# Patient Record
Sex: FEMALE | Race: WHITE | HISPANIC OR LATINO | Employment: OTHER | URBAN - METROPOLITAN AREA
[De-identification: names, ages, dates, MRNs, and addresses within clinical notes are randomized per-mention and may not be internally consistent; named-entity substitution may affect disease eponyms.]

---

## 2017-01-16 ENCOUNTER — OFFICE VISIT (OUTPATIENT)
Dept: RHEUMATOLOGY | Facility: CLINIC | Age: 72
End: 2017-01-16
Payer: MEDICARE

## 2017-01-16 ENCOUNTER — LAB VISIT (OUTPATIENT)
Dept: LAB | Facility: HOSPITAL | Age: 72
End: 2017-01-16
Attending: INTERNAL MEDICINE
Payer: MEDICARE

## 2017-01-16 VITALS
DIASTOLIC BLOOD PRESSURE: 84 MMHG | HEART RATE: 85 BPM | SYSTOLIC BLOOD PRESSURE: 151 MMHG | WEIGHT: 125.69 LBS | BODY MASS INDEX: 25.34 KG/M2 | HEIGHT: 59 IN

## 2017-01-16 DIAGNOSIS — M81.0 OSTEOPOROSIS: ICD-10-CM

## 2017-01-16 DIAGNOSIS — M19.049 CMC ARTHRITIS: Primary | ICD-10-CM

## 2017-01-16 DIAGNOSIS — M15.4 EROSIVE OSTEOARTHRITIS OF RIGHT HAND: ICD-10-CM

## 2017-01-16 LAB
ALBUMIN SERPL BCP-MCNC: 3.9 G/DL
ALP SERPL-CCNC: 71 U/L
ALT SERPL W/O P-5'-P-CCNC: 14 U/L
ANION GAP SERPL CALC-SCNC: 9 MMOL/L
AST SERPL-CCNC: 20 U/L
BILIRUB SERPL-MCNC: 0.1 MG/DL
BUN SERPL-MCNC: 14 MG/DL
CALCIUM SERPL-MCNC: 9.7 MG/DL
CHLORIDE SERPL-SCNC: 103 MMOL/L
CO2 SERPL-SCNC: 26 MMOL/L
CREAT SERPL-MCNC: 0.8 MG/DL
EST. GFR  (AFRICAN AMERICAN): >60 ML/MIN/1.73 M^2
EST. GFR  (NON AFRICAN AMERICAN): >60 ML/MIN/1.73 M^2
GLUCOSE SERPL-MCNC: 97 MG/DL
POTASSIUM SERPL-SCNC: 4.3 MMOL/L
PROT SERPL-MCNC: 7.3 G/DL
SODIUM SERPL-SCNC: 138 MMOL/L

## 2017-01-16 PROCEDURE — 3077F SYST BP >= 140 MM HG: CPT | Mod: S$GLB,,, | Performed by: INTERNAL MEDICINE

## 2017-01-16 PROCEDURE — 1157F ADVNC CARE PLAN IN RCRD: CPT | Mod: S$GLB,,, | Performed by: INTERNAL MEDICINE

## 2017-01-16 PROCEDURE — 36415 COLL VENOUS BLD VENIPUNCTURE: CPT

## 2017-01-16 PROCEDURE — 3079F DIAST BP 80-89 MM HG: CPT | Mod: S$GLB,,, | Performed by: INTERNAL MEDICINE

## 2017-01-16 PROCEDURE — 99999 PR PBB SHADOW E&M-EST. PATIENT-LVL III: CPT | Mod: PBBFAC,,, | Performed by: INTERNAL MEDICINE

## 2017-01-16 PROCEDURE — 1160F RVW MEDS BY RX/DR IN RCRD: CPT | Mod: S$GLB,,, | Performed by: INTERNAL MEDICINE

## 2017-01-16 PROCEDURE — 99214 OFFICE O/P EST MOD 30 MIN: CPT | Mod: S$GLB,,, | Performed by: INTERNAL MEDICINE

## 2017-01-16 PROCEDURE — 99499 UNLISTED E&M SERVICE: CPT | Mod: S$GLB,,, | Performed by: INTERNAL MEDICINE

## 2017-01-16 PROCEDURE — 80053 COMPREHEN METABOLIC PANEL: CPT

## 2017-01-16 PROCEDURE — 1159F MED LIST DOCD IN RCRD: CPT | Mod: S$GLB,,, | Performed by: INTERNAL MEDICINE

## 2017-01-16 RX ORDER — HYDROCODONE BITARTRATE AND ACETAMINOPHEN 5; 325 MG/1; MG/1
1 TABLET ORAL EVERY 8 HOURS
Refills: 0 | COMMUNITY
Start: 2016-12-22 | End: 2017-01-18 | Stop reason: SDUPTHER

## 2017-01-16 RX ORDER — DICLOFENAC SODIUM 10 MG/G
2 GEL TOPICAL 2 TIMES DAILY
Qty: 1 TUBE | Refills: 2 | Status: SHIPPED | OUTPATIENT
Start: 2017-01-16 | End: 2017-07-17 | Stop reason: SDUPTHER

## 2017-01-16 ASSESSMENT — ROUTINE ASSESSMENT OF PATIENT INDEX DATA (RAPID3)
PAIN SCORE: 8
MDHAQ FUNCTION SCORE: .3
PSYCHOLOGICAL DISTRESS SCORE: 4.4
TOTAL RAPID3 SCORE: 6.17
PATIENT GLOBAL ASSESSMENT SCORE: 9.5

## 2017-01-16 NOTE — PROGRESS NOTES
"Subjective:       Patient ID: Rola Richter is a 71 y.o. female.    Chief Complaint: CMC osteoarthritis Osteoporosis and Erosive OA  HPI 70y/o female is here for follow up for osteoporosis and Erosive OA. Today, she is complaining of pain in both first CMC joints, 8/10 worse with activity, better with rest.  No joint swelling     On hydroxychloroquine 200 mg once a day for erosive OA.  Cannot tolerate higher dose of Plaquenil.  Eye exam pending  For osteoporosis, we will scheduled zoledronic acid in March 2017. Denies any recent falls or fractures      Review of Systems   Constitutional: Negative for fever.   Eyes: Negative for pain and redness.   Respiratory: Negative for cough and shortness of breath.    Cardiovascular: Negative for chest pain and leg swelling.   Gastrointestinal: Negative for abdominal distention and abdominal pain.   Genitourinary: Negative for genital sores and hematuria.   Neurological: Negative for tremors and seizures.   Psychiatric/Behavioral: Negative for agitation and behavioral problems.         Objective:     Visit Vitals    BP (!) 151/84 (BP Location: Right arm, Patient Position: Sitting, BP Method: Automatic)    Pulse 85    Ht 4' 11" (1.499 m)    Wt 57 kg (125 lb 10.6 oz)    BMI 25.38 kg/m2        Physical Exam   Constitutional: She is oriented to person, place, and time and well-developed, well-nourished, and in no distress.   HENT:   Head: Normocephalic and atraumatic.   Eyes: Conjunctivae and EOM are normal. Pupils are equal, round, and reactive to light.   Neck: Neck supple. No thyromegaly present.   Cardiovascular: Normal rate and regular rhythm.    Pulmonary/Chest: Effort normal and breath sounds normal.   Abdominal: Soft. Bowel sounds are normal.   Neurological: She is alert and oriented to person, place, and time.   Skin: Skin is warm and dry.     Psychiatric: Affect normal.   Musculoskeletal: She exhibits no edema.   Bilateral first CMC tender to palpate           "           Lab Results   Component Value Date    WBC 7.60 10/08/2016    HGB 11.6 (L) 10/08/2016    HCT 34.4 (L) 10/08/2016    MCV 91 10/08/2016     10/08/2016     CMP  Sodium   Date Value Ref Range Status   10/08/2016 138 136 - 145 mmol/L Final     Potassium   Date Value Ref Range Status   10/08/2016 4.2 3.5 - 5.1 mmol/L Final     Chloride   Date Value Ref Range Status   10/08/2016 108 95 - 110 mmol/L Final     CO2   Date Value Ref Range Status   10/08/2016 22 (L) 23 - 29 mmol/L Final     Glucose   Date Value Ref Range Status   10/08/2016 95 70 - 110 mg/dL Final     BUN, Bld   Date Value Ref Range Status   10/08/2016 10 8 - 23 mg/dL Final     Creatinine   Date Value Ref Range Status   10/08/2016 0.7 0.5 - 1.4 mg/dL Final     Calcium   Date Value Ref Range Status   10/08/2016 8.4 (L) 8.7 - 10.5 mg/dL Final     Total Protein   Date Value Ref Range Status   10/08/2016 6.6 6.0 - 8.4 g/dL Final     Albumin   Date Value Ref Range Status   10/08/2016 3.5 3.5 - 5.2 g/dL Final     Total Bilirubin   Date Value Ref Range Status   10/08/2016 0.4 0.1 - 1.0 mg/dL Final     Comment:     For infants and newborns, interpretation of results should be based  on gestational age, weight and in agreement with clinical  observations.  Premature Infant recommended reference ranges:  Up to 24 hours.............<8.0 mg/dL  Up to 48 hours............<12.0 mg/dL  3-5 days..................<15.0 mg/dL  6-29 days.................<15.0 mg/dL       Alkaline Phosphatase   Date Value Ref Range Status   10/08/2016 60 55 - 135 U/L Final     AST   Date Value Ref Range Status   10/08/2016 19 10 - 40 U/L Final     ALT   Date Value Ref Range Status   10/08/2016 13 10 - 44 U/L Final     Anion Gap   Date Value Ref Range Status   10/08/2016 8 8 - 16 mmol/L Final     eGFR if    Date Value Ref Range Status   10/08/2016 >60 >60 mL/min/1.73 m^2 Final     eGFR if non    Date Value Ref Range Status   10/08/2016 >60 >60  mL/min/1.73 m^2 Final     Comment:     Calculation used to obtain the estimated glomerular filtration  rate (eGFR) is the CKD-EPI equation. Since race is unknown   in our information system, the eGFR values for   -American and Non--American patients are given   for each creatinine result.       Lab Results   Component Value Date    WBC 7.60 10/08/2016    HGB 11.6 (L) 10/08/2016    HCT 34.4 (L) 10/08/2016    MCV 91 10/08/2016     10/08/2016     CMP  Sodium   Date Value Ref Range Status   10/08/2016 138 136 - 145 mmol/L Final     Potassium   Date Value Ref Range Status   10/08/2016 4.2 3.5 - 5.1 mmol/L Final     Chloride   Date Value Ref Range Status   10/08/2016 108 95 - 110 mmol/L Final     CO2   Date Value Ref Range Status   10/08/2016 22 (L) 23 - 29 mmol/L Final     Glucose   Date Value Ref Range Status   10/08/2016 95 70 - 110 mg/dL Final     BUN, Bld   Date Value Ref Range Status   10/08/2016 10 8 - 23 mg/dL Final     Creatinine   Date Value Ref Range Status   10/08/2016 0.7 0.5 - 1.4 mg/dL Final     Calcium   Date Value Ref Range Status   10/08/2016 8.4 (L) 8.7 - 10.5 mg/dL Final     Total Protein   Date Value Ref Range Status   10/08/2016 6.6 6.0 - 8.4 g/dL Final     Albumin   Date Value Ref Range Status   10/08/2016 3.5 3.5 - 5.2 g/dL Final     Total Bilirubin   Date Value Ref Range Status   10/08/2016 0.4 0.1 - 1.0 mg/dL Final     Comment:     For infants and newborns, interpretation of results should be based  on gestational age, weight and in agreement with clinical  observations.  Premature Infant recommended reference ranges:  Up to 24 hours.............<8.0 mg/dL  Up to 48 hours............<12.0 mg/dL  3-5 days..................<15.0 mg/dL  6-29 days.................<15.0 mg/dL       Alkaline Phosphatase   Date Value Ref Range Status   10/08/2016 60 55 - 135 U/L Final     AST   Date Value Ref Range Status   10/08/2016 19 10 - 40 U/L Final     ALT   Date Value Ref Range Status    10/08/2016 13 10 - 44 U/L Final     Anion Gap   Date Value Ref Range Status   10/08/2016 8 8 - 16 mmol/L Final     eGFR if    Date Value Ref Range Status   10/08/2016 >60 >60 mL/min/1.73 m^2 Final     eGFR if non    Date Value Ref Range Status   10/08/2016 >60 >60 mL/min/1.73 m^2 Final     Comment:     Calculation used to obtain the estimated glomerular filtration  rate (eGFR) is the CKD-EPI equation. Since race is unknown   in our information system, the eGFR values for   -American and Non--American patients are given   for each creatinine result.       Lab Results   Component Value Date    SEDRATE 17 12/14/2015     Lab Results   Component Value Date    CRP 3.0 12/14/2015       The bone mineral densities (BMD) of the lumbar spine as well as the trabecular bone of the left femoral neck were calculated using the DXA method. Values were then compared to the diagnostic criteria established by the WHO (World Health Organization).     Hip:  The BMD of the trabecular bone of the femoral neck was measured at 0.639 grams per cm2, with a young adult T-score of -2.0 and an age-matched Z score of -0.2. Based on WHO criteria this is consistent with osteopenia at moderate increased risk for fracture. The FRAX 10 year probability of major osteoporotic fracture is 11 percent and the 10 year probability of hip fracture is 2.4 percent.    Lumbar:   The BMD of the lumbar region measures 0.710 grams per cm2, with a young adult T score of -3.1, and an age-matched Z score of -0.9. Based on WHO criteria this is consistent with osteoporosis at high risk for fracture.    Assessment:   Bilateral CMC OA  Erosive osteoarthritis- on hydroxychloroquine 200 mg once daily  Osteoporosis-on Reclast -monitor CMP  Long-term use of hydroxychloroquine  GERD-stable   Plan:   Start Voltaren gel for CMC OA  Continue  HCQ to 200mg once  a day (cannot tolerate twice a day)  Follow-up with ophthalmology for HC  Q eye exam  Continue Reclast 5 mg IV yearly  Check baseline CMP today  Continue vitamin D [id number] UNITS a day   Take calcium via diet   Advised to soak hands in warm water and wax  Counseled for fall prevention   RTC in 6 months

## 2017-01-16 NOTE — MR AVS SNAPSHOT
[place] [facility]  [address]  [place] [address]  Phone: [phone]  Fax: [phone]                  Rola Richter   2017 1:00 PM   Office Visit    Description:  Female : 1945   Provider:  Jess Cabral MD   Department:  [place] [facility]           Reason for Visit     Follow-up           Diagnoses this Visit        Comments    CMC arthritis    -  Primary     Osteoporosis                To Do List           Future Appointments        Provider Department Dept Phone    2017 1:40 PM [facility] [phone]    2017 11:00 AM Kathy Jim PA-C [place] [facility] [phone]    2017 1:00 PM HRASHAYAN 2 [place] [facility] [phone]    2017 1:00 PM Timi Marcial MD [facility] [facility] [phone]    3/24/2017 11:00 AM Liseth Carias MD [place] [facility] [phone]      Goals (5 Years of Data)     None       These Medications        Disp Refills Start End    diclofenac sodium (VOLTAREN) 1 % Gel 1 Tube 2 2017     Apply 2 g topically 2 (two) times daily. - Topical    Pharmacy: [facility] - [address] Ph #: [phone]         [facility] On Call     [facility] Nurse Care [facility] -  Assistance  Registered nurses in the [facility] provide clinical advisement, health education, appointment booking, and other advisory services.  Call for this free service at [phone].             Medications           Message regarding Medications     Verify the changes and/or additions to your medication regime listed below are the same as discussed with your clinician today.  If any of these changes or additions are incorrect, please notify your healthcare provider.        START taking these NEW medications        Refills    diclofenac sodium (VOLTAREN) 1 % Gel 2    Sig: Apply 2 g topically 2 (two) times  "daily.    Class: Normal    Route: Topical           Verify that the below list of medications is an accurate representation of the medications you are currently taking.  If none reported, the list may be blank. If incorrect, please contact your healthcare provider. Carry this list with you in case of emergency.           Current Medications     cyclobenzaprine (FLEXERIL) 10 MG tablet Take 1 tablet (10 mg total) by mouth 3 (three) times daily.    esomeprazole (NEXIUM) 40 MG capsule Take 1 capsule (40 mg total) by mouth before breakfast.    estradiol (ESTRACE) 1 MG tablet Take 1 tablet (1 mg total) by mouth once daily.    fish oil-omega-3 fatty acids 300-1,000 mg capsule Take 2 g by mouth once daily.    guaifenesin (HUMIBID E) 400 mg Tab Take 400 mg by mouth.    hydrocodone-acetaminophen 5-325mg (NORCO) 5-325 mg per tablet Take 1 tablet by mouth every 8 (eight) hours.    multivit with min-folic acid 200 mcg Chew     polyethylene glycol (GLYCOLAX) 17 gram/dose powder Take 17 g by mouth once daily.    sertraline (ZOLOFT) 100 MG tablet Take 1 tablet (100 mg total) by mouth once daily.    sucralfate (CARAFATE) 1 gram tablet Take 1 g by mouth 4 (four) times daily.    diclofenac sodium (VOLTAREN) 1 % Gel Apply 2 g topically 2 (two) times daily.    hydroxychloroquine (PLAQUENIL) 200 mg tablet Take 1 tablet (200 mg total) by mouth 2 (two) times daily.           Clinical Reference Information           Vital Signs - Last Recorded  Most recent update: 1/16/2017  1:07 PM by Cesar Colon LPN    BP Pulse Ht Wt BMI    (!) 151/84 (BP Location: Right arm, Patient Position: Sitting, BP Method: Automatic) 85 4' 11" (1.499 m) 57 kg (125 lb 10.6 oz) 25.38 kg/m2      Blood Pressure          Most Recent Value    BP  (!)  151/84      Allergies as of 1/16/2017     Aspirin    Penicillins    Sulfa (Sulfonamide Antibiotics)      Immunizations Administered on Date of Encounter - 1/16/2017     None      Orders Placed During Today's Visit     " Future Labs/Procedures Expected by Expires    Comprehensive metabolic panel  1/16/2017 3/17/2018

## 2017-01-18 ENCOUNTER — OFFICE VISIT (OUTPATIENT)
Dept: PAIN MEDICINE | Facility: CLINIC | Age: 72
End: 2017-01-18
Payer: MEDICARE

## 2017-01-18 VITALS
WEIGHT: 125.69 LBS | HEIGHT: 59 IN | HEART RATE: 83 BPM | SYSTOLIC BLOOD PRESSURE: 133 MMHG | BODY MASS INDEX: 25.34 KG/M2 | DIASTOLIC BLOOD PRESSURE: 78 MMHG

## 2017-01-18 DIAGNOSIS — M79.18 MYOFASCIAL PAIN: Primary | ICD-10-CM

## 2017-01-18 DIAGNOSIS — M54.81 OCCIPITAL NEURALGIA OF LEFT SIDE: ICD-10-CM

## 2017-01-18 DIAGNOSIS — M47.812 SPONDYLOSIS OF CERVICAL REGION WITHOUT MYELOPATHY OR RADICULOPATHY: ICD-10-CM

## 2017-01-18 DIAGNOSIS — M50.30 DDD (DEGENERATIVE DISC DISEASE), CERVICAL: ICD-10-CM

## 2017-01-18 PROCEDURE — 1157F ADVNC CARE PLAN IN RCRD: CPT | Mod: S$GLB,,, | Performed by: PHYSICIAN ASSISTANT

## 2017-01-18 PROCEDURE — 99214 OFFICE O/P EST MOD 30 MIN: CPT | Mod: S$GLB,,, | Performed by: PHYSICIAN ASSISTANT

## 2017-01-18 PROCEDURE — 1159F MED LIST DOCD IN RCRD: CPT | Mod: S$GLB,,, | Performed by: PHYSICIAN ASSISTANT

## 2017-01-18 PROCEDURE — 3075F SYST BP GE 130 - 139MM HG: CPT | Mod: S$GLB,,, | Performed by: PHYSICIAN ASSISTANT

## 2017-01-18 PROCEDURE — 3078F DIAST BP <80 MM HG: CPT | Mod: S$GLB,,, | Performed by: PHYSICIAN ASSISTANT

## 2017-01-18 PROCEDURE — 99999 PR PBB SHADOW E&M-EST. PATIENT-LVL III: CPT | Mod: PBBFAC,,, | Performed by: PHYSICIAN ASSISTANT

## 2017-01-18 PROCEDURE — 1125F AMNT PAIN NOTED PAIN PRSNT: CPT | Mod: S$GLB,,, | Performed by: PHYSICIAN ASSISTANT

## 2017-01-18 PROCEDURE — 1160F RVW MEDS BY RX/DR IN RCRD: CPT | Mod: S$GLB,,, | Performed by: PHYSICIAN ASSISTANT

## 2017-01-18 RX ORDER — HYDROCODONE BITARTRATE AND ACETAMINOPHEN 5; 325 MG/1; MG/1
1 TABLET ORAL EVERY 8 HOURS
Qty: 60 TABLET | Refills: 0 | Status: SHIPPED | OUTPATIENT
Start: 2017-02-18 | End: 2017-03-19

## 2017-01-18 RX ORDER — HYDROCODONE BITARTRATE AND ACETAMINOPHEN 5; 325 MG/1; MG/1
1 TABLET ORAL EVERY 8 HOURS
Qty: 60 TABLET | Refills: 0 | Status: SHIPPED | OUTPATIENT
Start: 2017-03-19 | End: 2017-03-08 | Stop reason: SDUPTHER

## 2017-01-18 RX ORDER — HYDROCODONE BITARTRATE AND ACETAMINOPHEN 5; 325 MG/1; MG/1
1 TABLET ORAL EVERY 8 HOURS
Qty: 60 TABLET | Refills: 0 | Status: SHIPPED | OUTPATIENT
Start: 2017-01-20 | End: 2017-02-06 | Stop reason: ALTCHOICE

## 2017-01-18 RX ORDER — CYCLOBENZAPRINE HCL 10 MG
10 TABLET ORAL 3 TIMES DAILY
Qty: 90 TABLET | Refills: 2 | Status: SHIPPED | OUTPATIENT
Start: 2017-01-18 | End: 2017-02-17

## 2017-01-18 NOTE — MR AVS SNAPSHOT
[place] [facility]  [address] [place] [address] [address]  [place] [address]  Phone: [phone]                  Rola Richter   2017 11:00 AM   Office Visit    Description:  Female : 1945   Provider:  Kathy Jim PA-C   Department:  [place] [facility]                To Do List           Future Appointments        Provider Department Dept Phone    2017 10:40 AM MD [place] [facility] [phone]    2017 2:00 PM [facility] [place] [facility] [phone]    2017 1:00 PM MD [place] [facility] [phone]    3/24/2017 11:00 AM MD [place] [facility] [phone]    2017 9:30 AM [facility] [phone]      Goals (5 Years of Data)     None       These Medications        Disp Refills Start End    hydrocodone-acetaminophen 5-325mg (NORCO) 5-325 mg per tablet 60 tablet 0 2017    Take 1 tablet by mouth every 8 (eight) hours. - Oral    Pharmacy: [facility] - [place] [id number] [facility] Ph #: [phone]       hydrocodone-acetaminophen 5-325mg (NORCO) 5-325 mg per tablet 60 tablet 0 2017 3/19/2017    Take 1 tablet by mouth every 8 (eight) hours. - Oral    Pharmacy: [facility] - [facility] [address] [facility] Ph #: [phone]       hydrocodone-acetaminophen 5-325mg (NORCO) 5-325 mg per tablet 60 tablet 0 3/19/2017 2017    Take 1 tablet by mouth every 8 (eight) hours. - Oral    Pharmacy: [facility] - [facility] [address] [facility] Ph #: [phone]       cyclobenzaprine (FLEXERIL) 10 MG tablet 90 tablet 2 2017    Take 1 tablet (10 mg total) by mouth 3 (three) times daily. - Oral    Pharmacy: [facility] - [place], [place] - [address] Ph #: [phone]         [facility]     [facility] Nurse Care Line - 24/7 Assistance  Registered nurses in the [facility] provide clinical advisement, health education, appointment booking, and other advisory services.  Call for this free service at [phone].             Medications           Message regarding Medications     Verify the changes and/or additions to your medication regime listed below are the same as discussed with your clinician today.  If any of these changes or additions are incorrect, please notify your healthcare provider.        START taking these NEW medications        Refills    hydrocodone-acetaminophen 5-325mg (NORCO) 5-325 mg per tablet 0    Starting on: 2/18/2017    Sig: Take 1 tablet by mouth every 8 (eight) hours.    Class: Print    Route: Oral    hydrocodone-acetaminophen 5-325mg (NORCO) 5-325 mg per tablet 0    Starting on: 3/19/2017    Sig: Take 1 tablet by mouth every 8 (eight) hours.    Class: Print    Route: Oral           Verify that the below list of medications is an accurate representation of the medications you are currently taking.  If none reported, the list may be blank. If incorrect, please contact your healthcare provider. Carry this list with you in case of emergency.           Current Medications     cyclobenzaprine (FLEXERIL) 10 MG tablet Take 1 tablet (10 mg total) by mouth 3 (three) times daily.    diclofenac sodium (VOLTAREN) 1 % Gel Apply 2 g topically 2 (two) times daily.    esomeprazole (NEXIUM) 40 MG capsule Take 1 capsule (40 mg total) by mouth before breakfast.    estradiol (ESTRACE) 1 MG tablet Take 1 tablet (1 mg total) by mouth once daily.    fish oil-omega-3 fatty acids 300-1,000 mg capsule Take 2 g by mouth once daily.    guaifenesin (HUMIBID E) 400 mg Tab Take 400 mg by mouth.    hydrocodone-acetaminophen 5-325mg (NORCO) 5-325 mg per tablet Starting on Jan 20, 2017. Take 1 tablet by mouth every 8 (eight) hours.    hydroxychloroquine (PLAQUENIL) 200 mg tablet Take 1 tablet (200 mg  "total) by mouth 2 (two) times daily.    multivit with min-folic acid 200 mcg Chew     polyethylene glycol (GLYCOLAX) 17 gram/dose powder Take 17 g by mouth once daily.    sertraline (ZOLOFT) 100 MG tablet Take 1 tablet (100 mg total) by mouth once daily.    sucralfate (CARAFATE) 1 gram tablet Take 1 g by mouth 4 (four) times daily.    hydrocodone-acetaminophen 5-325mg (NORCO) 5-325 mg per tablet Starting on Feb 18, 2017. Take 1 tablet by mouth every 8 (eight) hours.    hydrocodone-acetaminophen 5-325mg (NORCO) 5-325 mg per tablet Starting on Mar 19, 2017. Take 1 tablet by mouth every 8 (eight) hours.           Clinical Reference Information           Vital Signs - Last Recorded  Most recent update: 1/18/2017 11:20 AM by Jaleesa Ag LPN    BP Pulse Ht Wt BMI    133/78 83 4' 11" (1.499 m) 57 kg (125 lb 10.6 oz) 25.38 kg/m2      Blood Pressure          Most Recent Value    BP  133/78      Allergies as of 1/18/2017     Aspirin    Penicillins    Sulfa (Sulfonamide Antibiotics)      Immunizations Administered on Date of Encounter - 1/18/2017     None      "

## 2017-01-18 NOTE — PROGRESS NOTES
Referring Physician: No ref. provider found    PCP: Liseth Carias MD      CC: neck and mid back pain    Interval history: Ms. Richter is a 71 y.o. female with neck pain and occipital neuralgia who presents today for f/u and medication refills.  She received moderate benefit from occipital nerve blocks and trigger point injections in the past. Pain has returned to baseline. She would like to repeat these injections.  LCV a TENs unit was ordered but she didn't receive it.   She takes Flexeril with moderate benefit.  She also takes Norco 5 mg every 12 hours as needed with some moderate benefit as well.  She denies any weakness.  No bowel bladder changes.   Pain today is rated 8/10.    Prior HPI:   Patient is 70-year-old female with past history history of cervical DDD, cervical spondylosis and chronic headaches.  She recently moved here from [place], [place].  She is treated in the past by neurology.  She states having constant burning pain over the left side of her posterior scalp.  Pain radiates to her neck as well as a frontal.  She also has left-sided neck pain as well.  She denies any radicular arm pain.  No numbness or weakness.  She states having cervical epidural steroid injection at past with minimal benefit.  She also has had decided of cervical nerve blocks in the past with moderate benefit.  Most recent injection was performed 2 months ago.  She desires repeat injection.  She currently takes Norco 10 mg every 12 hours as needed with moderate benefit.  She also takes Zanaflex 4 mg every 8 hours with mild benefits.  She rates her pain 7/10 today.    Pain intervention history: s/p left occipital nerve blocks on 1/2016 with 50% relief of her headaches    ROS:  CONSTITUTIONAL: No fevers, chills, night sweats, wt. loss, appetite changes  SKIN: no rashes or itching  ENT: No headaches, head trauma, vision changes, or eye pain  LYMPH NODES: None noticed   CV: No chest pain, palpitations.   RESP: No shortness  of breath, dyspnea on exertion, cough, wheezing, or hemoptysis  GI: No nausea, emesis, diarrhea, constipation, melena, hematochezia, pain.    : No dysuria, hematuria, urgency, or frequency   HEME: No easy bruising, bleeding problems  PSYCHIATRIC: No depression, anxiety, psychosis, hallucinations.  NEURO: No seizures, memory loss, dizziness or difficulty sleeping  MSK: + History of present illness      Past Medical History   Diagnosis Date    Anxiety     Arthritis     Cataract     DDD (degenerative disc disease), lumbar     Depression     GERD (gastroesophageal reflux disease)     Hyperlipidemia     Hypertension      pt ststes she does not take meds anymore she just watches her weight//    Immunosuppressed status 2016    Osteoporosis      Past Surgical History   Procedure Laterality Date    Cholecystectomy       section       x 2    Hysterectomy      Vocal cord tumor removal      Laser periphery iridotomy Bilateral      OD 16 and OS touch up 2016    Appendectomy       Family History   Problem Relation Age of Onset    Osteoarthritis Mother     Alcohol abuse Mother     Osteoarthritis Sister     Diabetes Brother     No Known Problems Son     No Known Problems Sister     No Known Problems Sister     No Known Problems Sister     No Known Problems Brother     Arthritis Son     No Known Problems Son     Stroke Maternal Grandmother 99    Retinal detachment Neg Hx     Macular degeneration Neg Hx     Glaucoma Neg Hx     Amblyopia Neg Hx     Blindness Neg Hx     Cancer Neg Hx     Cataracts Neg Hx     Hypertension Neg Hx     Strabismus Neg Hx     Thyroid disease Neg Hx      Social History     Social History    Marital status:      Spouse name: N/A    Number of children: N/A    Years of education: N/A     Social History Main Topics    Smoking status: Never Smoker    Smokeless tobacco: Never Used    Alcohol use No    Drug use: No    Sexual activity: Yes  "    Partners: Male     Other Topics Concern    None     Social History Narrative         Medications/Allergies: See med card    Vitals:    01/18/17 1111   BP: 133/78   Pulse: 83   Weight: 57 kg (125 lb 10.6 oz)   Height: 4' 11" (1.499 m)   PainSc:   8   PainLoc: Neck         Physical exam:    GENERAL: A and O x3, the patient appears well groomed and is in no acute distress.  Skin: No rashes or obvious lesions  HEENT: normocephalic, atraumatic  CARDIOVASCULAR:  RRR  LUNGS: nonlabored breathing  ABDOMEN: soft, nontender   UPPER EXTREMITIES: Normal alignment, normal range of motion, no atrophy, no skin changes,  hair growth and nail growth normal and equal bilaterally. No swelling, no tenderness.    LOWER EXTREMITIES:  Normal alignment, normal range of motion, no atrophy, no skin changes,  hair growth and nail growth normal and equal bilaterally. No swelling, no tenderness.  CERVICAL SPINE:  Cervical spine: ROM is full in flexion, extension and lateral rotation with moderate increased pain.  Spurling's maneuver causes no neck pain to either side.  Myofascial exam:  Tenderness to palpation across cervical paraspinous region bilaterally, L>R.  Tenderness over the left occipital notch      MENTAL STATUS: normal orientation, speech, language, and fund of knowledge for social situation.  Emotional state appropriate.    CRANIAL NERVES:  II:  PERRL bilaterally,   III,IV,VI: EOMI.    V:  Facial sensation equal bilaterally  VII:  Facial motor function normal.  VIII:  Hearing equal to finger rub bilaterally  IX/X: Gag normal, palate symmetric  XI:  Shoulder shrug equal, head turn equal  XII:  Tongue midline without fasciculations      MOTOR: Tone and bulk: normal bilateral upper and lower Strength: normal   Delt Bi Tri WE WF     R 5 5 5 5 5 5   L 5 5 5 5 5 5     IP ADD ABD Quad TA Gas HAM  R 5 5 5 5 5 5 5  L 5 5 5 5 5 5 5    SENSATION: Light touch and pinprick intact bilaterally  REFLEXES: normal, symmetric, nonbrisk.  Toes " down, no clonus. No hoffmans.  GAIT: normal rise, base, steps, and arm swing.        Imaging:  Cervical MRI March 2015 ([place], [place])  - C2-3 disc space is hypoplastic and there is ankylosis of the facet joints bilaterally.  At C3-4, there is disc desiccation.  Disc bulging does not affect the spinal cord, but there is left uncovertebral joint hypertrophy.  At C4-5, there is disc desiccation and loss of height.  The facet joints are degenerative and hypertrophic.  There is slightly subluxation of C4-C5.  No central canal stenosis  C5-6, there is disc desiccation loss of height.  There is left uncovertebral joint hypertrophy.  C6-7, there is disc desiccation loss of height  C7-T1, there is disc desiccation.  Mild disc bulging.    Assessment:  Mrs. Richter is a 71 y.o. female with     1. Myofascial pain    2. Spondylosis of cervical region without myelopathy or radiculopathy    3. Occipital neuralgia of left side    4. DDD (degenerative disc disease), cervical        Plan:  1.  I have stressed the importance of physical activity and exercise to improve overall health  2. Schedule cervical paraspinal TPIs and occipital nerve blocks with Dr. Grimaldo  3. Norco 5mg q12hrs as needed for pain.  Script provided for three months.   reviewed  4. Ordered TENS unit for her residual myofascial pain  5. Follow up in three months  All medication management was performed by Dr. Timothy Grimaldo

## 2017-01-19 ENCOUNTER — TELEPHONE (OUTPATIENT)
Dept: PSYCHIATRY | Facility: CLINIC | Age: 72
End: 2017-01-19

## 2017-01-19 NOTE — TELEPHONE ENCOUNTER
For documentation purpose only. Reached out to patient x2 days, left multiple voicemail. Patient in need of follow-Up appointment with Dr. Benítez for continued care.  Kimberly

## 2017-01-25 ENCOUNTER — OFFICE VISIT (OUTPATIENT)
Dept: PAIN MEDICINE | Facility: CLINIC | Age: 72
End: 2017-01-25
Payer: MEDICARE

## 2017-01-25 VITALS
HEIGHT: 59 IN | DIASTOLIC BLOOD PRESSURE: 78 MMHG | BODY MASS INDEX: 25.2 KG/M2 | SYSTOLIC BLOOD PRESSURE: 150 MMHG | HEART RATE: 101 BPM | WEIGHT: 125 LBS

## 2017-01-25 DIAGNOSIS — M54.81 OCCIPITAL NEURALGIA OF LEFT SIDE: Primary | ICD-10-CM

## 2017-01-25 DIAGNOSIS — M50.30 DDD (DEGENERATIVE DISC DISEASE), CERVICAL: ICD-10-CM

## 2017-01-25 DIAGNOSIS — M79.18 MYOFASCIAL PAIN: ICD-10-CM

## 2017-01-25 PROCEDURE — 1160F RVW MEDS BY RX/DR IN RCRD: CPT | Mod: S$GLB,,, | Performed by: ANESTHESIOLOGY

## 2017-01-25 PROCEDURE — 3077F SYST BP >= 140 MM HG: CPT | Mod: S$GLB,,, | Performed by: ANESTHESIOLOGY

## 2017-01-25 PROCEDURE — 64405 NJX AA&/STRD GR OCPL NRV: CPT | Mod: 59,LT,S$GLB, | Performed by: ANESTHESIOLOGY

## 2017-01-25 PROCEDURE — 1159F MED LIST DOCD IN RCRD: CPT | Mod: S$GLB,,, | Performed by: ANESTHESIOLOGY

## 2017-01-25 PROCEDURE — 3078F DIAST BP <80 MM HG: CPT | Mod: S$GLB,,, | Performed by: ANESTHESIOLOGY

## 2017-01-25 PROCEDURE — 1125F AMNT PAIN NOTED PAIN PRSNT: CPT | Mod: S$GLB,,, | Performed by: ANESTHESIOLOGY

## 2017-01-25 PROCEDURE — 1157F ADVNC CARE PLAN IN RCRD: CPT | Mod: S$GLB,,, | Performed by: ANESTHESIOLOGY

## 2017-01-25 PROCEDURE — 99999 PR PBB SHADOW E&M-EST. PATIENT-LVL III: CPT | Mod: PBBFAC,,, | Performed by: ANESTHESIOLOGY

## 2017-01-25 PROCEDURE — 20553 NJX 1/MLT TRIGGER POINTS 3/>: CPT | Mod: 59,S$GLB,, | Performed by: ANESTHESIOLOGY

## 2017-01-25 PROCEDURE — 99213 OFFICE O/P EST LOW 20 MIN: CPT | Mod: 25,S$GLB,, | Performed by: ANESTHESIOLOGY

## 2017-01-25 RX ORDER — METHYLPREDNISOLONE ACETATE 40 MG/ML
40 INJECTION, SUSPENSION INTRA-ARTICULAR; INTRALESIONAL; INTRAMUSCULAR; SOFT TISSUE ONCE
Status: COMPLETED | OUTPATIENT
Start: 2017-01-25 | End: 2017-01-25

## 2017-01-25 RX ORDER — BUPIVACAINE HYDROCHLORIDE 2.5 MG/ML
10 INJECTION, SOLUTION EPIDURAL; INFILTRATION; INTRACAUDAL ONCE
Status: COMPLETED | OUTPATIENT
Start: 2017-01-25 | End: 2017-01-25

## 2017-01-25 RX ADMIN — BUPIVACAINE HYDROCHLORIDE 25 MG: 2.5 INJECTION, SOLUTION EPIDURAL; INFILTRATION; INTRACAUDAL at 11:01

## 2017-01-25 RX ADMIN — METHYLPREDNISOLONE ACETATE 40 MG: 40 INJECTION, SUSPENSION INTRA-ARTICULAR; INTRALESIONAL; INTRAMUSCULAR; SOFT TISSUE at 11:01

## 2017-01-25 NOTE — PROGRESS NOTES
Referring Physician: No ref. provider found    PCP: Liseth Carias MD      CC: neck and left occipital pain    Interval history: Ms. Richter is a 71 y.o. female with neck pain and occipital neuralgia who presents today for repeat cervical paraspinal TPIs and left occipital nerve block.   She received moderate benefit from occipital nerve blocks and trigger point injections in the past. Pain has returned to baseline.  She takes Flexeril with moderate benefit.  She also takes Norco 5 mg every 12 hours as needed with some moderate benefit as well.  She denies any weakness.  No bowel bladder changes.   Pain today is rated 8/10.  Prior HPI:   Patient is 70-year-old female with past history history of cervical DDD, cervical spondylosis and chronic headaches.  She recently moved here from [place], [place].  She is treated in the past by neurology.  She states having constant burning pain over the left side of her posterior scalp.  Pain radiates to her neck as well as a frontal.  She also has left-sided neck pain as well.  She denies any radicular arm pain.  No numbness or weakness.  She states having cervical epidural steroid injection at past with minimal benefit.  She also has had decided of cervical nerve blocks in the past with moderate benefit.  Most recent injection was performed 2 months ago.  She desires repeat injection.  She currently takes Norco 10 mg every 12 hours as needed with moderate benefit.  She also takes Zanaflex 4 mg every 8 hours with mild benefits.  She rates her pain 7/10 today.    Pain intervention history: s/p left occipital nerve blocks on 1/2016 with 50% relief of her headaches    ROS:  CONSTITUTIONAL: No fevers, chills, night sweats, wt. loss, appetite changes  SKIN: no rashes or itching  ENT: No headaches, head trauma, vision changes, or eye pain  LYMPH NODES: None noticed   CV: No chest pain, palpitations.   RESP: No shortness of breath, dyspnea on exertion, cough, wheezing, or  hemoptysis  GI: No nausea, emesis, diarrhea, constipation, melena, hematochezia, pain.    : No dysuria, hematuria, urgency, or frequency   HEME: No easy bruising, bleeding problems  PSYCHIATRIC: No depression, anxiety, psychosis, hallucinations.  NEURO: No seizures, memory loss, dizziness or difficulty sleeping  MSK: + History of present illness      Past Medical History   Diagnosis Date    Anxiety     Arthritis     Cataract     DDD (degenerative disc disease), lumbar     Depression     GERD (gastroesophageal reflux disease)     Hyperlipidemia     Hypertension      pt ststes she does not take meds anymore she just watches her weight//    Immunosuppressed status 2016    Osteoporosis      Past Surgical History   Procedure Laterality Date    Cholecystectomy       section       x 2    Hysterectomy      Vocal cord tumor removal      Laser periphery iridotomy Bilateral      OD 16 and OS touch up 2016    Appendectomy       Family History   Problem Relation Age of Onset    Osteoarthritis Mother     Alcohol abuse Mother     Osteoarthritis Sister     Diabetes Brother     No Known Problems Son     No Known Problems Sister     No Known Problems Sister     No Known Problems Sister     No Known Problems Brother     Arthritis Son     No Known Problems Son     Stroke Maternal Grandmother 99    Retinal detachment Neg Hx     Macular degeneration Neg Hx     Glaucoma Neg Hx     Amblyopia Neg Hx     Blindness Neg Hx     Cancer Neg Hx     Cataracts Neg Hx     Hypertension Neg Hx     Strabismus Neg Hx     Thyroid disease Neg Hx      Social History     Social History    Marital status:      Spouse name: N/A    Number of children: N/A    Years of education: N/A     Social History Main Topics    Smoking status: Never Smoker    Smokeless tobacco: Never Used    Alcohol use No    Drug use: No    Sexual activity: Yes     Partners: Male     Other Topics Concern     "None     Social History Narrative         Medications/Allergies: See med card    Vitals:    01/25/17 1035   BP: (!) 150/78   Pulse: 101   Weight: 56.7 kg (125 lb)   Height: 4' 11" (1.499 m)   PainSc:   6         Physical exam:    GENERAL: A and O x3, the patient appears well groomed and is in no acute distress.  Skin: No rashes or obvious lesions  HEENT: normocephalic, atraumatic  CARDIOVASCULAR:  RRR  LUNGS: nonlabored breathing  ABDOMEN: soft, nontender   UPPER EXTREMITIES: Normal alignment, normal range of motion, no atrophy, no skin changes,  hair growth and nail growth normal and equal bilaterally. No swelling, no tenderness.    LOWER EXTREMITIES:  Normal alignment, normal range of motion, no atrophy, no skin changes,  hair growth and nail growth normal and equal bilaterally. No swelling, no tenderness.  CERVICAL SPINE:  Cervical spine: ROM is full in flexion, extension and lateral rotation with moderate increased pain.  Spurling's maneuver causes no neck pain to either side.  Myofascial exam:  Tenderness to palpation across cervical paraspinous region bilaterally, L>R.  Tenderness over the left occipital notch      MENTAL STATUS: normal orientation, speech, language, and fund of knowledge for social situation.  Emotional state appropriate.    CRANIAL NERVES:  II:  PERRL bilaterally,   III,IV,VI: EOMI.    V:  Facial sensation equal bilaterally  VII:  Facial motor function normal.  VIII:  Hearing equal to finger rub bilaterally  IX/X: Gag normal, palate symmetric  XI:  Shoulder shrug equal, head turn equal  XII:  Tongue midline without fasciculations      MOTOR: Tone and bulk: normal bilateral upper and lower Strength: normal   Delt Bi Tri WE WF     R 5 5 5 5 5 5   L 5 5 5 5 5 5     IP ADD ABD Quad TA Gas HAM  R 5 5 5 5 5 5 5  L 5 5 5 5 5 5 5    SENSATION: Light touch and pinprick intact bilaterally  REFLEXES: normal, symmetric, nonbrisk.  Toes down, no clonus. No hoffmans.  GAIT: normal rise, base, steps, " and arm swing.        Imaging:  Cervical MRI March 2015 ([facility] [place])  - C2-3 disc space is hypoplastic and there is ankylosis of the facet joints bilaterally.  At C3-4, there is disc desiccation.  Disc bulging does not affect the spinal cord, but there is left uncovertebral joint hypertrophy.  At C4-5, there is disc desiccation and loss of height.  The facet joints are degenerative and hypertrophic.  There is slightly subluxation of C4-C5.  No central canal stenosis  C5-6, there is disc desiccation loss of height.  There is left uncovertebral joint hypertrophy.  C6-7, there is disc desiccation loss of height  C7-T1, there is disc desiccation.  Mild disc bulging.    Assessment:  Mrs. Richter is a 71 y.o. female with     1. Occipital neuralgia of left side    2. Myofascial pain    3. DDD (degenerative disc disease), cervical        Plan:  1.  I have stressed the importance of physical activity and exercise to improve overall health  2. Perform cervical paraspinal TPIs and left occipital nerve block today  3. Continue Norco 5mg q12hrs as needed for pain.  Script provided for three months last visit  4. Ordered TENS unit for her residual myofascial pain  5. Follow up in three months      Trigger point injection   Pre-procedure diagnosis: Myofascial trigger points in bilateral cervical and trapezius region  Post-procedure diagnosis: Same    Upon examination, 4 trigger points were noted in the above noted regions.   Timeout performed prior to procedure.  After the procedure was described and informed consent obtained, the skin over the trigger points was cleaned with isopropyl alcohol. Using a 27-gauge needle, injection of 1ml of 0.25% ropivacaine with 10 mg of methylprednisolone was injected into each trigger point. The patient noted concordant radiating pain upon injection of her trigger points. The skin was then cleaned and bandages were applied to sites as necessary. Blood loss was <2ml. We observed the patient for 10  minutes after the procedure for any complications, and as there were none noted, the patient was then discharged home with instructions. We advised the patient that during the duration of the local anesthetic effect, this would be the optimal time to perform stretching exercises for the muscles which contain the trigger points. We also advised the patient to continue these exercises daily as this would likely give the best chance of resolution of the trigger points.

## 2017-01-25 NOTE — PROCEDURES
"Nerve Block  Date/Time: 1/25/2017 11:25 AM  Performed by: MACIEJ BACON  Authorized by: MACIEJ BACON   Consent Done: Yes  Time out: Immediately prior to procedure a "time out" was called to verify the correct patient, procedure, equipment, support staff and site/side marked as required.  Indications: pain relief  Body area: head  Nerve: greater occipital  Laterality: left  Patient sedated: no  Medications administered: DepoMedrol 40 mg injectionPreparation: Patient was prepped and draped in the usual sterile fashion.  Patient position: sitting  Needle size: 27 G  Location technique: ultrasound guidance and anatomical landmarks  Local Anesthetic: bupivacaine 0.25% without epinephrine   Anesthetic total: 3 mL  Outcome: pain improved  Patient tolerance: Patient tolerated the procedure well with no immediate complications        "

## 2017-02-02 ENCOUNTER — DOCUMENTATION ONLY (OUTPATIENT)
Dept: FAMILY MEDICINE | Facility: CLINIC | Age: 72
End: 2017-02-02

## 2017-02-02 NOTE — PROGRESS NOTES
Pre-Visit Chart Review  For Appointment Scheduled on 02/06/2017    Health Maintenance Due   Topic Date Due    Fecal Occult Blood Test (FOBT)  1945    Colonoscopy  05/31/1995

## 2017-02-06 ENCOUNTER — OFFICE VISIT (OUTPATIENT)
Dept: FAMILY MEDICINE | Facility: CLINIC | Age: 72
End: 2017-02-06
Payer: MEDICARE

## 2017-02-06 VITALS
SYSTOLIC BLOOD PRESSURE: 139 MMHG | DIASTOLIC BLOOD PRESSURE: 78 MMHG | HEART RATE: 78 BPM | TEMPERATURE: 99 F | WEIGHT: 125 LBS | BODY MASS INDEX: 25.2 KG/M2 | HEIGHT: 59 IN

## 2017-02-06 DIAGNOSIS — Z00.00 ENCOUNTER FOR PREVENTIVE HEALTH EXAMINATION: Primary | ICD-10-CM

## 2017-02-06 DIAGNOSIS — D84.9 IMMUNOSUPPRESSED STATUS: ICD-10-CM

## 2017-02-06 DIAGNOSIS — F43.10 PTSD (POST-TRAUMATIC STRESS DISORDER): ICD-10-CM

## 2017-02-06 DIAGNOSIS — E78.5 HYPERLIPIDEMIA, UNSPECIFIED HYPERLIPIDEMIA TYPE: ICD-10-CM

## 2017-02-06 DIAGNOSIS — Z12.31 ENCOUNTER FOR SCREENING MAMMOGRAM FOR BREAST CANCER: ICD-10-CM

## 2017-02-06 DIAGNOSIS — I10 ESSENTIAL HYPERTENSION: ICD-10-CM

## 2017-02-06 DIAGNOSIS — K21.9 GASTROESOPHAGEAL REFLUX DISEASE, ESOPHAGITIS PRESENCE NOT SPECIFIED: ICD-10-CM

## 2017-02-06 DIAGNOSIS — F41.9 ANXIETY: ICD-10-CM

## 2017-02-06 DIAGNOSIS — H04.129 DRY EYE SYNDROME, UNSPECIFIED LATERALITY: ICD-10-CM

## 2017-02-06 DIAGNOSIS — Z13.31 POSITIVE DEPRESSION SCREENING: ICD-10-CM

## 2017-02-06 DIAGNOSIS — M81.0 OSTEOPOROSIS: ICD-10-CM

## 2017-02-06 PROCEDURE — 99999 PR PBB SHADOW E&M-EST. PATIENT-LVL V: CPT | Mod: PBBFAC,,, | Performed by: PHYSICIAN ASSISTANT

## 2017-02-06 PROCEDURE — 99499 UNLISTED E&M SERVICE: CPT | Mod: S$GLB,,, | Performed by: PHYSICIAN ASSISTANT

## 2017-02-06 PROCEDURE — 3075F SYST BP GE 130 - 139MM HG: CPT | Mod: S$GLB,,, | Performed by: PHYSICIAN ASSISTANT

## 2017-02-06 PROCEDURE — G0439 PPPS, SUBSEQ VISIT: HCPCS | Mod: S$GLB,,, | Performed by: PHYSICIAN ASSISTANT

## 2017-02-06 PROCEDURE — 3078F DIAST BP <80 MM HG: CPT | Mod: S$GLB,,, | Performed by: PHYSICIAN ASSISTANT

## 2017-02-06 RX ORDER — BUSPIRONE HYDROCHLORIDE 10 MG/1
TABLET ORAL
COMMUNITY
Start: 2017-01-30 | End: 2017-05-16 | Stop reason: SDUPTHER

## 2017-02-06 NOTE — Clinical Note
Primary Care Providers: Liseth Carias MD, MD (General)  Your patient was seen today for a HRA visit. Gap(s) in care (HEDIS gaps) have been identified during this visit that require additional testing and possible follow up.  Orders Placed This Encounter     Mammo Digital Screening Bilateral With CAD         Standing Status: Future         Standing Expiration Date: 4/6/2018         Order Specific Question: May the Radiologist modify the order per protocol to meet the clinical needs of the patient?         Answer: Yes     Lipid panel         Standing Status: Future         Standing Expiration Date: 4/7/2018     Ambulatory referral to Optometry   These orders were placed using [facility] approved protocol and any results will be forwarded to your office for appropriate follow up. I have included a copy of my visit note; please review the note and feel free to contact me with any questions.   Thank you for allowing me to participate in the care of your patients. Adriana Siddiqui PA-C

## 2017-02-06 NOTE — MR AVS SNAPSHOT
[facility]  [address]  [place] [address]  Phone: [phone]  Fax: [phone]                  Rola Richter   2017 2:00 PM   Office Visit    Description:  Female : 1945   Provider:  Adriana Siddiqui PA-C   Department:  [facility]           Reason for Visit     Health Risk Assessment           Diagnoses this Visit        Comments    Encounter for preventive health examination    -  Primary     Anxiety     Stable, continue to follow with Psychiatry    PTSD (post-traumatic stress disorder)         Essential hypertension         Gastroesophageal reflux disease, esophagitis presence not specified         Hyperlipidemia, unspecified hyperlipidemia type         Osteoporosis         Immunosuppressed status         Encounter for screening mammogram for breast cancer         Positive depression screening         Dry eye syndrome, unspecified laterality                To Do List           Future Appointments        Provider Department Dept Phone    2017 9:00 AM LAB, KYLEIGHBRUNA SAT [facility] - Lab [phone]    2017 9:30 AM [facility] MAMMO1 [facility]- Mammography [phone]    2017 9:45 AM Juan Manuel Eduardo OD [facility] - Optometry [phone]    3/1/2017 1:30 PM Timi Marcial MD [facility] - Gastroenterology [phone]    3/24/2017 11:00 AM Liseth Carias MD [facility] [phone]      Goals (5 Years of Data)     None      [facility]     [facility] -  Assistance  Registered nurses in the [facility] provide clinical advisement, health education, appointment booking, and other advisory services.  Call for this free service at [phone].             Medications           Message regarding Medications     Verify the changes and/or additions to your medication regime listed below are the same as discussed with your clinician today.  If any of these changes or additions are  "incorrect, please notify your healthcare provider.             Verify that the below list of medications is an accurate representation of the medications you are currently taking.  If none reported, the list may be blank. If incorrect, please contact your healthcare provider. Carry this list with you in case of emergency.           Current Medications     cyclobenzaprine (FLEXERIL) 10 MG tablet Take 1 tablet (10 mg total) by mouth 3 (three) times daily.    diclofenac sodium (VOLTAREN) 1 % Gel Apply 2 g topically 2 (two) times daily.    esomeprazole (NEXIUM) 40 MG capsule Take 1 capsule (40 mg total) by mouth before breakfast.    estradiol (ESTRACE) 1 MG tablet Take 1 tablet (1 mg total) by mouth once daily.    fish oil-omega-3 fatty acids 300-1,000 mg capsule Take 2 g by mouth once daily.    guaifenesin (HUMIBID E) 400 mg Tab Take 400 mg by mouth.    hydrocodone-acetaminophen 5-325mg (NORCO) 5-325 mg per tablet Starting on Feb 18, 2017. Take 1 tablet by mouth every 8 (eight) hours.    hydrocodone-acetaminophen 5-325mg (NORCO) 5-325 mg per tablet Starting on Mar 19, 2017. Take 1 tablet by mouth every 8 (eight) hours.    hydroxychloroquine (PLAQUENIL) 200 mg tablet Take 1 tablet (200 mg total) by mouth 2 (two) times daily.    multivit with min-folic acid 200 mcg Chew     polyethylene glycol (GLYCOLAX) 17 gram/dose powder Take 17 g by mouth once daily.    sertraline (ZOLOFT) 100 MG tablet Take 1 tablet (100 mg total) by mouth once daily.    sucralfate (CARAFATE) 1 gram tablet Take 1 g by mouth 4 (four) times daily.    busPIRone (BUSPAR) 10 MG tablet            Clinical Reference Information           Your Vitals Were     BP Pulse Temp Height Weight BMI    139/78 (BP Location: Right arm, Patient Position: Sitting, BP Method: Automatic) 78 98.6 °F (37 °C) (Oral) 4' 11" (1.499 m) 56.7 kg (125 lb) 25.25 kg/m2      Blood Pressure          Most Recent Value    BP  139/78      Allergies as of 2/6/2017     Aspirin    " Penicillins    Sulfa (Sulfonamide Antibiotics)      Immunizations Administered on Date of Encounter - 2/6/2017     None      Orders Placed During Today's Visit      Normal Orders This Visit    Ambulatory referral to Optometry     Future Labs/Procedures Expected by Expires    Lipid panel  2/6/2017 4/7/2018    Mammo Digital Screening Bilateral With CAD  2/6/2017 4/6/2018      Instructions      Controlling High Blood Pressure  High blood pressure (hypertension) is often called the silent killer. This is because many people who have it dont know it. High blood pressure is defined as 140/90 mm Hg or higher. Know your blood pressure and remember to check it regularly. Doing so can save your life. Here are some things you can do to help control your blood pressure.    Choose heart-healthy foods  · Select low-salt, low-fat foods. Limit sodium intake to 2,400 mg per day or the amount suggested by your healthcare provider.  · Limit canned, dried, cured, packaged, and fast foods. These can contain a lot of salt.  · Eat 8 to 10 servings of fruits and vegetables every day.  · Choose lean meats, fish, or chicken.  · Eat whole-grain pasta, brown rice, and beans.  · Eat 2 to 3 servings of low-fat or fat-free dairy products.  · Ask your doctor about the DASH eating plan. This plan helps reduce blood pressure.  · When you go to a restaurant, ask that your meal be prepared with no added salt.  Maintain a healthy weight  · Ask your healthcare provider how many calories to eat a day. Then stick to that number.  · Ask your healthcare provider what weight range is healthiest for you. If you are overweight, a weight loss of only 3% to 5% of your body weight can help lower blood pressure. Generally, a good weight loss goal is to lose 10% of your body weight in a year.  · Limit snacks and sweets.  · Get regular exercise.  Get up and get active  · Choose activities you enjoy. Find ones you can do with friends or family. This includes  bicycling, dancing, walking, and jogging.  · Park farther away from building entrances.  · Use stairs instead of the elevator.  · When you can, walk or bike instead of driving.  · [place] leaves, garden, or do household repairs.  · Be active at a moderate to vigorous level of physical activity for at least 40 minutes for a minimum of 3 to 4 days a week.   Manage stress  · Make time to relax and enjoy life. Find time to laugh.  · Communicate your concerns with your loved ones and your healthcare provider.  · Visit with family and friends, and keep up with hobbies.  Limit alcohol and quit smoking  · Men should have no more than 2 drinks per day.  · Women should have no more than 1 drink per day.  · Talk with your healthcare provider about quitting smoking. Smoking significantly increases your risk for heart disease and stroke. Ask your healthcare provider about community smoking cessation programs and other options.  Medicines  If lifestyle changes arent enough, your healthcare provider may prescribe high blood pressure medicine. Take all medicines as prescribed. If you have any questions about your medicines, ask your healthcare provider before stopping or changing them.   Date Last Reviewed: 4/27/2016  © 0980-5163 [employer]. [address] [place] [address], [place] [address]. All rights reserved. This information is not intended as a substitute for professional medical care. Always follow your healthcare professional's instructions.        Counseling and Referral of Other Preventative  (Italic type indicates deductible and co-insurance are waived)    Patient Name: Rola Richter  Today's Date: 2/6/2017      SERVICE LIMITATIONS RECOMMENDATION    Vaccines    · Pneumococcal (once after 65)    · Influenza (annually)    · Hepatitis B (if medium/high risk)    · Prevnar 13      Hepatitis B medium/high risk factors:       - End-stage renal disease       - Hemophiliacs who received Factor VII or         IX  concentrates       - Clients of institutions for the mentally             retarded       - Persons who live in the same house as          a HepB carrier       - Homosexual men       - Illicit injectable drug abusers     Pneumococcal: Done, no repeat necessary     Influenza: Done, repeat in one year     Hepatitis B: Not indicated     Prevnar 13: Done, no repeat necessary    Mammogram (biennial age 50-74)  Annually (age 40 or over)  Recommended annual, over due at this time.    Pap (up to age 70 and after 70 if unknown history or abnormal study last 10 years)   Not recommended     The USPSTF recommends against screening for cervical cancer in women who have had a hysterectomy with removal of the cervix and who do not have a history of a high-grade precancerous lesion (cervical intraepithelial neoplasia [RADHA] grade 2 or 3) or cervical cancer.     Colorectal cancer screening (to age 75)    · Fecal occult blood test (annual)  · Flexible sigmoidoscopy (5y)  · Screening colonoscopy (10y)  · Barium enema   Recommended today in clinic, pt to bring in a copy of records from out of state colon[place] performed 2-3 years ago    Diabetes self-management training (no USPSTF recommendations)  Requires referral by treating physician for patient with diabetes or renal disease. 10 hours of initial DSMT sessions of no less than 30 minutes each in a continuous 12-month period. 2 hours of follow-up DSMT in subsequent years. Not indicated    Bone mass measurements (age 65 & older, biennial)  Requires diagnosis related to osteoporosis or estrogen deficiency. Biennial benefit unless patient has history of long-term glucocorticoid  Last done 12/2015, recommend to repeat every 2-3  years    Glaucoma screening (no USPSTF recommendation)  Diabetes mellitus, family history   , age 50 or over    American, age 65 or over  Not indicated    Medical nutrition therapy for diabetes or renal disease (no recommended schedule)   Requires referral by treating physician for patient with diabetes or renal disease or kidney transplant within the past 3 years.  Can be provided in same year as diabetes self-management training (DSMT), and CMS recommends medical nutrition therapy take place after DSMT. Up to 3 hours for initial year and 2 hours in subsequent years.  Not indicated    Cardiovascular screening blood tests (every 5 years)  · Fasting lipid panel  Order as a panel if possible  Scheduled, see appointments    Diabetes screening tests (at least every 3 years, Medicare covers annually or at 6-month intervals for prediabetic patients)  · Fasting blood sugar (FBS) or glucose tolerance test (GTT)  Patient must be diagnosed with one of the following:       - Hypertension       - Dyslipidemia       - Obesity (BMI 30kg/m2)       - Previous elevated impaired FBS or GTT       ... or any two of the following:       - Overweight (BMI 25 but <30)       - Family history of diabetes       - Age 65 or older       - History of gestational diabetes or birth of baby weighing more than 9 pounds  Done this year, repeat every year    Abdominal aortic aneurysm screening (once)  · Sonogram   Limited to patients who meet one of the following criteria:       - Men who are 65-75 years old and have smoked more than 100 cigarette in their lifetime       - Anyone with a family history of abdominal aortic aneurysm       - Anyone recommended for screening by the USPSTF  Not indicated    HIV screening (annually for increased risk patients)  · HIV-1 and HIV-2 by EIA, or LAURY, rapid antibody test or oral mucosa transudate  Patients must be at increased risk for HIV infection per USPSTF guidelines or pregnant. Tests covered annually for patient at increased risk or as requested by the patient. Pregnant patients may receive up to 3 tests during pregnancy.  Risks discussed, screening is not recommended    Smoking cessation counseling (up to 8 sessions per year)  Patients  must be asymptomatic of tobacco-related conditions to receive as a preventative service. Not indicated    Subsequent annual wellness visit  At least 12 months since last AWV  Return in one year     The following information is provided to all patients.  This information is to help you find resources for any of the problems found today that may be affecting your health:                Living healthy guide: www.[facility].[place].[facility]      Understanding Diabetes: www.diabetes.org      Eating healthy: www.cdc.gov/healthyweight      CDC home safety checklist: www.cdc.gov/steadi/patient.html      Agency on Aging: www.goea.[place]      Alcoholics anonymous (AA): www.aa.org      Physical Activity: www.milagros.nih.gov/vq4inqk      Tobacco use: www.quitwithusla.org          Language Assistance Services     ATTENTION: Language assistance services are available, free of charge. Please call [phone].      ATENCIÓN: Si bennyla ba, tiene a garcia disposición servicios gratuitos de asistencia lingüística. Llame al [phone].     CHÚ Ý: N?u b?n nói Ti?ng Vi?t, có các d?ch v? h? tr? ngôn ng? mi?n phí dành cho b?n. G?i s? [phone].         [facility] complies with applicable Federal civil rights laws and does not discriminate on the basis of race, color, national origin, age, disability, or sex.

## 2017-02-06 NOTE — PATIENT INSTRUCTIONS
Controlling High Blood Pressure  High blood pressure (hypertension) is often called the silent killer. This is because many people who have it dont know it. High blood pressure is defined as 140/90 mm Hg or higher. Know your blood pressure and remember to check it regularly. Doing so can save your life. Here are some things you can do to help control your blood pressure.    Choose heart-healthy foods  · Select low-salt, low-fat foods. Limit sodium intake to 2,400 mg per day or the amount suggested by your healthcare provider.  · Limit canned, dried, cured, packaged, and fast foods. These can contain a lot of salt.  · Eat 8 to 10 servings of fruits and vegetables every day.  · Choose lean meats, fish, or chicken.  · Eat whole-grain pasta, brown rice, and beans.  · Eat 2 to 3 servings of low-fat or fat-free dairy products.  · Ask your doctor about the DASH eating plan. This plan helps reduce blood pressure.  · When you go to a restaurant, ask that your meal be prepared with no added salt.  Maintain a healthy weight  · Ask your healthcare provider how many calories to eat a day. Then stick to that number.  · Ask your healthcare provider what weight range is healthiest for you. If you are overweight, a weight loss of only 3% to 5% of your body weight can help lower blood pressure. Generally, a good weight loss goal is to lose 10% of your body weight in a year.  · Limit snacks and sweets.  · Get regular exercise.  Get up and get active  · Choose activities you enjoy. Find ones you can do with friends or family. This includes bicycling, dancing, walking, and jogging.  · Park farther away from building entrances.  · Use stairs instead of the elevator.  · When you can, walk or bike instead of driving.  · [place] leaves, garden, or do household repairs.  · Be active at a moderate to vigorous level of physical activity for at least 40 minutes for a minimum of 3 to 4 days a week.   Manage stress  · Make time to relax and enjoy  life. Find time to laugh.  · Communicate your concerns with your loved ones and your healthcare provider.  · Visit with family and friends, and keep up with hobbies.  Limit alcohol and quit smoking  · Men should have no more than 2 drinks per day.  · Women should have no more than 1 drink per day.  · Talk with your healthcare provider about quitting smoking. Smoking significantly increases your risk for heart disease and stroke. Ask your healthcare provider about community smoking cessation programs and other options.  Medicines  If lifestyle changes arent enough, your healthcare provider may prescribe high blood pressure medicine. Take all medicines as prescribed. If you have any questions about your medicines, ask your healthcare provider before stopping or changing them.   Date Last Reviewed: 4/27/2016  © 9347-1266 [employer]. [address] [place] [address], [place] [address]. All rights reserved. This information is not intended as a substitute for professional medical care. Always follow your healthcare professional's instructions.        Counseling and Referral of Other Preventative  (Italic type indicates deductible and co-insurance are waived)    Patient Name: Rola Richter  Today's Date: 2/6/2017      SERVICE LIMITATIONS RECOMMENDATION    Vaccines    · Pneumococcal (once after 65)    · Influenza (annually)    · Hepatitis B (if medium/high risk)    · Prevnar 13      Hepatitis B medium/high risk factors:       - End-stage renal disease       - Hemophiliacs who received Factor VII or         IX concentrates       - Clients of institutions for the mentally             retarded       - Persons who live in the same house as          a HepB carrier       - Homosexual men       - Illicit injectable drug abusers     Pneumococcal: Done, no repeat necessary     Influenza: Done, repeat in one year     Hepatitis B: Not indicated     Prevnar 13: Done, no repeat necessary    Mammogram (biennial age 50-74)   Annually (age 40 or over)  Recommended annual, over due at this time.    Pap (up to age 70 and after 70 if unknown history or abnormal study last 10 years)   Not recommended     The USPSTF recommends against screening for cervical cancer in women who have had a hysterectomy with removal of the cervix and who do not have a history of a high-grade precancerous lesion (cervical intraepithelial neoplasia [RADHA] grade 2 or 3) or cervical cancer.     Colorectal cancer screening (to age 75)    · Fecal occult blood test (annual)  · Flexible sigmoidoscopy (5y)  · Screening colonoscopy (10y)  · Barium enema   Recommended today in clinic, pt to bring in a copy of records from out of state colonoscopy performed 2-3 years ago    Diabetes self-management training (no USPSTF recommendations)  Requires referral by treating physician for patient with diabetes or renal disease. 10 hours of initial DSMT sessions of no less than 30 minutes each in a continuous 12-month period. 2 hours of follow-up DSMT in subsequent years. Not indicated    Bone mass measurements (age 65 & older, biennial)  Requires diagnosis related to osteoporosis or estrogen deficiency. Biennial benefit unless patient has history of long-term glucocorticoid  Last done 12/2015, recommend to repeat every 2-3  years    Glaucoma screening (no USPSTF recommendation)  Diabetes mellitus, family history   , age 50 or over    American, age 65 or over  Not indicated    Medical nutrition therapy for diabetes or renal disease (no recommended schedule)  Requires referral by treating physician for patient with diabetes or renal disease or kidney transplant within the past 3 years.  Can be provided in same year as diabetes self-management training (DSMT), and CMS recommends medical nutrition therapy take place after DSMT. Up to 3 hours for initial year and 2 hours in subsequent years.  Not indicated    Cardiovascular screening blood tests (every 5  years)  · Fasting lipid panel  Order as a panel if possible  Scheduled, see appointments    Diabetes screening tests (at least every 3 years, Medicare covers annually or at 6-month intervals for prediabetic patients)  · Fasting blood sugar (FBS) or glucose tolerance test (GTT)  Patient must be diagnosed with one of the following:       - Hypertension       - Dyslipidemia       - Obesity (BMI 30kg/m2)       - Previous elevated impaired FBS or GTT       ... or any two of the following:       - Overweight (BMI 25 but <30)       - Family history of diabetes       - Age 65 or older       - History of gestational diabetes or birth of baby weighing more than 9 pounds  Done this year, repeat every year    Abdominal aortic aneurysm screening (once)  · Sonogram   Limited to patients who meet one of the following criteria:       - Men who are 65-75 years old and have smoked more than 100 cigarette in their lifetime       - Anyone with a family history of abdominal aortic aneurysm       - Anyone recommended for screening by the USPSTF  Not indicated    HIV screening (annually for increased risk patients)  · HIV-1 and HIV-2 by EIA, or LAURY, rapid antibody test or oral mucosa transudate  Patients must be at increased risk for HIV infection per USPSTF guidelines or pregnant. Tests covered annually for patient at increased risk or as requested by the patient. Pregnant patients may receive up to 3 tests during pregnancy.  Risks discussed, screening is not recommended    Smoking cessation counseling (up to 8 sessions per year)  Patients must be asymptomatic of tobacco-related conditions to receive as a preventative service. Not indicated    Subsequent annual wellness visit  At least 12 months since last AWV  Return in one year     The following information is provided to all patients.  This information is to help you find resources for any of the problems found today that may be affecting your health:                Living healthy  guide: www.[facility].[place].[facility]      Understanding Diabetes: www.diabetes.org      Eating healthy: www.cdc.gov/healthyweight      CDC home safety checklist: www.cdc.gov/steadi/patient.html      Agency on Aging: www.goea.[place].[facility]      Alcoholics anonymous (AA): www.aa.org      Physical Activity: www.milagros.nih.gov/di7jrtd      Tobacco use: www.quitwithusla.org

## 2017-02-06 NOTE — PROGRESS NOTES
Subjective:       Patient ID: Rola Richter is here for   Chief Complaint   Patient presents with    Health Risk Assessment       Rola Richter was seen today in a face to face encounter for a comprehensive Health Risk Assessment. This visit included a review of her total medical record available at the time of this visit.        Past Medical, Family, and Surgical History:      This information was reviewed and reconciled today during this encounter. Medications were reviewed and reconciled today. The active problem list has been reviewed/updated and reconciled today. These active problems are listed in the diagnosis list below.    **See completed HRA forms/Questionnaires/Flowsheets for ROS information.    Physical Exam   Constitutional: She is oriented to person, place, and time. She appears well-developed and well-nourished.   HENT:   Head: Normocephalic and atraumatic.   Right Ear: Hearing and external ear normal.   Left Ear: Hearing and external ear normal.   Eyes: Conjunctivae and EOM are normal. Pupils are equal, round, and reactive to light.   Cardiovascular: Normal rate, regular rhythm, normal heart sounds and intact distal pulses.    No femoral/aortic bruits. No lower extremity ulcerations.   Pulmonary/Chest: Effort normal and breath sounds normal.   Neurological: She is alert and oriented to person, place, and time.   Skin: Skin is warm and dry.   Psychiatric: She has a normal mood and affect. Her behavior is normal.   Vitals reviewed.        Diagnoses (identified today) and assoicated plan for each diagnosis:         Encounter for preventive health examination    Anxiety  Comments:  Stable, continue to follow with Psychiatry    PTSD (post-traumatic stress disorder)  Comments:  Stable, overdue for follow up with Psychiatry    Essential hypertension  Comments:  Controlled, not currently on any medications, continue to follow with PCP    Gastroesophageal reflux disease, esophagitis presence not  specified  Comments:  Stable, on nexium 40 mg daily, continue to follow with GI    Hyperlipidemia, unspecified hyperlipidemia type  Comments:  Uncontrolled, overdue for lipid panel, continue to follow with PCP, consider starting statin if it remains uncontrolled  Orders:  -     Lipid panel; Future; Expected date: 2/6/17    Osteoporosis  Comments:  Stable, last DEXA 12/2015 on reclast infusions annually, continue to follow with Rheumatology    Immunosuppressed status  Comments:  Stable, on HCQ per Rheumatology for inflammatory osteoarthritis. Annual eye exam recommended.     Positive depression screening  Comments:  Encouraged pt to f/u with Psychiatry. If patient has any SI/HI thoughts then she is to call 911 or seek emergency medical attention.    Encounter for screening mammogram for breast cancer  -     Mammo Digital Screening Bilateral With CAD; Future; Expected date: 2/6/17    Dry eye syndrome, unspecified laterality  -     Ambulatory referral to Optometry    Advised patient to obtain zoster vaccine if approved by Rheumatology since she is on immunomodulating medications. Pt to bring her last colonoscopy report from out of state GI so health maintenance can be updated and colonoscopy can be ordered as needed for colon cancer screening.   Establish care with Dr. Carias as scheduled.

## 2017-02-08 ENCOUNTER — HOSPITAL ENCOUNTER (OUTPATIENT)
Dept: RADIOLOGY | Facility: CLINIC | Age: 72
Discharge: HOME | End: 2017-02-08
Attending: FAMILY MEDICINE
Payer: MEDICARE

## 2017-02-08 DIAGNOSIS — Z12.31 ENCOUNTER FOR SCREENING MAMMOGRAM FOR BREAST CANCER: ICD-10-CM

## 2017-02-08 PROCEDURE — 77067 SCR MAMMO BI INCL CAD: CPT | Mod: TC

## 2017-02-08 PROCEDURE — 77067 SCR MAMMO BI INCL CAD: CPT | Mod: 26,,, | Performed by: RADIOLOGY

## 2017-02-08 PROCEDURE — 77063 BREAST TOMOSYNTHESIS BI: CPT | Mod: 26,,, | Performed by: RADIOLOGY

## 2017-02-09 ENCOUNTER — OFFICE VISIT (OUTPATIENT)
Dept: OPTOMETRY | Facility: CLINIC | Age: 72
End: 2017-02-09
Payer: MEDICARE

## 2017-02-09 DIAGNOSIS — H25.13 NUCLEAR SCLEROSIS, BILATERAL: ICD-10-CM

## 2017-02-09 DIAGNOSIS — H43.393 VITREOUS FLOATER, BILATERAL: ICD-10-CM

## 2017-02-09 DIAGNOSIS — H35.89 RPE MOTTLING OF MACULA: ICD-10-CM

## 2017-02-09 DIAGNOSIS — H57.12 PAIN AROUND EYE, LEFT: Primary | ICD-10-CM

## 2017-02-09 PROCEDURE — 99999 PR PBB SHADOW E&M-EST. PATIENT-LVL II: CPT | Mod: PBBFAC,,, | Performed by: OPTOMETRIST

## 2017-02-09 PROCEDURE — 99499 UNLISTED E&M SERVICE: CPT | Mod: S$GLB,,, | Performed by: OPTOMETRIST

## 2017-02-09 PROCEDURE — 92014 COMPRE OPH EXAM EST PT 1/>: CPT | Mod: S$GLB,,, | Performed by: OPTOMETRIST

## 2017-02-09 NOTE — PROGRESS NOTES
HPI     CC: Pt here for irritated left eye over the last month. Pt states had   blood vessel in eye burst and eye was red for a few weeks and now is sore   1 month later.     Pt sees shadow in left eye over the last 1 month, small, floats by, not   always there. Pt denies flashes.    (+) art tears as needed both eyes    (-) pain / (+) discomfort  (-) headaches  (-) diplopia   (-) flashes / (+) hx of  floaters         Last edited by Juan Manuel Eduardo, OD on 2/9/2017 10:22 AM.     ROS     Positive for: Eyes    Negative for: Constitutional, Gastrointestinal, Neurological, Skin,   Genitourinary, Musculoskeletal, HENT, Endocrine, Cardiovascular,   Respiratory, Psychiatric, Allergic/Imm, Heme/Lymph    Last edited by Juan Manuel Eduardo, OD on 2/9/2017 10:09 AM. (History)        Assessment /Plan     For exam results, see Encounter Report.    Pain around eye, left    Vitreous floater, bilateral    Nuclear sclerosis, bilateral      Pain around OS, pt states having a lot of allergy and sinus issues lately. Discussed findings, recommend OTC antihistamine approved for HBP. F/u with PCP as directed.    Vitreous floaters OU. No holes, tears, breaks, RD OD, OS. Negative amador's sign. High refractive error with peripheral reticular degeneration OU. Discussed signs/symptoms of RD. Return immediately if worsening floaters, any flashes, decreased vision, veiling of vision, or trauma occurs.    Moderate NS ou. Discussed possible ocular affects of cataracts. Acceptable BCVA OU. Discussed treatment options. Surgery not recommended at this time. Monitor yearly.     Return prn for updated spec Rx.    Macular mottling OU. Recommend otc AREDs vitamins po. Amsler grid for home self monitoring. Monitor yearly.    RTC in 1 year for comprehensive eye exam, or sooner prn.

## 2017-02-09 NOTE — LETTER
February 9, 2017      Liseth Carias MD  [address]  [place] [address]           [facility] [place] [facility]  [address] [place] [address] Drive [address]  [place] [address]  Phone: [phone]          Patient: Rola Richter   MR Number: [id number]   YOB: 1945   Date of Visit: 2/9/2017       Dear Dr. Liseth Carias:    Thank you for referring Rola Richter to me for evaluation. Attached you will find relevant portions of my assessment and plan of care.    If you have questions, please do not hesitate to call me. I look forward to following Rola Richter along with you.    Sincerely,    Juan Manuel Eduardo, OD    Enclosure  CC:  No Recipients    If you would like to receive this communication electronically, please contact [email or web address] or [phone] to request more information on [employer] access.    For providers and/or their staff who would like to refer a patient to [facility], please contact us through our one-stop-shop provider referral line, [facility], at [phone].    If you feel you have received this communication in error or would no longer like to receive these types of communications, please e-mail [email or web address]

## 2017-02-14 RX ORDER — CYCLOBENZAPRINE HCL 10 MG
TABLET ORAL
Qty: 90 TABLET | Refills: 1 | Status: SHIPPED | OUTPATIENT
Start: 2017-02-14 | End: 2017-11-29 | Stop reason: SDUPTHER

## 2017-03-01 ENCOUNTER — OFFICE VISIT (OUTPATIENT)
Dept: GASTROENTEROLOGY | Facility: CLINIC | Age: 72
End: 2017-03-01
Payer: MEDICARE

## 2017-03-01 VITALS
DIASTOLIC BLOOD PRESSURE: 61 MMHG | RESPIRATION RATE: 20 BRPM | SYSTOLIC BLOOD PRESSURE: 120 MMHG | WEIGHT: 125 LBS | BODY MASS INDEX: 25.2 KG/M2 | HEART RATE: 80 BPM | HEIGHT: 59 IN

## 2017-03-01 DIAGNOSIS — K21.9 LPRD (LARYNGOPHARYNGEAL REFLUX DISEASE): ICD-10-CM

## 2017-03-01 DIAGNOSIS — K21.9 GASTROESOPHAGEAL REFLUX DISEASE WITHOUT ESOPHAGITIS: Primary | ICD-10-CM

## 2017-03-01 DIAGNOSIS — R13.10 DYSPHAGIA, UNSPECIFIED TYPE: Primary | ICD-10-CM

## 2017-03-01 DIAGNOSIS — R13.14 PHARYNGOESOPHAGEAL DYSPHAGIA: ICD-10-CM

## 2017-03-01 PROCEDURE — 99214 OFFICE O/P EST MOD 30 MIN: CPT | Mod: S$GLB,,, | Performed by: INTERNAL MEDICINE

## 2017-03-01 PROCEDURE — 1160F RVW MEDS BY RX/DR IN RCRD: CPT | Mod: S$GLB,,, | Performed by: INTERNAL MEDICINE

## 2017-03-01 PROCEDURE — 99499 UNLISTED E&M SERVICE: CPT | Mod: S$GLB,,, | Performed by: INTERNAL MEDICINE

## 2017-03-01 PROCEDURE — 1159F MED LIST DOCD IN RCRD: CPT | Mod: S$GLB,,, | Performed by: INTERNAL MEDICINE

## 2017-03-01 PROCEDURE — 1126F AMNT PAIN NOTED NONE PRSNT: CPT | Mod: S$GLB,,, | Performed by: INTERNAL MEDICINE

## 2017-03-01 PROCEDURE — 1157F ADVNC CARE PLAN IN RCRD: CPT | Mod: S$GLB,,, | Performed by: INTERNAL MEDICINE

## 2017-03-01 PROCEDURE — 3078F DIAST BP <80 MM HG: CPT | Mod: S$GLB,,, | Performed by: INTERNAL MEDICINE

## 2017-03-01 PROCEDURE — 99999 PR PBB SHADOW E&M-EST. PATIENT-LVL III: CPT | Mod: PBBFAC,,, | Performed by: INTERNAL MEDICINE

## 2017-03-01 PROCEDURE — 3074F SYST BP LT 130 MM HG: CPT | Mod: S$GLB,,, | Performed by: INTERNAL MEDICINE

## 2017-03-01 NOTE — PROGRESS NOTES
"[facility]    CC: GERD    HPI 71 y.o. female is here to follow up for her moderate GERD associated with laryngopharyngeal reflux, heartburn and dysphagia.  She states that she is compliant with nexium 40 mg BID although she does sometimes forget to take the AM dose until after she eats.  Despite BID dosing she does have some persistent heartburn and dysphagia to solids.  No nausea, emesis or GI bleeding.  No unintentional weight loss.  She does have hoarseness although this is likely due to prior surgery on the throat that was done many years ago.    Past Medical History:   Diagnosis Date    Anxiety     Arthritis     Cataract     DDD (degenerative disc disease), lumbar     Depression     GERD (gastroesophageal reflux disease)     Hyperlipidemia     Hypertension     pt ststes she does not take meds anymore she just watches her weight//    Immunosuppressed status 11/17/2016    Osteoporosis          Review of Systems  General ROS: negative for - chills, fever or weight loss  Cardiovascular ROS: no chest pain or dyspnea on exertion  Gastrointestinal ROS: +GERD, dysphagia, no diarrhea    Physical Examination  /61 (BP Location: Left arm, Patient Position: Sitting, BP Method: Automatic)  Pulse 80  Resp 20  Ht 4' 11" (1.499 m)  Wt 56.7 kg (125 lb)  BMI 25.25 kg/m2  General appearance: alert, cooperative, no distress  HENT: Normocephalic, atraumatic, neck symmetrical, no nasal discharge   Abdomen: soft, NT ND BS present    Labs:  H/H 11/34    Assessment:   1. GERD  2. Laryngopharyngeal reflux  3. Dysphagia    Plan:  - Continue nexium 40 mg BID  - Continue lifestyle modification for chronic GERD  - Schedule EGD with possible dilation for dysphagia  - If EGD is unrevealing and dilation does not improve symptoms - I will attempt to further manage her symptoms conservatively with reassurance and continued PPI BID dosing.    Timi Marcial MD  [facility]  [address], " [address]  [place] [address]  Office: [phone]  Fax: [phone]

## 2017-03-08 ENCOUNTER — OFFICE VISIT (OUTPATIENT)
Dept: PSYCHIATRY | Facility: CLINIC | Age: 72
End: 2017-03-08
Payer: MEDICARE

## 2017-03-08 VITALS
SYSTOLIC BLOOD PRESSURE: 129 MMHG | HEIGHT: 59 IN | HEART RATE: 81 BPM | WEIGHT: 124.75 LBS | BODY MASS INDEX: 25.15 KG/M2 | DIASTOLIC BLOOD PRESSURE: 64 MMHG

## 2017-03-08 DIAGNOSIS — F41.9 ANXIETY: ICD-10-CM

## 2017-03-08 DIAGNOSIS — F43.10 PTSD (POST-TRAUMATIC STRESS DISORDER): Primary | ICD-10-CM

## 2017-03-08 PROCEDURE — 3078F DIAST BP <80 MM HG: CPT | Mod: S$GLB,,, | Performed by: PSYCHIATRY & NEUROLOGY

## 2017-03-08 PROCEDURE — 1157F ADVNC CARE PLAN IN RCRD: CPT | Mod: S$GLB,,, | Performed by: PSYCHIATRY & NEUROLOGY

## 2017-03-08 PROCEDURE — 90833 PSYTX W PT W E/M 30 MIN: CPT | Mod: S$GLB,,, | Performed by: PSYCHIATRY & NEUROLOGY

## 2017-03-08 PROCEDURE — 3074F SYST BP LT 130 MM HG: CPT | Mod: S$GLB,,, | Performed by: PSYCHIATRY & NEUROLOGY

## 2017-03-08 PROCEDURE — 1159F MED LIST DOCD IN RCRD: CPT | Mod: S$GLB,,, | Performed by: PSYCHIATRY & NEUROLOGY

## 2017-03-08 PROCEDURE — 99999 PR PBB SHADOW E&M-EST. PATIENT-LVL III: CPT | Mod: PBBFAC,,, | Performed by: PSYCHIATRY & NEUROLOGY

## 2017-03-08 PROCEDURE — 1160F RVW MEDS BY RX/DR IN RCRD: CPT | Mod: S$GLB,,, | Performed by: PSYCHIATRY & NEUROLOGY

## 2017-03-08 PROCEDURE — 99214 OFFICE O/P EST MOD 30 MIN: CPT | Mod: S$GLB,,, | Performed by: PSYCHIATRY & NEUROLOGY

## 2017-03-08 PROCEDURE — 99499 UNLISTED E&M SERVICE: CPT | Mod: S$GLB,,, | Performed by: PSYCHIATRY & NEUROLOGY
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
## 2017-03-14 ENCOUNTER — SURGERY (OUTPATIENT)
Age: 72
End: 2017-03-14

## 2017-03-14 ENCOUNTER — ANESTHESIA (OUTPATIENT)
Dept: ENDOSCOPY | Facility: HOSPITAL | Age: 72
End: 2017-03-14
Payer: MEDICARE

## 2017-03-14 ENCOUNTER — ANESTHESIA EVENT (OUTPATIENT)
Dept: ENDOSCOPY | Facility: HOSPITAL | Age: 72
End: 2017-03-14
Payer: MEDICARE

## 2017-03-14 ENCOUNTER — HOSPITAL ENCOUNTER (OUTPATIENT)
Facility: HOSPITAL | Age: 72
Discharge: HOME | End: 2017-03-14
Attending: INTERNAL MEDICINE | Admitting: INTERNAL MEDICINE
Payer: MEDICARE

## 2017-03-14 VITALS — RESPIRATION RATE: 16 BRPM

## 2017-03-14 DIAGNOSIS — R13.10 DYSPHAGIA: ICD-10-CM

## 2017-03-14 PROCEDURE — 63600175 PHARM REV CODE 636 W HCPCS

## 2017-03-14 PROCEDURE — 43248 EGD GUIDE WIRE INSERTION: CPT | Performed by: INTERNAL MEDICINE

## 2017-03-14 PROCEDURE — 25000003 PHARM REV CODE 250: Performed by: INTERNAL MEDICINE

## 2017-03-14 PROCEDURE — 27201012 HC FORCEPS, HOT/COLD, DISP: Performed by: INTERNAL MEDICINE

## 2017-03-14 PROCEDURE — D9220A PRA ANESTHESIA: Mod: ANES,,, | Performed by: ANESTHESIOLOGY

## 2017-03-14 PROCEDURE — D9220A PRA ANESTHESIA: Mod: CRNA,,, | Performed by: NURSE ANESTHETIST, CERTIFIED REGISTERED

## 2017-03-14 PROCEDURE — 37000009 HC ANESTHESIA EA ADD 15 MINS: Performed by: INTERNAL MEDICINE

## 2017-03-14 PROCEDURE — 27201089 HC SNARE, DISP (ANY): Performed by: INTERNAL MEDICINE

## 2017-03-14 PROCEDURE — 37000008 HC ANESTHESIA 1ST 15 MINUTES: Performed by: INTERNAL MEDICINE

## 2017-03-14 PROCEDURE — 25000003 PHARM REV CODE 250: Performed by: NURSE ANESTHETIST, CERTIFIED REGISTERED

## 2017-03-14 PROCEDURE — 88305 TISSUE EXAM BY PATHOLOGIST: CPT | Performed by: PATHOLOGY

## 2017-03-14 PROCEDURE — 43251 EGD REMOVE LESION SNARE: CPT | Performed by: INTERNAL MEDICINE

## 2017-03-14 PROCEDURE — 43248 EGD GUIDE WIRE INSERTION: CPT | Mod: 59,,, | Performed by: INTERNAL MEDICINE

## 2017-03-14 PROCEDURE — 43251 EGD REMOVE LESION SNARE: CPT | Mod: ,,, | Performed by: INTERNAL MEDICINE

## 2017-03-14 PROCEDURE — 43239 EGD BIOPSY SINGLE/MULTIPLE: CPT | Mod: 59,,, | Performed by: INTERNAL MEDICINE

## 2017-03-14 PROCEDURE — 43239 EGD BIOPSY SINGLE/MULTIPLE: CPT | Performed by: INTERNAL MEDICINE

## 2017-03-14 RX ORDER — PROPOFOL 10 MG/ML
INJECTION, EMULSION INTRAVENOUS
Status: COMPLETED
Start: 2017-03-14 | End: 2017-03-14

## 2017-03-14 RX ORDER — SODIUM CHLORIDE 9 MG/ML
INJECTION, SOLUTION INTRAVENOUS CONTINUOUS
Status: DISCONTINUED | OUTPATIENT
Start: 2017-03-14 | End: 2017-03-14 | Stop reason: HOSPADM

## 2017-03-14 RX ORDER — LIDOCAINE HYDROCHLORIDE 20 MG/ML
INJECTION, SOLUTION EPIDURAL; INFILTRATION; INTRACAUDAL; PERINEURAL
Status: DISCONTINUED
Start: 2017-03-14 | End: 2017-03-14 | Stop reason: HOSPADM

## 2017-03-14 RX ORDER — OMEPRAZOLE 40 MG/1
40 CAPSULE, DELAYED RELEASE ORAL DAILY
Qty: 90 CAPSULE | Refills: 3 | Status: SHIPPED | OUTPATIENT
Start: 2017-03-14 | End: 2017-03-24

## 2017-03-14 RX ORDER — LIDOCAINE HCL/PF 100 MG/5ML
SYRINGE (ML) INTRAVENOUS
Status: DISCONTINUED | OUTPATIENT
Start: 2017-03-14 | End: 2017-03-14

## 2017-03-14 RX ORDER — LIDOCAINE HYDROCHLORIDE 20 MG/ML
15 SOLUTION OROPHARYNGEAL ONCE
Status: COMPLETED | OUTPATIENT
Start: 2017-03-14 | End: 2017-03-14

## 2017-03-14 RX ADMIN — SODIUM CHLORIDE: 0.9 INJECTION, SOLUTION INTRAVENOUS at 08:03

## 2017-03-14 RX ADMIN — PROPOFOL 50 MG: 10 INJECTION, EMULSION INTRAVENOUS at 09:03

## 2017-03-14 RX ADMIN — LIDOCAINE HYDROCHLORIDE 100 MG: 20 INJECTION, SOLUTION INTRAVENOUS at 09:03

## 2017-03-14 RX ADMIN — LIDOCAINE HYDROCHLORIDE 15 ML: 20 SOLUTION ORAL; TOPICAL at 10:03

## 2017-03-14 RX ADMIN — PROPOFOL 100 MG: 10 INJECTION, EMULSION INTRAVENOUS at 09:03

## 2017-03-14 NOTE — DISCHARGE INSTRUCTIONS
Esophageal Dilation     A balloon dilator may be used to widen a stricture in the esophagus.   An esophageal dilation is a procedure used to widen a narrowed section of your esophagus. This is the tube that leads from your throat to your stomach. Narrowing (stricture) of the esophagus can cause problems. These include trouble swallowing. This sheet explains what to expect with esophageal dilation.  Why esophageal dilation is needed  Several problems can be treated with esophageal dilation. They include:  · Peptic stricture. This is caused by reflux esophagitis. With this problem, the esophagus is irritated by acid reflux (heartburn). This occurs when acid from your stomach flows back up into the esophagus. Stomach acid damages the lining of the esophagus. This leads to a buildup of scar tissue. As a result, the esophagus is narrowed.  · Schatzkis ring. This is an abnormal ring of tissue. It forms where the esophagus meets the stomach. It can cause trouble swallowing. It can also cause food to get stuck in the esophagus. The cause of this condition is not known.  · Achalasia. This condition stops food and liquids from moving into your stomach from the esophagus. It affects the lower esophageal sphincter (LES). The LES is a muscular ring that opens (relaxes) when you swallow. With achalasia, the LES does not relax. This condition can also cause problems with peristalsis. This is the normal muscular action of the esophagus that moves food into the stomach.  · Eosinophilic esophagitis. This is a redness and swelling (inflammation) in the esophagus. It is caused by an environmental trigger such as a food allergy. It can lead to pain, trouble swallowing, and strictures.  · Less common causes of stricture. Other causes of stricture include radiation treatment and cancer.  Before you have esophageal dilation  · Tell your provider about any medicines you take. This includes prescription medicines, over-the-counter  medicines, herbs, vitamins, and other supplements. Be sure to mention aspirin or any blood thinners youre taking.  · Let your provider know if you need to take antibiotics before dental procedures. You may need to take them before esophageal dilation as well.  · Tell your provider about any health conditions you have, such as heart or lung disease. Also mention any allergies to medicines.  · Youll need to have an empty stomach for the procedure. Follow your providers instructions for not eating and drinking before the procedure.  · Arrange to have a family member or friend drive you home after the procedure.  During the procedure  · You may be given local anesthesia to numb your throat. Youll also likely be given medicine to relax you. The procedure takes about 15 minutes. It does not cause trouble breathing.  · A tube called an endoscope (scope) is used. This is a narrow tube with a tiny light and camera at the end. The scope is inserted through your mouth and into your esophagus. It lets your provider see inside your esophagus. To help guide your provider, an imaging method called fluoroscopy may also be used. This creates a moving X-ray image on a computer screen.   · Next, special tiny tools are carefully guided through your mouth and down into the esophagus. They widen the stricture and are then removed. Different types of instruments are used. The type used depends on the size and cause of the stricture. Types include:  ¨ Balloon dilator. A tiny empty balloon is put into the stricture using an endoscope. The balloon is slowly filled with air. The air is removed from the balloon when the stricture is widened to the right size. Balloon dilators are used to treat many types of strictures.  ¨ Guided wire dilator. A thin wire is eased down the esophagus. A small tube thats wider on one end is guided down the wire. It is put into the stricture to stretch it. These dilators are used to treat all kinds of  strictures.  ¨ Bougies. These are weighted, cone-shaped tubes. Starting with smaller cones, your provider uses increasingly larger cones until the stricture is stretched the right amount. Bougies are often used to treat strictures that are simple (short, straight, and not very narrow).  After the procedure  · Youll be watched closely until your provider says youre ready to go home. Youll need to have a friend or family member drive you home.  · You may have a sore throat for the rest of the day.  · You may have pain behind your breastbone for a short time afterwards.  · You can start drinking fluids again after the numbness in your throat goes away. You can resume eating the same day or the next day.  Risks and possible complications  Risks and possible complications for esophageal dilation include:  · Infection  · A tear or hole in the esophagus lining, causing bleeding and possibly needing surgery to fix  · Risks of anesthesia  Follow-up  You may need to have the procedure repeated one or more times. This depends on the cause and extent of the narrowing. Repeat procedures can allow the dilation to take place more slowly. This reduces the risks of the procedure.  If your stricture was caused by reflux esophagitis, youll likely need to take medicine to treat that condition. Your provider will tell you more.  When to call your provider  Call your healthcare provider right away if you have any of the following after the procedure:  · Fever of 100.4°F (38.0°C)  · Chest pain  · Trouble swallowing  · Vomiting blood or material that looks like coffee grounds  · Bleeding  · Black, tarry, or bloody stools   Date Last Reviewed: 7/1/2016 © 2000-2016 [employer]. [address] [place] [address], [place] [address]. All rights reserved. This information is not intended as a substitute for professional medical care. Always follow your healthcare professional's instructions.        Gastritis (Adult)    Gastritis  is inflammation and irritation of the stomach lining. It can be present for a short time (acute) or be long lasting (chronic). Gastritis is often caused by infection with bacteria called H pylori. More than a third of people in the [place] have this bacteria in their bodies. In many cases, H pylori causes no problems or symptoms. In some people, though, the infection irritates the stomach lining and causes gastritis. Other causes of stomach irritation include drinking alcohol or taking pain-relieving medicines called NSAIDs (such as aspirin or ibuprofen).   Symptoms of gastritis can include:  · Abdominal pain or bloating  · Loss of appetite  · Nausea or vomiting  · Vomiting blood or having black stools  · Feeling more tired than usual  An inflamed and irritated stomach lining is more likely to develop a sore called an ulcer. To help prevent this, gastritis should be treated.  Home care  If needed, medicines may be prescribed. If you have H pylori infection, treating it will likely relieve your symptoms. Other changes can help reduce stomach irritation and help it heal.  · If you have been prescribed medicines for H pylori infection, take them as directed. Take all of the medicine until it is finished or your healthcare provider tells you to stop, even if you feel better.  · Your healthcare provider may recommend avoiding NSAIDs. If you take daily aspirin for your heart or other medical reasons, do not stop without talking to your healthcare provider first.  · Avoid drinking alcohol.  · Stop smoking. Smoking can irritate the stomach and delay healing. As much as possible, stay away from second hand smoke.  Follow-up care  Follow up with your healthcare provider, or as advised by our staff. Testing may be needed to check for inflammation or an ulcer.  When to seek medical advice  Call your healthcare provider for any of the following:  · Stomach pain that gets worse or moves to the lower right abdomen (appendix  area)  · Chest pain that appears or gets worse, or spreads to the back, neck, shoulder, or arm  · Frequent vomiting (cant keep down liquids)  · Blood in the stool or vomit (red or black in color)  · Feeling weak or dizzy  · Fever of 100.4ºF (38ºC) or higher, or as directed by your healthcare provider  Date Last Reviewed: 6/22/2015  © 6484-3294 [employer]. [address] [place] [address], [place] [address]. All rights reserved. This information is not intended as a substitute for professional medical care. Always follow your healthcare professional's instructions.

## 2017-03-14 NOTE — ANESTHESIA PREPROCEDURE EVALUATION
03/14/2017  Rola Richter is a 71 y.o., female.    [facility] Anesthesia Evaluation    I have reviewed the Patient Summary Reports.    I have reviewed the Nursing Notes.      Review of Systems  Anesthesia Hx:  No problems with previous Anesthesia    Cardiovascular:   Hypertension, well controlled    Hepatic/GI:   GERD, well controlled        Physical Exam  General:  Well nourished                 Anesthesia Plan  Type of Anesthesia, risks & benefits discussed:  Anesthesia Type:  general  Patient's Preference:   Intra-op Monitoring Plan:   Intra-op Monitoring Plan Comments:   Post Op Pain Control Plan:   Post Op Pain Control Plan Comments:   Induction:   IV  Beta Blocker:  Patient is not currently on a Beta-Blocker (No further documentation required).       Informed Consent: Patient understands risks and agrees with Anesthesia plan.  Questions answered. Anesthesia consent signed with patient.  ASA Score: 2     Day of Surgery Review of History & Physical:    H&P update referred to the surgeon.         Ready For Surgery From Anesthesia Perspective.

## 2017-03-14 NOTE — H&P (VIEW-ONLY)
"[facility]    CC: GERD    HPI 71 y.o. female is here to follow up for her moderate GERD associated with laryngopharyngeal reflux, heartburn and dysphagia.  She states that she is compliant with nexium 40 mg BID although she does sometimes forget to take the AM dose until after she eats.  Despite BID dosing she does have some persistent heartburn and dysphagia to solids.  No nausea, emesis or GI bleeding.  No unintentional weight loss.  She does have hoarseness although this is likely due to prior surgery on the throat that was done many years ago.    Past Medical History:   Diagnosis Date    Anxiety     Arthritis     Cataract     DDD (degenerative disc disease), lumbar     Depression     GERD (gastroesophageal reflux disease)     Hyperlipidemia     Hypertension     pt ststes she does not take meds anymore she just watches her weight//    Immunosuppressed status 11/17/2016    Osteoporosis          Review of Systems  General ROS: negative for - chills, fever or weight loss  Cardiovascular ROS: no chest pain or dyspnea on exertion  Gastrointestinal ROS: +GERD, dysphagia, no diarrhea    Physical Examination  /61 (BP Location: Left arm, Patient Position: Sitting, BP Method: Automatic)  Pulse 80  Resp 20  Ht 4' 11" (1.499 m)  Wt 56.7 kg (125 lb)  BMI 25.25 kg/m2  General appearance: alert, cooperative, no distress  HENT: Normocephalic, atraumatic, neck symmetrical, no nasal discharge   Abdomen: soft, NT ND BS present    Labs:  H/H 11/34    Assessment:   1. GERD  2. Laryngopharyngeal reflux  3. Dysphagia    Plan:  - Continue nexium 40 mg BID  - Continue lifestyle modification for chronic GERD  - Schedule EGD with possible dilation for dysphagia  - If EGD is unrevealing and dilation does not improve symptoms - I will attempt to further manage her symptoms conservatively with reassurance and continued PPI BID dosing.    Timi Marcial MD  [facility]  [address], " [address]  [place] [address]  Office: [phone]  Fax: [phone]

## 2017-03-14 NOTE — ANESTHESIA POSTPROCEDURE EVALUATION
"Anesthesia Post Evaluation    Patient: Rola Richter    Procedure(s) Performed: Procedure(s) (LRB):  ESOPHAGOGASTRODUODENOSCOPY (EGD) (N/A)    Final Anesthesia Type: general  Patient location during evaluation: PACU  Patient participation: Yes- Able to Participate  Level of consciousness: awake and alert  Post-procedure vital signs: reviewed and stable  Pain management: adequate  Airway patency: patent  PONV status at discharge: No PONV  Anesthetic complications: no      Cardiovascular status: blood pressure returned to baseline and hemodynamically stable  Respiratory status: unassisted  Hydration status: euvolemic  Follow-up not needed.        Visit Vitals    BP (!) 150/71    Pulse 85    Temp 36.6 °C (97.8 °F) (Oral)    Resp (!) 23    Ht 4' 11" (1.499 m)    Wt 56.2 kg (124 lb)    SpO2 (!) 93%    Breastfeeding No    BMI 25.04 kg/m2       Pain/Amol Score: Pain Assessment Performed: Yes (3/14/2017  9:19 AM)  Presence of Pain: denies (3/14/2017  8:18 AM)      "

## 2017-03-14 NOTE — PLAN OF CARE
Patient awake, alert, and oriented.  Patient states that throat pain is better after lidocaine viscous administration.  Instructed patient not to eat or drink anything until after throat numbness is completely gone.    Abdomen soft and nontender;  Ambulates without difficulty;  All instructions given and reviewed with patient and family;  Stable for discharge to home accompanied by .  Dr. Marcial spoke with patient and  prior to discharge

## 2017-03-14 NOTE — OR NURSING
Patient with complaints of pain in throat post procedure.  Patient given some ice chips and sips of water, which she states only slightly helped.  Dr. Marcial to bedside for evaluation.  New orders received.

## 2017-03-14 NOTE — IP AVS SNAPSHOT
[address] [place] [address]  Phone: [phone]           Patient Discharge Instructions     Our goal is to set you up for success. This packet includes information on your condition, medications, and your home care. It will help you to care for yourself so you don't get sicker and need to go back to the hospital.     Please ask your nurse if you have any questions.        There are many details to remember when preparing to leave the hospital. Here is what you will need to do:    1. Take your medicine. If you are prescribed medications, review your Medication List in the following pages. You may have new medications to  at the pharmacy and others that you'll need to stop taking. Review the instructions for how and when to take your medications. Talk with your doctor or nurses if you are unsure of what to do.     2. Go to your follow-up appointments. Specific follow-up information is listed in the following pages. Your may be contacted by a transition nurse or clinical provider about future appointments. Be sure we have all of the phone numbers to reach you, if needed. Please contact your provider's office if you are unable to make an appointment.     3. Watch for warning signs. Your doctor or nurse will give you detailed warning signs to watch for and when to call for assistance. These instructions may also include educational information about your condition. If you experience any of warning signs to your health, call your doctor.               [facility]  Unless otherwise directed by your provider, please contact [facility], our nurse care line that is available for 24/7 assistance.     [phone] (toll-free)    Registered nurses in the [facility] provide clinical advisement, health education, appointment booking, and other advisory services.                    ** Verify the list of medication(s) below is accurate and up to date.  Carry this with you in case of emergency. If your medications have changed, please notify your healthcare provider.             Medication List      START taking these medications        Additional Info                      omeprazole 40 MG capsule   Commonly known as:  PRILOSEC   Quantity:  90 capsule   Refills:  3   Dose:  40 mg    Instructions:  Take 1 capsule (40 mg total) by mouth once daily.     Begin Date    AM    Noon    PM    Bedtime         CONTINUE taking these medications        Additional Info                      busPIRone 10 MG tablet   Commonly known as:  BUSPAR   Refills:  0      Begin Date    AM    Noon    PM    Bedtime       cyclobenzaprine 10 MG tablet   Commonly known as:  FLEXERIL   Quantity:  90 tablet   Refills:  1    Instructions:  take 1 tablet by mouth three times a day if needed for muscle spasm     Begin Date    AM    Noon    PM    Bedtime       diclofenac sodium 1 % Gel   Commonly known as:  VOLTAREN   Quantity:  1 Tube   Refills:  2   Dose:  2 g    Instructions:  Apply 2 g topically 2 (two) times daily.     Begin Date    AM    Noon    PM    Bedtime       estradiol 1 MG tablet   Commonly known as:  ESTRACE   Quantity:  30 tablet   Refills:  5   Dose:  1 mg   Comments:  Needs to establish care with a new PCP before further refills    Instructions:  Take 1 tablet (1 mg total) by mouth once daily.     Begin Date    AM    Noon    PM    Bedtime       fish oil-omega-3 fatty acids 300-1,000 mg capsule   Refills:  0   Dose:  2 g    Instructions:  Take 2 g by mouth once daily.     Begin Date    AM    Noon    PM    Bedtime       guaifenesin 400 mg Tab   Commonly known as:  HUMIBID E   Refills:  0   Dose:  400 mg    Instructions:  Take 400 mg by mouth.     Begin Date    AM    Noon    PM    Bedtime       hydrocodone-acetaminophen 5-325mg 5-325 mg per tablet   Commonly known as:  NORCO   Quantity:  60 tablet   Refills:  0   Dose:  1 tablet    Instructions:  Take 1 tablet by mouth every 8 (eight)  hours.     Begin Date    AM    Noon    PM    Bedtime       hydroxychloroquine 200 mg tablet   Commonly known as:  PLAQUENIL   Quantity:  60 tablet   Refills:  5   Dose:  200 mg    Instructions:  Take 1 tablet (200 mg total) by mouth 2 (two) times daily.     Begin Date    AM    Noon    PM    Bedtime       multivit with min-folic acid 200 mcg Chew   Refills:  0      Begin Date    AM    Noon    PM    Bedtime       polyethylene glycol 17 gram/dose powder   Commonly known as:  GLYCOLAX   Quantity:  510 g   Refills:  0   Dose:  17 g    Instructions:  Take 17 g by mouth once daily.     Begin Date    AM    Noon    PM    Bedtime       sertraline 100 MG tablet   Commonly known as:  ZOLOFT   Quantity:  90 tablet   Refills:  2   Dose:  100 mg    Instructions:  Take 1 tablet (100 mg total) by mouth once daily.     Begin Date    AM    Noon    PM    Bedtime       sucralfate 1 gram tablet   Commonly known as:  CARAFATE   Refills:  0   Dose:  1 g    Instructions:  Take 1 g by mouth 4 (four) times daily.     Begin Date    AM    Noon    PM    Bedtime         STOP taking these medications     esomeprazole 40 MG capsule   Commonly known as:  NEXIUM            Where to Get Your Medications      These medications were sent to [facility] - [facility] [address]  [address], [facility] [address]     Phone:  [phone]     omeprazole 40 MG capsule                  Please bring to all follow up appointments:    1. A copy of your discharge instructions.  2. All medicines you are currently taking in their original bottles.  3. Identification and insurance card.    Please arrive 15 minutes ahead of scheduled appointment time.    Please call 24 hours in advance if you must reschedule your appointment and/or time.        Your Scheduled Appointments     Mar 24, 2017 11:00 AM CDT   Established Patient Visit with MD [place] - Family Medicine ([place])    [address] [place]  [address]   [phone]            Apr 05, 2017 10:30 AM CDT   Established Patient Visit with WILLIS Yan - Pain Management ([facility])    [address] [place] [address] [address]  [place] [address]   [phone]            Jul 17, 2017 10:00 AM CDT   Established Patient Visit with MD [place] - Rheumatology ([facility])    [address] [facility]  [place] [address]   [phone]                Discharge Instructions     Future Orders    Diet general     Questions:    Total calories:      Fat restriction, if any:      Protein restriction, if any:      Na restriction, if any:      Fluid restriction:      Additional restrictions:          Discharge Instructions         Esophageal Dilation     A balloon dilator may be used to widen a stricture in the esophagus.   An esophageal dilation is a procedure used to widen a narrowed section of your esophagus. This is the tube that leads from your throat to your stomach. Narrowing (stricture) of the esophagus can cause problems. These include trouble swallowing. This sheet explains what to expect with esophageal dilation.  Why esophageal dilation is needed  Several problems can be treated with esophageal dilation. They include:  · Peptic stricture. This is caused by reflux esophagitis. With this problem, the esophagus is irritated by acid reflux (heartburn). This occurs when acid from your stomach flows back up into the esophagus. Stomach acid damages the lining of the esophagus. This leads to a buildup of scar tissue. As a result, the esophagus is narrowed.  · Schatzkis ring. This is an abnormal ring of tissue. It forms where the esophagus meets the stomach. It can cause trouble swallowing. It can also cause food to get stuck in the esophagus. The cause of this condition is not known.  · Achalasia. This condition stops food and liquids from moving into your stomach from the esophagus. It affects the lower  esophageal sphincter (LES). The LES is a muscular ring that opens (relaxes) when you swallow. With achalasia, the LES does not relax. This condition can also cause problems with peristalsis. This is the normal muscular action of the esophagus that moves food into the stomach.  · Eosinophilic esophagitis. This is a redness and swelling (inflammation) in the esophagus. It is caused by an environmental trigger such as a food allergy. It can lead to pain, trouble swallowing, and strictures.  · Less common causes of stricture. Other causes of stricture include radiation treatment and cancer.  Before you have esophageal dilation  · Tell your provider about any medicines you take. This includes prescription medicines, over-the-counter medicines, herbs, vitamins, and other supplements. Be sure to mention aspirin or any blood thinners youre taking.  · Let your provider know if you need to take antibiotics before dental procedures. You may need to take them before esophageal dilation as well.  · Tell your provider about any health conditions you have, such as heart or lung disease. Also mention any allergies to medicines.  · Youll need to have an empty stomach for the procedure. Follow your providers instructions for not eating and drinking before the procedure.  · Arrange to have a family member or friend drive you home after the procedure.  During the procedure  · You may be given local anesthesia to numb your throat. Youll also likely be given medicine to relax you. The procedure takes about 15 minutes. It does not cause trouble breathing.  · A tube called an endoscope (scope) is used. This is a narrow tube with a tiny light and camera at the end. The scope is inserted through your mouth and into your esophagus. It lets your provider see inside your esophagus. To help guide your provider, an imaging method called fluoroscopy may also be used. This creates a moving X-ray image on a computer screen.   · Next, special  tiny tools are carefully guided through your mouth and down into the esophagus. They widen the stricture and are then removed. Different types of instruments are used. The type used depends on the size and cause of the stricture. Types include:  ¨ Balloon dilator. A tiny empty balloon is put into the stricture using an endoscope. The balloon is slowly filled with air. The air is removed from the balloon when the stricture is widened to the right size. Balloon dilators are used to treat many types of strictures.  ¨ Guided wire dilator. A thin wire is eased down the esophagus. A small tube thats wider on one end is guided down the wire. It is put into the stricture to stretch it. These dilators are used to treat all kinds of strictures.  ¨ Bougies. These are weighted, cone-shaped tubes. Starting with smaller cones, your provider uses increasingly larger cones until the stricture is stretched the right amount. Bougies are often used to treat strictures that are simple (short, straight, and not very narrow).  After the procedure  · Youll be watched closely until your provider says youre ready to go home. Youll need to have a friend or family member drive you home.  · You may have a sore throat for the rest of the day.  · You may have pain behind your breastbone for a short time afterwards.  · You can start drinking fluids again after the numbness in your throat goes away. You can resume eating the same day or the next day.  Risks and possible complications  Risks and possible complications for esophageal dilation include:  · Infection  · A tear or hole in the esophagus lining, causing bleeding and possibly needing surgery to fix  · Risks of anesthesia  Follow-up  You may need to have the procedure repeated one or more times. This depends on the cause and extent of the narrowing. Repeat procedures can allow the dilation to take place more slowly. This reduces the risks of the procedure.  If your stricture was caused  by reflux esophagitis, youll likely need to take medicine to treat that condition. Your provider will tell you more.  When to call your provider  Call your healthcare provider right away if you have any of the following after the procedure:  · Fever of 100.4°F (38.0°C)  · Chest pain  · Trouble swallowing  · Vomiting blood or material that looks like coffee grounds  · Bleeding  · Black, tarry, or bloody stools   Date Last Reviewed: 7/1/2016 © 2000-2016 [employer]. [address] [place] [address]. All rights reserved. This information is not intended as a substitute for professional medical care. Always follow your healthcare professional's instructions.        Gastritis (Adult)    Gastritis is inflammation and irritation of the stomach lining. It can be present for a short time (acute) or be long lasting (chronic). Gastritis is often caused by infection with bacteria called H pylori. More than a third of people in the  have this bacteria in their bodies. In many cases, H pylori causes no problems or symptoms. In some people, though, the infection irritates the stomach lining and causes gastritis. Other causes of stomach irritation include drinking alcohol or taking pain-relieving medicines called NSAIDs (such as aspirin or ibuprofen).   Symptoms of gastritis can include:  · Abdominal pain or bloating  · Loss of appetite  · Nausea or vomiting  · Vomiting blood or having black stools  · Feeling more tired than usual  An inflamed and irritated stomach lining is more likely to develop a sore called an ulcer. To help prevent this, gastritis should be treated.  Home care  If needed, medicines may be prescribed. If you have H pylori infection, treating it will likely relieve your symptoms. Other changes can help reduce stomach irritation and help it heal.  · If you have been prescribed medicines for H pylori infection, take them as directed. Take all of the medicine until it is finished or your  "healthcare provider tells you to stop, even if you feel better.  · Your healthcare provider may recommend avoiding NSAIDs. If you take daily aspirin for your heart or other medical reasons, do not stop without talking to your healthcare provider first.  · Avoid drinking alcohol.  · Stop smoking. Smoking can irritate the stomach and delay healing. As much as possible, stay away from second hand smoke.  Follow-up care  Follow up with your healthcare provider, or as advised by our staff. Testing may be needed to check for inflammation or an ulcer.  When to seek medical advice  Call your healthcare provider for any of the following:  · Stomach pain that gets worse or moves to the lower right abdomen (appendix area)  · Chest pain that appears or gets worse, or spreads to the back, neck, shoulder, or arm  · Frequent vomiting (cant keep down liquids)  · Blood in the stool or vomit (red or black in color)  · Feeling weak or dizzy  · Fever of 100.4ºF (38ºC) or higher, or as directed by your healthcare provider  Date Last Reviewed: 6/22/2015  © 6491-8014 [employer]. [address] [place] [address]. All rights reserved. This information is not intended as a substitute for professional medical care. Always follow your healthcare professional's instructions.            Admission Information     Date & Time Provider Department CSN    3/14/2017  7:35 AM Timi Marcial MD [facility] [id number]      Care Providers     Provider Role Specialty Primary office phone    Timi Marcial MD Attending Provider Gastroenterology [phone]    Timi Marcial MD Surgeon  Gastroenterology [phone]      Your Vitals Were     BP Pulse Temp Resp Height Weight    147/71 78 97.8 °F (36.6 °C) (Oral) 16 4' 11" (1.499 m) 56.2 kg (124 lb)    SpO2 BMI             93% 25.04 kg/m2         Recent Lab Values     No lab values to display.      Allergies as of 3/14/2017        Reactions    Aspirin Nausea And " Vomiting    Penicillins Itching    Sulfa (Sulfonamide Antibiotics) Itching      Advance Directives     An advance directive is a document which, in the event you are no longer able to make decisions for yourself, tells your healthcare team what kind of treatment you do or do not want to receive, or who you would like to make those decisions for you.  If you do not currently have an advance directive, [facility] encourages you to create one.  For more information call:  [phone]), [phone] ([phone]),  or log on to [email or web address].        Language Assistance Services     ATTENTION: Language assistance services are available, free of charge. Please call [phone].      ATENCIÓN: Si habla español, tiene a garcia disposición servicios gratuitos de asistencia lingüística. Llame al [phone].     CHÚ Ý: N?u b?n nói Ti?ng Vi?t, có các d?ch v? h? tr? ngôn ng? mi?n phí dành cho b?n. G?i s? [phone].         [facility] complies with applicable Federal civil rights laws and does not discriminate on the basis of race, color, national origin, age, disability, or sex.

## 2017-03-14 NOTE — TRANSFER OF CARE
"Anesthesia Transfer of Care Note    Patient: Rola Richter    Procedure(s) Performed: Procedure(s) (LRB):  ESOPHAGOGASTRODUODENOSCOPY (EGD) (N/A)    Patient location: PACU    Anesthesia Type: general    Transport from OR: Transported from OR on room air with adequate spontaneous ventilation    Post pain: adequate analgesia    Post assessment: no apparent anesthetic complications and tolerated procedure well    Post vital signs: stable    Level of consciousness: awake    Nausea/Vomiting: no nausea/vomiting    Complications: none          Last vitals:   Visit Vitals    BP (!) 150/71    Pulse 85    Temp 36.6 °C (97.8 °F) (Oral)    Resp (!) 23    Ht 4' 11" (1.499 m)    Wt 56.2 kg (124 lb)    SpO2 (!) 93%    Breastfeeding No    BMI 25.04 kg/m2     "

## 2017-03-15 VITALS
RESPIRATION RATE: 18 BRPM | TEMPERATURE: 98 F | HEART RATE: 74 BPM | BODY MASS INDEX: 25 KG/M2 | OXYGEN SATURATION: 99 % | DIASTOLIC BLOOD PRESSURE: 79 MMHG | WEIGHT: 124 LBS | HEIGHT: 59 IN | SYSTOLIC BLOOD PRESSURE: 168 MMHG

## 2017-03-17 ENCOUNTER — TELEPHONE (OUTPATIENT)
Dept: GASTROENTEROLOGY | Facility: CLINIC | Age: 72
End: 2017-03-17

## 2017-03-17 NOTE — TELEPHONE ENCOUNTER
----- Message from Timi Marcial MD sent at 3/17/2017 10:21 AM CDT -----  Please let ms. Richter know that her gastric and esophageal biopsies were unrevealing.  Her gastric polyp was benign and needs no further follow up.  She needs to follow up with me if her dysphagia returns.

## 2017-03-20 RX ORDER — ATORVASTATIN CALCIUM 40 MG/1
40 TABLET, FILM COATED ORAL DAILY
Qty: 90 TABLET | Refills: 3 | Status: SHIPPED | OUTPATIENT
Start: 2017-03-20 | End: 2017-03-24

## 2017-03-23 ENCOUNTER — DOCUMENTATION ONLY (OUTPATIENT)
Dept: FAMILY MEDICINE | Facility: CLINIC | Age: 72
End: 2017-03-23

## 2017-03-24 ENCOUNTER — OFFICE VISIT (OUTPATIENT)
Dept: FAMILY MEDICINE | Facility: CLINIC | Age: 72
End: 2017-03-24
Payer: MEDICARE

## 2017-03-24 VITALS
HEART RATE: 71 BPM | BODY MASS INDEX: 24.85 KG/M2 | SYSTOLIC BLOOD PRESSURE: 125 MMHG | DIASTOLIC BLOOD PRESSURE: 66 MMHG | HEIGHT: 59 IN | TEMPERATURE: 98 F | WEIGHT: 123.25 LBS

## 2017-03-24 DIAGNOSIS — M81.0 OSTEOPOROSIS: ICD-10-CM

## 2017-03-24 DIAGNOSIS — F19.20 DEPENDENCY ON PAIN MEDICATION: ICD-10-CM

## 2017-03-24 DIAGNOSIS — E78.5 HYPERLIPIDEMIA, UNSPECIFIED HYPERLIPIDEMIA TYPE: Primary | ICD-10-CM

## 2017-03-24 DIAGNOSIS — M54.81 OCCIPITAL NEURALGIA, UNSPECIFIED LATERALITY: ICD-10-CM

## 2017-03-24 DIAGNOSIS — M79.18 MYOFASCIAL PAIN: ICD-10-CM

## 2017-03-24 DIAGNOSIS — M12.9 ARTHRITIS, MULTIPLE JOINT INVOLVEMENT: ICD-10-CM

## 2017-03-24 DIAGNOSIS — M15.4 EROSIVE OSTEOARTHRITIS OF RIGHT HAND: ICD-10-CM

## 2017-03-24 DIAGNOSIS — M50.30 DDD (DEGENERATIVE DISC DISEASE), CERVICAL: ICD-10-CM

## 2017-03-24 DIAGNOSIS — I10 ESSENTIAL HYPERTENSION: ICD-10-CM

## 2017-03-24 PROCEDURE — 96372 THER/PROPH/DIAG INJ SC/IM: CPT | Mod: S$GLB,,, | Performed by: FAMILY MEDICINE

## 2017-03-24 PROCEDURE — 1157F ADVNC CARE PLAN IN RCRD: CPT | Mod: S$GLB,,, | Performed by: FAMILY MEDICINE

## 2017-03-24 PROCEDURE — 99499 UNLISTED E&M SERVICE: CPT | Mod: S$GLB,,, | Performed by: FAMILY MEDICINE

## 2017-03-24 PROCEDURE — 3078F DIAST BP <80 MM HG: CPT | Mod: S$GLB,,, | Performed by: FAMILY MEDICINE

## 2017-03-24 PROCEDURE — 99999 PR PBB SHADOW E&M-EST. PATIENT-LVL III: CPT | Mod: PBBFAC,,, | Performed by: FAMILY MEDICINE

## 2017-03-24 PROCEDURE — 1159F MED LIST DOCD IN RCRD: CPT | Mod: S$GLB,,, | Performed by: FAMILY MEDICINE

## 2017-03-24 PROCEDURE — 3074F SYST BP LT 130 MM HG: CPT | Mod: S$GLB,,, | Performed by: FAMILY MEDICINE

## 2017-03-24 PROCEDURE — 99214 OFFICE O/P EST MOD 30 MIN: CPT | Mod: 25,S$GLB,, | Performed by: FAMILY MEDICINE

## 2017-03-24 PROCEDURE — 1125F AMNT PAIN NOTED PAIN PRSNT: CPT | Mod: S$GLB,,, | Performed by: FAMILY MEDICINE

## 2017-03-24 PROCEDURE — 1160F RVW MEDS BY RX/DR IN RCRD: CPT | Mod: S$GLB,,, | Performed by: FAMILY MEDICINE

## 2017-03-24 RX ORDER — METHYLPREDNISOLONE ACETATE 40 MG/ML
60 INJECTION, SUSPENSION INTRA-ARTICULAR; INTRALESIONAL; INTRAMUSCULAR; SOFT TISSUE
Status: COMPLETED | OUTPATIENT
Start: 2017-03-24 | End: 2017-03-24

## 2017-03-24 RX ORDER — HYDROCODONE BITARTRATE AND ACETAMINOPHEN 5; 325 MG/1; MG/1
1 TABLET ORAL EVERY 6 HOURS PRN
COMMUNITY
End: 2017-04-05 | Stop reason: SDUPTHER

## 2017-03-24 RX ORDER — ROSUVASTATIN CALCIUM 5 MG/1
5 TABLET, COATED ORAL DAILY
Qty: 90 TABLET | Refills: 3 | Status: SHIPPED | OUTPATIENT
Start: 2017-03-24 | End: 2017-05-07 | Stop reason: SDUPTHER

## 2017-03-24 RX ADMIN — METHYLPREDNISOLONE ACETATE 60 MG: 40 INJECTION, SUSPENSION INTRA-ARTICULAR; INTRALESIONAL; INTRAMUSCULAR; SOFT TISSUE at 11:03

## 2017-03-24 NOTE — PROGRESS NOTES
Subjective:       Patient ID: Rola Richter is a 71 y.o. female.    Chief Complaint: Establish Care    Patient Active Problem List   Diagnosis    Hypertension    GERD (gastroesophageal reflux disease)    Arthritis    Anxiety    Hyperlipidemia    Encounter for monitoring primary estrogen replacement therapy    Osteoporosis    Erosive osteoarthritis of right hand    Medication monitoring encounter    Dependency on pain medication    PTSD (post-traumatic stress disorder)    Post-nasal drip    Immunosuppressed status    CMC arthritis    Dry eye syndrome    Dysphagia    DDD (degenerative disc disease), cervical    Occipital neuralgia   C/o bin shoulder and neck pain which is chronic and stable under care of pain mgmt Dr. Grimaldo.  Also has chronic pain in hands from erosive OA under care of rheum  Dr. srivastava who is prescribing plaquenil.      Had colonoscopy 3 years ago in [place] which was wnl per pt-will check records    HPI  Review of Systems   Constitutional: Negative for fatigue and unexpected weight change.   Respiratory: Negative for chest tightness and shortness of breath.    Cardiovascular: Negative for chest pain, palpitations and leg swelling.   Gastrointestinal: Negative for abdominal pain.   Musculoskeletal: Positive for arthralgias, neck pain and neck stiffness.   Neurological: Negative for dizziness, syncope, light-headedness and headaches.       Objective:      Physical Exam   Constitutional: She is oriented to person, place, and time. She appears well-developed and well-nourished.   Cardiovascular: Normal rate, regular rhythm and normal heart sounds.    Pulmonary/Chest: Effort normal and breath sounds normal.   Musculoskeletal: She exhibits no edema.   Neurological: She is alert and oriented to person, place, and time.   Skin: Skin is warm and dry.   Psychiatric: She has a normal mood and affect.   Nursing note and vitals reviewed.      Assessment:       1. Hyperlipidemia, unspecified  hyperlipidemia type    2. Arthritis, multiple joint involvement    3. Dependency on pain medication    4. Essential hypertension    5. Erosive osteoarthritis of right hand    6. Osteoporosis    7. DDD (degenerative disc disease), cervical    8. Occipital neuralgia, unspecified laterality    9. Myofascial pain        Plan:       1. Hyperlipidemia, unspecified hyperlipidemia type  Reviewed recent labs with patient and recommended:  - rosuvastatin (CRESTOR) 5 MG tablet; Take 1 tablet (5 mg total) by mouth once daily.  Dispense: 90 tablet; Refill: 3    2. Arthritis, multiple joint involvement  Cont rheum and pain mgmt  - methylPREDNISolone acetate injection 60 mg; Inject 1.5 mLs (60 mg total) into the muscle one time.    3. Dependency on pain medication  Cont pain mgmt and monitoring under Dr Grimaldo    4. Essential hypertension  Controlled on current medications.  Continue current medications.      5. Erosive osteoarthritis of right hand  Cont plaquenil and care under rheum.  Cont conservative mgmt with heat, voltaren gel and tylenol as needed    6. Osteoporosis  Cont rheum care    7. DDD (degenerative disc disease), cervical  Cont pain mgmt    8. Occipital neuralgia, unspecified laterality  Cont pain mgmt    9. Myofascial pain  COnt pain mgmt    [facility] goal documentation:  Patient readiness: acceptance and barriers:readiness  During the course of the visit the patient was educated and counseled about the following: Hypertension:   Dietary sodium restriction.  Regular aerobic exercise.  Goals: Hypertension: Reduce Blood Pressure  Goal/Outcomes met:Hypertension  The following self management tools provided:none  Patient Instructions (the written plan) was given to the patient/family: Yes  Time spent with patient: 40 minutes    Patient with be reevaluated in 6 months or sooner prn    Greater than 50% of this visit was spent counseling as described in above documentation:Yes

## 2017-03-24 NOTE — MR AVS SNAPSHOT
[facility]  [address] [place] [place] [address]  Phone: [phone]  Fax: [phone]                  Rola Richter   3/24/2017 11:00 AM   Office Visit    Description:  Female : 1945   Provider:  Liseth Carias MD   Department:  [place] [facility]           Reason for Visit     Establish Care           Diagnoses this Visit        Comments    Hyperlipidemia, unspecified hyperlipidemia type    -  Primary     Arthritis, multiple joint involvement                To Do List           Future Appointments        Provider Department Dept Phone    2017 10:30 AM [facility] - Pain Management [phone]    2017 10:00 AM MD She[facility] - Rheumatology [phone]    2017 10:40 AM Liseth Carias MD [facility] [phone]      Goals (5 Years of Data)     None      Follow-Up and Disposition     Return in about 6 months (around 2017).       These Medications        Disp Refills Start End    rosuvastatin (CRESTOR) 5 MG tablet 90 tablet 3 3/24/2017 3/24/2018    Take 1 tablet (5 mg total) by mouth once daily. - Oral    Pharmacy: [facility] - [place], [place] - [address] Ph #: [phone]         [facility] On Call     [facility] On Call Nurse Care Line -  Assistance  Registered nurses in the [facility] On Call Center provide clinical advisement, health education, appointment booking, and other advisory services.  Call for this free service at [phone].             Medications           Message regarding Medications     Verify the changes and/or additions to your medication regime listed below are the same as discussed with your clinician today.  If any of these changes or additions are incorrect, please notify your healthcare provider.        START taking these NEW medications        Refills    rosuvastatin (CRESTOR) 5 MG tablet 3    Sig: Take 1 tablet (5 mg total) by mouth once daily.     Class: Normal    Route: Oral      These medications were administered today        Dose Freq    methylPREDNISolone acetate injection 60 mg 60 mg Clinic/HOD 1 time    Sig: Inject 1.5 mLs (60 mg total) into the muscle one time.    Class: Normal    Route: Intramuscular      STOP taking these medications     omeprazole (PRILOSEC) 40 MG capsule Take 1 capsule (40 mg total) by mouth once daily.    atorvastatin (LIPITOR) 40 MG tablet Take 1 tablet (40 mg total) by mouth once daily.           Verify that the below list of medications is an accurate representation of the medications you are currently taking.  If none reported, the list may be blank. If incorrect, please contact your healthcare provider. Carry this list with you in case of emergency.           Current Medications     busPIRone (BUSPAR) 10 MG tablet     cyclobenzaprine (FLEXERIL) 10 MG tablet take 1 tablet by mouth three times a day if needed for muscle spasm    diclofenac sodium (VOLTAREN) 1 % Gel Apply 2 g topically 2 (two) times daily.    estradiol (ESTRACE) 1 MG tablet Take 1 tablet (1 mg total) by mouth once daily.    fish oil-omega-3 fatty acids 300-1,000 mg capsule Take 2 g by mouth once daily.    guaifenesin (HUMIBID E) 400 mg Tab Take 400 mg by mouth.    hydrocodone-acetaminophen 5-325mg (NORCO) 5-325 mg per tablet Take 1 tablet by mouth every 6 (six) hours as needed for Pain.    hydroxychloroquine (PLAQUENIL) 200 mg tablet Take 1 tablet (200 mg total) by mouth 2 (two) times daily.    multivit with min-folic acid 200 mcg Chew     polyethylene glycol (GLYCOLAX) 17 gram/dose powder Take 17 g by mouth once daily.    sertraline (ZOLOFT) 100 MG tablet Take 1 tablet (100 mg total) by mouth once daily.    sucralfate (CARAFATE) 1 gram tablet Take 1 g by mouth 4 (four) times daily.    rosuvastatin (CRESTOR) 5 MG tablet Take 1 tablet (5 mg total) by mouth once daily.           Clinical Reference Information           Your Vitals Were     BP Pulse Temp Height  "Weight BMI    125/66 (BP Location: Right arm, Patient Position: Sitting, BP Method: Automatic) 71 98.2 °F (36.8 °C) (Oral) 4' 11" (1.499 m) 55.9 kg (123 lb 3.8 oz) 24.89 kg/m2      Blood Pressure          Most Recent Value    BP  125/66      Allergies as of 3/24/2017     Aspirin    Penicillins    Lipitor [Atorvastatin]    Sulfa (Sulfonamide Antibiotics)      Immunizations Administered on Date of Encounter - 3/24/2017     None      Administrations This Visit     methylPREDNISolone acetate injection 60 mg     Admin Date Action Dose Route Administered By             03/24/2017 Given 60 mg Intramuscular Sabrina Orozco LPN                      Language Assistance Services     ATTENTION: Language assistance services are available, free of charge. Please call [phone].      ATENCIÓN: Si becca cosme, tiene a garcia disposición servicios gratuitos de asistencia lingüística. Llame al [phone].     CHÚ Ý: N?u b?n nói Ti?ng Vi?t, có các d?ch v? h? tr? ngôn ng? mi?n phí dành cho b?n. G?i s? [phone].         [facility] complies with applicable Federal civil rights laws and does not discriminate on the basis of race, color, national origin, age, disability, or sex.        "

## 2017-04-05 ENCOUNTER — OFFICE VISIT (OUTPATIENT)
Dept: PAIN MEDICINE | Facility: CLINIC | Age: 72
End: 2017-04-05
Payer: MEDICARE

## 2017-04-05 VITALS
HEIGHT: 59 IN | DIASTOLIC BLOOD PRESSURE: 80 MMHG | HEART RATE: 83 BPM | BODY MASS INDEX: 23.6 KG/M2 | SYSTOLIC BLOOD PRESSURE: 143 MMHG | WEIGHT: 117.06 LBS

## 2017-04-05 DIAGNOSIS — M54.81 OCCIPITAL NEURALGIA OF LEFT SIDE: ICD-10-CM

## 2017-04-05 DIAGNOSIS — M47.812 SPONDYLOSIS OF CERVICAL REGION WITHOUT MYELOPATHY OR RADICULOPATHY: Primary | ICD-10-CM

## 2017-04-05 DIAGNOSIS — M79.18 MYOFASCIAL PAIN: ICD-10-CM

## 2017-04-05 PROCEDURE — 1157F ADVNC CARE PLAN IN RCRD: CPT | Mod: S$GLB,,, | Performed by: PHYSICIAN ASSISTANT

## 2017-04-05 PROCEDURE — 3077F SYST BP >= 140 MM HG: CPT | Mod: S$GLB,,, | Performed by: PHYSICIAN ASSISTANT

## 2017-04-05 PROCEDURE — 1159F MED LIST DOCD IN RCRD: CPT | Mod: S$GLB,,, | Performed by: PHYSICIAN ASSISTANT

## 2017-04-05 PROCEDURE — 1160F RVW MEDS BY RX/DR IN RCRD: CPT | Mod: S$GLB,,, | Performed by: PHYSICIAN ASSISTANT

## 2017-04-05 PROCEDURE — 1125F AMNT PAIN NOTED PAIN PRSNT: CPT | Mod: S$GLB,,, | Performed by: PHYSICIAN ASSISTANT

## 2017-04-05 PROCEDURE — 99999 PR PBB SHADOW E&M-EST. PATIENT-LVL III: CPT | Mod: PBBFAC,,, | Performed by: PHYSICIAN ASSISTANT

## 2017-04-05 PROCEDURE — 99214 OFFICE O/P EST MOD 30 MIN: CPT | Mod: S$GLB,,, | Performed by: PHYSICIAN ASSISTANT

## 2017-04-05 PROCEDURE — 3079F DIAST BP 80-89 MM HG: CPT | Mod: S$GLB,,, | Performed by: PHYSICIAN ASSISTANT

## 2017-04-05 RX ORDER — HYDROCODONE BITARTRATE AND ACETAMINOPHEN 5; 325 MG/1; MG/1
1 TABLET ORAL EVERY 12 HOURS PRN
Qty: 60 TABLET | Refills: 0 | Status: SHIPPED | OUTPATIENT
Start: 2017-05-18 | End: 2017-05-01

## 2017-04-05 RX ORDER — HYDROCODONE BITARTRATE AND ACETAMINOPHEN 5; 325 MG/1; MG/1
1 TABLET ORAL EVERY 12 HOURS PRN
Qty: 60 TABLET | Refills: 0 | Status: SHIPPED | OUTPATIENT
Start: 2017-06-17 | End: 2017-05-01

## 2017-04-05 RX ORDER — HYDROCODONE BITARTRATE AND ACETAMINOPHEN 5; 325 MG/1; MG/1
1 TABLET ORAL EVERY 12 HOURS PRN
Qty: 60 TABLET | Refills: 0 | Status: SHIPPED | OUTPATIENT
Start: 2017-04-18 | End: 2017-05-18

## 2017-04-05 NOTE — PROGRESS NOTES
Referring Physician: No ref. provider found    PCP: Liseth Carias MD      CC: neck and left occipital pain    Interval history: Ms. Richter is a 71 y.o. female with neck pain and occipital neuralgia who presents today for medication refill. She is s/p repeat cervical paraspinal TPIs and left occipital nerve block.   Reports near resolution of occipital neuralgia. New complaint of numbness and tingling in digits 2-4 on left hand. She takes Flexeril with moderate benefit.  She also takes Norco 5 mg every 12 hours as needed with some moderate benefit as well.  She denies any weakness.  No bowel bladder changes.   Pain today is rated 8/10.  Prior HPI:   Patient is 70-year-old female with past history history of cervical DDD, cervical spondylosis and chronic headaches.  She recently moved here from [place], [place].  She is treated in the past by neurology.  She states having constant burning pain over the left side of her posterior scalp.  Pain radiates to her neck as well as a frontal.  She also has left-sided neck pain as well.  She denies any radicular arm pain.  No numbness or weakness.  She states having cervical epidural steroid injection at past with minimal benefit.  She also has had decided of cervical nerve blocks in the past with moderate benefit.  Most recent injection was performed 2 months ago.  She desires repeat injection.  She currently takes Norco 10 mg every 12 hours as needed with moderate benefit.  She also takes Zanaflex 4 mg every 8 hours with mild benefits.  She rates her pain 7/10 today.    Pain intervention history: s/p left occipital nerve blocks on 1/2016 with 50% relief of her headaches    ROS:  CONSTITUTIONAL: No fevers, chills, night sweats, wt. loss, appetite changes  SKIN: no rashes or itching  ENT: No headaches, head trauma, vision changes, or eye pain  LYMPH NODES: None noticed   CV: No chest pain, palpitations.   RESP: No shortness of breath, dyspnea on exertion, cough,  wheezing, or hemoptysis  GI: No nausea, emesis, diarrhea, constipation, melena, hematochezia, pain.    : No dysuria, hematuria, urgency, or frequency   HEME: No easy bruising, bleeding problems  PSYCHIATRIC: No depression, anxiety, psychosis, hallucinations.  NEURO: No seizures, memory loss, dizziness or difficulty sleeping  MSK: + History of present illness      Past Medical History:   Diagnosis Date    Anxiety     Arthritis     Cataract     DDD (degenerative disc disease), lumbar     Depression     GERD (gastroesophageal reflux disease)     Hyperlipidemia     Hypertension     pt ststes she does not take meds anymore she just watches her weight//    Immunosuppressed status 2016    Occipital neuralgia 3/24/2017    Osteoporosis      Past Surgical History:   Procedure Laterality Date    APPENDECTOMY       SECTION      x 2    CHOLECYSTECTOMY      HYSTERECTOMY      Laser Periphery Iridotomy Bilateral     OD 16 and OS touch up 2016    vocal cord tumor removal       Family History   Problem Relation Age of Onset    Osteoarthritis Mother     Alcohol abuse Mother     Osteoarthritis Sister     Diabetes Brother     No Known Problems Son     No Known Problems Sister     No Known Problems Sister     No Known Problems Sister     No Known Problems Brother     Arthritis Son     No Known Problems Son     Stroke Maternal Grandmother 99    Retinal detachment Neg Hx     Macular degeneration Neg Hx     Glaucoma Neg Hx     Amblyopia Neg Hx     Blindness Neg Hx     Cancer Neg Hx     Cataracts Neg Hx     Hypertension Neg Hx     Strabismus Neg Hx     Thyroid disease Neg Hx      Social History     Social History    Marital status:      Spouse name: N/A    Number of children: N/A    Years of education: N/A     Social History Main Topics    Smoking status: Never Smoker    Smokeless tobacco: Never Used    Alcohol use No    Drug use: No    Sexual activity: Yes      "Partners: Male     Other Topics Concern    None     Social History Narrative         Medications/Allergies: See med card    Vitals:    04/05/17 1029   BP: (!) 143/80   Pulse: 83   Weight: 53.1 kg (117 lb 1 oz)   Height: 4' 11" (1.499 m)   PainSc:   8         Physical exam:    GENERAL: A and O x3, the patient appears well groomed and is in no acute distress.  Skin: No rashes or obvious lesions  HEENT: normocephalic, atraumatic  CARDIOVASCULAR:  RRR  LUNGS: nonlabored breathing  ABDOMEN: soft, nontender   UPPER EXTREMITIES: Normal alignment, normal range of motion, no atrophy, no skin changes,  hair growth and nail growth normal and equal bilaterally. No swelling, no tenderness.  +Phalens on left  LOWER EXTREMITIES:  Normal alignment, normal range of motion, no atrophy, no skin changes,  hair growth and nail growth normal and equal bilaterally. No swelling, no tenderness.  CERVICAL SPINE:  Cervical spine: ROM is full in flexion, extension and lateral rotation with moderate increased pain.  Spurling's maneuver causes no neck pain to either side.  Myofascial exam:  Tenderness to palpation across cervical paraspinous region bilaterally, L>R.        MENTAL STATUS: normal orientation, speech, language, and fund of knowledge for social situation.  Emotional state appropriate.    CRANIAL NERVES:  II:  PERRL bilaterally,   III,IV,VI: EOMI.    V:  Facial sensation equal bilaterally  VII:  Facial motor function normal.  VIII:  Hearing equal to finger rub bilaterally  IX/X: Gag normal, palate symmetric  XI:  Shoulder shrug equal, head turn equal  XII:  Tongue midline without fasciculations      MOTOR: Tone and bulk: normal bilateral upper and lower Strength: normal   Delt Bi Tri WE WF     R 5 5 5 5 5 5   L 5 5 5 5 5 5     IP ADD ABD Quad TA Gas HAM  R 5 5 5 5 5 5 5  L 5 5 5 5 5 5 5    SENSATION: Light touch and pinprick intact bilaterally  REFLEXES: normal, symmetric, nonbrisk.  Toes down, no clonus. No hoffmans.  GAIT: " normal rise, base, steps, and arm swing.        Imaging:   Cervical MRI March 2015 ([place], [place])  - C2-3 disc space is hypoplastic and there is ankylosis of the facet joints bilaterally.  At C3-4, there is disc desiccation.  Disc bulging does not affect the spinal cord, but there is left uncovertebral joint hypertrophy.  At C4-5, there is disc desiccation and loss of height.  The facet joints are degenerative and hypertrophic.  There is slightly subluxation of C4-C5.  No central canal stenosis  C5-6, there is disc desiccation loss of height.  There is left uncovertebral joint hypertrophy.  C6-7, there is disc desiccation loss of height  C7-T1, there is disc desiccation.  Mild disc bulging.    Assessment:  Mrs. Richter is a 71 y.o. female with     1. Spondylosis of cervical region without myelopathy or radiculopathy    2. Occipital neuralgia of left side    3. Myofascial pain        Plan:  1.  I have stressed the importance of physical activity and exercise to improve overall health  2. ,Monitor progress from cervical paraspinal TPIs and left occipital nerve block  3. Norco 5mg q12hrs as needed for pain.  Script provided for three months last visit  4. Wrist splint at night for carpal tunnel  5. Follow up in three months

## 2017-04-05 NOTE — MR AVS SNAPSHOT
[facility]  [address] [place] [address] [address]  [place] [address]  Phone: [phone]                  Rola Richter   2017 10:30 AM   Office Visit    Description:  Female : 1945   Provider:  Kathy Jim PA-C   Department:  [facility]           Reason for Visit     Neck Pain     Shoulder Pain           Diagnoses this Visit        Comments    Spondylosis of cervical region without myelopathy or radiculopathy    -  Primary     Occipital neuralgia of left side         Myofascial pain                To Do List           Future Appointments        Provider Department Dept Phone    2017 11:00 AM Kathy Jim PA-C [facility] [phone]    2017 10:00 AM MD [place] - Rhe[facility] [phone]    2017 10:40 AM Liseth Carias MD [facility] [phone]      Goals (5 Years of Data)     None       These Medications        Disp Refills Start End    hydrocodone-acetaminophen 5-325mg (NORCO) 5-325 mg per tablet 60 tablet 0 2017    Take 1 tablet by mouth every 12 (twelve) hours as needed for Pain. - Oral    [facility]acy: [facility] - [address] Ph #: [phone]       hydrocodone-acetaminophen 5-325mg (NORCO) 5-325 mg per tablet 60 tablet 0 2017    Take 1 tablet by mouth every 12 (twelve) hours as needed for Pain. - Oral    Pharmacy: [facility] - [facility] [address] [facility] Ph #: [phone]       hydrocodone-acetaminophen 5-325mg (NORCO) 5-325 mg per tablet 60 tablet 0 2017    Take 1 tablet by mouth every 12 (twelve) hours as needed for Pain. - Oral    Pharmacy: [facility] - [address] Ph #: [phone]         [facility]     [facility] Nurse Care Line -  Assistance  Unless otherwise directed by your provider, please contact  [facility], our nurse care line that is available for 24/7 assistance.     Registered nurses in the [facility] Center provide: appointment scheduling, clinical advisement, health education, and other advisory services.  Call: [phone] (toll free)               Medications           Message regarding Medications     Verify the changes and/or additions to your medication regime listed below are the same as discussed with your clinician today.  If any of these changes or additions are incorrect, please notify your healthcare provider.        START taking these NEW medications        Refills    hydrocodone-acetaminophen 5-325mg (NORCO) 5-325 mg per tablet 0    Starting on: 5/18/2017    Sig: Take 1 tablet by mouth every 12 (twelve) hours as needed for Pain.    Class: Print    Route: Oral    hydrocodone-acetaminophen 5-325mg (NORCO) 5-325 mg per tablet 0    Starting on: 6/17/2017    Sig: Take 1 tablet by mouth every 12 (twelve) hours as needed for Pain.    Class: Print    Route: Oral      CHANGE how you are taking these medications     Start Taking Instead of    hydrocodone-acetaminophen 5-325mg (NORCO) 5-325 mg per tablet hydrocodone-acetaminophen 5-325mg (NORCO) 5-325 mg per tablet    Dosage:  Take 1 tablet by mouth every 12 (twelve) hours as needed for Pain. Dosage:  Take 1 tablet by mouth every 6 (six) hours as needed for Pain.    Reason for Change:  Reorder     Starting on: 4/18/2017            Verify that the below list of medications is an accurate representation of the medications you are currently taking.  If none reported, the list may be blank. If incorrect, please contact your healthcare provider. Carry this list with you in case of emergency.           Current Medications     busPIRone (BUSPAR) 10 MG tablet     cyclobenzaprine (FLEXERIL) 10 MG tablet take 1 tablet by mouth three times a day if needed for muscle spasm    diclofenac sodium (VOLTAREN) 1 % Gel Apply 2 g topically 2 (two) times  "daily.    estradiol (ESTRACE) 1 MG tablet Take 1 tablet (1 mg total) by mouth once daily.    fish oil-omega-3 fatty acids 300-1,000 mg capsule Take 2 g by mouth once daily.    guaifenesin (HUMIBID E) 400 mg Tab Take 400 mg by mouth.    hydrocodone-acetaminophen 5-325mg (NORCO) 5-325 mg per tablet Starting on Apr 18, 2017. Take 1 tablet by mouth every 12 (twelve) hours as needed for Pain.    hydrocodone-acetaminophen 5-325mg (NORCO) 5-325 mg per tablet Starting on May 18, 2017. Take 1 tablet by mouth every 12 (twelve) hours as needed for Pain.    hydrocodone-acetaminophen 5-325mg (NORCO) 5-325 mg per tablet Starting on Jun 17, 2017. Take 1 tablet by mouth every 12 (twelve) hours as needed for Pain.    hydroxychloroquine (PLAQUENIL) 200 mg tablet Take 1 tablet (200 mg total) by mouth 2 (two) times daily.    multivit with min-folic acid 200 mcg Chew     polyethylene glycol (GLYCOLAX) 17 gram/dose powder Take 17 g by mouth once daily.    rosuvastatin (CRESTOR) 5 MG tablet Take 1 tablet (5 mg total) by mouth once daily.    sertraline (ZOLOFT) 100 MG tablet Take 1 tablet (100 mg total) by mouth once daily.    sucralfate (CARAFATE) 1 gram tablet Take 1 g by mouth 4 (four) times daily.           Clinical Reference Information           Your Vitals Were     BP Pulse Height Weight BMI    143/80 83 4' 11" (1.499 m) 53.1 kg (117 lb 1 oz) 23.64 kg/m2      Blood Pressure          Most Recent Value    BP  (!)  143/80      Allergies as of 4/5/2017     Aspirin    Penicillins    Lipitor [Atorvastatin]    Sulfa (Sulfonamide Antibiotics)      Immunizations Administered on Date of Encounter - 4/5/2017     None      Language Assistance Services     ATTENTION: Language assistance services are available, free of charge. Please call [phone].      ATENCIÓN: Si becca ba, tiene a garcia disposición servicios gratuitos de asistencia lingüística. Llame al [phone].     CHÚ Ý: N?u b?n nói Ti?ng Vi?t, có các d?ch v? h? tr? ngôn ng? " mi?n phí dành cho b?n. G?i s? [phone].         [facility] complies with applicable Federal civil rights laws and does not discriminate on the basis of race, color, national origin, age, disability, or sex.

## 2017-04-10 ENCOUNTER — OFFICE VISIT (OUTPATIENT)
Dept: PSYCHIATRY | Facility: CLINIC | Age: 72
End: 2017-04-10
Payer: MEDICARE

## 2017-04-10 VITALS
WEIGHT: 124.25 LBS | DIASTOLIC BLOOD PRESSURE: 70 MMHG | HEIGHT: 59 IN | SYSTOLIC BLOOD PRESSURE: 130 MMHG | BODY MASS INDEX: 25.05 KG/M2 | HEART RATE: 82 BPM

## 2017-04-10 DIAGNOSIS — F41.9 ANXIETY: ICD-10-CM

## 2017-04-10 DIAGNOSIS — F43.10 PTSD (POST-TRAUMATIC STRESS DISORDER): Primary | ICD-10-CM

## 2017-04-10 PROCEDURE — 99999 PR PBB SHADOW E&M-EST. PATIENT-LVL III: CPT | Mod: PBBFAC,,, | Performed by: PSYCHIATRY & NEUROLOGY

## 2017-04-10 PROCEDURE — 1159F MED LIST DOCD IN RCRD: CPT | Mod: S$GLB,,, | Performed by: PSYCHIATRY & NEUROLOGY

## 2017-04-10 PROCEDURE — 99499 UNLISTED E&M SERVICE: CPT | Mod: S$GLB,,, | Performed by: PSYCHIATRY & NEUROLOGY

## 2017-04-10 PROCEDURE — 99214 OFFICE O/P EST MOD 30 MIN: CPT | Mod: S$GLB,,, | Performed by: PSYCHIATRY & NEUROLOGY

## 2017-04-10 PROCEDURE — 3078F DIAST BP <80 MM HG: CPT | Mod: S$GLB,,, | Performed by: PSYCHIATRY & NEUROLOGY

## 2017-04-10 PROCEDURE — 90833 PSYTX W PT W E/M 30 MIN: CPT | Mod: S$GLB,,, | Performed by: PSYCHIATRY & NEUROLOGY

## 2017-04-10 PROCEDURE — 3075F SYST BP GE 130 - 139MM HG: CPT | Mod: S$GLB,,, | Performed by: PSYCHIATRY & NEUROLOGY

## 2017-04-10 PROCEDURE — 1160F RVW MEDS BY RX/DR IN RCRD: CPT | Mod: S$GLB,,, | Performed by: PSYCHIATRY & NEUROLOGY

## 2017-04-10 PROCEDURE — 1157F ADVNC CARE PLAN IN RCRD: CPT | Mod: S$GLB,,, | Performed by: PSYCHIATRY & NEUROLOGY
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
## 2017-04-20 ENCOUNTER — OFFICE VISIT (OUTPATIENT)
Dept: FAMILY MEDICINE | Facility: CLINIC | Age: 72
End: 2017-04-20
Payer: MEDICARE

## 2017-04-20 ENCOUNTER — TELEPHONE (OUTPATIENT)
Dept: FAMILY MEDICINE | Facility: CLINIC | Age: 72
End: 2017-04-20

## 2017-04-20 ENCOUNTER — DOCUMENTATION ONLY (OUTPATIENT)
Dept: FAMILY MEDICINE | Facility: CLINIC | Age: 72
End: 2017-04-20

## 2017-04-20 VITALS
HEART RATE: 74 BPM | DIASTOLIC BLOOD PRESSURE: 66 MMHG | BODY MASS INDEX: 24.62 KG/M2 | WEIGHT: 122.13 LBS | TEMPERATURE: 98 F | SYSTOLIC BLOOD PRESSURE: 119 MMHG | HEIGHT: 59 IN

## 2017-04-20 DIAGNOSIS — K13.79 MOUTH SORES: Primary | ICD-10-CM

## 2017-04-20 PROCEDURE — 99999 PR PBB SHADOW E&M-EST. PATIENT-LVL III: CPT | Mod: PBBFAC,,, | Performed by: PHYSICIAN ASSISTANT

## 2017-04-20 PROCEDURE — 1157F ADVNC CARE PLAN IN RCRD: CPT | Mod: S$GLB,,, | Performed by: PHYSICIAN ASSISTANT

## 2017-04-20 PROCEDURE — 3074F SYST BP LT 130 MM HG: CPT | Mod: S$GLB,,, | Performed by: PHYSICIAN ASSISTANT

## 2017-04-20 PROCEDURE — 99213 OFFICE O/P EST LOW 20 MIN: CPT | Mod: S$GLB,,, | Performed by: PHYSICIAN ASSISTANT

## 2017-04-20 PROCEDURE — 3078F DIAST BP <80 MM HG: CPT | Mod: S$GLB,,, | Performed by: PHYSICIAN ASSISTANT

## 2017-04-20 PROCEDURE — 1125F AMNT PAIN NOTED PAIN PRSNT: CPT | Mod: S$GLB,,, | Performed by: PHYSICIAN ASSISTANT

## 2017-04-20 PROCEDURE — 1159F MED LIST DOCD IN RCRD: CPT | Mod: S$GLB,,, | Performed by: PHYSICIAN ASSISTANT

## 2017-04-20 PROCEDURE — 1160F RVW MEDS BY RX/DR IN RCRD: CPT | Mod: S$GLB,,, | Performed by: PHYSICIAN ASSISTANT

## 2017-04-20 NOTE — MR AVS SNAPSHOT
[place] [facility]  [address]  [place] [address]  Phone: [phone]  Fax: [phone]                  Rola Richter   2017 2:40 PM   Office Visit    Description:  Female : 1945   Provider:  ANIL Alberto   Department:  [place] [facility]           Reason for Visit     Mouth Lesions           Diagnoses this Visit        Comments    Mouth sores    -  Primary            To Do List           Future Appointments        Provider Department Dept Phone    2017 9:15 AM Juan Manuel Agustin AleshaKAMARI [place] [facility] - Optometry [phone]    2017 11:00 AM Kathy Jim PA-C [place] [facility] Pain [facility] [phone]    2017 10:00 AM Jess Cabral MD [place] [facility] Rheumatology [phone]    2017 10:40 AM Liseth Carias MD [facility] [phone]      Goals (5 Years of Data)     None      [facility]     [facility] On Call Nurse Care Line -  Assistance  Unless otherwise directed by your provider, please contact [facility], our nurse care line that is available for  assistance.     Registered nurses in the [facility] provide: appointment scheduling, clinical advisement, health education, and other advisory services.  Call: [phone] (toll free)               Medications           Message regarding Medications     Verify the changes and/or additions to your medication regime listed below are the same as discussed with your clinician today.  If any of these changes or additions are incorrect, please notify your healthcare provider.             Verify that the below list of medications is an accurate representation of the medications you are currently taking.  If none reported, the list may be blank. If incorrect, please contact your healthcare provider. Carry this list with you in case of emergency.           Current Medications     busPIRone (BUSPAR) 10 MG tablet     cyclobenzaprine (FLEXERIL) 10 MG tablet take 1 tablet  "by mouth three times a day if needed for muscle spasm    diclofenac sodium (VOLTAREN) 1 % Gel Apply 2 g topically 2 (two) times daily.    estradiol (ESTRACE) 1 MG tablet Take 1 tablet (1 mg total) by mouth once daily.    fish oil-omega-3 fatty acids 300-1,000 mg capsule Take 2 g by mouth once daily.    hydrocodone-acetaminophen 5-325mg (NORCO) 5-325 mg per tablet Take 1 tablet by mouth every 12 (twelve) hours as needed for Pain.    hydrocodone-acetaminophen 5-325mg (NORCO) 5-325 mg per tablet Starting on May 18, 2017. Take 1 tablet by mouth every 12 (twelve) hours as needed for Pain.    hydrocodone-acetaminophen 5-325mg (NORCO) 5-325 mg per tablet Starting on Jun 17, 2017. Take 1 tablet by mouth every 12 (twelve) hours as needed for Pain.    hydroxychloroquine (PLAQUENIL) 200 mg tablet Take 1 tablet (200 mg total) by mouth 2 (two) times daily.    multivit with min-folic acid 200 mcg Chew     polyethylene glycol (GLYCOLAX) 17 gram/dose powder Take 17 g by mouth once daily.    rosuvastatin (CRESTOR) 5 MG tablet Take 1 tablet (5 mg total) by mouth once daily.    sertraline (ZOLOFT) 100 MG tablet Take 1 tablet (100 mg total) by mouth once daily.    sucralfate (CARAFATE) 1 gram tablet Take 1 g by mouth 4 (four) times daily.    guaifenesin (HUMIBID E) 400 mg Tab Take 400 mg by mouth.           Clinical Reference Information           Your Vitals Were     BP Pulse Temp Height Weight BMI    119/66 (BP Location: Left arm, Patient Position: Sitting, BP Method: Automatic) 74 98.1 °F (36.7 °C) (Oral) 4' 11" (1.499 m) 55.4 kg (122 lb 2.2 oz) 24.67 kg/m2      Blood Pressure          Most Recent Value    BP  119/66      Allergies as of 4/20/2017     Aspirin    Penicillins    Lipitor [Atorvastatin]    Sulfa (Sulfonamide Antibiotics)      Immunizations Administered on Date of Encounter - 4/20/2017     None      Orders Placed During Today's Visit      Normal Orders This Visit    Ambulatory referral to ENT       Language Assistance " Services     ATTENTION: Language assistance services are available, free of charge. Please call [phone].      ATENCIÓN: Si habla ba, tiene a garcia disposición servicios gratuitos de asistencia lingüística. Llame al [phone].     CHÚ Ý: N?u b?n nói Ti?ng Vi?t, có các d?ch v? h? tr? ngôn ng? mi?n phí dành cho b?n. G?i s? [phone].         [facility] complies with applicable Federal civil rights laws and does not discriminate on the basis of race, color, national origin, age, disability, or sex.

## 2017-04-20 NOTE — TELEPHONE ENCOUNTER
----- Message from Gladys Horvath sent at 4/18/2017  2:33 PM CDT -----  Contact: Patient  Patient stated for the last 3 or 4 days her tongue has been red and looks like a sponge. Almost everything hurts her tongue. Please call patient at [phone].

## 2017-04-20 NOTE — PROGRESS NOTES
Pre-Visit Chart Review  For Appointment Scheduled on 04/20/2017      Health Maintenance Due   Topic Date Due    Colonoscopy  05/31/1995

## 2017-04-20 NOTE — PROGRESS NOTES
Subjective:       Patient ID: Rola Richter is a 71 y.o. female.    Chief Complaint: Mouth Lesions (on tongue)    HPI Comments: Patient presents with mouth lesions.  She states that they have been occurring on and off for months.  She says spicy food causing a good deal of pain so she avoids them.  She also complains of chronic ulcers along the inner lip.  She has not tried any treatment for the symptoms.  Denies any history of smoking or chewing tobacco.  No abdominal pain, reflux or diarrhea    Review of Systems   Constitutional: Negative for activity change and appetite change.   HENT: Positive for mouth sores. Negative for congestion, dental problem, drooling, ear discharge, ear pain, facial swelling, hearing loss, nosebleeds, postnasal drip, rhinorrhea, sinus pressure, sneezing, sore throat, tinnitus, trouble swallowing and voice change.    Eyes: Negative for pain and visual disturbance.   Respiratory: Negative for chest tightness and shortness of breath.    Cardiovascular: Negative for chest pain, palpitations and leg swelling.   Genitourinary: Negative for difficulty urinating, dysuria and frequency.   Musculoskeletal: Negative for arthralgias, back pain, gait problem, joint swelling and neck pain.   Neurological: Positive for headaches. Negative for dizziness, tremors, weakness, light-headedness and numbness.       Objective:      Physical Exam   Constitutional: She appears well-developed and well-nourished. No distress.   HENT:   Head: Normocephalic and atraumatic.   Right Ear: External ear normal.   Left Ear: External ear normal.   Mouth/Throat: Uvula is midline, oropharynx is clear and moist and mucous membranes are normal. No oral lesions. No uvula swelling. No oropharyngeal exudate, posterior oropharyngeal edema or posterior oropharyngeal erythema.       Eyes: Conjunctivae and EOM are normal. Pupils are equal, round, and reactive to light.   Neck: Normal range of motion. Neck supple. No thyromegaly  present.   Cardiovascular: Normal rate, regular rhythm and normal heart sounds.  Exam reveals no gallop and no friction rub.    No murmur heard.  Pulmonary/Chest: Effort normal and breath sounds normal. No respiratory distress. She has no wheezes. She has no rales.   Abdominal: Soft. Bowel sounds are normal. There is no tenderness.   Skin: She is not diaphoretic.       Assessment:       1. Mouth sores        Plan:       Rola was seen today for mouth lesions.    Diagnoses and all orders for this visit:    Mouth sores  -     Ambulatory referral to ENT

## 2017-04-24 ENCOUNTER — OFFICE VISIT (OUTPATIENT)
Dept: OPTOMETRY | Facility: CLINIC | Age: 72
End: 2017-04-24
Payer: MEDICARE

## 2017-04-24 DIAGNOSIS — H04.123 DRY EYE SYNDROME, BILATERAL: ICD-10-CM

## 2017-04-24 DIAGNOSIS — H57.89 IRRITATION OF BOTH EYES: Primary | ICD-10-CM

## 2017-04-24 PROCEDURE — 92012 INTRM OPH EXAM EST PATIENT: CPT | Mod: S$GLB,,, | Performed by: OPTOMETRIST

## 2017-04-24 PROCEDURE — 99999 PR PBB SHADOW E&M-EST. PATIENT-LVL II: CPT | Mod: PBBFAC,,, | Performed by: OPTOMETRIST

## 2017-04-24 RX ORDER — FLUOROMETHOLONE 1 MG/ML
1 SUSPENSION/ DROPS OPHTHALMIC 4 TIMES DAILY
Qty: 5 ML | Refills: 0 | Status: SHIPPED | OUTPATIENT
Start: 2017-04-24 | End: 2017-09-27

## 2017-04-24 NOTE — PROGRESS NOTES
HPI     CC Pt here today because irritated both eyes. Left eye is worse than   right. Pt shas irritation over the last month. Pt states feels like   something is in eye and itches in the corner. Pt states left eye tears and   left eye gets crust occasionally.     (+) Pt has tried using OTC drops but states does not help. Pt using drops   2 to 3 x day.   (+) Pt states having hard john reading. Pt states when reading small print   letters get wavy and seem to move. Pt has new glasses from [facility] 3   months ago.    (-) pain / (+) discomfort  (-) headaches  (-) diplopia   (-) flashes / (-) floaters         Last edited by Juan Manuel Eduardo, OD on 4/24/2017  9:32 AM.     ROS     Positive for: Eyes    Negative for: Constitutional, Gastrointestinal, Neurological, Skin,   Genitourinary, Musculoskeletal, HENT, Endocrine, Cardiovascular,   Respiratory, Psychiatric, Allergic/Imm, Heme/Lymph    Last edited by Juan Manuel Eduardo, OD on 4/24/2017  9:32 AM. (History)        Assessment /Plan     For exam results, see Encounter Report.    Irritation of both eyes  -     fluorometholone 0.1% (FML) 0.1 % DrpS; Place 1 drop into both eyes 4 (four) times daily.  Dispense: 5 mL; Refill: 0    Dry eye syndrome, bilateral  -     fluorometholone 0.1% (FML) 0.1 % DrpS; Place 1 drop into both eyes 4 (four) times daily.  Dispense: 5 mL; Refill: 0      Discussed findings. Start FML 0.1%: 1 gt qid OU, shake bottle well. OTC Refresh gel drop at bedtime OU, caution transient blurring of vision with gel use. D/C visine drops. Return in 1 week for f/u or sooner if symptoms worsening.

## 2017-05-01 ENCOUNTER — OFFICE VISIT (OUTPATIENT)
Dept: FAMILY MEDICINE | Facility: CLINIC | Age: 72
End: 2017-05-01
Payer: MEDICARE

## 2017-05-01 ENCOUNTER — OFFICE VISIT (OUTPATIENT)
Dept: OPTOMETRY | Facility: CLINIC | Age: 72
End: 2017-05-01
Payer: MEDICARE

## 2017-05-01 ENCOUNTER — DOCUMENTATION ONLY (OUTPATIENT)
Dept: FAMILY MEDICINE | Facility: CLINIC | Age: 72
End: 2017-05-01

## 2017-05-01 ENCOUNTER — HOSPITAL ENCOUNTER (OUTPATIENT)
Dept: RADIOLOGY | Facility: HOSPITAL | Age: 72
Discharge: HOME | End: 2017-05-01
Attending: OTOLARYNGOLOGY
Payer: MEDICARE

## 2017-05-01 VITALS
WEIGHT: 123.25 LBS | TEMPERATURE: 98 F | SYSTOLIC BLOOD PRESSURE: 133 MMHG | HEART RATE: 75 BPM | DIASTOLIC BLOOD PRESSURE: 73 MMHG | HEIGHT: 59 IN | BODY MASS INDEX: 24.85 KG/M2

## 2017-05-01 DIAGNOSIS — M79.10 MYALGIA: ICD-10-CM

## 2017-05-01 DIAGNOSIS — E78.5 HYPERLIPIDEMIA, UNSPECIFIED HYPERLIPIDEMIA TYPE: ICD-10-CM

## 2017-05-01 DIAGNOSIS — B02.30 HERPES ZOSTER OPHTHALMICUS OF LEFT EYE: Primary | ICD-10-CM

## 2017-05-01 DIAGNOSIS — H57.89 IRRITATION OF BOTH EYES: Primary | ICD-10-CM

## 2017-05-01 DIAGNOSIS — J32.9 SINUSITIS: ICD-10-CM

## 2017-05-01 PROCEDURE — 1160F RVW MEDS BY RX/DR IN RCRD: CPT | Mod: S$GLB,,, | Performed by: FAMILY MEDICINE

## 2017-05-01 PROCEDURE — 70486 CT MAXILLOFACIAL W/O DYE: CPT | Mod: 26,,, | Performed by: RADIOLOGY

## 2017-05-01 PROCEDURE — 99213 OFFICE O/P EST LOW 20 MIN: CPT | Mod: S$GLB,,, | Performed by: FAMILY MEDICINE

## 2017-05-01 PROCEDURE — 1157F ADVNC CARE PLAN IN RCRD: CPT | Mod: S$GLB,,, | Performed by: FAMILY MEDICINE

## 2017-05-01 PROCEDURE — 92012 INTRM OPH EXAM EST PATIENT: CPT | Mod: S$GLB,,, | Performed by: OPTOMETRIST

## 2017-05-01 PROCEDURE — 3075F SYST BP GE 130 - 139MM HG: CPT | Mod: S$GLB,,, | Performed by: FAMILY MEDICINE

## 2017-05-01 PROCEDURE — 99499 UNLISTED E&M SERVICE: CPT | Mod: S$GLB,,, | Performed by: FAMILY MEDICINE

## 2017-05-01 PROCEDURE — 3074F SYST BP LT 130 MM HG: CPT | Mod: S$GLB,,, | Performed by: OPTOMETRIST

## 2017-05-01 PROCEDURE — 3078F DIAST BP <80 MM HG: CPT | Mod: S$GLB,,, | Performed by: OPTOMETRIST

## 2017-05-01 PROCEDURE — 99999 PR PBB SHADOW E&M-EST. PATIENT-LVL II: CPT | Mod: PBBFAC,,, | Performed by: OPTOMETRIST

## 2017-05-01 PROCEDURE — 99999 PR PBB SHADOW E&M-EST. PATIENT-LVL III: CPT | Mod: PBBFAC,,, | Performed by: FAMILY MEDICINE

## 2017-05-01 PROCEDURE — 3078F DIAST BP <80 MM HG: CPT | Mod: S$GLB,,, | Performed by: FAMILY MEDICINE

## 2017-05-01 PROCEDURE — 70486 CT MAXILLOFACIAL W/O DYE: CPT | Mod: TC

## 2017-05-01 PROCEDURE — 1159F MED LIST DOCD IN RCRD: CPT | Mod: S$GLB,,, | Performed by: FAMILY MEDICINE

## 2017-05-01 PROCEDURE — 1125F AMNT PAIN NOTED PAIN PRSNT: CPT | Mod: S$GLB,,, | Performed by: FAMILY MEDICINE

## 2017-05-01 RX ORDER — FERROUS SULFATE 325(65) MG
325 TABLET ORAL
COMMUNITY
End: 2017-09-21

## 2017-05-01 RX ORDER — VALACYCLOVIR HYDROCHLORIDE 1 G/1
1000 TABLET, FILM COATED ORAL 3 TIMES DAILY
Qty: 42 TABLET | Refills: 0 | Status: SHIPPED | OUTPATIENT
Start: 2017-05-01 | End: 2017-06-30 | Stop reason: SDUPTHER

## 2017-05-01 NOTE — MR AVS SNAPSHOT
[facility]  [address]  [place] [address]  Phone: [phone]  Fax: [phone]                  Rola Richter   2017 10:20 AM   Office Visit    Description:  Female : 1945   Provider:  Liseth Carias MD   Department:  [place] [facility]           Reason for Visit     Rash           Diagnoses this Visit        Comments    Herpes zoster ophthalmicus of left eye    -  Primary            To Do List           Future Appointments        Provider Department Dept Phone    2017 1:00 PM [facility] [phone]    2017  1:15 PM [facility] CT2 LIMIT [address] LBS [facility] [phone]    2017 9:45 AM KAMARI [facility] - Optometry [phone]    5/15/2017 11:00 AM ANIL [facility] - Family Medicine [phone]    2017 11:00 AM WILLIS [facility] - Pain Management [phone]      Goals (5 Years of Data)     None       These Medications        Disp Refills Start End    valacyclovir (VALTREX) 1000 MG tablet 42 tablet 0 2017 5/15/2017    Take 1 tablet (1,000 mg total) by mouth 3 (three) times daily. - Oral    Pharmacy: [facility] [facility] [address]  [place] Ph #: [phone]         [facility]     [facility] Nurse Care Line -  Assistance  Unless otherwise directed by your provider, please contact [facility], our nurse care line that is available for  assistance.     Registered nurses in the [facility] provide: appointment scheduling, clinical advisement, health education, and other advisory services.  Call: [phone] (toll free)               Medications           Message regarding Medications     Verify the changes and/or additions to your medication regime listed below are the same as discussed with your clinician today.  If any of these changes or additions are incorrect, please notify  your healthcare provider.        START taking these NEW medications        Refills    valacyclovir (VALTREX) 1000 MG tablet 0    Sig: Take 1 tablet (1,000 mg total) by mouth 3 (three) times daily.    Class: Normal    Route: Oral           Verify that the below list of medications is an accurate representation of the medications you are currently taking.  If none reported, the list may be blank. If incorrect, please contact your healthcare provider. Carry this list with you in case of emergency.           Current Medications     busPIRone (BUSPAR) 10 MG tablet     cyclobenzaprine (FLEXERIL) 10 MG tablet take 1 tablet by mouth three times a day if needed for muscle spasm    diclofenac sodium (VOLTAREN) 1 % Gel Apply 2 g topically 2 (two) times daily.    estradiol (ESTRACE) 1 MG tablet Take 1 tablet (1 mg total) by mouth once daily.    ferrous sulfate (IRON) 325 mg (65 mg iron) Tab tablet Take 325 mg by mouth daily with breakfast.    fish oil-omega-3 fatty acids 300-1,000 mg capsule Take 2 g by mouth once daily.    fluorometholone 0.1% (FML) 0.1 % DrpS Place 1 drop into both eyes 4 (four) times daily.    guaifenesin (HUMIBID E) 400 mg Tab Take 400 mg by mouth.    hydrocodone-acetaminophen 5-325mg (NORCO) 5-325 mg per tablet Take 1 tablet by mouth every 12 (twelve) hours as needed for Pain.    hydroxychloroquine (PLAQUENIL) 200 mg tablet Take 1 tablet (200 mg total) by mouth 2 (two) times daily.    multivit with min-folic acid 200 mcg Chew     polyethylene glycol (GLYCOLAX) 17 gram/dose powder Take 17 g by mouth once daily.    rosuvastatin (CRESTOR) 5 MG tablet Take 1 tablet (5 mg total) by mouth once daily.    sertraline (ZOLOFT) 100 MG tablet Take 1 tablet (100 mg total) by mouth once daily.    sucralfate (CARAFATE) 1 gram tablet Take 1 g by mouth 4 (four) times daily.    valacyclovir (VALTREX) 1000 MG tablet Take 1 tablet (1,000 mg total) by mouth 3 (three) times daily.           Clinical Reference Information     "       Your Vitals Were     BP Pulse Temp Height Weight BMI    133/73 (BP Location: Left arm, Patient Position: Sitting, BP Method: Automatic) 75 98.1 °F (36.7 °C) (Oral) 4' 11" (1.499 m) 55.9 kg (123 lb 3.8 oz) 24.89 kg/m2      Blood Pressure          Most Recent Value    BP  133/73      Allergies as of 5/1/2017     Aspirin    Penicillins    Lipitor [Atorvastatin]    Sulfa (Sulfonamide Antibiotics)      Immunizations Administered on Date of Encounter - 5/1/2017     None      Language Assistance Services     ATTENTION: Language assistance services are available, free of charge. Please call [phone].      ATENCIÓN: Si bennyla ba, tiene a garcia disposición servicios gratuitos de asistencia lingüística. Llame al [phone].     HYACINTH Ý: N?u b?n nói Ti?ng Vi?t, có các d?ch v? h? tr? ngôn ng? mi?n phí dành cho b?n. G?i s? [phone].         [place] [facility] complies with applicable Federal civil rights laws and does not discriminate on the basis of race, color, national origin, age, disability, or sex.        "

## 2017-05-01 NOTE — PROGRESS NOTES
Pre-Visit Chart Review  For Appointment Scheduled on 5-1-17    Health Maintenance Due   Topic Date Due    Colonoscopy  05/31/1995

## 2017-05-01 NOTE — PROGRESS NOTES
HPI     CC: Pt here today for 1 week follow up. Pt states left eye is getting   worse. Pt states left eye is burning and getting watery. Pt states Friday   (3 days ago) lid is soar to touch. Pt states feels like something is in   left eye. Pt states right eye feels better.     (+) FML 4 x day both eyes / Pt states compliant with drops   (+) Pt has red spot on upper left side of nose that is sore to the touch   and burns. Pt states spot started on Friday. Pt states temporal and side   of head is sore.     (-) pain / (-) discomfort  (-) headaches  (-) diplopia   (-) flashes / (-) floaters         Last edited by Juan Manuel Eduardo, OD on 5/1/2017 10:19 AM.     ROS     Positive for: Eyes    Negative for: Constitutional, Gastrointestinal, Neurological, Skin,   Genitourinary, Musculoskeletal, HENT, Endocrine, Cardiovascular,   Respiratory, Psychiatric, Allergic/Imm, Heme/Lymph    Last edited by Juan Manuel Eduardo, OD on 5/1/2017 10:19 AM. (History)        Assessment /Plan     For exam results, see Encounter Report.    Irritation of both eyes      Eye irritation OD resolved. Pt states left eye still irritated, pain around eye. Rash on left nose bridge, eyelid, and forehead started over the weekend. No corneal dendrites, no corneal stain, no ocular involvement. Continue FML 0.1%: 1 gt qid OS, shake bottle well. Start otc gel drop qid OS. Referred to Dr. Carias for rash eval for herpes zoster. Return with me in 1 week for ocular f/u, or sooner if symptoms worsen.

## 2017-05-01 NOTE — Clinical Note
Walter Eduardo.  I do think this is shingles and have started antiviral medication on this patient.  I see you have initiated treatment for her eye and will be seeing her again on the 8th. We will see her back in 2 weeks.  Please let me know if there is anything else you would recommend for her  Thank you Liseth Carias MD

## 2017-05-07 RX ORDER — ROSUVASTATIN CALCIUM 5 MG/1
5 TABLET, COATED ORAL EVERY OTHER DAY
Qty: 45 TABLET | Refills: 3 | COMMUNITY
Start: 2017-05-07 | End: 2018-07-16 | Stop reason: SDUPTHER

## 2017-05-07 NOTE — PROGRESS NOTES
Subjective:       Patient ID: Rola Richter is a 71 y.o. female.    Chief Complaint: Rash (burning)    Patient Active Problem List   Diagnosis    Hypertension    GERD (gastroesophageal reflux disease)    Arthritis    Anxiety    Hyperlipidemia    Encounter for monitoring primary estrogen replacement therapy    Osteoporosis    Erosive osteoarthritis of right hand    Medication monitoring encounter    Dependency on pain medication    PTSD (post-traumatic stress disorder)    Post-nasal drip    Immunosuppressed status    CMC arthritis    Dry eye syndrome    Dysphagia    DDD (degenerative disc disease), cervical    Occipital neuralgia   Has painful rash around left eye.  Seen by optometry today Dr. Eduardo.  Concerned about shingles.  Outbreak started 2-3 days ago    HPI  Review of Systems   Constitutional: Negative for fatigue and unexpected weight change.   Respiratory: Negative for chest tightness and shortness of breath.    Cardiovascular: Negative for chest pain, palpitations and leg swelling.   Gastrointestinal: Negative for abdominal pain.   Musculoskeletal: Positive for myalgias. Negative for arthralgias.   Neurological: Negative for dizziness, syncope, light-headedness and headaches.       Objective:      Physical Exam   Constitutional: She is oriented to person, place, and time. She appears well-developed and well-nourished.   Eyes: Conjunctivae and EOM are normal. Pupils are equal, round, and reactive to light.   Erythematous papulovesicular rash around left orbit and left temple, left scalp anteriorly.  Stops at midline   Cardiovascular: Normal rate, regular rhythm and normal heart sounds.    Pulmonary/Chest: Effort normal and breath sounds normal.   Musculoskeletal: She exhibits no edema.   Neurological: She is alert and oriented to person, place, and time.   Skin: Skin is warm and dry.   Psychiatric: She has a normal mood and affect.   Nursing note and vitals reviewed.      Assessment:        1. Herpes zoster ophthalmicus of left eye    2. Hyperlipidemia, unspecified hyperlipidemia type    3. Myalgia        Plan:         1. Herpes zoster ophthalmicus of left eye  Start:  - valacyclovir (VALTREX) 1000 MG tablet; Take 1 tablet (1,000 mg total) by mouth 3 (three) times daily.  Dispense: 42 tablet; Refill: 0  Cont care under eye specialist    2. Hyperlipidemia, unspecified hyperlipidemia type  Change to  - rosuvastatin (CRESTOR) 5 MG tablet; Take 1 tablet (5 mg total) by mouth every other day.  Dispense: 45 tablet; Refill: 3    3. Myalgia  Due to statin.      Reeval 2 weeks with [facility]

## 2017-05-08 ENCOUNTER — OFFICE VISIT (OUTPATIENT)
Dept: OPTOMETRY | Facility: CLINIC | Age: 72
End: 2017-05-08
Payer: MEDICARE

## 2017-05-08 DIAGNOSIS — H57.89 IRRITATION OF LEFT EYE: Primary | ICD-10-CM

## 2017-05-08 PROCEDURE — 92012 INTRM OPH EXAM EST PATIENT: CPT | Mod: S$GLB,,, | Performed by: OPTOMETRIST

## 2017-05-08 PROCEDURE — 99999 PR PBB SHADOW E&M-EST. PATIENT-LVL II: CPT | Mod: PBBFAC,,, | Performed by: OPTOMETRIST

## 2017-05-08 NOTE — PROGRESS NOTES
HPI     CC: Pt here today for irritation of both eyes 2 week follow up. Pt states   eye irritation is now gone. No Pain. Forehead still burns and tingles.     (+) Shingles  / oral valtrex one pill by mouth 3 x day  (+) FML 1 drop 4 x day lefty eye   (+) OTC gel drop 3 x day left eye     (-) pain / (-) discomfort  (-) headaches  (-) diplopia   (-) flashes / (-) floaters         Last edited by Juan Manuel Eduardo, OD on 5/8/2017 10:41 AM.     ROS     Positive for: Eyes    Negative for: Constitutional, Gastrointestinal, Neurological, Skin,   Genitourinary, Musculoskeletal, HENT, Endocrine, Cardiovascular,   Respiratory, Psychiatric, Allergic/Imm, Heme/Lymph    Last edited by Juan Manuel Eduardo, OD on 5/8/2017 10:41 AM. (History)        Assessment /Plan     For exam results, see Encounter Report.    Irritation of left eye      Resolved irritation OS, negative corneal stain, negative corneal dendrites. Pt currently on Valtrex, states feels better but forehead still burns. Directed to continue oral Valtrex and f/u with Dr. Carias as directed.    Start taper FML: 1 gt tid OS x 1 week, then 1 gt bid OS x 1 week, then 1 gt qd OS x 1 week, then D/C drops. Return if any eye irritation returns.

## 2017-05-12 ENCOUNTER — TELEPHONE (OUTPATIENT)
Dept: FAMILY MEDICINE | Facility: CLINIC | Age: 72
End: 2017-05-12

## 2017-05-12 NOTE — TELEPHONE ENCOUNTER
Informed patinet that provider will be out that day and appointment needed to be rescheduled. Per patient request, rescheduled appointment.

## 2017-05-16 RX ORDER — BUSPIRONE HYDROCHLORIDE 10 MG/1
TABLET ORAL
Qty: 90 TABLET | Refills: 1 | Status: SHIPPED | OUTPATIENT
Start: 2017-05-16 | End: 2017-09-12 | Stop reason: SDUPTHER

## 2017-05-17 ENCOUNTER — DOCUMENTATION ONLY (OUTPATIENT)
Dept: FAMILY MEDICINE | Facility: CLINIC | Age: 72
End: 2017-05-17

## 2017-05-17 NOTE — PROGRESS NOTES
Pre-Visit Chart Review  For Appointment Scheduled on 5/18/17    Health Maintenance Due   Topic Date Due    Colonoscopy  05/31/1995

## 2017-05-18 ENCOUNTER — OFFICE VISIT (OUTPATIENT)
Dept: FAMILY MEDICINE | Facility: CLINIC | Age: 72
End: 2017-05-18
Payer: MEDICARE

## 2017-05-18 VITALS
BODY MASS INDEX: 24.97 KG/M2 | HEIGHT: 59 IN | HEART RATE: 76 BPM | DIASTOLIC BLOOD PRESSURE: 74 MMHG | WEIGHT: 123.88 LBS | TEMPERATURE: 98 F | SYSTOLIC BLOOD PRESSURE: 128 MMHG

## 2017-05-18 DIAGNOSIS — B02.8 HERPES ZOSTER WITH COMPLICATION: Primary | ICD-10-CM

## 2017-05-18 PROCEDURE — 1159F MED LIST DOCD IN RCRD: CPT | Mod: S$GLB,,, | Performed by: PHYSICIAN ASSISTANT

## 2017-05-18 PROCEDURE — 99213 OFFICE O/P EST LOW 20 MIN: CPT | Mod: S$GLB,,, | Performed by: PHYSICIAN ASSISTANT

## 2017-05-18 PROCEDURE — 1160F RVW MEDS BY RX/DR IN RCRD: CPT | Mod: S$GLB,,, | Performed by: PHYSICIAN ASSISTANT

## 2017-05-18 PROCEDURE — 3074F SYST BP LT 130 MM HG: CPT | Mod: S$GLB,,, | Performed by: PHYSICIAN ASSISTANT

## 2017-05-18 PROCEDURE — 1125F AMNT PAIN NOTED PAIN PRSNT: CPT | Mod: S$GLB,,, | Performed by: PHYSICIAN ASSISTANT

## 2017-05-18 PROCEDURE — 3078F DIAST BP <80 MM HG: CPT | Mod: S$GLB,,, | Performed by: PHYSICIAN ASSISTANT

## 2017-05-18 PROCEDURE — 99999 PR PBB SHADOW E&M-EST. PATIENT-LVL IV: CPT | Mod: PBBFAC,,, | Performed by: PHYSICIAN ASSISTANT

## 2017-05-18 NOTE — MR AVS SNAPSHOT
[facility]  [address]  [place] [address]  Phone: [phone]  Fax: [phone]                  Rola Richter   2017 3:20 PM   Office Visit    Description:  Female : 1945   Provider:  ANIL Combs   Department:  [facility]           Reason for Visit     Follow-up           Diagnoses this Visit        Comments    Herpes zoster ophthalmicus of left eye    -  Primary            To Do List           Future Appointments        Provider Department Dept Phone    2017 3:20 PM ANIL Combs [facility] [phone]    2017 11:00 AM ANIL Yan-C [facility] [phone]    2017 10:00 AM MD [facility] - Rheumatology [phone]    2017 10:40 AM Liseth Carias MD [facility] [phone]      Goals (5 Years of Data)     None      [facility]     [facility] Care [facility] -  Assistance  Unless otherwise directed by your provider, please contact [facility], our nurse care line that is available for  assistance.     Registered nurses in the [facility] provide: appointment scheduling, clinical advisement, health education, and other advisory services.  Call: [phone] (toll free)               Medications           Message regarding Medications     Verify the changes and/or additions to your medication regime listed below are the same as discussed with your clinician today.  If any of these changes or additions are incorrect, please notify your healthcare provider.             Verify that the below list of medications is an accurate representation of the medications you are currently taking.  If none reported, the list may be blank. If incorrect, please contact your healthcare provider. Carry this list with you in case of emergency.           Current Medications     busPIRone (BUSPAR) 10 MG tablet TAKE 1 TABLET TWO TIMES  "DAILY AS NEEDED FOR ANXIETY    cyclobenzaprine (FLEXERIL) 10 MG tablet take 1 tablet by mouth three times a day if needed for muscle spasm    diclofenac sodium (VOLTAREN) 1 % Gel Apply 2 g topically 2 (two) times daily.    diphenhydrAMINE-aluminum-magnesium hydroxide-simethicone-lidocaine HCl 2% Swish and spit 15 mLs every 4 (four) hours as needed.    estradiol (ESTRACE) 1 MG tablet Take 1 tablet (1 mg total) by mouth once daily.    ferrous sulfate (IRON) 325 mg (65 mg iron) Tab tablet Take 325 mg by mouth daily with breakfast.    fish oil-omega-3 fatty acids 300-1,000 mg capsule Take 2 g by mouth once daily.    fluorometholone 0.1% (FML) 0.1 % DrpS Place 1 drop into both eyes 4 (four) times daily.    guaifenesin (HUMIBID E) 400 mg Tab Take 400 mg by mouth.    hydrocodone-acetaminophen 5-325mg (NORCO) 5-325 mg per tablet Take 1 tablet by mouth every 12 (twelve) hours as needed for Pain.    hydroxychloroquine (PLAQUENIL) 200 mg tablet Take 1 tablet (200 mg total) by mouth 2 (two) times daily.    multivit with min-folic acid 200 mcg Chew     polyethylene glycol (GLYCOLAX) 17 gram/dose powder Take 17 g by mouth once daily.    rosuvastatin (CRESTOR) 5 MG tablet Take 1 tablet (5 mg total) by mouth every other day.    sertraline (ZOLOFT) 100 MG tablet Take 1 tablet (100 mg total) by mouth once daily.    sucralfate (CARAFATE) 1 gram tablet Take 1 g by mouth 4 (four) times daily.    valacyclovir (VALTREX) 1000 MG tablet Take 1 tablet (1,000 mg total) by mouth 3 (three) times daily.           Clinical Reference Information           Your Vitals Were     BP Pulse Temp    128/74 (BP Location: Left arm, Patient Position: Sitting, BP Method: Automatic) 76 97.8 °F (36.6 °C) (Oral)    Height Weight BMI    4' 11" (1.499 m) 56.2 kg (123 lb 14.4 oz) 25.02 kg/m2      Blood Pressure          Most Recent Value    BP  128/74      Allergies as of 5/18/2017     Aspirin    Penicillins    Lipitor [Atorvastatin]    Sulfa (Sulfonamide " Antibiotics)      Immunizations Administered on Date of Encounter - 5/18/2017     None      Language Assistance Services     ATTENTION: Language assistance services are available, free of charge. Please call [phone].      ATENCIÓN: Si habheladio cosme, tiene a garcia disposición servicios gratuitos de asistencia lingüística. Llame al [phone].     CHÚ Ý: N?u b?n nói Ti?ng Vi?t, có các d?ch v? h? tr? ngôn ng? mi?n phí dành cho b?n. G?i s? [phone].         [facility] complies with applicable Federal civil rights laws and does not discriminate on the basis of race, color, national origin, age, disability, or sex.

## 2017-05-18 NOTE — PROGRESS NOTES
"Subjective:       Patient ID: Rola Richter is a 71 y.o. female.    Chief Complaint: Follow-up (Zosters,  left eye)    HPI Comments: Patient presents for follow up of Shingles.  She has completed a course of valtrex.  She has seen ophthalmologist.  She continues to have occasional "warm" sensation of the left forehead but otherwise she feels well.  She states that her rash is resolved.       Review of Systems   Constitutional: Negative for chills, diaphoresis, fatigue and fever.   HENT: Negative for ear pain.    Eyes: Negative for photophobia, pain, discharge, redness, itching and visual disturbance.   Skin: Negative for rash.   Neurological: Negative for dizziness, weakness, light-headedness and headaches.       Objective:      Physical Exam   Constitutional: She appears well-developed and well-nourished. She is cooperative. She does not appear ill. No distress.   HENT:   Head: Normocephalic and atraumatic.   Right Ear: Tympanic membrane, external ear and ear canal normal.   Left Ear: Tympanic membrane, external ear and ear canal normal.   Eyes: Conjunctivae, EOM and lids are normal. Pupils are equal, round, and reactive to light. Right eye exhibits no exudate. Left eye exhibits no exudate. No scleral icterus.   Neurological: She is alert.   Skin: Skin is warm and dry. No rash noted.   Vitals reviewed.      Assessment:       1. Herpes zoster with complication        Plan:       Rola was seen today for follow-up.    Diagnoses and all orders for this visit:    Herpes zoster with complication, resolved  No further treatment as this time   "

## 2017-06-12 ENCOUNTER — OFFICE VISIT (OUTPATIENT)
Dept: PSYCHIATRY | Facility: CLINIC | Age: 72
End: 2017-06-12
Payer: MEDICARE

## 2017-06-12 VITALS
SYSTOLIC BLOOD PRESSURE: 155 MMHG | BODY MASS INDEX: 24.82 KG/M2 | HEIGHT: 59 IN | WEIGHT: 123.13 LBS | DIASTOLIC BLOOD PRESSURE: 60 MMHG | HEART RATE: 73 BPM

## 2017-06-12 DIAGNOSIS — F43.10 PTSD (POST-TRAUMATIC STRESS DISORDER): Primary | ICD-10-CM

## 2017-06-12 DIAGNOSIS — F43.23 ADJUSTMENT DISORDER WITH MIXED ANXIETY AND DEPRESSED MOOD: ICD-10-CM

## 2017-06-12 PROCEDURE — 99999 PR PBB SHADOW E&M-EST. PATIENT-LVL III: CPT | Mod: PBBFAC,,, | Performed by: PSYCHIATRY & NEUROLOGY

## 2017-06-12 PROCEDURE — 99499 UNLISTED E&M SERVICE: CPT | Mod: S$GLB,,, | Performed by: PSYCHIATRY & NEUROLOGY

## 2017-06-12 PROCEDURE — 1159F MED LIST DOCD IN RCRD: CPT | Mod: S$GLB,,, | Performed by: PSYCHIATRY & NEUROLOGY

## 2017-06-12 PROCEDURE — 90833 PSYTX W PT W E/M 30 MIN: CPT | Mod: S$GLB,,, | Performed by: PSYCHIATRY & NEUROLOGY

## 2017-06-12 PROCEDURE — 99214 OFFICE O/P EST MOD 30 MIN: CPT | Mod: S$GLB,,, | Performed by: PSYCHIATRY & NEUROLOGY

## 2017-06-12 RX ORDER — BUTALBITAL, ASPIRIN, AND CAFFEINE 325; 50; 40 MG/1; MG/1; MG/1
1 CAPSULE ORAL EVERY 4 HOURS PRN
COMMUNITY
End: 2022-09-16

## 2017-06-12 RX ORDER — AMOXICILLIN AND CLAVULANATE POTASSIUM 875; 125 MG/1; MG/1
1 TABLET, FILM COATED ORAL 2 TIMES DAILY
COMMUNITY
End: 2017-06-28
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
## 2017-06-28 ENCOUNTER — HOSPITAL ENCOUNTER (OUTPATIENT)
Dept: RADIOLOGY | Facility: HOSPITAL | Age: 72
Discharge: HOME | End: 2017-06-28
Attending: INTERNAL MEDICINE
Payer: MEDICARE

## 2017-06-28 ENCOUNTER — OFFICE VISIT (OUTPATIENT)
Dept: RHEUMATOLOGY | Facility: CLINIC | Age: 72
End: 2017-06-28
Payer: MEDICARE

## 2017-06-28 ENCOUNTER — TELEPHONE (OUTPATIENT)
Dept: RHEUMATOLOGY | Facility: CLINIC | Age: 72
End: 2017-06-28

## 2017-06-28 VITALS
HEART RATE: 77 BPM | BODY MASS INDEX: 24.4 KG/M2 | DIASTOLIC BLOOD PRESSURE: 72 MMHG | WEIGHT: 121.06 LBS | SYSTOLIC BLOOD PRESSURE: 134 MMHG | HEIGHT: 59 IN

## 2017-06-28 DIAGNOSIS — Z79.899 LONG-TERM USE OF PLAQUENIL: ICD-10-CM

## 2017-06-28 DIAGNOSIS — M81.0 OSTEOPOROSIS, UNSPECIFIED OSTEOPOROSIS TYPE, UNSPECIFIED PATHOLOGICAL FRACTURE PRESENCE: ICD-10-CM

## 2017-06-28 DIAGNOSIS — M15.4 EROSIVE OSTEOARTHRITIS: ICD-10-CM

## 2017-06-28 DIAGNOSIS — M79.642 LEFT HAND PAIN: ICD-10-CM

## 2017-06-28 DIAGNOSIS — M65.9 TENOSYNOVITIS OF FINGER: Primary | ICD-10-CM

## 2017-06-28 PROCEDURE — 1159F MED LIST DOCD IN RCRD: CPT | Mod: S$GLB,,, | Performed by: INTERNAL MEDICINE

## 2017-06-28 PROCEDURE — 73130 X-RAY EXAM OF HAND: CPT | Mod: TC,LT

## 2017-06-28 PROCEDURE — 99499 UNLISTED E&M SERVICE: CPT | Mod: S$GLB,,, | Performed by: INTERNAL MEDICINE

## 2017-06-28 PROCEDURE — 73130 X-RAY EXAM OF HAND: CPT | Mod: 26,LT,, | Performed by: RADIOLOGY

## 2017-06-28 PROCEDURE — 20550 NJX 1 TENDON SHEATH/LIGAMENT: CPT | Mod: S$GLB,,, | Performed by: INTERNAL MEDICINE

## 2017-06-28 PROCEDURE — 1125F AMNT PAIN NOTED PAIN PRSNT: CPT | Mod: S$GLB,,, | Performed by: INTERNAL MEDICINE

## 2017-06-28 PROCEDURE — 99214 OFFICE O/P EST MOD 30 MIN: CPT | Mod: 25,S$GLB,, | Performed by: INTERNAL MEDICINE

## 2017-06-28 PROCEDURE — 99999 PR PBB SHADOW E&M-EST. PATIENT-LVL III: CPT | Mod: PBBFAC,,, | Performed by: INTERNAL MEDICINE

## 2017-06-28 RX ORDER — HYDROCODONE BITARTRATE AND ACETAMINOPHEN 5; 325 MG/1; MG/1
1 TABLET ORAL EVERY 6 HOURS PRN
COMMUNITY
End: 2017-06-30 | Stop reason: SDUPTHER

## 2017-06-28 RX ORDER — HYDROXYCHLOROQUINE SULFATE 200 MG/1
200 TABLET, FILM COATED ORAL DAILY
Qty: 30 TABLET | Refills: 11 | Status: SHIPPED | OUTPATIENT
Start: 2017-06-28 | End: 2018-01-10

## 2017-06-28 RX ORDER — TRIAMCINOLONE ACETONIDE 40 MG/ML
40 INJECTION, SUSPENSION INTRA-ARTICULAR; INTRAMUSCULAR
Status: DISCONTINUED | OUTPATIENT
Start: 2017-06-28 | End: 2017-06-28 | Stop reason: HOSPADM

## 2017-06-28 RX ADMIN — TRIAMCINOLONE ACETONIDE 40 MG: 40 INJECTION, SUSPENSION INTRA-ARTICULAR; INTRAMUSCULAR at 03:06

## 2017-06-28 NOTE — PROGRESS NOTES
"Subjective:       Patient ID: Rola Richter is a 72 y.o. female.    Chief Complaint: Left hand pain,  Osteoporosis and Erosive OA  HPI 70y/o female is here for follow up for osteoporosis and Erosive OA.  Today, she is here for evaluation of left hand pain.  Pain is located in the left second digit -in the tendon nodule accompanied by trigger finger.  Pain is sharp without any associated joint swelling   For erosive osteoarthritis, she is on On hydroxychloroquine 200 mg once a day Cannot tolerate higher dose of Plaquenil.    For osteoporosis, she is on zoledronic acid yearly. Denies any recent falls or fractures      Review of Systems   Eyes: Negative for pain and redness.   Respiratory: Negative for cough and shortness of breath.    Cardiovascular: Negative for chest pain and leg swelling.   Gastrointestinal: Negative for abdominal distention and abdominal pain.   Genitourinary: Negative for genital sores and hematuria.   Neurological: Negative for tremors and seizures.   Psychiatric/Behavioral: Negative for agitation and behavioral problems.         Objective:     /72 (BP Location: Left arm, Patient Position: Sitting, BP Method: Automatic)   Pulse 77   Ht 4' 11" (1.499 m)   Wt 54.9 kg (121 lb 0.5 oz)   BMI 24.45 kg/m²      Physical Exam   Constitutional: She is oriented to person, place, and time and well-developed, well-nourished, and in no distress.   HENT:   Head: Normocephalic and atraumatic.   Eyes: Conjunctivae and EOM are normal. Pupils are equal, round, and reactive to light.   Neck: Neck supple. No thyromegaly present.   Cardiovascular: Normal rate and regular rhythm.    Pulmonary/Chest: Effort normal and breath sounds normal.   Abdominal: Soft. Bowel sounds are normal.   Neurological: She is alert and oriented to person, place, and time.   Skin: Skin is warm and dry.     Psychiatric: Affect normal.   Musculoskeletal: She exhibits no edema.   Left second tenosynovitis                     Lab " Results   Component Value Date    WBC 7.60 10/08/2016    HGB 11.6 (L) 10/08/2016    HCT 34.4 (L) 10/08/2016    MCV 91 10/08/2016     10/08/2016     CMP  Sodium   Date Value Ref Range Status   05/01/2017 140 136 - 145 mmol/L Final     Potassium   Date Value Ref Range Status   05/01/2017 4.2 3.5 - 5.1 mmol/L Final     Chloride   Date Value Ref Range Status   05/01/2017 106 95 - 110 mmol/L Final     CO2   Date Value Ref Range Status   05/01/2017 25 23 - 29 mmol/L Final     Glucose   Date Value Ref Range Status   05/01/2017 106 70 - 110 mg/dL Final     BUN, Bld   Date Value Ref Range Status   05/01/2017 10 8 - 23 mg/dL Final     Creatinine   Date Value Ref Range Status   05/01/2017 0.8 0.5 - 1.4 mg/dL Final     Calcium   Date Value Ref Range Status   05/01/2017 9.3 8.7 - 10.5 mg/dL Final     Total Protein   Date Value Ref Range Status   01/16/2017 7.3 6.0 - 8.4 g/dL Final     Albumin   Date Value Ref Range Status   01/16/2017 3.9 3.5 - 5.2 g/dL Final     Total Bilirubin   Date Value Ref Range Status   01/16/2017 0.1 0.1 - 1.0 mg/dL Final     Comment:     For infants and newborns, interpretation of results should be based  on gestational age, weight and in agreement with clinical  observations.  Premature Infant recommended reference ranges:  Up to 24 hours.............<8.0 mg/dL  Up to 48 hours............<12.0 mg/dL  3-5 days..................<15.0 mg/dL  6-29 days.................<15.0 mg/dL       Alkaline Phosphatase   Date Value Ref Range Status   01/16/2017 71 55 - 135 U/L Final     AST   Date Value Ref Range Status   01/16/2017 20 10 - 40 U/L Final     ALT   Date Value Ref Range Status   01/16/2017 14 10 - 44 U/L Final     Anion Gap   Date Value Ref Range Status   05/01/2017 9 8 - 16 mmol/L Final     eGFR if    Date Value Ref Range Status   05/01/2017 >60 >60 mL/min/1.73 m^2 Final     eGFR if non    Date Value Ref Range Status   05/01/2017 >60 >60 mL/min/1.73 m^2 Final      Comment:     Calculation used to obtain the estimated glomerular filtration  rate (eGFR) is the CKD-EPI equation. Since race is unknown   in our information system, the eGFR values for   -American and Non--American patients are given   for each creatinine result.       Lab Results   Component Value Date    WBC 7.60 10/08/2016    HGB 11.6 (L) 10/08/2016    HCT 34.4 (L) 10/08/2016    MCV 91 10/08/2016     10/08/2016     CMP  Sodium   Date Value Ref Range Status   05/01/2017 140 136 - 145 mmol/L Final     Potassium   Date Value Ref Range Status   05/01/2017 4.2 3.5 - 5.1 mmol/L Final     Chloride   Date Value Ref Range Status   05/01/2017 106 95 - 110 mmol/L Final     CO2   Date Value Ref Range Status   05/01/2017 25 23 - 29 mmol/L Final     Glucose   Date Value Ref Range Status   05/01/2017 106 70 - 110 mg/dL Final     BUN, Bld   Date Value Ref Range Status   05/01/2017 10 8 - 23 mg/dL Final     Creatinine   Date Value Ref Range Status   05/01/2017 0.8 0.5 - 1.4 mg/dL Final     Calcium   Date Value Ref Range Status   05/01/2017 9.3 8.7 - 10.5 mg/dL Final     Total Protein   Date Value Ref Range Status   01/16/2017 7.3 6.0 - 8.4 g/dL Final     Albumin   Date Value Ref Range Status   01/16/2017 3.9 3.5 - 5.2 g/dL Final     Total Bilirubin   Date Value Ref Range Status   01/16/2017 0.1 0.1 - 1.0 mg/dL Final     Comment:     For infants and newborns, interpretation of results should be based  on gestational age, weight and in agreement with clinical  observations.  Premature Infant recommended reference ranges:  Up to 24 hours.............<8.0 mg/dL  Up to 48 hours............<12.0 mg/dL  3-5 days..................<15.0 mg/dL  6-29 days.................<15.0 mg/dL       Alkaline Phosphatase   Date Value Ref Range Status   01/16/2017 71 55 - 135 U/L Final     AST   Date Value Ref Range Status   01/16/2017 20 10 - 40 U/L Final     ALT   Date Value Ref Range Status   01/16/2017 14 10 - 44 U/L Final      Anion Gap   Date Value Ref Range Status   05/01/2017 9 8 - 16 mmol/L Final     eGFR if    Date Value Ref Range Status   05/01/2017 >60 >60 mL/min/1.73 m^2 Final     eGFR if non    Date Value Ref Range Status   05/01/2017 >60 >60 mL/min/1.73 m^2 Final     Comment:     Calculation used to obtain the estimated glomerular filtration  rate (eGFR) is the CKD-EPI equation. Since race is unknown   in our information system, the eGFR values for   -American and Non--American patients are given   for each creatinine result.       Lab Results   Component Value Date    SEDRATE 17 12/14/2015     Lab Results   Component Value Date    CRP 3.0 12/14/2015       The bone mineral densities (BMD) of the lumbar spine as well as the trabecular bone of the left femoral neck were calculated using the DXA method. Values were then compared to the diagnostic criteria established by the WHO (World Health Organization).     Hip:  The BMD of the trabecular bone of the femoral neck was measured at 0.639 grams per cm2, with a young adult T-score of -2.0 and an age-matched Z score of -0.2. Based on WHO criteria this is consistent with osteopenia at moderate increased risk for fracture. The FRAX 10 year probability of major osteoporotic fracture is 11 percent and the 10 year probability of hip fracture is 2.4 percent.    Lumbar:   The BMD of the lumbar region measures 0.710 grams per cm2, with a young adult T score of -3.1, and an age-matched Z score of -0.9. Based on WHO criteria this is consistent with osteoporosis at high risk for fracture.    Assessment:   Left second tenosynovitis/left hand pain   Erosive osteoarthritis- on hydroxychloroquine 200 mg once daily  Osteoporosis-on Reclast -monitor CMP  Long-term use of hydroxychloroquine  GERD-stable   Plan:   Left second tendon nodule was injected with  5 mg of triamcinolone   Check x-ray of left hand  Advised to tape left index finger  Continue   HCQ to 200mg once  a day (cannot tolerate twice a day)  Continue Voltaren gel for CMC OA   Follow-up with ophthalmology for HC Q eye exam   RTC in 6 months

## 2017-06-28 NOTE — TELEPHONE ENCOUNTER
----- Message from Donna Mclaughlin sent at 6/28/2017 12:53 PM CDT -----  Contact: self  Patient called regarding her left finger is swollen and can barely move it. Next appt is 7/17. Please contact [phone] (home)

## 2017-06-28 NOTE — PROCEDURES
Tendon Sheath  Date/Time: 6/28/2017 3:40 PM  Performed by: CANDIDA MAGAÑA  Authorized by: CANDIDA MAGAÑA     Consent Done?:  Yes (Verbal)  Timeout: prior to procedure the correct patient, procedure, and site was verified    Indications:  Pain  Site marked: the procedure site was marked    Timeout: prior to procedure the correct patient, procedure, and site was verified    Location:  Index finger  Site:  L index MCP  Prep: patient was prepped and draped in usual sterile fashion    Needle size:  27 G  Medications:  40 mg triamcinolone acetonide 40 mg/mL  Patient tolerance:  Patient tolerated the procedure well with no immediate complications   Left second tendon nodule was injected with  5 mg of triamcinolone

## 2017-06-29 ENCOUNTER — TELEPHONE (OUTPATIENT)
Dept: PAIN MEDICINE | Facility: CLINIC | Age: 72
End: 2017-06-29

## 2017-06-29 NOTE — TELEPHONE ENCOUNTER
----- Message from Donna Mclaughlin sent at 6/28/2017 12:53 PM CDT -----  Contact: self  Patient called regarding her appt on 6/30/17 and wanted to also get an injection. Please contact [phone] (home)

## 2017-06-30 ENCOUNTER — OFFICE VISIT (OUTPATIENT)
Dept: PAIN MEDICINE | Facility: CLINIC | Age: 72
End: 2017-06-30
Payer: MEDICARE

## 2017-06-30 VITALS
DIASTOLIC BLOOD PRESSURE: 81 MMHG | BODY MASS INDEX: 24.39 KG/M2 | HEIGHT: 59 IN | HEART RATE: 84 BPM | SYSTOLIC BLOOD PRESSURE: 147 MMHG | WEIGHT: 121 LBS

## 2017-06-30 DIAGNOSIS — M79.18 MYOFASCIAL PAIN: ICD-10-CM

## 2017-06-30 DIAGNOSIS — M47.812 SPONDYLOSIS OF CERVICAL REGION WITHOUT MYELOPATHY OR RADICULOPATHY: ICD-10-CM

## 2017-06-30 DIAGNOSIS — M54.12 CERVICAL RADICULOPATHY: ICD-10-CM

## 2017-06-30 DIAGNOSIS — M54.81 OCCIPITAL NEURALGIA OF LEFT SIDE: Primary | ICD-10-CM

## 2017-06-30 PROCEDURE — 99999 PR PBB SHADOW E&M-EST. PATIENT-LVL V: CPT | Mod: PBBFAC,,, | Performed by: PHYSICIAN ASSISTANT

## 2017-06-30 PROCEDURE — 1159F MED LIST DOCD IN RCRD: CPT | Mod: S$GLB,,, | Performed by: PHYSICIAN ASSISTANT

## 2017-06-30 PROCEDURE — 64405 NJX AA&/STRD GR OCPL NRV: CPT | Mod: LT,S$GLB,, | Performed by: ANESTHESIOLOGY

## 2017-06-30 PROCEDURE — 1125F AMNT PAIN NOTED PAIN PRSNT: CPT | Mod: S$GLB,,, | Performed by: PHYSICIAN ASSISTANT

## 2017-06-30 PROCEDURE — 99214 OFFICE O/P EST MOD 30 MIN: CPT | Mod: S$GLB,,, | Performed by: PHYSICIAN ASSISTANT

## 2017-06-30 RX ORDER — HYDROCODONE BITARTRATE AND ACETAMINOPHEN 5; 325 MG/1; MG/1
1 TABLET ORAL EVERY 12 HOURS PRN
Qty: 60 TABLET | Refills: 0 | Status: SHIPPED | OUTPATIENT
Start: 2017-08-17 | End: 2017-06-30 | Stop reason: SDUPTHER

## 2017-06-30 RX ORDER — HYDROCODONE BITARTRATE AND ACETAMINOPHEN 5; 325 MG/1; MG/1
1 TABLET ORAL EVERY 12 HOURS PRN
Qty: 60 TABLET | Refills: 0 | Status: SHIPPED | OUTPATIENT
Start: 2017-08-17 | End: 2017-09-12 | Stop reason: SDUPTHER

## 2017-06-30 RX ORDER — BUTALBITAL, ACETAMINOPHEN AND CAFFEINE 50; 325; 40 MG/1; MG/1; MG/1
TABLET ORAL
Refills: 0 | COMMUNITY
Start: 2017-06-02 | End: 2017-09-12 | Stop reason: SDUPTHER

## 2017-06-30 RX ORDER — HYDROCODONE BITARTRATE AND ACETAMINOPHEN 5; 325 MG/1; MG/1
1 TABLET ORAL EVERY 12 HOURS PRN
Qty: 60 TABLET | Refills: 0 | Status: SHIPPED | OUTPATIENT
Start: 2017-09-15 | End: 2017-09-28 | Stop reason: SDUPTHER

## 2017-06-30 RX ORDER — HYDROCODONE BITARTRATE AND ACETAMINOPHEN 5; 325 MG/1; MG/1
1 TABLET ORAL EVERY 12 HOURS PRN
Qty: 60 TABLET | Refills: 0 | Status: SHIPPED | OUTPATIENT
Start: 2017-07-19 | End: 2017-06-30 | Stop reason: SDUPTHER

## 2017-06-30 RX ORDER — HYDROCODONE BITARTRATE AND ACETAMINOPHEN 5; 325 MG/1; MG/1
1 TABLET ORAL EVERY 12 HOURS PRN
Qty: 60 TABLET | Refills: 0 | Status: SHIPPED | OUTPATIENT
Start: 2017-07-19 | End: 2017-08-17

## 2017-06-30 RX ORDER — HYDROCODONE BITARTRATE AND ACETAMINOPHEN 5; 325 MG/1; MG/1
1 TABLET ORAL EVERY 12 HOURS PRN
Qty: 60 TABLET | Refills: 0 | Status: SHIPPED | OUTPATIENT
Start: 2017-06-20 | End: 2017-06-30

## 2017-06-30 NOTE — PROGRESS NOTES
Referring Physician: No ref. provider found    PCP: Liseth Carias MD      CC: neck and left occipital pain    Interval history: Ms. Richter is a 72 y.o. female with neck pain and occipital neuralgia who presents today for medication refill and follow up. She requests a repeat cervical paraspinal TPIs and left occipital nerve block.   Reports near resolution of occipital neuralgia after previous injections. Continues to c/o of numbness and tingling in digits 2-4 on left hand. She takes Flexeril with moderate benefit.  She also takes Norco 5 mg every 12 hours as needed with some moderate benefit as well.  She denies any weakness.  No bowel bladder changes.   Pain today is rated 8/10.  Prior HPI:   Patient is 70-year-old female with past history history of cervical DDD, cervical spondylosis and chronic headaches.  She recently moved here from [place], [place].  She is treated in the past by neurology.  She states having constant burning pain over the left side of her posterior scalp.  Pain radiates to her neck as well as a frontal.  She also has left-sided neck pain as well.  She denies any radicular arm pain.  No numbness or weakness.  She states having cervical epidural steroid injection at past with minimal benefit.  She also has had decided of cervical nerve blocks in the past with moderate benefit.  Most recent injection was performed 2 months ago.  She desires repeat injection.  She currently takes Norco 10 mg every 12 hours as needed with moderate benefit.  She also takes Zanaflex 4 mg every 8 hours with mild benefits.  She rates her pain 7/10 today.    Pain intervention history: s/p left occipital nerve blocks on 1/2016 with 50% relief of her headaches    ROS:  CONSTITUTIONAL: No fevers, chills, night sweats, wt. loss, appetite changes  SKIN: no rashes or itching  ENT: No headaches, head trauma, vision changes, or eye pain  LYMPH NODES: None noticed   CV: No chest pain, palpitations.   RESP: No shortness  of breath, dyspnea on exertion, cough, wheezing, or hemoptysis  GI: No nausea, emesis, diarrhea, constipation, melena, hematochezia, pain.    : No dysuria, hematuria, urgency, or frequency   HEME: No easy bruising, bleeding problems  PSYCHIATRIC: No depression, anxiety, psychosis, hallucinations.  NEURO: No seizures, memory loss, dizziness or difficulty sleeping  MSK: + History of present illness      Past Medical History:   Diagnosis Date    Anxiety     Arthritis     Cataract     DDD (degenerative disc disease), lumbar     Depression     GERD (gastroesophageal reflux disease)     Hyperlipidemia     Hypertension     pt ststes she does not take meds anymore she just watches her weight//    Immunosuppressed status 2016    Occipital neuralgia 3/24/2017    Osteoporosis      Past Surgical History:   Procedure Laterality Date    APPENDECTOMY       SECTION      x 2    CHOLECYSTECTOMY      HYSTERECTOMY      Laser Periphery Iridotomy Bilateral     OD 16 and OS touch up 2016    vocal cord tumor removal       Family History   Problem Relation Age of Onset    Osteoarthritis Mother     Alcohol abuse Mother     Osteoarthritis Sister     Diabetes Brother     No Known Problems Son     No Known Problems Sister     No Known Problems Sister     No Known Problems Sister     No Known Problems Brother     Arthritis Son     No Known Problems Son     Stroke Maternal Grandmother 99    Retinal detachment Neg Hx     Macular degeneration Neg Hx     Glaucoma Neg Hx     Amblyopia Neg Hx     Blindness Neg Hx     Cancer Neg Hx     Cataracts Neg Hx     Hypertension Neg Hx     Strabismus Neg Hx     Thyroid disease Neg Hx      Social History     Social History    Marital status:      Spouse name: N/A    Number of children: N/A    Years of education: N/A     Social History Main Topics    Smoking status: Never Smoker    Smokeless tobacco: Never Used    Alcohol use No    Drug  "use: No    Sexual activity: Yes     Partners: Male     Other Topics Concern    None     Social History Narrative    None         Medications/Allergies: See med card    Vitals:    06/30/17 0908   BP: (!) 147/81   Pulse: 84   Weight: 54.9 kg (121 lb)   Height: 4' 11" (1.499 m)   PainSc:   8   PainLoc: Head         Physical exam:    GENERAL: A and O x3, the patient appears well groomed and is in no acute distress.  Skin: No rashes or obvious lesions  HEENT: normocephalic, atraumatic  CARDIOVASCULAR:  RRR  LUNGS: nonlabored breathing  ABDOMEN: soft, nontender   UPPER EXTREMITIES: Normal alignment, normal range of motion, no atrophy, no skin changes,  hair growth and nail growth normal and equal bilaterally. No swelling, no tenderness.  +Phalens on left  LOWER EXTREMITIES:  Normal alignment, normal range of motion, no atrophy, no skin changes,  hair growth and nail growth normal and equal bilaterally. No swelling, no tenderness.  CERVICAL SPINE:  Cervical spine: ROM is full in flexion, extension and lateral rotation with moderate increased pain.  Spurling's maneuver causes no neck pain to either side.  Myofascial exam:  Tenderness to palpation across cervical paraspinous region bilaterally, L>R.        MENTAL STATUS: normal orientation, speech, language, and fund of knowledge for social situation.  Emotional state appropriate.    CRANIAL NERVES:  II:  PERRL bilaterally,   III,IV,VI: EOMI.    V:  Facial sensation equal bilaterally  VII:  Facial motor function normal.  VIII:  Hearing equal to finger rub bilaterally  IX/X: Gag normal, palate symmetric  XI:  Shoulder shrug equal, head turn equal  XII:  Tongue midline without fasciculations      MOTOR: Tone and bulk: normal bilateral upper and lower Strength: normal   Delt Bi Tri WE WF     R 5 5 5 5 5 5   L 5 5 5 5 5 5     IP ADD ABD Quad TA Gas HAM  R 5 5 5 5 5 5 5  L 5 5 5 5 5 5 5    SENSATION: Light touch and pinprick intact bilaterally  REFLEXES: normal, symmetric, " nonbrisk.  Toes down, no clonus. No hoffmans.  GAIT: normal rise, base, steps, and arm swing.        Imaging:   Cervical MRI March 2015 ([facility] [place])  - C2-3 disc space is hypoplastic and there is ankylosis of the facet joints bilaterally.  At C3-4, there is disc desiccation.  Disc bulging does not affect the spinal cord, but there is left uncovertebral joint hypertrophy.  At C4-5, there is disc desiccation and loss of height.  The facet joints are degenerative and hypertrophic.  There is slightly subluxation of C4-C5.  No central canal stenosis  C5-6, there is disc desiccation loss of height.  There is left uncovertebral joint hypertrophy.  C6-7, there is disc desiccation loss of height  C7-T1, there is disc desiccation.  Mild disc bulging.    Assessment:  Mrs. Richter is a 72 y.o. female with     1. Occipital neuralgia of left side    2. Spondylosis of cervical region without myelopathy or radiculopathy    3. Myofascial pain    4. Cervical radiculopathy        Plan:  1.  I have stressed the importance of physical activity and exercise to improve overall health  2. Repeat cervical paraspinal TPIs and left occipital nerve block  3. Norco 5mg q12hrs as needed for pain.  Script provided for three months   4. May benefit from cervical MELANY  5. Follow up in three months  All medication management was performed by Dr. Timothy Grimaldo

## 2017-07-03 NOTE — PROCEDURES
"Nerve Block  Date/Time: 6/30/2017 11:03 AM  Performed by: MACIEJ BACON.  Authorized by: MACIEJ BACON   Consent Done: Yes  Time out: Immediately prior to procedure a "time out" was called to verify the correct patient, procedure, equipment, support staff and site/side marked as required.  Indications: pain relief  Body area: head  Nerve: greater occipital  Laterality: right  Patient sedated: no  Medications administered: DepoMedrol 40 mg injectionPreparation: Patient was prepped and draped in the usual sterile fashion.  Patient position: sitting  Needle size: 27 G  Location technique: anatomical landmarks  Local Anesthetic: bupivacaine 0.25% without epinephrine  Anesthetic total: 4 mL  Patient tolerance: Patient tolerated the procedure well with no immediate complications        "

## 2017-07-06 ENCOUNTER — TELEPHONE (OUTPATIENT)
Dept: RHEUMATOLOGY | Facility: CLINIC | Age: 72
End: 2017-07-06

## 2017-07-06 NOTE — TELEPHONE ENCOUNTER
----- Message from Sera Campuzano sent at 7/6/2017  9:54 AM CDT -----  Contact: marisabel with [facility] PH#[phone]  marisabel with [facility] PH#[phone] please call in regards to order on reclast   Last received jan 30, 2017

## 2017-07-06 NOTE — TELEPHONE ENCOUNTER
I spoke with Irving at [facility] infusion scheduling, and she wanted to see when the patient's next Reclast infusion should be since she had the last one 1/30/17. Irving informed that Dr. Cabral wants the next one in February 2018. Irving verbalized understanding.

## 2017-07-17 ENCOUNTER — OFFICE VISIT (OUTPATIENT)
Dept: RHEUMATOLOGY | Facility: CLINIC | Age: 72
End: 2017-07-17
Payer: MEDICARE

## 2017-07-17 VITALS
DIASTOLIC BLOOD PRESSURE: 71 MMHG | HEIGHT: 59 IN | WEIGHT: 122.38 LBS | SYSTOLIC BLOOD PRESSURE: 147 MMHG | BODY MASS INDEX: 24.67 KG/M2 | HEART RATE: 79 BPM

## 2017-07-17 DIAGNOSIS — M19.049 CMC ARTHRITIS: ICD-10-CM

## 2017-07-17 DIAGNOSIS — M15.4 EROSIVE OSTEOARTHRITIS OF RIGHT HAND: Primary | ICD-10-CM

## 2017-07-17 DIAGNOSIS — M81.0 AGE-RELATED OSTEOPOROSIS WITHOUT CURRENT PATHOLOGICAL FRACTURE: ICD-10-CM

## 2017-07-17 PROCEDURE — 99499 UNLISTED E&M SERVICE: CPT | Mod: S$GLB,,, | Performed by: INTERNAL MEDICINE

## 2017-07-17 PROCEDURE — 99999 PR PBB SHADOW E&M-EST. PATIENT-LVL III: CPT | Mod: PBBFAC,,, | Performed by: INTERNAL MEDICINE

## 2017-07-17 PROCEDURE — 1159F MED LIST DOCD IN RCRD: CPT | Mod: S$GLB,,, | Performed by: INTERNAL MEDICINE

## 2017-07-17 PROCEDURE — 99213 OFFICE O/P EST LOW 20 MIN: CPT | Mod: S$GLB,,, | Performed by: INTERNAL MEDICINE

## 2017-07-17 RX ORDER — DICLOFENAC SODIUM 10 MG/G
2 GEL TOPICAL 4 TIMES DAILY
Qty: 1 TUBE | Refills: 5 | Status: SHIPPED | OUTPATIENT
Start: 2017-07-17 | End: 2018-05-18

## 2017-07-17 ASSESSMENT — ROUTINE ASSESSMENT OF PATIENT INDEX DATA (RAPID3)
PATIENT GLOBAL ASSESSMENT SCORE: 0
TOTAL RAPID3 SCORE: .33
MDHAQ FUNCTION SCORE: .3
PSYCHOLOGICAL DISTRESS SCORE: 0
PAIN SCORE: 0

## 2017-07-17 NOTE — PROGRESS NOTES
"Subjective:       Patient ID: Rola Richter is a 72 y.o. female.    Chief Complaint:  Osteoporosis and Erosive OA  HPI 71y/o female is here for follow up for osteoporosis and Erosive OA.  Left hand tenosynovitis significantly improved since the last visit.  Reports injection was very helpful.  Denies any new joint pain or swelling today.  Grades her pain as 0 x 10 today.  Denies any joint swelling  For erosive osteoarthritis, she is on On hydroxychloroquine 200 mg once a day Cannot tolerate higher dose of Plaquenil.    For osteoporosis, she is on zoledronic acid yearly. Denies any recent falls or fractures      Review of Systems   Eyes: Negative for pain and redness.   Respiratory: Negative for cough and shortness of breath.    Cardiovascular: Negative for chest pain and leg swelling.   Gastrointestinal: Negative for abdominal distention and abdominal pain.   Genitourinary: Negative for genital sores and hematuria.   Neurological: Negative for tremors and seizures.   Psychiatric/Behavioral: Negative for agitation and behavioral problems.         Objective:     BP (!) 147/71 (BP Location: Right arm, Patient Position: Sitting, BP Method: Automatic)   Pulse 79   Ht 4' 11" (1.499 m)   Wt 55.5 kg (122 lb 5.7 oz)   BMI 24.71 kg/m²      Physical Exam   Constitutional: She is oriented to person, place, and time and well-developed, well-nourished, and in no distress.   HENT:   Head: Normocephalic and atraumatic.   Eyes: Conjunctivae and EOM are normal. Pupils are equal, round, and reactive to light.   Neck: Neck supple. No thyromegaly present.   Cardiovascular: Normal rate and regular rhythm.    Pulmonary/Chest: Effort normal and breath sounds normal.   Abdominal: Soft. Bowel sounds are normal.   Neurological: She is alert and oriented to person, place, and time.   Skin: Skin is warm and dry.     Psychiatric: Affect normal.   Musculoskeletal: She exhibits no edema.   No joint swelling                     Lab Results "   Component Value Date    WBC 7.60 10/08/2016    HGB 11.6 (L) 10/08/2016    HCT 34.4 (L) 10/08/2016    MCV 91 10/08/2016     10/08/2016     CMP  Sodium   Date Value Ref Range Status   05/01/2017 140 136 - 145 mmol/L Final     Potassium   Date Value Ref Range Status   05/01/2017 4.2 3.5 - 5.1 mmol/L Final     Chloride   Date Value Ref Range Status   05/01/2017 106 95 - 110 mmol/L Final     CO2   Date Value Ref Range Status   05/01/2017 25 23 - 29 mmol/L Final     Glucose   Date Value Ref Range Status   05/01/2017 106 70 - 110 mg/dL Final     BUN, Bld   Date Value Ref Range Status   05/01/2017 10 8 - 23 mg/dL Final     Creatinine   Date Value Ref Range Status   05/01/2017 0.8 0.5 - 1.4 mg/dL Final     Calcium   Date Value Ref Range Status   05/01/2017 9.3 8.7 - 10.5 mg/dL Final     Total Protein   Date Value Ref Range Status   01/16/2017 7.3 6.0 - 8.4 g/dL Final     Albumin   Date Value Ref Range Status   01/16/2017 3.9 3.5 - 5.2 g/dL Final     Total Bilirubin   Date Value Ref Range Status   01/16/2017 0.1 0.1 - 1.0 mg/dL Final     Comment:     For infants and newborns, interpretation of results should be based  on gestational age, weight and in agreement with clinical  observations.  Premature Infant recommended reference ranges:  Up to 24 hours.............<8.0 mg/dL  Up to 48 hours............<12.0 mg/dL  3-5 days..................<15.0 mg/dL  6-29 days.................<15.0 mg/dL       Alkaline Phosphatase   Date Value Ref Range Status   01/16/2017 71 55 - 135 U/L Final     AST   Date Value Ref Range Status   01/16/2017 20 10 - 40 U/L Final     ALT   Date Value Ref Range Status   01/16/2017 14 10 - 44 U/L Final     Anion Gap   Date Value Ref Range Status   05/01/2017 9 8 - 16 mmol/L Final     eGFR if    Date Value Ref Range Status   05/01/2017 >60 >60 mL/min/1.73 m^2 Final     eGFR if non    Date Value Ref Range Status   05/01/2017 >60 >60 mL/min/1.73 m^2 Final     Comment:      Calculation used to obtain the estimated glomerular filtration  rate (eGFR) is the CKD-EPI equation. Since race is unknown   in our information system, the eGFR values for   -American and Non--American patients are given   for each creatinine result.       Lab Results   Component Value Date    WBC 7.60 10/08/2016    HGB 11.6 (L) 10/08/2016    HCT 34.4 (L) 10/08/2016    MCV 91 10/08/2016     10/08/2016     CMP  Sodium   Date Value Ref Range Status   05/01/2017 140 136 - 145 mmol/L Final     Potassium   Date Value Ref Range Status   05/01/2017 4.2 3.5 - 5.1 mmol/L Final     Chloride   Date Value Ref Range Status   05/01/2017 106 95 - 110 mmol/L Final     CO2   Date Value Ref Range Status   05/01/2017 25 23 - 29 mmol/L Final     Glucose   Date Value Ref Range Status   05/01/2017 106 70 - 110 mg/dL Final     BUN, Bld   Date Value Ref Range Status   05/01/2017 10 8 - 23 mg/dL Final     Creatinine   Date Value Ref Range Status   05/01/2017 0.8 0.5 - 1.4 mg/dL Final     Calcium   Date Value Ref Range Status   05/01/2017 9.3 8.7 - 10.5 mg/dL Final     Total Protein   Date Value Ref Range Status   01/16/2017 7.3 6.0 - 8.4 g/dL Final     Albumin   Date Value Ref Range Status   01/16/2017 3.9 3.5 - 5.2 g/dL Final     Total Bilirubin   Date Value Ref Range Status   01/16/2017 0.1 0.1 - 1.0 mg/dL Final     Comment:     For infants and newborns, interpretation of results should be based  on gestational age, weight and in agreement with clinical  observations.  Premature Infant recommended reference ranges:  Up to 24 hours.............<8.0 mg/dL  Up to 48 hours............<12.0 mg/dL  3-5 days..................<15.0 mg/dL  6-29 days.................<15.0 mg/dL       Alkaline Phosphatase   Date Value Ref Range Status   01/16/2017 71 55 - 135 U/L Final     AST   Date Value Ref Range Status   01/16/2017 20 10 - 40 U/L Final     ALT   Date Value Ref Range Status   01/16/2017 14 10 - 44 U/L Final     Anion Gap    Date Value Ref Range Status   05/01/2017 9 8 - 16 mmol/L Final     eGFR if    Date Value Ref Range Status   05/01/2017 >60 >60 mL/min/1.73 m^2 Final     eGFR if non    Date Value Ref Range Status   05/01/2017 >60 >60 mL/min/1.73 m^2 Final     Comment:     Calculation used to obtain the estimated glomerular filtration  rate (eGFR) is the CKD-EPI equation. Since race is unknown   in our information system, the eGFR values for   -American and Non--American patients are given   for each creatinine result.       Lab Results   Component Value Date    SEDRATE 17 12/14/2015     Lab Results   Component Value Date    CRP 3.0 12/14/2015       The bone mineral densities (BMD) of the lumbar spine as well as the trabecular bone of the left femoral neck were calculated using the DXA method. Values were then compared to the diagnostic criteria established by the WHO (World Health Organization).     Hip:  The BMD of the trabecular bone of the femoral neck was measured at 0.639 grams per cm2, with a young adult T-score of -2.0 and an age-matched Z score of -0.2. Based on WHO criteria this is consistent with osteopenia at moderate increased risk for fracture. The FRAX 10 year probability of major osteoporotic fracture is 11 percent and the 10 year probability of hip fracture is 2.4 percent.    Lumbar:   The BMD of the lumbar region measures 0.710 grams per cm2, with a young adult T score of -3.1, and an age-matched Z score of -0.9. Based on WHO criteria this is consistent with osteoporosis at high risk for fracture.    Assessment:   Erosive osteoarthritis- on hydroxychloroquine 200 mg once daily  Osteoporosis-on Reclast   Long-term use of hydroxychloroquine  Left second tenosynovitis/left hand pain -improved  GERD-stable   Plan:   Continue  HCQ to 200mg once  a day (cannot tolerate twice a day)  Continue Voltaren gel for CMC OA   Follow-up with ophthalmology for HC Q eye exam   RTC in 6  months

## 2017-09-12 ENCOUNTER — OFFICE VISIT (OUTPATIENT)
Dept: PSYCHIATRY | Facility: CLINIC | Age: 72
End: 2017-09-12
Payer: MEDICARE

## 2017-09-12 VITALS
WEIGHT: 121.81 LBS | DIASTOLIC BLOOD PRESSURE: 70 MMHG | BODY MASS INDEX: 24.56 KG/M2 | SYSTOLIC BLOOD PRESSURE: 154 MMHG | HEIGHT: 59 IN | HEART RATE: 76 BPM

## 2017-09-12 DIAGNOSIS — F43.10 PTSD (POST-TRAUMATIC STRESS DISORDER): Primary | ICD-10-CM

## 2017-09-12 DIAGNOSIS — F41.9 ANXIETY: ICD-10-CM

## 2017-09-12 PROCEDURE — 3077F SYST BP >= 140 MM HG: CPT | Mod: S$GLB,,, | Performed by: PSYCHIATRY & NEUROLOGY

## 2017-09-12 PROCEDURE — 1159F MED LIST DOCD IN RCRD: CPT | Mod: S$GLB,,, | Performed by: PSYCHIATRY & NEUROLOGY

## 2017-09-12 PROCEDURE — 99499 UNLISTED E&M SERVICE: CPT | Mod: S$GLB,,, | Performed by: PSYCHIATRY & NEUROLOGY

## 2017-09-12 PROCEDURE — 1125F AMNT PAIN NOTED PAIN PRSNT: CPT | Mod: S$GLB,,, | Performed by: PSYCHIATRY & NEUROLOGY

## 2017-09-12 PROCEDURE — 99999 PR PBB SHADOW E&M-EST. PATIENT-LVL III: CPT | Mod: PBBFAC,,, | Performed by: PSYCHIATRY & NEUROLOGY

## 2017-09-12 PROCEDURE — 99214 OFFICE O/P EST MOD 30 MIN: CPT | Mod: S$GLB,,, | Performed by: PSYCHIATRY & NEUROLOGY

## 2017-09-12 PROCEDURE — 3008F BODY MASS INDEX DOCD: CPT | Mod: S$GLB,,, | Performed by: PSYCHIATRY & NEUROLOGY

## 2017-09-12 PROCEDURE — 3078F DIAST BP <80 MM HG: CPT | Mod: S$GLB,,, | Performed by: PSYCHIATRY & NEUROLOGY

## 2017-09-12 RX ORDER — BUSPIRONE HYDROCHLORIDE 10 MG/1
10 TABLET ORAL 2 TIMES DAILY
Qty: 180 TABLET | Refills: 0 | Status: SHIPPED | OUTPATIENT
Start: 2017-09-12 | End: 2018-02-21 | Stop reason: SDUPTHER

## 2017-09-12 RX ORDER — SERTRALINE HYDROCHLORIDE 100 MG/1
100 TABLET, FILM COATED ORAL DAILY
Qty: 90 TABLET | Refills: 0 | Status: SHIPPED | OUTPATIENT
Start: 2017-09-12 | End: 2017-12-29 | Stop reason: SDUPTHER
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
## 2017-09-15 DIAGNOSIS — Z12.11 SCREENING FOR COLON CANCER: Primary | ICD-10-CM

## 2017-09-20 ENCOUNTER — PATIENT MESSAGE (OUTPATIENT)
Dept: ADMINISTRATIVE | Facility: OTHER | Age: 72
End: 2017-09-20

## 2017-09-20 ENCOUNTER — DOCUMENTATION ONLY (OUTPATIENT)
Dept: FAMILY MEDICINE | Facility: CLINIC | Age: 72
End: 2017-09-20

## 2017-09-20 NOTE — PROGRESS NOTES
Pre-Visit Chart Review  For Appointment Scheduled on 9-21-17    Health Maintenance Due   Topic Date Due    Colonoscopy  05/31/1995

## 2017-09-21 ENCOUNTER — OFFICE VISIT (OUTPATIENT)
Dept: FAMILY MEDICINE | Facility: CLINIC | Age: 72
End: 2017-09-21
Payer: MEDICARE

## 2017-09-21 ENCOUNTER — HOSPITAL ENCOUNTER (OUTPATIENT)
Dept: RADIOLOGY | Facility: CLINIC | Age: 72
Discharge: HOME | End: 2017-09-21
Attending: FAMILY MEDICINE
Payer: MEDICARE

## 2017-09-21 VITALS
WEIGHT: 122.81 LBS | BODY MASS INDEX: 24.76 KG/M2 | HEART RATE: 76 BPM | SYSTOLIC BLOOD PRESSURE: 120 MMHG | DIASTOLIC BLOOD PRESSURE: 66 MMHG | HEIGHT: 59 IN | TEMPERATURE: 98 F

## 2017-09-21 DIAGNOSIS — E78.5 HYPERLIPIDEMIA, UNSPECIFIED HYPERLIPIDEMIA TYPE: ICD-10-CM

## 2017-09-21 DIAGNOSIS — M25.559 HIP PAIN, CHRONIC, UNSPECIFIED LATERALITY: Primary | ICD-10-CM

## 2017-09-21 DIAGNOSIS — G89.29 HIP PAIN, CHRONIC, UNSPECIFIED LATERALITY: ICD-10-CM

## 2017-09-21 DIAGNOSIS — M25.559 HIP PAIN, CHRONIC, UNSPECIFIED LATERALITY: ICD-10-CM

## 2017-09-21 DIAGNOSIS — G89.29 HIP PAIN, CHRONIC, UNSPECIFIED LATERALITY: Primary | ICD-10-CM

## 2017-09-21 DIAGNOSIS — I10 ESSENTIAL HYPERTENSION: ICD-10-CM

## 2017-09-21 DIAGNOSIS — J30.1 SEASONAL ALLERGIC RHINITIS DUE TO POLLEN, UNSPECIFIED CHRONICITY: ICD-10-CM

## 2017-09-21 PROCEDURE — 73521 X-RAY EXAM HIPS BI 2 VIEWS: CPT | Mod: TC,PO

## 2017-09-21 PROCEDURE — 1125F AMNT PAIN NOTED PAIN PRSNT: CPT | Mod: S$GLB,,, | Performed by: FAMILY MEDICINE

## 2017-09-21 PROCEDURE — 99499 UNLISTED E&M SERVICE: CPT | Mod: S$GLB,,, | Performed by: FAMILY MEDICINE

## 2017-09-21 PROCEDURE — 3074F SYST BP LT 130 MM HG: CPT | Mod: S$GLB,,, | Performed by: FAMILY MEDICINE

## 2017-09-21 PROCEDURE — 3078F DIAST BP <80 MM HG: CPT | Mod: S$GLB,,, | Performed by: FAMILY MEDICINE

## 2017-09-21 PROCEDURE — 1159F MED LIST DOCD IN RCRD: CPT | Mod: S$GLB,,, | Performed by: FAMILY MEDICINE

## 2017-09-21 PROCEDURE — 3008F BODY MASS INDEX DOCD: CPT | Mod: S$GLB,,, | Performed by: FAMILY MEDICINE

## 2017-09-21 PROCEDURE — 99214 OFFICE O/P EST MOD 30 MIN: CPT | Mod: S$GLB,,, | Performed by: FAMILY MEDICINE

## 2017-09-21 PROCEDURE — 73521 X-RAY EXAM HIPS BI 2 VIEWS: CPT | Mod: 26,,, | Performed by: RADIOLOGY

## 2017-09-21 PROCEDURE — 99999 PR PBB SHADOW E&M-EST. PATIENT-LVL III: CPT | Mod: PBBFAC,,, | Performed by: FAMILY MEDICINE

## 2017-09-21 RX ORDER — MELOXICAM 7.5 MG/1
7.5 TABLET ORAL DAILY
Qty: 90 TABLET | Refills: 3 | Status: SHIPPED | OUTPATIENT
Start: 2017-09-21 | End: 2017-11-29 | Stop reason: SDUPTHER

## 2017-09-21 RX ORDER — FLUTICASONE PROPIONATE 50 MCG
2 SPRAY, SUSPENSION (ML) NASAL DAILY
Qty: 1 BOTTLE | Status: SHIPPED | OUTPATIENT
Start: 2017-09-21 | End: 2020-10-22 | Stop reason: SDUPTHER

## 2017-09-21 RX ORDER — LORATADINE 10 MG/1
10 TABLET ORAL DAILY
Qty: 90 TABLET | Refills: 3 | Status: SHIPPED | OUTPATIENT
Start: 2017-09-21 | End: 2019-09-30

## 2017-09-21 NOTE — PATIENT INSTRUCTIONS
Controlling High Blood Pressure  High blood pressure (hypertension) is often called the silent killer. This is because many people who have it dont know it. High blood pressure is defined as 140/90 mm Hg or higher. Know your blood pressure and remember to check it regularly. Doing so can save your life. Here are some things you can do to help control your blood pressure.    Choose heart-healthy foods  · Select low-salt, low-fat foods. Limit sodium intake to 2,400 mg per day or the amount suggested by your healthcare provider.  · Limit canned, dried, cured, packaged, and fast foods. These can contain a lot of salt.  · Eat 8 to 10 servings of fruits and vegetables every day.  · Choose lean meats, fish, or chicken.  · Eat whole-grain pasta, brown rice, and beans.  · Eat 2 to 3 servings of low-fat or fat-free dairy products.  · Ask your doctor about the DASH eating plan. This plan helps reduce blood pressure.  · When you go to a restaurant, ask that your meal be prepared with no added salt.  Maintain a healthy weight  · Ask your healthcare provider how many calories to eat a day. Then stick to that number.  · Ask your healthcare provider what weight range is healthiest for you. If you are overweight, a weight loss of only 3% to 5% of your body weight can help lower blood pressure. Generally, a good weight loss goal is to lose 10% of your body weight in a year.  · Limit snacks and sweets.  · Get regular exercise.  Get up and get active  · Choose activities you enjoy. Find ones you can do with friends or family. This includes bicycling, dancing, walking, and jogging.  · Park farther away from building entrances.  · Use stairs instead of the elevator.  · When you can, walk or bike instead of driving.  · Colony leaves, garden, or do household repairs.  · Be active at a moderate to vigorous level of physical activity for at least 40 minutes for a minimum of 3 to 4 days a week.   Manage stress  · Make time to relax and enjoy  life. Find time to laugh.  · Communicate your concerns with your loved ones and your healthcare provider.  · Visit with family and friends, and keep up with hobbies.  Limit alcohol and quit smoking  · Men should have no more than 2 drinks per day.  · Women should have no more than 1 drink per day.  · Talk with your healthcare provider about quitting smoking. Smoking significantly increases your risk for heart disease and stroke. Ask your healthcare provider about community smoking cessation programs and other options.  Medicines  If lifestyle changes arent enough, your healthcare provider may prescribe high blood pressure medicine. Take all medicines as prescribed. If you have any questions about your medicines, ask your healthcare provider before stopping or changing them.   Date Last Reviewed: 4/27/2016  © 9087-2605 [employer]. [address] [place] [address], [place] [address]. All rights reserved. This information is not intended as a substitute for professional medical care. Always follow your healthcare professional's instructions.

## 2017-09-21 NOTE — PROGRESS NOTES
Subjective:       Patient ID: Rola Richter is a 72 y.o. female.    Chief Complaint: Follow-up    Patient Active Problem List   Diagnosis    Hypertension    GERD (gastroesophageal reflux disease)    Arthritis    Anxiety    Hyperlipidemia    Encounter for monitoring primary estrogen replacement therapy    Osteoporosis    Erosive osteoarthritis of right hand    Medication monitoring encounter    Dependency on pain medication    PTSD (post-traumatic stress disorder)    Post-nasal drip    Immunosuppressed status    CMC arthritis    Dry eye syndrome    Dysphagia    DDD (degenerative disc disease), cervical    Occipital neuralgia   C/o bin hip ache to thigh right > left.  Has h/o RA , lumbar DDD. Takes norco 5 bid Dr. Grimaldo.  Takes plaquenil prescribed by Dr. Cabral for erosive arthritis.  Pain worsens with sitting for prolonged periods.    Uses fioricet for sinus congestion and drippy nose.  No allergy meds      HPI  Review of Systems   Constitutional: Positive for activity change. Negative for unexpected weight change.   HENT: Positive for trouble swallowing. Negative for hearing loss and rhinorrhea.    Eyes: Negative for discharge and visual disturbance.   Respiratory: Negative for chest tightness and wheezing.    Cardiovascular: Negative for chest pain and palpitations.   Gastrointestinal: Positive for constipation. Negative for blood in stool, diarrhea and vomiting.   Endocrine: Negative for polydipsia and polyuria.   Genitourinary: Negative for difficulty urinating, dysuria, hematuria and menstrual problem.   Musculoskeletal: Positive for arthralgias and neck pain. Negative for joint swelling.   Neurological: Positive for weakness and headaches.   Psychiatric/Behavioral: Positive for dysphoric mood. Negative for confusion.       Objective:      Physical Exam   Constitutional: She is oriented to person, place, and time. She appears well-developed and well-nourished.   Cardiovascular: Normal rate,  regular rhythm and normal heart sounds.    Pulmonary/Chest: Effort normal and breath sounds normal.   Musculoskeletal: She exhibits no edema.        Right hip: She exhibits tenderness. She exhibits normal range of motion and normal strength.   Neurological: She is alert and oriented to person, place, and time.   Skin: Skin is warm and dry.   Psychiatric: She has a normal mood and affect.   Nursing note and vitals reviewed.      Assessment:       1. Hip pain, chronic, unspecified laterality    2. Seasonal allergic rhinitis due to pollen, unspecified chronicity    3. Essential hypertension    4. Hyperlipidemia, unspecified hyperlipidemia type        Plan:       1. Hip pain, chronic, unspecified laterality  Suspect DJD flare.  Work up:  - X-Ray Hips Bilateral 2 View Inc AP Pelvis; Future  Add:  - meloxicam (MOBIC) 7.5 MG tablet; Take 1 tablet (7.5 mg total) by mouth once daily.  Dispense: 90 tablet; Refill: 3    2. Seasonal allergic rhinitis due to pollen, unspecified chronicity  Recommend otc non-sedating antihistamine such as Loratadine and/or steroid nasal spray such as Flonase as directed and as needed.  Please return to clinic if symptoms persist after these interventions.  Avoid use of fioricet except for tension headache  - loratadine (CLARITIN) 10 mg tablet; Take 1 tablet (10 mg total) by mouth once daily.  Dispense: 90 tablet; Refill: 3  - fluticasone (FLONASE) 50 mcg/actuation nasal spray; 2 sprays by Each Nare route once daily.  Dispense: 1 Bottle; Refill: prn    3. Essential hypertension  Controlled on current medications.  Continue current medications.      4. Hyperlipidemia, unspecified hyperlipidemia type  Stable condition.  Continue current medications.  Will adjust based on lab findings or if condition changes.      [facility] goal documentation:  Patient readiness: acceptance and barriers:readiness  During the course of the visit the patient was educated and counseled about the following: Hypertension:    Dietary sodium restriction.  Regular aerobic exercise.  Goals: Hypertension: Reduce Blood Pressure  Goal/Outcomes met:Hypertension  The following self management tools provided:none  Patient Instructions (the written plan) was given to the patient/family: Yes  Time spent with patient: 20 minutes    Patient with be reevaluated in 3 months or sooner prn    Greater than 50% of this visit was spent counseling as described in above documentation:Yes

## 2017-09-27 ENCOUNTER — TELEPHONE (OUTPATIENT)
Dept: FAMILY MEDICINE | Facility: CLINIC | Age: 72
End: 2017-09-27

## 2017-09-27 ENCOUNTER — OFFICE VISIT (OUTPATIENT)
Dept: FAMILY MEDICINE | Facility: CLINIC | Age: 72
End: 2017-09-27
Payer: MEDICARE

## 2017-09-27 VITALS
RESPIRATION RATE: 16 BRPM | DIASTOLIC BLOOD PRESSURE: 66 MMHG | WEIGHT: 124.31 LBS | HEART RATE: 73 BPM | TEMPERATURE: 98 F | BODY MASS INDEX: 25.06 KG/M2 | SYSTOLIC BLOOD PRESSURE: 139 MMHG | HEIGHT: 59 IN

## 2017-09-27 DIAGNOSIS — N39.0 URINARY TRACT INFECTION WITH HEMATURIA, SITE UNSPECIFIED: Primary | ICD-10-CM

## 2017-09-27 DIAGNOSIS — R31.9 URINARY TRACT INFECTION WITH HEMATURIA, SITE UNSPECIFIED: Primary | ICD-10-CM

## 2017-09-27 LAB
BILIRUB SERPL-MCNC: NORMAL MG/DL
BLOOD, POC UA: NORMAL
GLUCOSE UR QL STRIP: NORMAL
KETONES UR QL STRIP: NEGATIVE
LEUKOCYTE ESTERASE URINE, POC: NORMAL
NITRITE, POC UA: NORMAL
PH, POC UA: 6
PROTEIN, POC: NEGATIVE
SPECIFIC GRAVITY, POC UA: 1.01
UROBILINOGEN, POC UA: NORMAL

## 2017-09-27 PROCEDURE — 3008F BODY MASS INDEX DOCD: CPT | Mod: S$GLB,,, | Performed by: FAMILY MEDICINE

## 2017-09-27 PROCEDURE — 3075F SYST BP GE 130 - 139MM HG: CPT | Mod: S$GLB,,, | Performed by: FAMILY MEDICINE

## 2017-09-27 PROCEDURE — 87088 URINE BACTERIA CULTURE: CPT

## 2017-09-27 PROCEDURE — 1125F AMNT PAIN NOTED PAIN PRSNT: CPT | Mod: S$GLB,,, | Performed by: FAMILY MEDICINE

## 2017-09-27 PROCEDURE — 87186 SC STD MICRODIL/AGAR DIL: CPT

## 2017-09-27 PROCEDURE — 87086 URINE CULTURE/COLONY COUNT: CPT

## 2017-09-27 PROCEDURE — 3078F DIAST BP <80 MM HG: CPT | Mod: S$GLB,,, | Performed by: FAMILY MEDICINE

## 2017-09-27 PROCEDURE — 1159F MED LIST DOCD IN RCRD: CPT | Mod: S$GLB,,, | Performed by: FAMILY MEDICINE

## 2017-09-27 PROCEDURE — 99214 OFFICE O/P EST MOD 30 MIN: CPT | Mod: 25,S$GLB,, | Performed by: FAMILY MEDICINE

## 2017-09-27 PROCEDURE — 87077 CULTURE AEROBIC IDENTIFY: CPT

## 2017-09-27 PROCEDURE — 99999 PR PBB SHADOW E&M-EST. PATIENT-LVL III: CPT | Mod: PBBFAC,,, | Performed by: FAMILY MEDICINE

## 2017-09-27 PROCEDURE — 81000 URINALYSIS NONAUTO W/SCOPE: CPT | Mod: S$GLB,,, | Performed by: FAMILY MEDICINE

## 2017-09-27 RX ORDER — CIPROFLOXACIN 500 MG/1
500 TABLET ORAL 2 TIMES DAILY
Qty: 14 TABLET | Refills: 0 | Status: SHIPPED | OUTPATIENT
Start: 2017-09-27 | End: 2017-10-02 | Stop reason: ALTCHOICE

## 2017-09-27 NOTE — PROGRESS NOTES
Subjective:       Patient ID: Rola Richter is a 72 y.o. female.    Chief Complaint: Urinary Tract Infection (started Sunday evening, c/o burning )    Urinary Tract Infection    This is a new problem. The current episode started in the past 7 days. The problem occurs every urination. The problem has been waxing and waning. The quality of the pain is described as aching and burning. The pain is moderate. There has been no fever. Associated symptoms include frequency and urgency. Pertinent negatives include no discharge, flank pain, nausea or rash. Treatments tried: AZO. The treatment provided mild relief.     Review of Systems   Constitutional: Negative for fever.   Respiratory: Negative for shortness of breath.    Cardiovascular: Negative for chest pain.   Gastrointestinal: Negative for abdominal pain and nausea.   Genitourinary: Positive for frequency and urgency. Negative for flank pain.   Skin: Negative for rash.   All other systems reviewed and are negative.      Objective:      Physical Exam   Constitutional: She appears well-developed. No distress.   Cardiovascular: Normal rate and regular rhythm.    No murmur heard.  Pulmonary/Chest: Effort normal and breath sounds normal.   Abdominal: Soft. There is tenderness in the suprapubic area. There is no CVA tenderness.       Assessment:       1. Urinary tract infection with hematuria, site unspecified        Plan:         Rola was seen today for urinary tract infection.    Diagnoses and all orders for this visit:    Urinary tract infection with hematuria, site unspecified  -     POCT Urinalysis  -     Urine culture  -     ciprofloxacin HCl (CIPRO) 500 MG tablet; Take 1 tablet (500 mg total) by mouth 2 (two) times daily.    Sulfa and PNCN allergy; pt has taken Cipro before with no side effects.

## 2017-09-27 NOTE — TELEPHONE ENCOUNTER
----- Message from Ro Morgan sent at 9/27/2017  8:53 AM CDT -----  call  //[phone]   Pt is calling   For  A med for  uti // please  Call   [facility] [place] [address] [address] [place] [address] [address] [facility] [address]  Phone: [phone] Fax: [phone]

## 2017-09-28 RX ORDER — HYDROCODONE BITARTRATE AND ACETAMINOPHEN 5; 325 MG/1; MG/1
1 TABLET ORAL EVERY 12 HOURS PRN
Qty: 60 TABLET | Refills: 0 | Status: SHIPPED | OUTPATIENT
Start: 2017-12-07 | End: 2018-01-05

## 2017-09-28 RX ORDER — HYDROCODONE BITARTRATE AND ACETAMINOPHEN 5; 325 MG/1; MG/1
1 TABLET ORAL EVERY 12 HOURS PRN
Qty: 60 TABLET | Refills: 0 | Status: SHIPPED | OUTPATIENT
Start: 2017-10-10 | End: 2017-11-08

## 2017-09-28 RX ORDER — HYDROCODONE BITARTRATE AND ACETAMINOPHEN 5; 325 MG/1; MG/1
1 TABLET ORAL EVERY 12 HOURS PRN
Qty: 60 TABLET | Refills: 0 | Status: SHIPPED | OUTPATIENT
Start: 2017-11-08 | End: 2017-12-07

## 2017-09-30 LAB — BACTERIA UR CULT: NORMAL

## 2017-10-02 ENCOUNTER — TELEPHONE (OUTPATIENT)
Dept: FAMILY MEDICINE | Facility: CLINIC | Age: 72
End: 2017-10-02

## 2017-10-02 DIAGNOSIS — N39.0 URINARY TRACT INFECTION WITHOUT HEMATURIA, SITE UNSPECIFIED: Primary | ICD-10-CM

## 2017-10-02 RX ORDER — NITROFURANTOIN 25; 75 MG/1; MG/1
100 CAPSULE ORAL 2 TIMES DAILY
Qty: 14 CAPSULE | Refills: 0 | Status: SHIPPED | OUTPATIENT
Start: 2017-10-02 | End: 2017-10-09

## 2017-10-02 NOTE — TELEPHONE ENCOUNTER
UTI is resistant to cipro. D/c antibiotic prescribed by Dr. Heredia. Start macrobid 100 mg BID for 7 days. Take antibiotics with food.  Increase fluid intake.  Call the clinic if symptoms worsen, new symptoms develop or if you are not any better after completion of your antibiotics.

## 2017-10-10 ENCOUNTER — TELEPHONE (OUTPATIENT)
Dept: PAIN MEDICINE | Facility: CLINIC | Age: 72
End: 2017-10-10

## 2017-10-10 ENCOUNTER — OFFICE VISIT (OUTPATIENT)
Dept: PAIN MEDICINE | Facility: CLINIC | Age: 72
End: 2017-10-10
Payer: MEDICARE

## 2017-10-10 ENCOUNTER — HOSPITAL ENCOUNTER (OUTPATIENT)
Dept: RADIOLOGY | Facility: HOSPITAL | Age: 72
Discharge: HOME | End: 2017-10-10
Attending: PHYSICIAN ASSISTANT
Payer: MEDICARE

## 2017-10-10 VITALS
HEART RATE: 83 BPM | DIASTOLIC BLOOD PRESSURE: 74 MMHG | HEIGHT: 59 IN | BODY MASS INDEX: 25 KG/M2 | WEIGHT: 124 LBS | SYSTOLIC BLOOD PRESSURE: 146 MMHG

## 2017-10-10 DIAGNOSIS — M47.812 SPONDYLOSIS OF CERVICAL REGION WITHOUT MYELOPATHY OR RADICULOPATHY: ICD-10-CM

## 2017-10-10 DIAGNOSIS — M79.18 MYOFASCIAL PAIN: ICD-10-CM

## 2017-10-10 DIAGNOSIS — M50.30 DDD (DEGENERATIVE DISC DISEASE), CERVICAL: ICD-10-CM

## 2017-10-10 DIAGNOSIS — M25.511 ACUTE PAIN OF RIGHT SHOULDER: ICD-10-CM

## 2017-10-10 DIAGNOSIS — M54.12 CERVICAL RADICULOPATHY: ICD-10-CM

## 2017-10-10 DIAGNOSIS — M25.511 ACUTE PAIN OF RIGHT SHOULDER: Primary | ICD-10-CM

## 2017-10-10 PROCEDURE — 99214 OFFICE O/P EST MOD 30 MIN: CPT | Mod: S$GLB,,, | Performed by: PHYSICIAN ASSISTANT

## 2017-10-10 PROCEDURE — 73030 X-RAY EXAM OF SHOULDER: CPT | Mod: TC,RT

## 2017-10-10 PROCEDURE — 99999 PR PBB SHADOW E&M-EST. PATIENT-LVL IV: CPT | Mod: PBBFAC,,, | Performed by: PHYSICIAN ASSISTANT

## 2017-10-10 PROCEDURE — 73030 X-RAY EXAM OF SHOULDER: CPT | Mod: 26,RT,, | Performed by: RADIOLOGY

## 2017-10-10 NOTE — PROGRESS NOTES
Referring Physician: No ref. provider found    PCP: Liseth Carias MD      CC: neck and left occipital pain    Interval history: Ms. Richter is a 72 y.o. female with neck pain and occipital neuralgia who presents today for medication refill and follow up. LCV she had repeat cervical paraspinal TPIs and left occipital nerve block. Reports good benefit.    Continues to c/o of numbness and tingling in digits 2-5 on right hand. Tingling occurs with flexion of her elbow. She also reports right shoulder pain. She takes Flexeril with moderate benefit.  She also takes Norco 5 mg every 12 hours as needed with some moderate benefit as well.  She denies any weakness.  No bowel bladder changes.   Pain today is rated 9/10.  Prior HPI:   Patient is 70-year-old female with past history history of cervical DDD, cervical spondylosis and chronic headaches.  She recently moved here from [place], [place].  She is treated in the past by neurology.  She states having constant burning pain over the left side of her posterior scalp.  Pain radiates to her neck as well as a frontal.  She also has left-sided neck pain as well.  She denies any radicular arm pain.  No numbness or weakness.  She states having cervical epidural steroid injection at past with minimal benefit.  She also has had decided of cervical nerve blocks in the past with moderate benefit.  Most recent injection was performed 2 months ago.  She desires repeat injection.  She currently takes Norco 10 mg every 12 hours as needed with moderate benefit.  She also takes Zanaflex 4 mg every 8 hours with mild benefits.  She rates her pain 7/10 today.    Pain intervention history: s/p left occipital nerve blocks on 1/2016 with 50% relief of her headaches    ROS:  CONSTITUTIONAL: No fevers, chills, night sweats, wt. loss, appetite changes  SKIN: no rashes or itching  ENT: No headaches, head trauma, vision changes, or eye pain  LYMPH NODES: None noticed   CV: No chest pain,  palpitations.   RESP: No shortness of breath, dyspnea on exertion, cough, wheezing, or hemoptysis  GI: No nausea, emesis, diarrhea, constipation, melena, hematochezia, pain.    : No dysuria, hematuria, urgency, or frequency   HEME: No easy bruising, bleeding problems  PSYCHIATRIC: No depression, anxiety, psychosis, hallucinations.  NEURO: No seizures, memory loss, dizziness or difficulty sleeping  MSK: + History of present illness      Past Medical History:   Diagnosis Date    Anxiety     Arthritis     Cataract     DDD (degenerative disc disease), lumbar     Depression     GERD (gastroesophageal reflux disease)     Hyperlipidemia     Hypertension     pt ststes she does not take meds anymore she just watches her weight//    Immunosuppressed status 2016    Occipital neuralgia 3/24/2017    Osteoporosis      Past Surgical History:   Procedure Laterality Date    APPENDECTOMY       SECTION      x 2    CHOLECYSTECTOMY      HYSTERECTOMY      Laser Periphery Iridotomy Bilateral     OD 16 and OS touch up 2016    vocal cord tumor removal       Family History   Problem Relation Age of Onset    Osteoarthritis Mother     Alcohol abuse Mother     Osteoarthritis Sister     Diabetes Brother     No Known Problems Son     No Known Problems Sister     No Known Problems Sister     No Known Problems Sister     No Known Problems Brother     Arthritis Son     No Known Problems Son     Stroke Maternal Grandmother 99    Retinal detachment Neg Hx     Macular degeneration Neg Hx     Glaucoma Neg Hx     Amblyopia Neg Hx     Blindness Neg Hx     Cancer Neg Hx     Cataracts Neg Hx     Hypertension Neg Hx     Strabismus Neg Hx     Thyroid disease Neg Hx      Social History     Social History    Marital status:      Spouse name: N/A    Number of children: N/A    Years of education: N/A     Social History Main Topics    Smoking status: Never Smoker    Smokeless tobacco:  "Never Used    Alcohol use No    Drug use: No    Sexual activity: Yes     Partners: Male     Other Topics Concern    None     Social History Narrative    None         Medications/Allergies: See med card    Vitals:    10/10/17 0930   BP: (!) 146/74   Pulse: 83   Weight: 56.2 kg (124 lb)   Height: 4' 11" (1.499 m)   PainSc:   9   PainLoc: Hip         Physical exam:    GENERAL: A and O x3, the patient appears well groomed and is in no acute distress.  Skin: No rashes or obvious lesions  HEENT: normocephalic, atraumatic  CARDIOVASCULAR:  RRR  LUNGS: nonlabored breathing  ABDOMEN: soft, nontender   UPPER EXTREMITIES: Normal alignment, normal range of motion, no atrophy, no skin changes,  hair growth and nail growth normal and equal bilaterally. No swelling, no tenderness.  +Phalens on left  LOWER EXTREMITIES:  Normal alignment, normal range of motion, no atrophy, no skin changes,  hair growth and nail growth normal and equal bilaterally. No swelling, no tenderness.  CERVICAL SPINE:  Cervical spine: ROM is full in flexion, extension and lateral rotation with moderate increased pain.  Spurling's maneuver causes no neck pain to either side.  Myofascial exam:  Tenderness to palpation across cervical paraspinous region bilaterally, L>R.        MENTAL STATUS: normal orientation, speech, language, and fund of knowledge for social situation.  Emotional state appropriate.    CRANIAL NERVES:  II:  PERRL bilaterally,   III,IV,VI: EOMI.    V:  Facial sensation equal bilaterally  VII:  Facial motor function normal.  VIII:  Hearing equal to finger rub bilaterally  IX/X: Gag normal, palate symmetric  XI:  Shoulder shrug equal, head turn equal  XII:  Tongue midline without fasciculations      MOTOR: Tone and bulk: normal bilateral upper and lower Strength: normal   Delt Bi Tri WE WF     R 5 5 5 5 5 5   L 5 5 5 5 5 5     IP ADD ABD Quad TA Gas HAM  R 5 5 5 5 5 5 5  L 5 5 5 5 5 5 5    SENSATION: Light touch and pinprick intact " bilaterally  REFLEXES: normal, symmetric, nonbrisk.  Toes down, no clonus. No hoffmans.  GAIT: normal rise, base, steps, and arm swing.        Imaging:   Cervical MRI March 2015 ([place], [place])  - C2-3 disc space is hypoplastic and there is ankylosis of the facet joints bilaterally.  At C3-4, there is disc desiccation.  Disc bulging does not affect the spinal cord, but there is left uncovertebral joint hypertrophy.  At C4-5, there is disc desiccation and loss of height.  The facet joints are degenerative and hypertrophic.  There is slightly subluxation of C4-C5.  No central canal stenosis  C5-6, there is disc desiccation loss of height.  There is left uncovertebral joint hypertrophy.  C6-7, there is disc desiccation loss of height  C7-T1, there is disc desiccation.  Mild disc bulging.    Assessment:  Mrs. Richter is a 72 y.o. female with     1. Acute pain of right shoulder    2. Spondylosis of cervical region without myelopathy or radiculopathy    3. Myofascial pain    4. Cervical radiculopathy    5. DDD (degenerative disc disease), cervical        Plan:  1.  I have stressed the importance of physical activity and exercise to improve overall health  2. Monitor progress cervical paraspinal TPIs and left occipital nerve block  3. Norco 5mg q12hrs as needed for pain.  Script provided for three months. Call for additional refills  4. Schedule cervical MELANY C7-T1. I have explained the risks, benefits, and alternatives of the procedure in detail. The patient voices understanding and all questions have been answered. The patient agrees to proceed as planned. Written Consent obtained.   5. If minimal improvement with above, Will consider Cervical MRI vs EMG  6. Right shoulder xray  7. F/u s/p MELANY and in February for med refill  All medication management was performed by Dr. Timothy Grimaldo

## 2017-10-16 DIAGNOSIS — M54.12 CERVICAL RADICULOPATHY: Primary | ICD-10-CM

## 2017-10-23 ENCOUNTER — HOSPITAL ENCOUNTER (OUTPATIENT)
Facility: AMBULARY SURGERY CENTER | Age: 72
Discharge: HOME | End: 2017-10-23
Attending: ANESTHESIOLOGY | Admitting: ANESTHESIOLOGY
Payer: MEDICARE

## 2017-10-23 ENCOUNTER — SURGERY (OUTPATIENT)
Age: 72
End: 2017-10-23

## 2017-10-23 DIAGNOSIS — M50.30 DDD (DEGENERATIVE DISC DISEASE), CERVICAL: Primary | ICD-10-CM

## 2017-10-23 PROCEDURE — 99152 MOD SED SAME PHYS/QHP 5/>YRS: CPT | Mod: ,,, | Performed by: ANESTHESIOLOGY

## 2017-10-23 PROCEDURE — 62321 NJX INTERLAMINAR CRV/THRC: CPT | Performed by: ANESTHESIOLOGY

## 2017-10-23 PROCEDURE — 62321 NJX INTERLAMINAR CRV/THRC: CPT | Mod: ,,, | Performed by: ANESTHESIOLOGY

## 2017-10-23 RX ORDER — ALPRAZOLAM 0.25 MG/1
1 TABLET ORAL ONCE AS NEEDED
Status: DISCONTINUED | OUTPATIENT
Start: 2017-10-23 | End: 2017-10-23 | Stop reason: HOSPADM

## 2017-10-23 RX ORDER — FENTANYL CITRATE 50 UG/ML
INJECTION, SOLUTION INTRAMUSCULAR; INTRAVENOUS
Status: DISCONTINUED
Start: 2017-10-23 | End: 2017-10-23 | Stop reason: HOSPADM

## 2017-10-23 RX ORDER — MIDAZOLAM HYDROCHLORIDE 1 MG/ML
INJECTION INTRAMUSCULAR; INTRAVENOUS
Status: DISCONTINUED
Start: 2017-10-23 | End: 2017-10-23 | Stop reason: HOSPADM

## 2017-10-23 RX ORDER — LIDOCAINE HYDROCHLORIDE 10 MG/ML
INJECTION, SOLUTION EPIDURAL; INFILTRATION; INTRACAUDAL; PERINEURAL
Status: DISCONTINUED
Start: 2017-10-23 | End: 2017-10-23 | Stop reason: HOSPADM

## 2017-10-23 RX ORDER — MIDAZOLAM HYDROCHLORIDE 2 MG/2ML
INJECTION, SOLUTION INTRAMUSCULAR; INTRAVENOUS
Status: DISCONTINUED | OUTPATIENT
Start: 2017-10-23 | End: 2017-10-23 | Stop reason: HOSPADM

## 2017-10-23 RX ORDER — SODIUM CHLORIDE, SODIUM LACTATE, POTASSIUM CHLORIDE, CALCIUM CHLORIDE 600; 310; 30; 20 MG/100ML; MG/100ML; MG/100ML; MG/100ML
INJECTION, SOLUTION INTRAVENOUS CONTINUOUS
Status: DISCONTINUED | OUTPATIENT
Start: 2017-10-23 | End: 2017-10-23 | Stop reason: HOSPADM

## 2017-10-23 RX ORDER — FENTANYL CITRATE 50 UG/ML
INJECTION, SOLUTION INTRAMUSCULAR; INTRAVENOUS
Status: DISCONTINUED | OUTPATIENT
Start: 2017-10-23 | End: 2017-10-23 | Stop reason: HOSPADM

## 2017-10-23 RX ORDER — DEXAMETHASONE SODIUM PHOSPHATE 10 MG/ML
INJECTION INTRAMUSCULAR; INTRAVENOUS
Status: DISCONTINUED | OUTPATIENT
Start: 2017-10-23 | End: 2017-10-23 | Stop reason: HOSPADM

## 2017-10-23 RX ORDER — SODIUM CHLORIDE 9 MG/ML
INJECTION, SOLUTION INTRAMUSCULAR; INTRAVENOUS; SUBCUTANEOUS
Status: DISCONTINUED | OUTPATIENT
Start: 2017-10-23 | End: 2017-10-23 | Stop reason: HOSPADM

## 2017-10-23 RX ORDER — DEXAMETHASONE SODIUM PHOSPHATE 10 MG/ML
INJECTION INTRAMUSCULAR; INTRAVENOUS
Status: DISCONTINUED
Start: 2017-10-23 | End: 2017-10-23 | Stop reason: HOSPADM

## 2017-10-23 RX ORDER — LIDOCAINE HYDROCHLORIDE 10 MG/ML
INJECTION, SOLUTION EPIDURAL; INFILTRATION; INTRACAUDAL; PERINEURAL
Status: DISCONTINUED | OUTPATIENT
Start: 2017-10-23 | End: 2017-10-23 | Stop reason: HOSPADM

## 2017-10-23 RX ADMIN — SODIUM CHLORIDE, SODIUM LACTATE, POTASSIUM CHLORIDE, CALCIUM CHLORIDE: 600; 310; 30; 20 INJECTION, SOLUTION INTRAVENOUS at 02:10

## 2017-10-23 RX ADMIN — FENTANYL CITRATE 50 MCG: 50 INJECTION, SOLUTION INTRAMUSCULAR; INTRAVENOUS at 02:10

## 2017-10-23 RX ADMIN — SODIUM CHLORIDE 4 ML: 9 INJECTION, SOLUTION INTRAMUSCULAR; INTRAVENOUS; SUBCUTANEOUS at 02:10

## 2017-10-23 RX ADMIN — MIDAZOLAM HYDROCHLORIDE 2 MG: 2 INJECTION, SOLUTION INTRAMUSCULAR; INTRAVENOUS at 02:10

## 2017-10-23 RX ADMIN — DEXAMETHASONE SODIUM PHOSPHATE 10 MG: 10 INJECTION INTRAMUSCULAR; INTRAVENOUS at 02:10

## 2017-10-23 RX ADMIN — LIDOCAINE HYDROCHLORIDE 5 ML: 10 INJECTION, SOLUTION EPIDURAL; INFILTRATION; INTRACAUDAL; PERINEURAL at 02:10

## 2017-10-23 NOTE — OP NOTE
PROCEDURE DATE: 10/23/2017    Procedure: C7-T1 cervical interlaminar epidural steroid injection under utilizing fluoroscopy.    Diagnosis: Cervical Degenerative Disc Diease; Cervical Radiculitis  POSTOP DIAGNOSIS: SAME    Physician: Timothy Grimaldo MD    Medications injected:  Dexamethasone 10mg followed by a slow injection of 4 mL sterile, preservative-free normal saline.    Local anesthetic used: Lidocaine 1%, 2 ml.    Sedation Medications: RN IV sedation    Complications:  None    Estimated blood loss: None    Technique:  A time-out was taken to identify patient and procedure prior to starting the procedure.  With the patient laying in a prone position with the neck in a mid-flexed forward position, the area was prepped and draped in the usual sterile fashion using ChloraPrep and a fenestrated drape.  The area was determined under AP fluoroscopic guidance.  Local anesthetic was given using a 25-gauge 1.5 inch needle by raising a wheal and then infiltrating ventrally.  A 3.5 inch 20-gauge Touhy needle was introduced under fluoroscopic guidance to meet the lamina of C7.  The needle was then hinged under the lamina then advanced using loss of resistance technique.  Once the tip of the needle was in the desired position, the 1ml contrast dye Omnipaque was injected to determine placement and no uptake.  The steroid was then injected slowly followed by a slow injection of 4 mL of the sterile preservative-free normal saline.  The patient tolerated the procedure well.    The patient was monitored after the procedure and was given post-procedure and discharge instructions to follow at home. The patient was discharged in a stable condition.

## 2017-10-23 NOTE — H&P (VIEW-ONLY)
Referring Physician: No ref. provider found    PCP: Liseth Carias MD      CC: neck and left occipital pain    Interval history: Ms. Richter is a 72 y.o. female with neck pain and occipital neuralgia who presents today for medication refill and follow up. LCV she had repeat cervical paraspinal TPIs and left occipital nerve block. Reports good benefit.    Continues to c/o of numbness and tingling in digits 2-5 on right hand. Tingling occurs with flexion of her elbow. She also reports right shoulder pain. She takes Flexeril with moderate benefit.  She also takes Norco 5 mg every 12 hours as needed with some moderate benefit as well.  She denies any weakness.  No bowel bladder changes.   Pain today is rated 9/10.  Prior HPI:   Patient is 70-year-old female with past history history of cervical DDD, cervical spondylosis and chronic headaches.  She recently moved here from [place], [place].  She is treated in the past by neurology.  She states having constant burning pain over the left side of her posterior scalp.  Pain radiates to her neck as well as a frontal.  She also has left-sided neck pain as well.  She denies any radicular arm pain.  No numbness or weakness.  She states having cervical epidural steroid injection at past with minimal benefit.  She also has had decided of cervical nerve blocks in the past with moderate benefit.  Most recent injection was performed 2 months ago.  She desires repeat injection.  She currently takes Norco 10 mg every 12 hours as needed with moderate benefit.  She also takes Zanaflex 4 mg every 8 hours with mild benefits.  She rates her pain 7/10 today.    Pain intervention history: s/p left occipital nerve blocks on 1/2016 with 50% relief of her headaches    ROS:  CONSTITUTIONAL: No fevers, chills, night sweats, wt. loss, appetite changes  SKIN: no rashes or itching  ENT: No headaches, head trauma, vision changes, or eye pain  LYMPH NODES: None noticed   CV: No chest pain,  palpitations.   RESP: No shortness of breath, dyspnea on exertion, cough, wheezing, or hemoptysis  GI: No nausea, emesis, diarrhea, constipation, melena, hematochezia, pain.    : No dysuria, hematuria, urgency, or frequency   HEME: No easy bruising, bleeding problems  PSYCHIATRIC: No depression, anxiety, psychosis, hallucinations.  NEURO: No seizures, memory loss, dizziness or difficulty sleeping  MSK: + History of present illness      Past Medical History:   Diagnosis Date    Anxiety     Arthritis     Cataract     DDD (degenerative disc disease), lumbar     Depression     GERD (gastroesophageal reflux disease)     Hyperlipidemia     Hypertension     pt ststes she does not take meds anymore she just watches her weight//    Immunosuppressed status 2016    Occipital neuralgia 3/24/2017    Osteoporosis      Past Surgical History:   Procedure Laterality Date    APPENDECTOMY       SECTION      x 2    CHOLECYSTECTOMY      HYSTERECTOMY      Laser Periphery Iridotomy Bilateral     OD 16 and OS touch up 2016    vocal cord tumor removal       Family History   Problem Relation Age of Onset    Osteoarthritis Mother     Alcohol abuse Mother     Osteoarthritis Sister     Diabetes Brother     No Known Problems Son     No Known Problems Sister     No Known Problems Sister     No Known Problems Sister     No Known Problems Brother     Arthritis Son     No Known Problems Son     Stroke Maternal Grandmother 99    Retinal detachment Neg Hx     Macular degeneration Neg Hx     Glaucoma Neg Hx     Amblyopia Neg Hx     Blindness Neg Hx     Cancer Neg Hx     Cataracts Neg Hx     Hypertension Neg Hx     Strabismus Neg Hx     Thyroid disease Neg Hx      Social History     Social History    Marital status:      Spouse name: N/A    Number of children: N/A    Years of education: N/A     Social History Main Topics    Smoking status: Never Smoker    Smokeless tobacco:  "Never Used    Alcohol use No    Drug use: No    Sexual activity: Yes     Partners: Male     Other Topics Concern    None     Social History Narrative    None         Medications/Allergies: See med card    Vitals:    10/10/17 0930   BP: (!) 146/74   Pulse: 83   Weight: 56.2 kg (124 lb)   Height: 4' 11" (1.499 m)   PainSc:   9   PainLoc: Hip         Physical exam:    GENERAL: A and O x3, the patient appears well groomed and is in no acute distress.  Skin: No rashes or obvious lesions  HEENT: normocephalic, atraumatic  CARDIOVASCULAR:  RRR  LUNGS: nonlabored breathing  ABDOMEN: soft, nontender   UPPER EXTREMITIES: Normal alignment, normal range of motion, no atrophy, no skin changes,  hair growth and nail growth normal and equal bilaterally. No swelling, no tenderness.  +Phalens on left  LOWER EXTREMITIES:  Normal alignment, normal range of motion, no atrophy, no skin changes,  hair growth and nail growth normal and equal bilaterally. No swelling, no tenderness.  CERVICAL SPINE:  Cervical spine: ROM is full in flexion, extension and lateral rotation with moderate increased pain.  Spurling's maneuver causes no neck pain to either side.  Myofascial exam:  Tenderness to palpation across cervical paraspinous region bilaterally, L>R.        MENTAL STATUS: normal orientation, speech, language, and fund of knowledge for social situation.  Emotional state appropriate.    CRANIAL NERVES:  II:  PERRL bilaterally,   III,IV,VI: EOMI.    V:  Facial sensation equal bilaterally  VII:  Facial motor function normal.  VIII:  Hearing equal to finger rub bilaterally  IX/X: Gag normal, palate symmetric  XI:  Shoulder shrug equal, head turn equal  XII:  Tongue midline without fasciculations      MOTOR: Tone and bulk: normal bilateral upper and lower Strength: normal   Delt Bi Tri WE WF     R 5 5 5 5 5 5   L 5 5 5 5 5 5     IP ADD ABD Quad TA Gas HAM  R 5 5 5 5 5 5 5  L 5 5 5 5 5 5 5    SENSATION: Light touch and pinprick intact " bilaterally  REFLEXES: normal, symmetric, nonbrisk.  Toes down, no clonus. No hoffmans.  GAIT: normal rise, base, steps, and arm swing.        Imaging:   Cervical MRI March 2015 ([place], [place])  - C2-3 disc space is hypoplastic and there is ankylosis of the facet joints bilaterally.  At C3-4, there is disc desiccation.  Disc bulging does not affect the spinal cord, but there is left uncovertebral joint hypertrophy.  At C4-5, there is disc desiccation and loss of height.  The facet joints are degenerative and hypertrophic.  There is slightly subluxation of C4-C5.  No central canal stenosis  C5-6, there is disc desiccation loss of height.  There is left uncovertebral joint hypertrophy.  C6-7, there is disc desiccation loss of height  C7-T1, there is disc desiccation.  Mild disc bulging.    Assessment:  Mrs. Richter is a 72 y.o. female with     1. Acute pain of right shoulder    2. Spondylosis of cervical region without myelopathy or radiculopathy    3. Myofascial pain    4. Cervical radiculopathy    5. DDD (degenerative disc disease), cervical        Plan:  1.  I have stressed the importance of physical activity and exercise to improve overall health  2. Monitor progress cervical paraspinal TPIs and left occipital nerve block  3. Norco 5mg q12hrs as needed for pain.  Script provided for three months. Call for additional refills  4. Schedule cervical MELANY C7-T1. I have explained the risks, benefits, and alternatives of the procedure in detail. The patient voices understanding and all questions have been answered. The patient agrees to proceed as planned. Written Consent obtained.   5. If minimal improvement with above, Will consider Cervical MRI vs EMG  6. Right shoulder xray  7. F/u s/p MELANY and in February for med refill  All medication management was performed by Dr. Timothy Grimaldo

## 2017-10-23 NOTE — DISCHARGE INSTRUCTIONS
Recovery After Procedural Sedation (Adult)  You have been given medicine by vein to make you sleep during your surgery. This may have included both a pain medicine and sleeping medicine. Most of the effects have worn off. But you may still have some drowsiness for the next 6 to 8 hours.  Home care  Follow these guidelines when you get home:  · For the next 8 hours, you should be watched by a responsible adult. This person should make sure your condition is not getting worse.  · Don't drink any alcohol for the next 24 hours.  · Don't drive, operate dangerous machinery, or make important business or personal decisions during the next 24 hours.  Note: Your healthcare provider may tell you not to take any medicine by mouth for pain or sleep in the next 4 hours. These medicines may react with the medicines you were given in the hospital. This could cause a much stronger response than usual.  Follow-up care  Follow up with your healthcare provider if you are not alert and back to your usual level of activity within 12 hours.  When to seek medical advice  Call your healthcare provider right away if any of these occur:  · Drowsiness gets worse  · Weakness or dizziness gets worse  · Repeated vomiting  · You can't be awakened   Date Last Reviewed: 10/18/2016  © 7261-3274 [employer]. [address] [place] [address], [place] [address]. All rights reserved. This information is not intended as a substitute for professional medical care. Always follow your healthcare professional's instructions.      Pain injection instructions:     Steroids take about a week to relieve pain.  Initially you may get pain relief from the local anesthetic but this may wear off before the steroid works.    No driving for 24 hrs   Activity as tolerated- gradually increase activities.  Dont lift over 10 lbs for 24 hrs   No heat at injection sites x 2 days  Use ice for mild swelling and for comfort.  May shower today. No baths for two days.       Resume Aspirin, Plavix, or Coumadin the day after the procedure unless otherwise instructed.       Seek immediate medical help for:   Severe increase in your usual pain or appearance of new pain.  Prolonged or increasing weakness or numbness in the legs or arms.    - Numbing medicine was injected that affects nerves that carry information from       muscles to brain and vice versa.  This numbness can last 4-6 hrs so be very careful walking.    Fever above 101 ,Drainage,redness,active bleeding, or increased swelling at the injection site.  Headache, shortness of breath, chest pain, or breathing problems.

## 2017-10-23 NOTE — DISCHARGE SUMMARY
[facility]  Discharge Note  Short Stay    Admit Date: 10/23/2017    Discharge Date and Time: 10/23/2017    Attending Physician: Timothy Grimaldo MD     Discharge Provider: Timothy Grimaldo    Diagnoses:  Active Hospital Problems    Diagnosis  POA    *DDD (degenerative disc disease), cervical [M50.30]  Yes      Resolved Hospital Problems    Diagnosis Date Resolved POA   No resolved problems to display.       Hospital Course: Cervical MELANY  Discharged Condition: Good    Final Diagnoses:   Active Hospital Problems    Diagnosis  POA    *DDD (degenerative disc disease), cervical [M50.30]  Yes      Resolved Hospital Problems    Diagnosis Date Resolved POA   No resolved problems to display.       Disposition: Home or Self Care    Follow up/Patient Instructions:    Medications:  Reconciled Home Medications:   Current Discharge Medication List      CONTINUE these medications which have NOT CHANGED    Details   cyclobenzaprine (FLEXERIL) 10 MG tablet take 1 tablet by mouth three times a day if needed for muscle spasm  Qty: 90 tablet, Refills: 1      !! hydrocodone-acetaminophen 5-325mg (NORCO) 5-325 mg per tablet Take 1 tablet by mouth every 12 (twelve) hours as needed for Pain.  Qty: 60 tablet, Refills: 0      meloxicam (MOBIC) 7.5 MG tablet Take 1 tablet (7.5 mg total) by mouth once daily.  Qty: 90 tablet, Refills: 3    Associated Diagnoses: Hip pain, chronic, unspecified laterality      busPIRone (BUSPAR) 10 MG tablet Take 1 tablet (10 mg total) by mouth 2 (two) times daily.  Qty: 180 tablet, Refills: 0      butalbital-aspirin-caffeine -40 mg (FIORINAL) -40 mg Cap Take 1 capsule by mouth every 4 (four) hours as needed.      diclofenac sodium (VOLTAREN) 1 % Gel Apply 2 g topically 4 (four) times daily.  Qty: 1 Tube, Refills: 5    Associated Diagnoses: CMC arthritis      estradiol (ESTRACE) 1 MG tablet Take 1 tablet (1 mg total) by mouth once daily.  Qty: 30 tablet, Refills: 5    Comments: Needs to establish care  with a new PCP before further refills  Associated Diagnoses: Encounter for monitoring primary estrogen replacement therapy      fish oil-omega-3 fatty acids 300-1,000 mg capsule Take 2 g by mouth once daily.      fluticasone (FLONASE) 50 mcg/actuation nasal spray 2 sprays by Each Nare route once daily.  Qty: 1 Bottle, Refills: prn    Associated Diagnoses: Seasonal allergic rhinitis due to pollen, unspecified chronicity      FLUZONE HIGH-DOSE 2017-18, PF, 180 mcg/0.5 mL vaccine inject 0.5 milliliter intramuscularly  Refills: 0      !! hydrocodone-acetaminophen 5-325mg (NORCO) 5-325 mg per tablet Take 1 tablet by mouth every 12 (twelve) hours as needed for Pain.  Qty: 60 tablet, Refills: 0      !! hydrocodone-acetaminophen 5-325mg (NORCO) 5-325 mg per tablet Take 1 tablet by mouth every 12 (twelve) hours as needed for Pain.  Qty: 60 tablet, Refills: 0      hydroxychloroquine (PLAQUENIL) 200 mg tablet Take 1 tablet (200 mg total) by mouth once daily.  Qty: 30 tablet, Refills: 11    Associated Diagnoses: Erosive osteoarthritis      loratadine (CLARITIN) 10 mg tablet Take 1 tablet (10 mg total) by mouth once daily.  Qty: 90 tablet, Refills: 3    Associated Diagnoses: Seasonal allergic rhinitis due to pollen, unspecified chronicity      multivit with min-folic acid 200 mcg Chew       polyethylene glycol (GLYCOLAX) 17 gram/dose powder Take 17 g by mouth once daily.  Qty: 510 g, Refills: 0      rosuvastatin (CRESTOR) 5 MG tablet Take 1 tablet (5 mg total) by mouth every other day.  Qty: 45 tablet, Refills: 3    Associated Diagnoses: Hyperlipidemia, unspecified hyperlipidemia type      sertraline (ZOLOFT) 100 MG tablet Take 1 tablet (100 mg total) by mouth once daily.  Qty: 90 tablet, Refills: 0       !! - Potential duplicate medications found. Please discuss with provider.          Discharge Procedure Orders  Diet general     Activity as tolerated     Call MD for:  temperature >100.4     Call MD for:  persistent nausea and  vomiting or diarrhea     Call MD for:  severe uncontrolled pain     Call MD for:  redness, tenderness, or signs of infection (pain, swelling, redness, odor or green/yellow discharge around incision site)     Call MD for:  difficulty breathing or increased cough     Call MD for:  severe persistent headache          Follow up with MD in 2-3 weeks    Discharge Procedure Orders (must include Diet, Follow-up, Activity):    Discharge Procedure Orders (must include Diet, Follow-up, Activity)  Diet general     Activity as tolerated     Call MD for:  temperature >100.4     Call MD for:  persistent nausea and vomiting or diarrhea     Call MD for:  severe uncontrolled pain     Call MD for:  redness, tenderness, or signs of infection (pain, swelling, redness, odor or green/yellow discharge around incision site)     Call MD for:  difficulty breathing or increased cough     Call MD for:  severe persistent headache

## 2017-10-24 VITALS
DIASTOLIC BLOOD PRESSURE: 66 MMHG | WEIGHT: 124 LBS | OXYGEN SATURATION: 92 % | HEART RATE: 78 BPM | SYSTOLIC BLOOD PRESSURE: 148 MMHG | BODY MASS INDEX: 25 KG/M2 | HEIGHT: 59 IN | RESPIRATION RATE: 18 BRPM | TEMPERATURE: 98 F

## 2017-10-31 ENCOUNTER — OFFICE VISIT (OUTPATIENT)
Dept: OPTOMETRY | Facility: CLINIC | Age: 72
End: 2017-10-31
Payer: MEDICARE

## 2017-10-31 ENCOUNTER — TELEPHONE (OUTPATIENT)
Dept: FAMILY MEDICINE | Facility: CLINIC | Age: 72
End: 2017-10-31

## 2017-10-31 DIAGNOSIS — H25.13 NUCLEAR SCLEROSIS, BILATERAL: Primary | ICD-10-CM

## 2017-10-31 DIAGNOSIS — H52.7 REFRACTIVE ERROR: ICD-10-CM

## 2017-10-31 PROCEDURE — 92015 DETERMINE REFRACTIVE STATE: CPT | Mod: S$GLB,,, | Performed by: OPTOMETRIST

## 2017-10-31 PROCEDURE — 92012 INTRM OPH EXAM EST PATIENT: CPT | Mod: S$GLB,,, | Performed by: OPTOMETRIST

## 2017-10-31 PROCEDURE — 99999 PR PBB SHADOW E&M-EST. PATIENT-LVL I: CPT | Mod: PBBFAC,,, | Performed by: OPTOMETRIST

## 2017-10-31 NOTE — TELEPHONE ENCOUNTER
----- Message from Kenyetta Maldonado sent at 10/30/2017  2:21 PM CDT -----  Contact: Patient  Rola, patient [phone], calling about getting a Pneumonia shot. Please advise. thanks.

## 2017-10-31 NOTE — TELEPHONE ENCOUNTER
Spoke to patient requesting that she get a pneumonia injection. Please advise and order if needed.

## 2017-10-31 NOTE — PROGRESS NOTES
HPI     Presenting Complaint: Pt here today for blurry near vision with current   glasses. Pt states distance vision has been stable.       (-) headaches  (-) diplopia   (-) flashes / (-) floaters      Last edited by Juan Manuel Eduardo, OD on 10/31/2017 11:01 AM. (History)            Assessment /Plan     For exam results, see Encounter Report.    Nuclear sclerosis, bilateral    Refractive error      Moderate NS OU, not yet visually significant. Discussed possible ocular affects of cataracts. Acceptable BCVA OU. Discussed treatment options. Surgery not recommended at this time. Monitor yearly.     Dispensed updated spectacle Rx. Discussed various spectacle lens options. Discussed adaptation period to new specs.       RTC as scheduled for February 2018 for comprehensive eye exam, or sooner prn.

## 2017-11-16 ENCOUNTER — TELEPHONE (OUTPATIENT)
Dept: OPHTHALMOLOGY | Facility: CLINIC | Age: 72
End: 2017-11-16

## 2017-11-16 NOTE — TELEPHONE ENCOUNTER
----- Message from Lamonte Mayen sent at 11/16/2017 10:16 AM CST -----  Contact: Patient  Patient states that the glasses that she had gotten Tuesday, 11/14/2017, she cannot see out of.  They are bifocals.  She would like to come back in so she can show you why she cannot see.  Please call the patient back at [phone].  Thank you

## 2017-11-29 ENCOUNTER — OFFICE VISIT (OUTPATIENT)
Dept: PAIN MEDICINE | Facility: CLINIC | Age: 72
End: 2017-11-29
Payer: MEDICARE

## 2017-11-29 VITALS
WEIGHT: 124 LBS | BODY MASS INDEX: 25 KG/M2 | HEART RATE: 77 BPM | DIASTOLIC BLOOD PRESSURE: 79 MMHG | SYSTOLIC BLOOD PRESSURE: 151 MMHG | HEIGHT: 59 IN

## 2017-11-29 DIAGNOSIS — G89.29 HIP PAIN, CHRONIC, UNSPECIFIED LATERALITY: ICD-10-CM

## 2017-11-29 DIAGNOSIS — M79.18 MYOFASCIAL PAIN: ICD-10-CM

## 2017-11-29 DIAGNOSIS — M47.812 SPONDYLOSIS OF CERVICAL REGION WITHOUT MYELOPATHY OR RADICULOPATHY: ICD-10-CM

## 2017-11-29 DIAGNOSIS — M54.12 CERVICAL RADICULOPATHY: Primary | ICD-10-CM

## 2017-11-29 DIAGNOSIS — M47.812 OSTEOARTHRITIS OF CERVICAL SPINE, UNSPECIFIED SPINAL OSTEOARTHRITIS COMPLICATION STATUS: ICD-10-CM

## 2017-11-29 DIAGNOSIS — M25.559 HIP PAIN, CHRONIC, UNSPECIFIED LATERALITY: ICD-10-CM

## 2017-11-29 DIAGNOSIS — M50.30 DDD (DEGENERATIVE DISC DISEASE), CERVICAL: ICD-10-CM

## 2017-11-29 PROCEDURE — 99999 PR PBB SHADOW E&M-EST. PATIENT-LVL III: CPT | Mod: PBBFAC,,, | Performed by: NURSE PRACTITIONER

## 2017-11-29 PROCEDURE — 99214 OFFICE O/P EST MOD 30 MIN: CPT | Mod: S$GLB,,, | Performed by: NURSE PRACTITIONER

## 2017-11-29 RX ORDER — MELOXICAM 7.5 MG/1
7.5 TABLET ORAL DAILY
Qty: 90 TABLET | Refills: 2 | Status: SHIPPED | OUTPATIENT
Start: 2017-11-29 | End: 2018-01-31 | Stop reason: SDUPTHER

## 2017-11-29 RX ORDER — CYCLOBENZAPRINE HCL 10 MG
10 TABLET ORAL 3 TIMES DAILY
Qty: 90 TABLET | Refills: 2 | Status: SHIPPED | OUTPATIENT
Start: 2017-11-29 | End: 2018-01-31 | Stop reason: SDUPTHER

## 2017-11-29 NOTE — PROGRESS NOTES
Referring Physician: No ref. provider found    PCP: Liseth Carias MD      CC: neck and left occipital pain    Interval history: Ms. Richter is a 72 y.o. female with neck pain and occipital neuralgia.  She is s/p C7-T1 IL MELANY on 10/23/17 with 60% relief for about 2 weeks.  Her pain has returned to baseline.  The pain starts in the neck and radiates into the right arm, usually stopping above the elbow.  She has intermittent radiation to the wrist with numbness of first through third digits.  Her most recent cervical MRI was in 2015.  She also reports left sided occipital pain which has responded well to occipital nerve blocks in the past.  She continue to take Norco, Mobic and Flexeril which provide benefit.  No bowel bladder changes.   Pain today is rated 9/10.    Prior HPI:   Patient is 70-year-old female with past history history of cervical DDD, cervical spondylosis and chronic headaches.  She recently moved here from [place], [place].  She is treated in the past by neurology.  She states having constant burning pain over the left side of her posterior scalp.  Pain radiates to her neck as well as a frontal.  She also has left-sided neck pain as well.  She denies any radicular arm pain.  No numbness or weakness.  She states having cervical epidural steroid injection at past with minimal benefit.  She also has had decided of cervical nerve blocks in the past with moderate benefit.  Most recent injection was performed 2 months ago.  She desires repeat injection.  She currently takes Norco 10 mg every 12 hours as needed with moderate benefit.  She also takes Zanaflex 4 mg every 8 hours with mild benefits.  She rates her pain 7/10 today.    Pain intervention history: s/p left occipital nerve blocks on 1/2016 with 50% relief of her headaches    ROS:  CONSTITUTIONAL: No fevers, chills, night sweats, wt. loss, appetite changes  SKIN: no rashes or itching  ENT: No headaches, head trauma, vision changes, or eye  pain  LYMPH NODES: None noticed   CV: No chest pain, palpitations.   RESP: No shortness of breath, dyspnea on exertion, cough, wheezing, or hemoptysis  GI: No nausea, emesis, diarrhea, constipation, melena, hematochezia, pain.    : No dysuria, hematuria, urgency, or frequency   HEME: No easy bruising, bleeding problems  PSYCHIATRIC: No depression, anxiety, psychosis, hallucinations.  NEURO: No seizures, memory loss, dizziness or difficulty sleeping  MSK: + History of present illness      Past Medical History:   Diagnosis Date    Anxiety     Arthritis     Cataract     DDD (degenerative disc disease), lumbar     Depression     GERD (gastroesophageal reflux disease)     Hyperlipidemia     Hypertension     pt ststes she does not take meds anymore she just watches her weight//    Immunosuppressed status 2016    Occipital neuralgia 3/24/2017    Osteoporosis      Past Surgical History:   Procedure Laterality Date    APPENDECTOMY       SECTION      x 2    CHOLECYSTECTOMY      HYSTERECTOMY      Laser Periphery Iridotomy Bilateral     OD 16 and OS touch up 2016    vocal cord tumor removal       Family History   Problem Relation Age of Onset    Osteoarthritis Mother     Alcohol abuse Mother     Osteoarthritis Sister     Diabetes Brother     No Known Problems Son     No Known Problems Sister     No Known Problems Sister     No Known Problems Sister     No Known Problems Brother     Arthritis Son     No Known Problems Son     Stroke Maternal Grandmother 99    Retinal detachment Neg Hx     Macular degeneration Neg Hx     Glaucoma Neg Hx     Amblyopia Neg Hx     Blindness Neg Hx     Cancer Neg Hx     Cataracts Neg Hx     Hypertension Neg Hx     Strabismus Neg Hx     Thyroid disease Neg Hx      Social History     Social History    Marital status:      Spouse name: N/A    Number of children: N/A    Years of education: N/A     Social History Main Topics     "Smoking status: Never Smoker    Smokeless tobacco: Never Used    Alcohol use No    Drug use: No    Sexual activity: Yes     Partners: Male     Other Topics Concern    None     Social History Narrative    None         Medications/Allergies: See med card    Vitals:    11/29/17 1110   BP: (!) 151/79   Pulse: 77   Weight: 56.2 kg (124 lb)   Height: 4' 11" (1.499 m)   PainSc:   8   PainLoc: Neck         Physical exam:    GENERAL: A and O x3, the patient appears well groomed and is in no acute distress.  Skin: No rashes or obvious lesions  HEENT: normocephalic, atraumatic  CARDIOVASCULAR:  RRR  LUNGS: nonlabored breathing  ABDOMEN: soft, nontender   UPPER EXTREMITIES: Normal alignment, normal range of motion, no atrophy, no skin changes,  hair growth and nail growth normal and equal bilaterally. No swelling, no tenderness.  +Phalens on left  LOWER EXTREMITIES:  Normal alignment, normal range of motion, no atrophy, no skin changes,  hair growth and nail growth normal and equal bilaterally. No swelling, no tenderness.  CERVICAL SPINE:   Cervical spine: ROM is full in flexion, extension and lateral rotation.  Painful extension and flexion.  Positive facet loading bilaterally.  Spurling is negative.  Myofascial exam:  Tenderness to palpation across cervical paraspinals and trapezius muscles bilaterally.      MENTAL STATUS: normal orientation, speech, language, and fund of knowledge for social situation.  Emotional state appropriate.    CRANIAL NERVES:  II:  PERRL bilaterally,   III,IV,VI: EOMI.    V:  Facial sensation equal bilaterally  VII:  Facial motor function normal.  VIII:  Hearing equal to finger rub bilaterally  IX/X: Gag normal, palate symmetric  XI:  Shoulder shrug equal, head turn equal  XII:  Tongue midline without fasciculations      MOTOR: Tone and bulk: normal bilateral upper and lower Strength: normal "   Delt Bi Tri WE WF     R 5 5 5 5 5 5   L 5 5 5 5 5 5     IP ADD ABD Quad TA Gas HAM  R 5 5 5 5 5 5 5  L 5 5 5 5 5 5 5    SENSATION: Light touch and pinprick intact bilaterally  REFLEXES: normal, symmetric, nonbrisk.  Toes down, no clonus. No hoffmans.  GAIT: normal rise, base, steps, and arm swing.        Imaging:   Cervical MRI March 2015 ([place], [place])  - C2-3 disc space is hypoplastic and there is ankylosis of the facet joints bilaterally.  At C3-4, there is disc desiccation.  Disc bulging does not affect the spinal cord, but there is left uncovertebral joint hypertrophy.  At C4-5, there is disc desiccation and loss of height.  The facet joints are degenerative and hypertrophic.  There is slightly subluxation of C4-C5.  No central canal stenosis  C5-6, there is disc desiccation loss of height.  There is left uncovertebral joint hypertrophy.  C6-7, there is disc desiccation loss of height  C7-T1, there is disc desiccation.  Mild disc bulging.    Assessment:    Mrs. Richter is a 72 y.o. female with     1. Cervical radiculopathy    2. Spondylosis of cervical region without myelopathy or radiculopathy    3. Myofascial pain    4. DDD (degenerative disc disease), cervical    5. Osteoarthritis of cervical spine, unspecified spinal osteoarthritis complication status    6. Hip pain, chronic, unspecified laterality        Plan:  1.  I have stressed the importance of physical activity and exercise to improve overall health.  2. She is s/p C7-T1 IL MELANY with mild benefit.  I will order updated cervical MRI and XRAYs.  3. Can continue Norco 5mg q12hrs as needed for pain.  She does not need refills at this time.  5. Continue Flexeril and Mobic.  Labs reviewed today.  6. RTC in 2 weeks to review imaging.      The above plan and management options were discussed at length with patient. Patient is in agreement with the above and verbalized understanding.     Janet Esquivel, ROXY  11/29/2017

## 2017-12-04 ENCOUNTER — HOSPITAL ENCOUNTER (OUTPATIENT)
Dept: RADIOLOGY | Facility: HOSPITAL | Age: 72
Discharge: HOME | End: 2017-12-04
Attending: NURSE PRACTITIONER
Payer: MEDICARE

## 2017-12-04 DIAGNOSIS — M79.18 MYOFASCIAL PAIN: ICD-10-CM

## 2017-12-04 DIAGNOSIS — M47.812 SPONDYLOSIS OF CERVICAL REGION WITHOUT MYELOPATHY OR RADICULOPATHY: ICD-10-CM

## 2017-12-04 DIAGNOSIS — M50.30 DDD (DEGENERATIVE DISC DISEASE), CERVICAL: ICD-10-CM

## 2017-12-04 DIAGNOSIS — M54.12 CERVICAL RADICULOPATHY: ICD-10-CM

## 2017-12-04 DIAGNOSIS — M47.812 OSTEOARTHRITIS OF CERVICAL SPINE, UNSPECIFIED SPINAL OSTEOARTHRITIS COMPLICATION STATUS: ICD-10-CM

## 2017-12-04 PROCEDURE — 72052 X-RAY EXAM NECK SPINE 6/>VWS: CPT | Mod: 26,,, | Performed by: RADIOLOGY

## 2017-12-04 PROCEDURE — 72141 MRI NECK SPINE W/O DYE: CPT | Mod: 26,,, | Performed by: RADIOLOGY

## 2017-12-04 PROCEDURE — 72141 MRI NECK SPINE W/O DYE: CPT | Mod: TC

## 2017-12-04 PROCEDURE — 72052 X-RAY EXAM NECK SPINE 6/>VWS: CPT | Mod: TC

## 2017-12-07 ENCOUNTER — TELEPHONE (OUTPATIENT)
Dept: FAMILY MEDICINE | Facility: CLINIC | Age: 72
End: 2017-12-07

## 2017-12-07 ENCOUNTER — OFFICE VISIT (OUTPATIENT)
Dept: FAMILY MEDICINE | Facility: CLINIC | Age: 72
End: 2017-12-07
Payer: MEDICARE

## 2017-12-07 VITALS
OXYGEN SATURATION: 97 % | WEIGHT: 123.25 LBS | HEIGHT: 59 IN | HEART RATE: 77 BPM | DIASTOLIC BLOOD PRESSURE: 58 MMHG | SYSTOLIC BLOOD PRESSURE: 138 MMHG | TEMPERATURE: 98 F | BODY MASS INDEX: 24.85 KG/M2

## 2017-12-07 DIAGNOSIS — I10 ESSENTIAL HYPERTENSION: ICD-10-CM

## 2017-12-07 DIAGNOSIS — N39.0 URINARY TRACT INFECTION WITHOUT HEMATURIA, SITE UNSPECIFIED: Primary | ICD-10-CM

## 2017-12-07 DIAGNOSIS — R30.0 DYSURIA: ICD-10-CM

## 2017-12-07 PROCEDURE — 99999 PR PBB SHADOW E&M-EST. PATIENT-LVL V: CPT | Mod: PBBFAC,,, | Performed by: PHYSICIAN ASSISTANT

## 2017-12-07 PROCEDURE — 87591 N.GONORRHOEAE DNA AMP PROB: CPT

## 2017-12-07 PROCEDURE — 87077 CULTURE AEROBIC IDENTIFY: CPT

## 2017-12-07 PROCEDURE — 87186 SC STD MICRODIL/AGAR DIL: CPT

## 2017-12-07 PROCEDURE — 87086 URINE CULTURE/COLONY COUNT: CPT

## 2017-12-07 PROCEDURE — 99499 UNLISTED E&M SERVICE: CPT | Mod: S$GLB,,, | Performed by: PHYSICIAN ASSISTANT

## 2017-12-07 PROCEDURE — 87088 URINE BACTERIA CULTURE: CPT

## 2017-12-07 PROCEDURE — 99214 OFFICE O/P EST MOD 30 MIN: CPT | Mod: S$GLB,,, | Performed by: PHYSICIAN ASSISTANT

## 2017-12-07 RX ORDER — NITROFURANTOIN 25; 75 MG/1; MG/1
100 CAPSULE ORAL 2 TIMES DAILY
Qty: 14 CAPSULE | Refills: 0 | Status: SHIPPED | OUTPATIENT
Start: 2017-12-07 | End: 2017-12-14

## 2017-12-07 NOTE — TELEPHONE ENCOUNTER
----- Message from Cristina Sharma sent at 12/6/2017  9:43 AM CST -----  Contact: self  Patient states she has a bladder infection. Needs to know what needs to be done. Please call back at [phone] (home)

## 2017-12-07 NOTE — TELEPHONE ENCOUNTER
----- Message from Rosey Uriarte sent at 12/6/2017  3:00 PM CST -----  Contact: self  [phone]  Patient called asking for orders for labs to check bladder infection.Thanks!

## 2017-12-07 NOTE — TELEPHONE ENCOUNTER
----- Message from Arnie Ruiz sent at 12/7/2017 10:18 AM CST -----  Contact: same  Unsuccessful call placed to office.  Patient called in and stated she was returning call regarding her possible bladder infection.  Patient call back number is [phone]

## 2017-12-08 LAB
C TRACH DNA SPEC QL NAA+PROBE: NOT DETECTED
N GONORRHOEA DNA SPEC QL NAA+PROBE: NOT DETECTED

## 2017-12-08 NOTE — PROGRESS NOTES
Subjective:       Patient ID: Rola Richter is a 72 y.o. female.    Chief Complaint: painful urination, frequent urination    Urinary Tract Infection    This is a new problem. The current episode started in the past 7 days. The problem has been gradually improving. The quality of the pain is described as burning. The pain is moderate. There has been no fever. She is sexually active. There is no history of pyelonephritis. Associated symptoms include a discharge, frequency and urgency. Pertinent negatives include no behavior changes, chills, flank pain, hematuria, hesitancy, nausea, possible pregnancy, sweats, vomiting, weight loss or constipation. Treatments tried: AZO. The treatment provided mild relief. Her past medical history is significant for hypertension and recurrent UTIs. There is no history of diabetes mellitus, kidney stones or STD.     Review of Systems   Constitutional: Negative for activity change, appetite change, chills, fatigue, fever and weight loss.   Eyes: Negative for visual disturbance.   Respiratory: Negative for cough and shortness of breath.    Cardiovascular: Negative for chest pain, palpitations and leg swelling.   Gastrointestinal: Negative for abdominal pain, constipation, diarrhea, nausea and vomiting.   Genitourinary: Positive for frequency, pelvic pain, urgency and vaginal discharge. Negative for flank pain, hematuria and hesitancy.   Musculoskeletal: Negative for arthralgias.   Neurological: Negative for dizziness, weakness, light-headedness and headaches.       Objective:      Vitals:    12/07/17 1525   BP: (!) 138/58   Pulse:    Temp:      Physical Exam   Constitutional: She appears well-developed and well-nourished.   HENT:   Head: Normocephalic and atraumatic.   Right Ear: Hearing and external ear normal.   Left Ear: Hearing and external ear normal.   Eyes: Conjunctivae and EOM are normal. Pupils are equal, round, and reactive to light.   Cardiovascular: Normal rate, regular  rhythm, normal heart sounds and intact distal pulses.    Pulmonary/Chest: Effort normal and breath sounds normal.   Abdominal: Soft. Normal appearance. There is no tenderness. There is no CVA tenderness.   Skin: Skin is warm and dry.   Psychiatric: She has a normal mood and affect. Her behavior is normal.   Vitals reviewed.      Assessment:       1. Urinary tract infection without hematuria, site unspecified    2. Dysuria     3. Essential hypertension        Plan:       Urinary tract infection without hematuria, site unspecified  -     nitrofurantoin, macrocrystal-monohydrate, (MACROBID) 100 MG capsule; Take 1 capsule (100 mg total) by mouth 2 (two) times daily.  Dispense: 14 capsule; Refill: 0  -     Urinalysis and Urine culture  -     C. trachomatis/N. gonorrhoeae by AMP DNA Urine  -     POCT URINE DIPSTICK WITHOUT MICROSCOPE    Dysuria   -     C. trachomatis/N. gonorrhoeae by AMP DNA Urine    Essential hypertension        - Stable, continue current meds    Patient readiness: acceptance and barriers:none    During the course of the visit the patient was educated and counseled about the following:     Hypertension:   Medication: no change.    Goals: Hypertension: Reduce Blood Pressure    Did patient meet goals/outcomes: Yes    The following self management tools provided: declined    Patient Instructions (the written plan) was given to the patient/family.     Time spent with patient: 15 minutes

## 2017-12-11 LAB — BACTERIA UR CULT: NORMAL

## 2017-12-13 ENCOUNTER — PATIENT MESSAGE (OUTPATIENT)
Dept: PAIN MEDICINE | Facility: CLINIC | Age: 72
End: 2017-12-13

## 2017-12-18 ENCOUNTER — TELEPHONE (OUTPATIENT)
Dept: FAMILY MEDICINE | Facility: CLINIC | Age: 72
End: 2017-12-18

## 2017-12-18 DIAGNOSIS — R30.0 DYSURIA: Primary | ICD-10-CM

## 2017-12-18 NOTE — TELEPHONE ENCOUNTER
Patient is having urinary frequency, foul odor, and discomfort. Her last ov 12/07 she came in for a UTI as well. Patient was prescribed macrobid

## 2017-12-18 NOTE — TELEPHONE ENCOUNTER
----- Message from Cristina Sharma sent at 12/18/2017  9:25 AM CST -----  Contact: self  States she still has a UTI after taking meds prescribed on 12/7.Please call back at [phone] (home)     [facility] [place], [place] - [address] [place] [address] [address] [facility] [address]  Phone: [phone] Fax: [phone]

## 2017-12-19 ENCOUNTER — LAB VISIT (OUTPATIENT)
Dept: LAB | Facility: HOSPITAL | Age: 72
End: 2017-12-19
Attending: PHYSICIAN ASSISTANT
Payer: MEDICARE

## 2017-12-19 DIAGNOSIS — R30.0 DYSURIA: ICD-10-CM

## 2017-12-19 LAB
BACTERIA #/AREA URNS AUTO: ABNORMAL /HPF
BILIRUB UR QL STRIP: NEGATIVE
CLARITY UR REFRACT.AUTO: ABNORMAL
COLOR UR AUTO: YELLOW
GLUCOSE UR QL STRIP: NEGATIVE
HGB UR QL STRIP: ABNORMAL
KETONES UR QL STRIP: NEGATIVE
LEUKOCYTE ESTERASE UR QL STRIP: ABNORMAL
MICROSCOPIC COMMENT: ABNORMAL
NITRITE UR QL STRIP: POSITIVE
NON-SQ EPI CELLS #/AREA URNS AUTO: <1 /HPF
PH UR STRIP: 6 [PH] (ref 5–8)
PROT UR QL STRIP: NEGATIVE
RBC #/AREA URNS AUTO: 4 /HPF (ref 0–4)
SP GR UR STRIP: 1 (ref 1–1.03)
SQUAMOUS #/AREA URNS AUTO: 2 /HPF
URN SPEC COLLECT METH UR: ABNORMAL
UROBILINOGEN UR STRIP-ACNC: NEGATIVE EU/DL
WBC #/AREA URNS AUTO: 37 /HPF (ref 0–5)
WBC CLUMPS UR QL AUTO: ABNORMAL

## 2017-12-19 PROCEDURE — 87186 SC STD MICRODIL/AGAR DIL: CPT

## 2017-12-19 PROCEDURE — 87086 URINE CULTURE/COLONY COUNT: CPT

## 2017-12-19 PROCEDURE — 87077 CULTURE AEROBIC IDENTIFY: CPT

## 2017-12-19 PROCEDURE — 87088 URINE BACTERIA CULTURE: CPT

## 2017-12-19 PROCEDURE — 81001 URINALYSIS AUTO W/SCOPE: CPT

## 2017-12-20 ENCOUNTER — TELEPHONE (OUTPATIENT)
Dept: FAMILY MEDICINE | Facility: CLINIC | Age: 72
End: 2017-12-20

## 2017-12-20 DIAGNOSIS — N39.0 URINARY TRACT INFECTION WITHOUT HEMATURIA, SITE UNSPECIFIED: Primary | ICD-10-CM

## 2017-12-20 RX ORDER — NITROFURANTOIN 25; 75 MG/1; MG/1
100 CAPSULE ORAL 2 TIMES DAILY
Qty: 14 CAPSULE | Refills: 0 | Status: SHIPPED | OUTPATIENT
Start: 2017-12-20 | End: 2017-12-27

## 2017-12-20 NOTE — TELEPHONE ENCOUNTER
Spoke with patient and advised her to  her extended ABT. I also informed her that once the culture is done we may need to change it to something else. She understands.

## 2017-12-20 NOTE — TELEPHONE ENCOUNTER
Patient has UTI and I have sent another round of macrobid to her pharmacy. The culture is still pending and antibiotics may need to be changed based on those results.

## 2017-12-21 LAB — BACTERIA UR CULT: NORMAL

## 2017-12-29 RX ORDER — SERTRALINE HYDROCHLORIDE 100 MG/1
TABLET, FILM COATED ORAL
Qty: 90 TABLET | Refills: 0 | Status: SHIPPED | OUTPATIENT
Start: 2017-12-29 | End: 2018-02-08 | Stop reason: ALTCHOICE

## 2018-01-09 ENCOUNTER — DOCUMENTATION ONLY (OUTPATIENT)
Dept: FAMILY MEDICINE | Facility: CLINIC | Age: 73
End: 2018-01-09

## 2018-01-09 NOTE — PROGRESS NOTES
Pre-Visit Chart Review  For Appointment Scheduled on 1-10-18    Health Maintenance Due   Topic Date Due    Colonoscopy  05/31/1995

## 2018-01-10 ENCOUNTER — OFFICE VISIT (OUTPATIENT)
Dept: FAMILY MEDICINE | Facility: CLINIC | Age: 73
End: 2018-01-10
Payer: MEDICARE

## 2018-01-10 VITALS
HEIGHT: 59 IN | SYSTOLIC BLOOD PRESSURE: 137 MMHG | DIASTOLIC BLOOD PRESSURE: 69 MMHG | BODY MASS INDEX: 24.8 KG/M2 | WEIGHT: 123 LBS | TEMPERATURE: 98 F | HEART RATE: 78 BPM

## 2018-01-10 DIAGNOSIS — F19.20 DEPENDENCY ON PAIN MEDICATION: ICD-10-CM

## 2018-01-10 DIAGNOSIS — Z51.81 ENCOUNTER FOR MONITORING PRIMARY ESTROGEN REPLACEMENT THERAPY: ICD-10-CM

## 2018-01-10 DIAGNOSIS — K21.9 GASTROESOPHAGEAL REFLUX DISEASE, ESOPHAGITIS PRESENCE NOT SPECIFIED: Primary | ICD-10-CM

## 2018-01-10 DIAGNOSIS — I10 ESSENTIAL HYPERTENSION: ICD-10-CM

## 2018-01-10 DIAGNOSIS — Z79.890 ENCOUNTER FOR MONITORING PRIMARY ESTROGEN REPLACEMENT THERAPY: ICD-10-CM

## 2018-01-10 DIAGNOSIS — M15.4 EROSIVE OSTEOARTHRITIS OF RIGHT HAND: ICD-10-CM

## 2018-01-10 PROCEDURE — 99214 OFFICE O/P EST MOD 30 MIN: CPT | Mod: S$GLB,,, | Performed by: FAMILY MEDICINE

## 2018-01-10 PROCEDURE — 99999 PR PBB SHADOW E&M-EST. PATIENT-LVL III: CPT | Mod: PBBFAC,,, | Performed by: FAMILY MEDICINE

## 2018-01-10 PROCEDURE — 99213 OFFICE O/P EST LOW 20 MIN: CPT | Mod: PBBFAC,PO | Performed by: FAMILY MEDICINE

## 2018-01-10 PROCEDURE — 99499 UNLISTED E&M SERVICE: CPT | Mod: S$GLB,,, | Performed by: FAMILY MEDICINE

## 2018-01-10 RX ORDER — ESTRADIOL 0.5 MG/1
0.5 TABLET ORAL DAILY
Qty: 90 TABLET | Refills: 1 | Status: SHIPPED | OUTPATIENT
Start: 2018-01-10 | End: 2018-03-27

## 2018-01-10 RX ORDER — HYDROCODONE BITARTRATE AND ACETAMINOPHEN 5; 325 MG/1; MG/1
1 TABLET ORAL EVERY 6 HOURS PRN
COMMUNITY
End: 2018-01-31 | Stop reason: SDUPTHER

## 2018-01-10 RX ORDER — OMEPRAZOLE 20 MG/1
20 CAPSULE, DELAYED RELEASE ORAL DAILY
Qty: 90 CAPSULE | Refills: 3 | Status: SHIPPED | OUTPATIENT
Start: 2018-01-10 | End: 2018-02-14 | Stop reason: SDUPTHER

## 2018-01-16 ENCOUNTER — PES CALL (OUTPATIENT)
Dept: ADMINISTRATIVE | Facility: CLINIC | Age: 73
End: 2018-01-16

## 2018-01-22 NOTE — PROGRESS NOTES
Subjective:       Patient ID: Rola Richter is a 72 y.o. female.    Chief Complaint: Follow-up    Patient Active Problem List   Diagnosis    Hypertension    GERD (gastroesophageal reflux disease)    Arthritis    Anxiety    Hyperlipidemia    Encounter for monitoring primary estrogen replacement therapy    Osteoporosis    Erosive osteoarthritis of right hand    Medication monitoring encounter    Dependency on pain medication    PTSD (post-traumatic stress disorder)    Post-nasal drip    Immunosuppressed status    CMC arthritis    Dry eye syndrome    Dysphagia    DDD (degenerative disc disease), cervical    Occipital neuralgia   C/o pain in hands and shoulders bin-takes norco bid, mobic and voltaren gel.  Plaquenil stopped due to body aches.  Has appt with Dr. Cabral 2/2/17  HPI  Review of Systems   Constitutional: Negative for fatigue and unexpected weight change.   Respiratory: Negative for chest tightness and shortness of breath.    Cardiovascular: Negative for chest pain, palpitations and leg swelling.   Gastrointestinal: Negative for abdominal pain.   Musculoskeletal: Negative for arthralgias.   Neurological: Negative for dizziness, syncope, light-headedness and headaches.       Objective:      Physical Exam   Constitutional: She is oriented to person, place, and time. She appears well-developed and well-nourished.   Cardiovascular: Normal rate, regular rhythm and normal heart sounds.    Pulmonary/Chest: Effort normal and breath sounds normal.   Musculoskeletal: She exhibits no edema.   Neurological: She is alert and oriented to person, place, and time.   Skin: Skin is warm and dry.   Psychiatric: She has a normal mood and affect.   Nursing note and vitals reviewed.      Assessment:       1. Gastroesophageal reflux disease, esophagitis presence not specified    2. Encounter for monitoring primary estrogen replacement therapy    3. Essential hypertension    4. Erosive osteoarthritis of right  hand    5. Dependency on pain medication        Plan:       1. Encounter for monitoring primary estrogen replacement therapy  Wean off as able  - estradiol (ESTRACE) 0.5 MG tablet; Take 1 tablet (0.5 mg total) by mouth once daily.  Dispense: 90 tablet; Refill: 1  Discussed pros and cons of HRT including benefits of bone strength and reduction of menopausal symptoms.  Risks include clotting risks such as DVT and PE as well as increased cancer risk-breast and endometrial.  Patient elected to proceed with therapy.  All questions were answered.    2. Gastroesophageal reflux disease, esophagitis presence not specified  Counseled patient on prevention of reflux with changes in diet and behavior.  I recommended avoidance of greasy and spicy foods, caffeine and eating within 3 hours of bedtime.  I counseled the patient to avoid eating large meals and instead eating more frequent small meals.  I also recommended weight loss and elevation of the head of the bed by 6 inches.  If symptoms persist after these changes medication may be needed to control GERD.      - omeprazole (PRILOSEC) 20 MG capsule; Take 1 capsule (20 mg total) by mouth once daily.  Dispense: 90 capsule; Refill: 3    3. Essential hypertension  Controlled on current medications.  Continue current medications.      4. Erosive osteoarthritis of right hand  Cont rheum consult and current mgmt    5. Dependency on pain medication  Cont current pain mgmt    [facility] goal documentation:  Patient readiness: acceptance and barriers:readiness  During the course of the visit the patient was educated and counseled about the following: Hypertension:   Dietary sodium restriction.  Regular aerobic exercise.  Goals: Hypertension: Reduce Blood Pressure  Goal/Outcomes met:Hypertension  The following self management tools provided:none  Patient Instructions (the written plan) was given to the patient/family: Yes  Time spent with patient: 20 minutes    Patient with be reevaluated in 3  months or sooner prn    Greater than 50% of this visit was spent counseling as described in above documentation:Yes

## 2018-01-24 ENCOUNTER — TELEPHONE (OUTPATIENT)
Dept: PAIN MEDICINE | Facility: CLINIC | Age: 73
End: 2018-01-24

## 2018-01-24 NOTE — TELEPHONE ENCOUNTER
Outpatient Physical Therapy Peds Treatment Note TEVIN Parag     Patient Name: Rick Sorenson  : 2013  MRN: [id number]  Today's Date: 2018       Visit Date: 2018    There is no problem list on file for this patient.    Past Medical History:   Diagnosis Date   • Brain bleed     Reported to have a history of two brain bleeds.    • Drug exposure, gestational     Unsure of complications during pregnancy however biological mother performed drugs while pregnant and patient is now in foster care.   • Intracranial shunt     shunt placement    • On mechanically assisted ventilation     Following birth at 26 weeks gestation, pt required use of mechanical ventilation for approx 2-3 weeks.    • Premature birth     Pt was born 26 weeks early with an unknown birth weight.   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 Developmental delay, gross motor F82 315.4   2. Abnormality of gait and mobility R26.9 781.2   3. Cerebral palsy, quadriplegic G80.8 343.2             PT Pediatric Evaluation       18 1400          Subjective Comments    Subjective Comments Patient arrives with father who reports of irritation around of delio's mouth, he think it's allergy to food. Delio is taking topical ointment for it.   -AC      Subjective Pain    Able to rate subjective pain? no  -AC      General Observations/Behavior    General Observations/Behavior Tolerated handling well;Required physical redirection or verbal cues in order to perform tasks  -AC      Assessment Method Clinical Observation;Parent/Caregiver interview;Standardized Assessment  -AC        User Key  (r) = Recorded By, (t) = Taken By, (c) = Cosigned By    Initials Name Provider Type    AC Zoila Urena, PTA Physical Therapy Assistant                              PT  Staff contacted the patient to further discuss her concerns.    Patient is requesting a call back regarding receiving the results to her 12/04/2018 cervical xray and cervical mri.    Please contact the patient to discuss.    Assessment/Plan       01/04/18 1448       PT Assessment    Assessment Comments Patient tolerated treatment session well with difficulty noted when performing stairs. Treatment session consisted of gross motor skills, navigating thersholds and transitions, coordination, balance, and hypertonia. No adverse reactions with treatment session.   -AC     PT Plan    PT Plan Comments Continue per PT's POC, progress per the patient's tolerance.  -AC       User Key  (r) = Recorded By, (t) = Taken By, (c) = Cosigned By    Initials Name Provider Type    KHUSHBOO Urena PTA Physical Therapy Assistant                    Exercises       01/04/18 1400          Subjective Comments    Subjective Comments Patient arrives with father who reports of irritation bj's mouth, he think it's allergy to food. Bj is taking topical ointment for it.   -AC      Subjective Pain    Able to rate subjective pain? no  -AC      Exercise 1    Exercise Name 1 stretching:  hamstrings, heel cords, hip adductors 3 x 20 sec each   -AC      Exercise 2    Exercise Name 2 GAIT: practiced ambuilation on unlevel surfaces and on thresholds  unsupported steps today  -AC      Exercise 3    Exercise Name 3 creeping up and down steps to crash pit, walking up and down incline, climb in and out of crash pit, sit swiss ball to improve trunk control, stand at sensory wall with play  -AC      Exercise 4    Exercise Name 4 walk up and down 5 steps with hand held assist required.   -AC      Exercise 6    Exercise Name 6 walk incline and navigate thresholds  -AC        User Key  (r) = Recorded By, (t) = Taken By, (c) = Cosigned By    Initials Name Provider Type    KHUSHBOO Urena PTA Physical Therapy Assistant                             Therapy Education  Given: HEP  Program: Reinforced  How Provided: Verbal  Provided to: Caregiver  Level of Understanding: Verbalized, Demonstrated             Time Calculation:   Start Time: 0200  Stop Time:  0230  Time Calculation (min): 30 min    Therapy Charges for Today     Code Description Service Date Service Provider Modifiers Qty    02829087019 HC PT NEUROMUSC RE EDUCATION EA 15 MIN 1/4/2018 Zoila Urena, CHRIS GP 2                Zoila Urena, CHRIS  1/4/2018

## 2018-01-24 NOTE — TELEPHONE ENCOUNTER
----- Message from Ricarda Acevedo sent at 1/24/2018 10:58 AM CST -----  Contact: patient  Patient called regarding past test results on 12/04/17. Requesting a call back at [phone]. Thanks,

## 2018-01-31 ENCOUNTER — OFFICE VISIT (OUTPATIENT)
Dept: PAIN MEDICINE | Facility: CLINIC | Age: 73
End: 2018-01-31
Payer: MEDICARE

## 2018-01-31 ENCOUNTER — APPOINTMENT (OUTPATIENT)
Dept: LAB | Facility: HOSPITAL | Age: 73
End: 2018-01-31
Attending: NURSE PRACTITIONER
Payer: MEDICARE

## 2018-01-31 VITALS
WEIGHT: 123 LBS | HEIGHT: 59 IN | SYSTOLIC BLOOD PRESSURE: 137 MMHG | DIASTOLIC BLOOD PRESSURE: 83 MMHG | BODY MASS INDEX: 24.8 KG/M2 | HEART RATE: 88 BPM

## 2018-01-31 DIAGNOSIS — M54.12 CERVICAL RADICULOPATHY: Primary | ICD-10-CM

## 2018-01-31 DIAGNOSIS — M81.0 OSTEOPOROSIS, UNSPECIFIED OSTEOPOROSIS TYPE, UNSPECIFIED PATHOLOGICAL FRACTURE PRESENCE: Primary | ICD-10-CM

## 2018-01-31 DIAGNOSIS — M79.18 MYOFASCIAL PAIN: ICD-10-CM

## 2018-01-31 DIAGNOSIS — G89.29 HIP PAIN, CHRONIC, UNSPECIFIED LATERALITY: ICD-10-CM

## 2018-01-31 DIAGNOSIS — M25.559 HIP PAIN, CHRONIC, UNSPECIFIED LATERALITY: ICD-10-CM

## 2018-01-31 DIAGNOSIS — M50.30 DDD (DEGENERATIVE DISC DISEASE), CERVICAL: ICD-10-CM

## 2018-01-31 DIAGNOSIS — Z79.891 CHRONIC USE OF OPIATE DRUGS THERAPEUTIC PURPOSES: ICD-10-CM

## 2018-01-31 DIAGNOSIS — M47.812 OSTEOARTHRITIS OF CERVICAL SPINE, UNSPECIFIED SPINAL OSTEOARTHRITIS COMPLICATION STATUS: ICD-10-CM

## 2018-01-31 DIAGNOSIS — M47.812 SPONDYLOSIS OF CERVICAL REGION WITHOUT MYELOPATHY OR RADICULOPATHY: ICD-10-CM

## 2018-01-31 LAB
AMPHET+METHAMPHET UR QL: NEGATIVE
BARBITURATES UR QL SCN>200 NG/ML: NEGATIVE
BENZODIAZ UR QL SCN>200 NG/ML: NEGATIVE
BZE UR QL SCN: NEGATIVE
CANNABINOIDS UR QL SCN: NEGATIVE
CREAT UR-MCNC: 40.4 MG/DL
METHADONE UR QL SCN>300 NG/ML: NEGATIVE
OPIATES UR QL SCN: NORMAL
PCP UR QL SCN>25 NG/ML: NEGATIVE
TOXICOLOGY INFORMATION: NORMAL

## 2018-01-31 PROCEDURE — 3008F BODY MASS INDEX DOCD: CPT | Mod: S$GLB,,, | Performed by: NURSE PRACTITIONER

## 2018-01-31 PROCEDURE — 99999 PR PBB SHADOW E&M-EST. PATIENT-LVL III: CPT | Mod: PBBFAC,,, | Performed by: NURSE PRACTITIONER

## 2018-01-31 PROCEDURE — 99214 OFFICE O/P EST MOD 30 MIN: CPT | Mod: S$GLB,,, | Performed by: NURSE PRACTITIONER

## 2018-01-31 PROCEDURE — 80365 DRUG SCREENING OXYCODONE: CPT

## 2018-01-31 PROCEDURE — 1125F AMNT PAIN NOTED PAIN PRSNT: CPT | Mod: S$GLB,,, | Performed by: NURSE PRACTITIONER

## 2018-01-31 PROCEDURE — 80307 DRUG TEST PRSMV CHEM ANLYZR: CPT

## 2018-01-31 PROCEDURE — 1159F MED LIST DOCD IN RCRD: CPT | Mod: S$GLB,,, | Performed by: NURSE PRACTITIONER

## 2018-01-31 RX ORDER — HYDROCODONE BITARTRATE AND ACETAMINOPHEN 5; 325 MG/1; MG/1
1 TABLET ORAL EVERY 12 HOURS PRN
Qty: 60 TABLET | Refills: 0 | Status: SHIPPED | OUTPATIENT
Start: 2018-03-01 | End: 2018-02-08 | Stop reason: ALTCHOICE

## 2018-01-31 RX ORDER — HEPARIN 100 UNIT/ML
500 SYRINGE INTRAVENOUS
Status: CANCELLED | OUTPATIENT
Start: 2018-01-31

## 2018-01-31 RX ORDER — MELOXICAM 7.5 MG/1
7.5 TABLET ORAL DAILY
Qty: 90 TABLET | Refills: 0 | Status: SHIPPED | OUTPATIENT
Start: 2018-01-31 | End: 2018-03-27

## 2018-01-31 RX ORDER — ZOLEDRONIC ACID 5 MG/100ML
5 INJECTION, SOLUTION INTRAVENOUS
Status: CANCELLED | OUTPATIENT
Start: 2018-01-31

## 2018-01-31 RX ORDER — SODIUM CHLORIDE 0.9 % (FLUSH) 0.9 %
10 SYRINGE (ML) INJECTION
Status: CANCELLED | OUTPATIENT
Start: 2018-01-31

## 2018-01-31 RX ORDER — HYDROCODONE BITARTRATE AND ACETAMINOPHEN 5; 325 MG/1; MG/1
1 TABLET ORAL EVERY 12 HOURS PRN
Qty: 60 TABLET | Refills: 0 | Status: SHIPPED | OUTPATIENT
Start: 2018-01-31 | End: 2018-02-08 | Stop reason: ALTCHOICE

## 2018-01-31 RX ORDER — HYDROCODONE BITARTRATE AND ACETAMINOPHEN 5; 325 MG/1; MG/1
1 TABLET ORAL EVERY 12 HOURS PRN
Qty: 60 TABLET | Refills: 0 | Status: SHIPPED | OUTPATIENT
Start: 2018-03-31 | End: 2018-04-11 | Stop reason: SDUPTHER

## 2018-01-31 RX ORDER — CYCLOBENZAPRINE HCL 10 MG
10 TABLET ORAL 3 TIMES DAILY
Qty: 270 TABLET | Refills: 0 | Status: SHIPPED | OUTPATIENT
Start: 2018-01-31 | End: 2018-04-11 | Stop reason: SDUPTHER

## 2018-01-31 NOTE — PROGRESS NOTES
Referring Physician: No ref. provider found    PCP: Liseth Carias MD      CC: neck and left occipital pain    Interval history: Ms. Richter is a 72 y.o. female with neck pain and occipital neuralgia.  Her pain radiates into both arms, worse on the right.  Her right arm pain travels to her hand, her left stops around her shoulder.  She has numbness to her right hand and first through fourth digits.  She previously had MELANY with now reported 60% benefit.  She also reports left sided occipital pain which has responded well to occipital nerve blocks in the past.  Her biggest complaint today is right arm pain and numbness.  At her last OV, I ordered updated cervical XRAYs and MRI which she did obtain.  She continues to take Norco, Mobic and Flexeril which provide benefit.  No bowel bladder changes.   Pain today is rated 8/10.    Prior HPI:   Patient is 70-year-old female with past history history of cervical DDD, cervical spondylosis and chronic headaches.  She recently moved here from [place], [place].  She is treated in the past by neurology.  She states having constant burning pain over the left side of her posterior scalp.  Pain radiates to her neck as well as a frontal.  She also has left-sided neck pain as well.  She denies any radicular arm pain.  No numbness or weakness.  She states having cervical epidural steroid injection at past with minimal benefit.  She also has had decided of cervical nerve blocks in the past with moderate benefit.  Most recent injection was performed 2 months ago.  She desires repeat injection.  She currently takes Norco 10 mg every 12 hours as needed with moderate benefit.  She also takes Zanaflex 4 mg every 8 hours with mild benefits.  She rates her pain 7/10 today.    Pain intervention history: s/p left occipital nerve blocks on 1/2016 with 50% relief of her headaches    ROS:  CONSTITUTIONAL: No fevers, chills, night sweats, wt. loss, appetite changes  SKIN: no rashes or  itching  ENT: No headaches, head trauma, vision changes, or eye pain  LYMPH NODES: None noticed   CV: No chest pain, palpitations.   RESP: No shortness of breath, dyspnea on exertion, cough, wheezing, or hemoptysis  GI: No nausea, emesis, diarrhea, constipation, melena, hematochezia, pain.    : No dysuria, hematuria, urgency, or frequency   HEME: No easy bruising, bleeding problems  PSYCHIATRIC: No depression, anxiety, psychosis, hallucinations.  NEURO: No seizures, memory loss, dizziness or difficulty sleeping  MSK: + History of present illness      Past Medical History:   Diagnosis Date    Anxiety     Arthritis     Cataract     DDD (degenerative disc disease), lumbar     Depression     GERD (gastroesophageal reflux disease)     Hyperlipidemia     Hypertension     pt ststes she does not take meds anymore she just watches her weight//    Immunosuppressed status 2016    Occipital neuralgia 3/24/2017    Osteoporosis      Past Surgical History:   Procedure Laterality Date    APPENDECTOMY       SECTION      x 2    CHOLECYSTECTOMY      HYSTERECTOMY      Laser Periphery Iridotomy Bilateral     OD 16 and OS touch up 2016    vocal cord tumor removal       Family History   Problem Relation Age of Onset    Osteoarthritis Mother     Alcohol abuse Mother     Osteoarthritis Sister     Diabetes Brother     No Known Problems Son     No Known Problems Sister     No Known Problems Sister     No Known Problems Sister     No Known Problems Brother     Arthritis Son     No Known Problems Son     Stroke Maternal Grandmother 99    Retinal detachment Neg Hx     Macular degeneration Neg Hx     Glaucoma Neg Hx     Amblyopia Neg Hx     Blindness Neg Hx     Cancer Neg Hx     Cataracts Neg Hx     Hypertension Neg Hx     Strabismus Neg Hx     Thyroid disease Neg Hx      Social History     Social History    Marital status:      Spouse name: N/A    Number of children: N/A  "   Years of education: N/A     Social History Main Topics    Smoking status: Never Smoker    Smokeless tobacco: Never Used    Alcohol use No    Drug use: No    Sexual activity: Yes     Partners: Male     Other Topics Concern    None     Social History Narrative    None         Medications/Allergies: See med card    Vitals:    01/31/18 1054   BP: 137/83   Pulse: 88   Weight: 55.8 kg (123 lb)   Height: 4' 11" (1.499 m)   PainSc:   8   PainLoc: Neck         Physical exam:    GENERAL: A and O x3, the patient appears well groomed and is in no acute distress.  Skin: No rashes or obvious lesions  HEENT: normocephalic, atraumatic  CARDIOVASCULAR:  RRR  LUNGS: nonlabored breathing  ABDOMEN: soft, nontender   UPPER EXTREMITIES: Normal alignment, normal range of motion, no atrophy, no skin changes,  hair growth and nail growth normal and equal bilaterally. No swelling, no tenderness.  +Phalens on left side.  LOWER EXTREMITIES:  Normal alignment, normal range of motion, no atrophy, no skin changes,  hair growth and nail growth normal and equal bilaterally. No swelling, no tenderness.  CERVICAL SPINE:   Cervical spine: ROM is full in flexion, extension and lateral rotation.  Painful flexion > extension.  Positive facet loading bilaterally.  Spurling is positive at right side.  Myofascial exam:  Tenderness to palpation across cervical paraspinals and trapezius muscles bilaterally.      MENTAL STATUS: normal orientation, speech, language, and fund of knowledge for social situation.  Emotional state appropriate.    CRANIAL NERVES:  II:  PERRL bilaterally,   III,IV,VI: EOMI.    V:  Facial sensation equal bilaterally  VII:  Facial motor function normal.  VIII:  Hearing equal to finger rub bilaterally  IX/X: Gag normal, palate symmetric  XI:  Shoulder shrug equal, head turn equal  XII:  Tongue midline without fasciculations      MOTOR: Tone and bulk: normal bilateral upper and lower Strength: normal "   Delt Bi Tri WE WF     R 5 5 5 5 5 5   L 5 5 5 5 5 5     IP ADD ABD Quad TA Gas HAM  R 5 5 5 5 5 5 5  L 5 5 5 5 5 5 5    SENSATION: Light touch and pinprick intact bilaterally  REFLEXES: normal, symmetric, nonbrisk.  Toes down, no clonus. No hoffmans.  GAIT: normal rise, base, steps, and arm swing.        Imaging:   Cervical MRI 12/4/17    Narrative     EXAM: Cervical spine MRI without contrast.    INDICATION: Cervical radiculopathy.  Neck pain and occipital neuralgia.  The patient complains of neck and right arm pain.    TECHNIQUE: Routine multiplanar, multisequence unenhanced cervical spine MRI was performed.    COMPARISON: Plain films of the cervical spine obtained concurrently      FINDINGS:     Vertebral column: There is straightening of the cervical spine with loss of normal lordosis.  As seen on concurrent plain films, there is trace anterolisthesis of C3 on C4 with 2 mm anterolisthesis of C4 on C5.  There is marked disc space narrowing at the C5-6 level with moderate to marked disc space narrowing at the C4-5 and C6-7 levels.  There is partial non-segmentation anteriorly at the C2-3 level.  The C2 and C3 as well as the C4 and C5 facet joints appear fused and this may represent developmental non-segmentation or degenerative ankylosis.  All of the discs are desiccated.  The odontoid process is intact.    Spinal canal, cord, epidural space: The craniovertebral junction is normal.  The spinal canal is omental and normal.  There is no significant spinal stenosis.  The cord is normal in caliber.  There is very subtle flattening of the ventral cord surface at the C4-5 and C5-6 levels where there is degenerative change.  The study is mildly motion but there is no definite cord edema or myelomalacia.    Findings by level:    C2-3: There is no spinal canal or foraminal stenosis.  There is mild left facet joint arthropathy.    C3-4: There is trace anterolisthesis.  There is left greater than right uncovertebral  spurring and facet joint arthropathy.  There is a mild disc osteophyte complex, slightly eccentric to the left with subtle annular fissure.  This narrows the ventral subarachnoid space.  There is no spinal stenosis or cord compression.  There is moderate marked left foraminal stenosis.    C4-5: There is moderate disc space narrowing with 2 mm anterolisthesis of C4 on C5.  There is facet joint arthropathy or effusion left greater than right.  There is also mild bilateral but left greater than right uncovertebral spurring.  There is unroofing of a mild disc bulge which narrows the ventral subarachnoid space.  There is no spinal stenosis.  There is mild to moderate left foraminal stenosis.    C5-6:There is marked disc space narrowing.  There is bilateral uncovertebral spurring.  There is a disc osteophyte complex which narrows the subarachnoid space.  This is slightly eccentric to the right There is subtle flattening of the ventral cord surface.  Cord signal is grossly normal.  There is mild spinal stenosis with at least moderate bilateral foraminal stenosis.    C6-7: There is moderate disc space narrowing.  There is mild uncovertebral spurring.  There is a shallow disc osteophyte complex, slightly eccentric to the right.  There is narrowing of the ventral subarachnoid space.  There is no spinal stenosis.  Cord signal is normal.  There is mild bilateral foraminal stenosis.  There is a small 3.5 mm left foraminal perineural cyst.    C7- T1: There is a Schmorl's node in inferior endplate of C7, chronic.  There are tiny bilateral foraminal perineural cysts.  There is minimal bulging of the annulus and mild facet joint arthropathy without spinal canal or foraminal stenosis.    Soft tissues/other: The prevertebral soft tissues are normal.  The airway is patent.   Impression          1. There is multilevel degenerative disc disease described in detail above.  There is no acute fracture.  There is degenerative listhesis at  several levels.  There is some degree of disc osteophyte complex, uncovertebral spurring and/or facet joint arthropathy contributing to some degree of foraminal stenosis at several levels.  There is no significant spinal canal stenosis.  There is very subtle flattening of the ventral cord surface at the C4-5 and C6-7 levels The pertinent findings are summarized below.    2. At the C3-4 level, there is no spinal stenosis.  There is moderate to marked left foraminal stenosis.    3. At the C4-5 level there is no spinal stenosis.  There is mild to moderate left foraminal stenosis.    4. At the C5-6 level, there is mild spinal stenosis with at least moderate bilateral foraminal stenosis.    5. At the C6-7 level, there is no spinal stenosis.  There is mild bilateral foraminal stenosis.    6. There is no spinal canal or foraminal stenosis at the C2-3 or C7-T1 levels.         Assessment:    Mrs. Richter is a 72 y.o. female with neck and head pain secondary to the following diagnoses:     1. Cervical radiculopathy    2. Hip pain, chronic, unspecified laterality    3. Spondylosis of cervical region without myelopathy or radiculopathy    4. Myofascial pain    5. DDD (degenerative disc disease), cervical    6. Osteoarthritis of cervical spine, unspecified spinal osteoarthritis complication status        Plan:  1.  I have stressed the importance of physical activity and exercise to improve overall health.  2. Will repeat C7-T1 [place] MELANY as previous provided some benefit and her greatest complaint today is right arm pain.   3. Consider repeat occipital blocks for head pain.  4. Can continue Norco 5mg q12hrs as needed for pain.  She was provided with 3 months of prescriptions with appropriate dates.  The patient is here today for a refill of current pain medications and they believe these provide effective pain control and improvements in quality of life.  The patient notes no serious side effects, and feels the benefits outweigh the  risks.  The patient was reminded of the pain contract that they signed previously as well as the risks and benefits of the medication including possible death.  The updated [place] [employer] prescription monitoring program was reviewed, and the patient has been found to be compliant with current treatment plan.  5. Continue Flexeril and Mobic.  Refill sent for 90 day supply. Labs reviewed today.  6. Recent cervical MRI and XRAYs reviewed with patient in detail today.  7. RTC in 3 months or sooner if needed.    Medication management by Dr. Grimaldo.    The above plan and management options were discussed at length with patient. Patient is in agreement with the above and verbalized understanding.     Janet Esquivel, ROXY  01/31/2018

## 2018-02-01 ENCOUNTER — TELEPHONE (OUTPATIENT)
Dept: RHEUMATOLOGY | Facility: CLINIC | Age: 73
End: 2018-02-01

## 2018-02-01 DIAGNOSIS — M54.12 CERVICAL RADICULOPATHY: Primary | ICD-10-CM

## 2018-02-01 NOTE — TELEPHONE ENCOUNTER
----- Message from Ricarda Acevedo sent at 2/1/2018 10:00 AM CST -----  Contact: patient  Patient called regarding nurse appointment for injection. Call back at [phone]. Thanks,

## 2018-02-02 LAB
CODEINE UR-MCNC: 144 NG/ML
CODEINE UR-MCNC: NEGATIVE NG/ML
HYDROCODONE UR-MCNC: 213 NG/ML
HYDROMORPHONE UR-MCNC: 105 NG/ML
MORPHINE UR-MCNC: NEGATIVE NG/ML
NALOXONE BY LS MS/MS: NEGATIVE NG/ML
NORHYDROCODONE BY LC MS/MS: 587 NG/ML
NOROXYCODONE BY LC-MS/MS: NEGATIVE NG/ML
NOROXYMORPHONE BY LC-MS/MS: NEGATIVE NG/ML
OXYCODONE UR-MCNC: NEGATIVE NG/ML
OXYCODONE UR-MCNC: NEGATIVE NG/ML
TOXICOLOGIST REVIEW: NORMAL

## 2018-02-06 ENCOUNTER — DOCUMENTATION ONLY (OUTPATIENT)
Dept: FAMILY MEDICINE | Facility: CLINIC | Age: 73
End: 2018-02-06

## 2018-02-06 NOTE — PROGRESS NOTES
Pre-Visit Chart Review  For Appointment Scheduled on 2/8/2018    Health Maintenance Due   Topic Date Due    Colonoscopy  05/31/1995

## 2018-02-07 NOTE — OR NURSING
Reported to Dr Grimaldo that patient took his fiorinal yesterday.  Ok to proceed but patient is to not take any more.  Patient notified.

## 2018-02-08 ENCOUNTER — HOSPITAL ENCOUNTER (OUTPATIENT)
Facility: AMBULARY SURGERY CENTER | Age: 73
Discharge: HOME | End: 2018-02-08
Attending: ANESTHESIOLOGY | Admitting: ANESTHESIOLOGY
Payer: MEDICARE

## 2018-02-08 ENCOUNTER — SURGERY (OUTPATIENT)
Age: 73
End: 2018-02-08

## 2018-02-08 ENCOUNTER — OFFICE VISIT (OUTPATIENT)
Dept: FAMILY MEDICINE | Facility: CLINIC | Age: 73
End: 2018-02-08
Payer: MEDICARE

## 2018-02-08 VITALS
HEART RATE: 77 BPM | BODY MASS INDEX: 24.31 KG/M2 | SYSTOLIC BLOOD PRESSURE: 129 MMHG | HEIGHT: 59 IN | WEIGHT: 120.56 LBS | DIASTOLIC BLOOD PRESSURE: 77 MMHG | TEMPERATURE: 98 F

## 2018-02-08 DIAGNOSIS — I10 ESSENTIAL HYPERTENSION: ICD-10-CM

## 2018-02-08 DIAGNOSIS — E78.5 HYPERLIPIDEMIA, UNSPECIFIED HYPERLIPIDEMIA TYPE: ICD-10-CM

## 2018-02-08 DIAGNOSIS — M54.12 CERVICAL RADICULITIS: Primary | ICD-10-CM

## 2018-02-08 DIAGNOSIS — K21.9 GASTROESOPHAGEAL REFLUX DISEASE, ESOPHAGITIS PRESENCE NOT SPECIFIED: ICD-10-CM

## 2018-02-08 DIAGNOSIS — M54.81 OCCIPITAL NEURALGIA, UNSPECIFIED LATERALITY: ICD-10-CM

## 2018-02-08 DIAGNOSIS — F43.10 PTSD (POST-TRAUMATIC STRESS DISORDER): ICD-10-CM

## 2018-02-08 DIAGNOSIS — M19.049 CMC ARTHRITIS: ICD-10-CM

## 2018-02-08 DIAGNOSIS — F19.20 DEPENDENCY ON PAIN MEDICATION: ICD-10-CM

## 2018-02-08 DIAGNOSIS — D84.9 IMMUNOSUPPRESSED STATUS: ICD-10-CM

## 2018-02-08 DIAGNOSIS — Z00.00 ENCOUNTER FOR PREVENTIVE HEALTH EXAMINATION: Primary | ICD-10-CM

## 2018-02-08 DIAGNOSIS — M81.0 AGE-RELATED OSTEOPOROSIS WITHOUT CURRENT PATHOLOGICAL FRACTURE: ICD-10-CM

## 2018-02-08 DIAGNOSIS — Z12.11 COLON CANCER SCREENING: ICD-10-CM

## 2018-02-08 DIAGNOSIS — M50.30 DDD (DEGENERATIVE DISC DISEASE), CERVICAL: ICD-10-CM

## 2018-02-08 PROBLEM — R13.10 DYSPHAGIA: Status: RESOLVED | Noted: 2017-03-14 | Resolved: 2018-02-08

## 2018-02-08 PROCEDURE — 99152 MOD SED SAME PHYS/QHP 5/>YRS: CPT | Mod: ,,, | Performed by: ANESTHESIOLOGY

## 2018-02-08 PROCEDURE — 99999 PR PBB SHADOW E&M-EST. PATIENT-LVL V: CPT | Mod: PBBFAC,,, | Performed by: PHYSICIAN ASSISTANT

## 2018-02-08 PROCEDURE — 80061 LIPID PANEL: CPT

## 2018-02-08 PROCEDURE — 36415 COLL VENOUS BLD VENIPUNCTURE: CPT | Mod: PO

## 2018-02-08 PROCEDURE — 99215 OFFICE O/P EST HI 40 MIN: CPT | Mod: PO | Performed by: PHYSICIAN ASSISTANT

## 2018-02-08 PROCEDURE — 99499 UNLISTED E&M SERVICE: CPT | Mod: S$GLB,,, | Performed by: PHYSICIAN ASSISTANT

## 2018-02-08 PROCEDURE — 62321 NJX INTERLAMINAR CRV/THRC: CPT | Performed by: ANESTHESIOLOGY

## 2018-02-08 PROCEDURE — 62321 NJX INTERLAMINAR CRV/THRC: CPT | Mod: ,,, | Performed by: ANESTHESIOLOGY

## 2018-02-08 PROCEDURE — G0439 PPPS, SUBSEQ VISIT: HCPCS | Mod: S$GLB,,, | Performed by: PHYSICIAN ASSISTANT

## 2018-02-08 PROCEDURE — 80053 COMPREHEN METABOLIC PANEL: CPT

## 2018-02-08 RX ORDER — DEXAMETHASONE SODIUM PHOSPHATE 10 MG/ML
INJECTION INTRAMUSCULAR; INTRAVENOUS
Status: DISPENSED
Start: 2018-02-08 | End: 2018-02-08

## 2018-02-08 RX ORDER — LIDOCAINE HYDROCHLORIDE 10 MG/ML
INJECTION, SOLUTION EPIDURAL; INFILTRATION; INTRACAUDAL; PERINEURAL
Status: DISCONTINUED | OUTPATIENT
Start: 2018-02-08 | End: 2018-02-08 | Stop reason: HOSPADM

## 2018-02-08 RX ORDER — MIDAZOLAM HYDROCHLORIDE 2 MG/2ML
INJECTION, SOLUTION INTRAMUSCULAR; INTRAVENOUS
Status: DISCONTINUED | OUTPATIENT
Start: 2018-02-08 | End: 2018-02-08 | Stop reason: HOSPADM

## 2018-02-08 RX ORDER — FENTANYL CITRATE 50 UG/ML
INJECTION, SOLUTION INTRAMUSCULAR; INTRAVENOUS
Status: DISPENSED
Start: 2018-02-08 | End: 2018-02-09

## 2018-02-08 RX ORDER — LIDOCAINE HYDROCHLORIDE 10 MG/ML
INJECTION, SOLUTION EPIDURAL; INFILTRATION; INTRACAUDAL; PERINEURAL
Status: DISPENSED
Start: 2018-02-08 | End: 2018-02-08

## 2018-02-08 RX ORDER — MIDAZOLAM HYDROCHLORIDE 1 MG/ML
INJECTION INTRAMUSCULAR; INTRAVENOUS
Status: DISPENSED
Start: 2018-02-08 | End: 2018-02-09

## 2018-02-08 RX ORDER — DEXAMETHASONE SODIUM PHOSPHATE 10 MG/ML
INJECTION INTRAMUSCULAR; INTRAVENOUS
Status: DISCONTINUED | OUTPATIENT
Start: 2018-02-08 | End: 2018-02-08 | Stop reason: HOSPADM

## 2018-02-08 RX ORDER — FENTANYL CITRATE 50 UG/ML
INJECTION, SOLUTION INTRAMUSCULAR; INTRAVENOUS
Status: DISCONTINUED | OUTPATIENT
Start: 2018-02-08 | End: 2018-02-08 | Stop reason: HOSPADM

## 2018-02-08 RX ORDER — SODIUM CHLORIDE, SODIUM LACTATE, POTASSIUM CHLORIDE, CALCIUM CHLORIDE 600; 310; 30; 20 MG/100ML; MG/100ML; MG/100ML; MG/100ML
INJECTION, SOLUTION INTRAVENOUS CONTINUOUS
Status: DISCONTINUED | OUTPATIENT
Start: 2018-02-08 | End: 2018-02-08 | Stop reason: HOSPADM

## 2018-02-08 RX ORDER — SODIUM CHLORIDE 9 MG/ML
INJECTION, SOLUTION INTRAMUSCULAR; INTRAVENOUS; SUBCUTANEOUS
Status: DISCONTINUED | OUTPATIENT
Start: 2018-02-08 | End: 2018-02-08 | Stop reason: HOSPADM

## 2018-02-08 RX ADMIN — MIDAZOLAM HYDROCHLORIDE 2 MG: 2 INJECTION, SOLUTION INTRAMUSCULAR; INTRAVENOUS at 02:02

## 2018-02-08 RX ADMIN — DEXAMETHASONE SODIUM PHOSPHATE 10 MG: 10 INJECTION INTRAMUSCULAR; INTRAVENOUS at 02:02

## 2018-02-08 RX ADMIN — SODIUM CHLORIDE, SODIUM LACTATE, POTASSIUM CHLORIDE, CALCIUM CHLORIDE: 600; 310; 30; 20 INJECTION, SOLUTION INTRAVENOUS at 01:02

## 2018-02-08 RX ADMIN — LIDOCAINE HYDROCHLORIDE 5 ML: 10 INJECTION, SOLUTION EPIDURAL; INFILTRATION; INTRACAUDAL; PERINEURAL at 02:02

## 2018-02-08 RX ADMIN — FENTANYL CITRATE 50 MCG: 50 INJECTION, SOLUTION INTRAMUSCULAR; INTRAVENOUS at 02:02

## 2018-02-08 RX ADMIN — SODIUM CHLORIDE 4 ML: 9 INJECTION, SOLUTION INTRAMUSCULAR; INTRAVENOUS; SUBCUTANEOUS at 02:02

## 2018-02-08 NOTE — PROGRESS NOTES
"Rola Richter presented for a  Medicare AWV and comprehensive Health Risk Assessment today. The following components were reviewed and updated:    · Medical history  · Family History  · Social history  · Allergies and Current Medications  · Health Risk Assessment  · Health Maintenance  · Care Team     ** See Completed Assessments for Annual Wellness Visit within the encounter summary.**       The following assessments were completed:  · Living Situation  · CAGE  · Depression Screening  · Timed Get Up and Go  · Whisper Test  · Cognitive Function Screening  · Nutrition Screening  · ADL Screening  · PAQ Screening    Vitals:    02/08/18 1051   BP: 129/77   BP Location: Left arm   Patient Position: Sitting   BP Method: Small (Automatic)   Pulse: 77   Temp: 97.8 °F (36.6 °C)   TempSrc: Oral   Weight: 54.7 kg (120 lb 9.5 oz)   Height: 4' 11" (1.499 m)     Body mass index is 24.36 kg/m².  Physical Exam   Constitutional: She is oriented to person, place, and time. She appears well-developed and well-nourished.   HENT:   Head: Normocephalic and atraumatic.   Eyes: Conjunctivae are normal. Pupils are equal, round, and reactive to light.   Neck: Normal range of motion. Neck supple. No JVD present.   Cardiovascular: Normal rate and regular rhythm.  Exam reveals no gallop and no friction rub.    No murmur heard.  Pulmonary/Chest: Effort normal and breath sounds normal. No respiratory distress. She has no wheezes. She has no rales.   Neurological: She is alert and oriented to person, place, and time.   Skin: Skin is warm and dry.   Psychiatric: She has a normal mood and affect. Her behavior is normal. Judgment and thought content normal.               Diagnoses and health risks identified today and associated recommendations/orders:    Rola was seen today for health risk assessment.    Diagnoses and all orders for this visit:    Encounter for preventive health examination    Immunosuppressed status  Comments:  stable, " followed by rheumatology    Dependency on pain medication  Comments:  stable, followed by pain management    Occipital neuralgia, unspecified laterality  Comments:  stable, continue to monitor    DDD (degenerative disc disease), cervical  Comments:  stable, followed by pain management    CMC arthritis  Comments:  stable, followed by rheumatology    PTSD (post-traumatic stress disorder)  Comments:  controlled, continue regimen    Age-related osteoporosis without current pathological fracture  Comments:  stable, continue Reclast    Hyperlipidemia, unspecified hyperlipidemia type  Comments:  uncontrolled, labs ordered  Orders:  -     Comprehensive metabolic panel; Future  -     Lipid panel; Future    Gastroesophageal reflux disease, esophagitis presence not specified  Comments:  controlled, continue meds    Essential hypertension  Comments:  stable, continue meds    Colon cancer screening  Comments:  refer to gi  Orders:  -     Ambulatory referral to Gastroenterology        Provided Rola with a 5-10 year written screening schedule and personal prevention plan. Recommendations were developed using the USPSTF age appropriate recommendations. Education, counseling, and referrals were provided as needed. After Visit Summary printed and given to patient which includes a list of additional screenings\tests needed.    No Follow-up on file.    ANIL Gasca     Patient readiness: acceptance and barriers:none    During the course of the visit the patient was educated and counseled about the following:     Hypertension:   Regular aerobic exercise.    Goals: Hypertension: Reduce Blood Pressure    Did patient meet goals/outcomes: Yes    The following self management tools provided: declined    Patient Instructions (the written plan) was given to the patient/family.     Time spent with patient: 55 minutes    Barriers to medications present (no )    Adverse reactions to current medications (no)    Over the counter  medications reviewed (Yes)

## 2018-02-08 NOTE — DISCHARGE INSTRUCTIONS
Recovery After Procedural Sedation (Adult)  You have been given medicine by vein to make you sleep during your surgery. This may have included both a pain medicine and sleeping medicine. Most of the effects have worn off. But you may still have some drowsiness for the next 6 to 8 hours.  Home care  Follow these guidelines when you get home:  · For the next 8 hours, you should be watched by a responsible adult. This person should make sure your condition is not getting worse.  · Don't drink any alcohol for the next 24 hours.  · Don't drive, operate dangerous machinery, or make important business or personal decisions during the next 24 hours.  Note: Your healthcare provider may tell you not to take any medicine by mouth for pain or sleep in the next 4 hours. These medicines may react with the medicines you were given in the hospital. This could cause a much stronger response than usual.  Follow-up care  Follow up with your healthcare provider if you are not alert and back to your usual level of activity within 12 hours.  When to seek medical advice  Call your healthcare provider right away if any of these occur:  · Drowsiness gets worse  · Weakness or dizziness gets worse  · Repeated vomiting  · You can't be awakened   Date Last Reviewed: 10/18/2016  © 5599-2114 [employer]. [address] [place] [address], [place] [address]. All rights reserved. This information is not intended as a substitute for professional medical care. Always follow your healthcare professional's instructions.      Pain injection instructions:     Steroids take about a week to relieve pain.  Initially you may get pain relief from the local anesthetic but this may wear off before the steroid works.    No driving for 24 hrs   Activity as tolerated- gradually increase activities.  Dont lift over 10 lbs for 24 hrs   No heat at injection sites x 2 days. No hot tubs, swimming pools, saunas or heating pads for 2 days.  Use ice pack(given)for  mild swelling and for comfort at 20 minute intervals, 20 minutes on 20 minutes off. No direct contact of ice to skin.  May shower today. No  tubbaths for two days.      Resume Aspirin, Plavix, or Coumadin the day after the procedure unless otherwise instructed.   If diabetic,monitor your glucose carefully as steroids can increase glucose level    Seek immediate medical help for:   Severe increase in your usual pain or appearance of new pain.  Prolonged or increasing weakness or numbness in the legs or arms.    - Numbing medicine was injected that affects nerves that carry information from    the   muscles to brain and the brain to the muscles.  This numbness can last 4-6 hrs so be very careful to prevent falls; get assistance when standing or walking.    Fever above 101 ,Drainage,redness,active bleeding, or increased swelling at the injection site.  Headache, shortness of breath, chest pain, or breathing problems.

## 2018-02-08 NOTE — OP NOTE
PROCEDURE DATE: 2/8/2018    Procedure: C7-T1 cervical interlaminar epidural steroid injection under utilizing fluoroscopy.    Diagnosis: Cervical Degenerative Disc Diease; Cervical Radiculitis  POSTOP DIAGNOSIS: SAME    Physician: Timothy Grimaldo MD    Medications injected:  Dexamethasone 10mg followed by a slow injection of 4 mL sterile, preservative-free normal saline.    Local anesthetic used: Lidocaine 1%, 2 ml.    Sedation Medications: RN IV sedation    Complications:  None    Estimated blood loss: None    Technique:  A time-out was taken to identify patient and procedure prior to starting the procedure.  With the patient laying in a prone position with the neck in a mid-flexed forward position, the area was prepped and draped in the usual sterile fashion using ChloraPrep and a fenestrated drape.  The area was determined under AP fluoroscopic guidance.  Local anesthetic was given using a 25-gauge 1.5 inch needle by raising a wheal and then infiltrating ventrally.  A 3.5 inch 20-gauge Touhy needle was introduced under fluoroscopic guidance to meet the lamina of C7.  The needle was then hinged under the lamina then advanced using loss of resistance technique.  Once the tip of the needle was in the desired position, the 1ml contrast dye Omnipaque was injected to determine placement and no uptake.  The steroid was then injected slowly followed by a slow injection of 4 mL of the sterile preservative-free normal saline.  The patient tolerated the procedure well.    The patient was monitored after the procedure and was given post-procedure and discharge instructions to follow at home. The patient was discharged in a stable condition.

## 2018-02-08 NOTE — H&P (VIEW-ONLY)
Referring Physician: No ref. provider found    PCP: Liseth Carias MD      CC: neck and left occipital pain    Interval history: Ms. Richter is a 72 y.o. female with neck pain and occipital neuralgia.  Her pain radiates into both arms, worse on the right.  Her right arm pain travels to her hand, her left stops around her shoulder.  She has numbness to her right hand and first through fourth digits.  She previously had MELANY with now reported 60% benefit.  She also reports left sided occipital pain which has responded well to occipital nerve blocks in the past.  Her biggest complaint today is right arm pain and numbness.  At her last OV, I ordered updated cervical XRAYs and MRI which she did obtain.  She continues to take Norco, Mobic and Flexeril which provide benefit.  No bowel bladder changes.   Pain today is rated 8/10.    Prior HPI:   Patient is 70-year-old female with past history history of cervical DDD, cervical spondylosis and chronic headaches.  She recently moved here from [place], [place].  She is treated in the past by neurology.  She states having constant burning pain over the left side of her posterior scalp.  Pain radiates to her neck as well as a frontal.  She also has left-sided neck pain as well.  She denies any radicular arm pain.  No numbness or weakness.  She states having cervical epidural steroid injection at past with minimal benefit.  She also has had decided of cervical nerve blocks in the past with moderate benefit.  Most recent injection was performed 2 months ago.  She desires repeat injection.  She currently takes Norco 10 mg every 12 hours as needed with moderate benefit.  She also takes Zanaflex 4 mg every 8 hours with mild benefits.  She rates her pain 7/10 today.    Pain intervention history: s/p left occipital nerve blocks on 1/2016 with 50% relief of her headaches    ROS:  CONSTITUTIONAL: No fevers, chills, night sweats, wt. loss, appetite changes  SKIN: no rashes or  itching  ENT: No headaches, head trauma, vision changes, or eye pain  LYMPH NODES: None noticed   CV: No chest pain, palpitations.   RESP: No shortness of breath, dyspnea on exertion, cough, wheezing, or hemoptysis  GI: No nausea, emesis, diarrhea, constipation, melena, hematochezia, pain.    : No dysuria, hematuria, urgency, or frequency   HEME: No easy bruising, bleeding problems  PSYCHIATRIC: No depression, anxiety, psychosis, hallucinations.  NEURO: No seizures, memory loss, dizziness or difficulty sleeping  MSK: + History of present illness      Past Medical History:   Diagnosis Date    Anxiety     Arthritis     Cataract     DDD (degenerative disc disease), lumbar     Depression     GERD (gastroesophageal reflux disease)     Hyperlipidemia     Hypertension     pt ststes she does not take meds anymore she just watches her weight//    Immunosuppressed status 2016    Occipital neuralgia 3/24/2017    Osteoporosis      Past Surgical History:   Procedure Laterality Date    APPENDECTOMY       SECTION      x 2    CHOLECYSTECTOMY      HYSTERECTOMY      Laser Periphery Iridotomy Bilateral     OD 16 and OS touch up 2016    vocal cord tumor removal       Family History   Problem Relation Age of Onset    Osteoarthritis Mother     Alcohol abuse Mother     Osteoarthritis Sister     Diabetes Brother     No Known Problems Son     No Known Problems Sister     No Known Problems Sister     No Known Problems Sister     No Known Problems Brother     Arthritis Son     No Known Problems Son     Stroke Maternal Grandmother 99    Retinal detachment Neg Hx     Macular degeneration Neg Hx     Glaucoma Neg Hx     Amblyopia Neg Hx     Blindness Neg Hx     Cancer Neg Hx     Cataracts Neg Hx     Hypertension Neg Hx     Strabismus Neg Hx     Thyroid disease Neg Hx      Social History     Social History    Marital status:      Spouse name: N/A    Number of children: N/A  "   Years of education: N/A     Social History Main Topics    Smoking status: Never Smoker    Smokeless tobacco: Never Used    Alcohol use No    Drug use: No    Sexual activity: Yes     Partners: Male     Other Topics Concern    None     Social History Narrative    None         Medications/Allergies: See med card    Vitals:    01/31/18 1054   BP: 137/83   Pulse: 88   Weight: 55.8 kg (123 lb)   Height: 4' 11" (1.499 m)   PainSc:   8   PainLoc: Neck         Physical exam:    GENERAL: A and O x3, the patient appears well groomed and is in no acute distress.  Skin: No rashes or obvious lesions  HEENT: normocephalic, atraumatic  CARDIOVASCULAR:  RRR  LUNGS: nonlabored breathing  ABDOMEN: soft, nontender   UPPER EXTREMITIES: Normal alignment, normal range of motion, no atrophy, no skin changes,  hair growth and nail growth normal and equal bilaterally. No swelling, no tenderness.  +Phalens on left side.  LOWER EXTREMITIES:  Normal alignment, normal range of motion, no atrophy, no skin changes,  hair growth and nail growth normal and equal bilaterally. No swelling, no tenderness.  CERVICAL SPINE:   Cervical spine: ROM is full in flexion, extension and lateral rotation.  Painful flexion > extension.  Positive facet loading bilaterally.  Spurling is positive at right side.  Myofascial exam:  Tenderness to palpation across cervical paraspinals and trapezius muscles bilaterally.      MENTAL STATUS: normal orientation, speech, language, and fund of knowledge for social situation.  Emotional state appropriate.    CRANIAL NERVES:  II:  PERRL bilaterally,   III,IV,VI: EOMI.    V:  Facial sensation equal bilaterally  VII:  Facial motor function normal.  VIII:  Hearing equal to finger rub bilaterally  IX/X: Gag normal, palate symmetric  XI:  Shoulder shrug equal, head turn equal  XII:  Tongue midline without fasciculations      MOTOR: Tone and bulk: normal bilateral upper and lower Strength: normal "   Delt Bi Tri WE WF     R 5 5 5 5 5 5   L 5 5 5 5 5 5     IP ADD ABD Quad TA Gas HAM  R 5 5 5 5 5 5 5  L 5 5 5 5 5 5 5    SENSATION: Light touch and pinprick intact bilaterally  REFLEXES: normal, symmetric, nonbrisk.  Toes down, no clonus. No hoffmans.  GAIT: normal rise, base, steps, and arm swing.        Imaging:   Cervical MRI 12/4/17    Narrative     EXAM: Cervical spine MRI without contrast.    INDICATION: Cervical radiculopathy.  Neck pain and occipital neuralgia.  The patient complains of neck and right arm pain.    TECHNIQUE: Routine multiplanar, multisequence unenhanced cervical spine MRI was performed.    COMPARISON: Plain films of the cervical spine obtained concurrently      FINDINGS:     Vertebral column: There is straightening of the cervical spine with loss of normal lordosis.  As seen on concurrent plain films, there is trace anterolisthesis of C3 on C4 with 2 mm anterolisthesis of C4 on C5.  There is marked disc space narrowing at the C5-6 level with moderate to marked disc space narrowing at the C4-5 and C6-7 levels.  There is partial non-segmentation anteriorly at the C2-3 level.  The C2 and C3 as well as the C4 and C5 facet joints appear fused and this may represent developmental non-segmentation or degenerative ankylosis.  All of the discs are desiccated.  The odontoid process is intact.    Spinal canal, cord, epidural space: The craniovertebral junction is normal.  The spinal canal is omental and normal.  There is no significant spinal stenosis.  The cord is normal in caliber.  There is very subtle flattening of the ventral cord surface at the C4-5 and C5-6 levels where there is degenerative change.  The study is mildly motion but there is no definite cord edema or myelomalacia.    Findings by level:    C2-3: There is no spinal canal or foraminal stenosis.  There is mild left facet joint arthropathy.    C3-4: There is trace anterolisthesis.  There is left greater than right uncovertebral  spurring and facet joint arthropathy.  There is a mild disc osteophyte complex, slightly eccentric to the left with subtle annular fissure.  This narrows the ventral subarachnoid space.  There is no spinal stenosis or cord compression.  There is moderate marked left foraminal stenosis.    C4-5: There is moderate disc space narrowing with 2 mm anterolisthesis of C4 on C5.  There is facet joint arthropathy or effusion left greater than right.  There is also mild bilateral but left greater than right uncovertebral spurring.  There is unroofing of a mild disc bulge which narrows the ventral subarachnoid space.  There is no spinal stenosis.  There is mild to moderate left foraminal stenosis.    C5-6:There is marked disc space narrowing.  There is bilateral uncovertebral spurring.  There is a disc osteophyte complex which narrows the subarachnoid space.  This is slightly eccentric to the right There is subtle flattening of the ventral cord surface.  Cord signal is grossly normal.  There is mild spinal stenosis with at least moderate bilateral foraminal stenosis.    C6-7: There is moderate disc space narrowing.  There is mild uncovertebral spurring.  There is a shallow disc osteophyte complex, slightly eccentric to the right.  There is narrowing of the ventral subarachnoid space.  There is no spinal stenosis.  Cord signal is normal.  There is mild bilateral foraminal stenosis.  There is a small 3.5 mm left foraminal perineural cyst.    C7- T1: There is a Schmorl's node in inferior endplate of C7, chronic.  There are tiny bilateral foraminal perineural cysts.  There is minimal bulging of the annulus and mild facet joint arthropathy without spinal canal or foraminal stenosis.    Soft tissues/other: The prevertebral soft tissues are normal.  The airway is patent.   Impression          1. There is multilevel degenerative disc disease described in detail above.  There is no acute fracture.  There is degenerative listhesis at  several levels.  There is some degree of disc osteophyte complex, uncovertebral spurring and/or facet joint arthropathy contributing to some degree of foraminal stenosis at several levels.  There is no significant spinal canal stenosis.  There is very subtle flattening of the ventral cord surface at the C4-5 and C6-7 levels The pertinent findings are summarized below.    2. At the C3-4 level, there is no spinal stenosis.  There is moderate to marked left foraminal stenosis.    3. At the C4-5 level there is no spinal stenosis.  There is mild to moderate left foraminal stenosis.    4. At the C5-6 level, there is mild spinal stenosis with at least moderate bilateral foraminal stenosis.    5. At the C6-7 level, there is no spinal stenosis.  There is mild bilateral foraminal stenosis.    6. There is no spinal canal or foraminal stenosis at the C2-3 or C7-T1 levels.         Assessment:    Mrs. Richter is a 72 y.o. female with neck and head pain secondary to the following diagnoses:     1. Cervical radiculopathy    2. Hip pain, chronic, unspecified laterality    3. Spondylosis of cervical region without myelopathy or radiculopathy    4. Myofascial pain    5. DDD (degenerative disc disease), cervical    6. Osteoarthritis of cervical spine, unspecified spinal osteoarthritis complication status        Plan:  1.  I have stressed the importance of physical activity and exercise to improve overall health.  2. Will repeat C7-T1 [place] MELANY as previous provided some benefit and her greatest complaint today is right arm pain.   3. Consider repeat occipital blocks for head pain.  4. Can continue Norco 5mg q12hrs as needed for pain.  She was provided with 3 months of prescriptions with appropriate dates.  The patient is here today for a refill of current pain medications and they believe these provide effective pain control and improvements in quality of life.  The patient notes no serious side effects, and feels the benefits outweigh the  risks.  The patient was reminded of the pain contract that they signed previously as well as the risks and benefits of the medication including possible death.  The updated [place] Board [employer] prescription monitoring program was reviewed, and the patient has been found to be compliant with current treatment plan.  5. Continue Flexeril and Mobic.  Refill sent for 90 day supply. Labs reviewed today.  6. Recent cervical MRI and XRAYs reviewed with patient in detail today.  7. RTC in 3 months or sooner if needed.    Medication management by Dr. Grimaldo.    The above plan and management options were discussed at length with patient. Patient is in agreement with the above and verbalized understanding.     Janet Esquivel, ROXY  01/31/2018

## 2018-02-08 NOTE — PLAN OF CARE
Patient sitting in chair and states she is ready to go home. Patient denies pain nausea or any weakness..Patient's spouse chairside and states he is ready to  wilson pt home; he will be driving the patient  Home.

## 2018-02-08 NOTE — DISCHARGE SUMMARY
[facility]  Discharge Note  Short Stay    Admit Date: 2/8/2018    Discharge Date and Time: 2/8/2018    Attending Physician: Timothy Grimaldo MD     Discharge Provider: Timothy Grimaldo    Diagnoses:  Active Hospital Problems    Diagnosis  POA    *Cervical radiculitis [M54.12]  Yes      Resolved Hospital Problems    Diagnosis Date Resolved POA   No resolved problems to display.       Hospital Course: Cervical MELANY  Discharged Condition: Good    Final Diagnoses:   Active Hospital Problems    Diagnosis  POA    *Cervical radiculitis [M54.12]  Yes      Resolved Hospital Problems    Diagnosis Date Resolved POA   No resolved problems to display.       Disposition: Home or Self Care    Follow up/Patient Instructions:    Medications:  Reconciled Home Medications:   Current Discharge Medication List      CONTINUE these medications which have NOT CHANGED    Details   busPIRone (BUSPAR) 10 MG tablet Take 1 tablet (10 mg total) by mouth 2 (two) times daily.  Qty: 180 tablet, Refills: 0      butalbital-aspirin-caffeine -40 mg (FIORINAL) -40 mg Cap Take 1 capsule by mouth every 4 (four) hours as needed.      cyclobenzaprine (FLEXERIL) 10 MG tablet Take 1 tablet (10 mg total) by mouth 3 (three) times daily.  Qty: 270 tablet, Refills: 0      diclofenac sodium (VOLTAREN) 1 % Gel Apply 2 g topically 4 (four) times daily.  Qty: 1 Tube, Refills: 5    Associated Diagnoses: CMC arthritis      estradiol (ESTRACE) 0.5 MG tablet Take 1 tablet (0.5 mg total) by mouth once daily.  Qty: 90 tablet, Refills: 1    Comments: Needs to establish care with a new PCP before further refills  Associated Diagnoses: Encounter for monitoring primary estrogen replacement therapy      fish oil-omega-3 fatty acids 300-1,000 mg capsule Take 2 g by mouth once daily.      fluticasone (FLONASE) 50 mcg/actuation nasal spray 2 sprays by Each Nare route once daily.  Qty: 1 Bottle, Refills: prn    Associated Diagnoses: Seasonal allergic rhinitis due to  pollen, unspecified chronicity      hydrocodone-acetaminophen 5-325mg (NORCO) 5-325 mg per tablet Take 1 tablet by mouth every 12 (twelve) hours as needed for Pain.  Qty: 60 tablet, Refills: 0      loratadine (CLARITIN) 10 mg tablet Take 1 tablet (10 mg total) by mouth once daily.  Qty: 90 tablet, Refills: 3    Associated Diagnoses: Seasonal allergic rhinitis due to pollen, unspecified chronicity      meloxicam (MOBIC) 7.5 MG tablet Take 1 tablet (7.5 mg total) by mouth once daily.  Qty: 90 tablet, Refills: 0    Associated Diagnoses: Hip pain, chronic, unspecified laterality      multivit with min-folic acid 200 mcg Chew       omeprazole (PRILOSEC) 20 MG capsule Take 1 capsule (20 mg total) by mouth once daily.  Qty: 90 capsule, Refills: 3    Associated Diagnoses: Gastroesophageal reflux disease, esophagitis presence not specified      polyethylene glycol (GLYCOLAX) 17 gram/dose powder Take 17 g by mouth once daily.  Qty: 510 g, Refills: 0      rosuvastatin (CRESTOR) 5 MG tablet Take 1 tablet (5 mg total) by mouth every other day.  Qty: 45 tablet, Refills: 3    Associated Diagnoses: Hyperlipidemia, unspecified hyperlipidemia type             Discharge Procedure Orders  Call MD for:  temperature >100.4     Call MD for:  persistent nausea and vomiting or diarrhea     Call MD for:  severe uncontrolled pain     Call MD for:  redness, tenderness, or signs of infection (pain, swelling, redness, odor or green/yellow discharge around incision site)     Call MD for:  difficulty breathing or increased cough     Call MD for:  severe persistent headache          Follow up with MD in 2-3 weeks    Discharge Procedure Orders (must include Diet, Follow-up, Activity):    Discharge Procedure Orders (must include Diet, Follow-up, Activity)  Call MD for:  temperature >100.4     Call MD for:  persistent nausea and vomiting or diarrhea     Call MD for:  severe uncontrolled pain     Call MD for:  redness, tenderness, or signs of infection  (pain, swelling, redness, odor or green/yellow discharge around incision site)     Call MD for:  difficulty breathing or increased cough     Call MD for:  severe persistent headache

## 2018-02-09 VITALS
TEMPERATURE: 98 F | DIASTOLIC BLOOD PRESSURE: 65 MMHG | BODY MASS INDEX: 24.8 KG/M2 | SYSTOLIC BLOOD PRESSURE: 147 MMHG | HEART RATE: 72 BPM | HEIGHT: 59 IN | OXYGEN SATURATION: 95 % | WEIGHT: 123 LBS | RESPIRATION RATE: 18 BRPM

## 2018-02-14 DIAGNOSIS — K21.9 GASTROESOPHAGEAL REFLUX DISEASE, ESOPHAGITIS PRESENCE NOT SPECIFIED: ICD-10-CM

## 2018-02-14 RX ORDER — OMEPRAZOLE 20 MG/1
20 CAPSULE, DELAYED RELEASE ORAL DAILY
Qty: 90 CAPSULE | Refills: 3 | Status: SHIPPED | OUTPATIENT
Start: 2018-02-14 | End: 2018-03-27

## 2018-02-14 NOTE — TELEPHONE ENCOUNTER
----- Message from Sabrina Art sent at 2/14/2018  9:56 AM CST -----  Contact: enzo Richter - spouse  Patient requested refill on  Omeprazole 40mg, call into [facility] at  [phone]. Please call patient at  [phone] if you have any questions. Thank you.       LENNY [place] [address] [address] [place] [address] [address] [facility] [address]  Phone: [phone] Fax: [phone]

## 2018-02-16 ENCOUNTER — TELEPHONE (OUTPATIENT)
Dept: FAMILY MEDICINE | Facility: CLINIC | Age: 73
End: 2018-02-16

## 2018-02-16 RX ORDER — OMEPRAZOLE 40 MG/1
CAPSULE, DELAYED RELEASE ORAL
Qty: 30 CAPSULE | Refills: 11 | Status: SHIPPED | OUTPATIENT
Start: 2018-02-16 | End: 2018-07-16 | Stop reason: SDUPTHER

## 2018-02-16 NOTE — TELEPHONE ENCOUNTER
----- Message from Agnes Laughlin sent at 2/16/2018 11:31 AM CST -----  Contact: Aldo  Type:  RX Refill Request    Who Called:    Refill or New Rx:  refill  RX Name and Strength:  omeprazole (PRILOSEC) 40 MG capsule  How is the patient currently taking it? (ex. 1XDay):  1 capsule once daily  Is this a 30 day or 90 day RX:  90  Preferred Pharmacy with phone number:    [facility] [place], [place] - [address] [address]  Phone: [phone] Fax: [phone]  Local or Mail Order:  Local  Ordering Provider:  Dr Liseth Carias  Best Call Back Number:  [phone]  Additional Information:  Staets as per Dr Carias that Rx is to be 40 mg but went to pharmacy and the 20 mg was sent. Out of medication. Thanks!

## 2018-02-21 ENCOUNTER — OFFICE VISIT (OUTPATIENT)
Dept: PSYCHIATRY | Facility: CLINIC | Age: 73
End: 2018-02-21
Payer: MEDICARE

## 2018-02-21 VITALS
DIASTOLIC BLOOD PRESSURE: 57 MMHG | BODY MASS INDEX: 24.66 KG/M2 | HEIGHT: 59 IN | SYSTOLIC BLOOD PRESSURE: 144 MMHG | HEART RATE: 81 BPM | WEIGHT: 122.31 LBS

## 2018-02-21 DIAGNOSIS — I10 HYPERTENSION, UNSPECIFIED TYPE: ICD-10-CM

## 2018-02-21 DIAGNOSIS — F43.10 PTSD (POST-TRAUMATIC STRESS DISORDER): Primary | ICD-10-CM

## 2018-02-21 PROCEDURE — 99999 PR PBB SHADOW E&M-EST. PATIENT-LVL II: CPT | Mod: PBBFAC,,, | Performed by: PSYCHIATRY & NEUROLOGY

## 2018-02-21 PROCEDURE — 1159F MED LIST DOCD IN RCRD: CPT | Mod: S$GLB,,, | Performed by: PSYCHIATRY & NEUROLOGY

## 2018-02-21 PROCEDURE — 99499 UNLISTED E&M SERVICE: CPT | Mod: S$GLB,,, | Performed by: PSYCHIATRY & NEUROLOGY

## 2018-02-21 PROCEDURE — 99214 OFFICE O/P EST MOD 30 MIN: CPT | Mod: S$GLB,,, | Performed by: PSYCHIATRY & NEUROLOGY

## 2018-02-21 PROCEDURE — 3008F BODY MASS INDEX DOCD: CPT | Mod: S$GLB,,, | Performed by: PSYCHIATRY & NEUROLOGY

## 2018-02-21 PROCEDURE — 1126F AMNT PAIN NOTED NONE PRSNT: CPT | Mod: S$GLB,,, | Performed by: PSYCHIATRY & NEUROLOGY

## 2018-02-21 RX ORDER — BUSPIRONE HYDROCHLORIDE 10 MG/1
10 TABLET ORAL 2 TIMES DAILY
Qty: 180 TABLET | Refills: 0 | Status: SHIPPED | OUTPATIENT
Start: 2018-02-21 | End: 2018-05-21 | Stop reason: SDUPTHER

## 2018-02-21 RX ORDER — SERTRALINE HYDROCHLORIDE 100 MG/1
100 TABLET, FILM COATED ORAL DAILY
Qty: 90 TABLET | Refills: 0 | Status: SHIPPED | OUTPATIENT
Start: 2018-02-21 | End: 2018-05-21 | Stop reason: SDUPTHER
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
## 2018-03-27 ENCOUNTER — OFFICE VISIT (OUTPATIENT)
Dept: RHEUMATOLOGY | Facility: CLINIC | Age: 73
End: 2018-03-27
Payer: MEDICARE

## 2018-03-27 VITALS
DIASTOLIC BLOOD PRESSURE: 71 MMHG | WEIGHT: 120.13 LBS | BODY MASS INDEX: 24.22 KG/M2 | HEIGHT: 59 IN | HEART RATE: 78 BPM | SYSTOLIC BLOOD PRESSURE: 132 MMHG

## 2018-03-27 DIAGNOSIS — Z79.899 LONG-TERM USE OF PLAQUENIL: ICD-10-CM

## 2018-03-27 DIAGNOSIS — M19.049 CMC ARTHRITIS: Primary | ICD-10-CM

## 2018-03-27 DIAGNOSIS — M81.0 AGE-RELATED OSTEOPOROSIS WITHOUT CURRENT PATHOLOGICAL FRACTURE: ICD-10-CM

## 2018-03-27 PROCEDURE — 3075F SYST BP GE 130 - 139MM HG: CPT | Mod: CPTII,S$GLB,, | Performed by: INTERNAL MEDICINE

## 2018-03-27 PROCEDURE — 99999 PR PBB SHADOW E&M-EST. PATIENT-LVL III: CPT | Mod: PBBFAC,,, | Performed by: INTERNAL MEDICINE

## 2018-03-27 PROCEDURE — 3078F DIAST BP <80 MM HG: CPT | Mod: CPTII,S$GLB,, | Performed by: INTERNAL MEDICINE

## 2018-03-27 PROCEDURE — 99499 UNLISTED E&M SERVICE: CPT | Mod: S$GLB,,, | Performed by: INTERNAL MEDICINE

## 2018-03-27 PROCEDURE — 99213 OFFICE O/P EST LOW 20 MIN: CPT | Mod: S$GLB,,, | Performed by: INTERNAL MEDICINE

## 2018-03-27 RX ORDER — HYDROXYCHLOROQUINE SULFATE 200 MG/1
200 TABLET, FILM COATED ORAL DAILY
Qty: 30 TABLET | Refills: 5 | Status: SHIPPED | OUTPATIENT
Start: 2018-03-27 | End: 2018-08-14

## 2018-03-27 ASSESSMENT — ROUTINE ASSESSMENT OF PATIENT INDEX DATA (RAPID3)
PSYCHOLOGICAL DISTRESS SCORE: 6.6
PATIENT GLOBAL ASSESSMENT SCORE: 9.5
AM STIFFNESS SCORE: 1, YES
MDHAQ FUNCTION SCORE: .9
WHEN YOU AWAKENED IN THE MORNING OVER THE LAST WEEK, PLEASE INDICATE THE AMOUNT OF TIME IT TAKES UNTIL YOU ARE AS LIMBER AS YOU WILL BE FOR THE DAY: 10 MIN
FATIGUE SCORE: 8
PAIN SCORE: 9.5
TOTAL RAPID3 SCORE: 7.33

## 2018-03-27 NOTE — PROGRESS NOTES
"Subjective:       Patient ID: Rola Richter is a 72 y.o. female.    Chief Complaint:  Osteoporosis and Erosive OA  HPI 71y/o female is here for follow up for osteoporosis and Erosive OA.    Denies any new joint pain or swelling today.   For erosive osteoarthritis, she is on On hydroxychloroquine 200 mg once a day Cannot tolerate higher dose of Plaquenil.    For osteoporosis, she is on zoledronic acid yearly. Denies any recent falls or fractures      Review of Systems   Eyes: Negative for pain and redness.   Respiratory: Negative for cough and shortness of breath.    Cardiovascular: Negative for chest pain and leg swelling.   Gastrointestinal: Negative for abdominal distention and abdominal pain.   Genitourinary: Negative for genital sores and hematuria.   Neurological: Negative for tremors and seizures.   Psychiatric/Behavioral: Negative for agitation and behavioral problems.         Objective:     /71 (BP Location: Left arm, Patient Position: Sitting)   Pulse 78   Ht 4' 11" (1.499 m)   Wt 54.5 kg (120 lb 2.4 oz)   BMI 24.27 kg/m²      Physical Exam   Constitutional: She is oriented to person, place, and time and well-developed, well-nourished, and in no distress.   HENT:   Head: Normocephalic and atraumatic.   Eyes: Conjunctivae and EOM are normal. Pupils are equal, round, and reactive to light.   Neck: Neck supple. No thyromegaly present.   Cardiovascular: Normal rate and regular rhythm.    Pulmonary/Chest: Effort normal and breath sounds normal.   Abdominal: Soft. Bowel sounds are normal.   Neurological: She is alert and oriented to person, place, and time.   Skin: Skin is warm and dry.     Psychiatric: Affect normal.   Musculoskeletal: She exhibits no edema.   No joint swelling                     Lab Results   Component Value Date    WBC 7.60 10/08/2016    HGB 11.6 (L) 10/08/2016    HCT 34.4 (L) 10/08/2016    MCV 91 10/08/2016     10/08/2016     CMP  Sodium   Date Value Ref Range Status "   02/08/2018 136 136 - 145 mmol/L Final     Potassium   Date Value Ref Range Status   02/08/2018 4.2 3.5 - 5.1 mmol/L Final     Chloride   Date Value Ref Range Status   02/08/2018 103 95 - 110 mmol/L Final     CO2   Date Value Ref Range Status   02/08/2018 27 23 - 29 mmol/L Final     Glucose   Date Value Ref Range Status   02/08/2018 89 70 - 110 mg/dL Final     BUN, Bld   Date Value Ref Range Status   02/08/2018 10 8 - 23 mg/dL Final     Creatinine   Date Value Ref Range Status   02/08/2018 0.8 0.5 - 1.4 mg/dL Final     Calcium   Date Value Ref Range Status   02/08/2018 9.1 8.7 - 10.5 mg/dL Final     Total Protein   Date Value Ref Range Status   02/08/2018 7.6 6.0 - 8.4 g/dL Final     Albumin   Date Value Ref Range Status   02/08/2018 3.4 (L) 3.5 - 5.2 g/dL Final     Total Bilirubin   Date Value Ref Range Status   02/08/2018 0.3 0.1 - 1.0 mg/dL Final     Comment:     For infants and newborns, interpretation of results should be based  on gestational age, weight and in agreement with clinical  observations.  Premature Infant recommended reference ranges:  Up to 24 hours.............<8.0 mg/dL  Up to 48 hours............<12.0 mg/dL  3-5 days..................<15.0 mg/dL  6-29 days.................<15.0 mg/dL       Alkaline Phosphatase   Date Value Ref Range Status   02/08/2018 105 55 - 135 U/L Final     AST   Date Value Ref Range Status   02/08/2018 18 10 - 40 U/L Final     ALT   Date Value Ref Range Status   02/08/2018 10 10 - 44 U/L Final     Anion Gap   Date Value Ref Range Status   02/08/2018 6 (L) 8 - 16 mmol/L Final     eGFR if    Date Value Ref Range Status   02/08/2018 >60.0 >60 mL/min/1.73 m^2 Final     eGFR if non    Date Value Ref Range Status   02/08/2018 >60.0 >60 mL/min/1.73 m^2 Final     Comment:     Calculation used to obtain the estimated glomerular filtration  rate (eGFR) is the CKD-EPI equation.        Lab Results   Component Value Date    WBC 7.60 10/08/2016    HGB  11.6 (L) 10/08/2016    HCT 34.4 (L) 10/08/2016    MCV 91 10/08/2016     10/08/2016     CMP  Sodium   Date Value Ref Range Status   02/08/2018 136 136 - 145 mmol/L Final     Potassium   Date Value Ref Range Status   02/08/2018 4.2 3.5 - 5.1 mmol/L Final     Chloride   Date Value Ref Range Status   02/08/2018 103 95 - 110 mmol/L Final     CO2   Date Value Ref Range Status   02/08/2018 27 23 - 29 mmol/L Final     Glucose   Date Value Ref Range Status   02/08/2018 89 70 - 110 mg/dL Final     BUN, Bld   Date Value Ref Range Status   02/08/2018 10 8 - 23 mg/dL Final     Creatinine   Date Value Ref Range Status   02/08/2018 0.8 0.5 - 1.4 mg/dL Final     Calcium   Date Value Ref Range Status   02/08/2018 9.1 8.7 - 10.5 mg/dL Final     Total Protein   Date Value Ref Range Status   02/08/2018 7.6 6.0 - 8.4 g/dL Final     Albumin   Date Value Ref Range Status   02/08/2018 3.4 (L) 3.5 - 5.2 g/dL Final     Total Bilirubin   Date Value Ref Range Status   02/08/2018 0.3 0.1 - 1.0 mg/dL Final     Comment:     For infants and newborns, interpretation of results should be based  on gestational age, weight and in agreement with clinical  observations.  Premature Infant recommended reference ranges:  Up to 24 hours.............<8.0 mg/dL  Up to 48 hours............<12.0 mg/dL  3-5 days..................<15.0 mg/dL  6-29 days.................<15.0 mg/dL       Alkaline Phosphatase   Date Value Ref Range Status   02/08/2018 105 55 - 135 U/L Final     AST   Date Value Ref Range Status   02/08/2018 18 10 - 40 U/L Final     ALT   Date Value Ref Range Status   02/08/2018 10 10 - 44 U/L Final     Anion Gap   Date Value Ref Range Status   02/08/2018 6 (L) 8 - 16 mmol/L Final     eGFR if    Date Value Ref Range Status   02/08/2018 >60.0 >60 mL/min/1.73 m^2 Final     eGFR if non    Date Value Ref Range Status   02/08/2018 >60.0 >60 mL/min/1.73 m^2 Final     Comment:     Calculation used to obtain the  estimated glomerular filtration  rate (eGFR) is the CKD-EPI equation.        Lab Results   Component Value Date    SEDRATE 17 12/14/2015     Lab Results   Component Value Date    CRP 3.0 12/14/2015       The bone mineral densities (BMD) of the lumbar spine as well as the trabecular bone of the left femoral neck were calculated using the DXA method. Values were then compared to the diagnostic criteria established by the WHO (World Health Organization).     Hip:  The BMD of the trabecular bone of the femoral neck was measured at 0.639 grams per cm2, with a young adult T-score of -2.0 and an age-matched Z score of -0.2. Based on WHO criteria this is consistent with osteopenia at moderate increased risk for fracture. The FRAX 10 year probability of major osteoporotic fracture is 11 percent and the 10 year probability of hip fracture is 2.4 percent.    Lumbar:   The BMD of the lumbar region measures 0.710 grams per cm2, with a young adult T score of -3.1, and an age-matched Z score of -0.9. Based on WHO criteria this is consistent with osteoporosis at high risk for fracture.    Assessment:   Erosive osteoarthritis- on hydroxychloroquine 200 mg once daily  Osteoporosis-on Reclast   Long-term use of hydroxychloroquine-last eye exam in Oct 2017   GERD-stable   Plan:   Continue  HCQ to 200mg once  a day (cannot tolerate twice a day)  Continue Voltaren gel for CMC OA   On Reclast  RTC in 6 months

## 2018-04-11 ENCOUNTER — OFFICE VISIT (OUTPATIENT)
Dept: PAIN MEDICINE | Facility: CLINIC | Age: 73
End: 2018-04-11
Payer: MEDICARE

## 2018-04-11 VITALS
SYSTOLIC BLOOD PRESSURE: 135 MMHG | HEART RATE: 83 BPM | BODY MASS INDEX: 24.19 KG/M2 | HEIGHT: 59 IN | DIASTOLIC BLOOD PRESSURE: 72 MMHG | WEIGHT: 120 LBS

## 2018-04-11 DIAGNOSIS — M47.812 SPONDYLOSIS OF CERVICAL REGION WITHOUT MYELOPATHY OR RADICULOPATHY: ICD-10-CM

## 2018-04-11 DIAGNOSIS — M79.18 MYOFASCIAL PAIN: ICD-10-CM

## 2018-04-11 DIAGNOSIS — M77.8 TRICEPS TENDONITIS: ICD-10-CM

## 2018-04-11 DIAGNOSIS — M25.559 HIP PAIN, CHRONIC, UNSPECIFIED LATERALITY: ICD-10-CM

## 2018-04-11 DIAGNOSIS — G89.29 HIP PAIN, CHRONIC, UNSPECIFIED LATERALITY: ICD-10-CM

## 2018-04-11 DIAGNOSIS — M54.12 CERVICAL RADICULOPATHY: Primary | ICD-10-CM

## 2018-04-11 PROCEDURE — 3075F SYST BP GE 130 - 139MM HG: CPT | Mod: CPTII,S$GLB,, | Performed by: PHYSICIAN ASSISTANT

## 2018-04-11 PROCEDURE — 3078F DIAST BP <80 MM HG: CPT | Mod: CPTII,S$GLB,, | Performed by: PHYSICIAN ASSISTANT

## 2018-04-11 PROCEDURE — 99214 OFFICE O/P EST MOD 30 MIN: CPT | Mod: S$GLB,,, | Performed by: PHYSICIAN ASSISTANT

## 2018-04-11 PROCEDURE — 99999 PR PBB SHADOW E&M-EST. PATIENT-LVL IV: CPT | Mod: PBBFAC,,, | Performed by: PHYSICIAN ASSISTANT

## 2018-04-11 RX ORDER — HYDROCODONE BITARTRATE AND ACETAMINOPHEN 5; 325 MG/1; MG/1
1 TABLET ORAL EVERY 12 HOURS PRN
Qty: 60 TABLET | Refills: 0 | Status: SHIPPED | OUTPATIENT
Start: 2018-04-12 | End: 2018-04-11 | Stop reason: SDUPTHER

## 2018-04-11 RX ORDER — HYDROCODONE BITARTRATE AND ACETAMINOPHEN 5; 325 MG/1; MG/1
1 TABLET ORAL EVERY 12 HOURS PRN
Qty: 60 TABLET | Refills: 0 | Status: SHIPPED | OUTPATIENT
Start: 2018-05-12 | End: 2018-05-21 | Stop reason: SDUPTHER

## 2018-04-11 RX ORDER — HYDROCODONE BITARTRATE AND ACETAMINOPHEN 5; 325 MG/1; MG/1
1 TABLET ORAL EVERY 12 HOURS PRN
Qty: 60 TABLET | Refills: 0 | Status: SHIPPED | OUTPATIENT
Start: 2018-04-12 | End: 2018-05-12

## 2018-04-11 RX ORDER — MELOXICAM 7.5 MG/1
7.5 TABLET ORAL DAILY
Qty: 90 TABLET | Refills: 0 | Status: SHIPPED | OUTPATIENT
Start: 2018-04-11 | End: 2018-04-18

## 2018-04-11 RX ORDER — CYCLOBENZAPRINE HCL 10 MG
10 TABLET ORAL 3 TIMES DAILY
Qty: 270 TABLET | Refills: 0 | Status: SHIPPED | OUTPATIENT
Start: 2018-04-11 | End: 2018-07-09 | Stop reason: SDUPTHER

## 2018-04-11 RX ORDER — HYDROCODONE BITARTRATE AND ACETAMINOPHEN 5; 325 MG/1; MG/1
1 TABLET ORAL EVERY 12 HOURS PRN
Qty: 60 TABLET | Refills: 0 | Status: SHIPPED | OUTPATIENT
Start: 2018-06-11 | End: 2018-07-09 | Stop reason: SDUPTHER

## 2018-04-11 NOTE — PROGRESS NOTES
Referring Physician: No ref. provider found    PCP: Liseth Carias MD      CC: neck and left occipital pain    Interval history: Ms. Richter is a 72 y.o. female with neck pain and occipital neuralgia.  Her pain radiates into both arms, worse on the right.  Her right arm pain travels to her hand, her left stops around her shoulder.  She has numbness to her right hand and first through fourth digits. She is s/p cervical MELANY that only provided benefit for a short time.  She also reports left sided occipital pain which has responded well to occipital nerve blocks in the past.  Her biggest complaint today is right arm pain from shoulder to elbow.  She continues to take Norco, Mobic and Flexeril which provide benefit.  No bowel bladder changes.   Pain today is rated 10/10.    Prior HPI:   Patient is 70-year-old female with past history history of cervical DDD, cervical spondylosis and chronic headaches.  She recently moved here from [place], [place].  She is treated in the past by neurology.  She states having constant burning pain over the left side of her posterior scalp.  Pain radiates to her neck as well as a frontal.  She also has left-sided neck pain as well.  She denies any radicular arm pain.  No numbness or weakness.  She states having cervical epidural steroid injection at past with minimal benefit.  She also has had decided of cervical nerve blocks in the past with moderate benefit.  Most recent injection was performed 2 months ago.  She desires repeat injection.  She currently takes Norco 10 mg every 12 hours as needed with moderate benefit.  She also takes Zanaflex 4 mg every 8 hours with mild benefits.  She rates her pain 7/10 today.    Pain intervention history: s/p left occipital nerve blocks on 1/2016 with 50% relief of her headaches    ROS:  CONSTITUTIONAL: No fevers, chills, night sweats, wt. loss, appetite changes  SKIN: no rashes or itching  ENT: No headaches, head trauma, vision changes, or eye  pain  LYMPH NODES: None noticed   CV: No chest pain, palpitations.   RESP: No shortness of breath, dyspnea on exertion, cough, wheezing, or hemoptysis  GI: No nausea, emesis, diarrhea, constipation, melena, hematochezia, pain.    : No dysuria, hematuria, urgency, or frequency   HEME: No easy bruising, bleeding problems  PSYCHIATRIC: No depression, anxiety, psychosis, hallucinations.  NEURO: No seizures, memory loss, dizziness or difficulty sleeping  MSK: + History of present illness      Past Medical History:   Diagnosis Date    Anxiety     Arthritis     Cataract     DDD (degenerative disc disease), lumbar     Depression     GERD (gastroesophageal reflux disease)     Hyperlipidemia     Hypertension     pt ststes she does not take meds anymore she just watches her weight//    Immunosuppressed status 2016    Neuromuscular disorder     Occipital neuralgia 3/24/2017    Osteoporosis     Substance abuse      Past Surgical History:   Procedure Laterality Date    APPENDECTOMY       SECTION      x 2    CHOLECYSTECTOMY      HYSTERECTOMY      Laser Periphery Iridotomy Bilateral     OD 16 and OS touch up 2016    vocal cord tumor removal       Family History   Problem Relation Age of Onset    Osteoarthritis Mother     Alcohol abuse Mother     Rheum arthritis Mother     Osteoarthritis Sister     Diabetes Brother     No Known Problems Son     No Known Problems Sister     No Known Problems Sister     No Known Problems Brother     Arthritis Son     No Known Problems Son     Stroke Maternal Grandmother 99    Rheum arthritis Maternal Grandmother     Retinal detachment Neg Hx     Macular degeneration Neg Hx     Glaucoma Neg Hx     Amblyopia Neg Hx     Blindness Neg Hx     Cancer Neg Hx     Cataracts Neg Hx     Hypertension Neg Hx     Strabismus Neg Hx     Thyroid disease Neg Hx     Lupus Neg Hx     Kidney disease Neg Hx     Inflammatory bowel disease Neg Hx      "Psoriasis Neg Hx      Social History     Social History    Marital status:      Spouse name: N/A    Number of children: N/A    Years of education: N/A     Social History Main Topics    Smoking status: Never Smoker    Smokeless tobacco: Never Used    Alcohol use No    Drug use: No    Sexual activity: Yes     Partners: Male     Other Topics Concern    None     Social History Narrative    None         Medications/Allergies: See med card    Vitals:    04/11/18 0938   BP: 135/72   Pulse: 83   Weight: 54.4 kg (120 lb)   Height: 4' 11" (1.499 m)   PainSc: 10-Worst pain ever   PainLoc: Neck         Physical exam:    GENERAL: A and O x3, the patient appears well groomed and is in no acute distress.  Skin: No rashes or obvious lesions  HEENT: normocephalic, atraumatic  CARDIOVASCULAR:  RRR  LUNGS: nonlabored breathing  ABDOMEN: soft, nontender   UPPER EXTREMITIES: Normal alignment, normal range of motion, no atrophy, no skin changes,  hair growth and nail growth normal and equal bilaterally. No swelling, no tenderness.  +Phalens on left side. +TTP tricep tendon  LOWER EXTREMITIES:  Normal alignment, normal range of motion, no atrophy, no skin changes,  hair growth and nail growth normal and equal bilaterally. No swelling, no tenderness.  CERVICAL SPINE:   Cervical spine: ROM is full in flexion, extension and lateral rotation.  Painful flexion > extension.  Positive facet loading bilaterally.  Spurling is positive at right side.  Myofascial exam:  Tenderness to palpation across cervical paraspinals and trapezius muscles bilaterally.      MENTAL STATUS: normal orientation, speech, language, and fund of knowledge for social situation.  Emotional state appropriate.    CRANIAL NERVES:  II:  PERRL bilaterally,   III,IV,VI: EOMI.    V:  Facial sensation equal bilaterally  VII:  Facial motor function normal.  VIII:  Hearing equal to finger rub bilaterally  IX/X: Gag normal, palate symmetric  XI:  Shoulder shrug " equal, head turn equal  XII:  Tongue midline without fasciculations      MOTOR: Tone and bulk: normal bilateral upper and lower Strength: normal   Delt Bi Tri WE WF     R 5 5 5 5 5 5   L 5 5 5 5 5 5     IP ADD ABD Quad TA Gas HAM  R 5 5 5 5 5 5 5  L 5 5 5 5 5 5 5    SENSATION: Light touch and pinprick intact bilaterally  REFLEXES: normal, symmetric, nonbrisk.  Toes down, no clonus. No hoffmans.  GAIT: normal rise, base, steps, and arm swing.        Imaging:   Cervical MRI 12/4/17    Narrative     EXAM: Cervical spine MRI without contrast.    INDICATION: Cervical radiculopathy.  Neck pain and occipital neuralgia.  The patient complains of neck and right arm pain.    TECHNIQUE: Routine multiplanar, multisequence unenhanced cervical spine MRI was performed.    COMPARISON: Plain films of the cervical spine obtained concurrently      FINDINGS:     Vertebral column: There is straightening of the cervical spine with loss of normal lordosis.  As seen on concurrent plain films, there is trace anterolisthesis of C3 on C4 with 2 mm anterolisthesis of C4 on C5.  There is marked disc space narrowing at the C5-6 level with moderate to marked disc space narrowing at the C4-5 and C6-7 levels.  There is partial non-segmentation anteriorly at the C2-3 level.  The C2 and C3 as well as the C4 and C5 facet joints appear fused and this may represent developmental non-segmentation or degenerative ankylosis.  All of the discs are desiccated.  The odontoid process is intact.    Spinal canal, cord, epidural space: The craniovertebral junction is normal.  The spinal canal is omental and normal.  There is no significant spinal stenosis.  The cord is normal in caliber.  There is very subtle flattening of the ventral cord surface at the C4-5 and C5-6 levels where there is degenerative change.  The study is mildly motion but there is no definite cord edema or myelomalacia.    Findings by level:    C2-3: There is no spinal canal or foraminal  stenosis.  There is mild left facet joint arthropathy.    C3-4: There is trace anterolisthesis.  There is left greater than right uncovertebral spurring and facet joint arthropathy.  There is a mild disc osteophyte complex, slightly eccentric to the left with subtle annular fissure.  This narrows the ventral subarachnoid space.  There is no spinal stenosis or cord compression.  There is moderate marked left foraminal stenosis.    C4-5: There is moderate disc space narrowing with 2 mm anterolisthesis of C4 on C5.  There is facet joint arthropathy or effusion left greater than right.  There is also mild bilateral but left greater than right uncovertebral spurring.  There is unroofing of a mild disc bulge which narrows the ventral subarachnoid space.  There is no spinal stenosis.  There is mild to moderate left foraminal stenosis.    C5-6:There is marked disc space narrowing.  There is bilateral uncovertebral spurring.  There is a disc osteophyte complex which narrows the subarachnoid space.  This is slightly eccentric to the right There is subtle flattening of the ventral cord surface.  Cord signal is grossly normal.  There is mild spinal stenosis with at least moderate bilateral foraminal stenosis.    C6-7: There is moderate disc space narrowing.  There is mild uncovertebral spurring.  There is a shallow disc osteophyte complex, slightly eccentric to the right.  There is narrowing of the ventral subarachnoid space.  There is no spinal stenosis.  Cord signal is normal.  There is mild bilateral foraminal stenosis.  There is a small 3.5 mm left foraminal perineural cyst.    C7- T1: There is a Schmorl's node in inferior endplate of C7, chronic.  There are tiny bilateral foraminal perineural cysts.  There is minimal bulging of the annulus and mild facet joint arthropathy without spinal canal or foraminal stenosis.    Soft tissues/other: The prevertebral soft tissues are normal.  The airway is patent.   Impression           1. There is multilevel degenerative disc disease described in detail above.  There is no acute fracture.  There is degenerative listhesis at several levels.  There is some degree of disc osteophyte complex, uncovertebral spurring and/or facet joint arthropathy contributing to some degree of foraminal stenosis at several levels.  There is no significant spinal canal stenosis.  There is very subtle flattening of the ventral cord surface at the C4-5 and C6-7 levels The pertinent findings are summarized below.    2. At the C3-4 level, there is no spinal stenosis.  There is moderate to marked left foraminal stenosis.    3. At the C4-5 level there is no spinal stenosis.  There is mild to moderate left foraminal stenosis.    4. At the C5-6 level, there is mild spinal stenosis with at least moderate bilateral foraminal stenosis.    5. At the C6-7 level, there is no spinal stenosis.  There is mild bilateral foraminal stenosis.    6. There is no spinal canal or foraminal stenosis at the C2-3 or C7-T1 levels.         Assessment:  Mrs. Richter is a 72 y.o. female with neck and head pain    1. Cervical radiculopathy    2. Hip pain, chronic, unspecified laterality    3. Spondylosis of cervical region without myelopathy or radiculopathy    4. Myofascial pain    5. Triceps tendonitis      Plan:  1.  I have stressed the importance of physical activity and exercise to improve overall health.  2.  Consider repeat C7-T1 [place] MELANY in the future  3. Consider repeat occipital blocks for head pain.  4. Can continue Norco 5mg q12hrs as needed for pain. 3 prescriptions.  reviewed  5. Continue Flexeril and Mobic.  Refill sent for 90 day supply. Labs reviewed today.  6.May benefit from PT for Right UE  7. RTC in 3 months or sooner if needed.    Medication management by Dr. Grimaldo.      Kathy Jim PA-C  04/11/2018

## 2018-04-12 DIAGNOSIS — Z12.11 COLON CANCER SCREENING: Primary | ICD-10-CM

## 2018-04-17 ENCOUNTER — DOCUMENTATION ONLY (OUTPATIENT)
Dept: FAMILY MEDICINE | Facility: CLINIC | Age: 73
End: 2018-04-17

## 2018-04-17 NOTE — PROGRESS NOTES
Pre-Visit Chart Review  For Appointment Scheduled on 4-18-18    Health Maintenance Due   Topic Date Due    Colonoscopy  05/31/1995

## 2018-04-18 ENCOUNTER — OFFICE VISIT (OUTPATIENT)
Dept: FAMILY MEDICINE | Facility: CLINIC | Age: 73
End: 2018-04-18
Payer: MEDICARE

## 2018-04-18 ENCOUNTER — LAB VISIT (OUTPATIENT)
Dept: LAB | Facility: HOSPITAL | Age: 73
End: 2018-04-18
Attending: FAMILY MEDICINE
Payer: MEDICARE

## 2018-04-18 VITALS
WEIGHT: 120.56 LBS | BODY MASS INDEX: 24.31 KG/M2 | SYSTOLIC BLOOD PRESSURE: 135 MMHG | DIASTOLIC BLOOD PRESSURE: 70 MMHG | HEART RATE: 83 BPM | TEMPERATURE: 98 F | HEIGHT: 59 IN

## 2018-04-18 DIAGNOSIS — R53.82 CHRONIC FATIGUE: ICD-10-CM

## 2018-04-18 DIAGNOSIS — M62.838 MUSCLE SPASM: ICD-10-CM

## 2018-04-18 DIAGNOSIS — G89.29 OTHER CHRONIC PAIN: ICD-10-CM

## 2018-04-18 DIAGNOSIS — M62.838 MUSCLE SPASM: Primary | ICD-10-CM

## 2018-04-18 LAB
ALBUMIN SERPL BCP-MCNC: 4 G/DL
ALP SERPL-CCNC: 84 U/L
ALT SERPL W/O P-5'-P-CCNC: 14 U/L
ANION GAP SERPL CALC-SCNC: 9 MMOL/L
AST SERPL-CCNC: 23 U/L
BASOPHILS # BLD AUTO: 0.05 K/UL
BASOPHILS NFR BLD: 0.6 %
BILIRUB SERPL-MCNC: 0.3 MG/DL
BUN SERPL-MCNC: 13 MG/DL
CALCIUM SERPL-MCNC: 9.3 MG/DL
CHLORIDE SERPL-SCNC: 104 MMOL/L
CK SERPL-CCNC: 121 U/L
CO2 SERPL-SCNC: 24 MMOL/L
CREAT SERPL-MCNC: 0.9 MG/DL
DIFFERENTIAL METHOD: ABNORMAL
EOSINOPHIL # BLD AUTO: 0.4 K/UL
EOSINOPHIL NFR BLD: 5 %
ERYTHROCYTE [DISTWIDTH] IN BLOOD BY AUTOMATED COUNT: 14.8 %
ERYTHROCYTE [SEDIMENTATION RATE] IN BLOOD BY WESTERGREN METHOD: 22 MM/HR
EST. GFR  (AFRICAN AMERICAN): >60 ML/MIN/1.73 M^2
EST. GFR  (NON AFRICAN AMERICAN): >60 ML/MIN/1.73 M^2
GLUCOSE SERPL-MCNC: 96 MG/DL
HCT VFR BLD AUTO: 36 %
HGB BLD-MCNC: 11.6 G/DL
IMM GRANULOCYTES # BLD AUTO: 0.04 K/UL
IMM GRANULOCYTES NFR BLD AUTO: 0.5 %
LYMPHOCYTES # BLD AUTO: 3 K/UL
LYMPHOCYTES NFR BLD: 35 %
MCH RBC QN AUTO: 29.3 PG
MCHC RBC AUTO-ENTMCNC: 32.2 G/DL
MCV RBC AUTO: 91 FL
MONOCYTES # BLD AUTO: 0.9 K/UL
MONOCYTES NFR BLD: 10.5 %
NEUTROPHILS # BLD AUTO: 4.2 K/UL
NEUTROPHILS NFR BLD: 48.4 %
NRBC BLD-RTO: 0 /100 WBC
PLATELET # BLD AUTO: 319 K/UL
PMV BLD AUTO: 10.1 FL
POTASSIUM SERPL-SCNC: 4.2 MMOL/L
PROT SERPL-MCNC: 7.5 G/DL
RBC # BLD AUTO: 3.96 M/UL
SODIUM SERPL-SCNC: 137 MMOL/L
WBC # BLD AUTO: 8.63 K/UL

## 2018-04-18 PROCEDURE — 36415 COLL VENOUS BLD VENIPUNCTURE: CPT | Mod: PO

## 2018-04-18 PROCEDURE — 99214 OFFICE O/P EST MOD 30 MIN: CPT | Mod: S$GLB,,, | Performed by: FAMILY MEDICINE

## 2018-04-18 PROCEDURE — 80053 COMPREHEN METABOLIC PANEL: CPT

## 2018-04-18 PROCEDURE — 82550 ASSAY OF CK (CPK): CPT

## 2018-04-18 PROCEDURE — 85025 COMPLETE CBC W/AUTO DIFF WBC: CPT

## 2018-04-18 PROCEDURE — 3078F DIAST BP <80 MM HG: CPT | Mod: CPTII,S$GLB,, | Performed by: FAMILY MEDICINE

## 2018-04-18 PROCEDURE — 85651 RBC SED RATE NONAUTOMATED: CPT | Mod: PO

## 2018-04-18 PROCEDURE — 3075F SYST BP GE 130 - 139MM HG: CPT | Mod: CPTII,S$GLB,, | Performed by: FAMILY MEDICINE

## 2018-04-18 PROCEDURE — 99999 PR PBB SHADOW E&M-EST. PATIENT-LVL III: CPT | Mod: PBBFAC,,, | Performed by: FAMILY MEDICINE

## 2018-04-18 RX ORDER — FLUOROMETHOLONE 1 MG/ML
1 SUSPENSION/ DROPS OPHTHALMIC 4 TIMES DAILY
COMMUNITY
End: 2021-06-03 | Stop reason: CLARIF

## 2018-04-18 RX ORDER — PREGABALIN 75 MG/1
75 CAPSULE ORAL NIGHTLY
Qty: 30 CAPSULE | Refills: 6 | Status: SHIPPED | OUTPATIENT
Start: 2018-04-18 | End: 2018-12-07 | Stop reason: SDUPTHER

## 2018-04-28 NOTE — PROGRESS NOTES
Subjective:       Patient ID: Rola Richter is a 72 y.o. female.    Chief Complaint: Follow-up    Patient Active Problem List   Diagnosis    Hypertension    GERD (gastroesophageal reflux disease)    Hyperlipidemia    Osteoporosis    Dependency on pain medication    PTSD (post-traumatic stress disorder)    Immunosuppressed status    CMC arthritis    Dry eye syndrome    DDD (degenerative disc disease), cervical    Occipital neuralgia    Cervical radiculitis    Long-term use of Plaquenil   Rheum c/o muscle pain janett right upper arm. Rheum is ordering once yearly shot? Forteo?   HPI  Review of Systems   Constitutional: Negative for fatigue and unexpected weight change.   Respiratory: Negative for chest tightness and shortness of breath.    Cardiovascular: Negative for chest pain, palpitations and leg swelling.   Gastrointestinal: Negative for abdominal pain.   Musculoskeletal: Negative for arthralgias.   Neurological: Negative for dizziness, syncope, light-headedness and headaches.       Objective:      Physical Exam   Constitutional: She is oriented to person, place, and time. She appears well-developed and well-nourished.   Cardiovascular: Normal rate, regular rhythm and normal heart sounds.    Pulmonary/Chest: Effort normal and breath sounds normal.   Musculoskeletal: She exhibits no edema.   Neurological: She is alert and oriented to person, place, and time.   Skin: Skin is warm and dry.   Psychiatric: She has a normal mood and affect.   Nursing note and vitals reviewed.      Assessment:       1. Muscle spasm    2. Chronic fatigue     3. Other chronic pain        Plan:       1. Muscle spasm  Work up  - CBC auto differential; Future  - Comprehensive metabolic panel; Future  - CK; Future  - Sedimentation rate, manual; Future  Hold crestor  2. Chronic fatigue   Screen and treat as indicated:    - CBC auto differential; Future    3. Other chronic pain  Add:  - pregabalin (LYRICA) 75 MG capsule; Take 1  capsule (75 mg total) by mouth every evening.  Dispense: 30 capsule; Refill: 6    Reeval with me 4 months and 1 month ANIL Siddiqui to f/u labs/pain control

## 2018-05-15 ENCOUNTER — PATIENT MESSAGE (OUTPATIENT)
Dept: ADMINISTRATIVE | Facility: OTHER | Age: 73
End: 2018-05-15

## 2018-05-15 ENCOUNTER — DOCUMENTATION ONLY (OUTPATIENT)
Dept: FAMILY MEDICINE | Facility: CLINIC | Age: 73
End: 2018-05-15

## 2018-05-15 RX ORDER — MELOXICAM 7.5 MG/1
7.5 TABLET ORAL DAILY
Refills: 0 | COMMUNITY
Start: 2018-04-11 | End: 2018-07-09 | Stop reason: SDUPTHER

## 2018-05-15 NOTE — PROGRESS NOTES
Pre-Visit Chart Review  For Appointment Scheduled on 5/17/2018    Health Maintenance Due   Topic Date Due    Colonoscopy  05/31/1995

## 2018-05-18 ENCOUNTER — PATIENT OUTREACH (OUTPATIENT)
Dept: OTHER | Facility: OTHER | Age: 73
End: 2018-05-18

## 2018-05-18 NOTE — PROGRESS NOTES
Mrs. Rola Richter is a 72 y.o. female who is newly enrolled in the [facility]pert[facility].     The following information was reviewed/updated:  Preferred pharmacy   [facility] [facility] [place], [place] - [address] [place] [address] [facility] [address]  Phone: [phone] Fax: [phone]    [facility] [facility] [place], [place] - [address] [place] [address] [facility] [address]  Phone: [phone] Fax: [phone]    Patient prefers a 90 days supply    Review of patient's allergies indicates:   Allergen Reactions    Aspirin Nausea And Vomiting    Penicillins Itching    Lipitor [atorvastatin]      Achy      Sulfa (sulfonamide antibiotics) Itching     Current Outpatient Prescriptions on File Prior to Visit   Medication Sig Dispense Refill    busPIRone (BUSPAR) 10 MG tablet Take 1 tablet (10 mg total) by mouth 2 (two) times daily. 180 tablet 0    butalbital-aspirin-caffeine -40 mg (FIORINAL) -40 mg Cap Take 1 capsule by mouth every 4 (four) hours as needed.      cyclobenzaprine (FLEXERIL) 10 MG tablet Take 1 tablet (10 mg total) by mouth 3 (three) times daily. 270 tablet 0    diclofenac sodium (VOLTAREN) 1 % Gel Apply 2 g topically 4 (four) times daily. 1 Tube 5    fish oil-omega-3 fatty acids 300-1,000 mg capsule Take 2 g by mouth once daily.      fluorometholone 0.1% (FML) 0.1 % DrpS 1 drop 4 (four) times daily.      fluticasone (FLONASE) 50 mcg/actuation nasal spray 2 sprays by Each Nare route once daily. 1 Bottle prn    hydrocodone-acetaminophen 5-325mg (NORCO) 5-325 mg per tablet Take 1 tablet by mouth every 12 (twelve) hours as needed for Pain. 60 tablet 0    [START ON 6/11/2018] hydrocodone-acetaminophen 5-325mg (NORCO) 5-325 mg per tablet Take 1 tablet by mouth every 12 (twelve) hours as needed for Pain. 60 tablet 0    hydroxychloroquine (PLAQUENIL) 200 mg tablet Take 1 tablet (200 mg total) by mouth once  daily. 30 tablet 5    loratadine (CLARITIN) 10 mg tablet Take 1 tablet (10 mg total) by mouth once daily. 90 tablet 3    meloxicam (MOBIC) 7.5 MG tablet Take 7.5 mg by mouth once daily.  0    multivit with min-folic acid 200 mcg Chew       omeprazole (PRILOSEC) 40 MG capsule take 1 capsule by mouth once daily 30 capsule 11    polyethylene glycol (GLYCOLAX) 17 gram/dose powder Take 17 g by mouth once daily. 510 g 0    pregabalin (LYRICA) 75 MG capsule Take 1 capsule (75 mg total) by mouth every evening. 30 capsule 6    rosuvastatin (CRESTOR) 5 MG tablet Take 1 tablet (5 mg total) by mouth every other day. 45 tablet 3    sertraline (ZOLOFT) 100 MG tablet Take 1 tablet (100 mg total) by mouth once daily. 90 tablet 0     No current facility-administered medications on file prior to visit.        Responses to the depression screening suggest patient is having depressive symptoms. Patient is followed for this and is not interested in referral.     Liseth Carias indicated she would like to see patient / have patient  referred to a specialist for further evaluation.     Reviewed non-pharmacologic therapies and impact on BP:    1. Low-sodium diet- 11 mmHg reduction 2-4 weeks. I have reviewed D.A.S.H diet sodium restrictions (<2000mg/day) Mostly cook at home. Does use some salt.  2. Exercise- 7 mmHg reduction 4-12 weeks. I have recommended patient engage in exercise as tolerated at least 30 minutes 5x per week to improve cardiovascular health. Hip pain. Walks dog 25-30 minutes daily.   3. Alcohol intake- 3 mmHg reduction 4-12 weeks. I have discussed with patient the maximum recommended number of 1 drink(s) per day for women. Denies EtOH or tobacco.    Explained that we expect patient to obtain several blood pressures per week at random times of day.   Our goal is to get  BP to consistently below 130/80mmHg and make the process convenient so patient can avoid extra trips to the office. Getting your blood pressure below  130/80mmHg (definition of control) will reduce your risk for heart attack, kidney failure, stroke and death (as well as kidney failure, eye disease, & dementia).     Patient is not meeting the goal already.   When asked what the patient thinks is causing BP to be elevated, she states: does not think she has high blood pressure    Instructed patient not to allow anyone else to use phone and BP cuff.   I'm not available for emergencies. Patient will call [facility]call ([phone] or [phone]) or 911 if needed.     Discussed appropriate BP measuring technique:  Before taking your blood pressure  Find a quiet place. You will need to listen for your heartbeat.  Roll up the sleeve on your left arm or remove any tight-sleeved clothing, if needed. (It's best to take your blood pressure from your left arm if you are right-handed.You can use the other arm if you have been told by your health care provider to do so.)  Rest in a chair next to a table for 5 to 10 minutes. (Your left arm should rest comfortably at heart level.)  Sit up straight with your back against the chair, legs uncrossed and on the ground.  Rest your forearm on the table with the palm of your hand facing up.  You should not talk, read the newspaper, or watch television during this process.      Patient and I agreed that she will continue to monitor blood pressure and sodium intake, and continue to remain adherent to medications.     I will plan to follow-up with the patient in 2-3 weeks.   Emailed patient link to [facility] HTN webpage and my contact information in case she has any questions.       Last 5 Patient Entered Readings                                      Current 30 Day Average: 156/74     Recent Readings 5/15/2018    SBP (mmHg) 156    DBP (mmHg) 74    Pulse 72        Patient's  manages her medications. He states they are waiting to obtain more BP measurements until he has his cuff.

## 2018-05-21 ENCOUNTER — OFFICE VISIT (OUTPATIENT)
Dept: PSYCHIATRY | Facility: CLINIC | Age: 73
End: 2018-05-21
Payer: MEDICARE

## 2018-05-21 VITALS
HEIGHT: 59 IN | BODY MASS INDEX: 25.08 KG/M2 | DIASTOLIC BLOOD PRESSURE: 67 MMHG | SYSTOLIC BLOOD PRESSURE: 128 MMHG | HEART RATE: 84 BPM | WEIGHT: 124.38 LBS

## 2018-05-21 DIAGNOSIS — F43.10 PTSD (POST-TRAUMATIC STRESS DISORDER): ICD-10-CM

## 2018-05-21 DIAGNOSIS — I10 HYPERTENSION, UNSPECIFIED TYPE: Primary | ICD-10-CM

## 2018-05-21 PROCEDURE — 3074F SYST BP LT 130 MM HG: CPT | Mod: CPTII,S$GLB,, | Performed by: PSYCHIATRY & NEUROLOGY

## 2018-05-21 PROCEDURE — 99499 UNLISTED E&M SERVICE: CPT | Mod: S$GLB,,, | Performed by: PSYCHIATRY & NEUROLOGY

## 2018-05-21 PROCEDURE — 99214 OFFICE O/P EST MOD 30 MIN: CPT | Mod: S$GLB,,, | Performed by: PSYCHIATRY & NEUROLOGY

## 2018-05-21 PROCEDURE — 99999 PR PBB SHADOW E&M-EST. PATIENT-LVL III: CPT | Mod: PBBFAC,,, | Performed by: PSYCHIATRY & NEUROLOGY

## 2018-05-21 PROCEDURE — 3078F DIAST BP <80 MM HG: CPT | Mod: CPTII,S$GLB,, | Performed by: PSYCHIATRY & NEUROLOGY

## 2018-05-21 RX ORDER — BUSPIRONE HYDROCHLORIDE 10 MG/1
10 TABLET ORAL 2 TIMES DAILY
Qty: 180 TABLET | Refills: 0 | Status: SHIPPED | OUTPATIENT
Start: 2018-05-21 | End: 2018-07-11 | Stop reason: SDUPTHER

## 2018-05-21 RX ORDER — SERTRALINE HYDROCHLORIDE 100 MG/1
100 TABLET, FILM COATED ORAL DAILY
Qty: 90 TABLET | Refills: 0 | Status: SHIPPED | OUTPATIENT
Start: 2018-05-21 | End: 2018-07-11 | Stop reason: SDUPTHER
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
## 2018-05-28 ENCOUNTER — PATIENT OUTREACH (OUTPATIENT)
Dept: OTHER | Facility: OTHER | Age: 73
End: 2018-05-28

## 2018-05-28 NOTE — PROGRESS NOTES
"Last 5 Patient Entered Readings                                      Current 30 Day Average: 145/75     Recent Readings 5/27/2018 5/26/2018 5/25/2018 5/22/2018 5/21/2018    SBP (mmHg) 156 125 140 147 147    DBP (mmHg) 77 72 82 73 82    Pulse 97 73 91 76 88        Digital Medicine: Health  Introduction    Introduced Mrs. Rola Richter to Digital Medicine. Discussed health  role and recommended lifestyle modifications.    Lifestyle Assessment:  Current Dietary Habits(i.e. low sodium, food labels, dining out): Recommended low sodium diet (<2,000mg/day).  Patient's  reports that she mostly cooks at home and will only eat out on special occasions.  Patient's  reports that she will eat breakfast in the morning; she will usually eat waffles, grits, pancakes, or biscuits and gravy.  Patient's  states that she likes papayas and avocados.  Patient's  states that she usually cooks chicken or hamburger. Encouraged to incorporate more vegetables on their plates.  Patient's  reports that they are working on that.    Exercise: Recommended at least 30 minutes of exercise for 5 days a week.  Patient's  reports that she tries to walk about 7-8 blocks a day.  Patient's  states that they have a dog and she walks the dog around the block.  Patient's  reports that she has "aches and pains."    Alcohol/Tobacco: Patient's  reports no consumption of alcohol or tobacco use.    Medication Adherence: has been compliant with the medicaiton regimen      Reviewed AHA/AACE recommendations:  Limit sodium intake to <2000mg/day  Recommended CHO intake, 45-65% of daily caloric intake  Perform 150 minutes of physical activity per week    Reviewed the importance of self-monitoring, medication adherence, and that the health  can be used as a resource for lifestyle modifications to help reduce or maintain a healthy lifestyle.  Reviewed that the Digital Medicine team is not " available for emergencies and instructed the patient to call 911 or [facility] On [facility] ([phone] or [phone]) if one arises.    Patient's , Aldo, states that he is going to contact his [facility] physician to be enrolled in the program.  Aldo states that he has been using the patient's cuff, but states he signs into his own account.

## 2018-05-28 NOTE — LETTER
Xiomara Rodriguez, PharmD  [address] [place] [address]     Dear Rola Richter,    Welcome to the [facility]!         My name is Xiomara Rodriguez PharmD and I am your dedicated Digital Medicine clinician.  As an expert in medication management, I will help ensure that the medications you are taking continue to provide you with the intended benefits.      I am Tj Mann and I will be your health  for the duration of the program.  My  job is to help you identify lifestyle changes to improve your blood pressure control.  We will talk about nutrition, exercise, and other ways that you may be able to adjust your current habits to better your health. Together, we will work to improve your overall health and encourage you to meet your goals for a healthier lifestyle.    What we expect from YOU:    You will need to take blood pressure readings multiple times a week and no less than one reading per week.   It is important that you take your measurements at different times during the day, when possible.     What you should expect from your Digital Medicine Care Team:   We will provide you with education about high blood pressure, including lifestyle changes that could help you to control your blood pressure.   We will review your weekly readings and provide you with monthly blood pressure progress reports after you have been in the program for more than 30 days.   We will send monthly progress reports on your blood pressure control to your physician so they can follow along with your progress as well.    You will be able to reach me by phone at [phone] or through your [facility] account by clicking my name under Care Team on the right side of the home screen.    I look forward to working with you to achieve your blood pressure goals!    Sincerely,    Xiomara Rodriguez PharmD  Your personal clinician    Please visit  [email or web address] to learn more about high blood pressure and what you can do lower your blood pressure.                                                                                           Rola Richter  [address] [facility] [address]

## 2018-06-01 ENCOUNTER — PATIENT OUTREACH (OUTPATIENT)
Dept: OTHER | Facility: OTHER | Age: 73
End: 2018-06-01

## 2018-06-01 NOTE — PROGRESS NOTES
Last 5 Patient Entered Readings                                      Current 30 Day Average: 147/76     Recent Readings 5/31/2018 5/30/2018 5/29/2018 5/28/2018 5/27/2018    SBP (mmHg) 162 161 136 139 156    DBP (mmHg) 82 77 73 74 77    Pulse 78 83 78 89 97          Patient's BP average is above goal of <130/80.     Patient denies s/s of hypotension (lightheadedness, dizziness, nausea, fatigue) associated with low readings. Instructed patient to inform me if this occurs, patient confirms understanding.      Patient denies s/s of hypertension (SOB, CP, severe headaches, changes in vision) associated with high readings. Instructed patient to go to the ED if BP > 180/110 and accompanied by hypertensive s/s, patient confirms understanding.    Will continue to monitor regularly. Will follow up in 2-3 weeks, sooner if BP begins to trend upward or downward.    Patient has my contact information and knows to call with any concerns or clinical changes.     Current HTN regimen: no medications    Blood pressure fluctuating. Patient relates higher readings this week to pain and taking measurement soon after waking. Patient reports headaches, uncertain if related to BP. Encouraged patient to use [facility] on Call for further assessment if she is experiencing headaches with elevated BP. Consider initiating medication if BP not improved on follow up.

## 2018-06-08 ENCOUNTER — PATIENT OUTREACH (OUTPATIENT)
Dept: OTHER | Facility: OTHER | Age: 73
End: 2018-06-08

## 2018-06-29 ENCOUNTER — PATIENT OUTREACH (OUTPATIENT)
Dept: OTHER | Facility: OTHER | Age: 73
End: 2018-06-29

## 2018-07-05 ENCOUNTER — PATIENT OUTREACH (OUTPATIENT)
Dept: OTHER | Facility: OTHER | Age: 73
End: 2018-07-05

## 2018-07-05 NOTE — PROGRESS NOTES
Last 5 Patient Entered Readings                                      Current 30 Day Average: 152/80     Recent Readings 7/5/2018 7/4/2018 7/3/2018 7/2/2018 7/1/2018    SBP (mmHg) 154 141 144 159 163    DBP (mmHg) 72 71 70 79 88    Pulse 89 80 79 92 81        Spoke with Patient's .    Digital Medicine: Health  Follow Up    Lifestyle Modifications:    1.Dietary Modifications (Sodium intake <2,000mg/day, food labels, dining out): Patient's  states that he and his wife have increased their vegetable intake.  Patient's  reports that they are not eating pork.  Encouraged patient's  to tell patient to keep watching sodium intake.    2.Physical Activity: Patient's  states that patient walked 8 blocks yesterday.  Patient's  reports that patient is not lazy and cleans around the house every day.  Encouraged them to keep staying active around the house.     3.Medication Therapy: Patient has been compliant with the medication regimen.    4.Patient has the following medication side effects/concerns: none.  (Frequency/Alleviating factors/Precipitating factors, etc.)     Follow up with Mrs. Rola MENDOZA Neelam completed. No further questions or concerns. Will continue follow up to achieve health goals.    Patient's  states that they will start taking two readings every day.

## 2018-07-09 ENCOUNTER — OFFICE VISIT (OUTPATIENT)
Dept: PAIN MEDICINE | Facility: CLINIC | Age: 73
End: 2018-07-09
Payer: MEDICARE

## 2018-07-09 VITALS
BODY MASS INDEX: 25 KG/M2 | SYSTOLIC BLOOD PRESSURE: 132 MMHG | WEIGHT: 124 LBS | DIASTOLIC BLOOD PRESSURE: 67 MMHG | HEIGHT: 59 IN | HEART RATE: 86 BPM

## 2018-07-09 DIAGNOSIS — M47.812 OSTEOARTHRITIS OF CERVICAL SPINE, UNSPECIFIED SPINAL OSTEOARTHRITIS COMPLICATION STATUS: ICD-10-CM

## 2018-07-09 DIAGNOSIS — M47.812 SPONDYLOSIS OF CERVICAL REGION WITHOUT MYELOPATHY OR RADICULOPATHY: Primary | ICD-10-CM

## 2018-07-09 DIAGNOSIS — M79.18 MYOFASCIAL PAIN: ICD-10-CM

## 2018-07-09 DIAGNOSIS — M50.30 DDD (DEGENERATIVE DISC DISEASE), CERVICAL: ICD-10-CM

## 2018-07-09 PROCEDURE — 99214 OFFICE O/P EST MOD 30 MIN: CPT | Mod: S$GLB,,, | Performed by: PHYSICIAN ASSISTANT

## 2018-07-09 PROCEDURE — 3078F DIAST BP <80 MM HG: CPT | Mod: CPTII,S$GLB,, | Performed by: PHYSICIAN ASSISTANT

## 2018-07-09 PROCEDURE — 99999 PR PBB SHADOW E&M-EST. PATIENT-LVL IV: CPT | Mod: PBBFAC,,, | Performed by: PHYSICIAN ASSISTANT

## 2018-07-09 PROCEDURE — 3075F SYST BP GE 130 - 139MM HG: CPT | Mod: CPTII,S$GLB,, | Performed by: PHYSICIAN ASSISTANT

## 2018-07-09 RX ORDER — CYCLOBENZAPRINE HCL 10 MG
10 TABLET ORAL 3 TIMES DAILY
Qty: 270 TABLET | Refills: 0 | Status: SHIPPED | OUTPATIENT
Start: 2018-07-09 | End: 2018-08-31 | Stop reason: SDUPTHER

## 2018-07-09 RX ORDER — MELOXICAM 7.5 MG/1
7.5 TABLET ORAL DAILY
Qty: 90 TABLET | Refills: 0 | Status: SHIPPED | OUTPATIENT
Start: 2018-07-09 | End: 2018-09-28 | Stop reason: SDUPTHER

## 2018-07-09 RX ORDER — HYDROCODONE BITARTRATE AND ACETAMINOPHEN 5; 325 MG/1; MG/1
1 TABLET ORAL EVERY 12 HOURS PRN
Qty: 60 TABLET | Refills: 0 | Status: SHIPPED | OUTPATIENT
Start: 2018-08-08 | End: 2018-07-31 | Stop reason: SDUPTHER

## 2018-07-09 RX ORDER — HYDROCODONE BITARTRATE AND ACETAMINOPHEN 5; 325 MG/1; MG/1
1 TABLET ORAL EVERY 12 HOURS PRN
Qty: 60 TABLET | Refills: 0 | Status: SHIPPED | OUTPATIENT
Start: 2018-07-09 | End: 2018-08-08

## 2018-07-09 RX ORDER — HYDROCODONE BITARTRATE AND ACETAMINOPHEN 5; 325 MG/1; MG/1
1 TABLET ORAL EVERY 12 HOURS PRN
Qty: 60 TABLET | Refills: 0 | Status: SHIPPED | OUTPATIENT
Start: 2018-09-07 | End: 2018-07-31 | Stop reason: SDUPTHER

## 2018-07-09 NOTE — PROGRESS NOTES
Referring Physician: No ref. provider found    PCP: Liseth Carias MD      CC: neck and left occipital pain    Interval history: Ms. Richter is a 73 y.o. female with neck pain and occipital neuralgia who presents today for f/u and medication refill. Neck pain is bothersome.  Her pain radiates into both arms, worse on the right.  Her right arm pain travels to her hand, her left stops around her shoulder.  She has numbness to her right hand and first through fourth digits. She is s/p cervical MELANY in February that only provided benefit for a short time.  She also reports left sided occipital pain which has responded well to occipital nerve blocks in the past.   She continues to take Norco, Mobic and Flexeril which provide benefit.  No bowel bladder changes.   Pain today is rated 10/10.    Prior HPI:   Patient is 70-year-old female with past history history of cervical DDD, cervical spondylosis and chronic headaches.  She recently moved here from [place], [place].  She is treated in the past by neurology.  She states having constant burning pain over the left side of her posterior scalp.  Pain radiates to her neck as well as a frontal.  She also has left-sided neck pain as well.  She denies any radicular arm pain.  No numbness or weakness.  She states having cervical epidural steroid injection at past with minimal benefit.  She also has had decided of cervical nerve blocks in the past with moderate benefit.  Most recent injection was performed 2 months ago.  She desires repeat injection.  She currently takes Norco 10 mg every 12 hours as needed with moderate benefit.  She also takes Zanaflex 4 mg every 8 hours with mild benefits.  She rates her pain 7/10 today.    Pain intervention history: s/p left occipital nerve blocks on 1/2016 with 50% relief of her headaches  S/p cervical MELANY 2/8/18 moderate relief for a couple of weeks.     ROS:  CONSTITUTIONAL: No fevers, chills, night sweats, wt. loss, appetite  changes  SKIN: no rashes or itching  ENT: No headaches, head trauma, vision changes, or eye pain  LYMPH NODES: None noticed   CV: No chest pain, palpitations.   RESP: No shortness of breath, dyspnea on exertion, cough, wheezing, or hemoptysis  GI: No nausea, emesis, diarrhea, constipation, melena, hematochezia, pain.    : No dysuria, hematuria, urgency, or frequency   HEME: No easy bruising, bleeding problems  PSYCHIATRIC: No depression, anxiety, psychosis, hallucinations.  NEURO: No seizures, memory loss, dizziness or difficulty sleeping  MSK: + History of present illness      Past Medical History:   Diagnosis Date    Anxiety     Arthritis     Cataract     DDD (degenerative disc disease), lumbar     Depression     GERD (gastroesophageal reflux disease)     Hyperlipidemia     Hypertension     pt ststes she does not take meds anymore she just watches her weight//    Immunosuppressed status 2016    Neuromuscular disorder     Occipital neuralgia 3/24/2017    Osteoporosis     Substance abuse      Past Surgical History:   Procedure Laterality Date    APPENDECTOMY       SECTION      x 2    CHOLECYSTECTOMY      HYSTERECTOMY      Laser Periphery Iridotomy Bilateral     OD 16 and OS touch up 2016    vocal cord tumor removal       Family History   Problem Relation Age of Onset    Osteoarthritis Mother     Alcohol abuse Mother     Rheum arthritis Mother     Osteoarthritis Sister     Diabetes Brother     No Known Problems Son     No Known Problems Sister     No Known Problems Sister     No Known Problems Brother     Arthritis Son     No Known Problems Son     Stroke Maternal Grandmother 99    Rheum arthritis Maternal Grandmother     Retinal detachment Neg Hx     Macular degeneration Neg Hx     Glaucoma Neg Hx     Amblyopia Neg Hx     Blindness Neg Hx     Cancer Neg Hx     Cataracts Neg Hx     Hypertension Neg Hx     Strabismus Neg Hx     Thyroid disease Neg Hx   "   Lupus Neg Hx     Kidney disease Neg Hx     Inflammatory bowel disease Neg Hx     Psoriasis Neg Hx      Social History     Social History    Marital status:      Spouse name: N/A    Number of children: N/A    Years of education: N/A     Social History Main Topics    Smoking status: Never Smoker    Smokeless tobacco: Never Used    Alcohol use No    Drug use: No    Sexual activity: Yes     Partners: Male     Other Topics Concern    None     Social History Narrative    None         Medications/Allergies: See med card    Vitals:    07/09/18 0920   BP: 132/67   Pulse: 86   Weight: 56.2 kg (124 lb)   Height: 4' 11" (1.499 m)   PainSc: 10-Worst pain ever   PainLoc: Neck         Physical exam:    GENERAL: A and O x3, the patient appears well groomed and is in no acute distress.  Skin: No rashes or obvious lesions  HEENT: normocephalic, atraumatic  CARDIOVASCULAR:  RRR  LUNGS: nonlabored breathing  ABDOMEN: soft, nontender   UPPER EXTREMITIES: Normal alignment, normal range of motion, no atrophy, no skin changes,  hair growth and nail growth normal and equal bilaterally. No swelling, no tenderness.  +Phalens on left side. +TTP tricep tendon  LOWER EXTREMITIES:  Normal alignment, normal range of motion, no atrophy, no skin changes,  hair growth and nail growth normal and equal bilaterally. No swelling, no tenderness.  CERVICAL SPINE:   Cervical spine: ROM is full in flexion, extension and lateral rotation.  Painful flexion > extension.  Positive facet loading bilaterally.  Spurling is positive at right side.  Myofascial exam:  Tenderness to palpation across cervical paraspinals and trapezius muscles bilaterally.      MENTAL STATUS: normal orientation, speech, language, and fund of knowledge for social situation.  Emotional state appropriate.    CRANIAL NERVES:  II:  PERRL bilaterally,   III,IV,VI: EOMI.    V:  Facial sensation equal bilaterally  VII:  Facial motor function normal.  VIII:  Hearing equal " to finger rub bilaterally  IX/X: Gag normal, palate symmetric  XI:  Shoulder shrug equal, head turn equal  XII:  Tongue midline without fasciculations      MOTOR: Tone and bulk: normal bilateral upper and lower Strength: normal   Delt Bi Tri WE WF     R 5 5 5 5 5 5   L 5 5 5 5 5 5     IP ADD ABD Quad TA Gas HAM  R 5 5 5 5 5 5 5  L 5 5 5 5 5 5 5    SENSATION: Light touch and pinprick intact bilaterally  REFLEXES: normal, symmetric, nonbrisk.  Toes down, no clonus. No hoffmans.  GAIT: normal rise, base, steps, and arm swing.        Imaging:   Cervical MRI 12/4/17    Narrative     EXAM: Cervical spine MRI without contrast.    INDICATION: Cervical radiculopathy.  Neck pain and occipital neuralgia.  The patient complains of neck and right arm pain.    TECHNIQUE: Routine multiplanar, multisequence unenhanced cervical spine MRI was performed.    COMPARISON: Plain films of the cervical spine obtained concurrently      FINDINGS:     Vertebral column: There is straightening of the cervical spine with loss of normal lordosis.  As seen on concurrent plain films, there is trace anterolisthesis of C3 on C4 with 2 mm anterolisthesis of C4 on C5.  There is marked disc space narrowing at the C5-6 level with moderate to marked disc space narrowing at the C4-5 and C6-7 levels.  There is partial non-segmentation anteriorly at the C2-3 level.  The C2 and C3 as well as the C4 and C5 facet joints appear fused and this may represent developmental non-segmentation or degenerative ankylosis.  All of the discs are desiccated.  The odontoid process is intact.    Spinal canal, cord, epidural space: The craniovertebral junction is normal.  The spinal canal is omental and normal.  There is no significant spinal stenosis.  The cord is normal in caliber.  There is very subtle flattening of the ventral cord surface at the C4-5 and C5-6 levels where there is degenerative change.  The study is mildly motion but there is no definite cord edema or  myelomalacia.    Findings by level:    C2-3: There is no spinal canal or foraminal stenosis.  There is mild left facet joint arthropathy.    C3-4: There is trace anterolisthesis.  There is left greater than right uncovertebral spurring and facet joint arthropathy.  There is a mild disc osteophyte complex, slightly eccentric to the left with subtle annular fissure.  This narrows the ventral subarachnoid space.  There is no spinal stenosis or cord compression.  There is moderate marked left foraminal stenosis.    C4-5: There is moderate disc space narrowing with 2 mm anterolisthesis of C4 on C5.  There is facet joint arthropathy or effusion left greater than right.  There is also mild bilateral but left greater than right uncovertebral spurring.  There is unroofing of a mild disc bulge which narrows the ventral subarachnoid space.  There is no spinal stenosis.  There is mild to moderate left foraminal stenosis.    C5-6:There is marked disc space narrowing.  There is bilateral uncovertebral spurring.  There is a disc osteophyte complex which narrows the subarachnoid space.  This is slightly eccentric to the right There is subtle flattening of the ventral cord surface.  Cord signal is grossly normal.  There is mild spinal stenosis with at least moderate bilateral foraminal stenosis.    C6-7: There is moderate disc space narrowing.  There is mild uncovertebral spurring.  There is a shallow disc osteophyte complex, slightly eccentric to the right.  There is narrowing of the ventral subarachnoid space.  There is no spinal stenosis.  Cord signal is normal.  There is mild bilateral foraminal stenosis.  There is a small 3.5 mm left foraminal perineural cyst.    C7- T1: There is a Schmorl's node in inferior endplate of C7, chronic.  There are tiny bilateral foraminal perineural cysts.  There is minimal bulging of the annulus and mild facet joint arthropathy without spinal canal or foraminal stenosis.    Soft tissues/other: The  prevertebral soft tissues are normal.  The airway is patent.   Impression          1. There is multilevel degenerative disc disease described in detail above.  There is no acute fracture.  There is degenerative listhesis at several levels.  There is some degree of disc osteophyte complex, uncovertebral spurring and/or facet joint arthropathy contributing to some degree of foraminal stenosis at several levels.  There is no significant spinal canal stenosis.  There is very subtle flattening of the ventral cord surface at the C4-5 and C6-7 levels The pertinent findings are summarized below.    2. At the C3-4 level, there is no spinal stenosis.  There is moderate to marked left foraminal stenosis.    3. At the C4-5 level there is no spinal stenosis.  There is mild to moderate left foraminal stenosis.    4. At the C5-6 level, there is mild spinal stenosis with at least moderate bilateral foraminal stenosis.    5. At the C6-7 level, there is no spinal stenosis.  There is mild bilateral foraminal stenosis.    6. There is no spinal canal or foraminal stenosis at the C2-3 or C7-T1 levels.     Assessment:  Mrs. Richter is a 73 y.o. female with neck and head pain    1. Spondylosis of cervical region without myelopathy or radiculopathy    2. Myofascial pain    3. Osteoarthritis of cervical spine, unspecified spinal osteoarthritis complication status    4. DDD (degenerative disc disease), cervical      Plan:  1. I have stressed the importance of physical activity and exercise to improve overall health.  2.  Consider repeat C7-T1 [place] MELANY in the future  3. Consider repeat occipital blocks for head pain.  4. Can continue Norco 5mg q12hrs as needed for pain. 3 prescriptions.  reviewed  5. Continue Flexeril and Mobic.  Refill sent for 90 day supply. Labs reviewed today.  6. Ordered PT to focus on neck and upper back. Will be beneifical to prevent further muscle atrophy and increase mobility and strength   7. RTC in 3 months or  sooner if needed.    Medication management by Dr. Grimaldo.      Kathy Jim PA-C  07/09/2018

## 2018-07-11 DIAGNOSIS — F43.10 PTSD (POST-TRAUMATIC STRESS DISORDER): ICD-10-CM

## 2018-07-11 RX ORDER — SERTRALINE HYDROCHLORIDE 100 MG/1
100 TABLET, FILM COATED ORAL DAILY
Qty: 90 TABLET | Refills: 0 | Status: SHIPPED | OUTPATIENT
Start: 2018-07-11 | End: 2018-07-31 | Stop reason: SDUPTHER

## 2018-07-11 RX ORDER — SERTRALINE HYDROCHLORIDE 100 MG/1
100 TABLET, FILM COATED ORAL DAILY
Qty: 90 TABLET | Refills: 0 | Status: SHIPPED | OUTPATIENT
Start: 2018-07-11 | End: 2018-07-11 | Stop reason: SDUPTHER

## 2018-07-11 RX ORDER — BUSPIRONE HYDROCHLORIDE 10 MG/1
10 TABLET ORAL 2 TIMES DAILY
Qty: 180 TABLET | Refills: 0 | Status: SHIPPED | OUTPATIENT
Start: 2018-07-11 | End: 2018-07-31 | Stop reason: SDUPTHER

## 2018-07-11 RX ORDER — BUSPIRONE HYDROCHLORIDE 10 MG/1
10 TABLET ORAL 2 TIMES DAILY
Qty: 180 TABLET | Refills: 0 | Status: SHIPPED | OUTPATIENT
Start: 2018-07-11 | End: 2018-07-11 | Stop reason: SDUPTHER

## 2018-07-11 NOTE — TELEPHONE ENCOUNTER
Patient requesting to switch from [facility] to [facility].     Needs refill of sertraline and buspirone both 90 day supplies

## 2018-07-13 ENCOUNTER — TELEPHONE (OUTPATIENT)
Dept: OPHTHALMOLOGY | Facility: CLINIC | Age: 73
End: 2018-07-13

## 2018-07-13 NOTE — TELEPHONE ENCOUNTER
Called pt about FML refill. No answer . Left VM that if she is having problem with eyes she would need to call the apt line and request an urgent apt.     Roland Echavarria, COA [facility]       ----- Message from Tonya Paz sent at 7/12/2018  7:52 AM CDT -----  Regarding: REQUEST FOR MEDICINE  Received Fax for a request on the following medication:    Drug:Fluorometholone 0.1% eye drops, suspension    Pharmacy Requested: [facility], fax [phone]    Please provide the strength, directions, quantity and refills for drug listed.

## 2018-07-16 DIAGNOSIS — E78.5 HYPERLIPIDEMIA, UNSPECIFIED HYPERLIPIDEMIA TYPE: ICD-10-CM

## 2018-07-16 RX ORDER — OMEPRAZOLE 40 MG/1
40 CAPSULE, DELAYED RELEASE ORAL DAILY
Qty: 30 CAPSULE | Refills: 11 | Status: SHIPPED | OUTPATIENT
Start: 2018-07-16 | End: 2019-03-15 | Stop reason: SDUPTHER

## 2018-07-16 RX ORDER — ROSUVASTATIN CALCIUM 5 MG/1
5 TABLET, COATED ORAL EVERY OTHER DAY
Qty: 45 TABLET | Refills: 3 | Status: SHIPPED | OUTPATIENT
Start: 2018-07-16 | End: 2018-08-14

## 2018-07-19 ENCOUNTER — TELEPHONE (OUTPATIENT)
Dept: OPHTHALMOLOGY | Facility: CLINIC | Age: 73
End: 2018-07-19

## 2018-07-19 NOTE — TELEPHONE ENCOUNTER
Spoke with  advised she could use OTC Zaditor drops for itchy eyes. Also advised warm compress 2 x times a day for comfort. Advised if drops and warm compress does not help to let us know so that we can schedule appt.    Roland Echavarria, COA  [facility]      ----- Message from Araceli Pierre sent at 7/19/2018  8:47 AM CDT -----  Contact: Catie quinones/[facility]  Pharmacy [phone]  She is following up on a refill request for fluorometholome.  Thank you!

## 2018-07-20 ENCOUNTER — TELEPHONE (OUTPATIENT)
Dept: PSYCHIATRY | Facility: CLINIC | Age: 73
End: 2018-07-20

## 2018-07-20 ENCOUNTER — TELEPHONE (OUTPATIENT)
Dept: RHEUMATOLOGY | Facility: CLINIC | Age: 73
End: 2018-07-20

## 2018-07-20 NOTE — TELEPHONE ENCOUNTER
"Called the pt to discuss her phone call and her concerns.    "i'm getting a little depressed because my pain is a little worse and it gets me down. Just real stupid thoughts like 'oh when will I die?' and things like that but you know I would never do anything to myself but it makes me feel kindof anxious because I tell myself, 'stop those stupid thoughts. You'll be fine.'"    Discuss with the pt that she is able to take her buspar bid and currently takes one tab qafternoon but could implement one every morning OR take an additional tab AS NEEDED when she feels worse.     She expresses understanding. "ok, doc. It does help when I take it I just get scared to do something without asking you."    Pt has scheduled an earlier f/u appt on 7/31. Denies acute safety concerns.  "

## 2018-07-20 NOTE — TELEPHONE ENCOUNTER
----- Message from Hammad Higgins sent at 7/20/2018  9:57 AM CDT -----  Type:  Sooner Apoointment Request    Caller is requesting a sooner appointment.  Caller declined first available appointment listed below.  Caller will not accept being placed on the waitlist and is requesting a message be sent to doctor.    Name of Caller:  Patient  When is the first available appointment?  9.27.18  Best Call Back Number:  [phone]  Additional Information:  Patient states she needs sooner appointment as she is in pain. Please call to advise.

## 2018-07-20 NOTE — TELEPHONE ENCOUNTER
"Patient called requesting to increase her prescription buspirone 10 mg to twice daily.    Per chart notes patient could take up too two times a day for anxiety.     Patient stated "oh I didn't know I could take two a day"  "delmi been feeling off not so good going up and down lately" for about a month now."    Scheduled sooner appointment 7/31/2018.  Please advice [phone]  Kimberly    "

## 2018-07-25 ENCOUNTER — PATIENT OUTREACH (OUTPATIENT)
Dept: OTHER | Facility: OTHER | Age: 73
End: 2018-07-25

## 2018-07-25 NOTE — PROGRESS NOTES
Last 5 Patient Entered Readings                                      Current 30 Day Average: 149/78     Recent Readings 7/25/2018 7/20/2018 7/19/2018 7/18/2018 7/17/2018    SBP (mmHg) 158 152 144 140 149    DBP (mmHg) 74 80 72 76 79    Pulse 92 73 87 83 88          07/25: LVM.  Will call back on 08/03.

## 2018-07-31 ENCOUNTER — OFFICE VISIT (OUTPATIENT)
Dept: PSYCHIATRY | Facility: CLINIC | Age: 73
End: 2018-07-31
Payer: MEDICARE

## 2018-07-31 VITALS
HEIGHT: 59 IN | BODY MASS INDEX: 25.06 KG/M2 | WEIGHT: 124.31 LBS | HEART RATE: 83 BPM | DIASTOLIC BLOOD PRESSURE: 67 MMHG | SYSTOLIC BLOOD PRESSURE: 142 MMHG

## 2018-07-31 DIAGNOSIS — F43.10 PTSD (POST-TRAUMATIC STRESS DISORDER): ICD-10-CM

## 2018-07-31 DIAGNOSIS — I10 HYPERTENSION, UNSPECIFIED TYPE: Primary | ICD-10-CM

## 2018-07-31 PROCEDURE — 99214 OFFICE O/P EST MOD 30 MIN: CPT | Mod: S$PBB,,, | Performed by: PSYCHIATRY & NEUROLOGY

## 2018-07-31 PROCEDURE — 99999 PR PBB SHADOW E&M-EST. PATIENT-LVL III: CPT | Mod: PBBFAC,,, | Performed by: PSYCHIATRY & NEUROLOGY

## 2018-07-31 PROCEDURE — 99213 OFFICE O/P EST LOW 20 MIN: CPT | Mod: PBBFAC,PO | Performed by: PSYCHIATRY & NEUROLOGY

## 2018-07-31 RX ORDER — BUSPIRONE HYDROCHLORIDE 10 MG/1
10 TABLET ORAL 2 TIMES DAILY
Qty: 180 TABLET | Refills: 0 | Status: SHIPPED | OUTPATIENT
Start: 2018-07-31 | End: 2018-08-07 | Stop reason: SDUPTHER

## 2018-07-31 RX ORDER — SERTRALINE HYDROCHLORIDE 100 MG/1
100 TABLET, FILM COATED ORAL DAILY
Qty: 90 TABLET | Refills: 0 | Status: SHIPPED | OUTPATIENT
Start: 2018-07-31 | End: 2018-09-24 | Stop reason: SDUPTHER
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
## 2018-08-03 ENCOUNTER — PATIENT OUTREACH (OUTPATIENT)
Dept: OTHER | Facility: OTHER | Age: 73
End: 2018-08-03

## 2018-08-03 DIAGNOSIS — I10 HYPERTENSION, UNSPECIFIED TYPE: Primary | ICD-10-CM

## 2018-08-03 RX ORDER — IRBESARTAN 150 MG/1
150 TABLET ORAL NIGHTLY
Qty: 30 TABLET | Refills: 2 | Status: SHIPPED | OUTPATIENT
Start: 2018-08-03 | End: 2018-08-31 | Stop reason: SDUPTHER

## 2018-08-03 NOTE — PROGRESS NOTES
Last 5 Patient Entered Readings                                      Current 30 Day Average: 149/78     Recent Readings 8/2/2018 7/31/2018 7/30/2018 7/29/2018 7/28/2018    SBP (mmHg) 141 158 156 161 142    DBP (mmHg) 76 81 84 75 77    Pulse 86 71 77 75 76          Patient's BP average is above goal of <130/80.     Patient denies s/s of hypotension (lightheadedness, dizziness, nausea, fatigue) associated with low readings. Instructed patient to inform me if this occurs, patient confirms understanding.      Patient denies s/s of hypertension (SOB, CP, severe headaches, changes in vision) associated with high readings. Instructed patient to go to the ED if BP > 180/110 and accompanied by hypertensive s/s, patient confirms understanding.    Will continue to monitor regularly. Will follow up in 2-3 weeks, sooner if BP begins to trend upward or downward.    Patient has my contact information and knows to call with any concerns or clinical changes.     Current HTN regimen: no medications    BP continues to run high despite adjustments to BP measurement technique and timing. Begin irbesartan 150 mg once daily.

## 2018-08-07 DIAGNOSIS — F43.10 PTSD (POST-TRAUMATIC STRESS DISORDER): ICD-10-CM

## 2018-08-08 RX ORDER — BUSPIRONE HYDROCHLORIDE 10 MG/1
TABLET ORAL
Qty: 90 TABLET | Refills: 1 | Status: SHIPPED | OUTPATIENT
Start: 2018-08-08 | End: 2018-09-02 | Stop reason: SDUPTHER

## 2018-08-14 ENCOUNTER — OFFICE VISIT (OUTPATIENT)
Dept: RHEUMATOLOGY | Facility: CLINIC | Age: 73
End: 2018-08-14
Payer: MEDICARE

## 2018-08-14 ENCOUNTER — LAB VISIT (OUTPATIENT)
Dept: LAB | Facility: HOSPITAL | Age: 73
End: 2018-08-14
Attending: INTERNAL MEDICINE
Payer: MEDICARE

## 2018-08-14 VITALS
SYSTOLIC BLOOD PRESSURE: 126 MMHG | BODY MASS INDEX: 24.85 KG/M2 | HEIGHT: 59 IN | DIASTOLIC BLOOD PRESSURE: 73 MMHG | HEART RATE: 78 BPM | WEIGHT: 123.25 LBS

## 2018-08-14 DIAGNOSIS — M81.0 AGE-RELATED OSTEOPOROSIS WITHOUT CURRENT PATHOLOGICAL FRACTURE: ICD-10-CM

## 2018-08-14 DIAGNOSIS — M15.9 PRIMARY OSTEOARTHRITIS INVOLVING MULTIPLE JOINTS: ICD-10-CM

## 2018-08-14 DIAGNOSIS — M81.0 AGE-RELATED OSTEOPOROSIS WITHOUT CURRENT PATHOLOGICAL FRACTURE: Primary | ICD-10-CM

## 2018-08-14 PROBLEM — M15.0 PRIMARY OSTEOARTHRITIS INVOLVING MULTIPLE JOINTS: Status: ACTIVE | Noted: 2018-08-14

## 2018-08-14 PROBLEM — Z79.899 LONG-TERM USE OF PLAQUENIL: Status: RESOLVED | Noted: 2018-03-27 | Resolved: 2018-08-14

## 2018-08-14 LAB
ALBUMIN SERPL BCP-MCNC: 4.1 G/DL
ALP SERPL-CCNC: 67 U/L
ALT SERPL W/O P-5'-P-CCNC: 17 U/L
ANION GAP SERPL CALC-SCNC: 10 MMOL/L
AST SERPL-CCNC: 21 U/L
BILIRUB SERPL-MCNC: 0.2 MG/DL
BUN SERPL-MCNC: 14 MG/DL
CALCIUM SERPL-MCNC: 9.2 MG/DL
CHLORIDE SERPL-SCNC: 102 MMOL/L
CO2 SERPL-SCNC: 24 MMOL/L
CREAT SERPL-MCNC: 0.8 MG/DL
EST. GFR  (AFRICAN AMERICAN): >60 ML/MIN/1.73 M^2
EST. GFR  (NON AFRICAN AMERICAN): >60 ML/MIN/1.73 M^2
GLUCOSE SERPL-MCNC: 92 MG/DL
POTASSIUM SERPL-SCNC: 4.2 MMOL/L
PROT SERPL-MCNC: 7.5 G/DL
SODIUM SERPL-SCNC: 136 MMOL/L

## 2018-08-14 PROCEDURE — 3074F SYST BP LT 130 MM HG: CPT | Mod: CPTII,S$GLB,, | Performed by: INTERNAL MEDICINE

## 2018-08-14 PROCEDURE — 99213 OFFICE O/P EST LOW 20 MIN: CPT | Mod: S$GLB,,, | Performed by: INTERNAL MEDICINE

## 2018-08-14 PROCEDURE — 36415 COLL VENOUS BLD VENIPUNCTURE: CPT

## 2018-08-14 PROCEDURE — 3078F DIAST BP <80 MM HG: CPT | Mod: CPTII,S$GLB,, | Performed by: INTERNAL MEDICINE

## 2018-08-14 PROCEDURE — 80053 COMPREHEN METABOLIC PANEL: CPT

## 2018-08-14 PROCEDURE — 99999 PR PBB SHADOW E&M-EST. PATIENT-LVL III: CPT | Mod: PBBFAC,,, | Performed by: INTERNAL MEDICINE

## 2018-08-14 RX ORDER — HYDROCODONE BITARTRATE AND ACETAMINOPHEN 5; 325 MG/1; MG/1
TABLET ORAL
Refills: 0 | COMMUNITY
Start: 2018-08-08 | End: 2018-09-13 | Stop reason: SDUPTHER

## 2018-08-14 ASSESSMENT — ROUTINE ASSESSMENT OF PATIENT INDEX DATA (RAPID3)
PATIENT GLOBAL ASSESSMENT SCORE: 8
PSYCHOLOGICAL DISTRESS SCORE: 7.7
FATIGUE SCORE: 8
MDHAQ FUNCTION SCORE: 1.5
WHEN YOU AWAKENED IN THE MORNING OVER THE LAST WEEK, PLEASE INDICATE THE AMOUNT OF TIME IT TAKES UNTIL YOU ARE AS LIMBER AS YOU WILL BE FOR THE DAY: 10 MIN
TOTAL RAPID3 SCORE: 7.33
PAIN SCORE: 9
AM STIFFNESS SCORE: 1, YES

## 2018-08-14 NOTE — PROGRESS NOTES
"Subjective:       Patient ID: Rola Richter is a 73 y.o. female.    Chief Complaint:  Osteoporosis and Erosive OA  HPI 72y/o female is here for follow up for osteoporosis and OA.  Since the last visit, she has discontinued hydroxychloroquine as it was not helping erosive arthritis of her hands.  She has been following with Dr. Grimaldo for DDD and pain management.  Currently on Mantador   Today, she grades her pain as 9 x 10, aching type, worse with activity, better with rest.  Denies any joint effusion   For osteoporosis, she is on zoledronic acid yearly.  Awaiting appointment at this time.  Denies any recent falls or fractures      Review of Systems   Eyes: Negative for pain and redness.   Respiratory: Negative for cough and shortness of breath.    Cardiovascular: Negative for chest pain and leg swelling.   Gastrointestinal: Negative for abdominal distention and abdominal pain.   Genitourinary: Negative for genital sores and hematuria.   Neurological: Negative for tremors and seizures.   Psychiatric/Behavioral: Negative for agitation and behavioral problems.         Objective:     /73 (BP Location: Left arm, Patient Position: Sitting, BP Method: Medium (Automatic))   Pulse 78   Ht 4' 11" (1.499 m)   Wt 55.9 kg (123 lb 3.8 oz)   BMI 24.89 kg/m²      Physical Exam   Constitutional: She is oriented to person, place, and time and well-developed, well-nourished, and in no distress.   HENT:   Head: Normocephalic and atraumatic.   Eyes: Conjunctivae and EOM are normal. Pupils are equal, round, and reactive to light.   Neck: Neck supple. No thyromegaly present.   Cardiovascular: Normal rate and regular rhythm.    Pulmonary/Chest: Effort normal and breath sounds normal.   Abdominal: Soft. Bowel sounds are normal.   Neurological: She is alert and oriented to person, place, and time.   Skin: Skin is warm and dry.     Psychiatric: Affect normal.   Musculoskeletal: She exhibits no edema.   No joint swelling                "      Lab Results   Component Value Date    WBC 8.63 04/18/2018    HGB 11.6 (L) 04/18/2018    HCT 36.0 (L) 04/18/2018    MCV 91 04/18/2018     04/18/2018     CMP  Sodium   Date Value Ref Range Status   04/18/2018 137 136 - 145 mmol/L Final     Potassium   Date Value Ref Range Status   04/18/2018 4.2 3.5 - 5.1 mmol/L Final     Chloride   Date Value Ref Range Status   04/18/2018 104 95 - 110 mmol/L Final     CO2   Date Value Ref Range Status   04/18/2018 24 23 - 29 mmol/L Final     Glucose   Date Value Ref Range Status   04/18/2018 96 70 - 110 mg/dL Final     BUN, Bld   Date Value Ref Range Status   04/18/2018 13 8 - 23 mg/dL Final     Creatinine   Date Value Ref Range Status   04/18/2018 0.9 0.5 - 1.4 mg/dL Final     Calcium   Date Value Ref Range Status   04/18/2018 9.3 8.7 - 10.5 mg/dL Final     Total Protein   Date Value Ref Range Status   04/18/2018 7.5 6.0 - 8.4 g/dL Final     Albumin   Date Value Ref Range Status   04/18/2018 4.0 3.5 - 5.2 g/dL Final     Total Bilirubin   Date Value Ref Range Status   04/18/2018 0.3 0.1 - 1.0 mg/dL Final     Comment:     For infants and newborns, interpretation of results should be based  on gestational age, weight and in agreement with clinical  observations.  Premature Infant recommended reference ranges:  Up to 24 hours.............<8.0 mg/dL  Up to 48 hours............<12.0 mg/dL  3-5 days..................<15.0 mg/dL  6-29 days.................<15.0 mg/dL       Alkaline Phosphatase   Date Value Ref Range Status   04/18/2018 84 55 - 135 U/L Final     AST   Date Value Ref Range Status   04/18/2018 23 10 - 40 U/L Final     ALT   Date Value Ref Range Status   04/18/2018 14 10 - 44 U/L Final     Anion Gap   Date Value Ref Range Status   04/18/2018 9 8 - 16 mmol/L Final     eGFR if    Date Value Ref Range Status   04/18/2018 >60.0 >60 mL/min/1.73 m^2 Final     eGFR if non    Date Value Ref Range Status   04/18/2018 >60.0 >60 mL/min/1.73 m^2  Final     Comment:     Calculation used to obtain the estimated glomerular filtration  rate (eGFR) is the CKD-EPI equation.        Lab Results   Component Value Date    WBC 8.63 04/18/2018    HGB 11.6 (L) 04/18/2018    HCT 36.0 (L) 04/18/2018    MCV 91 04/18/2018     04/18/2018     CMP  Sodium   Date Value Ref Range Status   04/18/2018 137 136 - 145 mmol/L Final     Potassium   Date Value Ref Range Status   04/18/2018 4.2 3.5 - 5.1 mmol/L Final     Chloride   Date Value Ref Range Status   04/18/2018 104 95 - 110 mmol/L Final     CO2   Date Value Ref Range Status   04/18/2018 24 23 - 29 mmol/L Final     Glucose   Date Value Ref Range Status   04/18/2018 96 70 - 110 mg/dL Final     BUN, Bld   Date Value Ref Range Status   04/18/2018 13 8 - 23 mg/dL Final     Creatinine   Date Value Ref Range Status   04/18/2018 0.9 0.5 - 1.4 mg/dL Final     Calcium   Date Value Ref Range Status   04/18/2018 9.3 8.7 - 10.5 mg/dL Final     Total Protein   Date Value Ref Range Status   04/18/2018 7.5 6.0 - 8.4 g/dL Final     Albumin   Date Value Ref Range Status   04/18/2018 4.0 3.5 - 5.2 g/dL Final     Total Bilirubin   Date Value Ref Range Status   04/18/2018 0.3 0.1 - 1.0 mg/dL Final     Comment:     For infants and newborns, interpretation of results should be based  on gestational age, weight and in agreement with clinical  observations.  Premature Infant recommended reference ranges:  Up to 24 hours.............<8.0 mg/dL  Up to 48 hours............<12.0 mg/dL  3-5 days..................<15.0 mg/dL  6-29 days.................<15.0 mg/dL       Alkaline Phosphatase   Date Value Ref Range Status   04/18/2018 84 55 - 135 U/L Final     AST   Date Value Ref Range Status   04/18/2018 23 10 - 40 U/L Final     ALT   Date Value Ref Range Status   04/18/2018 14 10 - 44 U/L Final     Anion Gap   Date Value Ref Range Status   04/18/2018 9 8 - 16 mmol/L Final     eGFR if    Date Value Ref Range Status   04/18/2018 >60.0  >60 mL/min/1.73 m^2 Final     eGFR if non    Date Value Ref Range Status   04/18/2018 >60.0 >60 mL/min/1.73 m^2 Final     Comment:     Calculation used to obtain the estimated glomerular filtration  rate (eGFR) is the CKD-EPI equation.        Lab Results   Component Value Date    SEDRATE 22 (H) 04/18/2018     Lab Results   Component Value Date    CRP 3.0 12/14/2015       The bone mineral densities (BMD) of the lumbar spine as well as the trabecular bone of the left femoral neck were calculated using the DXA method. Values were then compared to the diagnostic criteria established by the WHO (World Health Organization).     Hip:  The BMD of the trabecular bone of the femoral neck was measured at 0.639 grams per cm2, with a young adult T-score of -2.0 and an age-matched Z score of -0.2. Based on WHO criteria this is consistent with osteopenia at moderate increased risk for fracture. The FRAX 10 year probability of major osteoporotic fracture is 11 percent and the 10 year probability of hip fracture is 2.4 percent.    Lumbar:   The BMD of the lumbar region measures 0.710 grams per cm2, with a young adult T score of -3.1, and an age-matched Z score of -0.9. Based on WHO criteria this is consistent with osteoporosis at high risk for fracture.    Assessment:   Osteoporosis-on Reclast   Osteoarthritis of multiple joint  GERD-stable   Plan:   Schedule Reclast  Check baseline  Continue Voltaren gel for CMC OA   Consult for fall prevention  Continue to follow up with Dr. Grimaldo  Recommended PT for OA  RTC in 6 months

## 2018-08-17 ENCOUNTER — OCCUPATIONAL HEALTH (OUTPATIENT)
Dept: RHEUMATOLOGY | Facility: CLINIC | Age: 73
End: 2018-08-17

## 2018-08-17 NOTE — PROGRESS NOTES
Last 5 Patient Entered Readings                                      Current 30 Day Average: 148/78     Recent Readings 8/9/2018 8/7/2018 8/4/2018 8/4/2018 8/3/2018    SBP (mmHg) 150 133 149 151 141    DBP (mmHg) 80 75 86 80 74    Pulse 70 82 77 79 85        Not enough BP readings to evaluate irbesartan. Health  to attempt patient outreach today.

## 2018-08-17 NOTE — PROGRESS NOTES
Last 5 Patient Entered Readings                                      Current 30 Day Average: 148/78     Recent Readings 8/9/2018 8/7/2018 8/4/2018 8/4/2018 8/3/2018    SBP (mmHg) 150 133 149 151 141    DBP (mmHg) 80 75 86 80 74    Pulse 70 82 77 79 85          08/17: LVM.  Encouraged patient to send more readings.  Will call back on 08/24

## 2018-08-20 ENCOUNTER — HOSPITAL ENCOUNTER (OUTPATIENT)
Dept: RADIOLOGY | Facility: CLINIC | Age: 73
Discharge: HOME | End: 2018-08-20
Attending: INTERNAL MEDICINE
Payer: MEDICARE

## 2018-08-20 DIAGNOSIS — M81.0 AGE-RELATED OSTEOPOROSIS WITHOUT CURRENT PATHOLOGICAL FRACTURE: ICD-10-CM

## 2018-08-20 PROCEDURE — 77080 DXA BONE DENSITY AXIAL: CPT | Mod: 26,,, | Performed by: RADIOLOGY

## 2018-08-20 PROCEDURE — 77080 DXA BONE DENSITY AXIAL: CPT | Mod: TC,PO

## 2018-08-24 ENCOUNTER — PATIENT OUTREACH (OUTPATIENT)
Dept: OTHER | Facility: OTHER | Age: 73
End: 2018-08-24

## 2018-08-24 NOTE — PROGRESS NOTES
Last 5 Patient Entered Readings                                      Current 30 Day Average: 149/78     Recent Readings 8/22/2018 8/20/2018 8/17/2018 8/9/2018 8/7/2018    SBP (mmHg) 139 154 154 150 133    DBP (mmHg) 75 81 73 80 75    Pulse 76 87 77 70 82          Patient's BP average is above goal of <130/80.     Patient denies s/s of hypotension (lightheadedness, dizziness, nausea, fatigue) associated with low readings. Instructed patient to inform me if this occurs, patient confirms understanding.      Patient denies s/s of hypertension (SOB, CP, severe headaches, changes in vision) associated with high readings. Instructed patient to go to the ED if BP > 180/110 and accompanied by hypertensive s/s, patient confirms understanding.    Will continue to monitor regularly. Will follow up in 1-2 weeks, sooner if BP begins to trend upward or downward.    Patient has my contact information and knows to call with any concerns or clinical changes.     Current HTN regimen:  Hypertension Medications             irbesartan (AVAPRO) 150 MG tablet Take 1 tablet (150 mg total) by mouth every evening. (for blood pressure)        Patient reports she started irbesartan last week and has been taking every other day. Again reviewed proper placement of cuff on arm as patient expressed concern that BP is lower at office visits. Discussed proper measurement technique and possibility of masked hypertension. Patient and  will try to go to [facility] 8/27 to have technique and cuff evaluated. Advised to hold irbesartan for 1 week to assess readings after review of cuff placement and technique.

## 2018-08-27 NOTE — PROGRESS NOTES
Last 5 Patient Entered Readings                                      Current 30 Day Average: 148/78     Recent Readings 8/26/2018 8/25/2018 8/22/2018 8/20/2018 8/17/2018    SBP (mmHg) 141 148 139 154 154    DBP (mmHg) 75 77 75 81 73    Pulse 77 79 76 87 77          Digital Medicine: Health  Follow Up    Lifestyle Modifications:    1.Dietary Modifications (Sodium intake <2,000mg/day, food labels, dining out): Patient reports switching from canned foods to frozen foods.  States they do not eat a lot of salt.  Encouraged them to continue low sodium diet.    2.Physical Activity: States they will sign up for silver sneakers in a couple of weeks once Mr. Richter starts feeling better.  Patient still walks around the block every day.    3.Medication Therapy: Patient has been compliant with the medication regimen.    4.Patient has the following medication side effects/concerns: none  (Frequency/Alleviating factors/Precipitating factors, etc.)     Follow up with Mrs. Rola Richter completed. No further questions or concerns. Will continue follow up to achieve health goals.

## 2018-08-29 ENCOUNTER — PATIENT MESSAGE (OUTPATIENT)
Dept: ADMINISTRATIVE | Facility: OTHER | Age: 73
End: 2018-08-29

## 2018-08-30 ENCOUNTER — PATIENT OUTREACH (OUTPATIENT)
Dept: OTHER | Facility: OTHER | Age: 73
End: 2018-08-30

## 2018-08-30 NOTE — PROGRESS NOTES
Last 5 Patient Entered Readings                                      Current 30 Day Average: 146/78     Recent Readings 8/26/2018 8/25/2018 8/22/2018 8/20/2018 8/17/2018    SBP (mmHg) 141 148 139 154 154    DBP (mmHg) 75 77 75 81 73    Pulse 77 79 76 87 77          Left voicemail. Discuss starting irbesartan.

## 2018-08-31 ENCOUNTER — OFFICE VISIT (OUTPATIENT)
Dept: FAMILY MEDICINE | Facility: CLINIC | Age: 73
End: 2018-08-31
Payer: MEDICARE

## 2018-08-31 VITALS
BODY MASS INDEX: 24.85 KG/M2 | HEIGHT: 59 IN | TEMPERATURE: 98 F | HEART RATE: 83 BPM | WEIGHT: 123.25 LBS | SYSTOLIC BLOOD PRESSURE: 131 MMHG | DIASTOLIC BLOOD PRESSURE: 78 MMHG

## 2018-08-31 DIAGNOSIS — M19.049 CMC ARTHRITIS: ICD-10-CM

## 2018-08-31 DIAGNOSIS — I10 HYPERTENSION, UNSPECIFIED TYPE: ICD-10-CM

## 2018-08-31 DIAGNOSIS — M79.10 MYALGIA: ICD-10-CM

## 2018-08-31 DIAGNOSIS — Z12.11 COLON CANCER SCREENING: ICD-10-CM

## 2018-08-31 DIAGNOSIS — E78.5 HYPERLIPIDEMIA, UNSPECIFIED HYPERLIPIDEMIA TYPE: Primary | ICD-10-CM

## 2018-08-31 DIAGNOSIS — R20.2 HAND PARESTHESIA, UNSPECIFIED LATERALITY: ICD-10-CM

## 2018-08-31 PROCEDURE — 3078F DIAST BP <80 MM HG: CPT | Mod: CPTII,S$GLB,, | Performed by: FAMILY MEDICINE

## 2018-08-31 PROCEDURE — 99214 OFFICE O/P EST MOD 30 MIN: CPT | Mod: S$GLB,,, | Performed by: FAMILY MEDICINE

## 2018-08-31 PROCEDURE — 3075F SYST BP GE 130 - 139MM HG: CPT | Mod: CPTII,S$GLB,, | Performed by: FAMILY MEDICINE

## 2018-08-31 PROCEDURE — 99999 PR PBB SHADOW E&M-EST. PATIENT-LVL III: CPT | Mod: PBBFAC,,, | Performed by: FAMILY MEDICINE

## 2018-08-31 RX ORDER — CYCLOBENZAPRINE HCL 5 MG
5 TABLET ORAL 3 TIMES DAILY PRN
Qty: 270 TABLET | Refills: 0 | Status: SHIPPED | OUTPATIENT
Start: 2018-08-31 | End: 2018-11-29

## 2018-08-31 RX ORDER — IRBESARTAN 150 MG/1
150 TABLET ORAL NIGHTLY
Qty: 30 TABLET | Refills: 2 | Status: SHIPPED | OUTPATIENT
Start: 2018-08-31 | End: 2018-09-26 | Stop reason: SDUPTHER

## 2018-08-31 RX ORDER — ROSUVASTATIN CALCIUM 10 MG/1
10 TABLET, COATED ORAL DAILY
Qty: 90 TABLET | Refills: 3 | Status: SHIPPED | OUTPATIENT
Start: 2018-08-31 | End: 2019-09-30

## 2018-08-31 NOTE — PROGRESS NOTES
Subjective:       Patient ID: Rola Richter is a 73 y.o. female.    Chief Complaint: Follow-up    HPI  Review of Systems   Constitutional: Negative for fatigue and unexpected weight change.   Respiratory: Negative for chest tightness and shortness of breath.    Cardiovascular: Negative for chest pain, palpitations and leg swelling.   Gastrointestinal: Negative for abdominal pain.   Musculoskeletal: Negative for arthralgias.   Neurological: Negative for dizziness, syncope, light-headedness and headaches.       Patient Active Problem List   Diagnosis    Hypertension    GERD (gastroesophageal reflux disease)    Hyperlipidemia    Osteoporosis    Dependency on pain medication    PTSD (post-traumatic stress disorder)    Immunosuppressed status    CMC arthritis    Dry eye syndrome    DDD (degenerative disc disease), cervical    Occipital neuralgia    Cervical radiculitis    Primary osteoarthritis involving multiple joints     Patient is here for a chronic conditions follow up.    Lab Visit on 08/14/2018   Component Date Value Ref Range Status    Sodium 08/14/2018 136  136 - 145 mmol/L Final    Potassium 08/14/2018 4.2  3.5 - 5.1 mmol/L Final    Chloride 08/14/2018 102  95 - 110 mmol/L Final    CO2 08/14/2018 24  23 - 29 mmol/L Final    Glucose 08/14/2018 92  70 - 110 mg/dL Final    BUN, Bld 08/14/2018 14  8 - 23 mg/dL Final    Creatinine 08/14/2018 0.8  0.5 - 1.4 mg/dL Final    Calcium 08/14/2018 9.2  8.7 - 10.5 mg/dL Final    Total Protein 08/14/2018 7.5  6.0 - 8.4 g/dL Final    Albumin 08/14/2018 4.1  3.5 - 5.2 g/dL Final    Total Bilirubin 08/14/2018 0.2  0.1 - 1.0 mg/dL Final    Alkaline Phosphatase 08/14/2018 67  55 - 135 U/L Final    AST 08/14/2018 21  10 - 40 U/L Final    ALT 08/14/2018 17  10 - 44 U/L Final    Anion Gap 08/14/2018 10  8 - 16 mmol/L Final    eGFR if  08/14/2018 >60  >60 mL/min/1.73 m^2 Final    eGFR if non African American 08/14/2018 >60  >60  mL/min/1.73 m^2 Final     C/o bin hand tingling and numbness intermittently.  No pain. No swelling.  No radiation from neck or involvement of arms  Rheum Dr. Cabral following for CMC arthritis.  On chronic opioids under pain mgmt Dr. Grimaldo. Takes norco 5 bid.  On lyrica 75 mg a day    HTN-in digital HTN program.  Readings have been suboptimal.  Has not had irbesartan in a week    HPL- taking crestor 3 x week or less    States she sent in stool card for testing and got result -negative but no result in chart    Objective:      Physical Exam   Constitutional: She is oriented to person, place, and time. She appears well-developed and well-nourished.   Cardiovascular: Normal rate, regular rhythm and normal heart sounds.   Pulmonary/Chest: Effort normal and breath sounds normal.   Musculoskeletal: She exhibits no edema.   Neurological: She is alert and oriented to person, place, and time.   Skin: Skin is warm and dry.   Psychiatric: She has a normal mood and affect.   Nursing note and vitals reviewed.      Assessment:       1. Hyperlipidemia, unspecified hyperlipidemia type    2. Hypertension, unspecified type    3. CMC arthritis    4. Myalgia    5. Hand paresthesia, unspecified laterality    6. Colon cancer screening        Plan:         1. Hypertension, unspecified type  Controlled on current medications.  Continue current medications.    - irbesartan (AVAPRO) 150 MG tablet; Take 1 tablet (150 mg total) by mouth every evening. (for blood pressure)  Dispense: 30 tablet; Refill: 2    2. Hyperlipidemia, unspecified hyperlipidemia type  Stable condition.  Continue current medications.  Will adjust based on lab findings or if condition changes.    - Comprehensive metabolic panel; Future  - Lipid panel; Future    3. CMC arthritis  Cont current rhem care    4. Myalgia  Cont flexeril    5. Hand paresthesia, unspecified laterality  Suspect intermittent CTS. Recommended otc splint    6. Colon cancer screening  Patient states she  did stool screening.  Will send copy of result        [facility] goal documentation:  Patient readiness: acceptance and barriers:readiness  During the course of the visit the patient was educated and counseled about the following: Hypertension:   Dietary sodium restriction.  Regular aerobic exercise.  Goals: Hypertension: Reduce Blood Pressure  Goal/Outcomes met:Hypertension  The following self management tools provided:none  Patient Instructions (the written plan) was given to the patient/family: Yes  Time spent with patient: 20 minutes    Patient with be reevaluated in 6 months or sooner prn    Greater than 50% of this visit was spent counseling as described in above documentation:Yes

## 2018-08-31 NOTE — PATIENT INSTRUCTIONS
Try OTC Black Cohosh supplements for hot flashes    Try OTC carpal tunnel wrist splint for numbness and tingling in hands    OTC fiber supplement daily     Established High Blood Pressure    High blood pressure (hypertension) is a chronic disease. Often, healthcare providers dont know what causes it. But it can be caused by certain health conditions and medicines.  If you have high blood pressure, you may not have any symptoms. If you do have symptoms, they may include headache, dizziness, changes in your vision, chest pain, and shortness of breath. But even without symptoms, high blood pressure thats not treated raises your risk for heart attack and stroke. High blood pressure is a serious health risk and shouldnt be ignored.  A blood pressure reading is made up of two numbers: a higher number over a lower number. The top number is the systolic pressure. The bottom number is the diastolic pressure. A normal blood pressure is a systolic pressure of  less than 120 over a diastolic pressure of less than 80. You will see your blood pressure readings written together. For example, a person with a systolic pressure of 188 and a diastolic pressure of 78 will have 118/78 written in the medical record.  High blood pressure is when either the top number is 140 or higher, or the bottom number is 90 or higher. This must be the result when taking your blood pressure a number of times. The blood pressures between normal and high are called prehypertension.  Home care  If you have high blood pressure, you should do what is listed below to lower your blood pressure. If you are taking medicines for high blood pressure, these methods may reduce or end your need for medicines in the future.  · Begin a weight-loss program if you are overweight.  · Cut back on how much salt you get in your diet. Heres how to do this:  ¨ Dont eat foods that have a lot of salt. These include olives, pickles, smoked meats, and salted potato  chips.  ¨ Dont add salt to your food at the table.  ¨ Use only small amounts of salt when cooking.  · Start an exercise program. Talk with your healthcare provider about the type of exercise program that would be best for you. It doesn't have to be hard. Even brisk walking for 20 minutes 3 times a week is a good form of exercise.  · Dont take medicines that stimulate the heart. This includes many over-the-counter cold and sinus decongestant pills and sprays, as well as diet pills. Check the warnings about hypertension on the label. Before buying any over-the-counter medicines or supplements, always ask the pharmacist about the product's potential interaction with your high blood pressure and your high blood pressure medicines.  · Stimulants such as amphetamine or cocaine could be deadly for someone with high blood pressure. Never take these.  · Limit how much caffeine you get in your diet. Switch to caffeine-free products.  · Stop smoking. If you are a long-time smoker, this can be hard. Talk to your healthcare provider about medicines and nicotine replacement options to help you. Also, enroll in a stop-smoking program to make it more likely that you will quit for good.  · Learn how to handle stress. This is an important part of any program to lower blood pressure. Learn about relaxation methods like meditation, yoga, or biofeedback.  · If your provider prescribed medicines, take them exactly as directed. Missing doses may cause your blood pressure get out of control.  · If you miss a dose or doses, check with your healthcare provider or pharmacist about what to do.  · Consider buying an automatic blood pressure machine. Ask your provider for a recommendation. You can get one of these at most pharmacies.     The American Heart Association recommends the following guidelines for home blood pressure monitoring:  · Don't smoke or drink coffee for 30 minutes before taking your blood pressure.  · Go to the bathroom  before the test.  · Relax for 5 minutes before taking the measurement.  · Sit with your back supported (don't sit on a couch or soft chair); keep your feet on the floor uncrossed. Place your arm on a solid flat surface (like a table) with the upper part of the arm at heart level. Place the middle of the cuff directly above the eye of the elbow. Check the monitor's instruction manual for an illustration.  · Take multiple readings. When you measure, take 2 to 3 readings one minute apart and record all of the results.  · Take your blood pressure at the same time every day, or as your healthcare provider recommends.  · Record the date, time, and blood pressure reading.  · Take the record with you to your next medical appointment. If your blood pressure monitor has a built-in memory, simply take the monitor with you to your next appointment.  · Call your provider if you have several high readings. Don't be frightened by a single high blood pressure reading, but if you get several high readings, check in with your healthcare provider.  · Note: When blood pressure reaches a systolic (top number) of 180 or higher OR diastolic (bottom number) of 110 or higher, seek emergency medical treatment.  Follow-up care  You will need to see your healthcare provider regularly. This is to check your blood pressure and to make changes to your medicines. Make a follow-up appointment as directed. Bring the record of your home blood pressure readings to the appointment.  When to seek medical advice  Call your healthcare provider right away if any of these occur:  · Blood pressure reaches a systolic (upper number) of 180 or higher OR a diastolic (bottom number) of 110 or higher  · Chest pain or shortness of breath  · Severe headache  · Throbbing or rushing sound in the ears  · Nosebleed  · Sudden severe pain in your belly (abdomen)  · Extreme drowsiness, confusion, or fainting  · Dizziness or spinning sensation (vertigo)  · Weakness of an  arm or leg or one side of the face  · You have problems speaking or seeing   Date Last Reviewed: 12/1/2016  © 4358-3681 [employer]. [address] [place] [address], [place] [address]. All rights reserved. This information is not intended as a substitute for professional medical care. Always follow your healthcare professional's instructions.

## 2018-09-02 DIAGNOSIS — F43.10 PTSD (POST-TRAUMATIC STRESS DISORDER): ICD-10-CM

## 2018-09-04 RX ORDER — BUSPIRONE HYDROCHLORIDE 10 MG/1
TABLET ORAL
Qty: 180 TABLET | Refills: 0 | Status: SHIPPED | OUTPATIENT
Start: 2018-09-04 | End: 2019-01-22 | Stop reason: SDUPTHER

## 2018-09-05 ENCOUNTER — LAB VISIT (OUTPATIENT)
Dept: LAB | Facility: HOSPITAL | Age: 73
End: 2018-09-05
Attending: FAMILY MEDICINE
Payer: MEDICARE

## 2018-09-05 DIAGNOSIS — E78.5 HYPERLIPIDEMIA, UNSPECIFIED HYPERLIPIDEMIA TYPE: ICD-10-CM

## 2018-09-05 LAB
ALBUMIN SERPL BCP-MCNC: 3.9 G/DL
ALP SERPL-CCNC: 72 U/L
ALT SERPL W/O P-5'-P-CCNC: 13 U/L
ANION GAP SERPL CALC-SCNC: 8 MMOL/L
AST SERPL-CCNC: 22 U/L
BILIRUB SERPL-MCNC: 0.3 MG/DL
BUN SERPL-MCNC: 14 MG/DL
CALCIUM SERPL-MCNC: 9 MG/DL
CHLORIDE SERPL-SCNC: 104 MMOL/L
CHOLEST SERPL-MCNC: 373 MG/DL
CHOLEST/HDLC SERPL: 7.5 {RATIO}
CO2 SERPL-SCNC: 25 MMOL/L
CREAT SERPL-MCNC: 0.8 MG/DL
EST. GFR  (AFRICAN AMERICAN): >60 ML/MIN/1.73 M^2
EST. GFR  (NON AFRICAN AMERICAN): >60 ML/MIN/1.73 M^2
GLUCOSE SERPL-MCNC: 84 MG/DL
HDLC SERPL-MCNC: 50 MG/DL
HDLC SERPL: 13.4 %
LDLC SERPL CALC-MCNC: 281.8 MG/DL
NONHDLC SERPL-MCNC: 323 MG/DL
POTASSIUM SERPL-SCNC: 4.3 MMOL/L
PROT SERPL-MCNC: 7.5 G/DL
SODIUM SERPL-SCNC: 137 MMOL/L
TRIGL SERPL-MCNC: 206 MG/DL

## 2018-09-05 PROCEDURE — 80061 LIPID PANEL: CPT

## 2018-09-05 PROCEDURE — 80053 COMPREHEN METABOLIC PANEL: CPT

## 2018-09-05 PROCEDURE — 36415 COLL VENOUS BLD VENIPUNCTURE: CPT | Mod: PO

## 2018-09-06 ENCOUNTER — TELEPHONE (OUTPATIENT)
Dept: FAMILY MEDICINE | Facility: CLINIC | Age: 73
End: 2018-09-06

## 2018-09-06 DIAGNOSIS — E78.5 HYPERLIPIDEMIA, UNSPECIFIED HYPERLIPIDEMIA TYPE: Primary | ICD-10-CM

## 2018-09-06 NOTE — TELEPHONE ENCOUNTER
----- Message from Arnie Ruiz sent at 9/6/2018 12:36 PM CDT -----  Contact: /Aldo  Aldo called in regarding the attached patient (wife).  Aldo stated patients Rx for cyclobenzaprine (FLEXERIL) 5 MG tablet is requiring a PA.    [facility] [address] [place] [address] & [address] [address]  Phone: [phone] Fax: [phone]    Aldo's call back number is [phone]

## 2018-09-06 NOTE — PROGRESS NOTES
Last 5 Patient Entered Readings                                      Current 30 Day Average: 145/76     Recent Readings 9/6/2018 9/5/2018 8/26/2018 8/25/2018 8/22/2018    SBP (mmHg) 126 156 141 148 139    DBP (mmHg) 74 78 75 77 75    Pulse 84 68 77 79 76          Left voicemail and sent [facility] message. Lower BP 9/6. Discuss irbesartan.

## 2018-09-06 NOTE — TELEPHONE ENCOUNTER
----- Message from Arnie Ruiz sent at 9/6/2018 12:36 PM CDT -----  Contact: /Aldo  Aldo called in regarding the attached patient (wife).  Aldo stated patients Rx for cyclobenzaprine (FLEXERIL) 5 MG tablet is requiring a PA.    [facility] [address] [place] [address] & [address] [address]  Phone: [phone] Fax: [phone]    Aldo's call back number is [phone]

## 2018-09-07 NOTE — TELEPHONE ENCOUNTER
Received fax PA denied.   Spoke to patient,patient notified of denial. Patient will call pharmacy to see how much the medication will be to pay out of pocket. If medication is too expensive patient will notified office to possibly change medication to baclofen

## 2018-09-10 NOTE — PROGRESS NOTES
Last 5 Patient Entered Readings                                      Current 30 Day Average: 144/76     Recent Readings 9/7/2018 9/6/2018 9/5/2018 8/26/2018 8/25/2018    SBP (mmHg) 132 126 156 141 148    DBP (mmHg) 74 74 78 75 77    Pulse 81 84 68 77 79          Patient's BP average is above goal of <130/80.     Patient denies s/s of hypotension (lightheadedness, dizziness, nausea, fatigue) associated with low readings. Instructed patient to inform me if this occurs, patient confirms understanding.      Patient denies s/s of hypertension (SOB, CP, severe headaches, changes in vision) associated with high readings. Instructed patient to go to the ED if BP > 180/110 and accompanied by hypertensive s/s, patient confirms understanding.    Will continue to monitor regularly. Will follow up in 2-3 weeks, sooner if BP begins to trend upward or downward.    Patient has my contact information and knows to call with any concerns or clinical changes.     Current HTN regimen:  Hypertension Medications             irbesartan (AVAPRO) 150 MG tablet Take 1 tablet (150 mg total) by mouth every evening. (for blood pressure)        Patient reports she started irbesartan 9/8. She contributes lower BP readings 9/6-9/7 to exercising at home. She will try to maintain exercise in attempt to lose weight. She relies on her  for transportation, and he has been ill. Continue with irbesartan at this time, but stressed to patient to contact me if she experiences hypotensive symptoms.

## 2018-09-13 RX ORDER — HYDROCODONE BITARTRATE AND ACETAMINOPHEN 5; 325 MG/1; MG/1
TABLET ORAL
Qty: 60 TABLET | Refills: 0 | Status: SHIPPED | OUTPATIENT
Start: 2018-09-13 | End: 2019-01-02 | Stop reason: SDUPTHER

## 2018-09-20 ENCOUNTER — PATIENT OUTREACH (OUTPATIENT)
Dept: OTHER | Facility: OTHER | Age: 73
End: 2018-09-20

## 2018-09-20 NOTE — PROGRESS NOTES
Last 5 Patient Entered Readings                                      Current 30 Day Average: 141/74     Recent Readings 9/19/2018 9/13/2018 9/12/2018 9/11/2018 9/7/2018    SBP (mmHg) 147 136 151 132 132    DBP (mmHg) 72 69 74 67 74    Pulse 84 80 79 80 81          Digital Medicine: Health  Follow Up    Lifestyle Modifications:    1.Dietary Modifications (Sodium intake <2,000mg/day, food labels, dining out): Reports she does not usually eat much at night.    2.Physical Activity: States she and her  have been putting off going to the gym, but have been doing exercises at home such as stretching.  Offered seated exercises and patient agreed.  Sent exercises to mailbox.    3.Medication Therapy: Patient has been compliant with the medication regimen.    4.Patient has the following medication side effects/concerns: none.  (Frequency/Alleviating factors/Precipitating factors, etc.)     Follow up with Mrs. Rola MENDOZA Neelam completed. No further questions or concerns. Will continue to follow up to achieve health goals.

## 2018-09-24 ENCOUNTER — PATIENT OUTREACH (OUTPATIENT)
Dept: OTHER | Facility: OTHER | Age: 73
End: 2018-09-24

## 2018-09-24 ENCOUNTER — PATIENT MESSAGE (OUTPATIENT)
Dept: ADMINISTRATIVE | Facility: OTHER | Age: 73
End: 2018-09-24

## 2018-09-24 ENCOUNTER — OFFICE VISIT (OUTPATIENT)
Dept: PSYCHIATRY | Facility: CLINIC | Age: 73
End: 2018-09-24
Payer: MEDICARE

## 2018-09-24 VITALS
HEIGHT: 59 IN | HEART RATE: 81 BPM | WEIGHT: 120.69 LBS | DIASTOLIC BLOOD PRESSURE: 67 MMHG | SYSTOLIC BLOOD PRESSURE: 134 MMHG | BODY MASS INDEX: 24.33 KG/M2

## 2018-09-24 DIAGNOSIS — F33.41 MDD (MAJOR DEPRESSIVE DISORDER), RECURRENT, IN PARTIAL REMISSION: ICD-10-CM

## 2018-09-24 DIAGNOSIS — I10 HYPERTENSION, UNSPECIFIED TYPE: ICD-10-CM

## 2018-09-24 DIAGNOSIS — E78.5 HYPERLIPIDEMIA, UNSPECIFIED HYPERLIPIDEMIA TYPE: ICD-10-CM

## 2018-09-24 DIAGNOSIS — F43.10 PTSD (POST-TRAUMATIC STRESS DISORDER): Primary | ICD-10-CM

## 2018-09-24 PROCEDURE — 3075F SYST BP GE 130 - 139MM HG: CPT | Mod: CPTII,,, | Performed by: PSYCHIATRY & NEUROLOGY

## 2018-09-24 PROCEDURE — 99214 OFFICE O/P EST MOD 30 MIN: CPT | Mod: S$PBB,,, | Performed by: PSYCHIATRY & NEUROLOGY

## 2018-09-24 PROCEDURE — 99213 OFFICE O/P EST LOW 20 MIN: CPT | Mod: PBBFAC,PO | Performed by: PSYCHIATRY & NEUROLOGY

## 2018-09-24 PROCEDURE — 3078F DIAST BP <80 MM HG: CPT | Mod: CPTII,,, | Performed by: PSYCHIATRY & NEUROLOGY

## 2018-09-24 PROCEDURE — 1101F PT FALLS ASSESS-DOCD LE1/YR: CPT | Mod: CPTII,,, | Performed by: PSYCHIATRY & NEUROLOGY

## 2018-09-24 PROCEDURE — 99999 PR PBB SHADOW E&M-EST. PATIENT-LVL III: CPT | Mod: PBBFAC,,, | Performed by: PSYCHIATRY & NEUROLOGY

## 2018-09-24 RX ORDER — SERTRALINE HYDROCHLORIDE 100 MG/1
100 TABLET, FILM COATED ORAL DAILY
Qty: 90 TABLET | Refills: 0 | Status: SHIPPED | OUTPATIENT
Start: 2018-09-24 | End: 2019-01-07 | Stop reason: SDUPTHER
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
## 2018-09-24 NOTE — PROGRESS NOTES
Last 5 Patient Entered Readings                                      Current 30 Day Average: 138/73     Recent Readings 9/21/2018 9/20/2018 9/19/2018 9/13/2018 9/12/2018    SBP (mmHg) 120 134 147 136 151    DBP (mmHg) 80 64 72 69 74    Pulse 80 89 84 80 79          09/24: Patient's  called and states that he disconnected cuff from bluetooth.  He tried to connect the cuff, but it is not connecting.  The cuff is charged and his bluetooth is on, but the [employer] jose r is saying he needs to create a pin, but there is not place to put it in.  Placed task for tech support.  Will follow up in a few weeks.  States he will try to go by [facility] in [place] on Thursday or Friday.

## 2018-09-26 DIAGNOSIS — I10 HYPERTENSION, UNSPECIFIED TYPE: ICD-10-CM

## 2018-09-27 RX ORDER — IRBESARTAN 150 MG/1
150 TABLET ORAL NIGHTLY
Qty: 90 TABLET | Refills: 3 | Status: SHIPPED | OUTPATIENT
Start: 2018-09-27 | End: 2019-09-30

## 2018-09-27 RX ORDER — HYDROCODONE BITARTRATE AND ACETAMINOPHEN 5; 325 MG/1; MG/1
1 TABLET ORAL EVERY 12 HOURS PRN
Qty: 60 TABLET | Refills: 0 | Status: SHIPPED | OUTPATIENT
Start: 2018-10-12 | End: 2018-11-10

## 2018-09-27 RX ORDER — HYDROCODONE BITARTRATE AND ACETAMINOPHEN 5; 325 MG/1; MG/1
1 TABLET ORAL EVERY 12 HOURS PRN
Qty: 60 TABLET | Refills: 0 | Status: SHIPPED | OUTPATIENT
Start: 2018-11-10 | End: 2018-12-09

## 2018-09-27 RX ORDER — HYDROCODONE BITARTRATE AND ACETAMINOPHEN 5; 325 MG/1; MG/1
1 TABLET ORAL EVERY 12 HOURS PRN
Qty: 60 TABLET | Refills: 0 | Status: SHIPPED | OUTPATIENT
Start: 2018-12-09 | End: 2019-01-07

## 2018-09-28 ENCOUNTER — PATIENT MESSAGE (OUTPATIENT)
Dept: PAIN MEDICINE | Facility: CLINIC | Age: 73
End: 2018-09-28

## 2018-09-28 ENCOUNTER — OFFICE VISIT (OUTPATIENT)
Dept: PAIN MEDICINE | Facility: CLINIC | Age: 73
End: 2018-09-28
Payer: MEDICARE

## 2018-09-28 VITALS
WEIGHT: 120 LBS | HEIGHT: 59 IN | SYSTOLIC BLOOD PRESSURE: 127 MMHG | BODY MASS INDEX: 24.19 KG/M2 | HEART RATE: 91 BPM | DIASTOLIC BLOOD PRESSURE: 73 MMHG

## 2018-09-28 DIAGNOSIS — M79.18 MYOFASCIAL PAIN: ICD-10-CM

## 2018-09-28 DIAGNOSIS — M50.30 DDD (DEGENERATIVE DISC DISEASE), CERVICAL: ICD-10-CM

## 2018-09-28 DIAGNOSIS — M47.812 SPONDYLOSIS OF CERVICAL REGION WITHOUT MYELOPATHY OR RADICULOPATHY: Primary | ICD-10-CM

## 2018-09-28 DIAGNOSIS — M77.8 TRICEPS TENDONITIS: ICD-10-CM

## 2018-09-28 PROCEDURE — 99999 PR PBB SHADOW E&M-EST. PATIENT-LVL IV: CPT | Mod: PBBFAC,,, | Performed by: PHYSICIAN ASSISTANT

## 2018-09-28 PROCEDURE — 99214 OFFICE O/P EST MOD 30 MIN: CPT | Mod: PBBFAC,PN | Performed by: PHYSICIAN ASSISTANT

## 2018-09-28 PROCEDURE — 99499 UNLISTED E&M SERVICE: CPT | Mod: HCNC,S$GLB,, | Performed by: PHYSICIAN ASSISTANT

## 2018-09-28 PROCEDURE — 99214 OFFICE O/P EST MOD 30 MIN: CPT | Mod: S$PBB,,, | Performed by: PHYSICIAN ASSISTANT

## 2018-09-28 PROCEDURE — 1101F PT FALLS ASSESS-DOCD LE1/YR: CPT | Mod: CPTII,,, | Performed by: PHYSICIAN ASSISTANT

## 2018-09-28 PROCEDURE — 3074F SYST BP LT 130 MM HG: CPT | Mod: CPTII,,, | Performed by: PHYSICIAN ASSISTANT

## 2018-09-28 PROCEDURE — 3078F DIAST BP <80 MM HG: CPT | Mod: CPTII,,, | Performed by: PHYSICIAN ASSISTANT

## 2018-09-28 RX ORDER — CYCLOBENZAPRINE HCL 10 MG
10 TABLET ORAL 3 TIMES DAILY PRN
Qty: 270 TABLET | Refills: 0 | Status: SHIPPED | OUTPATIENT
Start: 2018-09-28 | End: 2018-12-27

## 2018-09-28 RX ORDER — TIZANIDINE 4 MG/1
4 TABLET ORAL 3 TIMES DAILY
Qty: 90 TABLET | Refills: 1 | Status: SHIPPED | OUTPATIENT
Start: 2018-09-28 | End: 2019-01-03 | Stop reason: SDUPTHER

## 2018-09-28 RX ORDER — TIZANIDINE 4 MG/1
4 TABLET ORAL 3 TIMES DAILY
Qty: 90 TABLET | Refills: 1 | Status: SHIPPED | OUTPATIENT
Start: 2018-09-28 | End: 2019-01-22

## 2018-09-28 RX ORDER — MELOXICAM 7.5 MG/1
7.5 TABLET ORAL DAILY
Qty: 90 TABLET | Refills: 0 | Status: SHIPPED | OUTPATIENT
Start: 2018-09-28 | End: 2018-12-27

## 2018-09-28 NOTE — PROGRESS NOTES
Referring Physician: No ref. provider found    PCP: Liseth Carias MD      CC: neck and left occipital pain    Interval history: Ms. Richter is a 73 y.o. female with neck pain and occipital neuralgia who presents today for f/u and medication refill. Neck pain is bothersome.  Her pain radiates into both arms, worse on the right.  Her right arm pain travels to her hand, her left stops around her shoulder.  She has numbness to her right hand and first through fourth digits. She is s/p cervical MELANY in February that only provided benefit for a short time.  She also reports left sided occipital pain which has responded well to occipital nerve blocks in the past.   She continues to take Norco, Mobic and Flexeril which provide benefit.  No bowel bladder changes.   Pain today is rated 8/10.    Prior HPI:   Patient is 70-year-old female with past history history of cervical DDD, cervical spondylosis and chronic headaches.  She recently moved here from [place], [place].  She is treated in the past by neurology.  She states having constant burning pain over the left side of her posterior scalp.  Pain radiates to her neck as well as a frontal.  She also has left-sided neck pain as well.  She denies any radicular arm pain.  No numbness or weakness.  She states having cervical epidural steroid injection at past with minimal benefit.  She also has had decided of cervical nerve blocks in the past with moderate benefit.  Most recent injection was performed 2 months ago.  She desires repeat injection.  She currently takes Norco 10 mg every 12 hours as needed with moderate benefit.  She also takes Zanaflex 4 mg every 8 hours with mild benefits.  She rates her pain 7/10 today.    Pain intervention history: s/p left occipital nerve blocks on 1/2016 with 50% relief of her headaches  S/p cervical MELANY 2/8/18 moderate relief for a couple of weeks.     ROS:  CONSTITUTIONAL: No fevers, chills, night sweats, wt. loss, appetite  changes  SKIN: no rashes or itching  ENT: No headaches, head trauma, vision changes, or eye pain  LYMPH NODES: None noticed   CV: No chest pain, palpitations.   RESP: No shortness of breath, dyspnea on exertion, cough, wheezing, or hemoptysis  GI: No nausea, emesis, diarrhea, constipation, melena, hematochezia, pain.    : No dysuria, hematuria, urgency, or frequency   HEME: No easy bruising, bleeding problems  PSYCHIATRIC: No depression, anxiety, psychosis, hallucinations.  NEURO: No seizures, memory loss, dizziness or difficulty sleeping  MSK: + History of present illness      Past Medical History:   Diagnosis Date    Anxiety     Arthritis     Cataract     DDD (degenerative disc disease), lumbar     Depression     GERD (gastroesophageal reflux disease)     Hyperlipidemia     Hypertension     pt ststes she does not take meds anymore she just watches her weight//    Immunosuppressed status 2016    Neuromuscular disorder     Occipital neuralgia 3/24/2017    Osteoporosis     Substance abuse      Past Surgical History:   Procedure Laterality Date    APPENDECTOMY      APPENDECTOMY-LAPAROSCOPIC N/A 10/9/2016    [facility]     SECTION      x 2    CHOLECYSTECTOMY      ESOPHAGOGASTRODUODENOSCOPY (EGD) N/A 3/14/2017    [facility] ENDO    ESOPHAGOGASTRODUODENOSCOPY (EGD) N/A 2/3/2016    [facility]jo [facility] END[facility]    HYSTERECTOMY      INJECTION-STEROID-EPIDURAL-CERVICAL N/A 2018    [facility]    INJECTION-STEROID-EPIDURAL-CERVICAL N/A 10/23/2017    [facility]    Laser Periphery Iridotomy Bilateral     OD 16 and OS touch up 2016    vocal cord tumor removal       Family History   Problem Relation Age of Onset    Osteoarthritis Mother     Alcohol abuse Mother     Rheum arthritis Mother     Osteoarthritis Sister     Diabetes Brother  "    No Known Problems Son     No Known Problems Sister     No Known Problems Sister     No Known Problems Brother     Arthritis Son     No Known Problems Son     Stroke Maternal Grandmother 99    Rheum arthritis Maternal Grandmother     Retinal detachment Neg Hx     Macular degeneration Neg Hx     Glaucoma Neg Hx     Amblyopia Neg Hx     Blindness Neg Hx     Cancer Neg Hx     Cataracts Neg Hx     Hypertension Neg Hx     Strabismus Neg Hx     Thyroid disease Neg Hx     Lupus Neg Hx     Kidney disease Neg Hx     Inflammatory bowel disease Neg Hx     Psoriasis Neg Hx      Social History     Socioeconomic History    Marital status:      Spouse name: None    Number of children: None    Years of education: None    Highest education level: None   Social Needs    Financial resource strain: None    Food insecurity - worry: None    Food insecurity - inability: None    Transportation needs - medical: None    Transportation needs - non-medical: None   Occupational History    None   Tobacco Use    Smoking status: Never Smoker    Smokeless tobacco: Never Used   Substance and Sexual Activity    Alcohol use: No     Alcohol/week: 0.0 oz    Drug use: No    Sexual activity: Yes     Partners: Male   Other Topics Concern    None   Social History Narrative    None         Medications/Allergies: See med card    Vitals:    09/28/18 0955   BP: 127/73   Pulse: 91   Weight: 54.4 kg (120 lb)   Height: 4' 11" (1.499 m)   PainSc:   8   PainLoc: Neck         Physical exam:    GENERAL: A and O x3, the patient appears well groomed and is in no acute distress.  Skin: No rashes or obvious lesions  HEENT: normocephalic, atraumatic  CARDIOVASCULAR:  RRR  LUNGS: nonlabored breathing  ABDOMEN: soft, nontender   UPPER EXTREMITIES: Normal alignment, normal range of motion, no atrophy, no skin changes,  hair growth and nail growth normal and equal bilaterally. No swelling, no tenderness.  +Phalens on left side. " +TTP tricep tendon  LOWER EXTREMITIES:  Normal alignment, normal range of motion, no atrophy, no skin changes,  hair growth and nail growth normal and equal bilaterally. No swelling, no tenderness.  CERVICAL SPINE:   Cervical spine: ROM is full in flexion, extension and lateral rotation.  Painful flexion > extension.  Positive facet loading bilaterally.  Spurling is positive at right side.  Myofascial exam:  Tenderness to palpation across cervical paraspinals and trapezius muscles bilaterally.      MENTAL STATUS: normal orientation, speech, language, and fund of knowledge for social situation.  Emotional state appropriate.    CRANIAL NERVES:  II:  PERRL bilaterally,   III,IV,VI: EOMI.    V:  Facial sensation equal bilaterally  VII:  Facial motor function normal.  VIII:  Hearing equal to finger rub bilaterally  IX/X: Gag normal, palate symmetric  XI:  Shoulder shrug equal, head turn equal  XII:  Tongue midline without fasciculations      MOTOR: Tone and bulk: normal bilateral upper and lower Strength: normal   Delt Bi Tri WE WF     R 5 5 5 5 5 5   L 5 5 5 5 5 5     IP ADD ABD Quad TA Gas HAM  R 5 5 5 5 5 5 5  L 5 5 5 5 5 5 5    SENSATION: Light touch and pinprick intact bilaterally  REFLEXES: normal, symmetric, nonbrisk.  Toes down, no clonus. No hoffmans.  GAIT: normal rise, base, steps, and arm swing.        Imaging:   Cervical MRI 12/4/17    Narrative     EXAM: Cervical spine MRI without contrast.    INDICATION: Cervical radiculopathy.  Neck pain and occipital neuralgia.  The patient complains of neck and right arm pain.    TECHNIQUE: Routine multiplanar, multisequence unenhanced cervical spine MRI was performed.    COMPARISON: Plain films of the cervical spine obtained concurrently      FINDINGS:     Vertebral column: There is straightening of the cervical spine with loss of normal lordosis.  As seen on concurrent plain films, there is trace anterolisthesis of C3 on C4 with 2 mm anterolisthesis of C4 on C5.   There is marked disc space narrowing at the C5-6 level with moderate to marked disc space narrowing at the C4-5 and C6-7 levels.  There is partial non-segmentation anteriorly at the C2-3 level.  The C2 and C3 as well as the C4 and C5 facet joints appear fused and this may represent developmental non-segmentation or degenerative ankylosis.  All of the discs are desiccated.  The odontoid process is intact.    Spinal canal, cord, epidural space: The craniovertebral junction is normal.  The spinal canal is omental and normal.  There is no significant spinal stenosis.  The cord is normal in caliber.  There is very subtle flattening of the ventral cord surface at the C4-5 and C5-6 levels where there is degenerative change.  The study is mildly motion but there is no definite cord edema or myelomalacia.    Findings by level:    C2-3: There is no spinal canal or foraminal stenosis.  There is mild left facet joint arthropathy.    C3-4: There is trace anterolisthesis.  There is left greater than right uncovertebral spurring and facet joint arthropathy.  There is a mild disc osteophyte complex, slightly eccentric to the left with subtle annular fissure.  This narrows the ventral subarachnoid space.  There is no spinal stenosis or cord compression.  There is moderate marked left foraminal stenosis.    C4-5: There is moderate disc space narrowing with 2 mm anterolisthesis of C4 on C5.  There is facet joint arthropathy or effusion left greater than right.  There is also mild bilateral but left greater than right uncovertebral spurring.  There is unroofing of a mild disc bulge which narrows the ventral subarachnoid space.  There is no spinal stenosis.  There is mild to moderate left foraminal stenosis.    C5-6:There is marked disc space narrowing.  There is bilateral uncovertebral spurring.  There is a disc osteophyte complex which narrows the subarachnoid space.  This is slightly eccentric to the right There is subtle flattening  of the ventral cord surface.  Cord signal is grossly normal.  There is mild spinal stenosis with at least moderate bilateral foraminal stenosis.    C6-7: There is moderate disc space narrowing.  There is mild uncovertebral spurring.  There is a shallow disc osteophyte complex, slightly eccentric to the right.  There is narrowing of the ventral subarachnoid space.  There is no spinal stenosis.  Cord signal is normal.  There is mild bilateral foraminal stenosis.  There is a small 3.5 mm left foraminal perineural cyst.    C7- T1: There is a Schmorl's node in inferior endplate of C7, chronic.  There are tiny bilateral foraminal perineural cysts.  There is minimal bulging of the annulus and mild facet joint arthropathy without spinal canal or foraminal stenosis.    Soft tissues/other: The prevertebral soft tissues are normal.  The airway is patent.   Impression          1. There is multilevel degenerative disc disease described in detail above.  There is no acute fracture.  There is degenerative listhesis at several levels.  There is some degree of disc osteophyte complex, uncovertebral spurring and/or facet joint arthropathy contributing to some degree of foraminal stenosis at several levels.  There is no significant spinal canal stenosis.  There is very subtle flattening of the ventral cord surface at the C4-5 and C6-7 levels The pertinent findings are summarized below.    2. At the C3-4 level, there is no spinal stenosis.  There is moderate to marked left foraminal stenosis.    3. At the C4-5 level there is no spinal stenosis.  There is mild to moderate left foraminal stenosis.    4. At the C5-6 level, there is mild spinal stenosis with at least moderate bilateral foraminal stenosis.    5. At the C6-7 level, there is no spinal stenosis.  There is mild bilateral foraminal stenosis.    6. There is no spinal canal or foraminal stenosis at the C2-3 or C7-T1 levels.     Assessment:  Mrs. Richter is a 73 y.o. female with  neck and head pain    1. Spondylosis of cervical region without myelopathy or radiculopathy    2. Myofascial pain    3. DDD (degenerative disc disease), cervical    4. Triceps tendonitis      Plan:  1. I have stressed the importance of physical activity and exercise to improve overall health.  2. Consider repeat C7-T1 [place] MELANY in the future  3. Consider repeat occipital blocks for head pain.  4. Norco 5mg q12hrs as needed for pain. 3 prescriptions.  reviewed  5. Continue Flexeril and Mobic.  Refill sent for 90 day supply. Labs reviewed today.  6. Recommend PT to focus on neck and upper back. She will call when ready to schedule  7. RTC in 3 months or sooner if needed.    Medication management by Dr. Grimaldo.      Kathy Jim PA-C  09/28/2018

## 2018-10-03 NOTE — PROGRESS NOTES
Last 5 Patient Entered Readings                                      Current 30 Day Average: 135/72     Recent Readings 10/2/2018 10/1/2018 9/21/2018 9/20/2018 9/19/2018    SBP (mmHg) 122 127 120 134 147    DBP (mmHg) 72 73 80 64 72    Pulse 78 83 80 89 84          BP readings again transmitting and are at goal. No medication recommendations at this time. Continue to monitor.

## 2018-10-10 ENCOUNTER — OFFICE VISIT (OUTPATIENT)
Dept: FAMILY MEDICINE | Facility: CLINIC | Age: 73
End: 2018-10-10
Payer: MEDICARE

## 2018-10-10 VITALS
BODY MASS INDEX: 24.36 KG/M2 | DIASTOLIC BLOOD PRESSURE: 57 MMHG | RESPIRATION RATE: 16 BRPM | WEIGHT: 120.81 LBS | TEMPERATURE: 99 F | SYSTOLIC BLOOD PRESSURE: 115 MMHG | HEIGHT: 59 IN | HEART RATE: 87 BPM

## 2018-10-10 DIAGNOSIS — N30.01 ACUTE CYSTITIS WITH HEMATURIA: Primary | ICD-10-CM

## 2018-10-10 DIAGNOSIS — I10 ESSENTIAL HYPERTENSION: ICD-10-CM

## 2018-10-10 DIAGNOSIS — Z23 FLU VACCINE NEED: ICD-10-CM

## 2018-10-10 LAB
BILIRUB SERPL-MCNC: ABNORMAL MG/DL
BLOOD URINE, POC: ABNORMAL
COLOR, POC UA: ABNORMAL
GLUCOSE UR QL STRIP: ABNORMAL
KETONES UR QL STRIP: ABNORMAL
LEUKOCYTE ESTERASE URINE, POC: ABNORMAL
NITRITE, POC UA: ABNORMAL
PH, POC UA: 7
PROTEIN, POC: 30
SPECIFIC GRAVITY, POC UA: 1.01
UROBILINOGEN, POC UA: ABNORMAL

## 2018-10-10 PROCEDURE — 1101F PT FALLS ASSESS-DOCD LE1/YR: CPT | Mod: CPTII,,, | Performed by: PHYSICIAN ASSISTANT

## 2018-10-10 PROCEDURE — 90662 IIV NO PRSV INCREASED AG IM: CPT | Mod: PBBFAC,PO

## 2018-10-10 PROCEDURE — 87186 SC STD MICRODIL/AGAR DIL: CPT

## 2018-10-10 PROCEDURE — 81002 URINALYSIS NONAUTO W/O SCOPE: CPT | Mod: PBBFAC,PO | Performed by: PHYSICIAN ASSISTANT

## 2018-10-10 PROCEDURE — 99999 PR PBB SHADOW E&M-EST. PATIENT-LVL V: CPT | Mod: PBBFAC,,, | Performed by: PHYSICIAN ASSISTANT

## 2018-10-10 PROCEDURE — 87088 URINE BACTERIA CULTURE: CPT

## 2018-10-10 PROCEDURE — 99213 OFFICE O/P EST LOW 20 MIN: CPT | Mod: S$PBB,,, | Performed by: PHYSICIAN ASSISTANT

## 2018-10-10 PROCEDURE — 3074F SYST BP LT 130 MM HG: CPT | Mod: CPTII,,, | Performed by: PHYSICIAN ASSISTANT

## 2018-10-10 PROCEDURE — 3078F DIAST BP <80 MM HG: CPT | Mod: CPTII,,, | Performed by: PHYSICIAN ASSISTANT

## 2018-10-10 PROCEDURE — 99215 OFFICE O/P EST HI 40 MIN: CPT | Mod: PBBFAC,PO,25 | Performed by: PHYSICIAN ASSISTANT

## 2018-10-10 PROCEDURE — 87086 URINE CULTURE/COLONY COUNT: CPT

## 2018-10-10 PROCEDURE — 87077 CULTURE AEROBIC IDENTIFY: CPT

## 2018-10-10 RX ORDER — NITROFURANTOIN 25; 75 MG/1; MG/1
100 CAPSULE ORAL 2 TIMES DAILY
Qty: 10 CAPSULE | Refills: 0 | Status: SHIPPED | OUTPATIENT
Start: 2018-10-10 | End: 2018-10-15

## 2018-10-10 NOTE — PROGRESS NOTES
Subjective:       Patient ID: Rola Richter is a 73 y.o. female.    Chief Complaint: Cystitis (burn when urinate)    Urinary Tract Infection    This is a new problem. The current episode started in the past 7 days. The problem has been unchanged. The quality of the pain is described as burning. The pain is moderate. There has been no fever. Associated symptoms include chills, frequency, hematuria and urgency. Pertinent negatives include no behavior changes, discharge, flank pain, hesitancy, nausea, possible pregnancy, sweats, vomiting, weight loss, constipation or withholding. Treatments tried: AZO. The treatment provided no relief. Her past medical history is significant for hypertension. There is no history of diabetes mellitus, genitourinary reflux or kidney stones.     Review of Systems   Constitutional: Positive for chills. Negative for activity change, appetite change, fatigue, fever and weight loss.   Respiratory: Negative for cough.    Cardiovascular: Negative for chest pain and palpitations.   Gastrointestinal: Negative for abdominal pain, constipation, diarrhea, nausea and vomiting.   Genitourinary: Positive for dysuria, frequency, hematuria, pelvic pain and urgency. Negative for flank pain and hesitancy.   Allergic/Immunologic: Negative for immunocompromised state.   Neurological: Negative for weakness and light-headedness.       Objective:      Vitals:    10/10/18 1023   BP: (!) 115/57   Pulse: 87   Resp: 16   Temp: 99.1 °F (37.3 °C)     Physical Exam   Constitutional: She is oriented to person, place, and time. She appears well-developed and well-nourished.   HENT:   Head: Normocephalic and atraumatic.   Eyes: Conjunctivae and EOM are normal. Pupils are equal, round, and reactive to light.   Cardiovascular: Normal rate, regular rhythm, normal heart sounds and intact distal pulses.   Pulmonary/Chest: Effort normal and breath sounds normal.   Abdominal: There is tenderness in the suprapubic area.  There is no CVA tenderness.   Neurological: She is alert and oriented to person, place, and time.   Skin: Skin is warm and dry.   Psychiatric: She has a normal mood and affect. Her behavior is normal.   Vitals reviewed.      Assessment:       1. Acute cystitis with hematuria    2. Essential hypertension    3. Flu vaccine need        Plan:       Urinary tract infection with hematuria, site unspecified  -     POCT urine dipstick without microscope. + leukocytes, positive nitrates, +blood  -     Urinalysis and Urine culture  -     nitrofurantoin, macrocrystal-monohydrate, (MACROBID) 100 MG capsule; Take 1 capsule (100 mg total) by mouth 2 (two) times daily. for 5 days  Dispense: 10 capsule; Refill: 0  - Take antibiotics with food.  Increase fluid intake.  Call the clinic if symptoms worsen, new symptoms develop or if you are not any better after completion of your antibiotics.      Essential hypertension         - Controlled on current medications    Flu vaccine need  -     Influenza - High Dose (65+) (PF) (IM)        Patient readiness: acceptance and barriers:none    During the course of the visit the patient was educated and counseled about the following:     Hypertension:   Medication: no change.    Goals: Hypertension: Reduce Blood Pressure    Did patient meet goals/outcomes: No    The following self management tools provided: declined    Patient Instructions (the written plan) was given to the patient/family.     Time spent with patient: 15 minutes       Follow up with PCP as routinely as scheduled

## 2018-10-13 LAB — BACTERIA UR CULT: NORMAL

## 2018-10-18 ENCOUNTER — PATIENT OUTREACH (OUTPATIENT)
Dept: OTHER | Facility: OTHER | Age: 73
End: 2018-10-18

## 2018-10-18 NOTE — PROGRESS NOTES
Last 5 Patient Entered Readings                                      Current 30 Day Average: 137/74     Recent Readings 10/18/2018 10/11/2018 10/8/2018 10/7/2018 10/2/2018    SBP (mmHg) 151 131 158 142 122    DBP (mmHg) 75 70 78 83 72    Pulse 97 74 84 93 78          Digital Medicine: Health  Follow Up    Lifestyle Modifications:    1.Dietary Modifications (Sodium intake <2,000mg/day, food labels, dining out): Reports no change in diet.    2.Physical Activity: No change in PA    3.Medication Therapy: Patient has been compliant with the medication regimen.    4.Patient has the following medication side effects/concerns: none  (Frequency/Alleviating factors/Precipitating factors, etc.)     Follow up with Mrs. Rola MENDOZA Neelam completed. No further questions or concerns. Will continue to follow up to achieve health goals.    Spoke with Aldo, patient's .  When asked about higher reading today, states she was reading her bible for 15min before taking reading.  States she may be experiencing stress from son in [place].  Having hard time to sell his house.

## 2018-10-25 ENCOUNTER — PATIENT OUTREACH (OUTPATIENT)
Dept: OTHER | Facility: OTHER | Age: 73
End: 2018-10-25

## 2018-10-25 NOTE — PROGRESS NOTES
Last 5 Patient Entered Readings                                      Current 30 Day Average: 144/76     Recent Readings 10/25/2018 10/23/2018 10/22/2018 10/18/2018 10/11/2018    SBP (mmHg) 145 157 161 151 131    DBP (mmHg) 76 76 77 75 70    Pulse 86 81 72 97 74        Patient's BP average is above goal of <130/80.     Patient denies s/s of hypotension (lightheadedness, dizziness, nausea, fatigue) associated with low readings. Instructed patient to inform me if this occurs, patient confirms understanding.      Patient denies s/s of hypertension (SOB, CP, severe headaches, changes in vision) associated with high readings. Instructed patient to go to the ED if BP > 180/110 and accompanied by hypertensive s/s, patient confirms understanding.    Patient reports starting zanaflex BID (after breakfast and supper, 6-7 pm) about 1 week ago. Counseled on possible decrease in BP; she reports occasional dizziness that resolves on own. Counseled on fall precautions. She may not take evening dose of tizanidine daily. She confirms that she is waiting about an hour after waking before taking a reading. Reminded patient to fully charge BP cuff at least once every 2 weeks.    Will continue to monitor regularly. Will follow up in 2-3 weeks, sooner if BP begins to trend upward or downward.    Patient has my contact information and knows to call with any concerns or clinical changes.     Current HTN regimen:  Hypertension Medications             irbesartan (AVAPRO) 150 MG tablet Take 1 tablet (150 mg total) by mouth every evening. (for blood pressure)

## 2018-11-15 ENCOUNTER — PATIENT OUTREACH (OUTPATIENT)
Dept: OTHER | Facility: OTHER | Age: 73
End: 2018-11-15

## 2018-11-15 NOTE — PROGRESS NOTES
Last 5 Patient Entered Readings                                      Current 30 Day Average: 149/75     Recent Readings 11/12/2018 11/4/2018 11/1/2018 10/29/2018 10/27/2018    SBP (mmHg) 161 144 140 149 132    DBP (mmHg) 75 72 73 77 72    Pulse 75 89 85 91 89        Left voicemail. Discuss increasing irbesartan.

## 2018-11-16 NOTE — PROGRESS NOTES
Last 5 Patient Entered Readings                                      Current 30 Day Average: 148/74     Recent Readings 11/16/2018 11/12/2018 11/4/2018 11/1/2018 10/29/2018    SBP (mmHg) 144 161 144 140 149    DBP (mmHg) 69 75 72 73 77    Pulse 78 75 89 85 91          11/16: Pharmacist tried to contact patient yesterday.  Pushing call back.

## 2018-11-29 NOTE — PROGRESS NOTES
Last 5 Patient Entered Readings                                      Current 30 Day Average: 147/74     Recent Readings 11/27/2018 11/17/2018 11/16/2018 11/12/2018 11/4/2018    SBP (mmHg) 149 145 144 161 144    DBP (mmHg) 78 78 69 75 72    Pulse 86 86 78 75 89        Patient's BP average is above goal of <130/80.     Patient denies s/s of hypotension (lightheadedness, dizziness, nausea, fatigue) associated with low readings. Instructed patient to inform me if this occurs, patient confirms understanding.      Patient denies s/s of hypertension (SOB, CP, severe headaches, changes in vision) associated with high readings. Instructed patient to go to the ED if BP > 180/110 and accompanied by hypertensive s/s, patient confirms understanding.    Patient reports rheumatism this time of year, causing increased pain. She was under the impression from a prior appointment that she could take irbesartan every other day. She resumed once daily 11/26. Stressed to take irbesartan every day due to elevated BP; she verbalized understanding.    Will continue to monitor regularly. Will follow up in 2-3 weeks, sooner if BP begins to trend upward or downward.    Patient has my contact information and knows to call with any concerns or clinical changes.     Current HTN regimen:  Hypertension Medications             irbesartan (AVAPRO) 150 MG tablet Take 1 tablet (150 mg total) by mouth every evening. (for blood pressure)

## 2018-12-04 ENCOUNTER — PATIENT OUTREACH (OUTPATIENT)
Dept: OTHER | Facility: OTHER | Age: 73
End: 2018-12-04

## 2018-12-04 NOTE — PROGRESS NOTES
Last 5 Patient Entered Readings                                      Current 30 Day Average: 150/73     Recent Readings 12/4/2018 12/3/2018 12/2/2018 11/29/2018 11/27/2018    SBP (mmHg) 145 146 160 154 149    DBP (mmHg) 64 77 71 75 78    Pulse 95 82 97 86 86          Digital Medicine: Health  Follow Up    Lifestyle Modifications:    1.Dietary Modifications (Sodium intake <2,000mg/day, food labels, dining out): deferred    2.Physical Activity: States she has been doing exercises in the morning before breakfast.  States her joints would hurt sometime and encouraged patient to ice joints if sore.    3.Medication Therapy: Patient has been compliant with the medication regimen.    4.Patient has the following medication side effects/concerns: none  (Frequency/Alleviating factors/Precipitating factors, etc.)     Follow up with Mrs. Rola MENDOZA Neelam completed. No further questions or concerns. Will continue to follow up to achieve health goals.    Patient reports getting headaches this time of year.  Feels it is because of the weather.

## 2018-12-07 DIAGNOSIS — G89.29 OTHER CHRONIC PAIN: ICD-10-CM

## 2018-12-08 RX ORDER — PREGABALIN 75 MG/1
CAPSULE ORAL
Qty: 30 CAPSULE | Refills: 0 | Status: SHIPPED | OUTPATIENT
Start: 2018-12-08 | End: 2019-09-30

## 2018-12-10 NOTE — TELEPHONE ENCOUNTER
Called  Patient regarding Rx refill and follow up appointment, no answer left detail voice message for patient to call back to scheduled follow up appointment.

## 2018-12-12 ENCOUNTER — PATIENT MESSAGE (OUTPATIENT)
Dept: PSYCHIATRY | Facility: CLINIC | Age: 73
End: 2018-12-12

## 2018-12-13 ENCOUNTER — PATIENT OUTREACH (OUTPATIENT)
Dept: OTHER | Facility: OTHER | Age: 73
End: 2018-12-13

## 2018-12-13 NOTE — PROGRESS NOTES
Last 5 Patient Entered Readings                                      Current 30 Day Average: 145/73     Recent Readings 12/12/2018 12/11/2018 12/10/2018 12/4/2018 12/3/2018    SBP (mmHg) 139 149 115 145 146    DBP (mmHg) 69 79 67 64 77    Pulse 86 88 84 95 82        Patient's BP average is above goal of <130/80.     Patient denies s/s of hypotension (lightheadedness, dizziness, nausea, fatigue) associated with low readings. Instructed patient to inform me if this occurs, patient confirms understanding.      Patient denies s/s of hypertension (SOB, CP, severe headaches, changes in vision) associated with high readings. Instructed patient to go to the ED if BP > 180/110 and accompanied by hypertensive s/s, patient confirms understanding.    Patient reports adherence with irbesartan. She relates elevated BP to pain. Recommended to increase irbesartan. She declines, states she wants to work on diet (decreasing portion sizes). She also expressed some concerns about side effects with medication but declines experiencing any. She reports occasional dizziness when going from sitting to standing.    Will continue to monitor regularly. Will follow up in 3-4 weeks, sooner if BP begins to trend upward or downward.    Patient has my contact information and knows to call with any concerns or clinical changes.     Current HTN regimen:  Hypertension Medications             irbesartan (AVAPRO) 150 MG tablet Take 1 tablet (150 mg total) by mouth every evening. (for blood pressure)

## 2018-12-17 ENCOUNTER — TELEPHONE (OUTPATIENT)
Dept: FAMILY MEDICINE | Facility: CLINIC | Age: 73
End: 2018-12-17

## 2018-12-17 ENCOUNTER — LAB VISIT (OUTPATIENT)
Dept: LAB | Facility: HOSPITAL | Age: 73
End: 2018-12-17
Attending: FAMILY MEDICINE
Payer: MEDICARE

## 2018-12-17 DIAGNOSIS — Z12.11 SCREEN FOR COLON CANCER: ICD-10-CM

## 2018-12-17 DIAGNOSIS — Z12.11 SCREEN FOR COLON CANCER: Primary | ICD-10-CM

## 2018-12-17 LAB — HEMOCCULT STL QL IA: NEGATIVE

## 2018-12-17 PROCEDURE — 82274 ASSAY TEST FOR BLOOD FECAL: CPT

## 2018-12-17 NOTE — TELEPHONE ENCOUNTER
----- Message from Brent Noel sent at 12/17/2018 12:23 PM CST -----  Patient mailed in stool specimen for FOBT no orders were found, can you please place orders and contact our LAB at [phone] ASAP this specimen is time sensitive.

## 2018-12-17 NOTE — TELEPHONE ENCOUNTER
2nd wave of [facility] mail outs did not have orders with them. Order placed. Spoke with Lab and informed of order.

## 2018-12-18 DIAGNOSIS — F43.10 PTSD (POST-TRAUMATIC STRESS DISORDER): ICD-10-CM

## 2018-12-18 RX ORDER — SERTRALINE HYDROCHLORIDE 100 MG/1
TABLET, FILM COATED ORAL
Qty: 90 TABLET | Refills: 0 | Status: SHIPPED | OUTPATIENT
Start: 2018-12-18 | End: 2019-01-07 | Stop reason: SDUPTHER

## 2018-12-31 ENCOUNTER — TELEPHONE (OUTPATIENT)
Dept: FAMILY MEDICINE | Facility: CLINIC | Age: 73
End: 2018-12-31

## 2018-12-31 NOTE — TELEPHONE ENCOUNTER
----- Message from Norma Flood sent at 12/31/2018 10:55 AM CST -----  Contact: Patient  Type: Needs Medical Advice    Who Called:  Patient  Symptoms (please be specific):  Bladder infection  How long has patient had these symptoms:  Since Friday  Pharmacy name and phone #:    [id number] - [place], [place] - [address] AT [facility] [address]  [address] [address]  Phone: [phone] Fax: [phone]  Best Call Back Number:   Additional Information: Patient wants to know if she can bring a specimen in today.  Please call to discuss.

## 2019-01-02 NOTE — TELEPHONE ENCOUNTER
----- Message from Donna Mclaughlin sent at 2019  2:45 PM CST -----  Type:  RX Refill Request    Who Called: spouse- Aldo Richter  Refill or New Rx:  New Rx  RX Name and Strength:  HYDROcodone-acetaminophen (NORCO) 5-325 mg per tablet  How is the patient currently taking it? (ex. 1XDay):  30  Is this a 30 day or 90 day RX: 30    Preferred Pharmacy with phone number: [id number] - [place], [place] - [address] AT [place] [address]  [address]  [facility] [address]  Phone: [phone] Fax: [phone]   Local or Mail Order:  local  Ordering Provider:  Dillan Ortiz Call Back Number:  [phone] (home)     Additional Information: Stating the Rx , need a new prescription

## 2019-01-03 ENCOUNTER — OFFICE VISIT (OUTPATIENT)
Dept: FAMILY MEDICINE | Facility: CLINIC | Age: 74
End: 2019-01-03
Payer: MEDICARE

## 2019-01-03 VITALS
WEIGHT: 121.5 LBS | DIASTOLIC BLOOD PRESSURE: 64 MMHG | HEIGHT: 59 IN | OXYGEN SATURATION: 96 % | SYSTOLIC BLOOD PRESSURE: 120 MMHG | HEART RATE: 81 BPM | BODY MASS INDEX: 24.49 KG/M2 | TEMPERATURE: 99 F

## 2019-01-03 DIAGNOSIS — N30.00 ACUTE CYSTITIS WITHOUT HEMATURIA: ICD-10-CM

## 2019-01-03 DIAGNOSIS — I10 ESSENTIAL HYPERTENSION: ICD-10-CM

## 2019-01-03 DIAGNOSIS — E78.00 PURE HYPERCHOLESTEROLEMIA: ICD-10-CM

## 2019-01-03 DIAGNOSIS — R30.0 BURNING WITH URINATION: Primary | ICD-10-CM

## 2019-01-03 LAB
BILIRUB SERPL-MCNC: NEGATIVE MG/DL
BLOOD URINE, POC: ABNORMAL
COLOR, POC UA: YELLOW
GLUCOSE UR QL STRIP: NORMAL
KETONES UR QL STRIP: NEGATIVE
LEUKOCYTE ESTERASE URINE, POC: ABNORMAL
NITRITE, POC UA: NEGATIVE
PH, POC UA: 7
PROTEIN, POC: NEGATIVE
SPECIFIC GRAVITY, POC UA: 1
UROBILINOGEN, POC UA: NORMAL

## 2019-01-03 PROCEDURE — 1101F PT FALLS ASSESS-DOCD LE1/YR: CPT | Mod: CPTII,S$GLB,, | Performed by: NURSE PRACTITIONER

## 2019-01-03 PROCEDURE — 3078F DIAST BP <80 MM HG: CPT | Mod: CPTII,S$GLB,, | Performed by: NURSE PRACTITIONER

## 2019-01-03 PROCEDURE — 3074F SYST BP LT 130 MM HG: CPT | Mod: CPTII,S$GLB,, | Performed by: NURSE PRACTITIONER

## 2019-01-03 PROCEDURE — 3078F PR MOST RECENT DIASTOLIC BLOOD PRESSURE < 80 MM HG: ICD-10-PCS | Mod: CPTII,S$GLB,, | Performed by: NURSE PRACTITIONER

## 2019-01-03 PROCEDURE — 99999 PR PBB SHADOW E&M-EST. PATIENT-LVL III: ICD-10-PCS | Mod: PBBFAC,,, | Performed by: NURSE PRACTITIONER

## 2019-01-03 PROCEDURE — 99499 UNLISTED E&M SERVICE: CPT | Mod: HCNC,S$GLB,, | Performed by: NURSE PRACTITIONER

## 2019-01-03 PROCEDURE — 3074F PR MOST RECENT SYSTOLIC BLOOD PRESSURE < 130 MM HG: ICD-10-PCS | Mod: CPTII,S$GLB,, | Performed by: NURSE PRACTITIONER

## 2019-01-03 PROCEDURE — 99999 PR PBB SHADOW E&M-EST. PATIENT-LVL III: CPT | Mod: PBBFAC,,, | Performed by: NURSE PRACTITIONER

## 2019-01-03 PROCEDURE — 1101F PR PT FALLS ASSESS DOC 0-1 FALLS W/OUT INJ PAST YR: ICD-10-PCS | Mod: CPTII,S$GLB,, | Performed by: NURSE PRACTITIONER

## 2019-01-03 PROCEDURE — 99214 PR OFFICE/OUTPT VISIT, EST, LEVL IV, 30-39 MIN: ICD-10-PCS | Mod: 25,S$GLB,, | Performed by: NURSE PRACTITIONER

## 2019-01-03 PROCEDURE — 81002 URINALYSIS NONAUTO W/O SCOPE: CPT | Mod: S$GLB,,, | Performed by: NURSE PRACTITIONER

## 2019-01-03 PROCEDURE — 81002 POCT URINE DIPSTICK WITHOUT MICROSCOPE: ICD-10-PCS | Mod: S$GLB,,, | Performed by: NURSE PRACTITIONER

## 2019-01-03 PROCEDURE — 99214 OFFICE O/P EST MOD 30 MIN: CPT | Mod: 25,S$GLB,, | Performed by: NURSE PRACTITIONER

## 2019-01-03 PROCEDURE — 99499 RISK ADDL DX/OHS AUDIT: ICD-10-PCS | Mod: HCNC,S$GLB,, | Performed by: NURSE PRACTITIONER

## 2019-01-03 RX ORDER — HYDROCODONE BITARTRATE AND ACETAMINOPHEN 5; 325 MG/1; MG/1
TABLET ORAL
Qty: 60 TABLET | Refills: 0 | Status: SHIPPED | OUTPATIENT
Start: 2019-01-03 | End: 2019-04-16

## 2019-01-03 RX ORDER — PNEUMOCOCCAL 13-VALENT CONJUGATE VACCINE 2.2; 2.2; 2.2; 2.2; 2.2; 4.4; 2.2; 2.2; 2.2; 2.2; 2.2; 2.2; 2.2 UG/.5ML; UG/.5ML; UG/.5ML; UG/.5ML; UG/.5ML; UG/.5ML; UG/.5ML; UG/.5ML; UG/.5ML; UG/.5ML; UG/.5ML; UG/.5ML; UG/.5ML
INJECTION, SUSPENSION INTRAMUSCULAR
Refills: 0 | COMMUNITY
Start: 2018-12-20 | End: 2019-01-03

## 2019-01-03 NOTE — PATIENT INSTRUCTIONS
"  Bladder Infection, Female (Adult)    Urine is normally doesn't have any bacteria in it. But bacteria can get into the urinary tract from the skin around the rectum. Or they can travel in the blood from elsewhere in the body. Once they are in your urinary tract, they can cause infection in the urethra (urethritis), the bladder (cystitis), or the kidneys (pyelonephritis).  The most common place for an infection is in the bladder. This is called a bladder infection. This is one of the most common infections in women. Most bladder infections are easily treated. They are not serious unless the infection spreads to the kidney.  The phrases "bladder infection," "UTI," and "cystitis" are often used to describe the same thing. But they are not always the same. Cystitis is an inflammation of the bladder. The most common cause of cystitis is an infection.  Symptoms  The infection causes inflammation in the urethra and bladder. This causes many of the symptoms. The most common symptoms of a bladder infection are:  · Pain or burning when urinating  · Having to urinate more often than usual  · Urgent need to urinate  · Only a small amount of urine comes out  · Blood in urine  · Abdominal discomfort. This is usually in the lower abdomen above the pubic bone.  · Cloudy urine  · Strong- or bad-smelling urine  · Unable to urinate (urinary retention)  · Unable to hold urine in (urinary incontinence)  · Fever  · Loss of appetite  · Confusion (in older adults)  Causes  Bladder infections are not contagious. You can't get one from someone else, from a toilet seat, or from sharing a bath.  The most common cause of bladder infections is bacteria from the bowels. The bacteria get onto the skin around the opening of the urethra. From there, they can get into the urine and travel up to the bladder, causing inflammation and infection. This usually happens because of:  · Wiping improperly after urinating. Always wipe from front to " back.  · Bowel incontinence  · Pregnancy  · Procedures such as having a catheter inserted  · Older age  · Not emptying your bladder. This can allow bacteria a chance to grow in your urine.  · Dehydration  · Constipation  · Sex  · Use of a diaphragm for birth control   Treatment  Bladder infections are diagnosed by a urine test. They are treated with antibiotics and usually clear up quickly without complications. Treatment helps prevent a more serious kidney infection.  Medicines  Medicines can help in the treatment of a bladder infection:  · Take antibiotics until they are used up, even if you feel better. It is important to finish them to make sure the infection has cleared.  · You can use acetaminophen or ibuprofen for pain, fever, or discomfort, unless another medicine was prescribed. If you have chronic liver or kidney disease, talk with your healthcare provider before using these medicines. Also talk with your provider if you've ever had a stomach ulcer or gastrointestinal bleeding, or are taking blood-thinner medicines.  · If you are given phenazopydridine to reduce burning with urination, it will cause your urine to become a bright orange color. This can stain clothing.  Care and prevention  These self-care steps can help prevent future infections:  · Drink plenty of fluids to prevent dehydration and flush out your bladder. Do this unless you must restrict fluids for other health reasons, or your doctor told you not to.  · Proper cleaning after going to the bathroom is important. Wipe from front to back after using the toilet to prevent the spread of bacteria.  · Urinate more often. Don't try to hold urine in for a long time.  · Wear loose-fitting clothes and cotton underwear. Avoid tight-fitting pants.  · Improve your diet and prevent constipation. Eat more fresh fruit and vegetables, and fiber, and less junk and fatty foods.  · Avoid sex until your symptoms are gone.  · Avoid caffeine, alcohol, and spicy  foods. These can irritate your bladder.  · Urinate right after intercourse to flush out your bladder.  · If you use birth control pills and have frequent bladder infections, discuss it with your doctor.  Follow-up care  Call your healthcare provider if all symptoms are not gone after 3 days of treatment. This is especially important if you have repeat infections.  If a culture was done, you will be told if your treatment needs to be changed. If directed, you can call to find out the results.  If X-rays were done, you will be told if the results will affect your treatment.  Call 911  Call 911 if any of the following occur:  · Trouble breathing  · Hard to wake up or confusion  · Fainting or loss of consciousness  · Rapid heart rate  When to seek medical advice  Call your healthcare provider right away if any of these occur:  · Fever of 100.4ºF (38.0ºC) or higher, or as directed by your healthcare provider  · Symptoms are not better by the third day of treatment  · Back or belly (abdominal) pain that gets worse  · Repeated vomiting, or unable to keep medicine down  · Weakness or dizziness  · Vaginal discharge  · Pain, redness, or swelling in the outer vaginal area (labia)  Date Last Reviewed: 10/1/2016  © 3792-1061 [employer]. [address] [place] [address], [place] [address]. All rights reserved. This information is not intended as a substitute for professional medical care. Always follow your healthcare professional's instructions.        Phenazopyridine tablets  What is this medicine?  PHENAZOPYRIDINE (fen az oh PEER i tanya) is a pain reliever. It is used to stop the pain, burning, or discomfort caused by infection or irritation of the urinary tract. This medicine is not an antibiotic. It will not cure a urinary tract infection.  How should I use this medicine?  Take this medicine by mouth with a glass of water. Follow the directions on the prescription label. Take after meals. Take your doses at regular  intervals. Do not take your medicine more often than directed. Do not skip doses or stop your medicine early even if you feel better. Do not stop taking except on your doctor's advice.  Talk to your pediatrician regarding the use of this medicine in children. Special care may be needed.  What side effects may I notice from receiving this medicine?  Side effects that you should report to your doctor or health care professional as soon as possible:  · allergic reactions like skin rash, itching or hives, swelling of the face, lips, or tongue  · blue or purple color of the skin  · difficulty breathing  · fever  · less urine  · unusual bleeding, bruising  · unusual tired, weak  · vomiting  · yellowing of the eyes or skin  Side effects that usually do not require medical attention (report to your doctor or health care professional if they continue or are bothersome):  · dark urine  · headache  · stomach upset  What may interact with this medicine?  Interactions are not expected.  What if I miss a dose?  If you miss a dose, take it as soon as you can. If it is almost time for your next dose, take only that dose. Do not take double or extra doses.  Where should I keep my medicine?  Keep out of the reach of children.  Store at room temperature between 15 and 30 degrees C (59 and 86 degrees F). Protect from light and moisture. Throw away any unused medicine after the expiration date.  What should I tell my health care provider before I take this medicine?  They need to know if you have any of these conditions:  · glucose-6-phosphate dehydrogenase (G6PD) deficiency  · kidney disease  · an unusual or allergic reaction to phenazopyridine, other medicines, foods, dyes, or preservatives  · pregnant or trying to get pregnant  · breast-feeding  What should I watch for while using this medicine?  Tell your doctor or health care professional if your symptoms do not improve or if they get worse.  This medicine colors body fluids red.  This effect is harmless and will go away after you are done taking the medicine. It will change urine to an dark orange or red color. The red color may stain clothing. Soft contact lenses may become permanently stained. It is best not to wear soft contact lenses while taking this medicine.  If you are diabetic you may get a false positive result for sugar in your urine. Talk to your health care provider.  NOTE:This sheet is a summary. It may not cover all possible information. If you have questions about this medicine, talk to your doctor, pharmacist, or health care provider. Copyright© 2017 [employer]

## 2019-01-03 NOTE — PROGRESS NOTES
Subjective:       Patient ID: Rola Richter is a 73 y.o. female.    Chief Complaint: painful urination, burning with urination    Patient is a 73 year old female who presents today with complaints of burning with urination for . Patient was seen in October for the same symptoms and was prescribed Macrobid for 5 days; no urine culture done. Patient reports taking all the antibiotic and got full relief. Stated on Monday with burning after burning, urgency and frequency. This She has and history of UTI ( one in Oct 2018 and one 1 Dec 2017), cervical DDD, cervical spondylosis and chronic headaches. Patient is followed by ,  Pain Management-prescrided Norco. Followed by Psychiatry for Bipolar and Anxiety (Zoloft and Buspar). Time spent  40 minutes with patient with 50% of time spent reviewing history, labs and coordinating care. Questions and concerns addressed.      Urinary Tract Infection    This is a recurrent problem. The current episode started acute onset. The problem occurs every urination. The problem has been gradually improving (with antibotic). The quality of the pain is described as burning. The pain is at a severity of 6/10. The pain is mild. There has been no fever. She is not sexually active. There is no history of pyelonephritis. Associated symptoms include chills, frequency, hematuria (small) and urgency. Pertinent negatives include no flank pain, nausea, possible pregnancy, constipation or rash. She has tried antibiotics and increased fluids for the symptoms. The treatment provided moderate relief. Her past medical history is significant for hypertension and recurrent UTIs. There is no history of catheterization, kidney stones or a urological procedure.     Review of Systems   Constitutional: Positive for chills.   HENT: Negative for congestion, postnasal drip and rhinorrhea.    Respiratory: Negative for cough and shortness of breath.    Cardiovascular: Negative for chest pain and palpitations.  "  Gastrointestinal: Negative for constipation and nausea.   Endocrine: Negative for polydipsia, polyphagia and polyuria.   Genitourinary: Positive for frequency, hematuria (small) and urgency. Negative for flank pain.   Musculoskeletal: Negative for back pain and neck pain.   Skin: Negative for rash.   Neurological: Negative for dizziness and light-headedness.   Hematological: Negative.    Psychiatric/Behavioral: Negative for agitation. The patient is nervous/anxious.        Objective:      /64 (BP Location: Left arm, Patient Position: Sitting, BP Method: Medium (Automatic))   Pulse 81   Temp 98.5 °F (36.9 °C) (Oral)   Ht 4' 11" (1.499 m)   Wt 55.1 kg (121 lb 7.6 oz)   SpO2 96%   BMI 24.53 kg/m²   Physical Exam   Constitutional: She is oriented to person, place, and time. She appears well-developed and well-nourished.   HENT:   Head: Normocephalic.   Eyes: Conjunctivae and EOM are normal. Pupils are equal, round, and reactive to light.   Neck: Normal range of motion. Neck supple.   Cardiovascular: Normal rate, regular rhythm, normal heart sounds and intact distal pulses.   Pulmonary/Chest: Effort normal and breath sounds normal.   Abdominal: Soft. Bowel sounds are normal. There is tenderness in the right lower quadrant and left lower quadrant. There is CVA tenderness (right).   Musculoskeletal: Normal range of motion.   Neurological: She is alert and oriented to person, place, and time.   Skin: Skin is warm and dry.   Psychiatric: She has a normal mood and affect. Her behavior is normal. Judgment and thought content normal.       Assessment:       1. Burning with urination    2. Essential hypertension    3. Pure hypercholesterolemia        Plan:       Burning with urination  -     URINALYSIS  -     Urine culture   Will call with culture results and correct antibiotic treatment  Essential hypertension   Controlled, continue medication  Pure hypercholesterolemia   Controlled, continue medication    Patient " readiness: acceptance and barriers:none    During the course of the visit the patient was educated and counseled about the following:     Hypertension:   Dietary sodium restriction.  Regular aerobic exercise.  Obesity:   General weight loss/lifestyle modification strategies discussed (elicit support from others; identify saboteurs; non-food rewards, etc).  Regular aerobic exercise program discussed.    Goals: Hypertension: Reduce Blood Pressure and Obesity: Reduce calorie intake and BMI    Did patient meet goals/outcomes: Yes    The following self management tools provided: declined    Patient Instructions (the written plan) was given to the patient/family.     Time spent with patient: 45 minutes    Barriers to medications present (no )    Adverse reactions to current medications (no)    Over the counter medications reviewed (Yes)

## 2019-01-04 ENCOUNTER — OFFICE VISIT (OUTPATIENT)
Dept: PAIN MEDICINE | Facility: CLINIC | Age: 74
End: 2019-01-04
Payer: MEDICARE

## 2019-01-04 ENCOUNTER — LAB VISIT (OUTPATIENT)
Dept: LAB | Facility: HOSPITAL | Age: 74
End: 2019-01-04
Attending: NURSE PRACTITIONER
Payer: MEDICARE

## 2019-01-04 VITALS
HEIGHT: 59 IN | BODY MASS INDEX: 24.39 KG/M2 | WEIGHT: 121 LBS | DIASTOLIC BLOOD PRESSURE: 74 MMHG | HEART RATE: 81 BPM | SYSTOLIC BLOOD PRESSURE: 132 MMHG

## 2019-01-04 DIAGNOSIS — M79.18 MYOFASCIAL PAIN: ICD-10-CM

## 2019-01-04 DIAGNOSIS — M47.812 SPONDYLOSIS OF CERVICAL REGION WITHOUT MYELOPATHY OR RADICULOPATHY: ICD-10-CM

## 2019-01-04 DIAGNOSIS — M54.12 CERVICAL RADICULOPATHY: ICD-10-CM

## 2019-01-04 DIAGNOSIS — M50.30 DDD (DEGENERATIVE DISC DISEASE), CERVICAL: ICD-10-CM

## 2019-01-04 DIAGNOSIS — M54.81 BILATERAL OCCIPITAL NEURALGIA: Primary | ICD-10-CM

## 2019-01-04 DIAGNOSIS — R30.0 BURNING WITH URINATION: ICD-10-CM

## 2019-01-04 PROCEDURE — 81001 URINALYSIS AUTO W/SCOPE: CPT

## 2019-01-04 PROCEDURE — 3078F PR MOST RECENT DIASTOLIC BLOOD PRESSURE < 80 MM HG: ICD-10-PCS | Mod: CPTII,S$GLB,, | Performed by: PHYSICIAN ASSISTANT

## 2019-01-04 PROCEDURE — 99214 OFFICE O/P EST MOD 30 MIN: CPT | Mod: S$GLB,,, | Performed by: PHYSICIAN ASSISTANT

## 2019-01-04 PROCEDURE — 3075F SYST BP GE 130 - 139MM HG: CPT | Mod: CPTII,S$GLB,, | Performed by: PHYSICIAN ASSISTANT

## 2019-01-04 PROCEDURE — 87088 URINE BACTERIA CULTURE: CPT

## 2019-01-04 PROCEDURE — 87186 SC STD MICRODIL/AGAR DIL: CPT

## 2019-01-04 PROCEDURE — 99214 PR OFFICE/OUTPT VISIT, EST, LEVL IV, 30-39 MIN: ICD-10-PCS | Mod: S$GLB,,, | Performed by: PHYSICIAN ASSISTANT

## 2019-01-04 PROCEDURE — 87077 CULTURE AEROBIC IDENTIFY: CPT

## 2019-01-04 PROCEDURE — 3075F PR MOST RECENT SYSTOLIC BLOOD PRESS GE 130-139MM HG: ICD-10-PCS | Mod: CPTII,S$GLB,, | Performed by: PHYSICIAN ASSISTANT

## 2019-01-04 PROCEDURE — 1101F PR PT FALLS ASSESS DOC 0-1 FALLS W/OUT INJ PAST YR: ICD-10-PCS | Mod: CPTII,S$GLB,, | Performed by: PHYSICIAN ASSISTANT

## 2019-01-04 PROCEDURE — 99999 PR PBB SHADOW E&M-EST. PATIENT-LVL IV: CPT | Mod: PBBFAC,,, | Performed by: PHYSICIAN ASSISTANT

## 2019-01-04 PROCEDURE — 1101F PT FALLS ASSESS-DOCD LE1/YR: CPT | Mod: CPTII,S$GLB,, | Performed by: PHYSICIAN ASSISTANT

## 2019-01-04 PROCEDURE — 87086 URINE CULTURE/COLONY COUNT: CPT

## 2019-01-04 PROCEDURE — 99999 PR PBB SHADOW E&M-EST. PATIENT-LVL IV: ICD-10-PCS | Mod: PBBFAC,,, | Performed by: PHYSICIAN ASSISTANT

## 2019-01-04 PROCEDURE — 3078F DIAST BP <80 MM HG: CPT | Mod: CPTII,S$GLB,, | Performed by: PHYSICIAN ASSISTANT

## 2019-01-04 RX ORDER — HYDROCODONE BITARTRATE AND ACETAMINOPHEN 5; 325 MG/1; MG/1
1 TABLET ORAL EVERY 12 HOURS PRN
Qty: 60 TABLET | Refills: 0 | Status: SHIPPED | OUTPATIENT
Start: 2019-03-03 | End: 2019-01-22 | Stop reason: SDUPTHER

## 2019-01-04 RX ORDER — HYDROCODONE BITARTRATE AND ACETAMINOPHEN 5; 325 MG/1; MG/1
1 TABLET ORAL EVERY 12 HOURS PRN
Qty: 60 TABLET | Refills: 0 | Status: SHIPPED | OUTPATIENT
Start: 2019-01-04 | End: 2019-01-22 | Stop reason: SDUPTHER

## 2019-01-04 RX ORDER — HYDROCODONE BITARTRATE AND ACETAMINOPHEN 5; 325 MG/1; MG/1
1 TABLET ORAL EVERY 12 HOURS PRN
Qty: 60 TABLET | Refills: 0 | Status: SHIPPED | OUTPATIENT
Start: 2019-02-02 | End: 2019-01-22 | Stop reason: SDUPTHER

## 2019-01-04 NOTE — PROGRESS NOTES
Referring Physician: No ref. provider found    PCP: Liseth Carias MD      CC: neck and left occipital pain    Interval history: Ms. Richter is a 73 y.o. female with neck pain and occipital neuralgia who presents today for f/u and medication refill. Neck pain is bothersome.  Her pain radiates into both arms, worse on the right.  Her right arm pain travels to her hand, her left stops around her shoulder.  She has numbness to her right hand and first through fourth digits. She is s/p cervical MELANY in February that only provided benefit for a short time.  She also reports bilateral sided occipital pain which has responded well to occipital nerve blocks in the past. L>R.   She continues to take Norco, Mobic and Flexeril which provide benefit.  No bowel bladder changes.   Pain today is rated 8/10.    Prior HPI:   Patient is 70-year-old female with past history history of cervical DDD, cervical spondylosis and chronic headaches.  She recently moved here from [place], [place].  She is treated in the past by neurology.  She states having constant burning pain over the left side of her posterior scalp.  Pain radiates to her neck as well as a frontal.  She also has left-sided neck pain as well.  She denies any radicular arm pain.  No numbness or weakness.  She states having cervical epidural steroid injection at past with minimal benefit.  She also has had decided of cervical nerve blocks in the past with moderate benefit.  Most recent injection was performed 2 months ago.  She desires repeat injection.  She currently takes Norco 10 mg every 12 hours as needed with moderate benefit.  She also takes Zanaflex 4 mg every 8 hours with mild benefits.  She rates her pain 7/10 today.    Pain intervention history: s/p left occipital nerve blocks on 1/2016 with 50% relief of her headaches  S/p cervical MELANY 2/8/18 moderate relief for a couple of weeks.     ROS:  CONSTITUTIONAL: No fevers, chills, night sweats, wt. loss, appetite  changes  SKIN: no rashes or itching  ENT: No headaches, head trauma, vision changes, or eye pain  LYMPH NODES: None noticed   CV: No chest pain, palpitations.   RESP: No shortness of breath, dyspnea on exertion, cough, wheezing, or hemoptysis  GI: No nausea, emesis, diarrhea, constipation, melena, hematochezia, pain.    : No dysuria, hematuria, urgency, or frequency   HEME: No easy bruising, bleeding problems  PSYCHIATRIC: No depression, anxiety, psychosis, hallucinations.  NEURO: No seizures, memory loss, dizziness or difficulty sleeping  MSK: + History of present illness      Past Medical History:   Diagnosis Date    Anxiety     Arthritis     Cataract     DDD (degenerative disc disease), lumbar     Depression     GERD (gastroesophageal reflux disease)     Hyperlipidemia     Hypertension     pt ststes she does not take meds anymore she just watches her weight//    Immunosuppressed status 2016    Neuromuscular disorder     Occipital neuralgia 3/24/2017    Osteoporosis     Substance abuse      Past Surgical History:   Procedure Laterality Date    APPENDECTOMY      APPENDECTOMY-LAPAROSCOPIC N/A 10/9/2016    [facility]     SECTION      x 2    CHOLECYSTECTOMY      ESOPHAGOGASTRODUODENOSCOPY (EGD) N/A 3/14/2017    [facility] at [facility] ENDO    ESOPHAGOGASTRODUODENOSCOPY (EGD) N/A 2/3/2016    Per[facility] Imtiaz [facility]lejo [facility] at [facility] ENDO    HYSTERECTOMY      INJECTION-STEROID-EPIDURAL-CERVICAL N/A 2018    [facility]    INJECTION-STEROID-EPIDURAL-CERVICAL N/A 10/23/2017    [facility]    Laser Periphery Iridotomy Bilateral     OD 16 and OS touch up 2016    vocal cord tumor removal       Family History   Problem Relation Age of Onset    Osteoarthritis Mother     Alcohol abuse Mother     Rheum arthritis Mother     Osteoarthritis Sister     Diabetes Brother  "    No Known Problems Son     No Known Problems Sister     No Known Problems Sister     No Known Problems Brother     Arthritis Son     No Known Problems Son     Stroke Maternal Grandmother 99    Rheum arthritis Maternal Grandmother     Retinal detachment Neg Hx     Macular degeneration Neg Hx     Glaucoma Neg Hx     Amblyopia Neg Hx     Blindness Neg Hx     Cancer Neg Hx     Cataracts Neg Hx     Hypertension Neg Hx     Strabismus Neg Hx     Thyroid disease Neg Hx     Lupus Neg Hx     Kidney disease Neg Hx     Inflammatory bowel disease Neg Hx     Psoriasis Neg Hx      Social History     Socioeconomic History    Marital status:      Spouse name: None    Number of children: None    Years of education: None    Highest education level: None   Social Needs    Financial resource strain: None    Food insecurity - worry: None    Food insecurity - inability: None    Transportation needs - medical: None    Transportation needs - non-medical: None   Occupational History    None   Tobacco Use    Smoking status: Never Smoker    Smokeless tobacco: Never Used   Substance and Sexual Activity    Alcohol use: No     Alcohol/week: 0.0 oz    Drug use: No    Sexual activity: Yes     Partners: Male   Other Topics Concern    None   Social History Narrative    None         Medications/Allergies: See med card    Vitals:    01/04/19 1016   BP: 132/74   Pulse: 81   Weight: 54.9 kg (121 lb)   Height: 4' 11" (1.499 m)   PainSc:   8   PainLoc: Neck         Physical exam:    GENERAL: A and O x3, the patient appears well groomed and is in no acute distress.  Skin: No rashes or obvious lesions  HEENT: normocephalic, atraumatic  CARDIOVASCULAR:  RRR  LUNGS: nonlabored breathing  ABDOMEN: soft, nontender   UPPER EXTREMITIES: Normal alignment, normal range of motion, no atrophy, no skin changes,  hair growth and nail growth normal and equal bilaterally. No swelling, no tenderness.  +Phalens on left side. " +TTP tricep tendon  LOWER EXTREMITIES:  Normal alignment, normal range of motion, no atrophy, no skin changes,  hair growth and nail growth normal and equal bilaterally. No swelling, no tenderness.  CERVICAL SPINE:   Cervical spine: ROM is full in flexion, extension and lateral rotation.  Painful flexion > extension.  Positive facet loading bilaterally.  Spurling is positive at right side.  Myofascial exam:  Tenderness to palpation across cervical paraspinals and trapezius muscles bilaterally.      MENTAL STATUS: normal orientation, speech, language, and fund of knowledge for social situation.  Emotional state appropriate.    CRANIAL NERVES:  II:  PERRL bilaterally,   III,IV,VI: EOMI.    V:  Facial sensation equal bilaterally  VII:  Facial motor function normal.  VIII:  Hearing equal to finger rub bilaterally  IX/X: Gag normal, palate symmetric  XI:  Shoulder shrug equal, head turn equal  XII:  Tongue midline without fasciculations      MOTOR: Tone and bulk: normal bilateral upper and lower Strength: normal   Delt Bi Tri WE WF     R 5 5 5 5 5 5   L 5 5 5 5 5 5     IP ADD ABD Quad TA Gas HAM  R 5 5 5 5 5 5 5  L 5 5 5 5 5 5 5    SENSATION: Light touch and pinprick intact bilaterally  REFLEXES: normal, symmetric, nonbrisk.  Toes down, no clonus. No hoffmans.  GAIT: normal rise, base, steps, and arm swing.        Imaging:   Cervical MRI 12/4/17    Narrative     EXAM: Cervical spine MRI without contrast.    INDICATION: Cervical radiculopathy.  Neck pain and occipital neuralgia.  The patient complains of neck and right arm pain.    TECHNIQUE: Routine multiplanar, multisequence unenhanced cervical spine MRI was performed.    COMPARISON: Plain films of the cervical spine obtained concurrently      FINDINGS:     Vertebral column: There is straightening of the cervical spine with loss of normal lordosis.  As seen on concurrent plain films, there is trace anterolisthesis of C3 on C4 with 2 mm anterolisthesis of C4 on C5.   There is marked disc space narrowing at the C5-6 level with moderate to marked disc space narrowing at the C4-5 and C6-7 levels.  There is partial non-segmentation anteriorly at the C2-3 level.  The C2 and C3 as well as the C4 and C5 facet joints appear fused and this may represent developmental non-segmentation or degenerative ankylosis.  All of the discs are desiccated.  The odontoid process is intact.    Spinal canal, cord, epidural space: The craniovertebral junction is normal.  The spinal canal is omental and normal.  There is no significant spinal stenosis.  The cord is normal in caliber.  There is very subtle flattening of the ventral cord surface at the C4-5 and C5-6 levels where there is degenerative change.  The study is mildly motion but there is no definite cord edema or myelomalacia.    Findings by level:    C2-3: There is no spinal canal or foraminal stenosis.  There is mild left facet joint arthropathy.    C3-4: There is trace anterolisthesis.  There is left greater than right uncovertebral spurring and facet joint arthropathy.  There is a mild disc osteophyte complex, slightly eccentric to the left with subtle annular fissure.  This narrows the ventral subarachnoid space.  There is no spinal stenosis or cord compression.  There is moderate marked left foraminal stenosis.    C4-5: There is moderate disc space narrowing with 2 mm anterolisthesis of C4 on C5.  There is facet joint arthropathy or effusion left greater than right.  There is also mild bilateral but left greater than right uncovertebral spurring.  There is unroofing of a mild disc bulge which narrows the ventral subarachnoid space.  There is no spinal stenosis.  There is mild to moderate left foraminal stenosis.    C5-6:There is marked disc space narrowing.  There is bilateral uncovertebral spurring.  There is a disc osteophyte complex which narrows the subarachnoid space.  This is slightly eccentric to the right There is subtle flattening  of the ventral cord surface.  Cord signal is grossly normal.  There is mild spinal stenosis with at least moderate bilateral foraminal stenosis.    C6-7: There is moderate disc space narrowing.  There is mild uncovertebral spurring.  There is a shallow disc osteophyte complex, slightly eccentric to the right.  There is narrowing of the ventral subarachnoid space.  There is no spinal stenosis.  Cord signal is normal.  There is mild bilateral foraminal stenosis.  There is a small 3.5 mm left foraminal perineural cyst.    C7- T1: There is a Schmorl's node in inferior endplate of C7, chronic.  There are tiny bilateral foraminal perineural cysts.  There is minimal bulging of the annulus and mild facet joint arthropathy without spinal canal or foraminal stenosis.    Soft tissues/other: The prevertebral soft tissues are normal.  The airway is patent.   Impression          1. There is multilevel degenerative disc disease described in detail above.  There is no acute fracture.  There is degenerative listhesis at several levels.  There is some degree of disc osteophyte complex, uncovertebral spurring and/or facet joint arthropathy contributing to some degree of foraminal stenosis at several levels.  There is no significant spinal canal stenosis.  There is very subtle flattening of the ventral cord surface at the C4-5 and C6-7 levels The pertinent findings are summarized below.    2. At the C3-4 level, there is no spinal stenosis.  There is moderate to marked left foraminal stenosis.    3. At the C4-5 level there is no spinal stenosis.  There is mild to moderate left foraminal stenosis.    4. At the C5-6 level, there is mild spinal stenosis with at least moderate bilateral foraminal stenosis.    5. At the C6-7 level, there is no spinal stenosis.  There is mild bilateral foraminal stenosis.    6. There is no spinal canal or foraminal stenosis at the C2-3 or C7-T1 levels.     Assessment:  Mrs. Richter is a 73 y.o. female with  neck and head pain    1. Bilateral occipital neuralgia    2. Myofascial pain    3. Spondylosis of cervical region without myelopathy or radiculopathy    4. DDD (degenerative disc disease), cervical    5. Cervical radiculopathy      Plan:  1. I have stressed the importance of physical activity and exercise to improve overall health.  2. Consider repeat C7-T1 [place] MELANY in the future  3. Schedule repeat occipital blocks for head pain.  4. Norco 5mg q12hrs as needed for pain. 3 prescriptions.  reviewed  5. Continue Flexeril and Mobic. Labs reviewed today. Will call for refill.   6. Recommend PT to focus on neck and upper back. She will call when ready to schedule  7. RTC in 3 months or sooner if needed.    Medication management by Dr. Grimaldo.      Kathy Jim PA-C  01/04/2019

## 2019-01-05 LAB
BACTERIA #/AREA URNS AUTO: ABNORMAL /HPF
BILIRUB UR QL STRIP: NEGATIVE
CLARITY UR REFRACT.AUTO: CLEAR
COLOR UR AUTO: YELLOW
GLUCOSE UR QL STRIP: NEGATIVE
HGB UR QL STRIP: NEGATIVE
KETONES UR QL STRIP: NEGATIVE
LEUKOCYTE ESTERASE UR QL STRIP: ABNORMAL
MICROSCOPIC COMMENT: ABNORMAL
NITRITE UR QL STRIP: NEGATIVE
PH UR STRIP: 7 [PH] (ref 5–8)
PROT UR QL STRIP: NEGATIVE
RBC #/AREA URNS AUTO: 1 /HPF (ref 0–4)
SP GR UR STRIP: 1.01 (ref 1–1.03)
SQUAMOUS #/AREA URNS AUTO: 0 /HPF
URN SPEC COLLECT METH UR: ABNORMAL
WBC #/AREA URNS AUTO: 6 /HPF (ref 0–5)

## 2019-01-07 ENCOUNTER — PATIENT OUTREACH (OUTPATIENT)
Dept: OTHER | Facility: OTHER | Age: 74
End: 2019-01-07

## 2019-01-07 DIAGNOSIS — N30.00 ACUTE CYSTITIS WITHOUT HEMATURIA: Primary | ICD-10-CM

## 2019-01-07 DIAGNOSIS — N30.00 ACUTE CYSTITIS WITHOUT HEMATURIA: ICD-10-CM

## 2019-01-07 RX ORDER — CIPROFLOXACIN 250 MG/1
250 TABLET, FILM COATED ORAL 2 TIMES DAILY
Qty: 10 TABLET | Refills: 0 | Status: SHIPPED | OUTPATIENT
Start: 2019-01-07 | End: 2019-01-07 | Stop reason: SDUPTHER

## 2019-01-07 RX ORDER — CIPROFLOXACIN 250 MG/1
250 TABLET, FILM COATED ORAL 2 TIMES DAILY
Qty: 10 TABLET | Refills: 0 | Status: SHIPPED | OUTPATIENT
Start: 2019-01-07 | End: 2019-01-08 | Stop reason: SDUPTHER

## 2019-01-07 RX ORDER — SERTRALINE HYDROCHLORIDE 100 MG/1
TABLET, FILM COATED ORAL
Qty: 90 TABLET | Refills: 0 | Status: SHIPPED | OUTPATIENT
Start: 2019-01-07 | End: 2019-01-22 | Stop reason: SDUPTHER

## 2019-01-07 NOTE — PROGRESS NOTES
Last 5 Patient Entered Readings                                      Current 30 Day Average: 142/73     Recent Readings 1/6/2019 1/1/2019 12/30/2018 12/26/2018 12/22/2018    SBP (mmHg) 135 127 157 159 140    DBP (mmHg) 66 75 76 72 71    Pulse 87 92 82 79 80          Digital Medicine: Health  Follow Up    Lifestyle Modifications:    1.Dietary Modifications (Sodium intake <2,000mg/day, food labels, dining out): Patient states she has been working on eating less.  States she had a waffle for breakfast and a quesadilla for lunch with a glass of water.  Encouraged patient to monitor hidden sodium in cheese and bread.  Patient confirmed understanding.    2.Physical Activity: Patient states she feels she has not had much energy to exercise.    3.Medication Therapy: Patient has been compliant with the medication regimen.    4.Patient has the following medication side effects/concerns:   (Frequency/Alleviating factors/Precipitating factors, etc.)     Follow up with Mrs. Rola MENDOZA Neelam completed. No further questions or concerns. Will continue to follow up to achieve health goals.    Patient is happy with latest readings on [employer] cuff and in office.

## 2019-01-08 LAB
BACTERIA UR CULT: NORMAL
BACTERIA UR CULT: NORMAL

## 2019-01-08 RX ORDER — CIPROFLOXACIN 250 MG/1
250 TABLET, FILM COATED ORAL 2 TIMES DAILY
Qty: 10 TABLET | Refills: 0 | Status: SHIPPED | OUTPATIENT
Start: 2019-01-08 | End: 2019-01-14

## 2019-01-10 NOTE — PROGRESS NOTES
Last 5 Patient Entered Readings                                      Current 30 Day Average: 144/72     Recent Readings 1/9/2019 1/6/2019 1/1/2019 12/30/2018 12/26/2018    SBP (mmHg) 143 135 127 157 159    DBP (mmHg) 63 66 75 76 72    Pulse 77 87 92 82 79          Recent office /64 mmHg. Health  working with patient on portion sizes and hidden sources of sodium. Patient comments note latest home BP taken while drinking coffee. No medication recommendations at this time.

## 2019-01-14 ENCOUNTER — OFFICE VISIT (OUTPATIENT)
Dept: PAIN MEDICINE | Facility: CLINIC | Age: 74
End: 2019-01-14
Payer: MEDICARE

## 2019-01-14 VITALS
BODY MASS INDEX: 24.39 KG/M2 | HEART RATE: 88 BPM | SYSTOLIC BLOOD PRESSURE: 116 MMHG | HEIGHT: 59 IN | WEIGHT: 121 LBS | DIASTOLIC BLOOD PRESSURE: 71 MMHG

## 2019-01-14 DIAGNOSIS — M79.18 MYOFASCIAL PAIN: ICD-10-CM

## 2019-01-14 DIAGNOSIS — M50.30 DDD (DEGENERATIVE DISC DISEASE), CERVICAL: ICD-10-CM

## 2019-01-14 DIAGNOSIS — M54.81 BILATERAL OCCIPITAL NEURALGIA: Primary | ICD-10-CM

## 2019-01-14 DIAGNOSIS — M47.892 OTHER SPONDYLOSIS, CERVICAL REGION: ICD-10-CM

## 2019-01-14 PROCEDURE — 99999 PR PBB SHADOW E&M-EST. PATIENT-LVL IV: CPT | Mod: PBBFAC,,, | Performed by: ANESTHESIOLOGY

## 2019-01-14 PROCEDURE — 99214 PR OFFICE/OUTPT VISIT, EST, LEVL IV, 30-39 MIN: ICD-10-PCS | Mod: 25,S$GLB,, | Performed by: ANESTHESIOLOGY

## 2019-01-14 PROCEDURE — 3074F PR MOST RECENT SYSTOLIC BLOOD PRESSURE < 130 MM HG: ICD-10-PCS | Mod: CPTII,S$GLB,, | Performed by: ANESTHESIOLOGY

## 2019-01-14 PROCEDURE — 1101F PR PT FALLS ASSESS DOC 0-1 FALLS W/OUT INJ PAST YR: ICD-10-PCS | Mod: CPTII,S$GLB,, | Performed by: ANESTHESIOLOGY

## 2019-01-14 PROCEDURE — 64405 NERVE BLOCK: ICD-10-PCS | Mod: LT,S$GLB,, | Performed by: ANESTHESIOLOGY

## 2019-01-14 PROCEDURE — 99999 PR PBB SHADOW E&M-EST. PATIENT-LVL IV: ICD-10-PCS | Mod: PBBFAC,,, | Performed by: ANESTHESIOLOGY

## 2019-01-14 PROCEDURE — 64405 NJX AA&/STRD GR OCPL NRV: CPT | Mod: LT,S$GLB,, | Performed by: ANESTHESIOLOGY

## 2019-01-14 PROCEDURE — 3074F SYST BP LT 130 MM HG: CPT | Mod: CPTII,S$GLB,, | Performed by: ANESTHESIOLOGY

## 2019-01-14 PROCEDURE — 99214 OFFICE O/P EST MOD 30 MIN: CPT | Mod: 25,S$GLB,, | Performed by: ANESTHESIOLOGY

## 2019-01-14 PROCEDURE — 1101F PT FALLS ASSESS-DOCD LE1/YR: CPT | Mod: CPTII,S$GLB,, | Performed by: ANESTHESIOLOGY

## 2019-01-14 PROCEDURE — 3078F DIAST BP <80 MM HG: CPT | Mod: CPTII,S$GLB,, | Performed by: ANESTHESIOLOGY

## 2019-01-14 PROCEDURE — 3078F PR MOST RECENT DIASTOLIC BLOOD PRESSURE < 80 MM HG: ICD-10-PCS | Mod: CPTII,S$GLB,, | Performed by: ANESTHESIOLOGY

## 2019-01-14 RX ORDER — CYCLOBENZAPRINE HCL 10 MG
10 TABLET ORAL 3 TIMES DAILY PRN
Qty: 90 TABLET | Refills: 1 | Status: SHIPPED | OUTPATIENT
Start: 2019-01-14 | End: 2019-01-15 | Stop reason: SDUPTHER

## 2019-01-14 NOTE — PROGRESS NOTES
Referring Physician: No ref. provider found    PCP: Liseth Carias MD      CC: neck and left occipital pain    Interval history: Ms. Richter is a 73 y.o. female with neck pain and occipital neuralgia who presents today for repeat occipital nerve block. Neck pain is bothersome.  Her pain radiates into both arms, worse on the right.  Her right arm pain travels to her hand, her left stops around her shoulder.  She has numbness to her right hand and first through fourth digits. She is s/p cervical MELANY in February that only provided benefit for a short time.  She also reports bilateral sided occipital pain which has responded well to occipital nerve blocks in the past. L>R.   She continues to take Norco, Mobic and Flexeril which provide benefit.  No bowel bladder changes.   Pain today is rated 8/10.    Prior HPI:   Patient is 70-year-old female with past history history of cervical DDD, cervical spondylosis and chronic headaches.  She recently moved here from [place], [place].  She is treated in the past by neurology.  She states having constant burning pain over the left side of her posterior scalp.  Pain radiates to her neck as well as a frontal.  She also has left-sided neck pain as well.  She denies any radicular arm pain.  No numbness or weakness.  She states having cervical epidural steroid injection at past with minimal benefit.  She also has had decided of cervical nerve blocks in the past with moderate benefit.  Most recent injection was performed 2 months ago.  She desires repeat injection.  She currently takes Norco 10 mg every 12 hours as needed with moderate benefit.  She also takes Zanaflex 4 mg every 8 hours with mild benefits.  She rates her pain 7/10 today.    Pain intervention history: s/p left occipital nerve blocks on 1/2016 with 50% relief of her headaches  S/p cervical MELANY 2/8/18 moderate relief for a couple of weeks.     ROS:  CONSTITUTIONAL: No fevers, chills, night sweats, wt. loss,  appetite changes  SKIN: no rashes or itching  ENT: No headaches, head trauma, vision changes, or eye pain  LYMPH NODES: None noticed   CV: No chest pain, palpitations.   RESP: No shortness of breath, dyspnea on exertion, cough, wheezing, or hemoptysis  GI: No nausea, emesis, diarrhea, constipation, melena, hematochezia, pain.    : No dysuria, hematuria, urgency, or frequency   HEME: No easy bruising, bleeding problems  PSYCHIATRIC: No depression, anxiety, psychosis, hallucinations.  NEURO: No seizures, memory loss, dizziness or difficulty sleeping  MSK: + History of present illness      Past Medical History:   Diagnosis Date    Anxiety     Arthritis     Cataract     DDD (degenerative disc disease), lumbar     Depression     GERD (gastroesophageal reflux disease)     Hyperlipidemia     Hypertension     pt ststes she does not take meds anymore she just watches her weight//    Immunosuppressed status 2016    Neuromuscular disorder     Occipital neuralgia 3/24/2017    Osteoporosis     Substance abuse      Past Surgical History:   Procedure Laterality Date    APPENDECTOMY      APPENDECTOMY-LAPAROSCOPIC N/A 10/9/2016    [facility]     SECTION      x 2    CHOLECYSTECTOMY      ESOPHAGOGASTRODUODENOSCOPY (EGD) N/A 3/14/2017    [facility] ENDO    ESOPHAGOGASTRODUODENOSCOPY (EGD) N/A 2/3/2016    [facility]lejo [facility] at [facility] ENDO    HYSTERECTOMY      INJECTION-STEROID-EPIDURAL-CERVICAL N/A 2018    [facility]    INJECTION-STEROID-EPIDURAL-CERVICAL N/A 10/23/2017    [facility]    Laser Periphery Iridotomy Bilateral     OD 16 and OS touch up 2016    vocal cord tumor removal       Family History   Problem Relation Age of Onset    Osteoarthritis Mother     Alcohol abuse Mother     Rheum arthritis Mother     Osteoarthritis Sister      "Diabetes Brother     No Known Problems Son     No Known Problems Sister     No Known Problems Sister     No Known Problems Brother     Arthritis Son     No Known Problems Son     Stroke Maternal Grandmother 99    Rheum arthritis Maternal Grandmother     Retinal detachment Neg Hx     Macular degeneration Neg Hx     Glaucoma Neg Hx     Amblyopia Neg Hx     Blindness Neg Hx     Cancer Neg Hx     Cataracts Neg Hx     Hypertension Neg Hx     Strabismus Neg Hx     Thyroid disease Neg Hx     Lupus Neg Hx     Kidney disease Neg Hx     Inflammatory bowel disease Neg Hx     Psoriasis Neg Hx      Social History     Socioeconomic History    Marital status:      Spouse name: None    Number of children: None    Years of education: None    Highest education level: None   Social Needs    Financial resource strain: None    Food insecurity - worry: None    Food insecurity - inability: None    Transportation needs - medical: None    Transportation needs - non-medical: None   Occupational History    None   Tobacco Use    Smoking status: Never Smoker    Smokeless tobacco: Never Used   Substance and Sexual Activity    Alcohol use: No     Alcohol/week: 0.0 oz    Drug use: No    Sexual activity: Yes     Partners: Male   Other Topics Concern    None   Social History Narrative    None         Medications/Allergies: See med card    Vitals:    01/14/19 1036   BP: 116/71   Pulse: 88   Weight: 54.9 kg (121 lb)   Height: 4' 11" (1.499 m)   PainSc:   8         Physical exam:    GENERAL: A and O x3, the patient appears well groomed and is in no acute distress.  Skin: No rashes or obvious lesions  HEENT: normocephalic, atraumatic  CARDIOVASCULAR:  RRR  LUNGS: nonlabored breathing  ABDOMEN: soft, nontender   UPPER EXTREMITIES: Normal alignment, normal range of motion, no atrophy, no skin changes,  hair growth and nail growth normal and equal bilaterally. No swelling, no tenderness.  +Phalens on left side. " +TTP tricep tendon  LOWER EXTREMITIES:  Normal alignment, normal range of motion, no atrophy, no skin changes,  hair growth and nail growth normal and equal bilaterally. No swelling, no tenderness.  CERVICAL SPINE:   Cervical spine: ROM is full in flexion, extension and lateral rotation.  Painful flexion > extension.  Positive facet loading bilaterally.  Spurling is positive at right side.  Myofascial exam:  Tenderness to palpation across cervical paraspinals and trapezius muscles bilaterally.      MENTAL STATUS: normal orientation, speech, language, and fund of knowledge for social situation.  Emotional state appropriate.    CRANIAL NERVES:  II:  PERRL bilaterally,   III,IV,VI: EOMI.    V:  Facial sensation equal bilaterally  VII:  Facial motor function normal.  VIII:  Hearing equal to finger rub bilaterally  IX/X: Gag normal, palate symmetric  XI:  Shoulder shrug equal, head turn equal  XII:  Tongue midline without fasciculations      MOTOR: Tone and bulk: normal bilateral upper and lower Strength: normal   Delt Bi Tri WE WF     R 5 5 5 5 5 5   L 5 5 5 5 5 5     IP ADD ABD Quad TA Gas HAM  R 5 5 5 5 5 5 5  L 5 5 5 5 5 5 5    SENSATION: Light touch and pinprick intact bilaterally  REFLEXES: normal, symmetric, nonbrisk.  Toes down, no clonus. No hoffmans.  GAIT: normal rise, base, steps, and arm swing.        Imaging:   Cervical MRI 12/4/17    Narrative     EXAM: Cervical spine MRI without contrast.    INDICATION: Cervical radiculopathy.  Neck pain and occipital neuralgia.  The patient complains of neck and right arm pain.    TECHNIQUE: Routine multiplanar, multisequence unenhanced cervical spine MRI was performed.    COMPARISON: Plain films of the cervical spine obtained concurrently      FINDINGS:     Vertebral column: There is straightening of the cervical spine with loss of normal lordosis.  As seen on concurrent plain films, there is trace anterolisthesis of C3 on C4 with 2 mm anterolisthesis of C4 on C5.   There is marked disc space narrowing at the C5-6 level with moderate to marked disc space narrowing at the C4-5 and C6-7 levels.  There is partial non-segmentation anteriorly at the C2-3 level.  The C2 and C3 as well as the C4 and C5 facet joints appear fused and this may represent developmental non-segmentation or degenerative ankylosis.  All of the discs are desiccated.  The odontoid process is intact.    Spinal canal, cord, epidural space: The craniovertebral junction is normal.  The spinal canal is omental and normal.  There is no significant spinal stenosis.  The cord is normal in caliber.  There is very subtle flattening of the ventral cord surface at the C4-5 and C5-6 levels where there is degenerative change.  The study is mildly motion but there is no definite cord edema or myelomalacia.    Findings by level:    C2-3: There is no spinal canal or foraminal stenosis.  There is mild left facet joint arthropathy.    C3-4: There is trace anterolisthesis.  There is left greater than right uncovertebral spurring and facet joint arthropathy.  There is a mild disc osteophyte complex, slightly eccentric to the left with subtle annular fissure.  This narrows the ventral subarachnoid space.  There is no spinal stenosis or cord compression.  There is moderate marked left foraminal stenosis.    C4-5: There is moderate disc space narrowing with 2 mm anterolisthesis of C4 on C5.  There is facet joint arthropathy or effusion left greater than right.  There is also mild bilateral but left greater than right uncovertebral spurring.  There is unroofing of a mild disc bulge which narrows the ventral subarachnoid space.  There is no spinal stenosis.  There is mild to moderate left foraminal stenosis.    C5-6:There is marked disc space narrowing.  There is bilateral uncovertebral spurring.  There is a disc osteophyte complex which narrows the subarachnoid space.  This is slightly eccentric to the right There is subtle flattening  of the ventral cord surface.  Cord signal is grossly normal.  There is mild spinal stenosis with at least moderate bilateral foraminal stenosis.    C6-7: There is moderate disc space narrowing.  There is mild uncovertebral spurring.  There is a shallow disc osteophyte complex, slightly eccentric to the right.  There is narrowing of the ventral subarachnoid space.  There is no spinal stenosis.  Cord signal is normal.  There is mild bilateral foraminal stenosis.  There is a small 3.5 mm left foraminal perineural cyst.    C7- T1: There is a Schmorl's node in inferior endplate of C7, chronic.  There are tiny bilateral foraminal perineural cysts.  There is minimal bulging of the annulus and mild facet joint arthropathy without spinal canal or foraminal stenosis.    Soft tissues/other: The prevertebral soft tissues are normal.  The airway is patent.   Impression          1. There is multilevel degenerative disc disease described in detail above.  There is no acute fracture.  There is degenerative listhesis at several levels.  There is some degree of disc osteophyte complex, uncovertebral spurring and/or facet joint arthropathy contributing to some degree of foraminal stenosis at several levels.  There is no significant spinal canal stenosis.  There is very subtle flattening of the ventral cord surface at the C4-5 and C6-7 levels The pertinent findings are summarized below.    2. At the C3-4 level, there is no spinal stenosis.  There is moderate to marked left foraminal stenosis.    3. At the C4-5 level there is no spinal stenosis.  There is mild to moderate left foraminal stenosis.    4. At the C5-6 level, there is mild spinal stenosis with at least moderate bilateral foraminal stenosis.    5. At the C6-7 level, there is no spinal stenosis.  There is mild bilateral foraminal stenosis.    6. There is no spinal canal or foraminal stenosis at the C2-3 or C7-T1 levels.     Assessment:  Mrs. Richter is a 73 y.o. female with  neck and head pain    1. Bilateral occipital neuralgia    2. DDD (degenerative disc disease), cervical    3. Other spondylosis, cervical region    4. Myofascial pain      Plan:  1. I have stressed the importance of physical activity and exercise to improve overall health.  2. Consider repeat C7-T1 IL MELANY in the future  3. Perform repeat left occipital blocks for head pain.  4. Norco 5mg q12hrs as needed for pain. 3 prescriptions given last visit.  reviewed  5. Continue Flexeril and Mobic. Labs reviewed today. Will call for refill.   6. Recommend PT to focus on neck and upper back. She will call when ready to schedule  7. RTC as scheduled

## 2019-01-14 NOTE — PROCEDURES
"Nerve Block  Date/Time: 1/14/2019 10:59 AM  Performed by: Timothy Grimaldo MD  Authorized by: Timothy Grimaldo MD   Consent Done: Yes  Time out: Immediately prior to procedure a "time out" was called to verify the correct patient, procedure, equipment, support staff and site/side marked as required.  Indications: pain relief  Body area: head  Nerve: greater occipital  Laterality: left  Patient sedated: no  Medications administered: DepoMedrol 40 mg injectionPreparation: Patient was prepped and draped in the usual sterile fashion.  Patient position: sitting  Needle size: 25 G  Location technique: anatomical landmarks  Local Anesthetic: bupivacaine 0.25% without epinephrine  Anesthetic total: 4 mL  Patient tolerance: Patient tolerated the procedure well with no immediate complications        "

## 2019-01-15 RX ORDER — CYCLOBENZAPRINE HCL 10 MG
10 TABLET ORAL 3 TIMES DAILY PRN
Qty: 270 TABLET | Refills: 0 | Status: SHIPPED | OUTPATIENT
Start: 2019-01-15 | End: 2019-04-15

## 2019-01-22 ENCOUNTER — OFFICE VISIT (OUTPATIENT)
Dept: PSYCHIATRY | Facility: CLINIC | Age: 74
End: 2019-01-22
Payer: MEDICARE

## 2019-01-22 VITALS
HEART RATE: 77 BPM | BODY MASS INDEX: 24.36 KG/M2 | WEIGHT: 120.81 LBS | HEIGHT: 59 IN | SYSTOLIC BLOOD PRESSURE: 140 MMHG | DIASTOLIC BLOOD PRESSURE: 64 MMHG

## 2019-01-22 DIAGNOSIS — K21.9 GASTROESOPHAGEAL REFLUX DISEASE, ESOPHAGITIS PRESENCE NOT SPECIFIED: ICD-10-CM

## 2019-01-22 DIAGNOSIS — E78.00 PURE HYPERCHOLESTEROLEMIA: ICD-10-CM

## 2019-01-22 DIAGNOSIS — I10 ESSENTIAL HYPERTENSION: ICD-10-CM

## 2019-01-22 DIAGNOSIS — F33.41 MDD (MAJOR DEPRESSIVE DISORDER), RECURRENT, IN PARTIAL REMISSION: ICD-10-CM

## 2019-01-22 DIAGNOSIS — F43.10 PTSD (POST-TRAUMATIC STRESS DISORDER): Primary | ICD-10-CM

## 2019-01-22 DIAGNOSIS — M15.9 PRIMARY OSTEOARTHRITIS INVOLVING MULTIPLE JOINTS: ICD-10-CM

## 2019-01-22 PROCEDURE — 99999 PR PBB SHADOW E&M-EST. PATIENT-LVL II: CPT | Mod: PBBFAC,,, | Performed by: PSYCHIATRY & NEUROLOGY

## 2019-01-22 PROCEDURE — 1101F PR PT FALLS ASSESS DOC 0-1 FALLS W/OUT INJ PAST YR: ICD-10-PCS | Mod: CPTII,S$GLB,, | Performed by: PSYCHIATRY & NEUROLOGY

## 2019-01-22 PROCEDURE — 1101F PT FALLS ASSESS-DOCD LE1/YR: CPT | Mod: CPTII,S$GLB,, | Performed by: PSYCHIATRY & NEUROLOGY

## 2019-01-22 PROCEDURE — 3077F PR MOST RECENT SYSTOLIC BLOOD PRESSURE >= 140 MM HG: ICD-10-PCS | Mod: CPTII,S$GLB,, | Performed by: PSYCHIATRY & NEUROLOGY

## 2019-01-22 PROCEDURE — 99214 OFFICE O/P EST MOD 30 MIN: CPT | Mod: S$GLB,,, | Performed by: PSYCHIATRY & NEUROLOGY

## 2019-01-22 PROCEDURE — 3078F PR MOST RECENT DIASTOLIC BLOOD PRESSURE < 80 MM HG: ICD-10-PCS | Mod: CPTII,S$GLB,, | Performed by: PSYCHIATRY & NEUROLOGY

## 2019-01-22 PROCEDURE — 3077F SYST BP >= 140 MM HG: CPT | Mod: CPTII,S$GLB,, | Performed by: PSYCHIATRY & NEUROLOGY

## 2019-01-22 PROCEDURE — 99999 PR PBB SHADOW E&M-EST. PATIENT-LVL II: ICD-10-PCS | Mod: PBBFAC,,, | Performed by: PSYCHIATRY & NEUROLOGY

## 2019-01-22 PROCEDURE — 99214 PR OFFICE/OUTPT VISIT, EST, LEVL IV, 30-39 MIN: ICD-10-PCS | Mod: S$GLB,,, | Performed by: PSYCHIATRY & NEUROLOGY

## 2019-01-22 PROCEDURE — 3078F DIAST BP <80 MM HG: CPT | Mod: CPTII,S$GLB,, | Performed by: PSYCHIATRY & NEUROLOGY

## 2019-01-22 RX ORDER — TIZANIDINE HYDROCHLORIDE 4 MG/1
CAPSULE, GELATIN COATED ORAL
COMMUNITY
End: 2019-03-18

## 2019-01-22 RX ORDER — CIPROFLOXACIN 250 MG/1
TABLET, FILM COATED ORAL
Refills: 0 | COMMUNITY
Start: 2019-01-17 | End: 2019-02-04 | Stop reason: ALTCHOICE

## 2019-01-22 RX ORDER — SERTRALINE HYDROCHLORIDE 100 MG/1
TABLET, FILM COATED ORAL
Qty: 90 TABLET | Refills: 0 | Status: SHIPPED | OUTPATIENT
Start: 2019-01-22 | End: 2019-04-23 | Stop reason: SDUPTHER

## 2019-01-22 RX ORDER — BUSPIRONE HYDROCHLORIDE 10 MG/1
10 TABLET ORAL 2 TIMES DAILY
Qty: 180 TABLET | Refills: 0 | Status: SHIPPED | OUTPATIENT
Start: 2019-01-22 | End: 2019-04-23 | Stop reason: SDUPTHER
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
## 2019-01-24 ENCOUNTER — PATIENT OUTREACH (OUTPATIENT)
Dept: OTHER | Facility: OTHER | Age: 74
End: 2019-01-24

## 2019-01-24 NOTE — PROGRESS NOTES
Last 5 Patient Entered Readings                                      Current 30 Day Average: 146/72     Recent Readings 1/23/2019 1/20/2019 1/18/2019 1/16/2019 1/14/2019    SBP (mmHg) 150 142 151 142 157    DBP (mmHg) 72 66 88 72 73    Pulse 86 83 93 83 83          HPI:  Called patient to follow up. Patient endorses adherence to medication regimen. Home readings may be taken soon after drinking coffee.    Patient denies hypotensive s/sx (lightheadedness, dizziness, nausea, fatigue); patient denies hypertensive s/sx (SOB, CP, severe headaches, changes in vision, dizziness, fatigue, confusion, anxiety, nosebleeds).      Assessment:  Reviewed recent readings. Per 2017 ACC/ AHA HTN guidelines (goal of BP < 130/80), current 30-day average needs to be addressed more thoroughly today. Multiple office BP readings significantly lower than home readings.    Plan:  Continue current medication regimen. Advised patient to take BP readings prior to coffee (at least 45 minutes after waking) or 2 hours after coffee. She verbalized understanding. If still seeing 15-20 point difference between home and office BP readings with change in timing, will ask patient to take cuff to [facility] for evaluation of cuff and technique. She verbalized understanding.    I will continue to monitor regularly and will follow-up in 2 to 3 weeks, sooner if blood pressure begins to trend upward or downward.     Current medication regimen:  Hypertension Medications             irbesartan (AVAPRO) 150 MG tablet Take 1 tablet (150 mg total) by mouth every evening. (for blood pressure)          Patient denies having questions or concerns. Patient has my contact information and knows to call with any concerns or clinical changes.

## 2019-02-04 ENCOUNTER — HOSPITAL ENCOUNTER (OUTPATIENT)
Dept: RADIOLOGY | Facility: CLINIC | Age: 74
Discharge: HOME | End: 2019-02-04
Attending: PHYSICIAN ASSISTANT
Payer: MEDICARE

## 2019-02-04 ENCOUNTER — DOCUMENTATION ONLY (OUTPATIENT)
Dept: FAMILY MEDICINE | Facility: CLINIC | Age: 74
End: 2019-02-04

## 2019-02-04 ENCOUNTER — OFFICE VISIT (OUTPATIENT)
Dept: FAMILY MEDICINE | Facility: CLINIC | Age: 74
End: 2019-02-04
Payer: MEDICARE

## 2019-02-04 VITALS
SYSTOLIC BLOOD PRESSURE: 103 MMHG | HEART RATE: 84 BPM | WEIGHT: 119.69 LBS | HEIGHT: 59 IN | BODY MASS INDEX: 24.13 KG/M2 | TEMPERATURE: 99 F | DIASTOLIC BLOOD PRESSURE: 54 MMHG

## 2019-02-04 DIAGNOSIS — J18.9 PNEUMONIA DUE TO INFECTIOUS ORGANISM, UNSPECIFIED LATERALITY, UNSPECIFIED PART OF LUNG: ICD-10-CM

## 2019-02-04 DIAGNOSIS — J18.9 PNEUMONIA DUE TO INFECTIOUS ORGANISM, UNSPECIFIED LATERALITY, UNSPECIFIED PART OF LUNG: Primary | ICD-10-CM

## 2019-02-04 DIAGNOSIS — R06.2 WHEEZING: ICD-10-CM

## 2019-02-04 PROCEDURE — 1101F PR PT FALLS ASSESS DOC 0-1 FALLS W/OUT INJ PAST YR: ICD-10-PCS | Mod: HCNC,CPTII,S$GLB, | Performed by: PHYSICIAN ASSISTANT

## 2019-02-04 PROCEDURE — 1101F PT FALLS ASSESS-DOCD LE1/YR: CPT | Mod: HCNC,CPTII,S$GLB, | Performed by: PHYSICIAN ASSISTANT

## 2019-02-04 PROCEDURE — 3078F PR MOST RECENT DIASTOLIC BLOOD PRESSURE < 80 MM HG: ICD-10-PCS | Mod: HCNC,CPTII,S$GLB, | Performed by: PHYSICIAN ASSISTANT

## 2019-02-04 PROCEDURE — 3078F DIAST BP <80 MM HG: CPT | Mod: HCNC,CPTII,S$GLB, | Performed by: PHYSICIAN ASSISTANT

## 2019-02-04 PROCEDURE — 99213 PR OFFICE/OUTPT VISIT, EST, LEVL III, 20-29 MIN: ICD-10-PCS | Mod: HCNC,25,S$GLB, | Performed by: PHYSICIAN ASSISTANT

## 2019-02-04 PROCEDURE — 71046 XR CHEST PA AND LATERAL: ICD-10-PCS | Mod: 26,HCNC,, | Performed by: RADIOLOGY

## 2019-02-04 PROCEDURE — 99213 OFFICE O/P EST LOW 20 MIN: CPT | Mod: HCNC,25,S$GLB, | Performed by: PHYSICIAN ASSISTANT

## 2019-02-04 PROCEDURE — 3074F PR MOST RECENT SYSTOLIC BLOOD PRESSURE < 130 MM HG: ICD-10-PCS | Mod: HCNC,CPTII,S$GLB, | Performed by: PHYSICIAN ASSISTANT

## 2019-02-04 PROCEDURE — 99999 PR PBB SHADOW E&M-EST. PATIENT-LVL IV: CPT | Mod: PBBFAC,HCNC,, | Performed by: PHYSICIAN ASSISTANT

## 2019-02-04 PROCEDURE — 99999 PR PBB SHADOW E&M-EST. PATIENT-LVL IV: ICD-10-PCS | Mod: PBBFAC,HCNC,, | Performed by: PHYSICIAN ASSISTANT

## 2019-02-04 PROCEDURE — 96372 THER/PROPH/DIAG INJ SC/IM: CPT | Mod: HCNC,S$GLB,, | Performed by: PHYSICIAN ASSISTANT

## 2019-02-04 PROCEDURE — 3074F SYST BP LT 130 MM HG: CPT | Mod: HCNC,CPTII,S$GLB, | Performed by: PHYSICIAN ASSISTANT

## 2019-02-04 PROCEDURE — 96372 PR INJECTION,THERAP/PROPH/DIAG2ST, IM OR SUBCUT: ICD-10-PCS | Mod: HCNC,S$GLB,, | Performed by: PHYSICIAN ASSISTANT

## 2019-02-04 PROCEDURE — 71046 X-RAY EXAM CHEST 2 VIEWS: CPT | Mod: TC,HCNC,FY,PO

## 2019-02-04 PROCEDURE — 71046 X-RAY EXAM CHEST 2 VIEWS: CPT | Mod: 26,HCNC,, | Performed by: RADIOLOGY

## 2019-02-04 RX ORDER — DOXYCYCLINE 100 MG/1
100 CAPSULE ORAL 2 TIMES DAILY
Qty: 20 CAPSULE | Refills: 0 | Status: SHIPPED | OUTPATIENT
Start: 2019-02-04 | End: 2019-02-14

## 2019-02-04 RX ORDER — PREDNISONE 10 MG/1
10 TABLET ORAL DAILY
Qty: 10 TABLET | Refills: 0 | Status: SHIPPED | OUTPATIENT
Start: 2019-02-04 | End: 2019-02-14

## 2019-02-04 RX ORDER — BENZONATATE 100 MG/1
100 CAPSULE ORAL 3 TIMES DAILY PRN
Qty: 60 CAPSULE | Refills: 0 | Status: SHIPPED | OUTPATIENT
Start: 2019-02-04 | End: 2019-03-18

## 2019-02-04 NOTE — PROGRESS NOTES
Identified pt using name and . Explained procedure to pt. Understanding was verbalized. Administered Sol-medrol 125 mg IM in left upper outer quad gluteus. Sterile technique was used. Pt tolerated procedure well, no residual bleeding noted at injection site.

## 2019-02-04 NOTE — PROGRESS NOTES
Subjective:       Patient ID: Rola Richter is a 73 y.o. female.    Chief Complaint: Cough (congestion)    Cough   This is a new problem. The current episode started in the past 7 days (4 days). The problem has been gradually worsening. The problem occurs every few minutes. The cough is productive of sputum (green sputum with occasional blood tinge). Associated symptoms include chest pain, chills, headaches, myalgias, nasal congestion, rhinorrhea, shortness of breath and sweats. Pertinent negatives include no ear congestion, ear pain, fever, heartburn, hemoptysis, postnasal drip, sore throat, weight loss or wheezing. Associated symptoms comments: Chest pain with deep inhalation and coughing. Nothing aggravates the symptoms. She has tried rest and OTC cough suppressant for the symptoms. The treatment provided mild relief.     Review of Systems   Constitutional: Positive for appetite change, chills and fatigue. Negative for fever and weight loss.   HENT: Positive for congestion, rhinorrhea, sinus pressure and sinus pain. Negative for ear pain, postnasal drip and sore throat.    Respiratory: Positive for cough and shortness of breath. Negative for hemoptysis and wheezing.    Cardiovascular: Positive for chest pain.        Chest pain with deep inhalation and with coughing     Gastrointestinal: Negative for abdominal pain, diarrhea, heartburn, nausea and vomiting.   Genitourinary: Negative for difficulty urinating.   Musculoskeletal: Positive for myalgias.   Neurological: Positive for headaches.       Objective:      Physical Exam   Constitutional: She appears well-developed and well-nourished. No distress.   HENT:   Head: Normocephalic and atraumatic.   Right Ear: External ear and ear canal normal.   Left Ear: External ear and ear canal normal.   Nose: Mucosal edema and rhinorrhea present. Right sinus exhibits maxillary sinus tenderness. Left sinus exhibits maxillary sinus tenderness.   Mouth/Throat: Uvula is  midline, oropharynx is clear and moist and mucous membranes are normal. No oral lesions. No uvula swelling. No oropharyngeal exudate, posterior oropharyngeal edema or posterior oropharyngeal erythema.   Tympanosclerosis bilaterally   Eyes: Conjunctivae and EOM are normal. Pupils are equal, round, and reactive to light.   Neck: Normal range of motion. Neck supple. No thyromegaly present.   Cardiovascular: Normal rate, regular rhythm and normal heart sounds. Exam reveals no gallop and no friction rub.   No murmur heard.  Pulmonary/Chest: Effort normal. No accessory muscle usage. No tachypnea and no bradypnea. No respiratory distress. She has wheezes in the right upper field, the right middle field, the right lower field, the left upper field, the left middle field and the left lower field. She has no rales.   Abdominal: Soft. Bowel sounds are normal. There is no tenderness.   Skin: She is not diaphoretic.       Assessment:       No diagnosis found.    Plan:       Rola was seen today for cough.    Diagnoses and all orders for this visit:    Pneumonia due to infectious organism, unspecified laterality, unspecified part of lung  -     X-Ray Chest PA And Lateral; Future  -     benzonatate (TESSALON) 100 MG capsule; Take 1 capsule (100 mg total) by mouth 3 (three) times daily as needed for Cough.  -     doxycycline (MONODOX) 100 MG capsule; Take 1 capsule (100 mg total) by mouth 2 (two) times daily. for 10 days    Wheezing  -     methylPREDNISolone sod suc(PF) injection 125 mg  -     predniSONE (DELTASONE) 10 MG tablet; Take 1 tablet (10 mg total) by mouth once daily. for 10 days         Take doxycycline as prescribed. Take Tesslaon pearls as prescribed as needed. Please return to the clinic if you experience any worsening in symptoms or changes in symptoms.

## 2019-02-04 NOTE — PROGRESS NOTES
Pre-Visit Chart Review  For Appointment Scheduled on 02/04/2019    Health Maintenance Due   Topic Date Due    Mammogram  02/09/2019

## 2019-02-13 ENCOUNTER — PATIENT OUTREACH (OUTPATIENT)
Dept: OTHER | Facility: OTHER | Age: 74
End: 2019-02-13

## 2019-02-13 NOTE — PROGRESS NOTES
Last 5 Patient Entered Readings                                      Current 30 Day Average: 143/74     Recent Readings 2/12/2019 2/4/2019 2/4/2019 1/31/2019 1/23/2019    SBP (mmHg) 115 143 144 145 150    DBP (mmHg) 66 73 90 69 72    Pulse 87 105 104 81 86          2/13: Unable to LVM.  Will call in 1 week.

## 2019-02-14 NOTE — PROGRESS NOTES
Last 5 Patient Entered Readings                                      Current 30 Day Average: 141/75     Recent Readings 2/12/2019 2/4/2019 2/4/2019 1/31/2019 1/23/2019    SBP (mmHg) 115 143 144 145 150    DBP (mmHg) 66 73 90 69 72    Pulse 87 105 104 81 86          Health  attempting contact. Will ask her to follow up with patient about taking cuff to [facility]. Still significant difference between home and office readings.

## 2019-02-20 ENCOUNTER — PATIENT MESSAGE (OUTPATIENT)
Dept: FAMILY MEDICINE | Facility: CLINIC | Age: 74
End: 2019-02-20

## 2019-02-20 DIAGNOSIS — Z12.39 SCREENING FOR BREAST CANCER: Primary | ICD-10-CM

## 2019-02-20 NOTE — PROGRESS NOTES
"Last 5 Patient Entered Readings                                      Current 30 Day Average: 135/73     Recent Readings 2/15/2019 2/12/2019 2/4/2019 2/4/2019 1/31/2019    SBP (mmHg) 122 115 143 144 145    DBP (mmHg) 70 66 73 90 69    Pulse 88 87 105 104 81          Digital Medicine: Health  Follow Up    Lifestyle Modifications:    1.Dietary Modifications (Sodium intake <2,000mg/day, food labels, dining out): States she has been working on diet.  States she is not eating late at night.  States she eats dinner around 4-5pm.    2.Physical Activity: States she has been working on exercise "a little bit."  States the weather has been bad.  Encouraged patient to stay active around the house and take advantage of nice weather.  States she likes to work in her garden.    3.Medication Therapy: Patient has been compliant with the medication regimen.    4.Patient has the following medication side effects/concerns: none  (Frequency/Alleviating factors/Precipitating factors, etc.)     Follow up with Mrs. Rola MENDOZA Neelam completed. No further questions or concerns. Will continue to follow up to achieve health goals.    States she felt very sick on 2/4 and saw PCP.  States she got a steroid shot and antibiotics.  "

## 2019-02-20 NOTE — PROGRESS NOTES
Last 5 Patient Entered Readings                                      Current 30 Day Average: 135/73     Recent Readings 2/15/2019 2/12/2019 2/4/2019 2/4/2019 1/31/2019    SBP (mmHg) 122 115 143 144 145    DBP (mmHg) 70 66 73 90 69    Pulse 88 87 105 104 81          2/20: Unable to LVM.  Sent Favimt.  Will call in 1 week.

## 2019-02-28 ENCOUNTER — PATIENT OUTREACH (OUTPATIENT)
Dept: ADMINISTRATIVE | Facility: HOSPITAL | Age: 74
End: 2019-02-28

## 2019-03-15 ENCOUNTER — TELEPHONE (OUTPATIENT)
Dept: FAMILY MEDICINE | Facility: CLINIC | Age: 74
End: 2019-03-15

## 2019-03-15 NOTE — PROGRESS NOTES
Refill Authorization Note     is requesting a refill authorization.    Brief assessment and rationale for refill: ROUTE; op  Name and strength of medication: omeprazole (PRILOSEC) 40 MG capsule       Medication Therapy Plan: last escripted to [facility] 07/2018 for 12 month supply; local pharmacy requesting now; will pend for 3 more months     Medication reconciliation completed: No              How patient will take medication: tud          Comments:   APPOINTMENTS (past 12 m or future 3m authorizing provider)  LAST VISIT DATE  Liseth Carias MD 8/31/2018         NEXT VISIT DATE  Liseth Carias MD Visit date not found

## 2019-03-18 ENCOUNTER — HOSPITAL ENCOUNTER (OUTPATIENT)
Dept: RADIOLOGY | Facility: HOSPITAL | Age: 74
Discharge: HOME | End: 2019-03-18
Attending: NURSE PRACTITIONER
Payer: MEDICARE

## 2019-03-18 ENCOUNTER — OFFICE VISIT (OUTPATIENT)
Dept: FAMILY MEDICINE | Facility: CLINIC | Age: 74
End: 2019-03-18
Payer: MEDICARE

## 2019-03-18 VITALS
DIASTOLIC BLOOD PRESSURE: 67 MMHG | OXYGEN SATURATION: 95 % | TEMPERATURE: 98 F | HEIGHT: 59 IN | SYSTOLIC BLOOD PRESSURE: 135 MMHG | HEART RATE: 85 BPM | WEIGHT: 117.75 LBS | BODY MASS INDEX: 23.74 KG/M2

## 2019-03-18 DIAGNOSIS — K59.00 CONSTIPATION, UNSPECIFIED CONSTIPATION TYPE: ICD-10-CM

## 2019-03-18 DIAGNOSIS — I10 ESSENTIAL HYPERTENSION: Primary | ICD-10-CM

## 2019-03-18 DIAGNOSIS — R10.84 GENERALIZED ABDOMINAL PAIN: ICD-10-CM

## 2019-03-18 DIAGNOSIS — E78.00 PURE HYPERCHOLESTEROLEMIA: ICD-10-CM

## 2019-03-18 DIAGNOSIS — Z12.39 BREAST CANCER SCREENING: ICD-10-CM

## 2019-03-18 DIAGNOSIS — M50.30 DDD (DEGENERATIVE DISC DISEASE), CERVICAL: ICD-10-CM

## 2019-03-18 DIAGNOSIS — M19.049 CMC ARTHRITIS: ICD-10-CM

## 2019-03-18 DIAGNOSIS — F33.41 MDD (MAJOR DEPRESSIVE DISORDER), RECURRENT, IN PARTIAL REMISSION: ICD-10-CM

## 2019-03-18 DIAGNOSIS — K21.9 GASTROESOPHAGEAL REFLUX DISEASE WITHOUT ESOPHAGITIS: ICD-10-CM

## 2019-03-18 DIAGNOSIS — F43.10 PTSD (POST-TRAUMATIC STRESS DISORDER): ICD-10-CM

## 2019-03-18 DIAGNOSIS — M15.9 PRIMARY OSTEOARTHRITIS INVOLVING MULTIPLE JOINTS: ICD-10-CM

## 2019-03-18 PROCEDURE — 1101F PR PT FALLS ASSESS DOC 0-1 FALLS W/OUT INJ PAST YR: ICD-10-PCS | Mod: HCNC,CPTII,S$GLB, | Performed by: NURSE PRACTITIONER

## 2019-03-18 PROCEDURE — 3075F SYST BP GE 130 - 139MM HG: CPT | Mod: HCNC,CPTII,S$GLB, | Performed by: NURSE PRACTITIONER

## 2019-03-18 PROCEDURE — 3078F PR MOST RECENT DIASTOLIC BLOOD PRESSURE < 80 MM HG: ICD-10-PCS | Mod: HCNC,CPTII,S$GLB, | Performed by: NURSE PRACTITIONER

## 2019-03-18 PROCEDURE — 99214 PR OFFICE/OUTPT VISIT, EST, LEVL IV, 30-39 MIN: ICD-10-PCS | Mod: HCNC,S$GLB,, | Performed by: NURSE PRACTITIONER

## 2019-03-18 PROCEDURE — 3075F PR MOST RECENT SYSTOLIC BLOOD PRESS GE 130-139MM HG: ICD-10-PCS | Mod: HCNC,CPTII,S$GLB, | Performed by: NURSE PRACTITIONER

## 2019-03-18 PROCEDURE — 25500020 PHARM REV CODE 255: Mod: HCNC | Performed by: NURSE PRACTITIONER

## 2019-03-18 PROCEDURE — 74177 CT ABD & PELVIS W/CONTRAST: CPT | Mod: TC,HCNC

## 2019-03-18 PROCEDURE — 99999 PR PBB SHADOW E&M-EST. PATIENT-LVL IV: CPT | Mod: PBBFAC,HCNC,, | Performed by: NURSE PRACTITIONER

## 2019-03-18 PROCEDURE — 1101F PT FALLS ASSESS-DOCD LE1/YR: CPT | Mod: HCNC,CPTII,S$GLB, | Performed by: NURSE PRACTITIONER

## 2019-03-18 PROCEDURE — 99999 PR PBB SHADOW E&M-EST. PATIENT-LVL IV: ICD-10-PCS | Mod: PBBFAC,HCNC,, | Performed by: NURSE PRACTITIONER

## 2019-03-18 PROCEDURE — 99214 OFFICE O/P EST MOD 30 MIN: CPT | Mod: HCNC,S$GLB,, | Performed by: NURSE PRACTITIONER

## 2019-03-18 PROCEDURE — 3078F DIAST BP <80 MM HG: CPT | Mod: HCNC,CPTII,S$GLB, | Performed by: NURSE PRACTITIONER

## 2019-03-18 PROCEDURE — 74177 CT ABDOMEN PELVIS WITH CONTRAST: ICD-10-PCS | Mod: 26,HCNC,, | Performed by: RADIOLOGY

## 2019-03-18 PROCEDURE — 74177 CT ABD & PELVIS W/CONTRAST: CPT | Mod: 26,HCNC,, | Performed by: RADIOLOGY

## 2019-03-18 RX ORDER — OMEPRAZOLE 40 MG/1
CAPSULE, DELAYED RELEASE ORAL
Qty: 90 CAPSULE | Refills: 1 | Status: SHIPPED | OUTPATIENT
Start: 2019-03-18 | End: 2019-10-17 | Stop reason: SDUPTHER

## 2019-03-18 RX ADMIN — IOHEXOL 30 ML: 350 INJECTION, SOLUTION INTRAVENOUS at 02:03

## 2019-03-18 RX ADMIN — IOHEXOL 75 ML: 350 INJECTION, SOLUTION INTRAVENOUS at 02:03

## 2019-03-18 NOTE — PROGRESS NOTES
Subjective:       Patient ID: Rola Richter is a 73 y.o. female.    Chief Complaint: Abdominal Pain    Ms. Richter presents today for a 6 month chronic conditions F/U. She is complaining of left sided abdominal pain that radiates into her lower abdomen. This has been present since around hilda. It is worse after she eats and takes her medications in the morning. Happens intermittently and resolves spontaneously. It does not occur in the afternoons or at night. She reports trouble with constipation, has BM every other day. Constipation improves if she increases high fiber foods (fruits, vegetables) in her diet. Has used miralax in the past with good results. Her abdominal pain improves if she has a BM. Denies urinary symptoms or N/V/D. Abdominal pain and constipation do not affect her appetite. Also has history of GERD, has seen GI in the past for this. Taking Prilosec daily. She is a participant in digital HTN, checks BP daily. Under the care of Dr. Grimaldo for pain management and Dr. Cabral for rheumatology. Has mammogram scheduled for Friday.       Abdominal Pain   This is a new problem. The current episode started more than 1 month ago. The onset quality is gradual. The problem occurs 2 to 4 times per day. The problem has been gradually worsening. The pain is located in the LUQ and LLQ. The pain is at a severity of 9/10. The pain is severe. The quality of the pain is cramping and aching. Pain radiation: lower abdomen. Associated symptoms include constipation and nausea. Pertinent negatives include no diarrhea, fever, flatus, frequency, hematochezia, hematuria or vomiting. The pain is aggravated by eating (only in morning). The pain is relieved by nothing. She has tried nothing for the symptoms.   Constipation   This is a chronic problem. The current episode started more than 1 year ago. The problem has been waxing and waning since onset. Her stool frequency is 2 to 3 times per week. The patient is not on a high  fiber diet. She does not exercise regularly. There has not been adequate water intake. Associated symptoms include abdominal pain and nausea. Pertinent negatives include no diarrhea, difficulty urinating, fever, flatus, hematochezia or vomiting. She has tried stool softeners and fiber for the symptoms. The treatment provided moderate relief.     Patient Active Problem List   Diagnosis    Hypertension    GERD (gastroesophageal reflux disease)    Hyperlipidemia    Osteoporosis    Dependency on pain medication    PTSD (post-traumatic stress disorder)    Immunosuppressed status    CMC arthritis    Dry eye syndrome    DDD (degenerative disc disease), cervical    Occipital neuralgia    Cervical radiculitis    Primary osteoarthritis involving multiple joints    MDD (major depressive disorder), recurrent, in partial remission     Review of Systems   Constitutional: Negative for fatigue and fever.   Respiratory: Negative for cough, shortness of breath and wheezing.    Cardiovascular: Negative for chest pain and palpitations.   Gastrointestinal: Positive for abdominal pain, constipation and nausea. Negative for diarrhea, flatus, hematochezia and vomiting.   Genitourinary: Negative for difficulty urinating, frequency, hematuria and urgency.       Objective:      Physical Exam   Constitutional: She is oriented to person, place, and time. She appears well-developed and well-nourished.   HENT:   Head: Normocephalic and atraumatic.   Cardiovascular: Normal rate, regular rhythm and normal heart sounds.   Pulmonary/Chest: Effort normal and breath sounds normal. She has no wheezes.   Abdominal: Soft.   Neurological: She is alert and oriented to person, place, and time.   Skin: Skin is warm and dry.   Psychiatric: She has a normal mood and affect.   Nursing note and vitals reviewed.      Assessment:       1. Essential hypertension    2. Pure hypercholesterolemia    3. DDD (degenerative disc disease), cervical    4.  Primary osteoarthritis involving multiple joints    5. CMC arthritis    6. MDD (major depressive disorder), recurrent, in partial remission    7. PTSD (post-traumatic stress disorder)    8. Breast cancer screening    9. Constipation, unspecified constipation type    10. Generalized abdominal pain    11. Gastroesophageal reflux disease without esophagitis        Plan:       Rola was seen today for abdominal pain.    Diagnoses and all orders for this visit:    Essential hypertension  -     Comprehensive metabolic panel; Future  -     CBC auto differential; Future  -     Creatinine, serum; Future  Stable condition.  Continue current medications.  Will adjust based on lab findings or if condition changes.  Continue digital HTN    Pure hypercholesterolemia  -     Lipid panel; Future  Stable condition.  Continue current medications.  Will adjust based on lab findings or if condition changes.    DDD (degenerative disc disease), cervical  Continue under the care of pain management    Primary osteoarthritis involving multiple joints  continue under the care of pain management    CMC arthritis  Continue under the care of rheumatology     MDD (major depressive disorder), recurrent, in partial remission  Continue under the care of psychiatry     PTSD (post-traumatic stress disorder)  Continue under the care of psychiatry    Breast cancer screening  Continue with scheduled appointment     Constipation, unspecified constipation type  Patient was counseled that these lifestyle changes can help prevent constipation:  · Diet. Eat a high-fiber diet, with fresh fruit and vegetables, and reduce dairy intake, meats, and processed foods.  An over the counter fiber supplement is recommended such as Fiberchoice chewables or Metamucil.  · Fluids. It's important to get enough fluids each day. Drink plenty of water when you eat more fiber. If you are on diet that limits the amount of fluid you can have, talk about this with your health  care provider.  · Regular exercise. Check with your health care provider first.  I also advised use of over the counter stool softeners like docusate as needed for persistent constipation.  OTC probiotics may also be of benefit like Align to help regulation.  If acutely constipated the patient was advised to use otc fleets enema(s) and/or magnesium citrate to relieve symptoms.      Generalized abdominal pain  -     CT Abdomen With Contrast; Future  Screen and treat as needed    Gastroesophageal reflux disease without esophagitis  Continue Prilosec  Counseled patient on prevention of reflux with changes in diet and behavior.  I recommended avoidance of greasy and spicy foods, caffeine and eating within 3 hours of bedtime.  I counseled the patient to avoid eating large meals and instead eating more frequent small meals.  I also recommended weight loss and elevation of the head of the bed by 6 inches.      Patient readiness: acceptance and barriers:none    During the course of the visit the patient was educated and counseled about the following:     Hypertension:   Medication: no change.  Check blood pressures daily and record.    Goals: Hypertension: Reduce Blood Pressure    Did patient meet goals/outcomes: Yes    The following self management tools provided: none, digital HTN    Patient Instructions (the written plan) was given to the patient/family.     Time spent with patient: 30 minutes    Barriers to medications present (no )    Adverse reactions to current medications (no)    Over the counter medications reviewed (Yes)    F/U with Dr. Carias in 6 months

## 2019-03-20 ENCOUNTER — TELEPHONE (OUTPATIENT)
Dept: FAMILY MEDICINE | Facility: CLINIC | Age: 74
End: 2019-03-20

## 2019-03-20 ENCOUNTER — PATIENT OUTREACH (OUTPATIENT)
Dept: OTHER | Facility: OTHER | Age: 74
End: 2019-03-20

## 2019-03-20 NOTE — PROGRESS NOTES
Last 5 Patient Entered Readings                                      Current 30 Day Average: 141/74     Recent Readings 3/20/2019 3/19/2019 3/15/2019 3/12/2019 3/12/2019    SBP (mmHg) 132 156 149 144 163    DBP (mmHg) 67 80 73 72 80    Pulse 72 68 94 76 76        3/20: Patient is driving.  Will call tomorrow.

## 2019-03-20 NOTE — TELEPHONE ENCOUNTER
----- Message from Steph Collins NP sent at 3/18/2019  4:43 PM CDT -----  CT reviewed- it shows thickening of some of the small bowel loops. I would like her to see GI for further evaluation. There is also some bladder wall thickening. I am going to refer her to urology. Please schedule these appointments.

## 2019-03-21 NOTE — PROGRESS NOTES
Last 5 Patient Entered Readings                                      Current 30 Day Average: 141/74     Recent Readings 3/20/2019 3/19/2019 3/15/2019 3/12/2019 3/12/2019    SBP (mmHg) 132 156 149 144 163    DBP (mmHg) 67 80 73 72 80    Pulse 72 68 94 76 76          Digital Medicine: Health  Follow Up    Lifestyle Modifications:    1.Dietary Modifications (Sodium intake <2,000mg/day, food labels, dining out): deferred    2.Physical Activity: deferred    3.Medication Therapy: Patient has been compliant with the medication regimen.    4.Patient has the following medication side effects/concerns: none  (Frequency/Alleviating factors/Precipitating factors, etc.)     Follow up with Mrs. Rola Richter completed. No further questions or concerns. Will continue to follow up to achieve health goals.    Patient reports the reading of 156/80mmHg was her 's blood pressure reading.  Reinforced that she should be the only one using BP machine.  Patient's  can make his own account.  Patient confirmed understanding.

## 2019-03-22 ENCOUNTER — HOSPITAL ENCOUNTER (OUTPATIENT)
Dept: RADIOLOGY | Facility: HOSPITAL | Age: 74
Discharge: HOME | End: 2019-03-22
Attending: FAMILY MEDICINE
Payer: MEDICARE

## 2019-03-22 DIAGNOSIS — Z12.39 SCREENING FOR BREAST CANCER: ICD-10-CM

## 2019-03-22 PROCEDURE — 77067 MAMMO DIGITAL SCREENING BILAT WITH TOMOSYNTHESIS_CAD: ICD-10-PCS | Mod: 26,HCNC,, | Performed by: RADIOLOGY

## 2019-03-22 PROCEDURE — 77067 SCR MAMMO BI INCL CAD: CPT | Mod: 26,HCNC,, | Performed by: RADIOLOGY

## 2019-03-22 PROCEDURE — 77067 SCR MAMMO BI INCL CAD: CPT | Mod: TC,HCNC

## 2019-03-22 PROCEDURE — 77063 MAMMO DIGITAL SCREENING BILAT WITH TOMOSYNTHESIS_CAD: ICD-10-PCS | Mod: 26,HCNC,, | Performed by: RADIOLOGY

## 2019-03-22 PROCEDURE — 77063 BREAST TOMOSYNTHESIS BI: CPT | Mod: 26,HCNC,, | Performed by: RADIOLOGY

## 2019-03-25 ENCOUNTER — APPOINTMENT (OUTPATIENT)
Dept: LAB | Facility: HOSPITAL | Age: 74
End: 2019-03-25
Attending: NURSE PRACTITIONER
Payer: MEDICARE

## 2019-03-25 ENCOUNTER — OFFICE VISIT (OUTPATIENT)
Dept: UROLOGY | Facility: CLINIC | Age: 74
End: 2019-03-25
Payer: MEDICARE

## 2019-03-25 VITALS
HEART RATE: 74 BPM | HEIGHT: 59 IN | TEMPERATURE: 98 F | WEIGHT: 118.38 LBS | BODY MASS INDEX: 23.87 KG/M2 | RESPIRATION RATE: 18 BRPM | SYSTOLIC BLOOD PRESSURE: 144 MMHG | DIASTOLIC BLOOD PRESSURE: 77 MMHG

## 2019-03-25 DIAGNOSIS — N32.89 BLADDER WALL THICKENING: Primary | ICD-10-CM

## 2019-03-25 DIAGNOSIS — N30.90 BLADDER INFECTION: ICD-10-CM

## 2019-03-25 LAB
BILIRUB SERPL-MCNC: ABNORMAL MG/DL
BILIRUB UR QL STRIP: NEGATIVE
BLOOD URINE, POC: ABNORMAL
CLARITY UR: CLEAR
COLOR UR: YELLOW
COLOR, POC UA: YELLOW
GLUCOSE UR QL STRIP: ABNORMAL
GLUCOSE UR QL STRIP: NEGATIVE
HGB UR QL STRIP: NEGATIVE
KETONES UR QL STRIP: ABNORMAL
KETONES UR QL STRIP: NEGATIVE
LEUKOCYTE ESTERASE UR QL STRIP: NEGATIVE
LEUKOCYTE ESTERASE URINE, POC: ABNORMAL
NITRITE UR QL STRIP: NEGATIVE
NITRITE, POC UA: ABNORMAL
PH UR STRIP: 7 [PH] (ref 5–8)
PH, POC UA: 7
PROT UR QL STRIP: NEGATIVE
PROTEIN, POC: ABNORMAL
SP GR UR STRIP: <=1.005 (ref 1–1.03)
SPECIFIC GRAVITY, POC UA: 1.01
URN SPEC COLLECT METH UR: ABNORMAL
UROBILINOGEN UR STRIP-ACNC: NEGATIVE EU/DL
UROBILINOGEN, POC UA: 0.2

## 2019-03-25 PROCEDURE — 3078F PR MOST RECENT DIASTOLIC BLOOD PRESSURE < 80 MM HG: ICD-10-PCS | Mod: HCNC,CPTII,S$GLB, | Performed by: NURSE PRACTITIONER

## 2019-03-25 PROCEDURE — 99999 PR PBB SHADOW E&M-EST. PATIENT-LVL V: CPT | Mod: PBBFAC,HCNC,, | Performed by: NURSE PRACTITIONER

## 2019-03-25 PROCEDURE — 99999 PR PBB SHADOW E&M-EST. PATIENT-LVL V: ICD-10-PCS | Mod: PBBFAC,HCNC,, | Performed by: NURSE PRACTITIONER

## 2019-03-25 PROCEDURE — 99204 PR OFFICE/OUTPT VISIT, NEW, LEVL IV, 45-59 MIN: ICD-10-PCS | Mod: HCNC,25,S$GLB, | Performed by: NURSE PRACTITIONER

## 2019-03-25 PROCEDURE — 51701 INSERT BLADDER CATHETER: CPT | Mod: HCNC,S$GLB,, | Performed by: NURSE PRACTITIONER

## 2019-03-25 PROCEDURE — 99204 OFFICE O/P NEW MOD 45 MIN: CPT | Mod: HCNC,25,S$GLB, | Performed by: NURSE PRACTITIONER

## 2019-03-25 PROCEDURE — 3078F DIAST BP <80 MM HG: CPT | Mod: HCNC,CPTII,S$GLB, | Performed by: NURSE PRACTITIONER

## 2019-03-25 PROCEDURE — 81003 URINALYSIS AUTO W/O SCOPE: CPT | Mod: HCNC

## 2019-03-25 PROCEDURE — 81002 POCT URINE DIPSTICK WITHOUT MICROSCOPE: ICD-10-PCS | Mod: HCNC,S$GLB,, | Performed by: NURSE PRACTITIONER

## 2019-03-25 PROCEDURE — 1101F PR PT FALLS ASSESS DOC 0-1 FALLS W/OUT INJ PAST YR: ICD-10-PCS | Mod: HCNC,CPTII,S$GLB, | Performed by: NURSE PRACTITIONER

## 2019-03-25 PROCEDURE — 3077F PR MOST RECENT SYSTOLIC BLOOD PRESSURE >= 140 MM HG: ICD-10-PCS | Mod: HCNC,CPTII,S$GLB, | Performed by: NURSE PRACTITIONER

## 2019-03-25 PROCEDURE — 87086 URINE CULTURE/COLONY COUNT: CPT | Mod: HCNC

## 2019-03-25 PROCEDURE — 81002 URINALYSIS NONAUTO W/O SCOPE: CPT | Mod: HCNC,S$GLB,, | Performed by: NURSE PRACTITIONER

## 2019-03-25 PROCEDURE — 3077F SYST BP >= 140 MM HG: CPT | Mod: HCNC,CPTII,S$GLB, | Performed by: NURSE PRACTITIONER

## 2019-03-25 PROCEDURE — 51701 INSERT,NON-INDWELLING BLADDER CATHETER: ICD-10-PCS | Mod: HCNC,S$GLB,, | Performed by: NURSE PRACTITIONER

## 2019-03-25 PROCEDURE — 1101F PT FALLS ASSESS-DOCD LE1/YR: CPT | Mod: HCNC,CPTII,S$GLB, | Performed by: NURSE PRACTITIONER

## 2019-03-25 NOTE — PROGRESS NOTES
[facility] Note  Staff: PATRICIA Rosales    Referring provider and please cc: Steph Collins NP  PCP: Dr. Liseth Carias    Chief Complaint: Bladder wall thickening; Bladder outlet obstruction vs. Cystitis    Subjective:        HPI: Rola Richter is a 73 y.o. female NEW PATIENT presents today for further evaluation of recent abnormal findings on CT scan of bladder.  CT scan showed bladder wall thickening and cystitis vs. Bladder outlet obstruction issues.  But during last ov with PCP no urine testing was performed to correlate with this finding.    Pt initially presented to PCP office on 03/18/2019 with c/o abdominal pain, left sided that radiates into lower abdominal area which has been present since [place].  Her symptoms improve after she has a bowel movement.  The onset quality is gradual. The problem occurs 2 to 4 times per day. The problem has been gradually worsening. The pain is located in the LUQ and LLQ. Associated symptoms include constipation and nausea. Pertinent negatives include no diarrhea, fever, flatus, frequency, hematochezia, hematuria or vomiting. The pain is aggravated by eating (only in morning). The pain is relieved by nothing. She has tried nothing for the symptoms.     Questions asked the pt during ov today:  Dysuria?:  None  Hematuria?:  None  Urinary urgency, frequency?:  Yes, Yes  Pt does feel she empties her bladder with urination.  Nocturia?:  1-2x nightly  Incontinence issues?:  Yes, sometimes  Hx of Kidney Stones?:  No history  Sexually active?:  Yes, pt attributes her UTIs to having intercourse with her  who has a large prostate.  She obtains UTIs after intercourse.    Pt suffers with chronic constipation.    Past Urine Cultures:  01/04/2019:  E. Coli ([id number]) & Enterococcus Faecalis (>100,000)  RX'D--Cipro 250 mg BID x 5 days.    10/10/18:  E.Coli (>[id number]); RX'D-Macrobid 100 mg BID x 5 days.    Recent Radiology Reports:  CT ABDOMEN  PELVIS WITH CONTRAST done 2019:  FINDINGS:  Lung bases clear.  Normal size.  Cholecystectomy.     Pancreas atrophy.  Mild dilation of the right ureter without a calcified   stone which is a chronic finding.  Subcentimeter hypodense lesion lower   pole left kidney, too small to characterize.  Remaining solid abdominal   organs unremarkable.     There is enteric contrast.  Wall thickening involving several proximal   jejunal loops.  Distal small bowel is unremarkable.  No dilated bowel   loops.  Moderate degree of stool in the colon as can be seen with   constipation.     Atherosclerosis.  Hysterectomy.  Diffuse wall thickening urinary bladder.    Appendectomy.  No acute osseous abnormality.     IMPRESSION:   1. Apparent wall thickening involving several proximal small bowel loops.    This could reflect inadequate distension.  Infectious/inflammatory   enteritis also possible in the appropriate clinical setting.  2.  Diffuse bladder wall thickening with differential to include cystitis   and chronic outlet obstruction.     REVIEW OF SYSTEMS:  A comprehensive 10 system review was performed and is negative except as noted above in HPI    PMHx:  Past Medical History:   Diagnosis Date    Anxiety     Arthritis     Cataract     DDD (degenerative disc disease), lumbar     Depression     GERD (gastroesophageal reflux disease)     Hyperlipidemia     Hypertension     pt ststes she does not take meds anymore she just watches her weight//    Immunosuppressed status 2016    Neuromuscular disorder     Occipital neuralgia 3/24/2017    Osteoporosis     Substance abuse      PSHx:  Past Surgical History:   Procedure Laterality Date    APPENDECTOMY      APPENDECTOMY-LAPAROSCOPIC N/A 10/9/2016    Performed by [facility] Bill [facility] at [facility] OR     SECTION      x 2    CHOLECYSTECTOMY      ESOPHAGOGASTRODUODENOSCOPY (EGD) N/A 3/14/2017    Performed by Timi Marcial [facility] at [facility] ENDO     ESOPHAGOGASTRODUODENOSCOPY (EGD) N/A 2/3/2016    [facility]jo [facility] at [facility]O    HYSTERECTOMY      INJECTION-STEROID-EPIDURAL-CERVICAL N/A 2/8/2018    [facility] OR    INJECTION-STEROID-EPIDURAL-CERVICAL N/A 10/23/2017    Per[facility] at [facility] OR    Laser Periphery Iridotomy Bilateral     OD 5/26/16 and OS touch up 5/26/2016    vocal cord tumor removal       Screening Studies  Colonoscopy: Last test-Dr. Marcial 3 years, WNL    Fam Hx:   malignancies: No , gyn malignancies: Yes - Maternal aunt-uterine ca     Allergies:  Aspirin; Penicillins; Lipitor [atorvastatin]; and Sulfa (sulfonamide antibiotics)    Medications: reviewed   Anticoagulation: No    Objective:     Vitals:    03/25/19 1315   BP: (!) 144/77   Pulse: 74   Resp: 18   Temp: 98.3 °F (36.8 °C)     Physical Exam   Vitals reviewed.  Constitutional: She is oriented to person, place, and time. She appears well-developed and well-nourished.   HENT:   Head: Normocephalic and atraumatic.   Eyes: Conjunctivae and EOM are normal. Pupils are equal, round, and reactive to light.   Neck: Normal range of motion. Neck supple.   Cardiovascular: Normal rate, regular rhythm, normal heart sounds and intact distal pulses.    Pulmonary/Chest: Effort normal and breath sounds normal.   Abdominal: Soft. Bowel sounds are normal.   Musculoskeletal: Normal range of motion.   Neurological: She is alert and oriented to person, place, and time. She has normal reflexes.   Skin: Skin is warm and dry.     Psychiatric: She has a normal mood and affect. Her behavior is normal. Judgment and thought content normal.      exam:  External Genitalia: normal hair distribution, no lesions  Urethral meatus: normal without prolapse or caruncle  Urethra: without tenderness or mass  Bladder: without fullness or tenderness  In and out cath performed by me with 15 mL of urine residual  +Pelvic organ prolapse observed on exam to entrance of  vault.    LABS REVIEW:  UA today: From Clean Catch specimen  Color:Clear, Yellow  Spec. Grav.  1.010  PH  7.0  Trace of leukocytes  Trace of blood    Cr:   Lab Results   Component Value Date    CREATININE 0.8 03/18/2019     Assessment:       1. Bladder wall thickening    2. Bladder infection          Plan:   Cystitis vs. Bladder wall thickening:    UA, Urine culture to be performed.  I did encourage pt to start taking something on a daily basis in regards to her constipation issues as this will affect the bladder also.    F/u TBD after we review lab results for further POC.  Pt verbalized understanding.    [facility]: Active    Jailyn Eatsman, LIANAP-C

## 2019-03-25 NOTE — LETTER
March 25, 2019      Steph Collins, NP  [address] [facility]  [place] [address]           [place] [facility]  [address] [place] [address] [address]  [place] [address]  Phone: [phone]  Fax: [phone]          Patient: Rola Richter   MR Number: [id number]   YOB: 1945   Date of Visit: 3/25/2019       Dear Steph Collins:    Thank you for referring Rola Richter to me for evaluation. Attached you will find relevant portions of my assessment and plan of care.    If you have questions, please do not hesitate to call me. I look forward to following Rola Richter along with you.    Sincerely,    Jailyn Eastman, [facility]    Enclosure  CC:  No Recipients    If you would like to receive this communication electronically, please contact [email or web address] or [phone] to request more information on [employer] access.    For providers and/or their staff who would like to refer a patient to [facility], please contact us through our one-stop-shop provider referral line, [facility], at [phone].    If you feel you have received this communication in error or would no longer like to receive these types of communications, please e-mail [email or web address]

## 2019-03-25 NOTE — PATIENT INSTRUCTIONS
"  Bladder Infection, Female (Adult)    Urine is normally doesn't have any bacteria in it. But bacteria can get into the urinary tract from the skin around the rectum. Or they can travel in the blood from elsewhere in the body. Once they are in your urinary tract, they can cause infection in the urethra (urethritis), the bladder (cystitis), or the kidneys (pyelonephritis).  The most common place for an infection is in the bladder. This is called a bladder infection. This is one of the most common infections in women. Most bladder infections are easily treated. They are not serious unless the infection spreads to the kidney.  The phrases "bladder infection," "UTI," and "cystitis" are often used to describe the same thing. But they are not always the same. Cystitis is an inflammation of the bladder. The most common cause of cystitis is an infection.  Symptoms  The infection causes inflammation in the urethra and bladder. This causes many of the symptoms. The most common symptoms of a bladder infection are:  · Pain or burning when urinating  · Having to urinate more often than usual  · Urgent need to urinate  · Only a small amount of urine comes out  · Blood in urine  · Abdominal discomfort. This is usually in the lower abdomen above the pubic bone.  · Cloudy urine  · Strong- or bad-smelling urine  · Unable to urinate (urinary retention)  · Unable to hold urine in (urinary incontinence)  · Fever  · Loss of appetite  · Confusion (in older adults)  Causes  Bladder infections are not contagious. You can't get one from someone else, from a toilet seat, or from sharing a bath.  The most common cause of bladder infections is bacteria from the bowels. The bacteria get onto the skin around the opening of the urethra. From there, they can get into the urine and travel up to the bladder, causing inflammation and infection. This usually happens because of:  · Wiping improperly after urinating. Always wipe from front to " back.  · Bowel incontinence  · Pregnancy  · Procedures such as having a catheter inserted  · Older age  · Not emptying your bladder. This can allow bacteria a chance to grow in your urine.  · Dehydration  · Constipation  · Sex  · Use of a diaphragm for birth control   Treatment  Bladder infections are diagnosed by a urine test. They are treated with antibiotics and usually clear up quickly without complications. Treatment helps prevent a more serious kidney infection.  Medicines  Medicines can help in the treatment of a bladder infection:  · Take antibiotics until they are used up, even if you feel better. It is important to finish them to make sure the infection has cleared.  · You can use acetaminophen or ibuprofen for pain, fever, or discomfort, unless another medicine was prescribed. If you have chronic liver or kidney disease, talk with your healthcare provider before using these medicines. Also talk with your provider if you've ever had a stomach ulcer or gastrointestinal bleeding, or are taking blood-thinner medicines.  · If you are given phenazopydridine to reduce burning with urination, it will cause your urine to become a bright orange color. This can stain clothing.  Care and prevention  These self-care steps can help prevent future infections:  · Drink plenty of fluids to prevent dehydration and flush out your bladder. Do this unless you must restrict fluids for other health reasons, or your doctor told you not to.  · Proper cleaning after going to the bathroom is important. Wipe from front to back after using the toilet to prevent the spread of bacteria.  · Urinate more often. Don't try to hold urine in for a long time.  · Wear loose-fitting clothes and cotton underwear. Avoid tight-fitting pants.  · Improve your diet and prevent constipation. Eat more fresh fruit and vegetables, and fiber, and less junk and fatty foods.  · Avoid sex until your symptoms are gone.  · Avoid caffeine, alcohol, and spicy  foods. These can irritate your bladder.  · Urinate right after intercourse to flush out your bladder.  · If you use birth control pills and have frequent bladder infections, discuss it with your doctor.  Follow-up care  Call your healthcare provider if all symptoms are not gone after 3 days of treatment. This is especially important if you have repeat infections.  If a culture was done, you will be told if your treatment needs to be changed. If directed, you can call to find out the results.  If X-rays were done, you will be told if the results will affect your treatment.  Call 911  Call 911 if any of the following occur:  · Trouble breathing  · Hard to wake up or confusion  · Fainting or loss of consciousness  · Rapid heart rate  When to seek medical advice  Call your healthcare provider right away if any of these occur:  · Fever of 100.4ºF (38.0ºC) or higher, or as directed by your healthcare provider  · Symptoms are not better by the third day of treatment  · Back or belly (abdominal) pain that gets worse  · Repeated vomiting, or unable to keep medicine down  · Weakness or dizziness  · Vaginal discharge  · Pain, redness, or swelling in the outer vaginal area (labia)  Date Last Reviewed: 10/1/2016  © 4874-3772 [employer]. [address] [place] [address], [place] [address]. All rights reserved. This information is not intended as a substitute for professional medical care. Always follow your healthcare professional's instructions.        Pelvic Organ Prolapse  Pelvic organ prolapse is when 1 or more of the organs inside the pelvis (found between the waist and thighs), slip from their normal positions. Normally, muscles and tissues in the pelvic region support the pelvic organs and hold them in place.  What is a normal pelvis?     Cutaway view of pelvis showing the small intesting, bladder, pubic bone, urethra, pelvic floor muscles, uterus, vagina, and rectum.   A. The small intestine absorbs nutrients from  food.  B. The bladder collects and holds urine.  C. The pubic bone helps protect the pelvic organs.  D. The urethra is the tube that carries urine out of the body.  E. The pelvic floor muscles support organs and other structures in the pelvis.  F. The uterus is where the baby develops when a women is pregnant.  G. The vagina is the canal from the uterus to the outside of the body.  H. The rectum stores stool until a bowel movement occurs.  What causes pelvic organ prolapse?   There are several causes of pelvic organ prolapse including:  · Vaginal childbirth  · Genetic predisposition  · Connective tissue disorders  · Advancing age  · Constant coughing (such as with bronchitis or smoking)  · Heavy lifting  · Chronic straining (such as with constipation)  · Being overweight  What are the symptoms of pelvic organ prolapse?  The symptoms of pelvic organ prolapse include:  · A feeling of fullness or pressure in your pelvis  · A sense that a ball or lump is protruding from the vagina  · Problems passing urine or having a bowel movement  · Urine leakage when you cough or use stairs. (But this can happen even without prolapse.)   · Pain or pressure in your low back  · Problems with sexual intercourse  Date Last Reviewed: 5/10/2015  © 1640-0582 [employer]. [address] [place] [address], [place] [address]. All rights reserved. This information is not intended as a substitute for professional medical care. Always follow your healthcare professional's instructions.

## 2019-03-27 ENCOUNTER — LAB VISIT (OUTPATIENT)
Dept: LAB | Facility: HOSPITAL | Age: 74
End: 2019-03-27
Attending: NURSE PRACTITIONER
Payer: MEDICARE

## 2019-03-27 DIAGNOSIS — D64.9 ANEMIA, UNSPECIFIED TYPE: Primary | ICD-10-CM

## 2019-03-27 DIAGNOSIS — I10 ESSENTIAL HYPERTENSION: ICD-10-CM

## 2019-03-27 DIAGNOSIS — E78.00 PURE HYPERCHOLESTEROLEMIA: ICD-10-CM

## 2019-03-27 LAB
ALBUMIN SERPL BCP-MCNC: 3.9 G/DL (ref 3.5–5.2)
ALP SERPL-CCNC: 68 U/L (ref 55–135)
ALT SERPL W/O P-5'-P-CCNC: 12 U/L (ref 10–44)
ANION GAP SERPL CALC-SCNC: 10 MMOL/L (ref 8–16)
AST SERPL-CCNC: 19 U/L (ref 10–40)
BACTERIA UR CULT: NO GROWTH
BASOPHILS # BLD AUTO: 0.05 K/UL (ref 0–0.2)
BASOPHILS NFR BLD: 0.6 % (ref 0–1.9)
BILIRUB SERPL-MCNC: 0.3 MG/DL (ref 0.1–1)
BUN SERPL-MCNC: 13 MG/DL (ref 8–23)
CALCIUM SERPL-MCNC: 9.6 MG/DL (ref 8.7–10.5)
CHLORIDE SERPL-SCNC: 107 MMOL/L (ref 95–110)
CHOLEST SERPL-MCNC: 382 MG/DL (ref 120–199)
CHOLEST/HDLC SERPL: 6.8 {RATIO} (ref 2–5)
CO2 SERPL-SCNC: 23 MMOL/L (ref 23–29)
CREAT SERPL-MCNC: 0.8 MG/DL (ref 0.5–1.4)
DIFFERENTIAL METHOD: ABNORMAL
EOSINOPHIL # BLD AUTO: 0.6 K/UL (ref 0–0.5)
EOSINOPHIL NFR BLD: 6.7 % (ref 0–8)
ERYTHROCYTE [DISTWIDTH] IN BLOOD BY AUTOMATED COUNT: 14.7 % (ref 11.5–14.5)
EST. GFR  (AFRICAN AMERICAN): >60 ML/MIN/1.73 M^2
EST. GFR  (NON AFRICAN AMERICAN): >60 ML/MIN/1.73 M^2
GLUCOSE SERPL-MCNC: 82 MG/DL (ref 70–110)
HCT VFR BLD AUTO: 36.4 % (ref 37–48.5)
HDLC SERPL-MCNC: 56 MG/DL (ref 40–75)
HDLC SERPL: 14.7 % (ref 20–50)
HGB BLD-MCNC: 11.4 G/DL (ref 12–16)
IMM GRANULOCYTES # BLD AUTO: 0.03 K/UL (ref 0–0.04)
IMM GRANULOCYTES NFR BLD AUTO: 0.4 % (ref 0–0.5)
LDLC SERPL CALC-MCNC: 292.8 MG/DL (ref 63–159)
LYMPHOCYTES # BLD AUTO: 2.1 K/UL (ref 1–4.8)
LYMPHOCYTES NFR BLD: 25.3 % (ref 18–48)
MCH RBC QN AUTO: 29.5 PG (ref 27–31)
MCHC RBC AUTO-ENTMCNC: 31.3 G/DL (ref 32–36)
MCV RBC AUTO: 94 FL (ref 82–98)
MONOCYTES # BLD AUTO: 0.8 K/UL (ref 0.3–1)
MONOCYTES NFR BLD: 9.8 % (ref 4–15)
NEUTROPHILS # BLD AUTO: 4.8 K/UL (ref 1.8–7.7)
NEUTROPHILS NFR BLD: 57.2 % (ref 38–73)
NONHDLC SERPL-MCNC: 326 MG/DL
NRBC BLD-RTO: 0 /100 WBC
PLATELET # BLD AUTO: 250 K/UL (ref 150–350)
PMV BLD AUTO: 11.1 FL (ref 9.2–12.9)
POTASSIUM SERPL-SCNC: 4.5 MMOL/L (ref 3.5–5.1)
PROT SERPL-MCNC: 7.3 G/DL (ref 6–8.4)
RBC # BLD AUTO: 3.86 M/UL (ref 4–5.4)
SODIUM SERPL-SCNC: 140 MMOL/L (ref 136–145)
TRIGL SERPL-MCNC: 166 MG/DL (ref 30–150)
WBC # BLD AUTO: 8.38 K/UL (ref 3.9–12.7)

## 2019-03-27 PROCEDURE — 80061 LIPID PANEL: CPT | Mod: HCNC

## 2019-03-27 PROCEDURE — 36415 COLL VENOUS BLD VENIPUNCTURE: CPT | Mod: HCNC,PO

## 2019-03-27 PROCEDURE — 85025 COMPLETE CBC W/AUTO DIFF WBC: CPT | Mod: HCNC

## 2019-03-27 PROCEDURE — 80053 COMPREHEN METABOLIC PANEL: CPT | Mod: HCNC

## 2019-03-28 ENCOUNTER — TELEPHONE (OUTPATIENT)
Dept: FAMILY MEDICINE | Facility: CLINIC | Age: 74
End: 2019-03-28

## 2019-03-28 NOTE — TELEPHONE ENCOUNTER
----- Message from Steph Collins NP sent at 3/27/2019  4:49 PM CDT -----  Labs reviewed- cholesterol and LDL are high. Is she taking the crestor as prescribed? Kidney and liver function are in normal range. Blood count shows mild anemia, this was also present 11 months ago. I am going to order iron studies. Please schedule.

## 2019-03-28 NOTE — TELEPHONE ENCOUNTER
Spoke with patient regarding her results patient stated she take her cholesterol medication every other day instead of everyday, I asked patient if she is eating before she take her medication and she said she takes it at night and she try not to eat at night because that's when the calories pile on. Patient said she will start taking her medication everyday. I will scheduled her follow up lab appointment and send her a low cholesterol diet in the mail.

## 2019-04-02 ENCOUNTER — PATIENT MESSAGE (OUTPATIENT)
Dept: FAMILY MEDICINE | Facility: CLINIC | Age: 74
End: 2019-04-02

## 2019-04-02 RX ORDER — HYDROCODONE BITARTRATE AND ACETAMINOPHEN 5; 325 MG/1; MG/1
1 TABLET ORAL EVERY 12 HOURS PRN
Qty: 60 TABLET | Refills: 0 | Status: SHIPPED | OUTPATIENT
Start: 2019-05-03 | End: 2019-04-23 | Stop reason: SDUPTHER

## 2019-04-02 RX ORDER — HYDROCODONE BITARTRATE AND ACETAMINOPHEN 5; 325 MG/1; MG/1
1 TABLET ORAL EVERY 12 HOURS PRN
Qty: 60 TABLET | Refills: 0 | Status: SHIPPED | OUTPATIENT
Start: 2019-04-04 | End: 2019-04-23 | Stop reason: SDUPTHER

## 2019-04-02 RX ORDER — HYDROCODONE BITARTRATE AND ACETAMINOPHEN 5; 325 MG/1; MG/1
1 TABLET ORAL EVERY 12 HOURS PRN
Qty: 60 TABLET | Refills: 0 | Status: SHIPPED | OUTPATIENT
Start: 2019-06-01 | End: 2019-08-07 | Stop reason: SDUPTHER

## 2019-04-04 ENCOUNTER — PATIENT OUTREACH (OUTPATIENT)
Dept: OTHER | Facility: OTHER | Age: 74
End: 2019-04-04

## 2019-04-04 NOTE — PROGRESS NOTES
Last 5 Patient Entered Readings                                      Current 30 Day Average: 145/74     Recent Readings 3/28/2019 3/21/2019 3/20/2019 3/19/2019 3/15/2019    SBP (mmHg) 140 164 132 156 149    DBP (mmHg) 72 71 67 80 73    Pulse 91 87 72 68 94          Spoke briefly with patient. Her  has been out of town and will return tonight. She would like to obtain more BP readings to assess prior to making medication change.

## 2019-04-05 ENCOUNTER — PATIENT MESSAGE (OUTPATIENT)
Dept: PAIN MEDICINE | Facility: CLINIC | Age: 74
End: 2019-04-05

## 2019-04-08 ENCOUNTER — OFFICE VISIT (OUTPATIENT)
Dept: PAIN MEDICINE | Facility: CLINIC | Age: 74
End: 2019-04-08
Payer: MEDICARE

## 2019-04-08 ENCOUNTER — LAB VISIT (OUTPATIENT)
Dept: LAB | Facility: HOSPITAL | Age: 74
End: 2019-04-08
Attending: NURSE PRACTITIONER
Payer: MEDICARE

## 2019-04-08 VITALS
HEART RATE: 74 BPM | DIASTOLIC BLOOD PRESSURE: 67 MMHG | HEIGHT: 59 IN | SYSTOLIC BLOOD PRESSURE: 128 MMHG | WEIGHT: 118 LBS | BODY MASS INDEX: 23.79 KG/M2

## 2019-04-08 DIAGNOSIS — M77.8 TRICEPS TENDONITIS: ICD-10-CM

## 2019-04-08 DIAGNOSIS — M47.812 SPONDYLOSIS OF CERVICAL REGION WITHOUT MYELOPATHY OR RADICULOPATHY: ICD-10-CM

## 2019-04-08 DIAGNOSIS — D64.9 ANEMIA, UNSPECIFIED TYPE: ICD-10-CM

## 2019-04-08 DIAGNOSIS — M79.18 MYOFASCIAL PAIN: ICD-10-CM

## 2019-04-08 DIAGNOSIS — Z79.891 CHRONIC USE OF OPIATE DRUGS THERAPEUTIC PURPOSES: Primary | ICD-10-CM

## 2019-04-08 LAB
FERRITIN SERPL-MCNC: 248 NG/ML (ref 20–300)
IRON SERPL-MCNC: 67 UG/DL (ref 30–160)
SATURATED IRON: 22 % (ref 20–50)
TOTAL IRON BINDING CAPACITY: 311 UG/DL (ref 250–450)
TRANSFERRIN SERPL-MCNC: 210 MG/DL (ref 200–375)

## 2019-04-08 PROCEDURE — 1101F PT FALLS ASSESS-DOCD LE1/YR: CPT | Mod: HCNC,CPTII,S$GLB, | Performed by: PHYSICIAN ASSISTANT

## 2019-04-08 PROCEDURE — 80307 DRUG TEST PRSMV CHEM ANLYZR: CPT | Mod: HCNC

## 2019-04-08 PROCEDURE — 3078F PR MOST RECENT DIASTOLIC BLOOD PRESSURE < 80 MM HG: ICD-10-PCS | Mod: HCNC,CPTII,S$GLB, | Performed by: PHYSICIAN ASSISTANT

## 2019-04-08 PROCEDURE — 3078F DIAST BP <80 MM HG: CPT | Mod: HCNC,CPTII,S$GLB, | Performed by: PHYSICIAN ASSISTANT

## 2019-04-08 PROCEDURE — 36415 COLL VENOUS BLD VENIPUNCTURE: CPT | Mod: HCNC,PO

## 2019-04-08 PROCEDURE — 1101F PR PT FALLS ASSESS DOC 0-1 FALLS W/OUT INJ PAST YR: ICD-10-PCS | Mod: HCNC,CPTII,S$GLB, | Performed by: PHYSICIAN ASSISTANT

## 2019-04-08 PROCEDURE — 3074F PR MOST RECENT SYSTOLIC BLOOD PRESSURE < 130 MM HG: ICD-10-PCS | Mod: HCNC,CPTII,S$GLB, | Performed by: PHYSICIAN ASSISTANT

## 2019-04-08 PROCEDURE — 99214 OFFICE O/P EST MOD 30 MIN: CPT | Mod: HCNC,S$GLB,, | Performed by: PHYSICIAN ASSISTANT

## 2019-04-08 PROCEDURE — 99999 PR PBB SHADOW E&M-EST. PATIENT-LVL IV: CPT | Mod: PBBFAC,HCNC,, | Performed by: PHYSICIAN ASSISTANT

## 2019-04-08 PROCEDURE — 83540 ASSAY OF IRON: CPT | Mod: HCNC

## 2019-04-08 PROCEDURE — 99214 PR OFFICE/OUTPT VISIT, EST, LEVL IV, 30-39 MIN: ICD-10-PCS | Mod: HCNC,S$GLB,, | Performed by: PHYSICIAN ASSISTANT

## 2019-04-08 PROCEDURE — 82728 ASSAY OF FERRITIN: CPT | Mod: HCNC

## 2019-04-08 PROCEDURE — 3074F SYST BP LT 130 MM HG: CPT | Mod: HCNC,CPTII,S$GLB, | Performed by: PHYSICIAN ASSISTANT

## 2019-04-08 PROCEDURE — 99999 PR PBB SHADOW E&M-EST. PATIENT-LVL IV: ICD-10-PCS | Mod: PBBFAC,HCNC,, | Performed by: PHYSICIAN ASSISTANT

## 2019-04-08 RX ORDER — METHOCARBAMOL 500 MG/1
500 TABLET, FILM COATED ORAL 3 TIMES DAILY PRN
Qty: 90 TABLET | Refills: 0 | Status: SHIPPED | OUTPATIENT
Start: 2019-04-08 | End: 2019-05-08

## 2019-04-08 NOTE — PROGRESS NOTES
Referring Physician: No ref. provider found    PCP: Liseth Carias MD      CC: neck and left occipital pain    Interval history: Ms. Richter is a 73 y.o. female with neck pain and occipital neuralgia who presents today for f/u and medication refill. She continues to benefit from repeat occipital nerve block. Neck pain is bothersome.  Her pain radiates into both arms, worse on the right.  Her right arm pain travels to her hand, her left stops around her shoulder.  She has numbness to her right hand and first through fourth digits. She is s/p cervical MELANY in February 2018 that only provided benefit for a short time.     She continues to take Norco and Flexeril which provide benefit.  Reports Flexeril causes dry mouth. Denies UE weakness. No bowel bladder changes.   Pain today is rated 8/10.    Prior HPI:   Patient is 70-year-old female with past history history of cervical DDD, cervical spondylosis and chronic headaches.  She recently moved here from [place], [place].  She is treated in the past by neurology.  She states having constant burning pain over the left side of her posterior scalp.  Pain radiates to her neck as well as a frontal.  She also has left-sided neck pain as well.  She denies any radicular arm pain.  No numbness or weakness.  She states having cervical epidural steroid injection at past with minimal benefit.  She also has had decided of cervical nerve blocks in the past with moderate benefit.  Most recent injection was performed 2 months ago.  She desires repeat injection.  She currently takes Norco 10 mg every 12 hours as needed with moderate benefit.  She also takes Zanaflex 4 mg every 8 hours with mild benefits.  She rates her pain 7/10 today.    Pain intervention history: s/p left occipital nerve blocks on 1/2016 with 50% relief of her headaches  S/p cervical MELANY 2/8/18 moderate relief for a couple of weeks.     ROS:  CONSTITUTIONAL: No fevers, chills, night sweats, wt. loss, appetite  changes  SKIN: no rashes or itching  ENT: No headaches, head trauma, vision changes, or eye pain  LYMPH NODES: None noticed   CV: No chest pain, palpitations.   RESP: No shortness of breath, dyspnea on exertion, cough, wheezing, or hemoptysis  GI: No nausea, emesis, diarrhea, constipation, melena, hematochezia, pain.    : No dysuria, hematuria, urgency, or frequency   HEME: No easy bruising, bleeding problems  PSYCHIATRIC: No depression, anxiety, psychosis, hallucinations.  NEURO: No seizures, memory loss, dizziness or difficulty sleeping  MSK: + History of present illness      Past Medical History:   Diagnosis Date    Anxiety     Arthritis     Cataract     DDD (degenerative disc disease), lumbar     Depression     GERD (gastroesophageal reflux disease)     Hyperlipidemia     Hypertension     pt ststes she does not take meds anymore she just watches her weight//    Immunosuppressed status 2016    Neuromuscular disorder     Occipital neuralgia 3/24/2017    Osteoporosis     Substance abuse      Past Surgical History:   Procedure Laterality Date    APPENDECTOMY      APPENDECTOMY-LAPAROSCOPIC N/A 10/9/2016    [facility] OR     SECTION      x 2    CHOLECYSTECTOMY      ESOPHAGOGASTRODUODENOSCOPY (EGD) N/A 3/14/2017    [facility]cial [facility] at [facility] ENDO    ESOPHAGOGASTRODUODENOSCOPY (EGD) N/A 2/3/2016    Per[facility] Imtiaz Vallejo [facility] at [facility] ENDO    HYSTERECTOMY      INJECTION-STEROID-EPIDURAL-CERVICAL N/A 2018    [facility] at [facility] OR    INJECTION-STEROID-EPIDURAL-CERVICAL N/A 10/23/2017    [facility] OR    Laser Periphery Iridotomy Bilateral     OD 16 and OS touch up 2016    vocal cord tumor removal       Family History   Problem Relation Age of Onset    Osteoarthritis Mother     Alcohol abuse Mother     Rheum arthritis Mother     Osteoarthritis Sister     Diabetes Brother  "    No Known Problems Son     No Known Problems Sister     No Known Problems Sister     No Known Problems Brother     Arthritis Son     No Known Problems Son     Stroke Maternal Grandmother 99    Rheum arthritis Maternal Grandmother     Retinal detachment Neg Hx     Macular degeneration Neg Hx     Glaucoma Neg Hx     Amblyopia Neg Hx     Blindness Neg Hx     Cancer Neg Hx     Cataracts Neg Hx     Hypertension Neg Hx     Strabismus Neg Hx     Thyroid disease Neg Hx     Lupus Neg Hx     Kidney disease Neg Hx     Inflammatory bowel disease Neg Hx     Psoriasis Neg Hx      Social History     Socioeconomic History    Marital status:      Spouse name: Not on file    Number of children: Not on file    Years of education: Not on file    Highest education level: Not on file   Occupational History    Not on file   Social Needs    Financial resource strain: Not on file    Food insecurity:     Worry: Not on file     Inability: Not on file    Transportation needs:     Medical: Not on file     Non-medical: Not on file   Tobacco Use    Smoking status: Never Smoker    Smokeless tobacco: Never Used   Substance and Sexual Activity    Alcohol use: No     Alcohol/week: 0.0 oz    Drug use: No    Sexual activity: Yes     Partners: Male   Lifestyle    Physical activity:     Days per week: Not on file     Minutes per session: Not on file    Stress: Not on file   Relationships    Social connections:     Talks on phone: Not on file     Gets together: Not on file     Attends Jain service: Not on file     Active member of club or organization: Not on file     Attends meetings of clubs or organizations: Not on file     Relationship status: Not on file   Other Topics Concern    Not on file   Social History Narrative    Not on file         Medications/Allergies: See med card    Vitals:    04/08/19 0816   BP: 128/67   Pulse: 74   Weight: 53.5 kg (118 lb)   Height: 4' 11" (1.499 m)   PainSc:   8 "   PainLoc: Neck         Physical exam:    GENERAL: A and O x3, the patient appears well groomed and is in no acute distress.  Skin: No rashes or obvious lesions  HEENT: normocephalic, atraumatic  CARDIOVASCULAR:  RRR  LUNGS: nonlabored breathing  ABDOMEN: soft, nontender   UPPER EXTREMITIES: Normal alignment, normal range of motion, no atrophy, no skin changes,  hair growth and nail growth normal and equal bilaterally. No swelling, no tenderness.  +Phalens on left side. +TTP tricep tendon  LOWER EXTREMITIES:  Normal alignment, normal range of motion, no atrophy, no skin changes,  hair growth and nail growth normal and equal bilaterally. No swelling, no tenderness.  CERVICAL SPINE:   Cervical spine: ROM is full in flexion, extension and lateral rotation.  Painful flexion > extension.  Positive facet loading bilaterally.  Spurling is positive at right side.  Myofascial exam:  Tenderness to palpation across cervical paraspinals deltoid and trapezius muscles bilaterally.      MENTAL STATUS: normal orientation, speech, language, and fund of knowledge for social situation.  Emotional state appropriate.    CRANIAL NERVES:  II:  PERRL bilaterally,   III,IV,VI: EOMI.    V:  Facial sensation equal bilaterally  VII:  Facial motor function normal.  VIII:  Hearing equal to finger rub bilaterally  IX/X: Gag normal, palate symmetric  XI:  Shoulder shrug equal, head turn equal  XII:  Tongue midline without fasciculations      MOTOR: Tone and bulk: normal bilateral upper and lower Strength: normal   Delt Bi Tri WE WF     R 5 5 5 5 5 5   L 5 5 5 5 5 5     IP ADD ABD Quad TA Gas HAM  R 5 5 5 5 5 5 5  L 5 5 5 5 5 5 5    SENSATION: Light touch and pinprick intact bilaterally  REFLEXES: normal, symmetric, nonbrisk.  Toes down, no clonus. No hoffmans.  GAIT: normal rise, base, steps, and arm swing.        Imaging:   Cervical MRI 12/4/17    Narrative     EXAM: Cervical spine MRI without contrast.    INDICATION: Cervical radiculopathy.   Neck pain and occipital neuralgia.  The patient complains of neck and right arm pain.    TECHNIQUE: Routine multiplanar, multisequence unenhanced cervical spine MRI was performed.    COMPARISON: Plain films of the cervical spine obtained concurrently      FINDINGS:     Vertebral column: There is straightening of the cervical spine with loss of normal lordosis.  As seen on concurrent plain films, there is trace anterolisthesis of C3 on C4 with 2 mm anterolisthesis of C4 on C5.  There is marked disc space narrowing at the C5-6 level with moderate to marked disc space narrowing at the C4-5 and C6-7 levels.  There is partial non-segmentation anteriorly at the C2-3 level.  The C2 and C3 as well as the C4 and C5 facet joints appear fused and this may represent developmental non-segmentation or degenerative ankylosis.  All of the discs are desiccated.  The odontoid process is intact.    Spinal canal, cord, epidural space: The craniovertebral junction is normal.  The spinal canal is omental and normal.  There is no significant spinal stenosis.  The cord is normal in caliber.  There is very subtle flattening of the ventral cord surface at the C4-5 and C5-6 levels where there is degenerative change.  The study is mildly motion but there is no definite cord edema or myelomalacia.    Findings by level:    C2-3: There is no spinal canal or foraminal stenosis.  There is mild left facet joint arthropathy.    C3-4: There is trace anterolisthesis.  There is left greater than right uncovertebral spurring and facet joint arthropathy.  There is a mild disc osteophyte complex, slightly eccentric to the left with subtle annular fissure.  This narrows the ventral subarachnoid space.  There is no spinal stenosis or cord compression.  There is moderate marked left foraminal stenosis.    C4-5: There is moderate disc space narrowing with 2 mm anterolisthesis of C4 on C5.  There is facet joint arthropathy or effusion left greater than right.   There is also mild bilateral but left greater than right uncovertebral spurring.  There is unroofing of a mild disc bulge which narrows the ventral subarachnoid space.  There is no spinal stenosis.  There is mild to moderate left foraminal stenosis.    C5-6:There is marked disc space narrowing.  There is bilateral uncovertebral spurring.  There is a disc osteophyte complex which narrows the subarachnoid space.  This is slightly eccentric to the right There is subtle flattening of the ventral cord surface.  Cord signal is grossly normal.  There is mild spinal stenosis with at least moderate bilateral foraminal stenosis.    C6-7: There is moderate disc space narrowing.  There is mild uncovertebral spurring.  There is a shallow disc osteophyte complex, slightly eccentric to the right.  There is narrowing of the ventral subarachnoid space.  There is no spinal stenosis.  Cord signal is normal.  There is mild bilateral foraminal stenosis.  There is a small 3.5 mm left foraminal perineural cyst.    C7- T1: There is a Schmorl's node in inferior endplate of C7, chronic.  There are tiny bilateral foraminal perineural cysts.  There is minimal bulging of the annulus and mild facet joint arthropathy without spinal canal or foraminal stenosis.    Soft tissues/other: The prevertebral soft tissues are normal.  The airway is patent.   Impression          1. There is multilevel degenerative disc disease described in detail above.  There is no acute fracture.  There is degenerative listhesis at several levels.  There is some degree of disc osteophyte complex, uncovertebral spurring and/or facet joint arthropathy contributing to some degree of foraminal stenosis at several levels.  There is no significant spinal canal stenosis.  There is very subtle flattening of the ventral cord surface at the C4-5 and C6-7 levels The pertinent findings are summarized below.    2. At the C3-4 level, there is no spinal stenosis.  There is moderate to  marked left foraminal stenosis.    3. At the C4-5 level there is no spinal stenosis.  There is mild to moderate left foraminal stenosis.    4. At the C5-6 level, there is mild spinal stenosis with at least moderate bilateral foraminal stenosis.    5. At the C6-7 level, there is no spinal stenosis.  There is mild bilateral foraminal stenosis.    6. There is no spinal canal or foraminal stenosis at the C2-3 or C7-T1 levels.     Assessment:  Mrs. Richter is a 73 y.o. female with neck and head pain    1. Chronic use of opiate drugs therapeutic purposes    2. Myofascial pain    3. Spondylosis of cervical region without myelopathy or radiculopathy    4. Triceps tendonitis      Plan:  1. I have stressed the importance of physical activity and exercise to improve overall health.  2. Consider repeat C7-T1 [place] MELANY in the future  3. Monitor progress and consider repeat left occipital blocks for head pain.  4. Norco 5mg q12hrs as needed for pain..  reviewed. UDS today  5. Discontinue Flexeril. Start Robaxin 500 mg q 8 h prn   6. Recommend PT to focus on neck and upper back. She will call when ready to schedule  7. F/u 3 months or sooner

## 2019-04-12 LAB
6MAM UR QL: NOT DETECTED
7AMINOCLONAZEPAM UR QL: NOT DETECTED
A-OH ALPRAZ UR QL: NOT DETECTED
ALPRAZ UR QL: NOT DETECTED
AMPHET UR QL SCN: NOT DETECTED
ANNOTATION COMMENT IMP: NORMAL
ANNOTATION COMMENT IMP: NORMAL
BARBITURATES UR QL: NOT DETECTED
BUPRENORPHINE UR QL: NOT DETECTED
BZE UR QL: NOT DETECTED
CARBOXYTHC UR QL: NOT DETECTED
CARISOPRODOL UR QL: NOT DETECTED
CLONAZEPAM UR QL: NOT DETECTED
CODEINE UR QL: NOT DETECTED
CREAT UR-MCNC: 88.7 MG/DL (ref 20–400)
DIAZEPAM UR QL: NOT DETECTED
ETHYL GLUCURONIDE UR QL: NOT DETECTED
FENTANYL UR QL: NOT DETECTED
HYDROCODONE UR QL: PRESENT
HYDROMORPHONE UR QL: NOT DETECTED
LORAZEPAM UR QL: NOT DETECTED
MDA UR QL: NOT DETECTED
MDEA UR QL: NOT DETECTED
MDMA UR QL: NOT DETECTED
ME-PHENIDATE UR QL: NOT DETECTED
MEPERIDINE UR QL: NOT DETECTED
METHADONE UR QL: NOT DETECTED
METHAMPHET UR QL: NOT DETECTED
MIDAZOLAM UR QL SCN: NOT DETECTED
MORPHINE UR QL: NOT DETECTED
NORBUPRENORPHINE UR QL CFM: NOT DETECTED
NORDIAZEPAM UR QL: NOT DETECTED
NORFENTANYL UR QL: NOT DETECTED
NORHYDROCODONE UR QL CFM: PRESENT
NOROXYCODONE UR QL CFM: NOT DETECTED
NOROXYMORPHONE: NOT DETECTED
OXAZEPAM UR QL: NOT DETECTED
OXYCODONE UR QL: NOT DETECTED
OXYMORPHONE UR QL: NOT DETECTED
PATHOLOGY STUDY: NORMAL
PCP UR QL: NOT DETECTED
PHENTERMINE UR QL: NOT DETECTED
PROPOXYPH UR QL: NOT DETECTED
SERVICE CMNT-IMP: NORMAL
TAPENTADOL UR QL SCN: NOT DETECTED
TAPENTADOL-O-SULF: NOT DETECTED
TEMAZEPAM UR QL: NOT DETECTED
TRAMADOL UR QL: NOT DETECTED
ZOLPIDEM UR QL: NOT DETECTED

## 2019-04-16 ENCOUNTER — OFFICE VISIT (OUTPATIENT)
Dept: GASTROENTEROLOGY | Facility: CLINIC | Age: 74
End: 2019-04-16
Payer: MEDICARE

## 2019-04-16 VITALS — HEIGHT: 59 IN | BODY MASS INDEX: 23.37 KG/M2 | WEIGHT: 115.94 LBS | RESPIRATION RATE: 18 BRPM

## 2019-04-16 DIAGNOSIS — R13.14 PHARYNGOESOPHAGEAL DYSPHAGIA: ICD-10-CM

## 2019-04-16 DIAGNOSIS — K59.00 CONSTIPATION, UNSPECIFIED CONSTIPATION TYPE: ICD-10-CM

## 2019-04-16 DIAGNOSIS — K52.9 JEJUNITIS: ICD-10-CM

## 2019-04-16 DIAGNOSIS — R11.0 NAUSEA: ICD-10-CM

## 2019-04-16 DIAGNOSIS — F11.90 OPIOID USE: ICD-10-CM

## 2019-04-16 DIAGNOSIS — R93.3 ABNORMAL CT SCAN, GASTROINTESTINAL TRACT: ICD-10-CM

## 2019-04-16 DIAGNOSIS — R10.84 GENERALIZED ABDOMINAL PAIN: Primary | ICD-10-CM

## 2019-04-16 DIAGNOSIS — R49.0 HOARSENESS OF VOICE: ICD-10-CM

## 2019-04-16 PROCEDURE — 99214 PR OFFICE/OUTPT VISIT, EST, LEVL IV, 30-39 MIN: ICD-10-PCS | Mod: HCNC,S$GLB,, | Performed by: NURSE PRACTITIONER

## 2019-04-16 PROCEDURE — 99999 PR PBB SHADOW E&M-EST. PATIENT-LVL V: CPT | Mod: PBBFAC,HCNC,, | Performed by: NURSE PRACTITIONER

## 2019-04-16 PROCEDURE — 1100F PTFALLS ASSESS-DOCD GE2>/YR: CPT | Mod: HCNC,CPTII,S$GLB, | Performed by: NURSE PRACTITIONER

## 2019-04-16 PROCEDURE — 99214 OFFICE O/P EST MOD 30 MIN: CPT | Mod: HCNC,S$GLB,, | Performed by: NURSE PRACTITIONER

## 2019-04-16 PROCEDURE — 3288F PR FALLS RISK ASSESSMENT DOCUMENTED: ICD-10-PCS | Mod: HCNC,CPTII,S$GLB, | Performed by: NURSE PRACTITIONER

## 2019-04-16 PROCEDURE — 3288F FALL RISK ASSESSMENT DOCD: CPT | Mod: HCNC,CPTII,S$GLB, | Performed by: NURSE PRACTITIONER

## 2019-04-16 PROCEDURE — 1100F PR PT FALLS ASSESS DOC 2+ FALLS/FALL W/INJURY/YR: ICD-10-PCS | Mod: HCNC,CPTII,S$GLB, | Performed by: NURSE PRACTITIONER

## 2019-04-16 PROCEDURE — 99999 PR PBB SHADOW E&M-EST. PATIENT-LVL V: ICD-10-PCS | Mod: PBBFAC,HCNC,, | Performed by: NURSE PRACTITIONER

## 2019-04-16 NOTE — LETTER
April 17, 2019      Steph Collins, NP  [address] [address]           [facility] Gastroenterology  [address]  [facility] [address]  Phone: [phone]          Patient: Rola Richter   MR Number: [id number]   YOB: 1945   Date of Visit: 4/16/2019       Dear Steph Collins:    Thank you for referring Rola Richter to me for evaluation. Attached you will find relevant portions of my assessment and plan of care.    If you have questions, please do not hesitate to call me. I look forward to following Rola Richter along with you.    Sincerely,    Godwin Gómez LPN    Enclosure  CC:  No Recipients    If you would like to receive this communication electronically, please contact [email or web address] or [phone] to request more information on [employer] Link access.    For providers and/or their staff who would like to refer a patient to [facility], please contact us through our one-stop-shop provider referral line, [facility] , at [phone].    If you feel you have received this communication in error or would no longer like to receive these types of communications, please e-mail [email or web address]

## 2019-04-16 NOTE — H&P (VIEW-ONLY)
Subjective:       Patient ID: Rola Richter is a 73 y.o. female Body mass index is 23.42 kg/m².    Chief Complaint: Abdominal Pain (nausea, hard stool)    This patient is new to me.  Referring Provider:  KARUNA Collins NP for generalized abdominal pain & bowel thickening  Established patient of Dr. Marcial.    Abdominal Pain   This is a chronic problem. The current episode started more than 1 year ago (started several years ago, since she was seeing Dr. Vallejo (at least since 2016)). The problem occurs every several days. Duration: lasts about 10 minutes. The problem has been unchanged. The pain is located in the LUQ and epigastric region. The pain is at a severity of 0/10 (currently). The quality of the pain is aching. The abdominal pain radiates to the LLQ. Associated symptoms include constipation (bowel movements once every other day, pellet-like to hard stools; taking hydrocodone once daily, miralax once every other day mild relief) and nausea (occasional). Pertinent negatives include no anorexia, belching, diarrhea, dysuria, fever, flatus, hematochezia, melena, vomiting or weight loss (stable overall, reports she has been watching her diet due to high cholesterol lately). Associated symptoms comments: Denies NSAID use. Exacerbated by: after she eats breakfast or if she drinks milk, spicy foods. She has tried proton pump inhibitors (prilosec 40 mg once daily, taking something from Dr. Vallejo for her stomach PRN (patient unsure of name of medication- possibly carafate); PAST: nexium) for the symptoms. Prior diagnostic workup includes CT scan. Her past medical history is significant for abdominal surgery and GERD (occasional).     Review of Systems   Constitutional: Negative for appetite change, chills, fatigue, fever and weight loss (stable overall, reports she has been watching her diet due to high cholesterol lately).   HENT: Positive for trouble swallowing (occasional with food and large pills; denies problems  "with liquids, EGD with dilation in the past helped) and voice change. Negative for sore throat.    Respiratory: Negative for cough, choking and shortness of breath.    Cardiovascular: Negative for chest pain.   Gastrointestinal: Positive for abdominal pain, constipation (bowel movements once every other day, pellet-like to hard stools; taking hydrocodone once daily, miralax once every other day mild relief) and nausea (occasional). Negative for anal bleeding, anorexia, blood in stool, diarrhea, flatus, hematochezia, melena, rectal pain and vomiting.   Genitourinary: Negative for difficulty urinating, dysuria and flank pain.        Reports "goes fast"   Neurological: Negative for weakness.       No LMP recorded. Patient has had a hysterectomy.  Past Medical History:   Diagnosis Date    Anxiety     Arthritis     Cataract     DDD (degenerative disc disease), lumbar     Depression     GERD (gastroesophageal reflux disease)     Hyperlipidemia     Hypertension     pt states she does not take meds anymore she just watches her weight    Immunosuppressed status 2016    Neuromuscular disorder     Occipital neuralgia 3/24/2017    Osteoporosis     Substance abuse      Past Surgical History:   Procedure Laterality Date    APPENDECTOMY      APPENDECTOMY-LAPAROSCOPIC N/A 10/9/2016    [facility]     SECTION      x 2    CHOLECYSTECTOMY      ESOPHAGOGASTRODUODENOSCOPY (EGD) N/A 3/14/2017    [facility]    ESOPHAGOGASTRODUODENOSCOPY (EGD) N/A 2/3/2016    [facility]    HYSTERECTOMY      INJECTION-STEROID-EPIDURAL-CERVICAL N/A 2018    [facility]    INJECTION-STEROID-EPIDURAL-CERVICAL N/A 10/23/2017    [facility]    Laser Periphery Iridotomy Bilateral     OD 16 and OS touch up 2016    UPPER GASTROINTESTINAL ENDOSCOPY  " 03/14/2017    Dr. Marcial: esophageal stenosis- dilated, gastritis, gastric polyps removed; biopsy- mild gastritis, negative for h pylori, hyperplastic polyp, esophagus unremarkable    vocal cord tumor removal       Family History   Problem Relation Age of Onset    Osteoarthritis Mother     Alcohol abuse Mother     Rheum arthritis Mother     Osteoarthritis Sister     Diabetes Brother     No Known Problems Son     No Known Problems Sister     No Known Problems Sister     No Known Problems Brother     Arthritis Son     No Known Problems Son     Stroke Maternal Grandmother 99    Rheum arthritis Maternal Grandmother     Retinal detachment Neg Hx     Macular degeneration Neg Hx     Glaucoma Neg Hx     Amblyopia Neg Hx     Blindness Neg Hx     Cancer Neg Hx     Cataracts Neg Hx     Hypertension Neg Hx     Strabismus Neg Hx     Thyroid disease Neg Hx     Lupus Neg Hx     Kidney disease Neg Hx     Inflammatory bowel disease Neg Hx     Psoriasis Neg Hx      Wt Readings from Last 10 Encounters:   04/16/19 52.6 kg (115 lb 15.4 oz)   04/08/19 53.5 kg (118 lb)   03/25/19 53.7 kg (118 lb 6.2 oz)   03/18/19 53.4 kg (117 lb 11.6 oz)   02/04/19 54.3 kg (119 lb 11.4 oz)   01/14/19 54.9 kg (121 lb)   01/04/19 54.9 kg (121 lb)   01/03/19 55.1 kg (121 lb 7.6 oz)   10/10/18 54.8 kg (120 lb 13 oz)   09/28/18 54.4 kg (120 lb)     Lab Results   Component Value Date    WBC 8.38 03/27/2019    HGB 11.4 (L) 03/27/2019    HCT 36.4 (L) 03/27/2019    MCV 94 03/27/2019     03/27/2019     Lab Results   Component Value Date    IRON 67 04/08/2019    TIBC 311 04/08/2019    FERRITIN 248 04/08/2019 12/17/18 fecal immunochemical stool test negative    CMP  Sodium   Date Value Ref Range Status   03/27/2019 140 136 - 145 mmol/L Final     Potassium   Date Value Ref Range Status   03/27/2019 4.5 3.5 - 5.1 mmol/L Final     Chloride   Date Value Ref Range Status   03/27/2019 107 95 - 110 mmol/L Final     CO2   Date Value Ref  Range Status   03/27/2019 23 23 - 29 mmol/L Final     Glucose   Date Value Ref Range Status   03/27/2019 82 70 - 110 mg/dL Final     BUN, Bld   Date Value Ref Range Status   03/27/2019 13 8 - 23 mg/dL Final     Creatinine   Date Value Ref Range Status   03/27/2019 0.8 0.5 - 1.4 mg/dL Final     Calcium   Date Value Ref Range Status   03/27/2019 9.6 8.7 - 10.5 mg/dL Final     Total Protein   Date Value Ref Range Status   03/27/2019 7.3 6.0 - 8.4 g/dL Final     Albumin   Date Value Ref Range Status   03/27/2019 3.9 3.5 - 5.2 g/dL Final     Total Bilirubin   Date Value Ref Range Status   03/27/2019 0.3 0.1 - 1.0 mg/dL Final     Comment:     For infants and newborns, interpretation of results should be based  on gestational age, weight and in agreement with clinical  observations.  Premature Infant recommended reference ranges:  Up to 24 hours.............<8.0 mg/dL  Up to 48 hours............<12.0 mg/dL  3-5 days..................<15.0 mg/dL  6-29 days.................<15.0 mg/dL       Alkaline Phosphatase   Date Value Ref Range Status   03/27/2019 68 55 - 135 U/L Final     AST   Date Value Ref Range Status   03/27/2019 19 10 - 40 U/L Final     ALT   Date Value Ref Range Status   03/27/2019 12 10 - 44 U/L Final     Anion Gap   Date Value Ref Range Status   03/27/2019 10 8 - 16 mmol/L Final     eGFR if    Date Value Ref Range Status   03/27/2019 >60.0 >60 mL/min/1.73 m^2 Final     eGFR if non    Date Value Ref Range Status   03/27/2019 >60.0 >60 mL/min/1.73 m^2 Final     Comment:     Calculation used to obtain the estimated glomerular filtration  rate (eGFR) is the CKD-EPI equation.        Lab Results   Component Value Date    AMYLASE 72 08/23/2016     Lab Results   Component Value Date    LIPASE 13 10/07/2016     Lab Results   Component Value Date    TSH 1.686 08/23/2016     Reviewed prior medical records including radiology report of 3/18/19 ct abdomen pelvis & endoscopy history (see  surgical history).    Objective:      Physical Exam   Constitutional: She is oriented to person, place, and time. She appears well-developed and well-nourished. No distress.   HENT:   Mouth/Throat: Oropharynx is clear and moist and mucous membranes are normal. No oral lesions. No oropharyngeal exudate.   Eyes: Pupils are equal, round, and reactive to light. Conjunctivae are normal. No scleral icterus.   Pulmonary/Chest: Effort normal and breath sounds normal. No respiratory distress. She has no wheezes.   Abdominal: Soft. Normal appearance and bowel sounds are normal. She exhibits no distension, no abdominal bruit and no mass. There is tenderness (mild-moderate) in the right lower quadrant, epigastric area, periumbilical area and left upper quadrant. There is guarding. There is no rigidity, no rebound, no tenderness at McBurney's point and negative Rosas's sign.   Neurological: She is alert and oriented to person, place, and time.   Skin: Skin is warm and dry. No rash noted. She is not diaphoretic. No erythema. No pallor.   Non-jaundiced   Psychiatric: She has a normal mood and affect. Her behavior is normal. Judgment and thought content normal.   Nursing note and vitals reviewed.      Assessment:       1. Generalized abdominal pain    2. Nausea    3. Abnormal CT scan, gastrointestinal tract    4. Jejunitis    5. Constipation, unspecified constipation type    6. Opioid use    7. Hoarseness of voice    8. Pharyngoesophageal dysphagia        Plan:     Discussed case with Dr. Cohen, he recommended and agreed with below management plan.    Generalized abdominal pain  -     Case request GI: EGD (ESOPHAGOGASTRODUODENOSCOPY), - schedule EGD, discussed procedure with patient, patient verbalized understanding  -     Case request GI: COLONOSCOPY, - schedule Colonoscopy, discussed procedure with the patient, patient verbalized understanding  - CONTINUE PRILOSEC 40 MG ONCE DAILY AS DIRECTED, take in the morning 30-60 minutes  before breakfast, discussed about possible long term use of medication (prefer to use lowest effective dose or discontinuing if possible), pt verbalized understanding  -Educated patient on lifestyle modifications to help control/reduce reflux/abdominal pain including: avoid large meals, avoid eating within 2-3 hours of bedtime (avoid late night eating & lying down soon after eating), elevate head of bed if nocturnal symptoms are present, smoking cessation (if current smoker), & weight loss (if overweight).   -Educated to avoid known foods which trigger reflux symptoms & to minimize/avoid high-fat foods, chocolate, caffeine, citrus, alcohol, & tomato products.  -Advised to avoid/limit use of NSAID's, since they can cause GI upset, bleeding, and/or ulcers. If needed, take with food.   If no improvement in symptoms or symptoms worsen, call/follow-up at clinic or go to ER    Nausea  -     Case request GI: EGD (ESOPHAGOGASTRODUODENOSCOPY), - schedule EGD, discussed procedure with patient, patient verbalized understanding  - discussed with patient that certain medications, such as HYDROCODONE, may be contributing to symptoms. I recommend follow-up with provider who manages medication to discuss about possible alternative therapy, patient verbalized understanding    Abnormal CT scan, gastrointestinal tract & Jejunitis  -     Case request GI: EGD (ESOPHAGOGASTRODUODENOSCOPY), - schedule EGD with push enteroscopy, discussed procedure with patient, patient verbalized understanding  - Possible video capsule study pending results of testing and if symptoms persist  - if diarrhea occurs, recommend stool studies to further evaluate    Constipation, unspecified constipation type  -  START   linaclotide (LINZESS) 145 mcg Cap capsule; Take 1 capsule (145 mcg total) by mouth once daily. Take >30 minutes before 1st meal of the day.  Dispense: 30 capsule; Refill: 2  -     Case request GI: COLONOSCOPY, - schedule Colonoscopy, discussed  procedure with the patient, patient verbalized understanding  Recommend daily exercise as tolerated, adequate water intake (six 8-oz glasses of water daily), and high fiber diet. OTC fiber supplements are recommended if diet does not reach daily fiber goal (20-30 grams daily), such as Metamucil, Citrucel, or FiberCon (take as directed, separate from other oral medications by >2 hours).  -Recommend taking an OTC stool softener such as Colace as directed to avoid hard stools and straining with bowel movements PRN  - DISCONTINUE OTC MiraLax DUE TO ALTERNATE THERAPY  - If no improvement with above recommendations, try intermittently dosed Dulcolax OTC as directed (every 3-4  days) PRN to facilitate bowel movements  -If still no improvement with these measures, call/follow-up    Opioid use  - discussed with patient that a side effect of narcotic pain medications is constipation, advised patient to talk to provider who manages pain medication, patient verbalized understanding    Hoarseness of voice  Recommend follow-up with Primary Care Provider/ENT for continued evaluation and management.    Pharyngoesophageal dysphagia  -     Case request GI: EGD (ESOPHAGOGASTRODUODENOSCOPY), - schedule EGD, discussed procedure with patient and possible esophageal dilation may be performed during procedure if indicated, patient verbalized understanding  - educated patient to eat smaller more frequent meals and to eat slowly and advised to eat a soft diet.  - possible UGI/esophagram/esophageal manometry if symptoms persist    Follow up in about 1 month (around 5/16/2019), or if symptoms worsen or fail to improve.      If no improvement in symptoms or symptoms worsen, call/follow-up at clinic or go to ER.

## 2019-04-16 NOTE — PROGRESS NOTES
Subjective:       Patient ID: Rola Richter is a 73 y.o. female Body mass index is 23.42 kg/m².    Chief Complaint: Abdominal Pain (nausea, hard stool)    This patient is new to me.  Referring Provider:  KARUNA Collins NP for generalized abdominal pain & bowel thickening  Established patient of Dr. Marcial.    Abdominal Pain   This is a chronic problem. The current episode started more than 1 year ago (started several years ago, since she was seeing Dr. Vallejo (at least since 2016)). The problem occurs every several days. Duration: lasts about 10 minutes. The problem has been unchanged. The pain is located in the LUQ and epigastric region. The pain is at a severity of 0/10 (currently). The quality of the pain is aching. The abdominal pain radiates to the LLQ. Associated symptoms include constipation (bowel movements once every other day, pellet-like to hard stools; taking hydrocodone once daily, miralax once every other day mild relief) and nausea (occasional). Pertinent negatives include no anorexia, belching, diarrhea, dysuria, fever, flatus, hematochezia, melena, vomiting or weight loss (stable overall, reports she has been watching her diet due to high cholesterol lately). Associated symptoms comments: Denies NSAID use. Exacerbated by: after she eats breakfast or if she drinks milk, spicy foods. She has tried proton pump inhibitors (prilosec 40 mg once daily, taking something from Dr. Vallejo for her stomach PRN (patient unsure of name of medication- possibly carafate); PAST: nexium) for the symptoms. Prior diagnostic workup includes CT scan. Her past medical history is significant for abdominal surgery and GERD (occasional).     Review of Systems   Constitutional: Negative for appetite change, chills, fatigue, fever and weight loss (stable overall, reports she has been watching her diet due to high cholesterol lately).   HENT: Positive for trouble swallowing (occasional with food and large pills; denies problems  "with liquids, EGD with dilation in the past helped) and voice change. Negative for sore throat.    Respiratory: Negative for cough, choking and shortness of breath.    Cardiovascular: Negative for chest pain.   Gastrointestinal: Positive for abdominal pain, constipation (bowel movements once every other day, pellet-like to hard stools; taking hydrocodone once daily, miralax once every other day mild relief) and nausea (occasional). Negative for anal bleeding, anorexia, blood in stool, diarrhea, flatus, hematochezia, melena, rectal pain and vomiting.   Genitourinary: Negative for difficulty urinating, dysuria and flank pain.        Reports "goes fast"   Neurological: Negative for weakness.       No LMP recorded. Patient has had a hysterectomy.  Past Medical History:   Diagnosis Date    Anxiety     Arthritis     Cataract     DDD (degenerative disc disease), lumbar     Depression     GERD (gastroesophageal reflux disease)     Hyperlipidemia     Hypertension     pt states she does not take meds anymore she just watches her weight    Immunosuppressed status 2016    Neuromuscular disorder     Occipital neuralgia 3/24/2017    Osteoporosis     Substance abuse      Past Surgical History:   Procedure Laterality Date    APPENDECTOMY      APPENDECTOMY-LAPAROSCOPIC N/A 10/9/2016    [facility] at [facility] OR     SECTION      x 2    CHOLECYSTECTOMY      ESOPHAGOGASTRODUODENOSCOPY (EGD) N/A 3/14/2017    [facility]i [facility]cial [facility] at [facility] ENDO    ESOPHAGOGASTRODUODENOSCOPY (EGD) N/A 2/3/2016    Per[facility] Imtiaz [facility]jo [facility] at [facility] ENDO    HYSTERECTOMY      INJECTION-STEROID-EPIDURAL-CERVICAL N/A 2018    [facility]    INJECTION-STEROID-EPIDURAL-CERVICAL N/A 10/23/2017    [facility] OR    Laser Periphery Iridotomy Bilateral     OD 16 and OS touch up 2016    UPPER GASTROINTESTINAL ENDOSCOPY  " 03/14/2017    Dr. Marcial: esophageal stenosis- dilated, gastritis, gastric polyps removed; biopsy- mild gastritis, negative for h pylori, hyperplastic polyp, esophagus unremarkable    vocal cord tumor removal       Family History   Problem Relation Age of Onset    Osteoarthritis Mother     Alcohol abuse Mother     Rheum arthritis Mother     Osteoarthritis Sister     Diabetes Brother     No Known Problems Son     No Known Problems Sister     No Known Problems Sister     No Known Problems Brother     Arthritis Son     No Known Problems Son     Stroke Maternal Grandmother 99    Rheum arthritis Maternal Grandmother     Retinal detachment Neg Hx     Macular degeneration Neg Hx     Glaucoma Neg Hx     Amblyopia Neg Hx     Blindness Neg Hx     Cancer Neg Hx     Cataracts Neg Hx     Hypertension Neg Hx     Strabismus Neg Hx     Thyroid disease Neg Hx     Lupus Neg Hx     Kidney disease Neg Hx     Inflammatory bowel disease Neg Hx     Psoriasis Neg Hx      Wt Readings from Last 10 Encounters:   04/16/19 52.6 kg (115 lb 15.4 oz)   04/08/19 53.5 kg (118 lb)   03/25/19 53.7 kg (118 lb 6.2 oz)   03/18/19 53.4 kg (117 lb 11.6 oz)   02/04/19 54.3 kg (119 lb 11.4 oz)   01/14/19 54.9 kg (121 lb)   01/04/19 54.9 kg (121 lb)   01/03/19 55.1 kg (121 lb 7.6 oz)   10/10/18 54.8 kg (120 lb 13 oz)   09/28/18 54.4 kg (120 lb)     Lab Results   Component Value Date    WBC 8.38 03/27/2019    HGB 11.4 (L) 03/27/2019    HCT 36.4 (L) 03/27/2019    MCV 94 03/27/2019     03/27/2019     Lab Results   Component Value Date    IRON 67 04/08/2019    TIBC 311 04/08/2019    FERRITIN 248 04/08/2019 12/17/18 fecal immunochemical stool test negative    CMP  Sodium   Date Value Ref Range Status   03/27/2019 140 136 - 145 mmol/L Final     Potassium   Date Value Ref Range Status   03/27/2019 4.5 3.5 - 5.1 mmol/L Final     Chloride   Date Value Ref Range Status   03/27/2019 107 95 - 110 mmol/L Final     CO2   Date Value Ref  Range Status   03/27/2019 23 23 - 29 mmol/L Final     Glucose   Date Value Ref Range Status   03/27/2019 82 70 - 110 mg/dL Final     BUN, Bld   Date Value Ref Range Status   03/27/2019 13 8 - 23 mg/dL Final     Creatinine   Date Value Ref Range Status   03/27/2019 0.8 0.5 - 1.4 mg/dL Final     Calcium   Date Value Ref Range Status   03/27/2019 9.6 8.7 - 10.5 mg/dL Final     Total Protein   Date Value Ref Range Status   03/27/2019 7.3 6.0 - 8.4 g/dL Final     Albumin   Date Value Ref Range Status   03/27/2019 3.9 3.5 - 5.2 g/dL Final     Total Bilirubin   Date Value Ref Range Status   03/27/2019 0.3 0.1 - 1.0 mg/dL Final     Comment:     For infants and newborns, interpretation of results should be based  on gestational age, weight and in agreement with clinical  observations.  Premature Infant recommended reference ranges:  Up to 24 hours.............<8.0 mg/dL  Up to 48 hours............<12.0 mg/dL  3-5 days..................<15.0 mg/dL  6-29 days.................<15.0 mg/dL       Alkaline Phosphatase   Date Value Ref Range Status   03/27/2019 68 55 - 135 U/L Final     AST   Date Value Ref Range Status   03/27/2019 19 10 - 40 U/L Final     ALT   Date Value Ref Range Status   03/27/2019 12 10 - 44 U/L Final     Anion Gap   Date Value Ref Range Status   03/27/2019 10 8 - 16 mmol/L Final     eGFR if    Date Value Ref Range Status   03/27/2019 >60.0 >60 mL/min/1.73 m^2 Final     eGFR if non    Date Value Ref Range Status   03/27/2019 >60.0 >60 mL/min/1.73 m^2 Final     Comment:     Calculation used to obtain the estimated glomerular filtration  rate (eGFR) is the CKD-EPI equation.        Lab Results   Component Value Date    AMYLASE 72 08/23/2016     Lab Results   Component Value Date    LIPASE 13 10/07/2016     Lab Results   Component Value Date    TSH 1.686 08/23/2016     Reviewed prior medical records including radiology report of 3/18/19 ct abdomen pelvis & endoscopy history (see  surgical history).    Objective:      Physical Exam   Constitutional: She is oriented to person, place, and time. She appears well-developed and well-nourished. No distress.   HENT:   Mouth/Throat: Oropharynx is clear and moist and mucous membranes are normal. No oral lesions. No oropharyngeal exudate.   Eyes: Pupils are equal, round, and reactive to light. Conjunctivae are normal. No scleral icterus.   Pulmonary/Chest: Effort normal and breath sounds normal. No respiratory distress. She has no wheezes.   Abdominal: Soft. Normal appearance and bowel sounds are normal. She exhibits no distension, no abdominal bruit and no mass. There is tenderness (mild-moderate) in the right lower quadrant, epigastric area, periumbilical area and left upper quadrant. There is guarding. There is no rigidity, no rebound, no tenderness at McBurney's point and negative Rosas's sign.   Neurological: She is alert and oriented to person, place, and time.   Skin: Skin is warm and dry. No rash noted. She is not diaphoretic. No erythema. No pallor.   Non-jaundiced   Psychiatric: She has a normal mood and affect. Her behavior is normal. Judgment and thought content normal.   Nursing note and vitals reviewed.      Assessment:       1. Generalized abdominal pain    2. Nausea    3. Abnormal CT scan, gastrointestinal tract    4. Jejunitis    5. Constipation, unspecified constipation type    6. Opioid use    7. Hoarseness of voice    8. Pharyngoesophageal dysphagia        Plan:     Discussed case with Dr. Cohen, he recommended and agreed with below management plan.    Generalized abdominal pain  -     Case request GI: EGD (ESOPHAGOGASTRODUODENOSCOPY), - schedule EGD, discussed procedure with patient, patient verbalized understanding  -     Case request GI: COLONOSCOPY, - schedule Colonoscopy, discussed procedure with the patient, patient verbalized understanding  - CONTINUE PRILOSEC 40 MG ONCE DAILY AS DIRECTED, take in the morning 30-60 minutes  before breakfast, discussed about possible long term use of medication (prefer to use lowest effective dose or discontinuing if possible), pt verbalized understanding  -Educated patient on lifestyle modifications to help control/reduce reflux/abdominal pain including: avoid large meals, avoid eating within 2-3 hours of bedtime (avoid late night eating & lying down soon after eating), elevate head of bed if nocturnal symptoms are present, smoking cessation (if current smoker), & weight loss (if overweight).   -Educated to avoid known foods which trigger reflux symptoms & to minimize/avoid high-fat foods, chocolate, caffeine, citrus, alcohol, & tomato products.  -Advised to avoid/limit use of NSAID's, since they can cause GI upset, bleeding, and/or ulcers. If needed, take with food.   If no improvement in symptoms or symptoms worsen, call/follow-up at clinic or go to ER    Nausea  -     Case request GI: EGD (ESOPHAGOGASTRODUODENOSCOPY), - schedule EGD, discussed procedure with patient, patient verbalized understanding  - discussed with patient that certain medications, such as HYDROCODONE, may be contributing to symptoms. I recommend follow-up with provider who manages medication to discuss about possible alternative therapy, patient verbalized understanding    Abnormal CT scan, gastrointestinal tract & Jejunitis  -     Case request GI: EGD (ESOPHAGOGASTRODUODENOSCOPY), - schedule EGD with push enteroscopy, discussed procedure with patient, patient verbalized understanding  - Possible video capsule study pending results of testing and if symptoms persist  - if diarrhea occurs, recommend stool studies to further evaluate    Constipation, unspecified constipation type  -  START   linaclotide (LINZESS) 145 mcg Cap capsule; Take 1 capsule (145 mcg total) by mouth once daily. Take >30 minutes before 1st meal of the day.  Dispense: 30 capsule; Refill: 2  -     Case request GI: COLONOSCOPY, - schedule Colonoscopy, discussed  procedure with the patient, patient verbalized understanding  Recommend daily exercise as tolerated, adequate water intake (six 8-oz glasses of water daily), and high fiber diet. OTC fiber supplements are recommended if diet does not reach daily fiber goal (20-30 grams daily), such as Metamucil, Citrucel, or FiberCon (take as directed, separate from other oral medications by >2 hours).  -Recommend taking an OTC stool softener such as Colace as directed to avoid hard stools and straining with bowel movements PRN  - DISCONTINUE OTC MiraLax DUE TO ALTERNATE THERAPY  - If no improvement with above recommendations, try intermittently dosed Dulcolax OTC as directed (every 3-4  days) PRN to facilitate bowel movements  -If still no improvement with these measures, call/follow-up    Opioid use  - discussed with patient that a side effect of narcotic pain medications is constipation, advised patient to talk to provider who manages pain medication, patient verbalized understanding    Hoarseness of voice  Recommend follow-up with Primary Care Provider/ENT for continued evaluation and management.    Pharyngoesophageal dysphagia  -     Case request GI: EGD (ESOPHAGOGASTRODUODENOSCOPY), - schedule EGD, discussed procedure with patient and possible esophageal dilation may be performed during procedure if indicated, patient verbalized understanding  - educated patient to eat smaller more frequent meals and to eat slowly and advised to eat a soft diet.  - possible UGI/esophagram/esophageal manometry if symptoms persist    Follow up in about 1 month (around 5/16/2019), or if symptoms worsen or fail to improve.      If no improvement in symptoms or symptoms worsen, call/follow-up at clinic or go to ER.

## 2019-04-16 NOTE — PATIENT INSTRUCTIONS
Abdominal Pain    Abdominal pain is pain in the stomach or belly area. Everyone has this pain from time to time. In many cases it goes away on its own. But abdominal pain can sometimes be due to a serious problem, such as appendicitis. So its important to know when to seek help.  Causes of abdominal pain  There are many possible causes of abdominal pain. Common causes in adults include:  · Constipation, diarrhea, or gas  · Stomach acid flowing back up into the esophagus (acid reflux or heartburn)  · Severe acid reflux, called GERD (gastroesophageal reflux disease)  · A sore in the lining of the stomach or small intestine (peptic ulcer)  · Inflammation of the gallbladder, liver, or pancreas  · Gallstones or kidney stones  · Appendicitis   · Intestinal blockage   · An internal organ pushing through a muscle or other tissue (hernia)  · Urinary tract infections  · In women, menstrual cramps, fibroids, or endometriosis  · Inflammation or infection of the intestines  Diagnosing the cause of abdominal pain  Your healthcare provider will do a physical exam help find the cause of your pain. If needed, tests will be ordered. Belly pain has many possible causes. So it can be hard to find the reason for your pain. Giving details about your pain can help. Tell your provider where and when you feel the pain, and what makes it better or worse. Also let your provider know if you have other symptoms such as:  · Fever  · Tiredness  · Upset stomach (nausea)  · Vomiting  · Changes in bathroom habits  Treating abdominal pain  Some causes of pain need emergency medical treatment right away. These include appendicitis or a bowel blockage. Other problems can be treated with rest, fluids, or medicines. Your healthcare provider can give you specific instructions for treatment or self-care based on what is causing your pain.  If you have vomiting or diarrhea, sip water or other clear fluids. When you are ready to eat solid foods again,  start with small amounts of easy-to-digest, low-fat foods. These include apple sauce, toast, or crackers.   When to seek medical care  Call 911 or go to the hospital right away if you:  · Cant pass stool and are vomiting  · Are vomiting blood or have bloody diarrhea or black, tarry diarrhea  · Have chest, neck, or shoulder pain  · Feel like you might pass out  · Have pain in your shoulder blades with nausea  · Have sudden, severe belly pain  · Have new, severe pain unlike any you have felt before  · Have a belly that is rigid, hard, and tender to touch  Call your healthcare provider if you have:  · Pain for more than 5 days  · Bloating for more than 2 days  · Diarrhea for more than 5 days  · A fever of 100.4°F (38.0°C) or higher, or as directed by your provider  · Pain that gets worse  · Weight loss for no reason  · Continued lack of appetite  · Blood in your stool  How to prevent abdominal pain  Here are some tips to help prevent abdominal pain:  · Eat smaller amounts of food at one time.  · Avoid greasy, fried, or other high-fat foods.  · Avoid foods that give you gas.  · Exercise regularly.  · Drink plenty of fluids.  To help prevent GERD symptoms:  · Quit smoking.  · Reduce alcohol and certain foods that increase stomach acid.  · Avoid aspirin and over-the-counter pain and fever medicines (NSAIDS or nonsteroidal anti-inflammatory drugs), if possible  · Lose extra weight.  · Finish eating at least 2 hours before you go to bed or lie down.  · Raise the head of your bed.  Date Last Reviewed: 7/1/2016  © 6599-8217 [employer]. [address] [place] [address], [place] [address]. All rights reserved. This information is not intended as a substitute for professional medical care. Always follow your healthcare professional's instructions.        Constipation (Adult)  Constipation means that you have bowel movements that are less frequent than usual. Stools often become very hard and difficult to  pass.  Constipation is very common. At some point in life it affects almost everyone. Since everyone's bowel habits are different, what is constipation to one person may not be to another. Your healthcare provider may do tests to diagnose constipation. It depends on what he or she finds when evaluating you.    Symptoms of constipation include:  · Abdominal pain  · Bloating  · Vomiting  · Painful bowel movements  · Itching, swelling, bleeding, or pain around the anus  Causes  Constipation can have many causes. These include:  · Diet low in fiber  · Too much dairy  · Not drinking enough liquids  · Lack of exercise or physical activity. This is especially true for older adults.  · Changes in lifestyle or daily routine, including pregnancy, aging, work, and travel  · Frequent use or misuse of laxatives  · Ignoring the urge to have a bowel movement or delaying it until later  · Medicines, such as certain prescription pain medicines, iron supplements, antacids, certain antidepressants, and calcium supplements  · Diseases like irritable bowel syndrome, bowel obstructions, stroke, diabetes, thyroid disease, Parkinson disease, hemorrhoids, and colon cancer  Complications  Potential complications of constipation can include:  · Hemorrhoids  · Rectal bleeding from hemorrhoids or anal fissures (skin tears)  · Hernias  · Dependency on laxatives  · Chronic constipation  · Fecal impaction  · Bowel obstruction or perforation  Home care  All treatment should be done after talking with your healthcare provider. This is especially true if you have another medical problems, are taking prescription medicines, or are an older adult. Treatment most often involves lifestyle changes. You may also need medicines. Your healthcare provider will tell you which will work best for you. Follow the advice below to help avoid this problem in the future.  Lifestyle changes  These lifestyle changes can help prevent constipation:  · Diet. Eat a  high-fiber diet, with fresh fruit and vegetables, and reduce dairy intake, meats, and processed foods  · Fluids. It's important to get enough fluids each day. Drink plenty of water when you eat more fiber. If you are on diet that limits the amount of fluid you can have, talk about this with your healthcare provider.  · Regular exercise. Check with your healthcare provider first.  Medications  Take any medicines as directed. Some laxatives are safe to use only every now and then. Others can be taken on a regular basis. Talk with your doctor or pharmacist if you have questions.  Prescription pain medicines can cause constipation. If you are taking this kind of medicine, ask your healthcare provider if you should also take a stool softener.  Medicines you may take to treat constipation include:  · Fiber supplements  · Stool softeners  · Laxatives  · Enemas  · Rectal suppositories  Follow-up care  Follow up with your healthcare provider if symptoms don't get better in the next few days. You may need to have more tests or see a specialist.  Call 911  Call 911 if any of these occur:  · Trouble breathing  · Stiff, rigid abdomen that is severely painful to touch  · Confusion  · Fainting or loss of consciousness  · Rapid heart rate  · Chest pain  When to seek medical advice  Call your healthcare provider right away if any of these occur:  · Fever over 100.4°F (38°C)  · Failure to resume normal bowel movements  · Pain in your abdomen or back gets worse  · Nausea or vomiting  · Swelling in your abdomen  · Blood in the stool  · Black, tarry stool  · Involuntary weight loss  · Weakness  Date Last Reviewed: 12/30/2015  © 1338-3301 [employer]. [address] [place] [address], [place] [address]. All rights reserved. This information is not intended as a substitute for professional medical care. Always follow your healthcare professional's instructions.

## 2019-04-18 ENCOUNTER — PATIENT OUTREACH (OUTPATIENT)
Dept: OTHER | Facility: OTHER | Age: 74
End: 2019-04-18

## 2019-04-18 NOTE — PROGRESS NOTES
Last 5 Patient Entered Readings                                      Current 30 Day Average: 150/74     Recent Readings 4/10/2019 4/9/2019 4/5/2019 3/28/2019 3/21/2019    SBP (mmHg) 158 143 154 140 164    DBP (mmHg) 71 76 78 72 71    Pulse 74 75 81 91 87          4/18: Patient states she is taking care of  who recently had surgery.  Will call next week.

## 2019-04-23 ENCOUNTER — OFFICE VISIT (OUTPATIENT)
Dept: PSYCHIATRY | Facility: CLINIC | Age: 74
End: 2019-04-23
Payer: MEDICARE

## 2019-04-23 VITALS
HEIGHT: 59 IN | SYSTOLIC BLOOD PRESSURE: 131 MMHG | WEIGHT: 116 LBS | BODY MASS INDEX: 23.39 KG/M2 | HEART RATE: 66 BPM | DIASTOLIC BLOOD PRESSURE: 70 MMHG

## 2019-04-23 DIAGNOSIS — F43.10 PTSD (POST-TRAUMATIC STRESS DISORDER): ICD-10-CM

## 2019-04-23 DIAGNOSIS — M15.9 PRIMARY OSTEOARTHRITIS INVOLVING MULTIPLE JOINTS: ICD-10-CM

## 2019-04-23 DIAGNOSIS — F33.41 MDD (MAJOR DEPRESSIVE DISORDER), RECURRENT, IN PARTIAL REMISSION: Primary | ICD-10-CM

## 2019-04-23 PROCEDURE — 3075F SYST BP GE 130 - 139MM HG: CPT | Mod: HCNC,CPTII,S$GLB, | Performed by: PSYCHIATRY & NEUROLOGY

## 2019-04-23 PROCEDURE — 99214 PR OFFICE/OUTPT VISIT, EST, LEVL IV, 30-39 MIN: ICD-10-PCS | Mod: HCNC,S$GLB,, | Performed by: PSYCHIATRY & NEUROLOGY

## 2019-04-23 PROCEDURE — 99499 UNLISTED E&M SERVICE: CPT | Mod: HCNC,S$GLB,, | Performed by: PSYCHIATRY & NEUROLOGY

## 2019-04-23 PROCEDURE — 99499 RISK ADDL DX/OHS AUDIT: ICD-10-PCS | Mod: HCNC,S$GLB,, | Performed by: PSYCHIATRY & NEUROLOGY

## 2019-04-23 PROCEDURE — 99214 OFFICE O/P EST MOD 30 MIN: CPT | Mod: HCNC,S$GLB,, | Performed by: PSYCHIATRY & NEUROLOGY

## 2019-04-23 PROCEDURE — 99999 PR PBB SHADOW E&M-EST. PATIENT-LVL III: CPT | Mod: PBBFAC,HCNC,, | Performed by: PSYCHIATRY & NEUROLOGY

## 2019-04-23 PROCEDURE — 3078F DIAST BP <80 MM HG: CPT | Mod: HCNC,CPTII,S$GLB, | Performed by: PSYCHIATRY & NEUROLOGY

## 2019-04-23 PROCEDURE — 1101F PR PT FALLS ASSESS DOC 0-1 FALLS W/OUT INJ PAST YR: ICD-10-PCS | Mod: HCNC,CPTII,S$GLB, | Performed by: PSYCHIATRY & NEUROLOGY

## 2019-04-23 PROCEDURE — 1101F PT FALLS ASSESS-DOCD LE1/YR: CPT | Mod: HCNC,CPTII,S$GLB, | Performed by: PSYCHIATRY & NEUROLOGY

## 2019-04-23 PROCEDURE — 3078F PR MOST RECENT DIASTOLIC BLOOD PRESSURE < 80 MM HG: ICD-10-PCS | Mod: HCNC,CPTII,S$GLB, | Performed by: PSYCHIATRY & NEUROLOGY

## 2019-04-23 PROCEDURE — 3075F PR MOST RECENT SYSTOLIC BLOOD PRESS GE 130-139MM HG: ICD-10-PCS | Mod: HCNC,CPTII,S$GLB, | Performed by: PSYCHIATRY & NEUROLOGY

## 2019-04-23 PROCEDURE — 99999 PR PBB SHADOW E&M-EST. PATIENT-LVL III: ICD-10-PCS | Mod: PBBFAC,HCNC,, | Performed by: PSYCHIATRY & NEUROLOGY

## 2019-04-23 RX ORDER — BUSPIRONE HYDROCHLORIDE 10 MG/1
10 TABLET ORAL 2 TIMES DAILY
Qty: 180 TABLET | Refills: 0 | Status: SHIPPED | OUTPATIENT
Start: 2019-04-23 | End: 2020-01-16 | Stop reason: SDUPTHER

## 2019-04-23 RX ORDER — SERTRALINE HYDROCHLORIDE 100 MG/1
TABLET, FILM COATED ORAL
Qty: 90 TABLET | Refills: 0 | Status: SHIPPED | OUTPATIENT
Start: 2019-04-23 | End: 2020-01-16 | Stop reason: SDUPTHER
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
## 2019-04-25 NOTE — PROGRESS NOTES
Last 5 Patient Entered Readings                                      Current 30 Day Average: 146/74     Recent Readings 4/24/2019 4/22/2019 4/21/2019 4/10/2019 4/9/2019    SBP (mmHg) 133 141 156 158 143    DBP (mmHg) 77 70 71 71 76    Pulse 82 67 74 74 75          HPI:  Called patient to follow up. Patient endorses adherence to medication regimen. Patient denies hypotensive s/sx (lightheadedness, dizziness, nausea, fatigue); patient denies hypertensive s/sx (SOB, CP, severe headaches, changes in vision).     Assessment:  Reviewed recent readings. Per 2017 ACC/ AHA HTN guidelines (goal of BP < 130/80), current 30-day average needs to be addressed more thoroughly today.     Plan:  Discussed increasing irbesartan. Patient declines medication change. Appears she is using proper BP measurement technique, reminded her to rest 5 minutes prior to measurement. I will continue to monitor regularly and will follow-up in 2 to 3 weeks, sooner if blood pressure begins to trend upward or downward.     Current medication regimen:  Hypertension Medications             irbesartan (AVAPRO) 150 MG tablet Take 1 tablet (150 mg total) by mouth every evening. (for blood pressure)          Patient denies having questions or concerns. Patient has my contact information and knows to call with any concerns or clinical changes.

## 2019-04-30 ENCOUNTER — ANESTHESIA EVENT (OUTPATIENT)
Dept: ENDOSCOPY | Facility: HOSPITAL | Age: 74
End: 2019-04-30
Payer: MEDICARE

## 2019-04-30 ENCOUNTER — HOSPITAL ENCOUNTER (OUTPATIENT)
Facility: HOSPITAL | Age: 74
Discharge: HOME | End: 2019-04-30
Attending: INTERNAL MEDICINE | Admitting: INTERNAL MEDICINE
Payer: MEDICARE

## 2019-04-30 ENCOUNTER — ANESTHESIA (OUTPATIENT)
Dept: ENDOSCOPY | Facility: HOSPITAL | Age: 74
End: 2019-04-30
Payer: MEDICARE

## 2019-04-30 DIAGNOSIS — K44.9 HIATAL HERNIA: ICD-10-CM

## 2019-04-30 DIAGNOSIS — K29.70 GASTRITIS, PRESENCE OF BLEEDING UNSPECIFIED, UNSPECIFIED CHRONICITY, UNSPECIFIED GASTRITIS TYPE: ICD-10-CM

## 2019-04-30 DIAGNOSIS — R13.10 DYSPHAGIA: ICD-10-CM

## 2019-04-30 DIAGNOSIS — K22.2 SCHATZKI'S RING: Primary | ICD-10-CM

## 2019-04-30 PROCEDURE — 27201089 HC SNARE, DISP (ANY): Mod: HCNC | Performed by: INTERNAL MEDICINE

## 2019-04-30 PROCEDURE — 63600175 PHARM REV CODE 636 W HCPCS: Mod: HCNC | Performed by: NURSE ANESTHETIST, CERTIFIED REGISTERED

## 2019-04-30 PROCEDURE — 43239 PR EGD, FLEX, W/BIOPSY, SGL/MULTI: ICD-10-PCS | Mod: 59,HCNC,, | Performed by: INTERNAL MEDICINE

## 2019-04-30 PROCEDURE — 43239 EGD BIOPSY SINGLE/MULTIPLE: CPT | Mod: HCNC | Performed by: INTERNAL MEDICINE

## 2019-04-30 PROCEDURE — 88305 TISSUE SPECIMEN TO PATHOLOGY - SURGERY: ICD-10-PCS | Mod: 26,HCNC,, | Performed by: PATHOLOGY

## 2019-04-30 PROCEDURE — 25000003 PHARM REV CODE 250: Mod: HCNC | Performed by: NURSE ANESTHETIST, CERTIFIED REGISTERED

## 2019-04-30 PROCEDURE — 43251 EGD REMOVE LESION SNARE: CPT | Mod: HCNC | Performed by: INTERNAL MEDICINE

## 2019-04-30 PROCEDURE — 88305 TISSUE EXAM BY PATHOLOGIST: CPT | Mod: HCNC,59 | Performed by: PATHOLOGY

## 2019-04-30 PROCEDURE — 43251 EGD REMOVE LESION SNARE: CPT | Mod: HCNC,,, | Performed by: INTERNAL MEDICINE

## 2019-04-30 PROCEDURE — D9220A PRA ANESTHESIA: Mod: HCNC,ANES,, | Performed by: ANESTHESIOLOGY

## 2019-04-30 PROCEDURE — 43251 PR EGD, FLEX, W/REMOVAL, TUMOR/POLYP/LESION(S), SNARE: ICD-10-PCS | Mod: HCNC,,, | Performed by: INTERNAL MEDICINE

## 2019-04-30 PROCEDURE — D9220A PRA ANESTHESIA: ICD-10-PCS | Mod: HCNC,ANES,, | Performed by: ANESTHESIOLOGY

## 2019-04-30 PROCEDURE — 43248 EGD GUIDE WIRE INSERTION: CPT | Mod: HCNC | Performed by: INTERNAL MEDICINE

## 2019-04-30 PROCEDURE — 25000003 PHARM REV CODE 250: Mod: HCNC | Performed by: INTERNAL MEDICINE

## 2019-04-30 PROCEDURE — 88305 TISSUE EXAM BY PATHOLOGIST: CPT | Mod: 26,HCNC,, | Performed by: PATHOLOGY

## 2019-04-30 PROCEDURE — 43248 EGD GUIDE WIRE INSERTION: CPT | Mod: HCNC,,, | Performed by: INTERNAL MEDICINE

## 2019-04-30 PROCEDURE — 37000008 HC ANESTHESIA 1ST 15 MINUTES: Mod: HCNC | Performed by: INTERNAL MEDICINE

## 2019-04-30 PROCEDURE — 43239 EGD BIOPSY SINGLE/MULTIPLE: CPT | Mod: 59,HCNC,, | Performed by: INTERNAL MEDICINE

## 2019-04-30 PROCEDURE — 27201012 HC FORCEPS, HOT/COLD, DISP: Mod: HCNC | Performed by: INTERNAL MEDICINE

## 2019-04-30 PROCEDURE — 37000009 HC ANESTHESIA EA ADD 15 MINS: Mod: HCNC | Performed by: INTERNAL MEDICINE

## 2019-04-30 PROCEDURE — 43248 PR EGD, FLEX, W/DILATION OVER GUIDEWIRE: ICD-10-PCS | Mod: HCNC,,, | Performed by: INTERNAL MEDICINE

## 2019-04-30 RX ORDER — SODIUM CHLORIDE 9 MG/ML
INJECTION, SOLUTION INTRAVENOUS CONTINUOUS
Status: DISCONTINUED | OUTPATIENT
Start: 2019-04-30 | End: 2019-04-30 | Stop reason: HOSPADM

## 2019-04-30 RX ORDER — PROPOFOL 10 MG/ML
VIAL (ML) INTRAVENOUS
Status: DISCONTINUED | OUTPATIENT
Start: 2019-04-30 | End: 2019-04-30

## 2019-04-30 RX ORDER — LIDOCAINE HYDROCHLORIDE 10 MG/ML
INJECTION, SOLUTION INTRAVENOUS
Status: DISCONTINUED | OUTPATIENT
Start: 2019-04-30 | End: 2019-04-30

## 2019-04-30 RX ADMIN — PROPOFOL 20 MG: 10 INJECTION, EMULSION INTRAVENOUS at 08:04

## 2019-04-30 RX ADMIN — SODIUM CHLORIDE: 0.9 INJECTION, SOLUTION INTRAVENOUS at 08:04

## 2019-04-30 RX ADMIN — LIDOCAINE HYDROCHLORIDE 50 MG: 10 INJECTION, SOLUTION INTRAVENOUS at 08:04

## 2019-04-30 RX ADMIN — PROPOFOL 40 MG: 10 INJECTION, EMULSION INTRAVENOUS at 08:04

## 2019-04-30 RX ADMIN — PROPOFOL 120 MG: 10 INJECTION, EMULSION INTRAVENOUS at 08:04

## 2019-04-30 NOTE — TRANSFER OF CARE
"Anesthesia Transfer of Care Note    Patient: Rola Richter    Procedure(s) Performed: Procedure(s) (LRB):  EGD (ESOPHAGOGASTRODUODENOSCOPY) (N/A)    Patient location: PACU    Anesthesia Type: general    Transport from OR: Transported from OR on 2-3 L/min O2 by [place] with adequate spontaneous ventilation    Post pain: adequate analgesia    Post assessment: no apparent anesthetic complications and tolerated procedure well    Post vital signs: stable    Level of consciousness: awake, alert and oriented    Nausea/Vomiting: no nausea/vomiting    Complications: none    Transfer of care protocol was followed      Last vitals:   Visit Vitals  /63   Pulse 62   Temp 36.6 °C (97.9 °F)   Resp 17   Ht 4' 11" (1.499 m)   Wt 52.2 kg (115 lb)   SpO2 99%   BMI 23.23 kg/m²     "

## 2019-04-30 NOTE — ANESTHESIA POSTPROCEDURE EVALUATION
Anesthesia Post Evaluation    Patient: Rola Richter    Procedure(s) Performed: Procedure(s) (LRB):  EGD (ESOPHAGOGASTRODUODENOSCOPY) (N/A)    Final Anesthesia Type: general  Patient location during evaluation: PACU  Patient participation: Yes- Able to Participate  Level of consciousness: awake and alert and oriented  Post-procedure vital signs: reviewed and stable  Pain management: adequate  Airway patency: patent  PONV status at discharge: No PONV  Anesthetic complications: no      Cardiovascular status: blood pressure returned to baseline  Respiratory status: unassisted, spontaneous ventilation and room air  Hydration status: euvolemic  Follow-up not needed.          Vitals Value Taken Time   /72 4/30/2019  9:20 AM   Temp 36.7 °C (98.1 °F) 4/30/2019  9:20 AM   Pulse 62 4/30/2019  9:20 AM   Resp 17 4/30/2019  9:20 AM   SpO2 99 % 4/30/2019  9:20 AM         Event Time     Out of Recovery 09:32:53          Pain/Amol Score: Amol Score: 10 (4/30/2019  9:20 AM)

## 2019-04-30 NOTE — ANESTHESIA PREPROCEDURE EVALUATION
04/30/2019  Rola Richter is a 73 y.o., female.    Pre-op Assessment    I have reviewed the Patient Summary Reports.     I have reviewed the Nursing Notes.   I have reviewed the Medications.     Review of Systems  Anesthesia Hx:  No problems with previous Anesthesia    Social:  Non-Smoker    Cardiovascular:   Hypertension, well controlled    Hepatic/GI:   GERD, well controlled    Musculoskeletal:   Arthritis     Neurological:   Neuromuscular Disease,    Psych:   Psychiatric History          Physical Exam  General:  Well nourished    Airway/Jaw/Neck:  Airway Findings: Mouth Opening: Normal Tongue: Normal  General Airway Assessment: Adult, Good  Mallampati: II  Improves to II with phonation.  TM Distance: 4-6 cm      Dental:  Dental Findings: In tact   Chest/Lungs:  Chest/Lungs Findings: Clear to auscultation, Normal Respiratory Rate     Heart/Vascular:  Heart Findings: Rate: Normal  Rhythm: Regular Rhythm  Sounds: Normal  Heart murmur: negative       Mental Status:  Mental Status Findings:  Cooperative, Alert and Oriented         Anesthesia Plan  Type of Anesthesia, risks & benefits discussed:  Anesthesia Type:  general  Patient's Preference:   Intra-op Monitoring Plan: standard ASA monitors  Intra-op Monitoring Plan Comments:   Post Op Pain Control Plan:   Post Op Pain Control Plan Comments:   Induction:   IV  Beta Blocker:  Patient is not currently on a Beta-Blocker (No further documentation required).       Informed Consent: Patient understands risks and agrees with Anesthesia plan.  Questions answered. Anesthesia consent signed with patient.  ASA Score: 3     Day of Surgery Review of History & Physical: I have interviewed and examined the patient. I have reviewed the patient's H&P dated:  There are no significant changes.  H&P update referred to the surgeon.         Ready For Surgery From Anesthesia  Perspective.

## 2019-04-30 NOTE — DISCHARGE INSTRUCTIONS
Gastritis (Adult)    Gastritis is inflammation and irritation of the stomach lining. It can be present for a short time (acute) or be long lasting (chronic). Gastritis is often caused by infection with bacteria called H pylori. More than a third of people in the [place] have this bacteria in their bodies. In many cases, H pylori causes no problems or symptoms. In some people, though, the infection irritates the stomach lining and causes gastritis. Other causes of stomach irritation include drinking alcohol or taking pain-relieving medicines called NSAIDs (such as aspirin or ibuprofen).   Symptoms of gastritis can include:  · Abdominal pain or bloating  · Loss of appetite  · Nausea or vomiting  · Vomiting blood or having black stools  · Feeling more tired than usual  An inflamed and irritated stomach lining is more likely to develop a sore called an ulcer. To help prevent this, gastritis should be treated.  Home care  If needed, medicines may be prescribed. If you have H pylori infection, treating it will likely relieve your symptoms. Other changes can help reduce stomach irritation and help it heal.  · If you have been prescribed medicines for H pylori infection, take them as directed. Take all of the medicine until it is finished or your healthcare provider tells you to stop, even if you feel better.  · Your healthcare provider may recommend avoiding NSAIDs. If you take daily aspirin for your heart or other medical reasons, do not stop without talking to your healthcare provider first.  · Avoid drinking alcohol.  · Stop smoking. Smoking can irritate the stomach and delay healing. As much as possible, stay away from second hand smoke.  Follow-up care  Follow up with your healthcare provider, or as advised by our staff. Testing may be needed to check for inflammation or an ulcer.  When to seek medical advice  Call your healthcare provider for any of the following:  · Stomach pain that gets worse or moves to the lower  right abdomen (appendix area)  · Chest pain that appears or gets worse, or spreads to the back, neck, shoulder, or arm  · Frequent vomiting (cant keep down liquids)  · Blood in the stool or vomit (red or black in color)  · Feeling weak or dizzy  · Fever of 100.4ºF (38ºC) or higher, or as directed by your healthcare provider  Date Last Reviewed: 6/22/2015 © 2000-2017 [employer]. [address] [place] [address]. All rights reserved. This information is not intended as a substitute for professional medical care. Always follow your healthcare professional's instructions.        What Is a Hiatal Hernia?    Hiatal hernia is when the area where the stomach and esophagus meet bulges up through the diaphragm into the chest cavity. In some cases, part of the stomach may bulge above the diaphragm. Stomach acid may move up into the esophagus and cause symptoms. The symptoms are often blamed on gastroesophageal reflux disease (GERD). You may only know about the hernia when it shows up on an X-ray taken for other reasons.   What you may feel  The hiatus is a normal hole in the diaphragm. The esophagus passes through this hole and leads to the stomach. In some cases, part of the stomach may bulge above the diaphragm. This bulge is called a hernia. Stomach acid may move up into the esophagus and cause symptoms.  When you eat, the muscle at the hiatus relaxes to allow food to pass into the stomach. It tightens again to keep food and digestive acids in the stomach.  Many people with hiatal hernias have mild symptoms. You may notice the following GERD symptoms:  · Heartburn or other chest discomfort  · A feeling of chest fullness after a meal  · Frequent burping  · Acid taste in the mouth  · Trouble swallowing  Treating symptoms  If you have been diagnosed with hiatal hernia, these suggestions may help improve symptoms:  · Lose excess weight. Extra weight puts pressure on the stomach and esophagus.  · Dont  lie down after eating. Sit up for at least an hour after eating. Lying down after eating can increase symptoms.  · Avoid certain foods and drinks. These include fatty foods, chocolate, coffee, mint, and other foods that cause symptoms for you.  · Dont smoke or drink alcohol. These can worsen symptoms.  · Look at your medicines. Discuss your medicines with your healthcare provider. Many medicines can cause symptoms.  · Consider an antacid medicine. Ask your healthcare provider about over-the-counter and prescription medicines that may help.  · Ask about surgery, if needed. Surgery is a treatment choice for some people. Your healthcare provider can determine if surgery is an option for you.    Date Last Reviewed: 10/1/2016  © 9866-7118 [employer]. [address] [place] [address], [place] [address]. All rights reserved. This information is not intended as a substitute for professional medical care. Always follow your healthcare professional's instructions.        Esophageal Dilation     A balloon dilator may be used to widen a stricture in the esophagus.   An esophageal dilation is a procedure used to widen a narrowed section of your esophagus. This is the tube that leads from your throat to your stomach. Narrowing (stricture) of the esophagus can cause problems. These include trouble swallowing. This sheet explains what to expect with esophageal dilation.  Why esophageal dilation is needed  Several problems can be treated with esophageal dilation. They include:  · Peptic stricture. This is caused by reflux esophagitis. With this problem, the esophagus is irritated by acid reflux (heartburn). This occurs when acid from your stomach flows back up into the esophagus. Stomach acid damages the lining of the esophagus. This leads to a buildup of scar tissue. As a result, the esophagus is narrowed.  · Schatzkis ring. This is an abnormal ring of tissue. It forms where the esophagus meets the stomach. It can cause  trouble swallowing. It can also cause food to get stuck in the esophagus. The cause of this condition is not known.  · Achalasia. This condition stops food and liquids from moving into your stomach from the esophagus. It affects the lower esophageal sphincter (LES). The LES is a muscular ring that opens (relaxes) when you swallow. With achalasia, the LES does not relax. This condition can also cause problems with peristalsis. This is the normal muscular action of the esophagus that moves food into the stomach.  · Eosinophilic esophagitis. This is a redness and swelling (inflammation) in the esophagus. It is caused by an environmental trigger such as a food allergy. It can lead to pain, trouble swallowing, and strictures.  · Less common causes of stricture. Other causes of stricture include radiation treatment and cancer.  Before you have esophageal dilation  · Tell your provider about any medicines you take. This includes prescription medicines, over-the-counter medicines, herbs, vitamins, and other supplements. Be sure to mention aspirin or any blood thinners youre taking.  · Let your provider know if you need to take antibiotics before dental procedures. You may need to take them before esophageal dilation as well.  · Tell your provider about any health conditions you have, such as heart or lung disease. Also mention any allergies to medicines.  · Youll need to have an empty stomach for the procedure. Follow your providers instructions for not eating and drinking before the procedure.  · Arrange to have a family member or friend drive you home after the procedure.  During the procedure  · You may be given local anesthesia to numb your throat. Youll also likely be given medicine to relax you. The procedure takes about 15 minutes. It does not cause trouble breathing.  · A tube called an endoscope (scope) is used. This is a narrow tube with a tiny light and camera at the end. The scope is inserted through your  mouth and into your esophagus. It lets your provider see inside your esophagus. To help guide your provider, an imaging method called fluoroscopy may also be used. This creates a moving X-ray image on a computer screen.   · Next, special tiny tools are carefully guided through your mouth and down into the esophagus. They widen the stricture and are then removed. Different types of instruments are used. The type used depends on the size and cause of the stricture. Types include:  ¨ Balloon dilator. A tiny empty balloon is put into the stricture using an endoscope. The balloon is slowly filled with air. The air is removed from the balloon when the stricture is widened to the right size. Balloon dilators are used to treat many types of strictures.  ¨ Guided wire dilator. A thin wire is eased down the esophagus. A small tube thats wider on one end is guided down the wire. It is put into the stricture to stretch it. These dilators are used to treat all kinds of strictures.  ¨ Bougies. These are weighted, cone-shaped tubes. Starting with smaller cones, your provider uses increasingly larger cones until the stricture is stretched the right amount. Bougies are often used to treat strictures that are simple (short, straight, and not very narrow).  After the procedure  · Youll be watched closely until your provider says youre ready to go home. Youll need to have a friend or family member drive you home.  · You may have a sore throat for the rest of the day.  · You may have pain behind your breastbone for a short time afterwards.  · You can start drinking fluids again after the numbness in your throat goes away. You can resume eating the same day or the next day.  Risks and possible complications  Risks and possible complications for esophageal dilation include:  · Infection  · A tear or hole in the esophagus lining, causing bleeding and possibly needing surgery to fix  · Risks of anesthesia  Follow-up  You may need to  have the procedure repeated one or more times. This depends on the cause and extent of the narrowing. Repeat procedures can allow the dilation to take place more slowly. This reduces the risks of the procedure.  If your stricture was caused by reflux esophagitis, youll likely need to take medicine to treat that condition. Your provider will tell you more.  When to call your provider  Call your healthcare provider right away if you have any of the following after the procedure:  · Fever of 100.4°F (38.0°C)  · Chest pain  · Trouble swallowing  · Vomiting blood or material that looks like coffee grounds  · Bleeding  · Black, tarry, or bloody stools   Date Last Reviewed: 7/1/2016  © 7060-0109 [employer]. [address] [place] [address], [place] [address]. All rights reserved. This information is not intended as a substitute for professional medical care. Always follow your healthcare professional's instructions.

## 2019-04-30 NOTE — [employer] PATIENT INSTRUCTIONS
Discharge Summary/Instructions after an Endoscopic Procedure  Patient Name: Rola Richter  Patient MRN: [id number]  Patient YOB: 1945 Tuesday, April 30, 2019  Greg Cohen MD  RESTRICTIONS:  During your procedure today, you received medications for sedation.  These   medications may affect your judgment, balance and coordination.  Therefore,   for 24 hours, you have the following restrictions:   - DO NOT drive a car, operate machinery, make legal/financial decisions,   sign important papers or drink alcohol.    ACTIVITY:  Today: no heavy lifting, straining or running due to procedural   sedation/anesthesia.  The following day: return to full activity including work.  DIET:  Eat and drink normally unless instructed otherwise.     TREATMENT FOR COMMON SIDE EFFECTS:  - Mild abdominal pain, nausea, belching, bloating or excessive gas:  rest,   eat lightly and use a heating pad.  - Sore Throat: treat with throat lozenges and/or gargle with warm salt   water.  - Because air was used during the procedure, expelling large amounts of air   from your rectum or belching is normal.  - If a bowel prep was taken, you may not have a bowel movement for 1-3 days.    This is normal.  SYMPTOMS TO WATCH FOR AND REPORT TO YOUR PHYSICIAN:  1. Abdominal pain or bloating, other than gas cramps.  2. Chest pain.  3. Back pain.  4. Signs of infection such as: chills or fever occurring within 24 hours   after the procedure.  5. Rectal bleeding, which would show as bright red, maroon, or black stools.   (A tablespoon of blood from the rectum is not serious, especially if   hemorrhoids are present.)  6. Vomiting.  7. Weakness or dizziness.  GO DIRECTLY TO THE NEAREST EMERGENCY ROOM IF YOU HAVE ANY OF THE FOLLOWING:      Difficulty breathing              Chills and/or fever over 101 F   Persistent vomiting and/or vomiting blood   Severe abdominal pain   Severe chest pain   Black, tarry stools   Bleeding- more than one  tablespoon   Any other symptom or condition that you feel may need urgent attention  Your doctor recommends these additional instructions:  If any biopsies were taken, your doctors clinic will contact you in 1 to 2   weeks with any results.  - Patient has a contact number available for emergencies.  The signs and   symptoms of potential delayed complications were discussed with the   patient.  Return to normal activities tomorrow.  Written discharge   instructions were provided to the patient.   - Resume previous diet.   - Continue present medications.   - No aspirin, ibuprofen, naproxen, or other non-steroidal anti-inflammatory   drugs.   - Await pathology results.   - Discharge patient to home (ambulatory).   - Follow an antireflux regimen.   - Return to nurse practitioner in 6 weeks.  For questions, problems or results please call your physician - Greg Cohen MD at Work:  [phone].  [facility], EMERGENCY ROOM PHONE NUMBER: [phone]  IF A COMPLICATION OR EMERGENCY SITUATION ARISES AND YOU ARE UNABLE TO REACH   YOUR PHYSICIAN - GO DIRECTLY TO THE EMERGENCY ROOM.  Greg Cohen MD  4/30/2019 8:53:56 AM  This report has been verified and signed electronically.  [employer]

## 2019-05-01 VITALS
TEMPERATURE: 98 F | RESPIRATION RATE: 17 BRPM | DIASTOLIC BLOOD PRESSURE: 72 MMHG | OXYGEN SATURATION: 99 % | WEIGHT: 115 LBS | HEART RATE: 62 BPM | SYSTOLIC BLOOD PRESSURE: 164 MMHG | HEIGHT: 59 IN | BODY MASS INDEX: 23.18 KG/M2

## 2019-05-02 ENCOUNTER — PATIENT MESSAGE (OUTPATIENT)
Dept: ENDOSCOPY | Facility: HOSPITAL | Age: 74
End: 2019-05-02

## 2019-05-02 ENCOUNTER — TELEPHONE (OUTPATIENT)
Dept: GASTROENTEROLOGY | Facility: CLINIC | Age: 74
End: 2019-05-02

## 2019-05-02 RX ORDER — SUCRALFATE 1 G/10ML
1 SUSPENSION ORAL 4 TIMES DAILY
Qty: 400 ML | Refills: 0 | Status: SHIPPED | OUTPATIENT
Start: 2019-05-02 | End: 2019-05-12

## 2019-05-02 NOTE — TELEPHONE ENCOUNTER
Made an appt for pt.and instructed her to go to ER if starts having chest pain or discomfort gets worse or started running fever.

## 2019-05-02 NOTE — TELEPHONE ENCOUNTER
----- Message from Greg Victor MD sent at 5/2/2019  3:17 PM CDT -----  Contact: self [phone]  Will send Rx for sucralfate and have her come to clinic on Tuesday afternoon at 130 (OK to overbook).  If she feels worse before that time or if she has severe chest pain or fever, then she should go to the ER.      Thanks.    B      ----- Message -----  From: Rachel Peralta LPN  Sent: 5/2/2019   8:18 AM  To: Greg Victor MD        ----- Message -----  From: Araceli Ignacio  Sent: 5/2/2019   8:11 AM  To: Valente Garza Staff    She said that the her throat is hurting her a lot since the procedure on Monday, and it seems to continue to get worse.  She can barely eat.  Please call her.  Thank you!

## 2019-05-02 NOTE — TELEPHONE ENCOUNTER
----- Message from Rachel Peralta LPN sent at 5/2/2019  8:18 AM CDT -----  Contact: self [phone]      ----- Message -----  From: Araceli Pierre  Sent: 5/2/2019   8:11 AM  To: Valente Garza Staff    She said that the her throat is hurting her a lot since the procedure on Monday, and it seems to continue to get worse.  She can barely eat.  Please call her.  Thank you!

## 2019-05-07 ENCOUNTER — OFFICE VISIT (OUTPATIENT)
Dept: GASTROENTEROLOGY | Facility: CLINIC | Age: 74
End: 2019-05-07
Payer: MEDICARE

## 2019-05-07 VITALS
BODY MASS INDEX: 23.51 KG/M2 | WEIGHT: 116.38 LBS | SYSTOLIC BLOOD PRESSURE: 111 MMHG | DIASTOLIC BLOOD PRESSURE: 62 MMHG | HEART RATE: 72 BPM

## 2019-05-07 DIAGNOSIS — R10.9 ABDOMINAL PAIN, UNSPECIFIED ABDOMINAL LOCATION: ICD-10-CM

## 2019-05-07 DIAGNOSIS — J34.89 SINUS DRAINAGE: ICD-10-CM

## 2019-05-07 DIAGNOSIS — R93.89 ABNORMAL CT SCAN: ICD-10-CM

## 2019-05-07 DIAGNOSIS — R13.10 DYSPHAGIA, UNSPECIFIED TYPE: Primary | ICD-10-CM

## 2019-05-07 PROCEDURE — 3078F PR MOST RECENT DIASTOLIC BLOOD PRESSURE < 80 MM HG: ICD-10-PCS | Mod: HCNC,CPTII,S$GLB, | Performed by: INTERNAL MEDICINE

## 2019-05-07 PROCEDURE — 99999 PR PBB SHADOW E&M-EST. PATIENT-LVL III: ICD-10-PCS | Mod: PBBFAC,HCNC,, | Performed by: INTERNAL MEDICINE

## 2019-05-07 PROCEDURE — 1101F PT FALLS ASSESS-DOCD LE1/YR: CPT | Mod: HCNC,CPTII,S$GLB, | Performed by: INTERNAL MEDICINE

## 2019-05-07 PROCEDURE — 3074F SYST BP LT 130 MM HG: CPT | Mod: HCNC,CPTII,S$GLB, | Performed by: INTERNAL MEDICINE

## 2019-05-07 PROCEDURE — 3078F DIAST BP <80 MM HG: CPT | Mod: HCNC,CPTII,S$GLB, | Performed by: INTERNAL MEDICINE

## 2019-05-07 PROCEDURE — 99214 OFFICE O/P EST MOD 30 MIN: CPT | Mod: HCNC,S$GLB,, | Performed by: INTERNAL MEDICINE

## 2019-05-07 PROCEDURE — 99214 PR OFFICE/OUTPT VISIT, EST, LEVL IV, 30-39 MIN: ICD-10-PCS | Mod: HCNC,S$GLB,, | Performed by: INTERNAL MEDICINE

## 2019-05-07 PROCEDURE — 3074F PR MOST RECENT SYSTOLIC BLOOD PRESSURE < 130 MM HG: ICD-10-PCS | Mod: HCNC,CPTII,S$GLB, | Performed by: INTERNAL MEDICINE

## 2019-05-07 PROCEDURE — 1101F PR PT FALLS ASSESS DOC 0-1 FALLS W/OUT INJ PAST YR: ICD-10-PCS | Mod: HCNC,CPTII,S$GLB, | Performed by: INTERNAL MEDICINE

## 2019-05-07 PROCEDURE — 99999 PR PBB SHADOW E&M-EST. PATIENT-LVL III: CPT | Mod: PBBFAC,HCNC,, | Performed by: INTERNAL MEDICINE

## 2019-05-07 NOTE — PROGRESS NOTES
Subjective:       Patient ID: Rola Richter is a 73 y.o. female.    This is an established patient.      Chief Complaint: Abdominal pain    Abdominal Pain   This is a new problem. The current episode started more than 1 month ago. The onset quality is undetermined. The problem occurs intermittently. Duration: 15 minutes, begins after first food of the day then resolves spontaneously. The problem has been gradually improving. The pain is located in the LUQ. The pain is at a severity of 5/10. The pain is moderate. The quality of the pain is aching. The abdominal pain does not radiate. Associated symptoms include constipation (improved on Linzess). Pertinent negatives include no dysuria, fever, hematuria, nausea or vomiting. The pain is aggravated by eating. The pain is relieved by nothing. She has tried proton pump inhibitors (Linzess) for the symptoms. The treatment provided mild relief. Prior diagnostic workup includes CT scan and upper endoscopy. Her past medical history is significant for irritable bowel syndrome.     Review of Systems   Constitutional: Negative for chills, fatigue and fever.   HENT: Positive for trouble swallowing (resolved since dilation of esophagus on EGD). Negative for sore throat.    Respiratory: Negative for cough, shortness of breath and wheezing.    Cardiovascular: Negative for chest pain and palpitations.   Gastrointestinal: Positive for abdominal pain (improved) and constipation (improved on Linzess). Negative for blood in stool, nausea and vomiting.   Genitourinary: Negative for dysuria and hematuria.   Psychiatric/Behavioral: The patient is not nervous/anxious.    All other systems reviewed and are negative.      Objective:       Vitals:    05/07/19 1332   BP: 111/62   Pulse: 72   Weight: 52.8 kg (116 lb 6.5 oz)       Physical Exam   Constitutional: She is oriented to person, place, and time. She appears well-developed and well-nourished.   HENT:   Head: Normocephalic and  atraumatic.   Eyes: Pupils are equal, round, and reactive to light. No scleral icterus.   Cardiovascular: Normal rate and regular rhythm.   No murmur heard.  Pulmonary/Chest: Effort normal and breath sounds normal. She has no wheezes.   Abdominal: Soft. Bowel sounds are normal. She exhibits no distension. There is tenderness (mild, LUQ).   Lymphadenopathy:     She has no cervical adenopathy.   Neurological: She is alert and oriented to person, place, and time.         Lab Results   Component Value Date    WBC 8.38 03/27/2019    HGB 11.4 (L) 03/27/2019    HCT 36.4 (L) 03/27/2019    MCV 94 03/27/2019     03/27/2019       CMP  Sodium   Date Value Ref Range Status   03/27/2019 140 136 - 145 mmol/L Final     Potassium   Date Value Ref Range Status   03/27/2019 4.5 3.5 - 5.1 mmol/L Final     Chloride   Date Value Ref Range Status   03/27/2019 107 95 - 110 mmol/L Final     CO2   Date Value Ref Range Status   03/27/2019 23 23 - 29 mmol/L Final     Glucose   Date Value Ref Range Status   03/27/2019 82 70 - 110 mg/dL Final     BUN, Bld   Date Value Ref Range Status   03/27/2019 13 8 - 23 mg/dL Final     Creatinine   Date Value Ref Range Status   03/27/2019 0.8 0.5 - 1.4 mg/dL Final     Calcium   Date Value Ref Range Status   03/27/2019 9.6 8.7 - 10.5 mg/dL Final     Total Protein   Date Value Ref Range Status   03/27/2019 7.3 6.0 - 8.4 g/dL Final     Albumin   Date Value Ref Range Status   03/27/2019 3.9 3.5 - 5.2 g/dL Final     Total Bilirubin   Date Value Ref Range Status   03/27/2019 0.3 0.1 - 1.0 mg/dL Final     Comment:     For infants and newborns, interpretation of results should be based  on gestational age, weight and in agreement with clinical  observations.  Premature Infant recommended reference ranges:  Up to 24 hours.............<8.0 mg/dL  Up to 48 hours............<12.0 mg/dL  3-5 days..................<15.0 mg/dL  6-29 days.................<15.0 mg/dL       Alkaline Phosphatase   Date Value Ref Range  Status   03/27/2019 68 55 - 135 U/L Final     AST   Date Value Ref Range Status   03/27/2019 19 10 - 40 U/L Final     ALT   Date Value Ref Range Status   03/27/2019 12 10 - 44 U/L Final     Anion Gap   Date Value Ref Range Status   03/27/2019 10 8 - 16 mmol/L Final     eGFR if    Date Value Ref Range Status   03/27/2019 >60.0 >60 mL/min/1.73 m^2 Final     eGFR if non    Date Value Ref Range Status   03/27/2019 >60.0 >60 mL/min/1.73 m^2 Final     Comment:     Calculation used to obtain the estimated glomerular filtration  rate (eGFR) is the CKD-EPI equation.          Assessment:       1. Dysphagia, unspecified type    2. Abdominal pain, unspecified abdominal location    3. Sinus drainage    4. Abnormal CT scan        Plan:       1.  Colonoscopy as scheduled.   2.  If above unrevealing and pain persists, consider pillcam, however inflammatory disease of the small bowel is thought to be less likely given absence of diarrhea or bleeding and mild/interimttent nature of the pain with short lived symptoms which sound more functional in nature.  3.  Further recommendations to follow after above.

## 2019-05-09 ENCOUNTER — PATIENT MESSAGE (OUTPATIENT)
Dept: GASTROENTEROLOGY | Facility: CLINIC | Age: 74
End: 2019-05-09

## 2019-05-13 ENCOUNTER — HOSPITAL ENCOUNTER (OUTPATIENT)
Facility: HOSPITAL | Age: 74
Discharge: HOME | End: 2019-05-13
Attending: INTERNAL MEDICINE | Admitting: INTERNAL MEDICINE
Payer: MEDICARE

## 2019-05-13 ENCOUNTER — ANESTHESIA (OUTPATIENT)
Dept: ENDOSCOPY | Facility: HOSPITAL | Age: 74
End: 2019-05-13
Payer: MEDICARE

## 2019-05-13 ENCOUNTER — ANESTHESIA EVENT (OUTPATIENT)
Dept: ENDOSCOPY | Facility: HOSPITAL | Age: 74
End: 2019-05-13
Payer: MEDICARE

## 2019-05-13 VITALS
TEMPERATURE: 98 F | SYSTOLIC BLOOD PRESSURE: 140 MMHG | RESPIRATION RATE: 17 BRPM | DIASTOLIC BLOOD PRESSURE: 62 MMHG | HEART RATE: 88 BPM | OXYGEN SATURATION: 99 %

## 2019-05-13 DIAGNOSIS — R10.9 ABDOMINAL PAIN: ICD-10-CM

## 2019-05-13 PROCEDURE — 25000003 PHARM REV CODE 250: Mod: HCNC | Performed by: NURSE ANESTHETIST, CERTIFIED REGISTERED

## 2019-05-13 PROCEDURE — 45380 PR COLONOSCOPY,BIOPSY: ICD-10-PCS | Mod: HCNC,,, | Performed by: INTERNAL MEDICINE

## 2019-05-13 PROCEDURE — 88305 TISSUE EXAM BY PATHOLOGIST: CPT | Performed by: PATHOLOGY

## 2019-05-13 PROCEDURE — 25000003 PHARM REV CODE 250: Mod: HCNC | Performed by: INTERNAL MEDICINE

## 2019-05-13 PROCEDURE — 45380 COLONOSCOPY AND BIOPSY: CPT | Mod: HCNC | Performed by: INTERNAL MEDICINE

## 2019-05-13 PROCEDURE — D9220A PRA ANESTHESIA: ICD-10-PCS | Mod: HCNC,ANES,, | Performed by: ANESTHESIOLOGY

## 2019-05-13 PROCEDURE — 00811 ANES LWR INTST NDSC NOS: CPT | Mod: HCNC | Performed by: INTERNAL MEDICINE

## 2019-05-13 PROCEDURE — 37000008 HC ANESTHESIA 1ST 15 MINUTES: Mod: HCNC | Performed by: INTERNAL MEDICINE

## 2019-05-13 PROCEDURE — 45380 COLONOSCOPY AND BIOPSY: CPT | Mod: HCNC,,, | Performed by: INTERNAL MEDICINE

## 2019-05-13 PROCEDURE — 63600175 PHARM REV CODE 636 W HCPCS: Mod: HCNC | Performed by: NURSE ANESTHETIST, CERTIFIED REGISTERED

## 2019-05-13 PROCEDURE — D9220A PRA ANESTHESIA: Mod: HCNC,ANES,, | Performed by: ANESTHESIOLOGY

## 2019-05-13 PROCEDURE — 27201012 HC FORCEPS, HOT/COLD, DISP: Mod: HCNC | Performed by: INTERNAL MEDICINE

## 2019-05-13 PROCEDURE — 88305 TISSUE SPECIMEN TO PATHOLOGY - SURGERY: ICD-10-PCS | Mod: 26,,, | Performed by: PATHOLOGY

## 2019-05-13 PROCEDURE — 37000009 HC ANESTHESIA EA ADD 15 MINS: Mod: HCNC | Performed by: INTERNAL MEDICINE

## 2019-05-13 RX ORDER — GLYCOPYRROLATE 0.2 MG/ML
INJECTION INTRAMUSCULAR; INTRAVENOUS
Status: DISCONTINUED | OUTPATIENT
Start: 2019-05-13 | End: 2019-05-13

## 2019-05-13 RX ORDER — ONDANSETRON 2 MG/ML
INJECTION INTRAMUSCULAR; INTRAVENOUS
Status: DISCONTINUED | OUTPATIENT
Start: 2019-05-13 | End: 2019-05-13

## 2019-05-13 RX ORDER — SODIUM CHLORIDE 9 MG/ML
INJECTION, SOLUTION INTRAVENOUS CONTINUOUS
Status: DISCONTINUED | OUTPATIENT
Start: 2019-05-13 | End: 2019-05-13 | Stop reason: HOSPADM

## 2019-05-13 RX ORDER — LIDOCAINE HCL/PF 100 MG/5ML
SYRINGE (ML) INTRAVENOUS
Status: DISCONTINUED | OUTPATIENT
Start: 2019-05-13 | End: 2019-05-13

## 2019-05-13 RX ORDER — PROPOFOL 10 MG/ML
VIAL (ML) INTRAVENOUS
Status: DISCONTINUED | OUTPATIENT
Start: 2019-05-13 | End: 2019-05-13

## 2019-05-13 RX ADMIN — GLYCOPYRROLATE 0.1 MG: 0.2 INJECTION, SOLUTION INTRAMUSCULAR; INTRAVENOUS at 09:05

## 2019-05-13 RX ADMIN — PROPOFOL 20 MG: 10 INJECTION, EMULSION INTRAVENOUS at 09:05

## 2019-05-13 RX ADMIN — PROPOFOL 80 MG: 10 INJECTION, EMULSION INTRAVENOUS at 09:05

## 2019-05-13 RX ADMIN — ONDANSETRON 4 MG: 2 INJECTION, SOLUTION INTRAMUSCULAR; INTRAVENOUS at 09:05

## 2019-05-13 RX ADMIN — LIDOCAINE HYDROCHLORIDE 50 MG: 20 INJECTION, SOLUTION INTRAVENOUS at 09:05

## 2019-05-13 RX ADMIN — SODIUM CHLORIDE: 0.9 INJECTION, SOLUTION INTRAVENOUS at 08:05

## 2019-05-13 NOTE — TRANSFER OF CARE
Anesthesia Transfer of Care Note    Patient: Rola Richter    Procedure(s) Performed: Procedure(s) (LRB):  COLONOSCOPY (N/A)    Patient location: PACU    Anesthesia Type: general    Transport from OR: Transported from OR on room air with adequate spontaneous ventilation    Post pain: adequate analgesia    Post assessment: no apparent anesthetic complications and tolerated procedure well    Post vital signs: stable    Level of consciousness: sedated    Nausea/Vomiting: no nausea/vomiting    Complications: none    Transfer of care protocol was followed      Last vitals:   Visit Vitals  BP (!) 148/68 (BP Location: Left arm, Patient Position: Lying)   Pulse 73   Temp 36.6 °C (97.9 °F)   Resp 16   SpO2 100%   Breastfeeding? No

## 2019-05-13 NOTE — PLAN OF CARE
Have you had a colonoscopy LESS THAN 3 years ago?   * If YES, answer these questions*: No    1. Did patient have a prior colonic polyp in a previous surveillance/diagnostic colonoscopy and is 18 years or older on date of encounter?  2. Documentation of < 3 year interval since the patients last colonoscopy due to medical reasons (eg., last colonoscopy incomplete, last colonoscopy had inadequate prep, piecemeal removal of adenomas, or last colonoscopy found > 10 adenomas) ?

## 2019-05-13 NOTE — [employer]OVATION PATIENT INSTRUCTIONS
Discharge Summary/Instructions after an Endoscopic Procedure  Patient Name: Rola Richter  Patient MRN: [id number]  Patient YOB: 1945  Monday, May 13, 2019  Greg Cohen MD  RESTRICTIONS:  During your procedure today, you received medications for sedation.  These   medications may affect your judgment, balance and coordination.  Therefore,   for 24 hours, you have the following restrictions:   - DO NOT drive a car, operate machinery, make legal/financial decisions,   sign important papers or drink alcohol.    ACTIVITY:  Today: no heavy lifting, straining or running due to procedural   sedation/anesthesia.  The following day: return to full activity including work.  DIET:  Eat and drink normally unless instructed otherwise.     TREATMENT FOR COMMON SIDE EFFECTS:  - Mild abdominal pain, nausea, belching, bloating or excessive gas:  rest,   eat lightly and use a heating pad.  - Sore Throat: treat with throat lozenges and/or gargle with warm salt   water.  - Because air was used during the procedure, expelling large amounts of air   from your rectum or belching is normal.  - If a bowel prep was taken, you may not have a bowel movement for 1-3 days.    This is normal.  SYMPTOMS TO WATCH FOR AND REPORT TO YOUR PHYSICIAN:  1. Abdominal pain or bloating, other than gas cramps.  2. Chest pain.  3. Back pain.  4. Signs of infection such as: chills or fever occurring within 24 hours   after the procedure.  5. Rectal bleeding, which would show as bright red, maroon, or black stools.   (A tablespoon of blood from the rectum is not serious, especially if   hemorrhoids are present.)  6. Vomiting.  7. Weakness or dizziness.  GO DIRECTLY TO THE NEAREST EMERGENCY ROOM IF YOU HAVE ANY OF THE FOLLOWING:      Difficulty breathing              Chills and/or fever over 101 F   Persistent vomiting and/or vomiting blood   Severe abdominal pain   Severe chest pain   Black, tarry stools   Bleeding- more than one  tablespoon   Any other symptom or condition that you feel may need urgent attention  Your doctor recommends these additional instructions:  If any biopsies were taken, your doctors clinic will contact you in 1 to 2   weeks with any results.  - Patient has a contact number available for emergencies.  The signs and   symptoms of potential delayed complications were discussed with the   patient.  Return to normal activities tomorrow.  Written discharge   instructions were provided to the patient.   - High fiber diet.   - Continue present medications.   - Await pathology results.   - Repeat colonoscopy in 5 years for surveillance.   - Discharge patient to home (ambulatory).   - Return to my office PRN.  For questions, problems or results please call your physician - Greg Cohen MD at Work:  [phone].  [facility], EMERGENCY ROOM PHONE NUMBER: [phone]  IF A COMPLICATION OR EMERGENCY SITUATION ARISES AND YOU ARE UNABLE TO REACH   YOUR PHYSICIAN - GO DIRECTLY TO THE EMERGENCY ROOM.  Greg Cohen MD  5/13/2019 9:53:34 AM  This report has been verified and signed electronically.  [employer]

## 2019-05-13 NOTE — DISCHARGE INSTRUCTIONS
Diagnosing Hemorrhoids    To diagnose hemorrhoids, your healthcare provider will rule out other problems and determine how bad your hemorrhoids are. After the evaluation, your healthcare provider will help you decide on a treatment plan thats best for you.  Medical history  A medical history helps your healthcare provider learn more about your symptoms and overall health. This often includes questions about your bowel habits and diet. You may also be asked how often you exercise and whether you take any medicines. Be sure to mention if any members of your family have had cancer or polyps of the colon.  Physical exam  During a physical exam, youll be asked to lie on an exam table. Youll then be examined for signs of swollen hemorrhoids and other problems. The exam takes just a few minutes. It is usually not painful:  · A visual exam is used to view the outer anal skin.  · A digital rectal exam is used to check for hemorrhoids or other problems in the anal canal. It is done using a lubricated gloved finger.  · An anoscopic exam is done using a special viewing tube called an anoscope. The scope helps your healthcare provider view the anal canal.  Grading hemorrhoids  Based on the physical exam, your [facility] provider may assign a grade to internal hemorrhoids. The grades are based on the severity of your symptoms:  · Grade I hemorrhoids do not protrude from the anus. They may bleed, but otherwise cause few symptoms.  · Grade II hemorrhoids protrude from the anus during bowel movements. They reduce back into the anal canal when straining stops.  · Grade III hemorrhoids protrude on their own or with straining. They do not reduce by themselves, but can be pushed back into place.  · Grade IV hemorrhoids protrude and cannot be reduced at all. They can also be painful and may need prompt treatment.  Pregnancy and hemorrhoids  Many women develop hemorrhoids during pregnancy and childbirth. This is likely caused by  pressure on the pelvis and by hormonal changes. In most cases, the hemorrhoids will eventually go away on their own. In the meantime, talk with your healthcare provider about ways to help relieve your symptoms.   Other anal problems  Below are common problems that can cause symptoms similar to hemorrhoids. Your healthcare provider can explain your treatment choices:  · A fissure is a small tear or crack in the lining of the anus. It can be caused by hard bowel movements, diarrhea, or inflammation in the rectal area. Fissures can bleed and cause painful bowel movements.  · An abscess is an infected gland in the anal canal. The infected area swells and often causes pain.  · A fistula is a pathway that may form when an anal abscess drains. The pathway may remain after the abscess is gone. Fistulas are not usually painful. But they can cause drainage where the pathway meets the skin.   Date Last Reviewed: 7/1/2016  © 1828-8880 [employer]. [address] [place] [address]. All rights reserved. This information is not intended as a substitute for professional medical care. Always follow your healthcare professional's instructions.        Understanding Colon and Rectal Polyps    The colon (also called the large intestine) is a muscular tube that forms the last part of the digestive tract. It absorbs water and stores food waste. The colon is about 4 to 6 feet long. The rectum is the last 6 inches of the colon. The colon and rectum have a smooth lining composed of millions of cells. Changes in these cells can lead to growths in the colon that can become cancerous and should be removed. Multiple tests are available to screen for colon cancer, but the colonoscopy is the most recommended test. During colonoscopy, these polyps can be removed. How often you need this test depends on many things including your condition, your family history, symptoms, and what the findings were at the previous  colonoscopy.   When the colon lining changes  Changes that happen in the cells that line the colon or rectum can lead to growths called polyps. Over a period of years, polyps can turn cancerous. Removing polyps early may prevent cancer from ever forming.  Polyps  Polyps are fleshy clumps of tissue that form on the lining of the colon or rectum. Small polyps are usually benign (not cancerous). However, over time, cells in a polyp can change and become cancerous. Certain types of polyps known as adenomatous polyps are premalignant. The risk for invasive cancer increases with the size of the polyp and certain cell and gene features. This means that they can become cancerous if they're not removed. Hyperplastic polyps are benign. They can grow quite large and not turn cancerous.   Cancer  Almost all colorectal cancers start when polyp cells begin growing abnormally. As a cancerous tumor grows, it may involve more and more of the colon or rectum. In time, cancer can also grow beyond the colon or rectum and spread to nearby organs or to glands called lymph nodes. The cells can also travel to other parts of the body. This is known as metastasis. The earlier a cancerous tumor is removed, the better the chance of preventing its spread.    Date Last Reviewed: 8/1/2016  © 2764-8086 [employer]. [address] [place] [address], [place] [address]. All rights reserved. This information is not intended as a substitute for professional medical care. Always follow your healthcare professional's instructions.      Discharge Instructions: After Your Surgery/Procedure  Youve just had surgery. During surgery you were given medicine called anesthesia to keep you relaxed and free of pain. After surgery you may have some pain or nausea. This is common. Here are some tips for feeling better and getting well after surgery.     Stay on schedule with your medication.   Going home  Your doctor or nurse will show you how to take care of  "yourself when you go home. He or she will also answer your questions. Have an adult family member or friend drive you home.      For your safety we recommend these precaution for the first 24 hours after your procedure:  · Do not drive or use heavy equipment.  · Do not make important decisions or sign legal papers.  · Do not drink alcohol.  · Have someone stay with you, if needed. He or she can watch for problems and help keep you safe.  · Your concentration, balance, coordination, and judgement may be impaired for many hours after anesthesia.  Use caution when ambulating or standing up.     · You may feel weak and "washed out" after anesthesia and surgery.      Subtle residual effects of general anesthesia or sedation with regional / local anesthesia can last more than 24 hours.  Rest for the remainder of the day or longer if your Doctor/Surgeon has advised you to do so.  Although you may feel normal within the first 24 hours, your reflexes and mental ability may be impaired without you realizing it.  You may feel dizzy, lightheaded or sleepy for 24 hours or longer.      Be sure to go to all follow-up visits with your doctor. And rest after your surgery for as long as your doctor tells you to.  Coping with pain  If you have pain after surgery, pain medicine will help you feel better. Take it as told, before pain becomes severe. Also, ask your doctor or pharmacist about other ways to control pain. This might be with heat, ice, or relaxation. And follow any other instructions your surgeon or nurse gives you.  Tips for taking pain medicine  To get the best relief possible, remember these points:  · Pain medicines can upset your stomach. Taking them with a little food may help.  · Most pain relievers taken by mouth need at least 20 to 30 minutes to start to work.  · Taking medicine on a schedule can help you remember to take it. Try to time your medicine so that you can take it before starting an activity. This might " be before you get dressed, go for a walk, or sit down for dinner.  · Constipation is a common side effect of pain medicines. Call your doctor before taking any medicines such as laxatives or stool softeners to help ease constipation. Also ask if you should skip any foods. Drinking lots of fluids and eating foods such as fruits and vegetables that are high in fiber can also help. Remember, do not take laxatives unless your surgeon has prescribed them.  · Drinking alcohol and taking pain medicine can cause dizziness and slow your breathing. It can even be deadly. Do not drink alcohol while taking pain medicine.  · Pain medicine can make you react more slowly to things. Do not drive or run machinery while taking pain medicine.  Your health care provider may tell you to take acetaminophen to help ease your pain. Ask him or her how much you are supposed to take each day. Acetaminophen or other pain relievers may interact with your prescription medicines or other over-the-counter (OTC) drugs. Some prescription medicines have acetaminophen and other ingredients. Using both prescription and OTC acetaminophen for pain can cause you to overdose. Read the labels on your OTC medicines with care. This will help you to clearly know the list of ingredients, how much to take, and any warnings. It may also help you not take too much acetaminophen. If you have questions or do not understand the information, ask your pharmacist or health care provider to explain it to you before you take the OTC medicine.  Managing nausea  Some people have an upset stomach after surgery. This is often because of anesthesia, pain, or pain medicine, or the stress of surgery. These tips will help you handle nausea and eat healthy foods as you get better. If you were on a special food plan before surgery, ask your doctor if you should follow it while you get better. These tips may help:  · Do not push yourself to eat. Your body will tell you when to eat  and how much.  · Start off with clear liquids and soup. They are easier to digest.  · Next try semi-solid foods, such as mashed potatoes, applesauce, and gelatin, as you feel ready.  · Slowly move to solid foods. Dont eat fatty, rich, or spicy foods at first.  · Do not force yourself to have 3 large meals a day. Instead eat smaller amounts more often.  · Take pain medicines with a small amount of solid food, such as crackers or toast, to avoid nausea.     Call your surgeon if  · You still have pain an hour after taking medicine. The medicine may not be strong enough.  · You feel too sleepy, dizzy, or groggy. The medicine may be too strong.  · You have side effects like nausea, vomiting, or skin changes, such as rash, itching, or hives.       If you have obstructive sleep apnea  You were given anesthesia medicine during surgery to keep you comfortable and free of pain. After surgery, you may have more apnea spells because of this medicine and other medicines you were given. The spells may last longer than usual.   At home:  · Keep using the continuous positive airway pressure (CPAP) device when you sleep. Unless your health care provider tells you not to, use it when you sleep, day or night. CPAP is a common device used to treat obstructive sleep apnea.  · Talk with your provider before taking any pain medicine, muscle relaxants, or sedatives. Your provider will tell you about the possible dangers of taking these medicines.  © 0616-4212 [employer]. [address] [place] [address], [place] [address]. All rights reserved. This information is not intended as a substitute for professional medical care. Always follow your healthcare professional's instructions.

## 2019-05-13 NOTE — ANESTHESIA POSTPROCEDURE EVALUATION
Anesthesia Post Evaluation    Patient: Rola Richter    Procedure(s) Performed: Procedure(s) (LRB):  COLONOSCOPY (N/A)    Final Anesthesia Type: general  Patient location during evaluation: PACU  Patient participation: Yes- Able to Participate  Level of consciousness: awake and alert  Post-procedure vital signs: reviewed and stable  Pain management: adequate  Airway patency: patent  PONV status at discharge: No PONV  Anesthetic complications: no      Cardiovascular status: blood pressure returned to baseline  Respiratory status: unassisted  Hydration status: euvolemic  Follow-up not needed.          Vitals Value Taken Time   /62 5/13/2019 10:35 AM   Temp 36.6 °C (97.9 °F) 5/13/2019  8:35 AM   Pulse 88 5/13/2019 10:35 AM   Resp 17 5/13/2019 10:35 AM   SpO2 99 % 5/13/2019 10:35 AM         Event Time     Out of Recovery 10:47:54          Pain/Amol Score: Amol Score: 10 (5/13/2019 10:35 AM)

## 2019-05-13 NOTE — OR NURSING
Patient awake, alert and oriented. Vital signs within defined limits. No complaints of any abdominal pain or discomfort. Abdomen soft, non tender. Continues passing flatus. Tolerating PO fluids. No distress observed. Tolerated procedure well.  present at bedside. Patient ready for discharge home.

## 2019-05-13 NOTE — ANESTHESIA PREPROCEDURE EVALUATION
05/13/2019  Rola Richter is a 73 y.o., female.    Pre-op Assessment    I have reviewed the Patient Summary Reports.     I have reviewed the Nursing Notes.   I have reviewed the Medications.     Review of Systems  Anesthesia Hx:  No problems with previous Anesthesia    Social:  Non-Smoker    Cardiovascular:   Hypertension, well controlled    Hepatic/GI:   GERD, well controlled    Musculoskeletal:   Arthritis     Neurological:   Neuromuscular Disease,    Psych:   Psychiatric History          Physical Exam  General:  Well nourished    Airway/Jaw/Neck:  Airway Findings: Mouth Opening: Normal Tongue: Normal  General Airway Assessment: Adult, Good  Mallampati: II  Improves to II with phonation.  TM Distance: 4-6 cm      Dental:  Dental Findings: In tact   Chest/Lungs:  Chest/Lungs Findings: Clear to auscultation, Normal Respiratory Rate     Heart/Vascular:  Heart Findings: Rate: Normal  Rhythm: Regular Rhythm  Sounds: Normal  Heart murmur: negative       Mental Status:  Mental Status Findings:  Cooperative, Alert and Oriented         Anesthesia Plan  Type of Anesthesia, risks & benefits discussed:  Anesthesia Type:  general  Patient's Preference:   Intra-op Monitoring Plan: standard ASA monitors  Intra-op Monitoring Plan Comments:   Post Op Pain Control Plan:   Post Op Pain Control Plan Comments:   Induction:   IV  Beta Blocker:  Patient is not currently on a Beta-Blocker (No further documentation required).       Informed Consent: Patient understands risks and agrees with Anesthesia plan.  Questions answered. Anesthesia consent signed with patient.  ASA Score: 3     Day of Surgery Review of History & Physical:    H&P update referred to the surgeon.         Ready For Surgery From Anesthesia Perspective.

## 2019-05-17 ENCOUNTER — PATIENT MESSAGE (OUTPATIENT)
Dept: PAIN MEDICINE | Facility: CLINIC | Age: 74
End: 2019-05-17

## 2019-05-17 ENCOUNTER — DOCUMENTATION ONLY (OUTPATIENT)
Dept: FAMILY MEDICINE | Facility: CLINIC | Age: 74
End: 2019-05-17

## 2019-05-17 NOTE — TELEPHONE ENCOUNTER
Informed patient that I spoke to her pharmacy and her Rx is there and they will get it ready for her. Patient accepted and voiced understanding.

## 2019-05-20 ENCOUNTER — OFFICE VISIT (OUTPATIENT)
Dept: FAMILY MEDICINE | Facility: CLINIC | Age: 74
End: 2019-05-20
Payer: MEDICARE

## 2019-05-20 VITALS
HEIGHT: 59 IN | DIASTOLIC BLOOD PRESSURE: 61 MMHG | HEART RATE: 74 BPM | WEIGHT: 115.31 LBS | SYSTOLIC BLOOD PRESSURE: 123 MMHG | BODY MASS INDEX: 23.24 KG/M2 | TEMPERATURE: 98 F

## 2019-05-20 DIAGNOSIS — M15.9 PRIMARY OSTEOARTHRITIS INVOLVING MULTIPLE JOINTS: ICD-10-CM

## 2019-05-20 DIAGNOSIS — F33.41 MDD (MAJOR DEPRESSIVE DISORDER), RECURRENT, IN PARTIAL REMISSION: ICD-10-CM

## 2019-05-20 DIAGNOSIS — F19.20 DEPENDENCY ON PAIN MEDICATION: ICD-10-CM

## 2019-05-20 DIAGNOSIS — E78.00 PURE HYPERCHOLESTEROLEMIA: ICD-10-CM

## 2019-05-20 DIAGNOSIS — Z00.00 ENCOUNTER FOR PREVENTIVE HEALTH EXAMINATION: Primary | ICD-10-CM

## 2019-05-20 DIAGNOSIS — I10 ESSENTIAL HYPERTENSION: ICD-10-CM

## 2019-05-20 DIAGNOSIS — M81.0 AGE-RELATED OSTEOPOROSIS WITHOUT CURRENT PATHOLOGICAL FRACTURE: ICD-10-CM

## 2019-05-20 DIAGNOSIS — F43.10 PTSD (POST-TRAUMATIC STRESS DISORDER): ICD-10-CM

## 2019-05-20 DIAGNOSIS — K21.9 GASTROESOPHAGEAL REFLUX DISEASE WITHOUT ESOPHAGITIS: ICD-10-CM

## 2019-05-20 DIAGNOSIS — D84.9 IMMUNOSUPPRESSED STATUS: ICD-10-CM

## 2019-05-20 PROBLEM — R10.9 ABDOMINAL PAIN: Status: RESOLVED | Noted: 2019-05-13 | Resolved: 2019-05-20

## 2019-05-20 PROCEDURE — 99999 PR PBB SHADOW E&M-EST. PATIENT-LVL IV: CPT | Mod: PBBFAC,HCNC,, | Performed by: PHYSICIAN ASSISTANT

## 2019-05-20 PROCEDURE — 3078F PR MOST RECENT DIASTOLIC BLOOD PRESSURE < 80 MM HG: ICD-10-PCS | Mod: HCNC,CPTII,S$GLB, | Performed by: PHYSICIAN ASSISTANT

## 2019-05-20 PROCEDURE — 99999 PR PBB SHADOW E&M-EST. PATIENT-LVL IV: ICD-10-PCS | Mod: PBBFAC,HCNC,, | Performed by: PHYSICIAN ASSISTANT

## 2019-05-20 PROCEDURE — 99499 UNLISTED E&M SERVICE: CPT | Mod: HCNC,S$GLB,, | Performed by: PHYSICIAN ASSISTANT

## 2019-05-20 PROCEDURE — 3074F PR MOST RECENT SYSTOLIC BLOOD PRESSURE < 130 MM HG: ICD-10-PCS | Mod: HCNC,CPTII,S$GLB, | Performed by: PHYSICIAN ASSISTANT

## 2019-05-20 PROCEDURE — 3078F DIAST BP <80 MM HG: CPT | Mod: HCNC,CPTII,S$GLB, | Performed by: PHYSICIAN ASSISTANT

## 2019-05-20 PROCEDURE — G0439 PPPS, SUBSEQ VISIT: HCPCS | Mod: HCNC,S$GLB,, | Performed by: PHYSICIAN ASSISTANT

## 2019-05-20 PROCEDURE — G0439 PR MEDICARE ANNUAL WELLNESS SUBSEQUENT VISIT: ICD-10-PCS | Mod: HCNC,S$GLB,, | Performed by: PHYSICIAN ASSISTANT

## 2019-05-20 PROCEDURE — 3074F SYST BP LT 130 MM HG: CPT | Mod: HCNC,CPTII,S$GLB, | Performed by: PHYSICIAN ASSISTANT

## 2019-05-20 PROCEDURE — 99499 RISK ADDL DX/OHS AUDIT: ICD-10-PCS | Mod: HCNC,S$GLB,, | Performed by: PHYSICIAN ASSISTANT

## 2019-05-20 NOTE — PATIENT INSTRUCTIONS
Counseling and Referral of Other Preventative  (Italic type indicates deductible and co-insurance are waived)    Patient Name: Rola Richter  Today's Date: 5/20/2019    Health Maintenance       Date Due Completion Date    Shingles Vaccine (1 of 2) 05/20/2020 (Originally [birth year]/31/1995) ---    Influenza Vaccine 08/01/2019 10/10/2018    Lipid Panel 03/27/2020 3/27/2019    Mammogram 03/22/2021 3/22/2019    DEXA SCAN 08/20/2021 8/20/2018    Colonoscopy 05/13/2024 5/13/2019    TETANUS VACCINE 01/26/2026 1/26/2016        No orders of the defined types were placed in this encounter.    The following information is provided to all patients.  This information is to help you find resources for any of the problems found today that may be affecting your health:                Living healthy guide: www.[facility].[place].gov      Understanding Diabetes: www.diabetes.org      Eating healthy: www.cdc.gov/healthyweight      [facility] home safety checklist: www.cdc.gov/steadi/patient.html      Agency on Aging: www.goea.[place].[facility]      Alcoholics anonymous (AA): www.aa.org      Physical Activity: www.[place]agros.nih.gov/gl9chgj      Tobacco use: www.quitwithusla.org

## 2019-05-20 NOTE — PROGRESS NOTES
"Rola Richter presented for a  Medicare AWV and comprehensive Health Risk Assessment today. The following components were reviewed and updated:    · Medical history  · Family History  · Social history  · Allergies and Current Medications  · Health Risk Assessment  · Health Maintenance  · Care Team     ** See Completed Assessments for Annual Wellness Visit within the encounter summary.**       The following assessments were completed:  · Living Situation  · CAGE  · Depression Screening  · Timed Get Up and Go  · Whisper Test  · Cognitive Function Screening  · Nutrition Screening  · ADL Screening  · PAQ Screening    Vitals:    05/20/19 1002   BP: 123/61   BP Location: Right arm   Patient Position: Sitting   BP Method: Small (Automatic)   Pulse: 74   Temp: 98.3 °F (36.8 °C)   TempSrc: Oral   Weight: 52.3 kg (115 lb 4.8 oz)   Height: 4' 11" (1.499 m)     Body mass index is 23.29 kg/m².  Physical Exam   Constitutional: She is oriented to person, place, and time. She appears well-developed and well-nourished.   Pulmonary/Chest: Effort normal.   Musculoskeletal:        Right foot: There is no deformity.   Neurological: She is alert and oriented to person, place, and time.   Psychiatric: She has a normal mood and affect. Her behavior is normal. Judgment and thought content normal.                Diagnoses and health risks identified today and associated recommendations/orders:    Rola was seen today for medicare awv.    Diagnoses and all orders for this visit:    Encounter for preventive health examination    Immunosuppressed status  Comments:  Stable, continue to monitor    MDD (major depressive disorder), recurrent, in partial remission  Comments:  Controlled, continue current regimen    Dependency on pain medication  Comments:  Stable, followed by PCP    Essential hypertension  Comments:  Controlled, continue current regimen    Gastroesophageal reflux disease without esophagitis  Comments:  Controlled, continue " current regimen    Pure hypercholesterolemia  Comments:  Uncontrolled, continue medication    Age-related osteoporosis without current pathological fracture  Comments:  Stable, continue regimen    PTSD (post-traumatic stress disorder)  Comments:  Controlled, continue current regimen    Primary osteoarthritis involving multiple joints  Comments:  Controlled, continue current regimen        Provided Rola with a 5-10 year written screening schedule and personal prevention plan. Recommendations were developed using the USPSTF age appropriate recommendations. Education, counseling, and referrals were provided as needed. After Visit Summary printed and given to patient which includes a list of additional screenings\tests needed.    No follow-ups on file.    ANIL Gasca  I offered to discuss end of life issues, including information on how to make advance directives that the patient could use to name someone who would make medical decisions on their behalf if they became too ill to make themselves.    ___Patient declined  _X_Patient is interested, I provided paper work and offered to discuss.    Patient readiness: acceptance and barriers:none    During the course of the visit the patient was educated and counseled about the following:     Hypertension:   Regular aerobic exercise.    Goals: Hypertension: Reduce Blood Pressure    Did patient meet goals/outcomes: Yes    The following self management tools provided: blood pressure log    Patient Instructions (the written plan) was given to the patient/family.     Time spent with patient: 55 minutes    Barriers to medications present (no )    Adverse reactions to current medications (no)    Over the counter medications reviewed (Yes)

## 2019-05-20 NOTE — Clinical Note
Primary Care Providers:Liseth Carias MD, MD (General)Your patient was seen today for a HRA visit. Gap(s) in care (HEDIS gaps) have been identified during this visit that require additional testing and possible follow up.No orders of the defined types were placed in this encounter.These orders were placed using [facility] approved protocol and any results will be forwarded to your office for appropriate follow up. I have included a copy of my visit note; please review the note and feel free to contact me with any questions. Thank you for allowing me to participate in the care of your patients.ANIL Gasca

## 2019-05-22 ENCOUNTER — PATIENT MESSAGE (OUTPATIENT)
Dept: GASTROENTEROLOGY | Facility: CLINIC | Age: 74
End: 2019-05-22

## 2019-06-07 ENCOUNTER — PATIENT MESSAGE (OUTPATIENT)
Dept: ADMINISTRATIVE | Facility: OTHER | Age: 74
End: 2019-06-07

## 2019-06-07 ENCOUNTER — PATIENT OUTREACH (OUTPATIENT)
Dept: OTHER | Facility: OTHER | Age: 74
End: 2019-06-07

## 2019-07-01 ENCOUNTER — OFFICE VISIT (OUTPATIENT)
Dept: PAIN MEDICINE | Facility: CLINIC | Age: 74
End: 2019-07-01
Payer: MEDICARE

## 2019-07-01 VITALS
BODY MASS INDEX: 23.18 KG/M2 | SYSTOLIC BLOOD PRESSURE: 124 MMHG | HEART RATE: 68 BPM | WEIGHT: 115 LBS | HEIGHT: 59 IN | DIASTOLIC BLOOD PRESSURE: 60 MMHG

## 2019-07-01 DIAGNOSIS — M47.812 SPONDYLOSIS OF CERVICAL REGION WITHOUT MYELOPATHY OR RADICULOPATHY: Primary | ICD-10-CM

## 2019-07-01 DIAGNOSIS — M79.18 MYOFASCIAL PAIN: ICD-10-CM

## 2019-07-01 DIAGNOSIS — M54.81 BILATERAL OCCIPITAL NEURALGIA: ICD-10-CM

## 2019-07-01 PROCEDURE — 3078F DIAST BP <80 MM HG: CPT | Mod: HCNC,CPTII,S$GLB, | Performed by: PHYSICIAN ASSISTANT

## 2019-07-01 PROCEDURE — 3074F PR MOST RECENT SYSTOLIC BLOOD PRESSURE < 130 MM HG: ICD-10-PCS | Mod: HCNC,CPTII,S$GLB, | Performed by: PHYSICIAN ASSISTANT

## 2019-07-01 PROCEDURE — 3288F FALL RISK ASSESSMENT DOCD: CPT | Mod: HCNC,CPTII,S$GLB, | Performed by: PHYSICIAN ASSISTANT

## 2019-07-01 PROCEDURE — 99214 OFFICE O/P EST MOD 30 MIN: CPT | Mod: HCNC,S$GLB,, | Performed by: PHYSICIAN ASSISTANT

## 2019-07-01 PROCEDURE — 99999 PR PBB SHADOW E&M-EST. PATIENT-LVL IV: ICD-10-PCS | Mod: PBBFAC,HCNC,, | Performed by: PHYSICIAN ASSISTANT

## 2019-07-01 PROCEDURE — 99999 PR PBB SHADOW E&M-EST. PATIENT-LVL IV: CPT | Mod: PBBFAC,HCNC,, | Performed by: PHYSICIAN ASSISTANT

## 2019-07-01 PROCEDURE — 99214 PR OFFICE/OUTPT VISIT, EST, LEVL IV, 30-39 MIN: ICD-10-PCS | Mod: HCNC,S$GLB,, | Performed by: PHYSICIAN ASSISTANT

## 2019-07-01 PROCEDURE — 1100F PR PT FALLS ASSESS DOC 2+ FALLS/FALL W/INJURY/YR: ICD-10-PCS | Mod: HCNC,CPTII,S$GLB, | Performed by: PHYSICIAN ASSISTANT

## 2019-07-01 PROCEDURE — 1100F PTFALLS ASSESS-DOCD GE2>/YR: CPT | Mod: HCNC,CPTII,S$GLB, | Performed by: PHYSICIAN ASSISTANT

## 2019-07-01 PROCEDURE — 99499 RISK ADDL DX/OHS AUDIT: ICD-10-PCS | Mod: S$GLB,,, | Performed by: PHYSICIAN ASSISTANT

## 2019-07-01 PROCEDURE — 99499 UNLISTED E&M SERVICE: CPT | Mod: S$GLB,,, | Performed by: PHYSICIAN ASSISTANT

## 2019-07-01 PROCEDURE — 3078F PR MOST RECENT DIASTOLIC BLOOD PRESSURE < 80 MM HG: ICD-10-PCS | Mod: HCNC,CPTII,S$GLB, | Performed by: PHYSICIAN ASSISTANT

## 2019-07-01 PROCEDURE — 3288F PR FALLS RISK ASSESSMENT DOCUMENTED: ICD-10-PCS | Mod: HCNC,CPTII,S$GLB, | Performed by: PHYSICIAN ASSISTANT

## 2019-07-01 PROCEDURE — 3074F SYST BP LT 130 MM HG: CPT | Mod: HCNC,CPTII,S$GLB, | Performed by: PHYSICIAN ASSISTANT

## 2019-07-01 RX ORDER — METHOCARBAMOL 500 MG/1
500 TABLET, FILM COATED ORAL 3 TIMES DAILY PRN
Qty: 90 TABLET | Refills: 2 | Status: SHIPPED | OUTPATIENT
Start: 2019-07-01 | End: 2019-08-23 | Stop reason: SDUPTHER

## 2019-07-01 NOTE — PROGRESS NOTES
Referring Physician: No ref. provider found    PCP: Liseth Carias MD      CC: neck and left occipital pain    Interval history: Ms. Richter is a 74 y.o. female with neck pain and occipital neuralgia who presents today for f/u and medication refill. She continues to benefit from repeat occipital nerve block. Neck pain and arm pain is improved since cervical MELANY.  Her pain no longer radiates into both arms. Previously right arm pain traveled to her hand, her left stops around her shoulder.  She had numbness to her right hand and first through fourth digits. Cervical MELANY in February 2018 that only provided benefit for a short time.     She continues to take Norco and Robaxin which provide benefit. Flexeril caused dry mouth. Denies UE weakness. No bowel bladder changes.  Today cc is subscapular pain. Pain today is rated 8/10.    Prior HPI:   Patient is 70-year-old female with past history history of cervical DDD, cervical spondylosis and chronic headaches.  She recently moved here from [place], [place].  She is treated in the past by neurology.  She states having constant burning pain over the left side of her posterior scalp.  Pain radiates to her neck as well as a frontal.  She also has left-sided neck pain as well.  She denies any radicular arm pain.  No numbness or weakness.  She states having cervical epidural steroid injection at past with minimal benefit.  She also has had decided of cervical nerve blocks in the past with moderate benefit.  Most recent injection was performed 2 months ago.  She desires repeat injection.  She currently takes Norco 10 mg every 12 hours as needed with moderate benefit.  She also takes Zanaflex 4 mg every 8 hours with mild benefits.  She rates her pain 7/10 today.    Pain intervention history: s/p left occipital nerve blocks on 1/2016 with 50% relief of her headaches  S/p cervical MELANY 2/8/18 moderate relief for a couple of weeks.     ROS:  CONSTITUTIONAL: No fevers, chills,  night sweats, wt. loss, appetite changes  SKIN: no rashes or itching  ENT: No headaches, head trauma, vision changes, or eye pain  LYMPH NODES: None noticed   CV: No chest pain, palpitations.   RESP: No shortness of breath, dyspnea on exertion, cough, wheezing, or hemoptysis  GI: No nausea, emesis, diarrhea, constipation, melena, hematochezia, pain.    : No dysuria, hematuria, urgency, or frequency   HEME: No easy bruising, bleeding problems  PSYCHIATRIC: No depression, anxiety, psychosis, hallucinations.  NEURO: No seizures, memory loss, dizziness or difficulty sleeping  MSK: + History of present illness      Past Medical History:   Diagnosis Date    Anxiety     Arthritis     Cataract     DDD (degenerative disc disease), lumbar     Depression     Encounter for blood transfusion     GERD (gastroesophageal reflux disease)     Hyperlipidemia     Hypertension     pt states she does not take meds anymore she just watches her weight    Immunosuppressed status 2016    Neuromuscular disorder     Occipital neuralgia 3/24/2017    Osteoporosis     Substance abuse      Past Surgical History:   Procedure Laterality Date    APPENDECTOMY      APPENDECTOMY-LAPAROSCOPIC N/A 10/9/2016    [facility] at [facility] OR     SECTION      x 2    CHOLECYSTECTOMY      COLONOSCOPY  prior to     COLONOSCOPY N/A 2019    [facility] Greg Victor [facility] at [facility] ENDO    EGD (ESOPHAGOGASTRODUODENOSCOPY) N/A 2019    [facility] at [facility] ENDO    ESOPHAGOGASTRODUODENOSCOPY (EGD) N/A 3/14/2017    Performed by Timi Marcial [facility] at [facility] ENDO    ESOPHAGOGASTRODUODENOSCOPY (EGD) N/A 2/3/2016    Performed by Imtiaz Vallejo [facility] at [facility] ENDO    HYSTERECTOMY      INJECTION-STEROID-EPIDURAL-CERVICAL N/A 2018    [facility] OR    INJECTION-STEROID-EPIDURAL-CERVICAL N/A 10/23/2017    [facility] OR    Laser  Periphery Iridotomy Bilateral     OD 5/26/16 and OS touch up 5/26/2016    UPPER GASTROINTESTINAL ENDOSCOPY  03/14/2017    Dr. Marcial: esophageal stenosis- dilated, gastritis, gastric polyps removed; biopsy- mild gastritis, negative for h pylori, hyperplastic polyp, esophagus unremarkable    vocal cord tumor removal       Family History   Problem Relation Age of Onset    Osteoarthritis Mother     Alcohol abuse Mother     Rheum arthritis Mother     Osteoarthritis Sister     Diabetes Brother     No Known Problems Son     No Known Problems Sister     No Known Problems Sister     No Known Problems Brother     Arthritis Son     No Known Problems Son     Stroke Maternal Grandmother 99    Rheum arthritis Maternal Grandmother     Retinal detachment Neg Hx     Macular degeneration Neg Hx     Glaucoma Neg Hx     Amblyopia Neg Hx     Blindness Neg Hx     Cancer Neg Hx     Cataracts Neg Hx     Hypertension Neg Hx     Strabismus Neg Hx     Thyroid disease Neg Hx     Lupus Neg Hx     Kidney disease Neg Hx     Inflammatory bowel disease Neg Hx     Psoriasis Neg Hx     Colon cancer Neg Hx     Crohn's disease Neg Hx     Ulcerative colitis Neg Hx     Stomach cancer Neg Hx     Esophageal cancer Neg Hx      Social History     Socioeconomic History    Marital status:      Spouse name: Not on file    Number of children: Not on file    Years of education: Not on file    Highest education level: Not on file   Occupational History    Not on file   Social Needs    Financial resource strain: Not on file    Food insecurity:     Worry: Not on file     Inability: Not on file    Transportation needs:     Medical: Not on file     Non-medical: Not on file   Tobacco Use    Smoking status: Never Smoker    Smokeless tobacco: Never Used   Substance and Sexual Activity    Alcohol use: No     Alcohol/week: 0.0 oz    Drug use: Never    Sexual activity: Yes     Partners: Male   Lifestyle    Physical  "activity:     Days per week: Not on file     Minutes per session: Not on file    Stress: Not on file   Relationships    Social connections:     Talks on phone: Not on file     Gets together: Not on file     Attends Zoroastrianism service: Not on file     Active member of club or organization: Not on file     Attends meetings of clubs or organizations: Not on file     Relationship status: Not on file   Other Topics Concern    Not on file   Social History Narrative    Not on file         Medications/Allergies: See med card    Vitals:    07/01/19 0820   BP: 124/60   Pulse: 68   Weight: 52.2 kg (115 lb)   Height: 4' 11" (1.499 m)   PainSc:   8   PainLoc: Neck         Physical exam:    GENERAL: A and O x3, the patient appears well groomed and is in no acute distress.  Skin: No rashes or obvious lesions  HEENT: normocephalic, atraumatic  CARDIOVASCULAR:  RRR  LUNGS: nonlabored breathing  ABDOMEN: soft, nontender   UPPER EXTREMITIES: Normal alignment, normal range of motion, no atrophy, no skin changes,  hair growth and nail growth normal and equal bilaterally. No swelling, no tenderness.  +Phalens on left side. +TTP tricep tendon  LOWER EXTREMITIES:  Normal alignment, normal range of motion, no atrophy, no skin changes,  hair growth and nail growth normal and equal bilaterally. No swelling, no tenderness.  CERVICAL SPINE:   Cervical spine: ROM is full in flexion, extension and lateral rotation.  Painful flexion > extension.  Positive facet loading bilaterally.  Spurling is positive at right side.  Myofascial exam:  Tenderness to palpation across cervical paraspinals deltoid and trapezius muscles bilaterally.      MENTAL STATUS: normal orientation, speech, language, and fund of knowledge for social situation.  Emotional state appropriate.    CRANIAL NERVES:  II:  PERRL bilaterally,   III,IV,VI: EOMI.    V:  Facial sensation equal bilaterally  VII:  Facial motor function normal.  VIII:  Hearing equal to finger rub " bilaterally  IX/X: Gag normal, palate symmetric  XI:  Shoulder shrug equal, head turn equal  XII:  Tongue midline without fasciculations      MOTOR: Tone and bulk: normal bilateral upper and lower Strength: normal   Delt Bi Tri WE WF     R 5 5 5 5 5 5   L 5 5 5 5 5 5     IP ADD ABD Quad TA Gas HAM  R 5 5 5 5 5 5 5  L 5 5 5 5 5 5 5    SENSATION: Light touch and pinprick intact bilaterally  REFLEXES: normal, symmetric, nonbrisk.  Toes down, no clonus. No hoffmans.  GAIT: normal rise, base, steps, and arm swing.        Imaging:   Cervical MRI 12/4/17    Narrative     EXAM: Cervical spine MRI without contrast.    INDICATION: Cervical radiculopathy.  Neck pain and occipital neuralgia.  The patient complains of neck and right arm pain.    TECHNIQUE: Routine multiplanar, multisequence unenhanced cervical spine MRI was performed.    COMPARISON: Plain films of the cervical spine obtained concurrently      FINDINGS:     Vertebral column: There is straightening of the cervical spine with loss of normal lordosis.  As seen on concurrent plain films, there is trace anterolisthesis of C3 on C4 with 2 mm anterolisthesis of C4 on C5.  There is marked disc space narrowing at the C5-6 level with moderate to marked disc space narrowing at the C4-5 and C6-7 levels.  There is partial non-segmentation anteriorly at the C2-3 level.  The C2 and C3 as well as the C4 and C5 facet joints appear fused and this may represent developmental non-segmentation or degenerative ankylosis.  All of the discs are desiccated.  The odontoid process is intact.    Spinal canal, cord, epidural space: The craniovertebral junction is normal.  The spinal canal is omental and normal.  There is no significant spinal stenosis.  The cord is normal in caliber.  There is very subtle flattening of the ventral cord surface at the C4-5 and C5-6 levels where there is degenerative change.  The study is mildly motion but there is no definite cord edema or  myelomalacia.    Findings by level:    C2-3: There is no spinal canal or foraminal stenosis.  There is mild left facet joint arthropathy.    C3-4: There is trace anterolisthesis.  There is left greater than right uncovertebral spurring and facet joint arthropathy.  There is a mild disc osteophyte complex, slightly eccentric to the left with subtle annular fissure.  This narrows the ventral subarachnoid space.  There is no spinal stenosis or cord compression.  There is moderate marked left foraminal stenosis.    C4-5: There is moderate disc space narrowing with 2 mm anterolisthesis of C4 on C5.  There is facet joint arthropathy or effusion left greater than right.  There is also mild bilateral but left greater than right uncovertebral spurring.  There is unroofing of a mild disc bulge which narrows the ventral subarachnoid space.  There is no spinal stenosis.  There is mild to moderate left foraminal stenosis.    C5-6:There is marked disc space narrowing.  There is bilateral uncovertebral spurring.  There is a disc osteophyte complex which narrows the subarachnoid space.  This is slightly eccentric to the right There is subtle flattening of the ventral cord surface.  Cord signal is grossly normal.  There is mild spinal stenosis with at least moderate bilateral foraminal stenosis.    C6-7: There is moderate disc space narrowing.  There is mild uncovertebral spurring.  There is a shallow disc osteophyte complex, slightly eccentric to the right.  There is narrowing of the ventral subarachnoid space.  There is no spinal stenosis.  Cord signal is normal.  There is mild bilateral foraminal stenosis.  There is a small 3.5 mm left foraminal perineural cyst.    C7- T1: There is a Schmorl's node in inferior endplate of C7, chronic.  There are tiny bilateral foraminal perineural cysts.  There is minimal bulging of the annulus and mild facet joint arthropathy without spinal canal or foraminal stenosis.    Soft tissues/other: The  prevertebral soft tissues are normal.  The airway is patent.   Impression          1. There is multilevel degenerative disc disease described in detail above.  There is no acute fracture.  There is degenerative listhesis at several levels.  There is some degree of disc osteophyte complex, uncovertebral spurring and/or facet joint arthropathy contributing to some degree of foraminal stenosis at several levels.  There is no significant spinal canal stenosis.  There is very subtle flattening of the ventral cord surface at the C4-5 and C6-7 levels The pertinent findings are summarized below.    2. At the C3-4 level, there is no spinal stenosis.  There is moderate to marked left foraminal stenosis.    3. At the C4-5 level there is no spinal stenosis.  There is mild to moderate left foraminal stenosis.    4. At the C5-6 level, there is mild spinal stenosis with at least moderate bilateral foraminal stenosis.    5. At the C6-7 level, there is no spinal stenosis.  There is mild bilateral foraminal stenosis.    6. There is no spinal canal or foraminal stenosis at the C2-3 or C7-T1 levels.     Assessment:  Mrs. Richter is a 74 y.o. female with neck and head pain    1. Spondylosis of cervical region without myelopathy or radiculopathy    2. Bilateral occipital neuralgia    3. Myofascial pain      Plan:  1. I have stressed the importance of physical activity and exercise to improve overall health.  2. Consider repeat C7-T1 IL MELANY in the future  3. Monitor progress and consider repeat left occipital blocks for head pain.  4. Norco 5mg q12hrs as needed for pain..  reviewed. UDS LCV consistent  5.continue Robaxin 500 mg q 8 h prn   6. Recommend PT to focus on neck and upper back. She will call when ready to schedule  7. F/u 3 months or sooner

## 2019-07-09 ENCOUNTER — PATIENT MESSAGE (OUTPATIENT)
Dept: ADMINISTRATIVE | Facility: OTHER | Age: 74
End: 2019-07-09

## 2019-07-12 ENCOUNTER — PATIENT OUTREACH (OUTPATIENT)
Dept: OTHER | Facility: OTHER | Age: 74
End: 2019-07-12

## 2019-07-12 NOTE — PROGRESS NOTES
Last 5 Patient Entered Readings                                      Current 30 Day Average: 133/72     Recent Readings 7/9/2019 7/5/2019 7/3/2019 6/30/2019 6/29/2019    SBP (mmHg) 132 149 132 126 154    DBP (mmHg) 72 77 69 68 77    Pulse 76 71 67 70 97          Digital Medicine: Health  Follow Up    Lifestyle Modifications:    1.Dietary Modifications (Sodium intake <2,000mg/day, food labels, dining out): Followed up about high salt intake questionnaire.  Patient reports her  answered the questionnaire for her so she was unable to watch the video.  States she has cut out salt intake by not adding salt.  States she does not eat after 5pm.  Encouraged patient to check food labels for hidden sodium.    2.Physical Activity: Reports she is trying to exercise. States her  was just released from the hospital so she has been taking care of him.    3.Medication Therapy: Patient has been compliant with the medication regimen.    4.Patient has the following medication side effects/concerns: none  (Frequency/Alleviating factors/Precipitating factors, etc.)     Follow up with Mrs. Canela REJI Richter completed. No further questions or concerns. Will continue to follow up to achieve health goals.

## 2019-07-16 ENCOUNTER — OFFICE VISIT (OUTPATIENT)
Dept: OPTOMETRY | Facility: CLINIC | Age: 74
End: 2019-07-16
Payer: COMMERCIAL

## 2019-07-16 DIAGNOSIS — Z01.00 EXAMINATION OF EYES AND VISION: Primary | ICD-10-CM

## 2019-07-16 DIAGNOSIS — H52.7 REFRACTIVE ERROR: ICD-10-CM

## 2019-07-16 DIAGNOSIS — H25.13 NUCLEAR SCLEROSIS, BILATERAL: ICD-10-CM

## 2019-07-16 PROCEDURE — 92015 PR REFRACTION: ICD-10-PCS | Mod: S$GLB,,, | Performed by: OPTOMETRIST

## 2019-07-16 PROCEDURE — 99999 PR PBB SHADOW E&M-EST. PATIENT-LVL III: ICD-10-PCS | Mod: PBBFAC,,, | Performed by: OPTOMETRIST

## 2019-07-16 PROCEDURE — 99999 PR PBB SHADOW E&M-EST. PATIENT-LVL III: CPT | Mod: PBBFAC,,, | Performed by: OPTOMETRIST

## 2019-07-16 PROCEDURE — 92014 COMPRE OPH EXAM EST PT 1/>: CPT | Mod: S$GLB,,, | Performed by: OPTOMETRIST

## 2019-07-16 PROCEDURE — 92014 PR EYE EXAM, EST PATIENT,COMPREHESV: ICD-10-PCS | Mod: S$GLB,,, | Performed by: OPTOMETRIST

## 2019-07-16 PROCEDURE — 92015 DETERMINE REFRACTIVE STATE: CPT | Mod: S$GLB,,, | Performed by: OPTOMETRIST

## 2019-07-16 NOTE — PROGRESS NOTES
HPI     HPI    Pt here today for yearly eye exam.       Would patient like a refraction today? Yes, pt states vision has been   blurry in left eye been blurry. Pt states eye are crossing when you try   reading small print pt states started about 6 to 8 months ago.     (+)drops, art tears as needed when eyes burn     (-)flashes  (-)floaters  (-)diplopia     (-)Diabetes     OCULAR HISTORY  Last Eye Exam: 1 year  (-)eye surgery                 Last edited by Juan Manuel Eduardo, OD on 7/16/2019  4:17 PM. (History)            Assessment /Plan     For exam results, see Encounter Report.    Nuclear sclerosis, bilateral    Refractive error      Moderate NS OU. Not yet significant. Discussed possible ocular affects of cataracts. Acceptable BCVA OU. Discussed treatment options. Surgery not recommended at this time. Monitor yearly.     Dispensed updated spectacle Rx. Discussed various spectacle lens options. Discussed adaptation period to new specs.  Demonstrated new spec Rx vs current specs in phoropter with patient satisfaction.      RTC in 1 year for comprehensive eye exam, or sooner prn.

## 2019-08-06 ENCOUNTER — PATIENT MESSAGE (OUTPATIENT)
Dept: OPTOMETRY | Facility: CLINIC | Age: 74
End: 2019-08-06

## 2019-08-07 ENCOUNTER — PATIENT MESSAGE (OUTPATIENT)
Dept: PAIN MEDICINE | Facility: CLINIC | Age: 74
End: 2019-08-07

## 2019-08-07 RX ORDER — HYDROCODONE BITARTRATE AND ACETAMINOPHEN 5; 325 MG/1; MG/1
1 TABLET ORAL EVERY 12 HOURS PRN
Qty: 60 TABLET | Refills: 0 | Status: SHIPPED | OUTPATIENT
Start: 2019-08-07 | End: 2019-09-05

## 2019-08-16 ENCOUNTER — PATIENT OUTREACH (OUTPATIENT)
Dept: OTHER | Facility: OTHER | Age: 74
End: 2019-08-16

## 2019-08-16 NOTE — PROGRESS NOTES
Last 5 Patient Entered Readings                                      Current 30 Day Average: 134/73     Recent Readings 8/14/2019 8/14/2019 8/13/2019 8/4/2019 8/2/2019    SBP (mmHg) 130 146 141 135 125    DBP (mmHg) 73 86 80 79 66    Pulse 84 72 83 83 74        Patient reports higher readings are from coming in from outside and not resting.  States she takes a 2nd reading 1hr later and it comes down.    Digital Medicine: Health  Follow Up    Lifestyle Modifications:    1.Dietary Modifications (Sodium intake <2,000mg/day, food labels, dining out): Patient reports she and her  are trying to eat better.  Reports she is drinking plenty of water since they are walking around the block more.    2.Physical Activity: Patient reports she has her  have been walking 8 blocks in the morning almost every day.  Encouraged patient to continue with activity.  States she has shoulder pain.  Encouraged patient to contact doctor.    3.Medication Therapy: Patient has been compliant with the medication regimen.    4.Patient has the following medication side effects/concerns: none  (Frequency/Alleviating factors/Precipitating factors, etc.)     Follow up with Tarun Rola MENDOZA Neelam completed. No further questions or concerns. Will continue to follow up to achieve health goals.

## 2019-08-20 ENCOUNTER — PATIENT OUTREACH (OUTPATIENT)
Dept: OTHER | Facility: OTHER | Age: 74
End: 2019-08-20

## 2019-08-20 ENCOUNTER — PATIENT MESSAGE (OUTPATIENT)
Dept: PSYCHIATRY | Facility: CLINIC | Age: 74
End: 2019-08-20

## 2019-08-20 NOTE — PROGRESS NOTES
Last 5 Patient Entered Readings                                      Current 30 Day Average: 135/74     Recent Readings 8/14/2019 8/14/2019 8/13/2019 8/4/2019 8/2/2019    SBP (mmHg) 130 146 141 135 125    DBP (mmHg) 73 86 80 79 66    Pulse 84 72 83 83 74          HPI:  Called patient to follow up. Patient endorses adherence to medication regimen. Patient denies hypotensive s/sx (lightheadedness, dizziness, nausea, fatigue); patient denies hypertensive s/sx (SOB, CP, severe headaches, changes in vision).     Assessment:  Reviewed recent readings. Per 2017 ACC/ AHA HTN guidelines (goal of BP < 130/80), current 30-day average needs to be addressed more thoroughly today. BP has significantly improved and is acceptable at this time given patient's age and preference.     Plan:  Continue current medication regimen. I will continue to monitor regularly and will follow-up in 6 to 8 weeks, sooner if blood pressure begins to trend upward or downward.     Current medication regimen:  Hypertension Medications             irbesartan (AVAPRO) 150 MG tablet Take 1 tablet (150 mg total) by mouth every evening. (for blood pressure)          Patient denies having questions or concerns. Patient has my contact information and knows to call with any concerns or clinical changes.

## 2019-08-22 DIAGNOSIS — G89.4 CHRONIC PAIN DISORDER: Primary | ICD-10-CM

## 2019-08-22 RX ORDER — HYDROCODONE BITARTRATE AND ACETAMINOPHEN 5; 325 MG/1; MG/1
1 TABLET ORAL EVERY 12 HOURS PRN
Qty: 60 TABLET | Refills: 0 | Status: SHIPPED | OUTPATIENT
Start: 2019-10-04 | End: 2019-11-02

## 2019-08-22 RX ORDER — HYDROCODONE BITARTRATE AND ACETAMINOPHEN 5; 325 MG/1; MG/1
1 TABLET ORAL EVERY 12 HOURS PRN
Qty: 60 TABLET | Refills: 0 | Status: SHIPPED | OUTPATIENT
Start: 2019-09-05 | End: 2019-10-04

## 2019-08-22 RX ORDER — HYDROCODONE BITARTRATE AND ACETAMINOPHEN 5; 325 MG/1; MG/1
1 TABLET ORAL EVERY 12 HOURS PRN
Qty: 60 TABLET | Refills: 0 | Status: SHIPPED | OUTPATIENT
Start: 2019-11-02 | End: 2019-12-01

## 2019-08-23 ENCOUNTER — OFFICE VISIT (OUTPATIENT)
Dept: PAIN MEDICINE | Facility: CLINIC | Age: 74
End: 2019-08-23
Payer: MEDICARE

## 2019-08-23 VITALS
DIASTOLIC BLOOD PRESSURE: 64 MMHG | SYSTOLIC BLOOD PRESSURE: 128 MMHG | HEART RATE: 80 BPM | BODY MASS INDEX: 23.18 KG/M2 | HEIGHT: 59 IN | WEIGHT: 115 LBS

## 2019-08-23 DIAGNOSIS — M79.18 MYOFASCIAL PAIN: ICD-10-CM

## 2019-08-23 DIAGNOSIS — M54.81 BILATERAL OCCIPITAL NEURALGIA: ICD-10-CM

## 2019-08-23 DIAGNOSIS — M47.812 SPONDYLOSIS OF CERVICAL REGION WITHOUT MYELOPATHY OR RADICULOPATHY: Primary | ICD-10-CM

## 2019-08-23 PROCEDURE — 3074F SYST BP LT 130 MM HG: CPT | Mod: HCNC,CPTII,S$GLB, | Performed by: PHYSICIAN ASSISTANT

## 2019-08-23 PROCEDURE — 3078F PR MOST RECENT DIASTOLIC BLOOD PRESSURE < 80 MM HG: ICD-10-PCS | Mod: HCNC,CPTII,S$GLB, | Performed by: PHYSICIAN ASSISTANT

## 2019-08-23 PROCEDURE — 99214 PR OFFICE/OUTPT VISIT, EST, LEVL IV, 30-39 MIN: ICD-10-PCS | Mod: HCNC,S$GLB,, | Performed by: PHYSICIAN ASSISTANT

## 2019-08-23 PROCEDURE — 1101F PR PT FALLS ASSESS DOC 0-1 FALLS W/OUT INJ PAST YR: ICD-10-PCS | Mod: HCNC,CPTII,S$GLB, | Performed by: PHYSICIAN ASSISTANT

## 2019-08-23 PROCEDURE — 99999 PR PBB SHADOW E&M-EST. PATIENT-LVL IV: ICD-10-PCS | Mod: PBBFAC,HCNC,, | Performed by: PHYSICIAN ASSISTANT

## 2019-08-23 PROCEDURE — 3074F PR MOST RECENT SYSTOLIC BLOOD PRESSURE < 130 MM HG: ICD-10-PCS | Mod: HCNC,CPTII,S$GLB, | Performed by: PHYSICIAN ASSISTANT

## 2019-08-23 PROCEDURE — 99214 OFFICE O/P EST MOD 30 MIN: CPT | Mod: HCNC,S$GLB,, | Performed by: PHYSICIAN ASSISTANT

## 2019-08-23 PROCEDURE — 99999 PR PBB SHADOW E&M-EST. PATIENT-LVL IV: CPT | Mod: PBBFAC,HCNC,, | Performed by: PHYSICIAN ASSISTANT

## 2019-08-23 PROCEDURE — 3078F DIAST BP <80 MM HG: CPT | Mod: HCNC,CPTII,S$GLB, | Performed by: PHYSICIAN ASSISTANT

## 2019-08-23 PROCEDURE — 1101F PT FALLS ASSESS-DOCD LE1/YR: CPT | Mod: HCNC,CPTII,S$GLB, | Performed by: PHYSICIAN ASSISTANT

## 2019-08-23 RX ORDER — METHOCARBAMOL 500 MG/1
500 TABLET, FILM COATED ORAL 3 TIMES DAILY PRN
Qty: 270 TABLET | Refills: 0 | Status: SHIPPED | OUTPATIENT
Start: 2019-08-23 | End: 2020-02-12 | Stop reason: SDUPTHER

## 2019-08-23 NOTE — PROGRESS NOTES
Referring Physician: No ref. provider found    PCP: Liseth Carias MD      CC: neck and left occipital pain    Interval history: Ms. Richter is a 74 y.o. female with neck pain and occipital neuralgia who presents today for f/u and medication refill. She continues to benefit from repeat occipital nerve block. Neck pain and arm pain is improved since cervical MELANY.  Her pain no longer radiates into both arms. Previously right arm pain traveled to her hand, her left stops around her shoulder.  She had numbness to her right hand and first through fourth digits. Cervical MELANY in February 2018 that only provided benefit for a short time.     She continues to take Norco and Robaxin which provide benefit. Flexeril caused dry mouth. Denies UE weakness. No bowel bladder changes.  Today cc is subscapular pain. Pain today is rated 8/10.    Prior HPI:   Patient is 70-year-old female with past history history of cervical DDD, cervical spondylosis and chronic headaches.  She recently moved here from [place], [place].  She is treated in the past by neurology.  She states having constant burning pain over the left side of her posterior scalp.  Pain radiates to her neck as well as a frontal.  She also has left-sided neck pain as well.  She denies any radicular arm pain.  No numbness or weakness.  She states having cervical epidural steroid injection at past with minimal benefit.  She also has had decided of cervical nerve blocks in the past with moderate benefit.  Most recent injection was performed 2 months ago.  She desires repeat injection.  She currently takes Norco 10 mg every 12 hours as needed with moderate benefit.  She also takes Zanaflex 4 mg every 8 hours with mild benefits.  She rates her pain 7/10 today.    Pain intervention history: s/p left occipital nerve blocks on 1/2016 with 50% relief of her headaches  S/p cervical MELANY 2/8/18 moderate relief for a couple of weeks.     ROS:  CONSTITUTIONAL: No fevers, chills,  night sweats, wt. loss, appetite changes  SKIN: no rashes or itching  ENT: No headaches, head trauma, vision changes, or eye pain  LYMPH NODES: None noticed   CV: No chest pain, palpitations.   RESP: No shortness of breath, dyspnea on exertion, cough, wheezing, or hemoptysis  GI: No nausea, emesis, diarrhea, constipation, melena, hematochezia, pain.    : No dysuria, hematuria, urgency, or frequency   HEME: No easy bruising, bleeding problems  PSYCHIATRIC: No depression, anxiety, psychosis, hallucinations.  NEURO: No seizures, memory loss, dizziness or difficulty sleeping  MSK: + History of present illness      Past Medical History:   Diagnosis Date    Anxiety     Arthritis     Cataract     DDD (degenerative disc disease), lumbar     Depression     Encounter for blood transfusion     GERD (gastroesophageal reflux disease)     Hyperlipidemia     Hypertension     pt states she does not take meds anymore she just watches her weight    Immunosuppressed status 2016    Neuromuscular disorder     Occipital neuralgia 3/24/2017    Osteoporosis     Substance abuse      Past Surgical History:   Procedure Laterality Date    APPENDECTOMY      APPENDECTOMY-LAPAROSCOPIC N/A 10/9/2016    [facility]     SECTION      x 2    CHOLECYSTECTOMY      COLONOSCOPY  prior to     COLONOSCOPY N/A 2019    [facility] ENDO    EGD (ESOPHAGOGASTRODUODENOSCOPY) N/A 2019    [facility] ENDO    ESOPHAGOGASTRODUODENOSCOPY (EGD) N/A 3/14/2017    [facility] at [facility] ENDO    ESOPHAGOGASTRODUODENOSCOPY (EGD) N/A 2/3/2016    [facility]jo [facility] at [facility] ENDO    HYSTERECTOMY      INJECTION-STEROID-EPIDURAL-CERVICAL N/A 2018    [facility]    INJECTION-STEROID-EPIDURAL-CERVICAL N/A 10/23/2017    [facility]    Laser  Periphery Iridotomy Bilateral     OD 5/26/16 and OS touch up 5/26/2016    UPPER GASTROINTESTINAL ENDOSCOPY  03/14/2017    Dr. Marcial: esophageal stenosis- dilated, gastritis, gastric polyps removed; biopsy- mild gastritis, negative for h pylori, hyperplastic polyp, esophagus unremarkable    vocal cord tumor removal       Family History   Problem Relation Age of Onset    Osteoarthritis Mother     Alcohol abuse Mother     Rheum arthritis Mother     Osteoarthritis Sister     Diabetes Brother     No Known Problems Son     No Known Problems Sister     No Known Problems Sister     No Known Problems Brother     Arthritis Son     No Known Problems Son     Stroke Maternal Grandmother 99    Rheum arthritis Maternal Grandmother     Retinal detachment Neg Hx     Macular degeneration Neg Hx     Glaucoma Neg Hx     Amblyopia Neg Hx     Blindness Neg Hx     Cancer Neg Hx     Cataracts Neg Hx     Hypertension Neg Hx     Strabismus Neg Hx     Thyroid disease Neg Hx     Lupus Neg Hx     Kidney disease Neg Hx     Inflammatory bowel disease Neg Hx     Psoriasis Neg Hx     Colon cancer Neg Hx     Crohn's disease Neg Hx     Ulcerative colitis Neg Hx     Stomach cancer Neg Hx     Esophageal cancer Neg Hx      Social History     Socioeconomic History    Marital status:      Spouse name: Not on file    Number of children: Not on file    Years of education: Not on file    Highest education level: Not on file   Occupational History    Not on file   Social Needs    Financial resource strain: Not on file    Food insecurity:     Worry: Not on file     Inability: Not on file    Transportation needs:     Medical: Not on file     Non-medical: Not on file   Tobacco Use    Smoking status: Never Smoker    Smokeless tobacco: Never Used   Substance and Sexual Activity    Alcohol use: No     Alcohol/week: 0.0 oz    Drug use: Never    Sexual activity: Yes     Partners: Male   Lifestyle    Physical  "activity:     Days per week: Not on file     Minutes per session: Not on file    Stress: Not on file   Relationships    Social connections:     Talks on phone: Not on file     Gets together: Not on file     Attends [employer] service: Not on file     Active member of club or organization: Not on file     Attends meetings of clubs or organizations: Not on file     Relationship status: Not on file   Other Topics Concern    Not on file   Social History Narrative    Not on file         Medications/Allergies: See med card    Vitals:    08/23/19 1016   BP: 128/64   Pulse: 80   Weight: 52.2 kg (115 lb)   Height: 4' 11" (1.499 m)   PainSc:   8   PainLoc: Back         Physical exam:    GENERAL: A and O x3, the patient appears well groomed and is in no acute distress.  Skin: No rashes or obvious lesions  HEENT: normocephalic, atraumatic  CARDIOVASCULAR:  RRR  LUNGS: nonlabored breathing  ABDOMEN: soft, nontender   UPPER EXTREMITIES: Normal alignment, normal range of motion, no atrophy, no skin changes,  hair growth and nail growth normal and equal bilaterally. No swelling, no tenderness.  +Phalens on left side. +TTP tricep tendon  LOWER EXTREMITIES:  Normal alignment, normal range of motion, no atrophy, no skin changes,  hair growth and nail growth normal and equal bilaterally. No swelling, no tenderness.  CERVICAL SPINE:   Cervical spine: ROM is full in flexion, extension and lateral rotation.  Painful flexion > extension.  Positive facet loading bilaterally.  Spurling is positive at right side.  Myofascial exam:  Tenderness to palpation across cervical paraspinals deltoid and trapezius muscles bilaterally.      MENTAL STATUS: normal orientation, speech, language, and fund of knowledge for social situation.  Emotional state appropriate.    CRANIAL NERVES:  II:  PERRL bilaterally,   III,IV,VI: EOMI.    V:  Facial sensation equal bilaterally  VII:  Facial motor function normal.  VIII:  Hearing equal to finger rub " bilaterally  IX/X: Gag normal, palate symmetric  XI:  Shoulder shrug equal, head turn equal  XII:  Tongue midline without fasciculations      MOTOR: Tone and bulk: normal bilateral upper and lower Strength: normal   Delt Bi Tri WE WF     R 5 5 5 5 5 5   L 5 5 5 5 5 5     IP ADD ABD Quad TA Gas HAM  R 5 5 5 5 5 5 5  L 5 5 5 5 5 5 5    SENSATION: Light touch and pinprick intact bilaterally  REFLEXES: normal, symmetric, nonbrisk.  Toes down, no clonus. No hoffmans.  GAIT: normal rise, base, steps, and arm swing.        Imaging:   Cervical MRI 12/4/17    Narrative     EXAM: Cervical spine MRI without contrast.    INDICATION: Cervical radiculopathy.  Neck pain and occipital neuralgia.  The patient complains of neck and right arm pain.    TECHNIQUE: Routine multiplanar, multisequence unenhanced cervical spine MRI was performed.    COMPARISON: Plain films of the cervical spine obtained concurrently      FINDINGS:     Vertebral column: There is straightening of the cervical spine with loss of normal lordosis.  As seen on concurrent plain films, there is trace anterolisthesis of C3 on C4 with 2 mm anterolisthesis of C4 on C5.  There is marked disc space narrowing at the C5-6 level with moderate to marked disc space narrowing at the C4-5 and C6-7 levels.  There is partial non-segmentation anteriorly at the C2-3 level.  The C2 and C3 as well as the C4 and C5 facet joints appear fused and this may represent developmental non-segmentation or degenerative ankylosis.  All of the discs are desiccated.  The odontoid process is intact.    Spinal canal, cord, epidural space: The craniovertebral junction is normal.  The spinal canal is omental and normal.  There is no significant spinal stenosis.  The cord is normal in caliber.  There is very subtle flattening of the ventral cord surface at the C4-5 and C5-6 levels where there is degenerative change.  The study is mildly motion but there is no definite cord edema or  myelomalacia.    Findings by level:    C2-3: There is no spinal canal or foraminal stenosis.  There is mild left facet joint arthropathy.    C3-4: There is trace anterolisthesis.  There is left greater than right uncovertebral spurring and facet joint arthropathy.  There is a mild disc osteophyte complex, slightly eccentric to the left with subtle annular fissure.  This narrows the ventral subarachnoid space.  There is no spinal stenosis or cord compression.  There is moderate marked left foraminal stenosis.    C4-5: There is moderate disc space narrowing with 2 mm anterolisthesis of C4 on C5.  There is facet joint arthropathy or effusion left greater than right.  There is also mild bilateral but left greater than right uncovertebral spurring.  There is unroofing of a mild disc bulge which narrows the ventral subarachnoid space.  There is no spinal stenosis.  There is mild to moderate left foraminal stenosis.    C5-6:There is marked disc space narrowing.  There is bilateral uncovertebral spurring.  There is a disc osteophyte complex which narrows the subarachnoid space.  This is slightly eccentric to the right There is subtle flattening of the ventral cord surface.  Cord signal is grossly normal.  There is mild spinal stenosis with at least moderate bilateral foraminal stenosis.    C6-7: There is moderate disc space narrowing.  There is mild uncovertebral spurring.  There is a shallow disc osteophyte complex, slightly eccentric to the right.  There is narrowing of the ventral subarachnoid space.  There is no spinal stenosis.  Cord signal is normal.  There is mild bilateral foraminal stenosis.  There is a small 3.5 mm left foraminal perineural cyst.    C7- T1: There is a Schmorl's node in inferior endplate of C7, chronic.  There are tiny bilateral foraminal perineural cysts.  There is minimal bulging of the annulus and mild facet joint arthropathy without spinal canal or foraminal stenosis.    Soft tissues/other: The  prevertebral soft tissues are normal.  The airway is patent.   Impression          1. There is multilevel degenerative disc disease described in detail above.  There is no acute fracture.  There is degenerative listhesis at several levels.  There is some degree of disc osteophyte complex, uncovertebral spurring and/or facet joint arthropathy contributing to some degree of foraminal stenosis at several levels.  There is no significant spinal canal stenosis.  There is very subtle flattening of the ventral cord surface at the C4-5 and C6-7 levels The pertinent findings are summarized below.    2. At the C3-4 level, there is no spinal stenosis.  There is moderate to marked left foraminal stenosis.    3. At the C4-5 level there is no spinal stenosis.  There is mild to moderate left foraminal stenosis.    4. At the C5-6 level, there is mild spinal stenosis with at least moderate bilateral foraminal stenosis.    5. At the C6-7 level, there is no spinal stenosis.  There is mild bilateral foraminal stenosis.    6. There is no spinal canal or foraminal stenosis at the C2-3 or C7-T1 levels.     Assessment:  Mrs. Richter is a 74 y.o. female with neck and head pain    1. Spondylosis of cervical region without myelopathy or radiculopathy    2. Bilateral occipital neuralgia    3. Myofascial pain      Plan:  1. I have stressed the importance of physical activity and exercise to improve overall health.  2. Consider repeat C7-T1 [place] MELANY in the future  3. Monitor progress and consider repeat left occipital blocks for head pain.  4. Norco 5mg q12hrs as needed for pain..  reviewed. UDS LCV consistent  5.  Robaxin 500 mg q 8 h prn 90 day supply  6. Recommend PT to focus on neck and upper back. She will call when ready to schedule  7. F/u 3 months or sooner

## 2019-08-23 NOTE — PROGRESS NOTES
Referring Physician: No ref. provider found    PCP: Liseth Carias MD      CC: neck and left occipital pain    Interval history: Ms. Richter is a 74 y.o. female with neck pain and occipital neuralgia who presents today for f/u and medication refill. She continues to benefit from repeat occipital nerve block. Neck pain and arm pain is improved since cervical MELANY.  Her pain no longer radiates into both arms. Previously right arm pain traveled to her hand, her left stops around her shoulder.  She had numbness to her right hand and first through fourth digits. Cervical MELANY in February 2018 that only provided benefit for a short time.     She continues to take Norco and Robaxin which provide benefit. Flexeril caused dry mouth. Denies UE weakness. No bowel bladder changes.  Today cc is subscapular pain. Pain today is rated 8/10.    Prior HPI:   Patient is 70-year-old female with past history history of cervical DDD, cervical spondylosis and chronic headaches.  She recently moved here from [place], [place].  She is treated in the past by neurology.  She states having constant burning pain over the left side of her posterior scalp.  Pain radiates to her neck as well as a frontal.  She also has left-sided neck pain as well.  She denies any radicular arm pain.  No numbness or weakness.  She states having cervical epidural steroid injection at past with minimal benefit.  She also has had decided of cervical nerve blocks in the past with moderate benefit.  Most recent injection was performed 2 months ago.  She desires repeat injection.  She currently takes Norco 10 mg every 12 hours as needed with moderate benefit.  She also takes Zanaflex 4 mg every 8 hours with mild benefits.  She rates her pain 7/10 today.    Pain intervention history: s/p left occipital nerve blocks on 1/2016 with 50% relief of her headaches  S/p cervical MELANY 2/8/18 moderate relief for a couple of weeks.     ROS:  CONSTITUTIONAL: No fevers, chills,  night sweats, wt. loss, appetite changes  SKIN: no rashes or itching  ENT: No headaches, head trauma, vision changes, or eye pain  LYMPH NODES: None noticed   CV: No chest pain, palpitations.   RESP: No shortness of breath, dyspnea on exertion, cough, wheezing, or hemoptysis  GI: No nausea, emesis, diarrhea, constipation, melena, hematochezia, pain.    : No dysuria, hematuria, urgency, or frequency   HEME: No easy bruising, bleeding problems  PSYCHIATRIC: No depression, anxiety, psychosis, hallucinations.  NEURO: No seizures, memory loss, dizziness or difficulty sleeping  MSK: + History of present illness      Past Medical History:   Diagnosis Date    Anxiety     Arthritis     Cataract     DDD (degenerative disc disease), lumbar     Depression     Encounter for blood transfusion     GERD (gastroesophageal reflux disease)     Hyperlipidemia     Hypertension     pt states she does not take meds anymore she just watches her weight    Immunosuppressed status 2016    Neuromuscular disorder     Occipital neuralgia 3/24/2017    Osteoporosis     Substance abuse      Past Surgical History:   Procedure Laterality Date    APPENDECTOMY      APPENDECTOMY-LAPAROSCOPIC N/A 10/9/2016    [facility] at [facility] OR     SECTION      x 2    CHOLECYSTECTOMY      COLONOSCOPY  prior to     COLONOSCOPY N/A 2019    Performed by Greg Victor [facility] at [facility] ENDO    EGD (ESOPHAGOGASTRODUODENOSCOPY) N/A 2019    Performed by [facility] at [facility] ENDO    ESOPHAGOGASTRODUODENOSCOPY (EGD) N/A 3/14/2017    Performed by Timi Marcial MD at [facility] ENDO    ESOPHAGOGASTRODUODENOSCOPY (EGD) N/A 2/3/2016    Performed by Imtiaz Vallejo MD at [facility] ENDO    HYSTERECTOMY      INJECTION-STEROID-EPIDURAL-CERVICAL N/A 2018    [facility]    INJECTION-STEROID-EPIDURAL-CERVICAL N/A 10/23/2017    [facility] at [facility] OR    Laser  Periphery Iridotomy Bilateral     OD 5/26/16 and OS touch up 5/26/2016    UPPER GASTROINTESTINAL ENDOSCOPY  03/14/2017    Dr. Marcial: esophageal stenosis- dilated, gastritis, gastric polyps removed; biopsy- mild gastritis, negative for h pylori, hyperplastic polyp, esophagus unremarkable    vocal cord tumor removal       Family History   Problem Relation Age of Onset    Osteoarthritis Mother     Alcohol abuse Mother     Rheum arthritis Mother     Osteoarthritis Sister     Diabetes Brother     No Known Problems Son     No Known Problems Sister     No Known Problems Sister     No Known Problems Brother     Arthritis Son     No Known Problems Son     Stroke Maternal Grandmother 99    Rheum arthritis Maternal Grandmother     Retinal detachment Neg Hx     Macular degeneration Neg Hx     Glaucoma Neg Hx     Amblyopia Neg Hx     Blindness Neg Hx     Cancer Neg Hx     Cataracts Neg Hx     Hypertension Neg Hx     Strabismus Neg Hx     Thyroid disease Neg Hx     Lupus Neg Hx     Kidney disease Neg Hx     Inflammatory bowel disease Neg Hx     Psoriasis Neg Hx     Colon cancer Neg Hx     Crohn's disease Neg Hx     Ulcerative colitis Neg Hx     Stomach cancer Neg Hx     Esophageal cancer Neg Hx      Social History     Socioeconomic History    Marital status:      Spouse name: Not on file    Number of children: Not on file    Years of education: Not on file    Highest education level: Not on file   Occupational History    Not on file   Social Needs    Financial resource strain: Not on file    Food insecurity:     Worry: Not on file     Inability: Not on file    Transportation needs:     Medical: Not on file     Non-medical: Not on file   Tobacco Use    Smoking status: Never Smoker    Smokeless tobacco: Never Used   Substance and Sexual Activity    Alcohol use: No     Alcohol/week: 0.0 oz    Drug use: Never    Sexual activity: Yes     Partners: Male   Lifestyle    Physical  "activity:     Days per week: Not on file     Minutes per session: Not on file    Stress: Not on file   Relationships    Social connections:     Talks on phone: Not on file     Gets together: Not on file     Attends [employer] service: Not on file     Active member of club or organization: Not on file     Attends meetings of clubs or organizations: Not on file     Relationship status: Not on file   Other Topics Concern    Not on file   Social History Narrative    Not on file         Medications/Allergies: See med card    Vitals:    08/23/19 1016   BP: 128/64   Pulse: 80   Weight: 52.2 kg (115 lb)   Height: 4' 11" (1.499 m)   PainSc:   8   PainLoc: Back         Physical exam:    GENERAL: A and O x3, the patient appears well groomed and is in no acute distress.  Skin: No rashes or obvious lesions  HEENT: normocephalic, atraumatic  CARDIOVASCULAR:  RRR  LUNGS: nonlabored breathing  ABDOMEN: soft, nontender   UPPER EXTREMITIES: Normal alignment, normal range of motion, no atrophy, no skin changes,  hair growth and nail growth normal and equal bilaterally. No swelling, no tenderness.  +Phalens on left side. +TTP tricep tendon  LOWER EXTREMITIES:  Normal alignment, normal range of motion, no atrophy, no skin changes,  hair growth and nail growth normal and equal bilaterally. No swelling, no tenderness.  CERVICAL SPINE:   Cervical spine: ROM is full in flexion, extension and lateral rotation.  Painful flexion > extension.  Positive facet loading bilaterally.  Spurling is positive at right side.  Myofascial exam:  Tenderness to palpation across cervical paraspinals deltoid and trapezius muscles bilaterally.      MENTAL STATUS: normal orientation, speech, language, and fund of knowledge for social situation.  Emotional state appropriate.    CRANIAL NERVES:  II:  PERRL bilaterally,   III,IV,VI: EOMI.    V:  Facial sensation equal bilaterally  VII:  Facial motor function normal.  VIII:  Hearing equal to finger rub " bilaterally  IX/X: Gag normal, palate symmetric  XI:  Shoulder shrug equal, head turn equal  XII:  Tongue midline without fasciculations      MOTOR: Tone and bulk: normal bilateral upper and lower Strength: normal   Delt Bi Tri WE WF     R 5 5 5 5 5 5   L 5 5 5 5 5 5     IP ADD ABD Quad TA Gas HAM  R 5 5 5 5 5 5 5  L 5 5 5 5 5 5 5    SENSATION: Light touch and pinprick intact bilaterally  REFLEXES: normal, symmetric, nonbrisk.  Toes down, no clonus. No hoffmans.  GAIT: normal rise, base, steps, and arm swing.        Imaging:   Cervical MRI 12/4/17    Narrative     EXAM: Cervical spine MRI without contrast.    INDICATION: Cervical radiculopathy.  Neck pain and occipital neuralgia.  The patient complains of neck and right arm pain.    TECHNIQUE: Routine multiplanar, multisequence unenhanced cervical spine MRI was performed.    COMPARISON: Plain films of the cervical spine obtained concurrently      FINDINGS:     Vertebral column: There is straightening of the cervical spine with loss of normal lordosis.  As seen on concurrent plain films, there is trace anterolisthesis of C3 on C4 with 2 mm anterolisthesis of C4 on C5.  There is marked disc space narrowing at the C5-6 level with moderate to marked disc space narrowing at the C4-5 and C6-7 levels.  There is partial non-segmentation anteriorly at the C2-3 level.  The C2 and C3 as well as the C4 and C5 facet joints appear fused and this may represent developmental non-segmentation or degenerative ankylosis.  All of the discs are desiccated.  The odontoid process is intact.    Spinal canal, cord, epidural space: The craniovertebral junction is normal.  The spinal canal is omental and normal.  There is no significant spinal stenosis.  The cord is normal in caliber.  There is very subtle flattening of the ventral cord surface at the C4-5 and C5-6 levels where there is degenerative change.  The study is mildly motion but there is no definite cord edema or  myelomalacia.    Findings by level:    C2-3: There is no spinal canal or foraminal stenosis.  There is mild left facet joint arthropathy.    C3-4: There is trace anterolisthesis.  There is left greater than right uncovertebral spurring and facet joint arthropathy.  There is a mild disc osteophyte complex, slightly eccentric to the left with subtle annular fissure.  This narrows the ventral subarachnoid space.  There is no spinal stenosis or cord compression.  There is moderate marked left foraminal stenosis.    C4-5: There is moderate disc space narrowing with 2 mm anterolisthesis of C4 on C5.  There is facet joint arthropathy or effusion left greater than right.  There is also mild bilateral but left greater than right uncovertebral spurring.  There is unroofing of a mild disc bulge which narrows the ventral subarachnoid space.  There is no spinal stenosis.  There is mild to moderate left foraminal stenosis.    C5-6:There is marked disc space narrowing.  There is bilateral uncovertebral spurring.  There is a disc osteophyte complex which narrows the subarachnoid space.  This is slightly eccentric to the right There is subtle flattening of the ventral cord surface.  Cord signal is grossly normal.  There is mild spinal stenosis with at least moderate bilateral foraminal stenosis.    C6-7: There is moderate disc space narrowing.  There is mild uncovertebral spurring.  There is a shallow disc osteophyte complex, slightly eccentric to the right.  There is narrowing of the ventral subarachnoid space.  There is no spinal stenosis.  Cord signal is normal.  There is mild bilateral foraminal stenosis.  There is a small 3.5 mm left foraminal perineural cyst.    C7- T1: There is a Schmorl's node in inferior endplate of C7, chronic.  There are tiny bilateral foraminal perineural cysts.  There is minimal bulging of the annulus and mild facet joint arthropathy without spinal canal or foraminal stenosis.    Soft tissues/other: The  prevertebral soft tissues are normal.  The airway is patent.   Impression          1. There is multilevel degenerative disc disease described in detail above.  There is no acute fracture.  There is degenerative listhesis at several levels.  There is some degree of disc osteophyte complex, uncovertebral spurring and/or facet joint arthropathy contributing to some degree of foraminal stenosis at several levels.  There is no significant spinal canal stenosis.  There is very subtle flattening of the ventral cord surface at the C4-5 and C6-7 levels The pertinent findings are summarized below.    2. At the C3-4 level, there is no spinal stenosis.  There is moderate to marked left foraminal stenosis.    3. At the C4-5 level there is no spinal stenosis.  There is mild to moderate left foraminal stenosis.    4. At the C5-6 level, there is mild spinal stenosis with at least moderate bilateral foraminal stenosis.    5. At the C6-7 level, there is no spinal stenosis.  There is mild bilateral foraminal stenosis.    6. There is no spinal canal or foraminal stenosis at the C2-3 or C7-T1 levels.     Assessment:  Mrs. Richter is a 74 y.o. female with neck and head pain    1. Spondylosis of cervical region without myelopathy or radiculopathy    2. Bilateral occipital neuralgia    3. Myofascial pain      Plan:  1. I have stressed the importance of physical activity and exercise to improve overall health.  2. Consider repeat C7-T1 IL MELANY in the future  3. Monitor progress and consider repeat left occipital blocks for head pain.  4. Norco 5mg q12hrs as needed for pain..  reviewed. UDS LCV consistent  5. Continue Robaxin 500 mg q 8 h prn   6. Recommend PT to focus on neck and upper back. She will call when ready to schedule  7. F/u 3 months or sooner

## 2019-09-07 ENCOUNTER — PATIENT MESSAGE (OUTPATIENT)
Dept: ADMINISTRATIVE | Facility: OTHER | Age: 74
End: 2019-09-07

## 2019-09-13 ENCOUNTER — PATIENT MESSAGE (OUTPATIENT)
Dept: FAMILY MEDICINE | Facility: CLINIC | Age: 74
End: 2019-09-13

## 2019-09-18 DIAGNOSIS — K59.00 CONSTIPATION, UNSPECIFIED CONSTIPATION TYPE: ICD-10-CM

## 2019-09-19 RX ORDER — LINACLOTIDE 145 UG/1
CAPSULE, GELATIN COATED ORAL
Qty: 30 CAPSULE | Refills: 2 | Status: SHIPPED | OUTPATIENT
Start: 2019-09-19 | End: 2019-12-23

## 2019-09-23 ENCOUNTER — PATIENT MESSAGE (OUTPATIENT)
Dept: ADMINISTRATIVE | Facility: OTHER | Age: 74
End: 2019-09-23

## 2019-09-30 ENCOUNTER — OFFICE VISIT (OUTPATIENT)
Dept: FAMILY MEDICINE | Facility: CLINIC | Age: 74
End: 2019-09-30
Payer: MEDICARE

## 2019-09-30 VITALS
TEMPERATURE: 98 F | HEART RATE: 85 BPM | HEIGHT: 59 IN | SYSTOLIC BLOOD PRESSURE: 122 MMHG | OXYGEN SATURATION: 97 % | DIASTOLIC BLOOD PRESSURE: 60 MMHG | RESPIRATION RATE: 12 BRPM | WEIGHT: 119.25 LBS | BODY MASS INDEX: 24.04 KG/M2

## 2019-09-30 DIAGNOSIS — Z23 FLU VACCINE NEED: ICD-10-CM

## 2019-09-30 DIAGNOSIS — M15.9 PRIMARY OSTEOARTHRITIS INVOLVING MULTIPLE JOINTS: ICD-10-CM

## 2019-09-30 DIAGNOSIS — F19.20 DEPENDENCY ON PAIN MEDICATION: ICD-10-CM

## 2019-09-30 DIAGNOSIS — F33.41 MDD (MAJOR DEPRESSIVE DISORDER), RECURRENT, IN PARTIAL REMISSION: ICD-10-CM

## 2019-09-30 DIAGNOSIS — F43.10 PTSD (POST-TRAUMATIC STRESS DISORDER): ICD-10-CM

## 2019-09-30 DIAGNOSIS — I10 ESSENTIAL HYPERTENSION: ICD-10-CM

## 2019-09-30 DIAGNOSIS — E78.2 MIXED HYPERLIPIDEMIA: Primary | ICD-10-CM

## 2019-09-30 DIAGNOSIS — M79.10 MYALGIA: ICD-10-CM

## 2019-09-30 DIAGNOSIS — R70.0 ELEVATED SED RATE: ICD-10-CM

## 2019-09-30 PROCEDURE — 99999 PR PBB SHADOW E&M-EST. PATIENT-LVL V: CPT | Mod: PBBFAC,HCNC,, | Performed by: FAMILY MEDICINE

## 2019-09-30 PROCEDURE — 99214 OFFICE O/P EST MOD 30 MIN: CPT | Mod: 25,HCNC,S$GLB, | Performed by: FAMILY MEDICINE

## 2019-09-30 PROCEDURE — 3078F DIAST BP <80 MM HG: CPT | Mod: HCNC,CPTII,S$GLB, | Performed by: FAMILY MEDICINE

## 2019-09-30 PROCEDURE — G0008 FLU VACCINE - HIGH DOSE (65+) PRESERVATIVE FREE IM: ICD-10-PCS | Mod: HCNC,S$GLB,, | Performed by: FAMILY MEDICINE

## 2019-09-30 PROCEDURE — 90662 FLU VACCINE - HIGH DOSE (65+) PRESERVATIVE FREE IM: ICD-10-PCS | Mod: HCNC,S$GLB,, | Performed by: FAMILY MEDICINE

## 2019-09-30 PROCEDURE — 3078F PR MOST RECENT DIASTOLIC BLOOD PRESSURE < 80 MM HG: ICD-10-PCS | Mod: HCNC,CPTII,S$GLB, | Performed by: FAMILY MEDICINE

## 2019-09-30 PROCEDURE — G0008 ADMIN INFLUENZA VIRUS VAC: HCPCS | Mod: HCNC,S$GLB,, | Performed by: FAMILY MEDICINE

## 2019-09-30 PROCEDURE — 90662 IIV NO PRSV INCREASED AG IM: CPT | Mod: HCNC,S$GLB,, | Performed by: FAMILY MEDICINE

## 2019-09-30 PROCEDURE — 99999 PR PBB SHADOW E&M-EST. PATIENT-LVL V: ICD-10-PCS | Mod: PBBFAC,HCNC,, | Performed by: FAMILY MEDICINE

## 2019-09-30 PROCEDURE — 99214 PR OFFICE/OUTPT VISIT, EST, LEVL IV, 30-39 MIN: ICD-10-PCS | Mod: 25,HCNC,S$GLB, | Performed by: FAMILY MEDICINE

## 2019-09-30 PROCEDURE — 3074F SYST BP LT 130 MM HG: CPT | Mod: HCNC,CPTII,S$GLB, | Performed by: FAMILY MEDICINE

## 2019-09-30 PROCEDURE — 3074F PR MOST RECENT SYSTOLIC BLOOD PRESSURE < 130 MM HG: ICD-10-PCS | Mod: HCNC,CPTII,S$GLB, | Performed by: FAMILY MEDICINE

## 2019-09-30 PROCEDURE — 1101F PR PT FALLS ASSESS DOC 0-1 FALLS W/OUT INJ PAST YR: ICD-10-PCS | Mod: HCNC,CPTII,S$GLB, | Performed by: FAMILY MEDICINE

## 2019-09-30 PROCEDURE — 1101F PT FALLS ASSESS-DOCD LE1/YR: CPT | Mod: HCNC,CPTII,S$GLB, | Performed by: FAMILY MEDICINE

## 2019-09-30 RX ORDER — IRBESARTAN 150 MG/1
150 TABLET ORAL NIGHTLY
COMMUNITY
End: 2019-12-20

## 2019-09-30 RX ORDER — ROSUVASTATIN CALCIUM 10 MG/1
10 TABLET, COATED ORAL DAILY
COMMUNITY
End: 2020-10-22

## 2019-09-30 RX ORDER — LORATADINE 10 MG/1
10 TABLET ORAL DAILY
COMMUNITY
End: 2022-02-16

## 2019-09-30 RX ORDER — DICLOFENAC SODIUM 10 MG/G
2 GEL TOPICAL DAILY
Qty: 100 G | Status: SHIPPED | OUTPATIENT
Start: 2019-09-30 | End: 2020-11-29

## 2019-09-30 NOTE — PROGRESS NOTES
Subjective:       Patient ID: Rola Richter is a 74 y.o. female.    Chief Complaint: Follow-up (6mth f/u hypertension)    HPI  Review of Systems   Constitutional: Negative for fatigue and unexpected weight change.   Respiratory: Negative for chest tightness and shortness of breath.    Cardiovascular: Negative for chest pain, palpitations and leg swelling.   Gastrointestinal: Negative for abdominal pain.   Musculoskeletal: Positive for arthralgias, myalgias, neck pain and neck stiffness.   Neurological: Negative for dizziness, syncope, light-headedness and headaches.       Patient Active Problem List   Diagnosis    Hypertension    GERD (gastroesophageal reflux disease)    Hyperlipidemia    Osteoporosis    Dependency on pain medication    PTSD (post-traumatic stress disorder)    Immunosuppressed status    CMC arthritis    Dry eye syndrome    DDD (degenerative disc disease), cervical    Occipital neuralgia    Cervical radiculitis    Primary osteoarthritis involving multiple joints    MDD (major depressive disorder), recurrent, in partial remission    Dysphagia     Patient is here for a chronic conditions follow up.    Having muscle pain janett neck and shoulders in am -worse after starting crestor    Pain mgmt Dr. Grimaldo taking norco 5 bid . DPS checked no evidence of diversion.  Tried lyrica but stopped it due to dizziness    Has severe hand OA- tried plaquenil but did not help so stopped it     Mood is good on zoloft  Objective:      Physical Exam   Constitutional: She is oriented to person, place, and time. She appears well-developed and well-nourished.   Cardiovascular: Normal rate, regular rhythm and normal heart sounds.   Pulmonary/Chest: Effort normal and breath sounds normal.   Musculoskeletal: She exhibits no edema.        Left hand: She exhibits tenderness and deformity. She exhibits normal range of motion.   Neurological: She is alert and oriented to person, place, and time.   Skin: Skin is warm  and dry.   Psychiatric: She has a normal mood and affect.   Nursing note and vitals reviewed.      Assessment:       1. Mixed hyperlipidemia    2. Flu vaccine need    3. Dependency on pain medication    4. PTSD (post-traumatic stress disorder)    5. Essential hypertension    6. MDD (major depressive disorder), recurrent, in partial remission    7. Primary osteoarthritis involving multiple joints    8. Elevated sed rate    9. Myalgia        Plan:         1. Mixed hyperlipidemia  Stable condition.  Continue current medications.  Will adjust based on lab findings or if condition changes.    - CBC auto differential; Future  - Comprehensive metabolic panel; Future  - Lipid panel; Future    2. Flu vaccine need  Immunize today.  Counseled patient on risks, benefits and side effects.  Patient elected to proceed with vaccination.    - Influenza - High Dose (65+) (PF) (IM)    3. Dependency on pain medication  Cont pain mgmt and patient meets criteria for participation in  - Primary Care Behavioral Health (Opioids)    4. PTSD (post-traumatic stress disorder)  Encouraged participation  - Primary Care Behavioral Health (Opioids)    5. Essential hypertension  Controlled on current medications.  Continue current medications.      6. MDD (major depressive disorder), recurrent, in partial remission  Controlled on current medications.  Continue current medications.      7. Primary osteoarthritis involving multiple joints  Refer   - Ambulatory referral to Rheumatology  - Sedimentation rate; Future  - C-reactive protein; Future  - diclofenac sodium (VOLTAREN) 1 % Gel; Apply 2 g topically once daily.  Dispense: 100 g; Refill: prn    8. Elevated sed rate  Screen and treat as indicated:      9. Myalgia  Concerning for statin intolerance.  Add co q 10 and if sx persist then stop crestor  - CK; Future  - Magnesium; Future        Time spent with patient: 20 minutes    Patient with be reevaluated in 6 months or sooner prn    Greater than 50%  of this visit was spent counseling as described in above documentation:Yes

## 2019-09-30 NOTE — PROGRESS NOTES
Patient verified by name and . Patient received high dose flu vaccine in left deltoid. Patient tolerated injection well. Patient advised to wait in clinic for 15 minutes in case of adverse reactions. Patient demonstrated understanding.

## 2019-10-01 ENCOUNTER — TELEPHONE (OUTPATIENT)
Dept: PRIMARY CARE CLINIC | Facility: CLINIC | Age: 74
End: 2019-10-01

## 2019-10-01 ENCOUNTER — LAB VISIT (OUTPATIENT)
Dept: LAB | Facility: HOSPITAL | Age: 74
End: 2019-10-01
Attending: FAMILY MEDICINE
Payer: MEDICARE

## 2019-10-01 ENCOUNTER — DOCUMENTATION ONLY (OUTPATIENT)
Dept: PRIMARY CARE CLINIC | Facility: CLINIC | Age: 74
End: 2019-10-01

## 2019-10-01 ENCOUNTER — PATIENT OUTREACH (OUTPATIENT)
Dept: OTHER | Facility: OTHER | Age: 74
End: 2019-10-01

## 2019-10-01 DIAGNOSIS — M79.10 MYALGIA: ICD-10-CM

## 2019-10-01 DIAGNOSIS — M15.9 PRIMARY OSTEOARTHRITIS INVOLVING MULTIPLE JOINTS: ICD-10-CM

## 2019-10-01 DIAGNOSIS — E78.2 MIXED HYPERLIPIDEMIA: ICD-10-CM

## 2019-10-01 LAB
ALBUMIN SERPL BCP-MCNC: 4.2 G/DL (ref 3.5–5.2)
ALP SERPL-CCNC: 79 U/L (ref 55–135)
ALT SERPL W/O P-5'-P-CCNC: 13 U/L (ref 10–44)
ANION GAP SERPL CALC-SCNC: 9 MMOL/L (ref 8–16)
AST SERPL-CCNC: 19 U/L (ref 10–40)
BASOPHILS # BLD AUTO: 0.05 K/UL (ref 0–0.2)
BASOPHILS NFR BLD: 0.7 % (ref 0–1.9)
BILIRUB SERPL-MCNC: 0.3 MG/DL (ref 0.1–1)
BUN SERPL-MCNC: 13 MG/DL (ref 8–23)
CALCIUM SERPL-MCNC: 9.6 MG/DL (ref 8.7–10.5)
CHLORIDE SERPL-SCNC: 104 MMOL/L (ref 95–110)
CHOLEST SERPL-MCNC: 289 MG/DL (ref 120–199)
CHOLEST/HDLC SERPL: 5.2 {RATIO} (ref 2–5)
CK SERPL-CCNC: 108 U/L (ref 20–180)
CO2 SERPL-SCNC: 26 MMOL/L (ref 23–29)
CREAT SERPL-MCNC: 0.8 MG/DL (ref 0.5–1.4)
CRP SERPL-MCNC: 9.3 MG/L (ref 0–8.2)
DIFFERENTIAL METHOD: ABNORMAL
EOSINOPHIL # BLD AUTO: 0.4 K/UL (ref 0–0.5)
EOSINOPHIL NFR BLD: 5.8 % (ref 0–8)
ERYTHROCYTE [DISTWIDTH] IN BLOOD BY AUTOMATED COUNT: 14.3 % (ref 11.5–14.5)
ERYTHROCYTE [SEDIMENTATION RATE] IN BLOOD BY WESTERGREN METHOD: 33 MM/HR (ref 0–20)
EST. GFR  (AFRICAN AMERICAN): >60 ML/MIN/1.73 M^2
EST. GFR  (NON AFRICAN AMERICAN): >60 ML/MIN/1.73 M^2
GLUCOSE SERPL-MCNC: 95 MG/DL (ref 70–110)
HCT VFR BLD AUTO: 34.7 % (ref 37–48.5)
HDLC SERPL-MCNC: 56 MG/DL (ref 40–75)
HDLC SERPL: 19.4 % (ref 20–50)
HGB BLD-MCNC: 10.8 G/DL (ref 12–16)
IMM GRANULOCYTES # BLD AUTO: 0.01 K/UL (ref 0–0.04)
IMM GRANULOCYTES NFR BLD AUTO: 0.1 % (ref 0–0.5)
LDLC SERPL CALC-MCNC: 201 MG/DL (ref 63–159)
LYMPHOCYTES # BLD AUTO: 1.8 K/UL (ref 1–4.8)
LYMPHOCYTES NFR BLD: 23.6 % (ref 18–48)
MAGNESIUM SERPL-MCNC: 2.4 MG/DL (ref 1.6–2.6)
MCH RBC QN AUTO: 29.2 PG (ref 27–31)
MCHC RBC AUTO-ENTMCNC: 31.1 G/DL (ref 32–36)
MCV RBC AUTO: 94 FL (ref 82–98)
MONOCYTES # BLD AUTO: 0.8 K/UL (ref 0.3–1)
MONOCYTES NFR BLD: 10.3 % (ref 4–15)
NEUTROPHILS # BLD AUTO: 4.4 K/UL (ref 1.8–7.7)
NEUTROPHILS NFR BLD: 59.5 % (ref 38–73)
NONHDLC SERPL-MCNC: 233 MG/DL
NRBC BLD-RTO: 0 /100 WBC
PLATELET # BLD AUTO: 281 K/UL (ref 150–350)
PMV BLD AUTO: 11 FL (ref 9.2–12.9)
POTASSIUM SERPL-SCNC: 4.5 MMOL/L (ref 3.5–5.1)
PROT SERPL-MCNC: 7.6 G/DL (ref 6–8.4)
RBC # BLD AUTO: 3.7 M/UL (ref 4–5.4)
SODIUM SERPL-SCNC: 139 MMOL/L (ref 136–145)
TRIGL SERPL-MCNC: 160 MG/DL (ref 30–150)
WBC # BLD AUTO: 7.45 K/UL (ref 3.9–12.7)

## 2019-10-01 PROCEDURE — 85651 RBC SED RATE NONAUTOMATED: CPT | Mod: HCNC,PO

## 2019-10-01 PROCEDURE — 80061 LIPID PANEL: CPT | Mod: HCNC

## 2019-10-01 PROCEDURE — 85025 COMPLETE CBC W/AUTO DIFF WBC: CPT | Mod: HCNC

## 2019-10-01 PROCEDURE — 83735 ASSAY OF MAGNESIUM: CPT | Mod: HCNC

## 2019-10-01 PROCEDURE — 80053 COMPREHEN METABOLIC PANEL: CPT | Mod: HCNC

## 2019-10-01 PROCEDURE — 82550 ASSAY OF CK (CPK): CPT | Mod: HCNC

## 2019-10-01 PROCEDURE — 86140 C-REACTIVE PROTEIN: CPT | Mod: HCNC

## 2019-10-01 PROCEDURE — 36415 COLL VENOUS BLD VENIPUNCTURE: CPT | Mod: HCNC,PO

## 2019-10-01 NOTE — PROGRESS NOTES
[facility] pharmacist performed a medication reconciliation on the patient's controlled substances and psychotropic medications using the [place]  and [employer] prescription medication fills portal.    Patient currently filling controlled substances from one provider and Is deemed to be at low risk of overdose per  records on her current regimen of hydrocodone-APAP 5-325 mg twice daily.  Co-prescription of naloxone is currently not required, though continual monitoring and assessment of additional risk factors for opioid overdose should always be considered.    Patient is currently filling their psychotropic medication of sertraline 100 mg daily on time per [employer] reports.  It appears per records that the patient is no longer on buspirone 10 mg twice daily (ending on 7/24).  [facility] pharmacist has not confirmed adherence by means of contacting the patient.  Follow up warranted on adherence to this regimen.    Recommendations to PCP:  1.  Patient currently adherent to psychotropic and pain management regimens per  and [employer] fills reports.  Follow up on adherence and continue therapy based on clinical benefit.  2.  Patient currently at low risk of overdose on current opioid therapy.  Consider co-prescription of naloxone based on patient-specific risk factors    Nitin Devlin, PharmD, BCPP  [facility] Pharmacist

## 2019-10-01 NOTE — PROGRESS NOTES
"Mrs. Richter  was referred to the Opioid BHI program by Primary Care Provider, Dr. Liseth Carias.  YUE Ibanez contacted Mrs. Richter who reports chronic pain that limits her activities of daily living (ADLs).   PT scored "10" on the PHQ9 and "10" on the LAURA 7.  Based on these scores PT is eligible for the Behavioral health Integration Program.  [facility] completed the intake and scheduled an appointment for PT with Heber Petersen LCSW, on October 22, at 11:00 a.m.     Mrs. Richter reported that she sees Dr. Estee Benítez at [facility], however she is interested enrolling in the Behavioral Health Integration program.    "

## 2019-10-04 ENCOUNTER — TELEPHONE (OUTPATIENT)
Dept: PRIMARY CARE CLINIC | Facility: CLINIC | Age: 74
End: 2019-10-04

## 2019-10-08 ENCOUNTER — PATIENT OUTREACH (OUTPATIENT)
Dept: OTHER | Facility: OTHER | Age: 74
End: 2019-10-08

## 2019-10-08 NOTE — PROGRESS NOTES
Digital Medicine: Clinician Follow-Up    Patient initially states she does not understand BP fluctuations. Through further conversation, revealed that she drinks 1 - 1.5 cups of coffee most morning about 8:30 am. Sometimes she takes her BP while drinking or shortly after finishing coffee.    The history is provided by the patient and medical records.     Follow Up  Follow-up reason(s): reading review    5/20, 7/1, and 9/30 office BP readings well at goal.          Sleep Apnea  Patient not previously diagnosed with JULI and     Medication Affordability  Patient is currently not having problems affording medications    Medication Adherence:     reports medication adherence  She does not wonder if medications are working.  She knows purpose of medications.        INTERVENTION(S)  reviewed appropriate dose schedule, reviewed monitoring technique and encouragement/support    PLAN  patient verbalizes understanding and patient amenable to changes    Variable BP readings likely due in part to caffeine and I suspect measurement technique. Office readings consistently at g oal.  Reviewed temporary effect of caffeine on BP. Patient agreed to wait at least 30 minutes after finishing coffee before checking BP.  Continue current regimen and monitoring.        There are no preventive care reminders to display for this patient.    Last 5 Patient Entered Readings                                      Current 30 Day Average: 148/73     Recent Readings 10/6/2019 10/4/2019 9/30/2019 9/26/2019 9/22/2019    SBP (mmHg) 162 136 144 157 131    DBP (mmHg) 83 68 66 77 72    Pulse 73 83 75 78 76             Hypertension Medications             irbesartan (AVAPRO) 150 MG tablet Take 150 mg by mouth every evening.

## 2019-10-16 ENCOUNTER — TELEPHONE (OUTPATIENT)
Dept: FAMILY MEDICINE | Facility: CLINIC | Age: 74
End: 2019-10-16

## 2019-10-16 ENCOUNTER — PATIENT MESSAGE (OUTPATIENT)
Dept: INTERNAL MEDICINE | Facility: CLINIC | Age: 74
End: 2019-10-16

## 2019-10-16 DIAGNOSIS — N39.0 URINARY TRACT INFECTION WITHOUT HEMATURIA, SITE UNSPECIFIED: Primary | ICD-10-CM

## 2019-10-16 NOTE — TELEPHONE ENCOUNTER
----- Message from Rosey Uriarte sent at 10/16/2019 12:35 PM CDT -----  Type: Needs Medical Advice    Who Called:   -  Aldo Moreno Symptoms (please be specific):   How long has patient had these symptoms:   Pharmacy name and phone #:  Best Call Back Number: [phone]  Additional Information: Patient was seen at an urgent care facility in [place] 09/15/2019. Patient need a referral back dated for the visit.

## 2019-10-16 NOTE — TELEPHONE ENCOUNTER
Left message for patient's  to return call. Notified patient's  on voicemail that I am not sure if we can do a referral for an out of state urgent care but will check with Dr. Carias to see if it is possible.

## 2019-10-16 NOTE — PROGRESS NOTES
Digital Medicine: Health  Follow-Up    Reports she has been feeling stressed about 's upcoming surgery next week.    The history is provided by the patient.     Follow Up  Follow-up reason(s): routine education      Routine Education Topics: physical activity            Physical Activity:   When asked if exercising, patient responded: yes    Patient participates in the following activities: walking    Reports she walks with her  for about 1 hour around their neighborhood.    Assigning the following patient goal(s): participate in exercise weekly      SDOH    INTERVENTION(S)  recommend physical activity, reviewed monitoring technique and encouragement/support    PLAN  patient verbalizes understanding    Encouraged patient to continue to take BP readings, and be mindful stress can elevate BP.      There are no preventive care reminders to display for this patient.    Last 5 Patient Entered Readings                                      Current 30 Day Average: 148/73     Recent Readings 10/6/2019 10/4/2019 9/30/2019 9/26/2019 9/22/2019    SBP (mmHg) 162 136 144 157 131    DBP (mmHg) 83 68 66 77 72    Pulse 73 83 75 78 76

## 2019-10-17 ENCOUNTER — PATIENT MESSAGE (OUTPATIENT)
Dept: FAMILY MEDICINE | Facility: CLINIC | Age: 74
End: 2019-10-17

## 2019-10-17 DIAGNOSIS — K21.9 GASTROESOPHAGEAL REFLUX DISEASE, ESOPHAGITIS PRESENCE NOT SPECIFIED: Primary | ICD-10-CM

## 2019-10-18 ENCOUNTER — TELEPHONE (OUTPATIENT)
Dept: PRIMARY CARE CLINIC | Facility: CLINIC | Age: 74
End: 2019-10-18

## 2019-10-18 RX ORDER — OMEPRAZOLE 40 MG/1
40 CAPSULE, DELAYED RELEASE ORAL DAILY
Qty: 90 CAPSULE | Refills: 1 | Status: SHIPPED | OUTPATIENT
Start: 2019-10-18 | End: 2019-12-20

## 2019-10-18 NOTE — PROGRESS NOTES
Ms. Richter, cancelled her appt on Monday due to her  surgery on Monday.  YUE Ibanez rescheduled her appt to Monday, October 28 at 11:00 a.m.

## 2019-10-28 ENCOUNTER — TELEPHONE (OUTPATIENT)
Dept: FAMILY MEDICINE | Facility: CLINIC | Age: 74
End: 2019-10-28

## 2019-10-28 ENCOUNTER — CLINICAL SUPPORT (OUTPATIENT)
Dept: PRIMARY CARE CLINIC | Facility: CLINIC | Age: 74
End: 2019-10-28
Payer: MEDICARE

## 2019-10-28 DIAGNOSIS — F43.10 PTSD (POST-TRAUMATIC STRESS DISORDER): ICD-10-CM

## 2019-10-28 DIAGNOSIS — F33.41 MDD (MAJOR DEPRESSIVE DISORDER), RECURRENT, IN PARTIAL REMISSION: Primary | ICD-10-CM

## 2019-10-28 DIAGNOSIS — M50.30 DDD (DEGENERATIVE DISC DISEASE), CERVICAL: ICD-10-CM

## 2019-10-28 DIAGNOSIS — F41.9 ANXIETY: ICD-10-CM

## 2019-10-28 DIAGNOSIS — F19.20 DEPENDENCY ON PAIN MEDICATION: ICD-10-CM

## 2019-10-28 PROCEDURE — 90791 PSYCH DIAGNOSTIC EVALUATION: CPT | Mod: BHI,S$GLB,, | Performed by: SOCIAL WORKER

## 2019-10-28 PROCEDURE — 90791 PR PSYCHIATRIC DIAGNOSTIC EVALUATION: ICD-10-PCS | Mod: BHI,S$GLB,, | Performed by: SOCIAL WORKER

## 2019-10-28 PROCEDURE — 99499 RISK ADDL DX/OHS AUDIT: ICD-10-PCS | Mod: BHI,S$GLB,, | Performed by: SOCIAL WORKER

## 2019-10-28 PROCEDURE — 99499 UNLISTED E&M SERVICE: CPT | Mod: BHI,S$GLB,, | Performed by: SOCIAL WORKER

## 2019-10-28 NOTE — TELEPHONE ENCOUNTER
Patient is under pain mgmt care under Dr. Grimaldo.  I recommend discussing ongoing pain issues with him as I am unable to increase them since she is under contract

## 2019-10-28 NOTE — TELEPHONE ENCOUNTER
----- Message from Heber Petersen LCSW sent at 10/28/2019  3:47 PM CDT -----  Dr. Carias,     It is Heber with the [facility]. I spoke to PT today who states that her shoulders, arms, and hands are very painful. She states she has a cream but it is not helping at this time. I cc'd you my note.     Best,  Heber
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
## 2019-10-31 NOTE — PROGRESS NOTES
I, Cristina Cohen MD, have reviewed the LCSW's history and exam, and I agree with the assessment and plan.

## 2019-11-04 NOTE — PROGRESS NOTES
75 yo patient with home BP readings trending down and recent office readings at goal  Continue current regimen  Continue to reinforce proper measurement technique

## 2019-11-06 NOTE — PROGRESS NOTES
Last 5 Patient Entered Readings                                      Current 30 Day Average: 149/78     Recent Readings 7/27/2018 7/25/2018 7/20/2018 7/19/2018 7/18/2018    SBP (mmHg) 153 158 152 144 140    DBP (mmHg) 80 74 80 72 76    Pulse 87 92 73 87 83        Patient's BP average is above goal of <130/80.     Patient denies s/s of hypotension (lightheadedness, dizziness, nausea, fatigue) associated with low readings. Instructed patient to inform me if this occurs, patient confirms understanding.      Patient denies s/s of hypertension (SOB, CP, severe headaches, changes in vision) associated with high readings. Instructed patient to go to the ED if BP > 180/110 and accompanied by hypertensive s/s, patient confirms understanding.    Will continue to monitor regularly. Will follow up in 1-2 weeks, sooner if BP begins to trend upward or downward.    Patient has my contact information and knows to call with any concerns or clinical changes.     Current HTN regimen: no medications    6/29 - Spoke with patient's  who reports patient is back in town, and they plan to resume BP monitoring today. Health  outreach scheduled for next week.  7/13 - Left voicemail. Recent BP readings significantly improved.  7/27 - Per , BP readings typically taken while drinking coffee. She also leans forward. Reviewed proper BP measurement, cuff placement, and timing relative to waking and caffeine intake. Follow up in 1-2 weeks.   Normal rate, regular rhythm.  Heart sounds S1, S2.  No murmurs, rubs or gallops.

## 2019-11-07 ENCOUNTER — TELEPHONE (OUTPATIENT)
Dept: PAIN MEDICINE | Facility: CLINIC | Age: 74
End: 2019-11-07

## 2019-11-07 NOTE — TELEPHONE ENCOUNTER
----- Message from Sabrina Cobos sent at 11/7/2019  9:12 AM CST -----  Type: Needs soon appointment    Who Called:  Patient  Best Call Back Number: [phone]  Additional Information: Patient requesting to be seen today or tomorrow for head pain/stated that she is experiencing burning sensation on side of head ([place])no soon appointment showing available/please call back to schedule or advise.

## 2019-11-14 ENCOUNTER — PATIENT OUTREACH (OUTPATIENT)
Dept: OTHER | Facility: OTHER | Age: 74
End: 2019-11-14

## 2019-11-14 NOTE — PROGRESS NOTES
Digital Medicine: Health  Follow-Up    Patient reports she has been taking care of her  after he had surgery.  States she has a lot going on with the holidays coming up.    The history is provided by the patient.     Follow Up  Follow-up reason(s): reading review    Patient inquired about what makes SBP elevated.  Informed patient this could be due to stress.      INTERVENTION(S)  encouragement/support    PLAN  patient verbalizes understanding    Reports she is leaving the week of thanksgiving to see family.  Reports she will try to remember to bring her BP cuff.      There are no preventive care reminders to display for this patient.    Last 5 Patient Entered Readings                                      Current 30 Day Average: 147/72     Recent Readings 11/13/2019 11/1/2019 10/30/2019 10/28/2019 10/18/2019    SBP (mmHg) 148 143 147 152 155    DBP (mmHg) 73 70 75 76 75    Pulse 73 78 80 69 73                  Screenings    SDOH

## 2019-11-18 ENCOUNTER — TELEPHONE (OUTPATIENT)
Dept: PAIN MEDICINE | Facility: CLINIC | Age: 74
End: 2019-11-18

## 2019-11-18 NOTE — TELEPHONE ENCOUNTER
Pt not sure of injection. Pt has apt with PA 11/20. Advised we can schedule after the OV. Pt agreed

## 2019-11-18 NOTE — TELEPHONE ENCOUNTER
----- Message from Jeanette Bright sent at 11/18/2019 12:18 PM CST -----  Contact: pt [phone]  Patient called she is asking for a pain shot  For her pinch nerve in her head and neck she states this is a treatment that she gets every three months. Patient called a week ago and she states she has not heard back from anyone at this time.

## 2019-11-20 ENCOUNTER — OFFICE VISIT (OUTPATIENT)
Dept: PAIN MEDICINE | Facility: CLINIC | Age: 74
End: 2019-11-20
Payer: MEDICARE

## 2019-11-20 VITALS
BODY MASS INDEX: 23.99 KG/M2 | HEIGHT: 59 IN | WEIGHT: 119 LBS | HEART RATE: 84 BPM | SYSTOLIC BLOOD PRESSURE: 128 MMHG | DIASTOLIC BLOOD PRESSURE: 57 MMHG

## 2019-11-20 DIAGNOSIS — M54.12 CERVICAL RADICULOPATHY: ICD-10-CM

## 2019-11-20 DIAGNOSIS — M47.892 OTHER SPONDYLOSIS, CERVICAL REGION: ICD-10-CM

## 2019-11-20 DIAGNOSIS — M54.81 BILATERAL OCCIPITAL NEURALGIA: Primary | ICD-10-CM

## 2019-11-20 DIAGNOSIS — M47.812 OSTEOARTHRITIS OF CERVICAL SPINE, UNSPECIFIED SPINAL OSTEOARTHRITIS COMPLICATION STATUS: ICD-10-CM

## 2019-11-20 DIAGNOSIS — Z79.891 USE OF OPIATES FOR THERAPEUTIC PURPOSES: Primary | ICD-10-CM

## 2019-11-20 DIAGNOSIS — M77.8 TRICEPS TENDONITIS: ICD-10-CM

## 2019-11-20 DIAGNOSIS — M47.812 SPONDYLOSIS OF CERVICAL REGION WITHOUT MYELOPATHY OR RADICULOPATHY: ICD-10-CM

## 2019-11-20 DIAGNOSIS — M50.30 DDD (DEGENERATIVE DISC DISEASE), CERVICAL: ICD-10-CM

## 2019-11-20 DIAGNOSIS — Z79.891 USE OF OPIATES FOR THERAPEUTIC PURPOSES: ICD-10-CM

## 2019-11-20 DIAGNOSIS — M79.18 MYOFASCIAL PAIN: ICD-10-CM

## 2019-11-20 PROCEDURE — 1159F MED LIST DOCD IN RCRD: CPT | Mod: HCNC,S$GLB,, | Performed by: NURSE PRACTITIONER

## 2019-11-20 PROCEDURE — 99999 PR PBB SHADOW E&M-EST. PATIENT-LVL III: ICD-10-PCS | Mod: PBBFAC,HCNC,, | Performed by: NURSE PRACTITIONER

## 2019-11-20 PROCEDURE — 3074F PR MOST RECENT SYSTOLIC BLOOD PRESSURE < 130 MM HG: ICD-10-PCS | Mod: HCNC,CPTII,S$GLB, | Performed by: NURSE PRACTITIONER

## 2019-11-20 PROCEDURE — 1125F PR PAIN SEVERITY QUANTIFIED, PAIN PRESENT: ICD-10-PCS | Mod: HCNC,S$GLB,, | Performed by: NURSE PRACTITIONER

## 2019-11-20 PROCEDURE — 1101F PT FALLS ASSESS-DOCD LE1/YR: CPT | Mod: HCNC,CPTII,S$GLB, | Performed by: NURSE PRACTITIONER

## 2019-11-20 PROCEDURE — 1159F PR MEDICATION LIST DOCUMENTED IN MEDICAL RECORD: ICD-10-PCS | Mod: HCNC,S$GLB,, | Performed by: NURSE PRACTITIONER

## 2019-11-20 PROCEDURE — 99214 OFFICE O/P EST MOD 30 MIN: CPT | Mod: HCNC,S$GLB,, | Performed by: NURSE PRACTITIONER

## 2019-11-20 PROCEDURE — 1101F PR PT FALLS ASSESS DOC 0-1 FALLS W/OUT INJ PAST YR: ICD-10-PCS | Mod: HCNC,CPTII,S$GLB, | Performed by: NURSE PRACTITIONER

## 2019-11-20 PROCEDURE — 1125F AMNT PAIN NOTED PAIN PRSNT: CPT | Mod: HCNC,S$GLB,, | Performed by: NURSE PRACTITIONER

## 2019-11-20 PROCEDURE — 3078F DIAST BP <80 MM HG: CPT | Mod: HCNC,CPTII,S$GLB, | Performed by: NURSE PRACTITIONER

## 2019-11-20 PROCEDURE — 99214 PR OFFICE/OUTPT VISIT, EST, LEVL IV, 30-39 MIN: ICD-10-PCS | Mod: HCNC,S$GLB,, | Performed by: NURSE PRACTITIONER

## 2019-11-20 PROCEDURE — 99999 PR PBB SHADOW E&M-EST. PATIENT-LVL III: CPT | Mod: PBBFAC,HCNC,, | Performed by: NURSE PRACTITIONER

## 2019-11-20 PROCEDURE — 3074F SYST BP LT 130 MM HG: CPT | Mod: HCNC,CPTII,S$GLB, | Performed by: NURSE PRACTITIONER

## 2019-11-20 PROCEDURE — 3078F PR MOST RECENT DIASTOLIC BLOOD PRESSURE < 80 MM HG: ICD-10-PCS | Mod: HCNC,CPTII,S$GLB, | Performed by: NURSE PRACTITIONER

## 2019-11-20 RX ORDER — HYDROCODONE BITARTRATE AND ACETAMINOPHEN 5; 325 MG/1; MG/1
1 TABLET ORAL 2 TIMES DAILY PRN
Qty: 60 TABLET | Refills: 0 | Status: SHIPPED | OUTPATIENT
Start: 2020-02-02 | End: 2020-03-02

## 2019-11-20 RX ORDER — HYDROCODONE BITARTRATE AND ACETAMINOPHEN 5; 325 MG/1; MG/1
1 TABLET ORAL 2 TIMES DAILY PRN
Qty: 60 TABLET | Refills: 0 | Status: SHIPPED | OUTPATIENT
Start: 2020-01-04 | End: 2020-02-02

## 2019-11-20 RX ORDER — HYDROCODONE BITARTRATE AND ACETAMINOPHEN 5; 325 MG/1; MG/1
1 TABLET ORAL 2 TIMES DAILY PRN
Qty: 60 TABLET | Refills: 0 | Status: SHIPPED | OUTPATIENT
Start: 2019-12-06 | End: 2020-01-04

## 2019-11-20 NOTE — PROGRESS NOTES
Referring Physician: No ref. provider found    PCP: Liseth Carias MD      CC: neck and left occipital pain    Interval history: Rola Richter is a 74 y.o. female with neck, left radicular pain and occipital neuralgia here for f/u. Today she reports increased head, neck and LUE pain. Has had benefit in the past with C-ESIs and ONBs. Today she is interested in repeating ONB if possible to help with her head pain. She may also be interested in repeating C-MELANY to help with radicular symptoms.     She continues to take norco 5 mg bid prn pain and robaxin as needed for pain/spasms. She needs new prescriptions for norco. Okay on robaxin.  consistent with prescribed meds. Had UDS in April that was consistent.       Interval history: Ms. Richter is a 74 y.o. female with neck pain and occipital neuralgia who presents today for f/u and medication refill. She continues to benefit from repeat occipital nerve block. Neck pain and arm pain is improved since cervical MELANY.  Her pain no longer radiates into both arms. Previously right arm pain traveled to her hand, her left stops around her shoulder.  She had numbness to her right hand and first through fourth digits. Cervical MELANY in February 2018 that only provided benefit for a short time. She continues to take Norco and Robaxin which provide benefit. Flexeril caused dry mouth. Denies UE weakness. No bowel bladder changes.  Today cc is subscapular pain. Pain today is rated 8/10.    Prior HPI:   Patient is 70-year-old female with past history history of cervical DDD, cervical spondylosis and chronic headaches.  She recently moved here from [place], [place].  She is treated in the past by neurology.  She states having constant burning pain over the left side of her posterior scalp.  Pain radiates to her neck as well as a frontal.  She also has left-sided neck pain as well.  She denies any radicular arm pain.  No numbness or weakness.  She states having cervical epidural  steroid injection at past with minimal benefit.  She also has had decided of cervical nerve blocks in the past with moderate benefit.  Most recent injection was performed 2 months ago.  She desires repeat injection.  She currently takes Norco 10 mg every 12 hours as needed with moderate benefit.  She also takes Zanaflex 4 mg every 8 hours with mild benefits.  She rates her pain 7/10 today.    Pain intervention history: s/p left occipital nerve blocks on 2016 with 50% relief of her headaches  S/p cervical MELANY 18 moderate relief for a couple of weeks.     ROS:  CONSTITUTIONAL: No fevers, chills, night sweats, wt. loss, appetite changes  SKIN: no rashes or itching  ENT: No headaches, head trauma, vision changes, or eye pain  LYMPH NODES: None noticed   CV: No chest pain, palpitations.   RESP: No shortness of breath, dyspnea on exertion, cough, wheezing, or hemoptysis  GI: No nausea, emesis, diarrhea, constipation, melena, hematochezia, pain.    : No dysuria, hematuria, urgency, or frequency   HEME: No easy bruising, bleeding problems  PSYCHIATRIC: No depression, anxiety, psychosis, hallucinations.  NEURO: No seizures, memory loss, dizziness or difficulty sleeping  MSK: + History of present illness      Past Medical History:   Diagnosis Date    Anxiety     Arthritis     Cataract     DDD (degenerative disc disease), lumbar     Depression     Encounter for blood transfusion     GERD (gastroesophageal reflux disease)     Hyperlipidemia     Hypertension     pt states she does not take meds anymore she just watches her weight    Immunosuppressed status 2016    Neuromuscular disorder     Occipital neuralgia 3/24/2017    Osteoporosis     Substance abuse      Past Surgical History:   Procedure Laterality Date    APPENDECTOMY       SECTION      x 2    CHOLECYSTECTOMY      COLONOSCOPY  prior to     COLONOSCOPY N/A 2019    Procedure: COLONOSCOPY;  Surgeon: Greg Victor MD;   Location: [facility] ENDO;  Service: Endoscopy;  Laterality: N/A;    ESOPHAGOGASTRODUODENOSCOPY N/A 4/30/2019    Procedure: EGD (ESOPHAGOGASTRODUODENOSCOPY);  Surgeon: Greg Victor MD;  Location: [facility] ENDO;  Service: Endoscopy;  Laterality: N/A;    HYSTERECTOMY      Laser Periphery Iridotomy Bilateral     OD 5/26/16 and OS touch up 5/26/2016    UPPER GASTROINTESTINAL ENDOSCOPY  03/14/2017    Dr. Marcial: esophageal stenosis- dilated, gastritis, gastric polyps removed; biopsy- mild gastritis, negative for h pylori, hyperplastic polyp, esophagus unremarkable    vocal cord tumor removal       Family History   Problem Relation Age of Onset    Osteoarthritis Mother     Alcohol abuse Mother     Rheum arthritis Mother     Osteoarthritis Sister     Diabetes Brother     No Known Problems Son     No Known Problems Sister     No Known Problems Sister     No Known Problems Brother     Arthritis Son     No Known Problems Son     Stroke Maternal Grandmother 99    Rheum arthritis Maternal Grandmother     Retinal detachment Neg Hx     Macular degeneration Neg Hx     Glaucoma Neg Hx     Amblyopia Neg Hx     Blindness Neg Hx     Cancer Neg Hx     Cataracts Neg Hx     Hypertension Neg Hx     Strabismus Neg Hx     Thyroid disease Neg Hx     Lupus Neg Hx     Kidney disease Neg Hx     Inflammatory bowel disease Neg Hx     Psoriasis Neg Hx     Colon cancer Neg Hx     Crohn's disease Neg Hx     Ulcerative colitis Neg Hx     Stomach cancer Neg Hx     Esophageal cancer Neg Hx      Social History     Socioeconomic History    Marital status:      Spouse name: Not on file    Number of children: Not on file    Years of education: Not on file    Highest education level: Not on file   Occupational History    Not on file   Social Needs    Financial resource strain: Hard    Food insecurity:     Worry: Never true     Inability: Never true    Transportation needs:     Medical: No     Non-medical: No  "  Tobacco Use    Smoking status: Never Smoker    Smokeless tobacco: Never Used   Substance and Sexual Activity    Alcohol use: No     Alcohol/week: 0.0 standard drinks     Frequency: Never     Binge frequency: Never    Drug use: Never    Sexual activity: Yes     Partners: Male   Lifestyle    Physical activity:     Days per week: 0 days     Minutes per session: Not on file    Stress: Not at all   Relationships    Social connections:     Talks on phone: More than three times a week     Gets together: Twice a week     Attends Rastafarian service: Not on file     Active member of club or organization: Yes     Attends meetings of clubs or organizations: More than 4 times per year     Relationship status:    Other Topics Concern    Not on file   Social History Narrative    Not on file         Medications/Allergies: See med card    Vitals:    11/20/19 0931   BP: (!) 128/57   Pulse: 84   Weight: 54 kg (119 lb)   Height: 4' 11" (1.499 m)   PainSc:   9         Physical exam:    GENERAL: A and O x3, the patient appears well groomed and is in no acute distress.  Skin: No rashes or obvious lesions  HEENT: normocephalic, atraumatic  CARDIOVASCULAR:  RRR  LUNGS: nonlabored breathing  ABDOMEN: soft, nontender   UPPER EXTREMITIES: Normal alignment, normal range of motion, no atrophy, no skin changes,  hair growth and nail growth normal and equal bilaterally. No swelling, no tenderness.  +Phalens on left side. +TTP tricep tendon  LOWER EXTREMITIES:  Normal alignment, normal range of motion, no atrophy, no skin changes,  hair growth and nail growth normal and equal bilaterally. No swelling, no tenderness.  CERVICAL SPINE:   Cervical spine: ROM is full in flexion, extension and lateral rotation.  Painful flexion > extension.  Positive facet loading bilaterally.  Spurling is positive at right side.  Myofascial exam:  Tenderness to palpation across cervical paraspinals deltoid and trapezius muscles bilaterally.      MENTAL " STATUS: normal orientation, speech, language, and fund of knowledge for social situation.  Emotional state appropriate.    CRANIAL NERVES:  II:  PERRL bilaterally,   III,IV,VI: EOMI.    V:  Facial sensation equal bilaterally  VII:  Facial motor function normal.  VIII:  Hearing equal to finger rub bilaterally  IX/X: Gag normal, palate symmetric  XI:  Shoulder shrug equal, head turn equal  XII:  Tongue midline without fasciculations      MOTOR: Tone and bulk: normal bilateral upper and lower Strength: normal   Delt Bi Tri WE WF     R 5 5 5 5 5 5   L 5 5 5 5 5 5     IP ADD ABD Quad TA Gas HAM  R 5 5 5 5 5 5 5  L 5 5 5 5 5 5 5    SENSATION: Light touch and pinprick intact bilaterally  REFLEXES: normal, symmetric, nonbrisk.  Toes down, no clonus. No hoffmans.  GAIT: normal rise, base, steps, and arm swing.        Imaging:   Cervical MRI 12/4/17    Narrative     EXAM: Cervical spine MRI without contrast.    INDICATION: Cervical radiculopathy.  Neck pain and occipital neuralgia.  The patient complains of neck and right arm pain.    TECHNIQUE: Routine multiplanar, multisequence unenhanced cervical spine MRI was performed.    COMPARISON: Plain films of the cervical spine obtained concurrently      FINDINGS:     Vertebral column: There is straightening of the cervical spine with loss of normal lordosis.  As seen on concurrent plain films, there is trace anterolisthesis of C3 on C4 with 2 mm anterolisthesis of C4 on C5.  There is marked disc space narrowing at the C5-6 level with moderate to marked disc space narrowing at the C4-5 and C6-7 levels.  There is partial non-segmentation anteriorly at the C2-3 level.  The C2 and C3 as well as the C4 and C5 facet joints appear fused and this may represent developmental non-segmentation or degenerative ankylosis.  All of the discs are desiccated.  The odontoid process is intact.    Spinal canal, cord, epidural space: The craniovertebral junction is normal.  The spinal canal is  omental and normal.  There is no significant spinal stenosis.  The cord is normal in caliber.  There is very subtle flattening of the ventral cord surface at the C4-5 and C5-6 levels where there is degenerative change.  The study is mildly motion but there is no definite cord edema or myelomalacia.    Findings by level:    C2-3: There is no spinal canal or foraminal stenosis.  There is mild left facet joint arthropathy.    C3-4: There is trace anterolisthesis.  There is left greater than right uncovertebral spurring and facet joint arthropathy.  There is a mild disc osteophyte complex, slightly eccentric to the left with subtle annular fissure.  This narrows the ventral subarachnoid space.  There is no spinal stenosis or cord compression.  There is moderate marked left foraminal stenosis.    C4-5: There is moderate disc space narrowing with 2 mm anterolisthesis of C4 on C5.  There is facet joint arthropathy or effusion left greater than right.  There is also mild bilateral but left greater than right uncovertebral spurring.  There is unroofing of a mild disc bulge which narrows the ventral subarachnoid space.  There is no spinal stenosis.  There is mild to moderate left foraminal stenosis.    C5-6:There is marked disc space narrowing.  There is bilateral uncovertebral spurring.  There is a disc osteophyte complex which narrows the subarachnoid space.  This is slightly eccentric to the right There is subtle flattening of the ventral cord surface.  Cord signal is grossly normal.  There is mild spinal stenosis with at least moderate bilateral foraminal stenosis.    C6-7: There is moderate disc space narrowing.  There is mild uncovertebral spurring.  There is a shallow disc osteophyte complex, slightly eccentric to the right.  There is narrowing of the ventral subarachnoid space.  There is no spinal stenosis.  Cord signal is normal.  There is mild bilateral foraminal stenosis.  There is a small 3.5 mm left foraminal  perineural cyst.    C7- T1: There is a Schmorl's node in inferior endplate of C7, chronic.  There are tiny bilateral foraminal perineural cysts.  There is minimal bulging of the annulus and mild facet joint arthropathy without spinal canal or foraminal stenosis.    Soft tissues/other: The prevertebral soft tissues are normal.  The airway is patent.   Impression          1. There is multilevel degenerative disc disease described in detail above.  There is no acute fracture.  There is degenerative listhesis at several levels.  There is some degree of disc osteophyte complex, uncovertebral spurring and/or facet joint arthropathy contributing to some degree of foraminal stenosis at several levels.  There is no significant spinal canal stenosis.  There is very subtle flattening of the ventral cord surface at the C4-5 and C6-7 levels The pertinent findings are summarized below.    2. At the C3-4 level, there is no spinal stenosis.  There is moderate to marked left foraminal stenosis.    3. At the C4-5 level there is no spinal stenosis.  There is mild to moderate left foraminal stenosis.    4. At the C5-6 level, there is mild spinal stenosis with at least moderate bilateral foraminal stenosis.    5. At the C6-7 level, there is no spinal stenosis.  There is mild bilateral foraminal stenosis.    6. There is no spinal canal or foraminal stenosis at the C2-3 or C7-T1 levels.     Assessment:  Mrs. Richter is a 74 y.o. female with neck and head pain    1. Bilateral occipital neuralgia    2. Use of opiates for therapeutic purposes    3. Spondylosis of cervical region without myelopathy or radiculopathy    4. Myofascial pain    5. Triceps tendonitis    6. DDD (degenerative disc disease), cervical    7. Other spondylosis, cervical region    8. Cervical radiculopathy    9. Osteoarthritis of cervical spine, unspecified spinal osteoarthritis complication status      Plan:  1. I have stressed the importance of physical activity and  exercise to improve overall health.  2. Repeat Occipital NB. Will schedule today.   3. Consider repeat C7-T1 [place] MELANY in the future to help with LUE pain  4. Norco 5mg q12hrs as needed for pain..  reviewed. UDS LCV consistent. 3 monthly supply.   5. Continue Robaxin 500 mg q 8 h prn 90- call for refill when needed.   6. Recommend PT to focus on neck and upper back. She will call when ready to schedule  7. F/u after procedure and in 3 months for med management; sooner if needed.

## 2019-11-22 ENCOUNTER — TELEPHONE (OUTPATIENT)
Dept: PRIMARY CARE CLINIC | Facility: CLINIC | Age: 74
End: 2019-11-22

## 2019-11-22 NOTE — PROGRESS NOTES
"YUE Ibanez contacted Ms. Richter to remind her of her appt on Monday, November 25 at 2:00 p.m. , with Heber Petersen LCSW and update her assessments.  She scored "15" on the PHQ 9 and "6" on the GAD7.   "

## 2019-11-25 ENCOUNTER — CLINICAL SUPPORT (OUTPATIENT)
Dept: PRIMARY CARE CLINIC | Facility: CLINIC | Age: 74
End: 2019-11-25
Payer: MEDICARE

## 2019-11-25 ENCOUNTER — TELEPHONE (OUTPATIENT)
Dept: PRIMARY CARE CLINIC | Facility: CLINIC | Age: 74
End: 2019-11-25

## 2019-11-25 DIAGNOSIS — F33.41 MDD (MAJOR DEPRESSIVE DISORDER), RECURRENT, IN PARTIAL REMISSION: Primary | ICD-10-CM

## 2019-11-25 DIAGNOSIS — F41.9 ANXIETY: ICD-10-CM

## 2019-11-25 DIAGNOSIS — F19.20 DEPENDENCY ON PAIN MEDICATION: ICD-10-CM

## 2019-11-25 DIAGNOSIS — F43.10 PTSD (POST-TRAUMATIC STRESS DISORDER): ICD-10-CM

## 2019-11-25 PROCEDURE — 90837 PSYTX W PT 60 MINUTES: CPT | Mod: BHI,95,S$GLB, | Performed by: SOCIAL WORKER

## 2019-11-25 PROCEDURE — 90837 PR PSYCHOTHERAPY W/PATIENT, 60 MIN: ICD-10-PCS | Mod: BHI,95,S$GLB, | Performed by: SOCIAL WORKER
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
## 2019-12-05 ENCOUNTER — OFFICE VISIT (OUTPATIENT)
Dept: RHEUMATOLOGY | Facility: CLINIC | Age: 74
End: 2019-12-05
Payer: MEDICARE

## 2019-12-05 ENCOUNTER — HOSPITAL ENCOUNTER (OUTPATIENT)
Dept: RADIOLOGY | Facility: HOSPITAL | Age: 74
Discharge: HOME | End: 2019-12-05
Attending: INTERNAL MEDICINE
Payer: MEDICARE

## 2019-12-05 VITALS
WEIGHT: 118.13 LBS | SYSTOLIC BLOOD PRESSURE: 129 MMHG | DIASTOLIC BLOOD PRESSURE: 71 MMHG | BODY MASS INDEX: 23.85 KG/M2

## 2019-12-05 DIAGNOSIS — M15.9 PRIMARY OSTEOARTHRITIS INVOLVING MULTIPLE JOINTS: ICD-10-CM

## 2019-12-05 DIAGNOSIS — M81.0 AGE-RELATED OSTEOPOROSIS WITHOUT CURRENT PATHOLOGICAL FRACTURE: Primary | ICD-10-CM

## 2019-12-05 DIAGNOSIS — Z79.899 LONG-TERM USE OF HIGH-RISK MEDICATION: ICD-10-CM

## 2019-12-05 PROCEDURE — 73521 X-RAY EXAM HIPS BI 2 VIEWS: CPT | Mod: TC

## 2019-12-05 PROCEDURE — 73630 X-RAY EXAM OF FOOT: CPT | Mod: 50,TC

## 2019-12-05 PROCEDURE — 1101F PR PT FALLS ASSESS DOC 0-1 FALLS W/OUT INJ PAST YR: ICD-10-PCS | Mod: S$GLB,,, | Performed by: INTERNAL MEDICINE

## 2019-12-05 PROCEDURE — 1159F MED LIST DOCD IN RCRD: CPT | Mod: S$GLB,,, | Performed by: INTERNAL MEDICINE

## 2019-12-05 PROCEDURE — 73130 X-RAY EXAM OF HAND: CPT | Mod: 50,TC

## 2019-12-05 PROCEDURE — 3078F DIAST BP <80 MM HG: CPT | Mod: S$GLB,,, | Performed by: INTERNAL MEDICINE

## 2019-12-05 PROCEDURE — 99203 PR OFFICE/OUTPT VISIT, NEW, LEVL III, 30-44 MIN: ICD-10-PCS | Mod: S$GLB,,, | Performed by: INTERNAL MEDICINE

## 2019-12-05 PROCEDURE — 3078F PR MOST RECENT DIASTOLIC BLOOD PRESSURE < 80 MM HG: ICD-10-PCS | Mod: S$GLB,,, | Performed by: INTERNAL MEDICINE

## 2019-12-05 PROCEDURE — 1125F AMNT PAIN NOTED PAIN PRSNT: CPT | Mod: S$GLB,,, | Performed by: INTERNAL MEDICINE

## 2019-12-05 PROCEDURE — 1101F PT FALLS ASSESS-DOCD LE1/YR: CPT | Mod: S$GLB,,, | Performed by: INTERNAL MEDICINE

## 2019-12-05 PROCEDURE — 73560 X-RAY EXAM OF KNEE 1 OR 2: CPT | Mod: TC,50

## 2019-12-05 PROCEDURE — 73030 X-RAY EXAM OF SHOULDER: CPT | Mod: TC,50

## 2019-12-05 PROCEDURE — 3074F SYST BP LT 130 MM HG: CPT | Mod: S$GLB,,, | Performed by: INTERNAL MEDICINE

## 2019-12-05 PROCEDURE — 3074F PR MOST RECENT SYSTOLIC BLOOD PRESSURE < 130 MM HG: ICD-10-PCS | Mod: S$GLB,,, | Performed by: INTERNAL MEDICINE

## 2019-12-05 PROCEDURE — 1125F PR PAIN SEVERITY QUANTIFIED, PAIN PRESENT: ICD-10-PCS | Mod: S$GLB,,, | Performed by: INTERNAL MEDICINE

## 2019-12-05 PROCEDURE — 99203 OFFICE O/P NEW LOW 30 MIN: CPT | Mod: S$GLB,,, | Performed by: INTERNAL MEDICINE

## 2019-12-05 PROCEDURE — 1159F PR MEDICATION LIST DOCUMENTED IN MEDICAL RECORD: ICD-10-PCS | Mod: S$GLB,,, | Performed by: INTERNAL MEDICINE

## 2019-12-05 NOTE — LETTER
December 5, 2019      Liseth Carias MD  [address]  [place] [address]           [facility] - Rheumatology  [address]  [address]  [place] [address]  Phone: [phone]  Fax: [phone]          Patient: Rola Richter   MR Number: [id number]   YOB: 1945   Date of Visit: 12/5/2019       Dear Dr. Liseth Carias:    Thank you for referring Rola Richter to me for evaluation. Attached you will find relevant portions of my assessment and plan of care.    If you have questions, please do not hesitate to call me. I look forward to following Rola Richter along with you.    Sincerely,    Jayme Ram MD    Enclosure  CC:  No Recipients    If you would like to receive this communication electronically, please contact [email or web address] or [phone] to request more information on [employer] access.    For providers and/or their staff who would like to refer a patient to [facility], please contact us through our one-stop-shop provider referral line, [facility], at [phone].    If you feel you have received this communication in error or would no longer like to receive these types of communications, please e-mail [email or web address]

## 2019-12-05 NOTE — PROGRESS NOTES
[facility] RHEUMATOLOGY        NEW PATIENT      Subjective:       Patient ID:   NAME: Rola Richter : 1945     74 y.o. female    Referring Doc: Liseth Carias MD  Other Physicians:    Chief Complaint:  Osteoarthritis and Osteoporosis      History of Present Illness:     New patient with hx of erosive OA and Osteoporosis.  Was under Dr Cabral's care.Was last seen by her in 2018.  Was on Plaquenil but was DC'd sec to not being effective.  She was Dx'd with Osteoporosis in . Was on Reclast, last infusion   Last DEXA last yr ,showed improvement from Osteoporosis to Osteopenia  Has chronic pains in shoulders,PIP's,DIP's, L knee.  Neck pain,no low back pain.  Paresthesias in feet.  Has had epidural injection for radicular cervical pain ( sees Dr Grimaldo)  AM stiffness 30 minutes.    ROS:   GEN: no fevers night sweats or significant weight changes   + fatigue ( sleeps ok)  HEENT: + burning sensation scalp, occ  HA's,  No acute changes in vision , no mouth ulcers, no sicca symptoms, no scalp tenderness, jaw claudication  CV: no CP, SOB, PND, DALE or orthopnea,no palpitations  PULM:no SOB, + occ cough at night from post nasal drip,No hemoptysis, sputum or pleuritic pain  GI: no abdominal pain, nausea, vomiting,+ constipation, No diarrhea, melanotic stools, BRBPR, or hematemesis, no dysphagia  : no hematuria, dysuria  NEURO:+  Paresthesias in feet occ,scalp,Rare headaches, visual disturbances, muscle weakness  SKIN:  no rashes , erythema, bruising, or swelling, no Raynauds, no photosensitivity  MUSCULOSKELETAL:+ joint swelling PIP's,DIP's, no prolonged AM stiffness,  cervical back pain   PSYCH:   - Insomnia,  + depression, + anxiety  No hx of hepatic disorders  Medications:    Current Outpatient Medications:     busPIRone (BUSPAR) 10 MG tablet, Take 1 tablet (10 mg total) by mouth 2 (two) times daily., Disp: 180 tablet, Rfl: 0    butalbital-aspirin-caffeine -40 mg (FIORINAL) -40 mg Cap, Take 1  capsule by mouth every 4 (four) hours as needed., Disp: , Rfl:     diclofenac sodium (VOLTAREN) 1 % Gel, Apply 2 g topically once daily., Disp: 100 g, Rfl: prn    fish oil-omega-3 fatty acids 300-1,000 mg capsule, Take 2 g by mouth once daily., Disp: , Rfl:     fluorometholone 0.1% (FML) 0.1 % DrpS, 1 drop 4 (four) times daily., Disp: , Rfl:     fluticasone (FLONASE) 50 mcg/actuation nasal spray, 2 sprays by Each Nare route once daily., Disp: 1 Bottle, Rfl: prn    [START ON 12/6/2019] HYDROcodone-acetaminophen (NORCO) 5-325 mg per tablet, Take 1 tablet by mouth 2 (two) times daily as needed for Pain., Disp: 60 tablet, Rfl: 0    [START ON 1/4/2020] HYDROcodone-acetaminophen (NORCO) 5-325 mg per tablet, Take 1 tablet by mouth 2 (two) times daily as needed for Pain., Disp: 60 tablet, Rfl: 0    [START ON 2/2/2020] HYDROcodone-acetaminophen (NORCO) 5-325 mg per tablet, Take 1 tablet by mouth 2 (two) times daily as needed for Pain., Disp: 60 tablet, Rfl: 0    irbesartan (AVAPRO) 150 MG tablet, Take 150 mg by mouth every evening., Disp: , Rfl:     LINZESS 145 mcg Cap capsule, TAKE ONE CAPSULE BY MOUTH ONCE DAILY, MORE THAN 30 MINUTES BEFORE THE FIRST MEAL OF THE DAY, Disp: 30 capsule, Rfl: 2    loratadine (CLARITIN) 10 mg tablet, Take 10 mg by mouth once daily., Disp: , Rfl:     multivit with min-folic acid 200 mcg Chew, , Disp: , Rfl:     omeprazole (PRILOSEC) 40 MG capsule, Take 1 capsule (40 mg total) by mouth once daily., Disp: 90 capsule, Rfl: 1    rosuvastatin (CRESTOR) 10 MG tablet, Take 10 mg by mouth once daily., Disp: , Rfl:     sertraline (ZOLOFT) 100 MG tablet, TAKE 1 TABLET ONE TIME DAILY, Disp: 90 tablet, Rfl: 0  FAMILY HISTORY: 2 sisters,grandmother and aunt with Rheumatoid Arthritis    PAST MEDICAL HISTORY:  HTN  High Cholesterol  Seizures when she was young  PAST SURGICAL HISTORY:  Cholecystectomy  Appendectomy  Hysterectomy  C/S  SOCIAL HISTORY:  Never smoked  -  ETOH  ALLERGIES:  Penicillin  Sulfa        Objective:     Vitals:  Blood pressure 129/71, weight 53.6 kg (118 lb 1.6 oz).    Physical Examination:   GEN: no apparent distress, comfortable; AAOx3  SKIN: no rashes, no lesions, no sclerodactyly or induration, no Raynaud's, no periungual erythema  HEAD: normal  EYES: no pallor, no icterus, PERRLA  ENT:  no thrush,no mucosal dryness or ulcerations  NECK: no masses, thyroid normal, trachea midline, no LAD/LN's, supple  CV:   S1 and S2 regular, no murmurs, gallop or rubs  CHEST: Normal respiratory effort;  normal breath sounds; no rubs, no wheezes, no crackles.   ABDOM: nontender and nondistended; soft; ; no rebound/guarding,no masses  MUSC/Skeletal: ROM decrease in both shoulders and hips; no crepitus; joints without synovitis, + Heberden's and Daxa's.  Some tenderness on 2nd and 3rd MCP and 2nd 3rd and 4th MTP joints on the left.  EXTREM: no clubbing, cyanosis, edema, normal pulses.  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   RO other inflamm arthropathy  Osteoporosis, nohx compression fx's    PLAN:  X-rays of hands feet shoulders hips and knees   repeat inflammatory markers, rheumatoid factor CCP NATHANIEL SSA uric acid etc  Continue Voltaren gel  Paraffin wax treatment for hands prn    Discussion:     I have explained all of the above in detail and the patient understands all of the current recommendation(s). I have answered all of their questions to the best of my ability and to their complete satisfaction.      I have  reviewed the risks and benefits of the medication in detail with patient, who understands and wishes to proceed. Printed information regarding the disease and/or medication was also provided.        RTC 3-4 weeks        Electronically signed by Jayme Ram MD

## 2019-12-09 ENCOUNTER — HOSPITAL ENCOUNTER (OUTPATIENT)
Dept: RADIOLOGY | Facility: HOSPITAL | Age: 74
Discharge: HOME | End: 2019-12-09
Attending: INTERNAL MEDICINE
Payer: MEDICARE

## 2019-12-09 ENCOUNTER — PROCEDURE VISIT (OUTPATIENT)
Dept: PAIN MEDICINE | Facility: CLINIC | Age: 74
End: 2019-12-09
Payer: MEDICARE

## 2019-12-09 VITALS
BODY MASS INDEX: 23.79 KG/M2 | HEIGHT: 59 IN | DIASTOLIC BLOOD PRESSURE: 71 MMHG | WEIGHT: 118 LBS | HEART RATE: 74 BPM | SYSTOLIC BLOOD PRESSURE: 156 MMHG

## 2019-12-09 DIAGNOSIS — M54.81 BILATERAL OCCIPITAL NEURALGIA: Primary | ICD-10-CM

## 2019-12-09 PROCEDURE — 73030 X-RAY EXAM OF SHOULDER: CPT | Mod: TC,RT

## 2019-12-09 PROCEDURE — 64405 NERVE BLOCK: ICD-10-PCS | Mod: HCNC,LT,S$GLB, | Performed by: ANESTHESIOLOGY

## 2019-12-09 PROCEDURE — 64405 NJX AA&/STRD GR OCPL NRV: CPT | Mod: HCNC,LT,S$GLB, | Performed by: ANESTHESIOLOGY

## 2019-12-09 NOTE — PROCEDURES
"Nerve Block  Date/Time: 12/9/2019 8:00 AM  Performed by: Timothy Grimaldo MD  Authorized by: Timothy Grimaldo MD   Consent Done: Yes  Site marked: the operative site was marked  Time out: Immediately prior to procedure a "time out" was called to verify the correct patient, procedure, equipment, support staff and site/side marked as required.  Indications: pain relief  Body area: head  Nerve: greater occipital  Laterality: left  Patient sedated: no  Medications administered: DepoMedrol 40 mg injection (Dexamethasone 4mg)Preparation: Patient was prepped and draped in the usual sterile fashion.  Patient position: sitting  Needle size: 25 G  Location technique: anatomical landmarks  Local Anesthetic: bupivacaine 0.25% without epinephrine  Anesthetic total: 4 mL  Patient tolerance: Patient tolerated the procedure well with no immediate complications        "

## 2019-12-12 ENCOUNTER — TELEPHONE (OUTPATIENT)
Dept: PRIMARY CARE CLINIC | Facility: CLINIC | Age: 74
End: 2019-12-12

## 2019-12-12 NOTE — PROGRESS NOTES
"YUE Yadav, contacted Ms. Rola MENDOZATarun Neelam to update her assessments. Patient scored "9" on the PHQ-9 and  "9" on the LAURA-7.  CHW reminded patient of her appointment with the LCSW on 12/18/2019 at 3:00 pm.  "

## 2019-12-13 ENCOUNTER — PATIENT OUTREACH (OUTPATIENT)
Dept: OTHER | Facility: OTHER | Age: 74
End: 2019-12-13

## 2019-12-13 NOTE — PROGRESS NOTES
Digital Medicine: Health  Follow-Up    Patient reports she has a headache this morning, but relates this to sinus issues.  Reports she is dealing with a bladder infection that started this morning.    The history is provided by the patient.     Follow Up  Follow-up reason(s): reading review      Readings are trending up due to Patient unsure.        INTERVENTION(S)  recommended diet modifications, reviewed monitoring technique and encouragement/support    PLAN  patient verbalizes understanding    Informed patient to charge BP cuff before she takes another reading today.      There are no preventive care reminders to display for this patient.    Last 5 Patient Entered Readings                                      Current 30 Day Average: 149/75     Recent Readings 12/13/2019 12/8/2019 12/6/2019 12/5/2019 12/3/2019    SBP (mmHg) 165 153 145 130 149    DBP (mmHg) 77 80 73 73 72    Pulse 67 71 72 79 84                      Diet Screening       Reports she does not add extra salt, but reports she loves bread.  States she plans to start cutting back on bread intake.    Intervention(s): reducing sodium intake      SDOH

## 2019-12-16 ENCOUNTER — TELEPHONE (OUTPATIENT)
Dept: PRIMARY CARE CLINIC | Facility: CLINIC | Age: 74
End: 2019-12-16

## 2019-12-16 NOTE — PROGRESS NOTES
Catie Masterson CA, returned Ms. Rola Richter phone call to confirm her appointment on 12/18/2019 at 3:00 pm with the \[facility]\""W.

## 2019-12-18 ENCOUNTER — CLINICAL SUPPORT (OUTPATIENT)
Dept: PRIMARY CARE CLINIC | Facility: CLINIC | Age: 74
End: 2019-12-18
Payer: MEDICARE

## 2019-12-18 DIAGNOSIS — F41.9 ANXIETY: ICD-10-CM

## 2019-12-18 DIAGNOSIS — F33.41 MDD (MAJOR DEPRESSIVE DISORDER), RECURRENT, IN PARTIAL REMISSION: Primary | ICD-10-CM

## 2019-12-18 DIAGNOSIS — F43.10 PTSD (POST-TRAUMATIC STRESS DISORDER): ICD-10-CM

## 2019-12-18 PROCEDURE — 90834 PR PSYCHOTHERAPY W/PATIENT, 45 MIN: ICD-10-PCS | Mod: BHI,95,S$GLB, | Performed by: SOCIAL WORKER

## 2019-12-18 PROCEDURE — 90834 PSYTX W PT 45 MINUTES: CPT | Mod: BHI,95,S$GLB, | Performed by: SOCIAL WORKER
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
## 2019-12-20 DIAGNOSIS — K21.9 GASTROESOPHAGEAL REFLUX DISEASE, ESOPHAGITIS PRESENCE NOT SPECIFIED: ICD-10-CM

## 2019-12-20 RX ORDER — IRBESARTAN 150 MG/1
TABLET ORAL
Qty: 90 TABLET | Refills: 3 | Status: SHIPPED | OUTPATIENT
Start: 2019-12-20 | End: 2019-12-24 | Stop reason: SDUPTHER

## 2019-12-20 RX ORDER — OMEPRAZOLE 40 MG/1
CAPSULE, DELAYED RELEASE ORAL
Qty: 90 CAPSULE | Refills: 3 | Status: SHIPPED | OUTPATIENT
Start: 2019-12-20 | End: 2020-11-20

## 2019-12-23 DIAGNOSIS — K59.00 CONSTIPATION, UNSPECIFIED CONSTIPATION TYPE: ICD-10-CM

## 2019-12-23 RX ORDER — LINACLOTIDE 145 UG/1
CAPSULE, GELATIN COATED ORAL
Qty: 30 CAPSULE | Refills: 2 | Status: SHIPPED | OUTPATIENT
Start: 2019-12-23 | End: 2020-02-21

## 2019-12-24 ENCOUNTER — PATIENT OUTREACH (OUTPATIENT)
Dept: OTHER | Facility: OTHER | Age: 74
End: 2019-12-24

## 2019-12-24 DIAGNOSIS — I10 HYPERTENSION, UNSPECIFIED TYPE: Primary | ICD-10-CM

## 2019-12-24 RX ORDER — IRBESARTAN 150 MG/1
300 TABLET ORAL NIGHTLY
Qty: 180 TABLET | Refills: 1 | Status: SHIPPED | OUTPATIENT
Start: 2019-12-24 | End: 2020-06-16

## 2019-12-24 NOTE — PROGRESS NOTES
Digital Medicine: Clinician Follow-Up    Patient reports mild headache, denies other s/s of hypertension  She thinks headache related to sinus/congestion. Reports taking loratadine which improves headache.    The history is provided by the patient.     Follow Up  Follow-up reason(s): reading review and medication change      Readings are trending up   Medication Change: dose increase        INTERVENTION(S)  recommended med change, encouragement/support and denied questions    PLAN  patient verbalizes understanding, patient amenable to changes and continue monitoring    While BP may be affected by sinuses, does not appear she is taking decongestants  12/9 office BP similar to home BP readings  Increase irbesartan to 300 mg daily in the evening  Updated prescription. Will continue with 150 mg tablet to prevent needing to cut tablets in half if require dose reduction in future.      There are no preventive care reminders to display for this patient.    Last 5 Patient Entered Readings                                      Current 30 Day Average: 149/75     Recent Readings 12/20/2019 12/13/2019 12/8/2019 12/6/2019 12/5/2019    SBP (mmHg) 151 165 153 145 130    DBP (mmHg) 75 77 80 73 73    Pulse 74 67 71 72 79             Hypertension Medications             irbesartan (AVAPRO) 150 MG tablet TAKE 1 TABLET EVERY EVENING FOR BLOOD PRESSURE                             Medication Adherence Screening     She does not wonder if medications are working.  She knows purpose of medications.

## 2019-12-31 ENCOUNTER — TELEPHONE (OUTPATIENT)
Dept: FAMILY MEDICINE | Facility: CLINIC | Age: 74
End: 2019-12-31

## 2019-12-31 NOTE — TELEPHONE ENCOUNTER
Received PA request from [facility] for Irbesartan 150 mg tabs - take 2 every evening.    Dose increase was initiated by Xiomara Rodriguez, Pharm D  (digital Hypertension).  2-150mg tabs was requested so patient would not have to cut a 300 mg tab if dose decreases again.   Spoke to patient who wishes to attempt PA for 150 mg tabs.    PA has been requested via the Cover My Meds web site.

## 2020-01-02 ENCOUNTER — TELEPHONE (OUTPATIENT)
Dept: FAMILY MEDICINE | Facility: CLINIC | Age: 75
End: 2020-01-02

## 2020-01-02 NOTE — TELEPHONE ENCOUNTER
Received PA approval from [facility] for Irbesartan 150 mg tabs take 2 every evening.   Patient's  has been informed and verbalizes understanding.

## 2020-01-14 ENCOUNTER — PATIENT OUTREACH (OUTPATIENT)
Dept: OTHER | Facility: OTHER | Age: 75
End: 2020-01-14

## 2020-01-14 NOTE — PROGRESS NOTES
Digital Medicine: Clinician Follow-Up    Patient confirms starting increased dose of irbesartan  She states phone and BP cuff were not connecting. Her  received assistance from [employer].    The history is provided by the patient.     Follow Up  Follow-up reason(s): reading review and medication change follow-up      Patient started new medication.    Is patient tolerating med change?:  Yes      INTERVENTION(S)  reviewed appropriate dose schedule, encouragement/support and denied questions    PLAN  patient verbalizes understanding and continue monitoring    Infrequent BP readings since irbesartan increased. BP slightly improved.  Continue with current regimen.      There are no preventive care reminders to display for this patient.    Last 5 Patient Entered Readings                                      Current 30 Day Average: 139/78     Recent Readings 1/6/2020 12/30/2019 12/20/2019 12/13/2019 12/8/2019    SBP (mmHg) 147 120 151 165 153    DBP (mmHg) 79 80 75 77 80    Pulse 82 80 74 67 71             Hypertension Medications             irbesartan (AVAPRO) 150 MG tablet Take 2 tablets (300 mg total) by mouth every evening.                             Medication Adherence Screening     She does not wonder if medications are working.  She knows purpose of medications.

## 2020-01-16 ENCOUNTER — OFFICE VISIT (OUTPATIENT)
Dept: PSYCHIATRY | Facility: CLINIC | Age: 75
End: 2020-01-16
Payer: MEDICARE

## 2020-01-16 VITALS
HEIGHT: 59 IN | DIASTOLIC BLOOD PRESSURE: 70 MMHG | WEIGHT: 119.38 LBS | BODY MASS INDEX: 24.07 KG/M2 | SYSTOLIC BLOOD PRESSURE: 139 MMHG | HEART RATE: 80 BPM

## 2020-01-16 DIAGNOSIS — F43.10 PTSD (POST-TRAUMATIC STRESS DISORDER): Primary | ICD-10-CM

## 2020-01-16 DIAGNOSIS — I10 ESSENTIAL HYPERTENSION: ICD-10-CM

## 2020-01-16 DIAGNOSIS — F33.41 MDD (MAJOR DEPRESSIVE DISORDER), RECURRENT, IN PARTIAL REMISSION: ICD-10-CM

## 2020-01-16 PROCEDURE — 99999 PR PBB SHADOW E&M-EST. PATIENT-LVL III: CPT | Mod: PBBFAC,HCNC,, | Performed by: PSYCHIATRY & NEUROLOGY

## 2020-01-16 PROCEDURE — 3075F PR MOST RECENT SYSTOLIC BLOOD PRESS GE 130-139MM HG: ICD-10-PCS | Mod: HCNC,CPTII,S$GLB, | Performed by: PSYCHIATRY & NEUROLOGY

## 2020-01-16 PROCEDURE — 3075F SYST BP GE 130 - 139MM HG: CPT | Mod: HCNC,CPTII,S$GLB, | Performed by: PSYCHIATRY & NEUROLOGY

## 2020-01-16 PROCEDURE — 99999 PR PBB SHADOW E&M-EST. PATIENT-LVL III: ICD-10-PCS | Mod: PBBFAC,HCNC,, | Performed by: PSYCHIATRY & NEUROLOGY

## 2020-01-16 PROCEDURE — 1101F PT FALLS ASSESS-DOCD LE1/YR: CPT | Mod: HCNC,CPTII,S$GLB, | Performed by: PSYCHIATRY & NEUROLOGY

## 2020-01-16 PROCEDURE — 1125F PR PAIN SEVERITY QUANTIFIED, PAIN PRESENT: ICD-10-PCS | Mod: HCNC,S$GLB,, | Performed by: PSYCHIATRY & NEUROLOGY

## 2020-01-16 PROCEDURE — 3078F DIAST BP <80 MM HG: CPT | Mod: HCNC,CPTII,S$GLB, | Performed by: PSYCHIATRY & NEUROLOGY

## 2020-01-16 PROCEDURE — 99214 PR OFFICE/OUTPT VISIT, EST, LEVL IV, 30-39 MIN: ICD-10-PCS | Mod: HCNC,S$GLB,, | Performed by: PSYCHIATRY & NEUROLOGY

## 2020-01-16 PROCEDURE — 1101F PR PT FALLS ASSESS DOC 0-1 FALLS W/OUT INJ PAST YR: ICD-10-PCS | Mod: HCNC,CPTII,S$GLB, | Performed by: PSYCHIATRY & NEUROLOGY

## 2020-01-16 PROCEDURE — 3078F PR MOST RECENT DIASTOLIC BLOOD PRESSURE < 80 MM HG: ICD-10-PCS | Mod: HCNC,CPTII,S$GLB, | Performed by: PSYCHIATRY & NEUROLOGY

## 2020-01-16 PROCEDURE — 1159F PR MEDICATION LIST DOCUMENTED IN MEDICAL RECORD: ICD-10-PCS | Mod: HCNC,S$GLB,, | Performed by: PSYCHIATRY & NEUROLOGY

## 2020-01-16 PROCEDURE — 1125F AMNT PAIN NOTED PAIN PRSNT: CPT | Mod: HCNC,S$GLB,, | Performed by: PSYCHIATRY & NEUROLOGY

## 2020-01-16 PROCEDURE — 99214 OFFICE O/P EST MOD 30 MIN: CPT | Mod: HCNC,S$GLB,, | Performed by: PSYCHIATRY & NEUROLOGY

## 2020-01-16 PROCEDURE — 1159F MED LIST DOCD IN RCRD: CPT | Mod: HCNC,S$GLB,, | Performed by: PSYCHIATRY & NEUROLOGY

## 2020-01-16 RX ORDER — BUSPIRONE HYDROCHLORIDE 10 MG/1
10 TABLET ORAL 2 TIMES DAILY
Qty: 180 TABLET | Refills: 0 | Status: SHIPPED | OUTPATIENT
Start: 2020-01-16 | End: 2020-04-17 | Stop reason: SDUPTHER

## 2020-01-16 RX ORDER — SERTRALINE HYDROCHLORIDE 100 MG/1
TABLET, FILM COATED ORAL
Qty: 90 TABLET | Refills: 0 | Status: SHIPPED | OUTPATIENT
Start: 2020-01-16 | End: 2020-04-17 | Stop reason: SDUPTHER
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
## 2020-01-20 ENCOUNTER — LAB VISIT (OUTPATIENT)
Dept: LAB | Facility: HOSPITAL | Age: 75
End: 2020-01-20
Attending: INTERNAL MEDICINE
Payer: MEDICARE

## 2020-01-20 ENCOUNTER — OFFICE VISIT (OUTPATIENT)
Dept: RHEUMATOLOGY | Facility: CLINIC | Age: 75
End: 2020-01-20
Payer: MEDICARE

## 2020-01-20 VITALS
BODY MASS INDEX: 24.38 KG/M2 | DIASTOLIC BLOOD PRESSURE: 76 MMHG | SYSTOLIC BLOOD PRESSURE: 168 MMHG | WEIGHT: 120.69 LBS

## 2020-01-20 DIAGNOSIS — M15.9 PRIMARY OSTEOARTHRITIS INVOLVING MULTIPLE JOINTS: ICD-10-CM

## 2020-01-20 DIAGNOSIS — M81.0 AGE-RELATED OSTEOPOROSIS WITHOUT CURRENT PATHOLOGICAL FRACTURE: ICD-10-CM

## 2020-01-20 DIAGNOSIS — M81.0 AGE-RELATED OSTEOPOROSIS WITHOUT CURRENT PATHOLOGICAL FRACTURE: Primary | ICD-10-CM

## 2020-01-20 LAB
ALBUMIN SERPL BCP-MCNC: 4.3 G/DL (ref 3.5–5.2)
ALP SERPL-CCNC: 61 U/L (ref 55–135)
ALT SERPL W/O P-5'-P-CCNC: 13 U/L (ref 10–44)
ANION GAP SERPL CALC-SCNC: 9 MMOL/L (ref 8–16)
AST SERPL-CCNC: 20 U/L (ref 10–40)
BILIRUB SERPL-MCNC: 0.7 MG/DL (ref 0.1–1)
BUN SERPL-MCNC: 15 MG/DL (ref 8–23)
CALCIUM SERPL-MCNC: 9.2 MG/DL (ref 8.7–10.5)
CHLORIDE SERPL-SCNC: 100 MMOL/L (ref 95–110)
CO2 SERPL-SCNC: 27 MMOL/L (ref 23–29)
CREAT SERPL-MCNC: 0.7 MG/DL (ref 0.5–1.4)
EST. GFR  (AFRICAN AMERICAN): >60 ML/MIN/1.73 M^2
EST. GFR  (NON AFRICAN AMERICAN): >60 ML/MIN/1.73 M^2
GLUCOSE SERPL-MCNC: 93 MG/DL (ref 70–110)
POTASSIUM SERPL-SCNC: 4.2 MMOL/L (ref 3.5–5.1)
PROT SERPL-MCNC: 7.6 G/DL (ref 6–8.4)
SODIUM SERPL-SCNC: 136 MMOL/L (ref 136–145)

## 2020-01-20 PROCEDURE — 1125F AMNT PAIN NOTED PAIN PRSNT: CPT | Mod: S$GLB,,, | Performed by: INTERNAL MEDICINE

## 2020-01-20 PROCEDURE — 3078F PR MOST RECENT DIASTOLIC BLOOD PRESSURE < 80 MM HG: ICD-10-PCS | Mod: S$GLB,,, | Performed by: INTERNAL MEDICINE

## 2020-01-20 PROCEDURE — 99213 PR OFFICE/OUTPT VISIT, EST, LEVL III, 20-29 MIN: ICD-10-PCS | Mod: S$GLB,,, | Performed by: INTERNAL MEDICINE

## 2020-01-20 PROCEDURE — 1159F PR MEDICATION LIST DOCUMENTED IN MEDICAL RECORD: ICD-10-PCS | Mod: S$GLB,,, | Performed by: INTERNAL MEDICINE

## 2020-01-20 PROCEDURE — 99213 OFFICE O/P EST LOW 20 MIN: CPT | Mod: S$GLB,,, | Performed by: INTERNAL MEDICINE

## 2020-01-20 PROCEDURE — 3077F SYST BP >= 140 MM HG: CPT | Mod: S$GLB,,, | Performed by: INTERNAL MEDICINE

## 2020-01-20 PROCEDURE — 80053 COMPREHEN METABOLIC PANEL: CPT

## 2020-01-20 PROCEDURE — 3077F PR MOST RECENT SYSTOLIC BLOOD PRESSURE >= 140 MM HG: ICD-10-PCS | Mod: S$GLB,,, | Performed by: INTERNAL MEDICINE

## 2020-01-20 PROCEDURE — 1101F PT FALLS ASSESS-DOCD LE1/YR: CPT | Mod: S$GLB,,, | Performed by: INTERNAL MEDICINE

## 2020-01-20 PROCEDURE — 1101F PR PT FALLS ASSESS DOC 0-1 FALLS W/OUT INJ PAST YR: ICD-10-PCS | Mod: S$GLB,,, | Performed by: INTERNAL MEDICINE

## 2020-01-20 PROCEDURE — 3078F DIAST BP <80 MM HG: CPT | Mod: S$GLB,,, | Performed by: INTERNAL MEDICINE

## 2020-01-20 PROCEDURE — 1159F MED LIST DOCD IN RCRD: CPT | Mod: S$GLB,,, | Performed by: INTERNAL MEDICINE

## 2020-01-20 PROCEDURE — 1125F PR PAIN SEVERITY QUANTIFIED, PAIN PRESENT: ICD-10-PCS | Mod: S$GLB,,, | Performed by: INTERNAL MEDICINE

## 2020-01-20 PROCEDURE — 36415 COLL VENOUS BLD VENIPUNCTURE: CPT

## 2020-01-20 NOTE — PROGRESS NOTES
[facility] RHEUMATOLOGY            PROGRESS NOTE      Subjective:       Patient ID:   NAME: Rola Richter : 1945     74 y.o. female    Referring Doc: No ref. provider found  Other Physicians:    Chief Complaint:  Osteoporosis      History of Present Illness:     Patient returns today for a regularly scheduled follow-up visit for   osteoarthritis and osteoporosis  The patient has some neck pain and low back pain but mild.  No chest pains cough or shortness of breath.  No GI complaints.  Mild fatigue.  No rashes.            ROS:   GEN:  No  fever, night sweats . weight is stable   + mild fatigue  SKIN: no rashes, no bruising, no ulcerations, no Raynaud's  HEENT: no HA's, No visual changes, no mucosal ulcers, no sicca symptoms,  CV:   no CP, SOB, PND, DALE, no orthopnea, no palpitations  PULM: normal with no SOB, cough, hemoptysis, sputum or pleuritic pain  GI:  no abdominal pain, nausea, vomiting, constipation, diarrhea, melanotic stools, BRBPR, hematemesis, no dysphagia  :   no dysuria  NEURO: no paresthesias, headaches, visual disturbances, muscle weakness  MUSCULOSKELETAL:no joint swelling, prolonged AM stiffness, + back pain, no muscle pain  Allergies:  Review of patient's allergies indicates:   Allergen Reactions    Aspirin Nausea And Vomiting    Penicillins Itching    Lipitor [atorvastatin]      Achy      Sulfa (sulfonamide antibiotics) Itching       Medications:    Current Outpatient Medications:     busPIRone (BUSPAR) 10 MG tablet, Take 1 tablet (10 mg total) by mouth 2 (two) times daily., Disp: 180 tablet, Rfl: 0    butalbital-aspirin-caffeine -40 mg (FIORINAL) -40 mg Cap, Take 1 capsule by mouth every 4 (four) hours as needed., Disp: , Rfl:     diclofenac sodium (VOLTAREN) 1 % Gel, Apply 2 g topically once daily., Disp: 100 g, Rfl: prn    fish oil-omega-3 fatty acids 300-1,000 mg capsule, Take 2 g by mouth once daily., Disp: , Rfl:     fluorometholone 0.1% (FML) 0.1 % DrpS,  1 drop 4 (four) times daily., Disp: , Rfl:     fluticasone (FLONASE) 50 mcg/actuation nasal spray, 2 sprays by Each Nare route once daily., Disp: 1 Bottle, Rfl: prn    HYDROcodone-acetaminophen (NORCO) 5-325 mg per tablet, Take 1 tablet by mouth 2 (two) times daily as needed for Pain., Disp: 60 tablet, Rfl: 0    [START ON 2/2/2020] HYDROcodone-acetaminophen (NORCO) 5-325 mg per tablet, Take 1 tablet by mouth 2 (two) times daily as needed for Pain., Disp: 60 tablet, Rfl: 0    irbesartan (AVAPRO) 150 MG tablet, Take 2 tablets (300 mg total) by mouth every evening., Disp: 180 tablet, Rfl: 1    LINZESS 145 mcg Cap capsule, TAKE ONE CAPSULE BY MOUTH ONCE DAILY, MORE THAN 30 MINUTES BEFORE THE FIRST MEAL OF THE DAY, Disp: 30 capsule, Rfl: 2    loratadine (CLARITIN) 10 mg tablet, Take 10 mg by mouth once daily., Disp: , Rfl:     multivit with min-folic acid 200 mcg Chew, , Disp: , Rfl:     omeprazole (PRILOSEC) 40 MG capsule, TAKE 1 CAPSULE EVERY DAY, Disp: 90 capsule, Rfl: 3    rosuvastatin (CRESTOR) 10 MG tablet, Take 10 mg by mouth once daily., Disp: , Rfl:     sertraline (ZOLOFT) 100 MG tablet, TAKE 1 TABLET ONE TIME DAILY, Disp: 90 tablet, Rfl: 0    PMHx/PSHx Updates:        Objective:     Vitals:  Blood pressure (!) 168/76, weight 54.7 kg (120 lb 11.2 oz).    Physical Examination:   GEN: no apparent distress, comfortable; AAOx3  SKIN: no rashes,no ulceration, no Raynaud's, no petechiae, no SQ nodules,  HEAD: normal  EYES: no pallor, no icterus,  NECK: no masses, thyroid normal, trachea midline, no LAD/LN's, supple  CV: RRR with no murmur; l S1 and S2 reg. ,no gallop no rubs,   CHEST: Normal respiratory effort; CTAB; normal breath sounds; no wheeze or crackle  MUSC/Skeletal: ROM normal; no crepitus; joints without synovitis,  + Heberden's deformities  No joint swelling or tenderness of PIP, MCP, wrist, elbow, shoulder, or knee joints  EXTREM: no clubbing, cyanosis, no edema,normal  pulses   NEURO: grossly  intact; motor WNL; AAOx3;   PSYCH: normal mood, affect and behavior  LYMPH: normal cervical, supraclavicular          Labs:   Lab Results   Component Value Date    WBC 9.31 12/05/2019    HGB 11.5 (L) 12/05/2019    HCT 34.9 (L) 12/05/2019    MCV 91 12/05/2019     12/05/2019    CMP  @LASTLAB(NA,K,CL,CO2,GLU,BUN,Creatinine,Calcium,PROT,Albumin,Bilitot,Alkphos,AST,ALT,CRP,ESR,RF,CCP,NATHANIEL,SSA,CPK,uric acid) )@  I have reviewed all available lab results and radiology reports.    Radiology/Diagnostic Studies:        Assessment/Plan:   (1) 74 y.o. female with diagnosis of OA stable  Osteoporosis:  Apparently had 1 year of Reclast.  This was 2018.  Cont Vit D  Prolia q.6 months            Discussion:     I have explained all of the above in detail and the patient understands all of the current recommendation(s). I have answered all questions to the best of my ability and to their complete satisfaction.       The patient is to continue with the current management plan         RTC in   for 5 months      Electronically signed by Jayme Ram MD

## 2020-01-22 ENCOUNTER — TELEPHONE (OUTPATIENT)
Dept: PRIMARY CARE CLINIC | Facility: CLINIC | Age: 75
End: 2020-01-22

## 2020-01-22 NOTE — PROGRESS NOTES
"YUE Yadav, contacted Ms Richter to update the assessments. Patient scored "9" on the PHQ9 and "7" on the GAD7.  CHW also reminded patient of her appointment tomorrow 01/23/2020 at 3:00 pm with Heber Petersen LCSW.  "

## 2020-01-23 ENCOUNTER — CLINICAL SUPPORT (OUTPATIENT)
Dept: PRIMARY CARE CLINIC | Facility: CLINIC | Age: 75
End: 2020-01-23
Payer: MEDICARE

## 2020-01-23 DIAGNOSIS — F33.41 MDD (MAJOR DEPRESSIVE DISORDER), RECURRENT, IN PARTIAL REMISSION: Primary | ICD-10-CM

## 2020-01-23 DIAGNOSIS — F43.10 PTSD (POST-TRAUMATIC STRESS DISORDER): ICD-10-CM

## 2020-01-23 DIAGNOSIS — F41.9 ANXIETY: ICD-10-CM

## 2020-01-23 PROCEDURE — 90834 PSYTX W PT 45 MINUTES: CPT | Mod: BHI,95,S$GLB, | Performed by: SOCIAL WORKER

## 2020-01-23 PROCEDURE — 90834 PR PSYCHOTHERAPY W/PATIENT, 45 MIN: ICD-10-PCS | Mod: BHI,95,S$GLB, | Performed by: SOCIAL WORKER

## 2020-01-23 NOTE — PROGRESS NOTES
Individual Psychotherapy (PhD/LCSW)    The patient location is:  Patient Home   Visit type: Telephone  Each patient to whom he or she provides medical services by telemedicine is:  (1) informed of the relationship between the physician and patient and the respective role of any other health care provider with respect to management of the patient; and (2) notified that he or she may decline to receive medical services by telemedicine and may withdraw from such care at any time.      1/23/2020    Site:  [facility]         Therapeutic Intervention: Met with patient.  Outpatient - Behavior modifying psychotherapy 45 min - CPT code 35368 and Outpatient - Supportive psychotherapy 45 min - CPT Code 09226    Chief complaint/reason for encounter: depression, anxiety and cognition     Interval history and content of current session: LCSW and PT met by phone. PT states he is doing overall very well. PT states her current pain is a 7/10 today. PT states she has been practicing living more in the moment, slowing down, and encouraging her  to slow down and be in the moment as well. PT states they are going to [place] at the end of Feb to see her 's uncle. PT state she would like to start sewing more, as she feels like she has lost the internal motivation to complete her projects. LCSW and PT processed short term goal setting and the importance to set time aside to enjoy her craft.       Treatment plan:  · Target symptoms: recurrent depression, anxiety   · Why chosen therapy is appropriate versus another modality: relevant to diagnosis, patient responds to this modality, evidence based practice  · Outcome monitoring methods: self-report, checklist/rating scale  · Therapeutic intervention type: behavior modifying psychotherapy, supportive psychotherapy    Risk parameters:  Patient reports no suicidal ideation  Patient reports no homicidal ideation  Patient reports no self-injurious behavior  Patient reports no  violent behavior    Verbal deficits: None    Patient's response to intervention:  The patient's response to intervention is motivated.    Progress toward goals and other mental status changes:  The patient's progress toward goals is good.    Diagnosis:     ICD-10-CM ICD-9-CM   1. MDD (major depressive disorder), recurrent, in partial remission F33.41 296.35   2. Anxiety F41.9 300.00   3. PTSD (post-traumatic stress disorder) F43.10 309.81       Plan:  individual psychotherapy    Return to clinic: 1 month, as scheduled, as needed    Length of Service (minutes): 45

## 2020-02-03 ENCOUNTER — PATIENT OUTREACH (OUTPATIENT)
Dept: OTHER | Facility: OTHER | Age: 75
End: 2020-02-03

## 2020-02-03 NOTE — PROGRESS NOTES
"Digital Medicine: Health  Follow-Up    Patient states she and her  have been trying to take BP readings, but it just "cuts off."  States the blood pressure cuff inflates, but before it gives the reading, the jose r and BP machine cut off.  States a red light appears on the BP machine.    The history is provided by the patient.     Follow Up  Follow-up reason(s): reading review      Readings are missing.   [facility] support needed and tech support needed.      INTERVENTION(S)  reviewed monitoring technique and encouragement/support    PLAN  patient verbalizes understanding    Informed patient to check if the [employer] jose r is updated.  If they want to call the tech support team, I provided the number via [employer], as well as the [facility] location in [place] since they are in [place].      There are no preventive care reminders to display for this patient.    Last 5 Patient Entered Readings                                      Current 30 Day Average: 138/68     Recent Readings 1/16/2020 1/15/2020 1/6/2020 12/30/2019 12/20/2019    SBP (mmHg) 125 141 147 120 151    DBP (mmHg) 57 68 79 80 75    Pulse 79 74 82 80 74                  Screenings    SDOH  "

## 2020-02-04 NOTE — PROGRESS NOTES
Infrequent BP readings, health  addressed 2/3/20  Last BP reading at goal    Hypertension Medications             irbesartan (AVAPRO) 150 MG tablet Take 2 tablets (300 mg total) by mouth every evening.

## 2020-02-12 ENCOUNTER — OFFICE VISIT (OUTPATIENT)
Dept: PAIN MEDICINE | Facility: CLINIC | Age: 75
End: 2020-02-12
Payer: MEDICARE

## 2020-02-12 VITALS
HEIGHT: 59 IN | SYSTOLIC BLOOD PRESSURE: 129 MMHG | DIASTOLIC BLOOD PRESSURE: 68 MMHG | HEART RATE: 82 BPM | WEIGHT: 120 LBS | BODY MASS INDEX: 24.19 KG/M2

## 2020-02-12 DIAGNOSIS — M54.81 BILATERAL OCCIPITAL NEURALGIA: Primary | ICD-10-CM

## 2020-02-12 DIAGNOSIS — M47.812 SPONDYLOSIS OF CERVICAL REGION WITHOUT MYELOPATHY OR RADICULOPATHY: ICD-10-CM

## 2020-02-12 DIAGNOSIS — M79.18 MYOFASCIAL PAIN: ICD-10-CM

## 2020-02-12 DIAGNOSIS — M50.30 DDD (DEGENERATIVE DISC DISEASE), CERVICAL: ICD-10-CM

## 2020-02-12 DIAGNOSIS — G89.4 CHRONIC PAIN DISORDER: Primary | ICD-10-CM

## 2020-02-12 PROCEDURE — 1159F PR MEDICATION LIST DOCUMENTED IN MEDICAL RECORD: ICD-10-PCS | Mod: HCNC,S$GLB,, | Performed by: PHYSICIAN ASSISTANT

## 2020-02-12 PROCEDURE — 99999 PR PBB SHADOW E&M-EST. PATIENT-LVL IV: CPT | Mod: PBBFAC,HCNC,, | Performed by: PHYSICIAN ASSISTANT

## 2020-02-12 PROCEDURE — 1100F PR PT FALLS ASSESS DOC 2+ FALLS/FALL W/INJURY/YR: ICD-10-PCS | Mod: HCNC,CPTII,S$GLB, | Performed by: PHYSICIAN ASSISTANT

## 2020-02-12 PROCEDURE — 99214 PR OFFICE/OUTPT VISIT, EST, LEVL IV, 30-39 MIN: ICD-10-PCS | Mod: HCNC,S$GLB,, | Performed by: PHYSICIAN ASSISTANT

## 2020-02-12 PROCEDURE — 1159F MED LIST DOCD IN RCRD: CPT | Mod: HCNC,S$GLB,, | Performed by: PHYSICIAN ASSISTANT

## 2020-02-12 PROCEDURE — 3288F FALL RISK ASSESSMENT DOCD: CPT | Mod: HCNC,CPTII,S$GLB, | Performed by: PHYSICIAN ASSISTANT

## 2020-02-12 PROCEDURE — 3078F DIAST BP <80 MM HG: CPT | Mod: HCNC,CPTII,S$GLB, | Performed by: PHYSICIAN ASSISTANT

## 2020-02-12 PROCEDURE — 3288F PR FALLS RISK ASSESSMENT DOCUMENTED: ICD-10-PCS | Mod: HCNC,CPTII,S$GLB, | Performed by: PHYSICIAN ASSISTANT

## 2020-02-12 PROCEDURE — 99214 OFFICE O/P EST MOD 30 MIN: CPT | Mod: HCNC,S$GLB,, | Performed by: PHYSICIAN ASSISTANT

## 2020-02-12 PROCEDURE — 1125F PR PAIN SEVERITY QUANTIFIED, PAIN PRESENT: ICD-10-PCS | Mod: HCNC,S$GLB,, | Performed by: PHYSICIAN ASSISTANT

## 2020-02-12 PROCEDURE — 1125F AMNT PAIN NOTED PAIN PRSNT: CPT | Mod: HCNC,S$GLB,, | Performed by: PHYSICIAN ASSISTANT

## 2020-02-12 PROCEDURE — 3074F PR MOST RECENT SYSTOLIC BLOOD PRESSURE < 130 MM HG: ICD-10-PCS | Mod: HCNC,CPTII,S$GLB, | Performed by: PHYSICIAN ASSISTANT

## 2020-02-12 PROCEDURE — 1100F PTFALLS ASSESS-DOCD GE2>/YR: CPT | Mod: HCNC,CPTII,S$GLB, | Performed by: PHYSICIAN ASSISTANT

## 2020-02-12 PROCEDURE — 3078F PR MOST RECENT DIASTOLIC BLOOD PRESSURE < 80 MM HG: ICD-10-PCS | Mod: HCNC,CPTII,S$GLB, | Performed by: PHYSICIAN ASSISTANT

## 2020-02-12 PROCEDURE — 99999 PR PBB SHADOW E&M-EST. PATIENT-LVL IV: ICD-10-PCS | Mod: PBBFAC,HCNC,, | Performed by: PHYSICIAN ASSISTANT

## 2020-02-12 PROCEDURE — 3074F SYST BP LT 130 MM HG: CPT | Mod: HCNC,CPTII,S$GLB, | Performed by: PHYSICIAN ASSISTANT

## 2020-02-12 RX ORDER — METHOCARBAMOL 500 MG/1
500 TABLET, FILM COATED ORAL 3 TIMES DAILY PRN
Qty: 270 TABLET | Refills: 0 | Status: SHIPPED | OUTPATIENT
Start: 2020-02-12 | End: 2020-07-01 | Stop reason: SDUPTHER

## 2020-02-12 RX ORDER — HYDROCODONE BITARTRATE AND ACETAMINOPHEN 5; 325 MG/1; MG/1
1 TABLET ORAL EVERY 12 HOURS PRN
Qty: 60 TABLET | Refills: 0 | Status: SHIPPED | OUTPATIENT
Start: 2020-02-28 | End: 2020-03-28

## 2020-02-12 RX ORDER — HYDROCODONE BITARTRATE AND ACETAMINOPHEN 5; 325 MG/1; MG/1
1 TABLET ORAL EVERY 12 HOURS PRN
Qty: 60 TABLET | Refills: 0 | Status: SHIPPED | OUTPATIENT
Start: 2020-04-26 | End: 2020-07-01 | Stop reason: SDUPTHER

## 2020-02-12 RX ORDER — HYDROCODONE BITARTRATE AND ACETAMINOPHEN 5; 325 MG/1; MG/1
1 TABLET ORAL EVERY 12 HOURS PRN
Qty: 60 TABLET | Refills: 0 | Status: SHIPPED | OUTPATIENT
Start: 2020-03-28 | End: 2020-04-26

## 2020-02-12 NOTE — PROGRESS NOTES
Referring Physician: No ref. provider found    PCP: Liseth Carias MD      CC: neck and left occipital pain    Interval history: Ms. Richter is a 74 y.o. female with neck pain and occipital neuralgia who presents today for f/u and medication refill. She continues to benefit from repeat occipital nerve block. Neck pain and arm pain is improved since cervical MELANY.  Her pain no longer radiates into both arms. Previously right arm pain traveled to her hand, her left stops around her shoulder.  She had numbness to her right hand and first through fourth digits. Cervical MELANY in February 2018 that only provided benefit for a short time.     She continues to take Norco and Robaxin which provide benefit. Flexeril caused dry mouth. Denies UE weakness. No bowel bladder changes.   Pain today is rated 9/10.    Prior HPI:   Patient is 70-year-old female with past history history of cervical DDD, cervical spondylosis and chronic headaches.  She recently moved here from [place], [place].  She is treated in the past by neurology.  She states having constant burning pain over the left side of her posterior scalp.  Pain radiates to her neck as well as a frontal.  She also has left-sided neck pain as well.  She denies any radicular arm pain.  No numbness or weakness.  She states having cervical epidural steroid injection at past with minimal benefit.  She also has had decided of cervical nerve blocks in the past with moderate benefit.  Most recent injection was performed 2 months ago.  She desires repeat injection.  She currently takes Norco 10 mg every 12 hours as needed with moderate benefit.  She also takes Zanaflex 4 mg every 8 hours with mild benefits.  She rates her pain 7/10 today.    Pain intervention history: s/p left occipital nerve blocks on 1/2016 with 50% relief of her headaches  S/p cervical MELANY 2/8/18 moderate relief for a couple of weeks.     ROS:  CONSTITUTIONAL: No fevers, chills, night sweats, wt. loss,  appetite changes  SKIN: no rashes or itching  ENT: No headaches, head trauma, vision changes, or eye pain  LYMPH NODES: None noticed   CV: No chest pain, palpitations.   RESP: No shortness of breath, dyspnea on exertion, cough, wheezing, or hemoptysis  GI: No nausea, emesis, diarrhea, constipation, melena, hematochezia, pain.    : No dysuria, hematuria, urgency, or frequency   HEME: No easy bruising, bleeding problems  PSYCHIATRIC: No depression, anxiety, psychosis, hallucinations.  NEURO: No seizures, memory loss, dizziness or difficulty sleeping  MSK: + History of present illness      Past Medical History:   Diagnosis Date    Anxiety     Arthritis     Cataract     DDD (degenerative disc disease), lumbar     Depression     Encounter for blood transfusion     GERD (gastroesophageal reflux disease)     Hyperlipidemia     Hypertension     pt states she does not take meds anymore she just watches her weight    Immunosuppressed status 2016    Neuromuscular disorder     Occipital neuralgia 3/24/2017    Osteoporosis     Substance abuse      Past Surgical History:   Procedure Laterality Date    APPENDECTOMY       SECTION      x 2    CHOLECYSTECTOMY      COLONOSCOPY  prior to     COLONOSCOPY N/A 2019    Procedure: COLONOSCOPY;  Surgeon: Greg Victor MD;  Location: [facility];  Service: Endoscopy;  Laterality: N/A;    ESOPHAGOGASTRODUODENOSCOPY N/A 2019    Procedure: EGD (ESOPHAGOGASTRODUODENOSCOPY);  Surgeon: Greg Victor MD;  Location: [facility];  Service: Endoscopy;  Laterality: N/A;    HYSTERECTOMY      Laser Periphery Iridotomy Bilateral     OD 16 and OS touch up 2016    UPPER GASTROINTESTINAL ENDOSCOPY  2017    Dr. Marcial: esophageal stenosis- dilated, gastritis, gastric polyps removed; biopsy- mild gastritis, negative for h pylori, hyperplastic polyp, esophagus unremarkable    vocal cord tumor removal       Family History   Problem Relation  Age of Onset    Osteoarthritis Mother     Alcohol abuse Mother     Rheum arthritis Mother     Osteoarthritis Sister     Diabetes Brother     No Known Problems Son     No Known Problems Sister     No Known Problems Sister     No Known Problems Brother     Arthritis Son     No Known Problems Son     Stroke Maternal Grandmother 99    Rheum arthritis Maternal Grandmother     Retinal detachment Neg Hx     Macular degeneration Neg Hx     Glaucoma Neg Hx     Amblyopia Neg Hx     Blindness Neg Hx     Cancer Neg Hx     Cataracts Neg Hx     Hypertension Neg Hx     Strabismus Neg Hx     Thyroid disease Neg Hx     Lupus Neg Hx     Kidney disease Neg Hx     Inflammatory bowel disease Neg Hx     Psoriasis Neg Hx     Colon cancer Neg Hx     Crohn's disease Neg Hx     Ulcerative colitis Neg Hx     Stomach cancer Neg Hx     Esophageal cancer Neg Hx      Social History     Socioeconomic History    Marital status:      Spouse name: Not on file    Number of children: Not on file    Years of education: Not on file    Highest education level: Not on file   Occupational History    Not on file   Social Needs    Financial resource strain: Hard    Food insecurity:     Worry: Never true     Inability: Never true    Transportation needs:     Medical: No     Non-medical: No   Tobacco Use    Smoking status: Never Smoker    Smokeless tobacco: Never Used   Substance and Sexual Activity    Alcohol use: No     Alcohol/week: 0.0 standard drinks     Frequency: Never     Binge frequency: Never    Drug use: Never    Sexual activity: Yes     Partners: Male   Lifestyle    Physical activity:     Days per week: 0 days     Minutes per session: Not on file    Stress: Not at all   Relationships    Social connections:     Talks on phone: More than three times a week     Gets together: Twice a week     Attends Synagogue service: Not on file     Active member of club or organization: Yes     Attends meetings  "of clubs or organizations: More than 4 times per year     Relationship status:    Other Topics Concern    Not on file   Social History Narrative    Not on file         Medications/Allergies: See med card    Vitals:    02/12/20 1059   BP: 129/68   Pulse: 82   Weight: 54.4 kg (120 lb)   Height: 4' 11" (1.499 m)   PainSc:   9   PainLoc: Neck         Physical exam:    GENERAL: A and O x3, the patient appears well groomed and is in no acute distress.  Skin: No rashes or obvious lesions  HEENT: normocephalic, atraumatic  CARDIOVASCULAR:  RRR  LUNGS: nonlabored breathing  ABDOMEN: soft, nontender   UPPER EXTREMITIES: Normal alignment, normal range of motion, no atrophy, no skin changes,  hair growth and nail growth normal and equal bilaterally. No swelling, no tenderness.  +Phalens on left side. +TTP tricep tendon  LOWER EXTREMITIES:  Normal alignment, normal range of motion, no atrophy, no skin changes,  hair growth and nail growth normal and equal bilaterally. No swelling, no tenderness.  CERVICAL SPINE:   Cervical spine: ROM is full in flexion, extension and lateral rotation.  Painful flexion > extension.  Positive facet loading bilaterally.  Spurling is positive at right side.  Myofascial exam:  Tenderness to palpation across cervical paraspinals deltoid and trapezius muscles bilaterally.      MENTAL STATUS: normal orientation, speech, language, and fund of knowledge for social situation.  Emotional state appropriate.    CRANIAL NERVES:  II:  PERRL bilaterally,   III,IV,VI: EOMI.    V:  Facial sensation equal bilaterally  VII:  Facial motor function normal.  VIII:  Hearing equal to finger rub bilaterally  IX/X: Gag normal, palate symmetric  XI:  Shoulder shrug equal, head turn equal  XII:  Tongue midline without fasciculations      MOTOR: Tone and bulk: normal bilateral upper and lower Strength: normal "   Delt Bi Tri WE WF     R 5 5 5 5 5 5   L 5 5 5 5 5 5     IP ADD ABD Quad TA Gas HAM  R 5 5 5 5 5 5 5  L 5 5 5 5 5 5 5    SENSATION: Light touch and pinprick intact bilaterally  REFLEXES: normal, symmetric, nonbrisk.  Toes down, no clonus. No hoffmans.  GAIT: normal rise, base, steps, and arm swing.        Imaging:   Cervical MRI 12/4/17    Narrative     EXAM: Cervical spine MRI without contrast.    INDICATION: Cervical radiculopathy.  Neck pain and occipital neuralgia.  The patient complains of neck and right arm pain.    TECHNIQUE: Routine multiplanar, multisequence unenhanced cervical spine MRI was performed.    COMPARISON: Plain films of the cervical spine obtained concurrently      FINDINGS:     Vertebral column: There is straightening of the cervical spine with loss of normal lordosis.  As seen on concurrent plain films, there is trace anterolisthesis of C3 on C4 with 2 mm anterolisthesis of C4 on C5.  There is marked disc space narrowing at the C5-6 level with moderate to marked disc space narrowing at the C4-5 and C6-7 levels.  There is partial non-segmentation anteriorly at the C2-3 level.  The C2 and C3 as well as the C4 and C5 facet joints appear fused and this may represent developmental non-segmentation or degenerative ankylosis.  All of the discs are desiccated.  The odontoid process is intact.    Spinal canal, cord, epidural space: The craniovertebral junction is normal.  The spinal canal is omental and normal.  There is no significant spinal stenosis.  The cord is normal in caliber.  There is very subtle flattening of the ventral cord surface at the C4-5 and C5-6 levels where there is degenerative change.  The study is mildly motion but there is no definite cord edema or myelomalacia.    Findings by level:    C2-3: There is no spinal canal or foraminal stenosis.  There is mild left facet joint arthropathy.    C3-4: There is trace anterolisthesis.  There is left greater than right uncovertebral  spurring and facet joint arthropathy.  There is a mild disc osteophyte complex, slightly eccentric to the left with subtle annular fissure.  This narrows the ventral subarachnoid space.  There is no spinal stenosis or cord compression.  There is moderate marked left foraminal stenosis.    C4-5: There is moderate disc space narrowing with 2 mm anterolisthesis of C4 on C5.  There is facet joint arthropathy or effusion left greater than right.  There is also mild bilateral but left greater than right uncovertebral spurring.  There is unroofing of a mild disc bulge which narrows the ventral subarachnoid space.  There is no spinal stenosis.  There is mild to moderate left foraminal stenosis.    C5-6:There is marked disc space narrowing.  There is bilateral uncovertebral spurring.  There is a disc osteophyte complex which narrows the subarachnoid space.  This is slightly eccentric to the right There is subtle flattening of the ventral cord surface.  Cord signal is grossly normal.  There is mild spinal stenosis with at least moderate bilateral foraminal stenosis.    C6-7: There is moderate disc space narrowing.  There is mild uncovertebral spurring.  There is a shallow disc osteophyte complex, slightly eccentric to the right.  There is narrowing of the ventral subarachnoid space.  There is no spinal stenosis.  Cord signal is normal.  There is mild bilateral foraminal stenosis.  There is a small 3.5 mm left foraminal perineural cyst.    C7- T1: There is a Schmorl's node in inferior endplate of C7, chronic.  There are tiny bilateral foraminal perineural cysts.  There is minimal bulging of the annulus and mild facet joint arthropathy without spinal canal or foraminal stenosis.    Soft tissues/other: The prevertebral soft tissues are normal.  The airway is patent.   Impression          1. There is multilevel degenerative disc disease described in detail above.  There is no acute fracture.  There is degenerative listhesis at  several levels.  There is some degree of disc osteophyte complex, uncovertebral spurring and/or facet joint arthropathy contributing to some degree of foraminal stenosis at several levels.  There is no significant spinal canal stenosis.  There is very subtle flattening of the ventral cord surface at the C4-5 and C6-7 levels The pertinent findings are summarized below.    2. At the C3-4 level, there is no spinal stenosis.  There is moderate to marked left foraminal stenosis.    3. At the C4-5 level there is no spinal stenosis.  There is mild to moderate left foraminal stenosis.    4. At the C5-6 level, there is mild spinal stenosis with at least moderate bilateral foraminal stenosis.    5. At the C6-7 level, there is no spinal stenosis.  There is mild bilateral foraminal stenosis.    6. There is no spinal canal or foraminal stenosis at the C2-3 or C7-T1 levels.     Assessment:  Mrs. Richter is a 74 y.o. female with neck and head pain    1. Bilateral occipital neuralgia    2. Spondylosis of cervical region without myelopathy or radiculopathy    3. Myofascial pain    4. DDD (degenerative disc disease), cervical      Plan:  1. I have stressed the importance of physical activity and exercise to improve overall health.  2. Consider repeat C7-T1 IL MELANY in the future  3. Monitor progress and consider repeat left occipital blocks for head pain.  4. Norco 5mg q12hrs as needed for pain..  reviewed. UDS LCV consistent  5.  Robaxin 500 mg q 8 h prn 90 day supply  6. Recommend PT to focus on neck and upper back. She will call when ready to schedule  7. F/u 3 months or sooner  All medication management was performed by Dr. Timothy Grimaldo

## 2020-02-21 ENCOUNTER — TELEPHONE (OUTPATIENT)
Dept: PRIMARY CARE CLINIC | Facility: CLINIC | Age: 75
End: 2020-02-21

## 2020-02-21 DIAGNOSIS — K59.00 CONSTIPATION, UNSPECIFIED CONSTIPATION TYPE: ICD-10-CM

## 2020-02-21 RX ORDER — LINACLOTIDE 145 UG/1
CAPSULE, GELATIN COATED ORAL
Qty: 30 CAPSULE | Refills: 2 | Status: SHIPPED | OUTPATIENT
Start: 2020-02-21 | End: 2021-02-03 | Stop reason: SDUPTHER

## 2020-02-21 NOTE — PROGRESS NOTES
YUE Ibanez contacted Mrs. Richter to remind PT of her appt with Heber Petersen LCSW on Feb. 24 at 1:00 p.m.

## 2020-02-24 ENCOUNTER — CLINICAL SUPPORT (OUTPATIENT)
Dept: PRIMARY CARE CLINIC | Facility: CLINIC | Age: 75
End: 2020-02-24
Payer: MEDICARE

## 2020-02-24 DIAGNOSIS — F43.10 PTSD (POST-TRAUMATIC STRESS DISORDER): ICD-10-CM

## 2020-02-24 DIAGNOSIS — F41.9 ANXIETY: ICD-10-CM

## 2020-02-24 DIAGNOSIS — F33.41 MDD (MAJOR DEPRESSIVE DISORDER), RECURRENT, IN PARTIAL REMISSION: Primary | ICD-10-CM

## 2020-02-24 NOTE — PROGRESS NOTES
Individual Psychotherapy (PhD/LCSW)    The patient location is:  Patient Home   Visit type: Telephone  Each patient to whom he or she provides medical services by telemedicine is:  (1) informed of the relationship between the physician and patient and the respective role of any other health care provider with respect to management of the patient; and (2) notified that he or she may decline to receive medical services by telemedicine and may withdraw from such care at any time.     2020    Site:  [facility]         Therapeutic Intervention: Met with patient.  Outpatient - Behavior modifying psychotherapy 60 min - CPT code 50505 and Outpatient - Supportive psychotherapy 60 min - CPT Code 58851    Chief complaint/reason for encounter: depression, mood swings and cognition     Interval history and content of current session: LCSW and PT met by phone. PT states she recently has felt more depressed lately due to watching sad movies, hearing sad songs, and also hearing of the people who  during the [employer] parades. PT states she has increased pain in her right shoulder and cannot raise her arm high enough to comb her hair. LCSW and PT reviewed ACT, CBT, and Mindfulness based interventions. PT going to [place] to visit 's uncle on .     Treatment plan:  · Target symptoms: recurrent depression, anxiety , adjustment  · Why chosen therapy is appropriate versus another modality: relevant to diagnosis, patient responds to this modality, evidence based practice  · Outcome monitoring methods: self-report, checklist/rating scale  · Therapeutic intervention type: behavior modifying psychotherapy, supportive psychotherapy    Risk parameters:  Patient reports no suicidal ideation  Patient reports no homicidal ideation  Patient reports no self-injurious behavior  Patient reports no violent behavior    Verbal deficits: None    Patient's response to intervention:  The patient's response to intervention  is accepting.    Treatment Goals:  Mental:  PT wants to create new boundaries where she doesn't view sad things on media outlets - PT feels upset when viewing such material.    Physical:  PT would like to try to sew a blanket for a new born baby in her life.     Progress toward goals:  The patient's progress toward goals is good.    Diagnosis:     ICD-10-CM ICD-9-CM   1. MDD (major depressive disorder), recurrent, in partial remission F33.41 296.35   2. Anxiety F41.9 300.00   3. PTSD (post-traumatic stress disorder) F43.10 309.81       Plan:  individual psychotherapy    Return to clinic: 1 month, as scheduled, as needed    Length of Service (minutes): 60

## 2020-02-28 ENCOUNTER — PATIENT OUTREACH (OUTPATIENT)
Dept: OTHER | Facility: OTHER | Age: 75
End: 2020-02-28

## 2020-02-28 NOTE — PROGRESS NOTES
Digital Medicine: Health  Follow-Up    Patient reports her BP machine has been working fine.  Reports she has different readings compared to office readings. Reviewed technique.  States she will ask her  to loosen the cuff.    The history is provided by the patient.       INTERVENTION(S)  reviewed monitoring technique and encouragement/support    PLAN  patient verbalizes understanding      There are no preventive care reminders to display for this patient.    Last 5 Patient Entered Readings                                      Current 30 Day Average: 141/75     Recent Readings 2/13/2020 2/6/2020 1/16/2020 1/15/2020 1/6/2020    SBP (mmHg) 143 139 125 141 147    DBP (mmHg) 78 72 57 68 79    Pulse 78 80 79 74 82                  Screenings    SDOH

## 2020-03-04 ENCOUNTER — TELEPHONE (OUTPATIENT)
Dept: PRIMARY CARE CLINIC | Facility: CLINIC | Age: 75
End: 2020-03-04

## 2020-03-04 NOTE — PROGRESS NOTES
"YEU Ibanez contacted Ms. Richter to complete LAURA 7 assessment.  Ms. Richter scored "8" on LAURA 7.    "

## 2020-03-11 ENCOUNTER — PES CALL (OUTPATIENT)
Dept: ADMINISTRATIVE | Facility: CLINIC | Age: 75
End: 2020-03-11

## 2020-03-17 NOTE — PROGRESS NOTES
Infrequent BP readings but trending down since health  addressed BP cuff tightness 2/28/20  Average 142/75 mmHg in 73 yo patient  Continue current regimen and monitoring    Hypertension Medications             irbesartan (AVAPRO) 150 MG tablet Take 2 tablets (300 mg total) by mouth every evening.

## 2020-03-23 ENCOUNTER — TELEPHONE (OUTPATIENT)
Dept: PRIMARY CARE CLINIC | Facility: CLINIC | Age: 75
End: 2020-03-23

## 2020-03-23 NOTE — PROGRESS NOTES
YUE Ibanez contacted Mrs. Richter to remind PT of her appt with Heber Petersen LCSW tomorrow at 1:00 p.m.

## 2020-03-24 ENCOUNTER — TELEPHONE (OUTPATIENT)
Dept: PRIMARY CARE CLINIC | Facility: CLINIC | Age: 75
End: 2020-03-24

## 2020-03-24 ENCOUNTER — CLINICAL SUPPORT (OUTPATIENT)
Dept: PRIMARY CARE CLINIC | Facility: CLINIC | Age: 75
End: 2020-03-24
Payer: MEDICARE

## 2020-03-24 DIAGNOSIS — F43.10 PTSD (POST-TRAUMATIC STRESS DISORDER): ICD-10-CM

## 2020-03-24 DIAGNOSIS — F33.41 MDD (MAJOR DEPRESSIVE DISORDER), RECURRENT, IN PARTIAL REMISSION: ICD-10-CM

## 2020-03-24 DIAGNOSIS — F41.9 ANXIETY: Primary | ICD-10-CM

## 2020-03-24 PROCEDURE — 90837 PSYTX W PT 60 MINUTES: CPT | Mod: BHI,95,S$GLB, | Performed by: SOCIAL WORKER

## 2020-03-24 PROCEDURE — 90837 PR PSYCHOTHERAPY W/PATIENT, 60 MIN: ICD-10-PCS | Mod: BHI,95,S$GLB, | Performed by: SOCIAL WORKER

## 2020-03-24 NOTE — PROGRESS NOTES
"Lorraine Gusman contacted Ms. Richter to update her update her PHQ 9 assessment.  Ms. Richter scored "4" on the PHQ 9.   "

## 2020-03-25 NOTE — PROGRESS NOTES
Individual Psychotherapy (PhD/LCSW)    The patient location is:  Patient Home   Visit type: Telephone  Each patient to whom he or she provides medical services by telemedicine is:  (1) informed of the relationship between the physician and patient and the respective role of any other health care provider with respect to management of the patient; and (2) notified that he or she may decline to receive medical services by telemedicine and may withdraw from such care at any time.     3/24/2020    Site:  [facility]         Therapeutic Intervention: Met with patient.  Outpatient - Behavior modifying psychotherapy 60 min - CPT code 28638 and Outpatient - Supportive psychotherapy 60 min - CPT Code 64088    Chief complaint/reason for encounter: depression, anxiety, behavior, cognition and somatic     Interval history and content of current session: LCSW and PT met by phone. PT state she is doing over all well. PT states she has been worried for others due to COVID 19 but states she feels like she is safe.  PT states she still has not had the desire to get back to sewing. PT states she is still in pain but tries to push on with her day. LCSW and PT reviewed CBT, ACT, and Mindfulness based Interventions.     Treatment plan:  · Target symptoms: recurrent depression, anxiety , adjustment  · Why chosen therapy is appropriate versus another modality: relevant to diagnosis, patient responds to this modality, evidence based practice  · Outcome monitoring methods: self-report, checklist/rating scale  · Therapeutic intervention type: behavior modifying psychotherapy, supportive psychotherapy    Risk parameters:  Patient reports no suicidal ideation  Patient reports no homicidal ideation  Patient reports no self-injurious behavior  Patient reports no violent behavior    Verbal deficits: None    Patient's response to intervention:  The patient's response to intervention is motivated.    Treatment Goals:  Mental:  To remain calm  and hopeful.   Physical:  To get up and walk several days a week.     Progress toward goals:  The patient's progress toward goals is good.    Diagnosis:     ICD-10-CM ICD-9-CM   1. Anxiety F41.9 300.00   2. MDD (major depressive disorder), recurrent, in partial remission F33.41 296.35   3. PTSD (post-traumatic stress disorder) F43.10 309.81       Plan:  individual psychotherapy and medication management by physician    Return to clinic: 1 month, as scheduled, as needed    Length of Service (minutes): 60

## 2020-04-13 ENCOUNTER — PATIENT OUTREACH (OUTPATIENT)
Dept: OTHER | Facility: OTHER | Age: 75
End: 2020-04-13

## 2020-04-14 NOTE — PROGRESS NOTES
Digital Medicine: Health  Follow-Up    The history is provided by the patient.     Follow Up  Follow-up reason(s): reading review and routine education      Readings are trending up due to Patient is unsure..    Routine Education Topics: physical activity  Patient reports she takes her BP medication at night and waits 1 hr after waking to take her BP readings.  States her  helps her with the blood pressure cuff. Reviewed technique.      INTERVENTION(S)  recommend physical activity, reviewed monitoring technique, encouragement/support and denied questions    PLAN  patient verbalizes understanding and continue monitoring      There are no preventive care reminders to display for this patient.    Last 5 Patient Entered Readings                                      Current 30 Day Average: 134/74     Recent Readings 4/12/2020 4/10/2020 4/10/2020 4/8/2020 4/8/2020    SBP (mmHg) 146 137 156 128 127    DBP (mmHg) 82 70 79 64 90    Pulse 85 69 70 70 73                      Physical Activity Screening   When asked if exercising, patient responded: yes    Patient participates in the following activities: yard/housework and walking    States she is excited to start her garden.    Intervention(s): behavior change and exercise ideas       SDOH

## 2020-04-17 DIAGNOSIS — F43.10 PTSD (POST-TRAUMATIC STRESS DISORDER): ICD-10-CM

## 2020-04-17 RX ORDER — BUSPIRONE HYDROCHLORIDE 10 MG/1
10 TABLET ORAL 2 TIMES DAILY
Qty: 180 TABLET | Refills: 0 | Status: SHIPPED | OUTPATIENT
Start: 2020-04-17 | End: 2020-08-11 | Stop reason: SDUPTHER

## 2020-04-17 RX ORDER — SERTRALINE HYDROCHLORIDE 100 MG/1
TABLET, FILM COATED ORAL
Qty: 90 TABLET | Refills: 0 | Status: SHIPPED | OUTPATIENT
Start: 2020-04-17 | End: 2020-07-14

## 2020-04-21 ENCOUNTER — TELEPHONE (OUTPATIENT)
Dept: FAMILY MEDICINE | Facility: CLINIC | Age: 75
End: 2020-04-21

## 2020-04-21 ENCOUNTER — OFFICE VISIT (OUTPATIENT)
Dept: FAMILY MEDICINE | Facility: CLINIC | Age: 75
End: 2020-04-21
Payer: MEDICARE

## 2020-04-21 DIAGNOSIS — M15.9 PRIMARY OSTEOARTHRITIS INVOLVING MULTIPLE JOINTS: ICD-10-CM

## 2020-04-21 DIAGNOSIS — E78.2 MIXED HYPERLIPIDEMIA: ICD-10-CM

## 2020-04-21 DIAGNOSIS — I10 ESSENTIAL HYPERTENSION: Primary | ICD-10-CM

## 2020-04-21 PROCEDURE — 99441 PR PHYSICIAN TELEPHONE EVALUATION 5-10 MIN: ICD-10-PCS | Mod: 95,,, | Performed by: NURSE PRACTITIONER

## 2020-04-21 PROCEDURE — 99441 PR PHYSICIAN TELEPHONE EVALUATION 5-10 MIN: CPT | Mod: 95,,, | Performed by: NURSE PRACTITIONER

## 2020-04-21 NOTE — PROGRESS NOTES
"Subjective:       Patient ID: Rola Richter is a 74 y.o. female.    Chief Complaint: No chief complaint on file.    HPI   Established Patient - Audio Only Telehealth Visit     The patient location is: Home in [place]  The chief complaint leading to consultation is: chronic conditions follow up   Visit type: Virtual visit with audio only (telephone)     The reason for the audio only service rather than synchronous audio and video virtual visit was related to technical difficulties or patient preference/necessity.     Each patient to whom I provide medical services by telemedicine is:  (1) informed of the relationship between the physician and patient and the respective role of any other health care provider with respect to management of the patient; and (2) notified that they may decline to receive medical services by telemedicine and may withdraw from such care at any time. Patient verbally consented to receive this service via voice-only telephone call.       HPI:   Ms. Richter is a 73 yo female who presents today via telemedicine visit for chronic conditions follow up.  She is doing well overall and is without acute complaint.  She endorses just her normal " aches and pains".  She has been taking all her medications as directed.  She has been staying home and quarantined.  She is seen regularly by Dr. Estee Benítez in psychiatry.  She reports that her blood pressure has been well controlled.  Her latest reading was 128/73.      There were no vitals filed for this visit.  Review of Systems   Constitutional: Negative for chills, fatigue, fever and unexpected weight change.   HENT: Negative for congestion, hearing loss, nosebleeds, postnasal drip, sinus pressure, sinus pain and sore throat.    Eyes: Negative for pain, discharge, redness and visual disturbance.   Respiratory: Negative for cough, chest tightness and shortness of breath.    Cardiovascular: Negative for chest pain and palpitations.   Gastrointestinal: " Negative for abdominal pain, constipation, diarrhea, nausea and vomiting.   Endocrine: Negative for cold intolerance and heat intolerance.   Musculoskeletal: Negative for back pain.   Neurological: Negative for dizziness, syncope, light-headedness and headaches.   Psychiatric/Behavioral: Negative for agitation and dysphoric mood. The patient is not nervous/anxious.        Objective:      Physical Exam   Constitutional: She is oriented to person, place, and time.   Neurological: She is alert and oriented to person, place, and time.   Psychiatric: She has a normal mood and affect. Her speech is normal and behavior is normal. Thought content normal.       Assessment & Plan:       Essential hypertension  -Stable, continue current medication regimen  -Low salt diet   Mixed hyperlipidemia  -Stable, continue current medication regimen   Primary osteoarthritis involving multiple joints  -Chronic, continue current management         Follow up in about 6 months (around 10/21/2020).                       This service was not originating from a related E/M service provided within the previous 7 days nor will  to an E/M service or procedure within the next 24 hours or my soonest available appointment.  Prevailing standard of care was able to be met in this audio-only visit.

## 2020-04-28 ENCOUNTER — TELEPHONE (OUTPATIENT)
Dept: PRIMARY CARE CLINIC | Facility: CLINIC | Age: 75
End: 2020-04-28

## 2020-04-28 NOTE — PROGRESS NOTES
YUE Ibanez contacted Ms. Richter to update her assessments and remind Pt of her appt tomorrow with Heber Petersen LCSW.  Ms. Richter confirmed her appt and asked if she could complete the assessments on tomorrow.

## 2020-04-29 ENCOUNTER — CLINICAL SUPPORT (OUTPATIENT)
Dept: PRIMARY CARE CLINIC | Facility: CLINIC | Age: 75
End: 2020-04-29
Payer: MEDICARE

## 2020-04-29 ENCOUNTER — TELEPHONE (OUTPATIENT)
Dept: PRIMARY CARE CLINIC | Facility: CLINIC | Age: 75
End: 2020-04-29

## 2020-04-29 DIAGNOSIS — F41.9 ANXIETY: ICD-10-CM

## 2020-04-29 DIAGNOSIS — F43.10 PTSD (POST-TRAUMATIC STRESS DISORDER): ICD-10-CM

## 2020-04-29 DIAGNOSIS — F33.41 MDD (MAJOR DEPRESSIVE DISORDER), RECURRENT, IN PARTIAL REMISSION: Primary | ICD-10-CM

## 2020-04-29 PROCEDURE — 90834 PSYTX W PT 45 MINUTES: CPT | Mod: BHI,95,, | Performed by: SOCIAL WORKER

## 2020-04-29 PROCEDURE — 90834 PR PSYCHOTHERAPY W/PATIENT, 45 MIN: ICD-10-PCS | Mod: BHI,95,, | Performed by: SOCIAL WORKER

## 2020-04-29 PROCEDURE — 99499 UNLISTED E&M SERVICE: CPT | Mod: BHI,95,, | Performed by: SOCIAL WORKER

## 2020-04-29 PROCEDURE — 99499 NO LOS: ICD-10-PCS | Mod: BHI,95,, | Performed by: SOCIAL WORKER

## 2020-04-29 NOTE — PROGRESS NOTES
"YUE Ibanez contacted Mrs. Richter to update her assessments.  Mrs. Richter scored "7" on the PHQ 9 and "6" on the GAD7.  Mrs. Richter stated she is having difficulty with the "Stay Home" order but is managing to cope because she knows it is best for her and her family's health.    "

## 2020-04-29 NOTE — PROGRESS NOTES
Individual Psychotherapy (PhD/LCSW)    The patient location is:  Patient Home   Visit type: Telephone  Each patient to whom he or she provides medical services by telemedicine is:  (1) informed of the relationship between the physician and patient and the respective role of any other health care provider with respect to management of the patient; and (2) notified that he or she may decline to receive medical services by telemedicine and may withdraw from such care at any time.     4/29/2020    Site:  [facility]         Therapeutic Intervention: Met with patient.  Outpatient - Behavior modifying psychotherapy 45 min - CPT code 31901 and Outpatient - Supportive psychotherapy 45 min - CPT Code 93575    Chief complaint/reason for encounter: depression, anxiety, behavior, cognition and somatic     Interval history and content of current session: LCSW and PT met via telephone. PT states he is doing pretty well. She and her  have been staying inside and trying to protected themselves form the virus. PT states that she has not been able to see their family due to the virus and that has been hard. PT reports her current pain level is a 7/10.PT's PHQ and LAURA scores are lower than 10, and PT will be placed in the maintenance phase. PT is encouraged to contact the [facility] team if any needs arise. LCSW and PT reviewed ACT, CBT, and Mindfulness based interventions to reduce overall anxiety, depression, and chronic pain.  PT is currently being followed by psych.     Treatment plan:  · Target symptoms: recurrent depression, anxiety , adjustment  · Why chosen therapy is appropriate versus another modality: relevant to diagnosis, patient responds to this modality, evidence based practice  · Outcome monitoring methods: self-report, checklist/rating scale  · Therapeutic intervention type: behavior modifying psychotherapy, supportive psychotherapy    Risk parameters:  Patient reports no suicidal ideation  Patient reports no  homicidal ideation  Patient reports no self-injurious behavior  Patient reports no violent behavior    Verbal deficits: None    Patient's response to intervention:  The patient's response to intervention is accepting, motivated.    Treatment Goals:  Mental:  To remain calm and stay busy during COVID 19.   Physical:  To get outside each day and walk. No TV until the evening.     Progress toward goals:  The patient's progress toward goals is good.    Diagnosis:     ICD-10-CM ICD-9-CM   1. MDD (major depressive disorder), recurrent, in partial remission F33.41 296.35   2. Anxiety F41.9 300.00   3. PTSD (post-traumatic stress disorder) F43.10 309.81       Plan:  individual psychotherapy, consult psychiatrist for medication evaluation and medication management by physician    Return to clinic: 3 months, as scheduled, as needed    Length of Service (minutes): 45

## 2020-04-30 ENCOUNTER — PATIENT OUTREACH (OUTPATIENT)
Dept: OTHER | Facility: OTHER | Age: 75
End: 2020-04-30

## 2020-04-30 NOTE — PROGRESS NOTES
Digital Medicine: Clinician Follow-Up    Called due to upward trend in BP  Patient states prior to 4/30/20 elevated reading, she was outside and nervous/anxious about her cat being gone  She reports having a headache this morning that she relates to sinus (having drainage every morning). She denies other s/s of hypertension.    The history is provided by the patient.     Follow Up  Follow-up reason(s): reading review          INTERVENTION(S)  reviewed appropriate dose schedule, encouragement/support and denied questions    PLAN  patient verbalizes understanding and additional monitoring needed    Suspect elevated BP readings related to miss irbesartan dose and anxiety this morning  BP previously better controlled  Continue current regimen and requested more readings. Suggested obtaining some BP readings in afternoon to better assess control throughout the day.      There are no preventive care reminders to display for this patient.    Last 5 Patient Entered Readings                                      Current 30 Day Average: 140/73     Recent Readings 4/30/2020 4/26/2020 4/24/2020 4/23/2020 4/18/2020    SBP (mmHg) 174 154 126 138 128    DBP (mmHg) 81 75 68 71 63    Pulse 68 71 76 75 73             Hypertension Medications             irbesartan (AVAPRO) 150 MG tablet Take 2 tablets (300 mg total) by mouth every evening.                             Medication Adherence Screening   She missed a dose once this week.  took 1 irbesartan tablet instead of 2 tablets 2 days ago  Patient knows purpose of medications.

## 2020-05-04 ENCOUNTER — PATIENT MESSAGE (OUTPATIENT)
Dept: FAMILY MEDICINE | Facility: CLINIC | Age: 75
End: 2020-05-04

## 2020-05-05 ENCOUNTER — PATIENT MESSAGE (OUTPATIENT)
Dept: ADMINISTRATIVE | Facility: HOSPITAL | Age: 75
End: 2020-05-05

## 2020-05-06 ENCOUNTER — LAB VISIT (OUTPATIENT)
Dept: LAB | Facility: HOSPITAL | Age: 75
End: 2020-05-06
Attending: FAMILY MEDICINE
Payer: MEDICARE

## 2020-05-06 ENCOUNTER — TELEPHONE (OUTPATIENT)
Dept: FAMILY MEDICINE | Facility: CLINIC | Age: 75
End: 2020-05-06

## 2020-05-06 DIAGNOSIS — R30.0 BURNING WITH URINATION: ICD-10-CM

## 2020-05-06 DIAGNOSIS — R30.0 BURNING WITH URINATION: Primary | ICD-10-CM

## 2020-05-06 LAB
BACTERIA #/AREA URNS AUTO: ABNORMAL /HPF
BILIRUB UR QL STRIP: NEGATIVE
CLARITY UR REFRACT.AUTO: ABNORMAL
COLOR UR AUTO: YELLOW
GLUCOSE UR QL STRIP: NEGATIVE
HGB UR QL STRIP: NEGATIVE
KETONES UR QL STRIP: NEGATIVE
LEUKOCYTE ESTERASE UR QL STRIP: ABNORMAL
MICROSCOPIC COMMENT: ABNORMAL
NITRITE UR QL STRIP: NEGATIVE
PH UR STRIP: 6 [PH] (ref 5–8)
PROT UR QL STRIP: NEGATIVE
SP GR UR STRIP: 1.01 (ref 1–1.03)
URN SPEC COLLECT METH UR: ABNORMAL
WBC #/AREA URNS AUTO: >100 /HPF (ref 0–5)
WBC CLUMPS UR QL AUTO: ABNORMAL

## 2020-05-06 PROCEDURE — 87077 CULTURE AEROBIC IDENTIFY: CPT | Mod: HCNC

## 2020-05-06 PROCEDURE — 87088 URINE BACTERIA CULTURE: CPT | Mod: HCNC

## 2020-05-06 PROCEDURE — 87086 URINE CULTURE/COLONY COUNT: CPT | Mod: HCNC

## 2020-05-06 PROCEDURE — 81001 URINALYSIS AUTO W/SCOPE: CPT | Mod: HCNC

## 2020-05-06 PROCEDURE — 87186 SC STD MICRODIL/AGAR DIL: CPT | Mod: HCNC

## 2020-05-06 NOTE — TELEPHONE ENCOUNTER
----- Message from Terese Mock sent at 5/6/2020 10:38 AM CDT -----  Hello,    Pt dropped off urine spec today and there are no orders. Pt said it is for Dr. Carias.

## 2020-05-07 ENCOUNTER — TELEPHONE (OUTPATIENT)
Dept: FAMILY MEDICINE | Facility: CLINIC | Age: 75
End: 2020-05-07

## 2020-05-07 DIAGNOSIS — N39.0 URINARY TRACT INFECTION WITHOUT HEMATURIA, SITE UNSPECIFIED: Primary | ICD-10-CM

## 2020-05-07 RX ORDER — PHENAZOPYRIDINE HYDROCHLORIDE 200 MG/1
200 TABLET, FILM COATED ORAL 3 TIMES DAILY PRN
Qty: 6 TABLET | Refills: 0 | Status: SHIPPED | OUTPATIENT
Start: 2020-05-07 | End: 2020-05-11 | Stop reason: SDUPTHER

## 2020-05-07 RX ORDER — NITROFURANTOIN 25; 75 MG/1; MG/1
100 CAPSULE ORAL 2 TIMES DAILY
Qty: 14 CAPSULE | Refills: 0 | Status: SHIPPED | OUTPATIENT
Start: 2020-05-07 | End: 2020-05-11 | Stop reason: SDUPTHER

## 2020-05-07 NOTE — TELEPHONE ENCOUNTER
----- Message from Shawna Wise sent at 5/7/2020  2:31 PM CDT -----  Contact: Patient  Type:  Test Results    Who Called:  Patient  Name of Test (Lab/Mammo/Etc):  Urine  Date of Test:  5/6  Ordering Provider:  Jv  Where the test was performed:  [facility]  Best Call Back Number: [phone]   Additional Information:   Pt says this is very painful and is hoping soothing can be called in asap

## 2020-05-07 NOTE — PROGRESS NOTES
" Patient ID: Rola Richter is a 74 y.o. female.    Chief Complaint: No chief complaint on file.    HPI  Review of Systems    Patient Active Problem List   Diagnosis    Hypertension    GERD (gastroesophageal reflux disease)    Anxiety    Hyperlipidemia    Osteoporosis    Dependency on pain medication    PTSD (post-traumatic stress disorder)    Immunosuppressed status    CMC arthritis    Dry eye syndrome    DDD (degenerative disc disease), cervical    Occipital neuralgia    Cervical radiculitis    Primary osteoarthritis involving multiple joints    MDD (major depressive disorder), recurrent, in partial remission    Dysphagia     Patient is here for an annual exam    Objective:      Physical Exam    Assessment:       No diagnosis found.    Plan:       There are no diagnoses linked to this encounter.      Time spent with patient: {Blank single:80357::"20 minutes","40 minutes","60 minutes","***"}    Patient with be reevaluated in {Blank single:41660::"4 weeks","6 weeks","3 months","4 months","6 months","1 year","***"} or sooner prn    Greater than 50% of this visit was spent counseling as described in above documentation:{YES (DEF)/NO:14556}  "

## 2020-05-07 NOTE — TELEPHONE ENCOUNTER
It looks like this was ordered by Dr. Carias and not in the April visit, please send to Dr. Carias for her review.

## 2020-05-09 LAB — BACTERIA UR CULT: ABNORMAL

## 2020-05-11 DIAGNOSIS — N39.0 URINARY TRACT INFECTION WITHOUT HEMATURIA, SITE UNSPECIFIED: ICD-10-CM

## 2020-05-11 RX ORDER — NITROFURANTOIN 25; 75 MG/1; MG/1
100 CAPSULE ORAL 2 TIMES DAILY
Qty: 14 CAPSULE | Refills: 0 | Status: SHIPPED | OUTPATIENT
Start: 2020-05-11 | End: 2020-08-11 | Stop reason: ALTCHOICE

## 2020-05-11 RX ORDER — PHENAZOPYRIDINE HYDROCHLORIDE 200 MG/1
200 TABLET, FILM COATED ORAL 3 TIMES DAILY PRN
Qty: 6 TABLET | Refills: 0 | Status: SHIPPED | OUTPATIENT
Start: 2020-05-11 | End: 2020-05-21

## 2020-05-14 ENCOUNTER — OFFICE VISIT (OUTPATIENT)
Dept: PSYCHIATRY | Facility: CLINIC | Age: 75
End: 2020-05-14
Payer: MEDICARE

## 2020-05-14 VITALS
BODY MASS INDEX: 24.22 KG/M2 | SYSTOLIC BLOOD PRESSURE: 122 MMHG | TEMPERATURE: 98 F | WEIGHT: 120.13 LBS | DIASTOLIC BLOOD PRESSURE: 60 MMHG | HEIGHT: 59 IN

## 2020-05-14 DIAGNOSIS — F33.41 MDD (MAJOR DEPRESSIVE DISORDER), RECURRENT, IN PARTIAL REMISSION: Primary | ICD-10-CM

## 2020-05-14 DIAGNOSIS — F43.10 PTSD (POST-TRAUMATIC STRESS DISORDER): ICD-10-CM

## 2020-05-14 PROCEDURE — 99999 PR PBB SHADOW E&M-EST. PATIENT-LVL III: ICD-10-PCS | Mod: PBBFAC,HCNC,, | Performed by: PSYCHIATRY & NEUROLOGY

## 2020-05-14 PROCEDURE — 1101F PT FALLS ASSESS-DOCD LE1/YR: CPT | Mod: HCNC,CPTII,S$GLB, | Performed by: PSYCHIATRY & NEUROLOGY

## 2020-05-14 PROCEDURE — 3078F DIAST BP <80 MM HG: CPT | Mod: HCNC,CPTII,S$GLB, | Performed by: PSYCHIATRY & NEUROLOGY

## 2020-05-14 PROCEDURE — 1159F PR MEDICATION LIST DOCUMENTED IN MEDICAL RECORD: ICD-10-PCS | Mod: HCNC,S$GLB,, | Performed by: PSYCHIATRY & NEUROLOGY

## 2020-05-14 PROCEDURE — 3078F PR MOST RECENT DIASTOLIC BLOOD PRESSURE < 80 MM HG: ICD-10-PCS | Mod: HCNC,CPTII,S$GLB, | Performed by: PSYCHIATRY & NEUROLOGY

## 2020-05-14 PROCEDURE — 3074F PR MOST RECENT SYSTOLIC BLOOD PRESSURE < 130 MM HG: ICD-10-PCS | Mod: HCNC,CPTII,S$GLB, | Performed by: PSYCHIATRY & NEUROLOGY

## 2020-05-14 PROCEDURE — 99214 PR OFFICE/OUTPT VISIT, EST, LEVL IV, 30-39 MIN: ICD-10-PCS | Mod: HCNC,S$GLB,, | Performed by: PSYCHIATRY & NEUROLOGY

## 2020-05-14 PROCEDURE — 1159F MED LIST DOCD IN RCRD: CPT | Mod: HCNC,S$GLB,, | Performed by: PSYCHIATRY & NEUROLOGY

## 2020-05-14 PROCEDURE — 1101F PR PT FALLS ASSESS DOC 0-1 FALLS W/OUT INJ PAST YR: ICD-10-PCS | Mod: HCNC,CPTII,S$GLB, | Performed by: PSYCHIATRY & NEUROLOGY

## 2020-05-14 PROCEDURE — 99999 PR PBB SHADOW E&M-EST. PATIENT-LVL III: CPT | Mod: PBBFAC,HCNC,, | Performed by: PSYCHIATRY & NEUROLOGY

## 2020-05-14 PROCEDURE — 1125F AMNT PAIN NOTED PAIN PRSNT: CPT | Mod: HCNC,S$GLB,, | Performed by: PSYCHIATRY & NEUROLOGY

## 2020-05-14 PROCEDURE — 1125F PR PAIN SEVERITY QUANTIFIED, PAIN PRESENT: ICD-10-PCS | Mod: HCNC,S$GLB,, | Performed by: PSYCHIATRY & NEUROLOGY

## 2020-05-14 PROCEDURE — 3074F SYST BP LT 130 MM HG: CPT | Mod: HCNC,CPTII,S$GLB, | Performed by: PSYCHIATRY & NEUROLOGY

## 2020-05-14 PROCEDURE — 99214 OFFICE O/P EST MOD 30 MIN: CPT | Mod: HCNC,S$GLB,, | Performed by: PSYCHIATRY & NEUROLOGY
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
## 2020-05-26 ENCOUNTER — PATIENT OUTREACH (OUTPATIENT)
Dept: OTHER | Facility: OTHER | Age: 75
End: 2020-05-26

## 2020-05-26 NOTE — PROGRESS NOTES
Digital Medicine: Health  Follow-Up    Brief encounter due to patient and  putting new floors down.  BP avg close to goal 143/74 mmHg.    The history is provided by the patient.     Follow Up  Follow-up reason(s): reading review      Readings are trending down       INTERVENTION(S)  encouragement/support and denied questions    PLAN  patient verbalizes understanding and continue monitoring      There are no preventive care reminders to display for this patient.    Last 5 Patient Entered Readings                                      Current 30 Day Average: 143/74     Recent Readings 5/26/2020 5/17/2020 5/16/2020 5/12/2020 5/6/2020    SBP (mmHg) 135 139 132 152 134    DBP (mmHg) 73 71 68 74 72    Pulse 78 86 74 76 75                  Screenings    SDOH

## 2020-06-03 NOTE — PROGRESS NOTES
BP improving since last outreach  Average 137/73 mmHg    Continue current regimen and monitoring in 76 yo patient    Hypertension Medications             irbesartan (AVAPRO) 150 MG tablet Take 2 tablets (300 mg total) by mouth every evening.

## 2020-06-08 ENCOUNTER — OFFICE VISIT (OUTPATIENT)
Dept: RHEUMATOLOGY | Facility: CLINIC | Age: 75
End: 2020-06-08
Payer: MEDICARE

## 2020-06-08 VITALS
DIASTOLIC BLOOD PRESSURE: 73 MMHG | SYSTOLIC BLOOD PRESSURE: 135 MMHG | BODY MASS INDEX: 24.1 KG/M2 | TEMPERATURE: 98 F | WEIGHT: 119.31 LBS

## 2020-06-08 DIAGNOSIS — M81.0 AGE-RELATED OSTEOPOROSIS WITHOUT CURRENT PATHOLOGICAL FRACTURE: Primary | ICD-10-CM

## 2020-06-08 DIAGNOSIS — M70.51 PES ANSERINUS BURSITIS OF RIGHT KNEE: ICD-10-CM

## 2020-06-08 PROCEDURE — 3078F DIAST BP <80 MM HG: CPT | Mod: S$GLB,,, | Performed by: INTERNAL MEDICINE

## 2020-06-08 PROCEDURE — 1159F PR MEDICATION LIST DOCUMENTED IN MEDICAL RECORD: ICD-10-PCS | Mod: S$GLB,,, | Performed by: INTERNAL MEDICINE

## 2020-06-08 PROCEDURE — 3075F PR MOST RECENT SYSTOLIC BLOOD PRESS GE 130-139MM HG: ICD-10-PCS | Mod: S$GLB,,, | Performed by: INTERNAL MEDICINE

## 2020-06-08 PROCEDURE — 1159F MED LIST DOCD IN RCRD: CPT | Mod: S$GLB,,, | Performed by: INTERNAL MEDICINE

## 2020-06-08 PROCEDURE — 1125F AMNT PAIN NOTED PAIN PRSNT: CPT | Mod: S$GLB,,, | Performed by: INTERNAL MEDICINE

## 2020-06-08 PROCEDURE — 3075F SYST BP GE 130 - 139MM HG: CPT | Mod: S$GLB,,, | Performed by: INTERNAL MEDICINE

## 2020-06-08 PROCEDURE — 1125F PR PAIN SEVERITY QUANTIFIED, PAIN PRESENT: ICD-10-PCS | Mod: S$GLB,,, | Performed by: INTERNAL MEDICINE

## 2020-06-08 PROCEDURE — 1101F PR PT FALLS ASSESS DOC 0-1 FALLS W/OUT INJ PAST YR: ICD-10-PCS | Mod: S$GLB,,, | Performed by: INTERNAL MEDICINE

## 2020-06-08 PROCEDURE — 99213 OFFICE O/P EST LOW 20 MIN: CPT | Mod: S$GLB,,, | Performed by: INTERNAL MEDICINE

## 2020-06-08 PROCEDURE — 1101F PT FALLS ASSESS-DOCD LE1/YR: CPT | Mod: S$GLB,,, | Performed by: INTERNAL MEDICINE

## 2020-06-08 PROCEDURE — 3078F PR MOST RECENT DIASTOLIC BLOOD PRESSURE < 80 MM HG: ICD-10-PCS | Mod: S$GLB,,, | Performed by: INTERNAL MEDICINE

## 2020-06-08 PROCEDURE — 99213 PR OFFICE/OUTPT VISIT, EST, LEVL III, 20-29 MIN: ICD-10-PCS | Mod: S$GLB,,, | Performed by: INTERNAL MEDICINE

## 2020-06-08 NOTE — PROGRESS NOTES
[facility] RHEUMATOLOGY            PROGRESS NOTE      Subjective:       Patient ID:   NAME: Rola Richter : 1945     75 y.o. female    Referring Doc: No ref. provider found  Other Physicians:    Chief Complaint:  Osteoporosis      History of Present Illness:     Patient returns today for a regularly scheduled follow-up visit for osteoporosis and osteoarthritis      The patient is complaining of pain in the right medial aspect of the knee.  No history of trauma.  No joint swelling.  No chest pains cough or shortness of breath.  No GI complaints.            ROS:   GEN:  No  fever, night sweats . weight is stable   + sl fatigue  SKIN: no rashes, no bruising, no ulcerations, no Raynaud's  HEENT: no HA's, No visual changes, no mucosal ulcers, no sicca symptoms,  CV:   no CP, SOB, PND, DALE, no orthopnea, no palpitations  PULM: normal with no SOB, cough, hemoptysis, sputum or pleuritic pain  GI:  no abdominal pain, nausea, vomiting, constipation, diarrhea, melanotic stools, BRBPR, hematemesis, no dysphagia  :   no dysuria  NEURO: no paresthesias, headaches, visual disturbances, muscle weakness  MUSCULOSKELETAL:no joint swelling, prolonged AM stiffness, + occ  back pain, no muscle pain  Allergies:  Review of patient's allergies indicates:   Allergen Reactions    Aspirin Nausea And Vomiting    Penicillins Itching    Lipitor [atorvastatin]      Achy      Sulfa (sulfonamide antibiotics) Itching       Medications:    Current Outpatient Medications:     busPIRone (BUSPAR) 10 MG tablet, Take 1 tablet (10 mg total) by mouth 2 (two) times daily., Disp: 180 tablet, Rfl: 0    butalbital-aspirin-caffeine -40 mg (FIORINAL) -40 mg Cap, Take 1 capsule by mouth every 4 (four) hours as needed., Disp: , Rfl:     diclofenac sodium (VOLTAREN) 1 % Gel, Apply 2 g topically once daily., Disp: 100 g, Rfl: prn    fish oil-omega-3 fatty acids 300-1,000 mg capsule, Take 2 g by mouth once daily., Disp: , Rfl:      fluorometholone 0.1% (FML) 0.1 % DrpS, 1 drop 4 (four) times daily., Disp: , Rfl:     fluticasone (FLONASE) 50 mcg/actuation nasal spray, 2 sprays by Each Nare route once daily., Disp: 1 Bottle, Rfl: prn    irbesartan (AVAPRO) 150 MG tablet, Take 2 tablets (300 mg total) by mouth every evening., Disp: 180 tablet, Rfl: 1    LINZESS 145 mcg Cap capsule, TAKE ONE CAPSULE BY MOUTH ONCE DAILY, MORE THAN 30 MINUTES BEFORE FIRST MEAL OF THE DAY, Disp: 30 capsule, Rfl: 2    loratadine (CLARITIN) 10 mg tablet, Take 10 mg by mouth once daily., Disp: , Rfl:     multivit with min-folic acid 200 mcg Chew, , Disp: , Rfl:     nitrofurantoin, macrocrystal-monohydrate, (MACROBID) 100 MG capsule, Take 1 capsule (100 mg total) by mouth 2 (two) times daily., Disp: 14 capsule, Rfl: 0    omeprazole (PRILOSEC) 40 MG capsule, TAKE 1 CAPSULE EVERY DAY, Disp: 90 capsule, Rfl: 3    rosuvastatin (CRESTOR) 10 MG tablet, Take 10 mg by mouth once daily., Disp: , Rfl:     sertraline (ZOLOFT) 100 MG tablet, TAKE 1 TABLET ONE TIME DAILY, Disp: 90 tablet, Rfl: 0    PMHx/PSHx Updates:      Objective:     Vitals:  Blood pressure 135/73, temperature 98.4 °F (36.9 °C), weight 54.1 kg (119 lb 4.8 oz).    Physical Examination:   GEN: no apparent distress, comfortable; AAOx3  SKIN: no rashes,no ulceration, no Raynaud's, no petechiae, no SQ nodules,  HEAD: normal  EYES: no pallor, no icterus,  NECK: no masses, thyroid normal, trachea midline, no LAD/LN's, supple  CV: RRR with no murmur; l S1 and S2 reg. ,no gallop no rubs,   CHEST: Normal respiratory effort; CTAB; normal breath sounds; no wheeze or crackles  MUSC/Skeletal: ROM normal; no crepitus; joints without synovitis,  no deformities  No joint swelling or tenderness of PIP, MCP, wrist, elbow, shoulder, or knee joints.  Tenderness right anserine bursa  EXTREM: no clubbing, cyanosis, no edema,normal  pulses   NEURO: grossly intact; motor WNL; AAOx3; ,  PSYCH: normal mood, affect and  behavior  LYMPH: normal cervical, supraclavicular          Labs:   Lab Results   Component Value Date    WBC 9.31 12/05/2019    HGB 11.5 (L) 12/05/2019    HCT 34.9 (L) 12/05/2019    MCV 91 12/05/2019     12/05/2019    CMP  @LASTLAB(NA,K,CL,CO2,GLU,BUN,Creatinine,Calcium,PROT,Albumin,Bilitot,Alkphos,AST,ALT,CRP,ESR,RF,CCP,NATHANIEL,SSA,CPK,uric acid) )@  I have reviewed all available lab results and radiology reports.    Radiology/Diagnostic Studies:        Assessment/Plan:   (1) 75 y.o. female with diagnosis of right anserine bursitis.  Injected as per procedure note  2)Osteoporosis.  CMP and receive Prolia this month if no contraindication  3) OA             Discussion:     I have explained all of the above in detail and the patient understands all of the current recommendation(s). I have answered all questions to the best of my ability and to their complete satisfaction.       The patient is to continue with the current management plan         RTC in   4 months      Electronically signed by Jayme Ram MD

## 2020-06-08 NOTE — PROCEDURES
Large Joint Aspiration/Injection  Date/Time: 6/8/2020 4:15 PM  Performed by: Jayme Ram MD  Authorized by: Jayme Ram MD     Consent Done?:  Yes (Verbal)  Indications:  Pain  Local anesthetic:  Lidocaine 2% without epinephrine     Injected 0.5 cc Kenalog 0.5 cc dexamethasone right anserine bursa

## 2020-06-18 ENCOUNTER — LAB VISIT (OUTPATIENT)
Dept: LAB | Facility: HOSPITAL | Age: 75
End: 2020-06-18
Attending: INTERNAL MEDICINE
Payer: MEDICARE

## 2020-06-18 DIAGNOSIS — M81.0 AGE-RELATED OSTEOPOROSIS WITHOUT CURRENT PATHOLOGICAL FRACTURE: ICD-10-CM

## 2020-06-18 LAB
ALBUMIN SERPL BCP-MCNC: 4.3 G/DL (ref 3.5–5.2)
ALP SERPL-CCNC: 78 U/L (ref 55–135)
ALT SERPL W/O P-5'-P-CCNC: 16 U/L (ref 10–44)
ANION GAP SERPL CALC-SCNC: 9 MMOL/L (ref 8–16)
AST SERPL-CCNC: 20 U/L (ref 10–40)
BILIRUB SERPL-MCNC: 0.4 MG/DL (ref 0.1–1)
BUN SERPL-MCNC: 22 MG/DL (ref 8–23)
CALCIUM SERPL-MCNC: 9.2 MG/DL (ref 8.7–10.5)
CHLORIDE SERPL-SCNC: 104 MMOL/L (ref 95–110)
CO2 SERPL-SCNC: 23 MMOL/L (ref 23–29)
CREAT SERPL-MCNC: 0.9 MG/DL (ref 0.5–1.4)
EST. GFR  (AFRICAN AMERICAN): >60 ML/MIN/1.73 M^2
EST. GFR  (NON AFRICAN AMERICAN): >60 ML/MIN/1.73 M^2
GLUCOSE SERPL-MCNC: 120 MG/DL (ref 70–110)
POTASSIUM SERPL-SCNC: 4 MMOL/L (ref 3.5–5.1)
PROT SERPL-MCNC: 7.7 G/DL (ref 6–8.4)
SODIUM SERPL-SCNC: 136 MMOL/L (ref 136–145)

## 2020-06-18 PROCEDURE — 36415 COLL VENOUS BLD VENIPUNCTURE: CPT

## 2020-06-18 PROCEDURE — 80053 COMPREHEN METABOLIC PANEL: CPT

## 2020-06-24 ENCOUNTER — PATIENT MESSAGE (OUTPATIENT)
Dept: PAIN MEDICINE | Facility: CLINIC | Age: 75
End: 2020-06-24

## 2020-06-24 ENCOUNTER — PATIENT MESSAGE (OUTPATIENT)
Dept: FAMILY MEDICINE | Facility: CLINIC | Age: 75
End: 2020-06-24

## 2020-06-30 ENCOUNTER — PES CALL (OUTPATIENT)
Dept: ADMINISTRATIVE | Facility: CLINIC | Age: 75
End: 2020-06-30

## 2020-07-01 ENCOUNTER — OFFICE VISIT (OUTPATIENT)
Dept: PAIN MEDICINE | Facility: CLINIC | Age: 75
End: 2020-07-01
Payer: MEDICARE

## 2020-07-01 VITALS — BODY MASS INDEX: 24.04 KG/M2 | WEIGHT: 119.25 LBS | HEIGHT: 59 IN

## 2020-07-01 DIAGNOSIS — M47.812 SPONDYLOSIS OF CERVICAL REGION WITHOUT MYELOPATHY OR RADICULOPATHY: ICD-10-CM

## 2020-07-01 DIAGNOSIS — M79.18 MYOFASCIAL PAIN: ICD-10-CM

## 2020-07-01 DIAGNOSIS — G89.4 CHRONIC PAIN DISORDER: ICD-10-CM

## 2020-07-01 DIAGNOSIS — M54.81 BILATERAL OCCIPITAL NEURALGIA: Primary | ICD-10-CM

## 2020-07-01 PROCEDURE — 1100F PTFALLS ASSESS-DOCD GE2>/YR: CPT | Mod: HCNC,CPTII,S$GLB, | Performed by: PHYSICIAN ASSISTANT

## 2020-07-01 PROCEDURE — 99999 PR PBB SHADOW E&M-EST. PATIENT-LVL IV: ICD-10-PCS | Mod: PBBFAC,HCNC,, | Performed by: PHYSICIAN ASSISTANT

## 2020-07-01 PROCEDURE — 1100F PR PT FALLS ASSESS DOC 2+ FALLS/FALL W/INJURY/YR: ICD-10-PCS | Mod: HCNC,CPTII,S$GLB, | Performed by: PHYSICIAN ASSISTANT

## 2020-07-01 PROCEDURE — 1125F AMNT PAIN NOTED PAIN PRSNT: CPT | Mod: HCNC,S$GLB,, | Performed by: PHYSICIAN ASSISTANT

## 2020-07-01 PROCEDURE — 1125F PR PAIN SEVERITY QUANTIFIED, PAIN PRESENT: ICD-10-PCS | Mod: HCNC,S$GLB,, | Performed by: PHYSICIAN ASSISTANT

## 2020-07-01 PROCEDURE — 99999 PR PBB SHADOW E&M-EST. PATIENT-LVL IV: CPT | Mod: PBBFAC,HCNC,, | Performed by: PHYSICIAN ASSISTANT

## 2020-07-01 PROCEDURE — 3288F PR FALLS RISK ASSESSMENT DOCUMENTED: ICD-10-PCS | Mod: HCNC,CPTII,S$GLB, | Performed by: PHYSICIAN ASSISTANT

## 2020-07-01 PROCEDURE — 99214 OFFICE O/P EST MOD 30 MIN: CPT | Mod: HCNC,S$GLB,, | Performed by: PHYSICIAN ASSISTANT

## 2020-07-01 PROCEDURE — 99214 PR OFFICE/OUTPT VISIT, EST, LEVL IV, 30-39 MIN: ICD-10-PCS | Mod: HCNC,S$GLB,, | Performed by: PHYSICIAN ASSISTANT

## 2020-07-01 PROCEDURE — 3288F FALL RISK ASSESSMENT DOCD: CPT | Mod: HCNC,CPTII,S$GLB, | Performed by: PHYSICIAN ASSISTANT

## 2020-07-01 PROCEDURE — 1159F PR MEDICATION LIST DOCUMENTED IN MEDICAL RECORD: ICD-10-PCS | Mod: HCNC,S$GLB,, | Performed by: PHYSICIAN ASSISTANT

## 2020-07-01 PROCEDURE — 1159F MED LIST DOCD IN RCRD: CPT | Mod: HCNC,S$GLB,, | Performed by: PHYSICIAN ASSISTANT

## 2020-07-01 RX ORDER — HYDROCODONE BITARTRATE AND ACETAMINOPHEN 5; 325 MG/1; MG/1
1 TABLET ORAL EVERY 12 HOURS PRN
Qty: 60 TABLET | Refills: 0 | Status: SHIPPED | OUTPATIENT
Start: 2020-07-30 | End: 2020-08-28

## 2020-07-01 RX ORDER — HYDROCODONE BITARTRATE AND ACETAMINOPHEN 5; 325 MG/1; MG/1
1 TABLET ORAL EVERY 12 HOURS PRN
Qty: 60 TABLET | Refills: 0 | Status: SHIPPED | OUTPATIENT
Start: 2020-07-01 | End: 2020-07-30

## 2020-07-01 RX ORDER — METHOCARBAMOL 500 MG/1
500 TABLET, FILM COATED ORAL 3 TIMES DAILY PRN
Qty: 270 TABLET | Refills: 0 | Status: SHIPPED | OUTPATIENT
Start: 2020-07-01 | End: 2020-09-29

## 2020-07-01 NOTE — PROGRESS NOTES
Referring Physician: No ref. provider found    PCP: Liseth Carias MD      CC: neck and left occipital pain    Interval history: Ms. Richter is a 75 y.o. female with neck pain and occipital neuralgia who presents today for f/u and medication refill. She continues to benefit from repeat occipital nerve block. Neck pain and arm pain is improved since cervical MELANY.  Her pain no longer radiates into both arms. Previously right arm pain traveled to her hand, her left stops around her shoulder.  She had numbness to her right hand and first through fourth digits. Cervical MELANY in February 2018 that only provided benefit for a short time.     She continues to take Norco and Robaxin which provide benefit. Flexeril caused dry mouth. Denies UE weakness. No bowel bladder changes.   Pain today is rated 8/10.    Prior HPI:   Patient is 70-year-old female with past history history of cervical DDD, cervical spondylosis and chronic headaches.  She recently moved here from [place], [place].  She is treated in the past by neurology.  She states having constant burning pain over the left side of her posterior scalp.  Pain radiates to her neck as well as a frontal.  She also has left-sided neck pain as well.  She denies any radicular arm pain.  No numbness or weakness.  She states having cervical epidural steroid injection at past with minimal benefit.  She also has had decided of cervical nerve blocks in the past with moderate benefit.  Most recent injection was performed 2 months ago.  She desires repeat injection.  She currently takes Norco 10 mg every 12 hours as needed with moderate benefit.  She also takes Zanaflex 4 mg every 8 hours with mild benefits.  She rates her pain 7/10 today.    Pain intervention history: s/p left occipital nerve blocks on 1/2016 with 50% relief of her headaches  S/p cervical MELANY 2/8/18 moderate relief for a couple of weeks.     ROS:  CONSTITUTIONAL: No fevers, chills, night sweats, wt. loss,  appetite changes  SKIN: no rashes or itching  ENT: No headaches, head trauma, vision changes, or eye pain  LYMPH NODES: None noticed   CV: No chest pain, palpitations.   RESP: No shortness of breath, dyspnea on exertion, cough, wheezing, or hemoptysis  GI: No nausea, emesis, diarrhea, constipation, melena, hematochezia, pain.    : No dysuria, hematuria, urgency, or frequency   HEME: No easy bruising, bleeding problems  PSYCHIATRIC: No depression, anxiety, psychosis, hallucinations.  NEURO: No seizures, memory loss, dizziness or difficulty sleeping  MSK: + History of present illness      Past Medical History:   Diagnosis Date    Anxiety     Arthritis     Cataract     DDD (degenerative disc disease), lumbar     Depression     Encounter for blood transfusion     GERD (gastroesophageal reflux disease)     Hyperlipidemia     Hypertension     pt states she does not take meds anymore she just watches her weight    Immunosuppressed status 2016    Neuromuscular disorder     Occipital neuralgia 3/24/2017    Osteoporosis     Substance abuse      Past Surgical History:   Procedure Laterality Date    APPENDECTOMY       SECTION      x 2    CHOLECYSTECTOMY      COLONOSCOPY  prior to     COLONOSCOPY N/A 2019    Procedure: COLONOSCOPY;  Surgeon: Greg Victor MD;  Location: [facility];  Service: Endoscopy;  Laterality: N/A;    ESOPHAGOGASTRODUODENOSCOPY N/A 2019    Procedure: EGD (ESOPHAGOGASTRODUODENOSCOPY);  Surgeon: Greg Victor MD;  Location: [facility];  Service: Endoscopy;  Laterality: N/A;    HYSTERECTOMY      Laser Periphery Iridotomy Bilateral     OD 16 and OS touch up 2016    UPPER GASTROINTESTINAL ENDOSCOPY  2017    Dr. Marcial: esophageal stenosis- dilated, gastritis, gastric polyps removed; biopsy- mild gastritis, negative for h pylori, hyperplastic polyp, esophagus unremarkable    vocal cord tumor removal       Family History   Problem Relation  Age of Onset    Osteoarthritis Mother     Alcohol abuse Mother     Rheum arthritis Mother     Osteoarthritis Sister     Diabetes Brother     No Known Problems Son     No Known Problems Sister     No Known Problems Sister     No Known Problems Brother     Arthritis Son     No Known Problems Son     Stroke Maternal Grandmother 99    Rheum arthritis Maternal Grandmother     Retinal detachment Neg Hx     Macular degeneration Neg Hx     Glaucoma Neg Hx     Amblyopia Neg Hx     Blindness Neg Hx     Cancer Neg Hx     Cataracts Neg Hx     Hypertension Neg Hx     Strabismus Neg Hx     Thyroid disease Neg Hx     Lupus Neg Hx     Kidney disease Neg Hx     Inflammatory bowel disease Neg Hx     Psoriasis Neg Hx     Colon cancer Neg Hx     Crohn's disease Neg Hx     Ulcerative colitis Neg Hx     Stomach cancer Neg Hx     Esophageal cancer Neg Hx      Social History     Socioeconomic History    Marital status:      Spouse name: Not on file    Number of children: Not on file    Years of education: Not on file    Highest education level: Not on file   Occupational History    Not on file   Social Needs    Financial resource strain: Hard    Food insecurity     Worry: Never true     Inability: Never true    Transportation needs     Medical: No     Non-medical: No   Tobacco Use    Smoking status: Never Smoker    Smokeless tobacco: Never Used   Substance and Sexual Activity    Alcohol use: No     Alcohol/week: 0.0 standard drinks     Frequency: Never     Binge frequency: Never    Drug use: Never    Sexual activity: Yes     Partners: Male   Lifestyle    Physical activity     Days per week: 0 days     Minutes per session: Not on file    Stress: Not at all   Relationships    Social connections     Talks on phone: More than three times a week     Gets together: Twice a week     Attends [employer] service: Not on file     Active member of club or organization: Yes     Attends meetings of  "clubs or organizations: More than 4 times per year     Relationship status:    Other Topics Concern    Not on file   Social History Narrative    Not on file         Medications/Allergies: See med card    Vitals:    07/01/20 1014   Weight: 54.1 kg (119 lb 4.3 oz)   Height: 4' 11" (1.499 m)   PainSc:   8   PainLoc: Generalized         Physical exam:    GENERAL: A and O x3, the patient appears well groomed and is in no acute distress.  Skin: No rashes or obvious lesions  HEENT: normocephalic, atraumatic  CARDIOVASCULAR:  RRR  LUNGS: nonlabored breathing  ABDOMEN: soft, nontender   UPPER EXTREMITIES: Normal alignment, normal range of motion, no atrophy, no skin changes,  hair growth and nail growth normal and equal bilaterally. No swelling, no tenderness.  +Phalens on left side. +TTP tricep tendon  LOWER EXTREMITIES:  Normal alignment, normal range of motion, no atrophy, no skin changes,  hair growth and nail growth normal and equal bilaterally. No swelling, no tenderness.  CERVICAL SPINE:   Cervical spine: ROM is full in flexion, extension and lateral rotation.  Painful flexion > extension.  Positive facet loading bilaterally.  Spurling is positive at right side.  Myofascial exam:  Tenderness to palpation across cervical paraspinals deltoid and trapezius muscles bilaterally.      MENTAL STATUS: normal orientation, speech, language, and fund of knowledge for social situation.  Emotional state appropriate.    CRANIAL NERVES:  II:  PERRL bilaterally,   III,IV,VI: EOMI.    V:  Facial sensation equal bilaterally  VII:  Facial motor function normal.  VIII:  Hearing equal to finger rub bilaterally  IX/X: Gag normal, palate symmetric  XI:  Shoulder shrug equal, head turn equal  XII:  Tongue midline without fasciculations      MOTOR: Tone and bulk: normal bilateral upper and lower Strength: normal "   Delt Bi Tri WE WF     R 5 5 5 5 5 5   L 5 5 5 5 5 5     IP ADD ABD Quad TA Gas HAM  R 5 5 5 5 5 5 5  L 5 5 5 5 5 5 5    SENSATION: Light touch and pinprick intact bilaterally  REFLEXES: normal, symmetric, nonbrisk.  Toes down, no clonus. No hoffmans.  GAIT: normal rise, base, steps, and arm swing.        Imaging:   Cervical MRI 12/4/17    Narrative     EXAM: Cervical spine MRI without contrast.    INDICATION: Cervical radiculopathy.  Neck pain and occipital neuralgia.  The patient complains of neck and right arm pain.    TECHNIQUE: Routine multiplanar, multisequence unenhanced cervical spine MRI was performed.    COMPARISON: Plain films of the cervical spine obtained concurrently      FINDINGS:     Vertebral column: There is straightening of the cervical spine with loss of normal lordosis.  As seen on concurrent plain films, there is trace anterolisthesis of C3 on C4 with 2 mm anterolisthesis of C4 on C5.  There is marked disc space narrowing at the C5-6 level with moderate to marked disc space narrowing at the C4-5 and C6-7 levels.  There is partial non-segmentation anteriorly at the C2-3 level.  The C2 and C3 as well as the C4 and C5 facet joints appear fused and this may represent developmental non-segmentation or degenerative ankylosis.  All of the discs are desiccated.  The odontoid process is intact.    Spinal canal, cord, epidural space: The craniovertebral junction is normal.  The spinal canal is omental and normal.  There is no significant spinal stenosis.  The cord is normal in caliber.  There is very subtle flattening of the ventral cord surface at the C4-5 and C5-6 levels where there is degenerative change.  The study is mildly motion but there is no definite cord edema or myelomalacia.    Findings by level:    C2-3: There is no spinal canal or foraminal stenosis.  There is mild left facet joint arthropathy.    C3-4: There is trace anterolisthesis.  There is left greater than right uncovertebral  spurring and facet joint arthropathy.  There is a mild disc osteophyte complex, slightly eccentric to the left with subtle annular fissure.  This narrows the ventral subarachnoid space.  There is no spinal stenosis or cord compression.  There is moderate marked left foraminal stenosis.    C4-5: There is moderate disc space narrowing with 2 mm anterolisthesis of C4 on C5.  There is facet joint arthropathy or effusion left greater than right.  There is also mild bilateral but left greater than right uncovertebral spurring.  There is unroofing of a mild disc bulge which narrows the ventral subarachnoid space.  There is no spinal stenosis.  There is mild to moderate left foraminal stenosis.    C5-6:There is marked disc space narrowing.  There is bilateral uncovertebral spurring.  There is a disc osteophyte complex which narrows the subarachnoid space.  This is slightly eccentric to the right There is subtle flattening of the ventral cord surface.  Cord signal is grossly normal.  There is mild spinal stenosis with at least moderate bilateral foraminal stenosis.    C6-7: There is moderate disc space narrowing.  There is mild uncovertebral spurring.  There is a shallow disc osteophyte complex, slightly eccentric to the right.  There is narrowing of the ventral subarachnoid space.  There is no spinal stenosis.  Cord signal is normal.  There is mild bilateral foraminal stenosis.  There is a small 3.5 mm left foraminal perineural cyst.    C7- T1: There is a Schmorl's node in inferior endplate of C7, chronic.  There are tiny bilateral foraminal perineural cysts.  There is minimal bulging of the annulus and mild facet joint arthropathy without spinal canal or foraminal stenosis.    Soft tissues/other: The prevertebral soft tissues are normal.  The airway is patent.   Impression          1. There is multilevel degenerative disc disease described in detail above.  There is no acute fracture.  There is degenerative listhesis at  several levels.  There is some degree of disc osteophyte complex, uncovertebral spurring and/or facet joint arthropathy contributing to some degree of foraminal stenosis at several levels.  There is no significant spinal canal stenosis.  There is very subtle flattening of the ventral cord surface at the C4-5 and C6-7 levels The pertinent findings are summarized below.    2. At the C3-4 level, there is no spinal stenosis.  There is moderate to marked left foraminal stenosis.    3. At the C4-5 level there is no spinal stenosis.  There is mild to moderate left foraminal stenosis.    4. At the C5-6 level, there is mild spinal stenosis with at least moderate bilateral foraminal stenosis.    5. At the C6-7 level, there is no spinal stenosis.  There is mild bilateral foraminal stenosis.    6. There is no spinal canal or foraminal stenosis at the C2-3 or C7-T1 levels.     Assessment:  Mrs. Richter is a 75 y.o. female with neck and head pain    1. Bilateral occipital neuralgia    2. Chronic pain disorder    3. Spondylosis of cervical region without myelopathy or radiculopathy    4. Myofascial pain      Plan:  1. I have stressed the importance of physical activity and exercise to improve overall health.  2. Consider repeat C7-T1 IL MELANY in the future  3. Monitor progress and consider repeat left occipital blocks for head pain.  4. Norco 5mg q12hrs as needed for pain..  reviewed. UDS LCV consistent  5.  Robaxin 500 mg q 8 h prn 90 day supply  6. Recommend PT to focus on neck and upper back. She will call when ready to schedule  7. F/u 3 months or sooner  All medication management was performed by Dr. Timothy Grimaldo

## 2020-07-08 ENCOUNTER — PATIENT OUTREACH (OUTPATIENT)
Dept: OTHER | Facility: OTHER | Age: 75
End: 2020-07-08

## 2020-07-08 NOTE — PROGRESS NOTES
"Digital Medicine: Health  Follow-Up    Patient reports she and her  have only been going to the grocery during COVID 19.  States she is "doing her best."    The history is provided by the patient.   Follow Up  Follow-up reason(s): reading review      Readings are trending down       INTERVENTION(S)  encouragement/support and denied questions    PLAN  patient verbalizes understanding and continue monitoring      There are no preventive care reminders to display for this patient.    Last 5 Patient Entered Readings                                      Current 30 Day Average: 137/74     Recent Readings 7/7/2020 6/29/2020 6/26/2020 6/20/2020 6/18/2020    SBP (mmHg) 134 141 145 133 130    DBP (mmHg) 70 68 76 70 87    Pulse 69 72 75 78 70                  Screenings    SDOH  "

## 2020-07-23 ENCOUNTER — OFFICE VISIT (OUTPATIENT)
Dept: HOME HEALTH SERVICES | Facility: CLINIC | Age: 75
End: 2020-07-23
Payer: MEDICARE

## 2020-07-23 VITALS
TEMPERATURE: 98 F | SYSTOLIC BLOOD PRESSURE: 140 MMHG | OXYGEN SATURATION: 99 % | HEIGHT: 58 IN | WEIGHT: 119 LBS | BODY MASS INDEX: 24.98 KG/M2 | HEART RATE: 86 BPM | DIASTOLIC BLOOD PRESSURE: 63 MMHG

## 2020-07-23 DIAGNOSIS — I10 ESSENTIAL HYPERTENSION: ICD-10-CM

## 2020-07-23 DIAGNOSIS — F33.41 MDD (MAJOR DEPRESSIVE DISORDER), RECURRENT, IN PARTIAL REMISSION: ICD-10-CM

## 2020-07-23 DIAGNOSIS — F19.20 DEPENDENCY ON PAIN MEDICATION: ICD-10-CM

## 2020-07-23 DIAGNOSIS — K21.9 GASTROESOPHAGEAL REFLUX DISEASE WITHOUT ESOPHAGITIS: ICD-10-CM

## 2020-07-23 DIAGNOSIS — M81.0 AGE-RELATED OSTEOPOROSIS WITHOUT CURRENT PATHOLOGICAL FRACTURE: ICD-10-CM

## 2020-07-23 DIAGNOSIS — Z00.00 ENCOUNTER FOR PREVENTIVE HEALTH EXAMINATION: Primary | ICD-10-CM

## 2020-07-23 DIAGNOSIS — M50.30 DDD (DEGENERATIVE DISC DISEASE), CERVICAL: ICD-10-CM

## 2020-07-23 DIAGNOSIS — M54.12 CERVICAL RADICULITIS: ICD-10-CM

## 2020-07-23 DIAGNOSIS — F41.9 ANXIETY: ICD-10-CM

## 2020-07-23 DIAGNOSIS — E78.2 MIXED HYPERLIPIDEMIA: ICD-10-CM

## 2020-07-23 PROCEDURE — 99499 UNLISTED E&M SERVICE: CPT | Mod: S$GLB,,, | Performed by: NURSE PRACTITIONER

## 2020-07-23 PROCEDURE — 3078F DIAST BP <80 MM HG: CPT | Mod: CPTII,S$GLB,, | Performed by: NURSE PRACTITIONER

## 2020-07-23 PROCEDURE — G0439 PR MEDICARE ANNUAL WELLNESS SUBSEQUENT VISIT: ICD-10-PCS | Mod: S$GLB,,, | Performed by: NURSE PRACTITIONER

## 2020-07-23 PROCEDURE — 99499 RISK ADDL DX/OHS AUDIT: ICD-10-PCS | Mod: S$GLB,,, | Performed by: NURSE PRACTITIONER

## 2020-07-23 PROCEDURE — 3077F SYST BP >= 140 MM HG: CPT | Mod: CPTII,S$GLB,, | Performed by: NURSE PRACTITIONER

## 2020-07-23 PROCEDURE — 3077F PR MOST RECENT SYSTOLIC BLOOD PRESSURE >= 140 MM HG: ICD-10-PCS | Mod: CPTII,S$GLB,, | Performed by: NURSE PRACTITIONER

## 2020-07-23 PROCEDURE — G0439 PPPS, SUBSEQ VISIT: HCPCS | Mod: S$GLB,,, | Performed by: NURSE PRACTITIONER

## 2020-07-23 PROCEDURE — 3078F PR MOST RECENT DIASTOLIC BLOOD PRESSURE < 80 MM HG: ICD-10-PCS | Mod: CPTII,S$GLB,, | Performed by: NURSE PRACTITIONER

## 2020-07-23 NOTE — PROGRESS NOTES
"  Rola Richter presented for a  Medicare AWV and comprehensive Health Risk Assessment today. The following components were reviewed and updated:    · Medical history  · Family History  · Social history  · Allergies and Current Medications  · Health Risk Assessment  · Health Maintenance  · Care Team     ** See Completed Assessments for Annual Wellness Visit within the encounter summary.**         The following assessments were completed:  · Living Situation  · CAGE  · Depression Screening  · Timed Get Up and Go  · Whisper Test  · Cognitive Function Screening  ·     · Nutrition Screening  · ADL Screening  · PAQ Screening        Vitals:    07/23/20 0955   BP: (!) 140/63   Pulse: 86   Temp: 97.7 °F (36.5 °C)   SpO2: 99%   Weight: 54 kg (119 lb)   Height: 4' 10" (1.473 m)     Body mass index is 24.87 kg/m².  Physical Exam  Vitals signs reviewed.   Constitutional:       Appearance: She is well-developed.   HENT:      Head: Normocephalic.   Eyes:      Pupils: Pupils are equal, round, and reactive to light.   Neck:      Musculoskeletal: Normal range of motion.   Cardiovascular:      Rate and Rhythm: Normal rate and regular rhythm.      Heart sounds: Normal heart sounds.   Pulmonary:      Effort: Pulmonary effort is normal.      Breath sounds: Normal breath sounds.   Abdominal:      General: Bowel sounds are normal.      Palpations: Abdomen is soft.   Musculoskeletal: Normal range of motion.      Right lower leg: No edema.      Left lower leg: No edema.   Skin:     General: Skin is warm and dry.   Neurological:      Mental Status: She is alert and oriented to person, place, and time.   Psychiatric:         Behavior: Behavior normal.               Diagnoses and health risks identified today and associated recommendations/orders:    1. Encounter for preventive health examination  AWV Completed  -Encouraged shingles vaccine.    2. MDD (major depressive disorder), recurrent, in partial remission  Stable on current regimen, " followed by Psychiatry.    3. Dependency on pain medication  Chronic, followed by Pain Mgmt.    4. Gastroesophageal reflux disease without esophagitis  Stable on current regimen, followed by PCP and Gastro.    5. Age-related osteoporosis without current pathological fracture  Stable, Prolia q 6 mos, Dexa scheduled for 8/24/2020, followed by Rheumatology.    6. Mixed hyperlipidemia  Stable, on statin, followed by PCP.    7. Essential hypertension  Stable, followed by PCP.    8. Anxiety  Stable on current regimen, followed by Psychiatry.    9. DDD (degenerative disc disease), cervical  Chronic, followed by Pain Mgmt.    10. Cervical radiculitis  Chronic, followed by Pain Mgmt.      Provided Rola with a 5-10 year written screening schedule and personal prevention plan. Recommendations were developed using the USPSTF age appropriate recommendations. Education, counseling, and referrals were provided as needed. After Visit Summary printed and given to patient which includes a list of additional screenings\tests needed.    Follow up in about 1 year (around 7/23/2021) for your next annual wellness visit.    Quiana Zhong NP  I offered to discuss end of life issues, including information on how to make advance directives that the patient could use to name someone who would make medical decisions on their behalf if they became too ill to make themselves.    _X_Patient declined  ___Patient is interested, I provided paper work and offered to discuss.

## 2020-07-23 NOTE — PATIENT INSTRUCTIONS
Counseling and Referral of Other Preventative  (Italic type indicates deductible and co-insurance are waived)    Patient Name: Rola Richter  Today's Date: 7/23/2020    Health Maintenance       Date Due Completion Date    Sign Pain Contract 08/23/2020 (Originally 5/31/1963) ---    Naloxone Prescription 11/27/2020 (Originally 5/31/1963) ---    Shingles Vaccine (1 of 2) 12/25/2020 (Originally 5/31/1995) ---    Urine Drug Screen 01/22/2021 (Originally 10/8/2019) 4/8/2019    Influenza Vaccine (1) 09/01/2020 9/30/2019    Lipid Panel 10/01/2020 10/1/2019    DEXA SCAN 08/20/2021 8/20/2018    Colorectal Cancer Screening 05/13/2024 5/13/2019    TETANUS VACCINE 01/26/2026 1/26/2016        No orders of the defined types were placed in this encounter.    The following information is provided to all patients.  This information is to help you find resources for any of the problems found today that may be affecting your health:                Living healthy guide: www.[facility].[place].gov      Understanding Diabetes: www.diabetes.org      Eating healthy: www.cdc.gov/healthyweight      CDC home safety checklist: www.cdc.gov/steadi/patient.html      Agency on Aging: www.goea.[place].gov      Alcoholics anonymous (AA): www.aa.org      Physical Activity: www.[place]ros.nih.gov/yt6pqrl      Tobacco use: www.quitwithusla.org

## 2020-07-30 ENCOUNTER — TREATMENT PLANNING (OUTPATIENT)
Dept: RHEUMATOLOGY | Facility: CLINIC | Age: 75
End: 2020-07-30

## 2020-07-30 DIAGNOSIS — M81.0 AGE-RELATED OSTEOPOROSIS WITHOUT CURRENT PATHOLOGICAL FRACTURE: Primary | ICD-10-CM

## 2020-07-30 DIAGNOSIS — Z79.899 ENCOUNTER FOR LONG-TERM (CURRENT) USE OF MEDICATIONS: ICD-10-CM

## 2020-07-30 NOTE — PROGRESS NOTES
BP slightly elevated, however, readings are variable  Given patient's age, will continue current regimen to decrease risk of side effects and falls    BP average 142/73 mmHg    Hypertension Medications             irbesartan (AVAPRO) 150 MG tablet TAKE 2 TABLETS BY MOUTH EVERY EVENING.

## 2020-08-04 ENCOUNTER — LAB VISIT (OUTPATIENT)
Dept: LAB | Facility: HOSPITAL | Age: 75
End: 2020-08-04
Attending: INTERNAL MEDICINE
Payer: MEDICARE

## 2020-08-04 ENCOUNTER — TELEPHONE (OUTPATIENT)
Dept: INFUSION THERAPY | Facility: HOSPITAL | Age: 75
End: 2020-08-04

## 2020-08-04 DIAGNOSIS — M81.0 AGE-RELATED OSTEOPOROSIS WITHOUT CURRENT PATHOLOGICAL FRACTURE: ICD-10-CM

## 2020-08-04 DIAGNOSIS — Z79.899 ENCOUNTER FOR LONG-TERM (CURRENT) USE OF MEDICATIONS: ICD-10-CM

## 2020-08-04 LAB
ANION GAP SERPL CALC-SCNC: 9 MMOL/L (ref 8–16)
BUN SERPL-MCNC: 15 MG/DL (ref 8–23)
CALCIUM SERPL-MCNC: 8.7 MG/DL (ref 8.7–10.5)
CHLORIDE SERPL-SCNC: 105 MMOL/L (ref 95–110)
CO2 SERPL-SCNC: 23 MMOL/L (ref 23–29)
CREAT SERPL-MCNC: 0.8 MG/DL (ref 0.5–1.4)
EST. GFR  (AFRICAN AMERICAN): >60 ML/MIN/1.73 M^2
EST. GFR  (NON AFRICAN AMERICAN): >60 ML/MIN/1.73 M^2
GLUCOSE SERPL-MCNC: 99 MG/DL (ref 70–110)
POTASSIUM SERPL-SCNC: 3.8 MMOL/L (ref 3.5–5.1)
SODIUM SERPL-SCNC: 137 MMOL/L (ref 136–145)

## 2020-08-04 PROCEDURE — 36415 COLL VENOUS BLD VENIPUNCTURE: CPT

## 2020-08-04 PROCEDURE — 80048 BASIC METABOLIC PNL TOTAL CA: CPT

## 2020-08-04 NOTE — TELEPHONE ENCOUNTER
Patient scheduled for prolia tomorrow, lab done and in epic. Is it ok for pt to go ahead with Prolia dose tomorrow?

## 2020-08-06 ENCOUNTER — INFUSION (OUTPATIENT)
Dept: INFUSION THERAPY | Facility: HOSPITAL | Age: 75
End: 2020-08-06
Attending: INTERNAL MEDICINE
Payer: MEDICARE

## 2020-08-06 VITALS
SYSTOLIC BLOOD PRESSURE: 152 MMHG | TEMPERATURE: 97 F | DIASTOLIC BLOOD PRESSURE: 70 MMHG | RESPIRATION RATE: 16 BRPM | BODY MASS INDEX: 24.98 KG/M2 | OXYGEN SATURATION: 96 % | WEIGHT: 119.5 LBS | HEART RATE: 77 BPM

## 2020-08-06 DIAGNOSIS — M81.0 AGE-RELATED OSTEOPOROSIS WITHOUT CURRENT PATHOLOGICAL FRACTURE: Primary | ICD-10-CM

## 2020-08-06 PROCEDURE — 63600175 PHARM REV CODE 636 W HCPCS: Mod: JG | Performed by: INTERNAL MEDICINE

## 2020-08-06 PROCEDURE — 96372 THER/PROPH/DIAG INJ SC/IM: CPT

## 2020-08-06 RX ADMIN — DENOSUMAB 60 MG: 60 INJECTION SUBCUTANEOUS at 08:08

## 2020-08-06 NOTE — PLAN OF CARE
Problem: Fall Injury Risk  Goal: Absence of Fall and Fall-Related Injury  Outcome: Ongoing, Progressing  Intervention: Identify and Manage Contributors to Fall Injury Risk  Flowsheets (Taken 8/6/2020 0804)  Self-Care Promotion: independence encouraged  Medication Review/Management: medications reviewed

## 2020-08-07 ENCOUNTER — TELEPHONE (OUTPATIENT)
Dept: INFUSION THERAPY | Facility: HOSPITAL | Age: 75
End: 2020-08-07

## 2020-08-10 ENCOUNTER — TELEPHONE (OUTPATIENT)
Dept: PRIMARY CARE CLINIC | Facility: CLINIC | Age: 75
End: 2020-08-10

## 2020-08-10 NOTE — PROGRESS NOTES
"YUE Ibanez contacted Mrs. Richter to check on her wellbeing and update her assessments.  Mrs Richter scored "3" on the LAURA 7 and "8" on the PHQ 9.       "

## 2020-08-11 ENCOUNTER — OFFICE VISIT (OUTPATIENT)
Dept: PSYCHIATRY | Facility: CLINIC | Age: 75
End: 2020-08-11
Payer: MEDICARE

## 2020-08-11 VITALS
SYSTOLIC BLOOD PRESSURE: 139 MMHG | WEIGHT: 120.13 LBS | BODY MASS INDEX: 25.22 KG/M2 | DIASTOLIC BLOOD PRESSURE: 71 MMHG | HEIGHT: 58 IN | HEART RATE: 81 BPM

## 2020-08-11 DIAGNOSIS — F33.41 MDD (MAJOR DEPRESSIVE DISORDER), RECURRENT, IN PARTIAL REMISSION: Primary | ICD-10-CM

## 2020-08-11 DIAGNOSIS — F43.10 PTSD (POST-TRAUMATIC STRESS DISORDER): ICD-10-CM

## 2020-08-11 PROCEDURE — 1125F AMNT PAIN NOTED PAIN PRSNT: CPT | Mod: HCNC,S$GLB,, | Performed by: PSYCHIATRY & NEUROLOGY

## 2020-08-11 PROCEDURE — 3288F FALL RISK ASSESSMENT DOCD: CPT | Mod: HCNC,CPTII,S$GLB, | Performed by: PSYCHIATRY & NEUROLOGY

## 2020-08-11 PROCEDURE — 1100F PR PT FALLS ASSESS DOC 2+ FALLS/FALL W/INJURY/YR: ICD-10-PCS | Mod: HCNC,CPTII,S$GLB, | Performed by: PSYCHIATRY & NEUROLOGY

## 2020-08-11 PROCEDURE — 3075F SYST BP GE 130 - 139MM HG: CPT | Mod: HCNC,CPTII,S$GLB, | Performed by: PSYCHIATRY & NEUROLOGY

## 2020-08-11 PROCEDURE — 99999 PR PBB SHADOW E&M-EST. PATIENT-LVL III: ICD-10-PCS | Mod: PBBFAC,HCNC,, | Performed by: PSYCHIATRY & NEUROLOGY

## 2020-08-11 PROCEDURE — 1159F PR MEDICATION LIST DOCUMENTED IN MEDICAL RECORD: ICD-10-PCS | Mod: HCNC,S$GLB,, | Performed by: PSYCHIATRY & NEUROLOGY

## 2020-08-11 PROCEDURE — 99999 PR PBB SHADOW E&M-EST. PATIENT-LVL III: CPT | Mod: PBBFAC,HCNC,, | Performed by: PSYCHIATRY & NEUROLOGY

## 2020-08-11 PROCEDURE — 3288F PR FALLS RISK ASSESSMENT DOCUMENTED: ICD-10-PCS | Mod: HCNC,CPTII,S$GLB, | Performed by: PSYCHIATRY & NEUROLOGY

## 2020-08-11 PROCEDURE — 1159F MED LIST DOCD IN RCRD: CPT | Mod: HCNC,S$GLB,, | Performed by: PSYCHIATRY & NEUROLOGY

## 2020-08-11 PROCEDURE — 1125F PR PAIN SEVERITY QUANTIFIED, PAIN PRESENT: ICD-10-PCS | Mod: HCNC,S$GLB,, | Performed by: PSYCHIATRY & NEUROLOGY

## 2020-08-11 PROCEDURE — 3078F PR MOST RECENT DIASTOLIC BLOOD PRESSURE < 80 MM HG: ICD-10-PCS | Mod: HCNC,CPTII,S$GLB, | Performed by: PSYCHIATRY & NEUROLOGY

## 2020-08-11 PROCEDURE — 99214 OFFICE O/P EST MOD 30 MIN: CPT | Mod: HCNC,S$GLB,, | Performed by: PSYCHIATRY & NEUROLOGY

## 2020-08-11 PROCEDURE — 99214 PR OFFICE/OUTPT VISIT, EST, LEVL IV, 30-39 MIN: ICD-10-PCS | Mod: HCNC,S$GLB,, | Performed by: PSYCHIATRY & NEUROLOGY

## 2020-08-11 PROCEDURE — 1100F PTFALLS ASSESS-DOCD GE2>/YR: CPT | Mod: HCNC,CPTII,S$GLB, | Performed by: PSYCHIATRY & NEUROLOGY

## 2020-08-11 PROCEDURE — 3078F DIAST BP <80 MM HG: CPT | Mod: HCNC,CPTII,S$GLB, | Performed by: PSYCHIATRY & NEUROLOGY

## 2020-08-11 PROCEDURE — 3075F PR MOST RECENT SYSTOLIC BLOOD PRESS GE 130-139MM HG: ICD-10-PCS | Mod: HCNC,CPTII,S$GLB, | Performed by: PSYCHIATRY & NEUROLOGY

## 2020-08-11 RX ORDER — BUSPIRONE HYDROCHLORIDE 10 MG/1
10 TABLET ORAL 2 TIMES DAILY
Qty: 180 TABLET | Refills: 0 | Status: SHIPPED | OUTPATIENT
Start: 2020-08-11 | End: 2021-01-11 | Stop reason: SDUPTHER

## 2020-08-11 RX ORDER — SERTRALINE HYDROCHLORIDE 100 MG/1
TABLET, FILM COATED ORAL
Qty: 90 TABLET | Refills: 0 | Status: SHIPPED | OUTPATIENT
Start: 2020-08-11 | End: 2020-11-20 | Stop reason: SDUPTHER
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
## 2020-08-28 ENCOUNTER — HOSPITAL ENCOUNTER (OUTPATIENT)
Dept: RADIOLOGY | Facility: HOSPITAL | Age: 75
Discharge: HOME | End: 2020-08-28
Attending: INTERNAL MEDICINE
Payer: MEDICARE

## 2020-08-28 DIAGNOSIS — M81.0 AGE-RELATED OSTEOPOROSIS WITHOUT CURRENT PATHOLOGICAL FRACTURE: ICD-10-CM

## 2020-08-28 PROCEDURE — 77080 DXA BONE DENSITY AXIAL: CPT | Mod: TC,PO

## 2020-08-31 ENCOUNTER — PATIENT OUTREACH (OUTPATIENT)
Dept: OTHER | Facility: OTHER | Age: 75
End: 2020-08-31

## 2020-09-14 NOTE — Clinical Note
Primary Care Providers: Liseth Carias MD, MD (General)  Your patient was seen today for a HRA visit. Gap(s) in care (HEDIS gaps) have been identified during this visit that require additional testing and possible follow up.  Orders Placed This Encounter     Comprehensive metabolic panel         Standing Status: Future         Standing Expiration Date: 4/9/2019     Lipid panel         Standing Status: Future         Standing Expiration Date: 4/9/2019     Ambulatory referral to Gastroenterology         Referral Priority:Routine         Referral Type:Consultation         Referral Reason:Specialty Services Required         Referred to Provider:Timi Marcial MD         Requested Specialty:Gastroenterology         Number of Visits Requested:1   These orders were placed using [facility] approved protocol and any results will be forwarded to your office for appropriate follow up. I have included a copy of my visit note; please review the note and feel free to contact me with any questions.    Vaccine Information Sheet, \"Influenza - Inactivated\"  given to Anton Langston, or parent/legal guardian of  Anton Langston and verbalized understanding. Patient responses:    Have you ever had a reaction to a flu vaccine? No  Are you able to eat eggs without adverse effects? Yes  Do you have any current illness? No  Have you ever had [place] Syndrome? No    Flu vaccine given per order. Please see immunization tab.

## 2020-09-17 ENCOUNTER — PATIENT MESSAGE (OUTPATIENT)
Dept: PAIN MEDICINE | Facility: CLINIC | Age: 75
End: 2020-09-17

## 2020-09-17 RX ORDER — HYDROCODONE BITARTRATE AND ACETAMINOPHEN 5; 325 MG/1; MG/1
1 TABLET ORAL EVERY 12 HOURS PRN
Qty: 60 TABLET | Refills: 0 | Status: SHIPPED | OUTPATIENT
Start: 2020-09-17 | End: 2020-09-24 | Stop reason: SDUPTHER

## 2020-09-17 RX ORDER — HYDROCODONE BITARTRATE AND ACETAMINOPHEN 5; 325 MG/1; MG/1
1 TABLET ORAL EVERY 12 HOURS PRN
Qty: 60 TABLET | Refills: 0 | Status: SHIPPED | OUTPATIENT
Start: 2020-09-17 | End: 2020-09-17 | Stop reason: SDUPTHER

## 2020-09-21 ENCOUNTER — PATIENT OUTREACH (OUTPATIENT)
Dept: ADMINISTRATIVE | Facility: OTHER | Age: 75
End: 2020-09-21

## 2020-09-21 NOTE — PROGRESS NOTES
Chart was reviewed for overdue Proactive [facility] Encounters (HAJA)  topics  Updates were requested from care everywhere  Health Maintenance has been updated

## 2020-09-22 ENCOUNTER — PATIENT OUTREACH (OUTPATIENT)
Dept: OTHER | Facility: OTHER | Age: 75
End: 2020-09-22

## 2020-09-22 DIAGNOSIS — I10 HYPERTENSION, UNSPECIFIED TYPE: Primary | ICD-10-CM

## 2020-09-24 ENCOUNTER — OFFICE VISIT (OUTPATIENT)
Dept: PAIN MEDICINE | Facility: CLINIC | Age: 75
End: 2020-09-24
Payer: MEDICARE

## 2020-09-24 VITALS
BODY MASS INDEX: 25.19 KG/M2 | HEART RATE: 83 BPM | WEIGHT: 120 LBS | SYSTOLIC BLOOD PRESSURE: 120 MMHG | DIASTOLIC BLOOD PRESSURE: 70 MMHG | HEIGHT: 58 IN

## 2020-09-24 DIAGNOSIS — G89.4 CHRONIC PAIN DISORDER: Primary | ICD-10-CM

## 2020-09-24 DIAGNOSIS — M47.812 SPONDYLOSIS OF CERVICAL REGION WITHOUT MYELOPATHY OR RADICULOPATHY: ICD-10-CM

## 2020-09-24 DIAGNOSIS — M50.30 DDD (DEGENERATIVE DISC DISEASE), CERVICAL: ICD-10-CM

## 2020-09-24 DIAGNOSIS — M79.18 MYOFASCIAL PAIN: ICD-10-CM

## 2020-09-24 PROCEDURE — 99214 PR OFFICE/OUTPT VISIT, EST, LEVL IV, 30-39 MIN: ICD-10-PCS | Mod: HCNC,S$GLB,, | Performed by: PHYSICIAN ASSISTANT

## 2020-09-24 PROCEDURE — 1101F PR PT FALLS ASSESS DOC 0-1 FALLS W/OUT INJ PAST YR: ICD-10-PCS | Mod: HCNC,CPTII,S$GLB, | Performed by: PHYSICIAN ASSISTANT

## 2020-09-24 PROCEDURE — 99999 PR PBB SHADOW E&M-EST. PATIENT-LVL IV: ICD-10-PCS | Mod: PBBFAC,HCNC,, | Performed by: PHYSICIAN ASSISTANT

## 2020-09-24 PROCEDURE — 1125F AMNT PAIN NOTED PAIN PRSNT: CPT | Mod: HCNC,S$GLB,, | Performed by: PHYSICIAN ASSISTANT

## 2020-09-24 PROCEDURE — 3078F PR MOST RECENT DIASTOLIC BLOOD PRESSURE < 80 MM HG: ICD-10-PCS | Mod: HCNC,CPTII,S$GLB, | Performed by: PHYSICIAN ASSISTANT

## 2020-09-24 PROCEDURE — 1159F MED LIST DOCD IN RCRD: CPT | Mod: HCNC,S$GLB,, | Performed by: PHYSICIAN ASSISTANT

## 2020-09-24 PROCEDURE — 99214 OFFICE O/P EST MOD 30 MIN: CPT | Mod: HCNC,S$GLB,, | Performed by: PHYSICIAN ASSISTANT

## 2020-09-24 PROCEDURE — 1125F PR PAIN SEVERITY QUANTIFIED, PAIN PRESENT: ICD-10-PCS | Mod: HCNC,S$GLB,, | Performed by: PHYSICIAN ASSISTANT

## 2020-09-24 PROCEDURE — 3078F DIAST BP <80 MM HG: CPT | Mod: HCNC,CPTII,S$GLB, | Performed by: PHYSICIAN ASSISTANT

## 2020-09-24 PROCEDURE — 3074F PR MOST RECENT SYSTOLIC BLOOD PRESSURE < 130 MM HG: ICD-10-PCS | Mod: HCNC,CPTII,S$GLB, | Performed by: PHYSICIAN ASSISTANT

## 2020-09-24 PROCEDURE — 1159F PR MEDICATION LIST DOCUMENTED IN MEDICAL RECORD: ICD-10-PCS | Mod: HCNC,S$GLB,, | Performed by: PHYSICIAN ASSISTANT

## 2020-09-24 PROCEDURE — 99999 PR PBB SHADOW E&M-EST. PATIENT-LVL IV: CPT | Mod: PBBFAC,HCNC,, | Performed by: PHYSICIAN ASSISTANT

## 2020-09-24 PROCEDURE — 1101F PT FALLS ASSESS-DOCD LE1/YR: CPT | Mod: HCNC,CPTII,S$GLB, | Performed by: PHYSICIAN ASSISTANT

## 2020-09-24 PROCEDURE — 3074F SYST BP LT 130 MM HG: CPT | Mod: HCNC,CPTII,S$GLB, | Performed by: PHYSICIAN ASSISTANT

## 2020-09-24 RX ORDER — HYDROCODONE BITARTRATE AND ACETAMINOPHEN 5; 325 MG/1; MG/1
1 TABLET ORAL EVERY 12 HOURS PRN
Qty: 60 TABLET | Refills: 0 | Status: SHIPPED | OUTPATIENT
Start: 2020-12-13 | End: 2021-01-11

## 2020-09-24 RX ORDER — HYDROCODONE BITARTRATE AND ACETAMINOPHEN 5; 325 MG/1; MG/1
1 TABLET ORAL EVERY 12 HOURS PRN
Qty: 60 TABLET | Refills: 0 | Status: SHIPPED | OUTPATIENT
Start: 2020-11-14 | End: 2020-12-13

## 2020-09-24 RX ORDER — HYDROCODONE BITARTRATE AND ACETAMINOPHEN 5; 325 MG/1; MG/1
1 TABLET ORAL EVERY 12 HOURS PRN
Qty: 60 TABLET | Refills: 0 | Status: SHIPPED | OUTPATIENT
Start: 2020-10-16 | End: 2020-11-14

## 2020-09-24 NOTE — PROGRESS NOTES
Referring Physician: No ref. provider found    PCP: Liseth Carias MD      CC: neck and left occipital pain    Interval history: Ms. Richter is a 75 y.o. female with neck pain and occipital neuralgia who presents today for f/u and medication refill. She continues to benefit from repeat occipital nerve block. Continues to c/o neck pain that extends into bilateral shoulders. . Previously right arm pain traveled to her hand, her left stops around her shoulder.  She had numbness to her right hand and first through fourth digits. Cervical MELANY in February 2018 that only provided benefit for a short time.     She continues to take Norco with benefit.  Robaxin was helpful but caused dizziness. Flexeril caused dry mouth but she would like to resume this. She had some left at home and has been taking it but is unsure if it was 5 or 10 MG, she has been prescribed both in the past. . Denies UE weakness. No bowel bladder changes.   Pain today is rated 8/10.    Prior HPI:   Patient is 70-year-old female with past history history of cervical DDD, cervical spondylosis and chronic headaches.  She recently moved here from [place], [place].  She is treated in the past by neurology.  She states having constant burning pain over the left side of her posterior scalp.  Pain radiates to her neck as well as a frontal.  She also has left-sided neck pain as well.  She denies any radicular arm pain.  No numbness or weakness.  She states having cervical epidural steroid injection at past with minimal benefit.  She also has had decided of cervical nerve blocks in the past with moderate benefit.  Most recent injection was performed 2 months ago.  She desires repeat injection.  She currently takes Norco 10 mg every 12 hours as needed with moderate benefit.  She also takes Zanaflex 4 mg every 8 hours with mild benefits.  She rates her pain 7/10 today.    Pain intervention history: s/p left occipital nerve blocks on 1/2016 with 50% relief of  her headaches  S/p cervical MELANY 18 moderate relief for a couple of weeks.     ROS:  CONSTITUTIONAL: No fevers, chills, night sweats, wt. loss, appetite changes  SKIN: no rashes or itching  ENT: No headaches, head trauma, vision changes, or eye pain  LYMPH NODES: None noticed   CV: No chest pain, palpitations.   RESP: No shortness of breath, dyspnea on exertion, cough, wheezing, or hemoptysis  GI: No nausea, emesis, diarrhea, constipation, melena, hematochezia, pain.    : No dysuria, hematuria, urgency, or frequency   HEME: No easy bruising, bleeding problems  PSYCHIATRIC: No depression, anxiety, psychosis, hallucinations.  NEURO: No seizures, memory loss, dizziness or difficulty sleeping  MSK: + History of present illness      Past Medical History:   Diagnosis Date    Anxiety     Arthritis     Cataract     DDD (degenerative disc disease), lumbar     Depression     Encounter for blood transfusion     GERD (gastroesophageal reflux disease)     Hyperlipidemia     Hypertension     pt states she does not take meds anymore she just watches her weight    Neuromuscular disorder     Occipital neuralgia 3/24/2017    Osteoporosis      Past Surgical History:   Procedure Laterality Date    APPENDECTOMY       SECTION      x 2    CHOLECYSTECTOMY      COLONOSCOPY  prior to     COLONOSCOPY N/A 2019    Procedure: COLONOSCOPY;  Surgeon: Greg Victor MD;  Location: [facility];  Service: Endoscopy;  Laterality: N/A;    ESOPHAGOGASTRODUODENOSCOPY N/A 2019    Procedure: EGD (ESOPHAGOGASTRODUODENOSCOPY);  Surgeon: Greg Victor MD;  Location: [facility];  Service: Endoscopy;  Laterality: N/A;    HYSTERECTOMY      Laser Periphery Iridotomy Bilateral     OD 16 and OS touch up 2016    UPPER GASTROINTESTINAL ENDOSCOPY  2017    Dr. Marcial: esophageal stenosis- dilated, gastritis, gastric polyps removed; biopsy- mild gastritis, negative for h pylori, hyperplastic polyp,  esophagus unremarkable    vocal cord tumor removal       Family History   Problem Relation Age of Onset    Osteoarthritis Mother     Alcohol abuse Mother     Rheum arthritis Mother     Osteoarthritis Sister     Diabetes Brother     No Known Problems Son     No Known Problems Sister     No Known Problems Sister     No Known Problems Brother     Arthritis Son     No Known Problems Son     Stroke Maternal Grandmother 99    Rheum arthritis Maternal Grandmother     Retinal detachment Neg Hx     Macular degeneration Neg Hx     Glaucoma Neg Hx     Amblyopia Neg Hx     Blindness Neg Hx     Cancer Neg Hx     Cataracts Neg Hx     Hypertension Neg Hx     Strabismus Neg Hx     Thyroid disease Neg Hx     Lupus Neg Hx     Kidney disease Neg Hx     Inflammatory bowel disease Neg Hx     Psoriasis Neg Hx     Colon cancer Neg Hx     Crohn's disease Neg Hx     Ulcerative colitis Neg Hx     Stomach cancer Neg Hx     Esophageal cancer Neg Hx      Social History     Socioeconomic History    Marital status:      Spouse name: Not on file    Number of children: Not on file    Years of education: Not on file    Highest education level: Not on file   Occupational History    Not on file   Social Needs    Financial resource strain: Hard    Food insecurity     Worry: Never true     Inability: Never true    Transportation needs     Medical: No     Non-medical: No   Tobacco Use    Smoking status: Never Smoker    Smokeless tobacco: Never Used   Substance and Sexual Activity    Alcohol use: No     Alcohol/week: 0.0 standard drinks     Frequency: Never     Binge frequency: Never    Drug use: Yes     Types: Hydrocodone    Sexual activity: Yes     Partners: Male   Lifestyle    Physical activity     Days per week: 0 days     Minutes per session: Not on file    Stress: Not at all   Relationships    Social connections     Talks on phone: More than three times a week     Gets together: Twice a week     " Attends Congregation service: Not on file     Active member of club or organization: Yes     Attends meetings of clubs or organizations: More than 4 times per year     Relationship status:    Other Topics Concern    Not on file   Social History Narrative    Not on file         Medications/Allergies: See med card    Vitals:    09/24/20 1043   BP: 120/70   Pulse: 83   Weight: 54.4 kg (120 lb)   Height: 4' 10" (1.473 m)   PainSc:   8   PainLoc: Neck         Physical exam:    GENERAL: A and O x3, the patient appears well groomed and is in no acute distress.  Skin: No rashes or obvious lesions  HEENT: normocephalic, atraumatic  CARDIOVASCULAR:  RRR  LUNGS: nonlabored breathing  ABDOMEN: soft, nontender   UPPER EXTREMITIES: Normal alignment, normal range of motion, no atrophy, no skin changes,  hair growth and nail growth normal and equal bilaterally. No swelling, no tenderness.  +Phalens on left side. +TTP tricep tendon  LOWER EXTREMITIES:  Normal alignment, normal range of motion, no atrophy, no skin changes,  hair growth and nail growth normal and equal bilaterally. No swelling, no tenderness.  CERVICAL SPINE:   Cervical spine: ROM is full in flexion, extension and lateral rotation.  Painful flexion > extension.  Positive facet loading bilaterally.  Spurling is positive at right side.  Myofascial exam:  Tenderness to palpation across cervical paraspinals deltoid and trapezius muscles bilaterally.      MENTAL STATUS: normal orientation, speech, language, and fund of knowledge for social situation.  Emotional state appropriate.    CRANIAL NERVES:  II:  PERRL bilaterally,   III,IV,VI: EOMI.    V:  Facial sensation equal bilaterally  VII:  Facial motor function normal.  VIII:  Hearing equal to finger rub bilaterally  IX/X: Gag normal, palate symmetric  XI:  Shoulder shrug equal, head turn equal  XII:  Tongue midline without fasciculations      MOTOR: Tone and bulk: normal bilateral upper and lower Strength: normal "   Delt Bi Tri WE WF     R 5 5 5 5 5 5   L 5 5 5 5 5 5     IP ADD ABD Quad TA Gas HAM  R 5 5 5 5 5 5 5  L 5 5 5 5 5 5 5    SENSATION: Light touch and pinprick intact bilaterally  REFLEXES: normal, symmetric, nonbrisk.  Toes down, no clonus. No hoffmans.  GAIT: normal rise, base, steps, and arm swing.        Imaging:   Cervical MRI 12/4/17    Narrative     EXAM: Cervical spine MRI without contrast.    INDICATION: Cervical radiculopathy.  Neck pain and occipital neuralgia.  The patient complains of neck and right arm pain.    TECHNIQUE: Routine multiplanar, multisequence unenhanced cervical spine MRI was performed.    COMPARISON: Plain films of the cervical spine obtained concurrently      FINDINGS:     Vertebral column: There is straightening of the cervical spine with loss of normal lordosis.  As seen on concurrent plain films, there is trace anterolisthesis of C3 on C4 with 2 mm anterolisthesis of C4 on C5.  There is marked disc space narrowing at the C5-6 level with moderate to marked disc space narrowing at the C4-5 and C6-7 levels.  There is partial non-segmentation anteriorly at the C2-3 level.  The C2 and C3 as well as the C4 and C5 facet joints appear fused and this may represent developmental non-segmentation or degenerative ankylosis.  All of the discs are desiccated.  The odontoid process is intact.    Spinal canal, cord, epidural space: The craniovertebral junction is normal.  The spinal canal is omental and normal.  There is no significant spinal stenosis.  The cord is normal in caliber.  There is very subtle flattening of the ventral cord surface at the C4-5 and C5-6 levels where there is degenerative change.  The study is mildly motion but there is no definite cord edema or myelomalacia.    Findings by level:    C2-3: There is no spinal canal or foraminal stenosis.  There is mild left facet joint arthropathy.    C3-4: There is trace anterolisthesis.  There is left greater than right uncovertebral  spurring and facet joint arthropathy.  There is a mild disc osteophyte complex, slightly eccentric to the left with subtle annular fissure.  This narrows the ventral subarachnoid space.  There is no spinal stenosis or cord compression.  There is moderate marked left foraminal stenosis.    C4-5: There is moderate disc space narrowing with 2 mm anterolisthesis of C4 on C5.  There is facet joint arthropathy or effusion left greater than right.  There is also mild bilateral but left greater than right uncovertebral spurring.  There is unroofing of a mild disc bulge which narrows the ventral subarachnoid space.  There is no spinal stenosis.  There is mild to moderate left foraminal stenosis.    C5-6:There is marked disc space narrowing.  There is bilateral uncovertebral spurring.  There is a disc osteophyte complex which narrows the subarachnoid space.  This is slightly eccentric to the right There is subtle flattening of the ventral cord surface.  Cord signal is grossly normal.  There is mild spinal stenosis with at least moderate bilateral foraminal stenosis.    C6-7: There is moderate disc space narrowing.  There is mild uncovertebral spurring.  There is a shallow disc osteophyte complex, slightly eccentric to the right.  There is narrowing of the ventral subarachnoid space.  There is no spinal stenosis.  Cord signal is normal.  There is mild bilateral foraminal stenosis.  There is a small 3.5 mm left foraminal perineural cyst.    C7- T1: There is a Schmorl's node in inferior endplate of C7, chronic.  There are tiny bilateral foraminal perineural cysts.  There is minimal bulging of the annulus and mild facet joint arthropathy without spinal canal or foraminal stenosis.    Soft tissues/other: The prevertebral soft tissues are normal.  The airway is patent.   Impression          1. There is multilevel degenerative disc disease described in detail above.  There is no acute fracture.  There is degenerative listhesis at  several levels.  There is some degree of disc osteophyte complex, uncovertebral spurring and/or facet joint arthropathy contributing to some degree of foraminal stenosis at several levels.  There is no significant spinal canal stenosis.  There is very subtle flattening of the ventral cord surface at the C4-5 and C6-7 levels The pertinent findings are summarized below.    2. At the C3-4 level, there is no spinal stenosis.  There is moderate to marked left foraminal stenosis.    3. At the C4-5 level there is no spinal stenosis.  There is mild to moderate left foraminal stenosis.    4. At the C5-6 level, there is mild spinal stenosis with at least moderate bilateral foraminal stenosis.    5. At the C6-7 level, there is no spinal stenosis.  There is mild bilateral foraminal stenosis.    6. There is no spinal canal or foraminal stenosis at the C2-3 or C7-T1 levels.     Assessment:  Mrs. Richter is a 75 y.o. female with neck and head pain    1. Chronic pain disorder    2. Spondylosis of cervical region without myelopathy or radiculopathy    3. Myofascial pain    4. DDD (degenerative disc disease), cervical      Plan:  1. I have stressed the importance of physical activity and exercise to improve overall health.  2. Consider repeat C7-T1 [place] MELANY in the future  3. Monitor progress and consider repeat left occipital blocks for head pain.  4. Norco 5mg q12hrs as needed for pain..  reviewed. UDS LCV consistent  5.  Discontinue Robaxin 500 mg. Will send in prescription for Flexeril when needed.   6. Ordered PT to focus on neck. Will be beneifical to prevent further muscle atrophy and increase mobility and strength   7. F/u 3 months or sooner. If no improvement with PT, may benefit from Botox injections.   All medication management was performed by Dr. Timothy Grimaldo

## 2020-10-06 NOTE — PROGRESS NOTES
"Digital Medicine: Health  Follow-Up    The history is provided by the patient.             Reason for review: Blood pressure not at goal        Topics Covered on Call: physical activity    Additional Follow-up details: Asked about elevated BP reading on 9/22.  Patient denies hypotensive symptoms. Patient unsure about elevated BP reading.      Patient Characteristics      Diet-Not assessed          Physical Activity-no change to routine  No change to exercise routine.       Additional physical activity details: Patient reports she has been experiencing pain in shoulders and pain management discussed physical therapy with her. Patient asked for a physical therapist, but informed patient to contact PCP.      Medication Adherence-Medication Adherence not addressed.        Substance, Sleep, Stress-No change  stress-  Details:  Intervention(s):    Sleep-  Details:  Intervention(s):    Alcohol -  Details:  Intervention(s):    Tobacco-  Details:  Intervention(s):    Additional details:Patient expressed she is feeling "depressed" about shoulder pain. When asked if she feels she needs a referral to psychiatry, patient denies..         Continue current diet/physical activity routine.  Provided patient education.       Addressed patient questions and patient has my contact information if needed prior to next outreach. Patient verbalizes understanding.      Explained the importance of self-monitoring and medication adherence. Encouraged the patient to communicate with their health  for lifestyle modifications to help improve or maintain a healthy lifestyle.                Topic    Lipid (Cholesterol) Test          Last 5 Patient Entered Readings                                      Current 30 Day Average: 141/74     Recent Readings 10/6/2020 9/27/2020 9/23/2020 9/22/2020 9/20/2020    SBP (mmHg) 145 139 126 171 125    DBP (mmHg) 81 69 63 83 68    Pulse 83 78 86 74 74               "

## 2020-10-16 ENCOUNTER — LAB VISIT (OUTPATIENT)
Dept: LAB | Facility: HOSPITAL | Age: 75
End: 2020-10-16
Attending: NURSE PRACTITIONER
Payer: MEDICARE

## 2020-10-16 DIAGNOSIS — I10 ESSENTIAL HYPERTENSION: ICD-10-CM

## 2020-10-16 DIAGNOSIS — E78.2 MIXED HYPERLIPIDEMIA: ICD-10-CM

## 2020-10-16 LAB
ALBUMIN SERPL BCP-MCNC: 4 G/DL (ref 3.5–5.2)
ALP SERPL-CCNC: 76 U/L (ref 55–135)
ALT SERPL W/O P-5'-P-CCNC: 13 U/L (ref 10–44)
ANION GAP SERPL CALC-SCNC: 8 MMOL/L (ref 8–16)
AST SERPL-CCNC: 19 U/L (ref 10–40)
BASOPHILS # BLD AUTO: 0.06 K/UL (ref 0–0.2)
BASOPHILS NFR BLD: 0.8 % (ref 0–1.9)
BILIRUB SERPL-MCNC: 0.3 MG/DL (ref 0.1–1)
BUN SERPL-MCNC: 14 MG/DL (ref 8–23)
CALCIUM SERPL-MCNC: 9.2 MG/DL (ref 8.7–10.5)
CHLORIDE SERPL-SCNC: 104 MMOL/L (ref 95–110)
CHOLEST SERPL-MCNC: 383 MG/DL (ref 120–199)
CHOLEST/HDLC SERPL: 7.1 {RATIO} (ref 2–5)
CO2 SERPL-SCNC: 26 MMOL/L (ref 23–29)
CREAT SERPL-MCNC: 0.9 MG/DL (ref 0.5–1.4)
DIFFERENTIAL METHOD: ABNORMAL
EOSINOPHIL # BLD AUTO: 0.6 K/UL (ref 0–0.5)
EOSINOPHIL NFR BLD: 7.9 % (ref 0–8)
ERYTHROCYTE [DISTWIDTH] IN BLOOD BY AUTOMATED COUNT: 14.3 % (ref 11.5–14.5)
EST. GFR  (AFRICAN AMERICAN): >60 ML/MIN/1.73 M^2
EST. GFR  (NON AFRICAN AMERICAN): >60 ML/MIN/1.73 M^2
GLUCOSE SERPL-MCNC: 92 MG/DL (ref 70–110)
HCT VFR BLD AUTO: 35.5 % (ref 37–48.5)
HDLC SERPL-MCNC: 54 MG/DL (ref 40–75)
HDLC SERPL: 14.1 % (ref 20–50)
HGB BLD-MCNC: 11.3 G/DL (ref 12–16)
IMM GRANULOCYTES # BLD AUTO: 0.05 K/UL (ref 0–0.04)
IMM GRANULOCYTES NFR BLD AUTO: 0.7 % (ref 0–0.5)
LDLC SERPL CALC-MCNC: 277.8 MG/DL (ref 63–159)
LYMPHOCYTES # BLD AUTO: 2.5 K/UL (ref 1–4.8)
LYMPHOCYTES NFR BLD: 33.7 % (ref 18–48)
MCH RBC QN AUTO: 29.5 PG (ref 27–31)
MCHC RBC AUTO-ENTMCNC: 31.8 G/DL (ref 32–36)
MCV RBC AUTO: 93 FL (ref 82–98)
MONOCYTES # BLD AUTO: 0.9 K/UL (ref 0.3–1)
MONOCYTES NFR BLD: 11.6 % (ref 4–15)
NEUTROPHILS # BLD AUTO: 3.4 K/UL (ref 1.8–7.7)
NEUTROPHILS NFR BLD: 45.3 % (ref 38–73)
NONHDLC SERPL-MCNC: 329 MG/DL
NRBC BLD-RTO: 0 /100 WBC
PLATELET # BLD AUTO: 281 K/UL (ref 150–350)
PMV BLD AUTO: 10.8 FL (ref 9.2–12.9)
POTASSIUM SERPL-SCNC: 4.5 MMOL/L (ref 3.5–5.1)
PROT SERPL-MCNC: 7.6 G/DL (ref 6–8.4)
RBC # BLD AUTO: 3.83 M/UL (ref 4–5.4)
SODIUM SERPL-SCNC: 138 MMOL/L (ref 136–145)
TRIGL SERPL-MCNC: 256 MG/DL (ref 30–150)
WBC # BLD AUTO: 7.51 K/UL (ref 3.9–12.7)

## 2020-10-16 PROCEDURE — 80053 COMPREHEN METABOLIC PANEL: CPT | Mod: HCNC

## 2020-10-16 PROCEDURE — 80061 LIPID PANEL: CPT | Mod: HCNC

## 2020-10-16 PROCEDURE — 85025 COMPLETE CBC W/AUTO DIFF WBC: CPT | Mod: HCNC

## 2020-10-16 PROCEDURE — 36415 COLL VENOUS BLD VENIPUNCTURE: CPT | Mod: HCNC,PO

## 2020-10-22 ENCOUNTER — OFFICE VISIT (OUTPATIENT)
Dept: FAMILY MEDICINE | Facility: CLINIC | Age: 75
End: 2020-10-22
Payer: MEDICARE

## 2020-10-22 VITALS
HEIGHT: 59 IN | HEART RATE: 93 BPM | RESPIRATION RATE: 12 BRPM | TEMPERATURE: 98 F | WEIGHT: 121.5 LBS | BODY MASS INDEX: 24.49 KG/M2 | OXYGEN SATURATION: 96 % | DIASTOLIC BLOOD PRESSURE: 60 MMHG | SYSTOLIC BLOOD PRESSURE: 120 MMHG

## 2020-10-22 DIAGNOSIS — E78.2 MIXED HYPERLIPIDEMIA: ICD-10-CM

## 2020-10-22 DIAGNOSIS — J30.1 SEASONAL ALLERGIC RHINITIS DUE TO POLLEN: ICD-10-CM

## 2020-10-22 DIAGNOSIS — Z23 FLU VACCINE NEED: ICD-10-CM

## 2020-10-22 DIAGNOSIS — F33.41 MDD (MAJOR DEPRESSIVE DISORDER), RECURRENT, IN PARTIAL REMISSION: ICD-10-CM

## 2020-10-22 DIAGNOSIS — F19.20 DEPENDENCY ON PAIN MEDICATION: ICD-10-CM

## 2020-10-22 DIAGNOSIS — Z12.31 ENCOUNTER FOR SCREENING MAMMOGRAM FOR MALIGNANT NEOPLASM OF BREAST: ICD-10-CM

## 2020-10-22 DIAGNOSIS — K21.9 GASTROESOPHAGEAL REFLUX DISEASE WITHOUT ESOPHAGITIS: ICD-10-CM

## 2020-10-22 DIAGNOSIS — I10 ESSENTIAL HYPERTENSION: Primary | ICD-10-CM

## 2020-10-22 PROCEDURE — 99999 PR PBB SHADOW E&M-EST. PATIENT-LVL III: ICD-10-PCS | Mod: PBBFAC,HCNC,, | Performed by: FAMILY MEDICINE

## 2020-10-22 PROCEDURE — 1159F PR MEDICATION LIST DOCUMENTED IN MEDICAL RECORD: ICD-10-PCS | Mod: HCNC,S$GLB,, | Performed by: FAMILY MEDICINE

## 2020-10-22 PROCEDURE — 99999 PR PBB SHADOW E&M-EST. PATIENT-LVL III: CPT | Mod: PBBFAC,HCNC,, | Performed by: FAMILY MEDICINE

## 2020-10-22 PROCEDURE — 1100F PTFALLS ASSESS-DOCD GE2>/YR: CPT | Mod: HCNC,CPTII,S$GLB, | Performed by: FAMILY MEDICINE

## 2020-10-22 PROCEDURE — 3288F FALL RISK ASSESSMENT DOCD: CPT | Mod: HCNC,CPTII,S$GLB, | Performed by: FAMILY MEDICINE

## 2020-10-22 PROCEDURE — 3078F DIAST BP <80 MM HG: CPT | Mod: HCNC,CPTII,S$GLB, | Performed by: FAMILY MEDICINE

## 2020-10-22 PROCEDURE — 99214 PR OFFICE/OUTPT VISIT, EST, LEVL IV, 30-39 MIN: ICD-10-PCS | Mod: HCNC,S$GLB,, | Performed by: FAMILY MEDICINE

## 2020-10-22 PROCEDURE — 1159F MED LIST DOCD IN RCRD: CPT | Mod: HCNC,S$GLB,, | Performed by: FAMILY MEDICINE

## 2020-10-22 PROCEDURE — 3078F PR MOST RECENT DIASTOLIC BLOOD PRESSURE < 80 MM HG: ICD-10-PCS | Mod: HCNC,CPTII,S$GLB, | Performed by: FAMILY MEDICINE

## 2020-10-22 PROCEDURE — 1100F PR PT FALLS ASSESS DOC 2+ FALLS/FALL W/INJURY/YR: ICD-10-PCS | Mod: HCNC,CPTII,S$GLB, | Performed by: FAMILY MEDICINE

## 2020-10-22 PROCEDURE — 99214 OFFICE O/P EST MOD 30 MIN: CPT | Mod: HCNC,S$GLB,, | Performed by: FAMILY MEDICINE

## 2020-10-22 PROCEDURE — 3074F PR MOST RECENT SYSTOLIC BLOOD PRESSURE < 130 MM HG: ICD-10-PCS | Mod: HCNC,CPTII,S$GLB, | Performed by: FAMILY MEDICINE

## 2020-10-22 PROCEDURE — 1125F PR PAIN SEVERITY QUANTIFIED, PAIN PRESENT: ICD-10-PCS | Mod: HCNC,S$GLB,, | Performed by: FAMILY MEDICINE

## 2020-10-22 PROCEDURE — 1125F AMNT PAIN NOTED PAIN PRSNT: CPT | Mod: HCNC,S$GLB,, | Performed by: FAMILY MEDICINE

## 2020-10-22 PROCEDURE — 3288F PR FALLS RISK ASSESSMENT DOCUMENTED: ICD-10-PCS | Mod: HCNC,CPTII,S$GLB, | Performed by: FAMILY MEDICINE

## 2020-10-22 PROCEDURE — 3074F SYST BP LT 130 MM HG: CPT | Mod: HCNC,CPTII,S$GLB, | Performed by: FAMILY MEDICINE

## 2020-10-22 RX ORDER — EZETIMIBE 10 MG/1
10 TABLET ORAL DAILY
Qty: 90 TABLET | Refills: 3 | Status: SHIPPED | OUTPATIENT
Start: 2020-10-22 | End: 2021-09-19

## 2020-10-22 RX ORDER — FLUTICASONE PROPIONATE 50 MCG
2 SPRAY, SUSPENSION (ML) NASAL DAILY
Qty: 16 G | Status: SHIPPED | OUTPATIENT
Start: 2020-10-22 | End: 2021-06-11 | Stop reason: SDUPTHER

## 2020-10-26 ENCOUNTER — CLINICAL SUPPORT (OUTPATIENT)
Dept: REHABILITATION | Facility: HOSPITAL | Age: 75
End: 2020-10-26
Payer: MEDICARE

## 2020-10-26 DIAGNOSIS — M79.18 MYOFASCIAL PAIN: ICD-10-CM

## 2020-10-26 DIAGNOSIS — M54.2 NECK PAIN: ICD-10-CM

## 2020-10-26 DIAGNOSIS — M47.812 SPONDYLOSIS OF CERVICAL REGION WITHOUT MYELOPATHY OR RADICULOPATHY: ICD-10-CM

## 2020-10-26 DIAGNOSIS — M50.30 DDD (DEGENERATIVE DISC DISEASE), CERVICAL: ICD-10-CM

## 2020-10-26 PROCEDURE — 97161 PT EVAL LOW COMPLEX 20 MIN: CPT | Mod: HCNC,PN

## 2020-10-26 NOTE — PLAN OF CARE
[facility]  Physical Therapy Initial Evaluation    Date: 10/26/2020   Name: Rola Richter  Clinic Number: [id number]    Therapy Diagnosis:   Encounter Diagnoses   Name Primary?   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  Evaluation Date: 10/26/2020  Authorization Period Expiration: 10/30/20  Plan of Care Expiration: 20  Visit # / Visits authorized:     Time In: 1515  Time Out: 1555  Total Appointment Time (timed & untimed codes): 40 minutes    Precautions: Standard    Subjective   Date of onset: Chronic problem.Hx of neck pain for several yrs.  History of current condition - Rola reports: neck pain increased in last several months,radiating to both shoulders.     Medical History:   Past Medical History:   Diagnosis Date    Anxiety     Arthritis     Cataract     DDD (degenerative disc disease), lumbar     Depression     Encounter for blood transfusion     GERD (gastroesophageal reflux disease)     Hyperlipidemia     Hypertension     pt states she does not take meds anymore she just watches her weight    Neuromuscular disorder     Occipital neuralgia 3/24/2017    Osteoporosis        Surgical History:   Rola Richter  has a past surgical history that includes Cholecystectomy;  section; Hysterectomy; vocal cord tumor removal; Laser Periphery Iridotomy (Bilateral); Appendectomy; Upper gastrointestinal endoscopy (2017); Colonoscopy (prior to ); Esophagogastroduodenoscopy (N/A, 2019); and Colonoscopy (N/A, 2019).    Medications:   Rola has a current medication list which includes the following prescription(s): buspirone, butalbital-aspirin-caffeine -40 mg, cod  liver oil, diclofenac sodium, ezetimibe, fish oil-omega-3 fatty acids, fluorometholone 0.1%, fluticasone propionate, hydrocodone-acetaminophen, hydrocodone-acetaminophen, hydrocodone-acetaminophen, irbesartan, linzess, loratadine, multivit with min-folic acid, omeprazole, and sertraline.    Allergies:   Review of patient's allergies indicates:   Allergen Reactions    Aspirin Nausea And Vomiting    Penicillins Itching    Crestor [rosuvastatin]      Muscle pain      Lipitor [atorvastatin]      Achy      Sulfa (sulfonamide antibiotics) Itching        Imaging,   1. There is multilevel degenerative disc disease described in detail above.  There is no acute fracture.  There is degenerative listhesis at several levels.  There is some degree of disc osteophyte complex, uncovertebral spurring and/or facet joint arthropathy contributing to some degree of foraminal stenosis at several levels.  There is no significant spinal canal stenosis.  There is very subtle flattening of the ventral cord surface at the C4-5 and C6-7 levels The pertinent findings are summarized below.     2. At the C3-4 level, there is no spinal stenosis.  There is moderate to marked left foraminal stenosis.     3. At the C4-5 level there is no spinal stenosis.  There is mild to moderate left foraminal stenosis.     4. At the C5-6 level, there is mild spinal stenosis with at least moderate bilateral foraminal stenosis.     5. At the C6-7 level, there is no spinal stenosis.  There is mild bilateral foraminal stenosis.     6. There is no spinal canal or foraminal stenosis at the C2-3 or C7-T1 levels.     Prior Therapy: No  Social History: In [address] house lives with their spouse  Occupation: Not working.  Prior Level of Function: Independent.  Current Level of Function: Independent.    Pain:  Current 8/10, worst 9/10, best 7/10   Location: bilateral neck    Description: Aching, Dull, Throbbing, Tingling, Numb and Variable  Aggravating Factors: Bending,  Walking, Extension, Flexing and Lifting  Easing Factors: relaxation, pain medication, hot bath and rest    Patients goals: decrease pain,education.    Objective     Cervical/Thoracic/Lumbar AROM: Pain/Dysfunction with Movement:   Flexion  20    Extension  25    Right side bending  15    Left side bending  15    Right rotation   45    Left rotation   40      Strength of CS 3-/5 in all planes.  Palpation ;neck and upper traps tender.  Compression test neg. C5 test pos.    Limitation/Restriction for FOTO NA Survey    Therapist reviewed FOTO scores for Rola Richter on 10/26/2020.   FOTO documents entered into [employer] - see Media section.    Limitation Score: 54% IMPAIRED,NDI 27/50.         Education provided:   - Role of PT.POC.  Assessment   Rola is a 75 y.o. female referred to outpatient Physical Therapy with a medical diagnosis of cervical DDD.. Patient presents with ROM and strength deficits,poor posture,decreased function due to pain and above listed deficits.    Patient prognosis is Fair.   Patientt will benefit from skilled outpatient Physical Therapy to address the deficits stated above and in the chart below, provide patient /family education, and to maximize patientt's level of independence.     Plan of care discussed with patient: Yes  Patient's spiritual, cultural and educational needs considered and patient is agreeable to the plan of care and goals as stated below:     Anticipated Barriers for therapy: no    Medical Necessity is demonstrated by the following  History  Co-morbidities and personal factors that may impact the plan of care Co-morbidities:   advanced age    Personal Factors:   no deficits     low   Examination  Body Structures and Functions, activity limitations and participation restrictions that may impact the plan of care Body Regions:   neck    Body Systems:    gross symmetry  ROM  strength    Participation Restrictions:   no    Activity limitations:   Learning and applying  knowledge  no deficits    General Tasks and Commands  no deficits    Communication  no deficits    Mobility  no deficits    Self care  no deficits    Domestic Life  no deficits    Interactions/Relationships  no deficits    Life Areas  no deficits    Community and Social Life  no deficits         low   Clinical Presentation stable and uncomplicated low   Decision Making/ Complexity Score: low     Goals:  Short Term Goals: 3 weeks   1.Decrease pain x 1 grade.  2.Start HEP.    Long Term Goals: 6 weeks   1.Pain 0-4/10.  2.Independent HEP.  3.ROM CS WFL/WNL.  4.Strength 5/5.  5.NDI 20/50.  6.Restore previous JENNIFER.    Plan   Plan of care Certification: 10/26/2020 to 12/24/20.    Outpatient Physical Therapy 2 times weekly for 6 weeks to include the following interventions: Cervical/Lumbar Traction, Electrical Stimulation PRN, Manual Therapy, Moist Heat/ Ice, Patient Education, Therapeutic Activites, Therapeutic Exercise, Ultrasound and dry needling PRN.IMS PRN..     Daljit Vo, PT

## 2020-10-30 ENCOUNTER — TELEPHONE (OUTPATIENT)
Dept: PSYCHIATRY | Facility: CLINIC | Age: 75
End: 2020-10-30

## 2020-10-30 NOTE — TELEPHONE ENCOUNTER
"Patient cancel December appointment via patient portal.  Called patient to reschedule cancel appointment.   Follow up is scheduled for 12/15/2020.    Patient wanted to inform Dr. Benítez that during this time of year "I get really really depressed."    Offered a sooner than scheduled appointment. Patient declined stating "i'll be alright"     Patient was encouraged to call the office if a sooner appointment is needed.  Patient acknowledged understanding.  Kimberly"

## 2020-11-02 ENCOUNTER — PATIENT MESSAGE (OUTPATIENT)
Dept: FAMILY MEDICINE | Facility: CLINIC | Age: 75
End: 2020-11-02

## 2020-11-03 ENCOUNTER — PATIENT MESSAGE (OUTPATIENT)
Dept: FAMILY MEDICINE | Facility: CLINIC | Age: 75
End: 2020-11-03

## 2020-11-03 DIAGNOSIS — N39.0 URINARY TRACT INFECTION WITHOUT HEMATURIA, SITE UNSPECIFIED: Primary | ICD-10-CM

## 2020-11-03 RX ORDER — NITROFURANTOIN 25; 75 MG/1; MG/1
100 CAPSULE ORAL 2 TIMES DAILY
Qty: 14 CAPSULE | Refills: 0 | Status: SHIPPED | OUTPATIENT
Start: 2020-11-03 | End: 2021-02-03

## 2020-11-03 NOTE — TELEPHONE ENCOUNTER
Advise she come in for u/a and c/s before starting abx.  Macrobid 100mg bid x 7 days called in.  Orders placed. Monitor for worsening symptoms and return to clinic or go to the ER if these occur: fever > 100.4, severe abdominal pain, intractable vomiting, bleeding from the rectum or black tarry stools, dehydration, lethargy or other worsening symptoms.

## 2020-11-03 NOTE — TELEPHONE ENCOUNTER
Please see attached e-mails from patient. Call placed to patient regarding. Spoke to patient's  (Aldo) who verbalized he sent portal messages to office. Advised Mrs Carlson an urine sample would be needed for analysis. Advised for patient to come to lab to obtain a specimen collection kit, and submit urine sample at her convenience. Mr Carlson stated he would bring patient today or either early tomorrow morning. Order for UA C&S placed. Will forward message to Dr Carias for notification.

## 2020-11-12 ENCOUNTER — CLINICAL SUPPORT (OUTPATIENT)
Dept: REHABILITATION | Facility: HOSPITAL | Age: 75
End: 2020-11-12
Payer: MEDICARE

## 2020-11-12 DIAGNOSIS — M54.2 NECK PAIN: ICD-10-CM

## 2020-11-12 PROCEDURE — 97110 THERAPEUTIC EXERCISES: CPT | Mod: HCNC,PN

## 2020-11-12 PROCEDURE — 97012 MECHANICAL TRACTION THERAPY: CPT | Mod: HCNC,PN

## 2020-11-12 NOTE — PROGRESS NOTES
11/12/20 1100   Cx/Thoracic Exercises   Shoulder Shrugs Rep/Sets/Weight 30   Freemotion Scap. Retraction W/Ext Reps/Sets/Weights 30,ROLLS 30   Additional Exercises   Additional Exercise UBE 8'   Additional Exercise ROM CS 10/10 FL/EX/SBL/RT/LR/RR   Additional Exercise CHIN TUCKS 5

## 2020-11-12 NOTE — PROGRESS NOTES
Physical Therapy Treatment Note     Name: Rola Richter  Clinic Number: [id number]    Therapy Diagnosis:   Encounter Diagnosis   Name Primary?    Neck pain      Physician: Kathy Jim PA*    Visit Date: 11/12/2020       Physician Orders: PT Eval and Treat   Medical Diagnosis from Referral: Spondylosis of cervical region.DDD cervical.  Evaluation Date: 10/26/2020  Authorization Period Expiration: 10/30/20  Plan of Care Expiration: 12/18/20  Visit # / Visits authorized: 2/ 1   Time In: 1100  Time Out: 1140  Total Billable Time: 23 minutes    Precautions: Standard    Subjective     Pt reports: no new c/o.  She was compliant with home exercise program.  Response to previous treatment: no change  Functional change: no    Pain: 7/10  Location: bilateral neck      Objective     Rola received therapeutic exercises to develop strength, endurance, ROM, flexibility and posture for 23 minutes including:  See flowsheets  Rola received the following supervised modalities after being cleared for contradictions: Mechanical Traction:  Rola received intermittent mechanical traction to the cervical spine at a force of 15/5 pounds for a total of 12 minutes. Hold time of 40s and rest time for 10s. Patient tolerated treatment well without any adverse effects.    Education provided:   - Posture ed.    Assessment     Poor endurance  Rola is progressing well towards her goals.   Pt prognosis is Good.     Pt will continue to benefit from skilled outpatient physical therapy to address the deficits listed in the problem list box on initial evaluation, provide pt/family education and to maximize pt's level of independence in the home and community environment.     Pt's spiritual, cultural and educational needs considered and pt agreeable to plan of care and goals.     Anticipated barriers to physical therapy: no    Goals:   Short Term Goals: 3 weeks   1.Decrease pain x 1 grade.  2.Start HEP.     Long Term Goals: 6  MD Jordan weeks   1.Pain 0-4/10.  2.Independent HEP.  3.ROM CS WFL/WNL.  4.Strength 5/5.  5.NDI 20/50.  6.Restore previous JENNIFER.     Plan     Per POC.    Daljit Vo, PT

## 2020-11-16 ENCOUNTER — CLINICAL SUPPORT (OUTPATIENT)
Dept: REHABILITATION | Facility: HOSPITAL | Age: 75
End: 2020-11-16
Payer: MEDICARE

## 2020-11-16 DIAGNOSIS — M54.2 NECK PAIN: ICD-10-CM

## 2020-11-16 PROCEDURE — 97110 THERAPEUTIC EXERCISES: CPT | Mod: HCNC,PN,CQ

## 2020-11-16 PROCEDURE — 97012 MECHANICAL TRACTION THERAPY: CPT | Mod: HCNC,PN,CQ

## 2020-11-16 PROCEDURE — 97010 HOT OR COLD PACKS THERAPY: CPT | Mod: HCNC,PN,CQ

## 2020-11-16 NOTE — PROGRESS NOTES
Physical Therapy Treatment Note     Name: Rola Richter  Clinic Number: [id number]    Therapy Diagnosis:   Encounter Diagnosis   Name Primary?    Neck pain      Physician: Kathy Jim PA*    Visit Date: 11/16/2020    Physician Orders: PT Eval and Treat   Medical Diagnosis from Referral: Spondylosis of cervical region.DDD cervical.  Evaluation Date: 10/26/2020  Authorization Period Expiration: 10/30/20  Plan of Care Expiration: 12/18/20  Visit # / Visits authorized: 2/20    Time In: 1015  Time Out: 1105  Total Billable Time: 50 minutes    Precautions: Standard    Subjective     Pt reports: tension in neck/shoulder with activities and exercises increased tension.  She was somewhat compliant with home exercise program.  Response to previous treatment: no problem  Functional change: increase activity around the house    Pain: 8/10  Location: bilateral upper trap    Objective     Rola received therapeutic exercises to develop ROM, flexibility and posture for 35 minutes including:  UBE forward/backward 3'/2' (decreased time due to fatigue per patient)  Seated: Cervical ROM: F/B,SB, L/R x15 each (verbal cues to decrease shoulder hikes)    Chin tucks x15 (with rest break to improve form)    Shoulder rolls 3x10 (with mirror to improve posture)    Shoulder shrugs 3x10 (with mirror to improve posture)    Scap retraction 3x10 (with mirror to improve posture)      Rola received the following supervised modalities after being cleared for contradictions: Mechanical Traction:  Rola received intermittent mechanical traction to the cervical spine at a force of 15max/5min pounds for a total of 15 minutes. Hold time of 40 seconds and rest time for 10 seconds along with hot pack to neck area. Patient tolerated treatment well without any adverse effects.    Rola received hot pack for 15 minutes to cervical area along with mechanical cervical traction.       Home Exercises Provided and Patient Education  Provided     Education provided:   - slow down during exercises and breaks in-between exercise    Written Home Exercises Provided: Patient instructed to cont prior HEP.  Exercises were reviewed and Rola was able to demonstrate them prior to the end of the session.  Rola demonstrated fair  understanding of the education provided.     See EMR under Patient Instructions for exercises provided prior visit.    Assessment     Patient tolerate exercises with cues for proper form and moderate cueing to decrease shoulder hiking during exercises.  Rola is progressing well towards her goals.   Pt prognosis is Fair.     Pt will continue to benefit from skilled outpatient physical therapy to address the deficits listed in the problem list box on initial evaluation, provide pt/family education and to maximize pt's level of independence in the home and community environment.     Pt's spiritual, cultural and educational needs considered and pt agreeable to plan of care and goals.     Anticipated barriers to physical therapy: pain    Goals:     Short Term Goals: 3 weeks   1.Decrease pain x 1 grade.  2.Start HEP.      Long Term Goals: 6 weeks   1.Pain 0-4/10.  2.Independent HEP. (progressing)  3.ROM CS WFL/WNL.  4.Strength 5/5.  5.NDI 20/50.  6.Restore previous JENNIFER      Plan     Continue POC with increase activity without pain.    I certify that I was present in the room directing the student, Yissel Ortiz, in service delivery and guiding them using my skilled judgment. As the co-signing therapist I have reviewed the students documentation and am responsible for the treatment, assessment, and plan.         Kim Reilly, PTA

## 2020-11-17 ENCOUNTER — PATIENT OUTREACH (OUTPATIENT)
Dept: OTHER | Facility: OTHER | Age: 75
End: 2020-11-17

## 2020-11-19 NOTE — PROGRESS NOTES
Digital Medicine: Health  Follow-Up    The history is provided by the patient.             Reason for review: Blood pressure not at goal      Additional Follow-up details: Brief encounter due to patient's phone cutting in and out.  States she is happy about BP readings with no questions or concerns.            Diet-Not assessed          Physical Activity-Not assessed    Medication Adherence-Medication Adherence not addressed.      Substance, Sleep, Stress-Not assessed      Continue current diet/physical activity routine.       Addressed patient questions and patient has my contact information if needed prior to next outreach. Patient verbalizes understanding.      Explained the importance of self-monitoring and medication adherence. Encouraged the patient to communicate with their health  for lifestyle modifications to help improve or maintain a healthy lifestyle.               There are no preventive care reminders to display for this patient.      Last 5 Patient Entered Readings                                      Current 30 Day Average: 138/70     Recent Readings 11/19/2020 11/19/2020 11/11/2020 10/28/2020 10/20/2020    SBP (mmHg) 129 129 148 129 144    DBP (mmHg) 71 71 74 68 67    Pulse 85 85 78 74 83

## 2020-11-20 ENCOUNTER — CLINICAL SUPPORT (OUTPATIENT)
Dept: REHABILITATION | Facility: HOSPITAL | Age: 75
End: 2020-11-20
Payer: MEDICARE

## 2020-11-20 DIAGNOSIS — M54.2 NECK PAIN: ICD-10-CM

## 2020-11-20 PROCEDURE — 97110 THERAPEUTIC EXERCISES: CPT | Mod: HCNC,PN

## 2020-11-20 NOTE — PROGRESS NOTES
11/20/20 1200   Cx/Thoracic Exercises   Shoulder Shrugs Rep/Sets/Weight 30   Freemotion Scap. Retraction W/Ext Reps/Sets/Weights 30,rolls 30   Additional Exercises   Additional Exercise UBE 5/5   Additional Exercise ROM C/S 10/10 FL/EX,SBL/R,/LR/RR   Additional Exercise CHIN TUCKS 30

## 2020-11-20 NOTE — PROGRESS NOTES
Physical Therapy Treatment Note     Name: Rola Richter  Clinic Number: [id number]    Therapy Diagnosis:   Encounter Diagnosis   Name Primary?    Neck pain      Physician: Kathy Jim PA*    Visit Date: 11/20/2020    Physician Orders: PT Eval and Treat   Medical Diagnosis from Referral: Spondylosis of cervical region.DDD cervical.  Evaluation Date: 10/26/2020  Authorization Period Expiration: 10/30/20  Plan of Care Expiration: 12/18/20  Visit # / Visits authorized: 4/ 1   Time In: 1200  Time Out: 1244  Total Billable Time: 25 minutes    Precautions: Standard    Subjective     Pt reports: no new c/o.  She was compliant with home exercise program.  Response to previous treatment: good  Functional change: no    Pain: 7/10  Location: bilateral neck      Objective     Rola received therapeutic exercises to develop strength, endurance, ROM, flexibility and posture for 25 minutes including:  See flowsheet    Rola received the following supervised modalities after being cleared for contradictions: Mechanical Traction:  Rola received intermittent mechanical traction to the cervical spine at a force of 16/6 pounds for a total of 15 minutes. Hold time of 40s and rest time for 10s. Patient tolerated treatment well without any adverse effects.  Posture ed.Education provided:   - posture ed    Assessment     Little improvement  Rola is progressing well towards her goals.   Pt prognosis is Good.     Pt will continue to benefit from skilled outpatient physical therapy to address the deficits listed in the problem list box on initial evaluation, provide pt/family education and to maximize pt's level of independence in the home and community environment.     Pt's spiritual, cultural and educational needs considered and pt agreeable to plan of care and goals.     Anticipated barriers to physical therapy: no    Goals:   Short Term Goals: 3 weeks   1.Decrease pain x 1 grade.  2.Start HEP.     Long Term  Goals: 6 weeks   1.Pain 0-4/10.  2.Independent HEP.  3.ROM CS WFL/WNL.  4.Strength 5/5.  5.NDI 20/50.  6.Restore previous JENNIFER.     Plan     Progress as able.    Daljit Vo, PT

## 2020-11-23 ENCOUNTER — CLINICAL SUPPORT (OUTPATIENT)
Dept: REHABILITATION | Facility: HOSPITAL | Age: 75
End: 2020-11-23
Payer: MEDICARE

## 2020-11-23 DIAGNOSIS — M54.2 NECK PAIN: ICD-10-CM

## 2020-11-23 PROCEDURE — 97012 MECHANICAL TRACTION THERAPY: CPT | Mod: HCNC,PN

## 2020-11-23 PROCEDURE — 97110 THERAPEUTIC EXERCISES: CPT | Mod: HCNC,PN

## 2020-11-23 NOTE — PROGRESS NOTES
11/23/20 1300   Cx/Thoracic Exercises   Shoulder Shrugs Rep/Sets/Weight 30   Freemotion Scap. Retraction W/Ext Reps/Sets/Weights 30,rolls 30   Additional Exercises   Additional Exercise UBE 5/5'   Additional Exercise ROM CS 20 EACH   Additional Exercise CHIN TUCKS 20

## 2020-11-23 NOTE — PROGRESS NOTES
Physical Therapy Treatment Note     Name: Rola Richter  Clinic Number: [id number]    Therapy Diagnosis:   Encounter Diagnosis   Name Primary?    Neck pain      Physician: Kathy Jim PA*    Visit Date: 11/23/2020    Physician Orders: PT Eval and Treat   Medical Diagnosis from Referral: Spondylosis of cervical region.DDD cervical.  Evaluation Date: 10/26/2020  Authorization Period Expiration: 10/30/20  Plan of Care Expiration: 12/18/20  Visit # / Visits authorized: 5/ 1   Time In: 1048  Time Out: 1130  Total Billable Time: 23 minutes    Precautions: Standard    Subjective     Pt reports: BETTER.  She was compliant with home exercise program.  Response to previous treatment: better  Functional change: no    Pain: 6/10  Location: bilateral neck      Objective     Rola received therapeutic exercises to develop strength, endurance, ROM, flexibility, posture and core stabilization for 23 minutes including:  See flowsheets  Rola received the following supervised modalities after being cleared for contradictions: Mechanical Traction:  Rola received intermittent mechanical traction to the cervical spine at a force of 16/6 pounds for a total of 15 minutes. Hold time of 40s and rest time for 10s. Patient tolerated treatment well without any adverse effects.  Home Exercises Provided and Patient Education Provided     Education provided:   - Posture ed.    Assessment     Doing well.  Rola is progressing well towards her goals.   Pt prognosis is Good.     Pt will continue to benefit from skilled outpatient physical therapy to address the deficits listed in the problem list box on initial evaluation, provide pt/family education and to maximize pt's level of independence in the home and community environment.     Pt's spiritual, cultural and educational needs considered and pt agreeable to plan of care and goals.     Anticipated barriers to physical therapy: no    Goals:   Short Term Goals: 3 weeks    1.Decrease pain x 1 grade.  2.Start HEP.     Long Term Goals: 6 weeks   1.Pain 0-4/10.  2.Independent HEP.  3.ROM CS WFL/WNL.  4.Strength 5/5.  5.NDI 20/50.  6.Restore previous JENNIFER.     Plan     Per POC.    Daljit Vo, PT

## 2020-11-30 ENCOUNTER — CLINICAL SUPPORT (OUTPATIENT)
Dept: REHABILITATION | Facility: HOSPITAL | Age: 75
End: 2020-11-30
Payer: MEDICARE

## 2020-11-30 DIAGNOSIS — M54.2 NECK PAIN: ICD-10-CM

## 2020-11-30 PROCEDURE — 97110 THERAPEUTIC EXERCISES: CPT | Mod: HCNC,PN

## 2020-11-30 PROCEDURE — 97012 MECHANICAL TRACTION THERAPY: CPT | Mod: HCNC,PN

## 2020-11-30 NOTE — PROGRESS NOTES
Physical Therapy Treatment Note     Name: Rola Richter  Clinic Number: [id number]    Therapy Diagnosis:   Encounter Diagnosis   Name Primary?    Neck pain      Physician: Kathy Jim PA*    Visit Date: 11/30/2020  Physician Orders: PT Eval and Treat   Medical Diagnosis from Referral: Spondylosis of cervical region.DDD cervical.  Evaluation Date: 10/26/2020  Authorization Period Expiration: 10/30/20  Plan of Care Expiration: 12/18/20  Visit # / Visits authorized: 6/ 1   Time In: 1015  Time Out: 1055  Total Billable Time: 40 minutes    Precautions: Standard    Subjective     Pt reports: better.  She was compliant with home exercise program.  Response to previous treatment: improving  Functional change: no    Pain: 5/10  Location: bilateral neck      Objective     Rola received therapeutic exercises to develop strength, endurance, ROM, flexibility, posture and core stabilization for 25 minutes including:  See flowsheets  Rola received the following supervised modalities after being cleared for contradictions: Mechanical Traction:  Rola received intermittent mechanical traction to the cervical spine at a force of 16/6 pounds for a total of 15 minutes. Hold time of 40s and rest time for 10s. Patient tolerated treatment well without any adverse effects.  Education provided:   - Posture ed.    Assessment     Improving.  Rola is progressing well towards her goals.   Pt prognosis is Good.     Pt will continue to benefit from skilled outpatient physical therapy to address the deficits listed in the problem list box on initial evaluation, provide pt/family education and to maximize pt's level of independence in the home and community environment.     Pt's spiritual, cultural and educational needs considered and pt agreeable to plan of care and goals.     Anticipated barriers to physical therapy: no    Goals:   Short Term Goals: 3 weeks   1.Decrease pain x 1 grade.  2.Start HEP.     Long Term  Goals: 6 weeks   1.Pain 0-4/10.  2.Independent HEP.  3.ROM CS WFL/WNL.  4.Strength 5/5.  5.NDI 20/50.  6.Restore previous JENNIFER.     Plan     Per POC.    Daljit Vo, PT

## 2020-11-30 NOTE — PROGRESS NOTES
11/30/20 1000   Cx/Thoracic Exercises   Shoulder Shrugs Rep/Sets/Weight 30   Freemotion Scap. Retraction W/Ext Reps/Sets/Weights 30,rolls 30   Shoulder Exercises   Overhead Pulley 5'   Additional Exercises   Additional Exercise ube 5/5'   Additional Exercise ROM CS 20 EACH   Additional Exercise CHIN TUCKS 10

## 2020-12-04 ENCOUNTER — PES CALL (OUTPATIENT)
Dept: ADMINISTRATIVE | Facility: CLINIC | Age: 75
End: 2020-12-04

## 2020-12-07 ENCOUNTER — CLINICAL SUPPORT (OUTPATIENT)
Dept: REHABILITATION | Facility: HOSPITAL | Age: 75
End: 2020-12-07
Attending: FAMILY MEDICINE
Payer: MEDICARE

## 2020-12-07 ENCOUNTER — PATIENT MESSAGE (OUTPATIENT)
Dept: RHEUMATOLOGY | Facility: CLINIC | Age: 75
End: 2020-12-07

## 2020-12-07 DIAGNOSIS — M54.2 NECK PAIN: ICD-10-CM

## 2020-12-07 PROCEDURE — 97012 MECHANICAL TRACTION THERAPY: CPT | Mod: PN,CQ

## 2020-12-07 PROCEDURE — 97110 THERAPEUTIC EXERCISES: CPT | Mod: PN,CQ

## 2020-12-07 PROCEDURE — 97010 HOT OR COLD PACKS THERAPY: CPT | Mod: PN,CQ

## 2020-12-07 NOTE — PROGRESS NOTES
"  Physical Therapy Treatment Note     Name: Rola Richter  Clinic Number: [id number]    Therapy Diagnosis:   Encounter Diagnosis   Name Primary?    Neck pain      Physician: Kathy Jim PA*    Visit Date: 12/7/2020    Physician Orders: PT Eval and Treat   Medical Diagnosis from Referral: Spondylosis of cervical region.DDD cervical.  Evaluation Date: 10/26/2020  Authorization Period Expiration: 10/30/20  Plan of Care Expiration: 12/18/20  Visit # / Visits authorized: 6/20    Time In: 1020  Time Out: 1115  Total Billable Time: 55 minutes    Precautions: Standard    Subjective     Pt reports: Continued L UT soreness.  She had not received home exercise program.  Response to previous treatment: no problem  Functional change: none stated    Pain: 7/10  Location: left UT      Objective     Rola received therapeutic exercises to develop ROM, flexibility and posture for 40 minutes including:    UBE forward/backward 5'/5'    Seated:Cervical ROMx20: rotation and side bend R/L               UT stretch 2x30" R/L    LS stretch 2x30" R/L     Rola received the following supervised modalities after being cleared for contradictions: Mechanical Traction:  Rola received intermittent mechanical traction to the cervical spine at a force of 16max/6min pounds for a total of 15 minutes. Hold time of 40 seconds and rest time for 10 seconds along with hot pack to neck area. Patient tolerated treatment well without any adverse effects.     Rola received hot pack for 15 minutes to cervical area along with mechanical cervical traction.     Home Exercises Provided and Patient Education Provided     Education provided:   - ROM exercises throughout the day    Written Home Exercises Provided: yes.  Exercises were reviewed and Rola was able to demonstrate them prior to the end of the session.  Rola demonstrated good  understanding of the education provided.     See EMR under Media for exercises provided " 12/7/2020.    Assessment     Patient tolerated activities with cues for proper form.    Rola is progressing towards her goals.   Pt prognosis is Good.     Pt will continue to benefit from skilled outpatient physical therapy to address the deficits listed in the problem list box on initial evaluation, provide pt/family education and to maximize pt's level of independence in the home and community environment.     Pt's spiritual, cultural and educational needs considered and pt agreeable to plan of care and goals.     Anticipated barriers to physical therapy: none    Goals:     Short Term Goals: 3 weeks   1.Decrease pain x 1 grade.  2.Start HEP.      Long Term Goals: 6 weeks   1.Pain 0-4/10.  2.Independent HEP. (progressing)  3.ROM CS WFL/WNL.  4.Strength 5/5.  5.NDI 20/50.  6.Restore previous JENNIFER      Plan     Continue with POC to increase activities as patient tolerates.    Kim Reilly, PTA

## 2020-12-08 ENCOUNTER — OFFICE VISIT (OUTPATIENT)
Dept: RHEUMATOLOGY | Facility: CLINIC | Age: 75
End: 2020-12-08
Payer: MEDICARE

## 2020-12-08 ENCOUNTER — PATIENT MESSAGE (OUTPATIENT)
Dept: RHEUMATOLOGY | Facility: CLINIC | Age: 75
End: 2020-12-08

## 2020-12-08 VITALS
DIASTOLIC BLOOD PRESSURE: 58 MMHG | BODY MASS INDEX: 24.7 KG/M2 | TEMPERATURE: 97 F | SYSTOLIC BLOOD PRESSURE: 119 MMHG | WEIGHT: 122.31 LBS

## 2020-12-08 DIAGNOSIS — M75.51 BURSITIS OF SHOULDER, RIGHT: Primary | ICD-10-CM

## 2020-12-08 PROCEDURE — 99213 OFFICE O/P EST LOW 20 MIN: CPT | Mod: S$GLB,,, | Performed by: INTERNAL MEDICINE

## 2020-12-08 PROCEDURE — 1159F PR MEDICATION LIST DOCUMENTED IN MEDICAL RECORD: ICD-10-PCS | Mod: S$GLB,,, | Performed by: INTERNAL MEDICINE

## 2020-12-08 PROCEDURE — 3074F PR MOST RECENT SYSTOLIC BLOOD PRESSURE < 130 MM HG: ICD-10-PCS | Mod: S$GLB,,, | Performed by: INTERNAL MEDICINE

## 2020-12-08 PROCEDURE — 1125F PR PAIN SEVERITY QUANTIFIED, PAIN PRESENT: ICD-10-PCS | Mod: S$GLB,,, | Performed by: INTERNAL MEDICINE

## 2020-12-08 PROCEDURE — 99213 PR OFFICE/OUTPT VISIT, EST, LEVL III, 20-29 MIN: ICD-10-PCS | Mod: S$GLB,,, | Performed by: INTERNAL MEDICINE

## 2020-12-08 PROCEDURE — 1159F MED LIST DOCD IN RCRD: CPT | Mod: S$GLB,,, | Performed by: INTERNAL MEDICINE

## 2020-12-08 PROCEDURE — 3078F DIAST BP <80 MM HG: CPT | Mod: S$GLB,,, | Performed by: INTERNAL MEDICINE

## 2020-12-08 PROCEDURE — 3074F SYST BP LT 130 MM HG: CPT | Mod: S$GLB,,, | Performed by: INTERNAL MEDICINE

## 2020-12-08 PROCEDURE — 3078F PR MOST RECENT DIASTOLIC BLOOD PRESSURE < 80 MM HG: ICD-10-PCS | Mod: S$GLB,,, | Performed by: INTERNAL MEDICINE

## 2020-12-08 PROCEDURE — 1125F AMNT PAIN NOTED PAIN PRSNT: CPT | Mod: S$GLB,,, | Performed by: INTERNAL MEDICINE

## 2020-12-08 NOTE — PROCEDURES
Large Joint Aspiration/Injection    Date/Time: 12/8/2020 10:30 AM  Performed by: Jayme Ram MD  Authorized by: Jayme Ram MD     Consent Done?:  Yes (Verbal)  Site marked: the procedure site was marked    Timeout: prior to procedure the correct patient, procedure, and site was verified    Prep: patient was prepped and draped in usual sterile fashion    Local anesthetic:  Lidocaine 2% without epinephrine  Patient tolerance:  Patient tolerated the procedure well with no immediate complications     Injected 1 cc Kenalog 1 cc  Dexamethasone to GIA Nye

## 2020-12-10 ENCOUNTER — TELEPHONE (OUTPATIENT)
Dept: PRIMARY CARE CLINIC | Facility: CLINIC | Age: 75
End: 2020-12-10

## 2020-12-10 ENCOUNTER — CLINICAL SUPPORT (OUTPATIENT)
Dept: REHABILITATION | Facility: HOSPITAL | Age: 75
End: 2020-12-10
Payer: MEDICARE

## 2020-12-10 DIAGNOSIS — M54.2 NECK PAIN: ICD-10-CM

## 2020-12-10 PROCEDURE — 97110 THERAPEUTIC EXERCISES: CPT | Mod: HCNC,PN

## 2020-12-10 PROCEDURE — 97012 MECHANICAL TRACTION THERAPY: CPT | Mod: HCNC,PN

## 2020-12-10 NOTE — PROGRESS NOTES
"YUE Ibanez contacted Ms. Richter for her 6 month follow up.  Ms. Richter stated she has started going to physical therapy for her shoulder and is feeling better.  She scored "9" on the PHQ 9 and "6" on the LAURA 7.         "

## 2020-12-10 NOTE — PROGRESS NOTES
12/10/20 1400   Cx/Thoracic Exercises   Shoulder Shrugs Rep/Sets/Weight 30   Freemotion Scap. Retraction W/Ext Reps/Sets/Weights 30,ROLLS 30   Shoulder Exercises   Overhead Pulley 5'   Additional Exercises   Additional Exercise UBE 5/5',CHIN TUCKS 20   Additional Exercise ROM CS 20 EACH

## 2020-12-10 NOTE — PROGRESS NOTES
Outpatient Therapy Discharge Summary     Name: Rola Richter  Clinic Number: [id number]    Therapy Diagnosis:   Encounter Diagnosis   Name Primary?    Neck pain    RX; TE 25', C/TX 15' 16/6#.  Physician: Kathy Jim PA*    Physician Orders: EVAL AND TREAT.  Medical Diagnosis: Neck pain,c/DDD.  Evaluation Date: 11/12/20.      Date of Last visit: 12/10/20  Total Visits Received: 8  Cancelled Visits: 4  No Show Visits: 0    Assessment    Goals: partially met.  Pain 4-6/10.  ROM WFL/WNL.  Strength 5-/5.  OSWESTRY 17/50.  Discharge reason: Patient has reached the maximum rehab potential for the present time    Plan   This patient is discharged from Physical Therapy with HEP PRN.

## 2020-12-14 ENCOUNTER — OFFICE VISIT (OUTPATIENT)
Dept: PAIN MEDICINE | Facility: CLINIC | Age: 75
End: 2020-12-14
Payer: MEDICARE

## 2020-12-14 VITALS
HEIGHT: 59 IN | WEIGHT: 122 LBS | BODY MASS INDEX: 24.6 KG/M2 | SYSTOLIC BLOOD PRESSURE: 130 MMHG | HEART RATE: 94 BPM | DIASTOLIC BLOOD PRESSURE: 75 MMHG

## 2020-12-14 DIAGNOSIS — M79.18 MYOFASCIAL PAIN: ICD-10-CM

## 2020-12-14 DIAGNOSIS — Z79.891 USE OF OPIATES FOR THERAPEUTIC PURPOSES: Primary | ICD-10-CM

## 2020-12-14 DIAGNOSIS — M50.30 DDD (DEGENERATIVE DISC DISEASE), CERVICAL: ICD-10-CM

## 2020-12-14 DIAGNOSIS — M47.812 SPONDYLOSIS OF CERVICAL REGION WITHOUT MYELOPATHY OR RADICULOPATHY: ICD-10-CM

## 2020-12-14 PROCEDURE — 99214 OFFICE O/P EST MOD 30 MIN: CPT | Mod: HCNC,S$GLB,, | Performed by: PHYSICIAN ASSISTANT

## 2020-12-14 PROCEDURE — 99999 PR PBB SHADOW E&M-EST. PATIENT-LVL IV: CPT | Mod: PBBFAC,HCNC,, | Performed by: PHYSICIAN ASSISTANT

## 2020-12-14 PROCEDURE — 3078F DIAST BP <80 MM HG: CPT | Mod: HCNC,CPTII,S$GLB, | Performed by: PHYSICIAN ASSISTANT

## 2020-12-14 PROCEDURE — 1125F AMNT PAIN NOTED PAIN PRSNT: CPT | Mod: HCNC,S$GLB,, | Performed by: PHYSICIAN ASSISTANT

## 2020-12-14 PROCEDURE — 3075F SYST BP GE 130 - 139MM HG: CPT | Mod: HCNC,CPTII,S$GLB, | Performed by: PHYSICIAN ASSISTANT

## 2020-12-14 PROCEDURE — 3288F PR FALLS RISK ASSESSMENT DOCUMENTED: ICD-10-PCS | Mod: HCNC,CPTII,S$GLB, | Performed by: PHYSICIAN ASSISTANT

## 2020-12-14 PROCEDURE — 80307 DRUG TEST PRSMV CHEM ANLYZR: CPT | Mod: HCNC

## 2020-12-14 PROCEDURE — 1125F PR PAIN SEVERITY QUANTIFIED, PAIN PRESENT: ICD-10-PCS | Mod: HCNC,S$GLB,, | Performed by: PHYSICIAN ASSISTANT

## 2020-12-14 PROCEDURE — 1159F MED LIST DOCD IN RCRD: CPT | Mod: HCNC,S$GLB,, | Performed by: PHYSICIAN ASSISTANT

## 2020-12-14 PROCEDURE — 99214 PR OFFICE/OUTPT VISIT, EST, LEVL IV, 30-39 MIN: ICD-10-PCS | Mod: HCNC,S$GLB,, | Performed by: PHYSICIAN ASSISTANT

## 2020-12-14 PROCEDURE — 3288F FALL RISK ASSESSMENT DOCD: CPT | Mod: HCNC,CPTII,S$GLB, | Performed by: PHYSICIAN ASSISTANT

## 2020-12-14 PROCEDURE — 1101F PR PT FALLS ASSESS DOC 0-1 FALLS W/OUT INJ PAST YR: ICD-10-PCS | Mod: HCNC,CPTII,S$GLB, | Performed by: PHYSICIAN ASSISTANT

## 2020-12-14 PROCEDURE — 1101F PT FALLS ASSESS-DOCD LE1/YR: CPT | Mod: HCNC,CPTII,S$GLB, | Performed by: PHYSICIAN ASSISTANT

## 2020-12-14 PROCEDURE — 99999 PR PBB SHADOW E&M-EST. PATIENT-LVL IV: ICD-10-PCS | Mod: PBBFAC,HCNC,, | Performed by: PHYSICIAN ASSISTANT

## 2020-12-14 PROCEDURE — 3078F PR MOST RECENT DIASTOLIC BLOOD PRESSURE < 80 MM HG: ICD-10-PCS | Mod: HCNC,CPTII,S$GLB, | Performed by: PHYSICIAN ASSISTANT

## 2020-12-14 PROCEDURE — 3075F PR MOST RECENT SYSTOLIC BLOOD PRESS GE 130-139MM HG: ICD-10-PCS | Mod: HCNC,CPTII,S$GLB, | Performed by: PHYSICIAN ASSISTANT

## 2020-12-14 PROCEDURE — 1159F PR MEDICATION LIST DOCUMENTED IN MEDICAL RECORD: ICD-10-PCS | Mod: HCNC,S$GLB,, | Performed by: PHYSICIAN ASSISTANT

## 2020-12-14 RX ORDER — CYCLOBENZAPRINE HCL 5 MG
5 TABLET ORAL 3 TIMES DAILY PRN
Qty: 90 TABLET | Refills: 2 | Status: SHIPPED | OUTPATIENT
Start: 2020-12-14 | End: 2021-03-22 | Stop reason: SDUPTHER

## 2020-12-14 NOTE — PROGRESS NOTES
Referring Physician: No ref. provider found    PCP: Liseth Carias MD      CC: neck and left occipital pain    Interval history: Ms. Richter is a 75 y.o. female with neck pain and occipital neuralgia who presents today for f/u and medication refill. She continues to benefit from repeat occipital nerve block. Neck pain that extends into bilateral shoulders is improving with PT. Previously right arm pain traveled to her hand, her left stops around her shoulder.  She had numbness to her right hand and first through fourth digits. Cervical MELANY in February 2018 that only provided benefit for a short time.     She continues to take Norco with benefit.  Robaxin was helpful but caused dizziness. Flexeril caused dry mouth. Denies UE weakness. No bowel bladder changes.   Pain today is rated 4/10.    Prior HPI:   Patient is 70-year-old female with past history history of cervical DDD, cervical spondylosis and chronic headaches.  She recently moved here from [place], [place].  She is treated in the past by neurology.  She states having constant burning pain over the left side of her posterior scalp.  Pain radiates to her neck as well as a frontal.  She also has left-sided neck pain as well.  She denies any radicular arm pain.  No numbness or weakness.  She states having cervical epidural steroid injection at past with minimal benefit.  She also has had decided of cervical nerve blocks in the past with moderate benefit.  Most recent injection was performed 2 months ago.  She desires repeat injection.  She currently takes Norco 10 mg every 12 hours as needed with moderate benefit.  She also takes Zanaflex 4 mg every 8 hours with mild benefits.  She rates her pain 7/10 today.    Pain intervention history: s/p left occipital nerve blocks on 1/2016 with 50% relief of her headaches  S/p cervical MELANY 2/8/18 moderate relief for a couple of weeks.     ROS:  CONSTITUTIONAL: No fevers, chills, night sweats, wt. loss, appetite  changes  SKIN: no rashes or itching  ENT: No headaches, head trauma, vision changes, or eye pain  LYMPH NODES: None noticed   CV: No chest pain, palpitations.   RESP: No shortness of breath, dyspnea on exertion, cough, wheezing, or hemoptysis  GI: No nausea, emesis, diarrhea, constipation, melena, hematochezia, pain.    : No dysuria, hematuria, urgency, or frequency   HEME: No easy bruising, bleeding problems  PSYCHIATRIC: No depression, anxiety, psychosis, hallucinations.  NEURO: No seizures, memory loss, dizziness or difficulty sleeping  MSK: + History of present illness      Past Medical History:   Diagnosis Date    Anxiety     Arthritis     Cataract     DDD (degenerative disc disease), lumbar     Depression     Encounter for blood transfusion     GERD (gastroesophageal reflux disease)     Hyperlipidemia     Hypertension     pt states she does not take meds anymore she just watches her weight    Neuromuscular disorder     Occipital neuralgia 3/24/2017    Osteoporosis      Past Surgical History:   Procedure Laterality Date    APPENDECTOMY       SECTION      x 2    CHOLECYSTECTOMY      COLONOSCOPY  prior to     COLONOSCOPY N/A 2019    Procedure: COLONOSCOPY;  Surgeon: Greg Victor MD;  Location: [facility];  Service: Endoscopy;  Laterality: N/A;    ESOPHAGOGASTRODUODENOSCOPY N/A 2019    Procedure: EGD (ESOPHAGOGASTRODUODENOSCOPY);  Surgeon: Greg Victor MD;  Location: [facility];  Service: Endoscopy;  Laterality: N/A;    HYSTERECTOMY      Laser Periphery Iridotomy Bilateral     OD 16 and OS touch up 2016    UPPER GASTROINTESTINAL ENDOSCOPY  2017    Dr. Marcial: esophageal stenosis- dilated, gastritis, gastric polyps removed; biopsy- mild gastritis, negative for h pylori, hyperplastic polyp, esophagus unremarkable    vocal cord tumor removal       Family History   Problem Relation Age of Onset    Osteoarthritis Mother     Alcohol abuse Mother      Rheum arthritis Mother     Osteoarthritis Sister     Diabetes Brother     No Known Problems Son     No Known Problems Sister     No Known Problems Sister     No Known Problems Brother     Arthritis Son     No Known Problems Son     Stroke Maternal Grandmother 99    Rheum arthritis Maternal Grandmother     Retinal detachment Neg Hx     Macular degeneration Neg Hx     Glaucoma Neg Hx     Amblyopia Neg Hx     Blindness Neg Hx     Cancer Neg Hx     Cataracts Neg Hx     Hypertension Neg Hx     Strabismus Neg Hx     Thyroid disease Neg Hx     Lupus Neg Hx     Kidney disease Neg Hx     Inflammatory bowel disease Neg Hx     Psoriasis Neg Hx     Colon cancer Neg Hx     Crohn's disease Neg Hx     Ulcerative colitis Neg Hx     Stomach cancer Neg Hx     Esophageal cancer Neg Hx      Social History     Socioeconomic History    Marital status:      Spouse name: Not on file    Number of children: Not on file    Years of education: Not on file    Highest education level: Not on file   Occupational History    Not on file   Social Needs    Financial resource strain: Not very hard    Food insecurity     Worry: Never true     Inability: Never true    Transportation needs     Medical: No     Non-medical: No   Tobacco Use    Smoking status: Never Smoker    Smokeless tobacco: Never Used   Substance and Sexual Activity    Alcohol use: No     Alcohol/week: 0.0 standard drinks     Frequency: Never     Binge frequency: Never    Drug use: Yes     Types: Hydrocodone    Sexual activity: Yes     Partners: Male   Lifestyle    Physical activity     Days per week: 2 days     Minutes per session: 40 min    Stress: Not at all   Relationships    Social connections     Talks on phone: More than three times a week     Gets together: Never     Attends Worship service: More than 4 times per year     Active member of club or organization: No     Attends meetings of clubs or organizations: Never      "Relationship status:    Other Topics Concern    Not on file   Social History Narrative    Not on file         Medications/Allergies: See med card    Vitals:    12/14/20 1417   BP: 130/75   Pulse: 94   Weight: 55.3 kg (122 lb)   Height: 4' 11" (1.499 m)   PainSc:   4   PainLoc: Neck         Physical exam:    GENERAL: A and O x3, the patient appears well groomed and is in no acute distress.  Skin: No rashes or obvious lesions  HEENT: normocephalic, atraumatic  CARDIOVASCULAR:  RRR  LUNGS: nonlabored breathing  ABDOMEN: soft, nontender   UPPER EXTREMITIES: Normal alignment, normal range of motion, no atrophy, no skin changes,  hair growth and nail growth normal and equal bilaterally. No swelling, no tenderness.  +Phalens on left side. +TTP tricep tendon  LOWER EXTREMITIES:  Normal alignment, normal range of motion, no atrophy, no skin changes,  hair growth and nail growth normal and equal bilaterally. No swelling, no tenderness.  CERVICAL SPINE:   Cervical spine: ROM is full in flexion, extension and lateral rotation.  Painful flexion > extension.  Positive facet loading bilaterally.  Spurling is positive at right side.  Myofascial exam:  Tenderness to palpation across cervical paraspinals deltoid and trapezius muscles bilaterally.      MENTAL STATUS: normal orientation, speech, language, and fund of knowledge for social situation.  Emotional state appropriate.    CRANIAL NERVES:  II:  PERRL bilaterally,   III,IV,VI: EOMI.    V:  Facial sensation equal bilaterally  VII:  Facial motor function normal.  VIII:  Hearing equal to finger rub bilaterally  IX/X: Gag normal, palate symmetric  XI:  Shoulder shrug equal, head turn equal  XII:  Tongue midline without fasciculations      MOTOR: Tone and bulk: normal bilateral upper and lower Strength: normal   Delt Bi Tri WE WF     R 5 5 5 5 5 5   L 5 5 5 5 5 5     IP ADD ABD Quad TA Gas HAM  R 5 5 5 5 5 5 5  L 5 5 5 5 5 5 5    SENSATION: Light touch and pinprick " intact bilaterally  REFLEXES: normal, symmetric, nonbrisk.  Toes down, no clonus. No hoffmans.  GAIT: normal rise, base, steps, and arm swing.        Imaging:   Cervical MRI 12/4/17    Narrative     EXAM: Cervical spine MRI without contrast.    INDICATION: Cervical radiculopathy.  Neck pain and occipital neuralgia.  The patient complains of neck and right arm pain.    TECHNIQUE: Routine multiplanar, multisequence unenhanced cervical spine MRI was performed.    COMPARISON: Plain films of the cervical spine obtained concurrently      FINDINGS:     Vertebral column: There is straightening of the cervical spine with loss of normal lordosis.  As seen on concurrent plain films, there is trace anterolisthesis of C3 on C4 with 2 mm anterolisthesis of C4 on C5.  There is marked disc space narrowing at the C5-6 level with moderate to marked disc space narrowing at the C4-5 and C6-7 levels.  There is partial non-segmentation anteriorly at the C2-3 level.  The C2 and C3 as well as the C4 and C5 facet joints appear fused and this may represent developmental non-segmentation or degenerative ankylosis.  All of the discs are desiccated.  The odontoid process is intact.    Spinal canal, cord, epidural space: The craniovertebral junction is normal.  The spinal canal is omental and normal.  There is no significant spinal stenosis.  The cord is normal in caliber.  There is very subtle flattening of the ventral cord surface at the C4-5 and C5-6 levels where there is degenerative change.  The study is mildly motion but there is no definite cord edema or myelomalacia.    Findings by level:    C2-3: There is no spinal canal or foraminal stenosis.  There is mild left facet joint arthropathy.    C3-4: There is trace anterolisthesis.  There is left greater than right uncovertebral spurring and facet joint arthropathy.  There is a mild disc osteophyte complex, slightly eccentric to the left with subtle annular fissure.  This narrows the  ventral subarachnoid space.  There is no spinal stenosis or cord compression.  There is moderate marked left foraminal stenosis.    C4-5: There is moderate disc space narrowing with 2 mm anterolisthesis of C4 on C5.  There is facet joint arthropathy or effusion left greater than right.  There is also mild bilateral but left greater than right uncovertebral spurring.  There is unroofing of a mild disc bulge which narrows the ventral subarachnoid space.  There is no spinal stenosis.  There is mild to moderate left foraminal stenosis.    C5-6:There is marked disc space narrowing.  There is bilateral uncovertebral spurring.  There is a disc osteophyte complex which narrows the subarachnoid space.  This is slightly eccentric to the right There is subtle flattening of the ventral cord surface.  Cord signal is grossly normal.  There is mild spinal stenosis with at least moderate bilateral foraminal stenosis.    C6-7: There is moderate disc space narrowing.  There is mild uncovertebral spurring.  There is a shallow disc osteophyte complex, slightly eccentric to the right.  There is narrowing of the ventral subarachnoid space.  There is no spinal stenosis.  Cord signal is normal.  There is mild bilateral foraminal stenosis.  There is a small 3.5 mm left foraminal perineural cyst.    C7- T1: There is a Schmorl's node in inferior endplate of C7, chronic.  There are tiny bilateral foraminal perineural cysts.  There is minimal bulging of the annulus and mild facet joint arthropathy without spinal canal or foraminal stenosis.    Soft tissues/other: The prevertebral soft tissues are normal.  The airway is patent.   Impression          1. There is multilevel degenerative disc disease described in detail above.  There is no acute fracture.  There is degenerative listhesis at several levels.  There is some degree of disc osteophyte complex, uncovertebral spurring and/or facet joint arthropathy contributing to some degree of foraminal  stenosis at several levels.  There is no significant spinal canal stenosis.  There is very subtle flattening of the ventral cord surface at the C4-5 and C6-7 levels The pertinent findings are summarized below.    2. At the C3-4 level, there is no spinal stenosis.  There is moderate to marked left foraminal stenosis.    3. At the C4-5 level there is no spinal stenosis.  There is mild to moderate left foraminal stenosis.    4. At the C5-6 level, there is mild spinal stenosis with at least moderate bilateral foraminal stenosis.    5. At the C6-7 level, there is no spinal stenosis.  There is mild bilateral foraminal stenosis.    6. There is no spinal canal or foraminal stenosis at the C2-3 or C7-T1 levels.     Assessment:  Mrs. Richter is a 75 y.o. female with neck and head pain    1. Use of opiates for therapeutic purposes    2. Spondylosis of cervical region without myelopathy or radiculopathy    3. Myofascial pain    4. DDD (degenerative disc disease), cervical      Plan:  1. I have stressed the importance of physical activity and exercise to improve overall health.  2. Consider repeat C7-T1 IL MELANY in the future  3. Monitor progress and consider repeat left occipital blocks for head pain.  4. Norco 5mg q12hrs as needed for pain..  reviewed. UDS LCV consistent  5.   Flexeril 5-10 mg q 8 h prn   6. Continue PT to focus on neck.   7. F/u 3 months or sooner.   All medication management was performed by Dr. Timothy Grimaldo

## 2020-12-17 LAB
6MAM UR QL: NOT DETECTED
7AMINOCLONAZEPAM UR QL: NOT DETECTED
A-OH ALPRAZ UR QL: NOT DETECTED
ALPRAZ UR QL: NOT DETECTED
AMPHET UR QL SCN: NOT DETECTED
ANNOTATION COMMENT IMP: NORMAL
ANNOTATION COMMENT IMP: NORMAL
BARBITURATES UR QL: NOT DETECTED
BUPRENORPHINE UR QL: NOT DETECTED
BZE UR QL: NOT DETECTED
CARBOXYTHC UR QL: NOT DETECTED
CARISOPRODOL UR QL: NOT DETECTED
CLONAZEPAM UR QL: NOT DETECTED
CODEINE UR QL: NOT DETECTED
CREAT UR-MCNC: 84.8 MG/DL (ref 20–400)
DIAZEPAM UR QL: NOT DETECTED
ETHYL GLUCURONIDE UR QL: NOT DETECTED
FENTANYL UR QL: NOT DETECTED
HYDROCODONE UR QL: PRESENT
HYDROMORPHONE UR QL: NOT DETECTED
LORAZEPAM UR QL: NOT DETECTED
MDA UR QL: NOT DETECTED
MDEA UR QL: NOT DETECTED
MDMA UR QL: NOT DETECTED
ME-PHENIDATE UR QL: NOT DETECTED
MEPERIDINE UR QL: NOT DETECTED
METHADONE UR QL: NOT DETECTED
METHAMPHET UR QL: NOT DETECTED
MIDAZOLAM UR QL SCN: NOT DETECTED
MORPHINE UR QL: NOT DETECTED
NORBUPRENORPHINE UR QL CFM: NOT DETECTED
NORDIAZEPAM UR QL: NOT DETECTED
NORFENTANYL UR QL: NOT DETECTED
NORHYDROCODONE UR QL CFM: PRESENT
NOROXYCODONE UR QL CFM: NOT DETECTED
NOROXYMORPHONE: NOT DETECTED
OXAZEPAM UR QL: NOT DETECTED
OXYCODONE UR QL: NOT DETECTED
OXYMORPHONE UR QL: NOT DETECTED
PATHOLOGY STUDY: NORMAL
PCP UR QL: NOT DETECTED
PHENTERMINE UR QL: NOT DETECTED
PROPOXYPH UR QL: NOT DETECTED
SERVICE CMNT-IMP: NORMAL
TAPENTADOL UR QL SCN: NOT DETECTED
TAPENTADOL-O-SULF: NOT DETECTED
TEMAZEPAM UR QL: NOT DETECTED
TRAMADOL UR QL: NOT DETECTED
ZOLPIDEM UR QL: NOT DETECTED

## 2021-01-07 ENCOUNTER — OFFICE VISIT (OUTPATIENT)
Dept: PRIMARY CARE CLINIC | Facility: CLINIC | Age: 76
End: 2021-01-07
Payer: MEDICARE

## 2021-01-07 VITALS
TEMPERATURE: 97 F | SYSTOLIC BLOOD PRESSURE: 124 MMHG | DIASTOLIC BLOOD PRESSURE: 62 MMHG | OXYGEN SATURATION: 95 % | HEART RATE: 92 BPM | BODY MASS INDEX: 24.76 KG/M2 | WEIGHT: 122.56 LBS

## 2021-01-07 DIAGNOSIS — R41.3 MEMORY DIFFICULTIES: ICD-10-CM

## 2021-01-07 DIAGNOSIS — F33.41 MDD (MAJOR DEPRESSIVE DISORDER), RECURRENT, IN PARTIAL REMISSION: ICD-10-CM

## 2021-01-07 DIAGNOSIS — R13.10 DYSPHAGIA, UNSPECIFIED TYPE: ICD-10-CM

## 2021-01-07 DIAGNOSIS — M79.662 BILATERAL CALF PAIN: ICD-10-CM

## 2021-01-07 DIAGNOSIS — Z00.00 ENCOUNTER FOR PREVENTIVE HEALTH EXAMINATION: Primary | ICD-10-CM

## 2021-01-07 DIAGNOSIS — E78.2 MIXED HYPERLIPIDEMIA: ICD-10-CM

## 2021-01-07 DIAGNOSIS — I10 ESSENTIAL HYPERTENSION: ICD-10-CM

## 2021-01-07 DIAGNOSIS — M79.661 BILATERAL CALF PAIN: ICD-10-CM

## 2021-01-07 DIAGNOSIS — F19.20 DEPENDENCY ON PAIN MEDICATION: ICD-10-CM

## 2021-01-07 DIAGNOSIS — R29.898 UPPER EXTREMITY WEAKNESS: ICD-10-CM

## 2021-01-07 PROCEDURE — 3078F PR MOST RECENT DIASTOLIC BLOOD PRESSURE < 80 MM HG: ICD-10-PCS | Mod: CPTII,S$GLB,, | Performed by: NURSE PRACTITIONER

## 2021-01-07 PROCEDURE — G0439 PPPS, SUBSEQ VISIT: HCPCS | Mod: S$GLB,,, | Performed by: NURSE PRACTITIONER

## 2021-01-07 PROCEDURE — 3078F DIAST BP <80 MM HG: CPT | Mod: CPTII,S$GLB,, | Performed by: NURSE PRACTITIONER

## 2021-01-07 PROCEDURE — 3074F PR MOST RECENT SYSTOLIC BLOOD PRESSURE < 130 MM HG: ICD-10-PCS | Mod: CPTII,S$GLB,, | Performed by: NURSE PRACTITIONER

## 2021-01-07 PROCEDURE — G9919 PR SCREENING AND POSITIVE: ICD-10-PCS | Mod: CPTII,S$GLB,, | Performed by: NURSE PRACTITIONER

## 2021-01-07 PROCEDURE — 1158F PR ADVANCE CARE PLANNING DISCUSS DOCUMENTED IN MEDICAL RECORD: ICD-10-PCS | Mod: S$GLB,,, | Performed by: NURSE PRACTITIONER

## 2021-01-07 PROCEDURE — 3288F PR FALLS RISK ASSESSMENT DOCUMENTED: ICD-10-PCS | Mod: CPTII,S$GLB,, | Performed by: NURSE PRACTITIONER

## 2021-01-07 PROCEDURE — 99499 RISK ADDL DX/OHS AUDIT: ICD-10-PCS | Mod: HCNC,S$GLB,, | Performed by: NURSE PRACTITIONER

## 2021-01-07 PROCEDURE — 1101F PT FALLS ASSESS-DOCD LE1/YR: CPT | Mod: CPTII,S$GLB,, | Performed by: NURSE PRACTITIONER

## 2021-01-07 PROCEDURE — 1101F PR PT FALLS ASSESS DOC 0-1 FALLS W/OUT INJ PAST YR: ICD-10-PCS | Mod: CPTII,S$GLB,, | Performed by: NURSE PRACTITIONER

## 2021-01-07 PROCEDURE — G9919 SCRN ND POS ND PROV OF REC: HCPCS | Mod: CPTII,S$GLB,, | Performed by: NURSE PRACTITIONER

## 2021-01-07 PROCEDURE — G0439 PR MEDICARE ANNUAL WELLNESS SUBSEQUENT VISIT: ICD-10-PCS | Mod: S$GLB,,, | Performed by: NURSE PRACTITIONER

## 2021-01-07 PROCEDURE — 1158F ADVNC CARE PLAN TLK DOCD: CPT | Mod: S$GLB,,, | Performed by: NURSE PRACTITIONER

## 2021-01-07 PROCEDURE — 3288F FALL RISK ASSESSMENT DOCD: CPT | Mod: CPTII,S$GLB,, | Performed by: NURSE PRACTITIONER

## 2021-01-07 PROCEDURE — 3074F SYST BP LT 130 MM HG: CPT | Mod: CPTII,S$GLB,, | Performed by: NURSE PRACTITIONER

## 2021-01-07 PROCEDURE — 99499 UNLISTED E&M SERVICE: CPT | Mod: HCNC,S$GLB,, | Performed by: NURSE PRACTITIONER

## 2021-01-11 ENCOUNTER — OFFICE VISIT (OUTPATIENT)
Dept: PSYCHIATRY | Facility: CLINIC | Age: 76
End: 2021-01-11
Payer: MEDICARE

## 2021-01-11 VITALS
DIASTOLIC BLOOD PRESSURE: 68 MMHG | SYSTOLIC BLOOD PRESSURE: 131 MMHG | HEART RATE: 84 BPM | BODY MASS INDEX: 24.22 KG/M2 | HEIGHT: 59 IN | WEIGHT: 120.13 LBS

## 2021-01-11 DIAGNOSIS — F43.10 PTSD (POST-TRAUMATIC STRESS DISORDER): ICD-10-CM

## 2021-01-11 DIAGNOSIS — F33.41 MDD (MAJOR DEPRESSIVE DISORDER), RECURRENT, IN PARTIAL REMISSION: Primary | ICD-10-CM

## 2021-01-11 PROCEDURE — 99999 PR PBB SHADOW E&M-EST. PATIENT-LVL III: CPT | Mod: PBBFAC,,, | Performed by: PSYCHIATRY & NEUROLOGY

## 2021-01-11 PROCEDURE — 99213 OFFICE O/P EST LOW 20 MIN: CPT | Mod: PBBFAC,PO | Performed by: PSYCHIATRY & NEUROLOGY

## 2021-01-11 PROCEDURE — 99214 PR OFFICE/OUTPT VISIT, EST, LEVL IV, 30-39 MIN: ICD-10-PCS | Mod: S$GLB,,, | Performed by: PSYCHIATRY & NEUROLOGY

## 2021-01-11 PROCEDURE — 99214 OFFICE O/P EST MOD 30 MIN: CPT | Mod: S$GLB,,, | Performed by: PSYCHIATRY & NEUROLOGY

## 2021-01-11 PROCEDURE — 99999 PR PBB SHADOW E&M-EST. PATIENT-LVL III: ICD-10-PCS | Mod: PBBFAC,,, | Performed by: PSYCHIATRY & NEUROLOGY

## 2021-01-11 RX ORDER — SERTRALINE HYDROCHLORIDE 100 MG/1
TABLET, FILM COATED ORAL
Qty: 90 TABLET | Refills: 0 | Status: SHIPPED | OUTPATIENT
Start: 2021-01-11 | End: 2021-03-14 | Stop reason: SDUPTHER

## 2021-01-11 RX ORDER — BUSPIRONE HYDROCHLORIDE 10 MG/1
10 TABLET ORAL 2 TIMES DAILY
Qty: 180 TABLET | Refills: 0 | Status: SHIPPED | OUTPATIENT
Start: 2021-01-11 | End: 2021-05-10 | Stop reason: SDUPTHER

## 2021-01-12 ENCOUNTER — HOSPITAL ENCOUNTER (OUTPATIENT)
Dept: RADIOLOGY | Facility: HOSPITAL | Age: 76
Discharge: HOME | End: 2021-01-12
Attending: NURSE PRACTITIONER
Payer: MEDICARE

## 2021-01-12 DIAGNOSIS — M79.661 BILATERAL CALF PAIN: ICD-10-CM

## 2021-01-12 DIAGNOSIS — M79.662 BILATERAL CALF PAIN: ICD-10-CM

## 2021-01-12 PROCEDURE — 93925 LOWER EXTREMITY STUDY: CPT | Mod: TC,PO

## 2021-01-19 ENCOUNTER — IMMUNIZATION (OUTPATIENT)
Dept: PRIMARY CARE CLINIC | Facility: CLINIC | Age: 76
End: 2021-01-19
Payer: MEDICARE

## 2021-01-19 DIAGNOSIS — Z23 NEED FOR VACCINATION: Primary | ICD-10-CM

## 2021-01-19 PROCEDURE — 0001A COVID-19, MRNA, LNP-S, PF, 30 MCG/0.3 ML DOSE VACCINE: ICD-10-PCS | Mod: S$GLB,,, | Performed by: FAMILY MEDICINE

## 2021-01-19 PROCEDURE — 91300 COVID-19, MRNA, LNP-S, PF, 30 MCG/0.3 ML DOSE VACCINE: ICD-10-PCS | Mod: S$GLB,,, | Performed by: FAMILY MEDICINE

## 2021-01-19 PROCEDURE — 91300 COVID-19, MRNA, LNP-S, PF, 30 MCG/0.3 ML DOSE VACCINE: CPT | Mod: S$GLB,,, | Performed by: FAMILY MEDICINE

## 2021-01-19 PROCEDURE — 0001A COVID-19, MRNA, LNP-S, PF, 30 MCG/0.3 ML DOSE VACCINE: CPT | Mod: S$GLB,,, | Performed by: FAMILY MEDICINE

## 2021-01-21 ENCOUNTER — PATIENT MESSAGE (OUTPATIENT)
Dept: GASTROENTEROLOGY | Facility: CLINIC | Age: 76
End: 2021-01-21

## 2021-01-27 ENCOUNTER — PATIENT MESSAGE (OUTPATIENT)
Dept: PAIN MEDICINE | Facility: CLINIC | Age: 76
End: 2021-01-27

## 2021-01-27 ENCOUNTER — TELEPHONE (OUTPATIENT)
Dept: PRIMARY CARE CLINIC | Facility: CLINIC | Age: 76
End: 2021-01-27

## 2021-01-27 RX ORDER — HYDROCODONE BITARTRATE AND ACETAMINOPHEN 5; 325 MG/1; MG/1
1 TABLET ORAL EVERY 12 HOURS PRN
Qty: 60 TABLET | Refills: 0 | Status: SHIPPED | OUTPATIENT
Start: 2021-01-27 | End: 2021-03-22 | Stop reason: SDUPTHER

## 2021-02-03 ENCOUNTER — OFFICE VISIT (OUTPATIENT)
Dept: GASTROENTEROLOGY | Facility: CLINIC | Age: 76
End: 2021-02-03
Payer: MEDICARE

## 2021-02-03 ENCOUNTER — LAB VISIT (OUTPATIENT)
Dept: LAB | Facility: HOSPITAL | Age: 76
End: 2021-02-03
Attending: NURSE PRACTITIONER
Payer: MEDICARE

## 2021-02-03 ENCOUNTER — TELEPHONE (OUTPATIENT)
Dept: GASTROENTEROLOGY | Facility: CLINIC | Age: 76
End: 2021-02-03

## 2021-02-03 ENCOUNTER — PATIENT MESSAGE (OUTPATIENT)
Dept: FAMILY MEDICINE | Facility: CLINIC | Age: 76
End: 2021-02-03

## 2021-02-03 VITALS — HEIGHT: 59 IN | WEIGHT: 122.56 LBS | BODY MASS INDEX: 24.71 KG/M2 | RESPIRATION RATE: 19 BRPM

## 2021-02-03 DIAGNOSIS — M81.0 AGE-RELATED OSTEOPOROSIS WITHOUT CURRENT PATHOLOGICAL FRACTURE: Primary | ICD-10-CM

## 2021-02-03 DIAGNOSIS — R10.12 LEFT UPPER QUADRANT PAIN: ICD-10-CM

## 2021-02-03 DIAGNOSIS — Z01.818 PREOP TESTING: ICD-10-CM

## 2021-02-03 DIAGNOSIS — Z86.2 HISTORY OF ANEMIA: ICD-10-CM

## 2021-02-03 DIAGNOSIS — Z79.899 ENCOUNTER FOR LONG-TERM (CURRENT) USE OF MEDICATIONS: ICD-10-CM

## 2021-02-03 DIAGNOSIS — D50.9 IRON DEFICIENCY ANEMIA, UNSPECIFIED IRON DEFICIENCY ANEMIA TYPE: Primary | ICD-10-CM

## 2021-02-03 DIAGNOSIS — K52.9 JEJUNITIS: ICD-10-CM

## 2021-02-03 DIAGNOSIS — R13.19 ESOPHAGEAL DYSPHAGIA: Primary | ICD-10-CM

## 2021-02-03 DIAGNOSIS — Z86.2 HISTORY OF ANEMIA: Primary | ICD-10-CM

## 2021-02-03 DIAGNOSIS — K59.09 CHRONIC CONSTIPATION: ICD-10-CM

## 2021-02-03 DIAGNOSIS — K22.2 SCHATZKI'S RING: ICD-10-CM

## 2021-02-03 DIAGNOSIS — R10.9 LEFT SIDED ABDOMINAL PAIN: ICD-10-CM

## 2021-02-03 DIAGNOSIS — K44.9 HIATAL HERNIA: ICD-10-CM

## 2021-02-03 DIAGNOSIS — R10.32 LEFT LOWER QUADRANT PAIN: ICD-10-CM

## 2021-02-03 DIAGNOSIS — R93.3 ABNORMAL CT SCAN, GASTROINTESTINAL TRACT: ICD-10-CM

## 2021-02-03 LAB
ALBUMIN SERPL BCP-MCNC: 4.1 G/DL (ref 3.5–5.2)
ALP SERPL-CCNC: 64 U/L (ref 55–135)
ALT SERPL W/O P-5'-P-CCNC: 13 U/L (ref 10–44)
AST SERPL-CCNC: 20 U/L (ref 10–40)
BILIRUB DIRECT SERPL-MCNC: <0.1 MG/DL (ref 0.1–0.3)
BILIRUB SERPL-MCNC: 0.2 MG/DL (ref 0.1–1)
CREAT SERPL-MCNC: 0.9 MG/DL (ref 0.5–1.4)
ERYTHROCYTE [DISTWIDTH] IN BLOOD BY AUTOMATED COUNT: 14.2 % (ref 11.5–14.5)
EST. GFR  (AFRICAN AMERICAN): >60 ML/MIN/1.73 M^2
EST. GFR  (NON AFRICAN AMERICAN): >60 ML/MIN/1.73 M^2
HCT VFR BLD AUTO: 33.2 % (ref 37–48.5)
HGB BLD-MCNC: 10.9 G/DL (ref 12–16)
LIPASE SERPL-CCNC: 19 U/L (ref 4–60)
MCH RBC QN AUTO: 29.9 PG (ref 27–31)
MCHC RBC AUTO-ENTMCNC: 32.8 G/DL (ref 32–36)
MCV RBC AUTO: 91 FL (ref 82–98)
PLATELET # BLD AUTO: 267 K/UL (ref 150–350)
PMV BLD AUTO: 10.1 FL (ref 9.2–12.9)
PROT SERPL-MCNC: 7.3 G/DL (ref 6–8.4)
RBC # BLD AUTO: 3.65 M/UL (ref 4–5.4)
WBC # BLD AUTO: 8.37 K/UL (ref 3.9–12.7)

## 2021-02-03 PROCEDURE — 82565 ASSAY OF CREATININE: CPT | Mod: 91

## 2021-02-03 PROCEDURE — 99214 PR OFFICE/OUTPT VISIT, EST, LEVL IV, 30-39 MIN: ICD-10-PCS | Mod: S$GLB,,, | Performed by: NURSE PRACTITIONER

## 2021-02-03 PROCEDURE — 1101F PT FALLS ASSESS-DOCD LE1/YR: CPT | Mod: CPTII,S$GLB,, | Performed by: NURSE PRACTITIONER

## 2021-02-03 PROCEDURE — 99999 PR PBB SHADOW E&M-EST. PATIENT-LVL V: ICD-10-PCS | Mod: PBBFAC,,, | Performed by: NURSE PRACTITIONER

## 2021-02-03 PROCEDURE — 3288F FALL RISK ASSESSMENT DOCD: CPT | Mod: CPTII,S$GLB,, | Performed by: NURSE PRACTITIONER

## 2021-02-03 PROCEDURE — 83690 ASSAY OF LIPASE: CPT

## 2021-02-03 PROCEDURE — 1159F MED LIST DOCD IN RCRD: CPT | Mod: S$GLB,,, | Performed by: NURSE PRACTITIONER

## 2021-02-03 PROCEDURE — 1126F PR PAIN SEVERITY QUANTIFIED, NO PAIN PRESENT: ICD-10-PCS | Mod: S$GLB,,, | Performed by: NURSE PRACTITIONER

## 2021-02-03 PROCEDURE — 36415 COLL VENOUS BLD VENIPUNCTURE: CPT

## 2021-02-03 PROCEDURE — 3288F PR FALLS RISK ASSESSMENT DOCUMENTED: ICD-10-PCS | Mod: CPTII,S$GLB,, | Performed by: NURSE PRACTITIONER

## 2021-02-03 PROCEDURE — 80076 HEPATIC FUNCTION PANEL: CPT

## 2021-02-03 PROCEDURE — 1101F PR PT FALLS ASSESS DOC 0-1 FALLS W/OUT INJ PAST YR: ICD-10-PCS | Mod: CPTII,S$GLB,, | Performed by: NURSE PRACTITIONER

## 2021-02-03 PROCEDURE — 85027 COMPLETE CBC AUTOMATED: CPT

## 2021-02-03 PROCEDURE — 99214 OFFICE O/P EST MOD 30 MIN: CPT | Mod: S$GLB,,, | Performed by: NURSE PRACTITIONER

## 2021-02-03 PROCEDURE — 99999 PR PBB SHADOW E&M-EST. PATIENT-LVL V: CPT | Mod: PBBFAC,,, | Performed by: NURSE PRACTITIONER

## 2021-02-03 PROCEDURE — 1126F AMNT PAIN NOTED NONE PRSNT: CPT | Mod: S$GLB,,, | Performed by: NURSE PRACTITIONER

## 2021-02-03 PROCEDURE — 1159F PR MEDICATION LIST DOCUMENTED IN MEDICAL RECORD: ICD-10-PCS | Mod: S$GLB,,, | Performed by: NURSE PRACTITIONER

## 2021-02-03 RX ORDER — POLYETHYLENE GLYCOL 3350 17 G/17G
17 POWDER, FOR SOLUTION ORAL DAILY PRN
COMMUNITY
End: 2023-06-23

## 2021-02-04 DIAGNOSIS — D50.9 IRON DEFICIENCY ANEMIA, UNSPECIFIED IRON DEFICIENCY ANEMIA TYPE: Primary | ICD-10-CM

## 2021-02-05 ENCOUNTER — TELEPHONE (OUTPATIENT)
Dept: HEMATOLOGY/ONCOLOGY | Facility: CLINIC | Age: 76
End: 2021-02-05

## 2021-02-09 ENCOUNTER — IMMUNIZATION (OUTPATIENT)
Dept: PRIMARY CARE CLINIC | Facility: CLINIC | Age: 76
End: 2021-02-09
Payer: MEDICARE

## 2021-02-09 ENCOUNTER — TELEPHONE (OUTPATIENT)
Dept: HEMATOLOGY/ONCOLOGY | Facility: CLINIC | Age: 76
End: 2021-02-09

## 2021-02-09 DIAGNOSIS — Z23 NEED FOR VACCINATION: Primary | ICD-10-CM

## 2021-02-09 PROCEDURE — 0002A COVID-19, MRNA, LNP-S, PF, 30 MCG/0.3 ML DOSE VACCINE: ICD-10-PCS | Mod: S$GLB,,, | Performed by: FAMILY MEDICINE

## 2021-02-09 PROCEDURE — 0002A COVID-19, MRNA, LNP-S, PF, 30 MCG/0.3 ML DOSE VACCINE: CPT | Mod: S$GLB,,, | Performed by: FAMILY MEDICINE

## 2021-02-09 PROCEDURE — 91300 COVID-19, MRNA, LNP-S, PF, 30 MCG/0.3 ML DOSE VACCINE: CPT | Mod: S$GLB,,, | Performed by: FAMILY MEDICINE

## 2021-02-09 PROCEDURE — 91300 COVID-19, MRNA, LNP-S, PF, 30 MCG/0.3 ML DOSE VACCINE: ICD-10-PCS | Mod: S$GLB,,, | Performed by: FAMILY MEDICINE

## 2021-02-11 ENCOUNTER — HOSPITAL ENCOUNTER (OUTPATIENT)
Dept: RADIOLOGY | Facility: HOSPITAL | Age: 76
Discharge: HOME | End: 2021-02-11
Attending: NURSE PRACTITIONER
Payer: MEDICARE

## 2021-02-11 ENCOUNTER — OFFICE VISIT (OUTPATIENT)
Dept: HEMATOLOGY/ONCOLOGY | Facility: CLINIC | Age: 76
End: 2021-02-11
Payer: MEDICARE

## 2021-02-11 VITALS
OXYGEN SATURATION: 97 % | DIASTOLIC BLOOD PRESSURE: 65 MMHG | SYSTOLIC BLOOD PRESSURE: 140 MMHG | HEIGHT: 59 IN | BODY MASS INDEX: 24.97 KG/M2 | HEART RATE: 88 BPM | RESPIRATION RATE: 18 BRPM | TEMPERATURE: 98 F | WEIGHT: 123.88 LBS

## 2021-02-11 DIAGNOSIS — D64.9 NORMOCYTIC ANEMIA: Primary | ICD-10-CM

## 2021-02-11 DIAGNOSIS — R10.12 LEFT UPPER QUADRANT PAIN: ICD-10-CM

## 2021-02-11 DIAGNOSIS — K52.9 JEJUNITIS: ICD-10-CM

## 2021-02-11 DIAGNOSIS — R93.3 ABNORMAL CT SCAN, GASTROINTESTINAL TRACT: ICD-10-CM

## 2021-02-11 DIAGNOSIS — Z79.899 OTHER LONG TERM (CURRENT) DRUG THERAPY: ICD-10-CM

## 2021-02-11 DIAGNOSIS — K59.09 CHRONIC CONSTIPATION: ICD-10-CM

## 2021-02-11 DIAGNOSIS — K44.9 HIATAL HERNIA: ICD-10-CM

## 2021-02-11 DIAGNOSIS — R10.32 LEFT LOWER QUADRANT PAIN: ICD-10-CM

## 2021-02-11 DIAGNOSIS — R10.9 LEFT SIDED ABDOMINAL PAIN: ICD-10-CM

## 2021-02-11 PROCEDURE — 99204 OFFICE O/P NEW MOD 45 MIN: CPT | Mod: S$GLB,,, | Performed by: PHYSICIAN ASSISTANT

## 2021-02-11 PROCEDURE — A9698 NON-RAD CONTRAST MATERIALNOC: HCPCS

## 2021-02-11 PROCEDURE — 25500020 PHARM REV CODE 255

## 2021-02-11 PROCEDURE — 74177 CT ABD & PELVIS W/CONTRAST: CPT | Mod: 26,,, | Performed by: RADIOLOGY

## 2021-02-11 PROCEDURE — 3077F PR MOST RECENT SYSTOLIC BLOOD PRESSURE >= 140 MM HG: ICD-10-PCS | Mod: CPTII,S$GLB,, | Performed by: PHYSICIAN ASSISTANT

## 2021-02-11 PROCEDURE — 1159F MED LIST DOCD IN RCRD: CPT | Mod: S$GLB,,, | Performed by: PHYSICIAN ASSISTANT

## 2021-02-11 PROCEDURE — 3078F DIAST BP <80 MM HG: CPT | Mod: CPTII,S$GLB,, | Performed by: PHYSICIAN ASSISTANT

## 2021-02-11 PROCEDURE — 74177 CT ABDOMEN PELVIS WITH CONTRAST: ICD-10-PCS | Mod: 26,,, | Performed by: RADIOLOGY

## 2021-02-11 PROCEDURE — 99999 PR PBB SHADOW E&M-EST. PATIENT-LVL V: ICD-10-PCS | Mod: PBBFAC,,, | Performed by: PHYSICIAN ASSISTANT

## 2021-02-11 PROCEDURE — 99204 PR OFFICE/OUTPT VISIT, NEW, LEVL IV, 45-59 MIN: ICD-10-PCS | Mod: S$GLB,,, | Performed by: PHYSICIAN ASSISTANT

## 2021-02-11 PROCEDURE — 3078F PR MOST RECENT DIASTOLIC BLOOD PRESSURE < 80 MM HG: ICD-10-PCS | Mod: CPTII,S$GLB,, | Performed by: PHYSICIAN ASSISTANT

## 2021-02-11 PROCEDURE — 99999 PR PBB SHADOW E&M-EST. PATIENT-LVL V: CPT | Mod: PBBFAC,,, | Performed by: PHYSICIAN ASSISTANT

## 2021-02-11 PROCEDURE — 3077F SYST BP >= 140 MM HG: CPT | Mod: CPTII,S$GLB,, | Performed by: PHYSICIAN ASSISTANT

## 2021-02-11 PROCEDURE — 74177 CT ABD & PELVIS W/CONTRAST: CPT | Mod: TC

## 2021-02-11 PROCEDURE — 1159F PR MEDICATION LIST DOCUMENTED IN MEDICAL RECORD: ICD-10-PCS | Mod: S$GLB,,, | Performed by: PHYSICIAN ASSISTANT

## 2021-02-11 RX ADMIN — IOHEXOL 75 ML: 350 INJECTION, SOLUTION INTRAVENOUS at 11:02

## 2021-02-11 RX ADMIN — IOHEXOL 1000 ML: 9 SOLUTION ORAL at 11:02

## 2021-02-12 ENCOUNTER — TELEPHONE (OUTPATIENT)
Dept: HEMATOLOGY/ONCOLOGY | Facility: CLINIC | Age: 76
End: 2021-02-12

## 2021-02-12 ENCOUNTER — PATIENT MESSAGE (OUTPATIENT)
Dept: HEMATOLOGY/ONCOLOGY | Facility: CLINIC | Age: 76
End: 2021-02-12

## 2021-02-12 ENCOUNTER — TELEPHONE (OUTPATIENT)
Dept: GASTROENTEROLOGY | Facility: CLINIC | Age: 76
End: 2021-02-12

## 2021-02-14 ENCOUNTER — LAB VISIT (OUTPATIENT)
Dept: PRIMARY CARE CLINIC | Facility: CLINIC | Age: 76
End: 2021-02-14
Payer: MEDICARE

## 2021-02-14 DIAGNOSIS — Z01.818 PREOP TESTING: ICD-10-CM

## 2021-02-14 PROCEDURE — U0003 INFECTIOUS AGENT DETECTION BY NUCLEIC ACID (DNA OR RNA); SEVERE ACUTE RESPIRATORY SYNDROME CORONAVIRUS 2 (SARS-COV-2) (CORONAVIRUS DISEASE [COVID-19]), AMPLIFIED PROBE TECHNIQUE, MAKING USE OF HIGH THROUGHPUT TECHNOLOGIES AS DESCRIBED BY CMS-2020-01-R: HCPCS

## 2021-02-14 PROCEDURE — U0005 INFEC AGEN DETEC AMPLI PROBE: HCPCS

## 2021-02-15 LAB — SARS-COV-2 RNA RESP QL NAA+PROBE: NOT DETECTED

## 2021-02-16 ENCOUNTER — PATIENT MESSAGE (OUTPATIENT)
Dept: GASTROENTEROLOGY | Facility: CLINIC | Age: 76
End: 2021-02-16

## 2021-02-16 ENCOUNTER — PATIENT MESSAGE (OUTPATIENT)
Dept: ENDOSCOPY | Facility: HOSPITAL | Age: 76
End: 2021-02-16

## 2021-02-17 ENCOUNTER — ANESTHESIA EVENT (OUTPATIENT)
Dept: ENDOSCOPY | Facility: HOSPITAL | Age: 76
End: 2021-02-17
Payer: MEDICARE

## 2021-02-17 ENCOUNTER — HOSPITAL ENCOUNTER (OUTPATIENT)
Facility: HOSPITAL | Age: 76
Discharge: HOME | End: 2021-02-17
Attending: INTERNAL MEDICINE | Admitting: INTERNAL MEDICINE
Payer: MEDICARE

## 2021-02-17 ENCOUNTER — ANESTHESIA (OUTPATIENT)
Dept: ENDOSCOPY | Facility: HOSPITAL | Age: 76
End: 2021-02-17
Payer: MEDICARE

## 2021-02-17 DIAGNOSIS — K31.7 GASTRIC POLYPS: ICD-10-CM

## 2021-02-17 DIAGNOSIS — K22.2 SCHATZKI'S RING: Primary | ICD-10-CM

## 2021-02-17 DIAGNOSIS — R13.10 DYSPHAGIA: ICD-10-CM

## 2021-02-17 DIAGNOSIS — K44.9 HIATAL HERNIA: ICD-10-CM

## 2021-02-17 PROCEDURE — 43251 EGD REMOVE LESION SNARE: CPT | Mod: ,,, | Performed by: INTERNAL MEDICINE

## 2021-02-17 PROCEDURE — 43248 EGD GUIDE WIRE INSERTION: CPT | Mod: ,,, | Performed by: INTERNAL MEDICINE

## 2021-02-17 PROCEDURE — 27201012 HC FORCEPS, HOT/COLD, DISP: Performed by: INTERNAL MEDICINE

## 2021-02-17 PROCEDURE — 37000008 HC ANESTHESIA 1ST 15 MINUTES: Performed by: INTERNAL MEDICINE

## 2021-02-17 PROCEDURE — 43251 PR EGD, FLEX, W/REMOVAL, TUMOR/POLYP/LESION(S), SNARE: ICD-10-PCS | Mod: ,,, | Performed by: INTERNAL MEDICINE

## 2021-02-17 PROCEDURE — 63600175 PHARM REV CODE 636 W HCPCS: Performed by: NURSE ANESTHETIST, CERTIFIED REGISTERED

## 2021-02-17 PROCEDURE — D9220A PRA ANESTHESIA: ICD-10-PCS | Mod: CRNA,,, | Performed by: NURSE ANESTHETIST, CERTIFIED REGISTERED

## 2021-02-17 PROCEDURE — D9220A PRA ANESTHESIA: ICD-10-PCS | Mod: ANES,,, | Performed by: ANESTHESIOLOGY

## 2021-02-17 PROCEDURE — 43239 PR EGD, FLEX, W/BIOPSY, SGL/MULTI: ICD-10-PCS | Mod: 59,,, | Performed by: INTERNAL MEDICINE

## 2021-02-17 PROCEDURE — 88305 TISSUE EXAM BY PATHOLOGIST: CPT | Mod: 59 | Performed by: PATHOLOGY

## 2021-02-17 PROCEDURE — 43239 EGD BIOPSY SINGLE/MULTIPLE: CPT | Performed by: INTERNAL MEDICINE

## 2021-02-17 PROCEDURE — 43239 EGD BIOPSY SINGLE/MULTIPLE: CPT | Mod: 59,,, | Performed by: INTERNAL MEDICINE

## 2021-02-17 PROCEDURE — 43248 PR EGD, FLEX, W/DILATION OVER GUIDEWIRE: ICD-10-PCS | Mod: ,,, | Performed by: INTERNAL MEDICINE

## 2021-02-17 PROCEDURE — 43248 EGD GUIDE WIRE INSERTION: CPT | Performed by: INTERNAL MEDICINE

## 2021-02-17 PROCEDURE — D9220A PRA ANESTHESIA: Mod: ANES,,, | Performed by: ANESTHESIOLOGY

## 2021-02-17 PROCEDURE — 25000003 PHARM REV CODE 250: Performed by: INTERNAL MEDICINE

## 2021-02-17 PROCEDURE — 88305 TISSUE EXAM BY PATHOLOGIST: CPT | Mod: 26,,, | Performed by: PATHOLOGY

## 2021-02-17 PROCEDURE — 25000003 PHARM REV CODE 250: Performed by: NURSE ANESTHETIST, CERTIFIED REGISTERED

## 2021-02-17 PROCEDURE — D9220A PRA ANESTHESIA: Mod: CRNA,,, | Performed by: NURSE ANESTHETIST, CERTIFIED REGISTERED

## 2021-02-17 PROCEDURE — 27201089 HC SNARE, DISP (ANY): Performed by: INTERNAL MEDICINE

## 2021-02-17 PROCEDURE — 43251 EGD REMOVE LESION SNARE: CPT | Performed by: INTERNAL MEDICINE

## 2021-02-17 PROCEDURE — 88305 TISSUE EXAM BY PATHOLOGIST: ICD-10-PCS | Mod: 26,,, | Performed by: PATHOLOGY

## 2021-02-17 PROCEDURE — C1769 GUIDE WIRE: HCPCS | Performed by: INTERNAL MEDICINE

## 2021-02-17 RX ORDER — LIDOCAINE HYDROCHLORIDE 20 MG/ML
INJECTION INTRAVENOUS
Status: DISCONTINUED | OUTPATIENT
Start: 2021-02-17 | End: 2021-02-17

## 2021-02-17 RX ORDER — SODIUM CHLORIDE 9 MG/ML
INJECTION, SOLUTION INTRAVENOUS CONTINUOUS
Status: DISCONTINUED | OUTPATIENT
Start: 2021-02-17 | End: 2021-02-17 | Stop reason: HOSPADM

## 2021-02-17 RX ORDER — PROPOFOL 10 MG/ML
VIAL (ML) INTRAVENOUS
Status: DISCONTINUED | OUTPATIENT
Start: 2021-02-17 | End: 2021-02-17

## 2021-02-17 RX ADMIN — PROPOFOL 100 MG: 10 INJECTION, EMULSION INTRAVENOUS at 11:02

## 2021-02-17 RX ADMIN — LIDOCAINE HYDROCHLORIDE 100 MG: 20 INJECTION, SOLUTION INTRAVENOUS at 11:02

## 2021-02-17 RX ADMIN — SODIUM CHLORIDE: 0.9 INJECTION, SOLUTION INTRAVENOUS at 10:02

## 2021-02-18 VITALS
TEMPERATURE: 98 F | HEIGHT: 59 IN | BODY MASS INDEX: 24.6 KG/M2 | OXYGEN SATURATION: 96 % | HEART RATE: 76 BPM | WEIGHT: 122 LBS | SYSTOLIC BLOOD PRESSURE: 145 MMHG | DIASTOLIC BLOOD PRESSURE: 71 MMHG | RESPIRATION RATE: 16 BRPM

## 2021-02-19 LAB
FINAL PATHOLOGIC DIAGNOSIS: NORMAL
GROSS: NORMAL
Lab: NORMAL

## 2021-02-23 ENCOUNTER — PATIENT MESSAGE (OUTPATIENT)
Dept: GASTROENTEROLOGY | Facility: CLINIC | Age: 76
End: 2021-02-23

## 2021-02-25 ENCOUNTER — LAB VISIT (OUTPATIENT)
Dept: LAB | Facility: HOSPITAL | Age: 76
End: 2021-02-25
Attending: PHYSICIAN ASSISTANT
Payer: MEDICARE

## 2021-02-25 ENCOUNTER — INFUSION (OUTPATIENT)
Dept: INFUSION THERAPY | Facility: HOSPITAL | Age: 76
End: 2021-02-25
Attending: INTERNAL MEDICINE
Payer: MEDICARE

## 2021-02-25 VITALS
SYSTOLIC BLOOD PRESSURE: 124 MMHG | RESPIRATION RATE: 18 BRPM | WEIGHT: 123.69 LBS | BODY MASS INDEX: 24.98 KG/M2 | DIASTOLIC BLOOD PRESSURE: 74 MMHG | TEMPERATURE: 98 F | HEART RATE: 88 BPM | OXYGEN SATURATION: 96 %

## 2021-02-25 DIAGNOSIS — Z79.899 OTHER LONG TERM (CURRENT) DRUG THERAPY: ICD-10-CM

## 2021-02-25 DIAGNOSIS — D64.9 NORMOCYTIC ANEMIA: ICD-10-CM

## 2021-02-25 DIAGNOSIS — M81.0 AGE-RELATED OSTEOPOROSIS WITHOUT CURRENT PATHOLOGICAL FRACTURE: Primary | ICD-10-CM

## 2021-02-25 LAB
ALBUMIN SERPL BCP-MCNC: 3.9 G/DL (ref 3.5–5.2)
ALP SERPL-CCNC: 67 U/L (ref 55–135)
ALT SERPL W/O P-5'-P-CCNC: 13 U/L (ref 10–44)
ANION GAP SERPL CALC-SCNC: 7 MMOL/L (ref 8–16)
AST SERPL-CCNC: 20 U/L (ref 10–40)
BILIRUB SERPL-MCNC: 0.2 MG/DL (ref 0.1–1)
BUN SERPL-MCNC: 11 MG/DL (ref 8–23)
CALCIUM SERPL-MCNC: 9 MG/DL (ref 8.7–10.5)
CHLORIDE SERPL-SCNC: 106 MMOL/L (ref 95–110)
CO2 SERPL-SCNC: 25 MMOL/L (ref 23–29)
CREAT SERPL-MCNC: 0.9 MG/DL (ref 0.5–1.4)
EST. GFR  (AFRICAN AMERICAN): >60 ML/MIN/1.73 M^2
EST. GFR  (NON AFRICAN AMERICAN): >60 ML/MIN/1.73 M^2
FOLATE SERPL-MCNC: 33.7 NG/ML (ref 4–24)
GLUCOSE SERPL-MCNC: 123 MG/DL (ref 70–110)
POTASSIUM SERPL-SCNC: 4.2 MMOL/L (ref 3.5–5.1)
PROT SERPL-MCNC: 7.1 G/DL (ref 6–8.4)
SODIUM SERPL-SCNC: 138 MMOL/L (ref 136–145)
T4 FREE SERPL-MCNC: 0.91 NG/DL (ref 0.71–1.51)
TSH SERPL DL<=0.005 MIU/L-ACNC: 1.84 UIU/ML (ref 0.4–4)
VIT B12 SERPL-MCNC: 817 PG/ML (ref 210–950)

## 2021-02-25 PROCEDURE — 82607 VITAMIN B-12: CPT

## 2021-02-25 PROCEDURE — 63600175 PHARM REV CODE 636 W HCPCS: Mod: JG | Performed by: INTERNAL MEDICINE

## 2021-02-25 PROCEDURE — 84439 ASSAY OF FREE THYROXINE: CPT

## 2021-02-25 PROCEDURE — 96372 THER/PROPH/DIAG INJ SC/IM: CPT

## 2021-02-25 PROCEDURE — 80053 COMPREHEN METABOLIC PANEL: CPT

## 2021-02-25 PROCEDURE — 82746 ASSAY OF FOLIC ACID SERUM: CPT

## 2021-02-25 PROCEDURE — 36415 COLL VENOUS BLD VENIPUNCTURE: CPT

## 2021-02-25 PROCEDURE — 84443 ASSAY THYROID STIM HORMONE: CPT

## 2021-02-25 RX ADMIN — DENOSUMAB 60 MG: 60 INJECTION SUBCUTANEOUS at 09:02

## 2021-03-01 ENCOUNTER — TELEPHONE (OUTPATIENT)
Dept: INFUSION THERAPY | Facility: HOSPITAL | Age: 76
End: 2021-03-01

## 2021-03-04 ENCOUNTER — OFFICE VISIT (OUTPATIENT)
Dept: HEMATOLOGY/ONCOLOGY | Facility: CLINIC | Age: 76
End: 2021-03-04
Payer: MEDICARE

## 2021-03-04 VITALS
WEIGHT: 122.56 LBS | TEMPERATURE: 98 F | RESPIRATION RATE: 18 BRPM | HEIGHT: 59 IN | DIASTOLIC BLOOD PRESSURE: 63 MMHG | OXYGEN SATURATION: 96 % | SYSTOLIC BLOOD PRESSURE: 135 MMHG | BODY MASS INDEX: 24.71 KG/M2 | HEART RATE: 86 BPM

## 2021-03-04 DIAGNOSIS — K21.9 GASTROESOPHAGEAL REFLUX DISEASE WITHOUT ESOPHAGITIS: ICD-10-CM

## 2021-03-04 DIAGNOSIS — D50.9 IRON DEFICIENCY ANEMIA, UNSPECIFIED IRON DEFICIENCY ANEMIA TYPE: ICD-10-CM

## 2021-03-04 DIAGNOSIS — D64.9 ANEMIA, UNSPECIFIED TYPE: Primary | ICD-10-CM

## 2021-03-04 PROCEDURE — 3075F PR MOST RECENT SYSTOLIC BLOOD PRESS GE 130-139MM HG: ICD-10-PCS | Mod: CPTII,S$GLB,, | Performed by: INTERNAL MEDICINE

## 2021-03-04 PROCEDURE — 3075F SYST BP GE 130 - 139MM HG: CPT | Mod: CPTII,S$GLB,, | Performed by: INTERNAL MEDICINE

## 2021-03-04 PROCEDURE — 3288F PR FALLS RISK ASSESSMENT DOCUMENTED: ICD-10-PCS | Mod: CPTII,S$GLB,, | Performed by: INTERNAL MEDICINE

## 2021-03-04 PROCEDURE — 1125F AMNT PAIN NOTED PAIN PRSNT: CPT | Mod: S$GLB,,, | Performed by: INTERNAL MEDICINE

## 2021-03-04 PROCEDURE — 99213 PR OFFICE/OUTPT VISIT, EST, LEVL III, 20-29 MIN: ICD-10-PCS | Mod: S$GLB,,, | Performed by: INTERNAL MEDICINE

## 2021-03-04 PROCEDURE — 1159F PR MEDICATION LIST DOCUMENTED IN MEDICAL RECORD: ICD-10-PCS | Mod: S$GLB,,, | Performed by: INTERNAL MEDICINE

## 2021-03-04 PROCEDURE — 3078F DIAST BP <80 MM HG: CPT | Mod: CPTII,S$GLB,, | Performed by: INTERNAL MEDICINE

## 2021-03-04 PROCEDURE — 1101F PT FALLS ASSESS-DOCD LE1/YR: CPT | Mod: CPTII,S$GLB,, | Performed by: INTERNAL MEDICINE

## 2021-03-04 PROCEDURE — 99999 PR PBB SHADOW E&M-EST. PATIENT-LVL IV: CPT | Mod: PBBFAC,,, | Performed by: INTERNAL MEDICINE

## 2021-03-04 PROCEDURE — 1101F PR PT FALLS ASSESS DOC 0-1 FALLS W/OUT INJ PAST YR: ICD-10-PCS | Mod: CPTII,S$GLB,, | Performed by: INTERNAL MEDICINE

## 2021-03-04 PROCEDURE — 3078F PR MOST RECENT DIASTOLIC BLOOD PRESSURE < 80 MM HG: ICD-10-PCS | Mod: CPTII,S$GLB,, | Performed by: INTERNAL MEDICINE

## 2021-03-04 PROCEDURE — 99999 PR PBB SHADOW E&M-EST. PATIENT-LVL IV: ICD-10-PCS | Mod: PBBFAC,,, | Performed by: INTERNAL MEDICINE

## 2021-03-04 PROCEDURE — 1159F MED LIST DOCD IN RCRD: CPT | Mod: S$GLB,,, | Performed by: INTERNAL MEDICINE

## 2021-03-04 PROCEDURE — 1125F PR PAIN SEVERITY QUANTIFIED, PAIN PRESENT: ICD-10-PCS | Mod: S$GLB,,, | Performed by: INTERNAL MEDICINE

## 2021-03-04 PROCEDURE — 99213 OFFICE O/P EST LOW 20 MIN: CPT | Mod: S$GLB,,, | Performed by: INTERNAL MEDICINE

## 2021-03-04 PROCEDURE — 3288F FALL RISK ASSESSMENT DOCD: CPT | Mod: CPTII,S$GLB,, | Performed by: INTERNAL MEDICINE

## 2021-03-08 ENCOUNTER — LAB VISIT (OUTPATIENT)
Dept: LAB | Facility: HOSPITAL | Age: 76
End: 2021-03-08
Attending: FAMILY MEDICINE
Payer: MEDICARE

## 2021-03-08 DIAGNOSIS — D50.9 IRON DEFICIENCY ANEMIA, UNSPECIFIED IRON DEFICIENCY ANEMIA TYPE: ICD-10-CM

## 2021-03-08 LAB
BASOPHILS # BLD AUTO: 0.06 K/UL (ref 0–0.2)
BASOPHILS NFR BLD: 0.7 % (ref 0–1.9)
DIFFERENTIAL METHOD: ABNORMAL
EOSINOPHIL # BLD AUTO: 0.5 K/UL (ref 0–0.5)
EOSINOPHIL NFR BLD: 5.9 % (ref 0–8)
ERYTHROCYTE [DISTWIDTH] IN BLOOD BY AUTOMATED COUNT: 14.2 % (ref 11.5–14.5)
FERRITIN SERPL-MCNC: 141 NG/ML (ref 20–300)
HCT VFR BLD AUTO: 34.1 % (ref 37–48.5)
HGB BLD-MCNC: 11.1 G/DL (ref 12–16)
IMM GRANULOCYTES # BLD AUTO: 0.03 K/UL (ref 0–0.04)
IMM GRANULOCYTES NFR BLD AUTO: 0.4 % (ref 0–0.5)
IRON SERPL-MCNC: 81 UG/DL (ref 30–160)
LYMPHOCYTES # BLD AUTO: 2.7 K/UL (ref 1–4.8)
LYMPHOCYTES NFR BLD: 33.1 % (ref 18–48)
MCH RBC QN AUTO: 29.8 PG (ref 27–31)
MCHC RBC AUTO-ENTMCNC: 32.6 G/DL (ref 32–36)
MCV RBC AUTO: 92 FL (ref 82–98)
MONOCYTES # BLD AUTO: 0.7 K/UL (ref 0.3–1)
MONOCYTES NFR BLD: 9 % (ref 4–15)
NEUTROPHILS # BLD AUTO: 4.1 K/UL (ref 1.8–7.7)
NEUTROPHILS NFR BLD: 50.9 % (ref 38–73)
NRBC BLD-RTO: 0 /100 WBC
PLATELET # BLD AUTO: 290 K/UL (ref 150–350)
PMV BLD AUTO: 10.4 FL (ref 9.2–12.9)
RBC # BLD AUTO: 3.72 M/UL (ref 4–5.4)
RETICS/RBC NFR AUTO: 1.4 % (ref 0.5–2.5)
SATURATED IRON: 27 % (ref 20–50)
TOTAL IRON BINDING CAPACITY: 295 UG/DL (ref 250–450)
TRANSFERRIN SERPL-MCNC: 211 MG/DL (ref 200–375)
WBC # BLD AUTO: 8.07 K/UL (ref 3.9–12.7)

## 2021-03-08 PROCEDURE — 36415 COLL VENOUS BLD VENIPUNCTURE: CPT | Performed by: FAMILY MEDICINE

## 2021-03-08 PROCEDURE — 83540 ASSAY OF IRON: CPT | Performed by: FAMILY MEDICINE

## 2021-03-08 PROCEDURE — 85045 AUTOMATED RETICULOCYTE COUNT: CPT | Performed by: FAMILY MEDICINE

## 2021-03-08 PROCEDURE — 82728 ASSAY OF FERRITIN: CPT | Performed by: FAMILY MEDICINE

## 2021-03-08 PROCEDURE — 85025 COMPLETE CBC W/AUTO DIFF WBC: CPT | Performed by: FAMILY MEDICINE

## 2021-03-16 ENCOUNTER — DOCUMENTATION ONLY (OUTPATIENT)
Dept: PRIMARY CARE CLINIC | Facility: CLINIC | Age: 76
End: 2021-03-16

## 2021-03-19 ENCOUNTER — TELEPHONE (OUTPATIENT)
Dept: FAMILY MEDICINE | Facility: CLINIC | Age: 76
End: 2021-03-19

## 2021-03-22 ENCOUNTER — OFFICE VISIT (OUTPATIENT)
Dept: PAIN MEDICINE | Facility: CLINIC | Age: 76
End: 2021-03-22
Payer: MEDICARE

## 2021-03-22 VITALS
DIASTOLIC BLOOD PRESSURE: 66 MMHG | HEART RATE: 92 BPM | HEIGHT: 59 IN | SYSTOLIC BLOOD PRESSURE: 127 MMHG | WEIGHT: 122 LBS | BODY MASS INDEX: 24.6 KG/M2

## 2021-03-22 DIAGNOSIS — G89.4 CHRONIC PAIN DISORDER: Primary | ICD-10-CM

## 2021-03-22 DIAGNOSIS — M50.30 DDD (DEGENERATIVE DISC DISEASE), CERVICAL: ICD-10-CM

## 2021-03-22 DIAGNOSIS — M47.812 SPONDYLOSIS OF CERVICAL REGION WITHOUT MYELOPATHY OR RADICULOPATHY: ICD-10-CM

## 2021-03-22 DIAGNOSIS — M79.18 MYOFASCIAL PAIN: ICD-10-CM

## 2021-03-22 PROCEDURE — 99999 PR PBB SHADOW E&M-EST. PATIENT-LVL IV: ICD-10-PCS | Mod: PBBFAC,,, | Performed by: PHYSICIAN ASSISTANT

## 2021-03-22 PROCEDURE — 1159F MED LIST DOCD IN RCRD: CPT | Mod: S$GLB,,, | Performed by: PHYSICIAN ASSISTANT

## 2021-03-22 PROCEDURE — 99999 PR PBB SHADOW E&M-EST. PATIENT-LVL IV: CPT | Mod: PBBFAC,,, | Performed by: PHYSICIAN ASSISTANT

## 2021-03-22 PROCEDURE — 3074F SYST BP LT 130 MM HG: CPT | Mod: CPTII,S$GLB,, | Performed by: PHYSICIAN ASSISTANT

## 2021-03-22 PROCEDURE — 1159F PR MEDICATION LIST DOCUMENTED IN MEDICAL RECORD: ICD-10-PCS | Mod: S$GLB,,, | Performed by: PHYSICIAN ASSISTANT

## 2021-03-22 PROCEDURE — 3288F FALL RISK ASSESSMENT DOCD: CPT | Mod: CPTII,S$GLB,, | Performed by: PHYSICIAN ASSISTANT

## 2021-03-22 PROCEDURE — 3078F PR MOST RECENT DIASTOLIC BLOOD PRESSURE < 80 MM HG: ICD-10-PCS | Mod: CPTII,S$GLB,, | Performed by: PHYSICIAN ASSISTANT

## 2021-03-22 PROCEDURE — 99214 PR OFFICE/OUTPT VISIT, EST, LEVL IV, 30-39 MIN: ICD-10-PCS | Mod: S$GLB,,, | Performed by: PHYSICIAN ASSISTANT

## 2021-03-22 PROCEDURE — 1125F PR PAIN SEVERITY QUANTIFIED, PAIN PRESENT: ICD-10-PCS | Mod: S$GLB,,, | Performed by: PHYSICIAN ASSISTANT

## 2021-03-22 PROCEDURE — 1101F PR PT FALLS ASSESS DOC 0-1 FALLS W/OUT INJ PAST YR: ICD-10-PCS | Mod: CPTII,S$GLB,, | Performed by: PHYSICIAN ASSISTANT

## 2021-03-22 PROCEDURE — 1125F AMNT PAIN NOTED PAIN PRSNT: CPT | Mod: S$GLB,,, | Performed by: PHYSICIAN ASSISTANT

## 2021-03-22 PROCEDURE — 3288F PR FALLS RISK ASSESSMENT DOCUMENTED: ICD-10-PCS | Mod: CPTII,S$GLB,, | Performed by: PHYSICIAN ASSISTANT

## 2021-03-22 PROCEDURE — 1101F PT FALLS ASSESS-DOCD LE1/YR: CPT | Mod: CPTII,S$GLB,, | Performed by: PHYSICIAN ASSISTANT

## 2021-03-22 PROCEDURE — 3074F PR MOST RECENT SYSTOLIC BLOOD PRESSURE < 130 MM HG: ICD-10-PCS | Mod: CPTII,S$GLB,, | Performed by: PHYSICIAN ASSISTANT

## 2021-03-22 PROCEDURE — 99214 OFFICE O/P EST MOD 30 MIN: CPT | Mod: S$GLB,,, | Performed by: PHYSICIAN ASSISTANT

## 2021-03-22 PROCEDURE — 3078F DIAST BP <80 MM HG: CPT | Mod: CPTII,S$GLB,, | Performed by: PHYSICIAN ASSISTANT

## 2021-03-22 RX ORDER — HYDROCODONE BITARTRATE AND ACETAMINOPHEN 5; 325 MG/1; MG/1
1 TABLET ORAL EVERY 12 HOURS PRN
Qty: 60 TABLET | Refills: 0 | Status: SHIPPED | OUTPATIENT
Start: 2021-03-22 | End: 2021-04-21

## 2021-03-22 RX ORDER — CYCLOBENZAPRINE HCL 5 MG
5 TABLET ORAL 3 TIMES DAILY PRN
Qty: 90 TABLET | Refills: 2 | Status: SHIPPED | OUTPATIENT
Start: 2021-03-22 | End: 2021-04-22

## 2021-03-22 RX ORDER — HYDROCODONE BITARTRATE AND ACETAMINOPHEN 5; 325 MG/1; MG/1
1 TABLET ORAL EVERY 12 HOURS PRN
Qty: 60 TABLET | Refills: 0 | Status: SHIPPED | OUTPATIENT
Start: 2021-04-21 | End: 2021-05-10 | Stop reason: SDUPTHER

## 2021-03-22 RX ORDER — HYDROCODONE BITARTRATE AND ACETAMINOPHEN 5; 325 MG/1; MG/1
1 TABLET ORAL EVERY 12 HOURS PRN
Qty: 60 TABLET | Refills: 0 | Status: SHIPPED | OUTPATIENT
Start: 2021-05-21 | End: 2021-06-14 | Stop reason: SDUPTHER

## 2021-03-30 ENCOUNTER — LAB VISIT (OUTPATIENT)
Dept: LAB | Facility: HOSPITAL | Age: 76
End: 2021-03-30
Attending: INTERNAL MEDICINE
Payer: MEDICARE

## 2021-03-30 DIAGNOSIS — D64.9 ANEMIA, UNSPECIFIED TYPE: ICD-10-CM

## 2021-03-30 DIAGNOSIS — D50.9 IRON DEFICIENCY ANEMIA, UNSPECIFIED IRON DEFICIENCY ANEMIA TYPE: ICD-10-CM

## 2021-03-30 LAB
ALBUMIN SERPL BCP-MCNC: 4.1 G/DL (ref 3.5–5.2)
ALP SERPL-CCNC: 66 U/L (ref 55–135)
ALT SERPL W/O P-5'-P-CCNC: 14 U/L (ref 10–44)
ANION GAP SERPL CALC-SCNC: 12 MMOL/L (ref 8–16)
AST SERPL-CCNC: 20 U/L (ref 10–40)
BASOPHILS # BLD AUTO: 0.05 K/UL (ref 0–0.2)
BASOPHILS NFR BLD: 0.6 % (ref 0–1.9)
BILIRUB SERPL-MCNC: 0.2 MG/DL (ref 0.1–1)
BUN SERPL-MCNC: 14 MG/DL (ref 8–23)
CALCIUM SERPL-MCNC: 9.1 MG/DL (ref 8.7–10.5)
CHLORIDE SERPL-SCNC: 102 MMOL/L (ref 95–110)
CO2 SERPL-SCNC: 24 MMOL/L (ref 23–29)
CREAT SERPL-MCNC: 1 MG/DL (ref 0.5–1.4)
DIFFERENTIAL METHOD: ABNORMAL
EOSINOPHIL # BLD AUTO: 0.4 K/UL (ref 0–0.5)
EOSINOPHIL NFR BLD: 5.2 % (ref 0–8)
ERYTHROCYTE [DISTWIDTH] IN BLOOD BY AUTOMATED COUNT: 13.7 % (ref 11.5–14.5)
EST. GFR  (AFRICAN AMERICAN): >60 ML/MIN/1.73 M^2
EST. GFR  (NON AFRICAN AMERICAN): 55 ML/MIN/1.73 M^2
FERRITIN SERPL-MCNC: 211 NG/ML (ref 20–300)
GLUCOSE SERPL-MCNC: 136 MG/DL (ref 70–110)
HCT VFR BLD AUTO: 35.6 % (ref 37–48.5)
HGB BLD-MCNC: 11.2 G/DL (ref 12–16)
IMM GRANULOCYTES # BLD AUTO: 0.05 K/UL (ref 0–0.04)
IMM GRANULOCYTES NFR BLD AUTO: 0.6 % (ref 0–0.5)
IRON SERPL-MCNC: 68 UG/DL (ref 30–160)
LDH SERPL L TO P-CCNC: 175 U/L (ref 110–260)
LYMPHOCYTES # BLD AUTO: 2.6 K/UL (ref 1–4.8)
LYMPHOCYTES NFR BLD: 32.4 % (ref 18–48)
MCH RBC QN AUTO: 29 PG (ref 27–31)
MCHC RBC AUTO-ENTMCNC: 31.5 G/DL (ref 32–36)
MCV RBC AUTO: 92 FL (ref 82–98)
MONOCYTES # BLD AUTO: 0.8 K/UL (ref 0.3–1)
MONOCYTES NFR BLD: 9.6 % (ref 4–15)
NEUTROPHILS # BLD AUTO: 4.1 K/UL (ref 1.8–7.7)
NEUTROPHILS NFR BLD: 51.6 % (ref 38–73)
NRBC BLD-RTO: 0 /100 WBC
PLATELET # BLD AUTO: 300 K/UL (ref 150–450)
PMV BLD AUTO: 10 FL (ref 9.2–12.9)
POTASSIUM SERPL-SCNC: 4.3 MMOL/L (ref 3.5–5.1)
PROT SERPL-MCNC: 7.4 G/DL (ref 6–8.4)
RBC # BLD AUTO: 3.86 M/UL (ref 4–5.4)
RETICS/RBC NFR AUTO: 1.4 % (ref 0.5–2.5)
SATURATED IRON: 21 % (ref 20–50)
SODIUM SERPL-SCNC: 138 MMOL/L (ref 136–145)
TOTAL IRON BINDING CAPACITY: 329 UG/DL (ref 250–450)
TRANSFERRIN SERPL-MCNC: 222 MG/DL (ref 200–375)
WBC # BLD AUTO: 7.89 K/UL (ref 3.9–12.7)

## 2021-03-30 PROCEDURE — 82728 ASSAY OF FERRITIN: CPT | Performed by: INTERNAL MEDICINE

## 2021-03-30 PROCEDURE — 85045 AUTOMATED RETICULOCYTE COUNT: CPT | Performed by: INTERNAL MEDICINE

## 2021-03-30 PROCEDURE — 82668 ASSAY OF ERYTHROPOIETIN: CPT | Performed by: INTERNAL MEDICINE

## 2021-03-30 PROCEDURE — 83540 ASSAY OF IRON: CPT | Performed by: INTERNAL MEDICINE

## 2021-03-30 PROCEDURE — 36415 COLL VENOUS BLD VENIPUNCTURE: CPT | Performed by: INTERNAL MEDICINE

## 2021-03-30 PROCEDURE — 80053 COMPREHEN METABOLIC PANEL: CPT | Performed by: INTERNAL MEDICINE

## 2021-03-30 PROCEDURE — 85025 COMPLETE CBC W/AUTO DIFF WBC: CPT | Performed by: INTERNAL MEDICINE

## 2021-03-30 PROCEDURE — 83615 LACTATE (LD) (LDH) ENZYME: CPT | Performed by: INTERNAL MEDICINE

## 2021-04-01 ENCOUNTER — OFFICE VISIT (OUTPATIENT)
Dept: HEMATOLOGY/ONCOLOGY | Facility: CLINIC | Age: 76
End: 2021-04-01
Payer: MEDICARE

## 2021-04-01 VITALS
WEIGHT: 123.88 LBS | DIASTOLIC BLOOD PRESSURE: 58 MMHG | SYSTOLIC BLOOD PRESSURE: 135 MMHG | HEART RATE: 85 BPM | HEIGHT: 59 IN | BODY MASS INDEX: 24.97 KG/M2 | RESPIRATION RATE: 17 BRPM | TEMPERATURE: 98 F | OXYGEN SATURATION: 97 %

## 2021-04-01 DIAGNOSIS — D64.9 ANEMIA, UNSPECIFIED TYPE: Primary | ICD-10-CM

## 2021-04-01 LAB — EPO SERPL-ACNC: 5.4 MIU/ML (ref 2.6–18.5)

## 2021-04-01 PROCEDURE — 1126F AMNT PAIN NOTED NONE PRSNT: CPT | Mod: S$GLB,,, | Performed by: INTERNAL MEDICINE

## 2021-04-01 PROCEDURE — 3288F FALL RISK ASSESSMENT DOCD: CPT | Mod: CPTII,S$GLB,, | Performed by: INTERNAL MEDICINE

## 2021-04-01 PROCEDURE — 1159F PR MEDICATION LIST DOCUMENTED IN MEDICAL RECORD: ICD-10-PCS | Mod: S$GLB,,, | Performed by: INTERNAL MEDICINE

## 2021-04-01 PROCEDURE — 1101F PT FALLS ASSESS-DOCD LE1/YR: CPT | Mod: CPTII,S$GLB,, | Performed by: INTERNAL MEDICINE

## 2021-04-01 PROCEDURE — 1126F PR PAIN SEVERITY QUANTIFIED, NO PAIN PRESENT: ICD-10-PCS | Mod: S$GLB,,, | Performed by: INTERNAL MEDICINE

## 2021-04-01 PROCEDURE — 3075F PR MOST RECENT SYSTOLIC BLOOD PRESS GE 130-139MM HG: ICD-10-PCS | Mod: CPTII,S$GLB,, | Performed by: INTERNAL MEDICINE

## 2021-04-01 PROCEDURE — 1101F PR PT FALLS ASSESS DOC 0-1 FALLS W/OUT INJ PAST YR: ICD-10-PCS | Mod: CPTII,S$GLB,, | Performed by: INTERNAL MEDICINE

## 2021-04-01 PROCEDURE — 3078F DIAST BP <80 MM HG: CPT | Mod: CPTII,S$GLB,, | Performed by: INTERNAL MEDICINE

## 2021-04-01 PROCEDURE — 1159F MED LIST DOCD IN RCRD: CPT | Mod: S$GLB,,, | Performed by: INTERNAL MEDICINE

## 2021-04-01 PROCEDURE — 99213 OFFICE O/P EST LOW 20 MIN: CPT | Mod: S$GLB,,, | Performed by: INTERNAL MEDICINE

## 2021-04-01 PROCEDURE — 99999 PR PBB SHADOW E&M-EST. PATIENT-LVL IV: CPT | Mod: PBBFAC,,, | Performed by: INTERNAL MEDICINE

## 2021-04-01 PROCEDURE — 3288F PR FALLS RISK ASSESSMENT DOCUMENTED: ICD-10-PCS | Mod: CPTII,S$GLB,, | Performed by: INTERNAL MEDICINE

## 2021-04-01 PROCEDURE — 99213 PR OFFICE/OUTPT VISIT, EST, LEVL III, 20-29 MIN: ICD-10-PCS | Mod: S$GLB,,, | Performed by: INTERNAL MEDICINE

## 2021-04-01 PROCEDURE — 99999 PR PBB SHADOW E&M-EST. PATIENT-LVL IV: ICD-10-PCS | Mod: PBBFAC,,, | Performed by: INTERNAL MEDICINE

## 2021-04-01 PROCEDURE — 3075F SYST BP GE 130 - 139MM HG: CPT | Mod: CPTII,S$GLB,, | Performed by: INTERNAL MEDICINE

## 2021-04-01 PROCEDURE — 3078F PR MOST RECENT DIASTOLIC BLOOD PRESSURE < 80 MM HG: ICD-10-PCS | Mod: CPTII,S$GLB,, | Performed by: INTERNAL MEDICINE

## 2021-04-08 ENCOUNTER — OFFICE VISIT (OUTPATIENT)
Dept: RHEUMATOLOGY | Facility: CLINIC | Age: 76
End: 2021-04-08
Payer: MEDICARE

## 2021-04-08 VITALS — WEIGHT: 123.5 LBS | BODY MASS INDEX: 24.94 KG/M2 | SYSTOLIC BLOOD PRESSURE: 126 MMHG | DIASTOLIC BLOOD PRESSURE: 70 MMHG

## 2021-04-08 DIAGNOSIS — M19.90 ACUTE ARTHRITIS: Primary | ICD-10-CM

## 2021-04-08 PROCEDURE — 20600 SMALL JOINT ASPIRATION/INJECTION: L THUMB CMC: ICD-10-PCS | Mod: LT,S$GLB,, | Performed by: INTERNAL MEDICINE

## 2021-04-08 PROCEDURE — 1101F PT FALLS ASSESS-DOCD LE1/YR: CPT | Mod: S$GLB,,, | Performed by: INTERNAL MEDICINE

## 2021-04-08 PROCEDURE — 3288F FALL RISK ASSESSMENT DOCD: CPT | Mod: S$GLB,,, | Performed by: INTERNAL MEDICINE

## 2021-04-08 PROCEDURE — 1159F PR MEDICATION LIST DOCUMENTED IN MEDICAL RECORD: ICD-10-PCS | Mod: S$GLB,,, | Performed by: INTERNAL MEDICINE

## 2021-04-08 PROCEDURE — 1125F AMNT PAIN NOTED PAIN PRSNT: CPT | Mod: S$GLB,,, | Performed by: INTERNAL MEDICINE

## 2021-04-08 PROCEDURE — 3288F PR FALLS RISK ASSESSMENT DOCUMENTED: ICD-10-PCS | Mod: S$GLB,,, | Performed by: INTERNAL MEDICINE

## 2021-04-08 PROCEDURE — 20600 DRAIN/INJ JOINT/BURSA W/O US: CPT | Mod: LT,S$GLB,, | Performed by: INTERNAL MEDICINE

## 2021-04-08 PROCEDURE — 1125F PR PAIN SEVERITY QUANTIFIED, PAIN PRESENT: ICD-10-PCS | Mod: S$GLB,,, | Performed by: INTERNAL MEDICINE

## 2021-04-08 PROCEDURE — 99213 PR OFFICE/OUTPT VISIT, EST, LEVL III, 20-29 MIN: ICD-10-PCS | Mod: 25,S$GLB,, | Performed by: INTERNAL MEDICINE

## 2021-04-08 PROCEDURE — 99213 OFFICE O/P EST LOW 20 MIN: CPT | Mod: 25,S$GLB,, | Performed by: INTERNAL MEDICINE

## 2021-04-08 PROCEDURE — 1101F PR PT FALLS ASSESS DOC 0-1 FALLS W/OUT INJ PAST YR: ICD-10-PCS | Mod: S$GLB,,, | Performed by: INTERNAL MEDICINE

## 2021-04-08 PROCEDURE — 1159F MED LIST DOCD IN RCRD: CPT | Mod: S$GLB,,, | Performed by: INTERNAL MEDICINE

## 2021-04-08 RX ORDER — TRIAMCINOLONE ACETONIDE 40 MG/ML
40 INJECTION, SUSPENSION INTRA-ARTICULAR; INTRAMUSCULAR
Status: DISCONTINUED | OUTPATIENT
Start: 2021-04-08 | End: 2021-04-08 | Stop reason: HOSPADM

## 2021-04-08 RX ORDER — DEXAMETHASONE SODIUM PHOSPHATE 4 MG/ML
4 INJECTION, SOLUTION INTRA-ARTICULAR; INTRALESIONAL; INTRAMUSCULAR; INTRAVENOUS; SOFT TISSUE
Status: DISCONTINUED | OUTPATIENT
Start: 2021-04-08 | End: 2021-04-08 | Stop reason: HOSPADM

## 2021-04-08 RX ADMIN — TRIAMCINOLONE ACETONIDE 40 MG: 40 INJECTION, SUSPENSION INTRA-ARTICULAR; INTRAMUSCULAR at 11:04

## 2021-04-08 RX ADMIN — DEXAMETHASONE SODIUM PHOSPHATE 4 MG: 4 INJECTION, SOLUTION INTRA-ARTICULAR; INTRALESIONAL; INTRAMUSCULAR; INTRAVENOUS; SOFT TISSUE at 11:04

## 2021-04-22 ENCOUNTER — OFFICE VISIT (OUTPATIENT)
Dept: FAMILY MEDICINE | Facility: CLINIC | Age: 76
End: 2021-04-22
Payer: MEDICARE

## 2021-04-22 VITALS
WEIGHT: 121.06 LBS | BODY MASS INDEX: 24.4 KG/M2 | TEMPERATURE: 99 F | SYSTOLIC BLOOD PRESSURE: 124 MMHG | DIASTOLIC BLOOD PRESSURE: 64 MMHG | HEART RATE: 86 BPM | HEIGHT: 59 IN | OXYGEN SATURATION: 94 % | RESPIRATION RATE: 16 BRPM

## 2021-04-22 DIAGNOSIS — N39.0 URINARY TRACT INFECTION WITH HEMATURIA, SITE UNSPECIFIED: Primary | ICD-10-CM

## 2021-04-22 DIAGNOSIS — M15.9 PRIMARY OSTEOARTHRITIS INVOLVING MULTIPLE JOINTS: ICD-10-CM

## 2021-04-22 DIAGNOSIS — E78.5 HYPERLIPIDEMIA, UNSPECIFIED HYPERLIPIDEMIA TYPE: ICD-10-CM

## 2021-04-22 DIAGNOSIS — I10 ESSENTIAL HYPERTENSION: ICD-10-CM

## 2021-04-22 DIAGNOSIS — R31.9 URINARY TRACT INFECTION WITH HEMATURIA, SITE UNSPECIFIED: Primary | ICD-10-CM

## 2021-04-22 LAB
BILIRUB SERPL-MCNC: ABNORMAL MG/DL
BLOOD URINE, POC: 250
CLARITY, POC UA: ABNORMAL
COLOR, POC UA: ABNORMAL
GLUCOSE UR QL STRIP: NORMAL
KETONES UR QL STRIP: ABNORMAL
LEUKOCYTE ESTERASE URINE, POC: ABNORMAL
NITRITE, POC UA: ABNORMAL
PH, POC UA: 5
PROTEIN, POC: ABNORMAL
SPECIFIC GRAVITY, POC UA: 1.02
UROBILINOGEN, POC UA: 1

## 2021-04-22 PROCEDURE — 81003 URINALYSIS AUTO W/O SCOPE: CPT | Performed by: STUDENT IN AN ORGANIZED HEALTH CARE EDUCATION/TRAINING PROGRAM

## 2021-04-22 PROCEDURE — 3288F PR FALLS RISK ASSESSMENT DOCUMENTED: ICD-10-PCS | Mod: CPTII,S$GLB,, | Performed by: STUDENT IN AN ORGANIZED HEALTH CARE EDUCATION/TRAINING PROGRAM

## 2021-04-22 PROCEDURE — 87086 URINE CULTURE/COLONY COUNT: CPT | Performed by: STUDENT IN AN ORGANIZED HEALTH CARE EDUCATION/TRAINING PROGRAM

## 2021-04-22 PROCEDURE — 87186 SC STD MICRODIL/AGAR DIL: CPT | Performed by: STUDENT IN AN ORGANIZED HEALTH CARE EDUCATION/TRAINING PROGRAM

## 2021-04-22 PROCEDURE — 81002 POCT URINE DIPSTICK WITHOUT MICROSCOPE: ICD-10-PCS | Mod: S$GLB,,, | Performed by: STUDENT IN AN ORGANIZED HEALTH CARE EDUCATION/TRAINING PROGRAM

## 2021-04-22 PROCEDURE — 81002 URINALYSIS NONAUTO W/O SCOPE: CPT | Mod: S$GLB,,, | Performed by: STUDENT IN AN ORGANIZED HEALTH CARE EDUCATION/TRAINING PROGRAM

## 2021-04-22 PROCEDURE — 1125F PR PAIN SEVERITY QUANTIFIED, PAIN PRESENT: ICD-10-PCS | Mod: S$GLB,,, | Performed by: STUDENT IN AN ORGANIZED HEALTH CARE EDUCATION/TRAINING PROGRAM

## 2021-04-22 PROCEDURE — 1101F PT FALLS ASSESS-DOCD LE1/YR: CPT | Mod: CPTII,S$GLB,, | Performed by: STUDENT IN AN ORGANIZED HEALTH CARE EDUCATION/TRAINING PROGRAM

## 2021-04-22 PROCEDURE — 99214 OFFICE O/P EST MOD 30 MIN: CPT | Mod: 25,S$GLB,, | Performed by: STUDENT IN AN ORGANIZED HEALTH CARE EDUCATION/TRAINING PROGRAM

## 2021-04-22 PROCEDURE — 3288F FALL RISK ASSESSMENT DOCD: CPT | Mod: CPTII,S$GLB,, | Performed by: STUDENT IN AN ORGANIZED HEALTH CARE EDUCATION/TRAINING PROGRAM

## 2021-04-22 PROCEDURE — 1125F AMNT PAIN NOTED PAIN PRSNT: CPT | Mod: S$GLB,,, | Performed by: STUDENT IN AN ORGANIZED HEALTH CARE EDUCATION/TRAINING PROGRAM

## 2021-04-22 PROCEDURE — 87077 CULTURE AEROBIC IDENTIFY: CPT | Performed by: STUDENT IN AN ORGANIZED HEALTH CARE EDUCATION/TRAINING PROGRAM

## 2021-04-22 PROCEDURE — 1159F MED LIST DOCD IN RCRD: CPT | Mod: S$GLB,,, | Performed by: STUDENT IN AN ORGANIZED HEALTH CARE EDUCATION/TRAINING PROGRAM

## 2021-04-22 PROCEDURE — 1101F PR PT FALLS ASSESS DOC 0-1 FALLS W/OUT INJ PAST YR: ICD-10-PCS | Mod: CPTII,S$GLB,, | Performed by: STUDENT IN AN ORGANIZED HEALTH CARE EDUCATION/TRAINING PROGRAM

## 2021-04-22 PROCEDURE — 81001 URINALYSIS AUTO W/SCOPE: CPT | Performed by: STUDENT IN AN ORGANIZED HEALTH CARE EDUCATION/TRAINING PROGRAM

## 2021-04-22 PROCEDURE — 99214 PR OFFICE/OUTPT VISIT, EST, LEVL IV, 30-39 MIN: ICD-10-PCS | Mod: 25,S$GLB,, | Performed by: STUDENT IN AN ORGANIZED HEALTH CARE EDUCATION/TRAINING PROGRAM

## 2021-04-22 PROCEDURE — 99999 PR PBB SHADOW E&M-EST. PATIENT-LVL V: CPT | Mod: PBBFAC,,, | Performed by: STUDENT IN AN ORGANIZED HEALTH CARE EDUCATION/TRAINING PROGRAM

## 2021-04-22 PROCEDURE — 1159F PR MEDICATION LIST DOCUMENTED IN MEDICAL RECORD: ICD-10-PCS | Mod: S$GLB,,, | Performed by: STUDENT IN AN ORGANIZED HEALTH CARE EDUCATION/TRAINING PROGRAM

## 2021-04-22 PROCEDURE — 99999 PR PBB SHADOW E&M-EST. PATIENT-LVL V: ICD-10-PCS | Mod: PBBFAC,,, | Performed by: STUDENT IN AN ORGANIZED HEALTH CARE EDUCATION/TRAINING PROGRAM

## 2021-04-22 PROCEDURE — 87088 URINE BACTERIA CULTURE: CPT | Performed by: STUDENT IN AN ORGANIZED HEALTH CARE EDUCATION/TRAINING PROGRAM

## 2021-04-22 RX ORDER — NITROFURANTOIN 25; 75 MG/1; MG/1
100 CAPSULE ORAL 2 TIMES DAILY
Qty: 10 CAPSULE | Refills: 0 | Status: SHIPPED | OUTPATIENT
Start: 2021-04-22 | End: 2021-04-27

## 2021-04-22 RX ORDER — PRAVASTATIN SODIUM 10 MG/1
10 TABLET ORAL DAILY
Qty: 30 TABLET | Refills: 1 | Status: SHIPPED | OUTPATIENT
Start: 2021-04-22 | End: 2021-04-22

## 2021-04-23 LAB
BACTERIA #/AREA URNS AUTO: ABNORMAL /HPF
BILIRUB UR QL STRIP: NEGATIVE
CLARITY UR REFRACT.AUTO: ABNORMAL
COLOR UR AUTO: ABNORMAL
GLUCOSE UR QL STRIP: NEGATIVE
HGB UR QL STRIP: ABNORMAL
HYALINE CASTS UR QL AUTO: 0 /LPF
KETONES UR QL STRIP: NEGATIVE
LEUKOCYTE ESTERASE UR QL STRIP: ABNORMAL
MICROSCOPIC COMMENT: ABNORMAL
NITRITE UR QL STRIP: POSITIVE
NON-SQ EPI CELLS #/AREA URNS AUTO: 2 /HPF
PH UR STRIP: 5 [PH] (ref 5–8)
PROT UR QL STRIP: ABNORMAL
RBC #/AREA URNS AUTO: 54 /HPF (ref 0–4)
SP GR UR STRIP: 1.02 (ref 1–1.03)
URN SPEC COLLECT METH UR: ABNORMAL
WBC #/AREA URNS AUTO: >100 /HPF (ref 0–5)
WBC CLUMPS UR QL AUTO: ABNORMAL

## 2021-04-25 LAB — BACTERIA UR CULT: ABNORMAL

## 2021-05-10 ENCOUNTER — OFFICE VISIT (OUTPATIENT)
Dept: PSYCHIATRY | Facility: CLINIC | Age: 76
End: 2021-05-10
Payer: MEDICARE

## 2021-05-10 VITALS
WEIGHT: 121.13 LBS | HEART RATE: 85 BPM | HEIGHT: 59 IN | DIASTOLIC BLOOD PRESSURE: 76 MMHG | BODY MASS INDEX: 24.42 KG/M2 | SYSTOLIC BLOOD PRESSURE: 127 MMHG

## 2021-05-10 DIAGNOSIS — F33.41 MDD (MAJOR DEPRESSIVE DISORDER), RECURRENT, IN PARTIAL REMISSION: ICD-10-CM

## 2021-05-10 DIAGNOSIS — F41.9 ANXIETY: ICD-10-CM

## 2021-05-10 DIAGNOSIS — F43.10 PTSD (POST-TRAUMATIC STRESS DISORDER): Primary | ICD-10-CM

## 2021-05-10 PROCEDURE — 1159F MED LIST DOCD IN RCRD: CPT | Mod: S$GLB,,, | Performed by: PSYCHIATRY & NEUROLOGY

## 2021-05-10 PROCEDURE — 99999 PR PBB SHADOW E&M-EST. PATIENT-LVL III: ICD-10-PCS | Mod: PBBFAC,,, | Performed by: PSYCHIATRY & NEUROLOGY

## 2021-05-10 PROCEDURE — 99214 OFFICE O/P EST MOD 30 MIN: CPT | Mod: S$GLB,,, | Performed by: PSYCHIATRY & NEUROLOGY

## 2021-05-10 PROCEDURE — 1159F PR MEDICATION LIST DOCUMENTED IN MEDICAL RECORD: ICD-10-PCS | Mod: S$GLB,,, | Performed by: PSYCHIATRY & NEUROLOGY

## 2021-05-10 PROCEDURE — 1125F AMNT PAIN NOTED PAIN PRSNT: CPT | Mod: S$GLB,,, | Performed by: PSYCHIATRY & NEUROLOGY

## 2021-05-10 PROCEDURE — 3288F FALL RISK ASSESSMENT DOCD: CPT | Mod: CPTII,S$GLB,, | Performed by: PSYCHIATRY & NEUROLOGY

## 2021-05-10 PROCEDURE — 99214 PR OFFICE/OUTPT VISIT, EST, LEVL IV, 30-39 MIN: ICD-10-PCS | Mod: S$GLB,,, | Performed by: PSYCHIATRY & NEUROLOGY

## 2021-05-10 PROCEDURE — 99999 PR PBB SHADOW E&M-EST. PATIENT-LVL III: CPT | Mod: PBBFAC,,, | Performed by: PSYCHIATRY & NEUROLOGY

## 2021-05-10 PROCEDURE — 3288F PR FALLS RISK ASSESSMENT DOCUMENTED: ICD-10-PCS | Mod: CPTII,S$GLB,, | Performed by: PSYCHIATRY & NEUROLOGY

## 2021-05-10 PROCEDURE — 1100F PTFALLS ASSESS-DOCD GE2>/YR: CPT | Mod: CPTII,S$GLB,, | Performed by: PSYCHIATRY & NEUROLOGY

## 2021-05-10 PROCEDURE — 1125F PR PAIN SEVERITY QUANTIFIED, PAIN PRESENT: ICD-10-PCS | Mod: S$GLB,,, | Performed by: PSYCHIATRY & NEUROLOGY

## 2021-05-10 PROCEDURE — 1100F PR PT FALLS ASSESS DOC 2+ FALLS/FALL W/INJURY/YR: ICD-10-PCS | Mod: CPTII,S$GLB,, | Performed by: PSYCHIATRY & NEUROLOGY

## 2021-05-10 RX ORDER — BUSPIRONE HYDROCHLORIDE 10 MG/1
10 TABLET ORAL 2 TIMES DAILY
Qty: 180 TABLET | Refills: 0 | Status: SHIPPED | OUTPATIENT
Start: 2021-05-10 | End: 2021-07-23

## 2021-05-10 RX ORDER — DONEPEZIL HYDROCHLORIDE 5 MG/1
5 TABLET, FILM COATED ORAL NIGHTLY
Qty: 30 TABLET | Refills: 0 | Status: SHIPPED | OUTPATIENT
Start: 2021-05-10 | End: 2021-07-26

## 2021-05-10 RX ORDER — SERTRALINE HYDROCHLORIDE 100 MG/1
100 TABLET, FILM COATED ORAL DAILY
Qty: 90 TABLET | Refills: 0 | Status: SHIPPED | OUTPATIENT
Start: 2021-05-10 | End: 2021-06-24

## 2021-05-10 RX ORDER — CYCLOBENZAPRINE HCL 5 MG
5 TABLET ORAL DAILY PRN
COMMUNITY
End: 2021-06-15 | Stop reason: SDUPTHER

## 2021-05-11 ENCOUNTER — OFFICE VISIT (OUTPATIENT)
Dept: FAMILY MEDICINE | Facility: CLINIC | Age: 76
End: 2021-05-11
Attending: FAMILY MEDICINE
Payer: MEDICARE

## 2021-05-11 VITALS
BODY MASS INDEX: 24.36 KG/M2 | WEIGHT: 120.81 LBS | OXYGEN SATURATION: 97 % | HEIGHT: 59 IN | TEMPERATURE: 99 F | SYSTOLIC BLOOD PRESSURE: 110 MMHG | DIASTOLIC BLOOD PRESSURE: 68 MMHG | HEART RATE: 79 BPM

## 2021-05-11 DIAGNOSIS — R39.9 UTI SYMPTOMS: ICD-10-CM

## 2021-05-11 DIAGNOSIS — N30.01 ACUTE CYSTITIS WITH HEMATURIA: Primary | ICD-10-CM

## 2021-05-11 LAB
BILIRUB SERPL-MCNC: NEGATIVE MG/DL
BLOOD URINE, POC: POSITIVE
CLARITY, POC UA: ABNORMAL
COLOR, POC UA: YELLOW
GLUCOSE UR QL STRIP: NEGATIVE
KETONES UR QL STRIP: NEGATIVE
LEUKOCYTE ESTERASE URINE, POC: POSITIVE
NITRITE, POC UA: NEGATIVE
PH, POC UA: 5
PROTEIN, POC: POSITIVE
SPECIFIC GRAVITY, POC UA: 1.01
UROBILINOGEN, POC UA: NEGATIVE

## 2021-05-11 PROCEDURE — 1101F PT FALLS ASSESS-DOCD LE1/YR: CPT | Mod: CPTII,S$GLB,, | Performed by: FAMILY MEDICINE

## 2021-05-11 PROCEDURE — 99999 PR PBB SHADOW E&M-EST. PATIENT-LVL IV: CPT | Mod: PBBFAC,,, | Performed by: FAMILY MEDICINE

## 2021-05-11 PROCEDURE — 1159F PR MEDICATION LIST DOCUMENTED IN MEDICAL RECORD: ICD-10-PCS | Mod: S$GLB,,, | Performed by: FAMILY MEDICINE

## 2021-05-11 PROCEDURE — 1101F PR PT FALLS ASSESS DOC 0-1 FALLS W/OUT INJ PAST YR: ICD-10-PCS | Mod: CPTII,S$GLB,, | Performed by: FAMILY MEDICINE

## 2021-05-11 PROCEDURE — 3288F FALL RISK ASSESSMENT DOCD: CPT | Mod: CPTII,S$GLB,, | Performed by: FAMILY MEDICINE

## 2021-05-11 PROCEDURE — 1126F PR PAIN SEVERITY QUANTIFIED, NO PAIN PRESENT: ICD-10-PCS | Mod: S$GLB,,, | Performed by: FAMILY MEDICINE

## 2021-05-11 PROCEDURE — 87086 URINE CULTURE/COLONY COUNT: CPT | Performed by: FAMILY MEDICINE

## 2021-05-11 PROCEDURE — 99999 PR PBB SHADOW E&M-EST. PATIENT-LVL IV: ICD-10-PCS | Mod: PBBFAC,,, | Performed by: FAMILY MEDICINE

## 2021-05-11 PROCEDURE — 81002 POCT URINE DIPSTICK WITHOUT MICROSCOPE: ICD-10-PCS | Mod: S$GLB,,, | Performed by: FAMILY MEDICINE

## 2021-05-11 PROCEDURE — 1159F MED LIST DOCD IN RCRD: CPT | Mod: S$GLB,,, | Performed by: FAMILY MEDICINE

## 2021-05-11 PROCEDURE — 1126F AMNT PAIN NOTED NONE PRSNT: CPT | Mod: S$GLB,,, | Performed by: FAMILY MEDICINE

## 2021-05-11 PROCEDURE — 3288F PR FALLS RISK ASSESSMENT DOCUMENTED: ICD-10-PCS | Mod: CPTII,S$GLB,, | Performed by: FAMILY MEDICINE

## 2021-05-11 PROCEDURE — 99213 PR OFFICE/OUTPT VISIT, EST, LEVL III, 20-29 MIN: ICD-10-PCS | Mod: 25,S$GLB,, | Performed by: FAMILY MEDICINE

## 2021-05-11 PROCEDURE — 99213 OFFICE O/P EST LOW 20 MIN: CPT | Mod: 25,S$GLB,, | Performed by: FAMILY MEDICINE

## 2021-05-11 PROCEDURE — 81002 URINALYSIS NONAUTO W/O SCOPE: CPT | Mod: S$GLB,,, | Performed by: FAMILY MEDICINE

## 2021-05-11 RX ORDER — LEVOFLOXACIN 250 MG/1
250 TABLET ORAL DAILY
Qty: 7 TABLET | Refills: 0 | Status: SHIPPED | OUTPATIENT
Start: 2021-05-11 | End: 2021-05-18

## 2021-05-13 LAB — BACTERIA UR CULT: NO GROWTH

## 2021-05-14 ENCOUNTER — TELEPHONE (OUTPATIENT)
Dept: FAMILY MEDICINE | Facility: CLINIC | Age: 76
End: 2021-05-14

## 2021-05-30 ENCOUNTER — HOSPITAL ENCOUNTER (EMERGENCY)
Facility: HOSPITAL | Age: 76
Discharge: HOME | End: 2021-05-30
Attending: EMERGENCY MEDICINE
Payer: MEDICARE

## 2021-05-30 VITALS
RESPIRATION RATE: 22 BRPM | DIASTOLIC BLOOD PRESSURE: 60 MMHG | BODY MASS INDEX: 24.19 KG/M2 | HEART RATE: 102 BPM | OXYGEN SATURATION: 96 % | HEIGHT: 59 IN | SYSTOLIC BLOOD PRESSURE: 134 MMHG | TEMPERATURE: 100 F | WEIGHT: 120 LBS

## 2021-05-30 DIAGNOSIS — R06.02 SOB (SHORTNESS OF BREATH): Primary | ICD-10-CM

## 2021-05-30 DIAGNOSIS — J40 BRONCHITIS: ICD-10-CM

## 2021-05-30 DIAGNOSIS — R06.2 WHEEZING: ICD-10-CM

## 2021-05-30 LAB
ALBUMIN SERPL BCP-MCNC: 4.1 G/DL (ref 3.5–5.2)
ALP SERPL-CCNC: 79 U/L (ref 55–135)
ALT SERPL W/O P-5'-P-CCNC: 21 U/L (ref 10–44)
ANION GAP SERPL CALC-SCNC: 12 MMOL/L (ref 8–16)
AST SERPL-CCNC: 23 U/L (ref 10–40)
BASOPHILS # BLD AUTO: 0.07 K/UL (ref 0–0.2)
BASOPHILS NFR BLD: 0.8 % (ref 0–1.9)
BILIRUB SERPL-MCNC: 0.3 MG/DL (ref 0.1–1)
BNP SERPL-MCNC: 16 PG/ML (ref 0–99)
BUN SERPL-MCNC: 14 MG/DL (ref 8–23)
CALCIUM SERPL-MCNC: 9.1 MG/DL (ref 8.7–10.5)
CHLORIDE SERPL-SCNC: 103 MMOL/L (ref 95–110)
CO2 SERPL-SCNC: 19 MMOL/L (ref 23–29)
CREAT SERPL-MCNC: 0.9 MG/DL (ref 0.5–1.4)
D DIMER PPP IA.FEU-MCNC: 0.59 MG/L FEU
DIFFERENTIAL METHOD: ABNORMAL
EOSINOPHIL # BLD AUTO: 0.6 K/UL (ref 0–0.5)
EOSINOPHIL NFR BLD: 6 % (ref 0–8)
ERYTHROCYTE [DISTWIDTH] IN BLOOD BY AUTOMATED COUNT: 14.6 % (ref 11.5–14.5)
EST. GFR  (AFRICAN AMERICAN): >60 ML/MIN/1.73 M^2
EST. GFR  (NON AFRICAN AMERICAN): >60 ML/MIN/1.73 M^2
GLUCOSE SERPL-MCNC: 132 MG/DL (ref 70–110)
HCT VFR BLD AUTO: 34.7 % (ref 37–48.5)
HGB BLD-MCNC: 11.2 G/DL (ref 12–16)
IMM GRANULOCYTES # BLD AUTO: 0.06 K/UL (ref 0–0.04)
IMM GRANULOCYTES NFR BLD AUTO: 0.6 % (ref 0–0.5)
LYMPHOCYTES # BLD AUTO: 1.6 K/UL (ref 1–4.8)
LYMPHOCYTES NFR BLD: 16.7 % (ref 18–48)
MCH RBC QN AUTO: 29.5 PG (ref 27–31)
MCHC RBC AUTO-ENTMCNC: 32.3 G/DL (ref 32–36)
MCV RBC AUTO: 91 FL (ref 82–98)
MONOCYTES # BLD AUTO: 1 K/UL (ref 0.3–1)
MONOCYTES NFR BLD: 11.1 % (ref 4–15)
NEUTROPHILS # BLD AUTO: 6 K/UL (ref 1.8–7.7)
NEUTROPHILS NFR BLD: 64.8 % (ref 38–73)
NRBC BLD-RTO: 0 /100 WBC
PLATELET # BLD AUTO: 222 K/UL (ref 150–450)
PMV BLD AUTO: 10.1 FL (ref 9.2–12.9)
POTASSIUM SERPL-SCNC: 4.1 MMOL/L (ref 3.5–5.1)
PROT SERPL-MCNC: 7.7 G/DL (ref 6–8.4)
RBC # BLD AUTO: 3.8 M/UL (ref 4–5.4)
SARS-COV-2 RDRP RESP QL NAA+PROBE: NEGATIVE
SODIUM SERPL-SCNC: 134 MMOL/L (ref 136–145)
TROPONIN I SERPL DL<=0.01 NG/ML-MCNC: 0.01 NG/ML (ref 0–0.03)
WBC # BLD AUTO: 9.33 K/UL (ref 3.9–12.7)

## 2021-05-30 PROCEDURE — 80053 COMPREHEN METABOLIC PANEL: CPT | Performed by: PHYSICIAN ASSISTANT

## 2021-05-30 PROCEDURE — U0002 COVID-19 LAB TEST NON-CDC: HCPCS | Performed by: PHYSICIAN ASSISTANT

## 2021-05-30 PROCEDURE — 36415 COLL VENOUS BLD VENIPUNCTURE: CPT | Performed by: PHYSICIAN ASSISTANT

## 2021-05-30 PROCEDURE — 63600175 PHARM REV CODE 636 W HCPCS: Performed by: PHYSICIAN ASSISTANT

## 2021-05-30 PROCEDURE — 85025 COMPLETE CBC W/AUTO DIFF WBC: CPT | Performed by: PHYSICIAN ASSISTANT

## 2021-05-30 PROCEDURE — 93005 ELECTROCARDIOGRAM TRACING: CPT

## 2021-05-30 PROCEDURE — 94640 AIRWAY INHALATION TREATMENT: CPT

## 2021-05-30 PROCEDURE — 85379 FIBRIN DEGRADATION QUANT: CPT | Performed by: PHYSICIAN ASSISTANT

## 2021-05-30 PROCEDURE — 25000242 PHARM REV CODE 250 ALT 637 W/ HCPCS: Performed by: PHYSICIAN ASSISTANT

## 2021-05-30 PROCEDURE — 83880 ASSAY OF NATRIURETIC PEPTIDE: CPT | Performed by: PHYSICIAN ASSISTANT

## 2021-05-30 PROCEDURE — 96374 THER/PROPH/DIAG INJ IV PUSH: CPT

## 2021-05-30 PROCEDURE — 84484 ASSAY OF TROPONIN QUANT: CPT | Performed by: PHYSICIAN ASSISTANT

## 2021-05-30 PROCEDURE — 99285 EMERGENCY DEPT VISIT HI MDM: CPT | Mod: 25

## 2021-05-30 PROCEDURE — 93010 ELECTROCARDIOGRAM REPORT: CPT | Mod: ,,, | Performed by: INTERNAL MEDICINE

## 2021-05-30 PROCEDURE — 93010 EKG 12-LEAD: ICD-10-PCS | Mod: ,,, | Performed by: INTERNAL MEDICINE

## 2021-05-30 PROCEDURE — 25000003 PHARM REV CODE 250: Performed by: PHYSICIAN ASSISTANT

## 2021-05-30 RX ORDER — AZITHROMYCIN 250 MG/1
250 TABLET, FILM COATED ORAL DAILY
Qty: 6 TABLET | Refills: 0 | Status: SHIPPED | OUTPATIENT
Start: 2021-05-30 | End: 2021-06-03

## 2021-05-30 RX ORDER — METHYLPREDNISOLONE 4 MG/1
TABLET ORAL
Qty: 1 PACKAGE | Refills: 0 | Status: SHIPPED | OUTPATIENT
Start: 2021-05-30 | End: 2021-06-03

## 2021-05-30 RX ORDER — IPRATROPIUM BROMIDE AND ALBUTEROL SULFATE 2.5; .5 MG/3ML; MG/3ML
3 SOLUTION RESPIRATORY (INHALATION)
Status: COMPLETED | OUTPATIENT
Start: 2021-05-30 | End: 2021-05-30

## 2021-05-30 RX ORDER — METHYLPREDNISOLONE SOD SUCC 125 MG
125 VIAL (EA) INJECTION
Status: COMPLETED | OUTPATIENT
Start: 2021-05-30 | End: 2021-05-30

## 2021-05-30 RX ORDER — BENZONATATE 100 MG/1
100 CAPSULE ORAL 3 TIMES DAILY PRN
Qty: 20 CAPSULE | Refills: 0 | Status: SHIPPED | OUTPATIENT
Start: 2021-05-30 | End: 2022-05-16 | Stop reason: ALTCHOICE

## 2021-05-30 RX ORDER — CETIRIZINE HYDROCHLORIDE 10 MG/1
10 TABLET ORAL
Status: COMPLETED | OUTPATIENT
Start: 2021-05-30 | End: 2021-05-30

## 2021-05-30 RX ORDER — ALBUTEROL SULFATE 90 UG/1
1-2 AEROSOL, METERED RESPIRATORY (INHALATION) EVERY 6 HOURS PRN
Qty: 6.7 G | Refills: 0 | Status: ON HOLD | OUTPATIENT
Start: 2021-05-30 | End: 2021-06-04 | Stop reason: HOSPADM

## 2021-05-30 RX ADMIN — METHYLPREDNISOLONE SODIUM SUCCINATE 125 MG: 125 INJECTION, POWDER, FOR SOLUTION INTRAMUSCULAR; INTRAVENOUS at 11:05

## 2021-05-30 RX ADMIN — IPRATROPIUM BROMIDE AND ALBUTEROL SULFATE 3 ML: .5; 2.5 SOLUTION RESPIRATORY (INHALATION) at 10:05

## 2021-05-30 RX ADMIN — CETIRIZINE HYDROCHLORIDE 10 MG: 10 TABLET, FILM COATED ORAL at 11:05

## 2021-06-01 ENCOUNTER — OFFICE VISIT (OUTPATIENT)
Dept: FAMILY MEDICINE | Facility: CLINIC | Age: 76
End: 2021-06-01
Payer: MEDICARE

## 2021-06-01 VITALS
DIASTOLIC BLOOD PRESSURE: 72 MMHG | BODY MASS INDEX: 24.27 KG/M2 | HEART RATE: 90 BPM | HEIGHT: 59 IN | OXYGEN SATURATION: 98 % | TEMPERATURE: 99 F | WEIGHT: 120.38 LBS | SYSTOLIC BLOOD PRESSURE: 114 MMHG

## 2021-06-01 DIAGNOSIS — J20.9 ACUTE BRONCHITIS WITH WHEEZING: Primary | ICD-10-CM

## 2021-06-01 PROCEDURE — 99214 PR OFFICE/OUTPT VISIT, EST, LEVL IV, 30-39 MIN: ICD-10-PCS | Mod: 25,S$GLB,, | Performed by: PHYSICIAN ASSISTANT

## 2021-06-01 PROCEDURE — 96372 PR INJECTION,THERAP/PROPH/DIAG2ST, IM OR SUBCUT: ICD-10-PCS | Mod: 59,S$GLB,, | Performed by: PHYSICIAN ASSISTANT

## 2021-06-01 PROCEDURE — 1126F PR PAIN SEVERITY QUANTIFIED, NO PAIN PRESENT: ICD-10-PCS | Mod: S$GLB,,, | Performed by: PHYSICIAN ASSISTANT

## 2021-06-01 PROCEDURE — 1101F PR PT FALLS ASSESS DOC 0-1 FALLS W/OUT INJ PAST YR: ICD-10-PCS | Mod: CPTII,S$GLB,, | Performed by: PHYSICIAN ASSISTANT

## 2021-06-01 PROCEDURE — 96372 THER/PROPH/DIAG INJ SC/IM: CPT | Mod: 59,S$GLB,, | Performed by: PHYSICIAN ASSISTANT

## 2021-06-01 PROCEDURE — 3288F FALL RISK ASSESSMENT DOCD: CPT | Mod: CPTII,S$GLB,, | Performed by: PHYSICIAN ASSISTANT

## 2021-06-01 PROCEDURE — 94640 PR INHAL RX, AIRWAY OBST/DX SPUTUM INDUCT: ICD-10-PCS | Mod: S$GLB,,, | Performed by: PHYSICIAN ASSISTANT

## 2021-06-01 PROCEDURE — 1159F MED LIST DOCD IN RCRD: CPT | Mod: S$GLB,,, | Performed by: PHYSICIAN ASSISTANT

## 2021-06-01 PROCEDURE — 99999 PR PBB SHADOW E&M-EST. PATIENT-LVL III: ICD-10-PCS | Mod: PBBFAC,,, | Performed by: PHYSICIAN ASSISTANT

## 2021-06-01 PROCEDURE — 1126F AMNT PAIN NOTED NONE PRSNT: CPT | Mod: S$GLB,,, | Performed by: PHYSICIAN ASSISTANT

## 2021-06-01 PROCEDURE — 94640 AIRWAY INHALATION TREATMENT: CPT | Mod: S$GLB,,, | Performed by: PHYSICIAN ASSISTANT

## 2021-06-01 PROCEDURE — 1159F PR MEDICATION LIST DOCUMENTED IN MEDICAL RECORD: ICD-10-PCS | Mod: S$GLB,,, | Performed by: PHYSICIAN ASSISTANT

## 2021-06-01 PROCEDURE — 99214 OFFICE O/P EST MOD 30 MIN: CPT | Mod: 25,S$GLB,, | Performed by: PHYSICIAN ASSISTANT

## 2021-06-01 PROCEDURE — 3288F PR FALLS RISK ASSESSMENT DOCUMENTED: ICD-10-PCS | Mod: CPTII,S$GLB,, | Performed by: PHYSICIAN ASSISTANT

## 2021-06-01 PROCEDURE — 1101F PT FALLS ASSESS-DOCD LE1/YR: CPT | Mod: CPTII,S$GLB,, | Performed by: PHYSICIAN ASSISTANT

## 2021-06-01 PROCEDURE — 99999 PR PBB SHADOW E&M-EST. PATIENT-LVL III: CPT | Mod: PBBFAC,,, | Performed by: PHYSICIAN ASSISTANT

## 2021-06-01 RX ORDER — IPRATROPIUM BROMIDE AND ALBUTEROL SULFATE 2.5; .5 MG/3ML; MG/3ML
3 SOLUTION RESPIRATORY (INHALATION)
Status: COMPLETED | OUTPATIENT
Start: 2021-06-01 | End: 2021-06-01

## 2021-06-01 RX ORDER — PROMETHAZINE HYDROCHLORIDE AND DEXTROMETHORPHAN HYDROBROMIDE 6.25; 15 MG/5ML; MG/5ML
5 SYRUP ORAL EVERY 4 HOURS PRN
Qty: 180 ML | Refills: 0 | Status: SHIPPED | OUTPATIENT
Start: 2021-06-01 | End: 2021-06-01 | Stop reason: SDUPTHER

## 2021-06-01 RX ORDER — DEXAMETHASONE SODIUM PHOSPHATE 4 MG/ML
4 INJECTION, SOLUTION INTRA-ARTICULAR; INTRALESIONAL; INTRAMUSCULAR; INTRAVENOUS; SOFT TISSUE
Status: COMPLETED | OUTPATIENT
Start: 2021-06-01 | End: 2021-06-01

## 2021-06-01 RX ORDER — PROMETHAZINE HYDROCHLORIDE AND DEXTROMETHORPHAN HYDROBROMIDE 6.25; 15 MG/5ML; MG/5ML
5 SYRUP ORAL EVERY 4 HOURS PRN
Qty: 180 ML | Refills: 0 | Status: SHIPPED | OUTPATIENT
Start: 2021-06-01 | End: 2021-06-11

## 2021-06-01 RX ADMIN — DEXAMETHASONE SODIUM PHOSPHATE 4 MG: 4 INJECTION, SOLUTION INTRA-ARTICULAR; INTRALESIONAL; INTRAMUSCULAR; INTRAVENOUS; SOFT TISSUE at 02:06

## 2021-06-01 RX ADMIN — IPRATROPIUM BROMIDE AND ALBUTEROL SULFATE 3 ML: 2.5; .5 SOLUTION RESPIRATORY (INHALATION) at 01:06

## 2021-06-01 RX ADMIN — IPRATROPIUM BROMIDE AND ALBUTEROL SULFATE 3 ML: 2.5; .5 SOLUTION RESPIRATORY (INHALATION) at 02:06

## 2021-06-03 ENCOUNTER — OFFICE VISIT (OUTPATIENT)
Dept: FAMILY MEDICINE | Facility: CLINIC | Age: 76
End: 2021-06-03
Payer: MEDICARE

## 2021-06-03 ENCOUNTER — HOSPITAL ENCOUNTER (OUTPATIENT)
Facility: HOSPITAL | Age: 76
Discharge: HOME | End: 2021-06-04
Attending: EMERGENCY MEDICINE | Admitting: STUDENT IN AN ORGANIZED HEALTH CARE EDUCATION/TRAINING PROGRAM
Payer: MEDICARE

## 2021-06-03 VITALS
TEMPERATURE: 98 F | SYSTOLIC BLOOD PRESSURE: 116 MMHG | WEIGHT: 119.94 LBS | DIASTOLIC BLOOD PRESSURE: 70 MMHG | BODY MASS INDEX: 24.18 KG/M2 | OXYGEN SATURATION: 95 % | HEIGHT: 59 IN | HEART RATE: 87 BPM

## 2021-06-03 DIAGNOSIS — J20.9 ACUTE BRONCHITIS WITH WHEEZING: ICD-10-CM

## 2021-06-03 DIAGNOSIS — R06.2 WHEEZING: ICD-10-CM

## 2021-06-03 DIAGNOSIS — R06.03 ACUTE RESPIRATORY DISTRESS: Primary | ICD-10-CM

## 2021-06-03 DIAGNOSIS — R07.89 CHEST TIGHTNESS: ICD-10-CM

## 2021-06-03 DIAGNOSIS — J20.9 ACUTE BRONCHITIS, UNSPECIFIED ORGANISM: Primary | ICD-10-CM

## 2021-06-03 DIAGNOSIS — R07.9 CHEST PAIN: ICD-10-CM

## 2021-06-03 LAB
ADENOVIRUS: NOT DETECTED
ALBUMIN SERPL BCP-MCNC: 4.3 G/DL (ref 3.5–5.2)
ALP SERPL-CCNC: 65 U/L (ref 55–135)
ALT SERPL W/O P-5'-P-CCNC: 21 U/L (ref 10–44)
ANION GAP SERPL CALC-SCNC: 12 MMOL/L (ref 8–16)
AST SERPL-CCNC: 21 U/L (ref 10–40)
BASOPHILS # BLD AUTO: 0.04 K/UL (ref 0–0.2)
BASOPHILS NFR BLD: 0.3 % (ref 0–1.9)
BILIRUB SERPL-MCNC: 0.3 MG/DL (ref 0.1–1)
BNP SERPL-MCNC: 17 PG/ML (ref 0–99)
BORDETELLA PARAPERTUSSIS (IS1001): NOT DETECTED
BORDETELLA PERTUSSIS (PTXP): NOT DETECTED
BUN SERPL-MCNC: 20 MG/DL (ref 8–23)
CALCIUM SERPL-MCNC: 8.8 MG/DL (ref 8.7–10.5)
CHLAMYDIA PNEUMONIAE: NOT DETECTED
CHLORIDE SERPL-SCNC: 104 MMOL/L (ref 95–110)
CO2 SERPL-SCNC: 21 MMOL/L (ref 23–29)
CORONAVIRUS 229E, COMMON COLD VIRUS: NOT DETECTED
CORONAVIRUS HKU1, COMMON COLD VIRUS: NOT DETECTED
CORONAVIRUS NL63, COMMON COLD VIRUS: NOT DETECTED
CORONAVIRUS OC43, COMMON COLD VIRUS: NOT DETECTED
CREAT SERPL-MCNC: 1 MG/DL (ref 0.5–1.4)
DIFFERENTIAL METHOD: ABNORMAL
EOSINOPHIL # BLD AUTO: 0.1 K/UL (ref 0–0.5)
EOSINOPHIL NFR BLD: 0.5 % (ref 0–8)
ERYTHROCYTE [DISTWIDTH] IN BLOOD BY AUTOMATED COUNT: 14.7 % (ref 11.5–14.5)
EST. GFR  (AFRICAN AMERICAN): >60 ML/MIN/1.73 M^2
EST. GFR  (NON AFRICAN AMERICAN): 55 ML/MIN/1.73 M^2
FLUBV RNA NPH QL NAA+NON-PROBE: NOT DETECTED
GLUCOSE SERPL-MCNC: 99 MG/DL (ref 70–110)
HCT VFR BLD AUTO: 36.2 % (ref 37–48.5)
HGB BLD-MCNC: 11.8 G/DL (ref 12–16)
HPIV1 RNA NPH QL NAA+NON-PROBE: NOT DETECTED
HPIV2 RNA NPH QL NAA+NON-PROBE: NOT DETECTED
HPIV3 RNA NPH QL NAA+NON-PROBE: NOT DETECTED
HPIV4 RNA NPH QL NAA+NON-PROBE: NOT DETECTED
HUMAN METAPNEUMOVIRUS: NOT DETECTED
IMM GRANULOCYTES # BLD AUTO: 0.4 K/UL (ref 0–0.04)
IMM GRANULOCYTES NFR BLD AUTO: 3.1 % (ref 0–0.5)
INFLUENZA A (SUBTYPES H1,H1-2009,H3): NOT DETECTED
INFLUENZA A, MOLECULAR: NEGATIVE
INFLUENZA B, MOLECULAR: NEGATIVE
LYMPHOCYTES # BLD AUTO: 3.4 K/UL (ref 1–4.8)
LYMPHOCYTES NFR BLD: 26.3 % (ref 18–48)
MCH RBC QN AUTO: 29.6 PG (ref 27–31)
MCHC RBC AUTO-ENTMCNC: 32.6 G/DL (ref 32–36)
MCV RBC AUTO: 91 FL (ref 82–98)
MONOCYTES # BLD AUTO: 1.1 K/UL (ref 0.3–1)
MONOCYTES NFR BLD: 8.3 % (ref 4–15)
MYCOPLASMA PNEUMONIAE: NOT DETECTED
NEUTROPHILS # BLD AUTO: 7.9 K/UL (ref 1.8–7.7)
NEUTROPHILS NFR BLD: 61.5 % (ref 38–73)
NRBC BLD-RTO: 0 /100 WBC
PLATELET # BLD AUTO: 327 K/UL (ref 150–450)
PLATELET BLD QL SMEAR: ABNORMAL
PMV BLD AUTO: 9.8 FL (ref 9.2–12.9)
POTASSIUM SERPL-SCNC: 3.6 MMOL/L (ref 3.5–5.1)
PROT SERPL-MCNC: 7.8 G/DL (ref 6–8.4)
RBC # BLD AUTO: 3.98 M/UL (ref 4–5.4)
RESPIRATORY INFECTION PANEL SOURCE: ABNORMAL
RSV RNA NPH QL NAA+NON-PROBE: DETECTED
RV+EV RNA NPH QL NAA+NON-PROBE: NOT DETECTED
SARS-COV-2 RDRP RESP QL NAA+PROBE: NEGATIVE
SODIUM SERPL-SCNC: 137 MMOL/L (ref 136–145)
SPECIMEN SOURCE: NORMAL
TROPONIN I SERPL DL<=0.01 NG/ML-MCNC: 0.01 NG/ML (ref 0–0.03)
WBC # BLD AUTO: 12.8 K/UL (ref 3.9–12.7)

## 2021-06-03 PROCEDURE — 96374 THER/PROPH/DIAG INJ IV PUSH: CPT

## 2021-06-03 PROCEDURE — 25000242 PHARM REV CODE 250 ALT 637 W/ HCPCS: Performed by: STUDENT IN AN ORGANIZED HEALTH CARE EDUCATION/TRAINING PROGRAM

## 2021-06-03 PROCEDURE — 25000003 PHARM REV CODE 250: Performed by: STUDENT IN AN ORGANIZED HEALTH CARE EDUCATION/TRAINING PROGRAM

## 2021-06-03 PROCEDURE — 99215 PR OFFICE/OUTPT VISIT, EST, LEVL V, 40-54 MIN: ICD-10-PCS | Mod: 25,S$GLB,, | Performed by: PHYSICIAN ASSISTANT

## 2021-06-03 PROCEDURE — 99999 PR PBB SHADOW E&M-EST. PATIENT-LVL V: ICD-10-PCS | Mod: PBBFAC,,, | Performed by: PHYSICIAN ASSISTANT

## 2021-06-03 PROCEDURE — 99499 UNLISTED E&M SERVICE: CPT | Mod: S$GLB,,, | Performed by: PHYSICIAN ASSISTANT

## 2021-06-03 PROCEDURE — 94760 N-INVAS EAR/PLS OXIMETRY 1: CPT

## 2021-06-03 PROCEDURE — 96372 THER/PROPH/DIAG INJ SC/IM: CPT | Mod: 59

## 2021-06-03 PROCEDURE — 3288F FALL RISK ASSESSMENT DOCD: CPT | Mod: CPTII,S$GLB,, | Performed by: PHYSICIAN ASSISTANT

## 2021-06-03 PROCEDURE — 80053 COMPREHEN METABOLIC PANEL: CPT | Performed by: NURSE PRACTITIONER

## 2021-06-03 PROCEDURE — U0002 COVID-19 LAB TEST NON-CDC: HCPCS | Performed by: EMERGENCY MEDICINE

## 2021-06-03 PROCEDURE — 99499 RISK ADDL DX/OHS AUDIT: ICD-10-PCS | Mod: S$GLB,,, | Performed by: PHYSICIAN ASSISTANT

## 2021-06-03 PROCEDURE — 63600175 PHARM REV CODE 636 W HCPCS: Performed by: STUDENT IN AN ORGANIZED HEALTH CARE EDUCATION/TRAINING PROGRAM

## 2021-06-03 PROCEDURE — 99215 OFFICE O/P EST HI 40 MIN: CPT | Mod: 25,S$GLB,, | Performed by: PHYSICIAN ASSISTANT

## 2021-06-03 PROCEDURE — 1101F PT FALLS ASSESS-DOCD LE1/YR: CPT | Mod: CPTII,S$GLB,, | Performed by: PHYSICIAN ASSISTANT

## 2021-06-03 PROCEDURE — 83880 ASSAY OF NATRIURETIC PEPTIDE: CPT | Performed by: NURSE PRACTITIONER

## 2021-06-03 PROCEDURE — 25000242 PHARM REV CODE 250 ALT 637 W/ HCPCS: Performed by: NURSE PRACTITIONER

## 2021-06-03 PROCEDURE — 1159F PR MEDICATION LIST DOCUMENTED IN MEDICAL RECORD: ICD-10-PCS | Mod: S$GLB,,, | Performed by: PHYSICIAN ASSISTANT

## 2021-06-03 PROCEDURE — 3288F PR FALLS RISK ASSESSMENT DOCUMENTED: ICD-10-PCS | Mod: CPTII,S$GLB,, | Performed by: PHYSICIAN ASSISTANT

## 2021-06-03 PROCEDURE — 87633 RESP VIRUS 12-25 TARGETS: CPT | Performed by: STUDENT IN AN ORGANIZED HEALTH CARE EDUCATION/TRAINING PROGRAM

## 2021-06-03 PROCEDURE — 36415 COLL VENOUS BLD VENIPUNCTURE: CPT | Performed by: NURSE PRACTITIONER

## 2021-06-03 PROCEDURE — 94799 UNLISTED PULMONARY SVC/PX: CPT

## 2021-06-03 PROCEDURE — 99999 PR PBB SHADOW E&M-EST. PATIENT-LVL V: CPT | Mod: PBBFAC,,, | Performed by: PHYSICIAN ASSISTANT

## 2021-06-03 PROCEDURE — 87502 INFLUENZA DNA AMP PROBE: CPT | Performed by: NURSE PRACTITIONER

## 2021-06-03 PROCEDURE — 1126F AMNT PAIN NOTED NONE PRSNT: CPT | Mod: S$GLB,,, | Performed by: PHYSICIAN ASSISTANT

## 2021-06-03 PROCEDURE — 1101F PR PT FALLS ASSESS DOC 0-1 FALLS W/OUT INJ PAST YR: ICD-10-PCS | Mod: CPTII,S$GLB,, | Performed by: PHYSICIAN ASSISTANT

## 2021-06-03 PROCEDURE — 94640 PR INHAL RX, AIRWAY OBST/DX SPUTUM INDUCT: ICD-10-PCS | Mod: S$GLB,,, | Performed by: PHYSICIAN ASSISTANT

## 2021-06-03 PROCEDURE — 94640 AIRWAY INHALATION TREATMENT: CPT | Mod: S$GLB,,, | Performed by: PHYSICIAN ASSISTANT

## 2021-06-03 PROCEDURE — 1126F PR PAIN SEVERITY QUANTIFIED, NO PAIN PRESENT: ICD-10-PCS | Mod: S$GLB,,, | Performed by: PHYSICIAN ASSISTANT

## 2021-06-03 PROCEDURE — 63600175 PHARM REV CODE 636 W HCPCS: Performed by: NURSE PRACTITIONER

## 2021-06-03 PROCEDURE — 85025 COMPLETE CBC W/AUTO DIFF WBC: CPT | Performed by: NURSE PRACTITIONER

## 2021-06-03 PROCEDURE — 1159F MED LIST DOCD IN RCRD: CPT | Mod: S$GLB,,, | Performed by: PHYSICIAN ASSISTANT

## 2021-06-03 PROCEDURE — G0378 HOSPITAL OBSERVATION PER HR: HCPCS

## 2021-06-03 PROCEDURE — 94640 AIRWAY INHALATION TREATMENT: CPT

## 2021-06-03 PROCEDURE — 84484 ASSAY OF TROPONIN QUANT: CPT | Performed by: NURSE PRACTITIONER

## 2021-06-03 PROCEDURE — 99285 EMERGENCY DEPT VISIT HI MDM: CPT | Mod: 25

## 2021-06-03 RX ORDER — DONEPEZIL HYDROCHLORIDE 5 MG/1
5 TABLET, FILM COATED ORAL NIGHTLY
Status: CANCELLED | OUTPATIENT
Start: 2021-06-03

## 2021-06-03 RX ORDER — IPRATROPIUM BROMIDE AND ALBUTEROL SULFATE 2.5; .5 MG/3ML; MG/3ML
3 SOLUTION RESPIRATORY (INHALATION) EVERY 6 HOURS PRN
Status: DISCONTINUED | OUTPATIENT
Start: 2021-06-03 | End: 2021-06-04 | Stop reason: HOSPADM

## 2021-06-03 RX ORDER — BUSPIRONE HYDROCHLORIDE 10 MG/1
10 TABLET ORAL 2 TIMES DAILY
Status: DISCONTINUED | OUTPATIENT
Start: 2021-06-03 | End: 2021-06-04 | Stop reason: HOSPADM

## 2021-06-03 RX ORDER — PRAVASTATIN SODIUM 10 MG/1
10 TABLET ORAL NIGHTLY
Status: DISCONTINUED | OUTPATIENT
Start: 2021-06-03 | End: 2021-06-03

## 2021-06-03 RX ORDER — ALBUTEROL SULFATE 90 UG/1
2 AEROSOL, METERED RESPIRATORY (INHALATION) EVERY 6 HOURS PRN
Status: DISCONTINUED | OUTPATIENT
Start: 2021-06-03 | End: 2021-06-03 | Stop reason: CLARIF

## 2021-06-03 RX ORDER — IPRATROPIUM BROMIDE AND ALBUTEROL SULFATE 2.5; .5 MG/3ML; MG/3ML
3 SOLUTION RESPIRATORY (INHALATION)
Status: DISCONTINUED | OUTPATIENT
Start: 2021-06-03 | End: 2021-06-03 | Stop reason: HOSPADM

## 2021-06-03 RX ORDER — ALBUTEROL SULFATE 2.5 MG/.5ML
2.5 SOLUTION RESPIRATORY (INHALATION) EVERY 6 HOURS PRN
Status: DISCONTINUED | OUTPATIENT
Start: 2021-06-03 | End: 2021-06-03

## 2021-06-03 RX ORDER — ALBUTEROL SULFATE 2.5 MG/.5ML
2.5 SOLUTION RESPIRATORY (INHALATION) EVERY 6 HOURS PRN
Status: DISCONTINUED | OUTPATIENT
Start: 2021-06-03 | End: 2021-06-04 | Stop reason: HOSPADM

## 2021-06-03 RX ORDER — SODIUM CHLORIDE 0.9 % (FLUSH) 0.9 %
10 SYRINGE (ML) INJECTION
Status: DISCONTINUED | OUTPATIENT
Start: 2021-06-03 | End: 2021-06-04 | Stop reason: HOSPADM

## 2021-06-03 RX ORDER — LOSARTAN POTASSIUM 25 MG/1
100 TABLET ORAL DAILY
Status: DISCONTINUED | OUTPATIENT
Start: 2021-06-04 | End: 2021-06-04 | Stop reason: HOSPADM

## 2021-06-03 RX ORDER — EZETIMIBE 10 MG/1
10 TABLET ORAL DAILY
Status: DISCONTINUED | OUTPATIENT
Start: 2021-06-04 | End: 2021-06-04 | Stop reason: HOSPADM

## 2021-06-03 RX ORDER — IPRATROPIUM BROMIDE AND ALBUTEROL SULFATE 2.5; .5 MG/3ML; MG/3ML
3 SOLUTION RESPIRATORY (INHALATION)
Status: COMPLETED | OUTPATIENT
Start: 2021-06-03 | End: 2021-06-03

## 2021-06-03 RX ORDER — ENOXAPARIN SODIUM 100 MG/ML
40 INJECTION SUBCUTANEOUS EVERY 24 HOURS
Status: DISCONTINUED | OUTPATIENT
Start: 2021-06-03 | End: 2021-06-04 | Stop reason: HOSPADM

## 2021-06-03 RX ORDER — SERTRALINE HYDROCHLORIDE 50 MG/1
100 TABLET, FILM COATED ORAL DAILY
Status: DISCONTINUED | OUTPATIENT
Start: 2021-06-04 | End: 2021-06-04 | Stop reason: HOSPADM

## 2021-06-03 RX ORDER — PREDNISONE 20 MG/1
40 TABLET ORAL DAILY
Status: DISCONTINUED | OUTPATIENT
Start: 2021-06-04 | End: 2021-06-04 | Stop reason: HOSPADM

## 2021-06-03 RX ORDER — PANTOPRAZOLE SODIUM 40 MG/1
40 TABLET, DELAYED RELEASE ORAL DAILY
Status: DISCONTINUED | OUTPATIENT
Start: 2021-06-04 | End: 2021-06-04 | Stop reason: HOSPADM

## 2021-06-03 RX ORDER — BENZONATATE 100 MG/1
100 CAPSULE ORAL 3 TIMES DAILY PRN
Status: DISCONTINUED | OUTPATIENT
Start: 2021-06-03 | End: 2021-06-04 | Stop reason: HOSPADM

## 2021-06-03 RX ORDER — METHYLPREDNISOLONE SOD SUCC 125 MG
125 VIAL (EA) INJECTION
Status: COMPLETED | OUTPATIENT
Start: 2021-06-03 | End: 2021-06-03

## 2021-06-03 RX ADMIN — IPRATROPIUM BROMIDE AND ALBUTEROL SULFATE 3 ML: 2.5; .5 SOLUTION RESPIRATORY (INHALATION) at 11:06

## 2021-06-03 RX ADMIN — IPRATROPIUM BROMIDE AND ALBUTEROL SULFATE 3 ML: .5; 2.5 SOLUTION RESPIRATORY (INHALATION) at 01:06

## 2021-06-03 RX ADMIN — METHYLPREDNISOLONE SODIUM SUCCINATE 125 MG: 125 INJECTION, POWDER, FOR SOLUTION INTRAMUSCULAR; INTRAVENOUS at 01:06

## 2021-06-03 RX ADMIN — IPRATROPIUM BROMIDE AND ALBUTEROL SULFATE 3 ML: .5; 2.5 SOLUTION RESPIRATORY (INHALATION) at 08:06

## 2021-06-03 RX ADMIN — ENOXAPARIN SODIUM 40 MG: 40 INJECTION SUBCUTANEOUS at 05:06

## 2021-06-03 RX ADMIN — IPRATROPIUM BROMIDE AND ALBUTEROL SULFATE 3 ML: 2.5; .5 SOLUTION RESPIRATORY (INHALATION) at 12:06

## 2021-06-03 RX ADMIN — BUSPIRONE HYDROCHLORIDE 10 MG: 10 TABLET ORAL at 08:06

## 2021-06-03 RX ADMIN — BENZONATATE 100 MG: 100 CAPSULE ORAL at 08:06

## 2021-06-04 VITALS
BODY MASS INDEX: 24.67 KG/M2 | HEART RATE: 71 BPM | SYSTOLIC BLOOD PRESSURE: 184 MMHG | OXYGEN SATURATION: 97 % | HEIGHT: 59 IN | RESPIRATION RATE: 16 BRPM | DIASTOLIC BLOOD PRESSURE: 79 MMHG | TEMPERATURE: 97 F | WEIGHT: 122.38 LBS

## 2021-06-04 PROBLEM — J40 BRONCHITIS: Status: ACTIVE | Noted: 2021-06-03

## 2021-06-04 LAB
ALBUMIN SERPL BCP-MCNC: 3.7 G/DL (ref 3.5–5.2)
ALP SERPL-CCNC: 60 U/L (ref 55–135)
ALT SERPL W/O P-5'-P-CCNC: 20 U/L (ref 10–44)
ANION GAP SERPL CALC-SCNC: 9 MMOL/L (ref 8–16)
AST SERPL-CCNC: 16 U/L (ref 10–40)
BASOPHILS # BLD AUTO: 0.03 K/UL (ref 0–0.2)
BASOPHILS NFR BLD: 0.2 % (ref 0–1.9)
BILIRUB SERPL-MCNC: 0.2 MG/DL (ref 0.1–1)
BUN SERPL-MCNC: 19 MG/DL (ref 8–23)
CALCIUM SERPL-MCNC: 8.6 MG/DL (ref 8.7–10.5)
CHLORIDE SERPL-SCNC: 105 MMOL/L (ref 95–110)
CO2 SERPL-SCNC: 21 MMOL/L (ref 23–29)
CREAT SERPL-MCNC: 0.8 MG/DL (ref 0.5–1.4)
DIFFERENTIAL METHOD: ABNORMAL
EOSINOPHIL # BLD AUTO: 0 K/UL (ref 0–0.5)
EOSINOPHIL NFR BLD: 0 % (ref 0–8)
ERYTHROCYTE [DISTWIDTH] IN BLOOD BY AUTOMATED COUNT: 14.7 % (ref 11.5–14.5)
EST. GFR  (AFRICAN AMERICAN): >60 ML/MIN/1.73 M^2
EST. GFR  (NON AFRICAN AMERICAN): >60 ML/MIN/1.73 M^2
GLUCOSE SERPL-MCNC: 122 MG/DL (ref 70–110)
HCT VFR BLD AUTO: 32.7 % (ref 37–48.5)
HGB BLD-MCNC: 10.6 G/DL (ref 12–16)
IMM GRANULOCYTES # BLD AUTO: 0.6 K/UL (ref 0–0.04)
IMM GRANULOCYTES NFR BLD AUTO: 4.2 % (ref 0–0.5)
LYMPHOCYTES # BLD AUTO: 2.5 K/UL (ref 1–4.8)
LYMPHOCYTES NFR BLD: 17.3 % (ref 18–48)
MAGNESIUM SERPL-MCNC: 2.6 MG/DL (ref 1.6–2.6)
MCH RBC QN AUTO: 29.2 PG (ref 27–31)
MCHC RBC AUTO-ENTMCNC: 32.4 G/DL (ref 32–36)
MCV RBC AUTO: 90 FL (ref 82–98)
MONOCYTES # BLD AUTO: 1.1 K/UL (ref 0.3–1)
MONOCYTES NFR BLD: 7.8 % (ref 4–15)
NEUTROPHILS # BLD AUTO: 10 K/UL (ref 1.8–7.7)
NEUTROPHILS NFR BLD: 70.5 % (ref 38–73)
NRBC BLD-RTO: 0 /100 WBC
PLATELET # BLD AUTO: 330 K/UL (ref 150–450)
PMV BLD AUTO: 10 FL (ref 9.2–12.9)
POTASSIUM SERPL-SCNC: 4.5 MMOL/L (ref 3.5–5.1)
PROT SERPL-MCNC: 7 G/DL (ref 6–8.4)
RBC # BLD AUTO: 3.63 M/UL (ref 4–5.4)
SODIUM SERPL-SCNC: 135 MMOL/L (ref 136–145)
WBC # BLD AUTO: 14.24 K/UL (ref 3.9–12.7)

## 2021-06-04 PROCEDURE — 63600175 PHARM REV CODE 636 W HCPCS: Performed by: STUDENT IN AN ORGANIZED HEALTH CARE EDUCATION/TRAINING PROGRAM

## 2021-06-04 PROCEDURE — 36415 COLL VENOUS BLD VENIPUNCTURE: CPT | Performed by: STUDENT IN AN ORGANIZED HEALTH CARE EDUCATION/TRAINING PROGRAM

## 2021-06-04 PROCEDURE — 99900035 HC TECH TIME PER 15 MIN (STAT)

## 2021-06-04 PROCEDURE — 94761 N-INVAS EAR/PLS OXIMETRY MLT: CPT

## 2021-06-04 PROCEDURE — G0378 HOSPITAL OBSERVATION PER HR: HCPCS

## 2021-06-04 PROCEDURE — 80053 COMPREHEN METABOLIC PANEL: CPT | Performed by: STUDENT IN AN ORGANIZED HEALTH CARE EDUCATION/TRAINING PROGRAM

## 2021-06-04 PROCEDURE — 85025 COMPLETE CBC W/AUTO DIFF WBC: CPT | Performed by: STUDENT IN AN ORGANIZED HEALTH CARE EDUCATION/TRAINING PROGRAM

## 2021-06-04 PROCEDURE — 25000003 PHARM REV CODE 250: Performed by: STUDENT IN AN ORGANIZED HEALTH CARE EDUCATION/TRAINING PROGRAM

## 2021-06-04 PROCEDURE — 83735 ASSAY OF MAGNESIUM: CPT | Performed by: STUDENT IN AN ORGANIZED HEALTH CARE EDUCATION/TRAINING PROGRAM

## 2021-06-04 PROCEDURE — 94799 UNLISTED PULMONARY SVC/PX: CPT

## 2021-06-04 RX ORDER — METHYLPREDNISOLONE 4 MG/1
TABLET ORAL
Qty: 1 PACKAGE | Refills: 0 | Status: SHIPPED | OUTPATIENT
Start: 2021-06-04 | End: 2021-06-11 | Stop reason: ALTCHOICE

## 2021-06-04 RX ORDER — ALBUTEROL SULFATE 2.5 MG/.5ML
2.5 SOLUTION RESPIRATORY (INHALATION) EVERY 6 HOURS PRN
Qty: 1 EACH | Refills: 3 | Status: SHIPPED | OUTPATIENT
Start: 2021-06-04 | End: 2022-09-16

## 2021-06-04 RX ADMIN — EZETIMIBE 10 MG: 10 TABLET ORAL at 08:06

## 2021-06-04 RX ADMIN — LOSARTAN POTASSIUM 100 MG: 25 TABLET, FILM COATED ORAL at 08:06

## 2021-06-04 RX ADMIN — BUSPIRONE HYDROCHLORIDE 10 MG: 10 TABLET ORAL at 08:06

## 2021-06-04 RX ADMIN — PREDNISONE 40 MG: 20 TABLET ORAL at 08:06

## 2021-06-04 RX ADMIN — SERTRALINE HYDROCHLORIDE 100 MG: 50 TABLET ORAL at 08:06

## 2021-06-04 RX ADMIN — PANTOPRAZOLE SODIUM 40 MG: 40 TABLET, DELAYED RELEASE ORAL at 08:06

## 2021-06-08 ENCOUNTER — TELEPHONE (OUTPATIENT)
Dept: MEDSURG UNIT | Facility: HOSPITAL | Age: 76
End: 2021-06-08

## 2021-06-11 ENCOUNTER — OFFICE VISIT (OUTPATIENT)
Dept: FAMILY MEDICINE | Facility: CLINIC | Age: 76
End: 2021-06-11
Payer: MEDICARE

## 2021-06-11 VITALS
HEART RATE: 89 BPM | BODY MASS INDEX: 24.31 KG/M2 | HEIGHT: 59 IN | WEIGHT: 120.56 LBS | SYSTOLIC BLOOD PRESSURE: 100 MMHG | DIASTOLIC BLOOD PRESSURE: 50 MMHG | TEMPERATURE: 99 F | OXYGEN SATURATION: 96 %

## 2021-06-11 DIAGNOSIS — Z09 HOSPITAL DISCHARGE FOLLOW-UP: Primary | ICD-10-CM

## 2021-06-11 DIAGNOSIS — J30.1 SEASONAL ALLERGIC RHINITIS DUE TO POLLEN: ICD-10-CM

## 2021-06-11 PROCEDURE — 3288F PR FALLS RISK ASSESSMENT DOCUMENTED: ICD-10-PCS | Mod: CPTII,S$GLB,, | Performed by: PHYSICIAN ASSISTANT

## 2021-06-11 PROCEDURE — 1126F AMNT PAIN NOTED NONE PRSNT: CPT | Mod: S$GLB,,, | Performed by: PHYSICIAN ASSISTANT

## 2021-06-11 PROCEDURE — 99999 PR PBB SHADOW E&M-EST. PATIENT-LVL III: ICD-10-PCS | Mod: PBBFAC,,, | Performed by: PHYSICIAN ASSISTANT

## 2021-06-11 PROCEDURE — 1126F PR PAIN SEVERITY QUANTIFIED, NO PAIN PRESENT: ICD-10-PCS | Mod: S$GLB,,, | Performed by: PHYSICIAN ASSISTANT

## 2021-06-11 PROCEDURE — 1159F MED LIST DOCD IN RCRD: CPT | Mod: S$GLB,,, | Performed by: PHYSICIAN ASSISTANT

## 2021-06-11 PROCEDURE — 99214 OFFICE O/P EST MOD 30 MIN: CPT | Mod: S$GLB,,, | Performed by: PHYSICIAN ASSISTANT

## 2021-06-11 PROCEDURE — 99214 PR OFFICE/OUTPT VISIT, EST, LEVL IV, 30-39 MIN: ICD-10-PCS | Mod: S$GLB,,, | Performed by: PHYSICIAN ASSISTANT

## 2021-06-11 PROCEDURE — 1101F PT FALLS ASSESS-DOCD LE1/YR: CPT | Mod: CPTII,S$GLB,, | Performed by: PHYSICIAN ASSISTANT

## 2021-06-11 PROCEDURE — 99999 PR PBB SHADOW E&M-EST. PATIENT-LVL III: CPT | Mod: PBBFAC,,, | Performed by: PHYSICIAN ASSISTANT

## 2021-06-11 PROCEDURE — 3288F FALL RISK ASSESSMENT DOCD: CPT | Mod: CPTII,S$GLB,, | Performed by: PHYSICIAN ASSISTANT

## 2021-06-11 PROCEDURE — 1159F PR MEDICATION LIST DOCUMENTED IN MEDICAL RECORD: ICD-10-PCS | Mod: S$GLB,,, | Performed by: PHYSICIAN ASSISTANT

## 2021-06-11 PROCEDURE — 1101F PR PT FALLS ASSESS DOC 0-1 FALLS W/OUT INJ PAST YR: ICD-10-PCS | Mod: CPTII,S$GLB,, | Performed by: PHYSICIAN ASSISTANT

## 2021-06-11 RX ORDER — AZELASTINE 1 MG/ML
1 SPRAY, METERED NASAL 2 TIMES DAILY
Qty: 30 ML | Refills: 0 | Status: SHIPPED | OUTPATIENT
Start: 2021-06-11 | End: 2023-03-22

## 2021-06-11 RX ORDER — FLUTICASONE PROPIONATE 50 MCG
2 SPRAY, SUSPENSION (ML) NASAL DAILY
Qty: 16 G | Refills: 11 | Status: SHIPPED | OUTPATIENT
Start: 2021-06-11 | End: 2024-03-05

## 2021-06-14 DIAGNOSIS — G89.4 CHRONIC PAIN DISORDER: ICD-10-CM

## 2021-06-14 RX ORDER — HYDROCODONE BITARTRATE AND ACETAMINOPHEN 5; 325 MG/1; MG/1
1 TABLET ORAL EVERY 12 HOURS PRN
Qty: 60 TABLET | Refills: 0 | Status: SHIPPED | OUTPATIENT
Start: 2021-08-12 | End: 2021-09-15 | Stop reason: SDUPTHER

## 2021-06-14 RX ORDER — HYDROCODONE BITARTRATE AND ACETAMINOPHEN 5; 325 MG/1; MG/1
1 TABLET ORAL EVERY 12 HOURS PRN
Qty: 60 TABLET | Refills: 0 | Status: SHIPPED | OUTPATIENT
Start: 2021-06-15 | End: 2021-07-14

## 2021-06-14 RX ORDER — HYDROCODONE BITARTRATE AND ACETAMINOPHEN 5; 325 MG/1; MG/1
1 TABLET ORAL EVERY 12 HOURS PRN
Qty: 60 TABLET | Refills: 0 | Status: SHIPPED | OUTPATIENT
Start: 2021-07-14 | End: 2021-08-12

## 2021-06-15 ENCOUNTER — OFFICE VISIT (OUTPATIENT)
Dept: PAIN MEDICINE | Facility: CLINIC | Age: 76
End: 2021-06-15
Payer: MEDICARE

## 2021-06-15 VITALS
DIASTOLIC BLOOD PRESSURE: 67 MMHG | HEIGHT: 59 IN | WEIGHT: 120 LBS | HEART RATE: 88 BPM | SYSTOLIC BLOOD PRESSURE: 120 MMHG | BODY MASS INDEX: 24.19 KG/M2

## 2021-06-15 DIAGNOSIS — M50.30 DDD (DEGENERATIVE DISC DISEASE), CERVICAL: ICD-10-CM

## 2021-06-15 DIAGNOSIS — M47.812 SPONDYLOSIS OF CERVICAL REGION WITHOUT MYELOPATHY OR RADICULOPATHY: Primary | ICD-10-CM

## 2021-06-15 DIAGNOSIS — M79.18 MYOFASCIAL PAIN: ICD-10-CM

## 2021-06-15 PROCEDURE — 1159F MED LIST DOCD IN RCRD: CPT | Mod: S$GLB,,, | Performed by: PHYSICIAN ASSISTANT

## 2021-06-15 PROCEDURE — 1125F AMNT PAIN NOTED PAIN PRSNT: CPT | Mod: S$GLB,,, | Performed by: PHYSICIAN ASSISTANT

## 2021-06-15 PROCEDURE — 99214 OFFICE O/P EST MOD 30 MIN: CPT | Mod: S$GLB,,, | Performed by: PHYSICIAN ASSISTANT

## 2021-06-15 PROCEDURE — 1125F PR PAIN SEVERITY QUANTIFIED, PAIN PRESENT: ICD-10-PCS | Mod: S$GLB,,, | Performed by: PHYSICIAN ASSISTANT

## 2021-06-15 PROCEDURE — 1101F PT FALLS ASSESS-DOCD LE1/YR: CPT | Mod: CPTII,S$GLB,, | Performed by: PHYSICIAN ASSISTANT

## 2021-06-15 PROCEDURE — 99999 PR PBB SHADOW E&M-EST. PATIENT-LVL IV: ICD-10-PCS | Mod: PBBFAC,,, | Performed by: PHYSICIAN ASSISTANT

## 2021-06-15 PROCEDURE — 99999 PR PBB SHADOW E&M-EST. PATIENT-LVL IV: CPT | Mod: PBBFAC,,, | Performed by: PHYSICIAN ASSISTANT

## 2021-06-15 PROCEDURE — 1101F PR PT FALLS ASSESS DOC 0-1 FALLS W/OUT INJ PAST YR: ICD-10-PCS | Mod: CPTII,S$GLB,, | Performed by: PHYSICIAN ASSISTANT

## 2021-06-15 PROCEDURE — 1159F PR MEDICATION LIST DOCUMENTED IN MEDICAL RECORD: ICD-10-PCS | Mod: S$GLB,,, | Performed by: PHYSICIAN ASSISTANT

## 2021-06-15 PROCEDURE — 3288F PR FALLS RISK ASSESSMENT DOCUMENTED: ICD-10-PCS | Mod: CPTII,S$GLB,, | Performed by: PHYSICIAN ASSISTANT

## 2021-06-15 PROCEDURE — 3288F FALL RISK ASSESSMENT DOCD: CPT | Mod: CPTII,S$GLB,, | Performed by: PHYSICIAN ASSISTANT

## 2021-06-15 PROCEDURE — 99214 PR OFFICE/OUTPT VISIT, EST, LEVL IV, 30-39 MIN: ICD-10-PCS | Mod: S$GLB,,, | Performed by: PHYSICIAN ASSISTANT

## 2021-06-15 RX ORDER — CYCLOBENZAPRINE HCL 5 MG
10 TABLET ORAL DAILY PRN
Qty: 60 TABLET | Refills: 2 | Status: SHIPPED | OUTPATIENT
Start: 2021-06-15 | End: 2021-07-14

## 2021-07-14 ENCOUNTER — OFFICE VISIT (OUTPATIENT)
Dept: FAMILY MEDICINE | Facility: CLINIC | Age: 76
End: 2021-07-14
Payer: MEDICARE

## 2021-07-14 VITALS
HEART RATE: 82 BPM | RESPIRATION RATE: 16 BRPM | WEIGHT: 122.13 LBS | OXYGEN SATURATION: 97 % | DIASTOLIC BLOOD PRESSURE: 60 MMHG | SYSTOLIC BLOOD PRESSURE: 130 MMHG | BODY MASS INDEX: 24.62 KG/M2 | TEMPERATURE: 99 F | HEIGHT: 59 IN

## 2021-07-14 DIAGNOSIS — J30.1 SEASONAL ALLERGIC RHINITIS DUE TO POLLEN: ICD-10-CM

## 2021-07-14 DIAGNOSIS — R20.0 LEFT LEG NUMBNESS: ICD-10-CM

## 2021-07-14 DIAGNOSIS — F33.41 MDD (MAJOR DEPRESSIVE DISORDER), RECURRENT, IN PARTIAL REMISSION: ICD-10-CM

## 2021-07-14 DIAGNOSIS — J32.1 CHRONIC FRONTAL SINUSITIS: ICD-10-CM

## 2021-07-14 DIAGNOSIS — M54.12 CERVICAL RADICULITIS: ICD-10-CM

## 2021-07-14 DIAGNOSIS — I10 ESSENTIAL HYPERTENSION: ICD-10-CM

## 2021-07-14 DIAGNOSIS — R73.9 HYPERGLYCEMIA: ICD-10-CM

## 2021-07-14 DIAGNOSIS — D50.9 IRON DEFICIENCY ANEMIA, UNSPECIFIED IRON DEFICIENCY ANEMIA TYPE: ICD-10-CM

## 2021-07-14 DIAGNOSIS — E78.2 MIXED HYPERLIPIDEMIA: ICD-10-CM

## 2021-07-14 DIAGNOSIS — Z09 HOSPITAL DISCHARGE FOLLOW-UP: Primary | ICD-10-CM

## 2021-07-14 DIAGNOSIS — J20.9 ACUTE BRONCHITIS WITH WHEEZING: ICD-10-CM

## 2021-07-14 PROCEDURE — 3288F PR FALLS RISK ASSESSMENT DOCUMENTED: ICD-10-PCS | Mod: CPTII,S$GLB,, | Performed by: FAMILY MEDICINE

## 2021-07-14 PROCEDURE — 3078F PR MOST RECENT DIASTOLIC BLOOD PRESSURE < 80 MM HG: ICD-10-PCS | Mod: CPTII,S$GLB,, | Performed by: FAMILY MEDICINE

## 2021-07-14 PROCEDURE — 99999 PR PBB SHADOW E&M-EST. PATIENT-LVL IV: ICD-10-PCS | Mod: PBBFAC,,, | Performed by: FAMILY MEDICINE

## 2021-07-14 PROCEDURE — 3075F SYST BP GE 130 - 139MM HG: CPT | Mod: CPTII,S$GLB,, | Performed by: FAMILY MEDICINE

## 2021-07-14 PROCEDURE — 1125F AMNT PAIN NOTED PAIN PRSNT: CPT | Mod: CPTII,S$GLB,, | Performed by: FAMILY MEDICINE

## 2021-07-14 PROCEDURE — 1125F PR PAIN SEVERITY QUANTIFIED, PAIN PRESENT: ICD-10-PCS | Mod: CPTII,S$GLB,, | Performed by: FAMILY MEDICINE

## 2021-07-14 PROCEDURE — 99499 UNLISTED E&M SERVICE: CPT | Mod: S$GLB,,, | Performed by: FAMILY MEDICINE

## 2021-07-14 PROCEDURE — 99214 PR OFFICE/OUTPT VISIT, EST, LEVL IV, 30-39 MIN: ICD-10-PCS | Mod: S$GLB,,, | Performed by: FAMILY MEDICINE

## 2021-07-14 PROCEDURE — 3288F FALL RISK ASSESSMENT DOCD: CPT | Mod: CPTII,S$GLB,, | Performed by: FAMILY MEDICINE

## 2021-07-14 PROCEDURE — 3075F PR MOST RECENT SYSTOLIC BLOOD PRESS GE 130-139MM HG: ICD-10-PCS | Mod: CPTII,S$GLB,, | Performed by: FAMILY MEDICINE

## 2021-07-14 PROCEDURE — 99214 OFFICE O/P EST MOD 30 MIN: CPT | Mod: S$GLB,,, | Performed by: FAMILY MEDICINE

## 2021-07-14 PROCEDURE — 99999 PR PBB SHADOW E&M-EST. PATIENT-LVL IV: CPT | Mod: PBBFAC,,, | Performed by: FAMILY MEDICINE

## 2021-07-14 PROCEDURE — 1101F PR PT FALLS ASSESS DOC 0-1 FALLS W/OUT INJ PAST YR: ICD-10-PCS | Mod: CPTII,S$GLB,, | Performed by: FAMILY MEDICINE

## 2021-07-14 PROCEDURE — 99499 RISK ADDL DX/OHS AUDIT: ICD-10-PCS | Mod: S$GLB,,, | Performed by: FAMILY MEDICINE

## 2021-07-14 PROCEDURE — 3078F DIAST BP <80 MM HG: CPT | Mod: CPTII,S$GLB,, | Performed by: FAMILY MEDICINE

## 2021-07-14 PROCEDURE — 1101F PT FALLS ASSESS-DOCD LE1/YR: CPT | Mod: CPTII,S$GLB,, | Performed by: FAMILY MEDICINE

## 2021-07-14 RX ORDER — PROMETHAZINE HYDROCHLORIDE AND DEXTROMETHORPHAN HYDROBROMIDE 6.25; 15 MG/5ML; MG/5ML
5 SYRUP ORAL EVERY 4 HOURS PRN
Qty: 118 ML | Refills: 0 | Status: SHIPPED | OUTPATIENT
Start: 2021-07-14 | End: 2021-07-14 | Stop reason: SDUPTHER

## 2021-07-14 RX ORDER — MONTELUKAST SODIUM 10 MG/1
10 TABLET ORAL NIGHTLY
Qty: 90 TABLET | Refills: 3 | Status: SHIPPED | OUTPATIENT
Start: 2021-07-14 | End: 2021-07-14 | Stop reason: SDUPTHER

## 2021-07-14 RX ORDER — PROMETHAZINE HYDROCHLORIDE AND DEXTROMETHORPHAN HYDROBROMIDE 6.25; 15 MG/5ML; MG/5ML
5 SYRUP ORAL EVERY 4 HOURS PRN
Qty: 118 ML | Refills: 0 | Status: SHIPPED | OUTPATIENT
Start: 2021-07-14 | End: 2021-07-24

## 2021-07-14 RX ORDER — MONTELUKAST SODIUM 10 MG/1
10 TABLET ORAL NIGHTLY
Qty: 90 TABLET | Refills: 3 | Status: SHIPPED | OUTPATIENT
Start: 2021-07-14 | End: 2021-08-13

## 2021-07-20 ENCOUNTER — PATIENT OUTREACH (OUTPATIENT)
Dept: ADMINISTRATIVE | Facility: OTHER | Age: 76
End: 2021-07-20

## 2021-07-21 ENCOUNTER — OFFICE VISIT (OUTPATIENT)
Dept: PAIN MEDICINE | Facility: CLINIC | Age: 76
End: 2021-07-21
Payer: MEDICARE

## 2021-07-21 VITALS
HEART RATE: 80 BPM | BODY MASS INDEX: 24.6 KG/M2 | DIASTOLIC BLOOD PRESSURE: 59 MMHG | HEIGHT: 59 IN | WEIGHT: 122 LBS | SYSTOLIC BLOOD PRESSURE: 126 MMHG

## 2021-07-21 DIAGNOSIS — M50.30 DDD (DEGENERATIVE DISC DISEASE), CERVICAL: ICD-10-CM

## 2021-07-21 DIAGNOSIS — M54.12 CERVICAL RADICULITIS: ICD-10-CM

## 2021-07-21 DIAGNOSIS — M47.892 OTHER SPONDYLOSIS, CERVICAL REGION: ICD-10-CM

## 2021-07-21 DIAGNOSIS — M54.81 BILATERAL OCCIPITAL NEURALGIA: Primary | ICD-10-CM

## 2021-07-21 PROCEDURE — 64405 NERVE BLOCK: ICD-10-PCS | Mod: 50,S$GLB,, | Performed by: ANESTHESIOLOGY

## 2021-07-21 PROCEDURE — 3074F PR MOST RECENT SYSTOLIC BLOOD PRESSURE < 130 MM HG: ICD-10-PCS | Mod: CPTII,S$GLB,, | Performed by: ANESTHESIOLOGY

## 2021-07-21 PROCEDURE — 99214 PR OFFICE/OUTPT VISIT, EST, LEVL IV, 30-39 MIN: ICD-10-PCS | Mod: 25,S$GLB,, | Performed by: ANESTHESIOLOGY

## 2021-07-21 PROCEDURE — 3078F PR MOST RECENT DIASTOLIC BLOOD PRESSURE < 80 MM HG: ICD-10-PCS | Mod: CPTII,S$GLB,, | Performed by: ANESTHESIOLOGY

## 2021-07-21 PROCEDURE — 3074F SYST BP LT 130 MM HG: CPT | Mod: CPTII,S$GLB,, | Performed by: ANESTHESIOLOGY

## 2021-07-21 PROCEDURE — 64405 NJX AA&/STRD GR OCPL NRV: CPT | Mod: 50,S$GLB,, | Performed by: ANESTHESIOLOGY

## 2021-07-21 PROCEDURE — 1159F PR MEDICATION LIST DOCUMENTED IN MEDICAL RECORD: ICD-10-PCS | Mod: CPTII,S$GLB,, | Performed by: ANESTHESIOLOGY

## 2021-07-21 PROCEDURE — 1159F MED LIST DOCD IN RCRD: CPT | Mod: CPTII,S$GLB,, | Performed by: ANESTHESIOLOGY

## 2021-07-21 PROCEDURE — 99214 OFFICE O/P EST MOD 30 MIN: CPT | Mod: 25,S$GLB,, | Performed by: ANESTHESIOLOGY

## 2021-07-21 PROCEDURE — 1125F AMNT PAIN NOTED PAIN PRSNT: CPT | Mod: CPTII,S$GLB,, | Performed by: ANESTHESIOLOGY

## 2021-07-21 PROCEDURE — 99999 PR PBB SHADOW E&M-EST. PATIENT-LVL IV: CPT | Mod: PBBFAC,,, | Performed by: ANESTHESIOLOGY

## 2021-07-21 PROCEDURE — 99999 PR PBB SHADOW E&M-EST. PATIENT-LVL IV: ICD-10-PCS | Mod: PBBFAC,,, | Performed by: ANESTHESIOLOGY

## 2021-07-21 PROCEDURE — 3288F FALL RISK ASSESSMENT DOCD: CPT | Mod: CPTII,S$GLB,, | Performed by: ANESTHESIOLOGY

## 2021-07-21 PROCEDURE — 3288F PR FALLS RISK ASSESSMENT DOCUMENTED: ICD-10-PCS | Mod: CPTII,S$GLB,, | Performed by: ANESTHESIOLOGY

## 2021-07-21 PROCEDURE — 1101F PR PT FALLS ASSESS DOC 0-1 FALLS W/OUT INJ PAST YR: ICD-10-PCS | Mod: CPTII,S$GLB,, | Performed by: ANESTHESIOLOGY

## 2021-07-21 PROCEDURE — 1125F PR PAIN SEVERITY QUANTIFIED, PAIN PRESENT: ICD-10-PCS | Mod: CPTII,S$GLB,, | Performed by: ANESTHESIOLOGY

## 2021-07-21 PROCEDURE — 3078F DIAST BP <80 MM HG: CPT | Mod: CPTII,S$GLB,, | Performed by: ANESTHESIOLOGY

## 2021-07-21 PROCEDURE — 1101F PT FALLS ASSESS-DOCD LE1/YR: CPT | Mod: CPTII,S$GLB,, | Performed by: ANESTHESIOLOGY

## 2021-07-21 RX ORDER — IRBESARTAN 150 MG/1
150 TABLET ORAL 2 TIMES DAILY
Qty: 180 TABLET | Refills: 1
Start: 2021-07-21 | End: 2021-08-10

## 2021-07-26 RX ORDER — DONEPEZIL HYDROCHLORIDE 5 MG/1
5 TABLET, FILM COATED ORAL NIGHTLY
Qty: 30 TABLET | Refills: 2 | Status: SHIPPED | OUTPATIENT
Start: 2021-07-26 | End: 2021-11-03

## 2021-07-27 ENCOUNTER — HOSPITAL ENCOUNTER (OUTPATIENT)
Dept: RADIOLOGY | Facility: HOSPITAL | Age: 76
Discharge: HOME | End: 2021-07-27
Attending: FAMILY MEDICINE
Payer: MEDICARE

## 2021-07-27 ENCOUNTER — TELEPHONE (OUTPATIENT)
Dept: FAMILY MEDICINE | Facility: CLINIC | Age: 76
End: 2021-07-27

## 2021-07-27 DIAGNOSIS — J32.1 CHRONIC FRONTAL SINUSITIS: ICD-10-CM

## 2021-07-27 PROCEDURE — 70486 CT SINUSES WITHOUT CONTRAST: ICD-10-PCS | Mod: 26,,, | Performed by: RADIOLOGY

## 2021-07-27 PROCEDURE — 70486 CT MAXILLOFACIAL W/O DYE: CPT | Mod: TC

## 2021-07-27 PROCEDURE — 70486 CT MAXILLOFACIAL W/O DYE: CPT | Mod: 26,,, | Performed by: RADIOLOGY

## 2021-07-30 DIAGNOSIS — J32.9 CHRONIC SINUSITIS, UNSPECIFIED LOCATION: Primary | ICD-10-CM

## 2021-07-30 RX ORDER — CLINDAMYCIN HYDROCHLORIDE 300 MG/1
300 CAPSULE ORAL 4 TIMES DAILY
Qty: 56 CAPSULE | Refills: 0 | Status: SHIPPED | OUTPATIENT
Start: 2021-07-30 | End: 2022-05-16 | Stop reason: ALTCHOICE

## 2021-08-03 ENCOUNTER — OFFICE VISIT (OUTPATIENT)
Dept: PSYCHIATRY | Facility: CLINIC | Age: 76
End: 2021-08-03
Payer: MEDICARE

## 2021-08-03 DIAGNOSIS — F33.41 MDD (MAJOR DEPRESSIVE DISORDER), RECURRENT, IN PARTIAL REMISSION: ICD-10-CM

## 2021-08-03 DIAGNOSIS — F43.10 PTSD (POST-TRAUMATIC STRESS DISORDER): Primary | ICD-10-CM

## 2021-08-03 DIAGNOSIS — J40 BRONCHITIS: ICD-10-CM

## 2021-08-03 PROCEDURE — 99214 PR OFFICE/OUTPT VISIT, EST, LEVL IV, 30-39 MIN: ICD-10-PCS | Mod: 95,,, | Performed by: PSYCHIATRY & NEUROLOGY

## 2021-08-03 PROCEDURE — 99214 OFFICE O/P EST MOD 30 MIN: CPT | Mod: 95,,, | Performed by: PSYCHIATRY & NEUROLOGY

## 2021-08-09 ENCOUNTER — LAB VISIT (OUTPATIENT)
Dept: LAB | Facility: HOSPITAL | Age: 76
End: 2021-08-09
Attending: FAMILY MEDICINE
Payer: MEDICARE

## 2021-08-09 DIAGNOSIS — R73.9 HYPERGLYCEMIA: ICD-10-CM

## 2021-08-09 DIAGNOSIS — E78.2 MIXED HYPERLIPIDEMIA: ICD-10-CM

## 2021-08-09 LAB
ALBUMIN SERPL BCP-MCNC: 4 G/DL (ref 3.5–5.2)
ALP SERPL-CCNC: 64 U/L (ref 55–135)
ALT SERPL W/O P-5'-P-CCNC: 17 U/L (ref 10–44)
ANION GAP SERPL CALC-SCNC: 11 MMOL/L (ref 8–16)
AST SERPL-CCNC: 21 U/L (ref 10–40)
BASOPHILS # BLD AUTO: 0.05 K/UL (ref 0–0.2)
BASOPHILS NFR BLD: 0.7 % (ref 0–1.9)
BILIRUB SERPL-MCNC: 0.3 MG/DL (ref 0.1–1)
BUN SERPL-MCNC: 13 MG/DL (ref 8–23)
CALCIUM SERPL-MCNC: 9.4 MG/DL (ref 8.7–10.5)
CHLORIDE SERPL-SCNC: 105 MMOL/L (ref 95–110)
CHOLEST SERPL-MCNC: 331 MG/DL (ref 120–199)
CHOLEST/HDLC SERPL: 6.2 {RATIO} (ref 2–5)
CO2 SERPL-SCNC: 20 MMOL/L (ref 23–29)
CREAT SERPL-MCNC: 0.8 MG/DL (ref 0.5–1.4)
DIFFERENTIAL METHOD: ABNORMAL
EOSINOPHIL # BLD AUTO: 0.6 K/UL (ref 0–0.5)
EOSINOPHIL NFR BLD: 8 % (ref 0–8)
ERYTHROCYTE [DISTWIDTH] IN BLOOD BY AUTOMATED COUNT: 14.6 % (ref 11.5–14.5)
EST. GFR  (AFRICAN AMERICAN): >60 ML/MIN/1.73 M^2
EST. GFR  (NON AFRICAN AMERICAN): >60 ML/MIN/1.73 M^2
GLUCOSE SERPL-MCNC: 83 MG/DL (ref 70–110)
HCT VFR BLD AUTO: 35.9 % (ref 37–48.5)
HDLC SERPL-MCNC: 53 MG/DL (ref 40–75)
HDLC SERPL: 16 % (ref 20–50)
HGB BLD-MCNC: 11.3 G/DL (ref 12–16)
IMM GRANULOCYTES # BLD AUTO: 0.05 K/UL (ref 0–0.04)
IMM GRANULOCYTES NFR BLD AUTO: 0.7 % (ref 0–0.5)
LDLC SERPL CALC-MCNC: 232.8 MG/DL (ref 63–159)
LYMPHOCYTES # BLD AUTO: 2.3 K/UL (ref 1–4.8)
LYMPHOCYTES NFR BLD: 32 % (ref 18–48)
MCH RBC QN AUTO: 29.1 PG (ref 27–31)
MCHC RBC AUTO-ENTMCNC: 31.5 G/DL (ref 32–36)
MCV RBC AUTO: 93 FL (ref 82–98)
MONOCYTES # BLD AUTO: 0.7 K/UL (ref 0.3–1)
MONOCYTES NFR BLD: 9.2 % (ref 4–15)
NEUTROPHILS # BLD AUTO: 3.5 K/UL (ref 1.8–7.7)
NEUTROPHILS NFR BLD: 49.4 % (ref 38–73)
NONHDLC SERPL-MCNC: 278 MG/DL
NRBC BLD-RTO: 0 /100 WBC
PLATELET # BLD AUTO: 275 K/UL (ref 150–450)
PMV BLD AUTO: 10.7 FL (ref 9.2–12.9)
POTASSIUM SERPL-SCNC: 4.2 MMOL/L (ref 3.5–5.1)
PROT SERPL-MCNC: 7.3 G/DL (ref 6–8.4)
RBC # BLD AUTO: 3.88 M/UL (ref 4–5.4)
SODIUM SERPL-SCNC: 136 MMOL/L (ref 136–145)
TRIGL SERPL-MCNC: 226 MG/DL (ref 30–150)
WBC # BLD AUTO: 7.15 K/UL (ref 3.9–12.7)

## 2021-08-09 PROCEDURE — 80053 COMPREHEN METABOLIC PANEL: CPT | Performed by: FAMILY MEDICINE

## 2021-08-09 PROCEDURE — 36415 COLL VENOUS BLD VENIPUNCTURE: CPT | Mod: PO | Performed by: FAMILY MEDICINE

## 2021-08-09 PROCEDURE — 83036 HEMOGLOBIN GLYCOSYLATED A1C: CPT | Performed by: FAMILY MEDICINE

## 2021-08-09 PROCEDURE — 80061 LIPID PANEL: CPT | Performed by: FAMILY MEDICINE

## 2021-08-09 PROCEDURE — 85025 COMPLETE CBC W/AUTO DIFF WBC: CPT | Performed by: FAMILY MEDICINE

## 2021-08-10 LAB
ESTIMATED AVG GLUCOSE: 117 MG/DL (ref 68–131)
HBA1C MFR BLD: 5.7 % (ref 4–5.6)

## 2021-08-11 ENCOUNTER — OFFICE VISIT (OUTPATIENT)
Dept: RHEUMATOLOGY | Facility: CLINIC | Age: 76
End: 2021-08-11
Payer: MEDICARE

## 2021-08-11 VITALS
SYSTOLIC BLOOD PRESSURE: 119 MMHG | WEIGHT: 121.88 LBS | DIASTOLIC BLOOD PRESSURE: 71 MMHG | BODY MASS INDEX: 24.62 KG/M2

## 2021-08-11 DIAGNOSIS — M75.52 BURSITIS OF SHOULDER, LEFT: Primary | ICD-10-CM

## 2021-08-11 PROCEDURE — 99213 OFFICE O/P EST LOW 20 MIN: CPT | Mod: 25,S$GLB,, | Performed by: INTERNAL MEDICINE

## 2021-08-11 PROCEDURE — 1125F PR PAIN SEVERITY QUANTIFIED, PAIN PRESENT: ICD-10-PCS | Mod: S$GLB,,, | Performed by: INTERNAL MEDICINE

## 2021-08-11 PROCEDURE — 20610 LARGE JOINT ASPIRATION/INJECTION: L SUBACROMIAL BURSA: ICD-10-PCS | Mod: LT,S$GLB,, | Performed by: INTERNAL MEDICINE

## 2021-08-11 PROCEDURE — 1125F AMNT PAIN NOTED PAIN PRSNT: CPT | Mod: S$GLB,,, | Performed by: INTERNAL MEDICINE

## 2021-08-11 PROCEDURE — 99213 PR OFFICE/OUTPT VISIT, EST, LEVL III, 20-29 MIN: ICD-10-PCS | Mod: 25,S$GLB,, | Performed by: INTERNAL MEDICINE

## 2021-08-11 PROCEDURE — 20610 DRAIN/INJ JOINT/BURSA W/O US: CPT | Mod: LT,S$GLB,, | Performed by: INTERNAL MEDICINE

## 2021-08-11 PROCEDURE — 1159F MED LIST DOCD IN RCRD: CPT | Mod: S$GLB,,, | Performed by: INTERNAL MEDICINE

## 2021-08-11 PROCEDURE — 1159F PR MEDICATION LIST DOCUMENTED IN MEDICAL RECORD: ICD-10-PCS | Mod: S$GLB,,, | Performed by: INTERNAL MEDICINE

## 2021-08-16 ENCOUNTER — OFFICE VISIT (OUTPATIENT)
Dept: FAMILY MEDICINE | Facility: CLINIC | Age: 76
End: 2021-08-16
Payer: MEDICARE

## 2021-08-16 VITALS
HEART RATE: 94 BPM | BODY MASS INDEX: 24 KG/M2 | TEMPERATURE: 98 F | OXYGEN SATURATION: 96 % | SYSTOLIC BLOOD PRESSURE: 138 MMHG | WEIGHT: 119.06 LBS | HEIGHT: 59 IN | DIASTOLIC BLOOD PRESSURE: 72 MMHG

## 2021-08-16 DIAGNOSIS — F33.41 MDD (MAJOR DEPRESSIVE DISORDER), RECURRENT, IN PARTIAL REMISSION: ICD-10-CM

## 2021-08-16 DIAGNOSIS — F43.10 PTSD (POST-TRAUMATIC STRESS DISORDER): ICD-10-CM

## 2021-08-16 DIAGNOSIS — E78.2 MIXED HYPERLIPIDEMIA: Primary | ICD-10-CM

## 2021-08-16 DIAGNOSIS — I10 ESSENTIAL HYPERTENSION: ICD-10-CM

## 2021-08-16 DIAGNOSIS — M54.12 CERVICAL RADICULITIS: ICD-10-CM

## 2021-08-16 DIAGNOSIS — F41.9 ANXIETY: ICD-10-CM

## 2021-08-16 PROCEDURE — 1159F PR MEDICATION LIST DOCUMENTED IN MEDICAL RECORD: ICD-10-PCS | Mod: CPTII,S$GLB,, | Performed by: NURSE PRACTITIONER

## 2021-08-16 PROCEDURE — 1125F AMNT PAIN NOTED PAIN PRSNT: CPT | Mod: CPTII,S$GLB,, | Performed by: NURSE PRACTITIONER

## 2021-08-16 PROCEDURE — 1125F PR PAIN SEVERITY QUANTIFIED, PAIN PRESENT: ICD-10-PCS | Mod: CPTII,S$GLB,, | Performed by: NURSE PRACTITIONER

## 2021-08-16 PROCEDURE — 3075F SYST BP GE 130 - 139MM HG: CPT | Mod: CPTII,S$GLB,, | Performed by: NURSE PRACTITIONER

## 2021-08-16 PROCEDURE — 1159F MED LIST DOCD IN RCRD: CPT | Mod: CPTII,S$GLB,, | Performed by: NURSE PRACTITIONER

## 2021-08-16 PROCEDURE — 1101F PT FALLS ASSESS-DOCD LE1/YR: CPT | Mod: CPTII,S$GLB,, | Performed by: NURSE PRACTITIONER

## 2021-08-16 PROCEDURE — 3075F PR MOST RECENT SYSTOLIC BLOOD PRESS GE 130-139MM HG: ICD-10-PCS | Mod: CPTII,S$GLB,, | Performed by: NURSE PRACTITIONER

## 2021-08-16 PROCEDURE — 99999 PR PBB SHADOW E&M-EST. PATIENT-LVL V: CPT | Mod: PBBFAC,,, | Performed by: NURSE PRACTITIONER

## 2021-08-16 PROCEDURE — 1101F PR PT FALLS ASSESS DOC 0-1 FALLS W/OUT INJ PAST YR: ICD-10-PCS | Mod: CPTII,S$GLB,, | Performed by: NURSE PRACTITIONER

## 2021-08-16 PROCEDURE — 1160F PR REVIEW ALL MEDS BY PRESCRIBER/CLIN PHARMACIST DOCUMENTED: ICD-10-PCS | Mod: CPTII,S$GLB,, | Performed by: NURSE PRACTITIONER

## 2021-08-16 PROCEDURE — 3288F PR FALLS RISK ASSESSMENT DOCUMENTED: ICD-10-PCS | Mod: CPTII,S$GLB,, | Performed by: NURSE PRACTITIONER

## 2021-08-16 PROCEDURE — 3078F DIAST BP <80 MM HG: CPT | Mod: CPTII,S$GLB,, | Performed by: NURSE PRACTITIONER

## 2021-08-16 PROCEDURE — 99999 PR PBB SHADOW E&M-EST. PATIENT-LVL V: ICD-10-PCS | Mod: PBBFAC,,, | Performed by: NURSE PRACTITIONER

## 2021-08-16 PROCEDURE — 3288F FALL RISK ASSESSMENT DOCD: CPT | Mod: CPTII,S$GLB,, | Performed by: NURSE PRACTITIONER

## 2021-08-16 PROCEDURE — 99214 PR OFFICE/OUTPT VISIT, EST, LEVL IV, 30-39 MIN: ICD-10-PCS | Mod: S$GLB,,, | Performed by: NURSE PRACTITIONER

## 2021-08-16 PROCEDURE — 99214 OFFICE O/P EST MOD 30 MIN: CPT | Mod: S$GLB,,, | Performed by: NURSE PRACTITIONER

## 2021-08-16 PROCEDURE — 3078F PR MOST RECENT DIASTOLIC BLOOD PRESSURE < 80 MM HG: ICD-10-PCS | Mod: CPTII,S$GLB,, | Performed by: NURSE PRACTITIONER

## 2021-08-16 PROCEDURE — 1160F RVW MEDS BY RX/DR IN RCRD: CPT | Mod: CPTII,S$GLB,, | Performed by: NURSE PRACTITIONER

## 2021-08-16 RX ORDER — PRAVASTATIN SODIUM 20 MG/1
20 TABLET ORAL DAILY
Qty: 90 TABLET | Refills: 3 | Status: SHIPPED | OUTPATIENT
Start: 2021-08-16 | End: 2022-02-01

## 2021-08-23 ENCOUNTER — OFFICE VISIT (OUTPATIENT)
Dept: OTOLARYNGOLOGY | Facility: CLINIC | Age: 76
End: 2021-08-23
Payer: MEDICARE

## 2021-08-23 VITALS — BODY MASS INDEX: 24.45 KG/M2 | WEIGHT: 121.06 LBS

## 2021-08-23 DIAGNOSIS — J32.0 CHRONIC MAXILLARY SINUSITIS: Primary | ICD-10-CM

## 2021-08-23 DIAGNOSIS — J30.0 VASOMOTOR RHINITIS: ICD-10-CM

## 2021-08-23 PROCEDURE — 99204 OFFICE O/P NEW MOD 45 MIN: CPT | Mod: S$GLB,,, | Performed by: OTOLARYNGOLOGY

## 2021-08-23 PROCEDURE — 99204 PR OFFICE/OUTPT VISIT, NEW, LEVL IV, 45-59 MIN: ICD-10-PCS | Mod: S$GLB,,, | Performed by: OTOLARYNGOLOGY

## 2021-08-23 PROCEDURE — 99999 PR PBB SHADOW E&M-EST. PATIENT-LVL V: CPT | Mod: PBBFAC,,, | Performed by: OTOLARYNGOLOGY

## 2021-08-23 PROCEDURE — 99215 OFFICE O/P EST HI 40 MIN: CPT | Mod: PBBFAC,HCNC,PO | Performed by: OTOLARYNGOLOGY

## 2021-08-23 PROCEDURE — 99999 PR PBB SHADOW E&M-EST. PATIENT-LVL V: ICD-10-PCS | Mod: PBBFAC,,, | Performed by: OTOLARYNGOLOGY

## 2021-08-23 RX ORDER — IPRATROPIUM BROMIDE 42 UG/1
2 SPRAY, METERED NASAL 3 TIMES DAILY
Qty: 15 ML | Refills: 6 | Status: SHIPPED | OUTPATIENT
Start: 2021-08-23 | End: 2024-02-14

## 2021-08-26 ENCOUNTER — INFUSION (OUTPATIENT)
Dept: INFUSION THERAPY | Facility: HOSPITAL | Age: 76
End: 2021-08-26
Attending: INTERNAL MEDICINE
Payer: MEDICARE

## 2021-08-26 VITALS
BODY MASS INDEX: 24.14 KG/M2 | SYSTOLIC BLOOD PRESSURE: 134 MMHG | HEART RATE: 84 BPM | DIASTOLIC BLOOD PRESSURE: 82 MMHG | RESPIRATION RATE: 18 BRPM | OXYGEN SATURATION: 96 % | WEIGHT: 119.5 LBS | TEMPERATURE: 98 F

## 2021-08-26 DIAGNOSIS — M81.0 AGE-RELATED OSTEOPOROSIS WITHOUT CURRENT PATHOLOGICAL FRACTURE: Primary | ICD-10-CM

## 2021-08-26 PROCEDURE — 63600175 PHARM REV CODE 636 W HCPCS: Mod: JG | Performed by: INTERNAL MEDICINE

## 2021-08-26 PROCEDURE — 96372 THER/PROPH/DIAG INJ SC/IM: CPT

## 2021-08-26 RX ADMIN — DENOSUMAB 60 MG: 60 INJECTION SUBCUTANEOUS at 10:08

## 2021-09-15 ENCOUNTER — OFFICE VISIT (OUTPATIENT)
Dept: PAIN MEDICINE | Facility: CLINIC | Age: 76
End: 2021-09-15
Payer: MEDICARE

## 2021-09-15 VITALS
HEIGHT: 59 IN | DIASTOLIC BLOOD PRESSURE: 71 MMHG | SYSTOLIC BLOOD PRESSURE: 125 MMHG | WEIGHT: 119 LBS | BODY MASS INDEX: 23.99 KG/M2 | HEART RATE: 86 BPM

## 2021-09-15 DIAGNOSIS — M47.892 OTHER SPONDYLOSIS, CERVICAL REGION: ICD-10-CM

## 2021-09-15 DIAGNOSIS — G89.4 CHRONIC PAIN DISORDER: ICD-10-CM

## 2021-09-15 DIAGNOSIS — M54.81 BILATERAL OCCIPITAL NEURALGIA: ICD-10-CM

## 2021-09-15 DIAGNOSIS — M50.30 DDD (DEGENERATIVE DISC DISEASE), CERVICAL: ICD-10-CM

## 2021-09-15 DIAGNOSIS — M54.12 CERVICAL RADICULITIS: Primary | ICD-10-CM

## 2021-09-15 PROCEDURE — 3078F DIAST BP <80 MM HG: CPT | Mod: CPTII,S$GLB,, | Performed by: PHYSICIAN ASSISTANT

## 2021-09-15 PROCEDURE — 1160F RVW MEDS BY RX/DR IN RCRD: CPT | Mod: CPTII,S$GLB,, | Performed by: PHYSICIAN ASSISTANT

## 2021-09-15 PROCEDURE — 1125F AMNT PAIN NOTED PAIN PRSNT: CPT | Mod: CPTII,S$GLB,, | Performed by: PHYSICIAN ASSISTANT

## 2021-09-15 PROCEDURE — 1101F PR PT FALLS ASSESS DOC 0-1 FALLS W/OUT INJ PAST YR: ICD-10-PCS | Mod: CPTII,S$GLB,, | Performed by: PHYSICIAN ASSISTANT

## 2021-09-15 PROCEDURE — 99999 PR PBB SHADOW E&M-EST. PATIENT-LVL IV: ICD-10-PCS | Mod: PBBFAC,,, | Performed by: PHYSICIAN ASSISTANT

## 2021-09-15 PROCEDURE — 3288F PR FALLS RISK ASSESSMENT DOCUMENTED: ICD-10-PCS | Mod: CPTII,S$GLB,, | Performed by: PHYSICIAN ASSISTANT

## 2021-09-15 PROCEDURE — 1160F PR REVIEW ALL MEDS BY PRESCRIBER/CLIN PHARMACIST DOCUMENTED: ICD-10-PCS | Mod: CPTII,S$GLB,, | Performed by: PHYSICIAN ASSISTANT

## 2021-09-15 PROCEDURE — 1159F PR MEDICATION LIST DOCUMENTED IN MEDICAL RECORD: ICD-10-PCS | Mod: CPTII,S$GLB,, | Performed by: PHYSICIAN ASSISTANT

## 2021-09-15 PROCEDURE — 99214 OFFICE O/P EST MOD 30 MIN: CPT | Mod: S$GLB,,, | Performed by: PHYSICIAN ASSISTANT

## 2021-09-15 PROCEDURE — 1125F PR PAIN SEVERITY QUANTIFIED, PAIN PRESENT: ICD-10-PCS | Mod: CPTII,S$GLB,, | Performed by: PHYSICIAN ASSISTANT

## 2021-09-15 PROCEDURE — 99999 PR PBB SHADOW E&M-EST. PATIENT-LVL IV: CPT | Mod: PBBFAC,,, | Performed by: PHYSICIAN ASSISTANT

## 2021-09-15 PROCEDURE — 1101F PT FALLS ASSESS-DOCD LE1/YR: CPT | Mod: CPTII,S$GLB,, | Performed by: PHYSICIAN ASSISTANT

## 2021-09-15 PROCEDURE — 1159F MED LIST DOCD IN RCRD: CPT | Mod: CPTII,S$GLB,, | Performed by: PHYSICIAN ASSISTANT

## 2021-09-15 PROCEDURE — 3288F FALL RISK ASSESSMENT DOCD: CPT | Mod: CPTII,S$GLB,, | Performed by: PHYSICIAN ASSISTANT

## 2021-09-15 PROCEDURE — 3078F PR MOST RECENT DIASTOLIC BLOOD PRESSURE < 80 MM HG: ICD-10-PCS | Mod: CPTII,S$GLB,, | Performed by: PHYSICIAN ASSISTANT

## 2021-09-15 PROCEDURE — 3074F SYST BP LT 130 MM HG: CPT | Mod: CPTII,S$GLB,, | Performed by: PHYSICIAN ASSISTANT

## 2021-09-15 PROCEDURE — 3074F PR MOST RECENT SYSTOLIC BLOOD PRESSURE < 130 MM HG: ICD-10-PCS | Mod: CPTII,S$GLB,, | Performed by: PHYSICIAN ASSISTANT

## 2021-09-15 PROCEDURE — 99214 PR OFFICE/OUTPT VISIT, EST, LEVL IV, 30-39 MIN: ICD-10-PCS | Mod: S$GLB,,, | Performed by: PHYSICIAN ASSISTANT

## 2021-09-15 RX ORDER — CYCLOBENZAPRINE HCL 10 MG
10 TABLET ORAL 2 TIMES DAILY PRN
Qty: 60 TABLET | Refills: 2 | Status: SHIPPED | OUTPATIENT
Start: 2021-09-15 | End: 2021-10-15

## 2021-09-15 RX ORDER — HYDROCODONE BITARTRATE AND ACETAMINOPHEN 5; 325 MG/1; MG/1
1 TABLET ORAL EVERY 12 HOURS PRN
Qty: 60 TABLET | Refills: 0 | Status: SHIPPED | OUTPATIENT
Start: 2021-09-15 | End: 2021-10-14

## 2021-09-15 RX ORDER — HYDROCODONE BITARTRATE AND ACETAMINOPHEN 5; 325 MG/1; MG/1
1 TABLET ORAL EVERY 12 HOURS PRN
Qty: 60 TABLET | Refills: 0 | Status: SHIPPED | OUTPATIENT
Start: 2021-10-14 | End: 2021-12-14 | Stop reason: SDUPTHER

## 2021-09-21 ENCOUNTER — OFFICE VISIT (OUTPATIENT)
Dept: OTOLARYNGOLOGY | Facility: CLINIC | Age: 76
End: 2021-09-21
Payer: MEDICARE

## 2021-09-21 ENCOUNTER — TELEPHONE (OUTPATIENT)
Dept: NEUROLOGY | Facility: CLINIC | Age: 76
End: 2021-09-21

## 2021-09-21 VITALS — BODY MASS INDEX: 24.27 KG/M2 | WEIGHT: 120.13 LBS

## 2021-09-21 DIAGNOSIS — J30.0 VASOMOTOR RHINITIS: ICD-10-CM

## 2021-09-21 DIAGNOSIS — J32.0 CHRONIC MAXILLARY SINUSITIS: ICD-10-CM

## 2021-09-21 DIAGNOSIS — R51.9 NONINTRACTABLE HEADACHE, UNSPECIFIED CHRONICITY PATTERN, UNSPECIFIED HEADACHE TYPE: Primary | ICD-10-CM

## 2021-09-21 PROCEDURE — 1160F RVW MEDS BY RX/DR IN RCRD: CPT | Mod: CPTII,S$GLB,, | Performed by: OTOLARYNGOLOGY

## 2021-09-21 PROCEDURE — 1101F PR PT FALLS ASSESS DOC 0-1 FALLS W/OUT INJ PAST YR: ICD-10-PCS | Mod: CPTII,S$GLB,, | Performed by: OTOLARYNGOLOGY

## 2021-09-21 PROCEDURE — 99999 PR PBB SHADOW E&M-EST. PATIENT-LVL IV: ICD-10-PCS | Mod: PBBFAC,,, | Performed by: OTOLARYNGOLOGY

## 2021-09-21 PROCEDURE — 1126F AMNT PAIN NOTED NONE PRSNT: CPT | Mod: CPTII,S$GLB,, | Performed by: OTOLARYNGOLOGY

## 2021-09-21 PROCEDURE — 3288F FALL RISK ASSESSMENT DOCD: CPT | Mod: CPTII,S$GLB,, | Performed by: OTOLARYNGOLOGY

## 2021-09-21 PROCEDURE — 99999 PR PBB SHADOW E&M-EST. PATIENT-LVL IV: CPT | Mod: PBBFAC,,, | Performed by: OTOLARYNGOLOGY

## 2021-09-21 PROCEDURE — 99213 PR OFFICE/OUTPT VISIT, EST, LEVL III, 20-29 MIN: ICD-10-PCS | Mod: S$GLB,,, | Performed by: OTOLARYNGOLOGY

## 2021-09-21 PROCEDURE — 1159F MED LIST DOCD IN RCRD: CPT | Mod: CPTII,S$GLB,, | Performed by: OTOLARYNGOLOGY

## 2021-09-21 PROCEDURE — 99213 OFFICE O/P EST LOW 20 MIN: CPT | Mod: S$GLB,,, | Performed by: OTOLARYNGOLOGY

## 2021-09-21 PROCEDURE — 1101F PT FALLS ASSESS-DOCD LE1/YR: CPT | Mod: CPTII,S$GLB,, | Performed by: OTOLARYNGOLOGY

## 2021-09-21 PROCEDURE — 1159F PR MEDICATION LIST DOCUMENTED IN MEDICAL RECORD: ICD-10-PCS | Mod: CPTII,S$GLB,, | Performed by: OTOLARYNGOLOGY

## 2021-09-21 PROCEDURE — 1126F PR PAIN SEVERITY QUANTIFIED, NO PAIN PRESENT: ICD-10-PCS | Mod: CPTII,S$GLB,, | Performed by: OTOLARYNGOLOGY

## 2021-09-21 PROCEDURE — 3288F PR FALLS RISK ASSESSMENT DOCUMENTED: ICD-10-PCS | Mod: CPTII,S$GLB,, | Performed by: OTOLARYNGOLOGY

## 2021-09-21 PROCEDURE — 1160F PR REVIEW ALL MEDS BY PRESCRIBER/CLIN PHARMACIST DOCUMENTED: ICD-10-PCS | Mod: CPTII,S$GLB,, | Performed by: OTOLARYNGOLOGY

## 2021-10-07 ENCOUNTER — OFFICE VISIT (OUTPATIENT)
Dept: NEUROLOGY | Facility: CLINIC | Age: 76
End: 2021-10-07
Payer: MEDICARE

## 2021-10-07 ENCOUNTER — TELEPHONE (OUTPATIENT)
Dept: NEUROLOGY | Facility: CLINIC | Age: 76
End: 2021-10-07

## 2021-10-07 VITALS
SYSTOLIC BLOOD PRESSURE: 124 MMHG | TEMPERATURE: 98 F | WEIGHT: 119 LBS | HEIGHT: 59 IN | HEART RATE: 80 BPM | DIASTOLIC BLOOD PRESSURE: 72 MMHG | BODY MASS INDEX: 23.99 KG/M2

## 2021-10-07 DIAGNOSIS — G89.29 CHRONIC NONINTRACTABLE HEADACHE, UNSPECIFIED HEADACHE TYPE: Primary | ICD-10-CM

## 2021-10-07 DIAGNOSIS — R51.9 CHRONIC NONINTRACTABLE HEADACHE, UNSPECIFIED HEADACHE TYPE: Primary | ICD-10-CM

## 2021-10-07 DIAGNOSIS — R52 DIFFUSE PAIN: ICD-10-CM

## 2021-10-07 PROCEDURE — 99999 PR PBB SHADOW E&M-EST. PATIENT-LVL V: CPT | Mod: PBBFAC,HCNC,, | Performed by: PHYSICIAN ASSISTANT

## 2021-10-07 PROCEDURE — 99999 PR PBB SHADOW E&M-EST. PATIENT-LVL V: ICD-10-PCS | Mod: PBBFAC,HCNC,, | Performed by: PHYSICIAN ASSISTANT

## 2021-10-07 PROCEDURE — 99215 OFFICE O/P EST HI 40 MIN: CPT | Mod: PBBFAC,HCNC,PO | Performed by: PHYSICIAN ASSISTANT

## 2021-10-07 PROCEDURE — 99205 OFFICE O/P NEW HI 60 MIN: CPT | Mod: HCNC,S$GLB,, | Performed by: PHYSICIAN ASSISTANT

## 2021-10-07 PROCEDURE — 99205 PR OFFICE/OUTPT VISIT, NEW, LEVL V, 60-74 MIN: ICD-10-PCS | Mod: HCNC,S$GLB,, | Performed by: PHYSICIAN ASSISTANT

## 2021-10-07 RX ORDER — GABAPENTIN 100 MG/1
CAPSULE ORAL
Qty: 30 CAPSULE | Refills: 3 | Status: SHIPPED | OUTPATIENT
Start: 2021-10-07 | End: 2021-11-18 | Stop reason: SDUPTHER

## 2021-10-11 ENCOUNTER — CLINICAL SUPPORT (OUTPATIENT)
Dept: FAMILY MEDICINE | Facility: CLINIC | Age: 76
End: 2021-10-11
Payer: MEDICARE

## 2021-10-11 DIAGNOSIS — Z23 FLU VACCINE NEED: Primary | ICD-10-CM

## 2021-10-11 PROCEDURE — G0008 ADMIN INFLUENZA VIRUS VAC: HCPCS | Mod: HCNC,S$GLB,, | Performed by: FAMILY MEDICINE

## 2021-10-11 PROCEDURE — 90694 FLU VACCINE - QUADRIVALENT - ADJUVANTED: ICD-10-PCS | Mod: HCNC,S$GLB,, | Performed by: FAMILY MEDICINE

## 2021-10-11 PROCEDURE — 90694 VACC AIIV4 NO PRSRV 0.5ML IM: CPT | Mod: HCNC,S$GLB,, | Performed by: FAMILY MEDICINE

## 2021-10-11 PROCEDURE — G0008 FLU VACCINE - QUADRIVALENT - ADJUVANTED: ICD-10-PCS | Mod: HCNC,S$GLB,, | Performed by: FAMILY MEDICINE

## 2021-11-12 ENCOUNTER — LAB VISIT (OUTPATIENT)
Dept: LAB | Facility: HOSPITAL | Age: 76
End: 2021-11-12
Attending: FAMILY MEDICINE
Payer: MEDICARE

## 2021-11-12 DIAGNOSIS — I10 ESSENTIAL HYPERTENSION: ICD-10-CM

## 2021-11-12 DIAGNOSIS — E78.2 MIXED HYPERLIPIDEMIA: ICD-10-CM

## 2021-11-12 LAB
BASOPHILS # BLD AUTO: 0.06 K/UL (ref 0–0.2)
BASOPHILS NFR BLD: 0.8 % (ref 0–1.9)
CHOLEST SERPL-MCNC: 316 MG/DL (ref 120–199)
CHOLEST/HDLC SERPL: 5.4 {RATIO} (ref 2–5)
DIFFERENTIAL METHOD: ABNORMAL
EOSINOPHIL # BLD AUTO: 0.4 K/UL (ref 0–0.5)
EOSINOPHIL NFR BLD: 5.9 % (ref 0–8)
ERYTHROCYTE [DISTWIDTH] IN BLOOD BY AUTOMATED COUNT: 14.2 % (ref 11.5–14.5)
ESTIMATED AVG GLUCOSE: 120 MG/DL (ref 68–131)
HBA1C MFR BLD: 5.8 % (ref 4–5.6)
HCT VFR BLD AUTO: 35.4 % (ref 37–48.5)
HDLC SERPL-MCNC: 59 MG/DL (ref 40–75)
HDLC SERPL: 18.7 % (ref 20–50)
HGB BLD-MCNC: 11.1 G/DL (ref 12–16)
IMM GRANULOCYTES # BLD AUTO: 0.03 K/UL (ref 0–0.04)
IMM GRANULOCYTES NFR BLD AUTO: 0.4 % (ref 0–0.5)
LDLC SERPL CALC-MCNC: 217.6 MG/DL (ref 63–159)
LYMPHOCYTES # BLD AUTO: 2.5 K/UL (ref 1–4.8)
LYMPHOCYTES NFR BLD: 34.1 % (ref 18–48)
MCH RBC QN AUTO: 28.8 PG (ref 27–31)
MCHC RBC AUTO-ENTMCNC: 31.4 G/DL (ref 32–36)
MCV RBC AUTO: 92 FL (ref 82–98)
MONOCYTES # BLD AUTO: 0.8 K/UL (ref 0.3–1)
MONOCYTES NFR BLD: 10.3 % (ref 4–15)
NEUTROPHILS # BLD AUTO: 3.5 K/UL (ref 1.8–7.7)
NEUTROPHILS NFR BLD: 48.5 % (ref 38–73)
NONHDLC SERPL-MCNC: 257 MG/DL
NRBC BLD-RTO: 0 /100 WBC
PLATELET # BLD AUTO: 305 K/UL (ref 150–450)
PMV BLD AUTO: 10.3 FL (ref 9.2–12.9)
RBC # BLD AUTO: 3.85 M/UL (ref 4–5.4)
TRIGL SERPL-MCNC: 197 MG/DL (ref 30–150)
WBC # BLD AUTO: 7.3 K/UL (ref 3.9–12.7)

## 2021-11-12 PROCEDURE — 36415 COLL VENOUS BLD VENIPUNCTURE: CPT | Mod: HCNC,PO | Performed by: NURSE PRACTITIONER

## 2021-11-12 PROCEDURE — 80061 LIPID PANEL: CPT | Mod: HCNC | Performed by: NURSE PRACTITIONER

## 2021-11-12 PROCEDURE — 83036 HEMOGLOBIN GLYCOSYLATED A1C: CPT | Mod: HCNC | Performed by: NURSE PRACTITIONER

## 2021-11-12 PROCEDURE — 85025 COMPLETE CBC W/AUTO DIFF WBC: CPT | Mod: HCNC | Performed by: NURSE PRACTITIONER

## 2021-11-16 ENCOUNTER — TELEPHONE (OUTPATIENT)
Dept: FAMILY MEDICINE | Facility: CLINIC | Age: 76
End: 2021-11-16

## 2021-11-16 ENCOUNTER — OFFICE VISIT (OUTPATIENT)
Dept: FAMILY MEDICINE | Facility: CLINIC | Age: 76
End: 2021-11-16
Payer: MEDICARE

## 2021-11-16 VITALS
BODY MASS INDEX: 24.27 KG/M2 | WEIGHT: 120.38 LBS | HEIGHT: 59 IN | HEART RATE: 82 BPM | DIASTOLIC BLOOD PRESSURE: 60 MMHG | OXYGEN SATURATION: 97 % | SYSTOLIC BLOOD PRESSURE: 137 MMHG | TEMPERATURE: 98 F | RESPIRATION RATE: 18 BRPM

## 2021-11-16 DIAGNOSIS — I10 PRIMARY HYPERTENSION: ICD-10-CM

## 2021-11-16 DIAGNOSIS — R51.9 CHRONIC NONINTRACTABLE HEADACHE, UNSPECIFIED HEADACHE TYPE: ICD-10-CM

## 2021-11-16 DIAGNOSIS — G89.29 CHRONIC NONINTRACTABLE HEADACHE, UNSPECIFIED HEADACHE TYPE: ICD-10-CM

## 2021-11-16 DIAGNOSIS — K21.9 GASTROESOPHAGEAL REFLUX DISEASE WITHOUT ESOPHAGITIS: ICD-10-CM

## 2021-11-16 DIAGNOSIS — M54.12 CERVICAL RADICULITIS: ICD-10-CM

## 2021-11-16 DIAGNOSIS — M25.552 LEFT HIP PAIN: ICD-10-CM

## 2021-11-16 DIAGNOSIS — E78.2 MIXED HYPERLIPIDEMIA: Primary | ICD-10-CM

## 2021-11-16 PROCEDURE — 1159F MED LIST DOCD IN RCRD: CPT | Mod: CPTII,S$GLB,, | Performed by: FAMILY MEDICINE

## 2021-11-16 PROCEDURE — 99214 PR OFFICE/OUTPT VISIT, EST, LEVL IV, 30-39 MIN: ICD-10-PCS | Mod: S$GLB,,, | Performed by: FAMILY MEDICINE

## 2021-11-16 PROCEDURE — 99999 PR PBB SHADOW E&M-EST. PATIENT-LVL IV: CPT | Mod: PBBFAC,,, | Performed by: FAMILY MEDICINE

## 2021-11-16 PROCEDURE — 3078F DIAST BP <80 MM HG: CPT | Mod: CPTII,S$GLB,, | Performed by: FAMILY MEDICINE

## 2021-11-16 PROCEDURE — 3075F SYST BP GE 130 - 139MM HG: CPT | Mod: CPTII,S$GLB,, | Performed by: FAMILY MEDICINE

## 2021-11-16 PROCEDURE — 99999 PR PBB SHADOW E&M-EST. PATIENT-LVL IV: ICD-10-PCS | Mod: PBBFAC,,, | Performed by: FAMILY MEDICINE

## 2021-11-16 PROCEDURE — 96372 THER/PROPH/DIAG INJ SC/IM: CPT | Mod: PBBFAC,PO

## 2021-11-16 PROCEDURE — 1159F PR MEDICATION LIST DOCUMENTED IN MEDICAL RECORD: ICD-10-PCS | Mod: CPTII,S$GLB,, | Performed by: FAMILY MEDICINE

## 2021-11-16 PROCEDURE — 1125F AMNT PAIN NOTED PAIN PRSNT: CPT | Mod: CPTII,S$GLB,, | Performed by: FAMILY MEDICINE

## 2021-11-16 PROCEDURE — 3288F FALL RISK ASSESSMENT DOCD: CPT | Mod: CPTII,S$GLB,, | Performed by: FAMILY MEDICINE

## 2021-11-16 PROCEDURE — 3288F PR FALLS RISK ASSESSMENT DOCUMENTED: ICD-10-PCS | Mod: CPTII,S$GLB,, | Performed by: FAMILY MEDICINE

## 2021-11-16 PROCEDURE — 1160F PR REVIEW ALL MEDS BY PRESCRIBER/CLIN PHARMACIST DOCUMENTED: ICD-10-PCS | Mod: CPTII,S$GLB,, | Performed by: FAMILY MEDICINE

## 2021-11-16 PROCEDURE — 3078F PR MOST RECENT DIASTOLIC BLOOD PRESSURE < 80 MM HG: ICD-10-PCS | Mod: CPTII,S$GLB,, | Performed by: FAMILY MEDICINE

## 2021-11-16 PROCEDURE — 1125F PR PAIN SEVERITY QUANTIFIED, PAIN PRESENT: ICD-10-PCS | Mod: CPTII,S$GLB,, | Performed by: FAMILY MEDICINE

## 2021-11-16 PROCEDURE — 1101F PT FALLS ASSESS-DOCD LE1/YR: CPT | Mod: CPTII,S$GLB,, | Performed by: FAMILY MEDICINE

## 2021-11-16 PROCEDURE — 3075F PR MOST RECENT SYSTOLIC BLOOD PRESS GE 130-139MM HG: ICD-10-PCS | Mod: CPTII,S$GLB,, | Performed by: FAMILY MEDICINE

## 2021-11-16 PROCEDURE — 1160F RVW MEDS BY RX/DR IN RCRD: CPT | Mod: CPTII,S$GLB,, | Performed by: FAMILY MEDICINE

## 2021-11-16 PROCEDURE — 1101F PR PT FALLS ASSESS DOC 0-1 FALLS W/OUT INJ PAST YR: ICD-10-PCS | Mod: CPTII,S$GLB,, | Performed by: FAMILY MEDICINE

## 2021-11-16 PROCEDURE — 99214 OFFICE O/P EST MOD 30 MIN: CPT | Mod: S$GLB,,, | Performed by: FAMILY MEDICINE

## 2021-11-16 PROCEDURE — 99214 OFFICE O/P EST MOD 30 MIN: CPT | Mod: PBBFAC,PO | Performed by: FAMILY MEDICINE

## 2021-11-16 RX ORDER — DEXAMETHASONE SODIUM PHOSPHATE 4 MG/ML
6 INJECTION, SOLUTION INTRA-ARTICULAR; INTRALESIONAL; INTRAMUSCULAR; INTRAVENOUS; SOFT TISSUE
Status: COMPLETED | OUTPATIENT
Start: 2021-11-16 | End: 2021-11-16

## 2021-11-16 RX ADMIN — DEXAMETHASONE SODIUM PHOSPHATE 6 MG: 4 INJECTION INTRA-ARTICULAR; INTRALESIONAL; INTRAMUSCULAR; INTRAVENOUS; SOFT TISSUE at 11:11

## 2021-11-18 ENCOUNTER — PATIENT OUTREACH (OUTPATIENT)
Dept: ADMINISTRATIVE | Facility: OTHER | Age: 76
End: 2021-11-18
Payer: MEDICARE

## 2021-11-18 ENCOUNTER — OFFICE VISIT (OUTPATIENT)
Dept: NEUROLOGY | Facility: CLINIC | Age: 76
End: 2021-11-18
Payer: MEDICARE

## 2021-11-18 VITALS
SYSTOLIC BLOOD PRESSURE: 159 MMHG | RESPIRATION RATE: 17 BRPM | WEIGHT: 122.38 LBS | DIASTOLIC BLOOD PRESSURE: 69 MMHG | BODY MASS INDEX: 24.67 KG/M2 | HEART RATE: 79 BPM | HEIGHT: 59 IN | TEMPERATURE: 98 F

## 2021-11-18 DIAGNOSIS — G44.219 EPISODIC TENSION-TYPE HEADACHE, NOT INTRACTABLE: Primary | ICD-10-CM

## 2021-11-18 DIAGNOSIS — R03.0 ELEVATED BLOOD PRESSURE READING: ICD-10-CM

## 2021-11-18 DIAGNOSIS — H11.32 SUBCONJUNCTIVAL HEMORRHAGE OF LEFT EYE: ICD-10-CM

## 2021-11-18 PROCEDURE — 1101F PR PT FALLS ASSESS DOC 0-1 FALLS W/OUT INJ PAST YR: ICD-10-PCS | Mod: HCNC,CPTII,S$GLB, | Performed by: PHYSICIAN ASSISTANT

## 2021-11-18 PROCEDURE — 3288F PR FALLS RISK ASSESSMENT DOCUMENTED: ICD-10-PCS | Mod: HCNC,CPTII,S$GLB, | Performed by: PHYSICIAN ASSISTANT

## 2021-11-18 PROCEDURE — 99999 PR PBB SHADOW E&M-EST. PATIENT-LVL V: CPT | Mod: PBBFAC,HCNC,, | Performed by: PHYSICIAN ASSISTANT

## 2021-11-18 PROCEDURE — 1159F MED LIST DOCD IN RCRD: CPT | Mod: HCNC,CPTII,S$GLB, | Performed by: PHYSICIAN ASSISTANT

## 2021-11-18 PROCEDURE — 1101F PT FALLS ASSESS-DOCD LE1/YR: CPT | Mod: HCNC,CPTII,S$GLB, | Performed by: PHYSICIAN ASSISTANT

## 2021-11-18 PROCEDURE — 3077F SYST BP >= 140 MM HG: CPT | Mod: HCNC,CPTII,S$GLB, | Performed by: PHYSICIAN ASSISTANT

## 2021-11-18 PROCEDURE — 1126F AMNT PAIN NOTED NONE PRSNT: CPT | Mod: HCNC,CPTII,S$GLB, | Performed by: PHYSICIAN ASSISTANT

## 2021-11-18 PROCEDURE — 99214 PR OFFICE/OUTPT VISIT, EST, LEVL IV, 30-39 MIN: ICD-10-PCS | Mod: HCNC,S$GLB,, | Performed by: PHYSICIAN ASSISTANT

## 2021-11-18 PROCEDURE — 3077F PR MOST RECENT SYSTOLIC BLOOD PRESSURE >= 140 MM HG: ICD-10-PCS | Mod: HCNC,CPTII,S$GLB, | Performed by: PHYSICIAN ASSISTANT

## 2021-11-18 PROCEDURE — 1126F PR PAIN SEVERITY QUANTIFIED, NO PAIN PRESENT: ICD-10-PCS | Mod: HCNC,CPTII,S$GLB, | Performed by: PHYSICIAN ASSISTANT

## 2021-11-18 PROCEDURE — 99214 OFFICE O/P EST MOD 30 MIN: CPT | Mod: HCNC,S$GLB,, | Performed by: PHYSICIAN ASSISTANT

## 2021-11-18 PROCEDURE — 1160F PR REVIEW ALL MEDS BY PRESCRIBER/CLIN PHARMACIST DOCUMENTED: ICD-10-PCS | Mod: HCNC,CPTII,S$GLB, | Performed by: PHYSICIAN ASSISTANT

## 2021-11-18 PROCEDURE — 3078F DIAST BP <80 MM HG: CPT | Mod: HCNC,CPTII,S$GLB, | Performed by: PHYSICIAN ASSISTANT

## 2021-11-18 PROCEDURE — 1160F RVW MEDS BY RX/DR IN RCRD: CPT | Mod: HCNC,CPTII,S$GLB, | Performed by: PHYSICIAN ASSISTANT

## 2021-11-18 PROCEDURE — 1159F PR MEDICATION LIST DOCUMENTED IN MEDICAL RECORD: ICD-10-PCS | Mod: HCNC,CPTII,S$GLB, | Performed by: PHYSICIAN ASSISTANT

## 2021-11-18 PROCEDURE — 99999 PR PBB SHADOW E&M-EST. PATIENT-LVL V: ICD-10-PCS | Mod: PBBFAC,HCNC,, | Performed by: PHYSICIAN ASSISTANT

## 2021-11-18 PROCEDURE — 3078F PR MOST RECENT DIASTOLIC BLOOD PRESSURE < 80 MM HG: ICD-10-PCS | Mod: HCNC,CPTII,S$GLB, | Performed by: PHYSICIAN ASSISTANT

## 2021-11-18 PROCEDURE — 3288F FALL RISK ASSESSMENT DOCD: CPT | Mod: HCNC,CPTII,S$GLB, | Performed by: PHYSICIAN ASSISTANT

## 2021-11-18 RX ORDER — GABAPENTIN 100 MG/1
CAPSULE ORAL
Qty: 90 CAPSULE | Refills: 1 | Status: SHIPPED | OUTPATIENT
Start: 2021-11-18 | End: 2022-02-04 | Stop reason: SDUPTHER

## 2021-11-19 ENCOUNTER — TELEPHONE (OUTPATIENT)
Dept: OPTOMETRY | Facility: CLINIC | Age: 76
End: 2021-11-19
Payer: MEDICARE

## 2021-11-19 ENCOUNTER — OFFICE VISIT (OUTPATIENT)
Dept: OPTOMETRY | Facility: CLINIC | Age: 76
End: 2021-11-19
Payer: MEDICARE

## 2021-11-19 DIAGNOSIS — H11.32 SUBCONJUNCTIVAL HEMORRHAGE OF LEFT EYE: Primary | ICD-10-CM

## 2021-11-19 PROCEDURE — 1126F PR PAIN SEVERITY QUANTIFIED, NO PAIN PRESENT: ICD-10-PCS | Mod: HCNC,CPTII,S$GLB, | Performed by: OPTOMETRIST

## 2021-11-19 PROCEDURE — 3288F PR FALLS RISK ASSESSMENT DOCUMENTED: ICD-10-PCS | Mod: HCNC,CPTII,S$GLB, | Performed by: OPTOMETRIST

## 2021-11-19 PROCEDURE — 1159F PR MEDICATION LIST DOCUMENTED IN MEDICAL RECORD: ICD-10-PCS | Mod: HCNC,CPTII,S$GLB, | Performed by: OPTOMETRIST

## 2021-11-19 PROCEDURE — 1126F AMNT PAIN NOTED NONE PRSNT: CPT | Mod: HCNC,CPTII,S$GLB, | Performed by: OPTOMETRIST

## 2021-11-19 PROCEDURE — 99999 PR PBB SHADOW E&M-EST. PATIENT-LVL IV: ICD-10-PCS | Mod: PBBFAC,HCNC,, | Performed by: OPTOMETRIST

## 2021-11-19 PROCEDURE — 1160F PR REVIEW ALL MEDS BY PRESCRIBER/CLIN PHARMACIST DOCUMENTED: ICD-10-PCS | Mod: HCNC,CPTII,S$GLB, | Performed by: OPTOMETRIST

## 2021-11-19 PROCEDURE — 99999 PR PBB SHADOW E&M-EST. PATIENT-LVL IV: CPT | Mod: PBBFAC,HCNC,, | Performed by: OPTOMETRIST

## 2021-11-19 PROCEDURE — 1101F PR PT FALLS ASSESS DOC 0-1 FALLS W/OUT INJ PAST YR: ICD-10-PCS | Mod: HCNC,CPTII,S$GLB, | Performed by: OPTOMETRIST

## 2021-11-19 PROCEDURE — 1160F RVW MEDS BY RX/DR IN RCRD: CPT | Mod: HCNC,CPTII,S$GLB, | Performed by: OPTOMETRIST

## 2021-11-19 PROCEDURE — 99213 OFFICE O/P EST LOW 20 MIN: CPT | Mod: HCNC,S$GLB,, | Performed by: OPTOMETRIST

## 2021-11-19 PROCEDURE — 99213 PR OFFICE/OUTPT VISIT, EST, LEVL III, 20-29 MIN: ICD-10-PCS | Mod: HCNC,S$GLB,, | Performed by: OPTOMETRIST

## 2021-11-19 PROCEDURE — 1101F PT FALLS ASSESS-DOCD LE1/YR: CPT | Mod: HCNC,CPTII,S$GLB, | Performed by: OPTOMETRIST

## 2021-11-19 PROCEDURE — 1159F MED LIST DOCD IN RCRD: CPT | Mod: HCNC,CPTII,S$GLB, | Performed by: OPTOMETRIST

## 2021-11-19 PROCEDURE — 3288F FALL RISK ASSESSMENT DOCD: CPT | Mod: HCNC,CPTII,S$GLB, | Performed by: OPTOMETRIST

## 2021-12-01 ENCOUNTER — OFFICE VISIT (OUTPATIENT)
Dept: RHEUMATOLOGY | Facility: CLINIC | Age: 76
End: 2021-12-01
Payer: MEDICARE

## 2021-12-01 ENCOUNTER — OFFICE VISIT (OUTPATIENT)
Dept: PSYCHIATRY | Facility: CLINIC | Age: 76
End: 2021-12-01
Payer: MEDICARE

## 2021-12-01 VITALS
SYSTOLIC BLOOD PRESSURE: 136 MMHG | HEART RATE: 78 BPM | WEIGHT: 122 LBS | DIASTOLIC BLOOD PRESSURE: 72 MMHG | HEIGHT: 59 IN | BODY MASS INDEX: 24.6 KG/M2

## 2021-12-01 VITALS — DIASTOLIC BLOOD PRESSURE: 74 MMHG | BODY MASS INDEX: 24.34 KG/M2 | SYSTOLIC BLOOD PRESSURE: 144 MMHG | WEIGHT: 120.5 LBS

## 2021-12-01 DIAGNOSIS — F33.41 MDD (MAJOR DEPRESSIVE DISORDER), RECURRENT, IN PARTIAL REMISSION: ICD-10-CM

## 2021-12-01 DIAGNOSIS — M75.51 BURSITIS OF SHOULDER, RIGHT: Primary | ICD-10-CM

## 2021-12-01 DIAGNOSIS — F41.9 ANXIETY: ICD-10-CM

## 2021-12-01 DIAGNOSIS — F43.10 PTSD (POST-TRAUMATIC STRESS DISORDER): Primary | ICD-10-CM

## 2021-12-01 PROCEDURE — 99999 PR PBB SHADOW E&M-EST. PATIENT-LVL III: ICD-10-PCS | Mod: PBBFAC,HCNC,, | Performed by: PSYCHIATRY & NEUROLOGY

## 2021-12-01 PROCEDURE — 99213 PR OFFICE/OUTPT VISIT, EST, LEVL III, 20-29 MIN: ICD-10-PCS | Mod: 25,S$GLB,, | Performed by: INTERNAL MEDICINE

## 2021-12-01 PROCEDURE — 99213 OFFICE O/P EST LOW 20 MIN: CPT | Mod: 25,S$GLB,, | Performed by: INTERNAL MEDICINE

## 2021-12-01 PROCEDURE — 99214 PR OFFICE/OUTPT VISIT, EST, LEVL IV, 30-39 MIN: ICD-10-PCS | Mod: HCNC,S$GLB,, | Performed by: PSYCHIATRY & NEUROLOGY

## 2021-12-01 PROCEDURE — 99999 PR PBB SHADOW E&M-EST. PATIENT-LVL III: CPT | Mod: PBBFAC,HCNC,, | Performed by: PSYCHIATRY & NEUROLOGY

## 2021-12-01 PROCEDURE — 99214 OFFICE O/P EST MOD 30 MIN: CPT | Mod: HCNC,S$GLB,, | Performed by: PSYCHIATRY & NEUROLOGY

## 2021-12-01 PROCEDURE — 20610 LARGE JOINT ASPIRATION/INJECTION: R SUBACROMIAL BURSA: ICD-10-PCS | Mod: RT,S$GLB,, | Performed by: INTERNAL MEDICINE

## 2021-12-01 PROCEDURE — 20610 DRAIN/INJ JOINT/BURSA W/O US: CPT | Mod: RT,S$GLB,, | Performed by: INTERNAL MEDICINE

## 2021-12-01 RX ORDER — MONTELUKAST SODIUM 10 MG/1
10 TABLET ORAL NIGHTLY
COMMUNITY
End: 2023-06-23

## 2021-12-01 RX ORDER — DONEPEZIL HYDROCHLORIDE 5 MG/1
5 TABLET, FILM COATED ORAL NIGHTLY
Qty: 90 TABLET | Refills: 0 | Status: SHIPPED | OUTPATIENT
Start: 2021-12-01 | End: 2022-02-13

## 2021-12-01 RX ORDER — SERTRALINE HYDROCHLORIDE 100 MG/1
100 TABLET, FILM COATED ORAL DAILY
Qty: 90 TABLET | Refills: 0 | Status: SHIPPED | OUTPATIENT
Start: 2021-12-01 | End: 2022-06-07

## 2021-12-14 ENCOUNTER — OFFICE VISIT (OUTPATIENT)
Dept: PAIN MEDICINE | Facility: CLINIC | Age: 76
End: 2021-12-14
Payer: MEDICARE

## 2021-12-14 VITALS
SYSTOLIC BLOOD PRESSURE: 127 MMHG | HEIGHT: 59 IN | BODY MASS INDEX: 24.19 KG/M2 | DIASTOLIC BLOOD PRESSURE: 62 MMHG | WEIGHT: 120 LBS | HEART RATE: 89 BPM

## 2021-12-14 DIAGNOSIS — G89.4 CHRONIC PAIN DISORDER: ICD-10-CM

## 2021-12-14 DIAGNOSIS — M50.30 DDD (DEGENERATIVE DISC DISEASE), CERVICAL: ICD-10-CM

## 2021-12-14 DIAGNOSIS — M54.81 BILATERAL OCCIPITAL NEURALGIA: ICD-10-CM

## 2021-12-14 DIAGNOSIS — M54.12 CERVICAL RADICULITIS: ICD-10-CM

## 2021-12-14 DIAGNOSIS — M47.892 OTHER SPONDYLOSIS, CERVICAL REGION: ICD-10-CM

## 2021-12-14 DIAGNOSIS — F11.90 CHRONIC, CONTINUOUS USE OF OPIOIDS: Primary | ICD-10-CM

## 2021-12-14 PROCEDURE — 80307 DRUG TEST PRSMV CHEM ANLYZR: CPT | Mod: HCNC | Performed by: PHYSICIAN ASSISTANT

## 2021-12-14 PROCEDURE — 99214 PR OFFICE/OUTPT VISIT, EST, LEVL IV, 30-39 MIN: ICD-10-PCS | Mod: HCNC,S$GLB,, | Performed by: PHYSICIAN ASSISTANT

## 2021-12-14 PROCEDURE — 99214 OFFICE O/P EST MOD 30 MIN: CPT | Mod: HCNC,S$GLB,, | Performed by: PHYSICIAN ASSISTANT

## 2021-12-14 PROCEDURE — 99214 OFFICE O/P EST MOD 30 MIN: CPT | Mod: PBBFAC,HCNC,PN | Performed by: PHYSICIAN ASSISTANT

## 2021-12-14 PROCEDURE — 99999 PR PBB SHADOW E&M-EST. PATIENT-LVL IV: CPT | Mod: PBBFAC,HCNC,, | Performed by: PHYSICIAN ASSISTANT

## 2021-12-14 PROCEDURE — 99999 PR PBB SHADOW E&M-EST. PATIENT-LVL IV: ICD-10-PCS | Mod: PBBFAC,HCNC,, | Performed by: PHYSICIAN ASSISTANT

## 2021-12-14 RX ORDER — HYDROCODONE BITARTRATE AND ACETAMINOPHEN 5; 325 MG/1; MG/1
1 TABLET ORAL EVERY 12 HOURS PRN
Qty: 60 TABLET | Refills: 0 | Status: SHIPPED | OUTPATIENT
Start: 2022-01-12 | End: 2022-02-10

## 2021-12-14 RX ORDER — HYDROCODONE BITARTRATE AND ACETAMINOPHEN 5; 325 MG/1; MG/1
1 TABLET ORAL EVERY 12 HOURS PRN
Qty: 60 TABLET | Refills: 0 | Status: SHIPPED | OUTPATIENT
Start: 2022-02-10 | End: 2022-02-22 | Stop reason: SDUPTHER

## 2021-12-14 RX ORDER — BUSPIRONE HYDROCHLORIDE 10 MG/1
TABLET ORAL
COMMUNITY
Start: 2021-10-07 | End: 2021-12-20

## 2021-12-14 RX ORDER — HYDROCODONE BITARTRATE AND ACETAMINOPHEN 5; 325 MG/1; MG/1
1 TABLET ORAL EVERY 12 HOURS PRN
Qty: 60 TABLET | Refills: 0 | Status: SHIPPED | OUTPATIENT
Start: 2021-12-14 | End: 2022-01-12

## 2021-12-18 LAB
6MAM UR QL: NOT DETECTED
7AMINOCLONAZEPAM UR QL: NOT DETECTED
A-OH ALPRAZ UR QL: NOT DETECTED
ALPHA-OH-MIDAZOLAM: NOT DETECTED
ALPRAZ UR QL: NOT DETECTED
AMPHET UR QL SCN: NOT DETECTED
ANNOTATION COMMENT IMP: NORMAL
ANNOTATION COMMENT IMP: NORMAL
BARBITURATES UR QL: NOT DETECTED
BUPRENORPHINE UR QL: NOT DETECTED
BZE UR QL: NOT DETECTED
CARBOXYTHC UR QL: NOT DETECTED
CARISOPRODOL UR QL: NOT DETECTED
CLONAZEPAM UR QL: NOT DETECTED
CODEINE UR QL: NOT DETECTED
CREAT UR-MCNC: 192.2 MG/DL (ref 20–400)
DIAZEPAM UR QL: NOT DETECTED
ETHYL GLUCURONIDE UR QL: NOT DETECTED
FENTANYL UR QL: NOT DETECTED
GABAPENTIN: PRESENT
HYDROCODONE UR QL: PRESENT
HYDROMORPHONE UR QL: PRESENT
LORAZEPAM UR QL: NOT DETECTED
MDA UR QL: NOT DETECTED
MDEA UR QL: NOT DETECTED
MDMA UR QL: NOT DETECTED
ME-PHENIDATE UR QL: NOT DETECTED
METHADONE UR QL: NOT DETECTED
METHAMPHET UR QL: NOT DETECTED
MIDAZOLAM UR QL SCN: NOT DETECTED
MORPHINE UR QL: NOT DETECTED
NALOXONE: NOT DETECTED
NORBUPRENORPHINE UR QL CFM: NOT DETECTED
NORDIAZEPAM UR QL: NOT DETECTED
NORFENTANYL UR QL: NOT DETECTED
NORHYDROCODONE UR QL CFM: PRESENT
NORMEPERIDINE UR QL CFM: NOT DETECTED
NOROXYCODONE UR QL CFM: NOT DETECTED
NOROXYMORPHONE UR QL SCN: NOT DETECTED
OXAZEPAM UR QL: NOT DETECTED
OXYCODONE UR QL: NOT DETECTED
OXYMORPHONE UR QL: NOT DETECTED
PATHOLOGY STUDY: NORMAL
PCP UR QL: NOT DETECTED
PHENTERMINE UR QL: NOT DETECTED
PREGABALIN: NOT DETECTED
SERVICE CMNT-IMP: NORMAL
TAPENTADOL UR QL SCN: NOT DETECTED
TAPENTADOL UR QL SCN: NOT DETECTED
TEMAZEPAM UR QL: NOT DETECTED
TRAMADOL UR QL: NOT DETECTED
ZOLPIDEM METABOLITE: NOT DETECTED
ZOLPIDEM UR QL: NOT DETECTED

## 2022-01-29 DIAGNOSIS — I10 HYPERTENSION, UNSPECIFIED TYPE: ICD-10-CM

## 2022-01-29 NOTE — TELEPHONE ENCOUNTER
No new care gaps identified.  Powered by [employer]. Reference number: [id number].   1/29/2022 3:43:44 AM CST

## 2022-01-31 RX ORDER — LINACLOTIDE 145 UG/1
CAPSULE, GELATIN COATED ORAL
Qty: 90 CAPSULE | OUTPATIENT
Start: 2022-01-31

## 2022-02-01 ENCOUNTER — OFFICE VISIT (OUTPATIENT)
Dept: CARDIOLOGY | Facility: CLINIC | Age: 77
End: 2022-02-01
Payer: MEDICARE

## 2022-02-01 VITALS
SYSTOLIC BLOOD PRESSURE: 141 MMHG | HEART RATE: 77 BPM | BODY MASS INDEX: 24.8 KG/M2 | WEIGHT: 122.81 LBS | DIASTOLIC BLOOD PRESSURE: 68 MMHG

## 2022-02-01 DIAGNOSIS — E78.2 MIXED HYPERLIPIDEMIA: ICD-10-CM

## 2022-02-01 DIAGNOSIS — I10 PRIMARY HYPERTENSION: Primary | ICD-10-CM

## 2022-02-01 PROCEDURE — 3077F PR MOST RECENT SYSTOLIC BLOOD PRESSURE >= 140 MM HG: ICD-10-PCS | Mod: HCNC,CPTII,S$GLB, | Performed by: INTERNAL MEDICINE

## 2022-02-01 PROCEDURE — 99999 PR PBB SHADOW E&M-EST. PATIENT-LVL IV: CPT | Mod: PBBFAC,HCNC,, | Performed by: INTERNAL MEDICINE

## 2022-02-01 PROCEDURE — 1101F PR PT FALLS ASSESS DOC 0-1 FALLS W/OUT INJ PAST YR: ICD-10-PCS | Mod: HCNC,CPTII,S$GLB, | Performed by: INTERNAL MEDICINE

## 2022-02-01 PROCEDURE — 1160F RVW MEDS BY RX/DR IN RCRD: CPT | Mod: HCNC,CPTII,S$GLB, | Performed by: INTERNAL MEDICINE

## 2022-02-01 PROCEDURE — 1160F PR REVIEW ALL MEDS BY PRESCRIBER/CLIN PHARMACIST DOCUMENTED: ICD-10-PCS | Mod: HCNC,CPTII,S$GLB, | Performed by: INTERNAL MEDICINE

## 2022-02-01 PROCEDURE — 3288F FALL RISK ASSESSMENT DOCD: CPT | Mod: HCNC,CPTII,S$GLB, | Performed by: INTERNAL MEDICINE

## 2022-02-01 PROCEDURE — 1159F MED LIST DOCD IN RCRD: CPT | Mod: HCNC,CPTII,S$GLB, | Performed by: INTERNAL MEDICINE

## 2022-02-01 PROCEDURE — 99204 PR OFFICE/OUTPT VISIT, NEW, LEVL IV, 45-59 MIN: ICD-10-PCS | Mod: HCNC,S$GLB,, | Performed by: INTERNAL MEDICINE

## 2022-02-01 PROCEDURE — 1101F PT FALLS ASSESS-DOCD LE1/YR: CPT | Mod: HCNC,CPTII,S$GLB, | Performed by: INTERNAL MEDICINE

## 2022-02-01 PROCEDURE — 1159F PR MEDICATION LIST DOCUMENTED IN MEDICAL RECORD: ICD-10-PCS | Mod: HCNC,CPTII,S$GLB, | Performed by: INTERNAL MEDICINE

## 2022-02-01 PROCEDURE — 1126F AMNT PAIN NOTED NONE PRSNT: CPT | Mod: HCNC,CPTII,S$GLB, | Performed by: INTERNAL MEDICINE

## 2022-02-01 PROCEDURE — 3077F SYST BP >= 140 MM HG: CPT | Mod: HCNC,CPTII,S$GLB, | Performed by: INTERNAL MEDICINE

## 2022-02-01 PROCEDURE — 3288F PR FALLS RISK ASSESSMENT DOCUMENTED: ICD-10-PCS | Mod: HCNC,CPTII,S$GLB, | Performed by: INTERNAL MEDICINE

## 2022-02-01 PROCEDURE — 1126F PR PAIN SEVERITY QUANTIFIED, NO PAIN PRESENT: ICD-10-PCS | Mod: HCNC,CPTII,S$GLB, | Performed by: INTERNAL MEDICINE

## 2022-02-01 PROCEDURE — 3078F PR MOST RECENT DIASTOLIC BLOOD PRESSURE < 80 MM HG: ICD-10-PCS | Mod: HCNC,CPTII,S$GLB, | Performed by: INTERNAL MEDICINE

## 2022-02-01 PROCEDURE — 99999 PR PBB SHADOW E&M-EST. PATIENT-LVL IV: ICD-10-PCS | Mod: PBBFAC,HCNC,, | Performed by: INTERNAL MEDICINE

## 2022-02-01 PROCEDURE — 3078F DIAST BP <80 MM HG: CPT | Mod: HCNC,CPTII,S$GLB, | Performed by: INTERNAL MEDICINE

## 2022-02-01 PROCEDURE — 99204 OFFICE O/P NEW MOD 45 MIN: CPT | Mod: HCNC,S$GLB,, | Performed by: INTERNAL MEDICINE

## 2022-02-01 RX ORDER — ALIROCUMAB 150 MG/ML
150 INJECTION, SOLUTION SUBCUTANEOUS
Qty: 6 EACH | Refills: 3 | OUTPATIENT
Start: 2022-02-01 | End: 2022-03-15

## 2022-02-01 NOTE — PROGRESS NOTES
Subjective:    Patient ID:  Rola Richter is a 76 y.o. female who presents for evaluation of Hyperlipidemia (Referral PCP)      HPI76 yo WF with familial HLD who states she is intolerant to multiple statin drugs because of muscle pains. Fortunately no known heart disease. Denies chest pain, SOB, or edema    Review of Systems   Constitutional: Negative for decreased appetite, fever, malaise/fatigue, weight gain and weight loss.   HENT: Negative for hearing loss and nosebleeds.    Eyes: Negative for visual disturbance.   Cardiovascular: Negative for chest pain, claudication, cyanosis, dyspnea on exertion, irregular heartbeat, leg swelling, near-syncope, orthopnea, palpitations, paroxysmal nocturnal dyspnea and syncope.   Respiratory: Negative for cough, hemoptysis, shortness of breath, sleep disturbances due to breathing, snoring and wheezing.    Endocrine: Negative for cold intolerance, heat intolerance, polydipsia and polyuria.   Hematologic/Lymphatic: Negative for adenopathy and bleeding problem. Does not bruise/bleed easily.   Skin: Negative for color change, itching, poor wound healing, rash and suspicious lesions.   Musculoskeletal: Positive for arthritis, muscle cramps and myalgias. Negative for back pain, falls, joint pain, joint swelling and muscle weakness.   Gastrointestinal: Negative for bloating, abdominal pain, change in bowel habit, constipation, flatus, heartburn, hematemesis, hematochezia, hemorrhoids, jaundice, melena, nausea and vomiting.   Genitourinary: Negative for bladder incontinence, decreased libido, frequency, hematuria, hesitancy and urgency.   Neurological: Negative for brief paralysis, difficulty with concentration, excessive daytime sleepiness, dizziness, focal weakness, headaches, light-headedness, loss of balance, numbness, vertigo and weakness.   Psychiatric/Behavioral: Positive for depression. Negative for altered mental status and memory loss. The patient is nervous/anxious. The  patient does not have insomnia.    Allergic/Immunologic: Negative for environmental allergies, hives and persistent infections.        Objective:    Physical Exam  Vitals and nursing note reviewed.   Constitutional:       Appearance: She is well-developed and well-nourished.      Comments: BP (!) 141/68 (BP Location: Left arm, Patient Position: Sitting, BP Method: Large (Automatic))   Pulse 77   Wt 55.7 kg (122 lb 12.7 oz)   BMI 24.80 kg/m²      HENT:      Head: Normocephalic and atraumatic.      Right Ear: External ear normal.      Left Ear: External ear normal.      Nose: Nose normal.      Mouth/Throat:      Mouth: Oropharynx is clear and moist.   Eyes:      General: Lids are normal. No scleral icterus.        Right eye: No discharge.         Left eye: No discharge.      Extraocular Movements: EOM normal.      Conjunctiva/sclera: Conjunctivae normal.      Right eye: No hemorrhage.     Pupils: Pupils are equal, round, and reactive to light.   Neck:      Thyroid: No thyromegaly.      Vascular: No JVD.      Trachea: No tracheal deviation.   Cardiovascular:      Rate and Rhythm: Normal rate and regular rhythm.      Pulses: Intact distal pulses.      Heart sounds: Normal heart sounds. No murmur heard.  No friction rub. No gallop.    Pulmonary:      Effort: Pulmonary effort is normal. No respiratory distress.      Breath sounds: Normal breath sounds. No wheezing or rales.   Chest:      Chest wall: No tenderness.   Breasts: Breasts are symmetrical.       Abdominal:      General: Bowel sounds are normal. There is no distension.      Palpations: Abdomen is soft. There is no hepatomegaly, hepatosplenomegaly or mass.      Tenderness: There is no abdominal tenderness. There is no guarding or rebound.   Musculoskeletal:         General: No tenderness or edema. Normal range of motion.      Cervical back: Normal range of motion and neck supple.   Lymphadenopathy:      Cervical: No cervical adenopathy.   Skin:     General:  Skin is warm and dry.      Coloration: Skin is not pale.      Findings: No erythema or rash.   Neurological:      Mental Status: She is alert and oriented to person, place, and time.      Cranial Nerves: No cranial nerve deficit.      Coordination: Coordination normal.      Deep Tendon Reflexes: Reflexes normal.   Psychiatric:         Mood and Affect: Mood and affect normal.         Behavior: Behavior normal.         Thought Content: Thought content normal.         Judgment: Judgment normal.           Assessment:       1. Primary hypertension    2. Mixed hyperlipidemia         Plan:     Severe HLD intolerant to statins   Will send rx to [facility] for praluent      Orders Placed This Encounter   Procedures    Comprehensive Metabolic Panel    Lipid Panel     Follow up in about 6 months (around 8/1/2022).

## 2022-02-04 RX ORDER — GABAPENTIN 100 MG/1
CAPSULE ORAL
Qty: 90 CAPSULE | Refills: 1 | Status: SHIPPED | OUTPATIENT
Start: 2022-02-04 | End: 2022-02-28

## 2022-02-04 NOTE — TELEPHONE ENCOUNTER
----- Message from Liliana Barajas sent at 2/4/2022 11:30 AM CST -----  .Type:  RX Refill Request    Who Called: Pt  Refill or New Rx:  Refill  RX Name and Strength:  gabapentin (NEURONTIN) 100 MG capsule  How is the patient currently taking it? 1 x day  Is this a 30 day or 90 day RX:  30 day  Preferred Pharmacy with phone number:  .  [id number] - [place], [place] - [address] AT [facility] [address]  [address] [address]  Phone: [phone] Fax: [phone]     Local or Mail Order: Local  Ordering Provider:  Dr Dara Ortiz Call Back Number:  [phone]    Additional Information:  Requesting a refill.  Please advise  Thanks

## 2022-02-09 ENCOUNTER — OFFICE VISIT (OUTPATIENT)
Dept: FAMILY MEDICINE | Facility: CLINIC | Age: 77
End: 2022-02-09
Payer: MEDICARE

## 2022-02-09 VITALS
HEIGHT: 55 IN | SYSTOLIC BLOOD PRESSURE: 118 MMHG | WEIGHT: 121.69 LBS | HEART RATE: 79 BPM | DIASTOLIC BLOOD PRESSURE: 60 MMHG | BODY MASS INDEX: 28.16 KG/M2 | OXYGEN SATURATION: 96 %

## 2022-02-09 DIAGNOSIS — J02.9 SORE THROAT: Primary | ICD-10-CM

## 2022-02-09 DIAGNOSIS — I10 PRIMARY HYPERTENSION: ICD-10-CM

## 2022-02-09 DIAGNOSIS — J01.40 ACUTE PANSINUSITIS, RECURRENCE NOT SPECIFIED: ICD-10-CM

## 2022-02-09 LAB
CTP QC/QA: YES
SARS-COV-2 RDRP RESP QL NAA+PROBE: NEGATIVE

## 2022-02-09 PROCEDURE — 99999 PR PBB SHADOW E&M-EST. PATIENT-LVL IV: CPT | Mod: PBBFAC,HCNC,, | Performed by: FAMILY MEDICINE

## 2022-02-09 PROCEDURE — 99214 OFFICE O/P EST MOD 30 MIN: CPT | Mod: HCNC,S$GLB,, | Performed by: FAMILY MEDICINE

## 2022-02-09 PROCEDURE — U0002: ICD-10-PCS | Mod: QW,HCNC,S$GLB, | Performed by: FAMILY MEDICINE

## 2022-02-09 PROCEDURE — 1159F MED LIST DOCD IN RCRD: CPT | Mod: HCNC,CPTII,S$GLB, | Performed by: FAMILY MEDICINE

## 2022-02-09 PROCEDURE — 3288F PR FALLS RISK ASSESSMENT DOCUMENTED: ICD-10-PCS | Mod: HCNC,CPTII,S$GLB, | Performed by: FAMILY MEDICINE

## 2022-02-09 PROCEDURE — 99999 PR PBB SHADOW E&M-EST. PATIENT-LVL IV: ICD-10-PCS | Mod: PBBFAC,HCNC,, | Performed by: FAMILY MEDICINE

## 2022-02-09 PROCEDURE — 1101F PR PT FALLS ASSESS DOC 0-1 FALLS W/OUT INJ PAST YR: ICD-10-PCS | Mod: HCNC,CPTII,S$GLB, | Performed by: FAMILY MEDICINE

## 2022-02-09 PROCEDURE — 1160F RVW MEDS BY RX/DR IN RCRD: CPT | Mod: HCNC,CPTII,S$GLB, | Performed by: FAMILY MEDICINE

## 2022-02-09 PROCEDURE — 3078F DIAST BP <80 MM HG: CPT | Mod: HCNC,CPTII,S$GLB, | Performed by: FAMILY MEDICINE

## 2022-02-09 PROCEDURE — 1101F PT FALLS ASSESS-DOCD LE1/YR: CPT | Mod: HCNC,CPTII,S$GLB, | Performed by: FAMILY MEDICINE

## 2022-02-09 PROCEDURE — 99214 PR OFFICE/OUTPT VISIT, EST, LEVL IV, 30-39 MIN: ICD-10-PCS | Mod: HCNC,S$GLB,, | Performed by: FAMILY MEDICINE

## 2022-02-09 PROCEDURE — 3074F PR MOST RECENT SYSTOLIC BLOOD PRESSURE < 130 MM HG: ICD-10-PCS | Mod: HCNC,CPTII,S$GLB, | Performed by: FAMILY MEDICINE

## 2022-02-09 PROCEDURE — U0002 COVID-19 LAB TEST NON-CDC: HCPCS | Mod: QW,HCNC,S$GLB, | Performed by: FAMILY MEDICINE

## 2022-02-09 PROCEDURE — 1159F PR MEDICATION LIST DOCUMENTED IN MEDICAL RECORD: ICD-10-PCS | Mod: HCNC,CPTII,S$GLB, | Performed by: FAMILY MEDICINE

## 2022-02-09 PROCEDURE — 3074F SYST BP LT 130 MM HG: CPT | Mod: HCNC,CPTII,S$GLB, | Performed by: FAMILY MEDICINE

## 2022-02-09 PROCEDURE — 3078F PR MOST RECENT DIASTOLIC BLOOD PRESSURE < 80 MM HG: ICD-10-PCS | Mod: HCNC,CPTII,S$GLB, | Performed by: FAMILY MEDICINE

## 2022-02-09 PROCEDURE — 1160F PR REVIEW ALL MEDS BY PRESCRIBER/CLIN PHARMACIST DOCUMENTED: ICD-10-PCS | Mod: HCNC,CPTII,S$GLB, | Performed by: FAMILY MEDICINE

## 2022-02-09 PROCEDURE — 3288F FALL RISK ASSESSMENT DOCD: CPT | Mod: HCNC,CPTII,S$GLB, | Performed by: FAMILY MEDICINE

## 2022-02-09 RX ORDER — PROMETHAZINE HYDROCHLORIDE AND DEXTROMETHORPHAN HYDROBROMIDE 6.25; 15 MG/5ML; MG/5ML
5 SYRUP ORAL 4 TIMES DAILY PRN
Qty: 180 ML | Refills: 0 | Status: SHIPPED | OUTPATIENT
Start: 2022-02-09 | End: 2022-02-19

## 2022-02-09 RX ORDER — DOXYCYCLINE 100 MG/1
100 CAPSULE ORAL 2 TIMES DAILY
Qty: 20 CAPSULE | Refills: 0 | Status: SHIPPED | OUTPATIENT
Start: 2022-02-09 | End: 2022-03-15

## 2022-02-11 RX ORDER — IRBESARTAN 300 MG/1
300 TABLET ORAL NIGHTLY
Qty: 90 TABLET | Refills: 1 | Status: SHIPPED | OUTPATIENT
Start: 2022-02-11 | End: 2022-07-07

## 2022-02-11 NOTE — TELEPHONE ENCOUNTER
Refill Routing Note   Medication(s) are not appropriate for processing by [facility] for the following reason(s):      - Non-participating provider    [facility] action(s):  Route          --->Care Gap information included in message below if applicable.   Medication reconciliation completed: No   Automatic Epic Generated Protocol Data:        Requested Prescriptions   Pending Prescriptions Disp Refills    irbesartan (AVAPRO) 150 MG tablet [Pharmacy Med Name: IRBESARTAN 150 MG Tablet] 180 tablet 2     Sig: TAKE 2 TABLETS EVERY EVENING       Cardiovascular:  Angiotensin Receptor Blockers Passed - 1/29/2022  3:42 AM        Passed - Patient is at least 18 years old        Passed - Last BP in normal range within 360 days     BP Readings from Last 1 Encounters:   02/09/22 118/60               Passed - Valid encounter within last 15 months     Recent Visits  Date Type Provider Dept   11/16/21 Office Visit Liseth Carias MD [facility]   07/14/21 Office Visit Liseth Carias MD [facility]   10/22/20 Office Visit Liseth Carias MD [facility]   Showing recent visits within past 720 days and meeting all other requirements  Future Appointments  No visits were found meeting these conditions.  Showing future appointments within next 150 days and meeting all other requirements      Future Appointments              In 6 days Thong Rae NP [place] - [place], [place]    In 2 weeks INJECTION CHAIR, [facility] [place], [facility]    In 4 weeks WILLIS Yan - Pain Management, [place]    In 1 month Jayme Ram MD [facility]    In 3 months Liseth Carias MD [place] - [facility], [place]    In 3 months Gordy Diamond PA-C [place] - Headache, [place]                Passed - Cr is 1.39 or below and within 360 days     Lab Results   Component Value Date    CREATININE 0.8 08/09/2021    CREATININE 0.8  06/04/2021    CREATININE 1.0 06/03/2021              Passed - K is 5.2 or below and within 360 days     Potassium   Date Value Ref Range Status   08/09/2021 4.2 3.5 - 5.1 mmol/L Final   06/04/2021 4.5 3.5 - 5.1 mmol/L Final   06/03/2021 3.6 3.5 - 5.1 mmol/L Final              Passed - eGFR within 360 days     Lab Results   Component Value Date    EGFRNONAA >60.0 08/09/2021    EGFRNONAA >60 06/04/2021    EGFRNONAA 55 (A) 06/03/2021                      Appointments  past 12m or future 3m with PCP    Date Provider   Last Visit   11/16/2021 Liseth Carias MD   Next Visit   5/16/2022 Liseth Carias MD   ED visits in past 90 days: 0        Note composed:8:19 PM 02/10/2022

## 2022-02-16 ENCOUNTER — OFFICE VISIT (OUTPATIENT)
Dept: FAMILY MEDICINE | Facility: CLINIC | Age: 77
End: 2022-02-16
Payer: MEDICARE

## 2022-02-16 VITALS
HEART RATE: 85 BPM | BODY MASS INDEX: 24.18 KG/M2 | TEMPERATURE: 98 F | SYSTOLIC BLOOD PRESSURE: 138 MMHG | HEIGHT: 59 IN | OXYGEN SATURATION: 96 % | WEIGHT: 119.94 LBS | DIASTOLIC BLOOD PRESSURE: 70 MMHG

## 2022-02-16 DIAGNOSIS — M47.812 SPONDYLOSIS OF CERVICAL REGION WITHOUT MYELOPATHY OR RADICULOPATHY: ICD-10-CM

## 2022-02-16 DIAGNOSIS — F33.41 MDD (MAJOR DEPRESSIVE DISORDER), RECURRENT, IN PARTIAL REMISSION: ICD-10-CM

## 2022-02-16 DIAGNOSIS — F43.10 PTSD (POST-TRAUMATIC STRESS DISORDER): ICD-10-CM

## 2022-02-16 DIAGNOSIS — M15.9 PRIMARY OSTEOARTHRITIS INVOLVING MULTIPLE JOINTS: ICD-10-CM

## 2022-02-16 DIAGNOSIS — E78.2 MIXED HYPERLIPIDEMIA: ICD-10-CM

## 2022-02-16 DIAGNOSIS — I10 ESSENTIAL HYPERTENSION: Primary | ICD-10-CM

## 2022-02-16 DIAGNOSIS — J30.1 SEASONAL ALLERGIC RHINITIS DUE TO POLLEN: ICD-10-CM

## 2022-02-16 PROCEDURE — 99999 PR PBB SHADOW E&M-EST. PATIENT-LVL V: ICD-10-PCS | Mod: PBBFAC,HCNC,, | Performed by: NURSE PRACTITIONER

## 2022-02-16 PROCEDURE — 1101F PT FALLS ASSESS-DOCD LE1/YR: CPT | Mod: HCNC,CPTII,S$GLB, | Performed by: NURSE PRACTITIONER

## 2022-02-16 PROCEDURE — 1160F PR REVIEW ALL MEDS BY PRESCRIBER/CLIN PHARMACIST DOCUMENTED: ICD-10-PCS | Mod: HCNC,CPTII,S$GLB, | Performed by: NURSE PRACTITIONER

## 2022-02-16 PROCEDURE — 99999 PR PBB SHADOW E&M-EST. PATIENT-LVL V: CPT | Mod: PBBFAC,HCNC,, | Performed by: NURSE PRACTITIONER

## 2022-02-16 PROCEDURE — 99214 PR OFFICE/OUTPT VISIT, EST, LEVL IV, 30-39 MIN: ICD-10-PCS | Mod: HCNC,S$GLB,, | Performed by: NURSE PRACTITIONER

## 2022-02-16 PROCEDURE — 1159F PR MEDICATION LIST DOCUMENTED IN MEDICAL RECORD: ICD-10-PCS | Mod: HCNC,CPTII,S$GLB, | Performed by: NURSE PRACTITIONER

## 2022-02-16 PROCEDURE — 3078F DIAST BP <80 MM HG: CPT | Mod: HCNC,CPTII,S$GLB, | Performed by: NURSE PRACTITIONER

## 2022-02-16 PROCEDURE — 1125F PR PAIN SEVERITY QUANTIFIED, PAIN PRESENT: ICD-10-PCS | Mod: HCNC,CPTII,S$GLB, | Performed by: NURSE PRACTITIONER

## 2022-02-16 PROCEDURE — 1101F PR PT FALLS ASSESS DOC 0-1 FALLS W/OUT INJ PAST YR: ICD-10-PCS | Mod: HCNC,CPTII,S$GLB, | Performed by: NURSE PRACTITIONER

## 2022-02-16 PROCEDURE — 3075F SYST BP GE 130 - 139MM HG: CPT | Mod: HCNC,CPTII,S$GLB, | Performed by: NURSE PRACTITIONER

## 2022-02-16 PROCEDURE — 3078F PR MOST RECENT DIASTOLIC BLOOD PRESSURE < 80 MM HG: ICD-10-PCS | Mod: HCNC,CPTII,S$GLB, | Performed by: NURSE PRACTITIONER

## 2022-02-16 PROCEDURE — 3075F PR MOST RECENT SYSTOLIC BLOOD PRESS GE 130-139MM HG: ICD-10-PCS | Mod: HCNC,CPTII,S$GLB, | Performed by: NURSE PRACTITIONER

## 2022-02-16 PROCEDURE — 1125F AMNT PAIN NOTED PAIN PRSNT: CPT | Mod: HCNC,CPTII,S$GLB, | Performed by: NURSE PRACTITIONER

## 2022-02-16 PROCEDURE — 1160F RVW MEDS BY RX/DR IN RCRD: CPT | Mod: HCNC,CPTII,S$GLB, | Performed by: NURSE PRACTITIONER

## 2022-02-16 PROCEDURE — 99214 OFFICE O/P EST MOD 30 MIN: CPT | Mod: HCNC,S$GLB,, | Performed by: NURSE PRACTITIONER

## 2022-02-16 PROCEDURE — 3288F FALL RISK ASSESSMENT DOCD: CPT | Mod: HCNC,CPTII,S$GLB, | Performed by: NURSE PRACTITIONER

## 2022-02-16 PROCEDURE — 3288F PR FALLS RISK ASSESSMENT DOCUMENTED: ICD-10-PCS | Mod: HCNC,CPTII,S$GLB, | Performed by: NURSE PRACTITIONER

## 2022-02-16 PROCEDURE — 1159F MED LIST DOCD IN RCRD: CPT | Mod: HCNC,CPTII,S$GLB, | Performed by: NURSE PRACTITIONER

## 2022-02-16 RX ORDER — CETIRIZINE HYDROCHLORIDE 5 MG/1
5 TABLET ORAL DAILY
Qty: 30 TABLET | Refills: 11 | Status: SHIPPED | OUTPATIENT
Start: 2022-02-16 | End: 2024-02-14

## 2022-02-16 RX ORDER — CYCLOBENZAPRINE HCL 10 MG
TABLET ORAL
COMMUNITY
Start: 2021-12-06 | End: 2022-02-22 | Stop reason: SDUPTHER

## 2022-02-16 NOTE — PATIENT INSTRUCTIONS
If you are due for any health screening(s) below please notify me so we can arrange them to be ordered and scheduled to maintain your health.     Health Maintenance   Topic Date Due    Lipid Panel  11/12/2022    DEXA SCAN  08/28/2023    Colonoscopy  05/13/2024    TETANUS VACCINE  01/26/2026    Hepatitis C Screening  Completed

## 2022-02-16 NOTE — PROGRESS NOTES
Subjective:       Patient ID: Rola Richter is a 76 y.o. female.    Chief Complaint: No chief complaint on file.    HPI    Patient presents today for chronic conditions follow up.  2/1/2022: Cardiology-: Hyperlipidemia Alirocumab injection every 14 days-waiting for insurance approval  Last visit with PCP  11/16/21  PTSD-followed by [facility] ; last visit 12/1/21  Left shoulder bursisits-follwed by [facility] ; last visit 12/1/21  Bilateral occipital neuralgia, cervical radic followed by Dr Parsons;  last visit 2/14/21  Past Medical History:   Diagnosis Date    Anxiety     Arthritis     Cataract     DDD (degenerative disc disease), lumbar     Depression     Encounter for blood transfusion     GERD (gastroesophageal reflux disease)     Headache     Hyperlipidemia     Hypertension     pt states she does not take meds anymore she just watches her weight    Neuromuscular disorder     Occipital neuralgia 3/24/2017    Osteoporosis        Review of patient's allergies indicates:   Allergen Reactions    Aspirin Nausea And Vomiting    Penicillins Itching    Crestor [rosuvastatin]      Muscle pain      Lipitor [atorvastatin]      Achy           Current Outpatient Medications:     albuterol sulfate 2.5 mg/0.5 mL Nebu, Take 2.5 mg by nebulization every 6 (six) hours as needed (shortness of breath, wheezing). Rescue, Disp: 1 each, Rfl: 3    alirocumab (PRALUENT PEN) 150 mg/mL PnIj, Inject 1 mL (150 mg total) into the skin every 14 (fourteen) days., Disp: 6 each, Rfl: 3    azelastine (ASTELIN) 137 mcg (0.1 %) nasal spray, 1 spray (137 mcg total) by Nasal route 2 (two) times daily., Disp: 30 mL, Rfl: 0    benzonatate (TESSALON) 100 MG capsule, Take 1 capsule (100 mg total) by mouth 3 (three) times daily as needed for Cough., Disp: 20 capsule, Rfl: 0    busPIRone (BUSPAR) 10 MG tablet, TAKE 1 TABLET TWICE DAILY, Disp: 180 tablet, Rfl: 0     butalbital-aspirin-caffeine -40 mg (FIORINAL) -40 mg Cap, Take 1 capsule by mouth every 4 (four) hours as needed (migraine). , Disp: , Rfl:     clindamycin (CLEOCIN) 300 MG capsule, Take 1 capsule (300 mg total) by mouth 4 (four) times daily., Disp: 56 capsule, Rfl: 0    COD LIVER OIL ORAL, Take 1 tablet by mouth once daily. , Disp: , Rfl:     diclofenac sodium (VOLTAREN) 1 % Gel, APPLY 2 GRAMS TOPICALLY ONCE DAILY AS DIRECTED (Patient taking differently: Apply 2 g topically once daily.), Disp: 200 g, Rfl: prn    donepeziL (ARICEPT) 5 MG tablet, TAKE 1 TABLET (5 MG TOTAL) BY MOUTH EVERY EVENING., Disp: 90 tablet, Rfl: 0    doxycycline (MONODOX) 100 MG capsule, Take 1 capsule (100 mg total) by mouth 2 (two) times daily., Disp: 20 capsule, Rfl: 0    fish oil-omega-3 fatty acids 300-1,000 mg capsule, Take 2 g by mouth once daily., Disp: , Rfl:     fluticasone propionate (FLONASE) 50 mcg/actuation nasal spray, 2 sprays (100 mcg total) by Each Nostril route once daily., Disp: 16 g, Rfl: 11    gabapentin (NEURONTIN) 100 MG capsule, 1 pill 2 hours before bed every night, Disp: 90 capsule, Rfl: 1    ipratropium (ATROVENT) 42 mcg (0.06 %) nasal spray, 2 sprays by Nasal route 3 (three) times daily., Disp: 15 mL, Rfl: 6    irbesartan (AVAPRO) 300 MG tablet, Take 1 tablet (300 mg total) by mouth every evening. (new tablet strength), Disp: 90 tablet, Rfl: 1    montelukast (SINGULAIR) 10 mg tablet, Take 10 mg by mouth nightly., Disp: , Rfl:     multivit with min-folic acid 200 mcg Chew, Take 1 tablet by mouth once daily. , Disp: , Rfl:     omeprazole (PRILOSEC) 40 MG capsule, TAKE 1 CAPSULE EVERY DAY, Disp: 90 capsule, Rfl: 3    polyethylene glycol (GLYCOLAX) 17 gram/dose powder, Take 17 g by mouth daily as needed (constipation). , Disp: , Rfl:     sertraline (ZOLOFT) 100 MG tablet, Take 1 tablet (100 mg total) by mouth once daily., Disp: 90 tablet, Rfl: 0    cetirizine (ZYRTEC) 5 MG tablet, Take 1  "tablet (5 mg total) by mouth once daily., Disp: 30 tablet, Rfl: 11    cyclobenzaprine (FLEXERIL) 10 MG tablet, Take 1 tablet (10 mg total) by mouth 2 (two) times daily as needed for Muscle spasms., Disp: 60 tablet, Rfl: 1    [START ON 3/14/2022] HYDROcodone-acetaminophen (NORCO) 5-325 mg per tablet, Take 1 tablet by mouth every 12 (twelve) hours as needed for Pain. Medically necessary for greater than 7 days for chronic pain, Disp: 60 tablet, Rfl: 0    [START ON 4/12/2022] HYDROcodone-acetaminophen (NORCO) 5-325 mg per tablet, Take 1 tablet by mouth every 12 (twelve) hours as needed for Pain. Medically necessary for greater than 7 days for chronic pain, Disp: 60 tablet, Rfl: 0    [START ON 5/11/2022] HYDROcodone-acetaminophen (NORCO) 5-325 mg per tablet, Take 1 tablet by mouth every 12 (twelve) hours as needed for Pain. Medically necessary for greater than 7 days for chronic pain, Disp: 60 tablet, Rfl: 0    pravastatin (PRAVACHOL) 10 MG tablet, , Disp: , Rfl:     Review of Systems   Constitutional: Negative for unexpected weight change.   HENT: Negative for trouble swallowing.    Eyes: Negative for visual disturbance.   Respiratory: Negative for shortness of breath.    Cardiovascular: Negative for chest pain, palpitations and leg swelling.   Gastrointestinal: Negative for blood in stool.   Genitourinary: Negative for hematuria.   Skin: Negative for rash.   Allergic/Immunologic: Negative for immunocompromised state.   Neurological: Negative for headaches.   Hematological: Does not bruise/bleed easily.   Psychiatric/Behavioral: Negative for agitation. The patient is not nervous/anxious.        Objective:      /70   Pulse 85   Temp 97.9 °F (36.6 °C)   Ht 4' 11" (1.499 m)   Wt 54.4 kg (119 lb 14.9 oz)   SpO2 96%   BMI 24.22 kg/m²   Physical Exam  Constitutional:       Appearance: She is well-developed and well-nourished.   Eyes:      Extraocular Movements: EOM normal.      Pupils: Pupils are equal, " round, and reactive to light.   Cardiovascular:      Rate and Rhythm: Normal rate and regular rhythm.      Heart sounds: Normal heart sounds.   Pulmonary:      Effort: Pulmonary effort is normal.      Breath sounds: Normal breath sounds.   Abdominal:      General: Bowel sounds are normal.      Palpations: Abdomen is soft.   Musculoskeletal:         General: Normal range of motion.      Cervical back: Normal range of motion.   Skin:     General: Skin is warm and dry.   Neurological:      Mental Status: She is alert and oriented to person, place, and time.   Psychiatric:         Mood and Affect: Mood and affect normal.         Behavior: Behavior normal.         Thought Content: Thought content normal.         Judgment: Judgment normal.         Assessment:       1. Essential hypertension    2. Mixed hyperlipidemia    3. PTSD (post-traumatic stress disorder)    4. Seasonal allergic rhinitis due to pollen    5. MDD (major depressive disorder), recurrent, in partial remission    6. Primary osteoarthritis involving multiple joints    7. Spondylosis of cervical region without myelopathy or radiculopathy    8. BMI 24.0-24.9, adult        Plan:       Essential hypertension  -     Comprehensive Metabolic Panel; Future; Expected date: 02/16/2022  -     Hemoglobin A1C; Future; Expected date: 02/16/2022  -     CBC W/ AUTO DIFFERENTIAL; Future; Expected date: 02/16/2022  Stable, continue management  Low sodiumdeit  BP Readings from Last 3 Encounters:   02/22/22 138/69   02/16/22 138/70   02/09/22 118/60     Mixed hyperlipidemia  -     Lipid Panel; Future; Expected date: 02/16/2022  Stable continue managment  PTSD (post-traumatic stress disorder)  Stable, continue psych follow up  Seasonal allergic rhinitis due to pollen  -     cetirizine (ZYRTEC) 5 MG tablet; Take 1 tablet (5 mg total) by mouth once daily.  Dispense: 30 tablet; Refill: 11    MDD (major depressive disorder), recurrent, in partial remission  Stable, continue psych  follow up  Primary osteoarthritis involving multiple joints  Stable, continue follow up with pain managment  Spondylosis of cervical region without myelopathy or radiculopathy  Stable, continue follow up with pain managment  BMI 24.0-24.9, adult  Stable, continue follow up with pain managment        Patient readiness: acceptance and barriers:none    During the course of the visit the patient was educated and counseled about the following:     Hypertension:   Dietary sodium restriction.  Regular aerobic exercise.    Goals: Hypertension: Reduce Blood Pressure    Did patient meet goals/outcomes: Yes    The following self management tools provided: declined    Patient Instructions (the written plan) was given to the patient/family.     Time spent with patient: 15 minutes    Barriers to medications present (no )    Adverse reactions to current medications (no)    Over the counter medications reviewed (Yes)

## 2022-02-22 ENCOUNTER — OFFICE VISIT (OUTPATIENT)
Dept: PAIN MEDICINE | Facility: CLINIC | Age: 77
End: 2022-02-22
Payer: MEDICARE

## 2022-02-22 ENCOUNTER — TELEPHONE (OUTPATIENT)
Dept: PAIN MEDICINE | Facility: CLINIC | Age: 77
End: 2022-02-22
Payer: MEDICARE

## 2022-02-22 VITALS
SYSTOLIC BLOOD PRESSURE: 138 MMHG | DIASTOLIC BLOOD PRESSURE: 69 MMHG | HEIGHT: 59 IN | WEIGHT: 119 LBS | HEART RATE: 86 BPM | BODY MASS INDEX: 23.99 KG/M2

## 2022-02-22 DIAGNOSIS — M79.18 MYOFASCIAL PAIN: ICD-10-CM

## 2022-02-22 DIAGNOSIS — G89.4 CHRONIC PAIN DISORDER: ICD-10-CM

## 2022-02-22 DIAGNOSIS — M47.892 OTHER SPONDYLOSIS, CERVICAL REGION: ICD-10-CM

## 2022-02-22 DIAGNOSIS — M54.81 BILATERAL OCCIPITAL NEURALGIA: Primary | ICD-10-CM

## 2022-02-22 DIAGNOSIS — M50.30 DDD (DEGENERATIVE DISC DISEASE), CERVICAL: ICD-10-CM

## 2022-02-22 DIAGNOSIS — M54.12 CERVICAL RADICULITIS: ICD-10-CM

## 2022-02-22 PROCEDURE — 1101F PR PT FALLS ASSESS DOC 0-1 FALLS W/OUT INJ PAST YR: ICD-10-PCS | Mod: HCNC,CPTII,S$GLB, | Performed by: PHYSICIAN ASSISTANT

## 2022-02-22 PROCEDURE — 1125F PR PAIN SEVERITY QUANTIFIED, PAIN PRESENT: ICD-10-PCS | Mod: HCNC,CPTII,S$GLB, | Performed by: PHYSICIAN ASSISTANT

## 2022-02-22 PROCEDURE — 3078F PR MOST RECENT DIASTOLIC BLOOD PRESSURE < 80 MM HG: ICD-10-PCS | Mod: HCNC,CPTII,S$GLB, | Performed by: PHYSICIAN ASSISTANT

## 2022-02-22 PROCEDURE — 99999 PR PBB SHADOW E&M-EST. PATIENT-LVL III: ICD-10-PCS | Mod: PBBFAC,HCNC,, | Performed by: PHYSICIAN ASSISTANT

## 2022-02-22 PROCEDURE — 3288F FALL RISK ASSESSMENT DOCD: CPT | Mod: HCNC,CPTII,S$GLB, | Performed by: PHYSICIAN ASSISTANT

## 2022-02-22 PROCEDURE — 3075F SYST BP GE 130 - 139MM HG: CPT | Mod: HCNC,CPTII,S$GLB, | Performed by: PHYSICIAN ASSISTANT

## 2022-02-22 PROCEDURE — 99214 OFFICE O/P EST MOD 30 MIN: CPT | Mod: HCNC,S$GLB,, | Performed by: PHYSICIAN ASSISTANT

## 2022-02-22 PROCEDURE — 1125F AMNT PAIN NOTED PAIN PRSNT: CPT | Mod: HCNC,CPTII,S$GLB, | Performed by: PHYSICIAN ASSISTANT

## 2022-02-22 PROCEDURE — 99999 PR PBB SHADOW E&M-EST. PATIENT-LVL III: CPT | Mod: PBBFAC,HCNC,, | Performed by: PHYSICIAN ASSISTANT

## 2022-02-22 PROCEDURE — 1101F PT FALLS ASSESS-DOCD LE1/YR: CPT | Mod: HCNC,CPTII,S$GLB, | Performed by: PHYSICIAN ASSISTANT

## 2022-02-22 PROCEDURE — 3288F PR FALLS RISK ASSESSMENT DOCUMENTED: ICD-10-PCS | Mod: HCNC,CPTII,S$GLB, | Performed by: PHYSICIAN ASSISTANT

## 2022-02-22 PROCEDURE — 99214 PR OFFICE/OUTPT VISIT, EST, LEVL IV, 30-39 MIN: ICD-10-PCS | Mod: HCNC,S$GLB,, | Performed by: PHYSICIAN ASSISTANT

## 2022-02-22 PROCEDURE — 3078F DIAST BP <80 MM HG: CPT | Mod: HCNC,CPTII,S$GLB, | Performed by: PHYSICIAN ASSISTANT

## 2022-02-22 PROCEDURE — 3075F PR MOST RECENT SYSTOLIC BLOOD PRESS GE 130-139MM HG: ICD-10-PCS | Mod: HCNC,CPTII,S$GLB, | Performed by: PHYSICIAN ASSISTANT

## 2022-02-22 RX ORDER — PRAVASTATIN SODIUM 10 MG/1
TABLET ORAL
COMMUNITY
Start: 2022-01-11 | End: 2022-05-16

## 2022-02-22 RX ORDER — HYDROCODONE BITARTRATE AND ACETAMINOPHEN 5; 325 MG/1; MG/1
1 TABLET ORAL EVERY 12 HOURS PRN
Qty: 60 TABLET | Refills: 0 | Status: SHIPPED | OUTPATIENT
Start: 2022-04-12 | End: 2022-05-11

## 2022-02-22 RX ORDER — HYDROCODONE BITARTRATE AND ACETAMINOPHEN 5; 325 MG/1; MG/1
1 TABLET ORAL EVERY 12 HOURS PRN
Qty: 60 TABLET | Refills: 0 | Status: SHIPPED | OUTPATIENT
Start: 2022-03-14 | End: 2022-04-12

## 2022-02-22 RX ORDER — HYDROCODONE BITARTRATE AND ACETAMINOPHEN 5; 325 MG/1; MG/1
1 TABLET ORAL EVERY 12 HOURS PRN
Qty: 60 TABLET | Refills: 0 | Status: SHIPPED | OUTPATIENT
Start: 2022-05-11 | End: 2022-05-18 | Stop reason: SDUPTHER

## 2022-02-22 RX ORDER — CYCLOBENZAPRINE HCL 10 MG
10 TABLET ORAL 2 TIMES DAILY PRN
Qty: 60 TABLET | Refills: 1 | Status: SHIPPED | OUTPATIENT
Start: 2022-02-22 | End: 2022-05-18 | Stop reason: SDUPTHER

## 2022-02-22 NOTE — PROGRESS NOTES
Referring Physician: No ref. provider found    PCP: Liseth Carias MD      CC: neck and occipital pain    Interval history: Ms. Richter is a 76 y.o. female with neck pain and occipital neuralgia who returns to our clinic.  She continues to c/o headaches.  Most recent occcipital nerve block only provided mild short lived beneft. Neck pain that extends into bilateral shoulders is improving with PT. Previously right arm pain traveled to her hand, her left stops around her shoulder.  She had numbness to her right hand and first through fourth digits. Cervical MELANY in February 2018 that only provided benefit for a short time.     She continues to take Norco with benefit.  Flexeril 10 mg caused dry mouth however this resolved and she wishes to resume. Robaxin was helpful but caused dizziness.  Denies UE weakness. No bowel bladder changes.   Pain today is rated 8/10.    Prior HPI:   Patient is 70-year-old female with past history history of cervical DDD, cervical spondylosis and chronic headaches.  She recently moved here from [place], [place].  She is treated in the past by neurology.  She states having constant burning pain over the left side of her posterior scalp.  Pain radiates to her neck as well as a frontal.  She also has left-sided neck pain as well.  She denies any radicular arm pain.  No numbness or weakness.  She states having cervical epidural steroid injection at past with minimal benefit.  She also has had decided of cervical nerve blocks in the past with moderate benefit.  Most recent injection was performed 2 months ago.  She desires repeat injection.  She currently takes Norco 10 mg every 12 hours as needed with moderate benefit.  She also takes Zanaflex 4 mg every 8 hours with mild benefits.  She rates her pain 7/10 today.    Pain intervention history: s/p left occipital nerve blocks on 1/2016 with 50% relief of her headaches  S/p cervical MELANY 2/8/18 moderate relief for a couple of weeks.      ROS:  CONSTITUTIONAL: No fevers, chills, night sweats, wt. loss, appetite changes  SKIN: no rashes or itching  ENT: No headaches, head trauma, vision changes, or eye pain  LYMPH NODES: None noticed   CV: No chest pain, palpitations.   RESP: No shortness of breath, dyspnea on exertion, cough, wheezing, or hemoptysis  GI: No nausea, emesis, diarrhea, constipation, melena, hematochezia, pain.    : No dysuria, hematuria, urgency, or frequency   HEME: No easy bruising, bleeding problems  PSYCHIATRIC: No depression, anxiety, psychosis, hallucinations.  NEURO: No seizures, memory loss, dizziness or difficulty sleeping  MSK: + History of present illness      Past Medical History:   Diagnosis Date    Anxiety     Arthritis     Cataract     DDD (degenerative disc disease), lumbar     Depression     Encounter for blood transfusion     GERD (gastroesophageal reflux disease)     Headache     Hyperlipidemia     Hypertension     pt states she does not take meds anymore she just watches her weight    Neuromuscular disorder     Occipital neuralgia 3/24/2017    Osteoporosis      Past Surgical History:   Procedure Laterality Date    APPENDECTOMY       SECTION      x 2    CHOLECYSTECTOMY      COLONOSCOPY  prior to     COLONOSCOPY N/A 2019    Procedure: COLONOSCOPY;  Surgeon: Greg Victor MD;  [facility];  Service: Endoscopy;  Laterality: N/A; 2 colon polyps, hemorrhoids; repeat in 5 years for surveillance; biopsy: Tubular adenoma x2    ESOPHAGOGASTRODUODENOSCOPY N/A 2019    Procedure: EGD (ESOPHAGOGASTRODUODENOSCOPY);  Surgeon: Greg Victor MD;  Location: [facility];  Service: Endoscopy;  Laterality: N/A; Mild Schatzki ring. Dilated. small hiatal hernia; Four gastric polyps. Resected and retrieved, gastritis; biopsy:stomach- unremarkable, negative for h pylori, polyps-Fundic type mucosa with foveolar hyperplasia.    ESOPHAGOGASTRODUODENOSCOPY N/A 2021    Procedure:  EGD (ESOPHAGOGASTRODUODENOSCOPY);  Surgeon: Greg Victor MD;  Location: [facility];  Service: Endoscopy;  Laterality: N/A;    HYSTERECTOMY      Laser Periphery Iridotomy Bilateral     OD 5/26/16 and OS touch up 5/26/2016    UPPER GASTROINTESTINAL ENDOSCOPY  03/14/2017    Dr. Marcial: esophageal stenosis- dilated, gastritis, gastric polyps removed; biopsy- mild gastritis, negative for h pylori, hyperplastic polyp, esophagus unremarkable    vocal cord tumor removal       Family History   Problem Relation Age of Onset    Osteoarthritis Mother     Alcohol abuse Mother     Rheum arthritis Mother     Osteoarthritis Sister     Diabetes Brother     No Known Problems Son     No Known Problems Sister     No Known Problems Sister     No Known Problems Brother     Arthritis Son     No Known Problems Son     Stroke Maternal Grandmother 99    Rheum arthritis Maternal Grandmother     Retinal detachment Neg Hx     Macular degeneration Neg Hx     Glaucoma Neg Hx     Amblyopia Neg Hx     Blindness Neg Hx     Cancer Neg Hx     Cataracts Neg Hx     Hypertension Neg Hx     Strabismus Neg Hx     Thyroid disease Neg Hx     Lupus Neg Hx     Kidney disease Neg Hx     Inflammatory bowel disease Neg Hx     Psoriasis Neg Hx     Colon cancer Neg Hx     Crohn's disease Neg Hx     Ulcerative colitis Neg Hx     Stomach cancer Neg Hx     Esophageal cancer Neg Hx      Social History     Socioeconomic History    Marital status:    Tobacco Use    Smoking status: Never Smoker    Smokeless tobacco: Never Used   Substance and Sexual Activity    Alcohol use: No     Alcohol/week: 0.0 standard drinks    Drug use: Yes     Types: Hydrocodone    Sexual activity: Yes     Partners: Male     Social Determinants of Health     Financial Resource Strain: Low Risk     Difficulty of Paying Living Expenses: Not hard at all   Food Insecurity: No Food Insecurity    Worried About Running Out of Food in the Last Year:  "Never true    Ran Out of Food in the Last Year: Never true   Transportation Needs: No Transportation Needs    Lack of Transportation (Medical): No    Lack of Transportation (Non-Medical): No   Physical Activity: Insufficiently Active    Days of Exercise per Week: 3 days    Minutes of Exercise per Session: 20 min   Stress: No Stress Concern Present    Feeling of Stress : Not at all   Social Connections: Unknown    Frequency of Communication with Friends and Family: More than three times a week    Frequency of Social Gatherings with Friends and Family: Once a week    Active Member of Clubs or Organizations: Yes    Attends Club or Organization Meetings: More than 4 times per year    Marital Status:    Housing Stability: Low Risk     Unable to Pay for Housing in the Last Year: No    Number of Places Lived in the Last Year: 1    Unstable Housing in the Last Year: No         Medications/Allergies: See med card    Vitals:    02/22/22 0940   BP: 138/69   Pulse: 86   Weight: 54 kg (119 lb)   Height: 4' 11" (1.499 m)   PainSc:   8   PainLoc: Neck         Physical exam:    GENERAL: A and O x3, the patient appears well groomed and is in no acute distress.  Skin: No rashes or obvious lesions  HEENT: normocephalic, atraumatic  CARDIOVASCULAR:  RRR  LUNGS: nonlabored breathing  ABDOMEN: soft, nontender   UPPER EXTREMITIES: Normal alignment, normal range of motion, no atrophy, no skin changes,  hair growth and nail growth normal and equal bilaterally. No swelling, no tenderness.  +Phalens on left side. +TTP tricep tendon  LOWER EXTREMITIES:  Normal alignment, normal range of motion, no atrophy, no skin changes,  hair growth and nail growth normal and equal bilaterally. No swelling, no tenderness.  CERVICAL SPINE:   Cervical spine: ROM is full in flexion, extension and lateral rotation.  Painful flexion > extension.  Positive facet loading bilaterally.  Spurling is positive at right side.  Myofascial exam:  " Tenderness to palpation across cervical paraspinals deltoid and trapezius muscles bilaterally.      MENTAL STATUS: normal orientation, speech, language, and fund of knowledge for social situation.  Emotional state appropriate.    CRANIAL NERVES:  II:  PERRL bilaterally,   III,IV,VI: EOMI.    V:  Facial sensation equal bilaterally  VII:  Facial motor function normal.  VIII:  Hearing equal to finger rub bilaterally  IX/X: Gag normal, palate symmetric  XI:  Shoulder shrug equal, head turn equal  XII:  Tongue midline without fasciculations      MOTOR: Tone and bulk: normal bilateral upper and lower Strength: normal   Delt Bi Tri WE WF     R 5 5 5 5 5 5   L 5 5 5 5 5 5     IP ADD ABD Quad TA Gas HAM  R 5 5 5 5 5 5 5  L 5 5 5 5 5 5 5    SENSATION: Light touch and pinprick intact bilaterally  REFLEXES: normal, symmetric, nonbrisk.  Toes down, no clonus. No hoffmans.  GAIT: normal rise, base, steps, and arm swing.        Imaging:   Cervical MRI 12/4/17    Narrative     EXAM: Cervical spine MRI without contrast.    INDICATION: Cervical radiculopathy.  Neck pain and occipital neuralgia.  The patient complains of neck and right arm pain.    TECHNIQUE: Routine multiplanar, multisequence unenhanced cervical spine MRI was performed.    COMPARISON: Plain films of the cervical spine obtained concurrently      FINDINGS:     Vertebral column: There is straightening of the cervical spine with loss of normal lordosis.  As seen on concurrent plain films, there is trace anterolisthesis of C3 on C4 with 2 mm anterolisthesis of C4 on C5.  There is marked disc space narrowing at the C5-6 level with moderate to marked disc space narrowing at the C4-5 and C6-7 levels.  There is partial non-segmentation anteriorly at the C2-3 level.  The C2 and C3 as well as the C4 and C5 facet joints appear fused and this may represent developmental non-segmentation or degenerative ankylosis.  All of the discs are desiccated.  The odontoid process is  intact.    Spinal canal, cord, epidural space: The craniovertebral junction is normal.  The spinal canal is omental and normal.  There is no significant spinal stenosis.  The cord is normal in caliber.  There is very subtle flattening of the ventral cord surface at the C4-5 and C5-6 levels where there is degenerative change.  The study is mildly motion but there is no definite cord edema or myelomalacia.    Findings by level:    C2-3: There is no spinal canal or foraminal stenosis.  There is mild left facet joint arthropathy.    C3-4: There is trace anterolisthesis.  There is left greater than right uncovertebral spurring and facet joint arthropathy.  There is a mild disc osteophyte complex, slightly eccentric to the left with subtle annular fissure.  This narrows the ventral subarachnoid space.  There is no spinal stenosis or cord compression.  There is moderate marked left foraminal stenosis.    C4-5: There is moderate disc space narrowing with 2 mm anterolisthesis of C4 on C5.  There is facet joint arthropathy or effusion left greater than right.  There is also mild bilateral but left greater than right uncovertebral spurring.  There is unroofing of a mild disc bulge which narrows the ventral subarachnoid space.  There is no spinal stenosis.  There is mild to moderate left foraminal stenosis.    C5-6:There is marked disc space narrowing.  There is bilateral uncovertebral spurring.  There is a disc osteophyte complex which narrows the subarachnoid space.  This is slightly eccentric to the right There is subtle flattening of the ventral cord surface.  Cord signal is grossly normal.  There is mild spinal stenosis with at least moderate bilateral foraminal stenosis.    C6-7: There is moderate disc space narrowing.  There is mild uncovertebral spurring.  There is a shallow disc osteophyte complex, slightly eccentric to the right.  There is narrowing of the ventral subarachnoid space.  There is no spinal stenosis.   Cord signal is normal.  There is mild bilateral foraminal stenosis.  There is a small 3.5 mm left foraminal perineural cyst.    C7- T1: There is a Schmorl's node in inferior endplate of C7, chronic.  There are tiny bilateral foraminal perineural cysts.  There is minimal bulging of the annulus and mild facet joint arthropathy without spinal canal or foraminal stenosis.    Soft tissues/other: The prevertebral soft tissues are normal.  The airway is patent.   Impression          1. There is multilevel degenerative disc disease described in detail above.  There is no acute fracture.  There is degenerative listhesis at several levels.  There is some degree of disc osteophyte complex, uncovertebral spurring and/or facet joint arthropathy contributing to some degree of foraminal stenosis at several levels.  There is no significant spinal canal stenosis.  There is very subtle flattening of the ventral cord surface at the C4-5 and C6-7 levels The pertinent findings are summarized below.    2. At the C3-4 level, there is no spinal stenosis.  There is moderate to marked left foraminal stenosis.    3. At the C4-5 level there is no spinal stenosis.  There is mild to moderate left foraminal stenosis.    4. At the C5-6 level, there is mild spinal stenosis with at least moderate bilateral foraminal stenosis.    5. At the C6-7 level, there is no spinal stenosis.  There is mild bilateral foraminal stenosis.    6. There is no spinal canal or foraminal stenosis at the C2-3 or C7-T1 levels.     Assessment:  Mrs. Richter is a 76 y.o. female with neck and head pain    1. Bilateral occipital neuralgia    2. Cervical radiculitis    3. DDD (degenerative disc disease), cervical    4. Other spondylosis, cervical region    5. Myofascial pain       Plan:  1. I have stressed the importance of physical activity and exercise to improve overall health.  2. Consider repeat C7-T1 IL MELANY in the future  3. Repeat bilateral occipital blocks for head  pain.  4. Continue Norco 5mg q12hrs as needed for pain.  reviewed.  UDS today  5. Flexeril 10 mg BID #60 1 refill.   6. F/u 3 months or sooner  All medication management was performed by Dr. Timothy Grimaldo

## 2022-02-22 NOTE — TELEPHONE ENCOUNTER
----- Message from Tejal Infante sent at 2/22/2022  8:48 AM CST -----  Type: Needs Medical Advice  Who Called:  Aldo Richter (Spouse)  Best Call Back Number:   Additional Information: Caller has an appointment today and he is wanting to find out if patient can be seen today at the same time instead of next week

## 2022-02-28 ENCOUNTER — TELEPHONE (OUTPATIENT)
Dept: INFUSION THERAPY | Facility: HOSPITAL | Age: 77
End: 2022-02-28
Payer: MEDICARE

## 2022-02-28 NOTE — TELEPHONE ENCOUNTER
Patient did not have calcium level for Prolia injection today. Patient to be rescheduled. Notified Dr. Office to order and schedule lab for patient. Patient verbalized understanding.

## 2022-03-02 DIAGNOSIS — M81.0 AGE-RELATED OSTEOPOROSIS WITHOUT CURRENT PATHOLOGICAL FRACTURE: Primary | ICD-10-CM

## 2022-03-02 DIAGNOSIS — Z79.899 ENCOUNTER FOR LONG-TERM (CURRENT) USE OF MEDICATIONS: ICD-10-CM

## 2022-03-09 ENCOUNTER — LAB VISIT (OUTPATIENT)
Dept: LAB | Facility: HOSPITAL | Age: 77
End: 2022-03-09
Attending: INTERNAL MEDICINE
Payer: MEDICARE

## 2022-03-09 DIAGNOSIS — M81.0 SENILE OSTEOPOROSIS: Primary | ICD-10-CM

## 2022-03-09 DIAGNOSIS — Z79.899 ENCOUNTER FOR LONG-TERM (CURRENT) USE OF OTHER MEDICATIONS: ICD-10-CM

## 2022-03-09 LAB
ANION GAP SERPL CALC-SCNC: 11 MMOL/L (ref 8–16)
BUN SERPL-MCNC: 21 MG/DL (ref 8–23)
CALCIUM SERPL-MCNC: 8.8 MG/DL (ref 8.7–10.5)
CHLORIDE SERPL-SCNC: 100 MMOL/L (ref 95–110)
CO2 SERPL-SCNC: 23 MMOL/L (ref 23–29)
CREAT SERPL-MCNC: 0.9 MG/DL (ref 0.5–1.4)
EST. GFR  (AFRICAN AMERICAN): >60 ML/MIN/1.73 M^2
EST. GFR  (NON AFRICAN AMERICAN): >60 ML/MIN/1.73 M^2
GLUCOSE SERPL-MCNC: 109 MG/DL (ref 70–110)
POTASSIUM SERPL-SCNC: 4.2 MMOL/L (ref 3.5–5.1)
SODIUM SERPL-SCNC: 134 MMOL/L (ref 136–145)

## 2022-03-09 PROCEDURE — 80048 BASIC METABOLIC PNL TOTAL CA: CPT | Performed by: INTERNAL MEDICINE

## 2022-03-09 PROCEDURE — 36415 COLL VENOUS BLD VENIPUNCTURE: CPT | Performed by: INTERNAL MEDICINE

## 2022-03-15 ENCOUNTER — OFFICE VISIT (OUTPATIENT)
Dept: RHEUMATOLOGY | Facility: CLINIC | Age: 77
End: 2022-03-15
Payer: MEDICARE

## 2022-03-15 VITALS — SYSTOLIC BLOOD PRESSURE: 122 MMHG | DIASTOLIC BLOOD PRESSURE: 66 MMHG | WEIGHT: 122.31 LBS | BODY MASS INDEX: 24.7 KG/M2

## 2022-03-15 DIAGNOSIS — M75.52 BURSITIS OF SHOULDER, LEFT: Primary | ICD-10-CM

## 2022-03-15 PROCEDURE — 1159F PR MEDICATION LIST DOCUMENTED IN MEDICAL RECORD: ICD-10-PCS | Mod: S$GLB,,, | Performed by: INTERNAL MEDICINE

## 2022-03-15 PROCEDURE — 20610 LARGE JOINT ASPIRATION/INJECTION: L SUBACROMIAL BURSA: ICD-10-PCS | Mod: LT,S$GLB,, | Performed by: INTERNAL MEDICINE

## 2022-03-15 PROCEDURE — 1125F PR PAIN SEVERITY QUANTIFIED, PAIN PRESENT: ICD-10-PCS | Mod: S$GLB,,, | Performed by: INTERNAL MEDICINE

## 2022-03-15 PROCEDURE — 99213 PR OFFICE/OUTPT VISIT, EST, LEVL III, 20-29 MIN: ICD-10-PCS | Mod: 25,S$GLB,, | Performed by: INTERNAL MEDICINE

## 2022-03-15 PROCEDURE — 99213 OFFICE O/P EST LOW 20 MIN: CPT | Mod: 25,S$GLB,, | Performed by: INTERNAL MEDICINE

## 2022-03-15 PROCEDURE — 20610 DRAIN/INJ JOINT/BURSA W/O US: CPT | Mod: LT,S$GLB,, | Performed by: INTERNAL MEDICINE

## 2022-03-15 PROCEDURE — 1159F MED LIST DOCD IN RCRD: CPT | Mod: S$GLB,,, | Performed by: INTERNAL MEDICINE

## 2022-03-15 PROCEDURE — 1125F AMNT PAIN NOTED PAIN PRSNT: CPT | Mod: S$GLB,,, | Performed by: INTERNAL MEDICINE

## 2022-03-15 RX ORDER — PROMETHAZINE HYDROCHLORIDE AND DEXTROMETHORPHAN HYDROBROMIDE 6.25; 15 MG/5ML; MG/5ML
SYRUP ORAL
COMMUNITY
Start: 2022-03-02 | End: 2022-05-16 | Stop reason: ALTCHOICE

## 2022-03-15 NOTE — PROCEDURES
Large Joint Aspiration/Injection: L subacromial bursa    Date/Time: 3/15/2022 2:15 PM  Performed by: Jayme Ram MD  Authorized by: Jayme Ram MD     Consent Done?:  Yes (Verbal)  Indications:  Pain  Site marked: the procedure site was marked    Timeout: prior to procedure the correct patient, procedure, and site was verified    Prep: patient was prepped and draped in usual sterile fashion    Local anesthetic:  Lidocaine 2% without epinephrine  Location:  Shoulder  Site:  L subacromial bursa  Patient tolerance:  Patient tolerated the procedure well with no immediate complications    injecrted 1 cc Kenalog,1 cc Dexamethasone LSA Bursa

## 2022-03-16 ENCOUNTER — INFUSION (OUTPATIENT)
Dept: INFUSION THERAPY | Facility: HOSPITAL | Age: 77
End: 2022-03-16
Attending: INTERNAL MEDICINE
Payer: MEDICARE

## 2022-03-16 VITALS
HEART RATE: 93 BPM | RESPIRATION RATE: 16 BRPM | SYSTOLIC BLOOD PRESSURE: 154 MMHG | WEIGHT: 121.13 LBS | DIASTOLIC BLOOD PRESSURE: 70 MMHG | TEMPERATURE: 99 F | HEIGHT: 59 IN | BODY MASS INDEX: 24.42 KG/M2

## 2022-03-16 DIAGNOSIS — M81.0 AGE-RELATED OSTEOPOROSIS WITHOUT CURRENT PATHOLOGICAL FRACTURE: Primary | ICD-10-CM

## 2022-03-16 PROCEDURE — 63600175 PHARM REV CODE 636 W HCPCS: Mod: JG | Performed by: INTERNAL MEDICINE

## 2022-03-16 PROCEDURE — 96372 THER/PROPH/DIAG INJ SC/IM: CPT

## 2022-03-16 RX ADMIN — DENOSUMAB 60 MG: 60 INJECTION SUBCUTANEOUS at 10:03

## 2022-03-16 NOTE — PLAN OF CARE
Problem: Activity Intolerance  Goal: Enhanced Capacity and Energy  Outcome: Ongoing, Progressing  Intervention: Optimize Activity Tolerance  Flowsheets (Taken 3/16/2022 1056)  Self-Care Promotion: independence encouraged  Activity Management:   Ambulated -L4   Ambulated in wilson - L4   Ambulated in room - L4   Up in chair - L3  Environmental Support:   calm environment promoted   distractions minimized   rest periods encouraged

## 2022-04-11 ENCOUNTER — PATIENT MESSAGE (OUTPATIENT)
Dept: PSYCHIATRY | Facility: CLINIC | Age: 77
End: 2022-04-11
Payer: MEDICARE

## 2022-04-23 ENCOUNTER — TELEPHONE (OUTPATIENT)
Dept: FAMILY MEDICINE | Facility: CLINIC | Age: 77
End: 2022-04-23
Payer: MEDICARE

## 2022-04-23 NOTE — TELEPHONE ENCOUNTER
I called the patient to schedule their free one hour Enhanced Annual Wellness Visit appointment with Tamera Esquivel NP

## 2022-04-26 ENCOUNTER — TELEPHONE (OUTPATIENT)
Dept: FAMILY MEDICINE | Facility: CLINIC | Age: 77
End: 2022-04-26
Payer: MEDICARE

## 2022-04-26 NOTE — TELEPHONE ENCOUNTER
Called and spoke to pt's   about setting up AWV  Appt set up for 5/24/2022 @ 10:30am w/Ms Esquivel

## 2022-05-09 ENCOUNTER — LAB VISIT (OUTPATIENT)
Dept: LAB | Facility: HOSPITAL | Age: 77
End: 2022-05-09
Attending: FAMILY MEDICINE
Payer: MEDICARE

## 2022-05-09 DIAGNOSIS — I10 ESSENTIAL HYPERTENSION: ICD-10-CM

## 2022-05-09 DIAGNOSIS — E78.2 MIXED HYPERLIPIDEMIA: ICD-10-CM

## 2022-05-09 LAB
ALBUMIN SERPL BCP-MCNC: 3.8 G/DL (ref 3.5–5.2)
ALP SERPL-CCNC: 51 U/L (ref 55–135)
ALT SERPL W/O P-5'-P-CCNC: 12 U/L (ref 10–44)
ANION GAP SERPL CALC-SCNC: 7 MMOL/L (ref 8–16)
AST SERPL-CCNC: 16 U/L (ref 10–40)
BASOPHILS # BLD AUTO: 0.07 K/UL (ref 0–0.2)
BASOPHILS NFR BLD: 1 % (ref 0–1.9)
BILIRUB SERPL-MCNC: 0.3 MG/DL (ref 0.1–1)
BUN SERPL-MCNC: 15 MG/DL (ref 8–23)
CALCIUM SERPL-MCNC: 9 MG/DL (ref 8.7–10.5)
CHLORIDE SERPL-SCNC: 105 MMOL/L (ref 95–110)
CHOLEST SERPL-MCNC: 355 MG/DL (ref 120–199)
CHOLEST/HDLC SERPL: 6.3 {RATIO} (ref 2–5)
CO2 SERPL-SCNC: 26 MMOL/L (ref 23–29)
CREAT SERPL-MCNC: 0.9 MG/DL (ref 0.5–1.4)
DIFFERENTIAL METHOD: ABNORMAL
EOSINOPHIL # BLD AUTO: 0.6 K/UL (ref 0–0.5)
EOSINOPHIL NFR BLD: 8.8 % (ref 0–8)
ERYTHROCYTE [DISTWIDTH] IN BLOOD BY AUTOMATED COUNT: 15 % (ref 11.5–14.5)
EST. GFR  (AFRICAN AMERICAN): >60 ML/MIN/1.73 M^2
EST. GFR  (NON AFRICAN AMERICAN): >60 ML/MIN/1.73 M^2
ESTIMATED AVG GLUCOSE: 120 MG/DL (ref 68–131)
GLUCOSE SERPL-MCNC: 77 MG/DL (ref 70–110)
HBA1C MFR BLD: 5.8 % (ref 4–5.6)
HCT VFR BLD AUTO: 34.5 % (ref 37–48.5)
HDLC SERPL-MCNC: 56 MG/DL (ref 40–75)
HDLC SERPL: 15.8 % (ref 20–50)
HGB BLD-MCNC: 11.2 G/DL (ref 12–16)
IMM GRANULOCYTES # BLD AUTO: 0.03 K/UL (ref 0–0.04)
IMM GRANULOCYTES NFR BLD AUTO: 0.4 % (ref 0–0.5)
LDLC SERPL CALC-MCNC: 242 MG/DL (ref 63–159)
LYMPHOCYTES # BLD AUTO: 2.6 K/UL (ref 1–4.8)
LYMPHOCYTES NFR BLD: 35.9 % (ref 18–48)
MCH RBC QN AUTO: 29.3 PG (ref 27–31)
MCHC RBC AUTO-ENTMCNC: 32.5 G/DL (ref 32–36)
MCV RBC AUTO: 90 FL (ref 82–98)
MONOCYTES # BLD AUTO: 0.7 K/UL (ref 0.3–1)
MONOCYTES NFR BLD: 10 % (ref 4–15)
NEUTROPHILS # BLD AUTO: 3.2 K/UL (ref 1.8–7.7)
NEUTROPHILS NFR BLD: 43.9 % (ref 38–73)
NONHDLC SERPL-MCNC: 299 MG/DL
NRBC BLD-RTO: 0 /100 WBC
PLATELET # BLD AUTO: 255 K/UL (ref 150–450)
PMV BLD AUTO: 10.6 FL (ref 9.2–12.9)
POTASSIUM SERPL-SCNC: 4.8 MMOL/L (ref 3.5–5.1)
PROT SERPL-MCNC: 6.8 G/DL (ref 6–8.4)
RBC # BLD AUTO: 3.82 M/UL (ref 4–5.4)
SODIUM SERPL-SCNC: 138 MMOL/L (ref 136–145)
TRIGL SERPL-MCNC: 285 MG/DL (ref 30–150)
WBC # BLD AUTO: 7.28 K/UL (ref 3.9–12.7)

## 2022-05-09 PROCEDURE — 36415 COLL VENOUS BLD VENIPUNCTURE: CPT | Mod: PO | Performed by: NURSE PRACTITIONER

## 2022-05-09 PROCEDURE — 85025 COMPLETE CBC W/AUTO DIFF WBC: CPT | Performed by: NURSE PRACTITIONER

## 2022-05-09 PROCEDURE — 83036 HEMOGLOBIN GLYCOSYLATED A1C: CPT | Performed by: NURSE PRACTITIONER

## 2022-05-09 PROCEDURE — 80053 COMPREHEN METABOLIC PANEL: CPT | Performed by: NURSE PRACTITIONER

## 2022-05-09 PROCEDURE — 80061 LIPID PANEL: CPT | Performed by: NURSE PRACTITIONER

## 2022-05-16 ENCOUNTER — OFFICE VISIT (OUTPATIENT)
Dept: FAMILY MEDICINE | Facility: CLINIC | Age: 77
End: 2022-05-16
Payer: MEDICARE

## 2022-05-16 VITALS
WEIGHT: 121.25 LBS | HEART RATE: 86 BPM | DIASTOLIC BLOOD PRESSURE: 66 MMHG | BODY MASS INDEX: 24.44 KG/M2 | OXYGEN SATURATION: 96 % | RESPIRATION RATE: 14 BRPM | SYSTOLIC BLOOD PRESSURE: 122 MMHG | HEIGHT: 59 IN | TEMPERATURE: 98 F

## 2022-05-16 DIAGNOSIS — I10 PRIMARY HYPERTENSION: ICD-10-CM

## 2022-05-16 DIAGNOSIS — M50.30 DDD (DEGENERATIVE DISC DISEASE), CERVICAL: ICD-10-CM

## 2022-05-16 DIAGNOSIS — I10 ESSENTIAL HYPERTENSION: ICD-10-CM

## 2022-05-16 DIAGNOSIS — E78.2 MIXED HYPERLIPIDEMIA: ICD-10-CM

## 2022-05-16 DIAGNOSIS — R73.03 PREDIABETES: ICD-10-CM

## 2022-05-16 DIAGNOSIS — R13.10 DYSPHAGIA, UNSPECIFIED TYPE: Primary | ICD-10-CM

## 2022-05-16 PROCEDURE — 1125F PR PAIN SEVERITY QUANTIFIED, PAIN PRESENT: ICD-10-PCS | Mod: CPTII,S$GLB,, | Performed by: FAMILY MEDICINE

## 2022-05-16 PROCEDURE — 99214 OFFICE O/P EST MOD 30 MIN: CPT | Mod: S$GLB,,, | Performed by: FAMILY MEDICINE

## 2022-05-16 PROCEDURE — 99214 PR OFFICE/OUTPT VISIT, EST, LEVL IV, 30-39 MIN: ICD-10-PCS | Mod: S$GLB,,, | Performed by: FAMILY MEDICINE

## 2022-05-16 PROCEDURE — 3078F DIAST BP <80 MM HG: CPT | Mod: CPTII,S$GLB,, | Performed by: FAMILY MEDICINE

## 2022-05-16 PROCEDURE — 1125F AMNT PAIN NOTED PAIN PRSNT: CPT | Mod: CPTII,S$GLB,, | Performed by: FAMILY MEDICINE

## 2022-05-16 PROCEDURE — 1159F PR MEDICATION LIST DOCUMENTED IN MEDICAL RECORD: ICD-10-PCS | Mod: CPTII,S$GLB,, | Performed by: FAMILY MEDICINE

## 2022-05-16 PROCEDURE — 99999 PR PBB SHADOW E&M-EST. PATIENT-LVL IV: ICD-10-PCS | Mod: PBBFAC,,, | Performed by: FAMILY MEDICINE

## 2022-05-16 PROCEDURE — 1159F MED LIST DOCD IN RCRD: CPT | Mod: CPTII,S$GLB,, | Performed by: FAMILY MEDICINE

## 2022-05-16 PROCEDURE — 1101F PT FALLS ASSESS-DOCD LE1/YR: CPT | Mod: CPTII,S$GLB,, | Performed by: FAMILY MEDICINE

## 2022-05-16 PROCEDURE — 3074F PR MOST RECENT SYSTOLIC BLOOD PRESSURE < 130 MM HG: ICD-10-PCS | Mod: CPTII,S$GLB,, | Performed by: FAMILY MEDICINE

## 2022-05-16 PROCEDURE — 3288F PR FALLS RISK ASSESSMENT DOCUMENTED: ICD-10-PCS | Mod: CPTII,S$GLB,, | Performed by: FAMILY MEDICINE

## 2022-05-16 PROCEDURE — 1101F PR PT FALLS ASSESS DOC 0-1 FALLS W/OUT INJ PAST YR: ICD-10-PCS | Mod: CPTII,S$GLB,, | Performed by: FAMILY MEDICINE

## 2022-05-16 PROCEDURE — 3288F FALL RISK ASSESSMENT DOCD: CPT | Mod: CPTII,S$GLB,, | Performed by: FAMILY MEDICINE

## 2022-05-16 PROCEDURE — 3074F SYST BP LT 130 MM HG: CPT | Mod: CPTII,S$GLB,, | Performed by: FAMILY MEDICINE

## 2022-05-16 PROCEDURE — 3078F PR MOST RECENT DIASTOLIC BLOOD PRESSURE < 80 MM HG: ICD-10-PCS | Mod: CPTII,S$GLB,, | Performed by: FAMILY MEDICINE

## 2022-05-16 PROCEDURE — 99999 PR PBB SHADOW E&M-EST. PATIENT-LVL IV: CPT | Mod: PBBFAC,,, | Performed by: FAMILY MEDICINE

## 2022-05-16 RX ORDER — PITAVASTATIN CALCIUM 1.04 MG/1
1 TABLET, FILM COATED ORAL NIGHTLY
Qty: 90 TABLET | Refills: 3 | Status: SHIPPED | OUTPATIENT
Start: 2022-05-16 | End: 2022-06-02

## 2022-05-16 RX ORDER — ASPIRIN 325 MG
50 TABLET, DELAYED RELEASE (ENTERIC COATED) ORAL DAILY
Qty: 90 CAPSULE | Refills: 3 | Status: SHIPPED | OUTPATIENT
Start: 2022-05-16 | End: 2022-09-16

## 2022-05-16 NOTE — PROGRESS NOTES
Subjective:       Patient ID: Rola Richter is a 76 y.o. female.    Chief Complaint: Follow-up (6mth f/u )    HPI  Review of Systems   Constitutional: Negative for fatigue and unexpected weight change.   HENT: Positive for congestion and trouble swallowing.    Respiratory: Negative for chest tightness and shortness of breath.    Cardiovascular: Negative for chest pain, palpitations and leg swelling.   Gastrointestinal: Negative for abdominal pain.   Musculoskeletal: Positive for myalgias, neck pain and neck stiffness. Negative for arthralgias.   Neurological: Negative for dizziness, syncope, light-headedness and headaches.       Patient Active Problem List   Diagnosis    Hypertension    GERD (gastroesophageal reflux disease)    Anxiety    Hyperlipidemia    Osteoporosis    Dependency on pain medication    PTSD (post-traumatic stress disorder)    CMC arthritis    Dry eye syndrome    DDD (degenerative disc disease), cervical    Occipital neuralgia    Cervical radiculitis    Primary osteoarthritis involving multiple joints    MDD (major depressive disorder), recurrent, in partial remission    Dysphagia    Spondylosis of cervical region without myelopathy or radiculopathy    Myofascial pain    Neck pain    Bronchitis     Patient is here for a chronic conditions follow up.   Reviewed labs 5/2022  HPL- Your cholesterol is high.  Tried lipitor, crestor, pravastatin -all muscle pain. 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      Neck pain -helps when wears collar    Has chronic daily headaches- top and frontal. Congestion relieved partially by astelin and flonase NS.  PND, sneezing, hoarse. Sob and wheezing are improving but still lots of phlegm. No fever.  Unrelieved with tessalon or robitussin.  singulair added 7/2021. CT sinus showed chronic bin sinus disease.  Start  clindamycin 300mg qid x 14 days.  Under care of ENT Dr. Jansen -seen 8/23 and 9/21/2021. Did not feel daily HA were rhinogenic-Referred to [facility] -seen 10/7/2021 and gabapentin added. Helping some. Has f/u next month.     Referred to pain clinic for left leg numbness and cervical radiculitis 7/2021. Seen by Dr. Grimaldo and team 9/15/2021 . Considering MELANY C7-T1.   bin occipital nerve blocks recommended-not scheduled?   Cont norco and flexeril       Admitted NS 6/2021 former smoker after being seen in ED for SOb, wheeze, cough determined to be due to RSV induced acute bronchitis. She was treated supportively with albuterol nebulizer therapy and steroids. She did not require O2 therapy as she was stable on room air. She was monitored overnight and discharged 6/4/2021.        Heme/onc Dr. Murphy- anemia. Has had work up for blood loss anemia.  Likely anemia of chronic disease. Iron is normal     GI Dr. Victor egd 2/2021 10 gastric polyps (fundic gland polyps), hiatal hernia, gastritis.  H Pylori neg. Esophageal bx- no metaplasia, dysplasia or malignancy.   Colonoscopy 5/19 polyp -adenomatous. On 5 year surveillance     HPL-  Intolerant to statins due to myalgia.  Tried lipitor and crestor. Had to stop it      Pain mgmt Dr. Grimaldo taking norco 5 bid . DPS checked no evidence of diversion.  Tried lyrica but stopped it due to dizziness     Rheum Dr. KARUNA Ram Has severe hand OA- tried plaquenil but did not help so stopped it . Osteoporosis now on prolia q 6m     Psych Dr. Benítez MDDD/PTSD. Mood is good on zoloft  Objective:      Physical Exam  Vitals and nursing note reviewed.   Constitutional:       Appearance: She is well-developed.   Cardiovascular:      Rate and Rhythm: Normal rate and regular rhythm.      Heart sounds: Normal heart sounds.   Pulmonary:      Effort: Pulmonary effort is normal.      Breath sounds: Normal breath sounds.   Skin:     General: Skin is warm and dry.   Neurological:      Mental  Status: She is alert and oriented to person, place, and time.         Assessment:       1. Dysphagia, unspecified type    2. Mixed hyperlipidemia    3. Essential hypertension    4. Prediabetes    5. Primary hypertension    6. DDD (degenerative disc disease), cervical        Plan:       1. Dysphagia, unspecified type  Refer for eval and treat r/o esophageal stricture  - Ambulatory referral/consult to Gastroenterology; Future    2. Mixed hyperlipidemia  D/c pravastatin and add  - pitavastatin calcium (LIVALO) 1 mg Tab tablet; Take 1 tablet (1 mg total) by mouth every evening.  Dispense: 90 tablet; Refill: 3  - co-enzyme Q-10 (CO Q-10) 50 mg capsule; Take 1 capsule (50 mg total) by mouth once daily.  Dispense: 90 capsule; Refill: 3  - CBC Auto Differential; Future  - Comprehensive Metabolic Panel; Future  - Lipid Panel; Future    3. Essential hypertension  Controlled on current medications.  Continue current medications.      4. Prediabetes  Stable and controlled with diet  - Hemoglobin A1C; Future    5. Primary hypertension  Controlled on current medications.  Continue current medications.      6. DDD (degenerative disc disease), cervical  Cont current mgmt and pain consult        Time spent with patient: 20 minutes    Patient with be reevaluated in 4 months or sooner prn    Greater than 50% of this visit was spent counseling as described in above documentation:Yes

## 2022-05-17 ENCOUNTER — PATIENT MESSAGE (OUTPATIENT)
Dept: NEUROLOGY | Facility: CLINIC | Age: 77
End: 2022-05-17
Payer: MEDICARE

## 2022-05-18 ENCOUNTER — TELEPHONE (OUTPATIENT)
Dept: NEUROLOGY | Facility: CLINIC | Age: 77
End: 2022-05-18
Payer: MEDICARE

## 2022-05-18 ENCOUNTER — OFFICE VISIT (OUTPATIENT)
Dept: NEUROLOGY | Facility: CLINIC | Age: 77
End: 2022-05-18
Payer: MEDICARE

## 2022-05-18 ENCOUNTER — OFFICE VISIT (OUTPATIENT)
Dept: PAIN MEDICINE | Facility: CLINIC | Age: 77
End: 2022-05-18
Payer: MEDICARE

## 2022-05-18 VITALS
HEIGHT: 59 IN | BODY MASS INDEX: 24.58 KG/M2 | RESPIRATION RATE: 17 BRPM | SYSTOLIC BLOOD PRESSURE: 142 MMHG | TEMPERATURE: 98 F | WEIGHT: 121.94 LBS | HEART RATE: 81 BPM | DIASTOLIC BLOOD PRESSURE: 70 MMHG

## 2022-05-18 VITALS
BODY MASS INDEX: 24.39 KG/M2 | SYSTOLIC BLOOD PRESSURE: 132 MMHG | HEART RATE: 80 BPM | WEIGHT: 121 LBS | DIASTOLIC BLOOD PRESSURE: 70 MMHG | HEIGHT: 59 IN

## 2022-05-18 DIAGNOSIS — M79.18 MYOFASCIAL PAIN: ICD-10-CM

## 2022-05-18 DIAGNOSIS — M54.81 BILATERAL OCCIPITAL NEURALGIA: Primary | ICD-10-CM

## 2022-05-18 DIAGNOSIS — M54.12 CERVICAL RADICULITIS: ICD-10-CM

## 2022-05-18 DIAGNOSIS — M47.892 OTHER SPONDYLOSIS, CERVICAL REGION: ICD-10-CM

## 2022-05-18 DIAGNOSIS — M50.30 DDD (DEGENERATIVE DISC DISEASE), CERVICAL: ICD-10-CM

## 2022-05-18 DIAGNOSIS — H53.8 BLURRED VISION, BILATERAL: ICD-10-CM

## 2022-05-18 DIAGNOSIS — G89.4 CHRONIC PAIN DISORDER: ICD-10-CM

## 2022-05-18 DIAGNOSIS — G44.209 TENSION HEADACHE: Primary | ICD-10-CM

## 2022-05-18 PROCEDURE — 1101F PR PT FALLS ASSESS DOC 0-1 FALLS W/OUT INJ PAST YR: ICD-10-PCS | Mod: CPTII,S$GLB,, | Performed by: PHYSICIAN ASSISTANT

## 2022-05-18 PROCEDURE — 1125F AMNT PAIN NOTED PAIN PRSNT: CPT | Mod: CPTII,S$GLB,, | Performed by: PHYSICIAN ASSISTANT

## 2022-05-18 PROCEDURE — 3288F PR FALLS RISK ASSESSMENT DOCUMENTED: ICD-10-PCS | Mod: CPTII,S$GLB,, | Performed by: PHYSICIAN ASSISTANT

## 2022-05-18 PROCEDURE — 3075F SYST BP GE 130 - 139MM HG: CPT | Mod: CPTII,S$GLB,, | Performed by: PHYSICIAN ASSISTANT

## 2022-05-18 PROCEDURE — 1159F MED LIST DOCD IN RCRD: CPT | Mod: CPTII,S$GLB,, | Performed by: PHYSICIAN ASSISTANT

## 2022-05-18 PROCEDURE — 1160F PR REVIEW ALL MEDS BY PRESCRIBER/CLIN PHARMACIST DOCUMENTED: ICD-10-PCS | Mod: CPTII,S$GLB,, | Performed by: PHYSICIAN ASSISTANT

## 2022-05-18 PROCEDURE — 1160F RVW MEDS BY RX/DR IN RCRD: CPT | Mod: CPTII,S$GLB,, | Performed by: PHYSICIAN ASSISTANT

## 2022-05-18 PROCEDURE — 3078F DIAST BP <80 MM HG: CPT | Mod: CPTII,S$GLB,, | Performed by: PHYSICIAN ASSISTANT

## 2022-05-18 PROCEDURE — 1101F PT FALLS ASSESS-DOCD LE1/YR: CPT | Mod: CPTII,S$GLB,, | Performed by: PHYSICIAN ASSISTANT

## 2022-05-18 PROCEDURE — 3288F FALL RISK ASSESSMENT DOCD: CPT | Mod: CPTII,S$GLB,, | Performed by: PHYSICIAN ASSISTANT

## 2022-05-18 PROCEDURE — 1159F PR MEDICATION LIST DOCUMENTED IN MEDICAL RECORD: ICD-10-PCS | Mod: CPTII,S$GLB,, | Performed by: PHYSICIAN ASSISTANT

## 2022-05-18 PROCEDURE — 3075F PR MOST RECENT SYSTOLIC BLOOD PRESS GE 130-139MM HG: ICD-10-PCS | Mod: CPTII,S$GLB,, | Performed by: PHYSICIAN ASSISTANT

## 2022-05-18 PROCEDURE — 99999 PR PBB SHADOW E&M-EST. PATIENT-LVL IV: ICD-10-PCS | Mod: PBBFAC,,, | Performed by: PHYSICIAN ASSISTANT

## 2022-05-18 PROCEDURE — 99999 PR PBB SHADOW E&M-EST. PATIENT-LVL V: CPT | Mod: PBBFAC,,, | Performed by: PHYSICIAN ASSISTANT

## 2022-05-18 PROCEDURE — 3077F SYST BP >= 140 MM HG: CPT | Mod: CPTII,S$GLB,, | Performed by: PHYSICIAN ASSISTANT

## 2022-05-18 PROCEDURE — 1125F PR PAIN SEVERITY QUANTIFIED, PAIN PRESENT: ICD-10-PCS | Mod: CPTII,S$GLB,, | Performed by: PHYSICIAN ASSISTANT

## 2022-05-18 PROCEDURE — 99214 OFFICE O/P EST MOD 30 MIN: CPT | Mod: S$GLB,,, | Performed by: PHYSICIAN ASSISTANT

## 2022-05-18 PROCEDURE — 3078F PR MOST RECENT DIASTOLIC BLOOD PRESSURE < 80 MM HG: ICD-10-PCS | Mod: CPTII,S$GLB,, | Performed by: PHYSICIAN ASSISTANT

## 2022-05-18 PROCEDURE — 99999 PR PBB SHADOW E&M-EST. PATIENT-LVL V: ICD-10-PCS | Mod: PBBFAC,,, | Performed by: PHYSICIAN ASSISTANT

## 2022-05-18 PROCEDURE — 3077F PR MOST RECENT SYSTOLIC BLOOD PRESSURE >= 140 MM HG: ICD-10-PCS | Mod: CPTII,S$GLB,, | Performed by: PHYSICIAN ASSISTANT

## 2022-05-18 PROCEDURE — 99214 PR OFFICE/OUTPT VISIT, EST, LEVL IV, 30-39 MIN: ICD-10-PCS | Mod: S$GLB,,, | Performed by: PHYSICIAN ASSISTANT

## 2022-05-18 PROCEDURE — 99999 PR PBB SHADOW E&M-EST. PATIENT-LVL IV: CPT | Mod: PBBFAC,,, | Performed by: PHYSICIAN ASSISTANT

## 2022-05-18 RX ORDER — HYDROCODONE BITARTRATE AND ACETAMINOPHEN 5; 325 MG/1; MG/1
1 TABLET ORAL EVERY 12 HOURS PRN
Qty: 60 TABLET | Refills: 0 | Status: SHIPPED | OUTPATIENT
Start: 2022-07-29 | End: 2022-08-23 | Stop reason: SDUPTHER

## 2022-05-18 RX ORDER — HYDROCODONE BITARTRATE AND ACETAMINOPHEN 5; 325 MG/1; MG/1
1 TABLET ORAL EVERY 12 HOURS PRN
Qty: 60 TABLET | Refills: 0 | Status: SHIPPED | OUTPATIENT
Start: 2022-06-01 | End: 2022-06-30

## 2022-05-18 RX ORDER — HYDROCODONE BITARTRATE AND ACETAMINOPHEN 5; 325 MG/1; MG/1
1 TABLET ORAL EVERY 12 HOURS PRN
Qty: 60 TABLET | Refills: 0 | Status: SHIPPED | OUTPATIENT
Start: 2022-06-30 | End: 2022-07-29

## 2022-05-18 RX ORDER — CYCLOBENZAPRINE HCL 10 MG
10 TABLET ORAL 2 TIMES DAILY PRN
Qty: 60 TABLET | Refills: 1 | Status: SHIPPED | OUTPATIENT
Start: 2022-05-18 | End: 2022-08-23 | Stop reason: SDUPTHER

## 2022-05-18 RX ORDER — GABAPENTIN 100 MG/1
CAPSULE ORAL
Qty: 60 CAPSULE | Refills: 5 | Status: SHIPPED | OUTPATIENT
Start: 2022-05-18 | End: 2022-08-03

## 2022-05-18 NOTE — TELEPHONE ENCOUNTER
Received note from Pain management (in [place]) that patient just left office (950ish) for our 1000 appt today in [place], my staff and I have called to see about rescheduling her for another day as she will likely miss her appt (at the time of the writing she is now 12 mins late).      WILMER

## 2022-05-18 NOTE — PROGRESS NOTES
"[facility]  [address] [place] [address]  Phone:[phone]  Fax: [phone]  Follow Up Visit     Patient Name: Rola Richter  : 1945  MRN:  [id number]  Today: 2022   LOV: 2021  chief complaint: Headache    PCP: Liseth Carias MD.    Assessment:   Rola Richter is a 76 y.o. rwith a PMHx of: anxiety, arthritis, depression (followed by mental health) , GERD, HTN, HLD, osteoporosis, chronic neck pain (followed by pain management, Dr. Grimaldo)   whom presents with her  in follow up for HA.  Chronic full head and body pain... in patient with hx of depression, memory changes and arthritis. Possibly an underlying FMG?  Symptoms at times can be migrainous. Seem to be better s/p gabapentin. Now appearing more tension quality.     As aside her  subconjunctival hemorrhage OS has since resolved (noted seen back in 2021). She has had some blurred vision and states she has not seen eye provider in >1 year. "I would like a new opinion, is there anyone else here in [place] I can see?" Denies eye pain, vision loss, colour vision changes, "I just want to get re-established"    Review:    ICD-10-CM ICD-9-CM   1. Tension headache  G44.209 307.81   2. Blurred vision, bilateral  H53.8 368.8         Plan:   If HA change in quality or nature, will get updated imaging study      -as she states her HA are "better" no need at today's visit   Continue gabapentin 100 mg but increased to 1 pill BID, reaffirme dosing/adv effects, she agreed. Discussed moving forward to not self adjust and not take this as a PRN as the can increase risk of seizure  Continue zoloft from mental health   No oral steroids d/t pmhx of osteoporosis   Referral to eye provider placed   Discussed moving forward if she is late for appts, I will not see her. If she is going to be late (I.e., coming from another appt) she can call to reschedule and I will get her in so that she " "doesn't impact my other patients. She voiced agreement.     All test results as well as any necessary instructions were reviewed and discussed with patient.    Review:  Orders Placed This Encounter    Ambulatory referral/consult to Ophthalmology    gabapentin (NEURONTIN) 100 MG capsule         Patient to return to PCP/other specialists for all other problems  Patient to continue on all medications as Rx'd  AVS available online   RTO- 3-4 months   The patient indicates understanding of these issues and agrees to the plan.    HPI:   Rola Richter is a 76 y.o.right handed, female with a PMHx of: anxiety, arthritis, depression (followed by mental health) , GERD, HTN, HLD, osteoporosis, chronic neck pain (followed by pain management, Dr. Grimaldo)   whom presents with her  in follow up for HA.     Noted she had a 1000 appt today with me, seems to have gone (on the other side of the [place]) to a pain management appt at the same time. Noted to be 24 mins late to appt. Was able to work her in.       At last visit:Continue gabapentin 100 mg Q2h before bed, Continue zoloft from mental health, No oral steroids d/t pmhx of osteoporosis and follow up with eye provider.  States only 5-6 bad HA a in past month  Gabapentin helping, has been taking "an extra one" sometimes in the morning. No side effects  Has pressure above eyebrows (frontalis) made worse by "pushing on my head."        -states if she didn't push on her head, "I guess I wouldn't have pain in my eyebrows"  States some blurred vision at times, "I want another opinion of an eye doctor, can you please refer me over here         -denies eye pain, vision loss or diplopia  Denies falls, dysphagia, weight changes  States multiple time she is in 8/10 headache pain (noted smiling, lights on in exam room, able to carry on a conversation)          -she then said "maybe 8/10 is too high, maybe I am a 6/10"  States a lot of neck pain and shoulder pain, she was " "seen by her pain management provider, at the time of this note---there was no note from them   "I really feel better since I started gabapentin."         From previous visits:   4-5 HD a month now ( feels its more, patient feels this is about right)  Pain along frontalis       -eating/chewing make it worse  Last HA was 2 days, went away on its own ---"I don't have to take medicines anymore for this pain"  Feels gabapentin at 100 mg Q2h before bed is helping  "I have no HA, I actually feel good, I don't want to make any changes."  States she is compliant on all medications including her BP meds     As aside, noticed LEFT eye cornea with bright red blood under surface, states "I had this a week ago in the other eye."  mentions her BP has been 120s-170s systolic. NO vision loss, right eye (not the LEFT) feels a little sore. No double vision. Denies recent sneezing/coughing and no mention of constipation/bearing down. Eitan recent trauma and denies any foreign substance touching her eyes. States she has an established eye provider, has not followed up "in a while."           HA Hx  Start: HA for years many years, "just can't get rid of them"  History of trauma (no), History of CNS infection (no), History of Stroke (no)  Location:frontalis and along the cheek bones or pain in high vertex (more on the LEFT)        -feels its brought on by sinuses ("I have gone to my ENT, nothing can be done for me.")            -states her nose runs and her eyes tear, and that she feels congested (worst during fall/winter months)        -she mentions some blurred vision (multiple years)             -states the pain/symptoms are not new (I.e., years), just continue to bother her and wants to know if something can be done to make it better?  Severity: range: 8-10/10  Duration:full day   Frequency:every day   Associated factors (bolded positive) WITH HA (or migraine): Nausea, vomiting, photophobia, phonophobia (the " "aforementioned is not very often), tinnitus, scalp pain, vision loss, diplopia, scintillations, eye pain, jaw pain, weakness?    Tried:sinus medicine  Triggers (in bold): stress, lack of sleep, sinus pressure, too much caffeine, too little caffeine, weather change, seasonal change, strong odours, bright lights, sunlight, food    Currently having a HA?:yes  Positives in bold: Hx of Kidney Stones, asthma, GI bleed, osteoporosis, CAD/MI, CVA/TIA, DM    Imaging on file: none of the head  Therapies tried in past:   buspar  Fioricet  hydrocodone  zoloft  Flexeril  Robaxin  norco  lyrica  zanaflex   Epidural  GONB     -states to date, nothing has helped her HA or sinuses   Gabapentin     As aside, occasionally sees something out of corner of her eye ("like my cat walking, but its not my cat.") She has had this for "a while" and was put on aricept by her psychiatrist. She is able to take her own medications. She denies any trouble with ADLs.       -denies incontinence, dysuria      -states only on occasion, visual hallucinations, denies auditory         States she has had some falls in past year but per  "its been a while." Denies striking her head     Medication Reconciliation:   Current Outpatient Medications   Medication Sig Dispense Refill    albuterol sulfate 2.5 mg/0.5 mL Nebu Take 2.5 mg by nebulization every 6 (six) hours as needed (shortness of breath, wheezing). Rescue 1 each 3    azelastine (ASTELIN) 137 mcg (0.1 %) nasal spray 1 spray (137 mcg total) by Nasal route 2 (two) times daily. 30 mL 0    busPIRone (BUSPAR) 10 MG tablet TAKE 1 TABLET TWICE DAILY 180 tablet 0    butalbital-aspirin-caffeine -40 mg (FIORINAL) -40 mg Cap Take 1 capsule by mouth every 4 (four) hours as needed (migraine).       cetirizine (ZYRTEC) 5 MG tablet Take 1 tablet (5 mg total) by mouth once daily. 30 tablet 11    co-enzyme Q-10 (CO Q-10) 50 mg capsule Take 1 capsule (50 mg total) by mouth once daily. 90 " capsule 3    COD LIVER OIL ORAL Take 1 tablet by mouth once daily.       diclofenac sodium (VOLTAREN) 1 % Gel APPLY 2 GRAMS TOPICALLY ONCE DAILY AS DIRECTED (Patient taking differently: Apply 2 g topically once daily.) 200 g prn    donepeziL (ARICEPT) 5 MG tablet TAKE 1 TABLET (5MG TOTAL) EVERY EVENING 90 tablet 0    fish oil-omega-3 fatty acids 300-1,000 mg capsule Take 2 g by mouth once daily.      fluticasone propionate (FLONASE) 50 mcg/actuation nasal spray 2 sprays (100 mcg total) by Each Nostril route once daily. 16 g 11    gabapentin (NEURONTIN) 100 MG capsule TAKE 1 CAPSULE BY MOUTH EVERY NIGHT 2 HOURS BEFORE BEDTIME 30 capsule 3    HYDROcodone-acetaminophen (NORCO) 5-325 mg per tablet Take 1 tablet by mouth every 12 (twelve) hours as needed for Pain. Medically necessary for greater than 7 days for chronic pain 60 tablet 0    ipratropium (ATROVENT) 42 mcg (0.06 %) nasal spray 2 sprays by Nasal route 3 (three) times daily. 15 mL 6    irbesartan (AVAPRO) 300 MG tablet Take 1 tablet (300 mg total) by mouth every evening. (new tablet strength) 90 tablet 1    montelukast (SINGULAIR) 10 mg tablet Take 10 mg by mouth nightly.      multivit with min-folic acid 200 mcg Chew Take 1 tablet by mouth once daily.       omeprazole (PRILOSEC) 40 MG capsule TAKE 1 CAPSULE EVERY DAY 90 capsule 3    pitavastatin calcium (LIVALO) 1 mg Tab tablet Take 1 tablet (1 mg total) by mouth every evening. 90 tablet 3    polyethylene glycol (GLYCOLAX) 17 gram/dose powder Take 17 g by mouth daily as needed (constipation).       sertraline (ZOLOFT) 100 MG tablet Take 1 tablet (100 mg total) by mouth once daily. 90 tablet 0     No current facility-administered medications for this visit.     Review of patient's allergies indicates:   Allergen Reactions    Aspirin Nausea And Vomiting    Penicillins Itching    Crestor [rosuvastatin]      Muscle pain      Lipitor [atorvastatin]      Achy         PMHx:  Past Medical History:    Diagnosis Date    Anxiety     Arthritis     Cataract     DDD (degenerative disc disease), lumbar     Depression     Encounter for blood transfusion     GERD (gastroesophageal reflux disease)     Headache     Hyperlipidemia     Hypertension     pt states she does not take meds anymore she just watches her weight    Neuromuscular disorder     Occipital neuralgia 3/24/2017    Osteoporosis      Past Surgical History:   Procedure Laterality Date    APPENDECTOMY       SECTION      x 2    CHOLECYSTECTOMY      COLONOSCOPY  prior to     COLONOSCOPY N/A 2019    Procedure: COLONOSCOPY;  Surgeon: Greg Victor MD;  Location: [facility];  Service: Endoscopy;  Laterality: N/A; 2 colon polyps, hemorrhoids; repeat in 5 years for surveillance; biopsy: Tubular adenoma x2    ESOPHAGOGASTRODUODENOSCOPY N/A 2019    Procedure: EGD (ESOPHAGOGASTRODUODENOSCOPY);  Surgeon: Greg Victor MD;  Location: [facility] END[facility];  Service: Endoscopy;  Laterality: N/A; Mild Schatzki ring. Dilated. small hiatal hernia; Four gastric polyps. Resected and retrieved, gastritis; biopsy:stomach- unremarkable, negative for h pylori, polyps-Fundic type mucosa with foveolar hyperplasia.    ESOPHAGOGASTRODUODENOSCOPY N/A 2021    Procedure: EGD (ESOPHAGOGASTRODUODENOSCOPY);  Surgeon: Greg Victor MD;  Location: [facility];  Service: Endoscopy;  Laterality: N/A;    HYSTERECTOMY      Laser Periphery Iridotomy Bilateral     OD 16 and OS touch up 2016    UPPER GASTROINTESTINAL ENDOSCOPY  2017    Dr. Marcial: esophageal stenosis- dilated, gastritis, gastric polyps removed; biopsy- mild gastritis, negative for h pylori, hyperplastic polyp, esophagus unremarkable    vocal cord tumor removal         Fhx:  Family History   Problem Relation Age of Onset    Osteoarthritis Mother     Alcohol abuse Mother     Rheum arthritis Mother     Osteoarthritis Sister     Diabetes Brother     No Known Problems  Son     No Known Problems Sister     No Known Problems Sister     No Known Problems Brother     Arthritis Son     No Known Problems Son     Stroke Maternal Grandmother 99    Rheum arthritis Maternal Grandmother     Retinal detachment Neg Hx     Macular degeneration Neg Hx     Glaucoma Neg Hx     Amblyopia Neg Hx     Blindness Neg Hx     Cancer Neg Hx     Cataracts Neg Hx     Hypertension Neg Hx     Strabismus Neg Hx     Thyroid disease Neg Hx     Lupus Neg Hx     Kidney disease Neg Hx     Inflammatory bowel disease Neg Hx     Psoriasis Neg Hx     Colon cancer Neg Hx     Crohn's disease Neg Hx     Ulcerative colitis Neg Hx     Stomach cancer Neg Hx     Esophageal cancer Neg Hx        Shx:   Social History     Socioeconomic History    Marital status:    Tobacco Use    Smoking status: Never Smoker    Smokeless tobacco: Never Used   Substance and Sexual Activity    Alcohol use: No     Alcohol/week: 0.0 standard drinks    Drug use: Yes     Types: Hydrocodone    Sexual activity: Yes     Partners: Male     Social Determinants of Health     Financial Resource Strain: Low Risk     Difficulty of Paying Living Expenses: Not hard at all   Food Insecurity: No Food Insecurity    Worried About Running Out of Food in the Last Year: Never true    Ran Out of Food in the Last Year: Never true   Transportation Needs: No Transportation Needs    Lack of Transportation (Medical): No    Lack of Transportation (Non-Medical): No   Physical Activity: Insufficiently Active    Days of Exercise per Week: 2 days    Minutes of Exercise per Session: 30 min   Stress: No Stress Concern Present    Feeling of Stress : Only a little   Social Connections: Unknown    Frequency of Communication with Friends and Family: More than three times a week    Frequency of Social Gatherings with Friends and Family: Once a week    Active Member of Clubs or Organizations: Yes    Attends Club or Organization Meetings:  "More than 4 times per year    Marital Status:    Housing Stability: Low Risk     Unable to Pay for Housing in the Last Year: No    Number of Places Lived in the Last Year: 1    Unstable Housing in the Last Year: No           Labs:   Reviewed in chart   Imaging:   Reviewed in chart       Other testing:  Reviewed in chart     Note: I have independently reviewed any/all imaging/labs/tests and agree with the report (s) as documented.  Any discrepancies will be as noted/demarcated by free text.  WILMER PEDRO 5/18/2022                     ROS:   Review Of Systems (questions asked, positive or additions in BOLD)  Gen: Weight change, fatigue/malaise, pyrexia   HEENT: Tinnitus, headache,  blurred vision, eye pain, diplopia, photophobia,  nose bleeds, congestion, sore throat, jaw pain, scalp pain, neck stiffness   Card: Palpitations, CP   Pulm: SOB, cough   Vas: Easy bruising, easy bleeding   GI: N/V/D/C, incontinence, hematemesis, hematochezia         Endocrine: Temp intolerance, polyuria, polydipsia   M/S: Neck pain, myalgia, back pain, joint pain, falls    Neuro: PER HPI   PSY: Memory loss, confusion, depression, anxiety, trouble in sleep, visual hallucinations           EXAM:   BP (!) 142/70 (BP Location: Left arm, Patient Position: Sitting, BP Method: Medium (Automatic))   Pulse 81   Temp 97.7 °F (36.5 °C) (Temporal)   Resp 17   Ht 4' 11" (1.499 m)   Wt 55.3 kg (121 lb 14.6 oz)   BMI 24.62 kg/m²    GEN:  NAD  HEENT: NC/AT,NTTP along frontalis/vertex, states VTTP occipitalis, note bilt sclerae are white at today's visit  EXTREM:  Swelling of DIP and PIP bilat UE, volar distribution of fingers on palm. Noted TTP to elbows, knees  NEURO:   Mental Status:  Awake, alert and appropriately oriented to time, place, and person.  . Able to discuss current events.  Slightly decreased attention and concentration.  Speech is fluent and appropriate language function (I.e., comprehension)    Cranial Nerves:      " Extraocular movements are intact and without nystagmus.  Visual fields are full to confrontation testing.  Facial movement is symmetric.  Facial sensation is intact.  Hearing is normal. Uvula in midline. DROM of neck in all (6) directions, shoulder shrug symmetrical.       Motor:  Antigravity in all limbs   Tremor/pronator drift not apparent.          Gait and Stance: slightly antalgic, gait is not wide based         This document has been electronically signed by Gordy Diamond MPA, PA-C on 5/18/2022, I have personally typed this message using the EMR.       Dr Aron MD was available during today's visit.     Personal Protective Equipment:    Personal Protective Equipment was used during this encounter including: KN95 and non latex gloves.          CC: Liseth Carias MD

## 2022-05-18 NOTE — PROGRESS NOTES
Referring Physician: No ref. provider found    PCP: Liseth Carias MD      CC: neck and occipital pain    Interval history: Ms. Richter is a 76 y.o. female with neck pain and occipital neuralgia who returns to our clinic.  She continues to c/o headaches.  Most recent occcipital nerve block only provided mild short lived beneft. Neck pain that extends into bilateral shoulders is improving with PT. Previously right arm pain traveled to her hand, her left stops around her shoulder.  She had numbness to her right hand and first through fourth digits. Cervical MELANY in February 2018 that only provided benefit for a short time.     She continues to take Norco with benefit.  Flexeril 10 mg caused dry mouth however this resolved and she wishes to resume. Robaxin was helpful but caused dizziness.  Denies UE weakness. No bowel bladder changes.   Pain today is rated 8/10.    Prior HPI:   Patient is 70-year-old female with past history history of cervical DDD, cervical spondylosis and chronic headaches.  She recently moved here from [place], [place].  She is treated in the past by neurology.  She states having constant burning pain over the left side of her posterior scalp.  Pain radiates to her neck as well as a frontal.  She also has left-sided neck pain as well.  She denies any radicular arm pain.  No numbness or weakness.  She states having cervical epidural steroid injection at past with minimal benefit.  She also has had decided of cervical nerve blocks in the past with moderate benefit.  Most recent injection was performed 2 months ago.  She desires repeat injection.  She currently takes Norco 10 mg every 12 hours as needed with moderate benefit.  She also takes Zanaflex 4 mg every 8 hours with mild benefits.  She rates her pain 7/10 today.    Pain intervention history: s/p left occipital nerve blocks on 1/2016 with 50% relief of her headaches  S/p cervical MELANY 2/8/18 moderate relief for a couple of weeks.      ROS:  CONSTITUTIONAL: No fevers, chills, night sweats, wt. loss, appetite changes  SKIN: no rashes or itching  ENT: No headaches, head trauma, vision changes, or eye pain  LYMPH NODES: None noticed   CV: No chest pain, palpitations.   RESP: No shortness of breath, dyspnea on exertion, cough, wheezing, or hemoptysis  GI: No nausea, emesis, diarrhea, constipation, melena, hematochezia, pain.    : No dysuria, hematuria, urgency, or frequency   HEME: No easy bruising, bleeding problems  PSYCHIATRIC: No depression, anxiety, psychosis, hallucinations.  NEURO: No seizures, memory loss, dizziness or difficulty sleeping  MSK: + History of present illness      Past Medical History:   Diagnosis Date    Anxiety     Arthritis     Cataract     DDD (degenerative disc disease), lumbar     Depression     Encounter for blood transfusion     GERD (gastroesophageal reflux disease)     Headache     Hyperlipidemia     Hypertension     pt states she does not take meds anymore she just watches her weight    Neuromuscular disorder     Occipital neuralgia 3/24/2017    Osteoporosis      Past Surgical History:   Procedure Laterality Date    APPENDECTOMY       SECTION      x 2    CHOLECYSTECTOMY      COLONOSCOPY  prior to     COLONOSCOPY N/A 2019    Procedure: COLONOSCOPY;  Surgeon: Greg Victor MD;  Location: [facility];  Service: Endoscopy;  Laterality: N/A; 2 colon polyps, hemorrhoids; repeat in 5 years for surveillance; biopsy: Tubular adenoma x2    ESOPHAGOGASTRODUODENOSCOPY N/A 2019    Procedure: EGD (ESOPHAGOGASTRODUODENOSCOPY);  Surgeon: Greg Victor MD;  Location: [facility];  Service: Endoscopy;  Laterality: N/A; Mild Schatzki ring. Dilated. small hiatal hernia; Four gastric polyps. Resected and retrieved, gastritis; biopsy:stomach- unremarkable, negative for h pylori, polyps-Fundic type mucosa with foveolar hyperplasia.    ESOPHAGOGASTRODUODENOSCOPY N/A 2021    Procedure:  EGD (ESOPHAGOGASTRODUODENOSCOPY);  Surgeon: Greg Victor MD;  Location: [facility];  Service: Endoscopy;  Laterality: N/A;    HYSTERECTOMY      Laser Periphery Iridotomy Bilateral     OD 5/26/16 and OS touch up 5/26/2016    UPPER GASTROINTESTINAL ENDOSCOPY  03/14/2017    Dr. Marcial: esophageal stenosis- dilated, gastritis, gastric polyps removed; biopsy- mild gastritis, negative for h pylori, hyperplastic polyp, esophagus unremarkable    vocal cord tumor removal       Family History   Problem Relation Age of Onset    Osteoarthritis Mother     Alcohol abuse Mother     Rheum arthritis Mother     Osteoarthritis Sister     Diabetes Brother     No Known Problems Son     No Known Problems Sister     No Known Problems Sister     No Known Problems Brother     Arthritis Son     No Known Problems Son     Stroke Maternal Grandmother 99    Rheum arthritis Maternal Grandmother     Retinal detachment Neg Hx     Macular degeneration Neg Hx     Glaucoma Neg Hx     Amblyopia Neg Hx     Blindness Neg Hx     Cancer Neg Hx     Cataracts Neg Hx     Hypertension Neg Hx     Strabismus Neg Hx     Thyroid disease Neg Hx     Lupus Neg Hx     Kidney disease Neg Hx     Inflammatory bowel disease Neg Hx     Psoriasis Neg Hx     Colon cancer Neg Hx     Crohn's disease Neg Hx     Ulcerative colitis Neg Hx     Stomach cancer Neg Hx     Esophageal cancer Neg Hx      Social History     Socioeconomic History    Marital status:    Tobacco Use    Smoking status: Never Smoker    Smokeless tobacco: Never Used   Substance and Sexual Activity    Alcohol use: No     Alcohol/week: 0.0 standard drinks    Drug use: Yes     Types: Hydrocodone    Sexual activity: Yes     Partners: Male     Social Determinants of Health     Financial Resource Strain: Low Risk     Difficulty of Paying Living Expenses: Not hard at all   Food Insecurity: No Food Insecurity    Worried About Running Out of Food in the Last Year:  "Never true    Ran Out of Food in the Last Year: Never true   Transportation Needs: No Transportation Needs    Lack of Transportation (Medical): No    Lack of Transportation (Non-Medical): No   Physical Activity: Insufficiently Active    Days of Exercise per Week: 2 days    Minutes of Exercise per Session: 30 min   Stress: No Stress Concern Present    Feeling of Stress : Only a little   Social Connections: Unknown    Frequency of Communication with Friends and Family: More than three times a week    Frequency of Social Gatherings with Friends and Family: Once a week    Active Member of Clubs or Organizations: Yes    Attends Club or Organization Meetings: More than 4 times per year    Marital Status:    Housing Stability: Low Risk     Unable to Pay for Housing in the Last Year: No    Number of Places Lived in the Last Year: 1    Unstable Housing in the Last Year: No         Medications/Allergies: See med card    Vitals:    05/18/22 0943   BP: 132/70   Pulse: 80   Weight: 54.9 kg (121 lb)   Height: 4' 11" (1.499 m)   PainSc:   5   PainLoc: Neck         Physical exam:    GENERAL: A and O x3, the patient appears well groomed and is in no acute distress.  Skin: No rashes or obvious lesions  HEENT: normocephalic, atraumatic  CARDIOVASCULAR:  RRR  LUNGS: nonlabored breathing  ABDOMEN: soft, nontender   UPPER EXTREMITIES: Normal alignment, normal range of motion, no atrophy, no skin changes,  hair growth and nail growth normal and equal bilaterally. No swelling, no tenderness.  +Phalens on left side. +TTP tricep tendon  LOWER EXTREMITIES:  Normal alignment, normal range of motion, no atrophy, no skin changes,  hair growth and nail growth normal and equal bilaterally. No swelling, no tenderness.  CERVICAL SPINE:   Cervical spine: ROM is full in flexion, extension and lateral rotation.  Painful flexion > extension.  Positive facet loading bilaterally.  Spurling is positive at right side.  Myofascial exam:  " Tenderness to palpation across cervical paraspinals deltoid and trapezius muscles bilaterally.      MENTAL STATUS: normal orientation, speech, language, and fund of knowledge for social situation.  Emotional state appropriate.    CRANIAL NERVES:  II:  PERRL bilaterally,   III,IV,VI: EOMI.    V:  Facial sensation equal bilaterally  VII:  Facial motor function normal.  VIII:  Hearing equal to finger rub bilaterally  IX/X: Gag normal, palate symmetric  XI:  Shoulder shrug equal, head turn equal  XII:  Tongue midline without fasciculations      MOTOR: Tone and bulk: normal bilateral upper and lower Strength: normal   Delt Bi Tri WE WF     R 5 5 5 5 5 5   L 5 5 5 5 5 5     IP ADD ABD Quad TA Gas HAM  R 5 5 5 5 5 5 5  L 5 5 5 5 5 5 5    SENSATION: Light touch and pinprick intact bilaterally  REFLEXES: normal, symmetric, nonbrisk.  Toes down, no clonus. No hoffmans.  GAIT: normal rise, base, steps, and arm swing.        Imaging:   Cervical MRI 12/4/17    Narrative     EXAM: Cervical spine MRI without contrast.    INDICATION: Cervical radiculopathy.  Neck pain and occipital neuralgia.  The patient complains of neck and right arm pain.    TECHNIQUE: Routine multiplanar, multisequence unenhanced cervical spine MRI was performed.    COMPARISON: Plain films of the cervical spine obtained concurrently      FINDINGS:     Vertebral column: There is straightening of the cervical spine with loss of normal lordosis.  As seen on concurrent plain films, there is trace anterolisthesis of C3 on C4 with 2 mm anterolisthesis of C4 on C5.  There is marked disc space narrowing at the C5-6 level with moderate to marked disc space narrowing at the C4-5 and C6-7 levels.  There is partial non-segmentation anteriorly at the C2-3 level.  The C2 and C3 as well as the C4 and C5 facet joints appear fused and this may represent developmental non-segmentation or degenerative ankylosis.  All of the discs are desiccated.  The odontoid process is  intact.    Spinal canal, cord, epidural space: The craniovertebral junction is normal.  The spinal canal is omental and normal.  There is no significant spinal stenosis.  The cord is normal in caliber.  There is very subtle flattening of the ventral cord surface at the C4-5 and C5-6 levels where there is degenerative change.  The study is mildly motion but there is no definite cord edema or myelomalacia.    Findings by level:    C2-3: There is no spinal canal or foraminal stenosis.  There is mild left facet joint arthropathy.    C3-4: There is trace anterolisthesis.  There is left greater than right uncovertebral spurring and facet joint arthropathy.  There is a mild disc osteophyte complex, slightly eccentric to the left with subtle annular fissure.  This narrows the ventral subarachnoid space.  There is no spinal stenosis or cord compression.  There is moderate marked left foraminal stenosis.    C4-5: There is moderate disc space narrowing with 2 mm anterolisthesis of C4 on C5.  There is facet joint arthropathy or effusion left greater than right.  There is also mild bilateral but left greater than right uncovertebral spurring.  There is unroofing of a mild disc bulge which narrows the ventral subarachnoid space.  There is no spinal stenosis.  There is mild to moderate left foraminal stenosis.    C5-6:There is marked disc space narrowing.  There is bilateral uncovertebral spurring.  There is a disc osteophyte complex which narrows the subarachnoid space.  This is slightly eccentric to the right There is subtle flattening of the ventral cord surface.  Cord signal is grossly normal.  There is mild spinal stenosis with at least moderate bilateral foraminal stenosis.    C6-7: There is moderate disc space narrowing.  There is mild uncovertebral spurring.  There is a shallow disc osteophyte complex, slightly eccentric to the right.  There is narrowing of the ventral subarachnoid space.  There is no spinal stenosis.   Cord signal is normal.  There is mild bilateral foraminal stenosis.  There is a small 3.5 mm left foraminal perineural cyst.    C7- T1: There is a Schmorl's node in inferior endplate of C7, chronic.  There are tiny bilateral foraminal perineural cysts.  There is minimal bulging of the annulus and mild facet joint arthropathy without spinal canal or foraminal stenosis.    Soft tissues/other: The prevertebral soft tissues are normal.  The airway is patent.   Impression          1. There is multilevel degenerative disc disease described in detail above.  There is no acute fracture.  There is degenerative listhesis at several levels.  There is some degree of disc osteophyte complex, uncovertebral spurring and/or facet joint arthropathy contributing to some degree of foraminal stenosis at several levels.  There is no significant spinal canal stenosis.  There is very subtle flattening of the ventral cord surface at the C4-5 and C6-7 levels The pertinent findings are summarized below.    2. At the C3-4 level, there is no spinal stenosis.  There is moderate to marked left foraminal stenosis.    3. At the C4-5 level there is no spinal stenosis.  There is mild to moderate left foraminal stenosis.    4. At the C5-6 level, there is mild spinal stenosis with at least moderate bilateral foraminal stenosis.    5. At the C6-7 level, there is no spinal stenosis.  There is mild bilateral foraminal stenosis.    6. There is no spinal canal or foraminal stenosis at the C2-3 or C7-T1 levels.     Assessment:  Mrs. Richter is a 76 y.o. female with neck and head pain    1. Bilateral occipital neuralgia    2. DDD (degenerative disc disease), cervical    3. Other spondylosis, cervical region    4. Myofascial pain    5. Cervical radiculitis       Plan:  1. I have stressed the importance of physical activity and exercise to improve overall health.  2. Consider repeat C7-T1 IL MELANY in the future  3. Monitor progress form bilateral occipital  blocks for head pain.  4. Norco 5mg q12hrs as needed for pain.  reviewed.  Previous UDS consistent   5. Flexeril 10 mg BID #60 1 refill.   6. F/u 3 months or sooner  All medication management was performed by Dr. Timothy Grimaldo

## 2022-05-18 NOTE — TELEPHONE ENCOUNTER
Called patient to see if she would be able to make it to her appointment but no answer. Left voicemail.

## 2022-05-19 ENCOUNTER — PATIENT MESSAGE (OUTPATIENT)
Dept: FAMILY MEDICINE | Facility: CLINIC | Age: 77
End: 2022-05-19
Payer: MEDICARE

## 2022-05-23 ENCOUNTER — TELEPHONE (OUTPATIENT)
Dept: ADMINISTRATIVE | Facility: CLINIC | Age: 77
End: 2022-05-23
Payer: MEDICARE

## 2022-05-24 ENCOUNTER — OFFICE VISIT (OUTPATIENT)
Dept: FAMILY MEDICINE | Facility: CLINIC | Age: 77
End: 2022-05-24
Payer: MEDICARE

## 2022-05-24 VITALS
OXYGEN SATURATION: 98 % | TEMPERATURE: 98 F | SYSTOLIC BLOOD PRESSURE: 118 MMHG | HEART RATE: 78 BPM | BODY MASS INDEX: 24.49 KG/M2 | WEIGHT: 121.5 LBS | HEIGHT: 59 IN | DIASTOLIC BLOOD PRESSURE: 62 MMHG

## 2022-05-24 DIAGNOSIS — E78.2 MIXED HYPERLIPIDEMIA: ICD-10-CM

## 2022-05-24 DIAGNOSIS — Z00.00 ENCOUNTER FOR PREVENTIVE HEALTH EXAMINATION: Primary | ICD-10-CM

## 2022-05-24 DIAGNOSIS — I10 PRIMARY HYPERTENSION: ICD-10-CM

## 2022-05-24 DIAGNOSIS — I70.0 CALCIFICATION OF AORTA: ICD-10-CM

## 2022-05-24 DIAGNOSIS — F33.41 MDD (MAJOR DEPRESSIVE DISORDER), RECURRENT, IN PARTIAL REMISSION: ICD-10-CM

## 2022-05-24 PROCEDURE — 3288F FALL RISK ASSESSMENT DOCD: CPT | Mod: CPTII,S$GLB,, | Performed by: NURSE PRACTITIONER

## 2022-05-24 PROCEDURE — 3078F PR MOST RECENT DIASTOLIC BLOOD PRESSURE < 80 MM HG: ICD-10-PCS | Mod: CPTII,S$GLB,, | Performed by: NURSE PRACTITIONER

## 2022-05-24 PROCEDURE — 1159F MED LIST DOCD IN RCRD: CPT | Mod: CPTII,S$GLB,, | Performed by: NURSE PRACTITIONER

## 2022-05-24 PROCEDURE — 1125F PR PAIN SEVERITY QUANTIFIED, PAIN PRESENT: ICD-10-PCS | Mod: CPTII,S$GLB,, | Performed by: NURSE PRACTITIONER

## 2022-05-24 PROCEDURE — 1160F RVW MEDS BY RX/DR IN RCRD: CPT | Mod: CPTII,S$GLB,, | Performed by: NURSE PRACTITIONER

## 2022-05-24 PROCEDURE — G0439 PR MEDICARE ANNUAL WELLNESS SUBSEQUENT VISIT: ICD-10-PCS | Mod: S$GLB,,, | Performed by: NURSE PRACTITIONER

## 2022-05-24 PROCEDURE — 3074F SYST BP LT 130 MM HG: CPT | Mod: CPTII,S$GLB,, | Performed by: NURSE PRACTITIONER

## 2022-05-24 PROCEDURE — 1101F PR PT FALLS ASSESS DOC 0-1 FALLS W/OUT INJ PAST YR: ICD-10-PCS | Mod: CPTII,S$GLB,, | Performed by: NURSE PRACTITIONER

## 2022-05-24 PROCEDURE — 1170F FXNL STATUS ASSESSED: CPT | Mod: CPTII,S$GLB,, | Performed by: NURSE PRACTITIONER

## 2022-05-24 PROCEDURE — 3078F DIAST BP <80 MM HG: CPT | Mod: CPTII,S$GLB,, | Performed by: NURSE PRACTITIONER

## 2022-05-24 PROCEDURE — 3074F PR MOST RECENT SYSTOLIC BLOOD PRESSURE < 130 MM HG: ICD-10-PCS | Mod: CPTII,S$GLB,, | Performed by: NURSE PRACTITIONER

## 2022-05-24 PROCEDURE — G9919 SCRN ND POS ND PROV OF REC: HCPCS | Mod: CPTII,S$GLB,, | Performed by: NURSE PRACTITIONER

## 2022-05-24 PROCEDURE — 3288F PR FALLS RISK ASSESSMENT DOCUMENTED: ICD-10-PCS | Mod: CPTII,S$GLB,, | Performed by: NURSE PRACTITIONER

## 2022-05-24 PROCEDURE — 1170F PR FUNCTIONAL STATUS ASSESSED: ICD-10-PCS | Mod: CPTII,S$GLB,, | Performed by: NURSE PRACTITIONER

## 2022-05-24 PROCEDURE — 99999 PR PBB SHADOW E&M-EST. PATIENT-LVL V: CPT | Mod: PBBFAC,,, | Performed by: NURSE PRACTITIONER

## 2022-05-24 PROCEDURE — 1101F PT FALLS ASSESS-DOCD LE1/YR: CPT | Mod: CPTII,S$GLB,, | Performed by: NURSE PRACTITIONER

## 2022-05-24 PROCEDURE — 1160F PR REVIEW ALL MEDS BY PRESCRIBER/CLIN PHARMACIST DOCUMENTED: ICD-10-PCS | Mod: CPTII,S$GLB,, | Performed by: NURSE PRACTITIONER

## 2022-05-24 PROCEDURE — 1125F AMNT PAIN NOTED PAIN PRSNT: CPT | Mod: CPTII,S$GLB,, | Performed by: NURSE PRACTITIONER

## 2022-05-24 PROCEDURE — G0439 PPPS, SUBSEQ VISIT: HCPCS | Mod: S$GLB,,, | Performed by: NURSE PRACTITIONER

## 2022-05-24 PROCEDURE — G9919 PR SCREENING AND POSITIVE: ICD-10-PCS | Mod: CPTII,S$GLB,, | Performed by: NURSE PRACTITIONER

## 2022-05-24 PROCEDURE — 1159F PR MEDICATION LIST DOCUMENTED IN MEDICAL RECORD: ICD-10-PCS | Mod: CPTII,S$GLB,, | Performed by: NURSE PRACTITIONER

## 2022-05-24 PROCEDURE — 99999 PR PBB SHADOW E&M-EST. PATIENT-LVL V: ICD-10-PCS | Mod: PBBFAC,,, | Performed by: NURSE PRACTITIONER

## 2022-05-24 NOTE — PATIENT INSTRUCTIONS
Counseling and Referral of Other Preventative  (Italic type indicates deductible and co-insurance are waived)    Patient Name: Rola Richter  Today's Date: 5/24/2022    Health Maintenance       Date Due Completion Date    Sign Pain Contract Never done ---    Naloxone Prescription Never done ---    Shingles Vaccine (1 of 2) Never done ---    COVID-19 Vaccine (3 - Booster for Pfizer series) 07/09/2021 2/9/2021    Urine Drug Screen 06/14/2022 12/14/2021    Lipid Panel 05/09/2023 5/9/2022    DEXA Scan 08/28/2023 8/28/2020    Colonoscopy 05/13/2024 5/13/2019    TETANUS VACCINE 01/26/2026 1/26/2016        No orders of the defined types were placed in this encounter.    The following information is provided to all patients.  This information is to help you find resources for any of the problems found today that may be affecting your health:                Living healthy guide: www.[facility].[place].[facility]      Understanding Diabetes: www.diabetes.org      Eating healthy: www.cdc.gov/healthyweight      CDC home safety checklist: www.cdc.gov/steadi/patient.html      Agency on Aging: www.goea.[place].[facility]      Alcoholics anonymous (AA): www.aa.org      Physical Activity: www.[place].nih.gov/gg8gnzi      Tobacco use: www.quitwithusla.org

## 2022-05-24 NOTE — PROGRESS NOTES
"  Rola Richter presented for a  Medicare AWV and comprehensive Health Risk Assessment today. The following components were reviewed and updated:    · Medical history  · Family History  · Social history  · Allergies and Current Medications  · Health Risk Assessment  · Health Maintenance  · Care Team     Patient screened moderate and/or high risk for one or more social determinants of health (SDOH). Patient connected to community resources through the ED Navigator.      ** See Completed Assessments for Annual Wellness Visit within the encounter summary.**         The following assessments were completed:  · Living Situation  · CAGE  · Depression Screening  · Timed Get Up and Go  · Whisper Test  · Cognitive Function Screening  · Nutrition Screening  · ADL Screening  · PAQ Screening            Vitals:    05/24/22 1040   BP: 118/62   Pulse: 78   Temp: 98.4 °F (36.9 °C)   SpO2: 98%   Weight: 55.1 kg (121 lb 7.6 oz)   Height: 4' 11" (1.499 m)     Body mass index is 24.53 kg/m².  Physical Exam  Constitutional:       Appearance: She is well-developed.   HENT:      Head: Normocephalic and atraumatic.      Nose: Nose normal.   Eyes:      General: Lids are normal.      Conjunctiva/sclera: Conjunctivae normal.      Pupils: Pupils are equal, round, and reactive to light.   Cardiovascular:      Rate and Rhythm: Normal rate.   Pulmonary:      Effort: Pulmonary effort is normal.   Musculoskeletal:      Cervical back: Full passive range of motion without pain.   Skin:     General: Skin is warm and dry.   Neurological:      Mental Status: She is alert and oriented to person, place, and time.   Psychiatric:         Speech: Speech normal.         Behavior: Behavior normal.               Diagnoses and health risks identified today and associated recommendations/orders:    1. Encounter for preventive health examination  Discussed health maintenance guidelines appropriate for age.        2. MDD (major depressive disorder), recurrent, in " partial remission  Stable, continue current medication regimen  Followed by pcp    3. Calcification of aorta  Stable, continue to monitor  Followed by pcp    4. Primary hypertension  Controlled, continue current medication regimen  Low salt diet  Increase physical activity  Followed by pcp      5. Mixed hyperlipidemia  Uncontrolled patient has been statin intolerant  Is starting new statin medication currently  Followed closely  by pcp      Kika Rola with a 5-10 year written screening schedule and personal prevention plan. Recommendations were developed using the USPSTF age appropriate recommendations. Education, counseling, and referrals were provided as needed. After Visit Summary printed and given to patient which includes a list of additional screenings\tests needed.    Follow up for One year for Annual Wellness Visit.    Tamera Esquivel, NP  I offered to discuss advanced care planning, including how to pick a person who would make decisions for you if you were unable to make them for yourself, called a health care power of , and what kind of decisions you might make such as use of life sustaining treatments such as ventilators and tube feeding when faced with a life limiting illness recorded on a living will that they will need to know. (How you want to be cared for as you near the end of your natural life)     X Patient is interested in learning more about how to make advanced directives.  I provided them paperwork and offered to discuss this with them.

## 2022-05-30 ENCOUNTER — PATIENT MESSAGE (OUTPATIENT)
Dept: GASTROENTEROLOGY | Facility: CLINIC | Age: 77
End: 2022-05-30
Payer: MEDICARE

## 2022-05-31 NOTE — PROGRESS NOTES
Spoke to patient about her lab work and elevated cholesterol level. Patient stated that she takes 10mg of Pravastatin but stated that she thought you would up the mg because it was always high.  She also stated that she does not know what to do anymore because she is really trying to keep it under control.

## 2022-06-02 DIAGNOSIS — E78.2 MIXED HYPERLIPIDEMIA: Primary | ICD-10-CM

## 2022-06-02 RX ORDER — PITAVASTATIN CALCIUM 2.09 MG/1
2 TABLET, FILM COATED ORAL NIGHTLY
Qty: 90 TABLET | Refills: 3 | Status: SHIPPED | OUTPATIENT
Start: 2022-06-02 | End: 2022-12-01

## 2022-06-02 NOTE — PROGRESS NOTES
Informed patient about her increase in pitavastatin to 2 mg daily, patient all understanding. Patient also stated that she seen Dr. Claire In [place] [facility]. She stated that he thinks she needs an eye exam. She wanted to know what you thought she should do.

## 2022-06-06 NOTE — PROGRESS NOTES
Spoke with patient about scheduling her for an eye exam. Trying to get her scheduled because a referral was already put in.

## 2022-06-07 ENCOUNTER — OFFICE VISIT (OUTPATIENT)
Dept: PSYCHIATRY | Facility: CLINIC | Age: 77
End: 2022-06-07
Payer: MEDICARE

## 2022-06-07 VITALS
HEART RATE: 85 BPM | HEIGHT: 59 IN | WEIGHT: 121.25 LBS | BODY MASS INDEX: 24.44 KG/M2 | DIASTOLIC BLOOD PRESSURE: 77 MMHG | SYSTOLIC BLOOD PRESSURE: 135 MMHG

## 2022-06-07 DIAGNOSIS — F33.41 MDD (MAJOR DEPRESSIVE DISORDER), RECURRENT, IN PARTIAL REMISSION: ICD-10-CM

## 2022-06-07 DIAGNOSIS — R41.89 COGNITIVE CHANGES: ICD-10-CM

## 2022-06-07 DIAGNOSIS — F43.10 PTSD (POST-TRAUMATIC STRESS DISORDER): Primary | ICD-10-CM

## 2022-06-07 PROCEDURE — 99214 PR OFFICE/OUTPT VISIT, EST, LEVL IV, 30-39 MIN: ICD-10-PCS | Mod: S$GLB,,, | Performed by: PSYCHIATRY & NEUROLOGY

## 2022-06-07 PROCEDURE — 3288F PR FALLS RISK ASSESSMENT DOCUMENTED: ICD-10-PCS | Mod: CPTII,S$GLB,, | Performed by: PSYCHIATRY & NEUROLOGY

## 2022-06-07 PROCEDURE — 1159F MED LIST DOCD IN RCRD: CPT | Mod: CPTII,S$GLB,, | Performed by: PSYCHIATRY & NEUROLOGY

## 2022-06-07 PROCEDURE — 3075F PR MOST RECENT SYSTOLIC BLOOD PRESS GE 130-139MM HG: ICD-10-PCS | Mod: CPTII,S$GLB,, | Performed by: PSYCHIATRY & NEUROLOGY

## 2022-06-07 PROCEDURE — 1101F PT FALLS ASSESS-DOCD LE1/YR: CPT | Mod: CPTII,S$GLB,, | Performed by: PSYCHIATRY & NEUROLOGY

## 2022-06-07 PROCEDURE — 3078F PR MOST RECENT DIASTOLIC BLOOD PRESSURE < 80 MM HG: ICD-10-PCS | Mod: CPTII,S$GLB,, | Performed by: PSYCHIATRY & NEUROLOGY

## 2022-06-07 PROCEDURE — 1101F PR PT FALLS ASSESS DOC 0-1 FALLS W/OUT INJ PAST YR: ICD-10-PCS | Mod: CPTII,S$GLB,, | Performed by: PSYCHIATRY & NEUROLOGY

## 2022-06-07 PROCEDURE — 1125F AMNT PAIN NOTED PAIN PRSNT: CPT | Mod: CPTII,S$GLB,, | Performed by: PSYCHIATRY & NEUROLOGY

## 2022-06-07 PROCEDURE — 99999 PR PBB SHADOW E&M-EST. PATIENT-LVL III: CPT | Mod: PBBFAC,,, | Performed by: PSYCHIATRY & NEUROLOGY

## 2022-06-07 PROCEDURE — 3078F DIAST BP <80 MM HG: CPT | Mod: CPTII,S$GLB,, | Performed by: PSYCHIATRY & NEUROLOGY

## 2022-06-07 PROCEDURE — 99999 PR PBB SHADOW E&M-EST. PATIENT-LVL III: ICD-10-PCS | Mod: PBBFAC,,, | Performed by: PSYCHIATRY & NEUROLOGY

## 2022-06-07 PROCEDURE — 3075F SYST BP GE 130 - 139MM HG: CPT | Mod: CPTII,S$GLB,, | Performed by: PSYCHIATRY & NEUROLOGY

## 2022-06-07 PROCEDURE — 1125F PR PAIN SEVERITY QUANTIFIED, PAIN PRESENT: ICD-10-PCS | Mod: CPTII,S$GLB,, | Performed by: PSYCHIATRY & NEUROLOGY

## 2022-06-07 PROCEDURE — 99214 OFFICE O/P EST MOD 30 MIN: CPT | Mod: S$GLB,,, | Performed by: PSYCHIATRY & NEUROLOGY

## 2022-06-07 PROCEDURE — 1159F PR MEDICATION LIST DOCUMENTED IN MEDICAL RECORD: ICD-10-PCS | Mod: CPTII,S$GLB,, | Performed by: PSYCHIATRY & NEUROLOGY

## 2022-06-07 PROCEDURE — 3288F FALL RISK ASSESSMENT DOCD: CPT | Mod: CPTII,S$GLB,, | Performed by: PSYCHIATRY & NEUROLOGY

## 2022-06-07 RX ORDER — DONEPEZIL HYDROCHLORIDE 5 MG/1
TABLET, FILM COATED ORAL
Qty: 90 TABLET | Refills: 1 | Status: SHIPPED | OUTPATIENT
Start: 2022-06-07 | End: 2022-07-08

## 2022-06-07 RX ORDER — SERTRALINE HYDROCHLORIDE 100 MG/1
150 TABLET, FILM COATED ORAL DAILY
Qty: 135 TABLET | Refills: 1 | Status: SHIPPED | OUTPATIENT
Start: 2022-06-07 | End: 2022-07-08
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
## 2022-07-08 ENCOUNTER — PATIENT OUTREACH (OUTPATIENT)
Dept: ADMINISTRATIVE | Facility: OTHER | Age: 77
End: 2022-07-08

## 2022-07-11 ENCOUNTER — TELEPHONE (OUTPATIENT)
Dept: FAMILY MEDICINE | Facility: CLINIC | Age: 77
End: 2022-07-11
Payer: MEDICARE

## 2022-07-11 NOTE — TELEPHONE ENCOUNTER
----- Message from Jillian Florian sent at 7/11/2022 11:59 AM CDT -----  Contact: pt at [phone]  Type: Needs Medical Advice  Who Called:  pt   Symptoms (please be specific):  urinary tract infection   How long has patient had these symptoms:  burning when pee   Pharmacy name and phone #:    [id number] - [place] - [address] AT [facility] [address]  [address] [place] [address]  Phone: [phone] Fax: [phone]    [facility] ([facility]) - [place], [place] - [address] [facility] [address] [address]  [address]  [facility] [address]  Phone: [phone] Fax: [phone]      Best Call Back Number: [phone]    Additional Information: pt would like to be seen today if possible for the the urinary tract infection to receive medicine please call back and advise

## 2022-07-13 ENCOUNTER — OFFICE VISIT (OUTPATIENT)
Dept: FAMILY MEDICINE | Facility: CLINIC | Age: 77
End: 2022-07-13
Payer: MEDICARE

## 2022-07-13 VITALS
TEMPERATURE: 98 F | DIASTOLIC BLOOD PRESSURE: 60 MMHG | OXYGEN SATURATION: 96 % | BODY MASS INDEX: 24.76 KG/M2 | WEIGHT: 122.81 LBS | HEART RATE: 86 BPM | SYSTOLIC BLOOD PRESSURE: 126 MMHG | HEIGHT: 59 IN

## 2022-07-13 DIAGNOSIS — N39.0 URINARY TRACT INFECTION WITH HEMATURIA, SITE UNSPECIFIED: Primary | ICD-10-CM

## 2022-07-13 DIAGNOSIS — R31.9 URINARY TRACT INFECTION WITH HEMATURIA, SITE UNSPECIFIED: Primary | ICD-10-CM

## 2022-07-13 LAB
BILIRUB SERPL-MCNC: ABNORMAL MG/DL
BLOOD, POC UA: ABNORMAL
GLUCOSE UR QL STRIP: NORMAL
KETONES UR QL STRIP: NEGATIVE
LEUKOCYTE ESTERASE URINE, POC: ABNORMAL
NITRITE, POC UA: POSITIVE
PH, POC UA: 6
PROTEIN, POC: + 30
SPECIFIC GRAVITY, POC UA: 1.01
UROBILINOGEN, POC UA: NORMAL

## 2022-07-13 PROCEDURE — 3074F SYST BP LT 130 MM HG: CPT | Mod: CPTII,S$GLB,, | Performed by: FAMILY MEDICINE

## 2022-07-13 PROCEDURE — 81003 URINALYSIS AUTO W/O SCOPE: CPT | Mod: QW,S$GLB,, | Performed by: FAMILY MEDICINE

## 2022-07-13 PROCEDURE — 99213 OFFICE O/P EST LOW 20 MIN: CPT | Mod: S$GLB,,, | Performed by: FAMILY MEDICINE

## 2022-07-13 PROCEDURE — 1159F MED LIST DOCD IN RCRD: CPT | Mod: CPTII,S$GLB,, | Performed by: FAMILY MEDICINE

## 2022-07-13 PROCEDURE — 1101F PR PT FALLS ASSESS DOC 0-1 FALLS W/OUT INJ PAST YR: ICD-10-PCS | Mod: CPTII,S$GLB,, | Performed by: FAMILY MEDICINE

## 2022-07-13 PROCEDURE — 87186 SC STD MICRODIL/AGAR DIL: CPT | Performed by: FAMILY MEDICINE

## 2022-07-13 PROCEDURE — 81003 PR URINALYSIS, AUTO, W/O SCOPE: ICD-10-PCS | Mod: QW,S$GLB,, | Performed by: FAMILY MEDICINE

## 2022-07-13 PROCEDURE — 1101F PT FALLS ASSESS-DOCD LE1/YR: CPT | Mod: CPTII,S$GLB,, | Performed by: FAMILY MEDICINE

## 2022-07-13 PROCEDURE — 99213 PR OFFICE/OUTPT VISIT, EST, LEVL III, 20-29 MIN: ICD-10-PCS | Mod: S$GLB,,, | Performed by: FAMILY MEDICINE

## 2022-07-13 PROCEDURE — 3288F FALL RISK ASSESSMENT DOCD: CPT | Mod: CPTII,S$GLB,, | Performed by: FAMILY MEDICINE

## 2022-07-13 PROCEDURE — 81003 URINALYSIS AUTO W/O SCOPE: CPT | Mod: PBBFAC,PO | Performed by: FAMILY MEDICINE

## 2022-07-13 PROCEDURE — 1160F RVW MEDS BY RX/DR IN RCRD: CPT | Mod: CPTII,S$GLB,, | Performed by: FAMILY MEDICINE

## 2022-07-13 PROCEDURE — 99215 OFFICE O/P EST HI 40 MIN: CPT | Mod: PBBFAC,PO | Performed by: FAMILY MEDICINE

## 2022-07-13 PROCEDURE — 1160F PR REVIEW ALL MEDS BY PRESCRIBER/CLIN PHARMACIST DOCUMENTED: ICD-10-PCS | Mod: CPTII,S$GLB,, | Performed by: FAMILY MEDICINE

## 2022-07-13 PROCEDURE — 99999 PR PBB SHADOW E&M-EST. PATIENT-LVL V: ICD-10-PCS | Mod: PBBFAC,,, | Performed by: FAMILY MEDICINE

## 2022-07-13 PROCEDURE — 87088 URINE BACTERIA CULTURE: CPT | Performed by: FAMILY MEDICINE

## 2022-07-13 PROCEDURE — 3288F PR FALLS RISK ASSESSMENT DOCUMENTED: ICD-10-PCS | Mod: CPTII,S$GLB,, | Performed by: FAMILY MEDICINE

## 2022-07-13 PROCEDURE — 1159F PR MEDICATION LIST DOCUMENTED IN MEDICAL RECORD: ICD-10-PCS | Mod: CPTII,S$GLB,, | Performed by: FAMILY MEDICINE

## 2022-07-13 PROCEDURE — 3078F PR MOST RECENT DIASTOLIC BLOOD PRESSURE < 80 MM HG: ICD-10-PCS | Mod: CPTII,S$GLB,, | Performed by: FAMILY MEDICINE

## 2022-07-13 PROCEDURE — 99999 PR PBB SHADOW E&M-EST. PATIENT-LVL V: CPT | Mod: PBBFAC,,, | Performed by: FAMILY MEDICINE

## 2022-07-13 PROCEDURE — 3078F DIAST BP <80 MM HG: CPT | Mod: CPTII,S$GLB,, | Performed by: FAMILY MEDICINE

## 2022-07-13 PROCEDURE — 87077 CULTURE AEROBIC IDENTIFY: CPT | Performed by: FAMILY MEDICINE

## 2022-07-13 PROCEDURE — 87086 URINE CULTURE/COLONY COUNT: CPT | Performed by: FAMILY MEDICINE

## 2022-07-13 PROCEDURE — 3074F PR MOST RECENT SYSTOLIC BLOOD PRESSURE < 130 MM HG: ICD-10-PCS | Mod: CPTII,S$GLB,, | Performed by: FAMILY MEDICINE

## 2022-07-13 RX ORDER — CIPROFLOXACIN 500 MG/1
500 TABLET ORAL 2 TIMES DAILY
Qty: 14 TABLET | Refills: 0 | Status: SHIPPED | OUTPATIENT
Start: 2022-07-13 | End: 2022-09-16 | Stop reason: ALTCHOICE

## 2022-07-13 RX ORDER — PHENAZOPYRIDINE HYDROCHLORIDE 200 MG/1
200 TABLET, FILM COATED ORAL 3 TIMES DAILY PRN
Qty: 6 TABLET | Refills: 0 | Status: SHIPPED | OUTPATIENT
Start: 2022-07-13 | End: 2022-07-15

## 2022-07-13 NOTE — PROGRESS NOTES
Subjective:       Patient ID: Rola Richter is a 77 y.o. female.    Chief Complaint: No chief complaint on file.    Known to me patient here for UC visit.  Urinary frequency and post-void pain and burning.  No blood.  No fever but some chills.  No nausea.  Some back pain but not now - attributes to overuse at times.     Review of Systems   Constitutional: Negative for fever.   Respiratory: Negative for shortness of breath.    Cardiovascular: Negative for chest pain.   Gastrointestinal: Negative for abdominal pain and nausea.   Skin: Negative for rash.   All other systems reviewed and are negative.      Objective:      Physical Exam  Constitutional:       General: She is not in acute distress.     Appearance: She is well-developed.   Cardiovascular:      Rate and Rhythm: Normal rate and regular rhythm.      Heart sounds: No murmur heard.  Pulmonary:      Effort: Pulmonary effort is normal.      Breath sounds: Normal breath sounds.   Abdominal:      Palpations: Abdomen is soft.      Tenderness: There is abdominal tenderness in the suprapubic area. There is left CVA tenderness. There is no right CVA tenderness.         Assessment:       1. Urinary tract infection with hematuria, site unspecified        Plan:       Urinary tract infection with hematuria, site unspecified  -     POCT Urinalysis  -     Urine culture  -     ciprofloxacin HCl (CIPRO) 500 MG tablet; Take 1 tablet (500 mg total) by mouth 2 (two) times daily.  Dispense: 14 tablet; Refill: 0  -     phenazopyridine (PYRIDIUM) 200 MG tablet; Take 1 tablet (200 mg total) by mouth 3 (three) times daily as needed for Pain.  Dispense: 6 tablet; Refill: 0      Patient Instructions   Push fluids intake.  Drink plenty of water.

## 2022-07-16 LAB — BACTERIA UR CULT: ABNORMAL

## 2022-07-19 ENCOUNTER — OFFICE VISIT (OUTPATIENT)
Dept: RHEUMATOLOGY | Facility: CLINIC | Age: 77
End: 2022-07-19
Payer: MEDICARE

## 2022-07-19 VITALS — BODY MASS INDEX: 24.74 KG/M2 | WEIGHT: 122.5 LBS | SYSTOLIC BLOOD PRESSURE: 108 MMHG | DIASTOLIC BLOOD PRESSURE: 56 MMHG

## 2022-07-19 DIAGNOSIS — M15.9 PRIMARY OSTEOARTHRITIS INVOLVING MULTIPLE JOINTS: Primary | ICD-10-CM

## 2022-07-19 PROCEDURE — 3078F PR MOST RECENT DIASTOLIC BLOOD PRESSURE < 80 MM HG: ICD-10-PCS | Mod: CPTII,S$GLB,, | Performed by: INTERNAL MEDICINE

## 2022-07-19 PROCEDURE — 1159F MED LIST DOCD IN RCRD: CPT | Mod: CPTII,S$GLB,, | Performed by: INTERNAL MEDICINE

## 2022-07-19 PROCEDURE — 3074F PR MOST RECENT SYSTOLIC BLOOD PRESSURE < 130 MM HG: ICD-10-PCS | Mod: CPTII,S$GLB,, | Performed by: INTERNAL MEDICINE

## 2022-07-19 PROCEDURE — 1125F AMNT PAIN NOTED PAIN PRSNT: CPT | Mod: CPTII,S$GLB,, | Performed by: INTERNAL MEDICINE

## 2022-07-19 PROCEDURE — 99213 PR OFFICE/OUTPT VISIT, EST, LEVL III, 20-29 MIN: ICD-10-PCS | Mod: S$GLB,,, | Performed by: INTERNAL MEDICINE

## 2022-07-19 PROCEDURE — 99213 OFFICE O/P EST LOW 20 MIN: CPT | Mod: S$GLB,,, | Performed by: INTERNAL MEDICINE

## 2022-07-19 PROCEDURE — 1159F PR MEDICATION LIST DOCUMENTED IN MEDICAL RECORD: ICD-10-PCS | Mod: CPTII,S$GLB,, | Performed by: INTERNAL MEDICINE

## 2022-07-19 PROCEDURE — 3288F FALL RISK ASSESSMENT DOCD: CPT | Mod: CPTII,S$GLB,, | Performed by: INTERNAL MEDICINE

## 2022-07-19 PROCEDURE — 3288F PR FALLS RISK ASSESSMENT DOCUMENTED: ICD-10-PCS | Mod: CPTII,S$GLB,, | Performed by: INTERNAL MEDICINE

## 2022-07-19 PROCEDURE — 3074F SYST BP LT 130 MM HG: CPT | Mod: CPTII,S$GLB,, | Performed by: INTERNAL MEDICINE

## 2022-07-19 PROCEDURE — 3078F DIAST BP <80 MM HG: CPT | Mod: CPTII,S$GLB,, | Performed by: INTERNAL MEDICINE

## 2022-07-19 PROCEDURE — 1101F PR PT FALLS ASSESS DOC 0-1 FALLS W/OUT INJ PAST YR: ICD-10-PCS | Mod: CPTII,S$GLB,, | Performed by: INTERNAL MEDICINE

## 2022-07-19 PROCEDURE — 1101F PT FALLS ASSESS-DOCD LE1/YR: CPT | Mod: CPTII,S$GLB,, | Performed by: INTERNAL MEDICINE

## 2022-07-19 PROCEDURE — 1125F PR PAIN SEVERITY QUANTIFIED, PAIN PRESENT: ICD-10-PCS | Mod: CPTII,S$GLB,, | Performed by: INTERNAL MEDICINE

## 2022-08-22 ENCOUNTER — TELEPHONE (OUTPATIENT)
Dept: PAIN MEDICINE | Facility: CLINIC | Age: 77
End: 2022-08-22
Payer: MEDICARE

## 2022-08-22 NOTE — TELEPHONE ENCOUNTER
----- Message from Komal Dennis LPN sent at 8/19/2022  4:39 PM CDT -----  Contact: Patient    ----- Message -----  From: Laxmi Yepez  Sent: 8/19/2022   3:19 PM CDT  To: Diandra Chavez Staff    Type: Patient Call Back         Who called: Patient         What is the request in detail: has an appt sched for 8/23; needs to confirm that she can get a shot while there for neck pain; please advise           Best call back number: [phone] or [phone]         Additional Information:     [id number] - [place] - [address] AT [facility] [address]  [address] [address]  Phone: [phone] Fax: [phone]           Thank You

## 2022-08-23 ENCOUNTER — OFFICE VISIT (OUTPATIENT)
Dept: PAIN MEDICINE | Facility: CLINIC | Age: 77
End: 2022-08-23
Payer: MEDICARE

## 2022-08-23 VITALS
DIASTOLIC BLOOD PRESSURE: 66 MMHG | HEART RATE: 81 BPM | SYSTOLIC BLOOD PRESSURE: 147 MMHG | HEIGHT: 59 IN | WEIGHT: 122 LBS | BODY MASS INDEX: 24.6 KG/M2

## 2022-08-23 DIAGNOSIS — M54.12 CERVICAL RADICULITIS: Primary | ICD-10-CM

## 2022-08-23 DIAGNOSIS — M54.81 BILATERAL OCCIPITAL NEURALGIA: ICD-10-CM

## 2022-08-23 DIAGNOSIS — G89.4 CHRONIC PAIN DISORDER: ICD-10-CM

## 2022-08-23 DIAGNOSIS — M50.30 DDD (DEGENERATIVE DISC DISEASE), CERVICAL: Primary | ICD-10-CM

## 2022-08-23 DIAGNOSIS — M54.12 CERVICAL RADICULITIS: ICD-10-CM

## 2022-08-23 DIAGNOSIS — M47.892 OTHER SPONDYLOSIS, CERVICAL REGION: ICD-10-CM

## 2022-08-23 PROCEDURE — 99214 OFFICE O/P EST MOD 30 MIN: CPT | Mod: S$GLB,,, | Performed by: PHYSICIAN ASSISTANT

## 2022-08-23 PROCEDURE — 3288F PR FALLS RISK ASSESSMENT DOCUMENTED: ICD-10-PCS | Mod: CPTII,S$GLB,, | Performed by: PHYSICIAN ASSISTANT

## 2022-08-23 PROCEDURE — 3288F FALL RISK ASSESSMENT DOCD: CPT | Mod: CPTII,S$GLB,, | Performed by: PHYSICIAN ASSISTANT

## 2022-08-23 PROCEDURE — 3077F SYST BP >= 140 MM HG: CPT | Mod: CPTII,S$GLB,, | Performed by: PHYSICIAN ASSISTANT

## 2022-08-23 PROCEDURE — 1101F PT FALLS ASSESS-DOCD LE1/YR: CPT | Mod: CPTII,S$GLB,, | Performed by: PHYSICIAN ASSISTANT

## 2022-08-23 PROCEDURE — 3078F DIAST BP <80 MM HG: CPT | Mod: CPTII,S$GLB,, | Performed by: PHYSICIAN ASSISTANT

## 2022-08-23 PROCEDURE — 99214 PR OFFICE/OUTPT VISIT, EST, LEVL IV, 30-39 MIN: ICD-10-PCS | Mod: S$GLB,,, | Performed by: PHYSICIAN ASSISTANT

## 2022-08-23 PROCEDURE — 99999 PR PBB SHADOW E&M-EST. PATIENT-LVL IV: CPT | Mod: PBBFAC,,, | Performed by: PHYSICIAN ASSISTANT

## 2022-08-23 PROCEDURE — 99999 PR PBB SHADOW E&M-EST. PATIENT-LVL IV: ICD-10-PCS | Mod: PBBFAC,,, | Performed by: PHYSICIAN ASSISTANT

## 2022-08-23 PROCEDURE — 1159F MED LIST DOCD IN RCRD: CPT | Mod: CPTII,S$GLB,, | Performed by: PHYSICIAN ASSISTANT

## 2022-08-23 PROCEDURE — 1125F AMNT PAIN NOTED PAIN PRSNT: CPT | Mod: CPTII,S$GLB,, | Performed by: PHYSICIAN ASSISTANT

## 2022-08-23 PROCEDURE — 1125F PR PAIN SEVERITY QUANTIFIED, PAIN PRESENT: ICD-10-PCS | Mod: CPTII,S$GLB,, | Performed by: PHYSICIAN ASSISTANT

## 2022-08-23 PROCEDURE — 3078F PR MOST RECENT DIASTOLIC BLOOD PRESSURE < 80 MM HG: ICD-10-PCS | Mod: CPTII,S$GLB,, | Performed by: PHYSICIAN ASSISTANT

## 2022-08-23 PROCEDURE — 1101F PR PT FALLS ASSESS DOC 0-1 FALLS W/OUT INJ PAST YR: ICD-10-PCS | Mod: CPTII,S$GLB,, | Performed by: PHYSICIAN ASSISTANT

## 2022-08-23 PROCEDURE — 1159F PR MEDICATION LIST DOCUMENTED IN MEDICAL RECORD: ICD-10-PCS | Mod: CPTII,S$GLB,, | Performed by: PHYSICIAN ASSISTANT

## 2022-08-23 PROCEDURE — 3077F PR MOST RECENT SYSTOLIC BLOOD PRESSURE >= 140 MM HG: ICD-10-PCS | Mod: CPTII,S$GLB,, | Performed by: PHYSICIAN ASSISTANT

## 2022-08-23 RX ORDER — HYDROCODONE BITARTRATE AND ACETAMINOPHEN 5; 325 MG/1; MG/1
1 TABLET ORAL EVERY 12 HOURS PRN
Qty: 60 TABLET | Refills: 0 | Status: SHIPPED | OUTPATIENT
Start: 2022-10-20 | End: 2022-11-18

## 2022-08-23 RX ORDER — HYDROCODONE BITARTRATE AND ACETAMINOPHEN 5; 325 MG/1; MG/1
1 TABLET ORAL EVERY 12 HOURS PRN
Qty: 60 TABLET | Refills: 0 | Status: SHIPPED | OUTPATIENT
Start: 2022-09-21 | End: 2022-10-20

## 2022-08-23 RX ORDER — CYCLOBENZAPRINE HCL 10 MG
10 TABLET ORAL 2 TIMES DAILY PRN
Qty: 60 TABLET | Refills: 2 | Status: SHIPPED | OUTPATIENT
Start: 2022-08-23 | End: 2022-11-16 | Stop reason: SDUPTHER

## 2022-08-23 RX ORDER — HYDROCODONE BITARTRATE AND ACETAMINOPHEN 5; 325 MG/1; MG/1
1 TABLET ORAL EVERY 12 HOURS PRN
Qty: 60 TABLET | Refills: 0 | Status: SHIPPED | OUTPATIENT
Start: 2022-08-23 | End: 2022-09-21

## 2022-08-23 NOTE — H&P (VIEW-ONLY)
Referring Physician: No ref. provider found    PCP: Liseth Carias MD      CC: neck and occipital pain    Interval history: Ms. Richter is a 77 y.o. female with neck pain and occipital neuralgia who returns to our clinic.  She continues to c/o headaches and neck pain.  Most recent occcipital nerve block only provided mild short lived beneft. Neck pain that extends into bilateral shoulders.  She had numbness to her right hand and first through fourth digits. Cervical MELANY in February 2018  provided benefit for several weeks.    She continues to take Norco with benefit.  Flexeril 10 mg caused dry mouth however this resolved and she wishes to resume. Robaxin was helpful but caused dizziness.  Denies UE weakness. No bowel bladder changes.   Pain today is rated 9/10.    Prior HPI:   Patient is 70-year-old female with past history history of cervical DDD, cervical spondylosis and chronic headaches.  She recently moved here from [place], [place].  She is treated in the past by neurology.  She states having constant burning pain over the left side of her posterior scalp.  Pain radiates to her neck as well as a frontal.  She also has left-sided neck pain as well.  She denies any radicular arm pain.  No numbness or weakness.  She states having cervical epidural steroid injection at past with minimal benefit.  She also has had decided of cervical nerve blocks in the past with moderate benefit.  Most recent injection was performed 2 months ago.  She desires repeat injection.  She currently takes Norco 10 mg every 12 hours as needed with moderate benefit.  She also takes Zanaflex 4 mg every 8 hours with mild benefits.  She rates her pain 7/10 today.    Pain intervention history: s/p left occipital nerve blocks on 1/2016 with 50% relief of her headaches  S/p cervical MELANY 2/8/18 moderate relief for a couple of weeks.     ROS:  CONSTITUTIONAL: No fevers, chills, night sweats, wt. loss, appetite changes  SKIN: no rashes or  itching  ENT: No headaches, head trauma, vision changes, or eye pain  LYMPH NODES: None noticed   CV: No chest pain, palpitations.   RESP: No shortness of breath, dyspnea on exertion, cough, wheezing, or hemoptysis  GI: No nausea, emesis, diarrhea, constipation, melena, hematochezia, pain.    : No dysuria, hematuria, urgency, or frequency   HEME: No easy bruising, bleeding problems  PSYCHIATRIC: No depression, anxiety, psychosis, hallucinations.  NEURO: No seizures, memory loss, dizziness or difficulty sleeping  MSK: + History of present illness      Past Medical History:   Diagnosis Date    Anxiety     Arthritis     Cataract     DDD (degenerative disc disease), lumbar     Depression     Encounter for blood transfusion     GERD (gastroesophageal reflux disease)     Headache     Hyperlipidemia     Hypertension     pt states she does not take meds anymore she just watches her weight    Neuromuscular disorder     Occipital neuralgia 3/24/2017    Osteoporosis      Past Surgical History:   Procedure Laterality Date    APPENDECTOMY       SECTION      x 2    CHOLECYSTECTOMY      COLONOSCOPY  prior to     COLONOSCOPY N/A 2019    Procedure: COLONOSCOPY;  Surgeon: Greg Victor MD;  Location: [facility];  Service: Endoscopy;  Laterality: N/A; 2 colon polyps, hemorrhoids; repeat in 5 years for surveillance; biopsy: Tubular adenoma x2    ESOPHAGOGASTRODUODENOSCOPY N/A 2019    Procedure: EGD (ESOPHAGOGASTRODUODENOSCOPY);  Surgeon: Greg Victor MD;  Location: [facility];  Service: Endoscopy;  Laterality: N/A; Mild Schatzki ring. Dilated. small hiatal hernia; Four gastric polyps. Resected and retrieved, gastritis; biopsy:stomach- unremarkable, negative for h pylori, polyps-Fundic type mucosa with foveolar hyperplasia.    ESOPHAGOGASTRODUODENOSCOPY N/A 2021    Procedure: EGD (ESOPHAGOGASTRODUODENOSCOPY);  Surgeon: Greg Victor MD;  Location: [facility];  Service:  Endoscopy;  Laterality: N/A;    HYSTERECTOMY      Laser Periphery Iridotomy Bilateral     OD 5/26/16 and OS touch up 5/26/2016    UPPER GASTROINTESTINAL ENDOSCOPY  03/14/2017    Dr. Marcial: esophageal stenosis- dilated, gastritis, gastric polyps removed; biopsy- mild gastritis, negative for h pylori, hyperplastic polyp, esophagus unremarkable    vocal cord tumor removal       Family History   Problem Relation Age of Onset    Osteoarthritis Mother     Alcohol abuse Mother     Rheum arthritis Mother     Osteoarthritis Sister     Diabetes Brother     No Known Problems Son     No Known Problems Sister     No Known Problems Sister     No Known Problems Brother     Arthritis Son     No Known Problems Son     Stroke Maternal Grandmother 99    Rheum arthritis Maternal Grandmother     Retinal detachment Neg Hx     Macular degeneration Neg Hx     Glaucoma Neg Hx     Amblyopia Neg Hx     Blindness Neg Hx     Cancer Neg Hx     Cataracts Neg Hx     Hypertension Neg Hx     Strabismus Neg Hx     Thyroid disease Neg Hx     Lupus Neg Hx     Kidney disease Neg Hx     Inflammatory bowel disease Neg Hx     Psoriasis Neg Hx     Colon cancer Neg Hx     Crohn's disease Neg Hx     Ulcerative colitis Neg Hx     Stomach cancer Neg Hx     Esophageal cancer Neg Hx      Social History     Socioeconomic History    Marital status:    Tobacco Use    Smoking status: Never Smoker    Smokeless tobacco: Never Used   Substance and Sexual Activity    Alcohol use: No     Alcohol/week: 0.0 standard drinks    Drug use: Yes     Types: Hydrocodone    Sexual activity: Yes     Partners: Male     Social Determinants of Health     Financial Resource Strain: Low Risk     Difficulty of Paying Living Expenses: Not hard at all   Food Insecurity: No Food Insecurity    Worried About Running Out of Food in the Last Year: Never true    Ran Out of Food in the Last Year: Never true   Transportation Needs: No  "Transportation Needs    Lack of Transportation (Medical): No    Lack of Transportation (Non-Medical): No   Physical Activity: Sufficiently Active    Days of Exercise per Week: 5 days    Minutes of Exercise per Session: 30 min   Stress: No Stress Concern Present    Feeling of Stress : Only a little   Social Connections: Moderately Integrated    Frequency of Communication with Friends and Family: Three times a week    Frequency of Social Gatherings with Friends and Family: Once a week    Attends Worship Services: More than 4 times per year    Active Member of Clubs or Organizations: No    Attends Club or Organization Meetings: Never    Marital Status:    Housing Stability: Low Risk     Unable to Pay for Housing in the Last Year: No    Number of Places Lived in the Last Year: 1    Unstable Housing in the Last Year: No         Medications/Allergies: See med card    Vitals:    08/23/22 1002   BP: (!) 147/66   Pulse: 81   Weight: 55.3 kg (122 lb)   Height: 4' 11" (1.499 m)   PainSc:   9   PainLoc: Neck         Physical exam:    GENERAL: A and O x3, the patient appears well groomed and is in no acute distress.  Skin: No rashes or obvious lesions  HEENT: normocephalic, atraumatic  CARDIOVASCULAR:  RRR  LUNGS: nonlabored breathing  ABDOMEN: soft, nontender   UPPER EXTREMITIES: Normal alignment, normal range of motion, no atrophy, no skin changes,  hair growth and nail growth normal and equal bilaterally. No swelling, no tenderness.  +Phalens on left side. +TTP tricep tendon  LOWER EXTREMITIES:  Normal alignment, normal range of motion, no atrophy, no skin changes,  hair growth and nail growth normal and equal bilaterally. No swelling, no tenderness.  CERVICAL SPINE:   Cervical spine: ROM is full in flexion, extension and lateral rotation.  Painful flexion > extension.  Positive facet loading bilaterally.  Spurling is positive at right side.  Myofascial exam:  Tenderness to palpation across cervical " paraspinals deltoid and trapezius muscles bilaterally.      MENTAL STATUS: normal orientation, speech, language, and fund of knowledge for social situation.  Emotional state appropriate.    CRANIAL NERVES:  II:  PERRL bilaterally,   III,IV,VI: EOMI.    V:  Facial sensation equal bilaterally  VII:  Facial motor function normal.  VIII:  Hearing equal to finger rub bilaterally  IX/X: Gag normal, palate symmetric  XI:  Shoulder shrug equal, head turn equal  XII:  Tongue midline without fasciculations      MOTOR: Tone and bulk: normal bilateral upper and lower Strength: normal   Delt Bi Tri WE WF     R 5 5 5 5 5 5   L 5 5 5 5 5 5     IP ADD ABD Quad TA Gas HAM  R 5 5 5 5 5 5 5  L 5 5 5 5 5 5 5    SENSATION: Light touch and pinprick intact bilaterally  REFLEXES: normal, symmetric, nonbrisk.  Toes down, no clonus. No hoffmans.  GAIT: normal rise, base, steps, and arm swing.        Imaging:   Cervical MRI 12/4/17    Narrative     EXAM: Cervical spine MRI without contrast.    INDICATION: Cervical radiculopathy.  Neck pain and occipital neuralgia.  The patient complains of neck and right arm pain.    TECHNIQUE: Routine multiplanar, multisequence unenhanced cervical spine MRI was performed.    COMPARISON: Plain films of the cervical spine obtained concurrently      FINDINGS:     Vertebral column: There is straightening of the cervical spine with loss of normal lordosis.  As seen on concurrent plain films, there is trace anterolisthesis of C3 on C4 with 2 mm anterolisthesis of C4 on C5.  There is marked disc space narrowing at the C5-6 level with moderate to marked disc space narrowing at the C4-5 and C6-7 levels.  There is partial non-segmentation anteriorly at the C2-3 level.  The C2 and C3 as well as the C4 and C5 facet joints appear fused and this may represent developmental non-segmentation or degenerative ankylosis.  All of the discs are desiccated.  The odontoid process is intact.    Spinal canal, cord, epidural  space: The craniovertebral junction is normal.  The spinal canal is omental and normal.  There is no significant spinal stenosis.  The cord is normal in caliber.  There is very subtle flattening of the ventral cord surface at the C4-5 and C5-6 levels where there is degenerative change.  The study is mildly motion but there is no definite cord edema or myelomalacia.    Findings by level:    C2-3: There is no spinal canal or foraminal stenosis.  There is mild left facet joint arthropathy.    C3-4: There is trace anterolisthesis.  There is left greater than right uncovertebral spurring and facet joint arthropathy.  There is a mild disc osteophyte complex, slightly eccentric to the left with subtle annular fissure.  This narrows the ventral subarachnoid space.  There is no spinal stenosis or cord compression.  There is moderate marked left foraminal stenosis.    C4-5: There is moderate disc space narrowing with 2 mm anterolisthesis of C4 on C5.  There is facet joint arthropathy or effusion left greater than right.  There is also mild bilateral but left greater than right uncovertebral spurring.  There is unroofing of a mild disc bulge which narrows the ventral subarachnoid space.  There is no spinal stenosis.  There is mild to moderate left foraminal stenosis.    C5-6:There is marked disc space narrowing.  There is bilateral uncovertebral spurring.  There is a disc osteophyte complex which narrows the subarachnoid space.  This is slightly eccentric to the right There is subtle flattening of the ventral cord surface.  Cord signal is grossly normal.  There is mild spinal stenosis with at least moderate bilateral foraminal stenosis.    C6-7: There is moderate disc space narrowing.  There is mild uncovertebral spurring.  There is a shallow disc osteophyte complex, slightly eccentric to the right.  There is narrowing of the ventral subarachnoid space.  There is no spinal stenosis.  Cord signal is normal.  There is mild  bilateral foraminal stenosis.  There is a small 3.5 mm left foraminal perineural cyst.    C7- T1: There is a Schmorl's node in inferior endplate of C7, chronic.  There are tiny bilateral foraminal perineural cysts.  There is minimal bulging of the annulus and mild facet joint arthropathy without spinal canal or foraminal stenosis.    Soft tissues/other: The prevertebral soft tissues are normal.  The airway is patent.   Impression          1. There is multilevel degenerative disc disease described in detail above.  There is no acute fracture.  There is degenerative listhesis at several levels.  There is some degree of disc osteophyte complex, uncovertebral spurring and/or facet joint arthropathy contributing to some degree of foraminal stenosis at several levels.  There is no significant spinal canal stenosis.  There is very subtle flattening of the ventral cord surface at the C4-5 and C6-7 levels The pertinent findings are summarized below.    2. At the C3-4 level, there is no spinal stenosis.  There is moderate to marked left foraminal stenosis.    3. At the C4-5 level there is no spinal stenosis.  There is mild to moderate left foraminal stenosis.    4. At the C5-6 level, there is mild spinal stenosis with at least moderate bilateral foraminal stenosis.    5. At the C6-7 level, there is no spinal stenosis.  There is mild bilateral foraminal stenosis.    6. There is no spinal canal or foraminal stenosis at the C2-3 or C7-T1 levels.     Assessment:  Mrs. Richter is a 77 y.o. female with neck and head pain    1. DDD (degenerative disc disease), cervical    2. Other spondylosis, cervical region    3. Bilateral occipital neuralgia    4. Cervical radiculitis       Plan:  1. I have stressed the importance of physical activity and exercise to improve overall health.  2. Schedule repeat C7-T1 [place] MELANY. I have explained the risks, benefits, and alternatives of the procedure in detail. The patient voices understanding and all  questions have been answered. The patient agrees to proceed as planned. Written Consent obtained.   3. Consider bilateral occipital blocks for head pain.  4. Norco 5mg q12hrs as needed for pain.  reviewed.  Previous UDS consistent   5. Flexeril 10 mg BID #60 1 refill.   6. F/u 3 months or sooner  All medication management was performed by Dr. Timothy Grimaldo

## 2022-08-23 NOTE — PROGRESS NOTES
Referring Physician: No ref. provider found    PCP: Liseth Carias MD      CC: neck and occipital pain    Interval history: Ms. Richter is a 77 y.o. female with neck pain and occipital neuralgia who returns to our clinic.  She continues to c/o headaches and neck pain.  Most recent occcipital nerve block only provided mild short lived beneft. Neck pain that extends into bilateral shoulders.  She had numbness to her right hand and first through fourth digits. Cervical MELANY in February 2018  provided benefit for several weeks.    She continues to take Norco with benefit.  Flexeril 10 mg caused dry mouth however this resolved and she wishes to resume. Robaxin was helpful but caused dizziness.  Denies UE weakness. No bowel bladder changes.   Pain today is rated 9/10.    Prior HPI:   Patient is 70-year-old female with past history history of cervical DDD, cervical spondylosis and chronic headaches.  She recently moved here from [place], [place].  She is treated in the past by neurology.  She states having constant burning pain over the left side of her posterior scalp.  Pain radiates to her neck as well as a frontal.  She also has left-sided neck pain as well.  She denies any radicular arm pain.  No numbness or weakness.  She states having cervical epidural steroid injection at past with minimal benefit.  She also has had decided of cervical nerve blocks in the past with moderate benefit.  Most recent injection was performed 2 months ago.  She desires repeat injection.  She currently takes Norco 10 mg every 12 hours as needed with moderate benefit.  She also takes Zanaflex 4 mg every 8 hours with mild benefits.  She rates her pain 7/10 today.    Pain intervention history: s/p left occipital nerve blocks on 1/2016 with 50% relief of her headaches  S/p cervical MELANY 2/8/18 moderate relief for a couple of weeks.     ROS:  CONSTITUTIONAL: No fevers, chills, night sweats, wt. loss, appetite changes  SKIN: no rashes or  itching  ENT: No headaches, head trauma, vision changes, or eye pain  LYMPH NODES: None noticed   CV: No chest pain, palpitations.   RESP: No shortness of breath, dyspnea on exertion, cough, wheezing, or hemoptysis  GI: No nausea, emesis, diarrhea, constipation, melena, hematochezia, pain.    : No dysuria, hematuria, urgency, or frequency   HEME: No easy bruising, bleeding problems  PSYCHIATRIC: No depression, anxiety, psychosis, hallucinations.  NEURO: No seizures, memory loss, dizziness or difficulty sleeping  MSK: + History of present illness      Past Medical History:   Diagnosis Date    Anxiety     Arthritis     Cataract     DDD (degenerative disc disease), lumbar     Depression     Encounter for blood transfusion     GERD (gastroesophageal reflux disease)     Headache     Hyperlipidemia     Hypertension     pt states she does not take meds anymore she just watches her weight    Neuromuscular disorder     Occipital neuralgia 3/24/2017    Osteoporosis      Past Surgical History:   Procedure Laterality Date    APPENDECTOMY       SECTION      x 2    CHOLECYSTECTOMY      COLONOSCOPY  prior to     COLONOSCOPY N/A 2019    Procedure: COLONOSCOPY;  Surgeon: Greg Victor MD;  Location: [facility];  Service: Endoscopy;  Laterality: N/A; 2 colon polyps, hemorrhoids; repeat in 5 years for surveillance; biopsy: Tubular adenoma x2    ESOPHAGOGASTRODUODENOSCOPY N/A 2019    Procedure: EGD (ESOPHAGOGASTRODUODENOSCOPY);  Surgeon: Greg Victor MD;  Location: [facility];  Service: Endoscopy;  Laterality: N/A; Mild Schatzki ring. Dilated. small hiatal hernia; Four gastric polyps. Resected and retrieved, gastritis; biopsy:stomach- unremarkable, negative for h pylori, polyps-Fundic type mucosa with foveolar hyperplasia.    ESOPHAGOGASTRODUODENOSCOPY N/A 2021    Procedure: EGD (ESOPHAGOGASTRODUODENOSCOPY);  Surgeon: Greg Victor MD;  Location: [facility];  Service:  Endoscopy;  Laterality: N/A;    HYSTERECTOMY      Laser Periphery Iridotomy Bilateral     OD 5/26/16 and OS touch up 5/26/2016    UPPER GASTROINTESTINAL ENDOSCOPY  03/14/2017    Dr. Marcial: esophageal stenosis- dilated, gastritis, gastric polyps removed; biopsy- mild gastritis, negative for h pylori, hyperplastic polyp, esophagus unremarkable    vocal cord tumor removal       Family History   Problem Relation Age of Onset    Osteoarthritis Mother     Alcohol abuse Mother     Rheum arthritis Mother     Osteoarthritis Sister     Diabetes Brother     No Known Problems Son     No Known Problems Sister     No Known Problems Sister     No Known Problems Brother     Arthritis Son     No Known Problems Son     Stroke Maternal Grandmother 99    Rheum arthritis Maternal Grandmother     Retinal detachment Neg Hx     Macular degeneration Neg Hx     Glaucoma Neg Hx     Amblyopia Neg Hx     Blindness Neg Hx     Cancer Neg Hx     Cataracts Neg Hx     Hypertension Neg Hx     Strabismus Neg Hx     Thyroid disease Neg Hx     Lupus Neg Hx     Kidney disease Neg Hx     Inflammatory bowel disease Neg Hx     Psoriasis Neg Hx     Colon cancer Neg Hx     Crohn's disease Neg Hx     Ulcerative colitis Neg Hx     Stomach cancer Neg Hx     Esophageal cancer Neg Hx      Social History     Socioeconomic History    Marital status:    Tobacco Use    Smoking status: Never Smoker    Smokeless tobacco: Never Used   Substance and Sexual Activity    Alcohol use: No     Alcohol/week: 0.0 standard drinks    Drug use: Yes     Types: Hydrocodone    Sexual activity: Yes     Partners: Male     Social Determinants of Health     Financial Resource Strain: Low Risk     Difficulty of Paying Living Expenses: Not hard at all   Food Insecurity: No Food Insecurity    Worried About Running Out of Food in the Last Year: Never true    Ran Out of Food in the Last Year: Never true   Transportation Needs: No  "Transportation Needs    Lack of Transportation (Medical): No    Lack of Transportation (Non-Medical): No   Physical Activity: Sufficiently Active    Days of Exercise per Week: 5 days    Minutes of Exercise per Session: 30 min   Stress: No Stress Concern Present    Feeling of Stress : Only a little   Social Connections: Moderately Integrated    Frequency of Communication with Friends and Family: Three times a week    Frequency of Social Gatherings with Friends and Family: Once a week    Attends Mu-ism Services: More than 4 times per year    Active Member of Clubs or Organizations: No    Attends Club or Organization Meetings: Never    Marital Status:    Housing Stability: Low Risk     Unable to Pay for Housing in the Last Year: No    Number of Places Lived in the Last Year: 1    Unstable Housing in the Last Year: No         Medications/Allergies: See med card    Vitals:    08/23/22 1002   BP: (!) 147/66   Pulse: 81   Weight: 55.3 kg (122 lb)   Height: 4' 11" (1.499 m)   PainSc:   9   PainLoc: Neck         Physical exam:    GENERAL: A and O x3, the patient appears well groomed and is in no acute distress.  Skin: No rashes or obvious lesions  HEENT: normocephalic, atraumatic  CARDIOVASCULAR:  RRR  LUNGS: nonlabored breathing  ABDOMEN: soft, nontender   UPPER EXTREMITIES: Normal alignment, normal range of motion, no atrophy, no skin changes,  hair growth and nail growth normal and equal bilaterally. No swelling, no tenderness.  +Phalens on left side. +TTP tricep tendon  LOWER EXTREMITIES:  Normal alignment, normal range of motion, no atrophy, no skin changes,  hair growth and nail growth normal and equal bilaterally. No swelling, no tenderness.  CERVICAL SPINE:   Cervical spine: ROM is full in flexion, extension and lateral rotation.  Painful flexion > extension.  Positive facet loading bilaterally.  Spurling is positive at right side.  Myofascial exam:  Tenderness to palpation across cervical " paraspinals deltoid and trapezius muscles bilaterally.      MENTAL STATUS: normal orientation, speech, language, and fund of knowledge for social situation.  Emotional state appropriate.    CRANIAL NERVES:  II:  PERRL bilaterally,   III,IV,VI: EOMI.    V:  Facial sensation equal bilaterally  VII:  Facial motor function normal.  VIII:  Hearing equal to finger rub bilaterally  IX/X: Gag normal, palate symmetric  XI:  Shoulder shrug equal, head turn equal  XII:  Tongue midline without fasciculations      MOTOR: Tone and bulk: normal bilateral upper and lower Strength: normal   Delt Bi Tri WE WF     R 5 5 5 5 5 5   L 5 5 5 5 5 5     IP ADD ABD Quad TA Gas HAM  R 5 5 5 5 5 5 5  L 5 5 5 5 5 5 5    SENSATION: Light touch and pinprick intact bilaterally  REFLEXES: normal, symmetric, nonbrisk.  Toes down, no clonus. No hoffmans.  GAIT: normal rise, base, steps, and arm swing.        Imaging:   Cervical MRI 12/4/17    Narrative     EXAM: Cervical spine MRI without contrast.    INDICATION: Cervical radiculopathy.  Neck pain and occipital neuralgia.  The patient complains of neck and right arm pain.    TECHNIQUE: Routine multiplanar, multisequence unenhanced cervical spine MRI was performed.    COMPARISON: Plain films of the cervical spine obtained concurrently      FINDINGS:     Vertebral column: There is straightening of the cervical spine with loss of normal lordosis.  As seen on concurrent plain films, there is trace anterolisthesis of C3 on C4 with 2 mm anterolisthesis of C4 on C5.  There is marked disc space narrowing at the C5-6 level with moderate to marked disc space narrowing at the C4-5 and C6-7 levels.  There is partial non-segmentation anteriorly at the C2-3 level.  The C2 and C3 as well as the C4 and C5 facet joints appear fused and this may represent developmental non-segmentation or degenerative ankylosis.  All of the discs are desiccated.  The odontoid process is intact.    Spinal canal, cord, epidural  space: The craniovertebral junction is normal.  The spinal canal is omental and normal.  There is no significant spinal stenosis.  The cord is normal in caliber.  There is very subtle flattening of the ventral cord surface at the C4-5 and C5-6 levels where there is degenerative change.  The study is mildly motion but there is no definite cord edema or myelomalacia.    Findings by level:    C2-3: There is no spinal canal or foraminal stenosis.  There is mild left facet joint arthropathy.    C3-4: There is trace anterolisthesis.  There is left greater than right uncovertebral spurring and facet joint arthropathy.  There is a mild disc osteophyte complex, slightly eccentric to the left with subtle annular fissure.  This narrows the ventral subarachnoid space.  There is no spinal stenosis or cord compression.  There is moderate marked left foraminal stenosis.    C4-5: There is moderate disc space narrowing with 2 mm anterolisthesis of C4 on C5.  There is facet joint arthropathy or effusion left greater than right.  There is also mild bilateral but left greater than right uncovertebral spurring.  There is unroofing of a mild disc bulge which narrows the ventral subarachnoid space.  There is no spinal stenosis.  There is mild to moderate left foraminal stenosis.    C5-6:There is marked disc space narrowing.  There is bilateral uncovertebral spurring.  There is a disc osteophyte complex which narrows the subarachnoid space.  This is slightly eccentric to the right There is subtle flattening of the ventral cord surface.  Cord signal is grossly normal.  There is mild spinal stenosis with at least moderate bilateral foraminal stenosis.    C6-7: There is moderate disc space narrowing.  There is mild uncovertebral spurring.  There is a shallow disc osteophyte complex, slightly eccentric to the right.  There is narrowing of the ventral subarachnoid space.  There is no spinal stenosis.  Cord signal is normal.  There is mild  bilateral foraminal stenosis.  There is a small 3.5 mm left foraminal perineural cyst.    C7- T1: There is a Schmorl's node in inferior endplate of C7, chronic.  There are tiny bilateral foraminal perineural cysts.  There is minimal bulging of the annulus and mild facet joint arthropathy without spinal canal or foraminal stenosis.    Soft tissues/other: The prevertebral soft tissues are normal.  The airway is patent.   Impression          1. There is multilevel degenerative disc disease described in detail above.  There is no acute fracture.  There is degenerative listhesis at several levels.  There is some degree of disc osteophyte complex, uncovertebral spurring and/or facet joint arthropathy contributing to some degree of foraminal stenosis at several levels.  There is no significant spinal canal stenosis.  There is very subtle flattening of the ventral cord surface at the C4-5 and C6-7 levels The pertinent findings are summarized below.    2. At the C3-4 level, there is no spinal stenosis.  There is moderate to marked left foraminal stenosis.    3. At the C4-5 level there is no spinal stenosis.  There is mild to moderate left foraminal stenosis.    4. At the C5-6 level, there is mild spinal stenosis with at least moderate bilateral foraminal stenosis.    5. At the C6-7 level, there is no spinal stenosis.  There is mild bilateral foraminal stenosis.    6. There is no spinal canal or foraminal stenosis at the C2-3 or C7-T1 levels.     Assessment:  Mrs. Richter is a 77 y.o. female with neck and head pain    1. DDD (degenerative disc disease), cervical    2. Other spondylosis, cervical region    3. Bilateral occipital neuralgia    4. Cervical radiculitis       Plan:  1. I have stressed the importance of physical activity and exercise to improve overall health.  2. Schedule repeat C7-T1 [place] MELANY. I have explained the risks, benefits, and alternatives of the procedure in detail. The patient voices understanding and all  questions have been answered. The patient agrees to proceed as planned. Written Consent obtained.   3. Consider bilateral occipital blocks for head pain.  4. Norco 5mg q12hrs as needed for pain.  reviewed.  Previous UDS consistent   5. Flexeril 10 mg BID #60 1 refill.   6. F/u 3 months or sooner  All medication management was performed by Dr. Timothy Grimaldo

## 2022-08-31 ENCOUNTER — PATIENT MESSAGE (OUTPATIENT)
Dept: ADMINISTRATIVE | Facility: OTHER | Age: 77
End: 2022-08-31
Payer: MEDICARE

## 2022-09-12 ENCOUNTER — LAB VISIT (OUTPATIENT)
Dept: LAB | Facility: HOSPITAL | Age: 77
End: 2022-09-12
Attending: FAMILY MEDICINE
Payer: MEDICARE

## 2022-09-12 DIAGNOSIS — R73.03 PREDIABETES: ICD-10-CM

## 2022-09-12 DIAGNOSIS — E78.2 MIXED HYPERLIPIDEMIA: ICD-10-CM

## 2022-09-12 LAB
ALBUMIN SERPL BCP-MCNC: 4.1 G/DL (ref 3.5–5.2)
ALP SERPL-CCNC: 55 U/L (ref 55–135)
ALT SERPL W/O P-5'-P-CCNC: 14 U/L (ref 10–44)
ANION GAP SERPL CALC-SCNC: 7 MMOL/L (ref 8–16)
AST SERPL-CCNC: 22 U/L (ref 10–40)
BASOPHILS # BLD AUTO: 0.07 K/UL (ref 0–0.2)
BASOPHILS NFR BLD: 0.8 % (ref 0–1.9)
BILIRUB SERPL-MCNC: 0.3 MG/DL (ref 0.1–1)
BUN SERPL-MCNC: 15 MG/DL (ref 8–23)
CALCIUM SERPL-MCNC: 9.6 MG/DL (ref 8.7–10.5)
CHLORIDE SERPL-SCNC: 105 MMOL/L (ref 95–110)
CHOLEST SERPL-MCNC: 416 MG/DL (ref 120–199)
CHOLEST/HDLC SERPL: 7.8 {RATIO} (ref 2–5)
CO2 SERPL-SCNC: 24 MMOL/L (ref 23–29)
CREAT SERPL-MCNC: 1 MG/DL (ref 0.5–1.4)
DIFFERENTIAL METHOD: ABNORMAL
EOSINOPHIL # BLD AUTO: 0.6 K/UL (ref 0–0.5)
EOSINOPHIL NFR BLD: 7.3 % (ref 0–8)
ERYTHROCYTE [DISTWIDTH] IN BLOOD BY AUTOMATED COUNT: 14.2 % (ref 11.5–14.5)
EST. GFR  (NO RACE VARIABLE): 58 ML/MIN/1.73 M^2
ESTIMATED AVG GLUCOSE: 117 MG/DL (ref 68–131)
GLUCOSE SERPL-MCNC: 88 MG/DL (ref 70–110)
HBA1C MFR BLD: 5.7 % (ref 4–5.6)
HCT VFR BLD AUTO: 34.3 % (ref 37–48.5)
HDLC SERPL-MCNC: 53 MG/DL (ref 40–75)
HDLC SERPL: 12.7 % (ref 20–50)
HGB BLD-MCNC: 11.4 G/DL (ref 12–16)
IMM GRANULOCYTES # BLD AUTO: 0.06 K/UL (ref 0–0.04)
IMM GRANULOCYTES NFR BLD AUTO: 0.7 % (ref 0–0.5)
LDLC SERPL CALC-MCNC: 304.2 MG/DL (ref 63–159)
LYMPHOCYTES # BLD AUTO: 2.4 K/UL (ref 1–4.8)
LYMPHOCYTES NFR BLD: 29.2 % (ref 18–48)
MCH RBC QN AUTO: 30.2 PG (ref 27–31)
MCHC RBC AUTO-ENTMCNC: 33.2 G/DL (ref 32–36)
MCV RBC AUTO: 91 FL (ref 82–98)
MONOCYTES # BLD AUTO: 0.7 K/UL (ref 0.3–1)
MONOCYTES NFR BLD: 8.4 % (ref 4–15)
NEUTROPHILS # BLD AUTO: 4.4 K/UL (ref 1.8–7.7)
NEUTROPHILS NFR BLD: 53.6 % (ref 38–73)
NONHDLC SERPL-MCNC: 363 MG/DL
NRBC BLD-RTO: 0 /100 WBC
PLATELET # BLD AUTO: 285 K/UL (ref 150–450)
PMV BLD AUTO: 10.8 FL (ref 9.2–12.9)
POTASSIUM SERPL-SCNC: 4.4 MMOL/L (ref 3.5–5.1)
PROT SERPL-MCNC: 7.4 G/DL (ref 6–8.4)
RBC # BLD AUTO: 3.78 M/UL (ref 4–5.4)
SODIUM SERPL-SCNC: 136 MMOL/L (ref 136–145)
TRIGL SERPL-MCNC: 294 MG/DL (ref 30–150)
WBC # BLD AUTO: 8.25 K/UL (ref 3.9–12.7)

## 2022-09-12 PROCEDURE — 36415 COLL VENOUS BLD VENIPUNCTURE: CPT | Mod: PO | Performed by: FAMILY MEDICINE

## 2022-09-12 PROCEDURE — 80053 COMPREHEN METABOLIC PANEL: CPT | Performed by: FAMILY MEDICINE

## 2022-09-12 PROCEDURE — 85025 COMPLETE CBC W/AUTO DIFF WBC: CPT | Performed by: FAMILY MEDICINE

## 2022-09-12 PROCEDURE — 80061 LIPID PANEL: CPT | Performed by: FAMILY MEDICINE

## 2022-09-12 PROCEDURE — 83036 HEMOGLOBIN GLYCOSYLATED A1C: CPT | Performed by: FAMILY MEDICINE

## 2022-09-13 DIAGNOSIS — M81.0 AGE-RELATED OSTEOPOROSIS WITHOUT CURRENT PATHOLOGICAL FRACTURE: Primary | ICD-10-CM

## 2022-09-13 DIAGNOSIS — Z79.899 ENCOUNTER FOR LONG-TERM (CURRENT) USE OF MEDICATIONS: ICD-10-CM

## 2022-09-14 ENCOUNTER — TELEPHONE (OUTPATIENT)
Dept: PSYCHIATRY | Facility: CLINIC | Age: 77
End: 2022-09-14
Payer: MEDICARE

## 2022-09-16 ENCOUNTER — OFFICE VISIT (OUTPATIENT)
Dept: PSYCHIATRY | Facility: CLINIC | Age: 77
End: 2022-09-16
Payer: MEDICARE

## 2022-09-16 ENCOUNTER — HOSPITAL ENCOUNTER (OUTPATIENT)
Dept: RADIOLOGY | Facility: HOSPITAL | Age: 77
Discharge: HOME | End: 2022-09-16
Attending: INTERNAL MEDICINE
Payer: MEDICARE

## 2022-09-16 VITALS
HEIGHT: 59 IN | BODY MASS INDEX: 24.44 KG/M2 | SYSTOLIC BLOOD PRESSURE: 126 MMHG | DIASTOLIC BLOOD PRESSURE: 76 MMHG | HEART RATE: 79 BPM | WEIGHT: 121.25 LBS

## 2022-09-16 DIAGNOSIS — R41.89 COGNITIVE CHANGES: ICD-10-CM

## 2022-09-16 DIAGNOSIS — M81.0 AGE-RELATED OSTEOPOROSIS WITHOUT CURRENT PATHOLOGICAL FRACTURE: ICD-10-CM

## 2022-09-16 DIAGNOSIS — F41.9 ANXIETY: ICD-10-CM

## 2022-09-16 DIAGNOSIS — F33.41 MDD (MAJOR DEPRESSIVE DISORDER), RECURRENT, IN PARTIAL REMISSION: Primary | ICD-10-CM

## 2022-09-16 DIAGNOSIS — Z79.899 ENCOUNTER FOR LONG-TERM (CURRENT) USE OF MEDICATIONS: ICD-10-CM

## 2022-09-16 DIAGNOSIS — F43.10 PTSD (POST-TRAUMATIC STRESS DISORDER): ICD-10-CM

## 2022-09-16 PROCEDURE — 3078F DIAST BP <80 MM HG: CPT | Mod: CPTII,S$GLB,, | Performed by: STUDENT IN AN ORGANIZED HEALTH CARE EDUCATION/TRAINING PROGRAM

## 2022-09-16 PROCEDURE — 3288F FALL RISK ASSESSMENT DOCD: CPT | Mod: CPTII,S$GLB,, | Performed by: STUDENT IN AN ORGANIZED HEALTH CARE EDUCATION/TRAINING PROGRAM

## 2022-09-16 PROCEDURE — 99215 OFFICE O/P EST HI 40 MIN: CPT | Mod: S$GLB,,, | Performed by: STUDENT IN AN ORGANIZED HEALTH CARE EDUCATION/TRAINING PROGRAM

## 2022-09-16 PROCEDURE — 77080 DXA BONE DENSITY AXIAL: CPT | Mod: TC,PO

## 2022-09-16 PROCEDURE — 3078F PR MOST RECENT DIASTOLIC BLOOD PRESSURE < 80 MM HG: ICD-10-PCS | Mod: CPTII,S$GLB,, | Performed by: STUDENT IN AN ORGANIZED HEALTH CARE EDUCATION/TRAINING PROGRAM

## 2022-09-16 PROCEDURE — 3288F PR FALLS RISK ASSESSMENT DOCUMENTED: ICD-10-PCS | Mod: CPTII,S$GLB,, | Performed by: STUDENT IN AN ORGANIZED HEALTH CARE EDUCATION/TRAINING PROGRAM

## 2022-09-16 PROCEDURE — 1101F PR PT FALLS ASSESS DOC 0-1 FALLS W/OUT INJ PAST YR: ICD-10-PCS | Mod: CPTII,S$GLB,, | Performed by: STUDENT IN AN ORGANIZED HEALTH CARE EDUCATION/TRAINING PROGRAM

## 2022-09-16 PROCEDURE — 99215 PR OFFICE/OUTPT VISIT, EST, LEVL V, 40-54 MIN: ICD-10-PCS | Mod: S$GLB,,, | Performed by: STUDENT IN AN ORGANIZED HEALTH CARE EDUCATION/TRAINING PROGRAM

## 2022-09-16 PROCEDURE — 1125F AMNT PAIN NOTED PAIN PRSNT: CPT | Mod: CPTII,S$GLB,, | Performed by: STUDENT IN AN ORGANIZED HEALTH CARE EDUCATION/TRAINING PROGRAM

## 2022-09-16 PROCEDURE — 99499 RISK ADDL DX/OHS AUDIT: ICD-10-PCS | Mod: HCNC,S$GLB,, | Performed by: STUDENT IN AN ORGANIZED HEALTH CARE EDUCATION/TRAINING PROGRAM

## 2022-09-16 PROCEDURE — 3074F PR MOST RECENT SYSTOLIC BLOOD PRESSURE < 130 MM HG: ICD-10-PCS | Mod: CPTII,S$GLB,, | Performed by: STUDENT IN AN ORGANIZED HEALTH CARE EDUCATION/TRAINING PROGRAM

## 2022-09-16 PROCEDURE — 1159F PR MEDICATION LIST DOCUMENTED IN MEDICAL RECORD: ICD-10-PCS | Mod: CPTII,S$GLB,, | Performed by: STUDENT IN AN ORGANIZED HEALTH CARE EDUCATION/TRAINING PROGRAM

## 2022-09-16 PROCEDURE — 1159F MED LIST DOCD IN RCRD: CPT | Mod: CPTII,S$GLB,, | Performed by: STUDENT IN AN ORGANIZED HEALTH CARE EDUCATION/TRAINING PROGRAM

## 2022-09-16 PROCEDURE — 1160F RVW MEDS BY RX/DR IN RCRD: CPT | Mod: CPTII,S$GLB,, | Performed by: STUDENT IN AN ORGANIZED HEALTH CARE EDUCATION/TRAINING PROGRAM

## 2022-09-16 PROCEDURE — 99999 PR PBB SHADOW E&M-EST. PATIENT-LVL V: ICD-10-PCS | Mod: PBBFAC,,, | Performed by: STUDENT IN AN ORGANIZED HEALTH CARE EDUCATION/TRAINING PROGRAM

## 2022-09-16 PROCEDURE — 99499 UNLISTED E&M SERVICE: CPT | Mod: HCNC,S$GLB,, | Performed by: STUDENT IN AN ORGANIZED HEALTH CARE EDUCATION/TRAINING PROGRAM

## 2022-09-16 PROCEDURE — 1125F PR PAIN SEVERITY QUANTIFIED, PAIN PRESENT: ICD-10-PCS | Mod: CPTII,S$GLB,, | Performed by: STUDENT IN AN ORGANIZED HEALTH CARE EDUCATION/TRAINING PROGRAM

## 2022-09-16 PROCEDURE — 99999 PR PBB SHADOW E&M-EST. PATIENT-LVL V: CPT | Mod: PBBFAC,,, | Performed by: STUDENT IN AN ORGANIZED HEALTH CARE EDUCATION/TRAINING PROGRAM

## 2022-09-16 PROCEDURE — 3074F SYST BP LT 130 MM HG: CPT | Mod: CPTII,S$GLB,, | Performed by: STUDENT IN AN ORGANIZED HEALTH CARE EDUCATION/TRAINING PROGRAM

## 2022-09-16 PROCEDURE — 1101F PT FALLS ASSESS-DOCD LE1/YR: CPT | Mod: CPTII,S$GLB,, | Performed by: STUDENT IN AN ORGANIZED HEALTH CARE EDUCATION/TRAINING PROGRAM

## 2022-09-16 PROCEDURE — 1160F PR REVIEW ALL MEDS BY PRESCRIBER/CLIN PHARMACIST DOCUMENTED: ICD-10-PCS | Mod: CPTII,S$GLB,, | Performed by: STUDENT IN AN ORGANIZED HEALTH CARE EDUCATION/TRAINING PROGRAM

## 2022-09-16 RX ORDER — DONEPEZIL HYDROCHLORIDE 5 MG/1
TABLET, FILM COATED ORAL
Qty: 90 TABLET | Refills: 0 | Status: SHIPPED | OUTPATIENT
Start: 2022-09-16 | End: 2022-10-17 | Stop reason: DRUGHIGH

## 2022-09-16 RX ORDER — SERTRALINE HYDROCHLORIDE 100 MG/1
150 TABLET, FILM COATED ORAL DAILY
Qty: 45 TABLET | Refills: 1 | Status: SHIPPED | OUTPATIENT
Start: 2022-09-16 | End: 2022-10-17 | Stop reason: SDUPTHER
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
## 2022-09-16 NOTE — PATIENT INSTRUCTIONS
Start short-term psychotherapy  Resume ZOLOFT (sertraline) 150mg daily  Continue ARICEPT (donepizil) 5mg daily

## 2022-09-20 ENCOUNTER — HOSPITAL ENCOUNTER (OUTPATIENT)
Facility: AMBULARY SURGERY CENTER | Age: 77
Discharge: HOME | End: 2022-09-20
Attending: ANESTHESIOLOGY | Admitting: ANESTHESIOLOGY
Payer: MEDICARE

## 2022-09-20 DIAGNOSIS — M54.12 CERVICAL RADICULITIS: Primary | ICD-10-CM

## 2022-09-20 PROCEDURE — 62321 PR INJ CERV/THORAC, W/GUIDANCE: ICD-10-PCS | Mod: ,,, | Performed by: ANESTHESIOLOGY

## 2022-09-20 PROCEDURE — 62321 NJX INTERLAMINAR CRV/THRC: CPT | Performed by: ANESTHESIOLOGY

## 2022-09-20 PROCEDURE — 62321 NJX INTERLAMINAR CRV/THRC: CPT | Mod: ,,, | Performed by: ANESTHESIOLOGY

## 2022-09-20 RX ORDER — DEXAMETHASONE SODIUM PHOSPHATE 10 MG/ML
INJECTION INTRAMUSCULAR; INTRAVENOUS
Status: DISCONTINUED | OUTPATIENT
Start: 2022-09-20 | End: 2022-09-20 | Stop reason: HOSPADM

## 2022-09-20 RX ORDER — LIDOCAINE HYDROCHLORIDE 10 MG/ML
INJECTION, SOLUTION EPIDURAL; INFILTRATION; INTRACAUDAL; PERINEURAL
Status: DISCONTINUED | OUTPATIENT
Start: 2022-09-20 | End: 2022-09-20 | Stop reason: HOSPADM

## 2022-09-20 RX ORDER — FENTANYL CITRATE 50 UG/ML
INJECTION, SOLUTION INTRAMUSCULAR; INTRAVENOUS
Status: DISCONTINUED | OUTPATIENT
Start: 2022-09-20 | End: 2022-09-20 | Stop reason: HOSPADM

## 2022-09-20 RX ORDER — MIDAZOLAM HYDROCHLORIDE 1 MG/ML
INJECTION INTRAMUSCULAR; INTRAVENOUS
Status: DISCONTINUED | OUTPATIENT
Start: 2022-09-20 | End: 2022-09-20 | Stop reason: HOSPADM

## 2022-09-20 RX ORDER — SODIUM CHLORIDE 9 MG/ML
INJECTION, SOLUTION INTRAMUSCULAR; INTRAVENOUS; SUBCUTANEOUS
Status: DISCONTINUED | OUTPATIENT
Start: 2022-09-20 | End: 2022-09-20 | Stop reason: HOSPADM

## 2022-09-20 RX ORDER — SODIUM CHLORIDE, SODIUM LACTATE, POTASSIUM CHLORIDE, CALCIUM CHLORIDE 600; 310; 30; 20 MG/100ML; MG/100ML; MG/100ML; MG/100ML
INJECTION, SOLUTION INTRAVENOUS CONTINUOUS
Status: DISCONTINUED | OUTPATIENT
Start: 2022-09-20 | End: 2022-09-20 | Stop reason: HOSPADM

## 2022-09-20 RX ADMIN — SODIUM CHLORIDE, SODIUM LACTATE, POTASSIUM CHLORIDE, CALCIUM CHLORIDE: 600; 310; 30; 20 INJECTION, SOLUTION INTRAVENOUS at 08:09

## 2022-09-20 NOTE — DISCHARGE SUMMARY
[facility]  Discharge Note  Short Stay    Procedure(s) (LRB):  Injection-steroid-epidural-cervical C7-T1 (N/A)    OUTCOME: Patient tolerated treatment/procedure well without complication and is now ready for discharge.    DISPOSITION: Home or Self Care    FINAL DIAGNOSIS:  <principal problem not specified>    FOLLOWUP: In clinic    DISCHARGE INSTRUCTIONS:    Discharge Procedure Orders   Notify your health care provider if you experience any of the following:  temperature >100.4     Notify your health care provider if you experience any of the following:  severe uncontrolled pain     Notify your health care provider if you experience any of the following:  redness, tenderness, or signs of infection (pain, swelling, redness, odor or green/yellow discharge around incision site)     Activity as tolerated        TIME SPENT ON DISCHARGE:   30 minutes

## 2022-09-20 NOTE — PLAN OF CARE
Discharge instructions given to pt/, verbalized understanding.  Tolerating PO fluids.  IV removed.  C/o pain 5/10.  Ambulating out to   per RN in no distress.

## 2022-09-20 NOTE — OP NOTE
PROCEDURE DATE: 9/20/2022    Procedure: C7-T1 cervical interlaminar epidural steroid injection under utilizing fluoroscopy.    Diagnosis: Cervical Degenerative Disc Diease; Cervical Radiculitis  POSTOP DIAGNOSIS: SAME    Physician: Timothy Grimaldo MD    Medications injected:  Dexamethasone 10mg followed by a slow injection of 4 mL sterile, preservative-free normal saline.    Local anesthetic used: Lidocaine 1%, 2 ml.    Sedation Medications: RN IV sedation    Complications:  None    Estimated blood loss: None    Technique:  A time-out was taken to identify patient and procedure prior to starting the procedure.  With the patient laying in a prone position with the neck in a mid-flexed forward position, the area was prepped and draped in the usual sterile fashion using ChloraPrep and a fenestrated drape.  The area was determined under AP fluoroscopic guidance.  Local anesthetic was given using a 25-gauge 1.5 inch needle by raising a wheal and then infiltrating ventrally.  A 3.5 inch 20-gauge Touhy needle was introduced under fluoroscopic guidance to meet the lamina of C7.  The needle was then hinged under the lamina then advanced using loss of resistance technique.  Once the tip of the needle was in the desired position, the 1ml contrast dye Omnipaque was injected to determine placement and no uptake.  The steroid was then injected slowly followed by a slow injection of 4 mL of the sterile preservative-free normal saline.  The patient tolerated the procedure well.    The patient was monitored after the procedure and was given post-procedure and discharge instructions to follow at home. The patient was discharged in a stable condition.

## 2022-09-21 ENCOUNTER — TELEPHONE (OUTPATIENT)
Dept: PSYCHIATRY | Facility: CLINIC | Age: 77
End: 2022-09-21
Payer: MEDICARE

## 2022-09-21 ENCOUNTER — INFUSION (OUTPATIENT)
Dept: INFUSION THERAPY | Facility: HOSPITAL | Age: 77
End: 2022-09-21
Attending: INTERNAL MEDICINE
Payer: MEDICARE

## 2022-09-21 ENCOUNTER — OFFICE VISIT (OUTPATIENT)
Dept: FAMILY MEDICINE | Facility: CLINIC | Age: 77
End: 2022-09-21
Payer: MEDICARE

## 2022-09-21 VITALS
DIASTOLIC BLOOD PRESSURE: 58 MMHG | OXYGEN SATURATION: 94 % | TEMPERATURE: 98 F | DIASTOLIC BLOOD PRESSURE: 59 MMHG | BODY MASS INDEX: 24.76 KG/M2 | TEMPERATURE: 97 F | HEART RATE: 67 BPM | RESPIRATION RATE: 18 BRPM | WEIGHT: 122 LBS | BODY MASS INDEX: 24.6 KG/M2 | WEIGHT: 122.81 LBS | HEIGHT: 59 IN | HEIGHT: 59 IN | OXYGEN SATURATION: 92 % | SYSTOLIC BLOOD PRESSURE: 136 MMHG | SYSTOLIC BLOOD PRESSURE: 127 MMHG | RESPIRATION RATE: 18 BRPM

## 2022-09-21 VITALS
HEIGHT: 59 IN | TEMPERATURE: 99 F | HEART RATE: 80 BPM | DIASTOLIC BLOOD PRESSURE: 70 MMHG | RESPIRATION RATE: 14 BRPM | BODY MASS INDEX: 24.97 KG/M2 | OXYGEN SATURATION: 95 % | WEIGHT: 123.88 LBS | SYSTOLIC BLOOD PRESSURE: 138 MMHG

## 2022-09-21 DIAGNOSIS — I10 PRIMARY HYPERTENSION: ICD-10-CM

## 2022-09-21 DIAGNOSIS — Z23 FLU VACCINE NEED: ICD-10-CM

## 2022-09-21 DIAGNOSIS — M81.0 AGE-RELATED OSTEOPOROSIS WITHOUT CURRENT PATHOLOGICAL FRACTURE: ICD-10-CM

## 2022-09-21 DIAGNOSIS — R73.03 PREDIABETES: ICD-10-CM

## 2022-09-21 DIAGNOSIS — M15.9 PRIMARY OSTEOARTHRITIS INVOLVING MULTIPLE JOINTS: ICD-10-CM

## 2022-09-21 DIAGNOSIS — M81.0 AGE-RELATED OSTEOPOROSIS WITHOUT CURRENT PATHOLOGICAL FRACTURE: Primary | ICD-10-CM

## 2022-09-21 DIAGNOSIS — E78.01 FAMILIAL HYPERCHOLESTEROLEMIA: Primary | ICD-10-CM

## 2022-09-21 PROCEDURE — 3288F FALL RISK ASSESSMENT DOCD: CPT | Mod: CPTII,S$GLB,, | Performed by: FAMILY MEDICINE

## 2022-09-21 PROCEDURE — 3075F SYST BP GE 130 - 139MM HG: CPT | Mod: CPTII,S$GLB,, | Performed by: FAMILY MEDICINE

## 2022-09-21 PROCEDURE — 1159F PR MEDICATION LIST DOCUMENTED IN MEDICAL RECORD: ICD-10-PCS | Mod: CPTII,S$GLB,, | Performed by: FAMILY MEDICINE

## 2022-09-21 PROCEDURE — 90694 FLU VACCINE - QUADRIVALENT - ADJUVANTED: ICD-10-PCS | Mod: S$GLB,,, | Performed by: FAMILY MEDICINE

## 2022-09-21 PROCEDURE — 1125F AMNT PAIN NOTED PAIN PRSNT: CPT | Mod: CPTII,S$GLB,, | Performed by: FAMILY MEDICINE

## 2022-09-21 PROCEDURE — 1160F PR REVIEW ALL MEDS BY PRESCRIBER/CLIN PHARMACIST DOCUMENTED: ICD-10-PCS | Mod: CPTII,S$GLB,, | Performed by: FAMILY MEDICINE

## 2022-09-21 PROCEDURE — 1101F PR PT FALLS ASSESS DOC 0-1 FALLS W/OUT INJ PAST YR: ICD-10-PCS | Mod: CPTII,S$GLB,, | Performed by: FAMILY MEDICINE

## 2022-09-21 PROCEDURE — 99999 PR PBB SHADOW E&M-EST. PATIENT-LVL IV: ICD-10-PCS | Mod: PBBFAC,,, | Performed by: FAMILY MEDICINE

## 2022-09-21 PROCEDURE — 90694 VACC AIIV4 NO PRSRV 0.5ML IM: CPT | Mod: S$GLB,,, | Performed by: FAMILY MEDICINE

## 2022-09-21 PROCEDURE — G0008 FLU VACCINE - QUADRIVALENT - ADJUVANTED: ICD-10-PCS | Mod: S$GLB,,, | Performed by: FAMILY MEDICINE

## 2022-09-21 PROCEDURE — 99999 PR PBB SHADOW E&M-EST. PATIENT-LVL IV: CPT | Mod: PBBFAC,,, | Performed by: FAMILY MEDICINE

## 2022-09-21 PROCEDURE — 3078F DIAST BP <80 MM HG: CPT | Mod: CPTII,S$GLB,, | Performed by: FAMILY MEDICINE

## 2022-09-21 PROCEDURE — 1101F PT FALLS ASSESS-DOCD LE1/YR: CPT | Mod: CPTII,S$GLB,, | Performed by: FAMILY MEDICINE

## 2022-09-21 PROCEDURE — 99214 PR OFFICE/OUTPT VISIT, EST, LEVL IV, 30-39 MIN: ICD-10-PCS | Mod: S$GLB,,, | Performed by: FAMILY MEDICINE

## 2022-09-21 PROCEDURE — 1159F MED LIST DOCD IN RCRD: CPT | Mod: CPTII,S$GLB,, | Performed by: FAMILY MEDICINE

## 2022-09-21 PROCEDURE — G0008 ADMIN INFLUENZA VIRUS VAC: HCPCS | Mod: S$GLB,,, | Performed by: FAMILY MEDICINE

## 2022-09-21 PROCEDURE — 99214 OFFICE O/P EST MOD 30 MIN: CPT | Mod: S$GLB,,, | Performed by: FAMILY MEDICINE

## 2022-09-21 PROCEDURE — 96372 THER/PROPH/DIAG INJ SC/IM: CPT

## 2022-09-21 PROCEDURE — 3288F PR FALLS RISK ASSESSMENT DOCUMENTED: ICD-10-PCS | Mod: CPTII,S$GLB,, | Performed by: FAMILY MEDICINE

## 2022-09-21 PROCEDURE — 3075F PR MOST RECENT SYSTOLIC BLOOD PRESS GE 130-139MM HG: ICD-10-PCS | Mod: CPTII,S$GLB,, | Performed by: FAMILY MEDICINE

## 2022-09-21 PROCEDURE — 3078F PR MOST RECENT DIASTOLIC BLOOD PRESSURE < 80 MM HG: ICD-10-PCS | Mod: CPTII,S$GLB,, | Performed by: FAMILY MEDICINE

## 2022-09-21 PROCEDURE — 1125F PR PAIN SEVERITY QUANTIFIED, PAIN PRESENT: ICD-10-PCS | Mod: CPTII,S$GLB,, | Performed by: FAMILY MEDICINE

## 2022-09-21 PROCEDURE — 63600175 PHARM REV CODE 636 W HCPCS: Mod: JG | Performed by: INTERNAL MEDICINE

## 2022-09-21 PROCEDURE — 1160F RVW MEDS BY RX/DR IN RCRD: CPT | Mod: CPTII,S$GLB,, | Performed by: FAMILY MEDICINE

## 2022-09-21 RX ORDER — DICLOFENAC SODIUM 10 MG/G
2 GEL TOPICAL 4 TIMES DAILY
Qty: 450 G | Refills: 3 | Status: SHIPPED | OUTPATIENT
Start: 2022-09-21

## 2022-09-21 RX ADMIN — DENOSUMAB 60 MG: 60 INJECTION SUBCUTANEOUS at 10:09

## 2022-09-21 NOTE — PLAN OF CARE
Problem: Fatigue  Goal: Improved Activity Tolerance  Outcome: Ongoing, Progressing  Intervention: Promote Improved Energy  Flowsheets (Taken 9/21/2022 1041)  Fatigue Management: frequent rest breaks encouraged  Sleep/Rest Enhancement:   regular sleep/rest pattern promoted   relaxation techniques promoted  Activity Management: Ambulated -L4

## 2022-09-21 NOTE — PROGRESS NOTES
Subjective:       Patient ID: Rola Richter is a 77 y.o. female.    Chief Complaint: No chief complaint on file.    HPI  Review of Systems   Constitutional:  Negative for fatigue and unexpected weight change.   Respiratory:  Negative for chest tightness and shortness of breath.    Cardiovascular:  Negative for chest pain, palpitations and leg swelling.   Gastrointestinal:  Negative for abdominal pain.   Musculoskeletal:  Negative for arthralgias.   Neurological:  Negative for dizziness, syncope, light-headedness and headaches.     Patient Active Problem List   Diagnosis    Hypertension    GERD (gastroesophageal reflux disease)    Anxiety    Hyperlipidemia    Osteoporosis    Dependency on pain medication    PTSD (post-traumatic stress disorder)    CMC arthritis    Dry eye syndrome    DDD (degenerative disc disease), cervical    Occipital neuralgia    Cervical radiculitis    Primary osteoarthritis involving multiple joints    MDD (major depressive disorder), recurrent, in partial remission    Dysphagia    Spondylosis of cervical region without myelopathy or radiculopathy    Myofascial pain    Neck pain    Bronchitis    Calcification of aorta     Patient is here for a chronic conditions follow up.    Reviewed labs 9/2022. Ckd stage 3, anemia      HPL- Your cholesterol is high.  Tried lipitor, crestor, pravastatin -all muscle pain. Taking QOD pravastatin 10mg. Candidate for repatha?      Prediabetes a1c 5.7     H/o ant neck surgery x 3 as child due to tumors -? Thyroid or thymus     C/o food stuck in throat, hoarseness.       Neck pain -helps when wears collar     Has chronic daily headaches- top and frontal. Congestion relieved partially by astelin and flonase NS.  PND, sneezing, hoarse. Sob and wheezing are improving but still lots of phlegm. No fever.  Unrelieved with tessalon or robitussin.  singulair added 7/2021. CT sinus showed chronic bin sinus disease.  Start clindamycin 300mg qid x 14 days.  Under care of  ENT Dr. Jansen -seen 8/23 and 9/21/2021. Did not feel daily HA were rhinogenic-Referred to [facility] SILVERIO Diamond -seen 10/7/2021 and gabapentin added. Helping some. Has f/u next month.     Referred to pain clinic for left leg numbness and cervical radiculitis 7/2021. Seen by Dr. Grimaldo and team 9/15/2021 . Considering MELANY C7-T1.   bin occipital nerve blocks recommended-not scheduled?   Cont norco and flexeril        Admitted NS 6/2021 former smoker after being seen in ED for SOb, wheeze, cough determined to be due to RSV induced acute bronchitis. She was treated supportively with albuterol nebulizer therapy and steroids. She did not require O2 therapy as she was stable on room air. She was monitored overnight and discharged 6/4/2021.        Heme/onc Dr. Murphy- anemia. Has had work up for blood loss anemia.  Likely anemia of chronic disease. Iron is normal     GI Dr. Victor egd 2/2021 10 gastric polyps (fundic gland polyps), hiatal hernia, gastritis.  H Pylori neg. Esophageal bx- no metaplasia, dysplasia or malignancy.   Colonoscopy 5/19 polyp -adenomatous. On 5 year surveillance     HPL-  Intolerant to statins due to myalgia.  Tried lipitor and crestor. Had to stop it      Pain mgmt Dr. Grimaldo taking norco 5 bid . DPS checked no evidence of diversion.  Tried lyrica but stopped it due to dizziness     Rheum Dr. KARUNA Ram Has severe hand OA- tried plaquenil but did not help so stopped it . Osteoporosis now on prolia q 6m     Psych Dr. Benítez MDDD/PTSD. Mood is good on zoloft  Objective:      Physical Exam  Vitals and nursing note reviewed.   Constitutional:       Appearance: She is well-developed.   Cardiovascular:      Rate and Rhythm: Normal rate and regular rhythm.      Heart sounds: Normal heart sounds.   Pulmonary:      Effort: Pulmonary effort is normal.      Breath sounds: Normal breath sounds.   Skin:     General: Skin is warm and dry.   Neurological:      Mental Status: She is alert and oriented to person, place,  and time.       Assessment:       1. Familial hypercholesterolemia    2. Primary osteoarthritis involving multiple joints    3. Flu vaccine need    4. Age-related osteoporosis without current pathological fracture    5. Prediabetes        Plan:         1. Primary osteoarthritis involving multiple joints  Cont rheum care and apply to hands as needed  - diclofenac sodium (VOLTAREN) 1 % Gel; Apply 2 g topically 4 (four) times daily.  Dispense: 450 g; Refill: 3  - Ambulatory referral/consult to Rheumatology; Future    2. Familial hypercholesterolemia  Probable FC based on [place] criteria  Refer for eval and treat. Candidate for repatha? .alh  - Ambulatory referral/consult to Cardiology; Future  - CBC Auto Differential; Future  - Comprehensive Metabolic Panel; Future  - Lipid Panel; Future    3. Flu vaccine need  Immunize today.  Counseled patient on risks, benefits and side effects.  Patient elected to proceed with vaccination.    - Influenza (FLUAD) - Quadrivalent (Adjuvanted) *Preferred* (65+) (PF)    4. Age-related osteoporosis without current pathological fracture  Ont prolia. Dexa showed significant increase. Refer to establish with new provider  - Ambulatory referral/consult to Rheumatology; Future    5. Prediabetes  Stable and chronic.  Will continue to monitor q3-6 months and control chronic conditions as optimally as possible to preserve function.   Your blood sugar is borderline high.  This means you are at risk for developing type 2 diabetes mellitus.  To lessen your risk you should exercise regularly, avoid excess carbohydrates and work toward a body mass index of less than 25.      - Hemoglobin A1C; Future    6. Primary hypertension  Controlled on current medications.  Continue current medications.        Time spent with patient: 20 minutes    Patient with be reevaluated in 4 months or sooner prn    Greater than 50% of this visit was spent counseling as described in above documentation:Yes

## 2022-09-22 ENCOUNTER — TELEPHONE (OUTPATIENT)
Dept: FAMILY MEDICINE | Facility: CLINIC | Age: 77
End: 2022-09-22
Payer: MEDICARE

## 2022-09-22 ENCOUNTER — PATIENT OUTREACH (OUTPATIENT)
Dept: ADMINISTRATIVE | Facility: OTHER | Age: 77
End: 2022-09-22
Payer: MEDICARE

## 2022-09-22 ENCOUNTER — OFFICE VISIT (OUTPATIENT)
Dept: NEUROLOGY | Facility: CLINIC | Age: 77
End: 2022-09-22
Payer: MEDICARE

## 2022-09-22 VITALS
BODY MASS INDEX: 24.71 KG/M2 | SYSTOLIC BLOOD PRESSURE: 142 MMHG | HEART RATE: 73 BPM | WEIGHT: 122.38 LBS | DIASTOLIC BLOOD PRESSURE: 68 MMHG | RESPIRATION RATE: 20 BRPM

## 2022-09-22 DIAGNOSIS — G43.009 MIGRAINE WITHOUT AURA AND WITHOUT STATUS MIGRAINOSUS, NOT INTRACTABLE: ICD-10-CM

## 2022-09-22 DIAGNOSIS — G44.209 TENSION HEADACHE: Primary | ICD-10-CM

## 2022-09-22 PROCEDURE — 3077F PR MOST RECENT SYSTOLIC BLOOD PRESSURE >= 140 MM HG: ICD-10-PCS | Mod: CPTII,S$GLB,, | Performed by: PHYSICIAN ASSISTANT

## 2022-09-22 PROCEDURE — 1101F PT FALLS ASSESS-DOCD LE1/YR: CPT | Mod: CPTII,S$GLB,, | Performed by: PHYSICIAN ASSISTANT

## 2022-09-22 PROCEDURE — 1125F PR PAIN SEVERITY QUANTIFIED, PAIN PRESENT: ICD-10-PCS | Mod: CPTII,S$GLB,, | Performed by: PHYSICIAN ASSISTANT

## 2022-09-22 PROCEDURE — 1159F MED LIST DOCD IN RCRD: CPT | Mod: CPTII,S$GLB,, | Performed by: PHYSICIAN ASSISTANT

## 2022-09-22 PROCEDURE — 1125F AMNT PAIN NOTED PAIN PRSNT: CPT | Mod: CPTII,S$GLB,, | Performed by: PHYSICIAN ASSISTANT

## 2022-09-22 PROCEDURE — 3078F DIAST BP <80 MM HG: CPT | Mod: CPTII,S$GLB,, | Performed by: PHYSICIAN ASSISTANT

## 2022-09-22 PROCEDURE — 99213 OFFICE O/P EST LOW 20 MIN: CPT | Mod: S$GLB,,, | Performed by: PHYSICIAN ASSISTANT

## 2022-09-22 PROCEDURE — 99499 UNLISTED E&M SERVICE: CPT | Mod: HCNC,S$GLB,, | Performed by: PHYSICIAN ASSISTANT

## 2022-09-22 PROCEDURE — 1101F PR PT FALLS ASSESS DOC 0-1 FALLS W/OUT INJ PAST YR: ICD-10-PCS | Mod: CPTII,S$GLB,, | Performed by: PHYSICIAN ASSISTANT

## 2022-09-22 PROCEDURE — 99499 RISK ADDL DX/OHS AUDIT: ICD-10-PCS | Mod: HCNC,S$GLB,, | Performed by: PHYSICIAN ASSISTANT

## 2022-09-22 PROCEDURE — 99999 PR PBB SHADOW E&M-EST. PATIENT-LVL IV: ICD-10-PCS | Mod: PBBFAC,,, | Performed by: PHYSICIAN ASSISTANT

## 2022-09-22 PROCEDURE — 99213 PR OFFICE/OUTPT VISIT, EST, LEVL III, 20-29 MIN: ICD-10-PCS | Mod: S$GLB,,, | Performed by: PHYSICIAN ASSISTANT

## 2022-09-22 PROCEDURE — 3077F SYST BP >= 140 MM HG: CPT | Mod: CPTII,S$GLB,, | Performed by: PHYSICIAN ASSISTANT

## 2022-09-22 PROCEDURE — 3288F PR FALLS RISK ASSESSMENT DOCUMENTED: ICD-10-PCS | Mod: CPTII,S$GLB,, | Performed by: PHYSICIAN ASSISTANT

## 2022-09-22 PROCEDURE — 1159F PR MEDICATION LIST DOCUMENTED IN MEDICAL RECORD: ICD-10-PCS | Mod: CPTII,S$GLB,, | Performed by: PHYSICIAN ASSISTANT

## 2022-09-22 PROCEDURE — 3288F FALL RISK ASSESSMENT DOCD: CPT | Mod: CPTII,S$GLB,, | Performed by: PHYSICIAN ASSISTANT

## 2022-09-22 PROCEDURE — 3078F PR MOST RECENT DIASTOLIC BLOOD PRESSURE < 80 MM HG: ICD-10-PCS | Mod: CPTII,S$GLB,, | Performed by: PHYSICIAN ASSISTANT

## 2022-09-22 PROCEDURE — 99999 PR PBB SHADOW E&M-EST. PATIENT-LVL IV: CPT | Mod: PBBFAC,,, | Performed by: PHYSICIAN ASSISTANT

## 2022-09-22 RX ORDER — GABAPENTIN 100 MG/1
CAPSULE ORAL
Qty: 180 CAPSULE | Refills: 1 | Status: SHIPPED | OUTPATIENT
Start: 2022-09-22 | End: 2022-11-28 | Stop reason: SDUPTHER

## 2022-09-22 RX ORDER — RIMEGEPANT SULFATE 75 MG/75MG
75 TABLET, ORALLY DISINTEGRATING ORAL ONCE AS NEEDED
Qty: 8 TABLET | Refills: 5 | Status: SHIPPED | OUTPATIENT
Start: 2022-09-22 | End: 2022-10-07

## 2022-09-22 NOTE — PROGRESS NOTES
CHW - Initial Contact    This Community Health Worker completed OR updated the Social Determinant of Health questionnaire with patient via telephone today.    Pt identified barriers of most importance are: Patient declined services.   Referrals to community agencies completed with patient/caregiver consent outside of [facility] include: No  Referrals were put through [facility] - no  Support and Services: None  Other information discussed the patient needs / wants help with: None  Follow up required: No. Will not bother couple again.  No future outreach task assigned

## 2022-09-22 NOTE — PROGRESS NOTES
"[facility]  [address] [facility] [address]  [place] [address]  Phone:[phone]  Fax: [phone]  Follow Up Visit     Patient Name: Rola Richter  : 1945  MRN:  [id number]  Today: 2022   LOV:  2022  chief complaint: Headache    PCP: Liseth Carias MD.    Assessment:   Rola Richter is a 77 y.o. right handed, female with a PMHx of: anxiety, arthritis, depression (followed by mental health) , GERD, HTN, HLD, osteoporosis, chronic neck pain (followed by pain management, Dr. Grimaldo)   whom presents solo in HA follow up. Historically holocranial pain however much better since starting gabapentin at 100  mg BID. Now appears to have occasional tension vs migraine HA.          Review:    ICD-10-CM ICD-9-CM   1. Tension headache  G44.209 307.81   2. Migraine without aura and without status migrainosus, not intractable  G43.009 346.10           Plan:   If HA change in quality or nature, will get updated imaging study      -as she states her HA are "better" no need at today's visit   Continue gabapentin 100 mg  BID  Continue zoloft from mental health   No oral steroids d/t pmhx of osteoporosis   Nurtec written for bad HA day, discussed adv effects/dosing, she agreed    All test results as well as any necessary instructions were reviewed and discussed with patient.    Review:  Orders Placed This Encounter    rimegepant (NURTEC) 75 mg odt    gabapentin (NEURONTIN) 100 MG capsule           Patient to return to PCP/other specialists for all other problems  Patient to continue on all medications as Rx'd  AVS and office note available online   RTO- 6 months   The patient indicates understanding of these issues and agrees to the plan.    HPI:   Rola Richter is a 77 y.o.right handed, female with a PMHx of: anxiety, arthritis, depression (followed by mental health) , GERD, HTN, HLD, osteoporosis, chronic neck pain (followed by pain management, Dr." "Dillan)   whom presents solo in HA follow up.     Again presented 20 mins late to appt, I was able to work her in.   At last visit: adjust gabapentin to BID dosing and zoloft per other providers.    4-5 HD a month with pressure sensation in forehead  Feels gabapentin at 100 mg BID is truly helping, did not want adjust further d/t making her feel "off" especially since she takes chronic medicine daily (hydrocodone)  She takes 2 pills of hydrocodone a day, when she has her HA, it usually goes away with her normal hydrocodone dose  "Once in a while, the pressure stays" despite her use of hydrocodone  She feels some of the problem is d/t sinus, she has not followed up with her PCP  Pain along frontalis and right side of the facie  Can last minutes to hours   Would like other options to take away her HA   No vision changes, no falls  Has joint and neck pain      From previous visit:   Noted she had a 1000 appt today with me, seems to have gone (on the other side of the [place]) to a pain management appt at the same time. Noted to be 24 mins late to appt. Was able to work her in. Continue gabapentin 100 mg Q2h before bed, Continue zoloft from mental health, No oral steroids d/t pmhx of osteoporosis and follow up with eye provider.  States only 5-6 bad HA a in past month  Gabapentin helping, has been taking "an extra one" sometimes in the morning. No side effects  Has pressure above eyebrows (frontalis) made worse by "pushing on my head."        -states if she didn't push on her head, "I guess I wouldn't have pain in my eyebrows"  States some blurred vision at times, "I want another opinion of an eye doctor, can you please refer me over here         -denies eye pain, vision loss or diplopia  Denies falls, dysphagia, weight changes  States multiple time she is in 8/10 headache pain (noted smiling, lights on in exam room, able to carry on a conversation)          -she then said "maybe 8/10 is too high, maybe I am a 6/10"  States " "a lot of neck pain and shoulder pain, she was seen by her pain management provider, at the time of this note---there was no note from them   "I really feel better since I started gabapentin."         From previous visits:   4-5 HD a month now ( feels its more, patient feels this is about right)  Pain along frontalis       -eating/chewing make it worse  Last HA was 2 days, went away on its own ---"I don't have to take medicines anymore for this pain"  Feels gabapentin at 100 mg Q2h before bed is helping  "I have no HA, I actually feel good, I don't want to make any changes."  States she is compliant on all medications including her BP meds     As aside, noticed LEFT eye cornea with bright red blood under surface, states "I had this a week ago in the other eye."  mentions her BP has been 120s-170s systolic. NO vision loss, right eye (not the LEFT) feels a little sore. No double vision. Denies recent sneezing/coughing and no mention of constipation/bearing down. Eitan recent trauma and denies any foreign substance touching her eyes. States she has an established eye provider, has not followed up "in a while."           HA Hx  Start: HA for years many years, "just can't get rid of them"  History of trauma (no), History of CNS infection (no), History of Stroke (no)  Location:frontalis and along the cheek bones or pain in high vertex (more on the LEFT)        -feels its brought on by sinuses ("I have gone to my ENT, nothing can be done for me.")            -states her nose runs and her eyes tear, and that she feels congested (worst during fall/winter months)        -she mentions some blurred vision (multiple years)             -states the pain/symptoms are not new (I.e., years), just continue to bother her and wants to know if something can be done to make it better?  Severity: range: 8-10/10  Duration:full day   Frequency:every day   Associated factors (bolded positive) WITH HA (or migraine): Nausea, " "vomiting, photophobia, phonophobia (the aforementioned is not very often), tinnitus, scalp pain, vision loss, diplopia, scintillations, eye pain, jaw pain, weakness?    Tried:sinus medicine  Triggers (in bold): stress, lack of sleep, sinus pressure, too much caffeine, too little caffeine, weather change, seasonal change, strong odours, bright lights, sunlight, food    Currently having a HA?:yes  Positives in bold: Hx of Kidney Stones, asthma, GI bleed, osteoporosis, CAD/MI, CVA/TIA, DM    Imaging on file: none of the head  Therapies tried in past:   buspar  Fioricet  hydrocodone  zoloft  Flexeril  Robaxin  norco  lyrica  zanaflex   Epidural  GONB     -states to date, nothing has helped her HA or sinuses   Gabapentin     As aside, occasionally sees something out of corner of her eye ("like my cat walking, but its not my cat.") She has had this for "a while" and was put on aricept by her psychiatrist. She is able to take her own medications. She denies any trouble with ADLs.       -denies incontinence, dysuria      -states only on occasion, visual hallucinations, denies auditory         States she has had some falls in past year but per  "its been a while." Denies striking her head     Medication Reconciliation:   Current Outpatient Medications   Medication Sig Dispense Refill    azelastine (ASTELIN) 137 mcg (0.1 %) nasal spray 1 spray (137 mcg total) by Nasal route 2 (two) times daily. 30 mL 0    cetirizine (ZYRTEC) 5 MG tablet Take 1 tablet (5 mg total) by mouth once daily. 30 tablet 11    COD LIVER OIL ORAL Take 1 tablet by mouth once daily.       cyclobenzaprine (FLEXERIL) 10 MG tablet Take 1 tablet (10 mg total) by mouth 2 (two) times daily as needed for Muscle spasms. 60 tablet 2    diclofenac sodium (VOLTAREN) 1 % Gel Apply 2 g topically 4 (four) times daily. 450 g 3    donepeziL (ARICEPT) 5 MG tablet TAKE 1 TABLET EVERY EVENING 90 tablet 0    fish oil-omega-3 fatty acids 300-1,000 mg capsule Take 2 g by " mouth once daily.      fluticasone propionate (FLONASE) 50 mcg/actuation nasal spray 2 sprays (100 mcg total) by Each Nostril route once daily. 16 g 11    gabapentin (NEURONTIN) 100 MG capsule 1 pill twice a day 180 capsule 0    HYDROcodone-acetaminophen (NORCO) 5-325 mg per tablet Take 1 tablet by mouth every 12 (twelve) hours as needed for Pain. Medically necessary for greater than 7 days for chronic pain 60 tablet 0    [START ON 10/20/2022] HYDROcodone-acetaminophen (NORCO) 5-325 mg per tablet Take 1 tablet by mouth every 12 (twelve) hours as needed for Pain. Medically necessary for greater than 7 days for chronic pain 60 tablet 0    ipratropium (ATROVENT) 42 mcg (0.06 %) nasal spray 2 sprays by Nasal route 3 (three) times daily. 15 mL 6    irbesartan (AVAPRO) 300 MG tablet Take 1 tablet (300 mg total) by mouth every evening. New tablet strength 90 tablet 3    montelukast (SINGULAIR) 10 mg tablet Take 10 mg by mouth nightly.      multivit with min-folic acid 200 mcg Chew Take 1 tablet by mouth once daily.       omeprazole (PRILOSEC) 40 MG capsule TAKE 1 CAPSULE EVERY DAY 90 capsule 2    pitavastatin calcium (LIVALO) 2 mg Tab tablet Take 1 tablet (2 mg total) by mouth every evening. 90 tablet 3    polyethylene glycol (GLYCOLAX) 17 gram/dose powder Take 17 g by mouth daily as needed (constipation).       sertraline (ZOLOFT) 100 MG tablet Take 1.5 tablets (150 mg total) by mouth once daily. for 60 doses 45 tablet 1     No current facility-administered medications for this visit.     Review of patient's allergies indicates:   Allergen Reactions    Aspirin Nausea And Vomiting    Penicillins Itching    Crestor [rosuvastatin]      Muscle pain      Lipitor [atorvastatin]      Achy         PMHx:  Past Medical History:   Diagnosis Date    Anxiety     Arthritis     Cataract     DDD (degenerative disc disease), lumbar     Depression     Encounter for blood transfusion     GERD (gastroesophageal reflux disease)     Headache      Hyperlipidemia     Hypertension     pt states she does not take meds anymore she just watches her weight    Neuromuscular disorder     Occipital neuralgia 3/24/2017    Osteoporosis      Past Surgical History:   Procedure Laterality Date    APPENDECTOMY       SECTION      x 2    CHOLECYSTECTOMY      COLONOSCOPY  prior to     COLONOSCOPY N/A 2019    Procedure: COLONOSCOPY;  Surgeon: Greg Victor MD;  Location: [facility];  Service: Endoscopy;  Laterality: N/A; 2 colon polyps, hemorrhoids; repeat in 5 years for surveillance; biopsy: Tubular adenoma x2    EPIDURAL STEROID INJECTION INTO CERVICAL SPINE N/A 2022    Procedure: Injection-steroid-epidural-cervical C7-T1;  Surgeon: Timothy Grimaldo MD;  Location: [facility] OR;  Service: Pain Management;  Laterality: N/A;    ESOPHAGOGASTRODUODENOSCOPY N/A 2019    Procedure: EGD (ESOPHAGOGASTRODUODENOSCOPY);  Surgeon: Greg Victor MD;  Location: [facility];  Service: Endoscopy;  Laterality: N/A; Mild Schatzki ring. Dilated. small hiatal hernia; Four gastric polyps. Resected and retrieved, gastritis; biopsy:stomach- unremarkable, negative for h pylori, polyps-Fundic type mucosa with foveolar hyperplasia.    ESOPHAGOGASTRODUODENOSCOPY N/A 2021    Procedure: EGD (ESOPHAGOGASTRODUODENOSCOPY);  Surgeon: Greg Victor MD;  Location: [facility];  Service: Endoscopy;  Laterality: N/A;    HYSTERECTOMY      Laser Periphery Iridotomy Bilateral     OD 16 and OS touch up 2016    UPPER GASTROINTESTINAL ENDOSCOPY  2017    Dr. Marcial: esophageal stenosis- dilated, gastritis, gastric polyps removed; biopsy- mild gastritis, negative for h pylori, hyperplastic polyp, esophagus unremarkable    vocal cord tumor removal         Fhx:  Family History   Problem Relation Age of Onset    Osteoarthritis Mother     Alcohol abuse Mother     Rheum arthritis Mother     Osteoarthritis Sister     Diabetes Brother     No Known Problems Son     No Known Problems  Sister     No Known Problems Sister     No Known Problems Brother     Arthritis Son     No Known Problems Son     Stroke Maternal Grandmother 99    Rheum arthritis Maternal Grandmother     Retinal detachment Neg Hx     Macular degeneration Neg Hx     Glaucoma Neg Hx     Amblyopia Neg Hx     Blindness Neg Hx     Cancer Neg Hx     Cataracts Neg Hx     Hypertension Neg Hx     Strabismus Neg Hx     Thyroid disease Neg Hx     Lupus Neg Hx     Kidney disease Neg Hx     Inflammatory bowel disease Neg Hx     Psoriasis Neg Hx     Colon cancer Neg Hx     Crohn's disease Neg Hx     Ulcerative colitis Neg Hx     Stomach cancer Neg Hx     Esophageal cancer Neg Hx        Shx:   Social History     Socioeconomic History    Marital status:    Tobacco Use    Smoking status: Never    Smokeless tobacco: Never   Substance and Sexual Activity    Alcohol use: No     Alcohol/week: 0.0 standard drinks    Drug use: Yes     Types: Hydrocodone    Sexual activity: Yes     Partners: Male     Social Determinants of Health     Financial Resource Strain: Low Risk     Difficulty of Paying Living Expenses: Not hard at all   Food Insecurity: No Food Insecurity    Worried About Running Out of Food in the Last Year: Never true    Ran Out of Food in the Last Year: Never true   Transportation Needs: No Transportation Needs    Lack of Transportation (Medical): No    Lack of Transportation (Non-Medical): No   Physical Activity: Sufficiently Active    Days of Exercise per Week: 5 days    Minutes of Exercise per Session: 30 min   Stress: No Stress Concern Present    Feeling of Stress : Only a little   Social Connections: Moderately Integrated    Frequency of Communication with Friends and Family: Three times a week    Frequency of Social Gatherings with Friends and Family: Once a week    Attends Congregational Services: More than 4 times per year    Active Member of Clubs or Organizations: No    Attends Club or Organization Meetings: Never    Marital  Status:    Housing Stability: Low Risk     Unable to Pay for Housing in the Last Year: No    Number of Places Lived in the Last Year: 1    Unstable Housing in the Last Year: No           Labs:   Reviewed in chart   Imaging:   Reviewed in chart       Other testing:  Reviewed in chart     Note: I have independently reviewed any/all imaging/labs/tests and agree with the report (s) as documented.  Any discrepancies will be as noted/demarcated by free text.  WILMER PEDRO 9/22/2022                     ROS:   Review Of Systems (questions asked, positive or additions in BOLD)  Gen: Weight change, fatigue/malaise, pyrexia   HEENT: Tinnitus, headache,  blurred vision, eye pain, diplopia, photophobia,  nose bleeds, congestion, sore throat, jaw pain, scalp pain, neck stiffness   Card: Palpitations, CP   Pulm: SOB, cough   Vas: Easy bruising, easy bleeding   GI: N/V/D/C, incontinence, hematemesis, hematochezia         Endocrine: Temp intolerance, polyuria, polydipsia   M/S: Neck pain, myalgia, back pain, joint pain, falls    Neuro: PER HPI   PSY: Memory loss, confusion, depression, anxiety, trouble in sleep, visual hallucinations           EXAM:   BP (!) 142/68   Pulse 73   Resp 20   Wt 55.5 kg (122 lb 5.7 oz)   BMI 24.71 kg/m²    GEN:  NAD  HEENT: NC/AT, at first states NTTP along frontalis---then she pushed on her frontalis and stated pain, when I re-tested states there was discomfort.   EXTREM:  Swelling of DIP and PIP bilat UE, volar distribution of fingers on palm. Noted TTP to elbows, knees  NEURO:   Mental Status:  Awake, alert and appropriately oriented to time, place, and person.  . Able to discuss current events.  Slightly decreased attention and concentration.  Speech is fluent and appropriate language function (I.e., comprehension)    Cranial Nerves:      Extraocular movements are intact and without nystagmus.  Visual fields are full to confrontation testing.  Facial movement is symmetric.  Facial sensation is  intact.  Hearing is normal. Uvula in midline. DROM of neck in all (6) directions, shoulder shrug symmetrical.       Motor:  Antigravity in all limbs   Tremor/pronator drift not apparent.          Gait and Stance: slightly antalgic, gait is not wide based         This document has been electronically signed by Gordy Diamond MPA, PA-C on 9/22/2022, I have personally typed this message using the EMR.       Dr Aron MD was available during today's visit.     Personal Protective Equipment:    Personal Protective Equipment was used during this encounter including: surgical mask  and non latex gloves.          CC: Liseth Carias MD

## 2022-09-23 ENCOUNTER — TELEPHONE (OUTPATIENT)
Dept: PHARMACY | Facility: CLINIC | Age: 77
End: 2022-09-23
Payer: MEDICARE

## 2022-09-29 NOTE — PROGRESS NOTES
Subjective:    Patient ID:  Rola Richter is a 77 y.o. female who presents for evaluation of   Chief Complaint   Patient presents with    Referral     Dr. Carias         HPI:      Patient ID: Rola Richter is a 77 y.o. female.     Chief Complaint: No chief complaint on file.     HPI  Review of Systems   Constitutional:  Negative for fatigue and unexpected weight change.   Respiratory:  Negative for chest tightness and shortness of breath.    Cardiovascular:  Negative for chest pain, palpitations and leg swelling.   Gastrointestinal:  Negative for abdominal pain.   Musculoskeletal:  Negative for arthralgias.   Neurological:  Negative for dizziness, syncope, light-headedness and headaches.         Patient Active Problem List   Diagnosis    Hypertension    GERD (gastroesophageal reflux disease)    Anxiety    Hyperlipidemia    Osteoporosis    Dependency on pain medication    PTSD (post-traumatic stress disorder)    CMC arthritis    Dry eye syndrome    DDD (degenerative disc disease), cervical    Occipital neuralgia    Cervical radiculitis    Primary osteoarthritis involving multiple joints    MDD (major depressive disorder), recurrent, in partial remission    Dysphagia    Spondylosis of cervical region without myelopathy or radiculopathy    Myofascial pain    Neck pain    Bronchitis    Calcification of aorta      Patient is here for a chronic conditions follow up.    Reviewed labs 9/2022. Ckd stage 3, anemia        HPL- Your cholesterol is high.  Tried lipitor, crestor, pravastatin -all muscle pain. Taking QOD pravastatin 10mg. Candidate for repatha?      Prediabetes a1c 5.7     H/o ant neck surgery x 3 as child due to tumors -? Thyroid or thymus     C/o food stuck in throat, hoarseness.       Neck pain -helps when wears collar     9/30/22    SHE IS REFERRED BY DR. LYLES TO OBTAIN A REPATHA PRESCRIPTION.  He has a history of family all hyper cholesterolemia calcium of the aorta.  She is intolerant to  multiple statins in the past.  He has hypertension which is somewhat labile she is currently taking irbesartan 151 and half tablets a day which seems to work with a systolic pressure 130-140.  If she takes more medication she gets a little imbalance and is afraid to fall because of severe arthritis.    She has migraine headaches which may be stimulated by hypertension.  She has no chest pain.  She walks every other day.  She is able to garden and does her own housework.  She has a digital blood pressure monitoring.    Review of patient's allergies indicates:   Allergen Reactions    Aspirin Nausea And Vomiting    Penicillins Itching    Crestor [rosuvastatin]      Muscle pain      Lipitor [atorvastatin]      Achy         Past Medical History:   Diagnosis Date    Anxiety     Arthritis     Cataract     DDD (degenerative disc disease), lumbar     Depression     Encounter for blood transfusion     GERD (gastroesophageal reflux disease)     Headache     Hyperlipidemia     Hypertension     pt states she does not take meds anymore she just watches her weight    Neuromuscular disorder     Occipital neuralgia 3/24/2017    Osteoporosis      Past Surgical History:   Procedure Laterality Date    APPENDECTOMY       SECTION      x 2    CHOLECYSTECTOMY      COLONOSCOPY  prior to     COLONOSCOPY N/A 2019    Procedure: COLONOSCOPY;  Surgeon: Greg Victor MD;  Location: [facility];  Service: Endoscopy;  Laterality: N/A; 2 colon polyps, hemorrhoids; repeat in 5 years for surveillance; biopsy: Tubular adenoma x2    EPIDURAL STEROID INJECTION INTO CERVICAL SPINE N/A 2022    Procedure: Injection-steroid-epidural-cervical C7-T1;  Surgeon: Timothy Grimaldo MD;  Location: [facility];  Service: Pain Management;  Laterality: N/A;    ESOPHAGOGASTRODUODENOSCOPY N/A 2019    Procedure: EGD (ESOPHAGOGASTRODUODENOSCOPY);  Surgeon: Greg Victor MD;  Location: [facility];  Service: Endoscopy;  Laterality: N/A; Mild Schatzki  ring. Dilated. small hiatal hernia; Four gastric polyps. Resected and retrieved, gastritis; biopsy:stomach- unremarkable, negative for h pylori, polyps-Fundic type mucosa with foveolar hyperplasia.    ESOPHAGOGASTRODUODENOSCOPY N/A 2/17/2021    Procedure: EGD (ESOPHAGOGASTRODUODENOSCOPY);  Surgeon: Greg Victor MD;  Location: [facility];  Service: Endoscopy;  Laterality: N/A;    HYSTERECTOMY      Laser Periphery Iridotomy Bilateral     OD 5/26/16 and OS touch up 5/26/2016    UPPER GASTROINTESTINAL ENDOSCOPY  03/14/2017    Dr. Marcial: esophageal stenosis- dilated, gastritis, gastric polyps removed; biopsy- mild gastritis, negative for h pylori, hyperplastic polyp, esophagus unremarkable    vocal cord tumor removal       Social History     Tobacco Use    Smoking status: Never    Smokeless tobacco: Never   Substance Use Topics    Alcohol use: No     Alcohol/week: 0.0 standard drinks    Drug use: Yes     Types: Hydrocodone     Family History   Problem Relation Age of Onset    Osteoarthritis Mother     Alcohol abuse Mother     Rheum arthritis Mother     Osteoarthritis Sister     Diabetes Brother     No Known Problems Son     No Known Problems Sister     No Known Problems Sister     No Known Problems Brother     Arthritis Son     No Known Problems Son     Stroke Maternal Grandmother 99    Rheum arthritis Maternal Grandmother     Retinal detachment Neg Hx     Macular degeneration Neg Hx     Glaucoma Neg Hx     Amblyopia Neg Hx     Blindness Neg Hx     Cancer Neg Hx     Cataracts Neg Hx     Hypertension Neg Hx     Strabismus Neg Hx     Thyroid disease Neg Hx     Lupus Neg Hx     Kidney disease Neg Hx     Inflammatory bowel disease Neg Hx     Psoriasis Neg Hx     Colon cancer Neg Hx     Crohn's disease Neg Hx     Ulcerative colitis Neg Hx     Stomach cancer Neg Hx     Esophageal cancer Neg Hx         Review of Systems:   Constitution: Negative for diaphoresis and fever.   HEENT: Negative for nosebleeds.    Cardiovascular:  Negative for chest pain       No dyspnea on exertion       No leg swelling        No palpitations  Respiratory: Negative for shortness of breath and wheezing.    Hematologic/Lymphatic: Negative for bleeding problem. Does not bruise/bleed easily.   Skin: Negative for color change and rash.   Musculoskeletal: Negative for falls and myalgias.   Gastrointestinal: Negative for hematemesis and hematochezia.   Genitourinary: Negative for hematuria.   Neurological: Negative for dizziness and light-headedness.   Psychiatric/Behavioral: Negative for altered mental status and memory loss.          Objective:        Vitals:    09/30/22 0810   BP: 134/72   Pulse: 76       Lab Results   Component Value Date    WBC 8.25 09/12/2022    HGB 11.4 (L) 09/12/2022    HCT 34.3 (L) 09/12/2022     09/12/2022    CHOL 416 (H) 09/12/2022    TRIG 294 (H) 09/12/2022    HDL 53 09/12/2022    ALT 14 09/12/2022    AST 22 09/12/2022     09/12/2022    K 4.4 09/12/2022     09/12/2022    CREATININE 1.0 09/12/2022    BUN 15 09/12/2022    CO2 24 09/12/2022    TSH 1.840 02/25/2021    HGBA1C 5.7 (H) 09/12/2022        ECHOCARDIOGRAM RESULTS  No results found for this or any previous visit.        CURRENT/PREVIOUS VISIT EKG  Results for orders placed or performed during the hospital encounter of 05/30/21   EKG 12-lead    Collection Time: 05/30/21 10:13 AM    Narrative    Test Reason : R06.02,    Vent. Rate : 100 BPM     Atrial Rate : 100 BPM     P-R Int : 138 ms          QRS Dur : 070 ms      QT Int : 330 ms       P-R-T Axes : 074 083 076 degrees     QTc Int : 425 ms    Normal sinus rhythm  Nonspecific ST abnormality  Abnormal ECG  When compared with ECG of 10-OCT-2016 08:55,  No significant change was found  Confirmed by Dylan Pearl MD ([id number]) on 5/31/2021 9:42:36 AM    Referred By: [email or web address]   SELF           Confirmed By:Dylan Pearl MD     No valid procedures specified.   No results found for this or any previous  visit.      Physical Exam:  CONSTITUTIONAL: No fever, no chills  HEENT: Normocephalic, atraumatic,pupils reactive to light                 NECK:  No JVD no carotid bruit  CVS: S1S2+, RRR, no murmurs,   LUNGS: Clear  ABDOMEN: Soft, NT, BS+  EXTREMITIES: No cyanosis, edema  : No chopra catheter  NEURO: AAO X 3  PSY: Normal affect     EKG  NSR NORMAL   Medication List with Changes/Refills   New Medications    EVOLOCUMAB (REPATHA SURECLICK) 140 MG/ML PNIJ    Inject 1 mL (140 mg total) into the skin every 14 (fourteen) days.   Current Medications    AZELASTINE (ASTELIN) 137 MCG (0.1 %) NASAL SPRAY    1 spray (137 mcg total) by Nasal route 2 (two) times daily.    CETIRIZINE (ZYRTEC) 5 MG TABLET    Take 1 tablet (5 mg total) by mouth once daily.    COD LIVER OIL ORAL    Take 1 tablet by mouth once daily.     DICLOFENAC SODIUM (VOLTAREN) 1 % GEL    Apply 2 g topically 4 (four) times daily.    DONEPEZIL (ARICEPT) 5 MG TABLET    TAKE 1 TABLET EVERY EVENING    FISH OIL-OMEGA-3 FATTY ACIDS 300-1,000 MG CAPSULE    Take 2 g by mouth once daily.    FLUTICASONE PROPIONATE (FLONASE) 50 MCG/ACTUATION NASAL SPRAY    2 sprays (100 mcg total) by Each Nostril route once daily.    GABAPENTIN (NEURONTIN) 100 MG CAPSULE    1 pill twice a day    HYDROCODONE-ACETAMINOPHEN (NORCO) 5-325 MG PER TABLET    Take 1 tablet by mouth every 12 (twelve) hours as needed for Pain. Medically necessary for greater than 7 days for chronic pain    HYDROCODONE-ACETAMINOPHEN (NORCO) 5-325 MG PER TABLET    Take 1 tablet by mouth every 12 (twelve) hours as needed for Pain. Medically necessary for greater than 7 days for chronic pain    IPRATROPIUM (ATROVENT) 42 MCG (0.06 %) NASAL SPRAY    2 sprays by Nasal route 3 (three) times daily.    IRBESARTAN (AVAPRO) 300 MG TABLET    Take 1 tablet (300 mg total) by mouth every evening. New tablet strength    MONTELUKAST (SINGULAIR) 10 MG TABLET    Take 10 mg by mouth nightly.    MULTIVIT WITH MIN-FOLIC ACID 200 MCG CHEW     Take 1 tablet by mouth once daily.     OMEPRAZOLE (PRILOSEC) 40 MG CAPSULE    TAKE 1 CAPSULE EVERY DAY    PITAVASTATIN CALCIUM (LIVALO) 2 MG TAB TABLET    Take 1 tablet (2 mg total) by mouth every evening.    POLYETHYLENE GLYCOL (GLYCOLAX) 17 GRAM/DOSE POWDER    Take 17 g by mouth daily as needed (constipation).     RIMEGEPANT (NURTEC) 75 MG ODT    Take 1 tablet (75 mg total) by mouth once as needed for Migraine. Place ODT tablet on the tongue; alternatively the ODT tablet may be placed under the tongue    ROSUVASTATIN (CRESTOR) 20 MG TABLET    Take 20 mg by mouth once daily.    SERTRALINE (ZOLOFT) 100 MG TABLET    Take 1.5 tablets (150 mg total) by mouth once daily. for 60 doses             Assessment:       1. Family history of cerebrovascular accident (CVA)    2. Familial hypercholesterolemia    3. Mixed hyperlipidemia    4. Calcification of aorta    5. Statin not tolerated    6. Primary hypertension    7. Pure hypercholesterolemia    8. Age-related osteoporosis with current pathological fracture, initial encounter    9. Cervical radiculitis    10. Headaches due to old head injury    11. Imbalance         Plan:     Problem List Items Addressed This Visit          Neuro    Cervical radiculitis       Cardiac/Vascular    Hypertension    Relevant Orders    IN OFFICE EKG 12-LEAD (to [place])    Echo Saline Bubble? No    Hyperlipidemia    Relevant Medications    evolocumab (REPATHA SURECLICK) 140 mg/mL PnIj    Other Relevant Orders    Echo Saline Bubble? No    Echo Saline Bubble? No    Calcification of aorta    Relevant Medications    evolocumab (REPATHA SURECLICK) 140 mg/mL PnIj    Other Relevant Orders    Echo Saline Bubble? No       Orthopedic    Osteoporosis     Other Visit Diagnoses       Family history of cerebrovascular accident (CVA)    -  Primary    Statin not tolerated        Relevant Medications    evolocumab (REPATHA SURECLICK) 140 mg/mL PnIj    Headaches due to old head injury        Imbalance                 No follow-ups on file.    The patients questions were answered, they verbalized understanding, and agreed with the treatment plan.     LALA COBB MD  [facility]

## 2022-09-30 ENCOUNTER — TELEPHONE (OUTPATIENT)
Dept: CARDIOLOGY | Facility: CLINIC | Age: 77
End: 2022-09-30

## 2022-09-30 ENCOUNTER — SPECIALTY PHARMACY (OUTPATIENT)
Dept: PHARMACY | Facility: CLINIC | Age: 77
End: 2022-09-30
Payer: MEDICARE

## 2022-09-30 ENCOUNTER — OFFICE VISIT (OUTPATIENT)
Dept: CARDIOLOGY | Facility: CLINIC | Age: 77
End: 2022-09-30
Payer: MEDICARE

## 2022-09-30 VITALS
HEART RATE: 76 BPM | OXYGEN SATURATION: 98 % | HEIGHT: 59 IN | DIASTOLIC BLOOD PRESSURE: 72 MMHG | SYSTOLIC BLOOD PRESSURE: 134 MMHG | WEIGHT: 123.44 LBS | BODY MASS INDEX: 24.88 KG/M2

## 2022-09-30 DIAGNOSIS — I10 PRIMARY HYPERTENSION: ICD-10-CM

## 2022-09-30 DIAGNOSIS — S09.90XS HEADACHES DUE TO OLD HEAD INJURY: ICD-10-CM

## 2022-09-30 DIAGNOSIS — M54.12 CERVICAL RADICULITIS: ICD-10-CM

## 2022-09-30 DIAGNOSIS — E78.00 PURE HYPERCHOLESTEROLEMIA: ICD-10-CM

## 2022-09-30 DIAGNOSIS — E78.01 FAMILIAL HYPERCHOLESTEROLEMIA: ICD-10-CM

## 2022-09-30 DIAGNOSIS — G44.309 HEADACHES DUE TO OLD HEAD INJURY: ICD-10-CM

## 2022-09-30 DIAGNOSIS — Z82.3 FAMILY HISTORY OF CEREBROVASCULAR ACCIDENT (CVA): Primary | ICD-10-CM

## 2022-09-30 DIAGNOSIS — E78.00 PURE HYPERCHOLESTEROLEMIA: Primary | ICD-10-CM

## 2022-09-30 DIAGNOSIS — I70.0 CALCIFICATION OF AORTA: ICD-10-CM

## 2022-09-30 DIAGNOSIS — E78.2 MIXED HYPERLIPIDEMIA: ICD-10-CM

## 2022-09-30 DIAGNOSIS — I77.89 OTHER SPECIFIED DISORDERS OF ARTERIES AND ARTERIOLES: ICD-10-CM

## 2022-09-30 DIAGNOSIS — R26.89 IMBALANCE: ICD-10-CM

## 2022-09-30 DIAGNOSIS — I70.0 ATHEROSCLEROSIS OF AORTA: Primary | ICD-10-CM

## 2022-09-30 DIAGNOSIS — H53.133 SUDDEN VISUAL LOSS, BILATERAL: ICD-10-CM

## 2022-09-30 DIAGNOSIS — M80.00XA AGE-RELATED OSTEOPOROSIS WITH CURRENT PATHOLOGICAL FRACTURE, INITIAL ENCOUNTER: ICD-10-CM

## 2022-09-30 DIAGNOSIS — Z78.9 STATIN NOT TOLERATED: ICD-10-CM

## 2022-09-30 PROCEDURE — 1159F MED LIST DOCD IN RCRD: CPT | Mod: CPTII,S$GLB,, | Performed by: GENERAL PRACTICE

## 2022-09-30 PROCEDURE — 3078F DIAST BP <80 MM HG: CPT | Mod: CPTII,S$GLB,, | Performed by: GENERAL PRACTICE

## 2022-09-30 PROCEDURE — 3288F FALL RISK ASSESSMENT DOCD: CPT | Mod: CPTII,S$GLB,, | Performed by: GENERAL PRACTICE

## 2022-09-30 PROCEDURE — 93000 ELECTROCARDIOGRAM COMPLETE: CPT | Mod: S$GLB,,, | Performed by: GENERAL PRACTICE

## 2022-09-30 PROCEDURE — 99499 UNLISTED E&M SERVICE: CPT | Mod: HCNC,S$GLB,, | Performed by: GENERAL PRACTICE

## 2022-09-30 PROCEDURE — 99499 RISK ADDL DX/OHS AUDIT: ICD-10-PCS | Mod: HCNC,S$GLB,, | Performed by: GENERAL PRACTICE

## 2022-09-30 PROCEDURE — 3288F PR FALLS RISK ASSESSMENT DOCUMENTED: ICD-10-PCS | Mod: CPTII,S$GLB,, | Performed by: GENERAL PRACTICE

## 2022-09-30 PROCEDURE — 99214 PR OFFICE/OUTPT VISIT, EST, LEVL IV, 30-39 MIN: ICD-10-PCS | Mod: S$GLB,,, | Performed by: GENERAL PRACTICE

## 2022-09-30 PROCEDURE — 1101F PT FALLS ASSESS-DOCD LE1/YR: CPT | Mod: CPTII,S$GLB,, | Performed by: GENERAL PRACTICE

## 2022-09-30 PROCEDURE — 3075F SYST BP GE 130 - 139MM HG: CPT | Mod: CPTII,S$GLB,, | Performed by: GENERAL PRACTICE

## 2022-09-30 PROCEDURE — 1126F AMNT PAIN NOTED NONE PRSNT: CPT | Mod: CPTII,S$GLB,, | Performed by: GENERAL PRACTICE

## 2022-09-30 PROCEDURE — 3075F PR MOST RECENT SYSTOLIC BLOOD PRESS GE 130-139MM HG: ICD-10-PCS | Mod: CPTII,S$GLB,, | Performed by: GENERAL PRACTICE

## 2022-09-30 PROCEDURE — 99214 OFFICE O/P EST MOD 30 MIN: CPT | Mod: S$GLB,,, | Performed by: GENERAL PRACTICE

## 2022-09-30 PROCEDURE — 1101F PR PT FALLS ASSESS DOC 0-1 FALLS W/OUT INJ PAST YR: ICD-10-PCS | Mod: CPTII,S$GLB,, | Performed by: GENERAL PRACTICE

## 2022-09-30 PROCEDURE — 93000 EKG 12-LEAD: ICD-10-PCS | Mod: S$GLB,,, | Performed by: GENERAL PRACTICE

## 2022-09-30 PROCEDURE — 3078F PR MOST RECENT DIASTOLIC BLOOD PRESSURE < 80 MM HG: ICD-10-PCS | Mod: CPTII,S$GLB,, | Performed by: GENERAL PRACTICE

## 2022-09-30 PROCEDURE — 1126F PR PAIN SEVERITY QUANTIFIED, NO PAIN PRESENT: ICD-10-PCS | Mod: CPTII,S$GLB,, | Performed by: GENERAL PRACTICE

## 2022-09-30 PROCEDURE — 1159F PR MEDICATION LIST DOCUMENTED IN MEDICAL RECORD: ICD-10-PCS | Mod: CPTII,S$GLB,, | Performed by: GENERAL PRACTICE

## 2022-09-30 RX ORDER — ROSUVASTATIN CALCIUM 20 MG/1
20 TABLET, COATED ORAL DAILY
COMMUNITY
End: 2022-12-01

## 2022-09-30 NOTE — TELEPHONE ENCOUNTER
Raman MA approved from 9/30/22 -3/29/23. PA Case: [id number]    [employer]  Copay $9.85  Initial benefit  LIS 1  In network    Pending initial consult.

## 2022-09-30 NOTE — TELEPHONE ENCOUNTER
Kasi, this is Nikole Matias with [facility].  We are working on your prescription that your doctor has sent us. We will be working with your insurance to get this approved for you. We will be calling you along the way with updates on your medication.  If you have any questions, you can reach us at [phone].    Welcome call outcome: Patient/caregiver reached

## 2022-10-04 ENCOUNTER — HOSPITAL ENCOUNTER (OUTPATIENT)
Dept: RADIOLOGY | Facility: HOSPITAL | Age: 77
Discharge: HOME | End: 2022-10-04
Attending: GENERAL PRACTICE
Payer: MEDICARE

## 2022-10-04 DIAGNOSIS — I10 PRIMARY HYPERTENSION: ICD-10-CM

## 2022-10-04 DIAGNOSIS — I70.0 ATHEROSCLEROSIS OF AORTA: ICD-10-CM

## 2022-10-04 DIAGNOSIS — H53.133 SUDDEN VISUAL LOSS, BILATERAL: ICD-10-CM

## 2022-10-04 DIAGNOSIS — G44.309 HEADACHES DUE TO OLD HEAD INJURY: ICD-10-CM

## 2022-10-04 DIAGNOSIS — S09.90XS HEADACHES DUE TO OLD HEAD INJURY: ICD-10-CM

## 2022-10-04 PROCEDURE — 93880 EXTRACRANIAL BILAT STUDY: CPT | Mod: TC

## 2022-10-04 PROCEDURE — 93880 EXTRACRANIAL BILAT STUDY: CPT | Mod: 26,,, | Performed by: RADIOLOGY

## 2022-10-04 PROCEDURE — 93880 US CAROTID BILATERAL: ICD-10-PCS | Mod: 26,,, | Performed by: RADIOLOGY

## 2022-10-04 NOTE — TELEPHONE ENCOUNTER
Specialty Pharmacy - Initial Clinical Assessment    Specialty Medication Orders Linked to Encounter      Flowsheet Row Most Recent Value   Medication #1 evolocumab (REPATHA SURECLICK) 140 mg/mL PnIj (Order#[id number], Rx#[id number])          Patient Diagnosis   E78.00 - Pure hypercholesterolemia    Subjective    Rola Richter is a 77 y.o. female, who is followed by the specialty pharmacy service for management and education.    Recent Encounters       Date Type Provider Description    09/30/2022 Specialty Pharmacy Nikole Matias, Melissa Initial Clinical Assessment    09/30/2022 Specialty Pharmacy Nikole Matias, Melissa Referral Authorization          Clinical call attempts since last clinical assessment   No call attempts found.     Current Outpatient Medications   Medication Sig    azelastine (ASTELIN) 137 mcg (0.1 %) nasal spray 1 spray (137 mcg total) by Nasal route 2 (two) times daily.    cetirizine (ZYRTEC) 5 MG tablet Take 1 tablet (5 mg total) by mouth once daily. (Patient not taking: Reported on 9/30/2022)    COD LIVER OIL ORAL Take 1 tablet by mouth once daily.     diclofenac sodium (VOLTAREN) 1 % Gel Apply 2 g topically 4 (four) times daily. (Patient not taking: Reported on 9/30/2022)    donepeziL (ARICEPT) 5 MG tablet TAKE 1 TABLET EVERY EVENING    evolocumab (REPATHA SURECLICK) 140 mg/mL PnIj Inject 1 mL (140 mg total) into the skin every 14 (fourteen) days.    fish oil-omega-3 fatty acids 300-1,000 mg capsule Take 2 g by mouth once daily.    fluticasone propionate (FLONASE) 50 mcg/actuation nasal spray 2 sprays (100 mcg total) by Each Nostril route once daily.    gabapentin (NEURONTIN) 100 MG capsule 1 pill twice a day    HYDROcodone-acetaminophen (NORCO) 5-325 mg per tablet Take 1 tablet by mouth every 12 (twelve) hours as needed for Pain. Medically necessary for greater than 7 days for chronic pain    [START ON 10/20/2022] HYDROcodone-acetaminophen (NORCO) 5-325 mg per tablet Take 1 tablet by  mouth every 12 (twelve) hours as needed for Pain. Medically necessary for greater than 7 days for chronic pain    ipratropium (ATROVENT) 42 mcg (0.06 %) nasal spray 2 sprays by Nasal route 3 (three) times daily.    irbesartan (AVAPRO) 300 MG tablet Take 1 tablet (300 mg total) by mouth every evening. New tablet strength (Patient taking differently: Take 150 mg by mouth once daily. Take 2 tablets every evening)    montelukast (SINGULAIR) 10 mg tablet Take 10 mg by mouth nightly.    multivit with min-folic acid 200 mcg Chew Take 1 tablet by mouth once daily.     omeprazole (PRILOSEC) 40 MG capsule TAKE 1 CAPSULE EVERY DAY    pitavastatin calcium (LIVALO) 2 mg Tab tablet Take 1 tablet (2 mg total) by mouth every evening. (Patient not taking: Reported on 9/30/2022)    polyethylene glycol (GLYCOLAX) 17 gram/dose powder Take 17 g by mouth daily as needed (constipation).     rimegepant (NURTEC) 75 mg odt Take 1 tablet (75 mg total) by mouth once as needed for Migraine. Place ODT tablet on the tongue; alternatively the ODT tablet may be placed under the tongue (Patient not taking: Reported on 9/30/2022)    rosuvastatin (CRESTOR) 20 MG tablet Take 20 mg by mouth once daily.    sertraline (ZOLOFT) 100 MG tablet Take 1.5 tablets (150 mg total) by mouth once daily. for 60 doses (Patient not taking: Reported on 9/30/2022)   Last reviewed on 9/30/2022  8:14 AM by Alma Vaughan MA    Review of patient's allergies indicates:   Allergen Reactions    Aspirin Nausea And Vomiting    Penicillins Itching    Crestor [rosuvastatin]      Muscle pain      Lipitor [atorvastatin]      Achy     Last reviewed on  9/30/2022 8:11 AM by Alma Vaughan    Drug Interactions    Drug interactions evaluated: no  Clinically relevant drug interactions identified: no  Provided the patient with educational material regarding drug interactions: not applicable         Adverse Effects    *All other systems reviewed and are negative       Assessment  "Questions - Documented Responses      Flowsheet Row Most Recent Value   Assessment    Medication Reconciliation completed for patient No   During the past 4 weeks, has patient missed any activities due to condition or medication? No   During the past 4 weeks, did patient have any of the following urgent care visits? None   Goals of Therapy Status Discussed (new start)   Status of the patients ability to self-administer: Is Able   All education points have been covered with patient? No, patient declined- printed education provided   Welcome packet contents reviewed and discussed with patient? Yes   Assesment completed? Yes   Plan Therapy being initiated   Do you need to open a clinical intervention (i-vent)? No   Do you want to schedule first shipment? Yes   Medication #1 Assessment Info    Patient status New medication, New to [facility]   Is this medication effective? Not yet started          Refill Questions - Documented Responses      Flowsheet Row Most Recent Value   Patient Availability and HIPAA Verification    Does patient want to proceed with activity? Yes   HIPAA/medical authority confirmed? Yes   Relationship to patient of person spoken to? Self   Refill Screening Questions    When does the patient need to receive the medication? 10/05/22   Refill Delivery Questions    How will the patient receive the medication? MEDRx   When does the patient need to receive the medication? 10/05/22            Objective    She has a past medical history of Anxiety, Arthritis, Cataract, DDD (degenerative disc disease), lumbar, Depression, Encounter for blood transfusion, GERD (gastroesophageal reflux disease), Headache, Hyperlipidemia, Hypertension, Neuromuscular disorder, Occipital neuralgia (3/24/2017), and Osteoporosis.    Tried/failed medications:     BP Readings from Last 4 Encounters:   09/30/22 134/72   09/22/22 (!) 142/68   09/21/22 138/70   09/21/22 (!) 127/59     Ht Readings from Last 4 Encounters:   09/30/22 4' 11" " "(1.499 m)   09/21/22 4' 11" (1.499 m)   09/21/22 4' 11" (1.499 m)   09/14/22 4' 11" (1.499 m)     Wt Readings from Last 4 Encounters:   09/30/22 56 kg (123 lb 7.3 oz)   09/22/22 55.5 kg (122 lb 5.7 oz)   09/21/22 56.2 kg (123 lb 14.4 oz)   09/21/22 55.7 kg (122 lb 12.8 oz)       The goals of prescribed drug therapy management include:  Supporting patient to meet the prescriber's medical treatment objectives  Improving or maintaining quality of life  Maintaining optimal therapy adherence  Minimizing and managing side effects      Goals of Therapy Status: Discussed (new start)    Assessment/Plan  Patient plans to start therapy on 10/05/22      Indication, dosage, appropriateness, effectiveness, safety and convenience of her specialty medication(s) were reviewed today.     Patient Education   Pharmacist offer to  patient was declined. Printed educational materials will be provided with medication.  Patient did accept verbal education on the following topics:  · Expectations and possible outcomes of therapy  · Proper use, timely administration, and missed dose management  · Duration of therapy  · Side effects, including prevention, minimization, and management  · Storage, safe handling, and disposal        Tasks added this encounter   10/4/2022 - Refill Call   Tasks due within next 3 months   No tasks due.     Nikole Matias, PharmD  [place] [facility]  [address] [address]  Phone: [phone]  Fax: [phone]  "

## 2022-10-05 ENCOUNTER — PATIENT MESSAGE (OUTPATIENT)
Dept: PHARMACY | Facility: CLINIC | Age: 77
End: 2022-10-05
Payer: MEDICARE

## 2022-10-05 ENCOUNTER — TELEPHONE (OUTPATIENT)
Dept: PHARMACY | Facility: CLINIC | Age: 77
End: 2022-10-05
Payer: MEDICARE

## 2022-10-05 NOTE — TELEPHONE ENCOUNTER
Refill date pended appropriately. Patient will received med on 10/5 so will not need until 11/2. Refill pended till 10/26

## 2022-10-17 ENCOUNTER — OFFICE VISIT (OUTPATIENT)
Dept: PSYCHIATRY | Facility: CLINIC | Age: 77
End: 2022-10-17
Payer: MEDICARE

## 2022-10-17 VITALS
HEART RATE: 71 BPM | HEIGHT: 59 IN | WEIGHT: 124 LBS | BODY MASS INDEX: 25 KG/M2 | DIASTOLIC BLOOD PRESSURE: 72 MMHG | SYSTOLIC BLOOD PRESSURE: 134 MMHG

## 2022-10-17 DIAGNOSIS — G31.84 MILD NEUROCOGNITIVE DISORDER: ICD-10-CM

## 2022-10-17 DIAGNOSIS — F33.41 MDD (MAJOR DEPRESSIVE DISORDER), RECURRENT, IN PARTIAL REMISSION: Primary | ICD-10-CM

## 2022-10-17 DIAGNOSIS — F43.10 PTSD (POST-TRAUMATIC STRESS DISORDER): ICD-10-CM

## 2022-10-17 DIAGNOSIS — F19.20 DEPENDENCY ON PAIN MEDICATION: ICD-10-CM

## 2022-10-17 DIAGNOSIS — F41.1 GENERALIZED ANXIETY DISORDER: ICD-10-CM

## 2022-10-17 PROCEDURE — 3288F FALL RISK ASSESSMENT DOCD: CPT | Mod: CPTII,S$GLB,, | Performed by: STUDENT IN AN ORGANIZED HEALTH CARE EDUCATION/TRAINING PROGRAM

## 2022-10-17 PROCEDURE — 1125F AMNT PAIN NOTED PAIN PRSNT: CPT | Mod: CPTII,S$GLB,, | Performed by: STUDENT IN AN ORGANIZED HEALTH CARE EDUCATION/TRAINING PROGRAM

## 2022-10-17 PROCEDURE — 99215 OFFICE O/P EST HI 40 MIN: CPT | Mod: S$GLB,,, | Performed by: STUDENT IN AN ORGANIZED HEALTH CARE EDUCATION/TRAINING PROGRAM

## 2022-10-17 PROCEDURE — 1159F MED LIST DOCD IN RCRD: CPT | Mod: CPTII,S$GLB,, | Performed by: STUDENT IN AN ORGANIZED HEALTH CARE EDUCATION/TRAINING PROGRAM

## 2022-10-17 PROCEDURE — 96136 PSYCL/NRPSYC TST PHY/QHP 1ST: CPT | Mod: 59,S$GLB,, | Performed by: STUDENT IN AN ORGANIZED HEALTH CARE EDUCATION/TRAINING PROGRAM

## 2022-10-17 PROCEDURE — 99999 PR PBB SHADOW E&M-EST. PATIENT-LVL IV: CPT | Mod: PBBFAC,,, | Performed by: STUDENT IN AN ORGANIZED HEALTH CARE EDUCATION/TRAINING PROGRAM

## 2022-10-17 PROCEDURE — 99499 UNLISTED E&M SERVICE: CPT | Mod: HCNC,S$GLB,, | Performed by: STUDENT IN AN ORGANIZED HEALTH CARE EDUCATION/TRAINING PROGRAM

## 2022-10-17 PROCEDURE — 1160F RVW MEDS BY RX/DR IN RCRD: CPT | Mod: CPTII,S$GLB,, | Performed by: STUDENT IN AN ORGANIZED HEALTH CARE EDUCATION/TRAINING PROGRAM

## 2022-10-17 PROCEDURE — 1101F PT FALLS ASSESS-DOCD LE1/YR: CPT | Mod: CPTII,S$GLB,, | Performed by: STUDENT IN AN ORGANIZED HEALTH CARE EDUCATION/TRAINING PROGRAM

## 2022-10-17 PROCEDURE — 99215 PR OFFICE/OUTPT VISIT, EST, LEVL V, 40-54 MIN: ICD-10-PCS | Mod: S$GLB,,, | Performed by: STUDENT IN AN ORGANIZED HEALTH CARE EDUCATION/TRAINING PROGRAM

## 2022-10-17 PROCEDURE — 1159F PR MEDICATION LIST DOCUMENTED IN MEDICAL RECORD: ICD-10-PCS | Mod: CPTII,S$GLB,, | Performed by: STUDENT IN AN ORGANIZED HEALTH CARE EDUCATION/TRAINING PROGRAM

## 2022-10-17 PROCEDURE — 99499 RISK ADDL DX/OHS AUDIT: ICD-10-PCS | Mod: HCNC,S$GLB,, | Performed by: STUDENT IN AN ORGANIZED HEALTH CARE EDUCATION/TRAINING PROGRAM

## 2022-10-17 PROCEDURE — 3078F DIAST BP <80 MM HG: CPT | Mod: CPTII,S$GLB,, | Performed by: STUDENT IN AN ORGANIZED HEALTH CARE EDUCATION/TRAINING PROGRAM

## 2022-10-17 PROCEDURE — 3075F PR MOST RECENT SYSTOLIC BLOOD PRESS GE 130-139MM HG: ICD-10-PCS | Mod: CPTII,S$GLB,, | Performed by: STUDENT IN AN ORGANIZED HEALTH CARE EDUCATION/TRAINING PROGRAM

## 2022-10-17 PROCEDURE — 3075F SYST BP GE 130 - 139MM HG: CPT | Mod: CPTII,S$GLB,, | Performed by: STUDENT IN AN ORGANIZED HEALTH CARE EDUCATION/TRAINING PROGRAM

## 2022-10-17 PROCEDURE — 1101F PR PT FALLS ASSESS DOC 0-1 FALLS W/OUT INJ PAST YR: ICD-10-PCS | Mod: CPTII,S$GLB,, | Performed by: STUDENT IN AN ORGANIZED HEALTH CARE EDUCATION/TRAINING PROGRAM

## 2022-10-17 PROCEDURE — 3078F PR MOST RECENT DIASTOLIC BLOOD PRESSURE < 80 MM HG: ICD-10-PCS | Mod: CPTII,S$GLB,, | Performed by: STUDENT IN AN ORGANIZED HEALTH CARE EDUCATION/TRAINING PROGRAM

## 2022-10-17 PROCEDURE — 3288F PR FALLS RISK ASSESSMENT DOCUMENTED: ICD-10-PCS | Mod: CPTII,S$GLB,, | Performed by: STUDENT IN AN ORGANIZED HEALTH CARE EDUCATION/TRAINING PROGRAM

## 2022-10-17 PROCEDURE — 1125F PR PAIN SEVERITY QUANTIFIED, PAIN PRESENT: ICD-10-PCS | Mod: CPTII,S$GLB,, | Performed by: STUDENT IN AN ORGANIZED HEALTH CARE EDUCATION/TRAINING PROGRAM

## 2022-10-17 PROCEDURE — 99999 PR PBB SHADOW E&M-EST. PATIENT-LVL IV: ICD-10-PCS | Mod: PBBFAC,,, | Performed by: STUDENT IN AN ORGANIZED HEALTH CARE EDUCATION/TRAINING PROGRAM

## 2022-10-17 PROCEDURE — 1160F PR REVIEW ALL MEDS BY PRESCRIBER/CLIN PHARMACIST DOCUMENTED: ICD-10-PCS | Mod: CPTII,S$GLB,, | Performed by: STUDENT IN AN ORGANIZED HEALTH CARE EDUCATION/TRAINING PROGRAM

## 2022-10-17 PROCEDURE — 96136 PR PSYCH/NEUROPSYCH TEST ADMIN/SCORING, 2+ TESTS, 1ST 30 MIN: ICD-10-PCS | Mod: 59,S$GLB,, | Performed by: STUDENT IN AN ORGANIZED HEALTH CARE EDUCATION/TRAINING PROGRAM

## 2022-10-17 RX ORDER — DONEPEZIL HYDROCHLORIDE 10 MG/1
10 TABLET, FILM COATED ORAL NIGHTLY
Qty: 30 TABLET | Refills: 1 | Status: SHIPPED | OUTPATIENT
Start: 2022-10-17 | End: 2023-01-31 | Stop reason: SDUPTHER

## 2022-10-17 RX ORDER — SERTRALINE HYDROCHLORIDE 100 MG/1
150 TABLET, FILM COATED ORAL DAILY
Qty: 45 TABLET | Refills: 1 | Status: SHIPPED | OUTPATIENT
Start: 2022-10-17 | End: 2022-11-20

## 2022-10-17 NOTE — PROGRESS NOTES
"Outpatient Psychiatry Followup Visit (MD/NP)    10/17/2022    Rola Richter, a 77 y.o. female, presenting for followup visit. Met with patient.    Reason for Encounter:     Medication management followup.    Interval History:     Pt is warm, responsive, and open on interview. Pt was relaxed and comfortable with the review process. I could easily understanding the pt's meanings. Pt seemed to enjoy the attention received. Pt is a good historian. This visit was done in person in the clinic.    Pt opens stating, "I'm okay for the most part." Pt described how chronic pain is a source of stress.    Pt reported generalized and free-floating worry. GAD7 is below. Last panic attack was about a week ago. Pt's panic attacks occur monthly.    GAD7 improved, from 14 last visit to 8 this visit. PHQ9 improved, from 15 last visit to 10 this visit. GDS is stable, from 19 last visit to 21 this visit.    Mood is "okay." Pt described human emotional experiences usually associated with the assessment that things are uncertain or too much (specifically Stress and Worry), relationships (specifically Connection), and a good life (specifically Calm and Foreboding Kerry). Diminished interest in gardening and embroidering noted. Pt endorses symptoms of hopelessness. Pt does not have symptoms of neither guilt nor worthlessness. Pt qualified energy as stable. Socialization is intact.    Pt reports concentration is down. Word-finding difficulty is a problem. Pt occasionally gets disoriented in public spaces. Pt's working memory is poor.    Appetite is normal. No issues identified. Body weight is stable.    Pt reports sleep as good overall and usually non-restorative. Sleep onset is usually within 30 minutes. Duration is 8 or 9 hours on a typical night and pt reports typically no interruptions. Sleep hygiene is good.  Nightmares have not been a problem. Discussed dreams and nightmares. Themes identified included housework and memories of " friends. Pt denies naps.    Patient did not display signs of nor endorse symptoms of overt psychosis or acute mood disorder requiring hospitalization during the encounter. Patient denied violent thoughts or suicidal or homicidal ideation, intent, or plan.    Questionnaires Today (10/17/2022):    PHQ9 Interpretation: 10/27 (10/17/2022); MILD using 7-15-21 cutoffs, but there tends to be significant variability in sensitivity of cutoff scores between 7 and 15. The PHQ-9 was found to have acceptable diagnostic properties for screening for MDD for cut-off scores between 8 and 11. PHQ-9 is useful for screening, but not always for diagnosis of a current epsiode of MDD in a psychiatric specialty clinic; according to the literature the optimal cutoff score for a current episode of MDD is most reliably 13 or 14.     GDS (Geriatric Depression Scale) Interpretation: 21/30 (10/17/2022); SEVERE depression and/or significant quality of life issues, using 10-20 cutoffs. The GDS (Geriatric Depression Scale) identifies poor quality of life at scores 10/30 or more. A cutoff point of 11 has a high sensitivity for major (0.96) and minor (0.85) depression with a fairly low specificity (0.69). Variations on this test which have fewer items (like GDS-15) have varying accuracy depending on the population tested. Cognitive dysfunction also has potential to affect GDS score.    GAD7 Interpretation: 8/21 (10/17/2022); MILD using 5-10-15 cutoffs. The GAD7 has acceptable properties for identifying LAURA at cutoff scores 7 to 10; a cutoff score of 10 is fairly sensitive (0.8) but not specific (0.4).        Review Of Systems:     Pertinent items are noted in HPI.    Current Evaluation:       Nutritional Screening: Considering the patient's height and weight, medications, medical history and preferences, should a referral be made to the dietitian? not at this time    Constitutional  Vitals:  Most recent vital signs, dated less 90 days prior to this  "appointment, were reviewed.    Vitals:    10/17/22 1408   BP: 134/72   Pulse: 71   Weight: 56.3 kg (124 lb 0.1 oz)   Height: 4' 11" (1.499 m)        General:  unremarkable, age appropriate     Musculoskeletal  Muscle Strength/Tone:  No tremor   Gait & Station:  non-ataxic     Psychiatric  Speech: no latency; no pressure  Mood; Affect: "comes and goes"; appropriate and full  Thought Process: normal and logical   Associations: intact   Thought Content: normal, no suicidality, no homicidality, delusions, or paranoia   Orientation: grossly intact   Memory: intact for content of interview   Language: grossly intact   Attention Span & Concentration: able to focus   Fund of Knowledge: intact and appropriate to age and level of education   Insight: intact   Judgment: behavior is adequate to circumstances     Relevant Elements of Neurological Exam: normal gait    Functioning in Relationships:  Spouse/partner: healthy and no concerns identified  Peers: no concerns identified  Employers: N/A    Laboratory Data  No visits with results within 1 Month(s) from this visit.   Latest known visit with results is:   Lab Visit on 09/12/2022   Component Date Value Ref Range Status    WBC 09/12/2022 8.25  3.90 - 12.70 K/uL Final    RBC 09/12/2022 3.78 (L)  4.00 - 5.40 M/uL Final    Hemoglobin 09/12/2022 11.4 (L)  12.0 - 16.0 g/dL Final    Hematocrit 09/12/2022 34.3 (L)  37.0 - 48.5 % Final    MCV 09/12/2022 91  82 - 98 fL Final    MCH 09/12/2022 30.2  27.0 - 31.0 pg Final    MCHC 09/12/2022 33.2  32.0 - 36.0 g/dL Final    RDW 09/12/2022 14.2  11.5 - 14.5 % Final    Platelets 09/12/2022 285  150 - 450 K/uL Final    MPV 09/12/2022 10.8  9.2 - 12.9 fL Final    Immature Granulocytes 09/12/2022 0.7 (H)  0.0 - 0.5 % Final    Gran # (ANC) 09/12/2022 4.4  1.8 - 7.7 K/uL Final    Immature Grans (Abs) 09/12/2022 0.06 (H)  0.00 - 0.04 K/uL Final    Lymph # 09/12/2022 2.4  1.0 - 4.8 K/uL Final    Mono # 09/12/2022 0.7  0.3 - 1.0 K/uL Final    Eos # " 09/12/2022 0.6 (H)  0.0 - 0.5 K/uL Final    Baso # 09/12/2022 0.07  0.00 - 0.20 K/uL Final    nRBC 09/12/2022 0  0 /100 WBC Final    Gran % 09/12/2022 53.6  38.0 - 73.0 % Final    Lymph % 09/12/2022 29.2  18.0 - 48.0 % Final    Mono % 09/12/2022 8.4  4.0 - 15.0 % Final    Eosinophil % 09/12/2022 7.3  0.0 - 8.0 % Final    Basophil % 09/12/2022 0.8  0.0 - 1.9 % Final    Differential Method 09/12/2022 Automated   Final    Sodium 09/12/2022 136  136 - 145 mmol/L Final    Potassium 09/12/2022 4.4  3.5 - 5.1 mmol/L Final    Chloride 09/12/2022 105  95 - 110 mmol/L Final    CO2 09/12/2022 24  23 - 29 mmol/L Final    Glucose 09/12/2022 88  70 - 110 mg/dL Final    BUN 09/12/2022 15  8 - 23 mg/dL Final    Creatinine 09/12/2022 1.0  0.5 - 1.4 mg/dL Final    Calcium 09/12/2022 9.6  8.7 - 10.5 mg/dL Final    Total Protein 09/12/2022 7.4  6.0 - 8.4 g/dL Final    Albumin 09/12/2022 4.1  3.5 - 5.2 g/dL Final    Total Bilirubin 09/12/2022 0.3  0.1 - 1.0 mg/dL Final    Alkaline Phosphatase 09/12/2022 55  55 - 135 U/L Final    AST 09/12/2022 22  10 - 40 U/L Final    ALT 09/12/2022 14  10 - 44 U/L Final    Anion Gap 09/12/2022 7 (L)  8 - 16 mmol/L Final    eGFR 09/12/2022 58.0 (A)  >60 mL/min/1.73 m^2 Final    Hemoglobin A1C 09/12/2022 5.7 (H)  4.0 - 5.6 % Final    Estimated Avg Glucose 09/12/2022 117  68 - 131 mg/dL Final    Cholesterol 09/12/2022 416 (H)  120 - 199 mg/dL Final    Triglycerides 09/12/2022 294 (H)  30 - 150 mg/dL Final    HDL 09/12/2022 53  40 - 75 mg/dL Final    LDL Cholesterol 09/12/2022 304.2 (H)  63.0 - 159.0 mg/dL Final    HDL/Cholesterol Ratio 09/12/2022 12.7 (L)  20.0 - 50.0 % Final    Total Cholesterol/HDL Ratio 09/12/2022 7.8 (H)  2.0 - 5.0 Final    Non-HDL Cholesterol 09/12/2022 363  mg/dL Final       Medications  Outpatient Encounter Medications as of 10/17/2022   Medication Sig Dispense Refill    cetirizine (ZYRTEC) 5 MG tablet Take 1 tablet (5 mg total) by mouth once daily. 30 tablet 11    COD LIVER OIL  ORAL Take 1 tablet by mouth once daily.       diclofenac sodium (VOLTAREN) 1 % Gel Apply 2 g topically 4 (four) times daily. 450 g 3    evolocumab (REPATHA SURECLICK) 140 mg/mL PnIj Inject 1 mL (140 mg total) into the skin every 14 (fourteen) days. 2 mL 2    fish oil-omega-3 fatty acids 300-1,000 mg capsule Take 2 g by mouth once daily.      fluticasone propionate (FLONASE) 50 mcg/actuation nasal spray 2 sprays (100 mcg total) by Each Nostril route once daily. 16 g 11    gabapentin (NEURONTIN) 100 MG capsule 1 pill twice a day 180 capsule 1    HYDROcodone-acetaminophen (NORCO) 5-325 mg per tablet Take 1 tablet by mouth every 12 (twelve) hours as needed for Pain. Medically necessary for greater than 7 days for chronic pain 60 tablet 0    [START ON 10/20/2022] HYDROcodone-acetaminophen (NORCO) 5-325 mg per tablet Take 1 tablet by mouth every 12 (twelve) hours as needed for Pain. Medically necessary for greater than 7 days for chronic pain 60 tablet 0    ipratropium (ATROVENT) 42 mcg (0.06 %) nasal spray 2 sprays by Nasal route 3 (three) times daily. 15 mL 6    irbesartan (AVAPRO) 300 MG tablet Take 1 tablet (300 mg total) by mouth every evening. New tablet strength (Patient taking differently: Take 150 mg by mouth once daily. Take 2 tablets every evening) 90 tablet 3    montelukast (SINGULAIR) 10 mg tablet Take 10 mg by mouth nightly.      multivit with min-folic acid 200 mcg Chew Take 1 tablet by mouth once daily.       omeprazole (PRILOSEC) 40 MG capsule TAKE 1 CAPSULE EVERY DAY 90 capsule 2    pitavastatin calcium (LIVALO) 2 mg Tab tablet Take 1 tablet (2 mg total) by mouth every evening. 90 tablet 3    polyethylene glycol (GLYCOLAX) 17 gram/dose powder Take 17 g by mouth daily as needed (constipation).       rosuvastatin (CRESTOR) 20 MG tablet Take 20 mg by mouth once daily.      [DISCONTINUED] donepeziL (ARICEPT) 5 MG tablet TAKE 1 TABLET EVERY EVENING 90 tablet 0    [DISCONTINUED] sertraline (ZOLOFT) 100 MG  "tablet Take 1.5 tablets (150 mg total) by mouth once daily. for 60 doses 45 tablet 1    azelastine (ASTELIN) 137 mcg (0.1 %) nasal spray 1 spray (137 mcg total) by Nasal route 2 (two) times daily. 30 mL 0    donepeziL (ARICEPT) 10 MG tablet Take 1 tablet (10 mg total) by mouth every evening. 30 tablet 1    sertraline (ZOLOFT) 100 MG tablet Take 1.5 tablets (150 mg total) by mouth once daily. for 60 doses 45 tablet 1     No facility-administered encounter medications on file as of 10/17/2022.       Instruments    4/5 Mini-Cog Score  Version v.01.19.16, word list version 6 (daughter, heaven, mountain)  Administered today, 10/17/2022   Sub-scores:  3/3 Word Recall     1/2 Clock Draw  A cut point of <3 on the Mini-Cog has been validated for dementia screening, but many individuals with clinically meaningful cognitive impairment will score higher. When greater sensitivity is desired, a cut point of <4 is recommended as it may indicate a need for further evaluation of cognitive status.    29/30 S-MMSE (Standardized Mini-Mental State Exam) Score  Administered today, 10/17/2022  Sub-scores:  5/5 WORLD REVERSAL Score  There is NO IMPAIRMENT, using 10-20-26 cutoffs (scored 26-30). This S-MMSE score suggests there could be NO cognitive impairment. Instrumental activities of daily living could be normal. Communication could be normal. Memory and orientation could be normal. The duration of this phase of cognitive functioning is variable in cases where risk factors for dementia are present. The internal consistency of the sub-scoring was good; the score for WORLD reversal was about 17% of the total S-MMSE score (5 of 30 points). It is unclear if this is the patient's baseline. Note that there was a Maori-speaking substitution for this test; "CHARLEEN" was used in lieu of "WORLD." Writer used the scoring technique on Starriser to score appropriately for this substitution. Repeat testing or testing with another instrument " is indicated for this case. Educational attainment strongly affects the S-MMSE score. Note that patient has a primary formal education level (completed 6th grade).      Assessment - Diagnosis - Goals:     Impression:      ICD-10-CM ICD-9-CM   1. MDD (major depressive disorder), recurrent, in partial remission  F33.41 296.35   2. Generalized anxiety disorder  F41.1 300.02   3. PTSD (post-traumatic stress disorder)  F43.10 309.81   4. Dependency on pain medication  F19.20 304.90   5. Mild neurocognitive disorder  G31.84 331.83     Most recent biopsychosocial case formulation dated 16SEP2022.    Treatment Plan/Recommendations:   Medication management: The risks and benefits of medication were discussed with the patient.  Continue ZOLOFT  Anxiety may be made worse by dopamine-reuptake blocking properties  Symptom coverage is insufficient  Titration:  Prior to evaluation pt had been taking 150mg daily  Consider dose increase next visit  Increase ARICEPT  Symptom coverage may be insufficient  Medication Adjustments:  17OCT2022: Increase to 10mg HS  Prior to evaluation pt had been taking 5mg HS  NARX Scores (10/17/2022) [phone] (Narcotics-Stimulants-Sedatives-Overdose Risk)   Discussed concerns with patient (NARX [phone]). Reviewed use patterns for unsafe conditions.   Counseling, support and psychoeducation provided.  Counseled on proper medication administration and adherence.   Advised patient to limit OPIOID use due to deliriogenic risks.  Pt was encouraged to keep appointments with primary care  Short term therapy referral on pause. Reordered referral to be placed in queue.  Monitor:  Monitor PHQ9 score  Monitor GAD7 score  Monitor GDS score  Monitor cognition. Next visit consider administering MOCA  Monitor sleep - Will consider starting a sleep diary at a future encounter.  Monitor vitals, especially weight and BP  The treatment plan and followup plan were reviewed with the patient  Pt understands to contact  clinic if symptoms worsen, and to call 911 or go to nearest ER for suicidal ideation, intent or plan.      Return to Clinic: 1 month    Billing Documentation    Counseling - Smoking and Tobacco Cessation: Not done or UNDER 3 minutes = Not applicable  Counseling - Psychotherapy: Not done or UNDER 16 minutes = Not applicable  Additional Modifiers and Codes: None, administered and scored more than one psychological or neuropsychological tests (GDS, Mini-Cog, and S-MMSE) (25 mins) = [id number]  Other remaining time with chart and pt: 50 minutes, follow up visit = [id number]  Clinic or Virtual: IN PERSON visit at the clinic  Total time today: 75 minutes

## 2022-10-17 NOTE — PATIENT INSTRUCTIONS
Increase ARICEPT to 10mg at bedtime  Continue ZOLOFT at 150mg daily  Referral for short term therapy was placed last visit  Reviewed labs - keep appointment with primary care doctor  Limit opioid use (NORCO)

## 2022-10-26 ENCOUNTER — SPECIALTY PHARMACY (OUTPATIENT)
Dept: PHARMACY | Facility: CLINIC | Age: 77
End: 2022-10-26
Payer: MEDICARE

## 2022-11-04 ENCOUNTER — SPECIALTY PHARMACY (OUTPATIENT)
Dept: PHARMACY | Facility: CLINIC | Age: 77
End: 2022-11-04
Payer: MEDICARE

## 2022-11-04 NOTE — TELEPHONE ENCOUNTER
Specialty Pharmacy - Refill Coordination    Specialty Medication Orders Linked to Encounter      Flowsheet Row Most Recent Value   Medication #1 evolocumab (REPATHA SURECLICK) 140 mg/mL PnIj (Order#[id number], Rx#[id number])            Refill Questions - Documented Responses      Flowsheet Row Most Recent Value   Patient Availability and HIPAA Verification    Does patient want to proceed with activity? Yes   HIPAA/medical authority confirmed? Yes   Relationship to patient of person spoken to? Self   Refill Screening Questions    Would patient like to speak to a pharmacist? No   When does the patient need to receive the medication? 11/10/22   Refill Delivery Questions    How will the patient receive the medication? [facility]   When does the patient need to receive the medication? 11/10/22   Shipping Address Home   Address in [facility] confirmed and updated if neccessary? Yes   Expected Copay ($) 9.85   Is the patient able to afford the medication copay? Yes   Payment Method CC on file   Days supply of Refill 28   Supplies needed? No supplies needed   Refill activity completed? Yes   Refill activity plan Refill scheduled   Shipment/Pickup Date: 11/08/22            Current Outpatient Medications   Medication Sig    azelastine (ASTELIN) 137 mcg (0.1 %) nasal spray 1 spray (137 mcg total) by Nasal route 2 (two) times daily.    cetirizine (ZYRTEC) 5 MG tablet Take 1 tablet (5 mg total) by mouth once daily.    COD LIVER OIL ORAL Take 1 tablet by mouth once daily.     diclofenac sodium (VOLTAREN) 1 % Gel Apply 2 g topically 4 (four) times daily.    donepeziL (ARICEPT) 10 MG tablet Take 1 tablet (10 mg total) by mouth every evening.    evolocumab (REPATHA SURECLICK) 140 mg/mL PnIj Inject 1 mL (140 mg total) into the skin every 14 (fourteen) days.    fish oil-omega-3 fatty acids 300-1,000 mg capsule Take 2 g by mouth once daily.    fluticasone propionate (FLONASE) 50 mcg/actuation nasal spray 2 sprays (100 mcg total) by  Each Nostril route once daily.    gabapentin (NEURONTIN) 100 MG capsule 1 pill twice a day    HYDROcodone-acetaminophen (NORCO) 5-325 mg per tablet Take 1 tablet by mouth every 12 (twelve) hours as needed for Pain. Medically necessary for greater than 7 days for chronic pain    ipratropium (ATROVENT) 42 mcg (0.06 %) nasal spray 2 sprays by Nasal route 3 (three) times daily.    irbesartan (AVAPRO) 300 MG tablet Take 1 tablet (300 mg total) by mouth every evening. New tablet strength (Patient taking differently: Take 150 mg by mouth once daily. Take 2 tablets every evening)    montelukast (SINGULAIR) 10 mg tablet Take 10 mg by mouth nightly.    multivit with min-folic acid 200 mcg Chew Take 1 tablet by mouth once daily.     omeprazole (PRILOSEC) 40 MG capsule TAKE 1 CAPSULE EVERY DAY    pitavastatin calcium (LIVALO) 2 mg Tab tablet Take 1 tablet (2 mg total) by mouth every evening.    polyethylene glycol (GLYCOLAX) 17 gram/dose powder Take 17 g by mouth daily as needed (constipation).     rosuvastatin (CRESTOR) 20 MG tablet Take 20 mg by mouth once daily.    sertraline (ZOLOFT) 100 MG tablet Take 1.5 tablets (150 mg total) by mouth once daily. for 60 doses   Last reviewed on 10/17/2022  4:44 PM by Will Leong,     Review of patient's allergies indicates:   Allergen Reactions    Aspirin Nausea And Vomiting    Penicillins Itching    Crestor [rosuvastatin]      Muscle pain      Lipitor [atorvastatin]      Achy      Last reviewed on  10/17/2022 4:44 PM by Will Leong      Tasks added this encounter   12/1/2022 - Refill Call (Auto Added)   Tasks due within next 3 months   No tasks due.     Blanca Lawrence, PharmD  [facility]  [address]  [place] [address]  Phone: [phone]  Fax: [phone]

## 2022-11-16 ENCOUNTER — OFFICE VISIT (OUTPATIENT)
Dept: PAIN MEDICINE | Facility: CLINIC | Age: 77
End: 2022-11-16
Payer: MEDICARE

## 2022-11-16 VITALS
HEIGHT: 59 IN | WEIGHT: 124 LBS | DIASTOLIC BLOOD PRESSURE: 73 MMHG | SYSTOLIC BLOOD PRESSURE: 130 MMHG | BODY MASS INDEX: 25 KG/M2 | HEART RATE: 84 BPM

## 2022-11-16 DIAGNOSIS — M54.12 CERVICAL RADICULITIS: ICD-10-CM

## 2022-11-16 DIAGNOSIS — M47.892 OTHER SPONDYLOSIS, CERVICAL REGION: ICD-10-CM

## 2022-11-16 DIAGNOSIS — M54.81 BILATERAL OCCIPITAL NEURALGIA: ICD-10-CM

## 2022-11-16 DIAGNOSIS — Z79.891 CHRONIC USE OF OPIATE FOR THERAPEUTIC PURPOSE: Primary | ICD-10-CM

## 2022-11-16 DIAGNOSIS — M50.30 DDD (DEGENERATIVE DISC DISEASE), CERVICAL: ICD-10-CM

## 2022-11-16 PROCEDURE — 3078F DIAST BP <80 MM HG: CPT | Mod: CPTII,S$GLB,, | Performed by: PHYSICIAN ASSISTANT

## 2022-11-16 PROCEDURE — 3288F FALL RISK ASSESSMENT DOCD: CPT | Mod: CPTII,S$GLB,, | Performed by: PHYSICIAN ASSISTANT

## 2022-11-16 PROCEDURE — 99999 PR PBB SHADOW E&M-EST. PATIENT-LVL III: ICD-10-PCS | Mod: PBBFAC,,, | Performed by: PHYSICIAN ASSISTANT

## 2022-11-16 PROCEDURE — 99999 PR PBB SHADOW E&M-EST. PATIENT-LVL III: CPT | Mod: PBBFAC,,, | Performed by: PHYSICIAN ASSISTANT

## 2022-11-16 PROCEDURE — 99214 PR OFFICE/OUTPT VISIT, EST, LEVL IV, 30-39 MIN: ICD-10-PCS | Mod: S$GLB,,, | Performed by: PHYSICIAN ASSISTANT

## 2022-11-16 PROCEDURE — 1101F PR PT FALLS ASSESS DOC 0-1 FALLS W/OUT INJ PAST YR: ICD-10-PCS | Mod: CPTII,S$GLB,, | Performed by: PHYSICIAN ASSISTANT

## 2022-11-16 PROCEDURE — 3078F PR MOST RECENT DIASTOLIC BLOOD PRESSURE < 80 MM HG: ICD-10-PCS | Mod: CPTII,S$GLB,, | Performed by: PHYSICIAN ASSISTANT

## 2022-11-16 PROCEDURE — 1101F PT FALLS ASSESS-DOCD LE1/YR: CPT | Mod: CPTII,S$GLB,, | Performed by: PHYSICIAN ASSISTANT

## 2022-11-16 PROCEDURE — 3288F PR FALLS RISK ASSESSMENT DOCUMENTED: ICD-10-PCS | Mod: CPTII,S$GLB,, | Performed by: PHYSICIAN ASSISTANT

## 2022-11-16 PROCEDURE — 1125F PR PAIN SEVERITY QUANTIFIED, PAIN PRESENT: ICD-10-PCS | Mod: CPTII,S$GLB,, | Performed by: PHYSICIAN ASSISTANT

## 2022-11-16 PROCEDURE — 99214 OFFICE O/P EST MOD 30 MIN: CPT | Mod: S$GLB,,, | Performed by: PHYSICIAN ASSISTANT

## 2022-11-16 PROCEDURE — 3075F PR MOST RECENT SYSTOLIC BLOOD PRESS GE 130-139MM HG: ICD-10-PCS | Mod: CPTII,S$GLB,, | Performed by: PHYSICIAN ASSISTANT

## 2022-11-16 PROCEDURE — 3075F SYST BP GE 130 - 139MM HG: CPT | Mod: CPTII,S$GLB,, | Performed by: PHYSICIAN ASSISTANT

## 2022-11-16 PROCEDURE — 80326 AMPHETAMINES 5 OR MORE: CPT | Performed by: PHYSICIAN ASSISTANT

## 2022-11-16 PROCEDURE — 1125F AMNT PAIN NOTED PAIN PRSNT: CPT | Mod: CPTII,S$GLB,, | Performed by: PHYSICIAN ASSISTANT

## 2022-11-16 RX ORDER — HYDROCODONE BITARTRATE AND ACETAMINOPHEN 5; 325 MG/1; MG/1
1 TABLET ORAL EVERY 12 HOURS PRN
Qty: 60 TABLET | Refills: 0 | Status: SHIPPED | OUTPATIENT
Start: 2022-11-16 | End: 2022-12-15

## 2022-11-16 RX ORDER — CYCLOBENZAPRINE HCL 10 MG
10 TABLET ORAL 2 TIMES DAILY PRN
Qty: 60 TABLET | Refills: 2 | Status: SHIPPED | OUTPATIENT
Start: 2022-11-16 | End: 2022-12-16

## 2022-11-16 NOTE — PROGRESS NOTES
Referring Physician: No ref. provider found    PCP: Liseth Carias MD      CC: neck and occipital pain    Interval history: Ms. Richter is a 77 y.o. female with neck pain and occipital neuralgia who returns to our clinic.  She continues to c/o headaches and neck pain.  Most recent occcipital nerve block only provided mild short lived beneft. Recent cervical MELANY improved neck pain that was extending into left shoulder.  She has numbness to her right hand and first through fourth digits. Cervical MELANY in February 2018  provided benefit for several weeks.    She continues to take Norco with benefit.  Flexeril 10 mg caused dry mouth however this resolved and she wishes to resume. Robaxin was helpful but caused dizziness.  Denies UE weakness. No bowel bladder changes.   Pain today is rated 9/10.    Prior HPI:   Patient is 70-year-old female with past history history of cervical DDD, cervical spondylosis and chronic headaches.  She recently moved here from [place], [place].  She is treated in the past by neurology.  She states having constant burning pain over the left side of her posterior scalp.  Pain radiates to her neck as well as a frontal.  She also has left-sided neck pain as well.  She denies any radicular arm pain.  No numbness or weakness.  She states having cervical epidural steroid injection at past with minimal benefit.  She also has had decided of cervical nerve blocks in the past with moderate benefit.  Most recent injection was performed 2 months ago.  She desires repeat injection.  She currently takes Norco 10 mg every 12 hours as needed with moderate benefit.  She also takes Zanaflex 4 mg every 8 hours with mild benefits.  She rates her pain 7/10 today.    Pain intervention history: s/p left occipital nerve blocks on 1/2016 with 50% relief of her headaches  S/p cervical MELANY 2/8/18 moderate relief for a couple of weeks.     ROS:  CONSTITUTIONAL: No fevers, chills, night sweats, wt. loss, appetite  changes  SKIN: no rashes or itching  ENT: No headaches, head trauma, vision changes, or eye pain  LYMPH NODES: None noticed   CV: No chest pain, palpitations.   RESP: No shortness of breath, dyspnea on exertion, cough, wheezing, or hemoptysis  GI: No nausea, emesis, diarrhea, constipation, melena, hematochezia, pain.    : No dysuria, hematuria, urgency, or frequency   HEME: No easy bruising, bleeding problems  PSYCHIATRIC: No depression, anxiety, psychosis, hallucinations.  NEURO: No seizures, memory loss, dizziness or difficulty sleeping  MSK: + History of present illness      Past Medical History:   Diagnosis Date    Anxiety     Arthritis     Cataract     DDD (degenerative disc disease), lumbar     Depression     Encounter for blood transfusion     GERD (gastroesophageal reflux disease)     Headache     Hyperlipidemia     Hypertension     pt states she does not take meds anymore she just watches her weight    Neuromuscular disorder     Occipital neuralgia 3/24/2017    Osteoporosis      Past Surgical History:   Procedure Laterality Date    APPENDECTOMY       SECTION      x 2    CHOLECYSTECTOMY      COLONOSCOPY  prior to     COLONOSCOPY N/A 2019    Procedure: COLONOSCOPY;  Surgeon: Greg Victor MD;  Location: [facility];  Service: Endoscopy;  Laterality: N/A; 2 colon polyps, hemorrhoids; repeat in 5 years for surveillance; biopsy: Tubular adenoma x2    EPIDURAL STEROID INJECTION INTO CERVICAL SPINE N/A 2022    Procedure: Injection-steroid-epidural-cervical C7-T1;  Surgeon: Timothy Grimaldo MD;  [facility]: [facility];  Service: Pain Management;  Laterality: N/A;    ESOPHAGOGASTRODUODENOSCOPY N/A 2019    Procedure: EGD (ESOPHAGOGASTRODUODENOSCOPY);  Surgeon: rGeg Victor MD;  Location: [facility];  Service: Endoscopy;  Laterality: N/A; Mild Schatzki ring. Dilated. small hiatal hernia; Four gastric polyps. Resected and retrieved, gastritis; biopsy:stomach- unremarkable, negative for h  pylori, polyps-Fundic type mucosa with foveolar hyperplasia.    ESOPHAGOGASTRODUODENOSCOPY N/A 2/17/2021    Procedure: EGD (ESOPHAGOGASTRODUODENOSCOPY);  Surgeon: Greg Victor MD;  Location: [facility];  Service: Endoscopy;  Laterality: N/A;    HYSTERECTOMY      Laser Periphery Iridotomy Bilateral     OD 5/26/16 and OS touch up 5/26/2016    UPPER GASTROINTESTINAL ENDOSCOPY  03/14/2017    Dr. Marcial: esophageal stenosis- dilated, gastritis, gastric polyps removed; biopsy- mild gastritis, negative for h pylori, hyperplastic polyp, esophagus unremarkable    vocal cord tumor removal       Family History   Problem Relation Age of Onset    Osteoarthritis Mother     Alcohol abuse Mother     Rheum arthritis Mother     Osteoarthritis Sister     Diabetes Brother     No Known Problems Son     No Known Problems Sister     No Known Problems Sister     No Known Problems Brother     Arthritis Son     No Known Problems Son     Stroke Maternal Grandmother 99    Rheum arthritis Maternal Grandmother     Retinal detachment Neg Hx     Macular degeneration Neg Hx     Glaucoma Neg Hx     Amblyopia Neg Hx     Blindness Neg Hx     Cancer Neg Hx     Cataracts Neg Hx     Hypertension Neg Hx     Strabismus Neg Hx     Thyroid disease Neg Hx     Lupus Neg Hx     Kidney disease Neg Hx     Inflammatory bowel disease Neg Hx     Psoriasis Neg Hx     Colon cancer Neg Hx     Crohn's disease Neg Hx     Ulcerative colitis Neg Hx     Stomach cancer Neg Hx     Esophageal cancer Neg Hx      Social History     Socioeconomic History    Marital status:    Tobacco Use    Smoking status: Never    Smokeless tobacco: Never   Substance and Sexual Activity    Alcohol use: No     Alcohol/week: 0.0 standard drinks    Drug use: Yes     Types: Hydrocodone    Sexual activity: Yes     Partners: Male     Social Determinants of Health     Financial Resource Strain: Low Risk     Difficulty of Paying Living Expenses: Not hard at all   Food Insecurity: No Food  "Insecurity    Worried About Running Out of Food in the Last Year: Never true    Ran Out of Food in the Last Year: Never true   Transportation Needs: No Transportation Needs    Lack of Transportation (Medical): No    Lack of Transportation (Non-Medical): No   Physical Activity: Sufficiently Active    Days of Exercise per Week: 5 days    Minutes of Exercise per Session: 30 min   Stress: No Stress Concern Present    Feeling of Stress : Only a little   Social Connections: Moderately Integrated    Frequency of Communication with Friends and Family: Three times a week    Frequency of Social Gatherings with Friends and Family: Once a week    Attends Congregational Services: More than 4 times per year    Active Member of Clubs or Organizations: No    Attends Club or Organization Meetings: Never    Marital Status:    Housing Stability: Low Risk     Unable to Pay for Housing in the Last Year: No    Number of Places Lived in the Last Year: 1    Unstable Housing in the Last Year: No         Medications/Allergies: See med card    Vitals:    11/16/22 1029   BP: 130/73   Pulse: 84   Weight: 56.2 kg (124 lb)   Height: 4' 11" (1.499 m)   PainSc:   9   PainLoc: Neck         Physical exam:    GENERAL: A and O x3, the patient appears well groomed and is in no acute distress.  Skin: No rashes or obvious lesions  HEENT: normocephalic, atraumatic  CARDIOVASCULAR:  RRR  LUNGS: nonlabored breathing  ABDOMEN: soft, nontender   UPPER EXTREMITIES: Normal alignment, normal range of motion, no atrophy, no skin changes,  hair growth and nail growth normal and equal bilaterally. No swelling, no tenderness.  +Phalens on left side. +TTP tricep tendon  LOWER EXTREMITIES:  Normal alignment, normal range of motion, no atrophy, no skin changes,  hair growth and nail growth normal and equal bilaterally. No swelling, no tenderness.  CERVICAL SPINE:   Cervical spine: ROM is full in flexion, extension and lateral rotation.  Painful flexion > extension.  " Positive facet loading bilaterally.  Spurling is positive at right side.  Myofascial exam:  Tenderness to palpation across cervical paraspinals deltoid and trapezius muscles bilaterally.      MENTAL STATUS: normal orientation, speech, language, and fund of knowledge for social situation.  Emotional state appropriate.    CRANIAL NERVES:  II:  PERRL bilaterally,   III,IV,VI: EOMI.    V:  Facial sensation equal bilaterally  VII:  Facial motor function normal.  VIII:  Hearing equal to finger rub bilaterally  IX/X: Gag normal, palate symmetric  XI:  Shoulder shrug equal, head turn equal  XII:  Tongue midline without fasciculations      MOTOR: Tone and bulk: normal bilateral upper and lower Strength: normal   Delt Bi Tri WE WF     R 5 5 5 5 5 5   L 5 5 5 5 5 5     IP ADD ABD Quad TA Gas HAM  R 5 5 5 5 5 5 5  L 5 5 5 5 5 5 5    SENSATION: Light touch and pinprick intact bilaterally  REFLEXES: normal, symmetric, nonbrisk.  Toes down, no clonus. No hoffmans.  GAIT: normal rise, base, steps, and arm swing.        Imaging:   Cervical MRI 12/4/17    Narrative     EXAM: Cervical spine MRI without contrast.    INDICATION: Cervical radiculopathy.  Neck pain and occipital neuralgia.  The patient complains of neck and right arm pain.    TECHNIQUE: Routine multiplanar, multisequence unenhanced cervical spine MRI was performed.    COMPARISON: Plain films of the cervical spine obtained concurrently      FINDINGS:     Vertebral column: There is straightening of the cervical spine with loss of normal lordosis.  As seen on concurrent plain films, there is trace anterolisthesis of C3 on C4 with 2 mm anterolisthesis of C4 on C5.  There is marked disc space narrowing at the C5-6 level with moderate to marked disc space narrowing at the C4-5 and C6-7 levels.  There is partial non-segmentation anteriorly at the C2-3 level.  The C2 and C3 as well as the C4 and C5 facet joints appear fused and this may represent developmental  non-segmentation or degenerative ankylosis.  All of the discs are desiccated.  The odontoid process is intact.    Spinal canal, cord, epidural space: The craniovertebral junction is normal.  The spinal canal is omental and normal.  There is no significant spinal stenosis.  The cord is normal in caliber.  There is very subtle flattening of the ventral cord surface at the C4-5 and C5-6 levels where there is degenerative change.  The study is mildly motion but there is no definite cord edema or myelomalacia.    Findings by level:    C2-3: There is no spinal canal or foraminal stenosis.  There is mild left facet joint arthropathy.    C3-4: There is trace anterolisthesis.  There is left greater than right uncovertebral spurring and facet joint arthropathy.  There is a mild disc osteophyte complex, slightly eccentric to the left with subtle annular fissure.  This narrows the ventral subarachnoid space.  There is no spinal stenosis or cord compression.  There is moderate marked left foraminal stenosis.    C4-5: There is moderate disc space narrowing with 2 mm anterolisthesis of C4 on C5.  There is facet joint arthropathy or effusion left greater than right.  There is also mild bilateral but left greater than right uncovertebral spurring.  There is unroofing of a mild disc bulge which narrows the ventral subarachnoid space.  There is no spinal stenosis.  There is mild to moderate left foraminal stenosis.    C5-6:There is marked disc space narrowing.  There is bilateral uncovertebral spurring.  There is a disc osteophyte complex which narrows the subarachnoid space.  This is slightly eccentric to the right There is subtle flattening of the ventral cord surface.  Cord signal is grossly normal.  There is mild spinal stenosis with at least moderate bilateral foraminal stenosis.    C6-7: There is moderate disc space narrowing.  There is mild uncovertebral spurring.  There is a shallow disc osteophyte complex, slightly eccentric  to the right.  There is narrowing of the ventral subarachnoid space.  There is no spinal stenosis.  Cord signal is normal.  There is mild bilateral foraminal stenosis.  There is a small 3.5 mm left foraminal perineural cyst.    C7- T1: There is a Schmorl's node in inferior endplate of C7, chronic.  There are tiny bilateral foraminal perineural cysts.  There is minimal bulging of the annulus and mild facet joint arthropathy without spinal canal or foraminal stenosis.    Soft tissues/other: The prevertebral soft tissues are normal.  The airway is patent.   Impression          1. There is multilevel degenerative disc disease described in detail above.  There is no acute fracture.  There is degenerative listhesis at several levels.  There is some degree of disc osteophyte complex, uncovertebral spurring and/or facet joint arthropathy contributing to some degree of foraminal stenosis at several levels.  There is no significant spinal canal stenosis.  There is very subtle flattening of the ventral cord surface at the C4-5 and C6-7 levels The pertinent findings are summarized below.    2. At the C3-4 level, there is no spinal stenosis.  There is moderate to marked left foraminal stenosis.    3. At the C4-5 level there is no spinal stenosis.  There is mild to moderate left foraminal stenosis.    4. At the C5-6 level, there is mild spinal stenosis with at least moderate bilateral foraminal stenosis.    5. At the C6-7 level, there is no spinal stenosis.  There is mild bilateral foraminal stenosis.    6. There is no spinal canal or foraminal stenosis at the C2-3 or C7-T1 levels.     Assessment:  Mrs. Richter is a 77 y.o. female with neck and head pain    1. Chronic use of opiate for therapeutic purpose    2. Cervical radiculitis    3. DDD (degenerative disc disease), cervical    4. Other spondylosis, cervical region    5. Bilateral occipital neuralgia         Plan:  1. I have stressed the importance of physical activity and  exercise to improve overall health.  2. Monitor progress from repeat C7-T1 IL MELANY. I have explained the risks, benefits, and alternatives of the procedure in detail. The patient voices understanding and all questions have been answered. The patient agrees to proceed as planned. Written Consent obtained.   3. Consider bilateral occipital blocks for head pain.  4. Norco 5mg q12hrs as needed for pain.  reviewed.  Previous UDS consistent   5. Flexeril 10 mg BID #60 1 refill.   6. F/u 3 months or sooner  All medication management was performed by Dr. Timothy Grimaldo

## 2022-11-17 ENCOUNTER — OFFICE VISIT (OUTPATIENT)
Dept: RHEUMATOLOGY | Facility: CLINIC | Age: 77
End: 2022-11-17
Payer: MEDICARE

## 2022-11-17 VITALS
DIASTOLIC BLOOD PRESSURE: 79 MMHG | WEIGHT: 123.63 LBS | BODY MASS INDEX: 24.96 KG/M2 | SYSTOLIC BLOOD PRESSURE: 146 MMHG

## 2022-11-17 DIAGNOSIS — M75.52 BURSITIS OF SHOULDER, LEFT: Primary | ICD-10-CM

## 2022-11-17 PROCEDURE — 3288F FALL RISK ASSESSMENT DOCD: CPT | Mod: CPTII,S$GLB,, | Performed by: INTERNAL MEDICINE

## 2022-11-17 PROCEDURE — 3078F DIAST BP <80 MM HG: CPT | Mod: CPTII,S$GLB,, | Performed by: INTERNAL MEDICINE

## 2022-11-17 PROCEDURE — 1159F PR MEDICATION LIST DOCUMENTED IN MEDICAL RECORD: ICD-10-PCS | Mod: CPTII,S$GLB,, | Performed by: INTERNAL MEDICINE

## 2022-11-17 PROCEDURE — 20610 DRAIN/INJ JOINT/BURSA W/O US: CPT | Mod: LT,S$GLB,, | Performed by: INTERNAL MEDICINE

## 2022-11-17 PROCEDURE — 1125F PR PAIN SEVERITY QUANTIFIED, PAIN PRESENT: ICD-10-PCS | Mod: CPTII,S$GLB,, | Performed by: INTERNAL MEDICINE

## 2022-11-17 PROCEDURE — 3077F PR MOST RECENT SYSTOLIC BLOOD PRESSURE >= 140 MM HG: ICD-10-PCS | Mod: CPTII,S$GLB,, | Performed by: INTERNAL MEDICINE

## 2022-11-17 PROCEDURE — 1101F PT FALLS ASSESS-DOCD LE1/YR: CPT | Mod: CPTII,S$GLB,, | Performed by: INTERNAL MEDICINE

## 2022-11-17 PROCEDURE — 1101F PR PT FALLS ASSESS DOC 0-1 FALLS W/OUT INJ PAST YR: ICD-10-PCS | Mod: CPTII,S$GLB,, | Performed by: INTERNAL MEDICINE

## 2022-11-17 PROCEDURE — 1159F MED LIST DOCD IN RCRD: CPT | Mod: CPTII,S$GLB,, | Performed by: INTERNAL MEDICINE

## 2022-11-17 PROCEDURE — 99213 OFFICE O/P EST LOW 20 MIN: CPT | Mod: 25,S$GLB,, | Performed by: INTERNAL MEDICINE

## 2022-11-17 PROCEDURE — 99213 PR OFFICE/OUTPT VISIT, EST, LEVL III, 20-29 MIN: ICD-10-PCS | Mod: 25,S$GLB,, | Performed by: INTERNAL MEDICINE

## 2022-11-17 PROCEDURE — 3077F SYST BP >= 140 MM HG: CPT | Mod: CPTII,S$GLB,, | Performed by: INTERNAL MEDICINE

## 2022-11-17 PROCEDURE — 1125F AMNT PAIN NOTED PAIN PRSNT: CPT | Mod: CPTII,S$GLB,, | Performed by: INTERNAL MEDICINE

## 2022-11-17 PROCEDURE — 20610 LARGE JOINT ASPIRATION/INJECTION: L SUBACROMIAL BURSA: ICD-10-PCS | Mod: LT,S$GLB,, | Performed by: INTERNAL MEDICINE

## 2022-11-17 PROCEDURE — 3078F PR MOST RECENT DIASTOLIC BLOOD PRESSURE < 80 MM HG: ICD-10-PCS | Mod: CPTII,S$GLB,, | Performed by: INTERNAL MEDICINE

## 2022-11-17 PROCEDURE — 3288F PR FALLS RISK ASSESSMENT DOCUMENTED: ICD-10-PCS | Mod: CPTII,S$GLB,, | Performed by: INTERNAL MEDICINE

## 2022-11-17 NOTE — PROCEDURES
Large Joint Aspiration/Injection: L subacromial bursa    Date/Time: 11/17/2022 11:00 AM  Performed by: Jayme Ram MD  Authorized by: Jayme Ram MD     Consent Done?:  Yes (Verbal)  Indications:  Pain  Site marked: the procedure site was marked    Timeout: prior to procedure the correct patient, procedure, and site was verified    Prep: patient was prepped and draped in usual sterile fashion    Local anesthetic:  Lidocaine 2% without epinephrine  Location:  Shoulder  Site:  L subacromial bursa  Patient tolerance:  Patient tolerated the procedure well with no immediate complications  Injected 1 cc Kenalog 1 cc dexamethasone left subacromial bursa  
77 yo female was brought to [facility] with complaints of weakness , slight confusion . Per son in law she was having hard time getting up stand since yesterday , noted to be weak and having some intermittent confusion , she is with frequent urination as well, had previous UTI and similar symptoms per family ; admitted with weakness, suspected UTI

## 2022-11-17 NOTE — PROGRESS NOTES
[facility]            PROGRESS NOTE      Subjective:       Patient ID:   NAME: Rola Richter : 1945     77 y.o. female    Referring Doc: No ref. provider found  Other Physicians:    Chief Complaint:  Osteoarthritis      History of Present Illness:     Patient returns today for a regularly scheduled follow-up visit for OA    The patient is complaining of pain in the left shoulder with abduction.  No history of trauma.  No paresthesias.  No joint swelling.  No fevers or chest pains no cough or shortness of breath.      ROS:   GEN:  No  fever, night sweats . weight is stable   + sl fatigue  SKIN: no rashes, no bruising, no ulcerations, no Raynaud's  HEENT: no HA's, No visual changes, no mucosal ulcers, no sicca symptoms,  CV:   no CP, SOB, PND, DALE, no orthopnea, no palpitations  PULM: normal with no SOB, cough, hemoptysis, sputum or pleuritic pain  GI:  no abdominal pain, nausea, vomiting, constipation, diarrhea, melanotic stools, BRBPR, hematemesis, no dysphagia  NEURO: no paresthesias, headaches, visual disturbances, muscle weakness  MUSCULOSKELETAL:no joint swelling, prolonged AM stiffness, no back pain, no muscle pain  Allergies:  Review of patient's allergies indicates:   Allergen Reactions    Aspirin Nausea And Vomiting    Penicillins Itching    Crestor [rosuvastatin]      Muscle pain      Lipitor [atorvastatin]      Achy         Medications:    Current Outpatient Medications:     azelastine (ASTELIN) 137 mcg (0.1 %) nasal spray, 1 spray (137 mcg total) by Nasal route 2 (two) times daily., Disp: 30 mL, Rfl: 0    cetirizine (ZYRTEC) 5 MG tablet, Take 1 tablet (5 mg total) by mouth once daily., Disp: 30 tablet, Rfl: 11    COD LIVER OIL ORAL, Take 1 tablet by mouth once daily. , Disp: , Rfl:     cyclobenzaprine (FLEXERIL) 10 MG tablet, Take 1 tablet (10 mg total) by mouth 2 (two) times daily as needed for Muscle spasms., Disp: 60 tablet, Rfl: 2    diclofenac sodium (VOLTAREN) 1 % Gel,  Apply 2 g topically 4 (four) times daily., Disp: 450 g, Rfl: 3    donepeziL (ARICEPT) 10 MG tablet, Take 1 tablet (10 mg total) by mouth every evening., Disp: 30 tablet, Rfl: 1    evolocumab (REPATHA SURECLICK) 140 mg/mL PnIj, Inject 1 mL (140 mg total) into the skin every 14 (fourteen) days., Disp: 2 mL, Rfl: 2    fish oil-omega-3 fatty acids 300-1,000 mg capsule, Take 2 g by mouth once daily., Disp: , Rfl:     fluticasone propionate (FLONASE) 50 mcg/actuation nasal spray, 2 sprays (100 mcg total) by Each Nostril route once daily., Disp: 16 g, Rfl: 11    gabapentin (NEURONTIN) 100 MG capsule, 1 pill twice a day, Disp: 180 capsule, Rfl: 1    HYDROcodone-acetaminophen (NORCO) 5-325 mg per tablet, Take 1 tablet by mouth every 12 (twelve) hours as needed for Pain. Medically necessary for greater than 7 days for chronic pain, Disp: 60 tablet, Rfl: 0    HYDROcodone-acetaminophen (NORCO) 5-325 mg per tablet, Take 1 tablet by mouth every 12 (twelve) hours as needed for Pain., Disp: 60 tablet, Rfl: 0    ipratropium (ATROVENT) 42 mcg (0.06 %) nasal spray, 2 sprays by Nasal route 3 (three) times daily., Disp: 15 mL, Rfl: 6    irbesartan (AVAPRO) 300 MG tablet, Take 1 tablet (300 mg total) by mouth every evening. New tablet strength (Patient taking differently: Take 150 mg by mouth once daily. Take 2 tablets every evening), Disp: 90 tablet, Rfl: 3    montelukast (SINGULAIR) 10 mg tablet, Take 10 mg by mouth nightly., Disp: , Rfl:     multivit with min-folic acid 200 mcg Chew, Take 1 tablet by mouth once daily. , Disp: , Rfl:     omeprazole (PRILOSEC) 40 MG capsule, TAKE 1 CAPSULE EVERY DAY, Disp: 90 capsule, Rfl: 2    pitavastatin calcium (LIVALO) 2 mg Tab tablet, Take 1 tablet (2 mg total) by mouth every evening., Disp: 90 tablet, Rfl: 3    polyethylene glycol (GLYCOLAX) 17 gram/dose powder, Take 17 g by mouth daily as needed (constipation). , Disp: , Rfl:     rosuvastatin (CRESTOR) 20 MG tablet, Take 20 mg by mouth once  daily., Disp: , Rfl:     sertraline (ZOLOFT) 100 MG tablet, Take 1.5 tablets (150 mg total) by mouth once daily. for 60 doses, Disp: 45 tablet, Rfl: 1    PMHx/PSHx Updates:          Objective:     Vitals:  Blood pressure (!) 146/79, weight 56.1 kg (123 lb 9.6 oz).    Physical Examination:   GEN: no apparent distress, comfortable; AAOx3  SKIN: no rashes,no ulceration, no Raynaud's, no petechiae, no SQ nodules,  HEAD: normal  EYES: no pallor, no icterus  CV: RRR with no murmur; l S1 and S2 reg. ,no gallop no rubs,   CHEST: Normal respiratory effort; CTAB; normal breath sounds; no wheeze or crackles  MUSC/Skeletal:  no crepitus; joints without synovitis,  no deformities  No joint swelling or tenderness of PIP, MCP, wrist, elbow, R shoulder, or knee joints.  Left shoulder without swelling or erythema.  Abduction to 90° internal and external rotation preserved  EXTREM: no clubbing, cyanosis, no edema  NEURO: grossly intact; motor WNL; AAOx3;   PSYCH: normal mood, affect and behavior  LYMPH: normal cervical, supraclavicular          Labs:   Lab Results   Component Value Date    WBC 8.25 09/12/2022    HGB 11.4 (L) 09/12/2022    HCT 34.3 (L) 09/12/2022    MCV 91 09/12/2022     09/12/2022    CMP  @LASTLAB(NA,K,CL,CO2,GLU,BUN,Creatinine,Calcium,PROT,Albumin,Bilitot,Alkphos,AST,ALT,CRP,ESR,RF,CCP,NATHANIEL,SSA,CPK,uric acid) )@  I have reviewed all available lab results and radiology reports.    Radiology/Diagnostic Studies:        Assessment/Plan:   (1) 77 y.o. female with diagnosis of left subacromial bursitis.  Injected as per procedure note  Osteoarthritis.  Stable    Follow-up in 1 or 2 weeks if still symptomatic.  If not she will follow-up with primary care and Ortho as needed          Discussion:     I have explained all of the above in detail and the patient understands all of the current recommendation(s). I have answered all questions to the best of my ability and to their complete satisfaction.       The patient is  to continue with the current management plan         Electronically signed by Jayme Ram MD    Patient notified that this office will be closing December 22, 2022. They should begin looking for another rheumatologist as soon as possible.  A list with the names and contact details of rheumatologists in the surrounding area was provided.

## 2022-11-22 LAB
6MAM UR QL: NOT DETECTED
7AMINOCLONAZEPAM UR QL: NOT DETECTED
A-OH ALPRAZ UR QL: NOT DETECTED
ALPHA-OH-MIDAZOLAM: NOT DETECTED
ALPRAZ UR QL: NOT DETECTED
AMPHET UR QL SCN: NOT DETECTED
ANNOTATION COMMENT IMP: NORMAL
ANNOTATION COMMENT IMP: NORMAL
BARBITURATES UR QL: NOT DETECTED
BUPRENORPHINE UR QL: NOT DETECTED
BZE UR QL: NOT DETECTED
CARBOXYTHC UR QL: NOT DETECTED
CARISOPRODOL UR QL: NOT DETECTED
CLONAZEPAM UR QL: NOT DETECTED
CODEINE UR QL: NOT DETECTED
CREAT UR-MCNC: 38.8 MG/DL (ref 20–400)
DIAZEPAM UR QL: NOT DETECTED
ETHYL GLUCURONIDE UR QL: NOT DETECTED
FENTANYL UR QL: NOT DETECTED
GABAPENTIN: PRESENT
HYDROCODONE UR QL: PRESENT
HYDROMORPHONE UR QL: PRESENT
LORAZEPAM UR QL: NOT DETECTED
MDA UR QL: NOT DETECTED
MDEA UR QL: NOT DETECTED
MDMA UR QL: NOT DETECTED
ME-PHENIDATE UR QL: NOT DETECTED
METHADONE UR QL: NOT DETECTED
METHAMPHET UR QL: NOT DETECTED
MIDAZOLAM UR QL SCN: NOT DETECTED
MORPHINE UR QL: NOT DETECTED
NALOXONE: NOT DETECTED
NORBUPRENORPHINE UR QL CFM: NOT DETECTED
NORDIAZEPAM UR QL: NOT DETECTED
NORFENTANYL UR QL: NOT DETECTED
NORHYDROCODONE UR QL CFM: PRESENT
NORMEPERIDINE UR QL CFM: NOT DETECTED
NOROXYCODONE UR QL CFM: NOT DETECTED
NOROXYMORPHONE UR QL SCN: NOT DETECTED
OXAZEPAM UR QL: NOT DETECTED
OXYCODONE UR QL: NOT DETECTED
OXYMORPHONE UR QL: NOT DETECTED
PATHOLOGY STUDY: NORMAL
PCP UR QL: NOT DETECTED
PHENTERMINE UR QL: NOT DETECTED
PREGABALIN: NOT DETECTED
SERVICE CMNT-IMP: NORMAL
TAPENTADOL UR QL SCN: NOT DETECTED
TAPENTADOL UR QL SCN: NOT DETECTED
TEMAZEPAM UR QL: NOT DETECTED
TRAMADOL UR QL: NOT DETECTED
ZOLPIDEM METABOLITE: NOT DETECTED
ZOLPIDEM UR QL: NOT DETECTED

## 2022-11-28 DIAGNOSIS — G44.209 TENSION HEADACHE: ICD-10-CM

## 2022-11-28 RX ORDER — GABAPENTIN 100 MG/1
CAPSULE ORAL
Qty: 180 CAPSULE | Refills: 1 | Status: SHIPPED | OUTPATIENT
Start: 2022-11-28 | End: 2023-05-05 | Stop reason: SDUPTHER

## 2022-11-28 NOTE — TELEPHONE ENCOUNTER
----- Message from Yesika Richardson sent at 11/28/2022 11:48 AM CST -----  Contact: self  Type:  RX Refill Request    Who Called: Patient  Refill or New Rx:Refill  RX Name and Strength:gabapentin (NEURONTIN) 100 MG capsule  How is the patient currently taking it? (ex. 1XDay): 1 -2xday  Is this a 30 day or 90 day RX: 90  Preferred Pharmacy with phone number:  [facility] - [facility] [address]  [address] [address]  Phone: [phone] Fax: [phone]  Local or Mail Order:Mail  Ordering Provider:Dr. Gordy Diamond  Would the patient rather a call back or a response via [facility]? Call  Best Call Back Number:[phone]  Additional Information: Patient would like refill, please call if further directions are required.

## 2022-11-30 ENCOUNTER — OFFICE VISIT (OUTPATIENT)
Dept: OPTOMETRY | Facility: CLINIC | Age: 77
End: 2022-11-30
Payer: COMMERCIAL

## 2022-11-30 DIAGNOSIS — H52.7 REFRACTIVE ERROR: ICD-10-CM

## 2022-11-30 DIAGNOSIS — Z01.00 EXAMINATION OF EYES AND VISION: Primary | ICD-10-CM

## 2022-11-30 DIAGNOSIS — H25.13 NUCLEAR SCLEROSIS, BILATERAL: ICD-10-CM

## 2022-11-30 DIAGNOSIS — H26.9 CORTICAL CATARACT: ICD-10-CM

## 2022-11-30 DIAGNOSIS — H40.039 ANATOMICAL NARROW ANGLE: ICD-10-CM

## 2022-11-30 PROCEDURE — 99999 PR PBB SHADOW E&M-EST. PATIENT-LVL IV: ICD-10-PCS | Mod: PBBFAC,,, | Performed by: OPTOMETRIST

## 2022-11-30 PROCEDURE — 92014 COMPRE OPH EXAM EST PT 1/>: CPT | Mod: S$GLB,,, | Performed by: OPTOMETRIST

## 2022-11-30 PROCEDURE — 92015 PR REFRACTION: ICD-10-PCS | Mod: S$GLB,,, | Performed by: OPTOMETRIST

## 2022-11-30 PROCEDURE — 99999 PR PBB SHADOW E&M-EST. PATIENT-LVL IV: CPT | Mod: PBBFAC,,, | Performed by: OPTOMETRIST

## 2022-11-30 PROCEDURE — 92015 DETERMINE REFRACTIVE STATE: CPT | Mod: S$GLB,,, | Performed by: OPTOMETRIST

## 2022-11-30 PROCEDURE — 92014 PR EYE EXAM, EST PATIENT,COMPREHESV: ICD-10-PCS | Mod: S$GLB,,, | Performed by: OPTOMETRIST

## 2022-11-30 NOTE — PROGRESS NOTES
HPI     Annual Exam     Additional comments: LDE: 07/16/2019           Comments    76 YO female presents today for an annual eye exam. Patient states that   she is having trouble with near vision, things seem to cross over each   other. Occasional trouble with watching tv, patient does not drive.   Occasional eye pain OS>OD. Also notes trouble with mucus and tearing.     OTC tears OU BID          Last edited by Serena Rousseau on 11/30/2022  9:17 AM.            Assessment /Plan     For exam results, see Encounter Report.    Examination of eyes and vision    Nuclear sclerosis, bilateral    Cortical cataract    Anatomical narrow angle    Refractive error    1. Examination of eyes and vision    2. Nuclear sclerosis, bilateral  3. Cortical cataract  Moderate, borderline visually significant. Discussed possible ocular affects of cataracts. Acceptable BCVA OU. Discussed treatment options, pt prefers to try new specs, will set up surgery consult if no improvement.     4. Anatomical narrow angle  S/p LPI OU - patent  Discussed signs/symptoms of ACG    5. Refractive error  Dispensed updated spectacle Rx. Discussed various spectacle lens options. Discussed adaptation period to new specs.   Demonstrated new spec Rx vs current specs in phoropter with patient satisfaction      RTC in 1 year for comprehensive eye exam, or sooner prn.

## 2022-12-01 ENCOUNTER — OFFICE VISIT (OUTPATIENT)
Dept: CARDIOLOGY | Facility: CLINIC | Age: 77
End: 2022-12-01
Payer: MEDICARE

## 2022-12-01 ENCOUNTER — SPECIALTY PHARMACY (OUTPATIENT)
Dept: PHARMACY | Facility: CLINIC | Age: 77
End: 2022-12-01
Payer: MEDICARE

## 2022-12-01 VITALS
WEIGHT: 122.81 LBS | HEART RATE: 73 BPM | BODY MASS INDEX: 24.8 KG/M2 | OXYGEN SATURATION: 97 % | SYSTOLIC BLOOD PRESSURE: 158 MMHG | DIASTOLIC BLOOD PRESSURE: 75 MMHG

## 2022-12-01 DIAGNOSIS — M54.32 SCIATICA OF LEFT SIDE: ICD-10-CM

## 2022-12-01 DIAGNOSIS — R26.89 IMBALANCE: ICD-10-CM

## 2022-12-01 DIAGNOSIS — E78.01 FAMILIAL HYPERCHOLESTEROLEMIA: ICD-10-CM

## 2022-12-01 DIAGNOSIS — M50.30 DDD (DEGENERATIVE DISC DISEASE), CERVICAL: ICD-10-CM

## 2022-12-01 DIAGNOSIS — M47.812 SPONDYLOSIS OF CERVICAL REGION WITHOUT MYELOPATHY OR RADICULOPATHY: ICD-10-CM

## 2022-12-01 DIAGNOSIS — R94.31 ABNORMAL ELECTROCARDIOGRAM (ECG) (EKG): ICD-10-CM

## 2022-12-01 DIAGNOSIS — M54.12 CERVICAL RADICULITIS: ICD-10-CM

## 2022-12-01 DIAGNOSIS — Z78.9 STATIN NOT TOLERATED: ICD-10-CM

## 2022-12-01 DIAGNOSIS — Z82.3 FAMILY HISTORY OF CEREBROVASCULAR ACCIDENT (CVA): ICD-10-CM

## 2022-12-01 DIAGNOSIS — R42 DIZZINESS: Primary | ICD-10-CM

## 2022-12-01 DIAGNOSIS — I70.0 CALCIFICATION OF AORTA: ICD-10-CM

## 2022-12-01 DIAGNOSIS — E78.2 MIXED HYPERLIPIDEMIA: ICD-10-CM

## 2022-12-01 PROCEDURE — 1159F PR MEDICATION LIST DOCUMENTED IN MEDICAL RECORD: ICD-10-PCS | Mod: CPTII,S$GLB,, | Performed by: GENERAL PRACTICE

## 2022-12-01 PROCEDURE — 93000 EKG 12-LEAD: ICD-10-PCS | Mod: S$GLB,,, | Performed by: GENERAL PRACTICE

## 2022-12-01 PROCEDURE — 3077F PR MOST RECENT SYSTOLIC BLOOD PRESSURE >= 140 MM HG: ICD-10-PCS | Mod: CPTII,S$GLB,, | Performed by: GENERAL PRACTICE

## 2022-12-01 PROCEDURE — 1160F PR REVIEW ALL MEDS BY PRESCRIBER/CLIN PHARMACIST DOCUMENTED: ICD-10-PCS | Mod: CPTII,S$GLB,, | Performed by: GENERAL PRACTICE

## 2022-12-01 PROCEDURE — 99213 OFFICE O/P EST LOW 20 MIN: CPT | Mod: S$GLB,,, | Performed by: GENERAL PRACTICE

## 2022-12-01 PROCEDURE — 1159F MED LIST DOCD IN RCRD: CPT | Mod: CPTII,S$GLB,, | Performed by: GENERAL PRACTICE

## 2022-12-01 PROCEDURE — 3078F PR MOST RECENT DIASTOLIC BLOOD PRESSURE < 80 MM HG: ICD-10-PCS | Mod: CPTII,S$GLB,, | Performed by: GENERAL PRACTICE

## 2022-12-01 PROCEDURE — 93000 ELECTROCARDIOGRAM COMPLETE: CPT | Mod: S$GLB,,, | Performed by: GENERAL PRACTICE

## 2022-12-01 PROCEDURE — 99213 PR OFFICE/OUTPT VISIT, EST, LEVL III, 20-29 MIN: ICD-10-PCS | Mod: S$GLB,,, | Performed by: GENERAL PRACTICE

## 2022-12-01 PROCEDURE — 99999 PR PBB SHADOW E&M-EST. PATIENT-LVL III: CPT | Mod: PBBFAC,,, | Performed by: GENERAL PRACTICE

## 2022-12-01 PROCEDURE — 99999 PR PBB SHADOW E&M-EST. PATIENT-LVL III: ICD-10-PCS | Mod: PBBFAC,,, | Performed by: GENERAL PRACTICE

## 2022-12-01 PROCEDURE — 1160F RVW MEDS BY RX/DR IN RCRD: CPT | Mod: CPTII,S$GLB,, | Performed by: GENERAL PRACTICE

## 2022-12-01 PROCEDURE — 3078F DIAST BP <80 MM HG: CPT | Mod: CPTII,S$GLB,, | Performed by: GENERAL PRACTICE

## 2022-12-01 PROCEDURE — 3077F SYST BP >= 140 MM HG: CPT | Mod: CPTII,S$GLB,, | Performed by: GENERAL PRACTICE

## 2022-12-01 NOTE — PROGRESS NOTES
Subjective:    Patient ID:  Rola Richter is a 77 y.o. female who presents for follow-up of   Chief Complaint   Patient presents with    Dizziness    Hypertension       HPI:  9/30/22     SHE IS REFERRED BY DR. LYLES TO OBTAIN A REPATHA PRESCRIPTION.  He has a history of family all hyper cholesterolemia calcium of the aorta.  She is intolerant to multiple statins in the past.  He has hypertension which is somewhat labile she is currently taking irbesartan 151 and half tablets a day which seems to work with a systolic pressure 130-140.  If she takes more medication she gets a little imbalance and is afraid to fall because of severe arthritis.     She has migraine headaches which may be stimulated by hypertension.  She has no chest pain.  She walks every other day.  She is able to garden and does her own housework.  She has a digital blood pressure monitoring.       Patient ID: Rola Richetr is a 77 y.o. female.     Chief Complaint: No chief complaint on file.     HPI  Review of Systems   Constitutional:  Negative for fatigue and unexpected weight change.   Respiratory:  Negative for chest tightness and shortness of breath.    Cardiovascular:  Negative for chest pain, palpitations and leg swelling.   Gastrointestinal:  Negative for abdominal pain.   Musculoskeletal:  Negative for arthralgias.   Neurological:  Negative for dizziness, syncope, light-headedness and headaches.        1. Family history of cerebrovascular accident (CVA)    2. Familial hypercholesterolemia    3. Mixed hyperlipidemia    4. Calcification of aorta    5. Statin not tolerated    6. Primary hypertension    7. Pure hypercholesterolemia    8. Age-related osteoporosis with current pathological fracture, initial encounter    9. Cervical radiculitis    10. Headaches due to old head injury    11. Imbalance       Plan:      Problem List Items Addressed This Visit                  Neuro     Cervical radiculitis          Cardiac/Vascular      Hypertension     Relevant Orders     IN OFFICE EKG 12-LEAD (to [place])     Echo Saline Bubble? No     Hyperlipidemia     Relevant Medications     evolocumab (REPATHA SURECLICK) 140 mg/mL PnIj     Other Relevant Orders     Echo Saline Bubble? No     Echo Saline Bubble? No     Calcification of aorta     Relevant Medications     evolocumab (REPATHA SURECLICK) 140 mg/mL PnIj     Other Relevant Orders     Echo Saline Bubble? No          Orthopedic     Osteoporosis      Other Visit Diagnoses         Family history of cerebrovascular accident (CVA)    -  Primary     Statin not tolerated         Relevant Medications     evolocumab (REPATHA SURECLICK) 140 mg/mL PnIj     Headaches due to old head injury         Imbalance                22    She is here for follow-up.  She did have Repatha shot now for 2 months  She had a carotid duplex which is negative for any hemodynamically significant stenosis.  She is planning to have more blood work done in January for Dr. Martinez.  She did not get ECHO .         Review of patient's allergies indicates:   Allergen Reactions    Aspirin Nausea And Vomiting    Penicillins Itching    Crestor [rosuvastatin]      Muscle pain      Lipitor [atorvastatin]      Achy         Past Medical History:   Diagnosis Date    Anxiety     Arthritis     Cataract     DDD (degenerative disc disease), lumbar     Depression     Encounter for blood transfusion     GERD (gastroesophageal reflux disease)     Headache     Hyperlipidemia     Hypertension     pt states she does not take meds anymore she just watches her weight    Neuromuscular disorder     Occipital neuralgia 3/24/2017    Osteoporosis      Past Surgical History:   Procedure Laterality Date    APPENDECTOMY       SECTION      x 2    CHOLECYSTECTOMY      COLONOSCOPY  prior to     COLONOSCOPY N/A 2019    Procedure: COLONOSCOPY;  Surgeon: Greg Victor MD;  Location: [facility];  Service: Endoscopy;  Laterality: N/A; 2 colon polyps,  hemorrhoids; repeat in 5 years for surveillance; biopsy: Tubular adenoma x2    EPIDURAL STEROID INJECTION INTO CERVICAL SPINE N/A 9/20/2022    Procedure: Injection-steroid-epidural-cervical C7-T1;  Surgeon: Timothy Grimaldo MD;  Location: [facility] OR;  Service: Pain Management;  Laterality: N/A;    ESOPHAGOGASTRODUODENOSCOPY N/A 4/30/2019    Procedure: EGD (ESOPHAGOGASTRODUODENOSCOPY);  Surgeon: Greg Victor MD;  Location: [facility];  Service: Endoscopy;  Laterality: N/A; Mild Schatzki ring. Dilated. small hiatal hernia; Four gastric polyps. Resected and retrieved, gastritis; biopsy:stomach- unremarkable, negative for h pylori, polyps-Fundic type mucosa with foveolar hyperplasia.    ESOPHAGOGASTRODUODENOSCOPY N/A 2/17/2021    Procedure: EGD (ESOPHAGOGASTRODUODENOSCOPY);  Surgeon: Greg Victor MD;  Location: [facility] ENDO;  Service: Endoscopy;  Laterality: N/A;    HYSTERECTOMY      Laser Periphery Iridotomy Bilateral     OD 5/26/16 and OS touch up 5/26/2016    UPPER GASTROINTESTINAL ENDOSCOPY  03/14/2017    Dr. Marcial: esophageal stenosis- dilated, gastritis, gastric polyps removed; biopsy- mild gastritis, negative for h pylori, hyperplastic polyp, esophagus unremarkable    vocal cord tumor removal       Social History     Tobacco Use    Smoking status: Never    Smokeless tobacco: Never   Substance Use Topics    Alcohol use: No     Alcohol/week: 0.0 standard drinks    Drug use: Yes     Types: Hydrocodone     Family History   Problem Relation Age of Onset    Osteoarthritis Mother     Alcohol abuse Mother     Rheum arthritis Mother     Osteoarthritis Sister     Diabetes Brother     No Known Problems Son     No Known Problems Sister     No Known Problems Sister     No Known Problems Brother     Arthritis Son     No Known Problems Son     Stroke Maternal Grandmother 99    Rheum arthritis Maternal Grandmother     Retinal detachment Neg Hx     Macular degeneration Neg Hx     Glaucoma Neg Hx     Amblyopia Neg Hx     Blindness  Neg Hx     Cancer Neg Hx     Cataracts Neg Hx     Hypertension Neg Hx     Strabismus Neg Hx     Thyroid disease Neg Hx     Lupus Neg Hx     Kidney disease Neg Hx     Inflammatory bowel disease Neg Hx     Psoriasis Neg Hx     Colon cancer Neg Hx     Crohn's disease Neg Hx     Ulcerative colitis Neg Hx     Stomach cancer Neg Hx     Esophageal cancer Neg Hx         Review of Systems:   Constitution: Negative for diaphoresis and fever.   HEENT: Negative for nosebleeds.    Cardiovascular: Negative for chest pain       No dyspnea on exertion       No leg swelling        No palpitations  Respiratory: Negative for shortness of breath and wheezing.    Hematologic/Lymphatic: Negative for bleeding problem. Does not bruise/bleed easily.   Skin: Negative for color change and rash.   Musculoskeletal: Negative for falls and myalgias.   Gastrointestinal: Negative for hematemesis and hematochezia.   Genitourinary: Negative for hematuria.   Neurological: Negative for dizziness and light-headedness.   Psychiatric/Behavioral: Negative for altered mental status and memory loss.          Objective:        Vitals:    12/01/22 1036   BP: (!) 158/75   Pulse: 73       Lab Results   Component Value Date    WBC 8.25 09/12/2022    HGB 11.4 (L) 09/12/2022    HCT 34.3 (L) 09/12/2022     09/12/2022    CHOL 416 (H) 09/12/2022    TRIG 294 (H) 09/12/2022    HDL 53 09/12/2022    ALT 14 09/12/2022    AST 22 09/12/2022     09/12/2022    K 4.4 09/12/2022     09/12/2022    CREATININE 1.0 09/12/2022    BUN 15 09/12/2022    CO2 24 09/12/2022    TSH 1.840 02/25/2021    HGBA1C 5.7 (H) 09/12/2022        ECHOCARDIOGRAM RESULTS  No results found for this or any previous visit.        CURRENT/PREVIOUS VISIT EKG  Results for orders placed or performed in visit on 09/30/22   IN OFFICE EKG 12-LEAD (to [place])    Collection Time: 09/30/22  8:17 AM    Narrative    Test Reason : I10,    Vent. Rate : 071 BPM     Atrial Rate : 071 BPM     P-R Int : 152  ms          QRS Dur : 074 ms      QT Int : 386 ms       P-R-T Axes : 058 072 069 degrees     QTc Int : 419 ms    Normal sinus rhythm  Normal ECG  When compared with ECG of 30-MAY-2021 10:13,  No significant change was found  Confirmed by Comfort ZARATE, Abbe MANN ([id number]) on 10/23/2022 8:31:13 PM    Referred By: ROSALINDA JENKINS           Confirmed By:Abbe Moyer MD     No valid procedures specified.   No results found for this or any previous visit.      Physical Exam:  CONSTITUTIONAL: No fever, no chills  HEENT: Normocephalic, atraumatic,pupils reactive to light                 NECK:  No JVD no carotid bruit  CVS: S1S2+, RRR, no murmurs,   LUNGS: Clear  ABDOMEN: Soft, NT, BS+  EXTREMITIES: No cyanosis, edema  : No chopra catheter  NEURO: AAO X 3  PSY: Normal affect      Medication List with Changes/Refills   Current Medications    AZELASTINE (ASTELIN) 137 MCG (0.1 %) NASAL SPRAY    1 spray (137 mcg total) by Nasal route 2 (two) times daily.    CETIRIZINE (ZYRTEC) 5 MG TABLET    Take 1 tablet (5 mg total) by mouth once daily.    COD LIVER OIL ORAL    Take 1 tablet by mouth once daily.     CYCLOBENZAPRINE (FLEXERIL) 10 MG TABLET    Take 1 tablet (10 mg total) by mouth 2 (two) times daily as needed for Muscle spasms.    DICLOFENAC SODIUM (VOLTAREN) 1 % GEL    Apply 2 g topically 4 (four) times daily.    DONEPEZIL (ARICEPT) 10 MG TABLET    Take 1 tablet (10 mg total) by mouth every evening.    EVOLOCUMAB (REPATHA SURECLICK) 140 MG/ML PNIJ    Inject 1 mL (140 mg total) into the skin every 14 (fourteen) days.    FISH OIL-OMEGA-3 FATTY ACIDS 300-1,000 MG CAPSULE    Take 2 g by mouth once daily.    FLUTICASONE PROPIONATE (FLONASE) 50 MCG/ACTUATION NASAL SPRAY    2 sprays (100 mcg total) by Each Nostril route once daily.    GABAPENTIN (NEURONTIN) 100 MG CAPSULE    1 pill twice a day    HYDROCODONE-ACETAMINOPHEN (NORCO) 5-325 MG PER TABLET    Take 1 tablet by mouth every 12 (twelve) hours as needed for Pain.    IPRATROPIUM  (ATROVENT) 42 MCG (0.06 %) NASAL SPRAY    2 sprays by Nasal route 3 (three) times daily.    IRBESARTAN (AVAPRO) 300 MG TABLET    Take 1 tablet (300 mg total) by mouth every evening. New tablet strength    MONTELUKAST (SINGULAIR) 10 MG TABLET    Take 10 mg by mouth nightly.    MULTIVIT WITH MIN-FOLIC ACID 200 MCG CHEW    Take 1 tablet by mouth once daily.     OMEPRAZOLE (PRILOSEC) 40 MG CAPSULE    TAKE 1 CAPSULE EVERY DAY    POLYETHYLENE GLYCOL (GLYCOLAX) 17 GRAM/DOSE POWDER    Take 17 g by mouth daily as needed (constipation).     SERTRALINE (ZOLOFT) 100 MG TABLET    TAKE 1 TABLET EVERY DAY   Discontinued Medications    PITAVASTATIN CALCIUM (LIVALO) 2 MG TAB TABLET    Take 1 tablet (2 mg total) by mouth every evening.    ROSUVASTATIN (CRESTOR) 20 MG TABLET    Take 20 mg by mouth once daily.             Assessment:       1. Dizziness    2. Mixed hyperlipidemia    3. Familial hypercholesterolemia    4. Calcification of aorta    5. Statin not tolerated    6. Family history of cerebrovascular accident (CVA)    7. Imbalance    8. Cervical radiculitis    9. DDD (degenerative disc disease), cervical    10. Spondylosis of cervical region without myelopathy or radiculopathy    11. Sciatica of left side    12. Abnormal electrocardiogram (ECG) (EKG)         Plan:     Problem List Items Addressed This Visit          Neuro    DDD (degenerative disc disease), cervical    Cervical radiculitis    Spondylosis of cervical region without myelopathy or radiculopathy       Cardiac/Vascular    Hyperlipidemia    Relevant Orders    Echo Saline Bubble? No    Nuclear Stress - Cardiology Interpreted    Calcification of aorta     Other Visit Diagnoses       Dizziness    -  Primary    Relevant Orders    IN OFFICE EKG 12-LEAD (to [place])    Echo Saline Bubble? No    Nuclear Stress - Cardiology Interpreted    Statin not tolerated        Relevant Orders    Echo Saline Bubble? No    Nuclear Stress - Cardiology Interpreted    Family history of  cerebrovascular accident (CVA)        Imbalance        Sciatica of left side        Abnormal electrocardiogram (ECG) (EKG)        Relevant Orders    Nuclear Stress - Cardiology Interpreted          EKG shows normal sinus rhythm normal EKG    Will await the new lipid profile.  She never had ECHO done recommend routine screening stress test.    No follow-ups on file.    The patients questions were answered, they verbalized understanding, and agreed with the treatment plan.     LALA COBB MD  [facility]

## 2022-12-01 NOTE — TELEPHONE ENCOUNTER
Outgoing call regarding repatha refill; per pt's , she will inject tomorrow and has a pen on hand; next injection is on 12/16, and informed him that [facility] will follow up on 12/9 to schedule delivery

## 2022-12-09 NOTE — TELEPHONE ENCOUNTER
Specialty Pharmacy - Refill Coordination    Specialty Medication Orders Linked to Encounter      Flowsheet Row Most Recent Value   Medication #1 evolocumab (REPATHA SURECLICK) 140 mg/mL PnIj (Order#[id number], Rx#[id number])            Refill Questions - Documented Responses      Flowsheet Row Most Recent Value   Patient Availability and HIPAA Verification    Does patient want to proceed with activity? Yes   HIPAA/medical authority confirmed? Yes   Relationship to patient of person spoken to? Self   Refill Screening Questions    Would patient like to speak to a pharmacist? No   When does the patient need to receive the medication? 12/16/22   Refill Delivery Questions    How will the patient receive the medication? MEDRx   When does the patient need to receive the medication? 12/16/22   Shipping Address Home   Address in [facility] confirmed and updated if neccessary? Yes   Expected Copay ($) 9.85   Is the patient able to afford the medication copay? Yes   Payment Method CC on file   Days supply of Refill 28   Supplies needed? No supplies needed   Refill activity completed? Yes   Refill activity plan Refill scheduled   Shipment/Pickup Date: 12/14/22            Current Outpatient Medications   Medication Sig    azelastine (ASTELIN) 137 mcg (0.1 %) nasal spray 1 spray (137 mcg total) by Nasal route 2 (two) times daily.    cetirizine (ZYRTEC) 5 MG tablet Take 1 tablet (5 mg total) by mouth once daily.    COD LIVER OIL ORAL Take 1 tablet by mouth once daily.     cyclobenzaprine (FLEXERIL) 10 MG tablet Take 1 tablet (10 mg total) by mouth 2 (two) times daily as needed for Muscle spasms.    diclofenac sodium (VOLTAREN) 1 % Gel Apply 2 g topically 4 (four) times daily.    donepeziL (ARICEPT) 10 MG tablet Take 1 tablet (10 mg total) by mouth every evening.    evolocumab (REPATHA SURECLICK) 140 mg/mL PnIj Inject 1 mL (140 mg total) into the skin every 14 (fourteen) days.    fish oil-omega-3 fatty acids 300-1,000 mg  capsule Take 2 g by mouth once daily.    fluticasone propionate (FLONASE) 50 mcg/actuation nasal spray 2 sprays (100 mcg total) by Each Nostril route once daily.    gabapentin (NEURONTIN) 100 MG capsule 1 pill twice a day    HYDROcodone-acetaminophen (NORCO) 5-325 mg per tablet Take 1 tablet by mouth every 12 (twelve) hours as needed for Pain.    ipratropium (ATROVENT) 42 mcg (0.06 %) nasal spray 2 sprays by Nasal route 3 (three) times daily.    irbesartan (AVAPRO) 300 MG tablet Take 1 tablet (300 mg total) by mouth every evening. New tablet strength (Patient taking differently: Take 150 mg by mouth once daily. Take 2 tablets every evening)    montelukast (SINGULAIR) 10 mg tablet Take 10 mg by mouth nightly.    multivit with min-folic acid 200 mcg Chew Take 1 tablet by mouth once daily.     omeprazole (PRILOSEC) 40 MG capsule TAKE 1 CAPSULE EVERY DAY    polyethylene glycol (GLYCOLAX) 17 gram/dose powder Take 17 g by mouth daily as needed (constipation).     sertraline (ZOLOFT) 100 MG tablet TAKE 1 TABLET EVERY DAY   Last reviewed on 12/1/2022 11:09 AM by Abbe Moyer MD    Review of patient's allergies indicates:   Allergen Reactions    Aspirin Nausea And Vomiting    Penicillins Itching    Crestor [rosuvastatin]      Muscle pain      Lipitor [atorvastatin]      Achy      Last reviewed on  12/1/2022 10:37 AM by Maisha Benjamin      Tasks added this encounter   1/6/2023 - Refill Call (Auto Added)   Tasks due within next 3 months   No tasks due.     Maylin Simmons [facility]  [address] [place] [address]  Phone: [phone]  Fax: [phone]

## 2022-12-13 ENCOUNTER — TELEPHONE (OUTPATIENT)
Dept: CARDIOLOGY | Facility: HOSPITAL | Age: 77
End: 2022-12-13

## 2022-12-13 NOTE — TELEPHONE ENCOUNTER
Patient advised, test will be at [facility] ([address]   Will need to register on the first floor at the main entrance.   Patient advised that arrival time is 6:30.  Patient advised that  may be here about 3.5-4 hours, and may want to bring something to occupy their time, as there will be periods of waiting.    Patient advised, may take her medications prior to testing if you need to.  Advised if she needs to eat to take her medications, please keep it light, like toast and juice.    Patient advised to avoid all caffeine 12 hours prior to testing.  This includes decaf tea and coffee.    Will provide peanut butter crackers for a snack after stress test.  If patient would prefer something else, please bring a snack from home.    Wear comfortable clothing.   No lotions, oils, or powders to the upper chest area. May wear deodorant.    No metal jewelry, buttons, or zippers to the upper body.  Patient verbalizes understanding of instructions.

## 2022-12-14 ENCOUNTER — HOSPITAL ENCOUNTER (OUTPATIENT)
Dept: CARDIOLOGY | Facility: HOSPITAL | Age: 77
Discharge: HOME | End: 2022-12-14
Attending: GENERAL PRACTICE
Payer: MEDICARE

## 2022-12-14 ENCOUNTER — HOSPITAL ENCOUNTER (OUTPATIENT)
Dept: RADIOLOGY | Facility: HOSPITAL | Age: 77
Discharge: HOME | End: 2022-12-14
Attending: GENERAL PRACTICE
Payer: MEDICARE

## 2022-12-14 VITALS — BODY MASS INDEX: 24.6 KG/M2 | WEIGHT: 122 LBS | HEIGHT: 59 IN

## 2022-12-14 DIAGNOSIS — E78.01 FAMILIAL HYPERCHOLESTEROLEMIA: ICD-10-CM

## 2022-12-14 DIAGNOSIS — Z78.9 STATIN NOT TOLERATED: ICD-10-CM

## 2022-12-14 DIAGNOSIS — R94.31 ABNORMAL ELECTROCARDIOGRAM (ECG) (EKG): ICD-10-CM

## 2022-12-14 DIAGNOSIS — R42 DIZZINESS: ICD-10-CM

## 2022-12-14 LAB
AORTIC ROOT ANNULUS: 2.9 CM
AORTIC VALVE CUSP SEPERATION: 1.6 CM
AV INDEX (PROSTH): 0.81
AV MEAN GRADIENT: 3 MMHG
AV PEAK GRADIENT: 8 MMHG
AV REGURGITATION PRESSURE HALF TIME: 622 MS
AV VALVE AREA: 2.55 CM2
AV VELOCITY RATIO: 0.69
BSA FOR ECHO PROCEDURE: 1.52 M2
CV ECHO LV RWT: 0.42 CM
CV PHARM DOSE: 0.4 MG
CV STRESS BASE HR: 65 BPM
DIASTOLIC BLOOD PRESSURE: 74 MMHG
DOP CALC AO PEAK VEL: 1.37 M/S
DOP CALC AO VTI: 32.8 CM
DOP CALC LVOT AREA: 3.1 CM2
DOP CALC LVOT DIAMETER: 2 CM
DOP CALC LVOT PEAK VEL: 0.94 M/S
DOP CALC LVOT STROKE VOLUME: 83.52 CM3
DOP CALCLVOT PEAK VEL VTI: 26.6 CM
E WAVE DECELERATION TIME: 269 MSEC
E/A RATIO: 0.83
E/E' RATIO: 16.4 M/S
ECHO LV POSTERIOR WALL: 0.9 CM (ref 0.6–1.1)
EJECTION FRACTION- HIGH: 65 %
EJECTION FRACTION: 60 %
END DIASTOLIC INDEX-HIGH: 153 ML/M2
END DIASTOLIC INDEX-LOW: 93 ML/M2
END SYSTOLIC INDEX-HIGH: 71 ML/M2
END SYSTOLIC INDEX-LOW: 31 ML/M2
FRACTIONAL SHORTENING: 40 % (ref 28–44)
INTERVENTRICULAR SEPTUM: 0.95 CM (ref 0.6–1.1)
IVRT: 84 MSEC
LA MAJOR: 3.9 CM
LEFT ATRIUM SIZE: 3.9 CM
LEFT INTERNAL DIMENSION IN SYSTOLE: 2.53 CM (ref 2.1–4)
LEFT VENTRICLE DIASTOLIC VOLUME INDEX: 54.23 ML/M2
LEFT VENTRICLE DIASTOLIC VOLUME: 80.8 ML
LEFT VENTRICLE MASS INDEX: 84 G/M2
LEFT VENTRICLE SYSTOLIC VOLUME INDEX: 17 ML/M2
LEFT VENTRICLE SYSTOLIC VOLUME: 25.3 ML
LEFT VENTRICULAR INTERNAL DIMENSION IN DIASTOLE: 4.25 CM (ref 3.5–6)
LEFT VENTRICULAR MASS: 125.58 G
LV LATERAL E/E' RATIO: 16.4 M/S
LV SEPTAL E/E' RATIO: 16.4 M/S
LVOT MG: 2 MMHG
LVOT MV: 0.59 CM/S
MV PEAK A VEL: 0.99 M/S
MV PEAK E VEL: 0.82 M/S
MV STENOSIS PRESSURE HALF TIME: 50 MS
MV VALVE AREA P 1/2 METHOD: 4.4 CM2
NUC REST DIASTOLIC VOLUME INDEX: 46
NUC REST EJECTION FRACTION: 76
NUC REST SYSTOLIC VOLUME INDEX: 11
NUC STRESS DIASTOLIC VOLUME INDEX: 48
NUC STRESS EJECTION FRACTION: 75 %
NUC STRESS SYSTOLIC VOLUME INDEX: 12
OHS CV CPX 1 MINUTE RECOVERY HEART RATE: 88 BPM
OHS CV CPX 85 PERCENT MAX PREDICTED HEART RATE MALE: 118
OHS CV CPX MAX PREDICTED HEART RATE: 138
OHS CV CPX PATIENT IS FEMALE: 1
OHS CV CPX PATIENT IS MALE: 0
OHS CV CPX PEAK DIASTOLIC BLOOD PRESSURE: 70 MMHG
OHS CV CPX PEAK HEAR RATE: 88 BPM
OHS CV CPX PEAK RATE PRESSURE PRODUCT: NORMAL
OHS CV CPX PEAK SYSTOLIC BLOOD PRESSURE: 144 MMHG
OHS CV CPX PERCENT MAX PREDICTED HEART RATE ACHIEVED: 64
OHS CV CPX RATE PRESSURE PRODUCT PRESENTING: NORMAL
PISA AR MAX VEL: 3.27 M/S
PISA TR MAX VEL: 2.71 M/S
RA PRESSURE: 3 MMHG
RETIRED EF AND QEF - SEE NOTES: 53 %
RIGHT VENTRICULAR END-DIASTOLIC DIMENSION: 2.51 CM
SYSTOLIC BLOOD PRESSURE: 156 MMHG
TDI LATERAL: 0.05 M/S
TDI SEPTAL: 0.05 M/S
TDI: 0.05 M/S
TR MAX PG: 29 MMHG
TV REST PULMONARY ARTERY PRESSURE: 32 MMHG

## 2022-12-14 PROCEDURE — 93018 CV STRESS TEST I&R ONLY: CPT | Mod: ,,, | Performed by: GENERAL PRACTICE

## 2022-12-14 PROCEDURE — 93016 NUCLEAR STRESS - CARDIOLOGY INTERPRETED (CUPID ONLY): ICD-10-PCS | Mod: ,,, | Performed by: NURSE PRACTITIONER

## 2022-12-14 PROCEDURE — 93306 TTE W/DOPPLER COMPLETE: CPT | Mod: 26,,, | Performed by: GENERAL PRACTICE

## 2022-12-14 PROCEDURE — 93306 TTE W/DOPPLER COMPLETE: CPT

## 2022-12-14 PROCEDURE — 78452 HT MUSCLE IMAGE SPECT MULT: CPT

## 2022-12-14 PROCEDURE — 93018 NUCLEAR STRESS - CARDIOLOGY INTERPRETED (CUPID ONLY): ICD-10-PCS | Mod: ,,, | Performed by: GENERAL PRACTICE

## 2022-12-14 PROCEDURE — 78452 HT MUSCLE IMAGE SPECT MULT: CPT | Mod: 26,,, | Performed by: GENERAL PRACTICE

## 2022-12-14 PROCEDURE — 93016 CV STRESS TEST SUPVJ ONLY: CPT | Mod: ,,, | Performed by: NURSE PRACTITIONER

## 2022-12-14 PROCEDURE — 78452 NUCLEAR STRESS - CARDIOLOGY INTERPRETED (CUPID ONLY): ICD-10-PCS | Mod: 26,,, | Performed by: GENERAL PRACTICE

## 2022-12-14 PROCEDURE — 93306 ECHO (CUPID ONLY): ICD-10-PCS | Mod: 26,,, | Performed by: GENERAL PRACTICE

## 2022-12-14 RX ORDER — REGADENOSON 0.08 MG/ML
0.4 INJECTION, SOLUTION INTRAVENOUS ONCE
Status: COMPLETED | OUTPATIENT
Start: 2022-12-14 | End: 2022-12-14

## 2022-12-14 RX ADMIN — REGADENOSON 0.4 MG: 0.08 INJECTION, SOLUTION INTRAVENOUS at 08:12

## 2022-12-29 ENCOUNTER — TELEPHONE (OUTPATIENT)
Dept: PAIN MEDICINE | Facility: CLINIC | Age: 77
End: 2022-12-29
Payer: MEDICARE

## 2023-01-03 RX ORDER — HYDROCODONE BITARTRATE AND ACETAMINOPHEN 5; 325 MG/1; MG/1
1 TABLET ORAL EVERY 12 HOURS PRN
Qty: 60 TABLET | Refills: 0 | Status: SHIPPED | OUTPATIENT
Start: 2023-01-03 | End: 2023-02-14 | Stop reason: SDUPTHER

## 2023-01-06 DIAGNOSIS — Z78.9 STATIN NOT TOLERATED: ICD-10-CM

## 2023-01-06 DIAGNOSIS — E78.01 FAMILIAL HYPERCHOLESTEROLEMIA: ICD-10-CM

## 2023-01-06 DIAGNOSIS — I70.0 CALCIFICATION OF AORTA: ICD-10-CM

## 2023-01-06 RX ORDER — EVOLOCUMAB 140 MG/ML
140 INJECTION, SOLUTION SUBCUTANEOUS
Qty: 2 ML | Refills: 2 | Status: CANCELLED | OUTPATIENT
Start: 2023-01-06 | End: 2023-04-06

## 2023-01-10 DIAGNOSIS — E78.01 FAMILIAL HYPERCHOLESTEROLEMIA: ICD-10-CM

## 2023-01-10 DIAGNOSIS — Z78.9 STATIN NOT TOLERATED: ICD-10-CM

## 2023-01-10 DIAGNOSIS — I70.0 CALCIFICATION OF AORTA: ICD-10-CM

## 2023-01-11 RX ORDER — EVOLOCUMAB 140 MG/ML
140 INJECTION, SOLUTION SUBCUTANEOUS
Qty: 2 ML | Refills: 2 | Status: SHIPPED | OUTPATIENT
Start: 2023-01-11 | End: 2023-04-17 | Stop reason: SDUPTHER

## 2023-01-17 ENCOUNTER — SPECIALTY PHARMACY (OUTPATIENT)
Dept: PHARMACY | Facility: CLINIC | Age: 78
End: 2023-01-17
Payer: MEDICARE

## 2023-01-17 NOTE — TELEPHONE ENCOUNTER
Specialty Pharmacy - Refill Coordination    Specialty Medication Orders Linked to Encounter      Flowsheet Row Most Recent Value   Medication #1 evolocumab (REPATHA SURECLICK) 140 mg/mL PnIj (Order#[id number], Rx#[id number])            Refill Questions - Documented Responses      Flowsheet Row Most Recent Value   Patient Availability and HIPAA Verification    Does patient want to proceed with activity? Yes   HIPAA/medical authority confirmed? Yes   Relationship to patient of person spoken to? Self   Refill Screening Questions    Would patient like to speak to a pharmacist? No   When does the patient need to receive the medication? 01/17/23   Refill Delivery Questions    How will the patient receive the medication? MEDRx   When does the patient need to receive the medication? 01/17/23   Shipping Address Home   Address in [facility] confirmed and updated if neccessary? Yes   Expected Copay ($) 0   Is the patient able to afford the medication copay? Yes   Payment Method zero copay   Days supply of Refill 28   Supplies needed? No supplies needed   Refill activity completed? Yes   Refill activity plan Refill scheduled   Shipment/Pickup Date: 01/17/23            Current Outpatient Medications   Medication Sig    azelastine (ASTELIN) 137 mcg (0.1 %) nasal spray 1 spray (137 mcg total) by Nasal route 2 (two) times daily.    cetirizine (ZYRTEC) 5 MG tablet Take 1 tablet (5 mg total) by mouth once daily.    COD LIVER OIL ORAL Take 1 tablet by mouth once daily.     diclofenac sodium (VOLTAREN) 1 % Gel Apply 2 g topically 4 (four) times daily.    donepeziL (ARICEPT) 10 MG tablet Take 1 tablet (10 mg total) by mouth every evening.    evolocumab (REPATHA SURECLICK) 140 mg/mL PnIj Inject 1 mL (140 mg total) into the skin every 14 (fourteen) days.    fish oil-omega-3 fatty acids 300-1,000 mg capsule Take 2 g by mouth once daily.    fluticasone propionate (FLONASE) 50 mcg/actuation nasal spray 2 sprays (100 mcg total) by Each  Nostril route once daily.    gabapentin (NEURONTIN) 100 MG capsule 1 pill twice a day    HYDROcodone-acetaminophen (NORCO) 5-325 mg per tablet Take 1 tablet by mouth every 12 (twelve) hours as needed for Pain.    ipratropium (ATROVENT) 42 mcg (0.06 %) nasal spray 2 sprays by Nasal route 3 (three) times daily.    irbesartan (AVAPRO) 300 MG tablet Take 1 tablet (300 mg total) by mouth every evening. New tablet strength (Patient taking differently: Take 150 mg by mouth once daily. Take 2 tablets every evening)    montelukast (SINGULAIR) 10 mg tablet Take 10 mg by mouth nightly.    multivit with min-folic acid 200 mcg Chew Take 1 tablet by mouth once daily.     omeprazole (PRILOSEC) 40 MG capsule TAKE 1 CAPSULE EVERY DAY    polyethylene glycol (GLYCOLAX) 17 gram/dose powder Take 17 g by mouth daily as needed (constipation).     sertraline (ZOLOFT) 100 MG tablet TAKE 1 TABLET EVERY DAY   Last reviewed on 12/1/2022 11:09 AM by Abeb Moyer MD    Review of patient's allergies indicates:   Allergen Reactions    Aspirin Nausea And Vomiting    Penicillins Itching    Crestor [rosuvastatin]      Muscle pain      Lipitor [atorvastatin]      Achy      Last reviewed on  1/3/2023 11:46 AM by Timothy Grimaldo      Tasks added this encounter   2/7/2023 - Refill Call (Auto Added)   Tasks due within next 3 months   No tasks due.     Will Matamoros, PharmD  [facility]  [address] [place], [place] [address]  [facility] [address]  Phone: [phone]  Fax: [phone]

## 2023-01-20 ENCOUNTER — LAB VISIT (OUTPATIENT)
Dept: LAB | Facility: HOSPITAL | Age: 78
End: 2023-01-20
Attending: FAMILY MEDICINE
Payer: MEDICARE

## 2023-01-20 DIAGNOSIS — E78.01 FAMILIAL HYPERCHOLESTEROLEMIA: ICD-10-CM

## 2023-01-20 DIAGNOSIS — R73.03 PREDIABETES: ICD-10-CM

## 2023-01-20 LAB
ALBUMIN SERPL BCP-MCNC: 4.1 G/DL (ref 3.5–5.2)
ALP SERPL-CCNC: 50 U/L (ref 55–135)
ALT SERPL W/O P-5'-P-CCNC: 14 U/L (ref 10–44)
ANION GAP SERPL CALC-SCNC: 9 MMOL/L (ref 8–16)
AST SERPL-CCNC: 19 U/L (ref 10–40)
BASOPHILS # BLD AUTO: 0.05 K/UL (ref 0–0.2)
BASOPHILS NFR BLD: 0.7 % (ref 0–1.9)
BILIRUB SERPL-MCNC: 0.4 MG/DL (ref 0.1–1)
BUN SERPL-MCNC: 13 MG/DL (ref 8–23)
CALCIUM SERPL-MCNC: 9.5 MG/DL (ref 8.7–10.5)
CHLORIDE SERPL-SCNC: 102 MMOL/L (ref 95–110)
CHOLEST SERPL-MCNC: 285 MG/DL (ref 120–199)
CHOLEST/HDLC SERPL: 4.1 {RATIO} (ref 2–5)
CO2 SERPL-SCNC: 25 MMOL/L (ref 23–29)
CREAT SERPL-MCNC: 0.9 MG/DL (ref 0.5–1.4)
DIFFERENTIAL METHOD: ABNORMAL
EOSINOPHIL # BLD AUTO: 0.7 K/UL (ref 0–0.5)
EOSINOPHIL NFR BLD: 9.1 % (ref 0–8)
ERYTHROCYTE [DISTWIDTH] IN BLOOD BY AUTOMATED COUNT: 14.6 % (ref 11.5–14.5)
EST. GFR  (NO RACE VARIABLE): >60 ML/MIN/1.73 M^2
ESTIMATED AVG GLUCOSE: 123 MG/DL (ref 68–131)
GLUCOSE SERPL-MCNC: 81 MG/DL (ref 70–110)
HBA1C MFR BLD: 5.9 % (ref 4–5.6)
HCT VFR BLD AUTO: 34.4 % (ref 37–48.5)
HDLC SERPL-MCNC: 69 MG/DL (ref 40–75)
HDLC SERPL: 24.2 % (ref 20–50)
HGB BLD-MCNC: 11.2 G/DL (ref 12–16)
IMM GRANULOCYTES # BLD AUTO: 0.04 K/UL (ref 0–0.04)
IMM GRANULOCYTES NFR BLD AUTO: 0.6 % (ref 0–0.5)
LDLC SERPL CALC-MCNC: 194.4 MG/DL (ref 63–159)
LYMPHOCYTES # BLD AUTO: 2 K/UL (ref 1–4.8)
LYMPHOCYTES NFR BLD: 28.2 % (ref 18–48)
MCH RBC QN AUTO: 29.8 PG (ref 27–31)
MCHC RBC AUTO-ENTMCNC: 32.6 G/DL (ref 32–36)
MCV RBC AUTO: 92 FL (ref 82–98)
MONOCYTES # BLD AUTO: 0.7 K/UL (ref 0.3–1)
MONOCYTES NFR BLD: 9.5 % (ref 4–15)
NEUTROPHILS # BLD AUTO: 3.7 K/UL (ref 1.8–7.7)
NEUTROPHILS NFR BLD: 51.9 % (ref 38–73)
NONHDLC SERPL-MCNC: 216 MG/DL
NRBC BLD-RTO: 0 /100 WBC
PLATELET # BLD AUTO: 330 K/UL (ref 150–450)
PMV BLD AUTO: 10.4 FL (ref 9.2–12.9)
POTASSIUM SERPL-SCNC: 4.4 MMOL/L (ref 3.5–5.1)
PROT SERPL-MCNC: 7.2 G/DL (ref 6–8.4)
RBC # BLD AUTO: 3.76 M/UL (ref 4–5.4)
SODIUM SERPL-SCNC: 136 MMOL/L (ref 136–145)
TRIGL SERPL-MCNC: 108 MG/DL (ref 30–150)
WBC # BLD AUTO: 7.16 K/UL (ref 3.9–12.7)

## 2023-01-20 PROCEDURE — 80053 COMPREHEN METABOLIC PANEL: CPT | Mod: HCNC | Performed by: FAMILY MEDICINE

## 2023-01-20 PROCEDURE — 85025 COMPLETE CBC W/AUTO DIFF WBC: CPT | Mod: HCNC | Performed by: FAMILY MEDICINE

## 2023-01-20 PROCEDURE — 80061 LIPID PANEL: CPT | Mod: HCNC | Performed by: FAMILY MEDICINE

## 2023-01-20 PROCEDURE — 36415 COLL VENOUS BLD VENIPUNCTURE: CPT | Mod: HCNC,PO | Performed by: FAMILY MEDICINE

## 2023-01-20 PROCEDURE — 83036 HEMOGLOBIN GLYCOSYLATED A1C: CPT | Mod: HCNC | Performed by: FAMILY MEDICINE

## 2023-01-24 ENCOUNTER — TELEPHONE (OUTPATIENT)
Dept: PSYCHIATRY | Facility: CLINIC | Age: 78
End: 2023-01-24
Payer: MEDICARE

## 2023-01-24 NOTE — TELEPHONE ENCOUNTER
----- Message from Simran Hawkins sent at 1/24/2023  2:53 PM CST -----  Contact: Patient  Type:  Apoointment Request    Name of Caller: Patient    When is the first available appointment?       Would the patient rather a call back or a response via [employer]?  Call    Best Call Back Number: [phone]    Additional Information:

## 2023-01-25 ENCOUNTER — OFFICE VISIT (OUTPATIENT)
Dept: FAMILY MEDICINE | Facility: CLINIC | Age: 78
End: 2023-01-25
Payer: MEDICARE

## 2023-01-25 VITALS
BODY MASS INDEX: 24.49 KG/M2 | DIASTOLIC BLOOD PRESSURE: 60 MMHG | SYSTOLIC BLOOD PRESSURE: 124 MMHG | OXYGEN SATURATION: 95 % | TEMPERATURE: 98 F | WEIGHT: 121.5 LBS | HEIGHT: 59 IN | HEART RATE: 86 BPM

## 2023-01-25 DIAGNOSIS — Z23 NEED FOR HEPATITIS A VACCINATION: ICD-10-CM

## 2023-01-25 DIAGNOSIS — E78.2 MIXED HYPERLIPIDEMIA: ICD-10-CM

## 2023-01-25 DIAGNOSIS — K21.9 GASTROESOPHAGEAL REFLUX DISEASE WITHOUT ESOPHAGITIS: ICD-10-CM

## 2023-01-25 DIAGNOSIS — E78.01 FAMILIAL HYPERCHOLESTEROLEMIA: ICD-10-CM

## 2023-01-25 DIAGNOSIS — G44.209 TENSION HEADACHE: ICD-10-CM

## 2023-01-25 DIAGNOSIS — Z23 NEED FOR COVID-19 VACCINE: ICD-10-CM

## 2023-01-25 DIAGNOSIS — R73.9 HYPERGLYCEMIA: ICD-10-CM

## 2023-01-25 DIAGNOSIS — M12.9 ARTHRITIS, MULTIPLE JOINT INVOLVEMENT: ICD-10-CM

## 2023-01-25 DIAGNOSIS — Z23 NEED FOR SHINGLES VACCINE: ICD-10-CM

## 2023-01-25 DIAGNOSIS — I10 PRIMARY HYPERTENSION: Primary | ICD-10-CM

## 2023-01-25 PROCEDURE — 3078F PR MOST RECENT DIASTOLIC BLOOD PRESSURE < 80 MM HG: ICD-10-PCS | Mod: HCNC,CPTII,S$GLB, | Performed by: FAMILY MEDICINE

## 2023-01-25 PROCEDURE — 1125F AMNT PAIN NOTED PAIN PRSNT: CPT | Mod: HCNC,CPTII,S$GLB, | Performed by: FAMILY MEDICINE

## 2023-01-25 PROCEDURE — 1160F PR REVIEW ALL MEDS BY PRESCRIBER/CLIN PHARMACIST DOCUMENTED: ICD-10-PCS | Mod: HCNC,CPTII,S$GLB, | Performed by: FAMILY MEDICINE

## 2023-01-25 PROCEDURE — 3078F DIAST BP <80 MM HG: CPT | Mod: HCNC,CPTII,S$GLB, | Performed by: FAMILY MEDICINE

## 2023-01-25 PROCEDURE — 3074F SYST BP LT 130 MM HG: CPT | Mod: HCNC,CPTII,S$GLB, | Performed by: FAMILY MEDICINE

## 2023-01-25 PROCEDURE — 1159F MED LIST DOCD IN RCRD: CPT | Mod: HCNC,CPTII,S$GLB, | Performed by: FAMILY MEDICINE

## 2023-01-25 PROCEDURE — 1101F PT FALLS ASSESS-DOCD LE1/YR: CPT | Mod: HCNC,CPTII,S$GLB, | Performed by: FAMILY MEDICINE

## 2023-01-25 PROCEDURE — 3074F PR MOST RECENT SYSTOLIC BLOOD PRESSURE < 130 MM HG: ICD-10-PCS | Mod: HCNC,CPTII,S$GLB, | Performed by: FAMILY MEDICINE

## 2023-01-25 PROCEDURE — 99214 PR OFFICE/OUTPT VISIT, EST, LEVL IV, 30-39 MIN: ICD-10-PCS | Mod: HCNC,S$GLB,, | Performed by: FAMILY MEDICINE

## 2023-01-25 PROCEDURE — 3288F FALL RISK ASSESSMENT DOCD: CPT | Mod: HCNC,CPTII,S$GLB, | Performed by: FAMILY MEDICINE

## 2023-01-25 PROCEDURE — 99214 OFFICE O/P EST MOD 30 MIN: CPT | Mod: HCNC,S$GLB,, | Performed by: FAMILY MEDICINE

## 2023-01-25 PROCEDURE — 3288F PR FALLS RISK ASSESSMENT DOCUMENTED: ICD-10-PCS | Mod: HCNC,CPTII,S$GLB, | Performed by: FAMILY MEDICINE

## 2023-01-25 PROCEDURE — 99999 PR PBB SHADOW E&M-EST. PATIENT-LVL V: ICD-10-PCS | Mod: PBBFAC,HCNC,, | Performed by: FAMILY MEDICINE

## 2023-01-25 PROCEDURE — 1125F PR PAIN SEVERITY QUANTIFIED, PAIN PRESENT: ICD-10-PCS | Mod: HCNC,CPTII,S$GLB, | Performed by: FAMILY MEDICINE

## 2023-01-25 PROCEDURE — 99999 PR PBB SHADOW E&M-EST. PATIENT-LVL V: CPT | Mod: PBBFAC,HCNC,, | Performed by: FAMILY MEDICINE

## 2023-01-25 PROCEDURE — 1101F PR PT FALLS ASSESS DOC 0-1 FALLS W/OUT INJ PAST YR: ICD-10-PCS | Mod: HCNC,CPTII,S$GLB, | Performed by: FAMILY MEDICINE

## 2023-01-25 PROCEDURE — 1159F PR MEDICATION LIST DOCUMENTED IN MEDICAL RECORD: ICD-10-PCS | Mod: HCNC,CPTII,S$GLB, | Performed by: FAMILY MEDICINE

## 2023-01-25 PROCEDURE — 1160F RVW MEDS BY RX/DR IN RCRD: CPT | Mod: HCNC,CPTII,S$GLB, | Performed by: FAMILY MEDICINE

## 2023-01-25 RX ORDER — GABAPENTIN 300 MG/1
300 CAPSULE ORAL 2 TIMES DAILY
Qty: 180 CAPSULE | Refills: 1 | Status: SHIPPED | OUTPATIENT
Start: 2023-01-25 | End: 2023-06-23

## 2023-01-25 RX ORDER — ZOSTER VACCINE RECOMBINANT, ADJUVANTED 50 MCG/0.5
0.5 KIT INTRAMUSCULAR ONCE
Qty: 1 EACH | Refills: 0 | Status: SHIPPED | OUTPATIENT
Start: 2023-01-25 | End: 2023-01-25

## 2023-01-25 RX ORDER — CYCLOBENZAPRINE HCL 10 MG
TABLET ORAL
COMMUNITY
Start: 2023-01-03 | End: 2023-02-14 | Stop reason: SDUPTHER

## 2023-01-25 NOTE — PATIENT INSTRUCTIONS
Get from pharmacy ([facility]) COVID bivalent booster  Hepatitis A shot #1 (before trip) and 2nd in 6 months to complete the series  Shingrix shingles vaccine #1 before trip and #2 in 2-6 months after first dose to complete the series

## 2023-01-25 NOTE — PROGRESS NOTES
Subjective:       Patient ID: Rola Richter is a 77 y.o. female.    Chief Complaint: Follow-up (Shot for lungs/ dizziness )    HPI  Review of Systems   Constitutional:  Negative for fatigue and unexpected weight change.   Respiratory:  Negative for chest tightness and shortness of breath.    Cardiovascular:  Negative for chest pain, palpitations and leg swelling.   Gastrointestinal:  Negative for abdominal pain.   Musculoskeletal:  Negative for arthralgias.   Neurological:  Negative for dizziness, syncope, light-headedness and headaches.     Patient Active Problem List   Diagnosis    Hypertension    GERD (gastroesophageal reflux disease)    Anxiety    Hyperlipidemia    Osteoporosis    Dependency on pain medication    PTSD (post-traumatic stress disorder)    CMC arthritis    Dry eye syndrome    DDD (degenerative disc disease), cervical    Occipital neuralgia    Cervical radiculitis    Primary osteoarthritis involving multiple joints    MDD (major depressive disorder), recurrent, in partial remission    Dysphagia    Spondylosis of cervical region without myelopathy or radiculopathy    Myofascial pain    Neck pain    Bronchitis    Calcification of aorta    Mild neurocognitive disorder     Patient is here for a chronic conditions follow up.    Reviewed labs 9/2022. Ckd stage 3, anemia   Card Dr. Moyer familial cholesterolemia now on repatha    Eye Dr. Eduardo    HPREJI- Your cholesterol is high.  Tried lipitor, crestor, pravastatin -all muscle pain. Taking QOD pravastatin 10mg. Candidate for repatha?      Prediabetes a1c 5.9     H/o ant neck surgery x 3 as child due to tumors -? Thyroid or thymus     C/o food stuck in throat, hoarseness.       Neck pain -helps when wears collar     Neuro PA Jackler Has chronic daily headaches- top and frontal. Congestion relieved partially by astelin and flonase NS.  PND, sneezing, hoarse. Sob and wheezing are improving but still lots of phlegm. No fever.  Unrelieved with tessalon or  robitussin.  singulair added 7/2021. CT sinus showed chronic bin sinus disease.  Start clindamycin 300mg qid x 14 days.  Under care of ENT Dr. Jansen -seen 8/23 and 9/21/2021. Did not feel daily HA were rhinogenic-Referred to [facility] SILVER[facility] Diamond -seen 10/7/2021 and gabapentin added. Helping some. Has f/u next month.     Referred to pain clinic for left leg numbness and cervical radiculitis 7/2021.   Dr. Grimaldo MELANY 9/22      Admitted NS 6/2021 former smoker after being seen in ED for SOb, wheeze, cough determined to be due to RSV induced acute bronchitis. She was treated supportively with albuterol nebulizer therapy and steroids. She did not require O2 therapy as she was stable on room air. She was monitored overnight and discharged 6/4/2021.        Heme/onc Dr. Murphy- anemia. Has had work up for blood loss anemia.  Likely anemia of chronic disease. Iron is normal     GI Dr. Victor egd 2/2021 10 gastric polyps (fundic gland polyps), hiatal hernia, gastritis.  H Pylori neg. Esophageal bx- no metaplasia, dysplasia or malignancy.   Colonoscopy 5/19 polyp -adenomatous. On 5 year surveillance     HPL-  Intolerant to statins due to myalgia.  Tried lipitor and crestor. Had to stop it      Pain mgmt Dr. Grimaldo taking norco 5 bid . DPS checked no evidence of diversion.  Tried lyrica but stopped it due to dizziness     Rheum Dr. KARUNA Ram Has severe hand OA- tried plaquenil but did not help so stopped it . Osteoporosis now on prolia q 6m     Psych Dr. Benítez MDDD/PTSD. Mood is good on zoloft  Objective:      Physical Exam  Vitals and nursing note reviewed.   Constitutional:       Appearance: She is well-developed.   Cardiovascular:      Rate and Rhythm: Normal rate and regular rhythm.      Heart sounds: Normal heart sounds.   Pulmonary:      Effort: Pulmonary effort is normal.      Breath sounds: Normal breath sounds.   Skin:     General: Skin is warm and dry.   Neurological:      Mental Status: She is alert and oriented to  person, place, and time.       Assessment:       1. Primary hypertension    2. Mixed hyperlipidemia    3. Gastroesophageal reflux disease without esophagitis    4. Familial hypercholesterolemia    5. Hyperglycemia    6. Tension headache    7. Arthritis, multiple joint involvement    8. Need for shingles vaccine    9. Need for COVID-19 vaccine    10. Need for hepatitis A vaccination        Plan:         1. Primary hypertension  Controlled on current medications.  Continue current medications.      2. Mixed hyperlipidemia  I recommend a heart healthy diet rich in fiber, fresh vegetables and fruit and low in saturated fats (fried foods, red meat, etc.).  I also recommend regular exercise including a minimum of 150 minutes of cardio exercise per week and 2-30 minute workouts of strength training like light weights, yoga, pilates, etc.  You should work toward a body mass index of < 25.      - CBC Auto Differential; Future  - Comprehensive Metabolic Panel; Future  - Lipid Panel; Future    3. Gastroesophageal reflux disease without esophagitis  Counseled patient on prevention of reflux with changes in diet and behavior.  I recommended avoidance of greasy and spicy foods, caffeine and eating within 3 hours of bedtime.  I counseled the patient to avoid eating large meals and instead eating more frequent small meals.  I also recommended weight loss and elevation of the head of the bed by 6 inches.  If symptoms persist after these changes medication may be needed to control GERD.      4. Familial hypercholesterolemia  Cont repatha and monitoring    5. Hyperglycemia   Your blood sugar is borderline high.  This means you are at risk for developing type 2 diabetes mellitus.  To lessen your risk you should exercise regularly, avoid excess carbohydrates and work toward a body mass index of less than 25.      - Hemoglobin A1C; Future    6. Tension headache  Cont current mgmt    7. Arthritis, multiple joint involvement  Refer for  eval and treat  - Ambulatory referral/consult to Rheumatology; Future    8. Need for shingles vaccine  Rx sent to pharmacy to start series  - varicella-zoster gE-AS01B, PF, (SHINGRIX, PF,) 50 mcg/0.5 mL injection; Inject 0.5 mLs into the muscle once. for 1 dose  Dispense: 1 each; Refill: 0  - varicella-zoster gE-AS01B, PF, (SHINGRIX, PF,) 50 mcg/0.5 mL injection; Inject 0.5 mLs into the muscle once. for 1 dose  Dispense: 1 each; Refill: 0    9. Need for COVID-19 vaccine  Rx sent to pharmacy  - COVID-19-MRNA-(PF)(Pfizer Omicron) Vaccine    10. Need for hepatitis A vaccination  Going to [facility]. Recommend 1st shot prior to trip  - hepatitis A vaccine (VAQTA) 50 unit/mL injection; Inject 1 mL (50 Units total) into the muscle once. for 1 dose  Dispense: 1 mL; Refill: 0  - hepatitis A vaccine (VAQTA) 50 unit/mL injection; Inject 1 mL (50 Units total) into the muscle once. for 1 dose  Dispense: 1 mL; Refill: 0      Time spent with patient: 20 minutes    Patient with be reevaluated in 6 months or sooner prn    Greater than 50% of this visit was spent counseling as described in above documentation:Yes

## 2023-01-27 ENCOUNTER — TELEPHONE (OUTPATIENT)
Dept: FAMILY MEDICINE | Facility: CLINIC | Age: 78
End: 2023-01-27

## 2023-01-27 NOTE — TELEPHONE ENCOUNTER
----- Message from Teddy Ledezma sent at 1/26/2023  5:06 PM CST -----  Regarding: Request for Digital Medicine [facility] Dr. Carias,    Your patient Rola Richter was enrolled in [facility] on 5/18/18. Unfortunately, her  has requested discharge from Digital Medicine monitoring and we wanted to make you aware.     Thanks for your support of Digital Medicine programs! Please let me know if you any questions or concerns.    Sincerely,   Teddy Ledezma, MS, RD, LDN  Health   [phone]

## 2023-01-30 ENCOUNTER — PES CALL (OUTPATIENT)
Dept: ADMINISTRATIVE | Facility: CLINIC | Age: 78
End: 2023-01-30
Payer: MEDICARE

## 2023-01-31 ENCOUNTER — OFFICE VISIT (OUTPATIENT)
Dept: PSYCHIATRY | Facility: CLINIC | Age: 78
End: 2023-01-31
Payer: MEDICARE

## 2023-01-31 VITALS
HEART RATE: 79 BPM | HEIGHT: 59 IN | DIASTOLIC BLOOD PRESSURE: 67 MMHG | WEIGHT: 123.13 LBS | BODY MASS INDEX: 24.82 KG/M2 | SYSTOLIC BLOOD PRESSURE: 134 MMHG

## 2023-01-31 DIAGNOSIS — F41.1 GENERALIZED ANXIETY DISORDER: ICD-10-CM

## 2023-01-31 DIAGNOSIS — F33.1 MODERATE EPISODE OF RECURRENT MAJOR DEPRESSIVE DISORDER: Primary | ICD-10-CM

## 2023-01-31 DIAGNOSIS — F43.10 PTSD (POST-TRAUMATIC STRESS DISORDER): ICD-10-CM

## 2023-01-31 DIAGNOSIS — F19.20 DEPENDENCY ON PAIN MEDICATION: ICD-10-CM

## 2023-01-31 DIAGNOSIS — G31.84 MILD NEUROCOGNITIVE DISORDER: ICD-10-CM

## 2023-01-31 PROCEDURE — 99499 RISK ADDL DX/OHS AUDIT: ICD-10-PCS | Mod: S$PBB,AF,HB, | Performed by: STUDENT IN AN ORGANIZED HEALTH CARE EDUCATION/TRAINING PROGRAM

## 2023-01-31 PROCEDURE — 3078F DIAST BP <80 MM HG: CPT | Mod: CPTII,S$GLB,, | Performed by: STUDENT IN AN ORGANIZED HEALTH CARE EDUCATION/TRAINING PROGRAM

## 2023-01-31 PROCEDURE — 1101F PT FALLS ASSESS-DOCD LE1/YR: CPT | Mod: CPTII,S$GLB,, | Performed by: STUDENT IN AN ORGANIZED HEALTH CARE EDUCATION/TRAINING PROGRAM

## 2023-01-31 PROCEDURE — 1160F PR REVIEW ALL MEDS BY PRESCRIBER/CLIN PHARMACIST DOCUMENTED: ICD-10-PCS | Mod: CPTII,S$GLB,, | Performed by: STUDENT IN AN ORGANIZED HEALTH CARE EDUCATION/TRAINING PROGRAM

## 2023-01-31 PROCEDURE — 1125F PR PAIN SEVERITY QUANTIFIED, PAIN PRESENT: ICD-10-PCS | Mod: CPTII,S$GLB,, | Performed by: STUDENT IN AN ORGANIZED HEALTH CARE EDUCATION/TRAINING PROGRAM

## 2023-01-31 PROCEDURE — 99215 PR OFFICE/OUTPT VISIT, EST, LEVL V, 40-54 MIN: ICD-10-PCS | Mod: HCNC,S$GLB,, | Performed by: STUDENT IN AN ORGANIZED HEALTH CARE EDUCATION/TRAINING PROGRAM

## 2023-01-31 PROCEDURE — 99999 PR PBB SHADOW E&M-EST. PATIENT-LVL IV: CPT | Mod: PBBFAC,HCNC,, | Performed by: STUDENT IN AN ORGANIZED HEALTH CARE EDUCATION/TRAINING PROGRAM

## 2023-01-31 PROCEDURE — 1125F AMNT PAIN NOTED PAIN PRSNT: CPT | Mod: CPTII,S$GLB,, | Performed by: STUDENT IN AN ORGANIZED HEALTH CARE EDUCATION/TRAINING PROGRAM

## 2023-01-31 PROCEDURE — 3078F PR MOST RECENT DIASTOLIC BLOOD PRESSURE < 80 MM HG: ICD-10-PCS | Mod: CPTII,S$GLB,, | Performed by: STUDENT IN AN ORGANIZED HEALTH CARE EDUCATION/TRAINING PROGRAM

## 2023-01-31 PROCEDURE — 99499 UNLISTED E&M SERVICE: CPT | Mod: S$PBB,AF,HB, | Performed by: STUDENT IN AN ORGANIZED HEALTH CARE EDUCATION/TRAINING PROGRAM

## 2023-01-31 PROCEDURE — 99999 PR PBB SHADOW E&M-EST. PATIENT-LVL IV: ICD-10-PCS | Mod: PBBFAC,HCNC,, | Performed by: STUDENT IN AN ORGANIZED HEALTH CARE EDUCATION/TRAINING PROGRAM

## 2023-01-31 PROCEDURE — 3075F PR MOST RECENT SYSTOLIC BLOOD PRESS GE 130-139MM HG: ICD-10-PCS | Mod: CPTII,S$GLB,, | Performed by: STUDENT IN AN ORGANIZED HEALTH CARE EDUCATION/TRAINING PROGRAM

## 2023-01-31 PROCEDURE — 1159F PR MEDICATION LIST DOCUMENTED IN MEDICAL RECORD: ICD-10-PCS | Mod: CPTII,S$GLB,, | Performed by: STUDENT IN AN ORGANIZED HEALTH CARE EDUCATION/TRAINING PROGRAM

## 2023-01-31 PROCEDURE — 99215 OFFICE O/P EST HI 40 MIN: CPT | Mod: HCNC,S$GLB,, | Performed by: STUDENT IN AN ORGANIZED HEALTH CARE EDUCATION/TRAINING PROGRAM

## 2023-01-31 PROCEDURE — 1160F RVW MEDS BY RX/DR IN RCRD: CPT | Mod: CPTII,S$GLB,, | Performed by: STUDENT IN AN ORGANIZED HEALTH CARE EDUCATION/TRAINING PROGRAM

## 2023-01-31 PROCEDURE — 1101F PR PT FALLS ASSESS DOC 0-1 FALLS W/OUT INJ PAST YR: ICD-10-PCS | Mod: CPTII,S$GLB,, | Performed by: STUDENT IN AN ORGANIZED HEALTH CARE EDUCATION/TRAINING PROGRAM

## 2023-01-31 PROCEDURE — 3288F PR FALLS RISK ASSESSMENT DOCUMENTED: ICD-10-PCS | Mod: CPTII,S$GLB,, | Performed by: STUDENT IN AN ORGANIZED HEALTH CARE EDUCATION/TRAINING PROGRAM

## 2023-01-31 PROCEDURE — 3075F SYST BP GE 130 - 139MM HG: CPT | Mod: CPTII,S$GLB,, | Performed by: STUDENT IN AN ORGANIZED HEALTH CARE EDUCATION/TRAINING PROGRAM

## 2023-01-31 PROCEDURE — 3288F FALL RISK ASSESSMENT DOCD: CPT | Mod: CPTII,S$GLB,, | Performed by: STUDENT IN AN ORGANIZED HEALTH CARE EDUCATION/TRAINING PROGRAM

## 2023-01-31 PROCEDURE — 1159F MED LIST DOCD IN RCRD: CPT | Mod: CPTII,S$GLB,, | Performed by: STUDENT IN AN ORGANIZED HEALTH CARE EDUCATION/TRAINING PROGRAM

## 2023-01-31 RX ORDER — SERTRALINE HYDROCHLORIDE 100 MG/1
150 TABLET, FILM COATED ORAL DAILY
Qty: 45 TABLET | Refills: 1 | Status: SHIPPED | OUTPATIENT
Start: 2023-01-31 | End: 2023-03-22 | Stop reason: SDUPTHER

## 2023-01-31 RX ORDER — BUSPIRONE HYDROCHLORIDE 10 MG/1
10 TABLET ORAL 2 TIMES DAILY
Qty: 60 TABLET | Refills: 1 | Status: SHIPPED | OUTPATIENT
Start: 2023-01-31 | End: 2023-03-22 | Stop reason: DRUGHIGH

## 2023-01-31 RX ORDER — DONEPEZIL HYDROCHLORIDE 10 MG/1
10 TABLET, FILM COATED ORAL NIGHTLY
Qty: 30 TABLET | Refills: 1 | Status: SHIPPED | OUTPATIENT
Start: 2023-01-31 | End: 2023-03-22 | Stop reason: SDUPTHER

## 2023-01-31 NOTE — PROGRESS NOTES
Outpatient Psychiatry Followup Visit (DO/MD/NP)    1/31/2023    Rola Richter, a 77 y.o. female, presenting for followup visit.     Reason for Encounter: Medication management. Met with patient, in person.     Assessment - Diagnosis - Goals:     Impression:      ICD-10-CM ICD-9-CM   1. Moderate episode of recurrent major depressive disorder  F33.1 296.32   2. Generalized anxiety disorder  F41.1 300.02   3. PTSD (post-traumatic stress disorder)  F43.10 309.81   4. Mild neurocognitive disorder  G31.84 331.83   5. Dependency on pain medication  F19.20 304.90       Most recent biopsychosocial case formulation dated 16SEP2022.     Treatment Plan/Recommendations:   Medication management: The risks and benefits of medication were discussed with the patient.  Psychotropic history includes  ARICEPT, BARBITURATES, BUSPAR, GABAPENTIN, PROZAC, WELLBUTRIN, and ZOLOFT.  Restart BUSPAR  Tolerability established in the past.  For anxiety and depression  Titration:  [id number]: Increase to 10mg BID  31JAN2023: Start 5mg BID for 3 days  Prior to 31JAN2023 pt had been taking ZOLOFT 150mg daily  Continue ZOLOFT  Anxiety may be made worse by dopamine-reuptake blocking properties  Titration:  [id number]: Continue 150mg daily  Prior to evaluation pt had been taking 150mg daily  Continue ARICEPT   Ideally should be prescribed by neurology or primary care - encouraged patient to speak with those clinicians 21JAN2023  Medication Adjustments:  [id number]: Increase to 10mg HS  [id number]:  Prior to evaluation pt had been taking 5mg HS  Pt was encouraged to keep appointments with primary care  Referral:  Referral for individual psychotherapy (PENDING)  Monitor:  GAD7 and PHQ9  GDS  PSS10  Monitor cognition. Next visit consider administering MOCA  [id number] 29/30 S-MMSE, 4/5 Mini-Cog  Monitor sleep - Will consider starting a sleep diary at a future encounter.  Monitor vitals, especially weight and BP  The treatment plan and followup plan were  "reviewed with the patient  Pt understands to contact clinic if symptoms worsen, and to call 911 or go to nearest ER for suicidal ideation, intent or plan.    Return: 1 month, CLINIC ONLY (not virtual), for medication management only    Interval History:     Patient did not display signs of nor endorse symptoms of overt psychosis or acute mood disorder requiring hospitalization during the encounter. Patient denied violent thoughts or suicidal or homicidal ideation, intent, or plan.    Pt is pleasant and cooperative on interview. Pt is a fair historian. This visit was done in person in the clinic.    Been feeling "hyper" and "restless" since [place]. Feeling less on edge now. Requests to restart BUSPAR. Overall feeling rather well. New kittens.    Pt brought bottles. Reviewed medications with patient. Discussed risks of some medications, including depressing effects of FLEXERIL and GABAPENTIN. Advised to take medications as prescribed and to discuss concerns with prescribers.    Instruments:     Instruments Today (1/31/2023):    GAD7 Interpretation: 10/21 (1/31/2023); MODERATE using 5-10-15 cutoffs. Compared to MILD 8/21 last time, GAD7 worsened.    GDS Interpretation: 21/30 (1/31/2023); SEVERE depression and/or significant quality of life issues, using 10-20 cutoffs. Compared to SEVERE 21/30  last time, GDS is stable.    Pt declined PHQ9 this visit.    PSS10 Interpretation: 31/40 (1/31/2023); HIGH using 14-27 cutoffs. 4/6 perceived helplessness. 0/4 self-efficacy deficits. PSS10 Interpretation: This is a new baseline reading.      Review of Systems:     Mood and Associated Behaviors: Crying spells. Depression improving.  Appetite: No concerns.  Anxiety: Anxiety has worsened.  Stress: Stress has worsened.  Sleep: No concerns.    Functioning in Relationships:  Spouse/partner: no concerns identified  Peers: no concerns identified  Employers: N/A    Current Evaluation:     Constitutional  Vitals:     Vitals:    " "01/31/23 1324   BP: 134/67   Pulse: 79   Weight: 55.8 kg (123 lb 2 oz)   Height: 4' 11" (1.499 m)     General:  casual dress, normal weight (BMI 18.5 - 24.9)    Psychiatric / Mental Status Examination  1. Appearance: Dress is informal but appropriate. Motor activity normal.  2. Discourse: Clear speech with normal rate and volume. Associations intact. Orderly and without tangentiality.  3. Emotional Expression: Anxious and depressed mood. Affect is appropriate.  4. Perception and Thinking: No hallucinations. No suicidality, no homicidality, delusions, or paranoia.  5. Sensorium: Grossly intact. Able to focus for interview.  6. Memory and Fund of Knowledge: Intact for content of interview.  7. Insight and Judgment: Intact.    Neurological / Musculoskeletal / Osteopathic Structural  Gait, Station:  non-ataxic    Auto-populated chart data omitted from this note for brevity.    Billing Documentation:     Method of Encounter IN PERSON visit at the clinic   Type of Encounter Follow up visit with me   Counseling;  Psychotherapy Not applicable: Not done or UNDER 16 minutes   Counseling;  Tobacco and/or Nicotine Not applicable: Not done or UNDER 3 minutes   Additional Codes and Modifiers None   Time Remaining Chart/Pt 77792: FOLLOW UP VISIT 50 minutes   Total Mins  (1/31/2023) 050        Will Leong DO  Department of Psychiatry    "

## 2023-01-31 NOTE — PATIENT INSTRUCTIONS
Restart BUSPAR (BUSPIRONE) half tablet twice per day for 3 days, then increase to a whole tablet twice per day. Try to separate doses by about 12 hours each. (BUSPAR is for depression and anxiety)  Continue ARICEPT (DONEPIZIL) 10mg nightly (ARICEPT for memory)  Continue ZOLOFT (SERTRALINE) 150mg daily (ZOLOFT is for depression)    Referral placed for individual therapy

## 2023-02-03 ENCOUNTER — NURSE TRIAGE (OUTPATIENT)
Dept: ADMINISTRATIVE | Facility: CLINIC | Age: 78
End: 2023-02-03
Payer: MEDICARE

## 2023-02-03 ENCOUNTER — TELEPHONE (OUTPATIENT)
Dept: FAMILY MEDICINE | Facility: CLINIC | Age: 78
End: 2023-02-03
Payer: MEDICARE

## 2023-02-03 ENCOUNTER — OFFICE VISIT (OUTPATIENT)
Dept: URGENT CARE | Facility: CLINIC | Age: 78
End: 2023-02-03
Payer: MEDICARE

## 2023-02-03 VITALS
RESPIRATION RATE: 16 BRPM | BODY MASS INDEX: 24.8 KG/M2 | DIASTOLIC BLOOD PRESSURE: 59 MMHG | HEART RATE: 73 BPM | HEIGHT: 59 IN | SYSTOLIC BLOOD PRESSURE: 119 MMHG | WEIGHT: 123 LBS | OXYGEN SATURATION: 98 % | TEMPERATURE: 98 F

## 2023-02-03 DIAGNOSIS — R05.9 COUGH, UNSPECIFIED TYPE: ICD-10-CM

## 2023-02-03 DIAGNOSIS — U07.1 COVID-19 VIRUS INFECTION: Primary | ICD-10-CM

## 2023-02-03 LAB
CTP QC/QA: YES
SARS-COV-2 AG RESP QL IA.RAPID: POSITIVE

## 2023-02-03 PROCEDURE — 3078F DIAST BP <80 MM HG: CPT | Mod: CPTII,S$GLB,, | Performed by: NURSE PRACTITIONER

## 2023-02-03 PROCEDURE — 1125F AMNT PAIN NOTED PAIN PRSNT: CPT | Mod: CPTII,S$GLB,, | Performed by: NURSE PRACTITIONER

## 2023-02-03 PROCEDURE — 3074F PR MOST RECENT SYSTOLIC BLOOD PRESSURE < 130 MM HG: ICD-10-PCS | Mod: CPTII,S$GLB,, | Performed by: NURSE PRACTITIONER

## 2023-02-03 PROCEDURE — 1125F PR PAIN SEVERITY QUANTIFIED, PAIN PRESENT: ICD-10-PCS | Mod: CPTII,S$GLB,, | Performed by: NURSE PRACTITIONER

## 2023-02-03 PROCEDURE — 3074F SYST BP LT 130 MM HG: CPT | Mod: CPTII,S$GLB,, | Performed by: NURSE PRACTITIONER

## 2023-02-03 PROCEDURE — 3078F PR MOST RECENT DIASTOLIC BLOOD PRESSURE < 80 MM HG: ICD-10-PCS | Mod: CPTII,S$GLB,, | Performed by: NURSE PRACTITIONER

## 2023-02-03 PROCEDURE — 1159F PR MEDICATION LIST DOCUMENTED IN MEDICAL RECORD: ICD-10-PCS | Mod: CPTII,S$GLB,, | Performed by: NURSE PRACTITIONER

## 2023-02-03 PROCEDURE — 87811 SARS-COV-2 COVID19 W/OPTIC: CPT | Mod: QW,S$GLB,, | Performed by: NURSE PRACTITIONER

## 2023-02-03 PROCEDURE — 99203 OFFICE O/P NEW LOW 30 MIN: CPT | Mod: S$GLB,,, | Performed by: NURSE PRACTITIONER

## 2023-02-03 PROCEDURE — 1160F PR REVIEW ALL MEDS BY PRESCRIBER/CLIN PHARMACIST DOCUMENTED: ICD-10-PCS | Mod: CPTII,S$GLB,, | Performed by: NURSE PRACTITIONER

## 2023-02-03 PROCEDURE — 87811 SARS CORONAVIRUS 2 ANTIGEN POCT, MANUAL READ: ICD-10-PCS | Mod: QW,S$GLB,, | Performed by: NURSE PRACTITIONER

## 2023-02-03 PROCEDURE — 99203 PR OFFICE/OUTPT VISIT, NEW, LEVL III, 30-44 MIN: ICD-10-PCS | Mod: S$GLB,,, | Performed by: NURSE PRACTITIONER

## 2023-02-03 PROCEDURE — 1159F MED LIST DOCD IN RCRD: CPT | Mod: CPTII,S$GLB,, | Performed by: NURSE PRACTITIONER

## 2023-02-03 PROCEDURE — 1160F RVW MEDS BY RX/DR IN RCRD: CPT | Mod: CPTII,S$GLB,, | Performed by: NURSE PRACTITIONER

## 2023-02-03 RX ORDER — BENZONATATE 200 MG/1
200 CAPSULE ORAL 3 TIMES DAILY PRN
Qty: 30 CAPSULE | Refills: 0 | Status: SHIPPED | OUTPATIENT
Start: 2023-02-03 | End: 2023-05-04

## 2023-02-03 RX ORDER — ALBUTEROL SULFATE 90 UG/1
2 AEROSOL, METERED RESPIRATORY (INHALATION) EVERY 6 HOURS PRN
Qty: 6.7 G | Refills: 0 | Status: SHIPPED | OUTPATIENT
Start: 2023-02-03 | End: 2023-06-23

## 2023-02-03 NOTE — PATIENT INSTRUCTIONS
INSTRUCTIONS:  - Rest.  - Drink plenty of fluids.  - Take Tylenol and/or Ibuprofen as directed as needed for fever/pain.  Do not take more than the recommended dose.  - follow up with your PCP within the next 1-2 weeks as needed.  - You must understand that you have received an Urgent Care treatment only and that you may be released before all of your medical problems are known or treated.   - You, the patient, will arrange for follow up care as instructed.   - If your condition worsens or fails to improve we recommend that you receive another evaluation at the ER immediately or contact your PCP to discuss your concerns.   - You can call [phone] or [phone] to help schedule an appointment with the appropriate provider.     -If you smoke cigarettes, it would be beneficial for you to stop.    OVER THE COUNTER RECOMMENDATIONS/SUGGESTIONS.     Make sure to stay well hydrated.     Use Nasal Saline to mechanically move any post nasal drip from your eustachian tube or from the back of your throat.     Use warm salt water gargles to ease your throat pain. Warm salt water gargles as needed for sore throat-  1/2 tsp salt to 1 cup warm water, gargle as desired.     Use an antihistamine such as Claritin, Zyrtec or Allegra to dry you out.      Use pseudoephedrine (behind the counter) to decongest. Pseudoephedrine  30 mg up to 240 mg /day. It can raise your blood pressure and give you palpitations.     Use mucinex (guaifenisin) to break up mucous up to 2400mg/day to loosen any mucous.   The mucinex DM pill has a cough suppressant that can be sedating. It can be used at night to stop the tickle at the back of your throat.  You can use Mucinex D (it has guaifenesin and a high dose of pseudoephedrine) in the mornings to help decongest.        Use Afrin in each nare for no longer than 3 days, as it is addictive. It can also dry out your mucous membranes and cause elevated blood pressure. This is especially useful if you  are flying.     Use Flonase 1-2 sprays/nostril per day. It is a local acting steroid nasal spray, if you develop a bloody nose, stop using the medication immediately.     Sometimes Nyquil at night is beneficial to help you get some rest, however it is sedating and it does have an antihistamine, and tylenol.     Honey is a natural cough suppressant that can be used.     Tylenol up to 4,000 mg a day is safe for short periods and can be used for body aches, pain, and fever. However in high doses and prolonged use it can cause liver irritation.     Ibuprofen is a non-steroidal anti-inflammatory that can be used for body aches, pain, and fever.However it can also cause stomach irritation if over used.

## 2023-02-03 NOTE — TELEPHONE ENCOUNTER
----- Message from Windy Cantu sent at 2/3/2023 10:08 AM CST -----  Regarding: not feeling well  Name of Who is Calling:BENTON BUSBY [[id number]]          What is the request in detail: pt isnt feeling well and would like a appointment please advise           Can the clinic reply by [facility]: no           What Number to Call Back if not in [facility]: [phone] (home) [phone] (work)

## 2023-02-03 NOTE — PROGRESS NOTES
"Subjective:       Patient ID: Rola Richter is a 77 y.o. female.    Vitals:  height is 4' 11" (1.499 m) and weight is 55.8 kg (123 lb). Her oral temperature is 98.4 °F (36.9 °C). Her blood pressure is 119/59 (abnormal) and her pulse is 73. Her respiration is 16 and oxygen saturation is 98%.     Chief Complaint: Headache and Cough    Patient presents today with c/o cough and headache X's 2 days. Pt has taken antihistamines for symptoms with no relief. Pt has no known exposures. Pt is covid and flu vaccinated. Pain level 9/10.     Provider note begins below:   Patient denies any fever but has had chills.  Patient also reports some shortness of breath over the last couple of days.  She denies any chest pain, vomiting or abdominal pain.  Patient is awake and alert.  Speaking in full sentences.  She is afebrile.      Headache   This is a new problem. The current episode started in the past 7 days. The problem occurs constantly. The problem has been unchanged. The pain is located in the Frontal region. The quality of the pain is described as aching (pressure). The pain is at a severity of 9/10. The pain is moderate. Associated symptoms include coughing, muscle aches, nausea, sinus pressure and weakness. Pertinent negatives include no ear pain, eye pain, sore throat or vomiting. The treatment provided no relief.   Cough  This is a new problem. The current episode started in the past 7 days. The problem has been unchanged. The problem occurs every few hours. The cough is Productive of sputum. Associated symptoms include chills, headaches, nasal congestion, postnasal drip and shortness of breath. Pertinent negatives include no chest pain, ear pain, rash or sore throat. The treatment provided no relief.     Constitution: Positive for chills. Negative for sweating and fatigue.   HENT:  Positive for postnasal drip and sinus pressure. Negative for ear pain, facial swelling, congestion and sore throat.    Neck: Negative for " painful lymph nodes.   Cardiovascular: Negative.  Negative for chest trauma, chest pain and sob on exertion.   Eyes: Negative.  Negative for eye itching and eye pain.   Respiratory:  Positive for cough and shortness of breath. Negative for chest tightness and asthma.    Gastrointestinal:  Positive for nausea. Negative for vomiting and diarrhea.   Endocrine: negative. cold intolerance and excessive thirst.   Genitourinary: Negative.  Negative for dysuria, frequency, urgency and hematuria.   Musculoskeletal:  Negative for pain, trauma and joint pain.   Skin: Negative.  Negative for rash, wound and hives.   Allergic/Immunologic: Negative.  Negative for eczema, asthma, hives and itching.   Neurological:  Positive for headaches. Negative for disorientation and altered mental status.   Hematologic/Lymphatic: Negative.  Negative for swollen lymph nodes.   Psychiatric/Behavioral: Negative.  Negative for altered mental status, disorientation and confusion.      Objective:      Physical Exam   Constitutional: She is oriented to person, place, and time. She appears well-developed. She is cooperative.  Non-toxic appearance. She does not appear ill. No distress.   HENT:   Head: Normocephalic and atraumatic.   Ears:   Right Ear: Hearing, tympanic membrane, external ear and ear canal normal.   Left Ear: Hearing, tympanic membrane, external ear and ear canal normal.   Nose: Nose normal. No mucosal edema, rhinorrhea or nasal deformity. No epistaxis. Right sinus exhibits no maxillary sinus tenderness and no frontal sinus tenderness. Left sinus exhibits no maxillary sinus tenderness and no frontal sinus tenderness.   Mouth/Throat: Uvula is midline, oropharynx is clear and moist and mucous membranes are normal. No trismus in the jaw. Normal dentition. No uvula swelling. No oropharyngeal exudate, posterior oropharyngeal edema or posterior oropharyngeal erythema.   Eyes: Conjunctivae and lids are normal. No scleral icterus.   Neck:  Trachea normal and phonation normal. Neck supple. No edema present. No erythema present. No neck rigidity present.   Cardiovascular: Normal rate, regular rhythm, normal heart sounds and normal pulses.   Pulmonary/Chest: Effort normal and breath sounds normal. No stridor. No respiratory distress. She has no decreased breath sounds. She has no wheezes. She has no rhonchi. She has no rales. She exhibits no tenderness.   Abdominal: Normal appearance. She exhibits no distension. Soft. flat abdomen There is no abdominal tenderness. There is no guarding, no left CVA tenderness and no right CVA tenderness.   Musculoskeletal: Normal range of motion.         General: No deformity. Normal range of motion.   Lymphadenopathy:     She has no cervical adenopathy.   Neurological: no focal deficit. She is alert and oriented to person, place, and time. She exhibits normal muscle tone. Coordination normal.   Skin: Skin is warm, dry, intact, not diaphoretic and not pale.   Psychiatric: Her speech is normal and behavior is normal. Mood, judgment and thought content normal.   Nursing note and vitals reviewed.  The following results have been reviewed with the patient:  LABS-  Results for orders placed or performed in visit on 02/03/23   SARS Coronavirus 2 Antigen, POCT Manual Read   Result Value Ref Range    SARS Coronavirus 2 Antigen Positive (A) Negative     Acceptable Yes      *Note: Due to a large number of results and/or encounters for the requested time period, some results have not been displayed. A complete set of results can be found in Results Review.      3 Covid risk score.    IMAGING-  No results found.      Assessment:       1. COVID-19 virus infection    2. Cough, unspecified type          Plan:       All hx was provided by the pt or available as part of established EMR. The pt past medical hx, family hx, social hx, and current medications were reviewed. Interpretation of diagnostics performed today were  discussed. Tx discussed. Quarantine recommendations based on CDC guidelines discussed. Pt may follow up with [facility] for any concern. ER precautions. Pt voiced understanding of all discussed, and agreed.  Patient agreed to treat today with antivirals with positive COVID test.  FOLLOWUP  Follow up if symptoms worsen or fail to improve, for PLEASE CONTACT PCP OR CONTACT THE EMERGENCY ROOM..     PATIENT INSTRUCTIONS  Patient Instructions   INSTRUCTIONS:  - Rest.  - Drink plenty of fluids.  - Take Tylenol and/or Ibuprofen as directed as needed for fever/pain.  Do not take more than the recommended dose.  - follow up with your PCP within the next 1-2 weeks as needed.  - You must understand that you have received an Urgent Care treatment only and that you may be released before all of your medical problems are known or treated.   - You, the patient, will arrange for follow up care as instructed.   - If your condition worsens or fails to improve we recommend that you receive another evaluation at the ER immediately or contact your PCP to discuss your concerns.   - You can call [phone] or [phone] to help schedule an appointment with the appropriate provider.     -If you smoke cigarettes, it would be beneficial for you to stop.    OVER THE COUNTER RECOMMENDATIONS/SUGGESTIONS.     Make sure to stay well hydrated.     Use Nasal Saline to mechanically move any post nasal drip from your eustachian tube or from the back of your throat.     Use warm salt water gargles to ease your throat pain. Warm salt water gargles as needed for sore throat-  1/2 tsp salt to 1 cup warm water, gargle as desired.     Use an antihistamine such as Claritin, Zyrtec or Allegra to dry you out.      Use pseudoephedrine (behind the counter) to decongest. Pseudoephedrine  30 mg up to 240 mg /day. It can raise your blood pressure and give you palpitations.     Use mucinex (guaifenisin) to break up mucous up to 2400mg/day to loosen any mucous.    The mucinex DM pill has a cough suppressant that can be sedating. It can be used at night to stop the tickle at the back of your throat.  You can use Mucinex D (it has guaifenesin and a high dose of pseudoephedrine) in the mornings to help decongest.        Use Afrin in each nare for no longer than 3 days, as it is addictive. It can also dry out your mucous membranes and cause elevated blood pressure. This is especially useful if you are flying.     Use Flonase 1-2 sprays/nostril per day. It is a local acting steroid nasal spray, if you develop a bloody nose, stop using the medication immediately.     Sometimes Nyquil at night is beneficial to help you get some rest, however it is sedating and it does have an antihistamine, and tylenol.     Honey is a natural cough suppressant that can be used.     Tylenol up to 4,000 mg a day is safe for short periods and can be used for body aches, pain, and fever. However in high doses and prolonged use it can cause liver irritation.     Ibuprofen is a non-steroidal anti-inflammatory that can be used for body aches, pain, and fever.However it can also cause stomach irritation if over used.         THANK YOU FOR ALLOWING ME TO PARTICIPATE IN YOUR HEALTHCARE,     Imtiaz Beard, NP   COVID-19 virus infection  -     molnupiravir 200 mg capsule (EUA); Take 4 capsules (800 mg total) by mouth every 12 (twelve) hours. for 5 days  Dispense: 40 capsule; Refill: 0  -     benzonatate (TESSALON) 200 MG capsule; Take 1 capsule (200 mg total) by mouth 3 (three) times daily as needed for Cough.  Dispense: 30 capsule; Refill: 0  -     albuterol (VENTOLIN HFA) 90 mcg/actuation inhaler; Inhale 2 puffs into the lungs every 6 (six) hours as needed for Wheezing or Shortness of Breath. Rescue  Dispense: 6.7 g; Refill: 0    Cough, unspecified type  -     SARS Coronavirus 2 Antigen, POCT Manual Read

## 2023-02-03 NOTE — TELEPHONE ENCOUNTER
Reason for Disposition   SEVERE difficulty breathing (e.g., struggling for each breath, speaks in single words)    Protocols used: Common Cold-A-OH    Pt states she has a very bad cold and pressure in her ears for 3 days. Pt states she is struggling to breath and that is is not from a stuffy nose. Pt confirmed this twice.    Pt advised to call 911 for EMS. She asked if she could go to Urgent Care. Pt advised once the symptoms are stated the Triage disposition cannot be changed. Pt verbalized understanding.

## 2023-02-03 NOTE — TELEPHONE ENCOUNTER
Called patient to see what symptoms she is having so I can schedule an appointment. No answer , left voicemail to return call.

## 2023-02-07 ENCOUNTER — SPECIALTY PHARMACY (OUTPATIENT)
Dept: PHARMACY | Facility: CLINIC | Age: 78
End: 2023-02-07
Payer: MEDICARE

## 2023-02-07 NOTE — TELEPHONE ENCOUNTER
Specialty Pharmacy - Refill Coordination    Specialty Medication Orders Linked to Encounter      Flowsheet Row Most Recent Value   Medication #1 evolocumab (REPATHA SURECLICK) 140 mg/mL PnIj (Order#[id number], Rx#[id number])            Refill Questions - Documented Responses      Flowsheet Row Most Recent Value   Patient Availability and HIPAA Verification    Does patient want to proceed with activity? Yes   HIPAA/medical authority confirmed? Yes   Relationship to patient of person spoken to? Self   Refill Screening Questions    Would patient like to speak to a pharmacist? No   When does the patient need to receive the medication? 02/15/23   Refill Delivery Questions    How will the patient receive the medication? [facility]   When does the patient need to receive the medication? 02/15/23   Shipping Address Home   Address in [facility] confirmed and updated if neccessary? Yes   Expected Copay ($) 0   Is the patient able to afford the medication copay? Yes   Payment Method zero copay   Days supply of Refill 28   Supplies needed? No supplies needed   Refill activity completed? Yes   Refill activity plan Refill scheduled   Shipment/Pickup Date: 02/09/23            Current Outpatient Medications   Medication Sig    albuterol (VENTOLIN HFA) 90 mcg/actuation inhaler Inhale 2 puffs into the lungs every 6 (six) hours as needed for Wheezing or Shortness of Breath. Rescue    azelastine (ASTELIN) 137 mcg (0.1 %) nasal spray 1 spray (137 mcg total) by Nasal route 2 (two) times daily. (Patient not taking: Reported on 1/25/2023)    benzonatate (TESSALON) 200 MG capsule Take 1 capsule (200 mg total) by mouth 3 (three) times daily as needed for Cough.    busPIRone (BUSPAR) 10 MG tablet Take 1 tablet (10 mg total) by mouth 2 (two) times daily.    cetirizine (ZYRTEC) 5 MG tablet Take 1 tablet (5 mg total) by mouth once daily. (Patient not taking: Reported on 1/25/2023)    COD LIVER OIL ORAL Take 1 tablet by mouth once daily.      cyclobenzaprine (FLEXERIL) 10 MG tablet     diclofenac sodium (VOLTAREN) 1 % Gel Apply 2 g topically 4 (four) times daily. (Patient not taking: Reported on 1/25/2023)    donepeziL (ARICEPT) 10 MG tablet Take 1 tablet (10 mg total) by mouth every evening.    evolocumab (REPATHA SURECLICK) 140 mg/mL PnIj Inject 1 mL (140 mg total) into the skin every 14 (fourteen) days.    fish oil-omega-3 fatty acids 300-1,000 mg capsule Take 2 g by mouth once daily.    fluticasone propionate (FLONASE) 50 mcg/actuation nasal spray 2 sprays (100 mcg total) by Each Nostril route once daily.    gabapentin (NEURONTIN) 300 MG capsule Take 1 capsule (300 mg total) by mouth 2 (two) times daily.    ipratropium (ATROVENT) 42 mcg (0.06 %) nasal spray 2 sprays by Nasal route 3 (three) times daily.    irbesartan (AVAPRO) 300 MG tablet Take 1 tablet (300 mg total) by mouth every evening. New tablet strength (Patient taking differently: Take 150 mg by mouth once daily. Take 2 tablets every evening)    molnupiravir 200 mg capsule (EUA) Take 4 capsules (800 mg total) by mouth every 12 (twelve) hours. for 5 days    montelukast (SINGULAIR) 10 mg tablet Take 10 mg by mouth nightly.    multivit with min-folic acid 200 mcg Chew Take 1 tablet by mouth once daily.     omeprazole (PRILOSEC) 40 MG capsule TAKE 1 CAPSULE EVERY DAY    polyethylene glycol (GLYCOLAX) 17 gram/dose powder Take 17 g by mouth daily as needed (constipation).     sertraline (ZOLOFT) 100 MG tablet Take 1.5 tablets (150 mg total) by mouth once daily.   Last reviewed on 2/3/2023  2:22 PM by Imtiaz Beard NP    Review of patient's allergies indicates:   Allergen Reactions    Aspirin Nausea And Vomiting    Penicillins Itching    Crestor [rosuvastatin]      Muscle pain      Lipitor [atorvastatin]      Achy      Last reviewed on  2/3/2023 2:22 PM by Imtiaz Beard      Tasks added this encounter   3/8/2023 - Refill Call (Auto Added)   Tasks due within next 3 months   No tasks due.      Qian Baeza, PharmD  [facility]  [address]  [facility] [address]  Phone: [phone]  Fax: [phone]

## 2023-02-09 DIAGNOSIS — Z00.00 ENCOUNTER FOR MEDICARE ANNUAL WELLNESS EXAM: ICD-10-CM

## 2023-02-13 NOTE — PROGRESS NOTES
"Last 5 Patient Entered Readings                                      Current 30 Day Average: 142/70     Recent Readings 6/7/2019 6/6/2019 5/24/2019 5/23/2019 5/21/2019    SBP (mmHg) 147 123 111 147 139    DBP (mmHg) 74 70 64 77 64    Pulse 81 76 71 72 67          Digital Medicine: Health  Follow Up    Lifestyle Modifications:    1.Dietary Modifications (Sodium intake <2,000mg/day, food labels, dining out): Patient states she has been trying to control herself with eating.  Reports they went on vacation last week, so has been trying to get back to normal.    2.Physical Activity: Reports it has been too hot to go outside and it "makes me sick."  States she continues to do exercises in the morning.  Encouraged patient to stay hydrated.    3.Medication Therapy: Patient has been compliant with the medication regimen.    4.Patient has the following medication side effects/concerns: none  (Frequency/Alleviating factors/Precipitating factors, etc.)     Follow up with Tarun Rola REJI Richter completed. No further questions or concerns. Will continue to follow up to achieve health goals.  "
Last 5 Patient Entered Readings                                      Current 30 Day Average: 142/70     Recent Readings 6/7/2019 6/6/2019 5/24/2019 5/23/2019 5/21/2019    SBP (mmHg) 147 123 111 147 139    DBP (mmHg) 74 70 64 77 64    Pulse 81 76 71 72 67        6/7: LVM.  Will call in 2 weeks.  
pt c/o bilateral feet numbness (chronic)/Grossly Intact

## 2023-02-14 ENCOUNTER — OFFICE VISIT (OUTPATIENT)
Dept: PAIN MEDICINE | Facility: CLINIC | Age: 78
End: 2023-02-14
Payer: MEDICARE

## 2023-02-14 VITALS
WEIGHT: 123 LBS | HEART RATE: 80 BPM | SYSTOLIC BLOOD PRESSURE: 145 MMHG | BODY MASS INDEX: 24.8 KG/M2 | HEIGHT: 59 IN | DIASTOLIC BLOOD PRESSURE: 72 MMHG

## 2023-02-14 DIAGNOSIS — M54.81 BILATERAL OCCIPITAL NEURALGIA: ICD-10-CM

## 2023-02-14 DIAGNOSIS — M50.30 DDD (DEGENERATIVE DISC DISEASE), CERVICAL: ICD-10-CM

## 2023-02-14 DIAGNOSIS — G89.4 CHRONIC PAIN DISORDER: Primary | ICD-10-CM

## 2023-02-14 DIAGNOSIS — M54.12 CERVICAL RADICULITIS: Primary | ICD-10-CM

## 2023-02-14 DIAGNOSIS — M47.892 OTHER SPONDYLOSIS, CERVICAL REGION: ICD-10-CM

## 2023-02-14 PROCEDURE — 1125F PR PAIN SEVERITY QUANTIFIED, PAIN PRESENT: ICD-10-PCS | Mod: HCNC,CPTII,S$GLB, | Performed by: PHYSICIAN ASSISTANT

## 2023-02-14 PROCEDURE — 1125F AMNT PAIN NOTED PAIN PRSNT: CPT | Mod: HCNC,CPTII,S$GLB, | Performed by: PHYSICIAN ASSISTANT

## 2023-02-14 PROCEDURE — 1159F PR MEDICATION LIST DOCUMENTED IN MEDICAL RECORD: ICD-10-PCS | Mod: HCNC,CPTII,S$GLB, | Performed by: PHYSICIAN ASSISTANT

## 2023-02-14 PROCEDURE — 3077F PR MOST RECENT SYSTOLIC BLOOD PRESSURE >= 140 MM HG: ICD-10-PCS | Mod: HCNC,CPTII,S$GLB, | Performed by: PHYSICIAN ASSISTANT

## 2023-02-14 PROCEDURE — 3288F FALL RISK ASSESSMENT DOCD: CPT | Mod: HCNC,CPTII,S$GLB, | Performed by: PHYSICIAN ASSISTANT

## 2023-02-14 PROCEDURE — 3078F DIAST BP <80 MM HG: CPT | Mod: HCNC,CPTII,S$GLB, | Performed by: PHYSICIAN ASSISTANT

## 2023-02-14 PROCEDURE — 99214 PR OFFICE/OUTPT VISIT, EST, LEVL IV, 30-39 MIN: ICD-10-PCS | Mod: HCNC,S$GLB,, | Performed by: PHYSICIAN ASSISTANT

## 2023-02-14 PROCEDURE — 1159F MED LIST DOCD IN RCRD: CPT | Mod: HCNC,CPTII,S$GLB, | Performed by: PHYSICIAN ASSISTANT

## 2023-02-14 PROCEDURE — 99999 PR PBB SHADOW E&M-EST. PATIENT-LVL IV: CPT | Mod: PBBFAC,HCNC,, | Performed by: PHYSICIAN ASSISTANT

## 2023-02-14 PROCEDURE — 99999 PR PBB SHADOW E&M-EST. PATIENT-LVL IV: ICD-10-PCS | Mod: PBBFAC,HCNC,, | Performed by: PHYSICIAN ASSISTANT

## 2023-02-14 PROCEDURE — 1101F PT FALLS ASSESS-DOCD LE1/YR: CPT | Mod: HCNC,CPTII,S$GLB, | Performed by: PHYSICIAN ASSISTANT

## 2023-02-14 PROCEDURE — 1101F PR PT FALLS ASSESS DOC 0-1 FALLS W/OUT INJ PAST YR: ICD-10-PCS | Mod: HCNC,CPTII,S$GLB, | Performed by: PHYSICIAN ASSISTANT

## 2023-02-14 PROCEDURE — 99214 OFFICE O/P EST MOD 30 MIN: CPT | Mod: HCNC,S$GLB,, | Performed by: PHYSICIAN ASSISTANT

## 2023-02-14 PROCEDURE — 3078F PR MOST RECENT DIASTOLIC BLOOD PRESSURE < 80 MM HG: ICD-10-PCS | Mod: HCNC,CPTII,S$GLB, | Performed by: PHYSICIAN ASSISTANT

## 2023-02-14 PROCEDURE — 3288F PR FALLS RISK ASSESSMENT DOCUMENTED: ICD-10-PCS | Mod: HCNC,CPTII,S$GLB, | Performed by: PHYSICIAN ASSISTANT

## 2023-02-14 PROCEDURE — 3077F SYST BP >= 140 MM HG: CPT | Mod: HCNC,CPTII,S$GLB, | Performed by: PHYSICIAN ASSISTANT

## 2023-02-14 RX ORDER — CYCLOBENZAPRINE HCL 10 MG
10 TABLET ORAL 2 TIMES DAILY PRN
Qty: 60 TABLET | Refills: 1 | Status: SHIPPED | OUTPATIENT
Start: 2023-02-14 | End: 2023-05-11 | Stop reason: SDUPTHER

## 2023-02-14 RX ORDER — HYDROCODONE BITARTRATE AND ACETAMINOPHEN 5; 325 MG/1; MG/1
1 TABLET ORAL EVERY 12 HOURS PRN
Qty: 60 TABLET | Refills: 0 | Status: SHIPPED | OUTPATIENT
Start: 2023-03-15 | End: 2023-04-13

## 2023-02-14 RX ORDER — HYDROCODONE BITARTRATE AND ACETAMINOPHEN 5; 325 MG/1; MG/1
1 TABLET ORAL EVERY 12 HOURS PRN
Qty: 60 TABLET | Refills: 0 | Status: SHIPPED | OUTPATIENT
Start: 2023-02-14 | End: 2023-03-15

## 2023-02-14 RX ORDER — HYDROCODONE BITARTRATE AND ACETAMINOPHEN 5; 325 MG/1; MG/1
1 TABLET ORAL EVERY 12 HOURS PRN
Qty: 60 TABLET | Refills: 0 | Status: SHIPPED | OUTPATIENT
Start: 2023-04-13 | End: 2023-05-11 | Stop reason: SDUPTHER

## 2023-02-14 NOTE — PROGRESS NOTES
Referring Physician: No ref. provider found    PCP: Liseth Carias MD      CC: neck and occipital pain    Interval history: Ms. Richter is a 77 y.o. female with neck pain and occipital neuralgia who returns to our clinic.  She continues to c/o headaches and neck pain.  Most recent occcipital nerve block only provided mild short lived beneft. Recent cervical MELANY improved neck pain that was extending into left shoulder.  She has numbness to her right hand and first through fourth digits. Cervical MELANY in February 2018  provided benefit for several weeks.    She continues to take Norco with benefit.  Flexeril 10 mg caused dry mouth however this resolved and she wishes to resume. Robaxin was helpful but caused dizziness.  Denies UE weakness. No bowel bladder changes.   Pain today is rated 8/10.    Prior HPI:   Patient is 70-year-old female with past history history of cervical DDD, cervical spondylosis and chronic headaches.  She recently moved here from [place], [place].  She is treated in the past by neurology.  She states having constant burning pain over the left side of her posterior scalp.  Pain radiates to her neck as well as a frontal.  She also has left-sided neck pain as well.  She denies any radicular arm pain.  No numbness or weakness.  She states having cervical epidural steroid injection at past with minimal benefit.  She also has had decided of cervical nerve blocks in the past with moderate benefit.  Most recent injection was performed 2 months ago.  She desires repeat injection.  She currently takes Norco 10 mg every 12 hours as needed with moderate benefit.  She also takes Zanaflex 4 mg every 8 hours with mild benefits.  She rates her pain 7/10 today.    Pain intervention history: s/p left occipital nerve blocks on 1/2016 with 50% relief of her headaches  S/p cervical MELANY 2/8/18 moderate relief for a couple of weeks.     ROS:  CONSTITUTIONAL: No fevers, chills, night sweats, wt. loss, appetite  changes  SKIN: no rashes or itching  ENT: No headaches, head trauma, vision changes, or eye pain  LYMPH NODES: None noticed   CV: No chest pain, palpitations.   RESP: No shortness of breath, dyspnea on exertion, cough, wheezing, or hemoptysis  GI: No nausea, emesis, diarrhea, constipation, melena, hematochezia, pain.    : No dysuria, hematuria, urgency, or frequency   HEME: No easy bruising, bleeding problems  PSYCHIATRIC: No depression, anxiety, psychosis, hallucinations.  NEURO: No seizures, memory loss, dizziness or difficulty sleeping  MSK: + History of present illness      Past Medical History:   Diagnosis Date    Anxiety     Arthritis     Cataract     DDD (degenerative disc disease), lumbar     Depression     Encounter for blood transfusion     GERD (gastroesophageal reflux disease)     Headache     Hyperlipidemia     Hypertension     pt states she does not take meds anymore she just watches her weight    Neuromuscular disorder     Occipital neuralgia 3/24/2017    Osteoporosis      Past Surgical History:   Procedure Laterality Date    APPENDECTOMY       SECTION      x 2    CHOLECYSTECTOMY      COLONOSCOPY  prior to     COLONOSCOPY N/A 2019    Procedure: COLONOSCOPY;  Surgeon: Greg Victor MD;  Location: [facility];  Service: Endoscopy;  Laterality: N/A; 2 colon polyps, hemorrhoids; repeat in 5 years for surveillance; biopsy: Tubular adenoma x2    EPIDURAL STEROID INJECTION INTO CERVICAL SPINE N/A 2022    Procedure: Injection-steroid-epidural-cervical C7-T1;  Surgeon: Timothy Grimaldo MD;  Location: [facility];  Service: Pain Management;  Laterality: N/A;    ESOPHAGOGASTRODUODENOSCOPY N/A 2019    Procedure: EGD (ESOPHAGOGASTRODUODENOSCOPY);  Surgeon: Greg Victor MD;  Location: [facility];  Service: Endoscopy;  Laterality: N/A; Mild Schatzki ring. Dilated. small hiatal hernia; Four gastric polyps. Resected and retrieved, gastritis; biopsy:stomach- unremarkable, negative for h  pylori, polyps-Fundic type mucosa with foveolar hyperplasia.    ESOPHAGOGASTRODUODENOSCOPY N/A 2/17/2021    Procedure: EGD (ESOPHAGOGASTRODUODENOSCOPY);  Surgeon: Greg Victor MD;  Location: [facility];  Service: Endoscopy;  Laterality: N/A;    HYSTERECTOMY      Laser Periphery Iridotomy Bilateral     OD 5/26/16 and OS touch up 5/26/2016    UPPER GASTROINTESTINAL ENDOSCOPY  03/14/2017    Dr. Marcial: esophageal stenosis- dilated, gastritis, gastric polyps removed; biopsy- mild gastritis, negative for h pylori, hyperplastic polyp, esophagus unremarkable    vocal cord tumor removal       Family History   Problem Relation Age of Onset    Osteoarthritis Mother     Alcohol abuse Mother     Rheum arthritis Mother     Osteoarthritis Sister     Diabetes Brother     No Known Problems Son     No Known Problems Sister     No Known Problems Sister     No Known Problems Brother     Arthritis Son     No Known Problems Son     Stroke Maternal Grandmother 99    Rheum arthritis Maternal Grandmother     Retinal detachment Neg Hx     Macular degeneration Neg Hx     Glaucoma Neg Hx     Amblyopia Neg Hx     Blindness Neg Hx     Cancer Neg Hx     Cataracts Neg Hx     Hypertension Neg Hx     Strabismus Neg Hx     Thyroid disease Neg Hx     Lupus Neg Hx     Kidney disease Neg Hx     Inflammatory bowel disease Neg Hx     Psoriasis Neg Hx     Colon cancer Neg Hx     Crohn's disease Neg Hx     Ulcerative colitis Neg Hx     Stomach cancer Neg Hx     Esophageal cancer Neg Hx      Social History     Socioeconomic History    Marital status:    Tobacco Use    Smoking status: Never    Smokeless tobacco: Never   Substance and Sexual Activity    Alcohol use: No     Alcohol/week: 0.0 standard drinks    Drug use: Yes     Types: Hydrocodone    Sexual activity: Yes     Partners: Male     Social Determinants of Health     Financial Resource Strain: Low Risk     Difficulty of Paying Living Expenses: Not very hard   Food Insecurity: No Food  "Insecurity    Worried About Running Out of Food in the Last Year: Never true    Ran Out of Food in the Last Year: Never true   Transportation Needs: No Transportation Needs    Lack of Transportation (Medical): No    Lack of Transportation (Non-Medical): No   Physical Activity: Insufficiently Active    Days of Exercise per Week: 2 days    Minutes of Exercise per Session: 30 min   Stress: No Stress Concern Present    Feeling of Stress : Only a little   Social Connections: Socially Integrated    Frequency of Communication with Friends and Family: More than three times a week    Frequency of Social Gatherings with Friends and Family: Once a week    Attends [employer] Services: More than 4 times per year    Active Member of Clubs or Organizations: Yes    Attends Club or Organization Meetings: More than 4 times per year    Marital Status:    Housing Stability: Low Risk     Unable to Pay for Housing in the Last Year: No    Number of Places Lived in the Last Year: 1    Unstable Housing in the Last Year: No         Medications/Allergies: See med card    Vitals:    02/14/23 1111   BP: (!) 145/72   Pulse: 80   Weight: 55.8 kg (123 lb)   Height: 4' 11" (1.499 m)   PainSc:   8   PainLoc: Neck         Physical exam:    GENERAL: A and O x3, the patient appears well groomed and is in no acute distress.  Skin: No rashes or obvious lesions  HEENT: normocephalic, atraumatic  CARDIOVASCULAR:  RRR  LUNGS: nonlabored breathing  ABDOMEN: soft, nontender   UPPER EXTREMITIES: Normal alignment, normal range of motion, no atrophy, no skin changes,  hair growth and nail growth normal and equal bilaterally. No swelling, no tenderness.  +Phalens on left side. +TTP tricep tendon  LOWER EXTREMITIES:  Normal alignment, normal range of motion, no atrophy, no skin changes,  hair growth and nail growth normal and equal bilaterally. No swelling, no tenderness.  CERVICAL SPINE:   Cervical spine: ROM is full in flexion, extension and lateral " rotation.  Painful flexion > extension.  Positive facet loading bilaterally.  Spurling is positive at right side.  Myofascial exam:  Tenderness to palpation across cervical paraspinals deltoid and trapezius muscles bilaterally.      MENTAL STATUS: normal orientation, speech, language, and fund of knowledge for social situation.  Emotional state appropriate.    CRANIAL NERVES:  II:  PERRL bilaterally,   III,IV,VI: EOMI.    V:  Facial sensation equal bilaterally  VII:  Facial motor function normal.  VIII:  Hearing equal to finger rub bilaterally  IX/X: Gag normal, palate symmetric  XI:  Shoulder shrug equal, head turn equal  XII:  Tongue midline without fasciculations      MOTOR: Tone and bulk: normal bilateral upper and lower Strength: normal   Delt Bi Tri WE WF     R 5 5 5 5 5 5   L 5 5 5 5 5 5     IP ADD ABD Quad TA Gas HAM  R 5 5 5 5 5 5 5  L 5 5 5 5 5 5 5    SENSATION: Light touch and pinprick intact bilaterally  REFLEXES: normal, symmetric, nonbrisk.  Toes down, no clonus. No hoffmans.  GAIT: normal rise, base, steps, and arm swing.        Imaging:   Cervical MRI 12/4/17    Narrative     EXAM: Cervical spine MRI without contrast.    INDICATION: Cervical radiculopathy.  Neck pain and occipital neuralgia.  The patient complains of neck and right arm pain.    TECHNIQUE: Routine multiplanar, multisequence unenhanced cervical spine MRI was performed.    COMPARISON: Plain films of the cervical spine obtained concurrently      FINDINGS:     Vertebral column: There is straightening of the cervical spine with loss of normal lordosis.  As seen on concurrent plain films, there is trace anterolisthesis of C3 on C4 with 2 mm anterolisthesis of C4 on C5.  There is marked disc space narrowing at the C5-6 level with moderate to marked disc space narrowing at the C4-5 and C6-7 levels.  There is partial non-segmentation anteriorly at the C2-3 level.  The C2 and C3 as well as the C4 and C5 facet joints appear fused and this  may represent developmental non-segmentation or degenerative ankylosis.  All of the discs are desiccated.  The odontoid process is intact.    Spinal canal, cord, epidural space: The craniovertebral junction is normal.  The spinal canal is omental and normal.  There is no significant spinal stenosis.  The cord is normal in caliber.  There is very subtle flattening of the ventral cord surface at the C4-5 and C5-6 levels where there is degenerative change.  The study is mildly motion but there is no definite cord edema or myelomalacia.    Findings by level:    C2-3: There is no spinal canal or foraminal stenosis.  There is mild left facet joint arthropathy.    C3-4: There is trace anterolisthesis.  There is left greater than right uncovertebral spurring and facet joint arthropathy.  There is a mild disc osteophyte complex, slightly eccentric to the left with subtle annular fissure.  This narrows the ventral subarachnoid space.  There is no spinal stenosis or cord compression.  There is moderate marked left foraminal stenosis.    C4-5: There is moderate disc space narrowing with 2 mm anterolisthesis of C4 on C5.  There is facet joint arthropathy or effusion left greater than right.  There is also mild bilateral but left greater than right uncovertebral spurring.  There is unroofing of a mild disc bulge which narrows the ventral subarachnoid space.  There is no spinal stenosis.  There is mild to moderate left foraminal stenosis.    C5-6:There is marked disc space narrowing.  There is bilateral uncovertebral spurring.  There is a disc osteophyte complex which narrows the subarachnoid space.  This is slightly eccentric to the right There is subtle flattening of the ventral cord surface.  Cord signal is grossly normal.  There is mild spinal stenosis with at least moderate bilateral foraminal stenosis.    C6-7: There is moderate disc space narrowing.  There is mild uncovertebral spurring.  There is a shallow disc osteophyte  complex, slightly eccentric to the right.  There is narrowing of the ventral subarachnoid space.  There is no spinal stenosis.  Cord signal is normal.  There is mild bilateral foraminal stenosis.  There is a small 3.5 mm left foraminal perineural cyst.    C7- T1: There is a Schmorl's node in inferior endplate of C7, chronic.  There are tiny bilateral foraminal perineural cysts.  There is minimal bulging of the annulus and mild facet joint arthropathy without spinal canal or foraminal stenosis.    Soft tissues/other: The prevertebral soft tissues are normal.  The airway is patent.   Impression          1. There is multilevel degenerative disc disease described in detail above.  There is no acute fracture.  There is degenerative listhesis at several levels.  There is some degree of disc osteophyte complex, uncovertebral spurring and/or facet joint arthropathy contributing to some degree of foraminal stenosis at several levels.  There is no significant spinal canal stenosis.  There is very subtle flattening of the ventral cord surface at the C4-5 and C6-7 levels The pertinent findings are summarized below.    2. At the C3-4 level, there is no spinal stenosis.  There is moderate to marked left foraminal stenosis.    3. At the C4-5 level there is no spinal stenosis.  There is mild to moderate left foraminal stenosis.    4. At the C5-6 level, there is mild spinal stenosis with at least moderate bilateral foraminal stenosis.    5. At the C6-7 level, there is no spinal stenosis.  There is mild bilateral foraminal stenosis.    6. There is no spinal canal or foraminal stenosis at the C2-3 or C7-T1 levels.     Assessment:  Mrs. Richter is a 77 y.o. female with neck and head pain    1. Cervical radiculitis    2. DDD (degenerative disc disease), cervical    3. Other spondylosis, cervical region    4. Bilateral occipital neuralgia           Plan:  1. I have stressed the importance of physical activity and exercise to improve  overall health.  2. Monitor progress from repeat C7-T1 IL MELANY. I have explained the risks, benefits, and alternatives of the procedure in detail. The patient voices understanding and all questions have been answered. The patient agrees to proceed as planned. Written Consent obtained.   3. Consider bilateral occipital blocks for head pain.  4. Norco 5mg q12hrs as needed for pain.  reviewed.  Previous UDS consistent   5. Flexeril 10 mg BID #60 1 refill.   6. F/u 3 months or sooner  All medication management was performed by Dr. Timothy Grimaldo

## 2023-03-08 ENCOUNTER — SPECIALTY PHARMACY (OUTPATIENT)
Dept: PHARMACY | Facility: CLINIC | Age: 78
End: 2023-03-08
Payer: MEDICARE

## 2023-03-08 NOTE — TELEPHONE ENCOUNTER
Specialty Pharmacy - Refill Coordination    Specialty Medication Orders Linked to Encounter      Flowsheet Row Most Recent Value   Medication #1 evolocumab (REPATHA SURECLICK) 140 mg/mL PnIj (Order#[id number], Rx#[id number])            Refill Questions - Documented Responses      Flowsheet Row Most Recent Value   Patient Availability and HIPAA Verification    Does patient want to proceed with activity? Yes   HIPAA/medical authority confirmed? Yes   Relationship to patient of person spoken to? Spouse/Significant Other   Refill Screening Questions    Would patient like to speak to a pharmacist? No   When does the patient need to receive the medication? 03/15/23   Refill Delivery Questions    How will the patient receive the medication? [facility]   When does the patient need to receive the medication? 03/15/23   Shipping Address Home   Address in [facility] confirmed and updated if neccessary? Yes   Expected Copay ($) 0   Is the patient able to afford the medication copay? Yes   Payment Method zero copay   Days supply of Refill 28   Supplies needed? No supplies needed   Refill activity completed? Yes   Refill activity plan Refill scheduled   Shipment/Pickup Date: 03/09/23            Current Outpatient Medications   Medication Sig    albuterol (VENTOLIN HFA) 90 mcg/actuation inhaler Inhale 2 puffs into the lungs every 6 (six) hours as needed for Wheezing or Shortness of Breath. Rescue    azelastine (ASTELIN) 137 mcg (0.1 %) nasal spray 1 spray (137 mcg total) by Nasal route 2 (two) times daily. (Patient not taking: Reported on 1/25/2023)    benzonatate (TESSALON) 200 MG capsule Take 1 capsule (200 mg total) by mouth 3 (three) times daily as needed for Cough.    busPIRone (BUSPAR) 10 MG tablet Take 1 tablet (10 mg total) by mouth 2 (two) times daily.    cetirizine (ZYRTEC) 5 MG tablet Take 1 tablet (5 mg total) by mouth once daily.    COD LIVER OIL ORAL Take 1 tablet by mouth once daily.     cyclobenzaprine (FLEXERIL)  10 MG tablet Take 1 tablet (10 mg total) by mouth 2 (two) times daily as needed for Muscle spasms.    diclofenac sodium (VOLTAREN) 1 % Gel Apply 2 g topically 4 (four) times daily.    donepeziL (ARICEPT) 10 MG tablet Take 1 tablet (10 mg total) by mouth every evening.    evolocumab (REPATHA SURECLICK) 140 mg/mL PnIj Inject 1 mL (140 mg total) into the skin every 14 (fourteen) days.    fish oil-omega-3 fatty acids 300-1,000 mg capsule Take 2 g by mouth once daily.    fluticasone propionate (FLONASE) 50 mcg/actuation nasal spray 2 sprays (100 mcg total) by Each Nostril route once daily.    gabapentin (NEURONTIN) 300 MG capsule Take 1 capsule (300 mg total) by mouth 2 (two) times daily.    HYDROcodone-acetaminophen (NORCO) 5-325 mg per tablet Take 1 tablet by mouth every 12 (twelve) hours as needed for Pain.    [START ON 3/15/2023] HYDROcodone-acetaminophen (NORCO) 5-325 mg per tablet Take 1 tablet by mouth every 12 (twelve) hours as needed for Pain.    [START ON 4/13/2023] HYDROcodone-acetaminophen (NORCO) 5-325 mg per tablet Take 1 tablet by mouth every 12 (twelve) hours as needed for Pain.    ipratropium (ATROVENT) 42 mcg (0.06 %) nasal spray 2 sprays by Nasal route 3 (three) times daily.    irbesartan (AVAPRO) 300 MG tablet Take 1 tablet (300 mg total) by mouth every evening. New tablet strength (Patient taking differently: Take 150 mg by mouth once daily. Take 2 tablets every evening)    montelukast (SINGULAIR) 10 mg tablet Take 10 mg by mouth nightly.    multivit with min-folic acid 200 mcg Chew Take 1 tablet by mouth once daily.     omeprazole (PRILOSEC) 40 MG capsule TAKE 1 CAPSULE EVERY DAY    polyethylene glycol (GLYCOLAX) 17 gram/dose powder Take 17 g by mouth daily as needed (constipation).     sertraline (ZOLOFT) 100 MG tablet Take 1.5 tablets (150 mg total) by mouth once daily.   Last reviewed on 2/14/2023 11:12 AM by Melina Taveras MA    Review of patient's allergies indicates:   Allergen Reactions     Aspirin Nausea And Vomiting    Penicillins Itching    Crestor [rosuvastatin]      Muscle pain      Lipitor [atorvastatin]      Achy      Last reviewed on  2/14/2023 11:12 AM by Melina Taveras      Tasks added this encounter   4/5/2023 - Refill Call (Auto Added)   Tasks due within next 3 months   No tasks due.     [facility]  [address]  [place] [address]  Phone: [phone]  Fax: [phone]

## 2023-03-17 ENCOUNTER — TELEPHONE (OUTPATIENT)
Dept: ADMINISTRATIVE | Facility: CLINIC | Age: 78
End: 2023-03-17
Payer: MEDICARE

## 2023-03-17 ENCOUNTER — OFFICE VISIT (OUTPATIENT)
Dept: PSYCHIATRY | Facility: CLINIC | Age: 78
End: 2023-03-17
Payer: MEDICARE

## 2023-03-17 DIAGNOSIS — F41.1 GENERALIZED ANXIETY DISORDER: ICD-10-CM

## 2023-03-17 DIAGNOSIS — F43.10 PTSD (POST-TRAUMATIC STRESS DISORDER): ICD-10-CM

## 2023-03-17 DIAGNOSIS — F33.1 MODERATE EPISODE OF RECURRENT MAJOR DEPRESSIVE DISORDER: ICD-10-CM

## 2023-03-17 PROCEDURE — 90791 PSYCH DIAGNOSTIC EVALUATION: CPT | Mod: HCNC,S$GLB,, | Performed by: SOCIAL WORKER

## 2023-03-17 PROCEDURE — 90791 PR PSYCHIATRIC DIAGNOSTIC EVALUATION: ICD-10-PCS | Mod: HCNC,S$GLB,, | Performed by: SOCIAL WORKER

## 2023-03-17 PROCEDURE — 1159F MED LIST DOCD IN RCRD: CPT | Mod: HCNC,CPTII,S$GLB, | Performed by: SOCIAL WORKER

## 2023-03-17 PROCEDURE — 1159F PR MEDICATION LIST DOCUMENTED IN MEDICAL RECORD: ICD-10-PCS | Mod: HCNC,CPTII,S$GLB, | Performed by: SOCIAL WORKER

## 2023-03-17 PROCEDURE — 99999 PR PBB SHADOW E&M-EST. PATIENT-LVL II: ICD-10-PCS | Mod: PBBFAC,HCNC,, | Performed by: SOCIAL WORKER

## 2023-03-17 PROCEDURE — 99999 PR PBB SHADOW E&M-EST. PATIENT-LVL II: CPT | Mod: PBBFAC,HCNC,, | Performed by: SOCIAL WORKER
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
## 2023-03-21 NOTE — PROGRESS NOTES
Outpatient Psychiatry Followup Visit (DO/MD/NP)    3/22/2023    Rola Richter, a 77 y.o. female, presenting for followup visit.     Assessment - Diagnosis - Goals:     Diagnostic Impression     ICD-10-CM ICD-9-CM   1. Moderate episode of recurrent major depressive disorder  F33.1 296.32   2. Generalized anxiety disorder  F41.1 300.02   3. PTSD (post-traumatic stress disorder)  F43.10 309.81   4. Mild neurocognitive disorder  G31.84 331.83   5. Dependency on pain medication  F19.20 304.90      Progress See most recent case formulation. Forward progress.     Medication Management   Chart was reviewed. The risks and benefits of medication were discussed with pt. The treatment plan and followup plan were reviewed with pt. Pt understands to contact clinic if symptoms worsen. Pt understand to call 911 or go to nearest ER for suicidal ideation, intent or plan.   RX History Psychotropic history includes  ARICEPT, BARBITURATES, BUSPAR, GABAPENTIN, PROZAC, WELLBUTRIN, and ZOLOFT.   Current RX Reduce BUSPAR  Tolerability established in the past.  For anxiety and depression  Titration:  [id number]: Reduce to 7.5mg BID  60SNX6082: Increase to 10mg BID  12MPL4091: Start 5mg BID for 3 days  Prior to 31JAN2023 pt had been taking ZOLOFT 150mg daily  Continue ZOLOFT  Anxiety may be made worse by dopamine-reuptake blocking properties  Titration:  56WHY8684: Continue 150mg daily  Prior to evaluation pt had been taking 150mg daily  Continue ARICEPT   Ideally should be prescribed by neurology or primary care - encouraged patient to speak with those clinicians 21JAN2023  Medication Adjustments:  51SUF2433: Increase to 10mg HS  00LHK6118: Continue 5mg HS  Prior to evaluation pt had been taking 5mg HS   Education & Counseling RX administration and adherence   Other Orders Continue therapy   Monitor VITAL SIGNS  Instruments: PROMIS (A&D), PSS4   RETURN 6 weeks  for medication management only     Interval History:     Patient did not  "display signs of nor endorse symptoms of overt psychosis or acute mood disorder requiring hospitalization during the encounter. Patient denied violent thoughts or suicidal or homicidal ideation, intent, or plan.     Pt is pleasant and cooperative on interview. This visit was done in person in the clinic.    Stress improving after resolution of 's health scare. New kitten helping mood. Doing well, no complaints.    Met with therapist.    Taking BUSPAR daily only because twice per day made her feel "not there" and "funny."     Personal Functioning   Sleep No concerns.   Emotion Overall better.   Appetite No concerns.   Stress Better.   Anxiety Better.   Interpersonal Functioning   Home no concerns identified   Peers no concerns identified   Work N/A     Instruments:     Routine Instruments   PROMIS-ANXIETY Interpretation: T-SCORE 69; MODERATE using 55-60-70 cutoffs. This is a new baseline reading. Stratification of anxiety severity was done with GAD7 last time; compared to MODERATE 10/21  last time, severity probably is relatively stable. Changed instruments today; severity changes may be artifact. See "Interval History."   PROMIS-DEPRESSION Interpretation: T-SCORE 63; MODERATE using 55-60-70 cutoffs. This is a new baseline reading. Stratification of depression severity was done with GDS last time; compared to SEVERE 21/30  last time, severity probably IMPROVED. Changed instruments today; severity changes may be artifact. See "Interval History."   PSS4 Interpretation: 8/16; MODERATE using 6-11 cutoffs. 0/2 PH, 0/2 LSE. Stratification of stress severity was done with PSS10 last time; compared to HIGH 31/40 last time, stress IMPROVED.   Additional Instruments   Cognitive testing is to be performed at a future encounter.        Review of Systems:     See HPI.    Current Evaluation:     Constitutional       Vitals:    03/22/23 1045   BP: 139/74   Pulse: 78   Weight: 55.9 kg (123 lb 3.8 oz)   Height: 4' 11" (1.499 " m)     General:  casual dress, normal weight (BMI 18.5 - 24.9)    Psychiatric / Mental Status Examination  1. Appearance: Dress is informal but appropriate. Motor activity normal.  2. Discourse: Clear speech with normal rate and volume. Associations intact. Orderly and without tangentiality.  3. Emotional Expression: Somewhat anxious and depressed mood. Affect is appropriate.  4. Perception and Thinking: No hallucinations. No suicidality, no homicidality, delusions, or paranoia.  5. Sensorium: Grossly intact. Able to focus for interview.  6. Memory and Fund of Knowledge: Intact for content of interview.  7. Insight and Judgment: Intact.    Neurological / Musculoskeletal / Osteopathic Structural  Gait, Station:  non-ataxic     Auto-populated chart data omitted from this note for brevity.    Billing Documentation:     Method of Encounter IN PERSON visit at the clinic   Type of Encounter Follow up visit with me   Counseling;  Psychotherapy Not applicable: Not done or UNDER 16 minutes   Counseling;  Tobacco and/or Nicotine Not applicable: Not done or UNDER 3 minutes   Additional Codes and Modifiers None   Time Remaining Chart/Pt 31338: medication management follow up, 2+ stable chronic illnesses   Total Mins  (3/22/2023) N/A - Not billing for time        Will Leong DO  Department of Psychiatry

## 2023-03-22 ENCOUNTER — OFFICE VISIT (OUTPATIENT)
Dept: PSYCHIATRY | Facility: CLINIC | Age: 78
End: 2023-03-22
Payer: MEDICARE

## 2023-03-22 VITALS
SYSTOLIC BLOOD PRESSURE: 139 MMHG | HEIGHT: 59 IN | DIASTOLIC BLOOD PRESSURE: 74 MMHG | HEART RATE: 78 BPM | BODY MASS INDEX: 24.85 KG/M2 | WEIGHT: 123.25 LBS

## 2023-03-22 DIAGNOSIS — G31.84 MILD NEUROCOGNITIVE DISORDER: ICD-10-CM

## 2023-03-22 DIAGNOSIS — F41.1 GENERALIZED ANXIETY DISORDER: ICD-10-CM

## 2023-03-22 DIAGNOSIS — F33.1 MODERATE EPISODE OF RECURRENT MAJOR DEPRESSIVE DISORDER: Primary | ICD-10-CM

## 2023-03-22 DIAGNOSIS — F43.10 PTSD (POST-TRAUMATIC STRESS DISORDER): ICD-10-CM

## 2023-03-22 DIAGNOSIS — F19.20 DEPENDENCY ON PAIN MEDICATION: ICD-10-CM

## 2023-03-22 PROCEDURE — 99999 PR PBB SHADOW E&M-EST. PATIENT-LVL III: ICD-10-PCS | Mod: PBBFAC,HCNC,, | Performed by: STUDENT IN AN ORGANIZED HEALTH CARE EDUCATION/TRAINING PROGRAM

## 2023-03-22 PROCEDURE — 3078F PR MOST RECENT DIASTOLIC BLOOD PRESSURE < 80 MM HG: ICD-10-PCS | Mod: HCNC,CPTII,S$GLB,ICN | Performed by: STUDENT IN AN ORGANIZED HEALTH CARE EDUCATION/TRAINING PROGRAM

## 2023-03-22 PROCEDURE — 3288F FALL RISK ASSESSMENT DOCD: CPT | Mod: HCNC,CPTII,S$GLB,ICN | Performed by: STUDENT IN AN ORGANIZED HEALTH CARE EDUCATION/TRAINING PROGRAM

## 2023-03-22 PROCEDURE — 1160F RVW MEDS BY RX/DR IN RCRD: CPT | Mod: HCNC,CPTII,S$GLB, | Performed by: STUDENT IN AN ORGANIZED HEALTH CARE EDUCATION/TRAINING PROGRAM

## 2023-03-22 PROCEDURE — 99214 OFFICE O/P EST MOD 30 MIN: CPT | Mod: HCNC,S$GLB,ICN, | Performed by: STUDENT IN AN ORGANIZED HEALTH CARE EDUCATION/TRAINING PROGRAM

## 2023-03-22 PROCEDURE — 1159F PR MEDICATION LIST DOCUMENTED IN MEDICAL RECORD: ICD-10-PCS | Mod: HCNC,CPTII,S$GLB,ICN | Performed by: STUDENT IN AN ORGANIZED HEALTH CARE EDUCATION/TRAINING PROGRAM

## 2023-03-22 PROCEDURE — 1101F PT FALLS ASSESS-DOCD LE1/YR: CPT | Mod: HCNC,CPTII,S$GLB,ICN | Performed by: STUDENT IN AN ORGANIZED HEALTH CARE EDUCATION/TRAINING PROGRAM

## 2023-03-22 PROCEDURE — 99214 PR OFFICE/OUTPT VISIT, EST, LEVL IV, 30-39 MIN: ICD-10-PCS | Mod: HCNC,S$GLB,ICN, | Performed by: STUDENT IN AN ORGANIZED HEALTH CARE EDUCATION/TRAINING PROGRAM

## 2023-03-22 PROCEDURE — 3078F DIAST BP <80 MM HG: CPT | Mod: HCNC,CPTII,S$GLB,ICN | Performed by: STUDENT IN AN ORGANIZED HEALTH CARE EDUCATION/TRAINING PROGRAM

## 2023-03-22 PROCEDURE — 99213 OFFICE O/P EST LOW 20 MIN: CPT | Mod: PBBFAC,HCNC,PN | Performed by: STUDENT IN AN ORGANIZED HEALTH CARE EDUCATION/TRAINING PROGRAM

## 2023-03-22 PROCEDURE — 1101F PR PT FALLS ASSESS DOC 0-1 FALLS W/OUT INJ PAST YR: ICD-10-PCS | Mod: HCNC,CPTII,S$GLB,ICN | Performed by: STUDENT IN AN ORGANIZED HEALTH CARE EDUCATION/TRAINING PROGRAM

## 2023-03-22 PROCEDURE — 1125F PR PAIN SEVERITY QUANTIFIED, PAIN PRESENT: ICD-10-PCS | Mod: HCNC,CPTII,S$GLB,ICN | Performed by: STUDENT IN AN ORGANIZED HEALTH CARE EDUCATION/TRAINING PROGRAM

## 2023-03-22 PROCEDURE — 1159F MED LIST DOCD IN RCRD: CPT | Mod: HCNC,CPTII,S$GLB,ICN | Performed by: STUDENT IN AN ORGANIZED HEALTH CARE EDUCATION/TRAINING PROGRAM

## 2023-03-22 PROCEDURE — 1125F AMNT PAIN NOTED PAIN PRSNT: CPT | Mod: HCNC,CPTII,S$GLB,ICN | Performed by: STUDENT IN AN ORGANIZED HEALTH CARE EDUCATION/TRAINING PROGRAM

## 2023-03-22 PROCEDURE — 99999 PR PBB SHADOW E&M-EST. PATIENT-LVL III: CPT | Mod: PBBFAC,HCNC,, | Performed by: STUDENT IN AN ORGANIZED HEALTH CARE EDUCATION/TRAINING PROGRAM

## 2023-03-22 PROCEDURE — 1160F PR REVIEW ALL MEDS BY PRESCRIBER/CLIN PHARMACIST DOCUMENTED: ICD-10-PCS | Mod: HCNC,CPTII,S$GLB, | Performed by: STUDENT IN AN ORGANIZED HEALTH CARE EDUCATION/TRAINING PROGRAM

## 2023-03-22 PROCEDURE — 3075F PR MOST RECENT SYSTOLIC BLOOD PRESS GE 130-139MM HG: ICD-10-PCS | Mod: HCNC,CPTII,S$GLB,ICN | Performed by: STUDENT IN AN ORGANIZED HEALTH CARE EDUCATION/TRAINING PROGRAM

## 2023-03-22 PROCEDURE — 3288F PR FALLS RISK ASSESSMENT DOCUMENTED: ICD-10-PCS | Mod: HCNC,CPTII,S$GLB,ICN | Performed by: STUDENT IN AN ORGANIZED HEALTH CARE EDUCATION/TRAINING PROGRAM

## 2023-03-22 PROCEDURE — 3075F SYST BP GE 130 - 139MM HG: CPT | Mod: HCNC,CPTII,S$GLB,ICN | Performed by: STUDENT IN AN ORGANIZED HEALTH CARE EDUCATION/TRAINING PROGRAM

## 2023-03-22 RX ORDER — BUSPIRONE HYDROCHLORIDE 7.5 MG/1
7.5 TABLET ORAL 2 TIMES DAILY
Qty: 60 TABLET | Refills: 1 | Status: SHIPPED | OUTPATIENT
Start: 2023-03-22 | End: 2023-05-04

## 2023-03-22 RX ORDER — DONEPEZIL HYDROCHLORIDE 10 MG/1
10 TABLET, FILM COATED ORAL NIGHTLY
Qty: 30 TABLET | Refills: 1 | Status: SHIPPED | OUTPATIENT
Start: 2023-03-22 | End: 2023-05-04 | Stop reason: SDUPTHER

## 2023-03-22 RX ORDER — SERTRALINE HYDROCHLORIDE 100 MG/1
150 TABLET, FILM COATED ORAL DAILY
Qty: 45 TABLET | Refills: 1 | Status: SHIPPED | OUTPATIENT
Start: 2023-03-22 | End: 2023-05-04 | Stop reason: SDUPTHER

## 2023-03-22 NOTE — PATIENT INSTRUCTIONS
Reduce BUSPAR to 7.5mg twice per day, and try to separate doses by about 12 hours  Continue ZOLOFT 150mg daily and ARICEPT 10mg NIGHTLY   Continue therapy

## 2023-04-04 ENCOUNTER — TELEPHONE (OUTPATIENT)
Dept: FAMILY MEDICINE | Facility: CLINIC | Age: 78
End: 2023-04-04
Payer: MEDICARE

## 2023-04-05 ENCOUNTER — SPECIALTY PHARMACY (OUTPATIENT)
Dept: PHARMACY | Facility: CLINIC | Age: 78
End: 2023-04-05
Payer: MEDICARE

## 2023-04-05 NOTE — TELEPHONE ENCOUNTER
Outgoing call to pt regarding Repatha refill. Refill request sent to [facility]. Pt is due 4/15. Will follow up once refills received.

## 2023-04-06 DIAGNOSIS — E78.01 FAMILIAL HYPERCHOLESTEROLEMIA: ICD-10-CM

## 2023-04-06 DIAGNOSIS — Z78.9 STATIN NOT TOLERATED: ICD-10-CM

## 2023-04-06 DIAGNOSIS — I70.0 CALCIFICATION OF AORTA: ICD-10-CM

## 2023-04-06 RX ORDER — EVOLOCUMAB 140 MG/ML
140 INJECTION, SOLUTION SUBCUTANEOUS
Qty: 2 ML | Refills: 2 | Status: CANCELLED | OUTPATIENT
Start: 2023-04-05 | End: 2023-07-04

## 2023-04-10 ENCOUNTER — PES CALL (OUTPATIENT)
Dept: ADMINISTRATIVE | Facility: CLINIC | Age: 78
End: 2023-04-10
Payer: MEDICARE

## 2023-04-17 DIAGNOSIS — E78.01 FAMILIAL HYPERCHOLESTEROLEMIA: ICD-10-CM

## 2023-04-17 DIAGNOSIS — I70.0 CALCIFICATION OF AORTA: ICD-10-CM

## 2023-04-17 DIAGNOSIS — Z78.9 STATIN NOT TOLERATED: ICD-10-CM

## 2023-04-17 NOTE — TELEPHONE ENCOUNTER
Incoming call from patient's , informed him [facility] has not received a new prescription for Repatha. Patient's  stated she was due for injection on 4/15. Routing assigned pharmacist.

## 2023-04-18 NOTE — TELEPHONE ENCOUNTER
----- Message from Belen Figueroa sent at 4/18/2023  8:56 AM CDT -----  Regarding: advice  Contact: Nusrat  Type:  RX Refill Request    Who Called:  Nusrat with [facility]   Refill or New Rx:  refill  RX Name and Strength:  REPATHA   How is the patient currently taking it? (ex. 1XDay):    Is this a 30 day or 90 day RX:    Preferred Pharmacy with phone number:    [facility]   [address] [facility] [address]  [phone]    [facility] - [facility] [address]  [address] [address]  Phone: [phone] Fax: [phone]      Local or Mail Order:    Ordering Provider:    Best Call Back Number:  [phone]  Additional Information:  Nusrat stated that patient is in need of a refill of this medication. Please contact to advise. Thanks!

## 2023-04-19 RX ORDER — EVOLOCUMAB 140 MG/ML
140 INJECTION, SOLUTION SUBCUTANEOUS
Qty: 2 ML | Refills: 2 | Status: SHIPPED | OUTPATIENT
Start: 2023-04-19 | End: 2023-07-03 | Stop reason: SDUPTHER

## 2023-04-20 NOTE — TELEPHONE ENCOUNTER
Specialty Pharmacy - Refill Coordination    Specialty Medication Orders Linked to Encounter      Flowsheet Row Most Recent Value   Medication #1 evolocumab (REPATHA SURECLICK) 140 mg/mL PnIj (Order#[id number], Rx#[id number])            Refill Questions - Documented Responses      Flowsheet Row Most Recent Value   Patient Availability and HIPAA Verification    Does patient want to proceed with activity? Yes   HIPAA/medical authority confirmed? Yes   Relationship to patient of person spoken to? Spouse/Significant Other   Refill Screening Questions    Would patient like to speak to a pharmacist? No   When does the patient need to receive the medication? 04/21/23   Refill Delivery Questions    How will the patient receive the medication? [facility]   When does the patient need to receive the medication? 04/21/23   Shipping Address Home   Address in [facility] confirmed and updated if neccessary? Yes   Expected Copay ($) 0   Is the patient able to afford the medication copay? Yes   Payment Method zero copay   Days supply of Refill 28   Supplies needed? No supplies needed   Refill activity completed? Yes   Refill activity plan Refill scheduled   Shipment/Pickup Date: 04/20/23            Current Outpatient Medications   Medication Sig    albuterol (VENTOLIN HFA) 90 mcg/actuation inhaler Inhale 2 puffs into the lungs every 6 (six) hours as needed for Wheezing or Shortness of Breath. Rescue    benzonatate (TESSALON) 200 MG capsule Take 1 capsule (200 mg total) by mouth 3 (three) times daily as needed for Cough.    busPIRone (BUSPAR) 7.5 MG tablet Take 1 tablet (7.5 mg total) by mouth 2 (two) times a day. Separate doses by about 12 hours.    cetirizine (ZYRTEC) 5 MG tablet Take 1 tablet (5 mg total) by mouth once daily.    COD LIVER OIL ORAL Take 1 tablet by mouth once daily.     diclofenac sodium (VOLTAREN) 1 % Gel Apply 2 g topically 4 (four) times daily.    donepeziL (ARICEPT) 10 MG tablet Take 1 tablet (10 mg total) by  mouth every evening.    evolocumab (REPATHA SURECLICK) 140 mg/mL PnIj Inject 1 mL (140 mg total) into the skin every 14 (fourteen) days.    fish oil-omega-3 fatty acids 300-1,000 mg capsule Take 2 g by mouth once daily.    fluticasone propionate (FLONASE) 50 mcg/actuation nasal spray 2 sprays (100 mcg total) by Each Nostril route once daily.    gabapentin (NEURONTIN) 300 MG capsule Take 1 capsule (300 mg total) by mouth 2 (two) times daily. (Patient not taking: Reported on 3/22/2023)    HYDROcodone-acetaminophen (NORCO) 5-325 mg per tablet Take 1 tablet by mouth every 12 (twelve) hours as needed for Pain.    ipratropium (ATROVENT) 42 mcg (0.06 %) nasal spray 2 sprays by Nasal route 3 (three) times daily.    irbesartan (AVAPRO) 300 MG tablet Take 1 tablet (300 mg total) by mouth every evening. New tablet strength (Patient taking differently: Take 150 mg by mouth once daily. Take 2 tablets every evening)    montelukast (SINGULAIR) 10 mg tablet Take 10 mg by mouth nightly.    multivit with min-folic acid 200 mcg Chew Take 1 tablet by mouth once daily.     omeprazole (PRILOSEC) 40 MG capsule TAKE 1 CAPSULE EVERY DAY    polyethylene glycol (GLYCOLAX) 17 gram/dose powder Take 17 g by mouth daily as needed (constipation).     sertraline (ZOLOFT) 100 MG tablet Take 1.5 tablets (150 mg total) by mouth once daily.   Last reviewed on 3/22/2023 11:14 AM by Will Leong,     Review of patient's allergies indicates:   Allergen Reactions    Aspirin Nausea And Vomiting    Penicillins Itching    Crestor [rosuvastatin]      Muscle pain      Lipitor [atorvastatin]      Achy      Last reviewed on  3/22/2023 11:14 AM by Will Leong      Tasks added this encounter   5/12/2023 - Refill Call (Auto Added)   Tasks due within next 3 months   No tasks due.     Layne Ferguson, PharmD  [facility]  [address] [address]  Phone: [phone]  Fax: [phone]

## 2023-05-02 NOTE — PROGRESS NOTES
Outpatient Psychiatry Followup Visit (DO/MD/NP)    5/4/2023    Assessment - Plan:     Diagnostic Impression     ICD-10-CM ICD-9-CM   1. Moderate episode of recurrent major depressive disorder  F33.1 296.32   2. Generalized anxiety disorder  F41.1 300.02   3. PTSD (post-traumatic stress disorder)  F43.10 309.81   4. Mild neurocognitive disorder  G31.84 331.83   5. 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 BMI 25.0-25.9,adult  Z68.25 V85.21      5/4/2023 (3) Ongoing treatment of primary symptoms with some favorable progress.     Medication Management   Chart was reviewed. The risks and benefits of medication were discussed with pt. The treatment plan and followup plan were reviewed with pt. Pt understands to contact clinic if symptoms worsen. Pt understand to call 911 or go to nearest ER for suicidal ideation, intent or plan.   RX History ARICEPT, BARBITURATES, BUSPAR, GABAPENTIN, PROZAC, WELLBUTRIN, and ZOLOFT   Current RX Reduce BUSPAR  Adjustments:  45VNK7546: Reduce to 7.5mg in the afternoon  64WTK9880: Reduce to 7.5mg BID  34NFU6861: Increase to 10mg BID  31JAN2023: Start 5mg BID for 3 days  Prior to 31JAN2023 pt had been taking ZOLOFT 150mg daily  Increase ZOLOFT  Adjustments:  43CZZ8271: Increase to 200mg daily  79MPG0238: Continue 150mg daily  Prior to evaluation pt had been taking 150mg daily  Continue ARICEPT   Ideally should be prescribed by neurology or primary care - encouraged patient to speak with those clinicians 21JAN2023 and 04MAY2023  Adjustments:  20QXW9386: Increase to 10mg HS  47AZG2082: Continue 5mg HS  Prior to evaluation pt had been taking 5mg HS   Education & Counseling RX administration and adherence   Other Orders Referral to neurology  Continue psychotherapy   Monitor VITAL SIGNS  Instruments: PROMIS (A&D), PSS4   RETURN 4 weeks/1 month  for medication management only     Interval History - Review of Systems:     Patient did not display signs of nor endorse symptoms of overt  "psychosis or acute mood disorder requiring hospitalization during the encounter. Patient denied violent thoughts or suicidal or homicidal ideation, intent, or plan.     Rola Richter, a 77 y.o. female, presenting for followup visit. Pt is pleasant and cooperative on interview. This visit was done in person in the clinic.    No news.     Chronic opioid user. Pain persists. Using more NORCO at times.     Occasionally has brief visions of her cat and her son, who both live with her. Pt is unsure if "problem with my glasses" or talha hallucinations. Unclear upon exploration on interview. Will continue to monitor. Has been infrequent and not distressing.    Reports memory is poor and worsening. Requests adjustments to ARICEPT. Discussed referral to NEUROLOGY.    Discussed increasing ZOLOFT from 150mg to 200mg to better address depression. Has been taking BUSPAR only once in the afternoon and would like to continue in this way. Will adjust Rx.     Personal Functioning   Sleep About the same.   Emotion About the same.   Appetite Overall no concerns, or concerns are minimal.   Stress About the same.   Anxiety About the same.   Cognition Overall worse.   Interpersonal Functioning   Home stress   Peers no concerns identified   Work N/A     Measurement-Based Care (MBC):     Routine Instruments   PROMIS-ANXIETY Interpretation: T-SCORE 63; MODERATE using 55-60-70 cutoffs. Compared to MODERATE (69) last time, PROMIS-ANXIETY shows NO significant change.   PROMIS-DEPRESSION Interpretation: T-SCORE 61; MODERATE using 55-60-70 cutoffs. Compared to MODERATE (63) last time, PROMIS-DEPRESSION shows NO significant change.   PSS4 Interpretation: 8/16; MODERATE using 6-11 cutoffs. 1 PH, 0 LSE. Compared to MODERATE 8/16 last time, PSS4 shows NO significant change.   Additional Instruments   N/A       Current Evaluation of Mental Status:     Constitutional       Vitals:    05/04/23 1004   BP: (!) 147/74   Pulse: 77   Weight: 56.4 kg (124 " "lb 3.7 oz)   Height: 4' 11" (1.499 m)     General:  casual dress, overweight (BMI 25.0 - 29.9)    Psychiatric / Mental Status Examination  1. Appearance: Dress is informal but appropriate. Motor activity normal.  2. Discourse: Clear speech with normal rate and volume. Associations intact. Orderly.  3. Emotional Expression: Somewhat anxious and depressed mood. Affect is appropriate.  4. Perception and Thinking: No hallucinations. No suicidality, no homicidality, delusions, or paranoia.  5. Sensorium: Grossly intact. Able to focus for interview.  6. Memory and Fund of Knowledge: Intact for content of interview.  7. Insight and Judgment: Intact.    Neurological / Musculoskeletal / Osteopathic Structural  Gait, Station:  non-ataxic     Auto-populated chart data omitted from this note for brevity.    Billing Documentation:     Method of Encounter IN PERSON visit at the clinic   Type of Encounter Follow up visit with me   Counseling;  Psychotherapy Not applicable: Not done or UNDER 16 minutes   Counseling;  Tobacco and/or Nicotine Not applicable: Not done or UNDER 3 minutes   Additional Codes and Modifiers 81393: administered and scored more than one psychological or neuropsychological tests (see MBC above) (16+ mins)   Time Remaining Chart/Pt 83067: FOLLOW UP VISIT, Rx mgmt, "Multiple STABLE chronic illnesses"   Total Mins  (5/4/2023) N/A - Not billing for time        Will Leong DO  Department of Psychiatry    "

## 2023-05-04 ENCOUNTER — OFFICE VISIT (OUTPATIENT)
Dept: PSYCHIATRY | Facility: CLINIC | Age: 78
End: 2023-05-04
Payer: MEDICARE

## 2023-05-04 VITALS
BODY MASS INDEX: 25.05 KG/M2 | HEIGHT: 59 IN | HEART RATE: 77 BPM | WEIGHT: 124.25 LBS | DIASTOLIC BLOOD PRESSURE: 74 MMHG | SYSTOLIC BLOOD PRESSURE: 147 MMHG

## 2023-05-04 DIAGNOSIS — F41.1 GENERALIZED ANXIETY DISORDER: ICD-10-CM

## 2023-05-04 DIAGNOSIS — F33.1 MODERATE EPISODE OF RECURRENT MAJOR DEPRESSIVE DISORDER: Primary | ICD-10-CM

## 2023-05-04 DIAGNOSIS — F19.20 DEPENDENCY ON PAIN MEDICATION: ICD-10-CM

## 2023-05-04 DIAGNOSIS — F43.10 PTSD (POST-TRAUMATIC STRESS DISORDER): ICD-10-CM

## 2023-05-04 DIAGNOSIS — G31.84 MILD NEUROCOGNITIVE DISORDER: ICD-10-CM

## 2023-05-04 PROCEDURE — 3078F PR MOST RECENT DIASTOLIC BLOOD PRESSURE < 80 MM HG: ICD-10-PCS | Mod: CPTII,S$GLB,, | Performed by: STUDENT IN AN ORGANIZED HEALTH CARE EDUCATION/TRAINING PROGRAM

## 2023-05-04 PROCEDURE — 1159F MED LIST DOCD IN RCRD: CPT | Mod: CPTII,S$GLB,, | Performed by: SOCIAL WORKER

## 2023-05-04 PROCEDURE — 1159F PR MEDICATION LIST DOCUMENTED IN MEDICAL RECORD: ICD-10-PCS | Mod: CPTII,S$GLB,, | Performed by: SOCIAL WORKER

## 2023-05-04 PROCEDURE — 3077F SYST BP >= 140 MM HG: CPT | Mod: CPTII,S$GLB,, | Performed by: STUDENT IN AN ORGANIZED HEALTH CARE EDUCATION/TRAINING PROGRAM

## 2023-05-04 PROCEDURE — 1125F PR PAIN SEVERITY QUANTIFIED, PAIN PRESENT: ICD-10-PCS | Mod: CPTII,S$GLB,, | Performed by: STUDENT IN AN ORGANIZED HEALTH CARE EDUCATION/TRAINING PROGRAM

## 2023-05-04 PROCEDURE — 3078F DIAST BP <80 MM HG: CPT | Mod: CPTII,S$GLB,, | Performed by: STUDENT IN AN ORGANIZED HEALTH CARE EDUCATION/TRAINING PROGRAM

## 2023-05-04 PROCEDURE — 90834 PSYTX W PT 45 MINUTES: CPT | Mod: S$GLB,,, | Performed by: SOCIAL WORKER

## 2023-05-04 PROCEDURE — 90834 PR PSYCHOTHERAPY W/PATIENT, 45 MIN: ICD-10-PCS | Mod: S$GLB,,, | Performed by: SOCIAL WORKER

## 2023-05-04 PROCEDURE — 3288F PR FALLS RISK ASSESSMENT DOCUMENTED: ICD-10-PCS | Mod: CPTII,S$GLB,, | Performed by: STUDENT IN AN ORGANIZED HEALTH CARE EDUCATION/TRAINING PROGRAM

## 2023-05-04 PROCEDURE — 99999 PR PBB SHADOW E&M-EST. PATIENT-LVL IV: CPT | Mod: PBBFAC,,, | Performed by: STUDENT IN AN ORGANIZED HEALTH CARE EDUCATION/TRAINING PROGRAM

## 2023-05-04 PROCEDURE — 96136 PSYCL/NRPSYC TST PHY/QHP 1ST: CPT | Mod: 59,S$GLB,, | Performed by: STUDENT IN AN ORGANIZED HEALTH CARE EDUCATION/TRAINING PROGRAM

## 2023-05-04 PROCEDURE — 99214 PR OFFICE/OUTPT VISIT, EST, LEVL IV, 30-39 MIN: ICD-10-PCS | Mod: S$GLB,,, | Performed by: STUDENT IN AN ORGANIZED HEALTH CARE EDUCATION/TRAINING PROGRAM

## 2023-05-04 PROCEDURE — 99999 PR PBB SHADOW E&M-EST. PATIENT-LVL I: ICD-10-PCS | Mod: PBBFAC,,, | Performed by: SOCIAL WORKER

## 2023-05-04 PROCEDURE — 1125F AMNT PAIN NOTED PAIN PRSNT: CPT | Mod: CPTII,S$GLB,, | Performed by: STUDENT IN AN ORGANIZED HEALTH CARE EDUCATION/TRAINING PROGRAM

## 2023-05-04 PROCEDURE — 96136 PR PSYCH/NEUROPSYCH TEST ADMIN/SCORING, 2+ TESTS, 1ST 30 MIN: ICD-10-PCS | Mod: 59,S$GLB,, | Performed by: STUDENT IN AN ORGANIZED HEALTH CARE EDUCATION/TRAINING PROGRAM

## 2023-05-04 PROCEDURE — 99214 OFFICE O/P EST MOD 30 MIN: CPT | Mod: S$GLB,,, | Performed by: STUDENT IN AN ORGANIZED HEALTH CARE EDUCATION/TRAINING PROGRAM

## 2023-05-04 PROCEDURE — 99999 PR PBB SHADOW E&M-EST. PATIENT-LVL I: CPT | Mod: PBBFAC,,, | Performed by: SOCIAL WORKER

## 2023-05-04 PROCEDURE — 1160F RVW MEDS BY RX/DR IN RCRD: CPT | Mod: CPTII,S$GLB,, | Performed by: STUDENT IN AN ORGANIZED HEALTH CARE EDUCATION/TRAINING PROGRAM

## 2023-05-04 PROCEDURE — 99999 PR PBB SHADOW E&M-EST. PATIENT-LVL IV: ICD-10-PCS | Mod: PBBFAC,,, | Performed by: STUDENT IN AN ORGANIZED HEALTH CARE EDUCATION/TRAINING PROGRAM

## 2023-05-04 PROCEDURE — 3288F FALL RISK ASSESSMENT DOCD: CPT | Mod: CPTII,S$GLB,, | Performed by: STUDENT IN AN ORGANIZED HEALTH CARE EDUCATION/TRAINING PROGRAM

## 2023-05-04 PROCEDURE — 1159F PR MEDICATION LIST DOCUMENTED IN MEDICAL RECORD: ICD-10-PCS | Mod: CPTII,S$GLB,, | Performed by: STUDENT IN AN ORGANIZED HEALTH CARE EDUCATION/TRAINING PROGRAM

## 2023-05-04 PROCEDURE — 1160F PR REVIEW ALL MEDS BY PRESCRIBER/CLIN PHARMACIST DOCUMENTED: ICD-10-PCS | Mod: CPTII,S$GLB,, | Performed by: STUDENT IN AN ORGANIZED HEALTH CARE EDUCATION/TRAINING PROGRAM

## 2023-05-04 PROCEDURE — 1101F PR PT FALLS ASSESS DOC 0-1 FALLS W/OUT INJ PAST YR: ICD-10-PCS | Mod: CPTII,S$GLB,, | Performed by: STUDENT IN AN ORGANIZED HEALTH CARE EDUCATION/TRAINING PROGRAM

## 2023-05-04 PROCEDURE — 3077F PR MOST RECENT SYSTOLIC BLOOD PRESSURE >= 140 MM HG: ICD-10-PCS | Mod: CPTII,S$GLB,, | Performed by: STUDENT IN AN ORGANIZED HEALTH CARE EDUCATION/TRAINING PROGRAM

## 2023-05-04 PROCEDURE — 1101F PT FALLS ASSESS-DOCD LE1/YR: CPT | Mod: CPTII,S$GLB,, | Performed by: STUDENT IN AN ORGANIZED HEALTH CARE EDUCATION/TRAINING PROGRAM

## 2023-05-04 PROCEDURE — 1159F MED LIST DOCD IN RCRD: CPT | Mod: CPTII,S$GLB,, | Performed by: STUDENT IN AN ORGANIZED HEALTH CARE EDUCATION/TRAINING PROGRAM

## 2023-05-04 RX ORDER — BUSPIRONE HYDROCHLORIDE 7.5 MG/1
7.5 TABLET ORAL DAILY
Qty: 30 TABLET | Refills: 1 | Status: SHIPPED | OUTPATIENT
Start: 2023-05-04 | End: 2023-06-23 | Stop reason: SDUPTHER

## 2023-05-04 RX ORDER — SERTRALINE HYDROCHLORIDE 100 MG/1
200 TABLET, FILM COATED ORAL DAILY
Qty: 60 TABLET | Refills: 1 | Status: SHIPPED | OUTPATIENT
Start: 2023-05-04 | End: 2023-06-23 | Stop reason: SDUPTHER

## 2023-05-04 RX ORDER — DONEPEZIL HYDROCHLORIDE 10 MG/1
10 TABLET, FILM COATED ORAL NIGHTLY
Qty: 30 TABLET | Refills: 1 | Status: SHIPPED | OUTPATIENT
Start: 2023-05-04 | End: 2023-06-23 | Stop reason: SDUPTHER

## 2023-05-04 NOTE — PATIENT INSTRUCTIONS
Referral to neurology for memory problems  Keep therapy appointments  Increase ZOLOFT to 200mg daily  Continue taking BUSPAR 7.5mg once per day  Continue ARICEPT 10mg nightly

## 2023-05-05 ENCOUNTER — TELEPHONE (OUTPATIENT)
Dept: NEUROLOGY | Facility: CLINIC | Age: 78
End: 2023-05-05
Payer: MEDICARE

## 2023-05-05 DIAGNOSIS — G44.209 TENSION HEADACHE: ICD-10-CM

## 2023-05-05 RX ORDER — GABAPENTIN 100 MG/1
CAPSULE ORAL
Qty: 180 CAPSULE | Refills: 0 | Status: SHIPPED | OUTPATIENT
Start: 2023-05-05 | End: 2023-12-18

## 2023-05-05 NOTE — TELEPHONE ENCOUNTER
Spoke to the pt, appt scheduled on 6/21/23 at 1530 with Nusrat Bentley for memory.  Date, time and location discussed.

## 2023-05-08 ENCOUNTER — TELEPHONE (OUTPATIENT)
Dept: OPHTHALMOLOGY | Facility: CLINIC | Age: 78
End: 2023-05-08
Payer: MEDICARE

## 2023-05-08 NOTE — TELEPHONE ENCOUNTER
Spoke to pt and made aware appt still scheduled   ----- Message from Zachery Uriarte sent at 5/8/2023 11:07 AM CDT -----      Name of Who is Calling:PT          What is the request in detail:PT is requesting a call back to discuss questions she has about a letter she received in the mail from your office about rescheduling an appointment. Please be Advised!          Can the clinic reply by [facility]:no          What Number to Call Back if not in [phone]

## 2023-05-08 NOTE — PROGRESS NOTES
Individual Psychotherapy (PhD/LCSW)    5/4/2023    Site:  [facility]         Therapeutic Intervention: Met with patient.  Outpatient - Insight oriented psychotherapy 45 min - CPT code 26254, Outpatient - Behavior modifying psychotherapy 45 min - CPT code 11607, and Outpatient - Supportive psychotherapy 45 min - CPT Code 80753    Chief complaint/reason for encounter: depression and anxiety             Interval history and content of current session:  Ct was referred by Dr. Leong to address depression, anxiety, and trauma. Ct arrived to session on time and was fully engaged. Ct shared about not being able to be as active as she used to be and how much it bothers her. Ct shared that she has concerns about her 's medical issues and taking care of him. She reported that she has support from her sons. She shared that she plans to go out of the country soon to visit her sister. Ct shared about her love of gardening but not being able physically bend and  heavy things. SW recommended ct get a raised planter or garden so that she would not have to bend and she could do something simple so that she can still do something she enjoyed. Ct shared that she was going to talk to her  and sons about it. 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 SW and ct discussed her arthritis and ct working within her limitations and acceptance. Ct to continue in 1:1 sessions.       Treatment plan:  Target symptoms: depression, anxiety   Why chosen therapy is appropriate versus another modality: relevant to diagnosis, patient responds to this modality, evidence based practice  Outcome monitoring methods: self-report  Therapeutic intervention type: insight oriented psychotherapy, behavior modifying psychotherapy, supportive psychotherapy    Risk parameters:  Patient reports no suicidal ideation  Patient reports no homicidal ideation  Patient reports no self-injurious behavior  Patient reports no violent  behavior    CSSRS was completed:     Mental Status Exam:  General Appearance:  unremarkable, age appropriate   Speech: normal tone, normal rate, normal pitch, normal volume      Level of Cooperation: cooperative      Thought Processes: normal and logical   Mood: steady      Thought Content: normal, no suicidality, no homicidality, delusions, or paranoia   Affect: congruent and appropriate   Orientation: Oriented x3   Memory: recent >  unsure   Attention Span & Concentration: intact   Fund of General Knowledge: intact and appropriate to age and level of education   Abstract Reasoning: interpretation of similarities was abstract   Judgment & Insight: fair, intact     Language intact       Verbal deficits: None    Patient's response to intervention:  The patient's response to intervention is accepting.    Progress toward goals and other mental status changes:  The patient's progress toward goals is fair .    Diagnosis:     ICD-10-CM ICD-9-CM   1. Moderate episode of recurrent major depressive disorder  F33.1 296.32   2. Generalized anxiety disorder  F41.1 300.02   3. Mild neurocognitive disorder  G31.84 331.83       Plan:  individual psychotherapy and Ct to follow up with Dr. Leong for psych med mgt  Pt to go to ED or call 911 if symptoms worsen or if she has thoughts of harming self and/or others. Pt verbalized understanding.    Return to clinic: as scheduled    Length of Service (minutes): 45      Each patient to whom he or she provides medical services by telemedicine is: (1) informed of the relationship between the physician and patient and the respective role of any other health care provider with respect to management of the patient; and (2) notified that he or she may decline to receive medical services by telemedicine and may withdraw from such care at any time.

## 2023-05-10 ENCOUNTER — SPECIALTY PHARMACY (OUTPATIENT)
Dept: PHARMACY | Facility: CLINIC | Age: 78
End: 2023-05-10
Payer: MEDICARE

## 2023-05-10 NOTE — TELEPHONE ENCOUNTER
Incoming call from pt's  to schedule Repatha delivery.   stated that pt last injected on 5/6 and will be due to inject again on 5/20.  Claim is RTS until 5/12.  Informed  OSP will contact him back on 5/12 to schedule delivery.   voiced understanding.

## 2023-05-11 ENCOUNTER — OFFICE VISIT (OUTPATIENT)
Dept: PAIN MEDICINE | Facility: CLINIC | Age: 78
End: 2023-05-11
Payer: MEDICARE

## 2023-05-11 VITALS
HEART RATE: 81 BPM | HEIGHT: 59 IN | SYSTOLIC BLOOD PRESSURE: 121 MMHG | DIASTOLIC BLOOD PRESSURE: 63 MMHG | BODY MASS INDEX: 25 KG/M2 | WEIGHT: 124 LBS

## 2023-05-11 DIAGNOSIS — M54.81 BILATERAL OCCIPITAL NEURALGIA: ICD-10-CM

## 2023-05-11 DIAGNOSIS — G89.4 CHRONIC PAIN DISORDER: ICD-10-CM

## 2023-05-11 DIAGNOSIS — M47.892 OTHER SPONDYLOSIS, CERVICAL REGION: ICD-10-CM

## 2023-05-11 DIAGNOSIS — M54.12 CERVICAL RADICULITIS: ICD-10-CM

## 2023-05-11 DIAGNOSIS — M50.30 DDD (DEGENERATIVE DISC DISEASE), CERVICAL: Primary | ICD-10-CM

## 2023-05-11 PROCEDURE — 99999 PR PBB SHADOW E&M-EST. PATIENT-LVL III: ICD-10-PCS | Mod: PBBFAC,,, | Performed by: PHYSICIAN ASSISTANT

## 2023-05-11 PROCEDURE — 99214 PR OFFICE/OUTPT VISIT, EST, LEVL IV, 30-39 MIN: ICD-10-PCS | Mod: S$GLB,,, | Performed by: PHYSICIAN ASSISTANT

## 2023-05-11 PROCEDURE — 1159F MED LIST DOCD IN RCRD: CPT | Mod: CPTII,S$GLB,, | Performed by: PHYSICIAN ASSISTANT

## 2023-05-11 PROCEDURE — 3074F SYST BP LT 130 MM HG: CPT | Mod: CPTII,S$GLB,, | Performed by: PHYSICIAN ASSISTANT

## 2023-05-11 PROCEDURE — 3074F PR MOST RECENT SYSTOLIC BLOOD PRESSURE < 130 MM HG: ICD-10-PCS | Mod: CPTII,S$GLB,, | Performed by: PHYSICIAN ASSISTANT

## 2023-05-11 PROCEDURE — 1101F PR PT FALLS ASSESS DOC 0-1 FALLS W/OUT INJ PAST YR: ICD-10-PCS | Mod: CPTII,S$GLB,, | Performed by: PHYSICIAN ASSISTANT

## 2023-05-11 PROCEDURE — 1125F PR PAIN SEVERITY QUANTIFIED, PAIN PRESENT: ICD-10-PCS | Mod: CPTII,S$GLB,, | Performed by: PHYSICIAN ASSISTANT

## 2023-05-11 PROCEDURE — 1125F AMNT PAIN NOTED PAIN PRSNT: CPT | Mod: CPTII,S$GLB,, | Performed by: PHYSICIAN ASSISTANT

## 2023-05-11 PROCEDURE — 99999 PR PBB SHADOW E&M-EST. PATIENT-LVL III: CPT | Mod: PBBFAC,,, | Performed by: PHYSICIAN ASSISTANT

## 2023-05-11 PROCEDURE — 1159F PR MEDICATION LIST DOCUMENTED IN MEDICAL RECORD: ICD-10-PCS | Mod: CPTII,S$GLB,, | Performed by: PHYSICIAN ASSISTANT

## 2023-05-11 PROCEDURE — 3288F PR FALLS RISK ASSESSMENT DOCUMENTED: ICD-10-PCS | Mod: CPTII,S$GLB,, | Performed by: PHYSICIAN ASSISTANT

## 2023-05-11 PROCEDURE — 3078F DIAST BP <80 MM HG: CPT | Mod: CPTII,S$GLB,, | Performed by: PHYSICIAN ASSISTANT

## 2023-05-11 PROCEDURE — 1101F PT FALLS ASSESS-DOCD LE1/YR: CPT | Mod: CPTII,S$GLB,, | Performed by: PHYSICIAN ASSISTANT

## 2023-05-11 PROCEDURE — 3078F PR MOST RECENT DIASTOLIC BLOOD PRESSURE < 80 MM HG: ICD-10-PCS | Mod: CPTII,S$GLB,, | Performed by: PHYSICIAN ASSISTANT

## 2023-05-11 PROCEDURE — 3288F FALL RISK ASSESSMENT DOCD: CPT | Mod: CPTII,S$GLB,, | Performed by: PHYSICIAN ASSISTANT

## 2023-05-11 PROCEDURE — 99214 OFFICE O/P EST MOD 30 MIN: CPT | Mod: S$GLB,,, | Performed by: PHYSICIAN ASSISTANT

## 2023-05-11 RX ORDER — HYDROCODONE BITARTRATE AND ACETAMINOPHEN 5; 325 MG/1; MG/1
1 TABLET ORAL EVERY 12 HOURS PRN
Qty: 60 TABLET | Refills: 0 | Status: SHIPPED | OUTPATIENT
Start: 2023-05-23 | End: 2023-06-23

## 2023-05-11 RX ORDER — HYDROCODONE BITARTRATE AND ACETAMINOPHEN 5; 325 MG/1; MG/1
1 TABLET ORAL EVERY 12 HOURS PRN
Qty: 60 TABLET | Refills: 0 | Status: SHIPPED | OUTPATIENT
Start: 2023-06-21 | End: 2023-07-20

## 2023-05-11 RX ORDER — CYCLOBENZAPRINE HCL 10 MG
10 TABLET ORAL 2 TIMES DAILY PRN
Qty: 60 TABLET | Refills: 1 | Status: SHIPPED | OUTPATIENT
Start: 2023-05-11 | End: 2023-08-02 | Stop reason: SDUPTHER

## 2023-05-11 RX ORDER — HYDROCODONE BITARTRATE AND ACETAMINOPHEN 5; 325 MG/1; MG/1
1 TABLET ORAL EVERY 12 HOURS PRN
Qty: 60 TABLET | Refills: 0 | Status: SHIPPED | OUTPATIENT
Start: 2023-07-20 | End: 2023-08-02 | Stop reason: SDUPTHER

## 2023-05-11 NOTE — PROGRESS NOTES
Referring Physician: No ref. provider found    PCP: Liseth Carias MD      CC: neck and occipital pain    Interval history: Ms. Richter is a 77 y.o. female with neck pain and occipital neuralgia who returns to our clinic.  She continues to c/o headaches and neck pain.  Most recent occcipital nerve block only provided mild short lived beneft. Recent cervical MELANY improved neck pain that was extending into left shoulder.  She has numbness to her right hand and first through fourth digits. Cervical MELANY in February 2018  provided benefit for several weeks.    She continues to take Norco with benefit.  Flexeril 10 mg caused dry mouth however this resolved and she wishes to resume. Robaxin was helpful but caused dizziness.  Denies UE weakness. No bowel bladder changes.   Pain today is rated 8/10.    Prior HPI:   Patient is 70-year-old female with past history history of cervical DDD, cervical spondylosis and chronic headaches.  She recently moved here from [place], [place].  She is treated in the past by neurology.  She states having constant burning pain over the left side of her posterior scalp.  Pain radiates to her neck as well as a frontal.  She also has left-sided neck pain as well.  She denies any radicular arm pain.  No numbness or weakness.  She states having cervical epidural steroid injection at past with minimal benefit.  She also has had decided of cervical nerve blocks in the past with moderate benefit.  Most recent injection was performed 2 months ago.  She desires repeat injection.  She currently takes Norco 10 mg every 12 hours as needed with moderate benefit.  She also takes Zanaflex 4 mg every 8 hours with mild benefits.  She rates her pain 7/10 today.    Pain intervention history: s/p left occipital nerve blocks on 1/2016 with 50% relief of her headaches  S/p cervical MELANY 2/8/18 moderate relief for a couple of weeks.     ROS:  CONSTITUTIONAL: No fevers, chills, night sweats, wt. loss, appetite  changes  SKIN: no rashes or itching  ENT: No headaches, head trauma, vision changes, or eye pain  LYMPH NODES: None noticed   CV: No chest pain, palpitations.   RESP: No shortness of breath, dyspnea on exertion, cough, wheezing, or hemoptysis  GI: No nausea, emesis, diarrhea, constipation, melena, hematochezia, pain.    : No dysuria, hematuria, urgency, or frequency   HEME: No easy bruising, bleeding problems  PSYCHIATRIC: No depression, anxiety, psychosis, hallucinations.  NEURO: No seizures, memory loss, dizziness or difficulty sleeping  MSK: + History of present illness      Past Medical History:   Diagnosis Date    Anxiety     Arthritis     Cataract     DDD (degenerative disc disease), lumbar     Depression     Encounter for blood transfusion     GERD (gastroesophageal reflux disease)     Headache     Hyperlipidemia     Hypertension     pt states she does not take meds anymore she just watches her weight    Neuromuscular disorder     Occipital neuralgia 3/24/2017    Osteoporosis      Past Surgical History:   Procedure Laterality Date    APPENDECTOMY       SECTION      x 2    CHOLECYSTECTOMY      COLONOSCOPY  prior to     COLONOSCOPY N/A 2019    Procedure: COLONOSCOPY;  Surgeon: Greg Victor MD;  Location: [facility];  Service: Endoscopy;  Laterality: N/A; 2 colon polyps, hemorrhoids; repeat in 5 years for surveillance; biopsy: Tubular adenoma x2    EPIDURAL STEROID INJECTION INTO CERVICAL SPINE N/A 2022    Procedure: Injection-steroid-epidural-cervical C7-T1;  Surgeon: Timothy Grimaldo MD;  Location: [facility] OR;  Service: Pain Management;  Laterality: N/A;    ESOPHAGOGASTRODUODENOSCOPY N/A 2019    Procedure: EGD (ESOPHAGOGASTRODUODENOSCOPY);  Surgeon: Greg Victor MD;  Location: [facility];  Service: Endoscopy;  Laterality: N/A; Mild Schatzki ring. Dilated. small hiatal hernia; Four gastric polyps. Resected and retrieved, gastritis; biopsy:stomach- unremarkable, negative for h  pylori, polyps-Fundic type mucosa with foveolar hyperplasia.    ESOPHAGOGASTRODUODENOSCOPY N/A 2/17/2021    Procedure: EGD (ESOPHAGOGASTRODUODENOSCOPY);  Surgeon: Greg Victor MD;  [facility];  Service: Endoscopy;  Laterality: N/A;    HYSTERECTOMY      Laser Periphery Iridotomy Bilateral     OD 5/26/16 and OS touch up 5/26/2016    UPPER GASTROINTESTINAL ENDOSCOPY  03/14/2017    Dr. Marcial: esophageal stenosis- dilated, gastritis, gastric polyps removed; biopsy- mild gastritis, negative for h pylori, hyperplastic polyp, esophagus unremarkable    vocal cord tumor removal       Family History   Problem Relation Age of Onset    Osteoarthritis Mother     Alcohol abuse Mother     Rheum arthritis Mother     Osteoarthritis Sister     Diabetes Brother     No Known Problems Son     No Known Problems Sister     No Known Problems Sister     No Known Problems Brother     Arthritis Son     No Known Problems Son     Stroke Maternal Grandmother 99    Rheum arthritis Maternal Grandmother     Retinal detachment Neg Hx     Macular degeneration Neg Hx     Glaucoma Neg Hx     Amblyopia Neg Hx     Blindness Neg Hx     Cancer Neg Hx     Cataracts Neg Hx     Hypertension Neg Hx     Strabismus Neg Hx     Thyroid disease Neg Hx     Lupus Neg Hx     Kidney disease Neg Hx     Inflammatory bowel disease Neg Hx     Psoriasis Neg Hx     Colon cancer Neg Hx     Crohn's disease Neg Hx     Ulcerative colitis Neg Hx     Stomach cancer Neg Hx     Esophageal cancer Neg Hx      Social History     Socioeconomic History    Marital status:    Tobacco Use    Smoking status: Never    Smokeless tobacco: Never   Substance and Sexual Activity    Alcohol use: No     Alcohol/week: 0.0 standard drinks    Drug use: Yes     Types: Hydrocodone    Sexual activity: Yes     Partners: Male     Social Determinants of Health     Financial Resource Strain: Low Risk     Difficulty of Paying Living Expenses: Not very hard   Food Insecurity: No Food  "Insecurity    Worried About Running Out of Food in the Last Year: Never true    Ran Out of Food in the Last Year: Never true   Transportation Needs: No Transportation Needs    Lack of Transportation (Medical): No    Lack of Transportation (Non-Medical): No   Physical Activity: Insufficiently Active    Days of Exercise per Week: 2 days    Minutes of Exercise per Session: 30 min   Stress: No Stress Concern Present    Feeling of Stress : Only a little   Social Connections: Socially Integrated    Frequency of Communication with Friends and Family: More than three times a week    Frequency of Social Gatherings with Friends and Family: Once a week    Attends [employer] Services: More than 4 times per year    Active Member of Clubs or Organizations: Yes    Attends Club or Organization Meetings: More than 4 times per year    Marital Status:    Housing Stability: Low Risk     Unable to Pay for Housing in the Last Year: No    Number of Places Lived in the Last Year: 1    Unstable Housing in the Last Year: No         Medications/Allergies: See med card    Vitals:    05/11/23 1304   BP: 121/63   Pulse: 81   Weight: 56.2 kg (124 lb)   Height: 4' 11" (1.499 m)   PainSc:   8   PainLoc: Neck         Physical exam:    GENERAL: A and O x3, the patient appears well groomed and is in no acute distress.  Skin: No rashes or obvious lesions  HEENT: normocephalic, atraumatic  CARDIOVASCULAR:  RRR  LUNGS: nonlabored breathing  ABDOMEN: soft, nontender   UPPER EXTREMITIES: Normal alignment, normal range of motion, no atrophy, no skin changes,  hair growth and nail growth normal and equal bilaterally. No swelling, no tenderness.  +Phalens on left side. +TTP tricep tendon  LOWER EXTREMITIES:  Normal alignment, normal range of motion, no atrophy, no skin changes,  hair growth and nail growth normal and equal bilaterally. No swelling, no tenderness.  CERVICAL SPINE:   Cervical spine: ROM is full in flexion, extension and lateral rotation.  " Painful flexion > extension.  Positive facet loading bilaterally.  Spurling is positive at right side.  Myofascial exam:  Tenderness to palpation across cervical paraspinals deltoid and trapezius muscles bilaterally.      MENTAL STATUS: normal orientation, speech, language, and fund of knowledge for social situation.  Emotional state appropriate.    CRANIAL NERVES:  II:  PERRL bilaterally,   III,IV,VI: EOMI.    V:  Facial sensation equal bilaterally  VII:  Facial motor function normal.  VIII:  Hearing equal to finger rub bilaterally  IX/X: Gag normal, palate symmetric  XI:  Shoulder shrug equal, head turn equal  XII:  Tongue midline without fasciculations      MOTOR: Tone and bulk: normal bilateral upper and lower Strength: normal   Delt Bi Tri WE WF     R 5 5 5 5 5 5   L 5 5 5 5 5 5     IP ADD ABD Quad TA Gas HAM  R 5 5 5 5 5 5 5  L 5 5 5 5 5 5 5    SENSATION: Light touch and pinprick intact bilaterally  REFLEXES: normal, symmetric, nonbrisk.  Toes down, no clonus. No hoffmans.  GAIT: normal rise, base, steps, and arm swing.        Imaging:   Cervical MRI 12/4/17    Narrative     EXAM: Cervical spine MRI without contrast.    INDICATION: Cervical radiculopathy.  Neck pain and occipital neuralgia.  The patient complains of neck and right arm pain.    TECHNIQUE: Routine multiplanar, multisequence unenhanced cervical spine MRI was performed.    COMPARISON: Plain films of the cervical spine obtained concurrently      FINDINGS:     Vertebral column: There is straightening of the cervical spine with loss of normal lordosis.  As seen on concurrent plain films, there is trace anterolisthesis of C3 on C4 with 2 mm anterolisthesis of C4 on C5.  There is marked disc space narrowing at the C5-6 level with moderate to marked disc space narrowing at the C4-5 and C6-7 levels.  There is partial non-segmentation anteriorly at the C2-3 level.  The C2 and C3 as well as the C4 and C5 facet joints appear fused and this may represent  developmental non-segmentation or degenerative ankylosis.  All of the discs are desiccated.  The odontoid process is intact.    Spinal canal, cord, epidural space: The craniovertebral junction is normal.  The spinal canal is omental and normal.  There is no significant spinal stenosis.  The cord is normal in caliber.  There is very subtle flattening of the ventral cord surface at the C4-5 and C5-6 levels where there is degenerative change.  The study is mildly motion but there is no definite cord edema or myelomalacia.    Findings by level:    C2-3: There is no spinal canal or foraminal stenosis.  There is mild left facet joint arthropathy.    C3-4: There is trace anterolisthesis.  There is left greater than right uncovertebral spurring and facet joint arthropathy.  There is a mild disc osteophyte complex, slightly eccentric to the left with subtle annular fissure.  This narrows the ventral subarachnoid space.  There is no spinal stenosis or cord compression.  There is moderate marked left foraminal stenosis.    C4-5: There is moderate disc space narrowing with 2 mm anterolisthesis of C4 on C5.  There is facet joint arthropathy or effusion left greater than right.  There is also mild bilateral but left greater than right uncovertebral spurring.  There is unroofing of a mild disc bulge which narrows the ventral subarachnoid space.  There is no spinal stenosis.  There is mild to moderate left foraminal stenosis.    C5-6:There is marked disc space narrowing.  There is bilateral uncovertebral spurring.  There is a disc osteophyte complex which narrows the subarachnoid space.  This is slightly eccentric to the right There is subtle flattening of the ventral cord surface.  Cord signal is grossly normal.  There is mild spinal stenosis with at least moderate bilateral foraminal stenosis.    C6-7: There is moderate disc space narrowing.  There is mild uncovertebral spurring.  There is a shallow disc osteophyte complex,  slightly eccentric to the right.  There is narrowing of the ventral subarachnoid space.  There is no spinal stenosis.  Cord signal is normal.  There is mild bilateral foraminal stenosis.  There is a small 3.5 mm left foraminal perineural cyst.    C7- T1: There is a Schmorl's node in inferior endplate of C7, chronic.  There are tiny bilateral foraminal perineural cysts.  There is minimal bulging of the annulus and mild facet joint arthropathy without spinal canal or foraminal stenosis.    Soft tissues/other: The prevertebral soft tissues are normal.  The airway is patent.   Impression          1. There is multilevel degenerative disc disease described in detail above.  There is no acute fracture.  There is degenerative listhesis at several levels.  There is some degree of disc osteophyte complex, uncovertebral spurring and/or facet joint arthropathy contributing to some degree of foraminal stenosis at several levels.  There is no significant spinal canal stenosis.  There is very subtle flattening of the ventral cord surface at the C4-5 and C6-7 levels The pertinent findings are summarized below.    2. At the C3-4 level, there is no spinal stenosis.  There is moderate to marked left foraminal stenosis.    3. At the C4-5 level there is no spinal stenosis.  There is mild to moderate left foraminal stenosis.    4. At the C5-6 level, there is mild spinal stenosis with at least moderate bilateral foraminal stenosis.    5. At the C6-7 level, there is no spinal stenosis.  There is mild bilateral foraminal stenosis.    6. There is no spinal canal or foraminal stenosis at the C2-3 or C7-T1 levels.     Assessment:  Mrs. Richter is a 77 y.o. female with neck and head pain    1. DDD (degenerative disc disease), cervical    2. Cervical radiculitis    3. Other spondylosis, cervical region    4. Bilateral occipital neuralgia          Plan:  1. I have stressed the importance of physical activity and exercise to improve overall  health.  2. Monitor progress from repeat C7-T1 IL MELANY. I have explained the risks, benefits, and alternatives of the procedure in detail. The patient voices understanding and all questions have been answered. The patient agrees to proceed as planned. Written Consent obtained.   3. Consider bilateral occipital blocks for head pain.  4. Norco 5mg q12hrs as needed for pain.  reviewed.  Previous UDS consistent   5. Flexeril 10 mg BID #60 1 refill.   6. F/u 3 months or sooner  All medication management was performed by Dr. Timothy Grimaldo

## 2023-05-16 NOTE — TELEPHONE ENCOUNTER
Specialty Pharmacy - Refill Coordination    Specialty Medication Orders Linked to Encounter      Flowsheet Row Most Recent Value   Medication #1 evolocumab (REPATHA SURECLICK) 140 mg/mL PnIj (Order#[id number], Rx#[id number])            Refill Questions - Documented Responses      Flowsheet Row Most Recent Value   Patient Availability and HIPAA Verification    Does patient want to proceed with activity? Yes   HIPAA/medical authority confirmed? Yes   Relationship to patient of person spoken to? Spouse/Significant Other   Refill Screening Questions    Would patient like to speak to a pharmacist? No   When does the patient need to receive the medication? 05/20/23   Refill Delivery Questions    How will the patient receive the medication? [facility]   When does the patient need to receive the medication? 05/20/23   Shipping Address Home   Address in [facility] confirmed and updated if neccessary? Yes   Expected Copay ($) 0   Is the patient able to afford the medication copay? Yes   Payment Method zero copay   Days supply of Refill 28   Supplies needed? No supplies needed   Refill activity completed? Yes   Refill activity plan Refill scheduled   Shipment/Pickup Date: 05/17/23            Current Outpatient Medications   Medication Sig    albuterol (VENTOLIN HFA) 90 mcg/actuation inhaler Inhale 2 puffs into the lungs every 6 (six) hours as needed for Wheezing or Shortness of Breath. Rescue    busPIRone (BUSPAR) 7.5 MG tablet Take 1 tablet (7.5 mg total) by mouth once daily. Continue to take in the afternoon.    cetirizine (ZYRTEC) 5 MG tablet Take 1 tablet (5 mg total) by mouth once daily.    COD LIVER OIL ORAL Take 1 tablet by mouth once daily.     cyclobenzaprine (FLEXERIL) 10 MG tablet Take 1 tablet (10 mg total) by mouth 2 (two) times daily as needed for Muscle spasms.    diclofenac sodium (VOLTAREN) 1 % Gel Apply 2 g topically 4 (four) times daily.    donepeziL (ARICEPT) 10 MG tablet Take 1 tablet (10 mg total) by  mouth every evening.    evolocumab (REPATHA SURECLICK) 140 mg/mL PnIj Inject 1 mL (140 mg total) into the skin every 14 (fourteen) days.    fish oil-omega-3 fatty acids 300-1,000 mg capsule Take 2 g by mouth once daily.    fluticasone propionate (FLONASE) 50 mcg/actuation nasal spray 2 sprays (100 mcg total) by Each Nostril route once daily.    gabapentin (NEURONTIN) 100 MG capsule TAKE 1 CAPSULE TWICE DAILY    gabapentin (NEURONTIN) 300 MG capsule Take 1 capsule (300 mg total) by mouth 2 (two) times daily.    [START ON 5/23/2023] HYDROcodone-acetaminophen (NORCO) 5-325 mg per tablet Take 1 tablet by mouth every 12 (twelve) hours as needed for Pain.    [START ON 6/21/2023] HYDROcodone-acetaminophen (NORCO) 5-325 mg per tablet Take 1 tablet by mouth every 12 (twelve) hours as needed for Pain.    [START ON 7/20/2023] HYDROcodone-acetaminophen (NORCO) 5-325 mg per tablet Take 1 tablet by mouth every 12 (twelve) hours as needed for Pain.    ipratropium (ATROVENT) 42 mcg (0.06 %) nasal spray 2 sprays by Nasal route 3 (three) times daily.    irbesartan (AVAPRO) 300 MG tablet Take 1 tablet (300 mg total) by mouth every evening. New tablet strength (Patient taking differently: Take 150 mg by mouth once daily. Take 2 tablets every evening)    montelukast (SINGULAIR) 10 mg tablet Take 10 mg by mouth nightly.    multivit with min-folic acid 200 mcg Chew Take 1 tablet by mouth once daily.     omeprazole (PRILOSEC) 40 MG capsule TAKE 1 CAPSULE EVERY DAY    polyethylene glycol (GLYCOLAX) 17 gram/dose powder Take 17 g by mouth daily as needed (constipation).     sertraline (ZOLOFT) 100 MG tablet Take 2 tablets (200 mg total) by mouth once daily.   Last reviewed on 5/11/2023  1:06 PM by Melina Taveras MA    Review of patient's allergies indicates:   Allergen Reactions    Aspirin Nausea And Vomiting    Penicillins Itching    Crestor [rosuvastatin]      Muscle pain      Lipitor [atorvastatin]      Achy      Last reviewed on  5/11/2023  1:22 PM by Kathy Jim      Tasks added this encounter   No tasks added.   Tasks due within next 3 months   5/12/2023 - Refill Coordination Outreach (1 time occurrence)     [facility]  [address]  [facility] [address]  Phone: [phone]  Fax: [phone]

## 2023-06-07 ENCOUNTER — PATIENT MESSAGE (OUTPATIENT)
Dept: PHARMACY | Facility: CLINIC | Age: 78
End: 2023-06-07
Payer: MEDICARE

## 2023-06-09 ENCOUNTER — SPECIALTY PHARMACY (OUTPATIENT)
Dept: PHARMACY | Facility: CLINIC | Age: 78
End: 2023-06-09
Payer: MEDICARE

## 2023-06-09 NOTE — TELEPHONE ENCOUNTER
Specialty Pharmacy - Refill Coordination    Specialty Medication Orders Linked to Encounter      Flowsheet Row Most Recent Value   Medication #1 evolocumab (REPATHA SURECLICK) 140 mg/mL PnIj (Order#[id number], Rx#[id number])            Refill Questions - Documented Responses      Flowsheet Row Most Recent Value   Patient Availability and HIPAA Verification    Does patient want to proceed with activity? Yes   HIPAA/medical authority confirmed? Yes   Relationship to patient of person spoken to? Self   Refill Screening Questions    Would patient like to speak to a pharmacist? No   When does the patient need to receive the medication? 06/14/23   Refill Delivery Questions    How will the patient receive the medication? MEDRx   When does the patient need to receive the medication? 06/14/23   Shipping Address Home   Address in [facility] confirmed and updated if neccessary? Yes   Expected Copay ($) 0   Is the patient able to afford the medication copay? Yes   Payment Method zero copay   Days supply of Refill 28   Supplies needed? No supplies needed   Refill activity completed? Yes   Refill activity plan Refill scheduled   Shipment/Pickup Date: 06/12/23            Current Outpatient Medications   Medication Sig    albuterol (VENTOLIN HFA) 90 mcg/actuation inhaler Inhale 2 puffs into the lungs every 6 (six) hours as needed for Wheezing or Shortness of Breath. Rescue    busPIRone (BUSPAR) 7.5 MG tablet Take 1 tablet (7.5 mg total) by mouth once daily. Continue to take in the afternoon.    cetirizine (ZYRTEC) 5 MG tablet Take 1 tablet (5 mg total) by mouth once daily.    COD LIVER OIL ORAL Take 1 tablet by mouth once daily.     cyclobenzaprine (FLEXERIL) 10 MG tablet Take 1 tablet (10 mg total) by mouth 2 (two) times daily as needed for Muscle spasms.    diclofenac sodium (VOLTAREN) 1 % Gel Apply 2 g topically 4 (four) times daily.    donepeziL (ARICEPT) 10 MG tablet Take 1 tablet (10 mg total) by mouth every evening.     evolocumab (REPATHA SURECLICK) 140 mg/mL PnIj Inject 1 mL (140 mg total) into the skin every 14 (fourteen) days.    fish oil-omega-3 fatty acids 300-1,000 mg capsule Take 2 g by mouth once daily.    fluticasone propionate (FLONASE) 50 mcg/actuation nasal spray 2 sprays (100 mcg total) by Each Nostril route once daily.    gabapentin (NEURONTIN) 100 MG capsule TAKE 1 CAPSULE TWICE DAILY    gabapentin (NEURONTIN) 300 MG capsule Take 1 capsule (300 mg total) by mouth 2 (two) times daily.    HYDROcodone-acetaminophen (NORCO) 5-325 mg per tablet Take 1 tablet by mouth every 12 (twelve) hours as needed for Pain.    [START ON 6/21/2023] HYDROcodone-acetaminophen (NORCO) 5-325 mg per tablet Take 1 tablet by mouth every 12 (twelve) hours as needed for Pain.    [START ON 7/20/2023] HYDROcodone-acetaminophen (NORCO) 5-325 mg per tablet Take 1 tablet by mouth every 12 (twelve) hours as needed for Pain.    ipratropium (ATROVENT) 42 mcg (0.06 %) nasal spray 2 sprays by Nasal route 3 (three) times daily.    irbesartan (AVAPRO) 300 MG tablet Take 1 tablet (300 mg total) by mouth every evening. New tablet strength (Patient taking differently: Take 150 mg by mouth once daily. Take 2 tablets every evening)    montelukast (SINGULAIR) 10 mg tablet Take 10 mg by mouth nightly.    multivit with min-folic acid 200 mcg Chew Take 1 tablet by mouth once daily.     omeprazole (PRILOSEC) 40 MG capsule TAKE 1 CAPSULE EVERY DAY    polyethylene glycol (GLYCOLAX) 17 gram/dose powder Take 17 g by mouth daily as needed (constipation).     sertraline (ZOLOFT) 100 MG tablet Take 2 tablets (200 mg total) by mouth once daily.   Last reviewed on 5/11/2023  1:06 PM by Melina Taveras MA    Review of patient's allergies indicates:   Allergen Reactions    Aspirin Nausea And Vomiting    Penicillins Itching    Crestor [rosuvastatin]      Muscle pain      Lipitor [atorvastatin]      Achy      Last reviewed on  5/11/2023 1:22 PM by Kathy Aquino  added this encounter   No tasks added.   Tasks due within next 3 months   No tasks due.     Juan Miguel Weinberg, PharmD  [facility]  [address] [facility] [address]  Phone: [phone]  Fax: [phone]

## 2023-06-14 ENCOUNTER — PES CALL (OUTPATIENT)
Dept: ADMINISTRATIVE | Facility: CLINIC | Age: 78
End: 2023-06-14
Payer: MEDICARE

## 2023-06-20 ENCOUNTER — TELEPHONE (OUTPATIENT)
Dept: NEUROLOGY | Facility: CLINIC | Age: 78
End: 2023-06-20
Payer: MEDICARE

## 2023-06-21 ENCOUNTER — LAB VISIT (OUTPATIENT)
Dept: LAB | Facility: HOSPITAL | Age: 78
End: 2023-06-21
Attending: NURSE PRACTITIONER
Payer: MEDICARE

## 2023-06-21 ENCOUNTER — OFFICE VISIT (OUTPATIENT)
Dept: NEUROLOGY | Facility: CLINIC | Age: 78
End: 2023-06-21
Payer: MEDICARE

## 2023-06-21 ENCOUNTER — TELEPHONE (OUTPATIENT)
Dept: NEUROLOGY | Facility: CLINIC | Age: 78
End: 2023-06-21
Payer: MEDICARE

## 2023-06-21 VITALS
WEIGHT: 123.56 LBS | HEART RATE: 78 BPM | HEIGHT: 59 IN | BODY MASS INDEX: 24.91 KG/M2 | DIASTOLIC BLOOD PRESSURE: 59 MMHG | SYSTOLIC BLOOD PRESSURE: 136 MMHG

## 2023-06-21 DIAGNOSIS — R41.3 OTHER AMNESIA: ICD-10-CM

## 2023-06-21 DIAGNOSIS — R41.3 MEMORY LOSS: Primary | ICD-10-CM

## 2023-06-21 DIAGNOSIS — R41.3 MEMORY LOSS: ICD-10-CM

## 2023-06-21 DIAGNOSIS — I70.0 CALCIFICATION OF AORTA: ICD-10-CM

## 2023-06-21 LAB
TSH SERPL DL<=0.005 MIU/L-ACNC: 1.83 UIU/ML (ref 0.4–4)
VIT B12 SERPL-MCNC: 898 PG/ML (ref 210–950)

## 2023-06-21 PROCEDURE — 3288F FALL RISK ASSESSMENT DOCD: CPT | Mod: CPTII,S$GLB,, | Performed by: NURSE PRACTITIONER

## 2023-06-21 PROCEDURE — 82746 ASSAY OF FOLIC ACID SERUM: CPT | Performed by: NURSE PRACTITIONER

## 2023-06-21 PROCEDURE — 1160F RVW MEDS BY RX/DR IN RCRD: CPT | Mod: CPTII,S$GLB,, | Performed by: NURSE PRACTITIONER

## 2023-06-21 PROCEDURE — 82140 ASSAY OF AMMONIA: CPT | Performed by: NURSE PRACTITIONER

## 2023-06-21 PROCEDURE — 99215 OFFICE O/P EST HI 40 MIN: CPT | Mod: S$GLB,,, | Performed by: NURSE PRACTITIONER

## 2023-06-21 PROCEDURE — 3075F SYST BP GE 130 - 139MM HG: CPT | Mod: CPTII,S$GLB,, | Performed by: NURSE PRACTITIONER

## 2023-06-21 PROCEDURE — 84443 ASSAY THYROID STIM HORMONE: CPT | Performed by: NURSE PRACTITIONER

## 2023-06-21 PROCEDURE — 99999 PR PBB SHADOW E&M-EST. PATIENT-LVL V: ICD-10-PCS | Mod: PBBFAC,,, | Performed by: NURSE PRACTITIONER

## 2023-06-21 PROCEDURE — 3075F PR MOST RECENT SYSTOLIC BLOOD PRESS GE 130-139MM HG: ICD-10-PCS | Mod: CPTII,S$GLB,, | Performed by: NURSE PRACTITIONER

## 2023-06-21 PROCEDURE — 1125F PR PAIN SEVERITY QUANTIFIED, PAIN PRESENT: ICD-10-PCS | Mod: CPTII,S$GLB,, | Performed by: NURSE PRACTITIONER

## 2023-06-21 PROCEDURE — 84425 ASSAY OF VITAMIN B-1: CPT | Performed by: NURSE PRACTITIONER

## 2023-06-21 PROCEDURE — 99999 PR PBB SHADOW E&M-EST. PATIENT-LVL V: CPT | Mod: PBBFAC,,, | Performed by: NURSE PRACTITIONER

## 2023-06-21 PROCEDURE — 1160F PR REVIEW ALL MEDS BY PRESCRIBER/CLIN PHARMACIST DOCUMENTED: ICD-10-PCS | Mod: CPTII,S$GLB,, | Performed by: NURSE PRACTITIONER

## 2023-06-21 PROCEDURE — 3078F PR MOST RECENT DIASTOLIC BLOOD PRESSURE < 80 MM HG: ICD-10-PCS | Mod: CPTII,S$GLB,, | Performed by: NURSE PRACTITIONER

## 2023-06-21 PROCEDURE — 1125F AMNT PAIN NOTED PAIN PRSNT: CPT | Mod: CPTII,S$GLB,, | Performed by: NURSE PRACTITIONER

## 2023-06-21 PROCEDURE — 3078F DIAST BP <80 MM HG: CPT | Mod: CPTII,S$GLB,, | Performed by: NURSE PRACTITIONER

## 2023-06-21 PROCEDURE — 1159F PR MEDICATION LIST DOCUMENTED IN MEDICAL RECORD: ICD-10-PCS | Mod: CPTII,S$GLB,, | Performed by: NURSE PRACTITIONER

## 2023-06-21 PROCEDURE — 36415 COLL VENOUS BLD VENIPUNCTURE: CPT | Mod: PO | Performed by: NURSE PRACTITIONER

## 2023-06-21 PROCEDURE — 99215 PR OFFICE/OUTPT VISIT, EST, LEVL V, 40-54 MIN: ICD-10-PCS | Mod: S$GLB,,, | Performed by: NURSE PRACTITIONER

## 2023-06-21 PROCEDURE — 1100F PR PT FALLS ASSESS DOC 2+ FALLS/FALL W/INJURY/YR: ICD-10-PCS | Mod: CPTII,S$GLB,, | Performed by: NURSE PRACTITIONER

## 2023-06-21 PROCEDURE — 1159F MED LIST DOCD IN RCRD: CPT | Mod: CPTII,S$GLB,, | Performed by: NURSE PRACTITIONER

## 2023-06-21 PROCEDURE — 3288F PR FALLS RISK ASSESSMENT DOCUMENTED: ICD-10-PCS | Mod: CPTII,S$GLB,, | Performed by: NURSE PRACTITIONER

## 2023-06-21 PROCEDURE — 1100F PTFALLS ASSESS-DOCD GE2>/YR: CPT | Mod: CPTII,S$GLB,, | Performed by: NURSE PRACTITIONER

## 2023-06-21 PROCEDURE — 82607 VITAMIN B-12: CPT | Performed by: NURSE PRACTITIONER

## 2023-06-21 PROCEDURE — 83921 ORGANIC ACID SINGLE QUANT: CPT | Performed by: NURSE PRACTITIONER

## 2023-06-21 PROCEDURE — 86592 SYPHILIS TEST NON-TREP QUAL: CPT | Performed by: NURSE PRACTITIONER

## 2023-06-21 NOTE — PROGRESS NOTES
NEUROLOGY  Outpatient Follow Up    [facility]  [address], [place] [address]  [phone] (office) / [phone] (fax)    Patient Name:  Rola Richter  :  1945  MR #:  [id number]  Acct #:  [id number]    Date of Neurology Visit: 2023  Name of Provider: PATRICIA Galloway    Other Physicians:  Liseth Carias MD (Primary Care Physician); Will Leong,* (Referring)      Chief Complaint: Memory Loss      History of Present Illness (HPI):  Patient is a 77 y/o female with a PMHX of GERD, Anxiety, Dperssion, HLD, Osteoporosis, DDD, HTN, headaches    Interval Hx 2023:  Patient is new to me   Patient presents today for memory decline. She is accompanied by her spouse who helps supply information. She reports forgetfulness and will often not follow through with tasks. She misplaces objects all the time.       Patient's highest level of education completed was 6th grade. She worked as a  and housewife. The onset of memory issues likely began about 5 years ago. She struggles more with short term memory loss. She will often get confused when performing tasks.  There is no significant behavioral changes. She does endorse depression/anxiety and takes Sertraline and Buspar which dose offer aid. She denies suicidal ideation. She does follow a psychiatrist for her mood. She reports shadows in her peripheral vision or that her cat is there when he's not. She was told this may be due to her vision. She will be having cataract surgery in the near future. She reports sleeping well at night. There are no reports of dream re-enactment. She reports not being too good with executive function. Spouse manages the finances and has always done so. She manages her own medications without difficulty. She denies issues with hygiene and able to perform ADLs without assistance. She endorses trouble finding her words.  She denies urinary incontinence or recent falls. She  does not drive and never did. She likes to stay busy with house and garden work.   She was started on Aricept back in 2021 and it was recently increased to 10 mg QHS. She tolerates this medication well.           Past Medical, Surgical, Family & Social History:   Reviewed and updated.    Home Medications:     Current Outpatient Medications:     busPIRone (BUSPAR) 7.5 MG tablet, Take 1 tablet (7.5 mg total) by mouth once daily. Continue to take in the afternoon., Disp: 30 tablet, Rfl: 1    COD LIVER OIL ORAL, Take 1 tablet by mouth once daily. , Disp: , Rfl:     diclofenac sodium (VOLTAREN) 1 % Gel, Apply 2 g topically 4 (four) times daily., Disp: 450 g, Rfl: 3    donepeziL (ARICEPT) 10 MG tablet, Take 1 tablet (10 mg total) by mouth every evening., Disp: 30 tablet, Rfl: 1    evolocumab (REPATHA SURECLICK) 140 mg/mL PnIj, Inject 1 mL (140 mg total) into the skin every 14 (fourteen) days., Disp: 2 mL, Rfl: 2    fish oil-omega-3 fatty acids 300-1,000 mg capsule, Take 2 g by mouth once daily., Disp: , Rfl:     fluticasone propionate (FLONASE) 50 mcg/actuation nasal spray, 2 sprays (100 mcg total) by Each Nostril route once daily., Disp: 16 g, Rfl: 11    gabapentin (NEURONTIN) 100 MG capsule, TAKE 1 CAPSULE TWICE DAILY, Disp: 180 capsule, Rfl: 0    HYDROcodone-acetaminophen (NORCO) 5-325 mg per tablet, Take 1 tablet by mouth every 12 (twelve) hours as needed for Pain., Disp: 60 tablet, Rfl: 0    ipratropium (ATROVENT) 42 mcg (0.06 %) nasal spray, 2 sprays by Nasal route 3 (three) times daily., Disp: 15 mL, Rfl: 6    irbesartan (AVAPRO) 300 MG tablet, Take 1 tablet (300 mg total) by mouth every evening. New tablet strength (Patient taking differently: Take 150 mg by mouth once daily. Take 2 tablets every evening), Disp: 90 tablet, Rfl: 3    multivit with min-folic acid 200 mcg Chew, Take 1 tablet by mouth once daily. , Disp: , Rfl:     polyethylene glycol (GLYCOLAX) 17 gram/dose powder, Take 17 g by mouth daily as needed  "(constipation). , Disp: , Rfl:     sertraline (ZOLOFT) 100 MG tablet, Take 2 tablets (200 mg total) by mouth once daily., Disp: 60 tablet, Rfl: 1    albuterol (VENTOLIN HFA) 90 mcg/actuation inhaler, Inhale 2 puffs into the lungs every 6 (six) hours as needed for Wheezing or Shortness of Breath. Rescue (Patient not taking: Reported on 6/21/2023), Disp: 6.7 g, Rfl: 0    cetirizine (ZYRTEC) 5 MG tablet, Take 1 tablet (5 mg total) by mouth once daily., Disp: 30 tablet, Rfl: 11    gabapentin (NEURONTIN) 300 MG capsule, Take 1 capsule (300 mg total) by mouth 2 (two) times daily. (Patient not taking: Reported on 6/21/2023), Disp: 180 capsule, Rfl: 1    HYDROcodone-acetaminophen (NORCO) 5-325 mg per tablet, Take 1 tablet by mouth every 12 (twelve) hours as needed for Pain. (Patient not taking: Reported on 6/21/2023), Disp: 60 tablet, Rfl: 0    [START ON 7/20/2023] HYDROcodone-acetaminophen (NORCO) 5-325 mg per tablet, Take 1 tablet by mouth every 12 (twelve) hours as needed for Pain. (Patient not taking: Reported on 6/21/2023), Disp: 60 tablet, Rfl: 0    montelukast (SINGULAIR) 10 mg tablet, Take 10 mg by mouth nightly., Disp: , Rfl:     omeprazole (PRILOSEC) 40 MG capsule, TAKE 1 CAPSULE EVERY DAY, Disp: 90 capsule, Rfl: 2    Physical Examination:  BP (!) 136/59 (BP Location: Left arm, Patient Position: Sitting, BP Method: Small (Automatic))   Pulse 78   Ht 4' 11" (1.499 m)   Wt 56.1 kg (123 lb 9.1 oz)   BMI 24.96 kg/m²           GENERAL:  General appearance: Well, non-toxic appearing.  No apparent distress.  Neck: supple.    MENTAL STATUS:  Alertness, attention span & concentration: normal.  Language: normal.  Orientation to self, place & time:  normal.  Memory, recent & remote: limted  Fund of knowledge: normal.  MMSE: 22/30    SPEECH:  Clear and fluent.word finding noted  Follows complex commands.    CRANIAL NERVES:  Cranial Nerves II-XII were examined.  II - Visual fields: visual impairment   III, IV, VI: PERRL, " EOMI, No ptosis, No nystagmus.  V - Facial sensation: normal.  VII - Face symmetry & mobility: normal.  VIII - Hearing: normal  IX, X - Palate: mobile & midline.  XI - Shoulder shrug: normal.  XII - Tongue protrusion: normal.      GROSS MOTOR:  Gait & station: non focal  Tone: normal.  Abnormal movements: none.  Finger-nose:  normal.  Rapid alternating movements: normal.  Pronator drift: normal        MUSCLE STRENGTH:   5/5 strength throughout       REFLEXES:    RIGHT Reflex   LEFT   2+ Biceps 2+   2+ Brachiorad. 2+        2+ Patellar 2+     SENSORY:  Light touch: Normal throughout.         Diagnostic Data Reviewed:     Folate   Date Value Ref Range Status   02/25/2021 33.7 (H) 4.0 - 24.0 ng/mL Final     Vitamin B-12   Date Value Ref Range Status   02/25/2021 817 210 - 950 pg/mL Final     TSH   Date Value Ref Range Status   02/25/2021 1.840 0.400 - 4.000 uIU/mL Final                      Assessment and Plan:      Problem List Items Addressed This Visit          Neuro    Memory loss - Primary    Current Assessment & Plan     Patient is a 79 y/o female that presents for formal memory evaluation. Onset of memory changes began ~ 5 years ago. She reports forgetfulness and misplacing items.   There are no reports of behavioral changes, sleep disturbances, recent falls, urinary incontinence. She does endorse depression that is reportedly controlled on Sertraline and Buspar. There is questionable hallucinations vs visual disturbances as she reports seeing shadows out her peripheral.   MMSE today 22/30   - suspect mild/mod neurocognitive disorder; unknown etiology   - education level noted as pt did have difficulty with certain areas   Obtain MRI brain scan & serologies  Discussed role vs expectation of cognitive enhancing medications at length    - Aricept initiated in 2021 and recently increased to goal therapy. Continue!   - consider adding Namenda in future  Referral placed to Neuro psych for further testing and  recs  Continue following psych for med management   Discussed importance of brain stimulation and educated pt on side effects of opiates             Cardiac/Vascular    Calcification of aorta    Current Assessment & Plan     As noted on CT from 2021  Control vascular risk factors          Other Visit Diagnoses       Other amnesia                      Important to note, also  has a past medical history of Anxiety, Arthritis, Cataract, DDD (degenerative disc disease), lumbar, Depression, Encounter for blood transfusion, GERD (gastroesophageal reflux disease), Headache, Hyperlipidemia, Hypertension, Neuromuscular disorder, Occipital neuralgia (3/24/2017), and Osteoporosis.          The patient will return to clinic in 6 months.    All questions were answered and patient is comfortable with the plan.         Thank you very much for the opportunity to assist in this patient's care.    If you have any questions or concerns, please do not hesitate to contact me at any time.      Sincerely,     PATRICIA Galloway  [facility] [place]         I spent a total of 53 minutes on the day of the visit.This includes face to face time and non-face to face time preparing to see the patient (eg, review of tests), Obtaining and/or reviewing separately obtained history, Documenting clinical information in the electronic or other health record, Independently interpreting resultsand communicating results to the patient/family/caregiver, or Care coordination.

## 2023-06-21 NOTE — TELEPHONE ENCOUNTER
Spoke with patient regarding appointment for today at 3:30 pm with Nusrat Bentley NP. Asked patient if she would like to come in for 3 instead. Patient states that she will be here by 3 pm for appointment with Nusrat Bentley NP.

## 2023-06-21 NOTE — ASSESSMENT & PLAN NOTE
Patient is a 79 y/o female that presents for formal memory evaluation. Onset of memory changes began ~ 5 years ago. She reports forgetfulness and misplacing items.   There are no reports of behavioral changes, sleep disturbances, recent falls, urinary incontinence. She does endorse depression that is reportedly controlled on Sertraline and Buspar. There is questionable hallucinations vs visual disturbances as she reports seeing shadows out her peripheral.   MMSE today 22/30   - suspect mild/mod neurocognitive disorder; unknown etiology   - education level noted as pt did have difficulty with certain areas   Obtain MRI brain scan & serologies  Discussed role vs expectation of cognitive enhancing medications at length    - Aricept initiated in 2021 and recently increased to goal therapy. Continue!   - consider adding Namenda in future  Referral placed to Neuro psych for further testing and recs  Continue following psych for med management   Discussed importance of brain stimulation and educated pt on side effects of opiates

## 2023-06-21 NOTE — PATIENT INSTRUCTIONS
To prevent further memory loss, some of the best preventative measures are following a healthy diet, getting regular exercise, and ensuring good sleep habits.  Approximately 30 to 45 minutes of brisk physical activity (brisk walking, swimming, stationary bicycle, etc.) 5 days a week has been shown to improve function in vascular dementias, and can lower the risk of stroke and slow progression of memory loss.    A Mediterranean style diet, or the DASH diet with lots of fresh fruits and vegetables, whole grains, more fish and chicken, less red meat, less dairy, less processed foods is also beneficial. For more information see:     https://www.rush.[facility]/news/diet-may-help-prevent-alzheimers     Minimize or eliminate the use of alcohol, and discuss any prescription medications you might be taking with your doctor to avoid medications which can cause sedation or worsen cognitive function.    Establish a regular, consistent sleep pattern and practice good sleep hygiene.  Avoid screen time (computer, TV, smartphones or tablets) or heavy meals for at least an hour before bedtime, and avoid caffeine or stimulants after 2 PM. Exercise earlier in the day or mornings and keep your sleeping environment comfortable.     Socializing with friends and family and staying both mentally and physically active is also very important. Continue with hobbies or activities that are engaging and practice activities that are mentally stimulating (word puzzles, Sudoku, etc) and stay active in the community.    Some supplements which may be of potential benefit include:  - tumeric (curcumin) supplement  - omega-3 fatty acids with sufficient amounts of EPA and DHA, 1000 to 2000 mg a day   - Vitamin D3 supplement 2000 units (IU) daily   - Green tea and green tea extracts may also help (caffeine free)   - supplements containing Phosphatidylserine (PS) and phosphatidylcholine (PC)     There may be some benefit to dietary supplements, however there is  no definitive evidence to support the prevention of cognitive impairment or dementia.     Please look into the following websites, to help you find resources including day programs, caregivers and caregiver support, legal questions, support groups, etc.    [facility] on Aging[employer] ([facility])  [facility] - [address]  [facility] - [address] [facility]    Group meetings facilitated by Rogelio Justice MA, BREONNA [phone]    ADDRESS  Mailing Address:  [address]  [place] [address] Street Address:  [address] [place] [address]   Web Address:  www.[facility].[employer] [place]     Services offered at this location:  Chore, Congregate Meals, Home Delivered Meals, Homemaker, Information and Assistance, Legal, Material Aid, Medical Alert, Medication Management, NFCSP Information and Assistance, NFCSP In-Home Respite, NFCSP Material Aid, NGCSP Personal Care , NFCSP Public Education, NFCSP Sitter Service, NFCSP Support Groups, Nutrition Counseling, Nutrition Education, Outreach, Recreation, Transportation, Utility Assistance, Wellness, Area Agency on Aging, [place] on Aging      Website for the Alzheimers Association:  http://www.alz.org/   Excellent resources for finding community resources  Action plan navigator and online tools  Call [phone] for 24/7 helpline    [facility] Office of Aging and Adult Services:  Http://www.ldh.[place].gov/index.cfm/subhome/12/n/7    Peace With Dementia: http://carepartSaferTaxi.com/    Website for [facility] Agency on Ageing: http://goea.[place].gov/index.cfm?md=mio&tmp=category&catID=38&nid=24&ssid=0&startIndex=1       PATIENT/FAMILY RESOURCES:  1. Alzheimer's Association       http://www.alz.org  2. Alzheimer's Foundation of [place]     http://www.alzfdn.org  3. The Alzheimers Disease Education and Referral Center  https://www.[place].nih.gov/alzheimers  4. Lewy Body Dementia Association      http://www.lbda.org  5. National [place]  of Mental Health     http://www.nimh.nih.gov  6. National [place] on Mental Illness     http://www.danitza.org  7. Mental Health [place]       http://www.mentalhealthamerica.net  8. Mental Health.gov        http://www.mentalhealth.gov  9. National [place] for Behavioral Health     http://www.thenationalcouncil.org  10. Substance Abuse and Mental Health Services Administration  http://www.samhsa.gov    11. Licensed local counselors, social workers, psychiatrists, psychologists - one starting point is the Psychology Today website, therapist finder

## 2023-06-21 NOTE — PROGRESS NOTES
Caregiver name: Aldo  Relationship to the patient: ricardo  Does the patient have a living will? No  Does the patient have a designated healthcare POA? No  Does the patient have a designated general POA? Yes    Have educational materials/resources been provided? Yes      Activities of Daily Living    Bathing: Independent  Dressing: Needs Help  Grooming: Independent  Mouth Care: Independent  Toileting: Independent  Transferring Bed/Chair: Independent  Walking: Independent  Climbing: Independent  Eating: Independent      Instrumental Activities of Daily Living    Shopping: Needs Help  Cooking: Independent  Managing Medications: Independent  Using the phone and looking up numbers: Independent  Doing Housework: Independent  Doing Laundry: Independent  Driving or using public transportation: Cannot Do  Managing finances: Dependent    Functional Assessment Staging  2   Complains of forgetting location of objects. Subjective work difficulties.         Depression Patient Health Questionnaire 5/11/2023 2/14/2023 11/16/2022 8/23/2022 5/24/2022 5/18/2022 2/22/2022   Over the last two weeks how often have you been bothered by little interest or pleasure in doing things Not at all Not at all Not at all Not at all Not at all Not at all More than half the days   Over the last two weeks how often have you been bothered by feeling down, depressed or hopeless Not at all Not at all Not at all Not at all Several days Not at all Nearly every day   PHQ-2 Total Score 0 0 0 0 1 0 5   Over the last two weeks how often have you been bothered by trouble falling or staying asleep, or sleeping too much - - - - - - More than half the days   Over the last two weeks how often have you been bothered by feeling tired or having little energy - - - - - - Nearly every day   Over the last two weeks how often have you been bothered by a poor appetite or overeating - - - - - - Nearly every day   Over the last two weeks how often have you been bothered  by feeling bad about yourself - or that you are a failure or have let yourself or your family down - - - - - - More than half the days   Over the last two weeks how often have you been bothered by trouble concentrating on things, such as reading the newspaper or watching television - - - - - - Not at all   Over the last two weeks how often have you been bothered by moving or speaking so slowly that other people could have noticed. Or the opposite - being so fidgety or restless that you have been moving around a lot more than usual. - - - - - - Not at all   Over the last two weeks how often have you been bothered by thoughts that you would be better off dead, or of hurting yourself - - - - - - Not at all   If you checked off any problems, how difficult have these problems made it for you to do your work, take care of things at home or get along with other people? - - - - - - Not difficult at all   Total Score - - - - - - 15   Interpretation - - - - - - Moderately Severe   Some encounter information is confidential and restricted. Go to Review Flowsheets activity to see all data.

## 2023-06-22 DIAGNOSIS — G89.4 CHRONIC PAIN DISORDER: ICD-10-CM

## 2023-06-22 LAB
AMMONIA PLAS-SCNC: 36 UMOL/L (ref 10–50)
FOLATE SERPL-MCNC: 39.9 NG/ML (ref 4–24)
RPR SER QL: NORMAL

## 2023-06-23 ENCOUNTER — OFFICE VISIT (OUTPATIENT)
Dept: PSYCHIATRY | Facility: CLINIC | Age: 78
End: 2023-06-23
Payer: MEDICARE

## 2023-06-23 VITALS
BODY MASS INDEX: 24.53 KG/M2 | DIASTOLIC BLOOD PRESSURE: 69 MMHG | WEIGHT: 121.69 LBS | HEIGHT: 59 IN | SYSTOLIC BLOOD PRESSURE: 137 MMHG | HEART RATE: 75 BPM

## 2023-06-23 DIAGNOSIS — F19.20 DEPENDENCY ON PAIN MEDICATION: ICD-10-CM

## 2023-06-23 DIAGNOSIS — F33.1 MODERATE EPISODE OF RECURRENT MAJOR DEPRESSIVE DISORDER: Primary | ICD-10-CM

## 2023-06-23 DIAGNOSIS — R41.89 COGNITIVE CHANGES: ICD-10-CM

## 2023-06-23 DIAGNOSIS — F41.1 GENERALIZED ANXIETY DISORDER: ICD-10-CM

## 2023-06-23 DIAGNOSIS — F43.10 PTSD (POST-TRAUMATIC STRESS DISORDER): ICD-10-CM

## 2023-06-23 DIAGNOSIS — G31.84 MILD NEUROCOGNITIVE DISORDER: ICD-10-CM

## 2023-06-23 PROCEDURE — 99999 PR PBB SHADOW E&M-EST. PATIENT-LVL I: ICD-10-PCS | Mod: PBBFAC,,, | Performed by: SOCIAL WORKER

## 2023-06-23 PROCEDURE — 1125F AMNT PAIN NOTED PAIN PRSNT: CPT | Mod: CPTII,S$GLB,, | Performed by: STUDENT IN AN ORGANIZED HEALTH CARE EDUCATION/TRAINING PROGRAM

## 2023-06-23 PROCEDURE — 96136 PSYCL/NRPSYC TST PHY/QHP 1ST: CPT | Mod: 59,S$GLB,, | Performed by: STUDENT IN AN ORGANIZED HEALTH CARE EDUCATION/TRAINING PROGRAM

## 2023-06-23 PROCEDURE — 3075F PR MOST RECENT SYSTOLIC BLOOD PRESS GE 130-139MM HG: ICD-10-PCS | Mod: CPTII,S$GLB,, | Performed by: STUDENT IN AN ORGANIZED HEALTH CARE EDUCATION/TRAINING PROGRAM

## 2023-06-23 PROCEDURE — 3288F FALL RISK ASSESSMENT DOCD: CPT | Mod: CPTII,S$GLB,, | Performed by: STUDENT IN AN ORGANIZED HEALTH CARE EDUCATION/TRAINING PROGRAM

## 2023-06-23 PROCEDURE — 96136 PR PSYCH/NEUROPSYCH TEST ADMIN/SCORING, 2+ TESTS, 1ST 30 MIN: ICD-10-PCS | Mod: 59,S$GLB,, | Performed by: STUDENT IN AN ORGANIZED HEALTH CARE EDUCATION/TRAINING PROGRAM

## 2023-06-23 PROCEDURE — 99999 PR PBB SHADOW E&M-EST. PATIENT-LVL III: CPT | Mod: PBBFAC,,, | Performed by: STUDENT IN AN ORGANIZED HEALTH CARE EDUCATION/TRAINING PROGRAM

## 2023-06-23 PROCEDURE — 99214 PR OFFICE/OUTPT VISIT, EST, LEVL IV, 30-39 MIN: ICD-10-PCS | Mod: S$GLB,,, | Performed by: STUDENT IN AN ORGANIZED HEALTH CARE EDUCATION/TRAINING PROGRAM

## 2023-06-23 PROCEDURE — 3078F DIAST BP <80 MM HG: CPT | Mod: CPTII,S$GLB,, | Performed by: STUDENT IN AN ORGANIZED HEALTH CARE EDUCATION/TRAINING PROGRAM

## 2023-06-23 PROCEDURE — 90834 PSYTX W PT 45 MINUTES: CPT | Mod: S$GLB,,, | Performed by: SOCIAL WORKER

## 2023-06-23 PROCEDURE — 1160F PR REVIEW ALL MEDS BY PRESCRIBER/CLIN PHARMACIST DOCUMENTED: ICD-10-PCS | Mod: CPTII,S$GLB,, | Performed by: STUDENT IN AN ORGANIZED HEALTH CARE EDUCATION/TRAINING PROGRAM

## 2023-06-23 PROCEDURE — 1159F MED LIST DOCD IN RCRD: CPT | Mod: CPTII,S$GLB,, | Performed by: SOCIAL WORKER

## 2023-06-23 PROCEDURE — 99999 PR PBB SHADOW E&M-EST. PATIENT-LVL I: CPT | Mod: PBBFAC,,, | Performed by: SOCIAL WORKER

## 2023-06-23 PROCEDURE — 1160F RVW MEDS BY RX/DR IN RCRD: CPT | Mod: CPTII,S$GLB,, | Performed by: STUDENT IN AN ORGANIZED HEALTH CARE EDUCATION/TRAINING PROGRAM

## 2023-06-23 PROCEDURE — 3288F PR FALLS RISK ASSESSMENT DOCUMENTED: ICD-10-PCS | Mod: CPTII,S$GLB,, | Performed by: STUDENT IN AN ORGANIZED HEALTH CARE EDUCATION/TRAINING PROGRAM

## 2023-06-23 PROCEDURE — 3075F SYST BP GE 130 - 139MM HG: CPT | Mod: CPTII,S$GLB,, | Performed by: STUDENT IN AN ORGANIZED HEALTH CARE EDUCATION/TRAINING PROGRAM

## 2023-06-23 PROCEDURE — 99999 PR PBB SHADOW E&M-EST. PATIENT-LVL III: ICD-10-PCS | Mod: PBBFAC,,, | Performed by: STUDENT IN AN ORGANIZED HEALTH CARE EDUCATION/TRAINING PROGRAM

## 2023-06-23 PROCEDURE — 1159F PR MEDICATION LIST DOCUMENTED IN MEDICAL RECORD: ICD-10-PCS | Mod: CPTII,S$GLB,, | Performed by: SOCIAL WORKER

## 2023-06-23 PROCEDURE — 1159F PR MEDICATION LIST DOCUMENTED IN MEDICAL RECORD: ICD-10-PCS | Mod: CPTII,S$GLB,, | Performed by: STUDENT IN AN ORGANIZED HEALTH CARE EDUCATION/TRAINING PROGRAM

## 2023-06-23 PROCEDURE — 99214 OFFICE O/P EST MOD 30 MIN: CPT | Mod: S$GLB,,, | Performed by: STUDENT IN AN ORGANIZED HEALTH CARE EDUCATION/TRAINING PROGRAM

## 2023-06-23 PROCEDURE — 1159F MED LIST DOCD IN RCRD: CPT | Mod: CPTII,S$GLB,, | Performed by: STUDENT IN AN ORGANIZED HEALTH CARE EDUCATION/TRAINING PROGRAM

## 2023-06-23 PROCEDURE — 1125F PR PAIN SEVERITY QUANTIFIED, PAIN PRESENT: ICD-10-PCS | Mod: CPTII,S$GLB,, | Performed by: STUDENT IN AN ORGANIZED HEALTH CARE EDUCATION/TRAINING PROGRAM

## 2023-06-23 PROCEDURE — 1101F PR PT FALLS ASSESS DOC 0-1 FALLS W/OUT INJ PAST YR: ICD-10-PCS | Mod: CPTII,S$GLB,, | Performed by: STUDENT IN AN ORGANIZED HEALTH CARE EDUCATION/TRAINING PROGRAM

## 2023-06-23 PROCEDURE — 3078F PR MOST RECENT DIASTOLIC BLOOD PRESSURE < 80 MM HG: ICD-10-PCS | Mod: CPTII,S$GLB,, | Performed by: STUDENT IN AN ORGANIZED HEALTH CARE EDUCATION/TRAINING PROGRAM

## 2023-06-23 PROCEDURE — 1101F PT FALLS ASSESS-DOCD LE1/YR: CPT | Mod: CPTII,S$GLB,, | Performed by: STUDENT IN AN ORGANIZED HEALTH CARE EDUCATION/TRAINING PROGRAM

## 2023-06-23 PROCEDURE — 90834 PR PSYCHOTHERAPY W/PATIENT, 45 MIN: ICD-10-PCS | Mod: S$GLB,,, | Performed by: SOCIAL WORKER

## 2023-06-23 RX ORDER — HYDROCODONE BITARTRATE AND ACETAMINOPHEN 5; 325 MG/1; MG/1
1 TABLET ORAL EVERY 12 HOURS PRN
Qty: 60 TABLET | Refills: 0 | Status: SHIPPED | OUTPATIENT
Start: 2023-06-23 | End: 2023-07-22

## 2023-06-23 RX ORDER — BUSPIRONE HYDROCHLORIDE 7.5 MG/1
7.5 TABLET ORAL DAILY
Qty: 30 TABLET | Refills: 2 | Status: SHIPPED | OUTPATIENT
Start: 2023-06-23 | End: 2023-08-31 | Stop reason: DRUGHIGH

## 2023-06-23 RX ORDER — SERTRALINE HYDROCHLORIDE 100 MG/1
200 TABLET, FILM COATED ORAL DAILY
Qty: 60 TABLET | Refills: 2 | Status: SHIPPED | OUTPATIENT
Start: 2023-06-23 | End: 2023-08-31 | Stop reason: SDUPTHER

## 2023-06-23 RX ORDER — DONEPEZIL HYDROCHLORIDE 10 MG/1
10 TABLET, FILM COATED ORAL NIGHTLY
Qty: 30 TABLET | Refills: 2 | Status: SHIPPED | OUTPATIENT
Start: 2023-06-23 | End: 2023-11-07 | Stop reason: SDUPTHER

## 2023-06-23 NOTE — PATIENT INSTRUCTIONS
Continue medications as prescribed  Keep appointments with specialists  Keep appointments for tests  Keep appointments with therapist

## 2023-06-23 NOTE — TELEPHONE ENCOUNTER
I called pt spouse advised that I can see a script from 06/21 and he states pharmacy doesn't have it I called pharmacy to see if they have it to be filled.        They state that rx was pulled 06/23 and all they have is 07/20 can  you please reorder for today if appropriate? Thank you

## 2023-06-23 NOTE — TELEPHONE ENCOUNTER
----- Message from Lynne Josue, Patient Care Assistant sent at 6/23/2023 12:42 PM CDT -----  Contact: Aldo gillis  Type:  RX Refill Request    Who Called:  Aldo  Refill or New Rx:  refill  RX Name and Strength:  HYDROcodone-acetaminophen (NORCO) 5-325 mg per tablet  How is the patient currently taking it? (ex. 1XDay):  as directed  Is this a 30 day or 90 day RX:  30  Preferred Pharmacy with phone number:    [id number] - [place], [place] - [address] [facility] [address]  [address] [facility] [address]  Phone: [phone] Fax: [phone]  Local or Mail Order:  local  Ordering Provider:  Dr Dillan Ortiz Call Back Number:  [phone]  Additional Information:  thanks

## 2023-06-23 NOTE — PROGRESS NOTES
Outpatient Psychiatry Followup Visit (DO/MD/NP)    6/23/2023    Assessment - Plan:     Diagnostic Impression     ICD-10-CM ICD-9-CM   1. Moderate episode of recurrent major depressive disorder  F33.1 296.32   2. Generalized anxiety disorder  F41.1 300.02   3. Mild neurocognitive disorder  G31.84 331.83   4. PTSD (post-traumatic stress disorder)  F43.10 309.81   5. 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 BMI 24.0-24.9, adult  Z68.24 V85.1      6/23/2023 (2) Ongoing treatment of residual symptoms with some favorable progress.     Medication Management   Chart was reviewed. The risks and benefits of medication were discussed with pt. The treatment plan and followup plan were reviewed with pt. Pt understands to contact clinic if symptoms worsen. Pt understands to call 911 or go to nearest ER for suicidal ideation, intent or plan.   RX History ARICEPT, BARBITURATES, BUSPAR, GABAPENTIN, PROZAC, WELLBUTRIN, and ZOLOFT   Current RX Continue BUSPAR  Adjustments:  95QUV1972: Reduce to 7.5mg in the afternoon  92UQV0037: Reduce to 7.5mg BID  66VKO8329: Increase to 10mg BID  98NZK0962: Start 5mg BID for 3 days  Prior to 31JAN2023 pt had been taking ZOLOFT 150mg daily  Continue ZOLOFT  Adjustments:  82WVM4480: Increase to 200mg daily  86FZA6752: Continue 150mg daily  Prior to evaluation pt had been taking 150mg daily  Continue ARICEPT   To be continued by neurology because this is for a neurological problem - 90d supply provided with MS. JONES to renew  Adjustments:  77OBD1244: Increase to 10mg HS  66MGO2471: Continue 5mg HS  Prior to evaluation pt had been taking 5mg HS   Education & Counseling RX administration and adherence   Other Orders Continue individual psychotherapy   Monitor VITAL SIGNS  Instruments: PROMIS (A&D), PSS4   RETURN R: RETURN IN 8 WEEKS (TWO MONTHS), and reassess frequency three visits from now  Continue medication management.     Interval History - Review of Systems:     Available  documentation has been reviewed, and pertinent elements of the chart have been incorporated into this note where appropriate.   9/16/2022 : first Epic encounter with psychiatry department  5/4/2023 : last Epic encounter with psychiatry department  5/4/2023 : last Epic encounter with writer   Rolamarifer Richter, a 78 y.o. female, presenting for followup visit.      Global Subjective Rating: good    [place] [facility]    Kept neurology appointment. MRI pending. Neurology considering NAMENDA and continued ARICEPT. Advised pt Neurology to continue refills of ARICEPT, as this is a medication for a neurological problem.    Keeping therapy appointments.    Doing well. Perceptual disturbances improved. Cataract surgery upcoming.    Seems to be taking medications as prescribed.    Can reduce frequency of visits from q4w to q8w.     Vegetative Functions   Sleep [] Y  [x] N  No concerns.   GI/ [] Y  [] N     Appetite [] Y  [] N     Emotion and Mood   Negative Bias-Rumination [] Y  [x] N  Overall no concerns, or concerns are minimal.   Anhed./Context Insens. [] Y  [] N     Mood Dysregulation [] Y  [] N     Anxiety and Threat Perception   Rumination [] Y  [x] N  Overall better.   Salience-Anxious Av. [] Y  [] N     Threat Dysregulation [] Y  [] N     Consciousness, Cognition and Reality Testing   Cognitive Dyscontrol [] Y  [] N     Inattention [] Y  [] N     Memory Problems [] Y  [] N     Perception Problems [x] Y  [] N  Overall better.   Problems in Social Spheres   Home [] Y  [x] N  stable   Peers [] Y  [] N     Work [] Y  [] N     Stress [] Y  [] N       Pt did NOT display signs of nor endorse symptoms of overt psychosis or acute mood disorder requiring hospitalization during the encounter. Pt denied violent thoughts or suicidal or homicidal ideation, intent, or plan.       Measurement-Based Care (MBC):     Routine Instruments  "  PROMIS-ANXIETY Interpretation: T-SCORE 68; MODERATE using 55-60-70 cutoffs. Compared to MODERATE (63) last time, PROMIS-ANXIETY is relatively stable.   PROMIS-DEPRESSION Interpretation: T-SCORE 64; MODERATE using 55-60-70 cutoffs. Compared to MODERATE (61) last time, PROMIS-DEPRESSION is relatively stable.   PSS4 Interpretation: 9/16; MODERATE using 6-11 cutoffs. 1 PH, 0 LSE. Compared to MODERATE 8/16 last time, PSS4 is relatively stable.   Additional Instruments   N/A     Current Evaluation of Mental Status:     Constitutional / General       Vitals:    06/23/23 1025   BP: 137/69   Pulse: 75   Weight: 55.2 kg (121 lb 11.1 oz)   Height: 4' 11" (1.499 m)     casual dress, normal weight (BMI 18.5 - 24.9)    Psychiatric / Mental Status Examination  1. Appearance: Dress is informal but appropriate. Motor activity normal.  2. Discourse: Clear speech with normal rate and volume. Associations intact. Orderly.  3. Emotional Expression: Somewhat anxious and depressed mood. Affect is appropriate.  4. Perception and Thinking: No hallucinations. No suicidality, no homicidality, delusions, or paranoia.  5. Sensorium: Grossly intact. Able to focus for interview.  6. Memory and Fund of Knowledge: Intact for content of interview.  7. Insight and Judgment: Intact.         Auto-populated Chart Data:     Medications  Outpatient Encounter Medications as of 6/23/2023   Medication Sig Dispense Refill    COD LIVER OIL ORAL Take 1 tablet by mouth once daily.       diclofenac sodium (VOLTAREN) 1 % Gel Apply 2 g topically 4 (four) times daily. 450 g 3    evolocumab (REPATHA SURECLICK) 140 mg/mL PnIj Inject 1 mL (140 mg total) into the skin every 14 (fourteen) days. 2 mL 2    fish oil-omega-3 fatty acids 300-1,000 mg capsule Take 2 g by mouth once daily.      fluticasone propionate (FLONASE) 50 mcg/actuation nasal spray 2 sprays (100 mcg total) by Each Nostril route once daily. 16 g 11    gabapentin (NEURONTIN) 100 MG capsule TAKE 1 CAPSULE " TWICE DAILY 180 capsule 0    ipratropium (ATROVENT) 42 mcg (0.06 %) nasal spray 2 sprays by Nasal route 3 (three) times daily. 15 mL 6    irbesartan (AVAPRO) 300 MG tablet Take 1 tablet (300 mg total) by mouth every evening. New tablet strength (Patient taking differently: Take 150 mg by mouth once daily. Take 2 tablets every evening) 90 tablet 3    multivit with min-folic acid 200 mcg Chew Take 1 tablet by mouth once daily.       omeprazole (PRILOSEC) 40 MG capsule TAKE 1 CAPSULE EVERY DAY 90 capsule 2    [DISCONTINUED] busPIRone (BUSPAR) 7.5 MG tablet Take 1 tablet (7.5 mg total) by mouth once daily. Continue to take in the afternoon. 30 tablet 1    [DISCONTINUED] donepeziL (ARICEPT) 10 MG tablet Take 1 tablet (10 mg total) by mouth every evening. 30 tablet 1    [DISCONTINUED] montelukast (SINGULAIR) 10 mg tablet Take 10 mg by mouth nightly.      [DISCONTINUED] polyethylene glycol (GLYCOLAX) 17 gram/dose powder Take 17 g by mouth daily as needed (constipation).       [DISCONTINUED] sertraline (ZOLOFT) 100 MG tablet Take 2 tablets (200 mg total) by mouth once daily. 60 tablet 1    busPIRone (BUSPAR) 7.5 MG tablet Take 1 tablet (7.5 mg total) by mouth once daily. Continue to take in the afternoon. 30 tablet 2    cetirizine (ZYRTEC) 5 MG tablet Take 1 tablet (5 mg total) by mouth once daily. 30 tablet 11    [] cyclobenzaprine (FLEXERIL) 10 MG tablet Take 1 tablet (10 mg total) by mouth 2 (two) times daily as needed for Muscle spasms. 60 tablet 1    donepeziL (ARICEPT) 10 MG tablet Take 1 tablet (10 mg total) by mouth every evening. 30 tablet 2    HYDROcodone-acetaminophen (NORCO) 5-325 mg per tablet Take 1 tablet by mouth every 12 (twelve) hours as needed for Pain. 60 tablet 0    [START ON 2023] HYDROcodone-acetaminophen (NORCO) 5-325 mg per tablet Take 1 tablet by mouth every 12 (twelve) hours as needed for Pain. 60 tablet 0    sertraline (ZOLOFT) 100 MG tablet Take 2 tablets (200 mg total) by mouth once  daily. 60 tablet 2    [DISCONTINUED] albuterol (VENTOLIN HFA) 90 mcg/actuation inhaler Inhale 2 puffs into the lungs every 6 (six) hours as needed for Wheezing or Shortness of Breath. Rescue (Patient not taking: Reported on 6/21/2023) 6.7 g 0    [DISCONTINUED] gabapentin (NEURONTIN) 300 MG capsule Take 1 capsule (300 mg total) by mouth 2 (two) times daily. (Patient not taking: Reported on 6/21/2023) 180 capsule 1    [DISCONTINUED] HYDROcodone-acetaminophen (NORCO) 5-325 mg per tablet Take 1 tablet by mouth every 12 (twelve) hours as needed for Pain. 60 tablet 0     No facility-administered encounter medications on file as of 6/23/2023.      The chart was reviewed for recent diagnostic procedures and investigations, and pertinent results are noted below.    Wt Readings from Last 2 Encounters:   06/23/23 55.2 kg (121 lb 11.1 oz)   06/21/23 56.1 kg (123 lb 9.1 oz)     BP Readings from Last 1 Encounters:   06/23/23 137/69     Pulse Readings from Last 1 Encounters:   06/23/23 75        Blood Counts, Electrolytes & Glucose: (i.e. WBC, ANC, Hemoglobin, Hematocrit, MCV, Platelets)  Lab Results   Component Value Date    WBC 7.16 01/20/2023    GRAN 3.7 01/20/2023    GRAN 51.9 01/20/2023    HGB 11.2 (L) 01/20/2023    HCT 34.4 (L) 01/20/2023    MCV 92 01/20/2023     01/20/2023     01/20/2023    K 4.4 01/20/2023    CALCIUM 9.5 01/20/2023    MG 2.6 06/04/2021    CO2 25 01/20/2023    ANIONGAP 9 01/20/2023    GLU 81 01/20/2023    HGBA1C 5.9 (H) 01/20/2023       Renal, Liver, Pancreas, Thyroid, Parathyroid, Prolactin, CPK, Lipids & Vitamin Levels: (i.e. Cr, BUN, Anion Gap, GFR, Urine Specific Gravity, Urine Protein, Microalburnin, AST, ALT, GGT, Alk Phos,Total Bili, Total Protein, Albumin, Ammonia, INR, Amylase, Lipase, TSH, Total T3, Total T4, Free T4 PTH, Prolactin, CPK, Cholesterol, Triglycerides, LDH, HDL, Vitamin B12, Folate, Vitamin D)  Lab Results   Component Value Date    CREATININE 0.9 01/20/2023    BUN 13  01/20/2023    EGFRNORACEVR >60.0 01/20/2023    SPECGRAV 1.010 07/13/2022    PROTEINUA 2+ (A) 04/22/2021    AST 19 01/20/2023    ALT 14 01/20/2023    ALKPHOS 50 (L) 01/20/2023    BILITOT 0.4 01/20/2023    ALBUMIN 4.1 01/20/2023    AMMONIA 36 06/21/2023    AMYLASE 72 08/23/2016    LIPASE 19 02/03/2021    TSH 1.831 06/21/2023    FREET4 0.91 02/25/2021     10/01/2019    CHOL 285 (H) 01/20/2023    TRIG 108 01/20/2023    LDLCALC 194.4 (H) 01/20/2023    HDL 69 01/20/2023    LIIXMTZM91 898 06/21/2023    FOLATE 39.9 (H) 06/21/2023    ZOYBZQUR36KV 49 12/05/2019       Infection Diseases, Pregnancy Screenings & Drug Levels: (i.e. Hepatitis Panel, HIV, Syphilis, Urine & Blood Pregnancy Screens, beta hCG, Lithium, Valproic Acid, Carbamazepine, Lamotrigine, Phenytoin, Phenobarbital, Clozapine, Norclozapine, Clozapine + Norclozapine)   Lab Results   Component Value Date    HEPCAB Negative 08/23/2016    RPR Non-reactive 06/21/2023       Addiction: (i.e. Urine Toxicology, Blood Alcohol, PETH, EtG, EtS, CDT, Buprenorphine, Norbuprenorphine)  Lab Results   Component Value Date    PCDSOBENZOD Negative 01/31/2018    BARBITURATES Negative 01/31/2018    PCDSCOMETHA Negative 01/31/2018    OPIATESCREEN Presumptive Positive 01/31/2018    COCAINEMETAB Negative 01/31/2018    AMPHETAMINES Negative 01/31/2018    MARIJUANATHC Negative 01/31/2018    PCDSOPHENCYN Negative 01/31/2018    PCDSUBUPRE Not Detected 11/16/2022    PCDSUFENTANY Not Detected 11/16/2022    PCDSUOXYCOD Not Detected 11/16/2022    PCDSUTRAMA Not Detected 11/16/2022       Results for orders placed or performed in visit on 12/01/22   IN OFFICE EKG 12-LEAD (to [place])    Collection Time: 12/01/22 10:46 AM    Narrative    Test Reason : R42,    Vent. Rate : 076 BPM     Atrial Rate : 076 BPM     P-R Int : 150 ms          QRS Dur : 072 ms      QT Int : 376 ms       P-R-T Axes : 062 072 063 degrees     QTc Int : 423 ms    Normal sinus rhythm  Normal ECG  When compared with ECG of  "30-SEP-2022 08:17,  No significant change was found  Confirmed by Comfort ZARATE, Abbe MANN ([id number]) on 12/7/2022 4:22:34 PM    Referred By:             Confirmed By:Abbe Moyer MD          Billing Documentation:     Method of Encounter IN PERSON visit at the clinic   Type of Encounter Follow up visit with me   Counseling;  Psychotherapy Not applicable: Not done or UNDER 16 minutes   Counseling;  Tobacco and/or Nicotine Not applicable: Not done or UNDER 3 minutes   Additional Codes and Modifiers 31116, with modifer 59: administered and scored more than one psychological or neuropsychological tests (see MBC above) (16+ mins)   Time Remaining Chart/Pt 36101: FOLLOW UP VISIT, Rx mgmt, "Multiple STABLE chronic illnesses; no changes in treatment at this time"   Total Mins  (6/23/2023) N/A - Not billing for time        Will Leong DO  Department of Psychiatry, [facility]        "

## 2023-06-26 LAB — METHYLMALONATE SERPL-SCNC: 0.24 UMOL/L

## 2023-06-27 ENCOUNTER — OFFICE VISIT (OUTPATIENT)
Dept: OPHTHALMOLOGY | Facility: CLINIC | Age: 78
End: 2023-06-27
Payer: MEDICARE

## 2023-06-27 DIAGNOSIS — H33.199 RETINOSCHISIS OF FOVEA: ICD-10-CM

## 2023-06-27 DIAGNOSIS — H33.192 OTHER RETINOSCHISIS AND RETINAL CYSTS, LEFT EYE: ICD-10-CM

## 2023-06-27 DIAGNOSIS — H04.123 DRY EYE SYNDROME, BILATERAL: ICD-10-CM

## 2023-06-27 DIAGNOSIS — H25.13 NUCLEAR SCLEROTIC CATARACT, BILATERAL: Primary | ICD-10-CM

## 2023-06-27 LAB — VIT B1 BLD-MCNC: 79 UG/L (ref 38–122)

## 2023-06-27 PROCEDURE — 1100F PTFALLS ASSESS-DOCD GE2>/YR: CPT | Mod: CPTII,S$GLB,, | Performed by: OPHTHALMOLOGY

## 2023-06-27 PROCEDURE — 99999 PR PBB SHADOW E&M-EST. PATIENT-LVL III: ICD-10-PCS | Mod: PBBFAC,,, | Performed by: OPHTHALMOLOGY

## 2023-06-27 PROCEDURE — 3288F PR FALLS RISK ASSESSMENT DOCUMENTED: ICD-10-PCS | Mod: CPTII,S$GLB,, | Performed by: OPHTHALMOLOGY

## 2023-06-27 PROCEDURE — 1126F AMNT PAIN NOTED NONE PRSNT: CPT | Mod: CPTII,S$GLB,, | Performed by: OPHTHALMOLOGY

## 2023-06-27 PROCEDURE — 99999 PR PBB SHADOW E&M-EST. PATIENT-LVL III: CPT | Mod: PBBFAC,,, | Performed by: OPHTHALMOLOGY

## 2023-06-27 PROCEDURE — 1160F RVW MEDS BY RX/DR IN RCRD: CPT | Mod: CPTII,S$GLB,, | Performed by: OPHTHALMOLOGY

## 2023-06-27 PROCEDURE — 1100F PR PT FALLS ASSESS DOC 2+ FALLS/FALL W/INJURY/YR: ICD-10-PCS | Mod: CPTII,S$GLB,, | Performed by: OPHTHALMOLOGY

## 2023-06-27 PROCEDURE — 92134 CPTRZ OPH DX IMG PST SGM RTA: CPT | Mod: S$GLB,,, | Performed by: OPHTHALMOLOGY

## 2023-06-27 PROCEDURE — 99204 PR OFFICE/OUTPT VISIT, NEW, LEVL IV, 45-59 MIN: ICD-10-PCS | Mod: S$GLB,,, | Performed by: OPHTHALMOLOGY

## 2023-06-27 PROCEDURE — 92134 POSTERIOR SEGMENT OCT RETINA (OCULAR COHERENCE TOMOGRAPHY)-BOTH EYES: ICD-10-PCS | Mod: S$GLB,,, | Performed by: OPHTHALMOLOGY

## 2023-06-27 PROCEDURE — 1159F MED LIST DOCD IN RCRD: CPT | Mod: CPTII,S$GLB,, | Performed by: OPHTHALMOLOGY

## 2023-06-27 PROCEDURE — 1160F PR REVIEW ALL MEDS BY PRESCRIBER/CLIN PHARMACIST DOCUMENTED: ICD-10-PCS | Mod: CPTII,S$GLB,, | Performed by: OPHTHALMOLOGY

## 2023-06-27 PROCEDURE — 3288F FALL RISK ASSESSMENT DOCD: CPT | Mod: CPTII,S$GLB,, | Performed by: OPHTHALMOLOGY

## 2023-06-27 PROCEDURE — 1159F PR MEDICATION LIST DOCUMENTED IN MEDICAL RECORD: ICD-10-PCS | Mod: CPTII,S$GLB,, | Performed by: OPHTHALMOLOGY

## 2023-06-27 PROCEDURE — 99204 OFFICE O/P NEW MOD 45 MIN: CPT | Mod: S$GLB,,, | Performed by: OPHTHALMOLOGY

## 2023-06-27 PROCEDURE — 1126F PR PAIN SEVERITY QUANTIFIED, NO PAIN PRESENT: ICD-10-PCS | Mod: CPTII,S$GLB,, | Performed by: OPHTHALMOLOGY

## 2023-06-27 NOTE — PROGRESS NOTES
HPI    Pt presents for cataract eval     Complains of blurred vision at near. Pt does not drive. Complains of a lot   of crusting and burning.     No ocular meds     Last edited by Pia Kulkarni on 6/27/2023  1:24 PM.            Assessment /Plan     For exam results, see Encounter Report.    Nuclear sclerotic cataract, bilateral    Dry eye syndrome, bilateral    Retinoschisis of fovea      1. Nuclear sclerotic cataract, bilateral  OS>OD  High hyperopia  Foveal pathology may limit BCVA OS  Higher risk due to small eye  S/p LPI OU  Patient with visually significant cataract impacting abiliity ADLs (reading, driving, PM driving, glare).  Discussed options, R & B, expectations, patient voices good understanding and wishes to proceed with procedure. Patient will likely benefit from sx.    Schedule CEIOL OS then OD  If A-scan shows very short AL, consider refer to Dr. Christianson for surgery or may need to consider additional procedure at same time as surgery    2. Dry eye syndrome, bilateral  Not well treated  Start ATs up to QID    3. Retinoschisis of fovea  OS  Vs lamellar hole or MacTel  [facility] still good  Monitor                      PACT on Monday 2/27 @ 9am for neulasta  Ped Hem/onc on Friday 3/3 @ 9am with Selma Roblero NP PACT on Monday 2/27 @ 9am for neulasta and count check  Ped Hem/onc on Friday 3/3 @ 9am with Selma Roblero NP

## 2023-06-27 NOTE — Clinical Note
Can you take a look at her OCTm?  Very High hyperope, 20/30 vision.  Foveal schisis vs lamellar hole vs mactel?  I'll be taking her cataracts out soon enough.

## 2023-06-28 DIAGNOSIS — K21.9 GASTROESOPHAGEAL REFLUX DISEASE: ICD-10-CM

## 2023-06-28 RX ORDER — OMEPRAZOLE 40 MG/1
CAPSULE, DELAYED RELEASE ORAL
Qty: 90 CAPSULE | Refills: 2 | Status: SHIPPED | OUTPATIENT
Start: 2023-06-28 | End: 2024-02-20

## 2023-06-28 NOTE — TELEPHONE ENCOUNTER
Refill Decision Note   Rola Richter  is requesting a refill authorization.  Brief Assessment and Rationale for Refill:  Approve     Medication Therapy Plan:       Medication Reconciliation Completed: No   Comments:     No Care Gaps recommended.     Note composed:8:29 AM 06/28/2023

## 2023-06-28 NOTE — TELEPHONE ENCOUNTER
No care due was identified.  [facility] Embedded Care Due Messages. Reference number: [id number].   6/28/2023 3:09:59 AM CDT

## 2023-06-29 ENCOUNTER — HOSPITAL ENCOUNTER (OUTPATIENT)
Dept: RADIOLOGY | Facility: HOSPITAL | Age: 78
Discharge: HOME | End: 2023-06-29
Attending: NURSE PRACTITIONER
Payer: MEDICARE

## 2023-06-29 DIAGNOSIS — R41.3 MEMORY LOSS: ICD-10-CM

## 2023-06-29 DIAGNOSIS — R41.3 OTHER AMNESIA: ICD-10-CM

## 2023-06-29 PROCEDURE — 70551 MRI BRAIN STEM W/O DYE: CPT | Mod: TC,PO

## 2023-06-29 NOTE — PROGRESS NOTES
Individual Psychotherapy (PhD/LCSW)    6/23/2023    Site:  [facility]         Therapeutic Intervention: Met with patient.  Outpatient - Insight oriented psychotherapy 45 min - CPT code 40873, Outpatient - Behavior modifying psychotherapy 45 min - CPT code 35085, and Outpatient - Supportive psychotherapy 45 min - CPT Code 06986    Chief complaint/reason for encounter: depression, anxiety, and cognition             Interval history and content of current session: Ct was referred by Dr. Leong to address depression, anxiety, and trauma. Ct arrived to session on time and was fully engaged.  Client shared that she went to [place] and that she had a good time.  Client shared that she recently found out that she may have some early onset dementia.  Client reported that she is not told her family are her sons but that her  is aware.  She reported that she has been prescribed medications to help.  She shared about some anxiety that she has when she is around family because she feels that she can not relax.  She reported that she has to take care of everything traditionally.   and client processed the importance of client spending time with her family when her family is around versus constantly cleaning and keeping busy.  Client verbalized understanding.  Client to continue in one-to-one sessions.      Treatment plan:  Target symptoms: depression, anxiety , cognition  Why chosen therapy is appropriate versus another modality: relevant to diagnosis, patient responds to this modality, evidence based practice  Outcome monitoring methods: self-report  Therapeutic intervention type: insight oriented psychotherapy, behavior modifying psychotherapy, supportive psychotherapy    Risk parameters:  Patient reports no suicidal ideation  Patient reports no homicidal ideation  Patient reports no self-injurious behavior  Patient reports no violent behavior    CSSRS was completed:     Mental  Status Exam:  General Appearance:  unremarkable, age appropriate   Speech: normal tone, normal rate, normal pitch, normal volume      Level of Cooperation: cooperative      Thought Processes: normal and logical   Mood: steady      Thought Content: normal, no suicidality, no homicidality, delusions, or paranoia   Affect: congruent and appropriate   Orientation: Oriented x3   Memory: recent >  intact, impaired   Attention Span & Concentration: intact   Fund of General Knowledge: intact and appropriate to age and level of education   Abstract Reasoning: interpretation of similarities was abstract, interpretation of similarities was concrete   Judgment & Insight: intact     Language intact       Verbal deficits: None    Patient's response to intervention:  The patient's response to intervention is accepting.    Progress toward goals and other mental status changes:  The patient's progress toward goals is fair .    Diagnosis:     ICD-10-CM ICD-9-CM   1. Moderate episode of recurrent major depressive disorder  F33.1 296.32   2. Generalized anxiety disorder  F41.1 300.02   3. Cognitive changes  R41.89 799.59       Plan:  individual psychotherapy and ct to follow up with Dr. Leong for psych med mgt  Pt to go to ED or call 911 if symptoms worsen or if she has thoughts of harming self and/or others. Pt verbalized understanding.    Return to clinic: as scheduled    Length of Service (minutes): 45      Each patient to whom he or she provides medical services by telemedicine is: (1) informed of the relationship between the physician and patient and the respective role of any other health care provider with respect to management of the patient; and (2) notified that he or she may decline to receive medical services by telemedicine and may withdraw from such care at any time.

## 2023-07-03 ENCOUNTER — SPECIALTY PHARMACY (OUTPATIENT)
Dept: PHARMACY | Facility: CLINIC | Age: 78
End: 2023-07-03
Payer: MEDICARE

## 2023-07-03 DIAGNOSIS — I70.0 CALCIFICATION OF AORTA: ICD-10-CM

## 2023-07-03 DIAGNOSIS — Z78.9 STATIN NOT TOLERATED: ICD-10-CM

## 2023-07-03 DIAGNOSIS — E78.01 FAMILIAL HYPERCHOLESTEROLEMIA: ICD-10-CM

## 2023-07-03 RX ORDER — EVOLOCUMAB 140 MG/ML
140 INJECTION, SOLUTION SUBCUTANEOUS
Qty: 2 ML | Refills: 2 | Status: ACTIVE | OUTPATIENT
Start: 2023-07-03 | End: 2023-10-04 | Stop reason: SDUPTHER

## 2023-07-03 NOTE — TELEPHONE ENCOUNTER
Outgoing call to pt regarding Repatha refill. Refill request sent to authorizing provider, confirmed provider with pt's . Pt is due 7/12. Will follow up once refills received.

## 2023-07-05 NOTE — TELEPHONE ENCOUNTER
Specialty Pharmacy - Refill Coordination    Specialty Medication Orders Linked to Encounter      Flowsheet Row Most Recent Value   Medication #1 evolocumab (REPATHA SURECLICK) 140 mg/mL PnIj (Order#[id number], Rx#[id number])            Refill Questions - Documented Responses      Flowsheet Row Most Recent Value   Patient Availability and HIPAA Verification    Does patient want to proceed with activity? Yes   HIPAA/medical authority confirmed? Yes   Relationship to patient of person spoken to? Spouse/Significant Other   Refill Screening Questions    Would patient like to speak to a pharmacist? No   When does the patient need to receive the medication? 07/12/23   Refill Delivery Questions    How will the patient receive the medication? MEDRx   When does the patient need to receive the medication? 07/12/23   Shipping Address Home   Address in [facility] confirmed and updated if neccessary? Yes   Expected Copay ($) 0   Is the patient able to afford the medication copay? Yes   Payment Method zero copay   Days supply of Refill 28   Supplies needed? No supplies needed   Refill activity completed? Yes   Refill activity plan Refill scheduled   Shipment/Pickup Date: 07/07/23            Current Outpatient Medications   Medication Sig    omeprazole (PRILOSEC) 40 MG capsule TAKE 1 CAPSULE EVERY DAY    busPIRone (BUSPAR) 7.5 MG tablet Take 1 tablet (7.5 mg total) by mouth once daily. Continue to take in the afternoon.    cetirizine (ZYRTEC) 5 MG tablet Take 1 tablet (5 mg total) by mouth once daily.    COD LIVER OIL ORAL Take 1 tablet by mouth once daily.     diclofenac sodium (VOLTAREN) 1 % Gel Apply 2 g topically 4 (four) times daily.    donepeziL (ARICEPT) 10 MG tablet Take 1 tablet (10 mg total) by mouth every evening.    evolocumab (REPATHA SURECLICK) 140 mg/mL PnIj Inject 1 mL (140 mg total) into the skin every 14 (fourteen) days.    fish oil-omega-3 fatty acids 300-1,000 mg capsule Take 2 g by mouth once daily.     fluticasone propionate (FLONASE) 50 mcg/actuation nasal spray 2 sprays (100 mcg total) by Each Nostril route once daily.    gabapentin (NEURONTIN) 100 MG capsule TAKE 1 CAPSULE TWICE DAILY    HYDROcodone-acetaminophen (NORCO) 5-325 mg per tablet Take 1 tablet by mouth every 12 (twelve) hours as needed for Pain.    [START ON 7/20/2023] HYDROcodone-acetaminophen (NORCO) 5-325 mg per tablet Take 1 tablet by mouth every 12 (twelve) hours as needed for Pain.    HYDROcodone-acetaminophen (NORCO) 5-325 mg per tablet Take 1 tablet by mouth every 12 (twelve) hours as needed for Pain.    ipratropium (ATROVENT) 42 mcg (0.06 %) nasal spray 2 sprays by Nasal route 3 (three) times daily.    irbesartan (AVAPRO) 300 MG tablet Take 1 tablet (300 mg total) by mouth every evening. New tablet strength (Patient taking differently: Take 150 mg by mouth once daily. Take 2 tablets every evening)    multivit with min-folic acid 200 mcg Chew Take 1 tablet by mouth once daily.     sertraline (ZOLOFT) 100 MG tablet Take 2 tablets (200 mg total) by mouth once daily.   Last reviewed on 6/28/2023  8:19 PM by Marcie Garcia LCSW    Review of patient's allergies indicates:   Allergen Reactions    Aspirin Nausea And Vomiting    Penicillins Itching    Crestor [rosuvastatin]      Muscle pain      Lipitor [atorvastatin]      Achy      Last reviewed on  6/28/2023 8:19 PM by Marcie Garcia      Tasks added this encounter   No tasks added.   Tasks due within next 3 months   7/8/2023 - Refill Coordination Outreach (1 time occurrence)     Tamela Ferguson, PharmD  [facility]  [address] [place] [id number]  Phone: [phone]  Fax: [phone]

## 2023-07-18 ENCOUNTER — LAB VISIT (OUTPATIENT)
Dept: LAB | Facility: HOSPITAL | Age: 78
End: 2023-07-18
Attending: FAMILY MEDICINE
Payer: MEDICARE

## 2023-07-18 DIAGNOSIS — E78.2 MIXED HYPERLIPIDEMIA: ICD-10-CM

## 2023-07-18 DIAGNOSIS — R73.9 HYPERGLYCEMIA: ICD-10-CM

## 2023-07-18 LAB
ALBUMIN SERPL BCP-MCNC: 3.9 G/DL (ref 3.5–5.2)
ALP SERPL-CCNC: 66 U/L (ref 55–135)
ALT SERPL W/O P-5'-P-CCNC: 11 U/L (ref 10–44)
ANION GAP SERPL CALC-SCNC: 13 MMOL/L (ref 8–16)
AST SERPL-CCNC: 20 U/L (ref 10–40)
BASOPHILS # BLD AUTO: 0.05 K/UL (ref 0–0.2)
BASOPHILS NFR BLD: 0.7 % (ref 0–1.9)
BILIRUB SERPL-MCNC: 0.2 MG/DL (ref 0.1–1)
BUN SERPL-MCNC: 18 MG/DL (ref 8–23)
CALCIUM SERPL-MCNC: 9.5 MG/DL (ref 8.7–10.5)
CHLORIDE SERPL-SCNC: 104 MMOL/L (ref 95–110)
CHOLEST SERPL-MCNC: 281 MG/DL (ref 120–199)
CHOLEST/HDLC SERPL: 4.7 {RATIO} (ref 2–5)
CO2 SERPL-SCNC: 23 MMOL/L (ref 23–29)
CREAT SERPL-MCNC: 1.2 MG/DL (ref 0.5–1.4)
DIFFERENTIAL METHOD: ABNORMAL
EOSINOPHIL # BLD AUTO: 0.6 K/UL (ref 0–0.5)
EOSINOPHIL NFR BLD: 8.4 % (ref 0–8)
ERYTHROCYTE [DISTWIDTH] IN BLOOD BY AUTOMATED COUNT: 14.3 % (ref 11.5–14.5)
EST. GFR  (NO RACE VARIABLE): 46.3 ML/MIN/1.73 M^2
ESTIMATED AVG GLUCOSE: 120 MG/DL (ref 68–131)
GLUCOSE SERPL-MCNC: 82 MG/DL (ref 70–110)
HBA1C MFR BLD: 5.8 % (ref 4–5.6)
HCT VFR BLD AUTO: 33.3 % (ref 37–48.5)
HDLC SERPL-MCNC: 60 MG/DL (ref 40–75)
HDLC SERPL: 21.4 % (ref 20–50)
HGB BLD-MCNC: 10.7 G/DL (ref 12–16)
IMM GRANULOCYTES # BLD AUTO: 0.04 K/UL (ref 0–0.04)
IMM GRANULOCYTES NFR BLD AUTO: 0.5 % (ref 0–0.5)
LDLC SERPL CALC-MCNC: 190.2 MG/DL (ref 63–159)
LYMPHOCYTES # BLD AUTO: 2.4 K/UL (ref 1–4.8)
LYMPHOCYTES NFR BLD: 32.5 % (ref 18–48)
MCH RBC QN AUTO: 29.5 PG (ref 27–31)
MCHC RBC AUTO-ENTMCNC: 32.1 G/DL (ref 32–36)
MCV RBC AUTO: 92 FL (ref 82–98)
MONOCYTES # BLD AUTO: 0.7 K/UL (ref 0.3–1)
MONOCYTES NFR BLD: 10.2 % (ref 4–15)
NEUTROPHILS # BLD AUTO: 3.5 K/UL (ref 1.8–7.7)
NEUTROPHILS NFR BLD: 47.7 % (ref 38–73)
NONHDLC SERPL-MCNC: 221 MG/DL
NRBC BLD-RTO: 0 /100 WBC
PLATELET # BLD AUTO: 270 K/UL (ref 150–450)
PMV BLD AUTO: 10.6 FL (ref 9.2–12.9)
POTASSIUM SERPL-SCNC: 4.5 MMOL/L (ref 3.5–5.1)
PROT SERPL-MCNC: 7 G/DL (ref 6–8.4)
RBC # BLD AUTO: 3.63 M/UL (ref 4–5.4)
SODIUM SERPL-SCNC: 140 MMOL/L (ref 136–145)
TRIGL SERPL-MCNC: 154 MG/DL (ref 30–150)
WBC # BLD AUTO: 7.29 K/UL (ref 3.9–12.7)

## 2023-07-18 PROCEDURE — 85025 COMPLETE CBC W/AUTO DIFF WBC: CPT | Mod: HCNC | Performed by: FAMILY MEDICINE

## 2023-07-18 PROCEDURE — 80061 LIPID PANEL: CPT | Mod: HCNC | Performed by: FAMILY MEDICINE

## 2023-07-18 PROCEDURE — 80053 COMPREHEN METABOLIC PANEL: CPT | Mod: HCNC | Performed by: FAMILY MEDICINE

## 2023-07-18 PROCEDURE — 83036 HEMOGLOBIN GLYCOSYLATED A1C: CPT | Mod: HCNC | Performed by: FAMILY MEDICINE

## 2023-07-18 PROCEDURE — 36415 COLL VENOUS BLD VENIPUNCTURE: CPT | Mod: HCNC,PO | Performed by: FAMILY MEDICINE

## 2023-07-19 ENCOUNTER — PATIENT MESSAGE (OUTPATIENT)
Dept: ADMINISTRATIVE | Facility: OTHER | Age: 78
End: 2023-07-19
Payer: MEDICARE

## 2023-07-20 DIAGNOSIS — I10 HYPERTENSION, UNSPECIFIED TYPE: ICD-10-CM

## 2023-07-20 RX ORDER — IRBESARTAN 300 MG/1
300 TABLET ORAL NIGHTLY
Qty: 90 TABLET | Refills: 1 | Status: SHIPPED | OUTPATIENT
Start: 2023-07-20

## 2023-07-20 NOTE — TELEPHONE ENCOUNTER
No care due was identified.  [facility] Embedded Care Due Messages. Reference number: [id number].   7/20/2023 2:53:44 PM CDT

## 2023-07-20 NOTE — TELEPHONE ENCOUNTER
Refill Decision Note   Rola Richter  is requesting a refill authorization.  Brief Assessment and Rationale for Refill:  Approve     Medication Therapy Plan:       Medication Reconciliation Completed: No    Comments:     No Care Gaps recommended.     Note composed:4:49 PM 07/20/2023

## 2023-07-24 ENCOUNTER — TELEPHONE (OUTPATIENT)
Dept: FAMILY MEDICINE | Facility: CLINIC | Age: 78
End: 2023-07-24
Payer: MEDICARE

## 2023-07-24 NOTE — TELEPHONE ENCOUNTER
----- Message from Lynne Josue, Patient Care Assistant sent at 7/24/2023 11:56 AM CDT -----  Contact: Dr Ivet ruiz is calling to speak w/  nurse in regards to pt dental extractions [phone]  thanks

## 2023-07-24 NOTE — TELEPHONE ENCOUNTER
"Called patient to advise per Mrs Rae    " Patients should hold medication  3-6 months before dental extraction to prevent osteonecrosis of jaw. Surgery  needs to be rescheduled. "    In regards to tooth extraction scheduled for today.  Patient and  verbalized understanding. They will reschedule extraction for 3 months from now.    "

## 2023-07-24 NOTE — TELEPHONE ENCOUNTER
----- Message from Ruth Gonsalves sent at 7/24/2023  2:35 PM CDT -----  Contact: pt  Type: Needs Medical Advice  Who Called:  pt  Best Call Back Number: [phone]    Additional Information: Pt is calling she is on the way but they have traffic, she is about 20 mins away Please call back and advise.

## 2023-07-24 NOTE — LETTER
"  July 24, 2023      [facility]  [address] [address]  Phone: [phone]  Fax: [phone]       Patient: Rola Richter   YOB: 1945  Date of Visit: 07/24/2023    To Whom It May Concern:    Raleigh Richter  receives the Prolia medication at [facility] . Per the provider   " Patients should hold medication  3-6 months before dental extraction to prevent osteonecrosis of jaw. Surgery  needs to be rescheduled."   If you have any questions or concerns, or if I can be of further assistance, please do not hesitate to contact me.    Sincerely,    Gloria Singh LPN     "

## 2023-07-24 NOTE — TELEPHONE ENCOUNTER
----- Message from Cassandra Prasad sent at 7/24/2023  8:19 AM CDT -----  Contact: Dentist Office  Type: Needs Medical Advice    Who Called:  Dentist office, Dr Ivet Hughes  What is this regarding?:  She is scheduled to have 6 teeth extracted and the office wants to know if the prolia is going to affect her procedure at 2:00. Is she safe to have them or goes she have to have a drug holiday from it?  Best Call Back Number:  [phone]  Additional Information:  Please call back at the phone number listed above to advise. Thank you!

## 2023-07-25 ENCOUNTER — TELEPHONE (OUTPATIENT)
Dept: PAIN MEDICINE | Facility: CLINIC | Age: 78
End: 2023-07-25

## 2023-07-25 ENCOUNTER — OFFICE VISIT (OUTPATIENT)
Dept: FAMILY MEDICINE | Facility: CLINIC | Age: 78
End: 2023-07-25
Payer: MEDICARE

## 2023-07-25 VITALS
WEIGHT: 123 LBS | BODY MASS INDEX: 24.8 KG/M2 | OXYGEN SATURATION: 95 % | DIASTOLIC BLOOD PRESSURE: 68 MMHG | SYSTOLIC BLOOD PRESSURE: 138 MMHG | HEART RATE: 90 BPM | TEMPERATURE: 99 F | HEIGHT: 59 IN

## 2023-07-25 DIAGNOSIS — M80.00XA AGE-RELATED OSTEOPOROSIS WITH CURRENT PATHOLOGICAL FRACTURE, INITIAL ENCOUNTER: ICD-10-CM

## 2023-07-25 DIAGNOSIS — E78.2 MIXED HYPERLIPIDEMIA: ICD-10-CM

## 2023-07-25 DIAGNOSIS — R73.9 HYPERGLYCEMIA: ICD-10-CM

## 2023-07-25 DIAGNOSIS — I10 PRIMARY HYPERTENSION: Primary | ICD-10-CM

## 2023-07-25 DIAGNOSIS — K21.9 GASTROESOPHAGEAL REFLUX DISEASE WITHOUT ESOPHAGITIS: ICD-10-CM

## 2023-07-25 DIAGNOSIS — E78.01 FAMILIAL HYPERCHOLESTEROLEMIA: ICD-10-CM

## 2023-07-25 DIAGNOSIS — M25.551 RIGHT HIP PAIN: ICD-10-CM

## 2023-07-25 PROCEDURE — 1159F PR MEDICATION LIST DOCUMENTED IN MEDICAL RECORD: ICD-10-PCS | Mod: HCNC,CPTII,S$GLB, | Performed by: FAMILY MEDICINE

## 2023-07-25 PROCEDURE — 3075F PR MOST RECENT SYSTOLIC BLOOD PRESS GE 130-139MM HG: ICD-10-PCS | Mod: HCNC,CPTII,S$GLB, | Performed by: FAMILY MEDICINE

## 2023-07-25 PROCEDURE — 3288F PR FALLS RISK ASSESSMENT DOCUMENTED: ICD-10-PCS | Mod: HCNC,CPTII,S$GLB, | Performed by: FAMILY MEDICINE

## 2023-07-25 PROCEDURE — 3075F SYST BP GE 130 - 139MM HG: CPT | Mod: HCNC,CPTII,S$GLB, | Performed by: FAMILY MEDICINE

## 2023-07-25 PROCEDURE — 1101F PR PT FALLS ASSESS DOC 0-1 FALLS W/OUT INJ PAST YR: ICD-10-PCS | Mod: HCNC,CPTII,S$GLB, | Performed by: FAMILY MEDICINE

## 2023-07-25 PROCEDURE — 3078F DIAST BP <80 MM HG: CPT | Mod: HCNC,CPTII,S$GLB, | Performed by: FAMILY MEDICINE

## 2023-07-25 PROCEDURE — 3288F FALL RISK ASSESSMENT DOCD: CPT | Mod: HCNC,CPTII,S$GLB, | Performed by: FAMILY MEDICINE

## 2023-07-25 PROCEDURE — 99214 OFFICE O/P EST MOD 30 MIN: CPT | Mod: HCNC,S$GLB,, | Performed by: FAMILY MEDICINE

## 2023-07-25 PROCEDURE — 99214 PR OFFICE/OUTPT VISIT, EST, LEVL IV, 30-39 MIN: ICD-10-PCS | Mod: HCNC,S$GLB,, | Performed by: FAMILY MEDICINE

## 2023-07-25 PROCEDURE — 1101F PT FALLS ASSESS-DOCD LE1/YR: CPT | Mod: HCNC,CPTII,S$GLB, | Performed by: FAMILY MEDICINE

## 2023-07-25 PROCEDURE — 1160F RVW MEDS BY RX/DR IN RCRD: CPT | Mod: HCNC,CPTII,S$GLB, | Performed by: FAMILY MEDICINE

## 2023-07-25 PROCEDURE — 99999 PR PBB SHADOW E&M-EST. PATIENT-LVL IV: CPT | Mod: PBBFAC,HCNC,, | Performed by: FAMILY MEDICINE

## 2023-07-25 PROCEDURE — 1125F PR PAIN SEVERITY QUANTIFIED, PAIN PRESENT: ICD-10-PCS | Mod: HCNC,CPTII,S$GLB, | Performed by: FAMILY MEDICINE

## 2023-07-25 PROCEDURE — 1125F AMNT PAIN NOTED PAIN PRSNT: CPT | Mod: HCNC,CPTII,S$GLB, | Performed by: FAMILY MEDICINE

## 2023-07-25 PROCEDURE — 99999 PR PBB SHADOW E&M-EST. PATIENT-LVL IV: ICD-10-PCS | Mod: PBBFAC,HCNC,, | Performed by: FAMILY MEDICINE

## 2023-07-25 PROCEDURE — 3078F PR MOST RECENT DIASTOLIC BLOOD PRESSURE < 80 MM HG: ICD-10-PCS | Mod: HCNC,CPTII,S$GLB, | Performed by: FAMILY MEDICINE

## 2023-07-25 PROCEDURE — 1160F PR REVIEW ALL MEDS BY PRESCRIBER/CLIN PHARMACIST DOCUMENTED: ICD-10-PCS | Mod: HCNC,CPTII,S$GLB, | Performed by: FAMILY MEDICINE

## 2023-07-25 PROCEDURE — 1159F MED LIST DOCD IN RCRD: CPT | Mod: HCNC,CPTII,S$GLB, | Performed by: FAMILY MEDICINE

## 2023-07-25 RX ORDER — MELOXICAM 7.5 MG/1
7.5 TABLET ORAL DAILY PRN
Qty: 90 TABLET | Status: SHIPPED | OUTPATIENT
Start: 2023-07-25

## 2023-07-25 NOTE — PROGRESS NOTES
Subjective:       Patient ID: Rola Richter is a 78 y.o. female.    Chief Complaint: Follow-up    HPI  Review of Systems   Constitutional:  Negative for fatigue and unexpected weight change.   Respiratory:  Negative for chest tightness and shortness of breath.    Cardiovascular:  Negative for chest pain, palpitations and leg swelling.   Gastrointestinal:  Negative for abdominal pain.   Musculoskeletal:  Positive for arthralgias and back pain.   Neurological:  Negative for dizziness, syncope, light-headedness and headaches.     Patient Active Problem List   Diagnosis    Hypertension    GERD (gastroesophageal reflux disease)    Hyperlipidemia    Osteoporosis    Dependency on pain medication    PTSD (post-traumatic stress disorder)    CMC arthritis    Dry eye syndrome    DDD (degenerative disc disease), cervical    Occipital neuralgia    Cervical radiculitis    Primary osteoarthritis involving multiple joints    Moderate episode of recurrent major depressive disorder    Dysphagia    Spondylosis of cervical region without myelopathy or radiculopathy    Myofascial pain    Neck pain    Bronchitis    Calcification of aorta    Memory loss    Generalized anxiety disorder    BMI 25.0-25.9,adult    BMI 24.0-24.9, adult    Mild neurocognitive disorder     Patient is here for a chronic conditions follow up.    Reviewed labs 7/2023   Ckd stage 3, anemia   Card Dr. Moyer familial cholesterolemia now on repatha    C/o right hip flare. No known injury. Deep in buttock. Sharp sticking pain.  Worse with standing and changing positions. Norco helps some     Eye Dr. Alesha SIN- Your cholesterol is high.  Tried lipitor, crestor, pravastatin -all muscle pain. Taking QOD pravastatin 10mg. Candidate for repatha-now on. Total chol down from 416 to 281.       Prediabetes a1c 5.8     H/o ant neck surgery x 3 as child due to tumors -? Thyroid or thymus     C/o food stuck in throat, hoarseness.       Neck pain -helps when wears collar      Neuro ANIL Diamond Has chronic daily headaches- top and frontal. Congestion relieved partially by astelin and flonase NS.  PND, sneezing, hoarse. Sob and wheezing are improving but still lots of phlegm. No fever.  Unrelieved with tessalon or robitussin.  singulair added 7/2021. CT sinus showed chronic bin sinus disease.  Start clindamycin 300mg qid x 14 days.  Under care of ENT Dr. Jansen -seen 8/23 and 9/21/2021. Did not feel daily HA were rhinogenic-Referred to [facility] SILVERIO Diamond -seen 10/7/2021 and gabapentin added. Helping some. Has f/u next month.     Referred to pain clinic for left leg numbness and cervical radiculitis 7/2021.   Dr. Grimaldo MELANY 9/22      Admitted NS 6/2021 former smoker after being seen in ED for SOb, wheeze, cough determined to be due to RSV induced acute bronchitis. She was treated supportively with albuterol nebulizer therapy and steroids. She did not require O2 therapy as she was stable on room air. She was monitored overnight and discharged 6/4/2021.        Heme/onc Dr. Murphy- anemia. Has had work up for blood loss anemia.  Likely anemia of chronic disease. Iron is normal     GI Dr. Victor egd 2/2021 10 gastric polyps (fundic gland polyps), hiatal hernia, gastritis.  H Pylori neg. Esophageal bx- no metaplasia, dysplasia or malignancy.   Colonoscopy 5/19 polyp -adenomatous. On 5 year surveillance     HPL-  Intolerant to statins due to myalgia.  Tried lipitor and crestor. Had to stop it      Pain mgmt Dr. Grimaldo taking norco 5 bid . DPS checked no evidence of diversion.  Tried lyrica but stopped it due to dizziness     Rheum Dr. KARUNA Ram Has severe hand OA- tried plaquenil but did not help so stopped it . Osteoporosis recommended to start prolia q 6m but has never started it. Having to hold prolia due to lower jaw dental extractions and recommended waiting at least 3 months after until healed and cleared. Letter written today and given to patient     Psych Dr. Benítez MDDD/PTSD. Mood is good  on zoloft  Objective:      Physical Exam  Vitals and nursing note reviewed.   Constitutional:       Appearance: She is well-developed.   Cardiovascular:      Rate and Rhythm: Normal rate and regular rhythm.      Heart sounds: Normal heart sounds.   Pulmonary:      Effort: Pulmonary effort is normal.      Breath sounds: Normal breath sounds.   Skin:     General: Skin is warm and dry.   Neurological:      Mental Status: She is alert and oriented to person, place, and time.       Assessment:       1. Primary hypertension    2. Mixed hyperlipidemia    3. Gastroesophageal reflux disease without esophagitis    4. Familial hypercholesterolemia    5. Hyperglycemia    6. Age-related osteoporosis with current pathological fracture, initial encounter    7. Right hip pain        Plan:       1. Primary hypertension  Controlled on current medications.  Continue current medications.      2. Mixed hyperlipidemia  Improved with repatha. Cont current mgmt and card care  - CBC Auto Differential; Future  - Lipid Panel; Future    3. Gastroesophageal reflux disease without esophagitis  Controlled on current medications.  Continue current medications.      4. Familial hypercholesterolemia  Cont repatha    5. Hyperglycemia   Your blood sugar is borderline high.  This means you are at risk for developing type 2 diabetes mellitus.  To lessen your risk you should exercise regularly, avoid excess carbohydrates and work toward a body mass index of less than 25.      - Comprehensive Metabolic Panel; Future  - Hemoglobin A1C; Future    6. Age-related osteoporosis with current pathological fracture, initial encounter  Hold prolia until after dental extractions and well healed    7. Right hip pain  Add prn  - meloxicam (MOBIC) 7.5 MG tablet; Take 1 tablet (7.5 mg total) by mouth daily as needed for Pain.  Dispense: 90 tablet; Refill: PRN  Cont pain mgmt       Time spent with patient: 20 minutes    Patient with be reevaluated in 4 months or sooner  prn    Greater than 50% of this visit was spent counseling as described in above documentation:Yes

## 2023-07-25 NOTE — LETTER
2023    Rola Richter  [address] [facility] [address]             [facility]  [address]  [facility] [address]  Phone: [phone]  Fax: [phone] To whom it may concern,    I am primary care physician for my patient, Rola Richter  1945.  She has osteoporosis and we intend to start her on prolia therapy but this has not yet started.  She is cleared to have dental extractions as recommended. I recommend postponing prolia until well healed from dental extractions (at least 3 months) post operatively. I would also like her to not need additional dental work in the near future before starting the prolia.  Please complete any major dental work before starting this therapy. Thank you for consideratio of this matter.      If you have any questions or concerns, please don't hesitate to call.    Sincerely,        Liseth Carias MD

## 2023-07-31 ENCOUNTER — SPECIALTY PHARMACY (OUTPATIENT)
Dept: PHARMACY | Facility: CLINIC | Age: 78
End: 2023-07-31
Payer: MEDICARE

## 2023-07-31 NOTE — TELEPHONE ENCOUNTER
Outgoing call regarding pt repatha. Pt stated that her next injection after today would be 8/14. Will follow up to set up that refill.

## 2023-08-02 ENCOUNTER — OFFICE VISIT (OUTPATIENT)
Dept: PAIN MEDICINE | Facility: CLINIC | Age: 78
End: 2023-08-02
Payer: MEDICARE

## 2023-08-02 VITALS
HEIGHT: 59 IN | BODY MASS INDEX: 24.8 KG/M2 | DIASTOLIC BLOOD PRESSURE: 66 MMHG | WEIGHT: 123 LBS | HEART RATE: 80 BPM | SYSTOLIC BLOOD PRESSURE: 115 MMHG

## 2023-08-02 DIAGNOSIS — M54.81 BILATERAL OCCIPITAL NEURALGIA: ICD-10-CM

## 2023-08-02 DIAGNOSIS — G89.4 CHRONIC PAIN DISORDER: ICD-10-CM

## 2023-08-02 DIAGNOSIS — M50.30 DDD (DEGENERATIVE DISC DISEASE), CERVICAL: Primary | ICD-10-CM

## 2023-08-02 DIAGNOSIS — M47.892 OTHER SPONDYLOSIS, CERVICAL REGION: ICD-10-CM

## 2023-08-02 DIAGNOSIS — M54.12 CERVICAL RADICULITIS: ICD-10-CM

## 2023-08-02 PROCEDURE — 3074F PR MOST RECENT SYSTOLIC BLOOD PRESSURE < 130 MM HG: ICD-10-PCS | Mod: HCNC,CPTII,S$GLB, | Performed by: PHYSICIAN ASSISTANT

## 2023-08-02 PROCEDURE — 1101F PR PT FALLS ASSESS DOC 0-1 FALLS W/OUT INJ PAST YR: ICD-10-PCS | Mod: HCNC,CPTII,S$GLB, | Performed by: PHYSICIAN ASSISTANT

## 2023-08-02 PROCEDURE — 3074F SYST BP LT 130 MM HG: CPT | Mod: HCNC,CPTII,S$GLB, | Performed by: PHYSICIAN ASSISTANT

## 2023-08-02 PROCEDURE — 1125F PR PAIN SEVERITY QUANTIFIED, PAIN PRESENT: ICD-10-PCS | Mod: HCNC,CPTII,S$GLB, | Performed by: PHYSICIAN ASSISTANT

## 2023-08-02 PROCEDURE — 3288F PR FALLS RISK ASSESSMENT DOCUMENTED: ICD-10-PCS | Mod: HCNC,CPTII,S$GLB, | Performed by: PHYSICIAN ASSISTANT

## 2023-08-02 PROCEDURE — 99999 PR PBB SHADOW E&M-EST. PATIENT-LVL III: CPT | Mod: PBBFAC,HCNC,, | Performed by: PHYSICIAN ASSISTANT

## 2023-08-02 PROCEDURE — 3078F DIAST BP <80 MM HG: CPT | Mod: HCNC,CPTII,S$GLB, | Performed by: PHYSICIAN ASSISTANT

## 2023-08-02 PROCEDURE — 1159F PR MEDICATION LIST DOCUMENTED IN MEDICAL RECORD: ICD-10-PCS | Mod: HCNC,CPTII,S$GLB, | Performed by: PHYSICIAN ASSISTANT

## 2023-08-02 PROCEDURE — 99214 PR OFFICE/OUTPT VISIT, EST, LEVL IV, 30-39 MIN: ICD-10-PCS | Mod: HCNC,S$GLB,, | Performed by: PHYSICIAN ASSISTANT

## 2023-08-02 PROCEDURE — 1125F AMNT PAIN NOTED PAIN PRSNT: CPT | Mod: HCNC,CPTII,S$GLB, | Performed by: PHYSICIAN ASSISTANT

## 2023-08-02 PROCEDURE — 3078F PR MOST RECENT DIASTOLIC BLOOD PRESSURE < 80 MM HG: ICD-10-PCS | Mod: HCNC,CPTII,S$GLB, | Performed by: PHYSICIAN ASSISTANT

## 2023-08-02 PROCEDURE — 99999 PR PBB SHADOW E&M-EST. PATIENT-LVL III: ICD-10-PCS | Mod: PBBFAC,HCNC,, | Performed by: PHYSICIAN ASSISTANT

## 2023-08-02 PROCEDURE — 1101F PT FALLS ASSESS-DOCD LE1/YR: CPT | Mod: HCNC,CPTII,S$GLB, | Performed by: PHYSICIAN ASSISTANT

## 2023-08-02 PROCEDURE — 1159F MED LIST DOCD IN RCRD: CPT | Mod: HCNC,CPTII,S$GLB, | Performed by: PHYSICIAN ASSISTANT

## 2023-08-02 PROCEDURE — 99214 OFFICE O/P EST MOD 30 MIN: CPT | Mod: HCNC,S$GLB,, | Performed by: PHYSICIAN ASSISTANT

## 2023-08-02 PROCEDURE — 3288F FALL RISK ASSESSMENT DOCD: CPT | Mod: HCNC,CPTII,S$GLB, | Performed by: PHYSICIAN ASSISTANT

## 2023-08-02 RX ORDER — HYDROCODONE BITARTRATE AND ACETAMINOPHEN 5; 325 MG/1; MG/1
1 TABLET ORAL EVERY 12 HOURS PRN
Qty: 60 TABLET | Refills: 0 | Status: SHIPPED | OUTPATIENT
Start: 2023-08-02 | End: 2023-08-31

## 2023-08-02 RX ORDER — HYDROCODONE BITARTRATE AND ACETAMINOPHEN 5; 325 MG/1; MG/1
1 TABLET ORAL EVERY 12 HOURS PRN
Qty: 60 TABLET | Refills: 0 | Status: ON HOLD | OUTPATIENT
Start: 2023-09-29 | End: 2023-10-25 | Stop reason: SDUPTHER

## 2023-08-02 RX ORDER — HYDROCODONE BITARTRATE AND ACETAMINOPHEN 5; 325 MG/1; MG/1
1 TABLET ORAL EVERY 12 HOURS PRN
Qty: 60 TABLET | Refills: 0 | Status: SHIPPED | OUTPATIENT
Start: 2023-08-31 | End: 2023-09-29

## 2023-08-02 RX ORDER — CYCLOBENZAPRINE HCL 10 MG
10 TABLET ORAL 2 TIMES DAILY PRN
Qty: 60 TABLET | Refills: 1 | Status: SHIPPED | OUTPATIENT
Start: 2023-08-02 | End: 2023-10-30 | Stop reason: SDUPTHER

## 2023-08-02 NOTE — PROGRESS NOTES
Referring Physician: No ref. provider found    PCP: Liseth Carias MD      CC: neck and occipital pain    Interval history: Ms. Richter is a 78 y.o. female with neck pain and occipital neuralgia who returns to our clinic.  She continues to c/o headaches and neck pain.  Most recent occcipital nerve block only provided mild short lived beneft. Recent cervical MELANY improved neck pain that was extending into left shoulder.  She has numbness to her right hand and first through fourth digits. Cervical MELANY in February 2018  provided benefit for several weeks.    She continues to take Norco with benefit.  Flexeril 10 mg caused dry mouth however this resolved and she wishes to resume. Robaxin was helpful but caused dizziness.  Denies UE weakness. No bowel bladder changes.   Pain today is rated 9/10.    Prior HPI:   Patient is 70-year-old female with past history history of cervical DDD, cervical spondylosis and chronic headaches.  She recently moved here from [place], [place].  She is treated in the past by neurology.  She states having constant burning pain over the left side of her posterior scalp.  Pain radiates to her neck as well as a frontal.  She also has left-sided neck pain as well.  She denies any radicular arm pain.  No numbness or weakness.  She states having cervical epidural steroid injection at past with minimal benefit.  She also has had decided of cervical nerve blocks in the past with moderate benefit.  Most recent injection was performed 2 months ago.  She desires repeat injection.  She currently takes Norco 10 mg every 12 hours as needed with moderate benefit.  She also takes Zanaflex 4 mg every 8 hours with mild benefits.  She rates her pain 7/10 today.    Pain intervention history: s/p left occipital nerve blocks on 1/2016 with 50% relief of her headaches  S/p cervical MELANY 2/8/18 moderate relief for a couple of weeks.     ROS:  CONSTITUTIONAL: No fevers, chills, night sweats, wt. loss, appetite  changes  SKIN: no rashes or itching  ENT: No headaches, head trauma, vision changes, or eye pain  LYMPH NODES: None noticed   CV: No chest pain, palpitations.   RESP: No shortness of breath, dyspnea on exertion, cough, wheezing, or hemoptysis  GI: No nausea, emesis, diarrhea, constipation, melena, hematochezia, pain.    : No dysuria, hematuria, urgency, or frequency   HEME: No easy bruising, bleeding problems  PSYCHIATRIC: No depression, anxiety, psychosis, hallucinations.  NEURO: No seizures, memory loss, dizziness or difficulty sleeping  MSK: + History of present illness      Past Medical History:   Diagnosis Date    Anxiety     Arthritis     Cataract     DDD (degenerative disc disease), lumbar     Depression     Encounter for blood transfusion     GERD (gastroesophageal reflux disease)     Headache     Hyperlipidemia     Hypertension     pt states she does not take meds anymore she just watches her weight    Neuromuscular disorder     Occipital neuralgia 3/24/2017    Osteoporosis      Past Surgical History:   Procedure Laterality Date    APPENDECTOMY       SECTION      x 2    CHOLECYSTECTOMY      COLONOSCOPY  prior to     COLONOSCOPY N/A 2019    Procedure: COLONOSCOPY;  Surgeon: Greg Victor MD;  Location: [facility];  Service: Endoscopy;  Laterality: N/A; 2 colon polyps, hemorrhoids; repeat in 5 years for surveillance; biopsy: Tubular adenoma x2    EPIDURAL STEROID INJECTION INTO CERVICAL SPINE N/A 2022    Procedure: Injection-steroid-epidural-cervical C7-T1;  Surgeon: Timothy Grimaldo MD;  Location: [facility] OR;  Service: Pain Management;  Laterality: N/A;    ESOPHAGOGASTRODUODENOSCOPY N/A 2019    Procedure: EGD (ESOPHAGOGASTRODUODENOSCOPY);  Surgeon: Greg Victor MD;  Location: [facility];  Service: Endoscopy;  Laterality: N/A; Mild Schatzki ring. Dilated. small hiatal hernia; Four gastric polyps. Resected and retrieved, gastritis; biopsy:stomach- unremarkable, negative for h  pylori, polyps-Fundic type mucosa with foveolar hyperplasia.    ESOPHAGOGASTRODUODENOSCOPY N/A 2/17/2021    Procedure: EGD (ESOPHAGOGASTRODUODENOSCOPY);  Surgeon: Greg Victor MD;  Location: [facility];  Service: Endoscopy;  Laterality: N/A;    HYSTERECTOMY      Laser Periphery Iridotomy Bilateral     OD 5/26/16 and OS touch up 5/26/2016    UPPER GASTROINTESTINAL ENDOSCOPY  03/14/2017    Dr. Marcial: esophageal stenosis- dilated, gastritis, gastric polyps removed; biopsy- mild gastritis, negative for h pylori, hyperplastic polyp, esophagus unremarkable    vocal cord tumor removal       Family History   Problem Relation Age of Onset    Osteoarthritis Mother     Alcohol abuse Mother     Rheum arthritis Mother     Osteoarthritis Sister     Diabetes Brother     No Known Problems Son     No Known Problems Sister     No Known Problems Sister     No Known Problems Brother     Arthritis Son     No Known Problems Son     Stroke Maternal Grandmother 99    Rheum arthritis Maternal Grandmother     Retinal detachment Neg Hx     Macular degeneration Neg Hx     Glaucoma Neg Hx     Amblyopia Neg Hx     Blindness Neg Hx     Cancer Neg Hx     Cataracts Neg Hx     Hypertension Neg Hx     Strabismus Neg Hx     Thyroid disease Neg Hx     Lupus Neg Hx     Kidney disease Neg Hx     Inflammatory bowel disease Neg Hx     Psoriasis Neg Hx     Colon cancer Neg Hx     Crohn's disease Neg Hx     Ulcerative colitis Neg Hx     Stomach cancer Neg Hx     Esophageal cancer Neg Hx      Social History     Socioeconomic History    Marital status:    Tobacco Use    Smoking status: Never    Smokeless tobacco: Never   Substance and Sexual Activity    Alcohol use: No     Alcohol/week: 0.0 standard drinks of alcohol    Drug use: Yes     Types: Hydrocodone    Sexual activity: Yes     Partners: Male     Social Determinants of Health     Financial Resource Strain: Low Risk  (11/24/2022)    Overall Financial Resource Strain (CARDIA)     Difficulty of  "Paying Living Expenses: Not very hard   Food Insecurity: No Food Insecurity (11/24/2022)    Hunger Vital Sign     Worried About Running Out of Food in the Last Year: Never true     Ran Out of Food in the Last Year: Never true   Transportation Needs: No Transportation Needs (11/24/2022)    PRAPARE - Transportation     Lack of Transportation (Medical): No     Lack of Transportation (Non-Medical): No   Physical Activity: Insufficiently Active (11/24/2022)    Exercise Vital Sign     Days of Exercise per Week: 2 days     Minutes of Exercise per Session: 30 min   Stress: No Stress Concern Present (11/24/2022)    [employer] - Occupational Stress Questionnaire     Feeling of Stress : Only a little   Social Connections: Socially Integrated (11/24/2022)    Social Connection and Isolation Panel [NHANES]     Frequency of Communication with Friends and Family: More than three times a week     Frequency of Social Gatherings with Friends and Family: Once a week     Attends [employer] Services: More than 4 times per year     Active Member of Clubs or Organizations: Yes     Attends Club or Organization Meetings: More than 4 times per year     Marital Status:    Housing Stability: Low Risk  (11/24/2022)    Housing Stability Vital Sign     Unable to Pay for Housing in the Last Year: No     Number of Places Lived in the Last Year: 1     Unstable Housing in the Last Year: No         Medications/Allergies: See med card    Vitals:    08/02/23 0943   BP: 115/66   Pulse: 80   Weight: 55.8 kg (123 lb)   Height: 4' 11" (1.499 m)   PainSc:   9   PainLoc: Neck         Physical exam:    GENERAL: A and O x3, the patient appears well groomed and is in no acute distress.  Skin: No rashes or obvious lesions  HEENT: normocephalic, atraumatic  CARDIOVASCULAR:  RRR  LUNGS: nonlabored breathing  ABDOMEN: soft, nontender   UPPER EXTREMITIES: Normal alignment, normal range of motion, no atrophy, no skin changes,  hair " growth and nail growth normal and equal bilaterally. No swelling, no tenderness.  +Phalens on left side. +TTP tricep tendon  LOWER EXTREMITIES:  Normal alignment, normal range of motion, no atrophy, no skin changes,  hair growth and nail growth normal and equal bilaterally. No swelling, no tenderness.  CERVICAL SPINE:   Cervical spine: ROM is full in flexion, extension and lateral rotation.  Painful flexion > extension.  Positive facet loading bilaterally.  Spurling is positive at right side.  Myofascial exam:  Tenderness to palpation across cervical paraspinals deltoid and trapezius muscles bilaterally.      MENTAL STATUS: normal orientation, speech, language, and fund of knowledge for social situation.  Emotional state appropriate.    CRANIAL NERVES:  II:  PERRL bilaterally,   III,IV,VI: EOMI.    V:  Facial sensation equal bilaterally  VII:  Facial motor function normal.  VIII:  Hearing equal to finger rub bilaterally  IX/X: Gag normal, palate symmetric  XI:  Shoulder shrug equal, head turn equal  XII:  Tongue midline without fasciculations      MOTOR: Tone and bulk: normal bilateral upper and lower Strength: normal   Delt Bi Tri WE WF     R 5 5 5 5 5 5   L 5 5 5 5 5 5     IP ADD ABD Quad TA Gas HAM  R 5 5 5 5 5 5 5  L 5 5 5 5 5 5 5    SENSATION: Light touch and pinprick intact bilaterally  REFLEXES: normal, symmetric, nonbrisk.  Toes down, no clonus. No hoffmans.  GAIT: normal rise, base, steps, and arm swing.        Imaging:   Cervical MRI 12/4/17    Narrative     EXAM: Cervical spine MRI without contrast.    INDICATION: Cervical radiculopathy.  Neck pain and occipital neuralgia.  The patient complains of neck and right arm pain.    TECHNIQUE: Routine multiplanar, multisequence unenhanced cervical spine MRI was performed.    COMPARISON: Plain films of the cervical spine obtained concurrently      FINDINGS:     Vertebral column: There is straightening of the cervical spine with loss of normal lordosis.  As  seen on concurrent plain films, there is trace anterolisthesis of C3 on C4 with 2 mm anterolisthesis of C4 on C5.  There is marked disc space narrowing at the C5-6 level with moderate to marked disc space narrowing at the C4-5 and C6-7 levels.  There is partial non-segmentation anteriorly at the C2-3 level.  The C2 and C3 as well as the C4 and C5 facet joints appear fused and this may represent developmental non-segmentation or degenerative ankylosis.  All of the discs are desiccated.  The odontoid process is intact.    Spinal canal, cord, epidural space: The craniovertebral junction is normal.  The spinal canal is omental and normal.  There is no significant spinal stenosis.  The cord is normal in caliber.  There is very subtle flattening of the ventral cord surface at the C4-5 and C5-6 levels where there is degenerative change.  The study is mildly motion but there is no definite cord edema or myelomalacia.    Findings by level:    C2-3: There is no spinal canal or foraminal stenosis.  There is mild left facet joint arthropathy.    C3-4: There is trace anterolisthesis.  There is left greater than right uncovertebral spurring and facet joint arthropathy.  There is a mild disc osteophyte complex, slightly eccentric to the left with subtle annular fissure.  This narrows the ventral subarachnoid space.  There is no spinal stenosis or cord compression.  There is moderate marked left foraminal stenosis.    C4-5: There is moderate disc space narrowing with 2 mm anterolisthesis of C4 on C5.  There is facet joint arthropathy or effusion left greater than right.  There is also mild bilateral but left greater than right uncovertebral spurring.  There is unroofing of a mild disc bulge which narrows the ventral subarachnoid space.  There is no spinal stenosis.  There is mild to moderate left foraminal stenosis.    C5-6:There is marked disc space narrowing.  There is bilateral uncovertebral spurring.  There is a disc osteophyte  complex which narrows the subarachnoid space.  This is slightly eccentric to the right There is subtle flattening of the ventral cord surface.  Cord signal is grossly normal.  There is mild spinal stenosis with at least moderate bilateral foraminal stenosis.    C6-7: There is moderate disc space narrowing.  There is mild uncovertebral spurring.  There is a shallow disc osteophyte complex, slightly eccentric to the right.  There is narrowing of the ventral subarachnoid space.  There is no spinal stenosis.  Cord signal is normal.  There is mild bilateral foraminal stenosis.  There is a small 3.5 mm left foraminal perineural cyst.    C7- T1: There is a Schmorl's node in inferior endplate of C7, chronic.  There are tiny bilateral foraminal perineural cysts.  There is minimal bulging of the annulus and mild facet joint arthropathy without spinal canal or foraminal stenosis.    Soft tissues/other: The prevertebral soft tissues are normal.  The airway is patent.   Impression          1. There is multilevel degenerative disc disease described in detail above.  There is no acute fracture.  There is degenerative listhesis at several levels.  There is some degree of disc osteophyte complex, uncovertebral spurring and/or facet joint arthropathy contributing to some degree of foraminal stenosis at several levels.  There is no significant spinal canal stenosis.  There is very subtle flattening of the ventral cord surface at the C4-5 and C6-7 levels The pertinent findings are summarized below.    2. At the C3-4 level, there is no spinal stenosis.  There is moderate to marked left foraminal stenosis.    3. At the C4-5 level there is no spinal stenosis.  There is mild to moderate left foraminal stenosis.    4. At the C5-6 level, there is mild spinal stenosis with at least moderate bilateral foraminal stenosis.    5. At the C6-7 level, there is no spinal stenosis.  There is mild bilateral foraminal stenosis.    6. There is no spinal  canal or foraminal stenosis at the C2-3 or C7-T1 levels.     Assessment:  Mrs. Richter is a 78 y.o. female with neck and head pain    1. DDD (degenerative disc disease), cervical    2. Cervical radiculitis    3. Other spondylosis, cervical region    4. Bilateral occipital neuralgia            Plan:  1. I have stressed the importance of physical activity and exercise to improve overall health.  2. Monitor progress from repeat C7-T1 [place] MELANY. I3. Consider bilateral occipital blocks for head pain.  3. Norco 5mg q12hrs as needed for pain.  reviewed.  Previous UDS consistent   4. Flexeril 10 mg BID #60 1 refill.   5. F/u 3 months or sooner  All medication management was performed by Dr. Timothy Grimaldo

## 2023-08-07 NOTE — TELEPHONE ENCOUNTER
Specialty Pharmacy - Refill Coordination    Specialty Medication Orders Linked to Encounter      Flowsheet Row Most Recent Value   Medication #1 evolocumab (REPATHA SURECLICK) 140 mg/mL PnIj (Order#[id number], Rx#[id number])            Refill Questions - Documented Responses      Flowsheet Row Most Recent Value   Patient Availability and HIPAA Verification    Does patient want to proceed with activity? Yes   HIPAA/medical authority confirmed? Yes   Relationship to patient of person spoken to? Self   Refill Screening Questions    Would patient like to speak to a pharmacist? No   When does the patient need to receive the medication? 08/14/23   Refill Delivery Questions    How will the patient receive the medication? MEDRx   When does the patient need to receive the medication? 08/14/23   Shipping Address Home   Address in [facility] confirmed and updated if neccessary? Yes   Expected Copay ($) 0   Is the patient able to afford the medication copay? Yes   Payment Method zero copay   Days supply of Refill 28   Supplies needed? No supplies needed   Refill activity completed? Yes   Refill activity plan Refill scheduled   Shipment/Pickup Date: 08/10/23            Current Outpatient Medications   Medication Sig    busPIRone (BUSPAR) 7.5 MG tablet Take 1 tablet (7.5 mg total) by mouth once daily. Continue to take in the afternoon.    cetirizine (ZYRTEC) 5 MG tablet Take 1 tablet (5 mg total) by mouth once daily.    COD LIVER OIL ORAL Take 1 tablet by mouth once daily.     cyclobenzaprine (FLEXERIL) 10 MG tablet Take 1 tablet (10 mg total) by mouth 2 (two) times daily as needed for Muscle spasms.    diclofenac sodium (VOLTAREN) 1 % Gel Apply 2 g topically 4 (four) times daily.    donepeziL (ARICEPT) 10 MG tablet Take 1 tablet (10 mg total) by mouth every evening.    evolocumab (REPATHA SURECLICK) 140 mg/mL PnIj Inject 1 mL (140 mg total) into the skin every 14 (fourteen) days.    fish oil-omega-3 fatty acids 300-1,000  mg capsule Take 2 g by mouth once daily.    fluticasone propionate (FLONASE) 50 mcg/actuation nasal spray 2 sprays (100 mcg total) by Each Nostril route once daily.    gabapentin (NEURONTIN) 100 MG capsule TAKE 1 CAPSULE TWICE DAILY    HYDROcodone-acetaminophen (NORCO) 5-325 mg per tablet Take 1 tablet by mouth every 12 (twelve) hours as needed for Pain.    [START ON 8/31/2023] HYDROcodone-acetaminophen (NORCO) 5-325 mg per tablet Take 1 tablet by mouth every 12 (twelve) hours as needed for Pain.    [START ON 9/29/2023] HYDROcodone-acetaminophen (NORCO) 5-325 mg per tablet Take 1 tablet by mouth every 12 (twelve) hours as needed for Pain.    ipratropium (ATROVENT) 42 mcg (0.06 %) nasal spray 2 sprays by Nasal route 3 (three) times daily.    irbesartan (AVAPRO) 300 MG tablet Take 1 tablet (300 mg total) by mouth every evening.    meloxicam (MOBIC) 7.5 MG tablet Take 1 tablet (7.5 mg total) by mouth daily as needed for Pain.    multivit with min-folic acid 200 mcg Chew Take 1 tablet by mouth once daily.     omeprazole (PRILOSEC) 40 MG capsule TAKE 1 CAPSULE EVERY DAY    sertraline (ZOLOFT) 100 MG tablet Take 2 tablets (200 mg total) by mouth once daily.   Last reviewed on 8/2/2023  9:44 AM by Melina Taveras MA    Review of patient's allergies indicates:   Allergen Reactions    Aspirin Nausea And Vomiting    Penicillins Itching    Crestor [rosuvastatin]      Muscle pain      Lipitor [atorvastatin]      Achy      Last reviewed on  8/2/2023 10:12 AM by Kathy Jim      Tasks added this encounter   No tasks added.   Tasks due within next 3 months   No tasks due.     Nikole Matias, PharmD  [facility]  [facility]  [facility] [address]  Phone: [phone]  Fax: [phone]

## 2023-08-07 NOTE — PROGRESS NOTES
Addended by: Sinan Yee on: 8/7/2023 11:03 AM     Modules accepted: Orders Last 5 Patient Entered Readings                                      Current 30 Day Average: 149/78     Recent Readings 6/8/2018 6/7/2018 6/7/2018 6/4/2018 6/3/2018    SBP (mmHg) 153 151 151 142 163    DBP (mmHg) 80 99 99 86 80    Pulse 82 86 86 91 88        Patient's BP average is above goal of <130/80.     Patient denies s/s of hypotension (lightheadedness, dizziness, nausea, fatigue) associated with low readings. Instructed patient to inform me if this occurs, patient confirms understanding.      Patient denies s/s of hypertension (SOB, CP, severe headaches, changes in vision) associated with high readings. Instructed patient to go to the ED if BP > 180/110 and accompanied by hypertensive s/s, patient confirms understanding.    Will continue to monitor regularly. Will follow up in 2-3 weeks, sooner if BP begins to trend upward or downward.    Patient has my contact information and knows to call with any concerns or clinical changes.     Current HTN regimen: no medications    Called patient to discuss starting medication, likely valsartan 80 mg once daily.  reports she will be out of town for about 2 weeks. She does not have her cuff as  is the one who assists her with measurement and manages her medication. Follow up with patient once she returns to discuss medication initiation.  agreed to contact me if patient returns home sooner.

## 2023-08-08 ENCOUNTER — TELEPHONE (OUTPATIENT)
Dept: NEUROLOGY | Facility: CLINIC | Age: 78
End: 2023-08-08
Payer: MEDICARE

## 2023-08-08 ENCOUNTER — OFFICE VISIT (OUTPATIENT)
Dept: HOME HEALTH SERVICES | Facility: CLINIC | Age: 78
End: 2023-08-08
Payer: MEDICARE

## 2023-08-08 VITALS
BODY MASS INDEX: 24.84 KG/M2 | HEART RATE: 92 BPM | OXYGEN SATURATION: 97 % | DIASTOLIC BLOOD PRESSURE: 66 MMHG | SYSTOLIC BLOOD PRESSURE: 132 MMHG | WEIGHT: 123 LBS

## 2023-08-08 DIAGNOSIS — G31.84 MILD NEUROCOGNITIVE DISORDER: ICD-10-CM

## 2023-08-08 DIAGNOSIS — M54.12 CERVICAL RADICULITIS: ICD-10-CM

## 2023-08-08 DIAGNOSIS — K21.9 GASTROESOPHAGEAL REFLUX DISEASE WITHOUT ESOPHAGITIS: ICD-10-CM

## 2023-08-08 DIAGNOSIS — I10 PRIMARY HYPERTENSION: ICD-10-CM

## 2023-08-08 DIAGNOSIS — F19.20 DEPENDENCY ON PAIN MEDICATION: ICD-10-CM

## 2023-08-08 DIAGNOSIS — F33.1 MODERATE EPISODE OF RECURRENT MAJOR DEPRESSIVE DISORDER: ICD-10-CM

## 2023-08-08 DIAGNOSIS — M80.00XA AGE-RELATED OSTEOPOROSIS WITH CURRENT PATHOLOGICAL FRACTURE, INITIAL ENCOUNTER: ICD-10-CM

## 2023-08-08 DIAGNOSIS — R41.3 MEMORY LOSS: ICD-10-CM

## 2023-08-08 DIAGNOSIS — I70.0 CALCIFICATION OF AORTA: ICD-10-CM

## 2023-08-08 DIAGNOSIS — E78.01 FAMILIAL HYPERCHOLESTEROLEMIA: ICD-10-CM

## 2023-08-08 DIAGNOSIS — F41.1 GENERALIZED ANXIETY DISORDER: ICD-10-CM

## 2023-08-08 DIAGNOSIS — Z00.00 ENCOUNTER FOR MEDICARE ANNUAL WELLNESS EXAM: Primary | ICD-10-CM

## 2023-08-08 DIAGNOSIS — Z00.00 ENCOUNTER FOR PREVENTIVE HEALTH EXAMINATION: ICD-10-CM

## 2023-08-08 PROCEDURE — G9919 PR SCREENING AND POSITIVE: ICD-10-PCS | Mod: CPTII,S$GLB,, | Performed by: NURSE PRACTITIONER

## 2023-08-08 PROCEDURE — G0439 PPPS, SUBSEQ VISIT: HCPCS | Mod: S$GLB,,, | Performed by: NURSE PRACTITIONER

## 2023-08-08 PROCEDURE — 1101F PT FALLS ASSESS-DOCD LE1/YR: CPT | Mod: CPTII,S$GLB,, | Performed by: NURSE PRACTITIONER

## 2023-08-08 PROCEDURE — 1159F PR MEDICATION LIST DOCUMENTED IN MEDICAL RECORD: ICD-10-PCS | Mod: CPTII,S$GLB,, | Performed by: NURSE PRACTITIONER

## 2023-08-08 PROCEDURE — 3288F PR FALLS RISK ASSESSMENT DOCUMENTED: ICD-10-PCS | Mod: CPTII,S$GLB,, | Performed by: NURSE PRACTITIONER

## 2023-08-08 PROCEDURE — 3078F PR MOST RECENT DIASTOLIC BLOOD PRESSURE < 80 MM HG: ICD-10-PCS | Mod: CPTII,S$GLB,, | Performed by: NURSE PRACTITIONER

## 2023-08-08 PROCEDURE — 1170F PR FUNCTIONAL STATUS ASSESSED: ICD-10-PCS | Mod: CPTII,S$GLB,, | Performed by: NURSE PRACTITIONER

## 2023-08-08 PROCEDURE — 3078F DIAST BP <80 MM HG: CPT | Mod: CPTII,S$GLB,, | Performed by: NURSE PRACTITIONER

## 2023-08-08 PROCEDURE — G0439 PR MEDICARE ANNUAL WELLNESS SUBSEQUENT VISIT: ICD-10-PCS | Mod: S$GLB,,, | Performed by: NURSE PRACTITIONER

## 2023-08-08 PROCEDURE — 3075F SYST BP GE 130 - 139MM HG: CPT | Mod: CPTII,S$GLB,, | Performed by: NURSE PRACTITIONER

## 2023-08-08 PROCEDURE — 1125F AMNT PAIN NOTED PAIN PRSNT: CPT | Mod: CPTII,S$GLB,, | Performed by: NURSE PRACTITIONER

## 2023-08-08 PROCEDURE — 1160F PR REVIEW ALL MEDS BY PRESCRIBER/CLIN PHARMACIST DOCUMENTED: ICD-10-PCS | Mod: CPTII,S$GLB,, | Performed by: NURSE PRACTITIONER

## 2023-08-08 PROCEDURE — 1101F PR PT FALLS ASSESS DOC 0-1 FALLS W/OUT INJ PAST YR: ICD-10-PCS | Mod: CPTII,S$GLB,, | Performed by: NURSE PRACTITIONER

## 2023-08-08 PROCEDURE — 3288F FALL RISK ASSESSMENT DOCD: CPT | Mod: CPTII,S$GLB,, | Performed by: NURSE PRACTITIONER

## 2023-08-08 PROCEDURE — 1170F FXNL STATUS ASSESSED: CPT | Mod: CPTII,S$GLB,, | Performed by: NURSE PRACTITIONER

## 2023-08-08 PROCEDURE — 1125F PR PAIN SEVERITY QUANTIFIED, PAIN PRESENT: ICD-10-PCS | Mod: CPTII,S$GLB,, | Performed by: NURSE PRACTITIONER

## 2023-08-08 PROCEDURE — 3075F PR MOST RECENT SYSTOLIC BLOOD PRESS GE 130-139MM HG: ICD-10-PCS | Mod: CPTII,S$GLB,, | Performed by: NURSE PRACTITIONER

## 2023-08-08 PROCEDURE — 1160F RVW MEDS BY RX/DR IN RCRD: CPT | Mod: CPTII,S$GLB,, | Performed by: NURSE PRACTITIONER

## 2023-08-08 PROCEDURE — G9919 SCRN ND POS ND PROV OF REC: HCPCS | Mod: CPTII,S$GLB,, | Performed by: NURSE PRACTITIONER

## 2023-08-08 PROCEDURE — 1159F MED LIST DOCD IN RCRD: CPT | Mod: CPTII,S$GLB,, | Performed by: NURSE PRACTITIONER

## 2023-08-08 NOTE — TELEPHONE ENCOUNTER
----- Message from Quiana Zhong NP sent at 8/8/2023  9:33 AM CDT -----  Regarding: MRI results  Patient would like a call re: the results of MRI on 6/21.  Please give her a call.  Thank You!

## 2023-08-08 NOTE — TELEPHONE ENCOUNTER
"Returned patient's spouse's call regarding patient's MRI. Informed patient's spouse per Nusrat Bentley NP, "Your MRI brain scan results look fine and do not require any change in treatment." Patient's spouse v/u.   "

## 2023-08-08 NOTE — PATIENT INSTRUCTIONS
Counseling and Referral of Other Preventative  (Italic type indicates deductible and co-insurance are waived)    Patient Name: Rola Richter  Today's Date: 8/8/2023    Health Maintenance       Date Due Completion Date    Sign Pain Contract Never done ---    Naloxone Prescription Never done ---    COVID-19 Vaccine (3 - Pfizer series) 04/06/2021 2/9/2021    Shingles Vaccine (2 of 2) 04/14/2023 2/17/2023    Urine Drug Screen 05/16/2023 11/16/2022    Influenza Vaccine (1) 09/01/2023 9/21/2022    Override on 10/12/2020: Done    Colonoscopy 05/13/2024 5/13/2019    Hemoglobin A1c (Prediabetes) 07/18/2024 7/18/2023    Lipid Panel 07/18/2024 7/18/2023    DEXA Scan 09/16/2025 9/16/2022    TETANUS VACCINE 01/26/2026 1/26/2016        No orders of the defined types were placed in this encounter.    The following information is provided to all patients.  This information is to help you find resources for any of the problems found today that may be affecting your health:                Living healthy guide: www.[facility].[place].gov      Understanding Diabetes: www.diabetes.org      Eating healthy: www.cdc.gov/healthyweight      CDC home safety checklist: www.cdc.gov/steadi/patient.html      Agency on Aging: www.goea.[place].[facility]      Alcoholics anonymous (AA): www.aa.org      Physical Activity: www.[place].nih.gov/eg1ehzg      Tobacco use: www.quitwithusla.org

## 2023-08-08 NOTE — PROGRESS NOTES
Rola Richter presented for a  Medicare AWV and comprehensive Health Risk Assessment today. The following components were reviewed and updated:    Medical history  Family History  Social history  Allergies and Current Medications  Health Risk Assessment  Health Maintenance  Care Team     Patient screened moderate and/or high risk for one or more social determinants of health (SDOH). Patient connected to community resources through the ED Navigator.      ** See Completed Assessments for Annual Wellness Visit within the encounter summary.**         The following assessments were completed:  Living Situation  CAGE  Depression Screening  Timed Get Up and Go  Whisper Test  Cognitive Function Screening - not performed  Nutrition Screening  ADL Screening  PAQ Screening    Patient on chronic opioids - Norco.   Risk factors reviewed for any potential opioid use disorder   Pain evaluated during visit 8/10, neck  Current treatment plan documented.  Will refer to specialist, as appropriate. - followed by pain mgmt       Vitals:    08/08/23 0900   BP: 132/66   Pulse: 92   SpO2: 97%   Weight: 55.8 kg (123 lb)     Body mass index is 24.84 kg/m².  Physical Exam  Vitals reviewed.   HENT:      Head: Normocephalic.   Eyes:      Pupils: Pupils are equal, round, and reactive to light.   Cardiovascular:      Rate and Rhythm: Normal rate and regular rhythm.      Heart sounds: Normal heart sounds.   Pulmonary:      Effort: Pulmonary effort is normal.      Breath sounds: Normal breath sounds.   Abdominal:      General: Bowel sounds are normal.      Palpations: Abdomen is soft.   Musculoskeletal:         General: Normal range of motion.      Cervical back: Normal range of motion.      Right lower leg: No edema.      Left lower leg: No edema.   Skin:     General: Skin is warm and dry.   Neurological:      Mental Status: She is alert and oriented to person, place, and time.   Psychiatric:         Behavior: Behavior normal.                Diagnoses and health risks identified today and associated recommendations/orders:    1. Encounter for Medicare annual wellness exam  - Ambulatory Referral/Consult to Enhanced Annual Wellness Visit (eAWV)    2. Encounter for preventive health examination  - Above assessments completed. Preventive measures and health maintenance reviewed with patient and   -encouraged 2nd shingles before 8/17    3. Mild neurocognitive disorder  Stable, followed by Neurology  -on donepezil    4. Calcification of aorta  Stable, followed by PCP  -on fish oil, repatha and asa    5. Moderate episode of recurrent major depressive disorder  Stable, followed by Psychiatry  -on sertraline    6. Generalized anxiety disorder  Stable, followed by Psychiatry  -on sertraline and buspirone    7. Dependency on pain medication  Stable, followed by Pain Mgmt  -on [place]    8. Cervical radiculitis  Stable, followed by Pain Mgmt  -on Norco, cyclobenazepril, gabapentin    9. Memory loss  Stable, followed by Neurology  -encouraged brain activities/exercises    10. Primary hypertension  Stable, followed by PCP  -irbesartan    11. Familial hypercholesterolemia  Stable, followed by PCP  -on repatah    12. Age-related osteoporosis with current pathological fracture, initial encounter  Stable, followed by PCP    13. Gastroesophageal reflux disease without esophagitis  Stable, followed by PCP  -on PPI      Provided Rola with a 5-10 year written screening schedule and personal prevention plan. Recommendations were developed using the USPSTF age appropriate recommendations. Education, counseling, and referrals were provided as needed. After Visit Summary printed and given to patient which includes a list of additional screenings\tests needed.    Follow up in about 1 year (around 8/8/2024) for your next annual wellness visit.    Quiana Zhong NP      I offered to discuss advanced care planning, including how to pick a person who would make decisions  for you if you were unable to make them for yourself, called a health care power of , and what kind of decisions you might make such as use of life sustaining treatments such as ventilators and tube feeding when faced with a life limiting illness recorded on a living will that they will need to know. (How you want to be cared for as you near the end of your natural life)     X Patient is interested in learning more about how to make advanced directives.  I provided them paperwork and offered to discuss this with them.

## 2023-08-17 ENCOUNTER — PATIENT MESSAGE (OUTPATIENT)
Dept: FAMILY MEDICINE | Facility: CLINIC | Age: 78
End: 2023-08-17
Payer: MEDICARE

## 2023-08-23 ENCOUNTER — PATIENT MESSAGE (OUTPATIENT)
Dept: ADMINISTRATIVE | Facility: OTHER | Age: 78
End: 2023-08-23
Payer: MEDICARE

## 2023-08-23 ENCOUNTER — OFFICE VISIT (OUTPATIENT)
Dept: PSYCHIATRY | Facility: CLINIC | Age: 78
End: 2023-08-23
Payer: MEDICARE

## 2023-08-23 DIAGNOSIS — F33.1 MODERATE EPISODE OF RECURRENT MAJOR DEPRESSIVE DISORDER: Primary | ICD-10-CM

## 2023-08-23 DIAGNOSIS — R41.89 COGNITIVE CHANGES: ICD-10-CM

## 2023-08-23 DIAGNOSIS — F41.1 GENERALIZED ANXIETY DISORDER: ICD-10-CM

## 2023-08-23 PROCEDURE — 1159F MED LIST DOCD IN RCRD: CPT | Mod: HCNC,CPTII,S$GLB, | Performed by: SOCIAL WORKER

## 2023-08-23 PROCEDURE — 99999 PR PBB SHADOW E&M-EST. PATIENT-LVL I: ICD-10-PCS | Mod: PBBFAC,HCNC,, | Performed by: SOCIAL WORKER

## 2023-08-23 PROCEDURE — 99999 PR PBB SHADOW E&M-EST. PATIENT-LVL I: CPT | Mod: PBBFAC,HCNC,, | Performed by: SOCIAL WORKER

## 2023-08-23 PROCEDURE — 1159F PR MEDICATION LIST DOCUMENTED IN MEDICAL RECORD: ICD-10-PCS | Mod: HCNC,CPTII,S$GLB, | Performed by: SOCIAL WORKER

## 2023-08-23 PROCEDURE — 90834 PR PSYCHOTHERAPY W/PATIENT, 45 MIN: ICD-10-PCS | Mod: HCNC,S$GLB,, | Performed by: SOCIAL WORKER

## 2023-08-23 PROCEDURE — 90834 PSYTX W PT 45 MINUTES: CPT | Mod: HCNC,S$GLB,, | Performed by: SOCIAL WORKER

## 2023-08-28 NOTE — PROGRESS NOTES
Individual Psychotherapy (PhD/LCSW)    8/23/2023    Site:  [place]         Therapeutic Intervention: Met with patient.  Outpatient - Insight oriented psychotherapy 45 min - CPT code 30627, Outpatient - Behavior modifying psychotherapy 45 min - CPT code 22916, and Outpatient - Supportive psychotherapy 45 min - CPT Code 79288    Chief complaint/reason for encounter: depression, anxiety, and cognition             Interval history and content of current session: Ct was referred by Dr. Leong to address depression, anxiety, and trauma. Ct arrived to session on time and was fully engaged.  Client shared that she continues to have anxiety and does not like to leave her house much.   and client processed client's thoughts regarding leaving her home.  Social Work and client also processed cognitive distortions and how to challenge them.  Client was receptive to feedback and reported that she plans to challenge her automatic thoughts by asking where's the evidence?.  Client to continue in one-to-one sessions.      Treatment plan:  Target symptoms: depression, anxiety , cognition  Why chosen therapy is appropriate versus another modality: relevant to diagnosis, patient responds to this modality, evidence based practice  Outcome monitoring methods: self-report  Therapeutic intervention type: insight oriented psychotherapy, behavior modifying psychotherapy, supportive psychotherapy    Risk parameters:  Patient reports no suicidal ideation  Patient reports no homicidal ideation  Patient reports no self-injurious behavior  Patient reports no violent behavior    CSSRS was completed:     Mental Status Exam:  General Appearance:  unremarkable, age appropriate   Speech: normal tone, normal rate, normal pitch, normal volume      Level of Cooperation: cooperative      Thought Processes: normal and logical   Mood: steady      Thought Content: normal, no suicidality, no homicidality, delusions, or paranoia   Affect:  congruent and appropriate   Orientation: Oriented x3   Memory: recent >  intact   Attention Span & Concentration: intact   Fund of General Knowledge: intact and appropriate to age and level of education   Abstract Reasoning: interpretation of similarities was abstract   Judgment & Insight: intact     Language intact       Verbal deficits: None    Patient's response to intervention:  The patient's response to intervention is accepting.    Progress toward goals and other mental status changes:  The patient's progress toward goals is fair .    Diagnosis:     ICD-10-CM ICD-9-CM   1. Moderate episode of recurrent major depressive disorder  F33.1 296.32   2. Generalized anxiety disorder  F41.1 300.02   3. Cognitive changes  R41.89 799.59       Plan:  individual psychotherapy and ct to follow up with Dr. Leong for psych med mgt  Pt to go to ED or call 911 if symptoms worsen or if she has thoughts of harming self and/or others. Pt verbalized understanding.    Return to clinic: as scheduled    Length of Service (minutes): 45      A portion of this note was created using Tunezy voice recognition software that occasionally misinterprets phrases or words.    Each patient to whom he or she provides medical services by telemedicine is: (1) informed of the relationship between the physician and patient and the respective role of any other health care provider with respect to management of the patient; and (2) notified that he or she may decline to receive medical services by telemedicine and may withdraw from such care at any time.

## 2023-08-31 ENCOUNTER — OFFICE VISIT (OUTPATIENT)
Dept: PSYCHIATRY | Facility: CLINIC | Age: 78
End: 2023-08-31
Payer: MEDICARE

## 2023-08-31 VITALS
HEIGHT: 59 IN | BODY MASS INDEX: 24.67 KG/M2 | HEART RATE: 70 BPM | SYSTOLIC BLOOD PRESSURE: 145 MMHG | DIASTOLIC BLOOD PRESSURE: 65 MMHG | WEIGHT: 122.38 LBS

## 2023-08-31 DIAGNOSIS — F19.20 DEPENDENCY ON PAIN MEDICATION: ICD-10-CM

## 2023-08-31 DIAGNOSIS — G31.84 MILD NEUROCOGNITIVE DISORDER: ICD-10-CM

## 2023-08-31 DIAGNOSIS — F41.1 GENERALIZED ANXIETY DISORDER: ICD-10-CM

## 2023-08-31 DIAGNOSIS — F33.1 MODERATE EPISODE OF RECURRENT MAJOR DEPRESSIVE DISORDER: Primary | ICD-10-CM

## 2023-08-31 DIAGNOSIS — F43.10 PTSD (POST-TRAUMATIC STRESS DISORDER): ICD-10-CM

## 2023-08-31 PROCEDURE — 3077F PR MOST RECENT SYSTOLIC BLOOD PRESSURE >= 140 MM HG: ICD-10-PCS | Mod: HCNC,CPTII,S$GLB, | Performed by: STUDENT IN AN ORGANIZED HEALTH CARE EDUCATION/TRAINING PROGRAM

## 2023-08-31 PROCEDURE — 96136 PSYCL/NRPSYC TST PHY/QHP 1ST: CPT | Mod: 59,HCNC,S$GLB, | Performed by: STUDENT IN AN ORGANIZED HEALTH CARE EDUCATION/TRAINING PROGRAM

## 2023-08-31 PROCEDURE — 99999 PR PBB SHADOW E&M-EST. PATIENT-LVL IV: ICD-10-PCS | Mod: PBBFAC,HCNC,, | Performed by: STUDENT IN AN ORGANIZED HEALTH CARE EDUCATION/TRAINING PROGRAM

## 2023-08-31 PROCEDURE — 1125F AMNT PAIN NOTED PAIN PRSNT: CPT | Mod: HCNC,CPTII,S$GLB, | Performed by: STUDENT IN AN ORGANIZED HEALTH CARE EDUCATION/TRAINING PROGRAM

## 2023-08-31 PROCEDURE — 3288F FALL RISK ASSESSMENT DOCD: CPT | Mod: HCNC,CPTII,S$GLB, | Performed by: STUDENT IN AN ORGANIZED HEALTH CARE EDUCATION/TRAINING PROGRAM

## 2023-08-31 PROCEDURE — 1101F PR PT FALLS ASSESS DOC 0-1 FALLS W/OUT INJ PAST YR: ICD-10-PCS | Mod: HCNC,CPTII,S$GLB, | Performed by: STUDENT IN AN ORGANIZED HEALTH CARE EDUCATION/TRAINING PROGRAM

## 2023-08-31 PROCEDURE — 3078F DIAST BP <80 MM HG: CPT | Mod: HCNC,CPTII,S$GLB, | Performed by: STUDENT IN AN ORGANIZED HEALTH CARE EDUCATION/TRAINING PROGRAM

## 2023-08-31 PROCEDURE — 3288F PR FALLS RISK ASSESSMENT DOCUMENTED: ICD-10-PCS | Mod: HCNC,CPTII,S$GLB, | Performed by: STUDENT IN AN ORGANIZED HEALTH CARE EDUCATION/TRAINING PROGRAM

## 2023-08-31 PROCEDURE — 3078F PR MOST RECENT DIASTOLIC BLOOD PRESSURE < 80 MM HG: ICD-10-PCS | Mod: HCNC,CPTII,S$GLB, | Performed by: STUDENT IN AN ORGANIZED HEALTH CARE EDUCATION/TRAINING PROGRAM

## 2023-08-31 PROCEDURE — 96136 PR PSYCH/NEUROPSYCH TEST ADMIN/SCORING, 2+ TESTS, 1ST 30 MIN: ICD-10-PCS | Mod: 59,HCNC,S$GLB, | Performed by: STUDENT IN AN ORGANIZED HEALTH CARE EDUCATION/TRAINING PROGRAM

## 2023-08-31 PROCEDURE — 1159F MED LIST DOCD IN RCRD: CPT | Mod: HCNC,CPTII,S$GLB, | Performed by: STUDENT IN AN ORGANIZED HEALTH CARE EDUCATION/TRAINING PROGRAM

## 2023-08-31 PROCEDURE — 1101F PT FALLS ASSESS-DOCD LE1/YR: CPT | Mod: HCNC,CPTII,S$GLB, | Performed by: STUDENT IN AN ORGANIZED HEALTH CARE EDUCATION/TRAINING PROGRAM

## 2023-08-31 PROCEDURE — 99214 OFFICE O/P EST MOD 30 MIN: CPT | Mod: HCNC,S$GLB,, | Performed by: STUDENT IN AN ORGANIZED HEALTH CARE EDUCATION/TRAINING PROGRAM

## 2023-08-31 PROCEDURE — 1160F RVW MEDS BY RX/DR IN RCRD: CPT | Mod: HCNC,CPTII,S$GLB, | Performed by: STUDENT IN AN ORGANIZED HEALTH CARE EDUCATION/TRAINING PROGRAM

## 2023-08-31 PROCEDURE — 1125F PR PAIN SEVERITY QUANTIFIED, PAIN PRESENT: ICD-10-PCS | Mod: HCNC,CPTII,S$GLB, | Performed by: STUDENT IN AN ORGANIZED HEALTH CARE EDUCATION/TRAINING PROGRAM

## 2023-08-31 PROCEDURE — 1160F PR REVIEW ALL MEDS BY PRESCRIBER/CLIN PHARMACIST DOCUMENTED: ICD-10-PCS | Mod: HCNC,CPTII,S$GLB, | Performed by: STUDENT IN AN ORGANIZED HEALTH CARE EDUCATION/TRAINING PROGRAM

## 2023-08-31 PROCEDURE — 1159F PR MEDICATION LIST DOCUMENTED IN MEDICAL RECORD: ICD-10-PCS | Mod: HCNC,CPTII,S$GLB, | Performed by: STUDENT IN AN ORGANIZED HEALTH CARE EDUCATION/TRAINING PROGRAM

## 2023-08-31 PROCEDURE — 3077F SYST BP >= 140 MM HG: CPT | Mod: HCNC,CPTII,S$GLB, | Performed by: STUDENT IN AN ORGANIZED HEALTH CARE EDUCATION/TRAINING PROGRAM

## 2023-08-31 PROCEDURE — 99214 PR OFFICE/OUTPT VISIT, EST, LEVL IV, 30-39 MIN: ICD-10-PCS | Mod: HCNC,S$GLB,, | Performed by: STUDENT IN AN ORGANIZED HEALTH CARE EDUCATION/TRAINING PROGRAM

## 2023-08-31 PROCEDURE — 99999 PR PBB SHADOW E&M-EST. PATIENT-LVL IV: CPT | Mod: PBBFAC,HCNC,, | Performed by: STUDENT IN AN ORGANIZED HEALTH CARE EDUCATION/TRAINING PROGRAM

## 2023-08-31 RX ORDER — SERTRALINE HYDROCHLORIDE 100 MG/1
200 TABLET, FILM COATED ORAL DAILY
Qty: 60 TABLET | Refills: 1 | Status: SHIPPED | OUTPATIENT
Start: 2023-08-31 | End: 2023-11-07

## 2023-08-31 RX ORDER — BUSPIRONE HYDROCHLORIDE 5 MG/1
TABLET ORAL
Qty: 45 TABLET | Refills: 1 | Status: SHIPPED | OUTPATIENT
Start: 2023-08-31 | End: 2023-11-07 | Stop reason: SDUPTHER

## 2023-08-31 NOTE — PROGRESS NOTES
Outpatient Psychiatry Followup Visit (DO/MD/NP)    8/31/2023  Assessment & Plan    Assessment - Plan:     Impression   8/31/2023 (2) Ongoing treatment of residual symptoms with some favorable progress.     ICD-10-CM ICD-9-CM   1. Moderate episode of recurrent major depressive disorder  F33.1 296.32   2. Generalized anxiety disorder  F41.1 300.02   3. Mild neurocognitive disorder  G31.84 331.83   4. PTSD (post-traumatic stress disorder)  F43.10 309.81   5. 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 BMI 24.0-24.9, adult  Z68.24 V85.1        Plan of Care & Medication Management    Chart was reviewed. The risks and benefits of medication were discussed with pt. The treatment plan and followup plan were reviewed with pt. Pt understands to contact clinic if symptoms worsen. Pt understands to call 911 or go to nearest ER for suicidal ideation, intent or plan.   RX History ARICEPT, BARBITURATES, BUSPAR, GABAPENTIN, PROZAC, WELLBUTRIN, and ZOLOFT   Current RX Considering CYMBALTA trial due to comorbid pain. Transition would be complex due to age increasing exposure and high dose of ZOLOFT (200mg). Due to risk for decompensation, would consider the following plan:  Reduce ZOLOFT from 200mg to 150mg for 2-4 weeks, then initiate PLAN A or PLAN B.  PLAN A: Order a 15d bukqe-pm-scdcbyezbbd protocol of ZOLOFT 150mg and add low dose CYMBALTA 1-2 weeks after initiation of the taper.  PLAN B: The 15d protocol with initiation of CYMBALTA may be too complex for patient; an easier alternative would be to reduce the ZOLOFT each visit, then add CYMBALTA whenever depression and anxiety worsens. Then at 2w follow up would taper to dc ZOLOFT with or without increasing CYMBALTA further, depending on  symptoms and current ZOLOFT..  Adjust BUSPAR  Adjustments:  [id number]: Adjust to 5-7.5mg in the afternoon  21VJK1721: Reduce to 7.5mg in the afternoon  45OKP8804: Reduce to 7.5mg BID  37RUL0303: Increase to 10mg BID  28SZA5537: Start  "5mg BID for 3 days  Prior to 31JAN2023 pt had been taking ZOLOFT 150mg daily  Continue ZOLOFT  Adjustments:  04MAY2023: Increase to 200mg daily  16SEP2022: Continue 150mg daily  Prior to evaluation pt had been taking 150mg daily   Education & Counseling RX administration and adherence   Other Orders Continue individual psychotherapy   Monitor VITAL SIGNS  Instruments: PROMIS (A&D), PSS4   RETURN S: RETURN IN 8 WEEKS (TWO MONTHS), and reassess frequency within two visits from now  Continue medication management.     Subjective    Interval History - Review of Systems (ROS):     Available documentation has been reviewed, and pertinent elements of the chart have been incorporated into this note where appropriate.   9/16/2022 : first Epic encounter with psychiatry department  8/23/2023 : last Epic encounter with psychiatry department  6/23/2023 : last Epic encounter with writer   Rola Richter, a 78 y.o. female, presenting for followup visit.      Since last visit, reports overall MUCH BETTER.    Less worry. Keeping therapy appointments.    Continued pain in shoulder and dental pain.    Visual disturbances are better.    Medications are "good." Reports BUSPAR is taking away "enthusiasm" and wants to reduce it. Would like to continue ZOLOFT as prescribed. Considering CYMBALTA due to comorbid pain. Transition would be complex due to age increasing exposure and high dose of ZOLOFT. Due to risk for decompensation, would consider reduction to 150mg for 4 weeks, then add low dose CYMBALTA midway during a 15d taper of ZOLOFT.     Vegetative Functions   Sleep [] Y  [x] N  No concerns.   GI/ [] Y  [] N     Appetite [] Y  [] N     Emotion and Mood   Negative Bias [] Y  [x] N  Overall no concerns, or concerns are minimal.   Hedonic Capacity [x] Y  [] N  Worse.   Mood Dysregulation [] Y  [] N     Anxiety and Threat Perception   CSTC: Rumination [x] Y  [] N  Overall better.   CSTC: Salience/Apprehen. " "[] Y  [] N     Amygdala: Panic/Phobic [] Y  [] N     Consciousness, Cognition and Reality Testing   Cognitive Dyscontrol [] Y  [] N     Inattention [] Y  [] N     Memory Problems [] Y  [] N     Perception Problems [] Y  [] N     Problems in Social Spheres   Home [] Y  [x] N  Overall no concerns, or concerns are minimal.   Peers [] Y  [] N     Work [] Y  [] N     Stress [] Y  [] N       Pt did NOT display signs of nor endorse symptoms of overt psychosis or acute mood disorder requiring hospitalization during the encounter. Pt denied violent thoughts or suicidal or homicidal ideation, intent, or plan.     Objective    Measurement-Based Care (MBC):     Routine Instruments   PROMIS-ANXIETY Interpretation: 4a raw score 13, T-SCORE 65.3; MODERATE using 55-60-70 cutoffs. Last T-SCORE was 68. This PROMIS T-score change of 5 points or fewer indicates the score is probably stable.   PROMIS-DEPRESSION Interpretation: 4a raw score 12, T-SCORE 62.2; MODERATE using 55-60-70 cutoffs. Last T-SCORE was 64. This PROMIS T-score change of 5 points or fewer indicates the score is probably stable.   PSS4 Interpretation: 9/16; MODERATE using 6-11 cutoffs. 1 PH, 0 LSE. Compared to MODERATE 9/16 last time, PSS4 is about the same.   Additional Instruments   N/A     Current Evaluation of Mental Status:     Constitutional / General       Vitals:    08/31/23 1120   BP: (!) 145/65   Pulse: 70   Weight: 55.5 kg (122 lb 5.7 oz)   Height: 4' 11" (1.499 m)       Psychiatric / Mental Status Examination  1. Appearance: Dress is informal but appropriate. Motor activity normal.  2. Discourse: Clear speech with normal rate and volume. Associations intact. Orderly.  3. Emotional Expression: Somewhat anxious and depressed mood. Affect is appropriate.  4. Perception and Thinking: No hallucinations. No suicidality, no homicidality, delusions, or paranoia.  5. Sensorium: Grossly intact. Able to focus for interview.  6. " "Memory and Fund of Knowledge: Intact for content of interview.  7. Insight and Judgment: Intact.         Auto-populated chart data omitted from this note for brevity.      Billing Documentation:     Method of Encounter IN PERSON visit at the clinic   Type of Encounter Follow up visit with me   Counseling;  Psychotherapy    Counseling;  Tobacco and/or Nicotine    Additional Codes and Modifiers 00124, with modifer 59: administered and scored more than one psychological or neuropsychological tests (see MBC above) (16+ mins)   Time Remaining Chart/Pt 87771: FOLLOW UP VISIT, Rx mgmt, "Multiple STABLE chronic illnesses"   Total Mins  (8/31/2023) N/A - Not billing for time        Will Leong DO  Department of Psychiatry, [facility]        "

## 2023-10-05 ENCOUNTER — OFFICE VISIT (OUTPATIENT)
Dept: PSYCHIATRY | Facility: CLINIC | Age: 78
End: 2023-10-05
Payer: MEDICARE

## 2023-10-05 DIAGNOSIS — F41.1 GENERALIZED ANXIETY DISORDER: ICD-10-CM

## 2023-10-05 DIAGNOSIS — F33.1 MODERATE EPISODE OF RECURRENT MAJOR DEPRESSIVE DISORDER: Primary | ICD-10-CM

## 2023-10-05 PROCEDURE — 99999 PR PBB SHADOW E&M-EST. PATIENT-LVL I: ICD-10-PCS | Mod: PBBFAC,HCNC,, | Performed by: SOCIAL WORKER

## 2023-10-05 PROCEDURE — 90834 PSYTX W PT 45 MINUTES: CPT | Mod: HCNC,S$GLB,, | Performed by: SOCIAL WORKER

## 2023-10-05 PROCEDURE — 1159F PR MEDICATION LIST DOCUMENTED IN MEDICAL RECORD: ICD-10-PCS | Mod: HCNC,CPTII,S$GLB, | Performed by: SOCIAL WORKER

## 2023-10-05 PROCEDURE — 90834 PR PSYCHOTHERAPY W/PATIENT, 45 MIN: ICD-10-PCS | Mod: HCNC,S$GLB,, | Performed by: SOCIAL WORKER

## 2023-10-05 PROCEDURE — 99999 PR PBB SHADOW E&M-EST. PATIENT-LVL I: CPT | Mod: PBBFAC,HCNC,, | Performed by: SOCIAL WORKER

## 2023-10-05 PROCEDURE — 1159F MED LIST DOCD IN RCRD: CPT | Mod: HCNC,CPTII,S$GLB, | Performed by: SOCIAL WORKER

## 2023-10-06 NOTE — PROGRESS NOTES
Individual Psychotherapy (PhD/LCSW)    10/5/2023    Site:  [facility]         Therapeutic Intervention: Met with patient.  Outpatient - Insight oriented psychotherapy 45 min - CPT code 69978, Outpatient - Behavior modifying psychotherapy 45 min - CPT code 07861, and Outpatient - Supportive psychotherapy 45 min - CPT Code 67055    Chief complaint/reason for encounter: depression and anxiety             Interval history and content of current session:  Ct was referred by Dr. Leong to address depression, anxiety, and trauma. Ct arrived to session on time and was fully engaged.  CT shared that she has been doing better managing her depression and anxiety. Ct shared that she has been reminding herself that she can not predict the future. She reported that she also reminds herself to have acceptance over her physical limitations. Ct shared that she tries not to overexert herself. 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 She reported that she does not have any issue with her psych medication. Ct to continue in 1:1 sessions.       Treatment plan:  Target symptoms: depression, anxiety   Why chosen therapy is appropriate versus another modality: relevant to diagnosis, patient responds to this modality, evidence based practice  Outcome monitoring methods: self-report  Therapeutic intervention type: insight oriented psychotherapy, behavior modifying psychotherapy, supportive psychotherapy    Risk parameters:  Patient reports no suicidal ideation  Patient reports no homicidal ideation  Patient reports no self-injurious behavior  Patient reports no violent behavior    CSSRS was completed:     Mental Status Exam:  General Appearance:  unremarkable, age appropriate   Speech: normal tone, normal rate, normal pitch, normal volume      Level of Cooperation: cooperative      Thought Processes: normal and logical   Mood: steady       Thought Content: normal, no suicidality, no homicidality, delusions, or paranoia   Affect: congruent and appropriate   Orientation: Oriented x3   Memory: recent >  intact   Attention Span & Concentration: intact   Fund of General Knowledge: intact and appropriate to age and level of education   Abstract Reasoning: interpretation of similarities was abstract   Judgment & Insight: intact     Language intact       Verbal deficits: None    Patient's response to intervention:  The patient's response to intervention is accepting.    Progress toward goals and other mental status changes:  The patient's progress toward goals is  improved .    Diagnosis:     ICD-10-CM ICD-9-CM   1. Moderate episode of recurrent major depressive disorder  F33.1 296.32   2. Generalized anxiety disorder  F41.1 300.02       Plan:  individual psychotherapy and ct to follow up with Dr Leong for psych med mgt  Pt to go to ED or call 911 if symptoms worsen or if she has thoughts of harming self and/or others. Pt verbalized understanding.    Return to clinic: as scheduled    Length of Service (minutes): 45      A portion of this note was created using [employer] voice recognition software that occasionally misinterprets phrases or words.    Each patient to whom he or she provides medical services by telemedicine is: (1) informed of the relationship between the physician and patient and the respective role of any other health care provider with respect to management of the patient; and (2) notified that he or she may decline to receive medical services by telemedicine and may withdraw from such care at any time.

## 2023-10-11 DIAGNOSIS — E78.01 FAMILIAL HYPERCHOLESTEROLEMIA: ICD-10-CM

## 2023-10-11 DIAGNOSIS — I70.0 CALCIFICATION OF AORTA: ICD-10-CM

## 2023-10-11 DIAGNOSIS — Z78.9 STATIN NOT TOLERATED: ICD-10-CM

## 2023-10-12 RX ORDER — EVOLOCUMAB 140 MG/ML
140 INJECTION, SOLUTION SUBCUTANEOUS
Qty: 2 ML | Refills: 2 | Status: ACTIVE | OUTPATIENT
Start: 2023-10-12 | End: 2024-01-09 | Stop reason: SDUPTHER

## 2023-10-12 NOTE — TELEPHONE ENCOUNTER
----- Message from Nitin Gil sent at 10/12/2023  9:51 AM CDT -----  Regarding: refill  Type:  RX Refill Request    Who Called:  Nikole with [facility]  Refill or New Rx:  refill    RX Name and Strength:    evolocumab (REPATHA SURECLICK) 140 mg/mL PnIj 2 mL 2 7/3/2023 10/11/2023  Sig - Route: Inject 1 mL (140 mg total) into the skin every 14 (fourteen) days. - Subcutaneous   Sent to pharmacy as: evolocumab (REPATHA SURECLICK) 140 mg/mL PnIj     How is the patient currently taking it? (ex. 1XDay):  see above  Is this a 30 day or 90 day RX:  see above    Preferred Pharmacy with phone number:    [facility] NO    Local or Mail Order:  local    Ordering Provider:  RAVI Sharp     Best Call Back Number:  [phone]    Additional Information:  pharm calling refill for pt.

## 2023-10-19 ENCOUNTER — OFFICE VISIT (OUTPATIENT)
Dept: OPHTHALMOLOGY | Facility: CLINIC | Age: 78
End: 2023-10-19
Payer: MEDICARE

## 2023-10-19 DIAGNOSIS — H25.13 NUCLEAR SCLEROTIC CATARACT, BILATERAL: Primary | ICD-10-CM

## 2023-10-19 PROCEDURE — 1159F PR MEDICATION LIST DOCUMENTED IN MEDICAL RECORD: ICD-10-PCS | Mod: HCNC,CPTII,S$GLB, | Performed by: OPHTHALMOLOGY

## 2023-10-19 PROCEDURE — 99999 PR PBB SHADOW E&M-EST. PATIENT-LVL IV: ICD-10-PCS | Mod: PBBFAC,HCNC,, | Performed by: OPHTHALMOLOGY

## 2023-10-19 PROCEDURE — 92136 OPHTHALMIC BIOMETRY: CPT | Mod: HCNC,LT,S$GLB, | Performed by: OPHTHALMOLOGY

## 2023-10-19 PROCEDURE — 99999 PR PBB SHADOW E&M-EST. PATIENT-LVL IV: CPT | Mod: PBBFAC,HCNC,, | Performed by: OPHTHALMOLOGY

## 2023-10-19 PROCEDURE — 1126F AMNT PAIN NOTED NONE PRSNT: CPT | Mod: HCNC,CPTII,S$GLB, | Performed by: OPHTHALMOLOGY

## 2023-10-19 PROCEDURE — 1101F PT FALLS ASSESS-DOCD LE1/YR: CPT | Mod: HCNC,CPTII,S$GLB, | Performed by: OPHTHALMOLOGY

## 2023-10-19 PROCEDURE — 92136 IOL MASTER - OS - LEFT EYE: ICD-10-PCS | Mod: HCNC,LT,S$GLB, | Performed by: OPHTHALMOLOGY

## 2023-10-19 PROCEDURE — 99214 PR OFFICE/OUTPT VISIT, EST, LEVL IV, 30-39 MIN: ICD-10-PCS | Mod: HCNC,S$GLB,, | Performed by: OPHTHALMOLOGY

## 2023-10-19 PROCEDURE — 3288F FALL RISK ASSESSMENT DOCD: CPT | Mod: HCNC,CPTII,S$GLB, | Performed by: OPHTHALMOLOGY

## 2023-10-19 PROCEDURE — 1101F PR PT FALLS ASSESS DOC 0-1 FALLS W/OUT INJ PAST YR: ICD-10-PCS | Mod: HCNC,CPTII,S$GLB, | Performed by: OPHTHALMOLOGY

## 2023-10-19 PROCEDURE — 1126F PR PAIN SEVERITY QUANTIFIED, NO PAIN PRESENT: ICD-10-PCS | Mod: HCNC,CPTII,S$GLB, | Performed by: OPHTHALMOLOGY

## 2023-10-19 PROCEDURE — 3288F PR FALLS RISK ASSESSMENT DOCUMENTED: ICD-10-PCS | Mod: HCNC,CPTII,S$GLB, | Performed by: OPHTHALMOLOGY

## 2023-10-19 PROCEDURE — 1159F MED LIST DOCD IN RCRD: CPT | Mod: HCNC,CPTII,S$GLB, | Performed by: OPHTHALMOLOGY

## 2023-10-19 PROCEDURE — 99214 OFFICE O/P EST MOD 30 MIN: CPT | Mod: HCNC,S$GLB,, | Performed by: OPHTHALMOLOGY

## 2023-10-19 RX ORDER — MOXIFLOXACIN 5 MG/ML
1 SOLUTION/ DROPS OPHTHALMIC 4 TIMES DAILY
Qty: 3 ML | Refills: 1 | Status: SHIPPED | OUTPATIENT
Start: 2023-10-19 | End: 2023-11-30 | Stop reason: ALTCHOICE

## 2023-10-19 RX ORDER — PHENYLEPHRINE HYDROCHLORIDE 25 MG/ML
1 SOLUTION/ DROPS OPHTHALMIC
Status: CANCELLED | OUTPATIENT
Start: 2023-10-19

## 2023-10-19 RX ORDER — SODIUM CHLORIDE 9 MG/ML
INJECTION, SOLUTION INTRAVENOUS CONTINUOUS
Status: CANCELLED | OUTPATIENT
Start: 2023-10-19

## 2023-10-19 RX ORDER — KETOROLAC TROMETHAMINE 5 MG/ML
1 SOLUTION OPHTHALMIC 4 TIMES DAILY
Qty: 5 ML | Refills: 2 | Status: SHIPPED | OUTPATIENT
Start: 2023-10-19 | End: 2023-12-14 | Stop reason: ALTCHOICE

## 2023-10-19 RX ORDER — PREDNISOLONE ACETATE 10 MG/ML
1 SUSPENSION/ DROPS OPHTHALMIC 4 TIMES DAILY
Qty: 5 ML | Refills: 2 | Status: SHIPPED | OUTPATIENT
Start: 2023-10-19 | End: 2023-11-30 | Stop reason: ALTCHOICE

## 2023-10-19 RX ORDER — PROPARACAINE HYDROCHLORIDE 5 MG/ML
1 SOLUTION/ DROPS OPHTHALMIC
Status: CANCELLED | OUTPATIENT
Start: 2023-10-19

## 2023-10-19 RX ORDER — TROPICAMIDE 10 MG/ML
1 SOLUTION/ DROPS OPHTHALMIC
Status: CANCELLED | OUTPATIENT
Start: 2023-10-19

## 2023-10-19 NOTE — PROGRESS NOTES
Assessment /Plan     For exam results, see Encounter Report.    Nuclear sclerotic cataract, bilateral      OS>OD  High hyperopia  Foveal pathology may limit BCVA OS  Higher risk due to small eye  S/p LPI OU  Patient with visually significant cataract impacting abiliity ADLs (reading, driving, PM driving, glare).  Discussed options, R & B, expectations, patient voices good understanding and wishes to proceed with procedure. Patient will likely benefit from sx and signed consent.  Pt aware that IOL accuracy will be limited and risks are higher due to short eye.    Pt still needs clearance appt  Will need to special order lens  Pt aware that glasses will be needed for sharpest vision    Proceed with CEIOL OS  Monofocal distance IOL  May use preop mannitol

## 2023-10-20 ENCOUNTER — OFFICE VISIT (OUTPATIENT)
Dept: FAMILY MEDICINE | Facility: CLINIC | Age: 78
End: 2023-10-20
Payer: MEDICARE

## 2023-10-20 VITALS
TEMPERATURE: 98 F | WEIGHT: 120.13 LBS | DIASTOLIC BLOOD PRESSURE: 60 MMHG | HEART RATE: 70 BPM | SYSTOLIC BLOOD PRESSURE: 132 MMHG | BODY MASS INDEX: 24.22 KG/M2 | OXYGEN SATURATION: 98 % | HEIGHT: 59 IN

## 2023-10-20 DIAGNOSIS — Z01.818 PRE-OP EVALUATION: Primary | ICD-10-CM

## 2023-10-20 DIAGNOSIS — H26.9 CATARACT, UNSPECIFIED CATARACT TYPE, UNSPECIFIED LATERALITY: ICD-10-CM

## 2023-10-20 DIAGNOSIS — I10 PRIMARY HYPERTENSION: ICD-10-CM

## 2023-10-20 PROCEDURE — 3075F PR MOST RECENT SYSTOLIC BLOOD PRESS GE 130-139MM HG: ICD-10-PCS | Mod: HCNC,CPTII,S$GLB, | Performed by: NURSE PRACTITIONER

## 2023-10-20 PROCEDURE — 1125F AMNT PAIN NOTED PAIN PRSNT: CPT | Mod: HCNC,CPTII,S$GLB, | Performed by: NURSE PRACTITIONER

## 2023-10-20 PROCEDURE — 3075F SYST BP GE 130 - 139MM HG: CPT | Mod: HCNC,CPTII,S$GLB, | Performed by: NURSE PRACTITIONER

## 2023-10-20 PROCEDURE — 1101F PT FALLS ASSESS-DOCD LE1/YR: CPT | Mod: HCNC,CPTII,S$GLB, | Performed by: NURSE PRACTITIONER

## 2023-10-20 PROCEDURE — 1125F PR PAIN SEVERITY QUANTIFIED, PAIN PRESENT: ICD-10-PCS | Mod: HCNC,CPTII,S$GLB, | Performed by: NURSE PRACTITIONER

## 2023-10-20 PROCEDURE — 1160F RVW MEDS BY RX/DR IN RCRD: CPT | Mod: HCNC,CPTII,S$GLB, | Performed by: NURSE PRACTITIONER

## 2023-10-20 PROCEDURE — 99213 OFFICE O/P EST LOW 20 MIN: CPT | Mod: HCNC,S$GLB,, | Performed by: NURSE PRACTITIONER

## 2023-10-20 PROCEDURE — 3078F DIAST BP <80 MM HG: CPT | Mod: HCNC,CPTII,S$GLB, | Performed by: NURSE PRACTITIONER

## 2023-10-20 PROCEDURE — 99213 PR OFFICE/OUTPT VISIT, EST, LEVL III, 20-29 MIN: ICD-10-PCS | Mod: HCNC,S$GLB,, | Performed by: NURSE PRACTITIONER

## 2023-10-20 PROCEDURE — 3288F FALL RISK ASSESSMENT DOCD: CPT | Mod: HCNC,CPTII,S$GLB, | Performed by: NURSE PRACTITIONER

## 2023-10-20 PROCEDURE — 99999 PR PBB SHADOW E&M-EST. PATIENT-LVL IV: ICD-10-PCS | Mod: PBBFAC,HCNC,, | Performed by: NURSE PRACTITIONER

## 2023-10-20 PROCEDURE — 99999 PR PBB SHADOW E&M-EST. PATIENT-LVL IV: CPT | Mod: PBBFAC,HCNC,, | Performed by: NURSE PRACTITIONER

## 2023-10-20 PROCEDURE — 1101F PR PT FALLS ASSESS DOC 0-1 FALLS W/OUT INJ PAST YR: ICD-10-PCS | Mod: HCNC,CPTII,S$GLB, | Performed by: NURSE PRACTITIONER

## 2023-10-20 PROCEDURE — 3078F PR MOST RECENT DIASTOLIC BLOOD PRESSURE < 80 MM HG: ICD-10-PCS | Mod: HCNC,CPTII,S$GLB, | Performed by: NURSE PRACTITIONER

## 2023-10-20 PROCEDURE — 1159F PR MEDICATION LIST DOCUMENTED IN MEDICAL RECORD: ICD-10-PCS | Mod: HCNC,CPTII,S$GLB, | Performed by: NURSE PRACTITIONER

## 2023-10-20 PROCEDURE — 3288F PR FALLS RISK ASSESSMENT DOCUMENTED: ICD-10-PCS | Mod: HCNC,CPTII,S$GLB, | Performed by: NURSE PRACTITIONER

## 2023-10-20 PROCEDURE — 1160F PR REVIEW ALL MEDS BY PRESCRIBER/CLIN PHARMACIST DOCUMENTED: ICD-10-PCS | Mod: HCNC,CPTII,S$GLB, | Performed by: NURSE PRACTITIONER

## 2023-10-20 PROCEDURE — 1159F MED LIST DOCD IN RCRD: CPT | Mod: HCNC,CPTII,S$GLB, | Performed by: NURSE PRACTITIONER

## 2023-10-20 NOTE — PROGRESS NOTES
Subjective:       Patient ID: Rola Richter is a 78 y.o. female.    Chief Complaint: Pre-op Exam     HPI   77 y/o female patient with medical problems listed below presents for pre op of left cataract surgery scheduled on 10/25/2023 by Dr. Dyer. No acute complaints reported.   Denies smoking.   Denies hx of anesthetic complications.     Labs reviewed from 2023    Patient Active Problem List   Diagnosis    Hypertension    GERD (gastroesophageal reflux disease)    Hyperlipidemia    Osteoporosis    Dependency on pain medication    PTSD (post-traumatic stress disorder)    CMC arthritis    Dry eye syndrome    DDD (degenerative disc disease), cervical    Occipital neuralgia    Cervical radiculitis    Primary osteoarthritis involving multiple joints    Moderate episode of recurrent major depressive disorder    Dysphagia    Spondylosis of cervical region without myelopathy or radiculopathy    Myofascial pain    Neck pain    Bronchitis    Calcification of aorta    Memory loss    Generalized anxiety disorder    BMI 25.0-25.9,adult    BMI 24.0-24.9, adult    Mild neurocognitive disorder      Review of patient's allergies indicates:   Allergen Reactions    Aspirin Nausea And Vomiting    Penicillins Itching    Crestor [rosuvastatin]      Muscle pain      Lipitor [atorvastatin]      Achy       Past Surgical History:   Procedure Laterality Date    APPENDECTOMY       SECTION      x 2    CHOLECYSTECTOMY      COLONOSCOPY  prior to 2009    COLONOSCOPY N/A 2019    Procedure: COLONOSCOPY;  Surgeon: Greg Victor MD;  Location: [facility];  Service: Endoscopy;  Laterality: N/A; 2 colon polyps, hemorrhoids; repeat in 5 years for surveillance; biopsy: Tubular adenoma x2    EPIDURAL STEROID INJECTION INTO CERVICAL SPINE N/A 2022    Procedure: Injection-steroid-epidural-cervical C7-T1;  Surgeon: Timothy Grimaldo MD;  Location: [facility];  Service: Pain Management;  Laterality: N/A;    ESOPHAGOGASTRODUODENOSCOPY N/A  4/30/2019    Procedure: EGD (ESOPHAGOGASTRODUODENOSCOPY);  Surgeon: Greg Victor MD;  Location: [facility] ENDO;  Service: Endoscopy;  Laterality: N/A; Mild Schatzki ring. Dilated. small hiatal hernia; Four gastric polyps. Resected and retrieved, gastritis; biopsy:stomach- unremarkable, negative for h pylori, polyps-Fundic type mucosa with foveolar hyperplasia.    ESOPHAGOGASTRODUODENOSCOPY N/A 2/17/2021    Procedure: EGD (ESOPHAGOGASTRODUODENOSCOPY);  Surgeon: Greg Victor MD;  Location: [facility] ENDO;  Service: Endoscopy;  Laterality: N/A;    HYSTERECTOMY      Laser Periphery Iridotomy Bilateral     OD 5/26/16 and OS touch up 5/26/2016    UPPER GASTROINTESTINAL ENDOSCOPY  03/14/2017    Dr. Marcial: esophageal stenosis- dilated, gastritis, gastric polyps removed; biopsy- mild gastritis, negative for h pylori, hyperplastic polyp, esophagus unremarkable    vocal cord tumor removal          Current Outpatient Medications:     busPIRone (BUSPAR) 5 MG Tab, Take one or one and a half (5mg total or 7.5mg total) every afternoon., Disp: 45 tablet, Rfl: 1    COD LIVER OIL ORAL, Take 1 tablet by mouth once daily. , Disp: , Rfl:     diclofenac sodium (VOLTAREN) 1 % Gel, Apply 2 g topically 4 (four) times daily., Disp: 450 g, Rfl: 3    evolocumab (REPATHA SURECLICK) 140 mg/mL PnIj, Inject 1 mL (140 mg total) into the skin every 14 (fourteen) days., Disp: 2 mL, Rfl: 2    fish oil-omega-3 fatty acids 300-1,000 mg capsule, Take 2 g by mouth once daily., Disp: , Rfl:     fluticasone propionate (FLONASE) 50 mcg/actuation nasal spray, 2 sprays (100 mcg total) by Each Nostril route once daily., Disp: 16 g, Rfl: 11    gabapentin (NEURONTIN) 100 MG capsule, TAKE 1 CAPSULE TWICE DAILY, Disp: 180 capsule, Rfl: 0    HYDROcodone-acetaminophen (NORCO) 5-325 mg per tablet, Take 1 tablet by mouth every 12 (twelve) hours as needed for Pain., Disp: 60 tablet, Rfl: 0    ipratropium (ATROVENT) 42 mcg (0.06 %) nasal spray, 2 sprays by Nasal route 3  (three) times daily., Disp: 15 mL, Rfl: 6    irbesartan (AVAPRO) 300 MG tablet, Take 1 tablet (300 mg total) by mouth every evening., Disp: 90 tablet, Rfl: 1    ketorolac 0.5% (ACULAR) 0.5 % Drop, Place 1 drop into the left eye 4 (four) times daily. Start 3 days before surgery., Disp: 5 mL, Rfl: 2    meloxicam (MOBIC) 7.5 MG tablet, Take 1 tablet (7.5 mg total) by mouth daily as needed for Pain., Disp: 90 tablet, Rfl: PRN    moxifloxacin (VIGAMOX) 0.5 % ophthalmic solution, Place 1 drop into the left eye 4 (four) times daily. Start 1 day before cataract surgery, Disp: 3 mL, Rfl: 1    multivit with min-folic acid 200 mcg Chew, Take 1 tablet by mouth once daily. , Disp: , Rfl:     omeprazole (PRILOSEC) 40 MG capsule, TAKE 1 CAPSULE EVERY DAY, Disp: 90 capsule, Rfl: 2    prednisoLONE acetate (PRED FORTE) 1 % DrpS, Place 1 drop into the left eye 4 (four) times daily. Start after cataract surgery., Disp: 5 mL, Rfl: 2    sertraline (ZOLOFT) 100 MG tablet, Take 2 tablets (200 mg total) by mouth once daily., Disp: 60 tablet, Rfl: 1    cetirizine (ZYRTEC) 5 MG tablet, Take 1 tablet (5 mg total) by mouth once daily., Disp: 30 tablet, Rfl: 11    donepeziL (ARICEPT) 10 MG tablet, Take 1 tablet (10 mg total) by mouth every evening., Disp: 30 tablet, Rfl: 2    Lab Results   Component Value Date    WBC 7.29 07/18/2023    HGB 10.7 (L) 07/18/2023    HCT 33.3 (L) 07/18/2023     07/18/2023    CHOL 281 (H) 07/18/2023    TRIG 154 (H) 07/18/2023    HDL 60 07/18/2023    ALT 11 07/18/2023    AST 20 07/18/2023     07/18/2023    K 4.5 07/18/2023     07/18/2023    CREATININE 1.2 07/18/2023    BUN 18 07/18/2023    CO2 23 07/18/2023    TSH 1.831 06/21/2023    HGBA1C 5.8 (H) 07/18/2023     Review of Systems   Constitutional:  Negative for chills and fever.   Respiratory:  Negative for cough, chest tightness and shortness of breath.    Cardiovascular:  Negative for chest pain and palpitations.   Gastrointestinal:  Negative for  "abdominal pain.   Neurological:  Negative for dizziness and headaches.       Objective:   /60 (BP Location: Left arm, Patient Position: Sitting, BP Method: Medium (Manual))   Pulse 70   Temp 97.7 °F (36.5 °C) (Oral)   Ht 4' 11" (1.499 m)   Wt 54.5 kg (120 lb 2.4 oz)   SpO2 98%   BMI 24.27 kg/m²         Physical Exam  Constitutional:       General: She is not in acute distress.     Appearance: Normal appearance.   HENT:      Head: Atraumatic.   Cardiovascular:      Rate and Rhythm: Normal rate and regular rhythm.      Pulses: Normal pulses.      Heart sounds: Normal heart sounds.   Pulmonary:      Effort: Pulmonary effort is normal.      Breath sounds: Normal breath sounds.   Abdominal:      General: Abdomen is flat. Bowel sounds are normal.      Palpations: Abdomen is soft.      Tenderness: There is no abdominal tenderness.   Neurological:      Mental Status: She is alert and oriented to person, place, and time.         Assessment:       1. Pre-op evaluation    2. Primary hypertension    3. Cataract, unspecified cataract type, unspecified laterality        Plan:       1. Primary hypertension    2. Pre-op evaluation  - Patient can proceed for the planned surgery    3. Cataract, unspecified cataract type, unspecified laterality    Patient with be reevaluated in  as scheduled  or sooner sean Diane NP  "

## 2023-10-23 ENCOUNTER — PATIENT MESSAGE (OUTPATIENT)
Dept: PSYCHIATRY | Facility: CLINIC | Age: 78
End: 2023-10-23
Payer: MEDICARE

## 2023-10-24 ENCOUNTER — ANESTHESIA EVENT (OUTPATIENT)
Dept: SURGERY | Facility: HOSPITAL | Age: 78
End: 2023-10-24
Payer: MEDICARE

## 2023-10-24 RX ORDER — SODIUM CHLORIDE, SODIUM LACTATE, POTASSIUM CHLORIDE, CALCIUM CHLORIDE 600; 310; 30; 20 MG/100ML; MG/100ML; MG/100ML; MG/100ML
125 INJECTION, SOLUTION INTRAVENOUS CONTINUOUS
Status: CANCELLED | OUTPATIENT
Start: 2023-10-24

## 2023-10-24 RX ORDER — ONDANSETRON 2 MG/ML
4 INJECTION INTRAMUSCULAR; INTRAVENOUS ONCE AS NEEDED
Status: CANCELLED | OUTPATIENT
Start: 2023-10-24 | End: 2035-03-22

## 2023-10-25 ENCOUNTER — ANESTHESIA (OUTPATIENT)
Dept: SURGERY | Facility: HOSPITAL | Age: 78
End: 2023-10-25
Payer: MEDICARE

## 2023-10-25 ENCOUNTER — HOSPITAL ENCOUNTER (OUTPATIENT)
Facility: HOSPITAL | Age: 78
Discharge: HOME | End: 2023-10-25
Attending: OPHTHALMOLOGY | Admitting: OPHTHALMOLOGY
Payer: MEDICARE

## 2023-10-25 DIAGNOSIS — H25.13 NUCLEAR SCLEROTIC CATARACT, BILATERAL: ICD-10-CM

## 2023-10-25 DIAGNOSIS — G89.4 CHRONIC PAIN DISORDER: ICD-10-CM

## 2023-10-25 PROCEDURE — D9220A PRA ANESTHESIA: ICD-10-PCS | Mod: CRNA,,, | Performed by: NURSE ANESTHETIST, CERTIFIED REGISTERED

## 2023-10-25 PROCEDURE — 36000706: Performed by: OPHTHALMOLOGY

## 2023-10-25 PROCEDURE — D9220A PRA ANESTHESIA: ICD-10-PCS | Mod: ANES,,, | Performed by: ANESTHESIOLOGY

## 2023-10-25 PROCEDURE — V2632 POST CHMBR INTRAOCULAR LENS: HCPCS | Performed by: OPHTHALMOLOGY

## 2023-10-25 PROCEDURE — 66982 XCAPSL CTRC RMVL CPLX WO ECP: CPT | Mod: LT,,, | Performed by: OPHTHALMOLOGY

## 2023-10-25 PROCEDURE — 63600175 PHARM REV CODE 636 W HCPCS: Performed by: NURSE ANESTHETIST, CERTIFIED REGISTERED

## 2023-10-25 PROCEDURE — D9220A PRA ANESTHESIA: Mod: ANES,,, | Performed by: ANESTHESIOLOGY

## 2023-10-25 PROCEDURE — 25000003 PHARM REV CODE 250: Performed by: OPHTHALMOLOGY

## 2023-10-25 PROCEDURE — 36000707: Performed by: OPHTHALMOLOGY

## 2023-10-25 PROCEDURE — 71000033 HC RECOVERY, INTIAL HOUR: Performed by: OPHTHALMOLOGY

## 2023-10-25 PROCEDURE — 25000003 PHARM REV CODE 250

## 2023-10-25 PROCEDURE — 63600175 PHARM REV CODE 636 W HCPCS: Performed by: OPHTHALMOLOGY

## 2023-10-25 PROCEDURE — 37000008 HC ANESTHESIA 1ST 15 MINUTES: Performed by: OPHTHALMOLOGY

## 2023-10-25 PROCEDURE — 37000009 HC ANESTHESIA EA ADD 15 MINS: Performed by: OPHTHALMOLOGY

## 2023-10-25 PROCEDURE — 66982 PR REMOVAL, CATARACT, W/INSRT INTRAOC LENS, W/O ENDO CYCLO, CMPLX: ICD-10-PCS | Mod: LT,,, | Performed by: OPHTHALMOLOGY

## 2023-10-25 PROCEDURE — 63600175 PHARM REV CODE 636 W HCPCS: Performed by: ANESTHESIOLOGY

## 2023-10-25 PROCEDURE — D9220A PRA ANESTHESIA: Mod: CRNA,,, | Performed by: NURSE ANESTHETIST, CERTIFIED REGISTERED

## 2023-10-25 DEVICE — IMPLANTABLE DEVICE: Type: IMPLANTABLE DEVICE | Site: EYE | Status: FUNCTIONAL

## 2023-10-25 RX ORDER — MOXIFLOXACIN 5 MG/ML
SOLUTION/ DROPS OPHTHALMIC
Status: DISCONTINUED | OUTPATIENT
Start: 2023-10-25 | End: 2023-10-25 | Stop reason: HOSPADM

## 2023-10-25 RX ORDER — HYDROCODONE BITARTRATE AND ACETAMINOPHEN 5; 325 MG/1; MG/1
1 TABLET ORAL EVERY 12 HOURS PRN
Qty: 60 TABLET | Refills: 0 | Status: SHIPPED | OUTPATIENT
Start: 2023-12-11 | End: 2023-11-30

## 2023-10-25 RX ORDER — ONDANSETRON 2 MG/ML
INJECTION INTRAMUSCULAR; INTRAVENOUS
Status: DISCONTINUED | OUTPATIENT
Start: 2023-10-25 | End: 2023-10-25

## 2023-10-25 RX ORDER — MIDAZOLAM HYDROCHLORIDE 1 MG/ML
INJECTION INTRAMUSCULAR; INTRAVENOUS
Status: DISCONTINUED | OUTPATIENT
Start: 2023-10-25 | End: 2023-10-25

## 2023-10-25 RX ORDER — LIDOCAINE HYDROCHLORIDE 10 MG/ML
1 INJECTION, SOLUTION EPIDURAL; INFILTRATION; INTRACAUDAL; PERINEURAL ONCE
Status: DISCONTINUED | OUTPATIENT
Start: 2023-10-25 | End: 2023-10-25 | Stop reason: HOSPADM

## 2023-10-25 RX ORDER — HYDROCODONE BITARTRATE AND ACETAMINOPHEN 5; 325 MG/1; MG/1
1 TABLET ORAL EVERY 12 HOURS PRN
Qty: 60 TABLET | Refills: 0 | Status: SHIPPED | OUTPATIENT
Start: 2023-11-12 | End: 2024-02-01 | Stop reason: SDUPTHER

## 2023-10-25 RX ORDER — MANNITOL 20 G/100ML
80 INJECTION, SOLUTION INTRAVENOUS ONCE
Status: COMPLETED | OUTPATIENT
Start: 2023-10-25 | End: 2023-10-25

## 2023-10-25 RX ORDER — TETRACAINE HYDROCHLORIDE 5 MG/ML
SOLUTION OPHTHALMIC
Status: DISCONTINUED | OUTPATIENT
Start: 2023-10-25 | End: 2023-10-25 | Stop reason: HOSPADM

## 2023-10-25 RX ORDER — TROPICAMIDE 10 MG/ML
1 SOLUTION/ DROPS OPHTHALMIC
Status: DISCONTINUED | OUTPATIENT
Start: 2023-10-25 | End: 2023-10-25 | Stop reason: HOSPADM

## 2023-10-25 RX ORDER — SODIUM CHLORIDE 9 MG/ML
INJECTION, SOLUTION INTRAVENOUS CONTINUOUS
Status: DISCONTINUED | OUTPATIENT
Start: 2023-10-25 | End: 2023-10-25 | Stop reason: HOSPADM

## 2023-10-25 RX ORDER — EPINEPHRINE 1 MG/ML
INJECTION, SOLUTION, CONCENTRATE INTRAVENOUS
Status: DISCONTINUED | OUTPATIENT
Start: 2023-10-25 | End: 2023-10-25 | Stop reason: HOSPADM

## 2023-10-25 RX ORDER — SODIUM CHLORIDE, SODIUM LACTATE, POTASSIUM CHLORIDE, CALCIUM CHLORIDE 600; 310; 30; 20 MG/100ML; MG/100ML; MG/100ML; MG/100ML
INJECTION, SOLUTION INTRAVENOUS CONTINUOUS
Status: DISCONTINUED | OUTPATIENT
Start: 2023-10-25 | End: 2023-10-25 | Stop reason: HOSPADM

## 2023-10-25 RX ORDER — PROPARACAINE HYDROCHLORIDE 5 MG/ML
1 SOLUTION/ DROPS OPHTHALMIC
Status: DISCONTINUED | OUTPATIENT
Start: 2023-10-25 | End: 2023-10-25 | Stop reason: HOSPADM

## 2023-10-25 RX ORDER — LIDOCAINE HYDROCHLORIDE 40 MG/ML
INJECTION, SOLUTION RETROBULBAR
Status: DISCONTINUED | OUTPATIENT
Start: 2023-10-25 | End: 2023-10-25 | Stop reason: HOSPADM

## 2023-10-25 RX ORDER — PHENYLEPHRINE HYDROCHLORIDE 25 MG/ML
1 SOLUTION/ DROPS OPHTHALMIC
Status: DISCONTINUED | OUTPATIENT
Start: 2023-10-25 | End: 2023-10-25 | Stop reason: HOSPADM

## 2023-10-25 RX ORDER — HYDROCODONE BITARTRATE AND ACETAMINOPHEN 5; 325 MG/1; MG/1
1 TABLET ORAL EVERY 12 HOURS PRN
Qty: 60 TABLET | Refills: 0 | Status: SHIPPED | OUTPATIENT
Start: 2024-01-09 | End: 2023-11-30

## 2023-10-25 RX ADMIN — PHENYLEPHRINE HYDROCHLORIDE 1 DROP: 25 SOLUTION/ DROPS OPHTHALMIC at 10:10

## 2023-10-25 RX ADMIN — SODIUM CHLORIDE: 9 INJECTION, SOLUTION INTRAVENOUS at 11:10

## 2023-10-25 RX ADMIN — ONDANSETRON 4 MG: 2 INJECTION, SOLUTION INTRAMUSCULAR; INTRAVENOUS at 12:10

## 2023-10-25 RX ADMIN — TROPICAMIDE 1 DROP: 10 SOLUTION/ DROPS OPHTHALMIC at 09:10

## 2023-10-25 RX ADMIN — PROPARACAINE HYDROCHLORIDE 1 DROP: 5 SOLUTION/ DROPS OPHTHALMIC at 10:10

## 2023-10-25 RX ADMIN — TROPICAMIDE 1 DROP: 10 SOLUTION/ DROPS OPHTHALMIC at 10:10

## 2023-10-25 RX ADMIN — PHENYLEPHRINE HYDROCHLORIDE 1 DROP: 25 SOLUTION/ DROPS OPHTHALMIC at 09:10

## 2023-10-25 RX ADMIN — SODIUM CHLORIDE, POTASSIUM CHLORIDE, SODIUM LACTATE AND CALCIUM CHLORIDE: 600; 310; 30; 20 INJECTION, SOLUTION INTRAVENOUS at 10:10

## 2023-10-25 RX ADMIN — MIDAZOLAM 1 MG: 1 INJECTION INTRAMUSCULAR; INTRAVENOUS at 12:10

## 2023-10-25 RX ADMIN — MANNITOL 80 G: 20 INJECTION, SOLUTION INTRAVENOUS at 11:10

## 2023-10-25 RX ADMIN — PROPARACAINE HYDROCHLORIDE 1 DROP: 5 SOLUTION/ DROPS OPHTHALMIC at 09:10

## 2023-10-25 NOTE — H&P
History    Chief complaint:  Painless progressive vision loss left Eye    Present Ilness/Diagnosis: Visually significant cataract, left Eye    ROS: +Eyes, otherwise no significant changes    Past Medical History: refer to chart    Family History/Social History: refer to chart    Allergies:   Review of patient's allergies indicates:   Allergen Reactions    Aspirin Nausea And Vomiting    Penicillins Itching    Crestor [rosuvastatin]      Muscle pain      Lipitor [atorvastatin]      Achy         Current Medications: see medcard      Physical Exam    BP: Vital signs stable  General: No apparent distress  HEENT: cataract  Lungs: adequate respirations  Heart: + pulses  Abdomen: soft  Rectal/pelvic: deferred    Labs: Labs Reviewed    Lab Results   Component Value Date    WBC 7.29 07/18/2023    HGB 10.7 (L) 07/18/2023    HCT 33.3 (L) 07/18/2023    MCV 92 07/18/2023     07/18/2023           CMP  Sodium   Date Value Ref Range Status   07/18/2023 140 136 - 145 mmol/L Final     Potassium   Date Value Ref Range Status   07/18/2023 4.5 3.5 - 5.1 mmol/L Final     Chloride   Date Value Ref Range Status   07/18/2023 104 95 - 110 mmol/L Final     CO2   Date Value Ref Range Status   07/18/2023 23 23 - 29 mmol/L Final     Glucose   Date Value Ref Range Status   07/18/2023 82 70 - 110 mg/dL Final     BUN   Date Value Ref Range Status   07/18/2023 18 8 - 23 mg/dL Final     Creatinine   Date Value Ref Range Status   07/18/2023 1.2 0.5 - 1.4 mg/dL Final     Calcium   Date Value Ref Range Status   07/18/2023 9.5 8.7 - 10.5 mg/dL Final     Total Protein   Date Value Ref Range Status   07/18/2023 7.0 6.0 - 8.4 g/dL Final     Albumin   Date Value Ref Range Status   07/18/2023 3.9 3.5 - 5.2 g/dL Final     Total Bilirubin   Date Value Ref Range Status   07/18/2023 0.2 0.1 - 1.0 mg/dL Final     Comment:     For infants and newborns, interpretation of results should be based  on gestational age, weight and in agreement with  clinical  observations.    Premature Infant recommended reference ranges:  Up to 24 hours.............<8.0 mg/dL  Up to 48 hours............<12.0 mg/dL  3-5 days..................<15.0 mg/dL  6-29 days.................<15.0 mg/dL       Alkaline Phosphatase   Date Value Ref Range Status   07/18/2023 66 55 - 135 U/L Final     AST   Date Value Ref Range Status   07/18/2023 20 10 - 40 U/L Final     ALT   Date Value Ref Range Status   07/18/2023 11 10 - 44 U/L Final     Anion Gap   Date Value Ref Range Status   07/18/2023 13 8 - 16 mmol/L Final     eGFR   Date Value Ref Range Status   07/18/2023 46.3 (A) >60 mL/min/1.73 m^2 Final       The patient has been cleared for surgery in an ambulatory surgery facility.     Impression: Visually significant Cataract left Eye    Plan: Phacoemulsification with implantation of Intraocular lens left Eye

## 2023-10-25 NOTE — ANESTHESIA PREPROCEDURE EVALUATION
10/25/2023  Rola Richter is a 78 y.o., female.    Pre-op Assessment    I have reviewed the Patient Summary Reports.    I have reviewed the Nursing Notes. I have reviewed the NPO Status.   I have reviewed the Medications.     Review of Systems  Anesthesia Hx:  No problems with previous Anesthesia    Social:  Non-Smoker, No Alcohol Use    Cardiovascular:   Hypertension, well controlled    Hepatic/GI:   GERD, well controlled Dysphagia    Musculoskeletal:   Arthritis   Spine Disorders: Degenerative disease and Disc disease    Neurological:   Neuromuscular Disease, Headaches    Psych:   Psychiatric History          Physical Exam  General:  Well nourished      Airway/Jaw/Neck:  Airway Findings: Mouth Opening: Normal   Tongue: Normal   General Airway Assessment: Adult, Good Mallampati: II  Improves to II with phonation.  TM Distance: 4-6 cm       Dental:  Dental Findings: Upper Dentures, Lower partial dentures     Chest/Lungs:  Chest/Lungs Findings: Clear to auscultation, Normal Respiratory Rate      Heart/Vascular:  Heart Findings: Rate: Normal  Rhythm: Regular Rhythm  Sounds: Normal  Heart murmur: negative       Mental Status:  Mental Status Findings:  Cooperative, Alert and Oriented         Anesthesia Plan  Type of Anesthesia, risks & benefits discussed:  Anesthesia Type:  MAC    Patient's Preference:   Plan Factors:          Intra-op Monitoring Plan: standard ASA monitors  Intra-op Monitoring Plan Comments:   Post Op Pain Control Plan:   Post Op Pain Control Plan Comments:     Induction:   IV  Beta Blocker:  Patient is not currently on a Beta-Blocker (No further documentation required).       Informed Consent: Informed consent signed with the Patient and all parties understand the risks and agree with anesthesia plan.  All questions answered.  Anesthesia consent signed with patient.  ASA Score: 3     Day of  Surgery Review of History & Physical: I have interviewed and examined the patient. I have reviewed the patient's H&P dated:              Ready For Surgery From Anesthesia Perspective.           Physical Exam  General: Well nourished    Airway:  Mallampati: II / II  Mouth Opening: Normal  TM Distance: 4-6 cm  Tongue: Normal    Dental:  Upper Dentures, Lower partial dentures    Chest/Lungs:  Clear to auscultation, Normal Respiratory Rate    Heart:  Rate: Normal  Rhythm: Regular Rhythm  Sounds: Normal          Anesthesia Plan  Type of Anesthesia, risks & benefits discussed:    Anesthesia Type: MAC  Intra-op Monitoring Plan: standard ASA monitors  Induction:  IV  Informed Consent: Informed consent signed with the Patient and all parties understand the risks and agree with anesthesia plan.  All questions answered.   ASA Score: 3  Day of Surgery Review of History & Physical: I have interviewed and examined the patient. I have reviewed the patient's H&P dated:     Ready For Surgery From Anesthesia Perspective.       .

## 2023-10-25 NOTE — DISCHARGE SUMMARY
[facility] - Periop Services  Discharge Note  Short Stay    Procedure(s) (LRB):  CEIOL OS (Left)  Patient tolerated treatment/procedure well without complication and is now ready for discharge.    OUTCOME:     DISPOSITION: Home or Self Care    FINAL DIAGNOSIS:  cataract    FOLLOWUP: In clinic    DISCHARGE INSTRUCTIONS:  No discharge procedures on file.     TIME SPENT ON DISCHARGE: 5 minutes

## 2023-10-25 NOTE — OP NOTE
Operative Date:  10/25/2023    Discharge Date:  10/25/2023    Report Title: Operative Note    SURGEON: Rustam Dyer MD    ASSISTANT: None    PREOPERATIVE DIAGNOSIS: Age-related nuclear cataract,  Miosis, Left Eye    POSTOPERATIVE DIAGNOSIS: Same    PROCEDURE PERFORMED: Complex phacoemulsification of the cataract with posterior chamber intraocular lens Left Eye    IMPLANTS: SA60AT 34.0    ANESTHESIA: Topical with MAC    COMPLICATIONS: None    ESTIMATED BLOOD LOSS: Minimal    PROCEDURE: Of note, patient was given preoperative mannitol to reduce volume of the vitreous cavity.  The patient was brought to the operating room, time out was performed and implant checked.  The patient was given light sedation, and topical anesthesia was instilled in the left eye.  The left eye was prepped and draped in the usual fashion for eye surgery and lid speculum used to retract the eyelid. The eyelashes were secluded within the drape.  A paracentesis was made inferiorly with a sideport blade. Epishugarcaine was injected in the anterior chamber and dispersive viscoelastic was injected into the anterior chamber. A temporal corneal incision was made with a steel keratome. An iris ring was inserted into the anterior chamber and positioned for iris retraction.  A cystitome was used to initiate a continuous curvilinear capsulorrhexis and completed using the capsulorrhexis forceps. Hydrodissection of the lens nucleus was performed using balanced salt solution (BSS) on hydrodissection cannula. The lens nucleus was removed using phacoemulsification in the modified stop and chop technique. The lens cortex was removed using the irrigation/aspiration handpiece. The capsular bag was filled with viscoelastic, and the intraocular lens was injected into the capsular bag under direct visualization. Iris ring was removed from the anterior chamber.  Viscoelastic was removed using the irrigation/aspiration handpiece. The wounds were hydrated until  watertight.  During the procedure, care was taken to prevent a flat chamber during all phases of the surgery.      The wounds were rechecked and no leakage was noted.  The speculum was removed. Topical antibiotic was applied to the eye and shield was placed over the eye. The patient tolerated the procedure well and left the operating room in good condition.

## 2023-10-25 NOTE — TRANSFER OF CARE
"Anesthesia Transfer of Care Note    Patient: Rola Richter    Procedure(s) Performed: Procedure(s) (LRB):  CEIOL OS (Left)    Patient location: PACU    Anesthesia Type: MAC    Transport from OR: Transported from OR on room air with adequate spontaneous ventilation    Post pain: adequate analgesia    Post assessment: no apparent anesthetic complications    Post vital signs: stable    Level of consciousness: awake and alert    Nausea/Vomiting: no nausea/vomiting    Complications: none    Transfer of care protocol was followed      Last vitals:   Visit Vitals  BP (!) 162/74   Pulse 71   Temp 36.5 °C (97.7 °F) (Skin)   Resp 18   Ht 4' 11" (1.499 m)   Wt 54.4 kg (119 lb 14.9 oz)   SpO2 95%   Breastfeeding No   BMI 24.22 kg/m²     "

## 2023-10-25 NOTE — PLAN OF CARE
Discharge instructions given to pt/, verbalized understanding.  Tolerating PO fluids.  IV removed.  Denies pain.  Sunglasses on.  Eye drops in pt's possession. Wheeled out to   per RN in no distress.

## 2023-10-25 NOTE — ANESTHESIA POSTPROCEDURE EVALUATION
Anesthesia Post Evaluation    Patient: Rola Richter    Procedure(s) Performed: Procedure(s) (LRB):  CEIOL OS (Left)    Final Anesthesia Type: MAC      Patient location during evaluation: PACU  Patient participation: Yes- Able to Participate  Level of consciousness: awake  Post-procedure vital signs: reviewed and stable  Pain management: adequate  Airway patency: patent    PONV status at discharge: No PONV  Anesthetic complications: no      Cardiovascular status: blood pressure returned to baseline and hypertensive  Respiratory status: spontaneous ventilation  Hydration status: euvolemic  Follow-up not needed.          Vitals Value Taken Time   /80 10/25/23 1356   Temp 36.4 °C (97.5 °F) 10/25/23 1319   Pulse 79 10/25/23 1358   Resp 20 10/25/23 1355   SpO2 95 % 10/25/23 1358   Vitals shown include unvalidated device data.      Event Time   Out of Recovery 14:08:00         Pain/Amol Score: No data recorded

## 2023-10-26 ENCOUNTER — OFFICE VISIT (OUTPATIENT)
Dept: OPHTHALMOLOGY | Facility: CLINIC | Age: 78
End: 2023-10-26
Payer: MEDICARE

## 2023-10-26 VITALS
DIASTOLIC BLOOD PRESSURE: 80 MMHG | RESPIRATION RATE: 20 BRPM | SYSTOLIC BLOOD PRESSURE: 143 MMHG | HEIGHT: 59 IN | BODY MASS INDEX: 24.18 KG/M2 | WEIGHT: 119.94 LBS | TEMPERATURE: 98 F | OXYGEN SATURATION: 95 % | HEART RATE: 74 BPM

## 2023-10-26 DIAGNOSIS — Z98.42 STATUS POST CATARACT SURGERY, LEFT: Primary | ICD-10-CM

## 2023-10-26 PROCEDURE — 1101F PR PT FALLS ASSESS DOC 0-1 FALLS W/OUT INJ PAST YR: ICD-10-PCS | Mod: HCNC,CPTII,S$GLB, | Performed by: OPHTHALMOLOGY

## 2023-10-26 PROCEDURE — 99024 POSTOP FOLLOW-UP VISIT: CPT | Mod: HCNC,S$GLB,, | Performed by: OPHTHALMOLOGY

## 2023-10-26 PROCEDURE — 3288F FALL RISK ASSESSMENT DOCD: CPT | Mod: HCNC,CPTII,S$GLB, | Performed by: OPHTHALMOLOGY

## 2023-10-26 PROCEDURE — 1159F PR MEDICATION LIST DOCUMENTED IN MEDICAL RECORD: ICD-10-PCS | Mod: HCNC,CPTII,S$GLB, | Performed by: OPHTHALMOLOGY

## 2023-10-26 PROCEDURE — 1160F PR REVIEW ALL MEDS BY PRESCRIBER/CLIN PHARMACIST DOCUMENTED: ICD-10-PCS | Mod: HCNC,CPTII,S$GLB, | Performed by: OPHTHALMOLOGY

## 2023-10-26 PROCEDURE — 99024 PR POST-OP FOLLOW-UP VISIT: ICD-10-PCS | Mod: HCNC,S$GLB,, | Performed by: OPHTHALMOLOGY

## 2023-10-26 PROCEDURE — 99999 PR PBB SHADOW E&M-EST. PATIENT-LVL III: CPT | Mod: PBBFAC,HCNC,, | Performed by: OPHTHALMOLOGY

## 2023-10-26 PROCEDURE — 1101F PT FALLS ASSESS-DOCD LE1/YR: CPT | Mod: HCNC,CPTII,S$GLB, | Performed by: OPHTHALMOLOGY

## 2023-10-26 PROCEDURE — 1159F MED LIST DOCD IN RCRD: CPT | Mod: HCNC,CPTII,S$GLB, | Performed by: OPHTHALMOLOGY

## 2023-10-26 PROCEDURE — 1160F RVW MEDS BY RX/DR IN RCRD: CPT | Mod: HCNC,CPTII,S$GLB, | Performed by: OPHTHALMOLOGY

## 2023-10-26 PROCEDURE — 1125F PR PAIN SEVERITY QUANTIFIED, PAIN PRESENT: ICD-10-PCS | Mod: HCNC,CPTII,S$GLB, | Performed by: OPHTHALMOLOGY

## 2023-10-26 PROCEDURE — 99999 PR PBB SHADOW E&M-EST. PATIENT-LVL III: ICD-10-PCS | Mod: PBBFAC,HCNC,, | Performed by: OPHTHALMOLOGY

## 2023-10-26 PROCEDURE — 3288F PR FALLS RISK ASSESSMENT DOCUMENTED: ICD-10-PCS | Mod: HCNC,CPTII,S$GLB, | Performed by: OPHTHALMOLOGY

## 2023-10-26 PROCEDURE — 1125F AMNT PAIN NOTED PAIN PRSNT: CPT | Mod: HCNC,CPTII,S$GLB, | Performed by: OPHTHALMOLOGY

## 2023-10-26 NOTE — PROGRESS NOTES
HPI    Pt here for 1 day post op cat sx OS.     Pt c/o OS hurting this AM like headache, feels fine now but feels like OS   is twitching feels dry    Compliant w PO gtts  Last edited by Jailyn Colindres on 10/26/2023  8:12 AM.            Assessment /Plan     For exam results, see Encounter Report.    Status post cataract surgery, left      Doing well  Post op precautions and instructions reviewed, sheet given  moxi QID  PF QID  Keto QID    F/u 1 week, brief refract, consider PW for OD

## 2023-10-27 ENCOUNTER — PATIENT MESSAGE (OUTPATIENT)
Dept: PSYCHIATRY | Facility: CLINIC | Age: 78
End: 2023-10-27
Payer: MEDICARE

## 2023-10-30 ENCOUNTER — PATIENT MESSAGE (OUTPATIENT)
Dept: PSYCHIATRY | Facility: CLINIC | Age: 78
End: 2023-10-30
Payer: MEDICARE

## 2023-10-30 ENCOUNTER — OFFICE VISIT (OUTPATIENT)
Dept: PAIN MEDICINE | Facility: CLINIC | Age: 78
End: 2023-10-30
Payer: MEDICARE

## 2023-10-30 VITALS
WEIGHT: 119 LBS | SYSTOLIC BLOOD PRESSURE: 118 MMHG | BODY MASS INDEX: 23.99 KG/M2 | DIASTOLIC BLOOD PRESSURE: 63 MMHG | HEIGHT: 59 IN | HEART RATE: 75 BPM

## 2023-10-30 DIAGNOSIS — G89.4 CHRONIC PAIN DISORDER: ICD-10-CM

## 2023-10-30 DIAGNOSIS — Z13.31 POSITIVE DEPRESSION SCREENING: ICD-10-CM

## 2023-10-30 DIAGNOSIS — Z79.891 CHRONIC USE OF OPIATE FOR THERAPEUTIC PURPOSE: Primary | ICD-10-CM

## 2023-10-30 PROCEDURE — 3074F SYST BP LT 130 MM HG: CPT | Mod: HCNC,CPTII,S$GLB, | Performed by: PHYSICIAN ASSISTANT

## 2023-10-30 PROCEDURE — 1101F PT FALLS ASSESS-DOCD LE1/YR: CPT | Mod: HCNC,CPTII,S$GLB, | Performed by: PHYSICIAN ASSISTANT

## 2023-10-30 PROCEDURE — 99214 PR OFFICE/OUTPT VISIT, EST, LEVL IV, 30-39 MIN: ICD-10-PCS | Mod: HCNC,S$GLB,, | Performed by: PHYSICIAN ASSISTANT

## 2023-10-30 PROCEDURE — 1125F AMNT PAIN NOTED PAIN PRSNT: CPT | Mod: HCNC,CPTII,S$GLB, | Performed by: PHYSICIAN ASSISTANT

## 2023-10-30 PROCEDURE — 1159F PR MEDICATION LIST DOCUMENTED IN MEDICAL RECORD: ICD-10-PCS | Mod: HCNC,CPTII,S$GLB, | Performed by: PHYSICIAN ASSISTANT

## 2023-10-30 PROCEDURE — 1101F PR PT FALLS ASSESS DOC 0-1 FALLS W/OUT INJ PAST YR: ICD-10-PCS | Mod: HCNC,CPTII,S$GLB, | Performed by: PHYSICIAN ASSISTANT

## 2023-10-30 PROCEDURE — 99999 PR PBB SHADOW E&M-EST. PATIENT-LVL IV: CPT | Mod: PBBFAC,HCNC,, | Performed by: PHYSICIAN ASSISTANT

## 2023-10-30 PROCEDURE — 1159F MED LIST DOCD IN RCRD: CPT | Mod: HCNC,CPTII,S$GLB, | Performed by: PHYSICIAN ASSISTANT

## 2023-10-30 PROCEDURE — 3074F PR MOST RECENT SYSTOLIC BLOOD PRESSURE < 130 MM HG: ICD-10-PCS | Mod: HCNC,CPTII,S$GLB, | Performed by: PHYSICIAN ASSISTANT

## 2023-10-30 PROCEDURE — 80326 AMPHETAMINES 5 OR MORE: CPT | Mod: HCNC | Performed by: PHYSICIAN ASSISTANT

## 2023-10-30 PROCEDURE — 99214 OFFICE O/P EST MOD 30 MIN: CPT | Mod: HCNC,S$GLB,, | Performed by: PHYSICIAN ASSISTANT

## 2023-10-30 PROCEDURE — 99999 PR PBB SHADOW E&M-EST. PATIENT-LVL IV: ICD-10-PCS | Mod: PBBFAC,HCNC,, | Performed by: PHYSICIAN ASSISTANT

## 2023-10-30 PROCEDURE — 1125F PR PAIN SEVERITY QUANTIFIED, PAIN PRESENT: ICD-10-PCS | Mod: HCNC,CPTII,S$GLB, | Performed by: PHYSICIAN ASSISTANT

## 2023-10-30 PROCEDURE — 3288F FALL RISK ASSESSMENT DOCD: CPT | Mod: HCNC,CPTII,S$GLB, | Performed by: PHYSICIAN ASSISTANT

## 2023-10-30 PROCEDURE — 3078F DIAST BP <80 MM HG: CPT | Mod: HCNC,CPTII,S$GLB, | Performed by: PHYSICIAN ASSISTANT

## 2023-10-30 PROCEDURE — 3288F PR FALLS RISK ASSESSMENT DOCUMENTED: ICD-10-PCS | Mod: HCNC,CPTII,S$GLB, | Performed by: PHYSICIAN ASSISTANT

## 2023-10-30 PROCEDURE — 3078F PR MOST RECENT DIASTOLIC BLOOD PRESSURE < 80 MM HG: ICD-10-PCS | Mod: HCNC,CPTII,S$GLB, | Performed by: PHYSICIAN ASSISTANT

## 2023-10-30 RX ORDER — CYCLOBENZAPRINE HCL 10 MG
10 TABLET ORAL 2 TIMES DAILY PRN
Qty: 60 TABLET | Refills: 1 | Status: SHIPPED | OUTPATIENT
Start: 2023-10-30 | End: 2024-02-01 | Stop reason: SDUPTHER

## 2023-10-30 NOTE — PROGRESS NOTES
Referring Physician: No ref. provider found    PCP: Liseth Carias MD      CC: neck and occipital pain    Interval history: Ms. Richter is a 78 y.o. female with neck pain and occipital neuralgia who returns to our clinic.  She continues to c/o headaches and neck pain.  Most recent occcipital nerve block only provided mild short lived beneft. Recent cervical MELANY improved neck pain that was extending into left shoulder.  She has numbness to her right hand and first through fourth digits. Cervical MELANY in February 2018  provided benefit for several weeks.    She continues to take Norco with benefit.  Flexeril 10 mg caused dry mouth however this resolved and she wishes to resume. Robaxin was helpful but caused dizziness.  Denies UE weakness. No bowel bladder changes.   Pain today is rated 10/10.    Prior HPI:   Patient is 70-year-old female with past history history of cervical DDD, cervical spondylosis and chronic headaches.  She recently moved here from [place], [place].  She is treated in the past by neurology.  She states having constant burning pain over the left side of her posterior scalp.  Pain radiates to her neck as well as a frontal.  She also has left-sided neck pain as well.  She denies any radicular arm pain.  No numbness or weakness.  She states having cervical epidural steroid injection at past with minimal benefit.  She also has had decided of cervical nerve blocks in the past with moderate benefit.  Most recent injection was performed 2 months ago.  She desires repeat injection.  She currently takes Norco 10 mg every 12 hours as needed with moderate benefit.  She also takes Zanaflex 4 mg every 8 hours with mild benefits.  She rates her pain 7/10 today.    Pain intervention history: s/p left occipital nerve blocks on 1/2016 with 50% relief of her headaches  S/p cervical MELANY 2/8/18 moderate relief for a couple of weeks.     ROS:  CONSTITUTIONAL: No fevers, chills, night sweats, wt. loss, appetite  changes  SKIN: no rashes or itching  ENT: No headaches, head trauma, vision changes, or eye pain  LYMPH NODES: None noticed   CV: No chest pain, palpitations.   RESP: No shortness of breath, dyspnea on exertion, cough, wheezing, or hemoptysis  GI: No nausea, emesis, diarrhea, constipation, melena, hematochezia, pain.    : No dysuria, hematuria, urgency, or frequency   HEME: No easy bruising, bleeding problems  PSYCHIATRIC: No depression, anxiety, psychosis, hallucinations.  NEURO: No seizures, memory loss, dizziness or difficulty sleeping  MSK: + History of present illness      Past Medical History:   Diagnosis Date    Anxiety     Arthritis     Cataract     DDD (degenerative disc disease), lumbar     Depression     Encounter for blood transfusion     GERD (gastroesophageal reflux disease)     Headache     Hyperlipidemia     Hypertension     pt states she does not take meds anymore she just watches her weight    Neuromuscular disorder     Occipital neuralgia 3/24/2017    Osteoporosis      Past Surgical History:   Procedure Laterality Date    APPENDECTOMY      CATARACT EXTRACTION W/  INTRAOCULAR LENS IMPLANT Left 10/25/2023    Procedure: CEIOL OS;  Surgeon: Rustam Dyer MD;  Location: [facility] OR;  Service: Ophthalmology;  Laterality: Left;  Last Case of day, short eye, very high power IOL     SECTION      x 2    CHOLECYSTECTOMY      COLONOSCOPY  prior to     COLONOSCOPY N/A 2019    Procedure: COLONOSCOPY;  Surgeon: Greg Victor MD;  Location: [facility];  Service: Endoscopy;  Laterality: N/A; 2 colon polyps, hemorrhoids; repeat in 5 years for surveillance; biopsy: Tubular adenoma x2    EPIDURAL STEROID INJECTION INTO CERVICAL SPINE N/A 2022    Procedure: Injection-steroid-epidural-cervical C7-T1;  Surgeon: Timothy Grimaldo MD;  Location: [facility] OR;  Service: Pain Management;  Laterality: N/A;    ESOPHAGOGASTRODUODENOSCOPY N/A 2019    Procedure: EGD (ESOPHAGOGASTRODUODENOSCOPY);  Surgeon:  Greg Victor MD;  Location: [facility] ENDO;  Service: Endoscopy;  Laterality: N/A; Mild Schatzki ring. Dilated. small hiatal hernia; Four gastric polyps. Resected and retrieved, gastritis; biopsy:stomach- unremarkable, negative for h pylori, polyps-Fundic type mucosa with foveolar hyperplasia.    ESOPHAGOGASTRODUODENOSCOPY N/A 2/17/2021    Procedure: EGD (ESOPHAGOGASTRODUODENOSCOPY);  Surgeon: Greg Victor MD;  Location: [facility] ENDO;  Service: Endoscopy;  Laterality: N/A;    HYSTERECTOMY      Laser Periphery Iridotomy Bilateral     OD 5/26/16 and OS touch up 5/26/2016    UPPER GASTROINTESTINAL ENDOSCOPY  03/14/2017    Dr. Marcial: esophageal stenosis- dilated, gastritis, gastric polyps removed; biopsy- mild gastritis, negative for h pylori, hyperplastic polyp, esophagus unremarkable    vocal cord tumor removal       Family History   Problem Relation Age of Onset    Osteoarthritis Mother     Alcohol abuse Mother     Rheum arthritis Mother     Osteoarthritis Sister     Diabetes Brother     No Known Problems Son     No Known Problems Sister     No Known Problems Sister     No Known Problems Brother     Arthritis Son     No Known Problems Son     Stroke Maternal Grandmother 99    Rheum arthritis Maternal Grandmother     Retinal detachment Neg Hx     Macular degeneration Neg Hx     Glaucoma Neg Hx     Amblyopia Neg Hx     Blindness Neg Hx     Cancer Neg Hx     Cataracts Neg Hx     Hypertension Neg Hx     Strabismus Neg Hx     Thyroid disease Neg Hx     Lupus Neg Hx     Kidney disease Neg Hx     Inflammatory bowel disease Neg Hx     Psoriasis Neg Hx     Colon cancer Neg Hx     Crohn's disease Neg Hx     Ulcerative colitis Neg Hx     Stomach cancer Neg Hx     Esophageal cancer Neg Hx      Social History     Socioeconomic History    Marital status:    Tobacco Use    Smoking status: Never    Smokeless tobacco: Never   Substance and Sexual Activity    Alcohol use: No     Alcohol/week: 0.0 standard drinks of alcohol     "Drug use: Yes     Types: Hydrocodone    Sexual activity: Yes     Partners: Male     Social Determinants of Health     Financial Resource Strain: Low Risk  (8/8/2023)    Overall Financial Resource Strain (CARDIA)     Difficulty of Paying Living Expenses: Not hard at all   Food Insecurity: No Food Insecurity (8/8/2023)    Hunger Vital Sign     Worried About Running Out of Food in the Last Year: Never true     Ran Out of Food in the Last Year: Never true   Transportation Needs: No Transportation Needs (8/8/2023)    PRAPARE - Transportation     Lack of Transportation (Medical): No     Lack of Transportation (Non-Medical): No   Physical Activity: Insufficiently Active (8/8/2023)    Exercise Vital Sign     Days of Exercise per Week: 3 days     Minutes of Exercise per Session: 30 min   Stress: No Stress Concern Present (8/8/2023)    [employer] - Occupational Stress Questionnaire     Feeling of Stress : Not at all   Social Connections: Moderately Isolated (8/8/2023)    Social Connection and Isolation Panel [NHANES]     Frequency of Communication with Friends and Family: More than three times a week     Frequency of Social Gatherings with Friends and Family: More than three times a week     Attends [employer] Services: Never     Active Member of Clubs or Organizations: No     Attends Club or Organization Meetings: Never     Marital Status:    Housing Stability: Unknown (8/8/2023)    Housing Stability Vital Sign     Unable to Pay for Housing in the Last Year: No     Unstable Housing in the Last Year: No         Medications/Allergies: See med card    Vitals:    10/30/23 1352   BP: 118/63   Pulse: 75   Weight: 54 kg (119 lb)   Height: 4' 11" (1.499 m)   PainSc: 10-Worst pain ever   PainLoc: Neck         Physical exam:    GENERAL: A and O x3, the patient appears well groomed and is in no acute distress.  Skin: No rashes or obvious lesions  HEENT: normocephalic, atraumatic  CARDIOVASCULAR:  " RRR  LUNGS: nonlabored breathing  ABDOMEN: soft, nontender   UPPER EXTREMITIES: Normal alignment, normal range of motion, no atrophy, no skin changes,  hair growth and nail growth normal and equal bilaterally. No swelling, no tenderness.  +Phalens on left side. +TTP tricep tendon  LOWER EXTREMITIES:  Normal alignment, normal range of motion, no atrophy, no skin changes,  hair growth and nail growth normal and equal bilaterally. No swelling, no tenderness.  CERVICAL SPINE:   Cervical spine: ROM is full in flexion, extension and lateral rotation.  Painful flexion > extension.  Positive facet loading bilaterally.  Spurling is positive at right side.  Myofascial exam:  Tenderness to palpation across cervical paraspinals deltoid and trapezius muscles bilaterally.      MENTAL STATUS: normal orientation, speech, language, and fund of knowledge for social situation.  Emotional state appropriate.    CRANIAL NERVES:  II:  PERRL bilaterally,   III,IV,VI: EOMI.    V:  Facial sensation equal bilaterally  VII:  Facial motor function normal.  VIII:  Hearing equal to finger rub bilaterally  IX/X: Gag normal, palate symmetric  XI:  Shoulder shrug equal, head turn equal  XII:  Tongue midline without fasciculations      MOTOR: Tone and bulk: normal bilateral upper and lower Strength: normal   Delt Bi Tri WE WF     R 5 5 5 5 5 5   L 5 5 5 5 5 5     IP ADD ABD Quad TA Gas HAM  R 5 5 5 5 5 5 5  L 5 5 5 5 5 5 5    SENSATION: Light touch and pinprick intact bilaterally  REFLEXES: normal, symmetric, nonbrisk.  Toes down, no clonus. No hoffmans.  GAIT: normal rise, base, steps, and arm swing.        Imaging:   Cervical MRI 12/4/17    Narrative     EXAM: Cervical spine MRI without contrast.    INDICATION: Cervical radiculopathy.  Neck pain and occipital neuralgia.  The patient complains of neck and right arm pain.    TECHNIQUE: Routine multiplanar, multisequence unenhanced cervical spine MRI was performed.    COMPARISON: Plain films of  the cervical spine obtained concurrently      FINDINGS:     Vertebral column: There is straightening of the cervical spine with loss of normal lordosis.  As seen on concurrent plain films, there is trace anterolisthesis of C3 on C4 with 2 mm anterolisthesis of C4 on C5.  There is marked disc space narrowing at the C5-6 level with moderate to marked disc space narrowing at the C4-5 and C6-7 levels.  There is partial non-segmentation anteriorly at the C2-3 level.  The C2 and C3 as well as the C4 and C5 facet joints appear fused and this may represent developmental non-segmentation or degenerative ankylosis.  All of the discs are desiccated.  The odontoid process is intact.    Spinal canal, cord, epidural space: The craniovertebral junction is normal.  The spinal canal is omental and normal.  There is no significant spinal stenosis.  The cord is normal in caliber.  There is very subtle flattening of the ventral cord surface at the C4-5 and C5-6 levels where there is degenerative change.  The study is mildly motion but there is no definite cord edema or myelomalacia.    Findings by level:    C2-3: There is no spinal canal or foraminal stenosis.  There is mild left facet joint arthropathy.    C3-4: There is trace anterolisthesis.  There is left greater than right uncovertebral spurring and facet joint arthropathy.  There is a mild disc osteophyte complex, slightly eccentric to the left with subtle annular fissure.  This narrows the ventral subarachnoid space.  There is no spinal stenosis or cord compression.  There is moderate marked left foraminal stenosis.    C4-5: There is moderate disc space narrowing with 2 mm anterolisthesis of C4 on C5.  There is facet joint arthropathy or effusion left greater than right.  There is also mild bilateral but left greater than right uncovertebral spurring.  There is unroofing of a mild disc bulge which narrows the ventral subarachnoid space.  There is no spinal stenosis.  There is  mild to moderate left foraminal stenosis.    C5-6:There is marked disc space narrowing.  There is bilateral uncovertebral spurring.  There is a disc osteophyte complex which narrows the subarachnoid space.  This is slightly eccentric to the right There is subtle flattening of the ventral cord surface.  Cord signal is grossly normal.  There is mild spinal stenosis with at least moderate bilateral foraminal stenosis.    C6-7: There is moderate disc space narrowing.  There is mild uncovertebral spurring.  There is a shallow disc osteophyte complex, slightly eccentric to the right.  There is narrowing of the ventral subarachnoid space.  There is no spinal stenosis.  Cord signal is normal.  There is mild bilateral foraminal stenosis.  There is a small 3.5 mm left foraminal perineural cyst.    C7- T1: There is a Schmorl's node in inferior endplate of C7, chronic.  There are tiny bilateral foraminal perineural cysts.  There is minimal bulging of the annulus and mild facet joint arthropathy without spinal canal or foraminal stenosis.    Soft tissues/other: The prevertebral soft tissues are normal.  The airway is patent.   Impression          1. There is multilevel degenerative disc disease described in detail above.  There is no acute fracture.  There is degenerative listhesis at several levels.  There is some degree of disc osteophyte complex, uncovertebral spurring and/or facet joint arthropathy contributing to some degree of foraminal stenosis at several levels.  There is no significant spinal canal stenosis.  There is very subtle flattening of the ventral cord surface at the C4-5 and C6-7 levels The pertinent findings are summarized below.    2. At the C3-4 level, there is no spinal stenosis.  There is moderate to marked left foraminal stenosis.    3. At the C4-5 level there is no spinal stenosis.  There is mild to moderate left foraminal stenosis.    4. At the C5-6 level, there is mild spinal stenosis with at least  moderate bilateral foraminal stenosis.    5. At the C6-7 level, there is no spinal stenosis.  There is mild bilateral foraminal stenosis.    6. There is no spinal canal or foraminal stenosis at the C2-3 or C7-T1 levels.     Assessment:  Mrs. Ricther is a 78 y.o. female with neck and head pain    1. Chronic use of opiate for therapeutic purpose    2. Chronic pain disorder            Plan:  1. I have stressed the importance of physical activity and exercise to improve overall health.  2. Monitor progress from repeat C7-T1 IL MELANY. Consider bilateral occipital blocks for head pain.  3. Norco 5mg q12hrs as needed for pain.  reviewed.  Previous UDS consistent   4. Flexeril 10 mg BID #60 1 refill.   5. F/u 3 months or sooner  All medication management was performed by Dr. Timothy Grimaldo

## 2023-11-01 ENCOUNTER — OFFICE VISIT (OUTPATIENT)
Dept: PSYCHIATRY | Facility: CLINIC | Age: 78
End: 2023-11-01
Payer: MEDICARE

## 2023-11-01 DIAGNOSIS — F33.1 MODERATE EPISODE OF RECURRENT MAJOR DEPRESSIVE DISORDER: Primary | ICD-10-CM

## 2023-11-01 DIAGNOSIS — F41.1 GENERALIZED ANXIETY DISORDER: ICD-10-CM

## 2023-11-01 PROCEDURE — 1159F PR MEDICATION LIST DOCUMENTED IN MEDICAL RECORD: ICD-10-PCS | Mod: HCNC,CPTII,S$GLB, | Performed by: SOCIAL WORKER

## 2023-11-01 PROCEDURE — 99999 PR PBB SHADOW E&M-EST. PATIENT-LVL I: ICD-10-PCS | Mod: PBBFAC,HCNC,, | Performed by: SOCIAL WORKER

## 2023-11-01 PROCEDURE — 1159F MED LIST DOCD IN RCRD: CPT | Mod: HCNC,CPTII,S$GLB, | Performed by: SOCIAL WORKER

## 2023-11-01 PROCEDURE — 99999 PR PBB SHADOW E&M-EST. PATIENT-LVL I: CPT | Mod: PBBFAC,HCNC,, | Performed by: SOCIAL WORKER

## 2023-11-01 PROCEDURE — 90834 PSYTX W PT 45 MINUTES: CPT | Mod: HCNC,S$GLB,, | Performed by: SOCIAL WORKER

## 2023-11-01 PROCEDURE — 90834 PR PSYCHOTHERAPY W/PATIENT, 45 MIN: ICD-10-PCS | Mod: HCNC,S$GLB,, | Performed by: SOCIAL WORKER

## 2023-11-02 ENCOUNTER — OFFICE VISIT (OUTPATIENT)
Dept: OPHTHALMOLOGY | Facility: CLINIC | Age: 78
End: 2023-11-02
Payer: MEDICARE

## 2023-11-02 DIAGNOSIS — Z98.42 STATUS POST CATARACT SURGERY, LEFT: Primary | ICD-10-CM

## 2023-11-02 PROCEDURE — 99999 PR PBB SHADOW E&M-EST. PATIENT-LVL III: CPT | Mod: PBBFAC,HCNC,, | Performed by: OPHTHALMOLOGY

## 2023-11-02 PROCEDURE — 1159F MED LIST DOCD IN RCRD: CPT | Mod: HCNC,CPTII,S$GLB, | Performed by: OPHTHALMOLOGY

## 2023-11-02 PROCEDURE — 1160F PR REVIEW ALL MEDS BY PRESCRIBER/CLIN PHARMACIST DOCUMENTED: ICD-10-PCS | Mod: HCNC,CPTII,S$GLB, | Performed by: OPHTHALMOLOGY

## 2023-11-02 PROCEDURE — 1160F RVW MEDS BY RX/DR IN RCRD: CPT | Mod: HCNC,CPTII,S$GLB, | Performed by: OPHTHALMOLOGY

## 2023-11-02 PROCEDURE — 3288F FALL RISK ASSESSMENT DOCD: CPT | Mod: HCNC,CPTII,S$GLB, | Performed by: OPHTHALMOLOGY

## 2023-11-02 PROCEDURE — 1125F PR PAIN SEVERITY QUANTIFIED, PAIN PRESENT: ICD-10-PCS | Mod: HCNC,CPTII,S$GLB, | Performed by: OPHTHALMOLOGY

## 2023-11-02 PROCEDURE — 99999 PR PBB SHADOW E&M-EST. PATIENT-LVL III: ICD-10-PCS | Mod: PBBFAC,HCNC,, | Performed by: OPHTHALMOLOGY

## 2023-11-02 PROCEDURE — 1159F PR MEDICATION LIST DOCUMENTED IN MEDICAL RECORD: ICD-10-PCS | Mod: HCNC,CPTII,S$GLB, | Performed by: OPHTHALMOLOGY

## 2023-11-02 PROCEDURE — 1101F PR PT FALLS ASSESS DOC 0-1 FALLS W/OUT INJ PAST YR: ICD-10-PCS | Mod: HCNC,CPTII,S$GLB, | Performed by: OPHTHALMOLOGY

## 2023-11-02 PROCEDURE — 1125F AMNT PAIN NOTED PAIN PRSNT: CPT | Mod: HCNC,CPTII,S$GLB, | Performed by: OPHTHALMOLOGY

## 2023-11-02 PROCEDURE — 1101F PT FALLS ASSESS-DOCD LE1/YR: CPT | Mod: HCNC,CPTII,S$GLB, | Performed by: OPHTHALMOLOGY

## 2023-11-02 PROCEDURE — 99024 PR POST-OP FOLLOW-UP VISIT: ICD-10-PCS | Mod: HCNC,S$GLB,, | Performed by: OPHTHALMOLOGY

## 2023-11-02 PROCEDURE — 99024 POSTOP FOLLOW-UP VISIT: CPT | Mod: HCNC,S$GLB,, | Performed by: OPHTHALMOLOGY

## 2023-11-02 PROCEDURE — 3288F PR FALLS RISK ASSESSMENT DOCUMENTED: ICD-10-PCS | Mod: HCNC,CPTII,S$GLB, | Performed by: OPHTHALMOLOGY

## 2023-11-02 NOTE — PROGRESS NOTES
HPI    1 week s/p phaco IOL OS done on 10/25/2023  Pt states OS is painful in temp corner. Feels like she has something in   eye. Blurry vision. Wearing specs from before surgery. Didn't know she   shouldn't wear old specs..   Compliant with post op gtts. Using pred, moxi, and keto as directed.      Pre op cat sx OD scheduled for 11/15/2023    Last edited by Dhara Palacios on 11/2/2023 10:33 AM.            Assessment /Plan     For exam results, see Encounter Report.    Status post cataract surgery, left      POW1 CEIOL OS  Small K abrasion  VA still lower than expected  Foveal pathology may limit BCVA    Continue moxi/keto/pred QID for another week    F/u next week    Will cancel OD surgery for now, to be done at a later date

## 2023-11-03 LAB
6MAM UR QL: NOT DETECTED
7AMINOCLONAZEPAM UR QL: NOT DETECTED
A-OH ALPRAZ UR QL: NOT DETECTED
ALPHA-OH-MIDAZOLAM: NOT DETECTED
ALPRAZ UR QL: NOT DETECTED
AMPHET UR QL SCN: NOT DETECTED
ANNOTATION COMMENT IMP: NORMAL
ANNOTATION COMMENT IMP: NORMAL
BARBITURATES UR QL: NOT DETECTED
BUPRENORPHINE UR QL: NOT DETECTED
BZE UR QL: NOT DETECTED
CARBOXYTHC UR QL: NOT DETECTED
CARISOPRODOL UR QL: NOT DETECTED
CLONAZEPAM UR QL: NOT DETECTED
CODEINE UR QL: NOT DETECTED
CREAT UR-MCNC: 231.6 MG/DL (ref 20–400)
DIAZEPAM UR QL: NOT DETECTED
ETHYL GLUCURONIDE UR QL: NORMAL
FENTANYL UR QL: NOT DETECTED
GABAPENTIN: PRESENT
HYDROCODONE UR QL: PRESENT
HYDROMORPHONE UR QL: PRESENT
LORAZEPAM UR QL: NOT DETECTED
MDA UR QL: NOT DETECTED
MDEA UR QL: NOT DETECTED
MDMA UR QL: NOT DETECTED
ME-PHENIDATE UR QL: NOT DETECTED
METHADONE UR QL: NOT DETECTED
METHAMPHET UR QL: NOT DETECTED
MIDAZOLAM UR QL SCN: NOT DETECTED
MORPHINE UR QL: NOT DETECTED
NALOXONE: NOT DETECTED
NORBUPRENORPHINE UR QL CFM: NOT DETECTED
NORDIAZEPAM UR QL: NOT DETECTED
NORFENTANYL UR QL: NOT DETECTED
NORHYDROCODONE UR QL CFM: PRESENT
NORMEPERIDINE UR QL CFM: NOT DETECTED
NOROXYCODONE UR QL CFM: NOT DETECTED
NOROXYMORPHONE UR QL SCN: NOT DETECTED
OXAZEPAM UR QL: NOT DETECTED
OXYCODONE UR QL: NOT DETECTED
OXYMORPHONE UR QL: NOT DETECTED
PATHOLOGY STUDY: NORMAL
PCP UR QL: NOT DETECTED
PHENTERMINE UR QL: NOT DETECTED
PREGABALIN: NOT DETECTED
SERVICE CMNT-IMP: NORMAL
TAPENTADOL UR QL SCN: NOT DETECTED
TAPENTADOL UR QL SCN: NOT DETECTED
TEMAZEPAM UR QL: NOT DETECTED
TRAMADOL UR QL: NOT DETECTED
ZOLPIDEM METABOLITE: NOT DETECTED
ZOLPIDEM UR QL: NOT DETECTED

## 2023-11-03 NOTE — PROGRESS NOTES
Individual Psychotherapy (PhD/LCSW)    11/1/2023    Site:  [facility]         Therapeutic Intervention: Met with patient.  Outpatient - Insight oriented psychotherapy 45 min - CPT code 09115, Outpatient - Behavior modifying psychotherapy 45 min - CPT code 08922, and Outpatient - Supportive psychotherapy 45 min - CPT Code 31272    Chief complaint/reason for encounter: depression and anxiety             Interval history and content of current session: Ct was referred by Dr. Leong to address depression, anxiety, and trauma. Ct arrived to session on time and was fully engaged.  CT shared that she has been doing better managing her depression and anxiety. Ct shared that her 's illness is progressing and she is concerned that he will not be able to drive anymore. Ct shared about her anxiety and her childhood. Ct shared that her mother was an alcoholic. She reported that her mother never showed affection and was under the influence often. SW and ct processed how growing up in an alcoholic home could contribute to anxiety d/o. Ct shared that she and her  have intimacy issues, she does not have the desire or want to be intimate with her . SW and ct processed things that ct does to spend time with her . SW and ct also discussed the importance of self care, acceptance, and ct doing things within her limitations to make her feel good about herself. cT to continue in 1:1 sessions.       Treatment plan:  Target symptoms: depression, anxiety   Why chosen therapy is appropriate versus another modality: relevant to diagnosis, patient responds to this modality, evidence based practice  Outcome monitoring methods: self-report  Therapeutic intervention type: insight oriented psychotherapy, behavior modifying psychotherapy, supportive psychotherapy    Risk parameters:  Patient reports no suicidal ideation  Patient reports no homicidal ideation  Patient reports no self-injurious behavior  Patient reports no  violent behavior    CSSRS was completed:     Mental Status Exam:  General Appearance:  unremarkable, age appropriate   Speech: normal tone, normal rate, normal pitch, normal volume      Level of Cooperation: cooperative      Thought Processes: normal and logical   Mood: steady      Thought Content: normal, no suicidality, no homicidality, delusions, or paranoia   Affect: congruent and appropriate   Orientation: Oriented x3   Memory: recent >  intact   Attention Span & Concentration: intact   Fund of General Knowledge: intact and appropriate to age and level of education   Abstract Reasoning: interpretation of similarities was abstract   Judgment & Insight: intact     Language intact       Verbal deficits: None    Patient's response to intervention:  The patient's response to intervention is accepting.    Progress toward goals and other mental status changes:  The patient's progress toward goals is fair .    Diagnosis:     ICD-10-CM ICD-9-CM   1. Moderate episode of recurrent major depressive disorder  F33.1 296.32   2. Generalized anxiety disorder  F41.1 300.02       Plan:  individual psychotherapy and ct to follow up with DR. Loeng for psych med mgt  Pt to go to ED or call 911 if symptoms worsen or if she has thoughts of harming self and/or others. Pt verbalized understanding.    Return to clinic: as scheduled    Length of Service (minutes): 45      A portion of this note was created using [employer] voice recognition software that occasionally misinterprets phrases or words.    Each patient to whom he or she provides medical services by telemedicine is: (1) informed of the relationship between the physician and patient and the respective role of any other health care provider with respect to management of the patient; and (2) notified that he or she may decline to receive medical services by telemedicine and may withdraw from such care at any time.

## 2023-11-07 ENCOUNTER — OFFICE VISIT (OUTPATIENT)
Dept: PSYCHIATRY | Facility: CLINIC | Age: 78
End: 2023-11-07
Payer: MEDICARE

## 2023-11-07 VITALS
BODY MASS INDEX: 24.53 KG/M2 | DIASTOLIC BLOOD PRESSURE: 62 MMHG | WEIGHT: 121.69 LBS | HEIGHT: 59 IN | SYSTOLIC BLOOD PRESSURE: 125 MMHG | HEART RATE: 78 BPM

## 2023-11-07 DIAGNOSIS — F41.1 GENERALIZED ANXIETY DISORDER: ICD-10-CM

## 2023-11-07 DIAGNOSIS — F19.20 DEPENDENCY ON PAIN MEDICATION: ICD-10-CM

## 2023-11-07 DIAGNOSIS — F33.1 MODERATE EPISODE OF RECURRENT MAJOR DEPRESSIVE DISORDER: Primary | ICD-10-CM

## 2023-11-07 DIAGNOSIS — F43.10 PTSD (POST-TRAUMATIC STRESS DISORDER): ICD-10-CM

## 2023-11-07 DIAGNOSIS — G31.84 MILD NEUROCOGNITIVE DISORDER: ICD-10-CM

## 2023-11-07 PROCEDURE — 3078F PR MOST RECENT DIASTOLIC BLOOD PRESSURE < 80 MM HG: ICD-10-PCS | Mod: HCNC,CPTII,S$GLB, | Performed by: STUDENT IN AN ORGANIZED HEALTH CARE EDUCATION/TRAINING PROGRAM

## 2023-11-07 PROCEDURE — 3288F FALL RISK ASSESSMENT DOCD: CPT | Mod: HCNC,CPTII,S$GLB, | Performed by: STUDENT IN AN ORGANIZED HEALTH CARE EDUCATION/TRAINING PROGRAM

## 2023-11-07 PROCEDURE — 3074F SYST BP LT 130 MM HG: CPT | Mod: HCNC,CPTII,S$GLB, | Performed by: STUDENT IN AN ORGANIZED HEALTH CARE EDUCATION/TRAINING PROGRAM

## 2023-11-07 PROCEDURE — 3074F PR MOST RECENT SYSTOLIC BLOOD PRESSURE < 130 MM HG: ICD-10-PCS | Mod: HCNC,CPTII,S$GLB, | Performed by: STUDENT IN AN ORGANIZED HEALTH CARE EDUCATION/TRAINING PROGRAM

## 2023-11-07 PROCEDURE — 1101F PT FALLS ASSESS-DOCD LE1/YR: CPT | Mod: HCNC,CPTII,S$GLB, | Performed by: STUDENT IN AN ORGANIZED HEALTH CARE EDUCATION/TRAINING PROGRAM

## 2023-11-07 PROCEDURE — 99214 PR OFFICE/OUTPT VISIT, EST, LEVL IV, 30-39 MIN: ICD-10-PCS | Mod: HCNC,S$GLB,, | Performed by: STUDENT IN AN ORGANIZED HEALTH CARE EDUCATION/TRAINING PROGRAM

## 2023-11-07 PROCEDURE — 1125F AMNT PAIN NOTED PAIN PRSNT: CPT | Mod: HCNC,CPTII,S$GLB, | Performed by: STUDENT IN AN ORGANIZED HEALTH CARE EDUCATION/TRAINING PROGRAM

## 2023-11-07 PROCEDURE — 1125F PR PAIN SEVERITY QUANTIFIED, PAIN PRESENT: ICD-10-PCS | Mod: HCNC,CPTII,S$GLB, | Performed by: STUDENT IN AN ORGANIZED HEALTH CARE EDUCATION/TRAINING PROGRAM

## 2023-11-07 PROCEDURE — 96136 PR PSYCH/NEUROPSYCH TEST ADMIN/SCORING, 2+ TESTS, 1ST 30 MIN: ICD-10-PCS | Mod: 59,HCNC,S$GLB, | Performed by: STUDENT IN AN ORGANIZED HEALTH CARE EDUCATION/TRAINING PROGRAM

## 2023-11-07 PROCEDURE — 1101F PR PT FALLS ASSESS DOC 0-1 FALLS W/OUT INJ PAST YR: ICD-10-PCS | Mod: HCNC,CPTII,S$GLB, | Performed by: STUDENT IN AN ORGANIZED HEALTH CARE EDUCATION/TRAINING PROGRAM

## 2023-11-07 PROCEDURE — 99999 PR PBB SHADOW E&M-EST. PATIENT-LVL IV: CPT | Mod: PBBFAC,HCNC,, | Performed by: STUDENT IN AN ORGANIZED HEALTH CARE EDUCATION/TRAINING PROGRAM

## 2023-11-07 PROCEDURE — 1160F PR REVIEW ALL MEDS BY PRESCRIBER/CLIN PHARMACIST DOCUMENTED: ICD-10-PCS | Mod: HCNC,CPTII,S$GLB, | Performed by: STUDENT IN AN ORGANIZED HEALTH CARE EDUCATION/TRAINING PROGRAM

## 2023-11-07 PROCEDURE — 96136 PSYCL/NRPSYC TST PHY/QHP 1ST: CPT | Mod: 59,HCNC,S$GLB, | Performed by: STUDENT IN AN ORGANIZED HEALTH CARE EDUCATION/TRAINING PROGRAM

## 2023-11-07 PROCEDURE — 99999 PR PBB SHADOW E&M-EST. PATIENT-LVL IV: ICD-10-PCS | Mod: PBBFAC,HCNC,, | Performed by: STUDENT IN AN ORGANIZED HEALTH CARE EDUCATION/TRAINING PROGRAM

## 2023-11-07 PROCEDURE — 1159F MED LIST DOCD IN RCRD: CPT | Mod: HCNC,CPTII,S$GLB, | Performed by: STUDENT IN AN ORGANIZED HEALTH CARE EDUCATION/TRAINING PROGRAM

## 2023-11-07 PROCEDURE — 1159F PR MEDICATION LIST DOCUMENTED IN MEDICAL RECORD: ICD-10-PCS | Mod: HCNC,CPTII,S$GLB, | Performed by: STUDENT IN AN ORGANIZED HEALTH CARE EDUCATION/TRAINING PROGRAM

## 2023-11-07 PROCEDURE — 99214 OFFICE O/P EST MOD 30 MIN: CPT | Mod: HCNC,S$GLB,, | Performed by: STUDENT IN AN ORGANIZED HEALTH CARE EDUCATION/TRAINING PROGRAM

## 2023-11-07 PROCEDURE — 1160F RVW MEDS BY RX/DR IN RCRD: CPT | Mod: HCNC,CPTII,S$GLB, | Performed by: STUDENT IN AN ORGANIZED HEALTH CARE EDUCATION/TRAINING PROGRAM

## 2023-11-07 PROCEDURE — 3288F PR FALLS RISK ASSESSMENT DOCUMENTED: ICD-10-PCS | Mod: HCNC,CPTII,S$GLB, | Performed by: STUDENT IN AN ORGANIZED HEALTH CARE EDUCATION/TRAINING PROGRAM

## 2023-11-07 PROCEDURE — 3078F DIAST BP <80 MM HG: CPT | Mod: HCNC,CPTII,S$GLB, | Performed by: STUDENT IN AN ORGANIZED HEALTH CARE EDUCATION/TRAINING PROGRAM

## 2023-11-07 RX ORDER — SERTRALINE HYDROCHLORIDE 50 MG/1
TABLET, FILM COATED ORAL
Qty: 30 TABLET | Refills: 1 | Status: SHIPPED | OUTPATIENT
Start: 2023-11-07 | End: 2023-11-30

## 2023-11-07 RX ORDER — BUSPIRONE HYDROCHLORIDE 5 MG/1
TABLET ORAL
Qty: 45 TABLET | Refills: 1 | Status: SHIPPED | OUTPATIENT
Start: 2023-11-07 | End: 2023-12-14 | Stop reason: DRUGHIGH

## 2023-11-07 RX ORDER — SERTRALINE HYDROCHLORIDE 100 MG/1
TABLET, FILM COATED ORAL
Qty: 60 TABLET | Refills: 1 | Status: SHIPPED | OUTPATIENT
Start: 2023-11-07 | End: 2023-11-30

## 2023-11-07 RX ORDER — DONEPEZIL HYDROCHLORIDE 10 MG/1
10 TABLET, FILM COATED ORAL NIGHTLY
Qty: 30 TABLET | Refills: 0 | Status: SHIPPED | OUTPATIENT
Start: 2023-11-07 | End: 2023-12-14 | Stop reason: SDUPTHER

## 2023-11-07 NOTE — PROGRESS NOTES
Outpatient Psychiatry Followup Visit (DO/MD/NP)    11/7/2023  Assessment & Plan    Assessment - Plan:     Impression     ICD-10-CM ICD-9-CM   1. Moderate episode of recurrent major depressive disorder  F33.1 296.32   2. Generalized anxiety disorder  F41.1 300.02   3. PTSD (post-traumatic stress disorder)  F43.10 309.81   4. Mild neurocognitive disorder  G31.84 331.83   5. 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 BMI 24.0-24.9, adult  Z68.24 V85.1        Plan of Care & Medication Management    Chart was reviewed. The risks and benefits of medication were discussed with pt. The treatment plan and followup plan were reviewed with pt. Pt understands to contact clinic if symptoms worsen. Pt understands to call 911 or go to nearest ER for suicidal ideation, intent or plan.   RX History ARICEPT, BARBITURATES, BUSPAR, GABAPENTIN, PROZAC, WELLBUTRIN, and ZOLOFT   Current RX [id number]: Will attempt CYMBALTA trial due to comorbid pain. Transition is complicated by age, high dose of ZOLOFT (200mg) and risk for decompensation:  Reduce ZOLOFT from 200mg to 150mg for 4 weeks  On reassessment a month later, either revert back to 200mg daily or initiate PLAN A or PLAN B.  PLAN A: Order a 15d wpgts-en-erujwlvpqdi protocol of ZOLOFT 150mg and add low dose CYMBALTA 1-2 weeks after initiation of the taper.  PLAN B: The 15d protocol with initiation of CYMBALTA may be too complex for patient; an easier alternative would be to reduce the ZOLOFT each visit, then add CYMBALTA whenever depression and anxiety worsens. Then at 2w follow up would taper to dc ZOLOFT with or without increasing CYMBALTA further, depending on  symptoms and current ZOLOFT..  Continue BUSPAR  Adjustments:  [id number]: Adjust to 5-7.5mg in the afternoon  10BNI8380: Reduce to 7.5mg in the afternoon  23XAT7750: Reduce to 7.5mg BID  26MDL7699: Increase to 10mg BID  31JAN2023: Start 5mg BID for 3 days  Prior to 31JAN2023 pt had been taking ZOLOFT 150mg  daily  Reduce ZOLOFT  See plan above  Adjustments:  [id number]: Reduce to 150mg daily  37YIW1907: Increase to 200mg daily  70DKX9198: Continue 150mg daily  Prior to evaluation pt had been taking 150mg daily   Education & Counseling RX administration and adherence   Other Orders Continue individual psychotherapy   Monitor VITAL SIGNS  Instruments: PROMIS (A&D), PSS4   RETURN K: RETURN IN 4 WEEKS (ONE MONTH), and reassess frequency within three visits from now  Continue medication management.     Subjective    Interval History - Review of Systems (ROS):     Available documentation has been reviewed, and pertinent elements of the chart have been incorporated into this note where appropriate.   9/16/2022 : first Epic encounter with this clinic  11/1/2023 : last Epic encounter with this clinic  8/31/2023 : last Epic encounter with this writer   Rola MENDOZA Neelam, a 78 y.o. female, presenting for followup visit.      Since last visit, reports overall about the same.    Family reunion in [facility] in [place] since last encounter. Enjoyed time there.    Lately depressed for about one or two hours per week, when feeling disappointment, overwhelmed, frustration and things are too much. Frustration with pain.    Pt confirms that pain leads to frustration and disappointment which leads to depressed affect episode.    Likes BUSPAR and would like to continue. Helps with anxiety.    Discussed option to gradually switch from ZOLOFT to CYMBALTA to add coverage to pain control. Discussed risks and benefits. Pt agrees to plan above.    Pt requested refill of ARICEPT because will run out in a few days. Will provide refill and encouraged pt to speak with neurology or primary care for management and future refills.       Pt did NOT display signs of nor endorse symptoms of overt psychosis or acute mood disorder requiring hospitalization during the encounter. Pt denied violent thoughts or suicidal or homicidal ideation, intent, or  "plan.         Objective    Measurement-Based Care (MBC):     Routine Instruments   PROMIS-ANXIETY Interpretation: 4a raw score 15, T-SCORE 69.3; MODERATE using 55-60-70 cutoffs. Last T-SCORE was 65. This PROMIS T-score change of 5 points or fewer indicates the score is probably stable.   PROMIS-DEPRESSION Interpretation: 4a raw score 12, T-SCORE 62.2; MODERATE using 55-60-70 cutoffs. Last T-SCORE was 62. This PROMIS T-score change of 5 points or fewer indicates the score is probably stable.   PSS4 Interpretation: 9/16; MODERATE using 6-11 cutoffs. 1 PH, 0 LSE. Last PSS4 score was 9. This PSS4 score change of less than 4 points indicates the score is probably stable.   Additional Instruments   N/A     Current Evaluation of Mental Status:     Constitutional / General       Vitals:    11/07/23 1306   BP: 125/62   Pulse: 78   Weight: 55.2 kg (121 lb 11.1 oz)   Height: 4' 11" (1.499 m)       Psychiatric / Mental Status Examination  1. Appearance: Dress is informal but appropriate. Motor activity normal.  2. Discourse: Clear speech with normal rate and volume. Associations intact. Orderly.  3. Emotional Expression: Somewhat anxious and depressed mood. Affect is appropriate.  4. Perception and Thinking: No hallucinations. No suicidality, no homicidality, delusions, or paranoia.  5. Sensorium: Grossly intact. Able to focus for interview.  6. Memory and Fund of Knowledge: Intact for content of interview.  7. Insight and Judgment: Intact.         Auto-populated Chart Data:     Medications  Outpatient Encounter Medications as of 11/7/2023   Medication Sig Dispense Refill    COD LIVER OIL ORAL Take 1 tablet by mouth once daily.       cyclobenzaprine (FLEXERIL) 10 MG tablet Take 1 tablet (10 mg total) by mouth 2 (two) times daily as needed for Muscle spasms. 60 tablet 1    diclofenac sodium (VOLTAREN) 1 % Gel Apply 2 g topically 4 (four) times daily. 450 g 3    evolocumab (REPATHA SURECLICK) 140 mg/mL PnIj Inject 1 mL (140 mg " total) into the skin every 14 (fourteen) days. 2 mL 2    fish oil-omega-3 fatty acids 300-1,000 mg capsule Take 2 g by mouth once daily.      fluticasone propionate (FLONASE) 50 mcg/actuation nasal spray 2 sprays (100 mcg total) by Each Nostril route once daily. 16 g 11    gabapentin (NEURONTIN) 100 MG capsule TAKE 1 CAPSULE TWICE DAILY 180 capsule 0    [START ON 11/12/2023] HYDROcodone-acetaminophen (NORCO) 5-325 mg per tablet Take 1 tablet by mouth every 12 (twelve) hours as needed for Pain. 60 tablet 0    [START ON 12/11/2023] HYDROcodone-acetaminophen (NORCO) 5-325 mg per tablet Take 1 tablet by mouth every 12 (twelve) hours as needed for Pain. 60 tablet 0    [START ON 1/9/2024] HYDROcodone-acetaminophen (NORCO) 5-325 mg per tablet Take 1 tablet by mouth every 12 (twelve) hours as needed for Pain. 60 tablet 0    ipratropium (ATROVENT) 42 mcg (0.06 %) nasal spray 2 sprays by Nasal route 3 (three) times daily. 15 mL 6    irbesartan (AVAPRO) 300 MG tablet Take 1 tablet (300 mg total) by mouth every evening. 90 tablet 1    ketorolac 0.5% (ACULAR) 0.5 % Drop Place 1 drop into the left eye 4 (four) times daily. Start 3 days before surgery. 5 mL 2    meloxicam (MOBIC) 7.5 MG tablet Take 1 tablet (7.5 mg total) by mouth daily as needed for Pain. 90 tablet PRN    moxifloxacin (VIGAMOX) 0.5 % ophthalmic solution Place 1 drop into the left eye 4 (four) times daily. Start 1 day before cataract surgery 3 mL 1    multivit with min-folic acid 200 mcg Chew Take 1 tablet by mouth once daily.       omeprazole (PRILOSEC) 40 MG capsule TAKE 1 CAPSULE EVERY DAY 90 capsule 2    prednisoLONE acetate (PRED FORTE) 1 % DrpS Place 1 drop into the left eye 4 (four) times daily. Start after cataract surgery. 5 mL 2    [DISCONTINUED] busPIRone (BUSPAR) 5 MG Tab Take one or one and a half (5mg total or 7.5mg total) every afternoon. 45 tablet 1    [DISCONTINUED] donepeziL (ARICEPT) 10 MG tablet Take 1 tablet (10 mg total) by mouth every  evening. 30 tablet 2    busPIRone (BUSPAR) 5 MG Tab Take one or one and a half (5mg total or 7.5mg total) every afternoon. 45 tablet 1    cetirizine (ZYRTEC) 5 MG tablet Take 1 tablet (5 mg total) by mouth once daily. 30 tablet 11    donepeziL (ARICEPT) 10 MG tablet Take 1 tablet (10 mg total) by mouth every evening. 30 tablet 0    sertraline (ZOLOFT) 100 MG tablet Take a 100mg tab with a 50mg tab (150mg total) every day in the morning. 60 tablet 1    sertraline (ZOLOFT) 50 MG tablet Take a 100mg tab with a 50mg tab (150mg total) every day in the morning. 30 tablet 1    [DISCONTINUED] sertraline (ZOLOFT) 100 MG tablet Take 2 tablets (200 mg total) by mouth once daily. 60 tablet 1     No facility-administered encounter medications on file as of 11/7/2023.      The chart was reviewed for recent diagnostic procedures and investigations, and pertinent results are noted below.    Wt Readings from Last 2 Encounters:   11/07/23 55.2 kg (121 lb 11.1 oz)   10/30/23 54 kg (119 lb)     BP Readings from Last 1 Encounters:   11/07/23 125/62     Pulse Readings from Last 1 Encounters:   11/07/23 78        Blood Counts, Electrolytes & Glucose: (i.e. WBC, ANC, Hemoglobin, Hematocrit, MCV, Platelets)  Lab Results   Component Value Date    WBC 7.29 07/18/2023    GRAN 3.5 07/18/2023    GRAN 47.7 07/18/2023    HGB 10.7 (L) 07/18/2023    HCT 33.3 (L) 07/18/2023    MCV 92 07/18/2023     07/18/2023     07/18/2023    K 4.5 07/18/2023    CALCIUM 9.5 07/18/2023    MG 2.6 06/04/2021    CO2 23 07/18/2023    ANIONGAP 13 07/18/2023    GLU 82 07/18/2023    HGBA1C 5.8 (H) 07/18/2023       Renal, Liver, Pancreas, Thyroid, Parathyroid, Prolactin, CPK, Lipids & Vitamin Levels: (i.e. Cr, BUN, Anion Gap, GFR, Urine Specific Gravity, Urine Protein, Microalburnin, AST, ALT, GGT, Alk Phos,Total Bili, Total Protein, Albumin, Ammonia, INR, Amylase, Lipase, TSH, Total T3, Total T4, Free T4 PTH, Prolactin, CPK, Cholesterol, Triglycerides, LDH,  HDL, Vitamin B12, Folate, Vitamin D)  Lab Results   Component Value Date    CREATININE 1.2 07/18/2023    BUN 18 07/18/2023    EGFRNORACEVR 46.3 (A) 07/18/2023    SPECGRAV 1.010 07/13/2022    PROTEINUA 2+ (A) 04/22/2021    AST 20 07/18/2023    ALT 11 07/18/2023    ALKPHOS 66 07/18/2023    BILITOT 0.2 07/18/2023    ALBUMIN 3.9 07/18/2023    AMMONIA 36 06/21/2023    AMYLASE 72 08/23/2016    LIPASE 19 02/03/2021    TSH 1.831 06/21/2023    FREET4 0.91 02/25/2021     10/01/2019    CHOL 281 (H) 07/18/2023    TRIG 154 (H) 07/18/2023    LDLCALC 190.2 (H) 07/18/2023    HDL 60 07/18/2023    ATCRVCKG85 898 06/21/2023    FOLATE 39.9 (H) 06/21/2023    THIAMINEBLOO 79 06/21/2023    KNJGPHJH74DS 49 12/05/2019       Infection Diseases, Pregnancy Screenings & Drug Levels: (i.e. Hepatitis Panel, HIV, Syphilis, Urine & Blood Pregnancy Screens, beta hCG, Lithium, Valproic Acid, Carbamazepine, Lamotrigine, Phenytoin, Phenobarbital, Clozapine, Norclozapine, Clozapine + Norclozapine)   Lab Results   Component Value Date    HEPCAB Negative 08/23/2016    RPR Non-reactive 06/21/2023       Addiction: (i.e. Urine Toxicology, Blood Alcohol, PETH, EtG, EtS, CDT, Buprenorphine, Norbuprenorphine)  Lab Results   Component Value Date    PCDSOBENZOD Negative 01/31/2018    BARBITURATES Negative 01/31/2018    PCDSCOMETHA Negative 01/31/2018    OPIATESCREEN Presumptive Positive 01/31/2018    COCAINEMETAB Negative 01/31/2018    AMPHETAMINES Negative 01/31/2018    MARIJUANATHC Negative 01/31/2018    PCDSOPHENCYN Negative 01/31/2018    PCDSUBUPRE Not Detected 10/30/2023    PCDSUFENTANY Not Detected 10/30/2023    PCDSUOXYCOD Not Detected 10/30/2023    PCDSUTRAMA Not Detected 10/30/2023       Results for orders placed or performed in visit on 12/01/22   IN OFFICE EKG 12-LEAD (to [place])    Collection Time: 12/01/22 10:46 AM    Narrative    Test Reason : R42,    Vent. Rate : 076 BPM     Atrial Rate : 076 BPM     P-R Int : 150 ms          QRS Dur : 072  "ms      QT Int : 376 ms       P-R-T Axes : 062 072 063 degrees     QTc Int : 423 ms    Normal sinus rhythm  Normal ECG  When compared with ECG of 30-SEP-2022 08:17,  No significant change was found  Confirmed by Comfort ZARATE, Abbe MANN ([id number]) on 12/7/2022 4:22:34 PM    Referred By:             Confirmed By:Abbe Moyer MD            Billing Documentation:     Method of Encounter IN PERSON visit at the clinic   Type of Encounter Follow up visit with me   Counseling;  Psychotherapy    Counseling;  Tobacco and/or Nicotine    Additional Codes and Modifiers 67523, with modifer 59: administered and scored more than one psychological or neuropsychological tests (see MBC above) (16+ mins)   Time Remaining Chart/Pt 17662: FOLLOW UP VISIT, Rx mgmt, "Multiple STABLE chronic illnesses"   Total Mins  (11/7/2023) N/A - Not billing for time        Will Leong DO  Department of Psychiatry, [facility]        "

## 2023-11-07 NOTE — PATIENT INSTRUCTIONS
Keep appointments with primary care  Keep therapy appointments     Reduce ZOLOFT to 150mg daily (100mg tab with a 50mg tab)  Continue BUSPAR as prescribed     Primary care or neurology will manage ARICEPT - a refill was sent today

## 2023-11-09 ENCOUNTER — OFFICE VISIT (OUTPATIENT)
Dept: OPHTHALMOLOGY | Facility: CLINIC | Age: 78
End: 2023-11-09
Payer: MEDICARE

## 2023-11-09 DIAGNOSIS — Z98.42 STATUS POST CATARACT SURGERY, LEFT: Primary | ICD-10-CM

## 2023-11-09 PROCEDURE — 3288F FALL RISK ASSESSMENT DOCD: CPT | Mod: HCNC,CPTII,S$GLB, | Performed by: OPHTHALMOLOGY

## 2023-11-09 PROCEDURE — 99999 PR PBB SHADOW E&M-EST. PATIENT-LVL III: ICD-10-PCS | Mod: PBBFAC,HCNC,, | Performed by: OPHTHALMOLOGY

## 2023-11-09 PROCEDURE — 1159F PR MEDICATION LIST DOCUMENTED IN MEDICAL RECORD: ICD-10-PCS | Mod: HCNC,CPTII,S$GLB, | Performed by: OPHTHALMOLOGY

## 2023-11-09 PROCEDURE — 1126F AMNT PAIN NOTED NONE PRSNT: CPT | Mod: HCNC,CPTII,S$GLB, | Performed by: OPHTHALMOLOGY

## 2023-11-09 PROCEDURE — 1160F PR REVIEW ALL MEDS BY PRESCRIBER/CLIN PHARMACIST DOCUMENTED: ICD-10-PCS | Mod: HCNC,CPTII,S$GLB, | Performed by: OPHTHALMOLOGY

## 2023-11-09 PROCEDURE — 99024 PR POST-OP FOLLOW-UP VISIT: ICD-10-PCS | Mod: HCNC,S$GLB,, | Performed by: OPHTHALMOLOGY

## 2023-11-09 PROCEDURE — 99024 POSTOP FOLLOW-UP VISIT: CPT | Mod: HCNC,S$GLB,, | Performed by: OPHTHALMOLOGY

## 2023-11-09 PROCEDURE — 1101F PR PT FALLS ASSESS DOC 0-1 FALLS W/OUT INJ PAST YR: ICD-10-PCS | Mod: HCNC,CPTII,S$GLB, | Performed by: OPHTHALMOLOGY

## 2023-11-09 PROCEDURE — 1101F PT FALLS ASSESS-DOCD LE1/YR: CPT | Mod: HCNC,CPTII,S$GLB, | Performed by: OPHTHALMOLOGY

## 2023-11-09 PROCEDURE — 1126F PR PAIN SEVERITY QUANTIFIED, NO PAIN PRESENT: ICD-10-PCS | Mod: HCNC,CPTII,S$GLB, | Performed by: OPHTHALMOLOGY

## 2023-11-09 PROCEDURE — 1160F RVW MEDS BY RX/DR IN RCRD: CPT | Mod: HCNC,CPTII,S$GLB, | Performed by: OPHTHALMOLOGY

## 2023-11-09 PROCEDURE — 1159F MED LIST DOCD IN RCRD: CPT | Mod: HCNC,CPTII,S$GLB, | Performed by: OPHTHALMOLOGY

## 2023-11-09 PROCEDURE — 3288F PR FALLS RISK ASSESSMENT DOCUMENTED: ICD-10-PCS | Mod: HCNC,CPTII,S$GLB, | Performed by: OPHTHALMOLOGY

## 2023-11-09 PROCEDURE — 99999 PR PBB SHADOW E&M-EST. PATIENT-LVL III: CPT | Mod: PBBFAC,HCNC,, | Performed by: OPHTHALMOLOGY

## 2023-11-22 ENCOUNTER — LAB VISIT (OUTPATIENT)
Dept: LAB | Facility: HOSPITAL | Age: 78
End: 2023-11-22
Attending: FAMILY MEDICINE
Payer: MEDICARE

## 2023-11-22 DIAGNOSIS — R73.9 HYPERGLYCEMIA: ICD-10-CM

## 2023-11-22 DIAGNOSIS — E78.2 MIXED HYPERLIPIDEMIA: ICD-10-CM

## 2023-11-22 LAB
ALBUMIN SERPL BCP-MCNC: 3.9 G/DL (ref 3.5–5.2)
ALP SERPL-CCNC: 90 U/L (ref 55–135)
ALT SERPL W/O P-5'-P-CCNC: 14 U/L (ref 10–44)
ANION GAP SERPL CALC-SCNC: 9 MMOL/L (ref 8–16)
AST SERPL-CCNC: 22 U/L (ref 10–40)
BASOPHILS # BLD AUTO: 0.06 K/UL (ref 0–0.2)
BASOPHILS NFR BLD: 0.8 % (ref 0–1.9)
BILIRUB SERPL-MCNC: 0.3 MG/DL (ref 0.1–1)
BUN SERPL-MCNC: 13 MG/DL (ref 8–23)
CALCIUM SERPL-MCNC: 9.7 MG/DL (ref 8.7–10.5)
CHLORIDE SERPL-SCNC: 104 MMOL/L (ref 95–110)
CHOLEST SERPL-MCNC: 280 MG/DL (ref 120–199)
CHOLEST/HDLC SERPL: 4.3 {RATIO} (ref 2–5)
CO2 SERPL-SCNC: 25 MMOL/L (ref 23–29)
CREAT SERPL-MCNC: 1.1 MG/DL (ref 0.5–1.4)
DIFFERENTIAL METHOD: ABNORMAL
EOSINOPHIL # BLD AUTO: 0.5 K/UL (ref 0–0.5)
EOSINOPHIL NFR BLD: 6.4 % (ref 0–8)
ERYTHROCYTE [DISTWIDTH] IN BLOOD BY AUTOMATED COUNT: 14 % (ref 11.5–14.5)
EST. GFR  (NO RACE VARIABLE): 51.4 ML/MIN/1.73 M^2
ESTIMATED AVG GLUCOSE: 120 MG/DL (ref 68–131)
GLUCOSE SERPL-MCNC: 83 MG/DL (ref 70–110)
HBA1C MFR BLD: 5.8 % (ref 4–5.6)
HCT VFR BLD AUTO: 35.6 % (ref 37–48.5)
HDLC SERPL-MCNC: 65 MG/DL (ref 40–75)
HDLC SERPL: 23.2 % (ref 20–50)
HGB BLD-MCNC: 11.6 G/DL (ref 12–16)
IMM GRANULOCYTES # BLD AUTO: 0.07 K/UL (ref 0–0.04)
IMM GRANULOCYTES NFR BLD AUTO: 0.9 % (ref 0–0.5)
LDLC SERPL CALC-MCNC: 182.4 MG/DL (ref 63–159)
LYMPHOCYTES # BLD AUTO: 2.5 K/UL (ref 1–4.8)
LYMPHOCYTES NFR BLD: 32.8 % (ref 18–48)
MCH RBC QN AUTO: 30 PG (ref 27–31)
MCHC RBC AUTO-ENTMCNC: 32.6 G/DL (ref 32–36)
MCV RBC AUTO: 92 FL (ref 82–98)
MONOCYTES # BLD AUTO: 0.8 K/UL (ref 0.3–1)
MONOCYTES NFR BLD: 9.9 % (ref 4–15)
NEUTROPHILS # BLD AUTO: 3.8 K/UL (ref 1.8–7.7)
NEUTROPHILS NFR BLD: 49.2 % (ref 38–73)
NONHDLC SERPL-MCNC: 215 MG/DL
NRBC BLD-RTO: 0 /100 WBC
PLATELET # BLD AUTO: 302 K/UL (ref 150–450)
PMV BLD AUTO: 10.7 FL (ref 9.2–12.9)
POTASSIUM SERPL-SCNC: 4.4 MMOL/L (ref 3.5–5.1)
PROT SERPL-MCNC: 7.4 G/DL (ref 6–8.4)
RBC # BLD AUTO: 3.87 M/UL (ref 4–5.4)
SODIUM SERPL-SCNC: 138 MMOL/L (ref 136–145)
TRIGL SERPL-MCNC: 163 MG/DL (ref 30–150)
WBC # BLD AUTO: 7.68 K/UL (ref 3.9–12.7)

## 2023-11-22 PROCEDURE — 80061 LIPID PANEL: CPT | Mod: HCNC | Performed by: FAMILY MEDICINE

## 2023-11-22 PROCEDURE — 36415 COLL VENOUS BLD VENIPUNCTURE: CPT | Mod: HCNC,PO | Performed by: FAMILY MEDICINE

## 2023-11-22 PROCEDURE — 80053 COMPREHEN METABOLIC PANEL: CPT | Mod: HCNC | Performed by: FAMILY MEDICINE

## 2023-11-22 PROCEDURE — 83036 HEMOGLOBIN GLYCOSYLATED A1C: CPT | Mod: HCNC | Performed by: FAMILY MEDICINE

## 2023-11-22 PROCEDURE — 85025 COMPLETE CBC W/AUTO DIFF WBC: CPT | Mod: HCNC | Performed by: FAMILY MEDICINE

## 2023-11-30 ENCOUNTER — OFFICE VISIT (OUTPATIENT)
Dept: FAMILY MEDICINE | Facility: CLINIC | Age: 78
End: 2023-11-30
Payer: MEDICARE

## 2023-11-30 VITALS
DIASTOLIC BLOOD PRESSURE: 64 MMHG | HEART RATE: 80 BPM | WEIGHT: 121.25 LBS | HEIGHT: 59 IN | BODY MASS INDEX: 24.44 KG/M2 | OXYGEN SATURATION: 98 % | SYSTOLIC BLOOD PRESSURE: 116 MMHG

## 2023-11-30 DIAGNOSIS — M54.81 BILATERAL OCCIPITAL NEURALGIA: ICD-10-CM

## 2023-11-30 DIAGNOSIS — M19.049 CMC ARTHRITIS: ICD-10-CM

## 2023-11-30 DIAGNOSIS — M80.00XA AGE-RELATED OSTEOPOROSIS WITH CURRENT PATHOLOGICAL FRACTURE, INITIAL ENCOUNTER: ICD-10-CM

## 2023-11-30 DIAGNOSIS — M12.9 ARTHRITIS, MULTIPLE JOINT INVOLVEMENT: Primary | ICD-10-CM

## 2023-11-30 DIAGNOSIS — E78.01 FAMILIAL HYPERCHOLESTEROLEMIA: ICD-10-CM

## 2023-11-30 DIAGNOSIS — R21 RASH: ICD-10-CM

## 2023-11-30 PROCEDURE — 99214 PR OFFICE/OUTPT VISIT, EST, LEVL IV, 30-39 MIN: ICD-10-PCS | Mod: HCNC,S$GLB,, | Performed by: NURSE PRACTITIONER

## 2023-11-30 PROCEDURE — 1159F PR MEDICATION LIST DOCUMENTED IN MEDICAL RECORD: ICD-10-PCS | Mod: HCNC,CPTII,S$GLB, | Performed by: NURSE PRACTITIONER

## 2023-11-30 PROCEDURE — 3288F PR FALLS RISK ASSESSMENT DOCUMENTED: ICD-10-PCS | Mod: HCNC,CPTII,S$GLB, | Performed by: NURSE PRACTITIONER

## 2023-11-30 PROCEDURE — 99214 OFFICE O/P EST MOD 30 MIN: CPT | Mod: HCNC,S$GLB,, | Performed by: NURSE PRACTITIONER

## 2023-11-30 PROCEDURE — 3288F FALL RISK ASSESSMENT DOCD: CPT | Mod: HCNC,CPTII,S$GLB, | Performed by: NURSE PRACTITIONER

## 2023-11-30 PROCEDURE — 1101F PT FALLS ASSESS-DOCD LE1/YR: CPT | Mod: HCNC,CPTII,S$GLB, | Performed by: NURSE PRACTITIONER

## 2023-11-30 PROCEDURE — 99999 PR PBB SHADOW E&M-EST. PATIENT-LVL III: CPT | Mod: PBBFAC,HCNC,, | Performed by: NURSE PRACTITIONER

## 2023-11-30 PROCEDURE — 3074F SYST BP LT 130 MM HG: CPT | Mod: HCNC,CPTII,S$GLB, | Performed by: NURSE PRACTITIONER

## 2023-11-30 PROCEDURE — 1159F MED LIST DOCD IN RCRD: CPT | Mod: HCNC,CPTII,S$GLB, | Performed by: NURSE PRACTITIONER

## 2023-11-30 PROCEDURE — 1101F PR PT FALLS ASSESS DOC 0-1 FALLS W/OUT INJ PAST YR: ICD-10-PCS | Mod: HCNC,CPTII,S$GLB, | Performed by: NURSE PRACTITIONER

## 2023-11-30 PROCEDURE — 3074F PR MOST RECENT SYSTOLIC BLOOD PRESSURE < 130 MM HG: ICD-10-PCS | Mod: HCNC,CPTII,S$GLB, | Performed by: NURSE PRACTITIONER

## 2023-11-30 PROCEDURE — 3078F PR MOST RECENT DIASTOLIC BLOOD PRESSURE < 80 MM HG: ICD-10-PCS | Mod: HCNC,CPTII,S$GLB, | Performed by: NURSE PRACTITIONER

## 2023-11-30 PROCEDURE — 3078F DIAST BP <80 MM HG: CPT | Mod: HCNC,CPTII,S$GLB, | Performed by: NURSE PRACTITIONER

## 2023-11-30 PROCEDURE — 99999 PR PBB SHADOW E&M-EST. PATIENT-LVL III: ICD-10-PCS | Mod: PBBFAC,HCNC,, | Performed by: NURSE PRACTITIONER

## 2023-11-30 RX ORDER — DULOXETIN HYDROCHLORIDE 60 MG/1
60 CAPSULE, DELAYED RELEASE ORAL DAILY
Qty: 30 CAPSULE | Refills: 11 | Status: SHIPPED | OUTPATIENT
Start: 2023-11-30 | End: 2023-12-14 | Stop reason: SDUPTHER

## 2023-11-30 RX ORDER — PRENATAL VIT 91/IRON/FOLIC/DHA 28-975-200
COMBINATION PACKAGE (EA) ORAL 2 TIMES DAILY
Qty: 15 G | Refills: 1 | Status: SHIPPED | OUTPATIENT
Start: 2023-11-30 | End: 2024-03-05 | Stop reason: ALTCHOICE

## 2023-11-30 NOTE — PROGRESS NOTES
This dictation has been generated using Modal Fluency Dictation some phonetic errors may occur. Please contact author for clarification if needed.     Problem List Items Addressed This Visit          INFUSION TREATMENT    Osteoporosis       Other    Hyperlipidemia    Relevant Orders    Ambulatory referral/consult to Cardiology    CMC arthritis    Relevant Orders    Ambulatory referral/consult to Rheumatology    Occipital neuralgia     Other Visit Diagnoses       Arthritis, multiple joint involvement    -  Primary    Relevant Orders    Ambulatory referral/consult to Rheumatology    Rash                Orders Placed This Encounter    Ambulatory referral/consult to Rheumatology    Ambulatory referral/consult to Cardiology    terbinafine HCL (LAMISIL) 1 % cream    DULoxetine (CYMBALTA) 60 MG capsule     Hyperlipidemia.  Discussed follow-up with Cardiology for further management.    Arthritis refer to rheumatology.  Patient considering going to [place].    Occipital neuralgia continue follow-up with Dr. Plascencia   Rash on hand with annular pattern suspect fungal etiology recommended Lamisil.    Anxiety depression chronic pain discontinue Zoloft and trial of Cymbalta.    Follow up in about 3 months (around 2/29/2024).    ________________________________________________________________  ________________________________________________________________      Chief Complaint   Patient presents with    Follow-up     History of present illness  This 78 y.o. presents today for complaint of follow-up.  Patient has hyperlipidemia arthritis occipital neuralgia and notes new complaint of rash on hand.  Also notes nerve issues.  She would previously been refer to Psychiatry but states therapists doesn't write.  Patient taking Zoloft and notes it is not helpful.  She is asking about additional meds.  Also notes the rheumatology issues and pain.  We discussed risk vs benefit of other medicine.  Regarding rheumatology issue she had been  referred to rheumatology at [facility] previously but she had deferred.  Notes ring finger swelling and pain and reconsidering rheumatology appointment.  Her previous rheumatologist has retired.      Past Medical History:   Diagnosis Date    Anxiety     Arthritis     Cataract     DDD (degenerative disc disease), lumbar     Depression     Encounter for blood transfusion     GERD (gastroesophageal reflux disease)     Headache     Hyperlipidemia     Hypertension     pt states she does not take meds anymore she just watches her weight    Neuromuscular disorder     Occipital neuralgia 3/24/2017    Osteoporosis        Past Surgical History:   Procedure Laterality Date    APPENDECTOMY      CATARACT EXTRACTION W/  INTRAOCULAR LENS IMPLANT Left 10/25/2023    Procedure: CEIOL OS;  Surgeon: Rustam Dyer MD;  [facility];  Service: Ophthalmology;  Laterality: Left;  Last Case of day, short eye, very high power IOL     SECTION      x 2    CHOLECYSTECTOMY      COLONOSCOPY  prior to     COLONOSCOPY N/A 2019    Procedure: COLONOSCOPY;  Surgeon: Greg Victor MD;  Location: [facility];  Service: Endoscopy;  Laterality: N/A; 2 colon polyps, hemorrhoids; repeat in 5 years for surveillance; biopsy: Tubular adenoma x2    EPIDURAL STEROID INJECTION INTO CERVICAL SPINE N/A 2022    Procedure: Injection-steroid-epidural-cervical C7-T1;  Surgeon: Timothy Grimaldo MD;  Location: [facility] OR;  Service: Pain Management;  Laterality: N/A;    ESOPHAGOGASTRODUODENOSCOPY N/A 2019    Procedure: EGD (ESOPHAGOGASTRODUODENOSCOPY);  Surgeon: Greg Victor MD;  Location: [facility];  Service: Endoscopy;  Laterality: N/A; Mild Schatzki ring. Dilated. small hiatal hernia; Four gastric polyps. Resected and retrieved, gastritis; biopsy:stomach- unremarkable, negative for h pylori, polyps-Fundic type mucosa with foveolar hyperplasia.    ESOPHAGOGASTRODUODENOSCOPY N/A 2021    Procedure: EGD  (ESOPHAGOGASTRODUODENOSCOPY);  Surgeon: Greg Victor MD;  Location: [facility];  Service: Endoscopy;  Laterality: N/A;    HYSTERECTOMY      Laser Periphery Iridotomy Bilateral     OD 5/26/16 and OS touch up 5/26/2016    UPPER GASTROINTESTINAL ENDOSCOPY  03/14/2017    Dr. Marcial: esophageal stenosis- dilated, gastritis, gastric polyps removed; biopsy- mild gastritis, negative for h pylori, hyperplastic polyp, esophagus unremarkable    vocal cord tumor removal         Family History   Problem Relation Age of Onset    Osteoarthritis Mother     Alcohol abuse Mother     Rheum arthritis Mother     Osteoarthritis Sister     Diabetes Brother     No Known Problems Son     No Known Problems Sister     No Known Problems Sister     No Known Problems Brother     Arthritis Son     No Known Problems Son     Stroke Maternal Grandmother 99    Rheum arthritis Maternal Grandmother     Retinal detachment Neg Hx     Macular degeneration Neg Hx     Glaucoma Neg Hx     Amblyopia Neg Hx     Blindness Neg Hx     Cancer Neg Hx     Cataracts Neg Hx     Hypertension Neg Hx     Strabismus Neg Hx     Thyroid disease Neg Hx     Lupus Neg Hx     Kidney disease Neg Hx     Inflammatory bowel disease Neg Hx     Psoriasis Neg Hx     Colon cancer Neg Hx     Crohn's disease Neg Hx     Ulcerative colitis Neg Hx     Stomach cancer Neg Hx     Esophageal cancer Neg Hx        Social History     Socioeconomic History    Marital status:    Tobacco Use    Smoking status: Never    Smokeless tobacco: Never   Substance and Sexual Activity    Alcohol use: No     Alcohol/week: 0.0 standard drinks of alcohol    Drug use: Yes     Types: Hydrocodone    Sexual activity: Yes     Partners: Male     Social Determinants of Health     Financial Resource Strain: Low Risk  (8/8/2023)    Overall Financial Resource Strain (CARDIA)     Difficulty of Paying Living Expenses: Not hard at all   Food Insecurity: No Food Insecurity (8/8/2023)    Hunger Vital Sign     Worried  About Running Out of Food in the Last Year: Never true     Ran Out of Food in the Last Year: Never true   Transportation Needs: No Transportation Needs (8/8/2023)    PRAPARE - Transportation     Lack of Transportation (Medical): No     Lack of Transportation (Non-Medical): No   Physical Activity: Insufficiently Active (8/8/2023)    Exercise Vital Sign     Days of Exercise per Week: 3 days     Minutes of Exercise per Session: 30 min   Stress: No Stress Concern Present (8/8/2023)    [employer] - Occupational Stress Questionnaire     Feeling of Stress : Not at all   Social Connections: Moderately Isolated (8/8/2023)    Social Connection and Isolation Panel [NHANES]     Frequency of Communication with Friends and Family: More than three times a week     Frequency of Social Gatherings with Friends and Family: More than three times a week     Attends Catholic Services: Never     Active Member of Clubs or Organizations: No     Attends Club or Organization Meetings: Never     Marital Status:    Housing Stability: Unknown (8/8/2023)    Housing Stability Vital Sign     Unable to Pay for Housing in the Last Year: No     Unstable Housing in the Last Year: No       Current Outpatient Medications   Medication Sig Dispense Refill    busPIRone (BUSPAR) 5 MG Tab Take one or one and a half (5mg total or 7.5mg total) every afternoon. 45 tablet 1    COD LIVER OIL ORAL Take 1 tablet by mouth once daily.       cyclobenzaprine (FLEXERIL) 10 MG tablet Take 1 tablet (10 mg total) by mouth 2 (two) times daily as needed for Muscle spasms. 60 tablet 1    diclofenac sodium (VOLTAREN) 1 % Gel Apply 2 g topically 4 (four) times daily. 450 g 3    donepeziL (ARICEPT) 10 MG tablet Take 1 tablet (10 mg total) by mouth every evening. 30 tablet 0    evolocumab (REPATHA SURECLICK) 140 mg/mL PnIj Inject 1 mL (140 mg total) into the skin every 14 (fourteen) days. 2 mL 2    fish oil-omega-3 fatty acids 300-1,000 mg  "capsule Take 2 g by mouth once daily.      fluticasone propionate (FLONASE) 50 mcg/actuation nasal spray 2 sprays (100 mcg total) by Each Nostril route once daily. 16 g 11    gabapentin (NEURONTIN) 100 MG capsule TAKE 1 CAPSULE TWICE DAILY 180 capsule 0    HYDROcodone-acetaminophen (NORCO) 5-325 mg per tablet Take 1 tablet by mouth every 12 (twelve) hours as needed for Pain. 60 tablet 0    ipratropium (ATROVENT) 42 mcg (0.06 %) nasal spray 2 sprays by Nasal route 3 (three) times daily. 15 mL 6    irbesartan (AVAPRO) 300 MG tablet Take 1 tablet (300 mg total) by mouth every evening. 90 tablet 1    ketorolac 0.5% (ACULAR) 0.5 % Drop Place 1 drop into the left eye 4 (four) times daily. Start 3 days before surgery. 5 mL 2    meloxicam (MOBIC) 7.5 MG tablet Take 1 tablet (7.5 mg total) by mouth daily as needed for Pain. 90 tablet PRN    multivit with min-folic acid 200 mcg Chew Take 1 tablet by mouth once daily.       omeprazole (PRILOSEC) 40 MG capsule TAKE 1 CAPSULE EVERY DAY 90 capsule 2    cetirizine (ZYRTEC) 5 MG tablet Take 1 tablet (5 mg total) by mouth once daily. 30 tablet 11    DULoxetine (CYMBALTA) 60 MG capsule Take 1 capsule (60 mg total) by mouth once daily. 30 capsule 11    terbinafine HCL (LAMISIL) 1 % cream Apply topically 2 (two) times daily. 15 g 1     No current facility-administered medications for this visit.       Review of patient's allergies indicates:   Allergen Reactions    Aspirin Nausea And Vomiting    Penicillins Itching    Crestor [rosuvastatin]      Muscle pain      Lipitor [atorvastatin]      Achy         Physical examination  Vitals Reviewed  /64 (BP Location: Right arm, Patient Position: Sitting, BP Method: Medium (Manual))   Pulse 80   Ht 4' 11" (1.499 m)   Wt 55 kg (121 lb 4.1 oz)   SpO2 98%   BMI 24.49 kg/m²  Body mass index is 24.49 kg/m².     BP Readings from Last 3 Encounters:   11/30/23 116/64   10/30/23 118/63   10/25/23 (!) 143/80       Wt Readings from Last 3 " Encounters:   11/30/23 55 kg (121 lb 4.1 oz)   10/30/23 54 kg (119 lb)   10/25/23 54.4 kg (119 lb 14.9 oz)       Gen. Well-dressed well-nourished   Skin warm dry and intact.  Minimal erythematous rash noted on the hands dorsal aspect near the thumb.  Also notes rash on the left ankle.  Has not annular pattern.  Some flaking noted.  Neck is supple without adenopathy  Chest.  Respirations are even unlabored.  Lungs are clear to auscultation.  Cardiac regular rate and rhythm.  No chest wall adenopathy noted.  Neuro. Awake alert oriented x4.  Normal judgment and cognition noted.  Extremities no clubbing cyanosis or edema noted.  Arthralgias noted in the fingers.  Joint deformities noted.    Call or return to clinic prn if these symptoms worsen or fail to improve as anticipated.

## 2023-12-01 ENCOUNTER — PATIENT MESSAGE (OUTPATIENT)
Dept: FAMILY MEDICINE | Facility: CLINIC | Age: 78
End: 2023-12-01
Payer: MEDICARE

## 2023-12-04 ENCOUNTER — TELEPHONE (OUTPATIENT)
Dept: FAMILY MEDICINE | Facility: CLINIC | Age: 78
End: 2023-12-04
Payer: MEDICARE

## 2023-12-11 ENCOUNTER — TELEPHONE (OUTPATIENT)
Dept: OPHTHALMOLOGY | Facility: CLINIC | Age: 78
End: 2023-12-11
Payer: MEDICARE

## 2023-12-11 NOTE — TELEPHONE ENCOUNTER
Spoke to pt and rs appt       ----- Message from Lakia Laughlin sent at 12/11/2023 10:22 AM CST -----  Contact: Self  Patient is calling in regards to her post op appt that was scheduled for tomorrow with Dr Dyer. Stated she had to cancel since her son is graduating and she cannot miss that event. Someone had rescheduled the post op appt on 01/11 but she is worried that she needs to be seen soon since Dr Dyer will be doing her other eye in January. Is there anyway we can get that appt rescheduled for Friday or Monday next week? Please call py back to assist at [phone]. Thank You.

## 2023-12-14 ENCOUNTER — OFFICE VISIT (OUTPATIENT)
Dept: OPHTHALMOLOGY | Facility: CLINIC | Age: 78
End: 2023-12-14
Payer: MEDICARE

## 2023-12-14 ENCOUNTER — OFFICE VISIT (OUTPATIENT)
Dept: PSYCHIATRY | Facility: CLINIC | Age: 78
End: 2023-12-14
Payer: MEDICARE

## 2023-12-14 VITALS
BODY MASS INDEX: 24.22 KG/M2 | WEIGHT: 120.13 LBS | HEART RATE: 74 BPM | SYSTOLIC BLOOD PRESSURE: 147 MMHG | HEIGHT: 59 IN | DIASTOLIC BLOOD PRESSURE: 69 MMHG

## 2023-12-14 DIAGNOSIS — Z98.42 STATUS POST CATARACT SURGERY, LEFT: Primary | ICD-10-CM

## 2023-12-14 DIAGNOSIS — H25.11 AGE-RELATED NUCLEAR CATARACT, RIGHT: ICD-10-CM

## 2023-12-14 DIAGNOSIS — R41.89 COGNITIVE CHANGES: ICD-10-CM

## 2023-12-14 DIAGNOSIS — F41.1 GENERALIZED ANXIETY DISORDER: Primary | ICD-10-CM

## 2023-12-14 DIAGNOSIS — F33.1 MODERATE EPISODE OF RECURRENT MAJOR DEPRESSIVE DISORDER: Primary | ICD-10-CM

## 2023-12-14 DIAGNOSIS — F33.1 MODERATE EPISODE OF RECURRENT MAJOR DEPRESSIVE DISORDER: ICD-10-CM

## 2023-12-14 DIAGNOSIS — F19.20 DEPENDENCY ON PAIN MEDICATION: ICD-10-CM

## 2023-12-14 DIAGNOSIS — F41.1 GENERALIZED ANXIETY DISORDER: ICD-10-CM

## 2023-12-14 DIAGNOSIS — F43.10 PTSD (POST-TRAUMATIC STRESS DISORDER): ICD-10-CM

## 2023-12-14 DIAGNOSIS — G31.84 MILD NEUROCOGNITIVE DISORDER: ICD-10-CM

## 2023-12-14 PROCEDURE — 92136 IOL MASTER - OD - RIGHT EYE: ICD-10-PCS | Mod: 26,HCNC,RT,S$GLB | Performed by: OPHTHALMOLOGY

## 2023-12-14 PROCEDURE — 1101F PR PT FALLS ASSESS DOC 0-1 FALLS W/OUT INJ PAST YR: ICD-10-PCS | Mod: HCNC,CPTII,S$GLB, | Performed by: OPHTHALMOLOGY

## 2023-12-14 PROCEDURE — 1160F RVW MEDS BY RX/DR IN RCRD: CPT | Mod: HCNC,CPTII,S$GLB, | Performed by: OPHTHALMOLOGY

## 2023-12-14 PROCEDURE — 3078F PR MOST RECENT DIASTOLIC BLOOD PRESSURE < 80 MM HG: ICD-10-PCS | Mod: HCNC,CPTII,S$GLB, | Performed by: STUDENT IN AN ORGANIZED HEALTH CARE EDUCATION/TRAINING PROGRAM

## 2023-12-14 PROCEDURE — 99999 PR PBB SHADOW E&M-EST. PATIENT-LVL IV: CPT | Mod: PBBFAC,HCNC,, | Performed by: STUDENT IN AN ORGANIZED HEALTH CARE EDUCATION/TRAINING PROGRAM

## 2023-12-14 PROCEDURE — 3077F PR MOST RECENT SYSTOLIC BLOOD PRESSURE >= 140 MM HG: ICD-10-PCS | Mod: HCNC,CPTII,S$GLB, | Performed by: STUDENT IN AN ORGANIZED HEALTH CARE EDUCATION/TRAINING PROGRAM

## 2023-12-14 PROCEDURE — 1125F PR PAIN SEVERITY QUANTIFIED, PAIN PRESENT: ICD-10-PCS | Mod: HCNC,CPTII,S$GLB, | Performed by: STUDENT IN AN ORGANIZED HEALTH CARE EDUCATION/TRAINING PROGRAM

## 2023-12-14 PROCEDURE — 1159F PR MEDICATION LIST DOCUMENTED IN MEDICAL RECORD: ICD-10-PCS | Mod: HCNC,CPTII,S$GLB, | Performed by: SOCIAL WORKER

## 2023-12-14 PROCEDURE — 1101F PR PT FALLS ASSESS DOC 0-1 FALLS W/OUT INJ PAST YR: ICD-10-PCS | Mod: HCNC,CPTII,S$GLB, | Performed by: STUDENT IN AN ORGANIZED HEALTH CARE EDUCATION/TRAINING PROGRAM

## 2023-12-14 PROCEDURE — 3078F DIAST BP <80 MM HG: CPT | Mod: HCNC,CPTII,S$GLB, | Performed by: STUDENT IN AN ORGANIZED HEALTH CARE EDUCATION/TRAINING PROGRAM

## 2023-12-14 PROCEDURE — 99999 PR PBB SHADOW E&M-EST. PATIENT-LVL I: ICD-10-PCS | Mod: PBBFAC,HCNC,, | Performed by: SOCIAL WORKER

## 2023-12-14 PROCEDURE — 3288F PR FALLS RISK ASSESSMENT DOCUMENTED: ICD-10-PCS | Mod: HCNC,CPTII,S$GLB, | Performed by: STUDENT IN AN ORGANIZED HEALTH CARE EDUCATION/TRAINING PROGRAM

## 2023-12-14 PROCEDURE — 1126F AMNT PAIN NOTED NONE PRSNT: CPT | Mod: HCNC,CPTII,S$GLB, | Performed by: OPHTHALMOLOGY

## 2023-12-14 PROCEDURE — 1159F PR MEDICATION LIST DOCUMENTED IN MEDICAL RECORD: ICD-10-PCS | Mod: HCNC,CPTII,S$GLB, | Performed by: OPHTHALMOLOGY

## 2023-12-14 PROCEDURE — 99999 PR PBB SHADOW E&M-EST. PATIENT-LVL III: ICD-10-PCS | Mod: PBBFAC,HCNC,, | Performed by: OPHTHALMOLOGY

## 2023-12-14 PROCEDURE — 99214 OFFICE O/P EST MOD 30 MIN: CPT | Mod: HCNC,S$GLB,, | Performed by: STUDENT IN AN ORGANIZED HEALTH CARE EDUCATION/TRAINING PROGRAM

## 2023-12-14 PROCEDURE — 96136 PSYCL/NRPSYC TST PHY/QHP 1ST: CPT | Mod: 59,HCNC,S$GLB, | Performed by: STUDENT IN AN ORGANIZED HEALTH CARE EDUCATION/TRAINING PROGRAM

## 2023-12-14 PROCEDURE — 99214 PR OFFICE/OUTPT VISIT, EST, LEVL IV, 30-39 MIN: ICD-10-PCS | Mod: HCNC,S$GLB,, | Performed by: STUDENT IN AN ORGANIZED HEALTH CARE EDUCATION/TRAINING PROGRAM

## 2023-12-14 PROCEDURE — 3077F SYST BP >= 140 MM HG: CPT | Mod: HCNC,CPTII,S$GLB, | Performed by: STUDENT IN AN ORGANIZED HEALTH CARE EDUCATION/TRAINING PROGRAM

## 2023-12-14 PROCEDURE — 3288F FALL RISK ASSESSMENT DOCD: CPT | Mod: HCNC,CPTII,S$GLB, | Performed by: OPHTHALMOLOGY

## 2023-12-14 PROCEDURE — 96136 PR PSYCH/NEUROPSYCH TEST ADMIN/SCORING, 2+ TESTS, 1ST 30 MIN: ICD-10-PCS | Mod: 59,HCNC,S$GLB, | Performed by: STUDENT IN AN ORGANIZED HEALTH CARE EDUCATION/TRAINING PROGRAM

## 2023-12-14 PROCEDURE — 99999 PR PBB SHADOW E&M-EST. PATIENT-LVL IV: ICD-10-PCS | Mod: PBBFAC,HCNC,, | Performed by: STUDENT IN AN ORGANIZED HEALTH CARE EDUCATION/TRAINING PROGRAM

## 2023-12-14 PROCEDURE — 99024 POSTOP FOLLOW-UP VISIT: CPT | Mod: HCNC,S$GLB,, | Performed by: OPHTHALMOLOGY

## 2023-12-14 PROCEDURE — 99024 PR POST-OP FOLLOW-UP VISIT: ICD-10-PCS | Mod: HCNC,S$GLB,, | Performed by: OPHTHALMOLOGY

## 2023-12-14 PROCEDURE — 99999 PR PBB SHADOW E&M-EST. PATIENT-LVL I: CPT | Mod: PBBFAC,HCNC,, | Performed by: SOCIAL WORKER

## 2023-12-14 PROCEDURE — 1126F PR PAIN SEVERITY QUANTIFIED, NO PAIN PRESENT: ICD-10-PCS | Mod: HCNC,CPTII,S$GLB, | Performed by: OPHTHALMOLOGY

## 2023-12-14 PROCEDURE — 3288F PR FALLS RISK ASSESSMENT DOCUMENTED: ICD-10-PCS | Mod: HCNC,CPTII,S$GLB, | Performed by: OPHTHALMOLOGY

## 2023-12-14 PROCEDURE — 1101F PT FALLS ASSESS-DOCD LE1/YR: CPT | Mod: HCNC,CPTII,S$GLB, | Performed by: STUDENT IN AN ORGANIZED HEALTH CARE EDUCATION/TRAINING PROGRAM

## 2023-12-14 PROCEDURE — 3288F FALL RISK ASSESSMENT DOCD: CPT | Mod: HCNC,CPTII,S$GLB, | Performed by: STUDENT IN AN ORGANIZED HEALTH CARE EDUCATION/TRAINING PROGRAM

## 2023-12-14 PROCEDURE — 1160F PR REVIEW ALL MEDS BY PRESCRIBER/CLIN PHARMACIST DOCUMENTED: ICD-10-PCS | Mod: HCNC,CPTII,S$GLB, | Performed by: STUDENT IN AN ORGANIZED HEALTH CARE EDUCATION/TRAINING PROGRAM

## 2023-12-14 PROCEDURE — 99999 PR PBB SHADOW E&M-EST. PATIENT-LVL III: CPT | Mod: PBBFAC,HCNC,, | Performed by: OPHTHALMOLOGY

## 2023-12-14 PROCEDURE — 1159F MED LIST DOCD IN RCRD: CPT | Mod: HCNC,CPTII,S$GLB, | Performed by: SOCIAL WORKER

## 2023-12-14 PROCEDURE — 1159F MED LIST DOCD IN RCRD: CPT | Mod: HCNC,CPTII,S$GLB, | Performed by: STUDENT IN AN ORGANIZED HEALTH CARE EDUCATION/TRAINING PROGRAM

## 2023-12-14 PROCEDURE — 90834 PSYTX W PT 45 MINUTES: CPT | Mod: HCNC,S$GLB,, | Performed by: SOCIAL WORKER

## 2023-12-14 PROCEDURE — 1101F PT FALLS ASSESS-DOCD LE1/YR: CPT | Mod: HCNC,CPTII,S$GLB, | Performed by: OPHTHALMOLOGY

## 2023-12-14 PROCEDURE — 1159F MED LIST DOCD IN RCRD: CPT | Mod: HCNC,CPTII,S$GLB, | Performed by: OPHTHALMOLOGY

## 2023-12-14 PROCEDURE — 1160F RVW MEDS BY RX/DR IN RCRD: CPT | Mod: HCNC,CPTII,S$GLB, | Performed by: STUDENT IN AN ORGANIZED HEALTH CARE EDUCATION/TRAINING PROGRAM

## 2023-12-14 PROCEDURE — 92136 OPHTHALMIC BIOMETRY: CPT | Mod: 26,HCNC,RT,S$GLB | Performed by: OPHTHALMOLOGY

## 2023-12-14 PROCEDURE — 90834 PR PSYCHOTHERAPY W/PATIENT, 45 MIN: ICD-10-PCS | Mod: HCNC,S$GLB,, | Performed by: SOCIAL WORKER

## 2023-12-14 PROCEDURE — 1159F PR MEDICATION LIST DOCUMENTED IN MEDICAL RECORD: ICD-10-PCS | Mod: HCNC,CPTII,S$GLB, | Performed by: STUDENT IN AN ORGANIZED HEALTH CARE EDUCATION/TRAINING PROGRAM

## 2023-12-14 PROCEDURE — 1160F PR REVIEW ALL MEDS BY PRESCRIBER/CLIN PHARMACIST DOCUMENTED: ICD-10-PCS | Mod: HCNC,CPTII,S$GLB, | Performed by: OPHTHALMOLOGY

## 2023-12-14 PROCEDURE — 1125F AMNT PAIN NOTED PAIN PRSNT: CPT | Mod: HCNC,CPTII,S$GLB, | Performed by: STUDENT IN AN ORGANIZED HEALTH CARE EDUCATION/TRAINING PROGRAM

## 2023-12-14 RX ORDER — SODIUM CHLORIDE 9 MG/ML
INJECTION, SOLUTION INTRAVENOUS CONTINUOUS
Status: CANCELLED | OUTPATIENT
Start: 2023-12-14

## 2023-12-14 RX ORDER — BUSPIRONE HYDROCHLORIDE 7.5 MG/1
TABLET ORAL
COMMUNITY
Start: 2023-11-28 | End: 2023-12-14 | Stop reason: DRUGHIGH

## 2023-12-14 RX ORDER — MOXIFLOXACIN 5 MG/ML
1 SOLUTION/ DROPS OPHTHALMIC 4 TIMES DAILY
Qty: 3 ML | Refills: 0 | Status: SHIPPED | OUTPATIENT
Start: 2023-12-14 | End: 2023-12-22

## 2023-12-14 RX ORDER — SERTRALINE HYDROCHLORIDE 50 MG/1
TABLET, FILM COATED ORAL
Qty: 18 TABLET | Refills: 0 | Status: SHIPPED | OUTPATIENT
Start: 2023-12-14 | End: 2024-01-02

## 2023-12-14 RX ORDER — PREDNISOLONE ACETATE 10 MG/ML
1 SUSPENSION/ DROPS OPHTHALMIC 4 TIMES DAILY
Qty: 5 ML | Refills: 2 | Status: SHIPPED | OUTPATIENT
Start: 2023-12-14 | End: 2024-02-01 | Stop reason: ALTCHOICE

## 2023-12-14 RX ORDER — PHENYLEPHRINE HYDROCHLORIDE 25 MG/ML
1 SOLUTION/ DROPS OPHTHALMIC
Status: CANCELLED | OUTPATIENT
Start: 2023-12-14

## 2023-12-14 RX ORDER — DONEPEZIL HYDROCHLORIDE 10 MG/1
10 TABLET, FILM COATED ORAL NIGHTLY
Qty: 30 TABLET | Refills: 0 | Status: SHIPPED | OUTPATIENT
Start: 2023-12-14 | End: 2024-01-22

## 2023-12-14 RX ORDER — DULOXETIN HYDROCHLORIDE 60 MG/1
60 CAPSULE, DELAYED RELEASE ORAL DAILY
Qty: 30 CAPSULE | Refills: 1
Start: 2023-12-14 | End: 2024-02-06 | Stop reason: SDUPTHER

## 2023-12-14 RX ORDER — KETOROLAC TROMETHAMINE 5 MG/ML
1 SOLUTION OPHTHALMIC 4 TIMES DAILY
Qty: 5 ML | Refills: 2 | Status: SHIPPED | OUTPATIENT
Start: 2023-12-14 | End: 2024-02-01 | Stop reason: ALTCHOICE

## 2023-12-14 RX ORDER — PHENYLEPHRINE HYDROCHLORIDE 100 MG/ML
1 SOLUTION/ DROPS OPHTHALMIC
Status: CANCELLED | OUTPATIENT
Start: 2023-12-14

## 2023-12-14 RX ORDER — TROPICAMIDE 10 MG/ML
1 SOLUTION/ DROPS OPHTHALMIC
Status: CANCELLED | OUTPATIENT
Start: 2023-12-14

## 2023-12-14 RX ORDER — PROPARACAINE HYDROCHLORIDE 5 MG/ML
1 SOLUTION/ DROPS OPHTHALMIC
Status: CANCELLED | OUTPATIENT
Start: 2023-12-14

## 2023-12-14 RX ORDER — BUSPIRONE HYDROCHLORIDE 5 MG/1
TABLET ORAL
Qty: 45 TABLET | Refills: 1 | Status: SHIPPED | OUTPATIENT
Start: 2023-12-14 | End: 2024-02-06 | Stop reason: SDUPTHER

## 2023-12-14 NOTE — PATIENT INSTRUCTIONS
Taper to discontinue ZOLOFT (SERTRALINE) 150mg: Take 100mg daily for 5 days, then 50mg daily for 5 days, then 25mg daily for 6 days, then discontinue. Do this with 50mg tablets: Take 2 tablets daily for 5 days, then 1 tablet daily for 5 days, then a half tablet daily for 6 days, then stop taking.    When you're done taking ZOLOFT (SERTRALINE), start taking CYMBALTA 60mg once per day in the morning.    Continue DONEPEZIL and BUSPAR (BUSPIRONE) as prescribed.    Bring bottles next visit    Keep therapy appointments

## 2023-12-14 NOTE — PROGRESS NOTES
HPI    1 MTH s/p phaco IOL OS done on 10/25/2023   Pre op catsx OD scheduled for 1/10/2023    Pt states OS feels well. Still seeing blurry, shadow worse in the morning.    Denies pain/ FOL/ floaters.     States finished with post op gtt. Using AT's 2-3x a day.     Last edited by Dhara Palacios on 12/14/2023  9:23 AM.            Assessment /Plan     For exam results, see Encounter Report.    Status post cataract surgery, left    Age-related nuclear cataract, right      1. Status post cataract surgery, left  POW6  Doing well, there is some negative dysphotopsia  Pt reassured    2. Age-related nuclear cataract, right  Patient with visually significant cataract impacting abiliity ADLs (reading, driving, PM driving, glare).  Discussed options, R & B, expectations, patient voices good understanding and wishes to proceed with procedure. Patient will likely benefit from sx and signed consent.    Proceed with CEIOL OD  Monofocal distance IOL  Will need preop mannitol  Higher risks due to very short eye  Will position haptics at 3 and 9 to reduce dysphotopsia

## 2023-12-15 DIAGNOSIS — G44.209 TENSION HEADACHE: ICD-10-CM

## 2023-12-15 NOTE — PROGRESS NOTES
Individual Psychotherapy (PhD/LCSW)    12/14/2023    Site:  [address]         Therapeutic Intervention: Met with patient.  Outpatient - Insight oriented psychotherapy 45 min - CPT code 39169, Outpatient - Behavior modifying psychotherapy 45 min - CPT code 91882, and Outpatient - Supportive psychotherapy 45 min - CPT Code 61850    Chief complaint/reason for encounter: depression and anxiety             Interval history and content of current session:  Ct was referred by Dr. Leong to address depression, anxiety, and trauma. Ct arrived to session on time and was fully engaged.  Ct shared that she continues to have stress at times, mainly when entertaining her family. She reported that she had an eye procedure on one of her eyes and she will have the other eye done next month. She reported that she has not been able to read because of the vision issues but hopefully after surgery things will improve. She shared about having memory loss and being easily distracted. SW and ct reviewed techniques for ct to use to help with memory loss and being less distracted. SW provided ct with handouts with techniques. Ct to continue in 1:1 sessions.       Treatment plan:  Target symptoms: depression, anxiety   Why chosen therapy is appropriate versus another modality: relevant to diagnosis, patient responds to this modality, evidence based practice  Outcome monitoring methods: self-report  Therapeutic intervention type: insight oriented psychotherapy, behavior modifying psychotherapy, supportive psychotherapy    Risk parameters:  Patient reports no suicidal ideation  Patient reports no homicidal ideation  Patient reports no self-injurious behavior  Patient reports no violent behavior    CSSRS was completed:     Mental Status Exam:  General Appearance:  unremarkable, age appropriate   Speech: normal tone, normal rate, normal pitch, normal volume      Level of Cooperation: cooperative      Thought Processes: normal and logical   Mood:  steady      Thought Content: normal, no suicidality, no homicidality, delusions, or paranoia   Affect: congruent and appropriate   Orientation: Oriented x3   Memory: recent >  intact   Attention Span & Concentration: intact   Fund of General Knowledge: intact and appropriate to age and level of education   Abstract Reasoning: interpretation of similarities was abstract   Judgment & Insight: intact     Language intact       Verbal deficits: None    Patient's response to intervention:  The patient's response to intervention is accepting.    Progress toward goals and other mental status changes:  The patient's progress toward goals is  fair, mild improvement .    Diagnosis:     ICD-10-CM ICD-9-CM   1. Generalized anxiety disorder  F41.1 300.02   2. Cognitive changes  R41.89 799.59   3. Moderate episode of recurrent major depressive disorder  F33.1 296.32       Plan:  individual psychotherapy and ct to follow up with Dr. Leong for psych med mgt  Pt to go to ED or call 911 if symptoms worsen or if she has thoughts of harming self and/or others. Pt verbalized understanding.    Return to clinic: as scheduled    Length of Service (minutes): 45      A portion of this note was created using [employer] voice recognition software that occasionally misinterprets phrases or words.    Each patient to whom he or she provides medical services by telemedicine is: (1) informed of the relationship between the physician and patient and the respective role of any other health care provider with respect to management of the patient; and (2) notified that he or she may decline to receive medical services by telemedicine and may withdraw from such care at any time.

## 2023-12-18 RX ORDER — GABAPENTIN 100 MG/1
CAPSULE ORAL
Qty: 180 CAPSULE | Refills: 3 | Status: SHIPPED | OUTPATIENT
Start: 2023-12-18

## 2024-01-02 ENCOUNTER — OFFICE VISIT (OUTPATIENT)
Dept: PSYCHIATRY | Facility: CLINIC | Age: 79
End: 2024-01-02
Payer: MEDICARE

## 2024-01-02 VITALS
SYSTOLIC BLOOD PRESSURE: 142 MMHG | WEIGHT: 121.69 LBS | BODY MASS INDEX: 24.53 KG/M2 | HEART RATE: 82 BPM | DIASTOLIC BLOOD PRESSURE: 73 MMHG | HEIGHT: 59 IN

## 2024-01-02 DIAGNOSIS — G31.84 MILD NEUROCOGNITIVE DISORDER: ICD-10-CM

## 2024-01-02 DIAGNOSIS — F41.1 GENERALIZED ANXIETY DISORDER: ICD-10-CM

## 2024-01-02 DIAGNOSIS — F33.1 MODERATE EPISODE OF RECURRENT MAJOR DEPRESSIVE DISORDER: Primary | ICD-10-CM

## 2024-01-02 DIAGNOSIS — F19.20 DEPENDENCY ON PAIN MEDICATION: ICD-10-CM

## 2024-01-02 DIAGNOSIS — F43.10 PTSD (POST-TRAUMATIC STRESS DISORDER): ICD-10-CM

## 2024-01-02 PROCEDURE — 99999 PR PBB SHADOW E&M-EST. PATIENT-LVL III: CPT | Mod: PBBFAC,HCNC,, | Performed by: STUDENT IN AN ORGANIZED HEALTH CARE EDUCATION/TRAINING PROGRAM

## 2024-01-02 PROCEDURE — 1160F RVW MEDS BY RX/DR IN RCRD: CPT | Mod: HCNC,CPTII,S$GLB, | Performed by: STUDENT IN AN ORGANIZED HEALTH CARE EDUCATION/TRAINING PROGRAM

## 2024-01-02 PROCEDURE — 3078F DIAST BP <80 MM HG: CPT | Mod: HCNC,CPTII,S$GLB, | Performed by: STUDENT IN AN ORGANIZED HEALTH CARE EDUCATION/TRAINING PROGRAM

## 2024-01-02 PROCEDURE — 3288F FALL RISK ASSESSMENT DOCD: CPT | Mod: HCNC,CPTII,S$GLB, | Performed by: STUDENT IN AN ORGANIZED HEALTH CARE EDUCATION/TRAINING PROGRAM

## 2024-01-02 PROCEDURE — 96136 PSYCL/NRPSYC TST PHY/QHP 1ST: CPT | Mod: 59,HCNC,S$GLB, | Performed by: STUDENT IN AN ORGANIZED HEALTH CARE EDUCATION/TRAINING PROGRAM

## 2024-01-02 PROCEDURE — 3077F SYST BP >= 140 MM HG: CPT | Mod: HCNC,CPTII,S$GLB, | Performed by: STUDENT IN AN ORGANIZED HEALTH CARE EDUCATION/TRAINING PROGRAM

## 2024-01-02 PROCEDURE — 1101F PT FALLS ASSESS-DOCD LE1/YR: CPT | Mod: HCNC,CPTII,S$GLB, | Performed by: STUDENT IN AN ORGANIZED HEALTH CARE EDUCATION/TRAINING PROGRAM

## 2024-01-02 PROCEDURE — 1159F MED LIST DOCD IN RCRD: CPT | Mod: HCNC,CPTII,S$GLB, | Performed by: STUDENT IN AN ORGANIZED HEALTH CARE EDUCATION/TRAINING PROGRAM

## 2024-01-02 PROCEDURE — 99214 OFFICE O/P EST MOD 30 MIN: CPT | Mod: HCNC,S$GLB,, | Performed by: STUDENT IN AN ORGANIZED HEALTH CARE EDUCATION/TRAINING PROGRAM

## 2024-01-02 PROCEDURE — 1125F AMNT PAIN NOTED PAIN PRSNT: CPT | Mod: HCNC,CPTII,S$GLB, | Performed by: STUDENT IN AN ORGANIZED HEALTH CARE EDUCATION/TRAINING PROGRAM

## 2024-01-02 PROCEDURE — 90833 PSYTX W PT W E/M 30 MIN: CPT | Mod: HCNC,S$GLB,, | Performed by: STUDENT IN AN ORGANIZED HEALTH CARE EDUCATION/TRAINING PROGRAM

## 2024-01-02 NOTE — PROGRESS NOTES
Outpatient Psychiatry Followup Visit (DO/MD/NP, etc.)    1/2/2024  Assessment & Plan    Assessment - Plan:     Impression     ICD-10-CM ICD-9-CM   1. Moderate episode of recurrent major depressive disorder  F33.1 296.32   2. Generalized anxiety disorder  F41.1 300.02   3. PTSD (post-traumatic stress disorder)  F43.10 309.81   4. Mild neurocognitive disorder  G31.84 331.83   5. 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 BMI 24.0-24.9, adult  Z68.24 V85.1        Plan of Care & Medication Management    Chart was reviewed. The risks and benefits of medication were discussed with pt. The treatment plan and followup plan were reviewed with pt. Pt understands to contact clinic if symptoms worsen. Pt understands to call 911 or go to nearest ER for suicidal ideation, intent or plan.   RX History ARICEPT, BARBITURATES, BUSPAR, GABAPENTIN, PROZAC, WELLBUTRIN, and ZOLOFT     Current RX Continue BUSPAR  Adjustments:  [id number]: Adjust to 5-7.5mg in the afternoon  [id number]: Reduce to 7.5mg in the afternoon  [id number]: Reduce to 7.5mg BID  [id number]: Increase to 10mg BID  31JAN2023: Start 5mg BID for 3 days  Prior to 31JAN2023 pt had been taking ZOLOFT 150mg daily  Start CYMBALTA  Adjustments:  02JAN2034: Start CYMBALTA 60mg daily   Education & Counseling Provided PSYCHOTHERAPY as documented below  RX administration and adherence   Other Orders Continue individual psychotherapy   Monitor VITAL SIGNS  Instruments: PROMIS (A&D), PSS4  [id number] 29/30 S-MMSE, 4/5 Mini-Cog    RETURN K: RETURN IN 4 WEEKS (ONE MONTH), and reassess frequency within three visits from now  Continue medication management.     Psychotherapy   Target Symptoms stress, interpersonal problems, recurring dreams   Why chosen therapy is appropriate versus another modality: patient responds to this modality, relevant to diagnosis   Outcome Monitoring observation, self-report   Therapeutic Intervention IPT (interpersonal psychotherapy) for role disputes,  [place] psychoanalytic techniques, psychoeducation, supportive psychotherapy   Topics and Themes Family problems: with son, nephew and ; recurring dreams, fear of losing family members.   Pt Response The patient's response to the intervention is accepting.    Pt Progress The patient's progress toward treatment goals is positive progress.   Duration 17 minutes       Subjective    Interval History:     Available documentation has been reviewed, and pertinent elements of the chart have been incorporated into this note where appropriate. Last Epic encounter with writer was on 12/14/2023   Rola Richter, a 78 y.o. female, presenting for followup visit.      Since last visit, reports overall A LITTLE WORSE.    Reviewed medications with patient    Visual illusions, likely provoked by eyesight. Getting second cataract surgery later this week.    Misses son.    Completed ZOLOFT taper today. Will start CYMBALTA tomorrow.    Will continue care as planned.       Pt did NOT display signs of nor endorse symptoms of overt psychosis or acute mood disorder requiring hospitalization during the encounter. Pt denied violent thoughts or suicidal or homicidal ideation, intent, or plan.         Objective    Measurement-Based Care (MBC):     Routine Instruments   PROMIS-ANXIETY Interpretation: 4a raw score 15, T-SCORE 69.3; MODERATE using 55-60-70 cutoffs. Last T-SCORE was 69.3. This PROMIS T-score change of 5 points or fewer indicates the score is probably stable.   PROMIS-DEPRESSION Interpretation: 4a raw score 13, T-SCORE 63.9; MODERATE using 55-60-70 cutoffs. Last T-SCORE was 63.9. This PROMIS T-score change of 5 points or fewer indicates the score is probably stable.   PSS4 Interpretation: 7/16; MODERATE using 6-11 cutoffs. 2 PH, 0 LSE. Last PSS4 score was 9. This PSS4 score change of less than 4 points indicates the score is probably stable.   Additional Instruments   N/A     Current Evaluation of Mental Status:  "    Constitutional / General       Vitals:    01/02/24 1326   BP: (!) 142/73   Pulse: 82   Weight: 55.2 kg (121 lb 11.1 oz)   Height: 4' 11" (1.499 m)       Psychiatric / Mental Status Examination  1. Appearance: Dress is informal but appropriate. Motor activity normal.  2. Discourse: Clear speech with normal rate and volume. Associations intact. Orderly.  3. Emotional Expression: Somewhat anxious and depressed mood. Affect is appropriate.  4. Perception and Thinking: No hallucinations. No suicidality, no homicidality, delusions, or paranoia.  5. Sensorium: Grossly intact. Able to focus for interview.  6. Memory and Fund of Knowledge: Intact for content of interview.  7. Insight and Judgment: Intact.         Auto-populated chart data omitted from this note for brevity.      Billing Documentation:     Method of Encounter IN PERSON visit at the clinic   Type of Encounter Follow up visit with me   Counseling;  Psychotherapy 33545: 17 minutes (with therapy documentation above)   Counseling;  Tobacco and/or Nicotine    Additional Codes and Modifiers 11100, with modifer 59: administered and scored more than one psychological or neuropsychological tests (see MBC above) (16+ mins)   Time Remaining Chart/Pt 27567: FOLLOW UP VISIT, Rx mgmt, "Multiple STABLE chronic illnesses"   Total Mins  (1/2/2024) N/A - Not billing for time        Will Leong DO  Department of Psychiatry, [facility]        "

## 2024-01-03 RX ORDER — MANNITOL 20 G/100ML
75 INJECTION, SOLUTION INTRAVENOUS ONCE
Status: CANCELLED | OUTPATIENT
Start: 2024-01-03 | End: 2024-01-03

## 2024-01-09 ENCOUNTER — ANESTHESIA EVENT (OUTPATIENT)
Dept: SURGERY | Facility: HOSPITAL | Age: 79
End: 2024-01-09
Payer: MEDICARE

## 2024-01-09 DIAGNOSIS — E78.01 FAMILIAL HYPERCHOLESTEROLEMIA: ICD-10-CM

## 2024-01-09 DIAGNOSIS — I70.0 CALCIFICATION OF AORTA: ICD-10-CM

## 2024-01-09 DIAGNOSIS — Z78.9 STATIN NOT TOLERATED: ICD-10-CM

## 2024-01-09 RX ORDER — EVOLOCUMAB 140 MG/ML
140 INJECTION, SOLUTION SUBCUTANEOUS
Qty: 2 ML | Refills: 2 | Status: ACTIVE | OUTPATIENT
Start: 2024-01-09 | End: 2024-04-08

## 2024-01-09 NOTE — DISCHARGE INSTRUCTIONS
Before leaving, please make sure you have all your personal belongings such as glasses, purses, wallets, keys, cell phones, jewelry, jackets etc     Discharge Instructions for Cataract Surgery    USE DROPS AS INSTRUCTED:     PREDNISOLONE  ONE DROP 4 TIMES A DAY TO RIGHT EYE  Moxifloxacin ONE DROP 4 TIMES A DAY TO RIGHT EYE  KETOROLAC ONE DROP 4 TIMES A DAY TO RIGHT EYE  WAIT 2 MINUTES BETWEEN DROPS     BRING ALL EYE DROPS TO YOUR CLINIC VISITS    Wear sunglasses during the day  Do not sleep on the affected side and wear eye shield while sleeping for 2 weeks.  No exercise or lifting greater than 10 lbs for 2 weeks.  Try not to cough. If coughing a lot, take a cough suppressent  Do not bend over for 2 weeks.  Keep water it of your eye for 2 weeks.             Wear sunglasses for comfort as needed.  It is normal to feel a slight irritation, like there is an eyelash in the eye that had surgery.   You may take Tylenol for discomfort but if pain intensifies , call Dr Dyer     If you use eye drops for glaucoma you may continue to use them.      After Surgery:  Always be aware that any surgery can cause these symptoms:    Pain- Medication can be prescribed for pain to decrease your pain but may not completely take your pain away.  Over the Counter pain medicine my be enough and you can always use Ice and rest to help ease pain.    Bleeding- a little bleeding after a surgery is usually within normal.  If there is a lot of blood you need to notify your MD.  Emergency treatments of bleeding are cold application, elevation of the bleeding site and compression.    Infection- Infection after surgery is NOT a normal occurrence.  Signs of infection are fever, swelling, hot to touch the incision.  If this occurs notify your MD immediately.    Nausea- this can be common after a surgery especially if you have had anesthesia medicine or are taking pain medicine.  Staying on clear liquids, bland foods, gingerale, or over  the counter anti nausea medicines can help.  If you vomit more than once, notify your MD.  Anti Nausea medicines can be prescribed.

## 2024-01-10 ENCOUNTER — TELEPHONE (OUTPATIENT)
Dept: NEUROLOGY | Facility: CLINIC | Age: 79
End: 2024-01-10

## 2024-01-10 ENCOUNTER — HOSPITAL ENCOUNTER (OUTPATIENT)
Facility: HOSPITAL | Age: 79
Discharge: HOME | End: 2024-01-10
Attending: OPHTHALMOLOGY | Admitting: OPHTHALMOLOGY
Payer: MEDICARE

## 2024-01-10 ENCOUNTER — ANESTHESIA (OUTPATIENT)
Dept: SURGERY | Facility: HOSPITAL | Age: 79
End: 2024-01-10
Payer: MEDICARE

## 2024-01-10 VITALS
HEART RATE: 86 BPM | SYSTOLIC BLOOD PRESSURE: 162 MMHG | BODY MASS INDEX: 24.53 KG/M2 | TEMPERATURE: 97 F | OXYGEN SATURATION: 96 % | WEIGHT: 121.69 LBS | HEIGHT: 59 IN | DIASTOLIC BLOOD PRESSURE: 72 MMHG | RESPIRATION RATE: 18 BRPM

## 2024-01-10 DIAGNOSIS — H25.11 AGE-RELATED NUCLEAR CATARACT, RIGHT: ICD-10-CM

## 2024-01-10 DIAGNOSIS — Z98.42 STATUS POST CATARACT SURGERY, LEFT: ICD-10-CM

## 2024-01-10 PROCEDURE — 66982 XCAPSL CTRC RMVL CPLX WO ECP: CPT | Mod: 79,RT,, | Performed by: OPHTHALMOLOGY

## 2024-01-10 PROCEDURE — 63600175 PHARM REV CODE 636 W HCPCS: Performed by: NURSE ANESTHETIST, CERTIFIED REGISTERED

## 2024-01-10 PROCEDURE — 36000707: Performed by: OPHTHALMOLOGY

## 2024-01-10 PROCEDURE — 63600175 PHARM REV CODE 636 W HCPCS: Performed by: OPHTHALMOLOGY

## 2024-01-10 PROCEDURE — 37000008 HC ANESTHESIA 1ST 15 MINUTES: Performed by: OPHTHALMOLOGY

## 2024-01-10 PROCEDURE — D9220A PRA ANESTHESIA: Mod: CRNA,,, | Performed by: NURSE ANESTHETIST, CERTIFIED REGISTERED

## 2024-01-10 PROCEDURE — D9220A PRA ANESTHESIA: Mod: ANES,,, | Performed by: ANESTHESIOLOGY

## 2024-01-10 PROCEDURE — 37000009 HC ANESTHESIA EA ADD 15 MINS: Performed by: OPHTHALMOLOGY

## 2024-01-10 PROCEDURE — 71000039 HC RECOVERY, EACH ADD'L HOUR: Performed by: OPHTHALMOLOGY

## 2024-01-10 PROCEDURE — 25000003 PHARM REV CODE 250: Performed by: OPHTHALMOLOGY

## 2024-01-10 PROCEDURE — V2632 POST CHMBR INTRAOCULAR LENS: HCPCS | Performed by: OPHTHALMOLOGY

## 2024-01-10 PROCEDURE — 71000033 HC RECOVERY, INTIAL HOUR: Performed by: OPHTHALMOLOGY

## 2024-01-10 PROCEDURE — 25000003 PHARM REV CODE 250

## 2024-01-10 PROCEDURE — 63600175 PHARM REV CODE 636 W HCPCS: Performed by: ANESTHESIOLOGY

## 2024-01-10 PROCEDURE — 25000003 PHARM REV CODE 250: Performed by: ANESTHESIOLOGY

## 2024-01-10 PROCEDURE — 36000706: Performed by: OPHTHALMOLOGY

## 2024-01-10 DEVICE — IMPLANTABLE DEVICE: Type: IMPLANTABLE DEVICE | Site: EYE | Status: FUNCTIONAL

## 2024-01-10 RX ORDER — MANNITOL 20 G/100ML
75 INJECTION, SOLUTION INTRAVENOUS ONCE
Status: COMPLETED | OUTPATIENT
Start: 2024-01-10 | End: 2024-01-10

## 2024-01-10 RX ORDER — METOCLOPRAMIDE HYDROCHLORIDE 5 MG/ML
10 INJECTION INTRAMUSCULAR; INTRAVENOUS EVERY 10 MIN PRN
Status: ACTIVE | OUTPATIENT
Start: 2024-01-10

## 2024-01-10 RX ORDER — LIDOCAINE HYDROCHLORIDE 10 MG/ML
1 INJECTION, SOLUTION EPIDURAL; INFILTRATION; INTRACAUDAL; PERINEURAL ONCE
Status: COMPLETED | OUTPATIENT
Start: 2024-01-10 | End: 2024-01-10

## 2024-01-10 RX ORDER — SODIUM CHLORIDE 9 MG/ML
INJECTION, SOLUTION INTRAVENOUS CONTINUOUS
Status: DISCONTINUED | OUTPATIENT
Start: 2024-01-10 | End: 2024-01-10 | Stop reason: HOSPADM

## 2024-01-10 RX ORDER — MOXIFLOXACIN 5 MG/ML
SOLUTION/ DROPS OPHTHALMIC
Status: DISCONTINUED | OUTPATIENT
Start: 2024-01-10 | End: 2024-01-10 | Stop reason: HOSPADM

## 2024-01-10 RX ORDER — EPINEPHRINE 1 MG/ML
INJECTION, SOLUTION, CONCENTRATE INTRAVENOUS
Status: DISCONTINUED | OUTPATIENT
Start: 2024-01-10 | End: 2024-01-10 | Stop reason: HOSPADM

## 2024-01-10 RX ORDER — PHENYLEPHRINE HYDROCHLORIDE 25 MG/ML
1 SOLUTION/ DROPS OPHTHALMIC
Status: DISCONTINUED | OUTPATIENT
Start: 2024-01-10 | End: 2024-01-10 | Stop reason: HOSPADM

## 2024-01-10 RX ORDER — MIDAZOLAM HYDROCHLORIDE 1 MG/ML
INJECTION, SOLUTION INTRAMUSCULAR; INTRAVENOUS
Status: DISCONTINUED | OUTPATIENT
Start: 2024-01-10 | End: 2024-01-10

## 2024-01-10 RX ORDER — SODIUM CHLORIDE, SODIUM LACTATE, POTASSIUM CHLORIDE, CALCIUM CHLORIDE 600; 310; 30; 20 MG/100ML; MG/100ML; MG/100ML; MG/100ML
INJECTION, SOLUTION INTRAVENOUS CONTINUOUS
Status: ACTIVE | OUTPATIENT
Start: 2024-01-10

## 2024-01-10 RX ORDER — LIDOCAINE HYDROCHLORIDE 40 MG/ML
INJECTION, SOLUTION RETROBULBAR
Status: DISCONTINUED | OUTPATIENT
Start: 2024-01-10 | End: 2024-01-10 | Stop reason: HOSPADM

## 2024-01-10 RX ORDER — ONDANSETRON 2 MG/ML
INJECTION INTRAMUSCULAR; INTRAVENOUS
Status: DISCONTINUED | OUTPATIENT
Start: 2024-01-10 | End: 2024-01-10

## 2024-01-10 RX ORDER — TROPICAMIDE 10 MG/ML
1 SOLUTION/ DROPS OPHTHALMIC
Status: DISCONTINUED | OUTPATIENT
Start: 2024-01-10 | End: 2024-01-10 | Stop reason: HOSPADM

## 2024-01-10 RX ORDER — PROPARACAINE HYDROCHLORIDE 5 MG/ML
1 SOLUTION/ DROPS OPHTHALMIC
Status: DISCONTINUED | OUTPATIENT
Start: 2024-01-10 | End: 2024-01-10 | Stop reason: HOSPADM

## 2024-01-10 RX ORDER — TETRACAINE HYDROCHLORIDE 5 MG/ML
SOLUTION OPHTHALMIC
Status: DISCONTINUED | OUTPATIENT
Start: 2024-01-10 | End: 2024-01-10 | Stop reason: HOSPADM

## 2024-01-10 RX ORDER — PHENYLEPHRINE HYDROCHLORIDE 100 MG/ML
1 SOLUTION/ DROPS OPHTHALMIC
Status: DISCONTINUED | OUTPATIENT
Start: 2024-01-10 | End: 2024-01-10 | Stop reason: HOSPADM

## 2024-01-10 RX ADMIN — SODIUM CHLORIDE: 9 INJECTION, SOLUTION INTRAVENOUS at 08:01

## 2024-01-10 RX ADMIN — MIDAZOLAM 1 MG: 1 INJECTION INTRAMUSCULAR; INTRAVENOUS at 10:01

## 2024-01-10 RX ADMIN — PROPARACAINE HYDROCHLORIDE 1 DROP: 5 SOLUTION/ DROPS OPHTHALMIC at 09:01

## 2024-01-10 RX ADMIN — LIDOCAINE HYDROCHLORIDE 5 MG: 10 INJECTION, SOLUTION EPIDURAL; INFILTRATION; INTRACAUDAL; PERINEURAL at 08:01

## 2024-01-10 RX ADMIN — MANNITOL 75 G: 20 INJECTION, SOLUTION INTRAVENOUS at 10:01

## 2024-01-10 RX ADMIN — TROPICAMIDE 1 DROP: 10 SOLUTION/ DROPS OPHTHALMIC at 09:01

## 2024-01-10 RX ADMIN — PHENYLEPHRINE HYDROCHLORIDE 1 DROP: 25 SOLUTION/ DROPS OPHTHALMIC at 09:01

## 2024-01-10 RX ADMIN — SODIUM CHLORIDE, SODIUM LACTATE, POTASSIUM CHLORIDE, AND CALCIUM CHLORIDE: .6; .31; .03; .02 INJECTION, SOLUTION INTRAVENOUS at 10:01

## 2024-01-10 RX ADMIN — MANNITOL 75 G: 20 INJECTION, SOLUTION INTRAVENOUS at 09:01

## 2024-01-10 RX ADMIN — PROPARACAINE HYDROCHLORIDE 1 DROP: 5 SOLUTION/ DROPS OPHTHALMIC at 08:01

## 2024-01-10 RX ADMIN — TROPICAMIDE 1 DROP: 10 SOLUTION/ DROPS OPHTHALMIC at 08:01

## 2024-01-10 RX ADMIN — PHENYLEPHRINE HYDROCHLORIDE 1 DROP: 25 SOLUTION/ DROPS OPHTHALMIC at 08:01

## 2024-01-10 RX ADMIN — ONDANSETRON 4 MG: 2 INJECTION, SOLUTION INTRAMUSCULAR; INTRAVENOUS at 10:01

## 2024-01-10 NOTE — OP NOTE
Operative Date:  01/10/2024    Discharge Date:  01/10/2024    Report Title: Operative Note    SURGEON: Rustam Dyer MD    ASSISTANT: None    PREOPERATIVE DIAGNOSIS: Age-related nuclear cataract, Miosis, Right Eye    POSTOPERATIVE DIAGNOSIS: Same    PROCEDURE PERFORMED: Complex phacoemulsification of the cataract with posterior chamber intraocular lens, Right Eye  ,  IMPLANTS: SA60AT 36.0    ANESTHESIA: Topical with MAC    COMPLICATIONS: None    ESTIMATED BLOOD LOSS: Minimal    PROCEDURE:  Patient was given preoperative mannitol in the preoperative area.  The patient was brought to the operating room, time out was performed and implant checked.  The patient was given light sedation, and topical anesthesia was instilled in the right eye.  The right eye was prepped and draped in the usual fashion for eye surgery and lid speculum used to retract the eyelid. The eyelashes were secluded within the drape.  A paracentesis was made superiorly with a sideport blade. Epishugarcaine was injected in the anterior chamber and dispersive viscoelastic was injected into the anterior chamber. A temporal corneal incision was made with a steel keratome. An iris ring was inserted into the anterior chamber and positioned for iris retraction.  A cystitome was used to initiate a continuous curvilinear capsulorrhexis and completed using the capsulorrhexis forceps. Hydrodissection of the lens nucleus was performed using balanced salt solution (BSS) on hydrodissection cannula. The lens nucleus was removed using phacoemulsification in the modified stop and chop technique. Care was taken to avoid shallowing of the anterior chamber.  The lens cortex was removed using the irrigation/aspiration handpiece. The capsular bag was filled with viscoelastic, wound was enlarged with the keratome, and the intraocular lens was injected into the capsular bag under direct visualization. Iris ring was removed from the anterior chamber.  Viscoelastic was  removed using the irrigation/aspiration handpiece. The wounds were hydrated until watertight.    The wounds were rechecked and no leakage was noted.  The speculum was removed. Topical antibiotic was applied to the eye and shield was placed over the eye. The patient tolerated the procedure well and left the operating room in good condition.

## 2024-01-10 NOTE — PLAN OF CARE
Patient is awake and alert and says she is ready to go home; her spouse Aldo is ready to take patient home and he is driving. Patient's vital signs and right eye stable. Patient denies pain, nausea, weakness or dizziness. Patient is wearing provided sunglasses. All patient belongings have been returned to patient.Patient is in stable condition and being discharged via wheelchair to car her spouse is driving.

## 2024-01-10 NOTE — ANESTHESIA POSTPROCEDURE EVALUATION
Anesthesia Post Evaluation    Patient: Rola Richter    Procedure(s) Performed: Procedure(s) (LRB):  CEIOL OD Preop Mannitol (Right)    Final Anesthesia Type: MAC      Patient location during evaluation: PACU  Patient participation: Yes- Able to Participate  Level of consciousness: awake  Post-procedure vital signs: reviewed and stable  Pain management: adequate  Airway patency: patent    PONV status at discharge: No PONV  Anesthetic complications: no      Cardiovascular status: blood pressure returned to baseline and hypertensive  Respiratory status: spontaneous ventilation  Hydration status: euvolemic  Follow-up not needed.              Vitals Value Taken Time   /72 01/10/24 1130   Temp  01/10/24 1210   Pulse 86 01/10/24 1130   Resp 18 01/10/24 1130   SpO2 96 % 01/10/24 1130         No case tracking events are documented in the log.      Pain/Amol Score: No data recorded

## 2024-01-10 NOTE — ANESTHESIA PREPROCEDURE EVALUATION
01/10/2024  Rola Richter is a 78 y.o., female.    Anesthesia Evaluation    I have reviewed the Patient Summary Reports.     I have reviewed the Nursing Notes. I have reviewed the NPO Status.   I have reviewed the Medications.     Review of Systems  Anesthesia Hx:  No problems with previous Anesthesia                Social:  Non-Smoker, No Alcohol Use       EENT/Dental:   cataract          Cardiovascular:     Hypertension, well controlled                                        Hepatic/GI:     GERD, well controlled   Dysphagia           Musculoskeletal:  Arthritis          Spine Disorders: cervical Degenerative disease and Disc disease           Neurological:    Neuromuscular Disease,  Headaches                                 Endocrine:  Endocrine Normal            Psych:  Psychiatric History                  Physical Exam  General:  Well nourished       Airway/Jaw/Neck:  Airway Findings: Mouth Opening: Normal     Tongue: Normal      General Airway Assessment: Adult, Good      Mallampati: II  Improves to II with phonation.  TM Distance: 4-6 cm               Dental:  Dental Findings: Upper Dentures, Lower partial dentures      Chest/Lungs:  Chest/Lungs Findings:  Clear to auscultation, Normal Respiratory Rate       Heart/Vascular:  Heart Findings: Rate: Normal  Rhythm: Regular Rhythm  Sounds: Normal  Heart murmur: negative                     Mental Status:  Mental Status Findings:  Cooperative, Alert and Oriented         Anesthesia Plan  Type of Anesthesia, risks & benefits discussed:  Anesthesia Type:  MAC    Patient's Preference:   Plan Factors:          Intra-op Monitoring Plan: standard ASA monitors  Intra-op Monitoring Plan Comments:   Post Op Pain Control Plan: multimodal analgesia and IV/PO Opioids PRN  Post Op Pain Control Plan Comments:     Induction:   IV  Beta Blocker:  Patient is not currently  on a Beta-Blocker (No further documentation required).       Informed Consent: Informed consent signed with the Patient and all parties understand the risks and agree with anesthesia plan.  All questions answered.  Anesthesia consent signed with patient.  ASA Score: 3     Day of Surgery Review of History & Physical: I have interviewed and examined the patient. I have reviewed the patient's H&P dated:  There are no significant changes.            Ready For Surgery From Anesthesia Perspective.             Physical Exam  General: Well nourished    Airway:  Mallampati: II / II  Mouth Opening: Normal  TM Distance: 4-6 cm  Tongue: Normal    Dental:  Upper Dentures, Lower partial dentures    Chest/Lungs:  Clear to auscultation, Normal Respiratory Rate    Heart:  Rate: Normal  Rhythm: Regular Rhythm  Sounds: Normal          Anesthesia Plan  Type of Anesthesia, risks & benefits discussed:    Anesthesia Type: MAC  Intra-op Monitoring Plan: standard ASA monitors  Post Op Pain Control Plan: multimodal analgesia and IV/PO Opioids PRN  Induction:  IV  Informed Consent: Informed consent signed with the Patient and all parties understand the risks and agree with anesthesia plan.  All questions answered.   ASA Score: 3  Day of Surgery Review of History & Physical: I have interviewed and examined the patient. I have reviewed the patient's H&P dated:     Ready For Surgery From Anesthesia Perspective.       .

## 2024-01-10 NOTE — H&P
History    Chief complaint:  Painless progressive vision loss right Eye    Present Ilness/Diagnosis: Visually significant cataract, right Eye    ROS: +Eyes, otherwise no significant changes    Past Medical History: refer to chart    Family History/Social History: refer to chart    Allergies:   Review of patient's allergies indicates:   Allergen Reactions    Aspirin Nausea And Vomiting    Penicillins Itching    Crestor [rosuvastatin]      Muscle pain      Lipitor [atorvastatin]      Achy         Current Medications: see medcard      Physical Exam    BP: Vital signs stable  General: No apparent distress  HEENT: cataract  Lungs: adequate respirations  Heart: + pulses  Abdomen: soft  Rectal/pelvic: deferred    Labs: Labs Reviewed    Lab Results   Component Value Date    WBC 7.68 11/22/2023    HGB 11.6 (L) 11/22/2023    HCT 35.6 (L) 11/22/2023    MCV 92 11/22/2023     11/22/2023           CMP  Sodium   Date Value Ref Range Status   11/22/2023 138 136 - 145 mmol/L Final     Potassium   Date Value Ref Range Status   11/22/2023 4.4 3.5 - 5.1 mmol/L Final     Chloride   Date Value Ref Range Status   11/22/2023 104 95 - 110 mmol/L Final     CO2   Date Value Ref Range Status   11/22/2023 25 23 - 29 mmol/L Final     Glucose   Date Value Ref Range Status   11/22/2023 83 70 - 110 mg/dL Final     BUN   Date Value Ref Range Status   11/22/2023 13 8 - 23 mg/dL Final     Creatinine   Date Value Ref Range Status   11/22/2023 1.1 0.5 - 1.4 mg/dL Final     Calcium   Date Value Ref Range Status   11/22/2023 9.7 8.7 - 10.5 mg/dL Final     Total Protein   Date Value Ref Range Status   11/22/2023 7.4 6.0 - 8.4 g/dL Final     Albumin   Date Value Ref Range Status   11/22/2023 3.9 3.5 - 5.2 g/dL Final     Total Bilirubin   Date Value Ref Range Status   11/22/2023 0.3 0.1 - 1.0 mg/dL Final     Comment:     For infants and newborns, interpretation of results should be based  on gestational age, weight and in agreement with  clinical  observations.    Premature Infant recommended reference ranges:  Up to 24 hours.............<8.0 mg/dL  Up to 48 hours............<12.0 mg/dL  3-5 days..................<15.0 mg/dL  6-29 days.................<15.0 mg/dL       Alkaline Phosphatase   Date Value Ref Range Status   11/22/2023 90 55 - 135 U/L Final     AST   Date Value Ref Range Status   11/22/2023 22 10 - 40 U/L Final     ALT   Date Value Ref Range Status   11/22/2023 14 10 - 44 U/L Final     Anion Gap   Date Value Ref Range Status   11/22/2023 9 8 - 16 mmol/L Final     eGFR   Date Value Ref Range Status   11/22/2023 51.4 (A) >60 mL/min/1.73 m^2 Final       The patient has been cleared for surgery in an ambulatory surgery facility.     Impression: Visually significant Cataract right Eye    Plan: Phacoemulsification with implantation of Intraocular lens right Eye

## 2024-01-10 NOTE — TRANSFER OF CARE
"Anesthesia Transfer of Care Note    Patient: Rola Richter    Procedure(s) Performed: Procedure(s) (LRB):  CEIOL OD Preop Mannitol (Right)    Patient location: PACU    Anesthesia Type: MAC    Transport from OR: Transported from OR on room air with adequate spontaneous ventilation    Post pain: adequate analgesia    Post assessment: no apparent anesthetic complications and tolerated procedure well    Post vital signs: stable    Level of consciousness: awake and sedated    Nausea/Vomiting: no nausea/vomiting    Complications: none    Transfer of care protocol was followed      Last vitals: Visit Vitals  BP (!) 160/74   Pulse 71   Temp 36.2 °C (97.2 °F) (Skin)   Resp 19   Ht 4' 11" (1.499 m)   Wt 55.2 kg (121 lb 11.1 oz)   SpO2 100%   Breastfeeding No   BMI 24.58 kg/m²     "

## 2024-01-10 NOTE — DISCHARGE SUMMARY
[facility] - Periop Services  Discharge Note  Short Stay    Procedure(s) (LRB):  CEIOL OD Preop Mannitol (Right)      OUTCOME: Patient tolerated treatment/procedure well without complication and is now ready for discharge.    DISPOSITION: Home or Self Care    FINAL DIAGNOSIS:  cataract    FOLLOWUP: In clinic    DISCHARGE INSTRUCTIONS:  No discharge procedures on file.     TIME SPENT ON DISCHARGE: 5 minutes

## 2024-01-11 ENCOUNTER — OFFICE VISIT (OUTPATIENT)
Dept: OPHTHALMOLOGY | Facility: CLINIC | Age: 79
End: 2024-01-11
Payer: MEDICARE

## 2024-01-11 DIAGNOSIS — Z98.41 STATUS POST CATARACT SURGERY, RIGHT: Primary | ICD-10-CM

## 2024-01-11 PROCEDURE — 3288F FALL RISK ASSESSMENT DOCD: CPT | Mod: HCNC,CPTII,S$GLB, | Performed by: OPHTHALMOLOGY

## 2024-01-11 PROCEDURE — 1160F RVW MEDS BY RX/DR IN RCRD: CPT | Mod: HCNC,CPTII,S$GLB, | Performed by: OPHTHALMOLOGY

## 2024-01-11 PROCEDURE — 1159F MED LIST DOCD IN RCRD: CPT | Mod: HCNC,CPTII,S$GLB, | Performed by: OPHTHALMOLOGY

## 2024-01-11 PROCEDURE — 1101F PT FALLS ASSESS-DOCD LE1/YR: CPT | Mod: HCNC,CPTII,S$GLB, | Performed by: OPHTHALMOLOGY

## 2024-01-11 PROCEDURE — 99999 PR PBB SHADOW E&M-EST. PATIENT-LVL III: CPT | Mod: PBBFAC,HCNC,, | Performed by: OPHTHALMOLOGY

## 2024-01-11 PROCEDURE — 99024 POSTOP FOLLOW-UP VISIT: CPT | Mod: HCNC,S$GLB,, | Performed by: OPHTHALMOLOGY

## 2024-01-11 PROCEDURE — 1126F AMNT PAIN NOTED NONE PRSNT: CPT | Mod: HCNC,CPTII,S$GLB, | Performed by: OPHTHALMOLOGY

## 2024-01-11 NOTE — PROGRESS NOTES
HPI    Pt presents for one day post op PCIOL OD     States OD is doing well     Moxi QID   PF QID   Keto QID     Last edited by Pia Kulkarni on 1/11/2024  8:09 AM.            Assessment /Plan     For exam results, see Encounter Report.    Status post cataract surgery, right      Doing well  Post op precautions and instructions reviewed, sheet given  moxi QID  PF QID  Keto QID    Okay to stop ketorolac OS    F/u 1 month, refract OU

## 2024-01-21 DIAGNOSIS — G31.84 MILD NEUROCOGNITIVE DISORDER: ICD-10-CM

## 2024-01-22 RX ORDER — DONEPEZIL HYDROCHLORIDE 10 MG/1
10 TABLET, FILM COATED ORAL NIGHTLY
Qty: 30 TABLET | Refills: 0 | Status: SHIPPED | OUTPATIENT
Start: 2024-01-22 | End: 2024-02-06 | Stop reason: SDUPTHER

## 2024-02-01 ENCOUNTER — OFFICE VISIT (OUTPATIENT)
Dept: PAIN MEDICINE | Facility: CLINIC | Age: 79
End: 2024-02-01
Payer: MEDICARE

## 2024-02-01 ENCOUNTER — OFFICE VISIT (OUTPATIENT)
Dept: OPHTHALMOLOGY | Facility: CLINIC | Age: 79
End: 2024-02-01
Payer: MEDICARE

## 2024-02-01 VITALS
WEIGHT: 121 LBS | BODY MASS INDEX: 24.39 KG/M2 | DIASTOLIC BLOOD PRESSURE: 68 MMHG | SYSTOLIC BLOOD PRESSURE: 143 MMHG | HEIGHT: 59 IN | HEART RATE: 79 BPM

## 2024-02-01 DIAGNOSIS — G89.4 CHRONIC PAIN DISORDER: Primary | ICD-10-CM

## 2024-02-01 DIAGNOSIS — M50.30 DDD (DEGENERATIVE DISC DISEASE), CERVICAL: ICD-10-CM

## 2024-02-01 DIAGNOSIS — M54.12 CERVICAL RADICULITIS: ICD-10-CM

## 2024-02-01 DIAGNOSIS — G89.4 CHRONIC PAIN DISORDER: ICD-10-CM

## 2024-02-01 DIAGNOSIS — Z98.42 STATUS POST CATARACT SURGERY, LEFT: ICD-10-CM

## 2024-02-01 DIAGNOSIS — Z98.41 STATUS POST CATARACT SURGERY, RIGHT: Primary | ICD-10-CM

## 2024-02-01 PROCEDURE — 1126F AMNT PAIN NOTED NONE PRSNT: CPT | Mod: HCNC,CPTII,S$GLB, | Performed by: OPHTHALMOLOGY

## 2024-02-01 PROCEDURE — 1159F MED LIST DOCD IN RCRD: CPT | Mod: HCNC,CPTII,S$GLB, | Performed by: OPHTHALMOLOGY

## 2024-02-01 PROCEDURE — 99214 OFFICE O/P EST MOD 30 MIN: CPT | Mod: HCNC,S$GLB,, | Performed by: PHYSICIAN ASSISTANT

## 2024-02-01 PROCEDURE — 3077F SYST BP >= 140 MM HG: CPT | Mod: HCNC,CPTII,S$GLB, | Performed by: PHYSICIAN ASSISTANT

## 2024-02-01 PROCEDURE — 1101F PT FALLS ASSESS-DOCD LE1/YR: CPT | Mod: HCNC,CPTII,S$GLB, | Performed by: OPHTHALMOLOGY

## 2024-02-01 PROCEDURE — 1160F RVW MEDS BY RX/DR IN RCRD: CPT | Mod: HCNC,CPTII,S$GLB, | Performed by: OPHTHALMOLOGY

## 2024-02-01 PROCEDURE — 1159F MED LIST DOCD IN RCRD: CPT | Mod: HCNC,CPTII,S$GLB, | Performed by: PHYSICIAN ASSISTANT

## 2024-02-01 PROCEDURE — 99999 PR PBB SHADOW E&M-EST. PATIENT-LVL III: CPT | Mod: PBBFAC,HCNC,, | Performed by: PHYSICIAN ASSISTANT

## 2024-02-01 PROCEDURE — 99024 POSTOP FOLLOW-UP VISIT: CPT | Mod: HCNC,S$GLB,, | Performed by: OPHTHALMOLOGY

## 2024-02-01 PROCEDURE — 3288F FALL RISK ASSESSMENT DOCD: CPT | Mod: HCNC,CPTII,S$GLB, | Performed by: OPHTHALMOLOGY

## 2024-02-01 PROCEDURE — 3078F DIAST BP <80 MM HG: CPT | Mod: HCNC,CPTII,S$GLB, | Performed by: PHYSICIAN ASSISTANT

## 2024-02-01 PROCEDURE — 1101F PT FALLS ASSESS-DOCD LE1/YR: CPT | Mod: HCNC,CPTII,S$GLB, | Performed by: PHYSICIAN ASSISTANT

## 2024-02-01 PROCEDURE — 1125F AMNT PAIN NOTED PAIN PRSNT: CPT | Mod: HCNC,CPTII,S$GLB, | Performed by: PHYSICIAN ASSISTANT

## 2024-02-01 PROCEDURE — 3288F FALL RISK ASSESSMENT DOCD: CPT | Mod: HCNC,CPTII,S$GLB, | Performed by: PHYSICIAN ASSISTANT

## 2024-02-01 PROCEDURE — 99999 PR PBB SHADOW E&M-EST. PATIENT-LVL III: CPT | Mod: PBBFAC,HCNC,, | Performed by: OPHTHALMOLOGY

## 2024-02-01 RX ORDER — HYDROCODONE BITARTRATE AND ACETAMINOPHEN 5; 325 MG/1; MG/1
1 TABLET ORAL EVERY 12 HOURS PRN
Qty: 60 TABLET | Refills: 0 | Status: SHIPPED | OUTPATIENT
Start: 2024-03-01 | End: 2024-03-05 | Stop reason: ALTCHOICE

## 2024-02-01 RX ORDER — CYCLOBENZAPRINE HCL 10 MG
10 TABLET ORAL 2 TIMES DAILY PRN
Qty: 60 TABLET | Refills: 1 | Status: SHIPPED | OUTPATIENT
Start: 2024-02-01 | End: 2024-04-30 | Stop reason: SDUPTHER

## 2024-02-01 RX ORDER — HYDROCODONE BITARTRATE AND ACETAMINOPHEN 5; 325 MG/1; MG/1
1 TABLET ORAL EVERY 12 HOURS PRN
Qty: 60 TABLET | Refills: 0 | Status: SHIPPED | OUTPATIENT
Start: 2024-02-01 | End: 2024-03-01

## 2024-02-01 RX ORDER — HYDROCODONE BITARTRATE AND ACETAMINOPHEN 5; 325 MG/1; MG/1
1 TABLET ORAL EVERY 12 HOURS PRN
Qty: 60 TABLET | Refills: 0 | Status: SHIPPED | OUTPATIENT
Start: 2024-03-30 | End: 2024-03-05 | Stop reason: ALTCHOICE

## 2024-02-01 NOTE — PROGRESS NOTES
HPI    Pt here for 1 mo post op cat sx OD -doing well, still using moxi QID pred   QID  C/o fuzzy vision OS  Last edited by Jailyn Colindres on 2/1/2024 11:00 AM.            Assessment /Plan     For exam results, see Encounter Report.    Status post cataract surgery, right    Status post cataract surgery, left      POM1 OD, POM2 OS  Eyes are healing well  Unable to adequately refract today    Can try OTC readers for now    Refer to Dr. Eduardo for refraction and routine exams thereafter

## 2024-02-01 NOTE — PROGRESS NOTES
Referring Physician: No ref. provider found    PCP: Liseth Carias MD      CC: neck and occipital pain    Interval history: Ms. Richter is a 78 y.o. female with neck pain and occipital neuralgia who returns to our clinic.  She continues to c/o headaches and neck pain.  Most recent occcipital nerve block only provided mild short lived beneft. Recent cervical MELANY improved neck pain that was extending into left shoulder.  She has numbness to her right hand and first through fourth digits. Cervical MELANY in February 2018  provided benefit for several weeks.    She continues to take Norco with benefit.  Flexeril 10 mg caused dry mouth however this resolved and she wishes to resume. Robaxin was helpful but caused dizziness.  Denies UE weakness. No bowel bladder changes.   Pain today is rated 9/10.    Prior HPI:   Patient is 70-year-old female with past history history of cervical DDD, cervical spondylosis and chronic headaches.  She recently moved here from [place], [place].  She is treated in the past by neurology.  She states having constant burning pain over the left side of her posterior scalp.  Pain radiates to her neck as well as a frontal.  She also has left-sided neck pain as well.  She denies any radicular arm pain.  No numbness or weakness.  She states having cervical epidural steroid injection at past with minimal benefit.  She also has had decided of cervical nerve blocks in the past with moderate benefit.  Most recent injection was performed 2 months ago.  She desires repeat injection.  She currently takes Norco 10 mg every 12 hours as needed with moderate benefit.  She also takes Zanaflex 4 mg every 8 hours with mild benefits.  She rates her pain 7/10 today.    Pain intervention history: s/p left occipital nerve blocks on 1/2016 with 50% relief of her headaches  S/p cervical MELANY 2/8/18 moderate relief for a couple of weeks.     ROS:  CONSTITUTIONAL: No fevers, chills, night sweats, wt. loss, appetite  changes  SKIN: no rashes or itching  ENT: No headaches, head trauma, vision changes, or eye pain  LYMPH NODES: None noticed   CV: No chest pain, palpitations.   RESP: No shortness of breath, dyspnea on exertion, cough, wheezing, or hemoptysis  GI: No nausea, emesis, diarrhea, constipation, melena, hematochezia, pain.    : No dysuria, hematuria, urgency, or frequency   HEME: No easy bruising, bleeding problems  PSYCHIATRIC: No depression, anxiety, psychosis, hallucinations.  NEURO: No seizures, memory loss, dizziness or difficulty sleeping  MSK: + History of present illness      Past Medical History:   Diagnosis Date    Anxiety     Arthritis     Cataract     DDD (degenerative disc disease), lumbar     Depression     Encounter for blood transfusion     GERD (gastroesophageal reflux disease)     Headache     Hyperlipidemia     Hypertension     pt states she does not take meds anymore she just watches her weight    Neuromuscular disorder     Occipital neuralgia 3/24/2017    Osteoporosis      Past Surgical History:   Procedure Laterality Date    APPENDECTOMY      CATARACT EXTRACTION W/  INTRAOCULAR LENS IMPLANT Left 10/25/2023    Procedure: CEIOL OS;  Surgeon: Rustam Dyer MD;  Location: [facility] OR;  Service: Ophthalmology;  Laterality: Left;  Last Case of day, short eye, very high power IOL    CATARACT EXTRACTION W/  INTRAOCULAR LENS IMPLANT Right 1/10/2024    Procedure: CEIOL OD Preop Mannitol;  Surgeon: Rustam Dyer MD;  Location: [facility] OR;  Service: Ophthalmology;  Laterality: Right;  Will need pre-op Mannitol     SECTION      x 2    CHOLECYSTECTOMY      COLONOSCOPY  prior to     COLONOSCOPY N/A 2019    Procedure: COLONOSCOPY;  Surgeon: Greg Victor MD;  Location: [facility];  Service: Endoscopy;  Laterality: N/A; 2 colon polyps, hemorrhoids; repeat in 5 years for surveillance; biopsy: Tubular adenoma x2    EPIDURAL STEROID INJECTION INTO CERVICAL SPINE N/A 2022    Procedure:  Injection-steroid-epidural-cervical C7-T1;  Surgeon: Timothy Grimaldo MD;  Location: [facility] OR;  Service: Pain Management;  Laterality: N/A;    ESOPHAGOGASTRODUODENOSCOPY N/A 4/30/2019    Procedure: EGD (ESOPHAGOGASTRODUODENOSCOPY);  Surgeon: Greg Victor MD;  Location: [facility];  Service: Endoscopy;  Laterality: N/A; Mild Schatzki ring. Dilated. small hiatal hernia; Four gastric polyps. Resected and retrieved, gastritis; biopsy:stomach- unremarkable, negative for h pylori, polyps-Fundic type mucosa with foveolar hyperplasia.    ESOPHAGOGASTRODUODENOSCOPY N/A 2/17/2021    Procedure: EGD (ESOPHAGOGASTRODUODENOSCOPY);  Surgeon: Greg Victor MD;  Location: [facility];  Service: Endoscopy;  Laterality: N/A;    HYSTERECTOMY      Laser Periphery Iridotomy Bilateral     OD 5/26/16 and OS touch up 5/26/2016    UPPER GASTROINTESTINAL ENDOSCOPY  03/14/2017    Dr. Marcial: esophageal stenosis- dilated, gastritis, gastric polyps removed; biopsy- mild gastritis, negative for h pylori, hyperplastic polyp, esophagus unremarkable    vocal cord tumor removal       Family History   Problem Relation Age of Onset    Osteoarthritis Mother     Alcohol abuse Mother     Rheum arthritis Mother     Osteoarthritis Sister     Diabetes Brother     No Known Problems Son     No Known Problems Sister     No Known Problems Sister     No Known Problems Brother     Arthritis Son     No Known Problems Son     Stroke Maternal Grandmother 99    Rheum arthritis Maternal Grandmother     Retinal detachment Neg Hx     Macular degeneration Neg Hx     Glaucoma Neg Hx     Amblyopia Neg Hx     Blindness Neg Hx     Cancer Neg Hx     Cataracts Neg Hx     Hypertension Neg Hx     Strabismus Neg Hx     Thyroid disease Neg Hx     Lupus Neg Hx     Kidney disease Neg Hx     Inflammatory bowel disease Neg Hx     Psoriasis Neg Hx     Colon cancer Neg Hx     Crohn's disease Neg Hx     Ulcerative colitis Neg Hx     Stomach cancer Neg Hx     Esophageal cancer Neg Hx   "    Social History     Socioeconomic History    Marital status:    Tobacco Use    Smoking status: Never    Smokeless tobacco: Never   Substance and Sexual Activity    Alcohol use: No     Alcohol/week: 0.0 standard drinks of alcohol    Drug use: Yes     Types: Hydrocodone    Sexual activity: Yes     Partners: Male     Social Determinants of Health     Financial Resource Strain: Low Risk  (8/8/2023)    Overall Financial Resource Strain (CARDIA)     Difficulty of Paying Living Expenses: Not hard at all   Food Insecurity: No Food Insecurity (8/8/2023)    Hunger Vital Sign     Worried About Running Out of Food in the Last Year: Never true     Ran Out of Food in the Last Year: Never true   Transportation Needs: No Transportation Needs (8/8/2023)    PRAPARE - Transportation     Lack of Transportation (Medical): No     Lack of Transportation (Non-Medical): No   Physical Activity: Insufficiently Active (8/8/2023)    Exercise Vital Sign     Days of Exercise per Week: 3 days     Minutes of Exercise per Session: 30 min   Stress: No Stress Concern Present (8/8/2023)    [place] [employer] - Occupational Stress Questionnaire     Feeling of Stress : Not at all   Social Connections: Moderately Isolated (8/8/2023)    Social Connection and Isolation Panel [NHANES]     Frequency of Communication with Friends and Family: More than three times a week     Frequency of Social Gatherings with Friends and Family: More than three times a week     Attends Muslim Services: Never     Active Member of Clubs or Organizations: No     Attends Club or Organization Meetings: Never     Marital Status:    Housing Stability: Unknown (8/8/2023)    Housing Stability Vital Sign     Unable to Pay for Housing in the Last Year: No     Unstable Housing in the Last Year: No         Medications/Allergies: See med card    Vitals:    02/01/24 1148   BP: (!) 143/68   Pulse: 79   Weight: 54.9 kg (121 lb)   Height: 4' 11" (1.499 " m)   PainSc:   9   PainLoc: Neck         Physical exam:    GENERAL: A and O x3, the patient appears well groomed and is in no acute distress.  Skin: No rashes or obvious lesions  HEENT: normocephalic, atraumatic  CARDIOVASCULAR:  RRR  LUNGS: nonlabored breathing  ABDOMEN: soft, nontender   UPPER EXTREMITIES: Normal alignment, normal range of motion, no atrophy, no skin changes,  hair growth and nail growth normal and equal bilaterally. No swelling, no tenderness.  +Phalens on left side. +TTP tricep tendon  LOWER EXTREMITIES:  Normal alignment, normal range of motion, no atrophy, no skin changes,  hair growth and nail growth normal and equal bilaterally. No swelling, no tenderness.  CERVICAL SPINE:   Cervical spine: ROM is full in flexion, extension and lateral rotation.  Painful flexion > extension.  Positive facet loading bilaterally.  Spurling is positive at right side.  Myofascial exam:  Tenderness to palpation across cervical paraspinals deltoid and trapezius muscles bilaterally.      MENTAL STATUS: normal orientation, speech, language, and fund of knowledge for social situation.  Emotional state appropriate.    CRANIAL NERVES:  II:  PERRL bilaterally,   III,IV,VI: EOMI.    V:  Facial sensation equal bilaterally  VII:  Facial motor function normal.  VIII:  Hearing equal to finger rub bilaterally  IX/X: Gag normal, palate symmetric  XI:  Shoulder shrug equal, head turn equal  XII:  Tongue midline without fasciculations      MOTOR: Tone and bulk: normal bilateral upper and lower Strength: normal   Delt Bi Tri WE WF     R 5 5 5 5 5 5   L 5 5 5 5 5 5     IP ADD ABD Quad TA Gas HAM  R 5 5 5 5 5 5 5  L 5 5 5 5 5 5 5    SENSATION: Light touch and pinprick intact bilaterally  REFLEXES: normal, symmetric, nonbrisk.  Toes down, no clonus. No hoffmans.  GAIT: normal rise, base, steps, and arm swing.        Imaging:   Cervical MRI 12/4/17    Narrative     EXAM: Cervical spine MRI without contrast.    INDICATION:  Cervical radiculopathy.  Neck pain and occipital neuralgia.  The patient complains of neck and right arm pain.    TECHNIQUE: Routine multiplanar, multisequence unenhanced cervical spine MRI was performed.    COMPARISON: Plain films of the cervical spine obtained concurrently      FINDINGS:     Vertebral column: There is straightening of the cervical spine with loss of normal lordosis.  As seen on concurrent plain films, there is trace anterolisthesis of C3 on C4 with 2 mm anterolisthesis of C4 on C5.  There is marked disc space narrowing at the C5-6 level with moderate to marked disc space narrowing at the C4-5 and C6-7 levels.  There is partial non-segmentation anteriorly at the C2-3 level.  The C2 and C3 as well as the C4 and C5 facet joints appear fused and this may represent developmental non-segmentation or degenerative ankylosis.  All of the discs are desiccated.  The odontoid process is intact.    Spinal canal, cord, epidural space: The craniovertebral junction is normal.  The spinal canal is omental and normal.  There is no significant spinal stenosis.  The cord is normal in caliber.  There is very subtle flattening of the ventral cord surface at the C4-5 and C5-6 levels where there is degenerative change.  The study is mildly motion but there is no definite cord edema or myelomalacia.    Findings by level:    C2-3: There is no spinal canal or foraminal stenosis.  There is mild left facet joint arthropathy.    C3-4: There is trace anterolisthesis.  There is left greater than right uncovertebral spurring and facet joint arthropathy.  There is a mild disc osteophyte complex, slightly eccentric to the left with subtle annular fissure.  This narrows the ventral subarachnoid space.  There is no spinal stenosis or cord compression.  There is moderate marked left foraminal stenosis.    C4-5: There is moderate disc space narrowing with 2 mm anterolisthesis of C4 on C5.  There is facet joint arthropathy or effusion  left greater than right.  There is also mild bilateral but left greater than right uncovertebral spurring.  There is unroofing of a mild disc bulge which narrows the ventral subarachnoid space.  There is no spinal stenosis.  There is mild to moderate left foraminal stenosis.    C5-6:There is marked disc space narrowing.  There is bilateral uncovertebral spurring.  There is a disc osteophyte complex which narrows the subarachnoid space.  This is slightly eccentric to the right There is subtle flattening of the ventral cord surface.  Cord signal is grossly normal.  There is mild spinal stenosis with at least moderate bilateral foraminal stenosis.    C6-7: There is moderate disc space narrowing.  There is mild uncovertebral spurring.  There is a shallow disc osteophyte complex, slightly eccentric to the right.  There is narrowing of the ventral subarachnoid space.  There is no spinal stenosis.  Cord signal is normal.  There is mild bilateral foraminal stenosis.  There is a small 3.5 mm left foraminal perineural cyst.    C7- T1: There is a Schmorl's node in inferior endplate of C7, chronic.  There are tiny bilateral foraminal perineural cysts.  There is minimal bulging of the annulus and mild facet joint arthropathy without spinal canal or foraminal stenosis.    Soft tissues/other: The prevertebral soft tissues are normal.  The airway is patent.   Impression          1. There is multilevel degenerative disc disease described in detail above.  There is no acute fracture.  There is degenerative listhesis at several levels.  There is some degree of disc osteophyte complex, uncovertebral spurring and/or facet joint arthropathy contributing to some degree of foraminal stenosis at several levels.  There is no significant spinal canal stenosis.  There is very subtle flattening of the ventral cord surface at the C4-5 and C6-7 levels The pertinent findings are summarized below.    2. At the C3-4 level, there is no spinal  stenosis.  There is moderate to marked left foraminal stenosis.    3. At the C4-5 level there is no spinal stenosis.  There is mild to moderate left foraminal stenosis.    4. At the C5-6 level, there is mild spinal stenosis with at least moderate bilateral foraminal stenosis.    5. At the C6-7 level, there is no spinal stenosis.  There is mild bilateral foraminal stenosis.    6. There is no spinal canal or foraminal stenosis at the C2-3 or C7-T1 levels.     Assessment:  Mrs. Richter is a 78 y.o. female with neck and head pain    1. Chronic pain disorder    2. Positive depression screening    3. DDD (degenerative disc disease), cervical    4. Cervical radiculitis          Plan:  1. I have stressed the importance of physical activity and exercise to improve overall health.  2. Monitor progress from repeat C7-T1 IL MELANY. Consider bilateral occipital blocks for head pain.  3. Norco 5mg q12hrs as needed for pain.  reviewed.  Previous UDS consistent   4. Flexeril 10 mg BID #60 1 refill.   5. F/u 3 months or sooner  All medication management was performed by Dr. Timothy Grimaldo

## 2024-02-06 ENCOUNTER — OFFICE VISIT (OUTPATIENT)
Dept: PSYCHIATRY | Facility: CLINIC | Age: 79
End: 2024-02-06
Payer: MEDICARE

## 2024-02-06 VITALS
SYSTOLIC BLOOD PRESSURE: 143 MMHG | BODY MASS INDEX: 24.62 KG/M2 | HEART RATE: 80 BPM | WEIGHT: 122.13 LBS | DIASTOLIC BLOOD PRESSURE: 72 MMHG | HEIGHT: 59 IN

## 2024-02-06 DIAGNOSIS — F19.20 DEPENDENCY ON PAIN MEDICATION: ICD-10-CM

## 2024-02-06 DIAGNOSIS — G31.84 MILD NEUROCOGNITIVE DISORDER: ICD-10-CM

## 2024-02-06 DIAGNOSIS — F33.0 MILD EPISODE OF RECURRENT MAJOR DEPRESSIVE DISORDER: Primary | ICD-10-CM

## 2024-02-06 DIAGNOSIS — F43.10 PTSD (POST-TRAUMATIC STRESS DISORDER): ICD-10-CM

## 2024-02-06 DIAGNOSIS — F41.1 GENERALIZED ANXIETY DISORDER: ICD-10-CM

## 2024-02-06 PROCEDURE — 3077F SYST BP >= 140 MM HG: CPT | Mod: HCNC,CPTII,S$GLB, | Performed by: STUDENT IN AN ORGANIZED HEALTH CARE EDUCATION/TRAINING PROGRAM

## 2024-02-06 PROCEDURE — 96136 PSYCL/NRPSYC TST PHY/QHP 1ST: CPT | Mod: 59,HCNC,S$GLB, | Performed by: STUDENT IN AN ORGANIZED HEALTH CARE EDUCATION/TRAINING PROGRAM

## 2024-02-06 PROCEDURE — 1101F PT FALLS ASSESS-DOCD LE1/YR: CPT | Mod: HCNC,CPTII,S$GLB, | Performed by: STUDENT IN AN ORGANIZED HEALTH CARE EDUCATION/TRAINING PROGRAM

## 2024-02-06 PROCEDURE — 1160F RVW MEDS BY RX/DR IN RCRD: CPT | Mod: HCNC,CPTII,S$GLB, | Performed by: STUDENT IN AN ORGANIZED HEALTH CARE EDUCATION/TRAINING PROGRAM

## 2024-02-06 PROCEDURE — 1159F MED LIST DOCD IN RCRD: CPT | Mod: HCNC,CPTII,S$GLB, | Performed by: STUDENT IN AN ORGANIZED HEALTH CARE EDUCATION/TRAINING PROGRAM

## 2024-02-06 PROCEDURE — 1125F AMNT PAIN NOTED PAIN PRSNT: CPT | Mod: HCNC,CPTII,S$GLB, | Performed by: STUDENT IN AN ORGANIZED HEALTH CARE EDUCATION/TRAINING PROGRAM

## 2024-02-06 PROCEDURE — 3288F FALL RISK ASSESSMENT DOCD: CPT | Mod: HCNC,CPTII,S$GLB, | Performed by: STUDENT IN AN ORGANIZED HEALTH CARE EDUCATION/TRAINING PROGRAM

## 2024-02-06 PROCEDURE — 99999 PR PBB SHADOW E&M-EST. PATIENT-LVL III: CPT | Mod: PBBFAC,HCNC,, | Performed by: STUDENT IN AN ORGANIZED HEALTH CARE EDUCATION/TRAINING PROGRAM

## 2024-02-06 PROCEDURE — 99214 OFFICE O/P EST MOD 30 MIN: CPT | Mod: HCNC,S$GLB,, | Performed by: STUDENT IN AN ORGANIZED HEALTH CARE EDUCATION/TRAINING PROGRAM

## 2024-02-06 PROCEDURE — 3078F DIAST BP <80 MM HG: CPT | Mod: HCNC,CPTII,S$GLB, | Performed by: STUDENT IN AN ORGANIZED HEALTH CARE EDUCATION/TRAINING PROGRAM

## 2024-02-06 RX ORDER — DULOXETIN HYDROCHLORIDE 60 MG/1
60 CAPSULE, DELAYED RELEASE ORAL DAILY
Qty: 30 CAPSULE | Refills: 1
Start: 2024-02-06 | End: 2024-02-08 | Stop reason: SDUPTHER

## 2024-02-06 RX ORDER — BUSPIRONE HYDROCHLORIDE 5 MG/1
TABLET ORAL
Qty: 45 TABLET | Refills: 1 | Status: SHIPPED | OUTPATIENT
Start: 2024-02-06 | End: 2024-03-12 | Stop reason: SDUPTHER

## 2024-02-06 RX ORDER — DONEPEZIL HYDROCHLORIDE 10 MG/1
10 TABLET, FILM COATED ORAL NIGHTLY
Qty: 90 TABLET | Refills: 0 | Status: SHIPPED | OUTPATIENT
Start: 2024-02-06 | End: 2024-02-08 | Stop reason: SDUPTHER

## 2024-02-06 NOTE — PROGRESS NOTES
Outpatient Psychiatry Followup Visit (DO/MD/NP, etc.)    2/6/2024  Assessment & Plan    Assessment - Plan:     Impression     ICD-10-CM ICD-9-CM   1. Mild episode of recurrent major depressive disorder  F33.0 296.31   2. Generalized anxiety disorder  F41.1 300.02   3. PTSD (post-traumatic stress disorder)  F43.10 309.81   4. Mild neurocognitive disorder  G31.84 331.83   5. 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 BMI 24.0-24.9, adult  Z68.24 V85.1        Plan of Care & Medication Management    Chart was reviewed. The risks and benefits of medication were discussed with pt. The treatment plan and followup plan were reviewed with pt. Pt understands to contact clinic if symptoms worsen. Pt understands to call 911 or go to nearest ER for suicidal ideation, intent or plan.   RX History ARICEPT, BARBITURATES, BUSPAR, GABAPENTIN, PROZAC, WELLBUTRIN, and ZOLOFT    Current RX [id number]: 3month supply of ARICEPT sent to pharmacy, with neurology or primary care to refill  Continue BUSPAR  Adjustments:  [id number]: Adjust to 5-7.5mg in the afternoon  [id number]: Reduce to 7.5mg in the afternoon  [id number]: Reduce to 7.5mg BID  [id number]: Increase to 10mg BID  [id number]: Start 5mg BID for 3 days  Prior to 31JAN2023 pt had been taking ZOLOFT 150mg daily  Continue CYMBALTA  Adjustments:  [id number]: Continue CYMBALTA 60mg daily   Education & Counseling RX administration and adherence   Other Orders Continue individual psychotherapy   Monitor VITAL SIGNS  Instruments: PROMIS (A&D), PSS4  [id number] 29/30 S-MMSE, 4/5 Mini-Cog    RETURN R: RETURN IN 8 WEEKS (TWO MONTHS), and reassess frequency within three visits from now  Continue medication management     Subjective    Interval History:     Available documentation has been reviewed, and pertinent elements of the chart have been incorporated into this note where appropriate. Last Epic encounter with writer was on 1/2/2024   Rola Richter, a 78 y.o. female, presenting  "for followup visit.      Since last visit, reports overall A LITTLE BETTER.    Mood has been a bit better. Much less anxious.    Poor memory concerns. Forgetfulness, losing things. Ran out of ARICEPT, requesting refills.    Complains of pain, NOT much improved with CYMBALTA.    Will continue treatment as planned.    Can reduce visit frequency to q2m       Unless otherwise specified, pt did NOT display signs of nor endorse symptoms of overt psychosis or acute mood disorder requiring hospitalization during the encounter; pt denied violent thoughts or suicidal or homicidal ideation, intent, or plan.         Objective    Measurement-Based Care (MBC):     Routine Instruments   PROMIS-ANXIETY Interpretation: 10 (4a raw score): T-SCORE 59.5; MILD using 55-60-70 cutoffs. Last T-SCORE was 69.3. This PROMIS T-score improvement of more than 10 points is an IMPROVEMENT by more than one standard deviation.   PROMIS-DEPRESSION Interpretation: 10 (4a raw score): T-SCORE 58.9; MILD using 55-60-70 cutoffs. Last T-SCORE was 63.9. This PROMIS T-score improvement of more than 5 points is an IMPROVEMENT by more than a half standard deviation.   PSS4 Interpretation: 10/16; MODERATE using 6-11 cutoffs. 2 PH, 0 LSE. Last PSS4 score was 7. This PSS4 score change of less than 4 points indicates the score is probably stable.   Additional Instruments   N/A     Current Evaluation of Mental Status:     Constitutional / General       Vitals:    02/06/24 1336   BP: (!) 143/72   Pulse: 80   Weight: 55.4 kg (122 lb 2.2 oz)   Height: 4' 11" (1.499 m)       Psychiatric / Mental Status Examination  1. Appearance: Dress is informal but appropriate. Motor activity normal.  2. Discourse: Clear speech with normal rate and volume. Associations intact. Orderly.  3. Emotional Expression: Euthymic mood with appropriate affect.  4. Perception and Thinking: No hallucinations. No suicidality, no homicidality, delusions, or paranoia.  5. Sensorium: Grossly intact. " "Able to focus for interview.  6. Memory and Fund of Knowledge: Intact for content of interview.  7. Insight and Judgment: Intact.               Billing Documentation:     Method of Encounter IN PERSON visit at the clinic   Type of Encounter Follow up visit with me   Counseling;  Psychotherapy    Counseling;  Tobacco and/or Nicotine    Additional Codes and Modifiers 08776, with modifer 59: administered and scored more than one psychological or neuropsychological tests (see MBC above) (16+ mins)   Time Remaining Chart/Pt 95477: FOLLOW UP VISIT, Rx mgmt, "Multiple STABLE chronic illnesses"   Total Mins  (2/6/2024) N/A - Not billing for time        Will Leong DO  Department of Psychiatry, [facility]        "

## 2024-02-06 NOTE — PATIENT INSTRUCTIONS
Move up primary care appointment to a sooner time due to your pain. A 3month supply of ARICEPT was sent to your pharmacy, with neurology or primary care to order future refills    Continue other medications as prescribed     Keep therapy appointments

## 2024-02-08 DIAGNOSIS — G31.84 MILD NEUROCOGNITIVE DISORDER: ICD-10-CM

## 2024-02-08 DIAGNOSIS — F41.1 GENERALIZED ANXIETY DISORDER: ICD-10-CM

## 2024-02-08 DIAGNOSIS — F33.0 MILD EPISODE OF RECURRENT MAJOR DEPRESSIVE DISORDER: ICD-10-CM

## 2024-02-08 RX ORDER — DONEPEZIL HYDROCHLORIDE 10 MG/1
10 TABLET, FILM COATED ORAL NIGHTLY
Qty: 90 TABLET | Refills: 0 | Status: SHIPPED | OUTPATIENT
Start: 2024-02-08 | End: 2024-05-22 | Stop reason: SDUPTHER

## 2024-02-08 RX ORDER — DULOXETIN HYDROCHLORIDE 60 MG/1
60 CAPSULE, DELAYED RELEASE ORAL DAILY
Qty: 30 CAPSULE | Refills: 1
Start: 2024-02-08 | End: 2024-02-14

## 2024-02-08 NOTE — TELEPHONE ENCOUNTER
----- Message from Mathieu Mac sent at 2/8/2024 10:52 AM CST -----  Regarding: refill  Contact: patient  Type:  RX Refill Request    Who Called: patient  Refill or New Rx:refill  RX Name and Strength:DULoxetine (CYMBALTA) 60 MG capsule and  donepeziL (ARICEPT) 10 MG tablet   How is the patient currently taking it? (ex. 1XDay):1  Is this a 30 day or 90 day RX:90  Preferred Pharmacy with phone number:  [id number] - [place], [place] - [address] [facility] [address]  [address]  [facility] [address]  Phone: [phone] Fax: [phone]  Local or Mail Order:local  Ordering Provider:Florian Gandhi  Would the patient rather a call back or a response via [facility]? refill  Best Call Back Number:[phone]  Additional Information:

## 2024-02-14 ENCOUNTER — HOSPITAL ENCOUNTER (EMERGENCY)
Facility: HOSPITAL | Age: 79
Discharge: HOME | End: 2024-02-15
Attending: EMERGENCY MEDICINE
Payer: MEDICARE

## 2024-02-14 ENCOUNTER — OFFICE VISIT (OUTPATIENT)
Dept: FAMILY MEDICINE | Facility: CLINIC | Age: 79
End: 2024-02-14
Payer: MEDICARE

## 2024-02-14 VITALS
RESPIRATION RATE: 18 BRPM | BODY MASS INDEX: 23.99 KG/M2 | TEMPERATURE: 98 F | HEIGHT: 59 IN | SYSTOLIC BLOOD PRESSURE: 161 MMHG | DIASTOLIC BLOOD PRESSURE: 74 MMHG | WEIGHT: 119 LBS | OXYGEN SATURATION: 96 % | HEART RATE: 89 BPM

## 2024-02-14 VITALS
SYSTOLIC BLOOD PRESSURE: 164 MMHG | OXYGEN SATURATION: 97 % | HEART RATE: 90 BPM | WEIGHT: 116.88 LBS | HEIGHT: 59 IN | DIASTOLIC BLOOD PRESSURE: 72 MMHG | BODY MASS INDEX: 23.56 KG/M2 | RESPIRATION RATE: 12 BRPM | TEMPERATURE: 98 F

## 2024-02-14 DIAGNOSIS — R07.89 CHEST TIGHTNESS: Primary | ICD-10-CM

## 2024-02-14 DIAGNOSIS — K21.9 GASTROESOPHAGEAL REFLUX DISEASE, UNSPECIFIED WHETHER ESOPHAGITIS PRESENT: Primary | ICD-10-CM

## 2024-02-14 DIAGNOSIS — R11.2 NAUSEA AND VOMITING, UNSPECIFIED VOMITING TYPE: ICD-10-CM

## 2024-02-14 DIAGNOSIS — R42 DIZZINESS: ICD-10-CM

## 2024-02-14 LAB
ALBUMIN SERPL BCP-MCNC: 4.4 G/DL (ref 3.5–5.2)
ALP SERPL-CCNC: 82 U/L (ref 55–135)
ALT SERPL W/O P-5'-P-CCNC: 10 U/L (ref 10–44)
ANION GAP SERPL CALC-SCNC: 14 MMOL/L (ref 8–16)
AST SERPL-CCNC: 16 U/L (ref 10–40)
BASOPHILS # BLD AUTO: 0.04 K/UL (ref 0–0.2)
BASOPHILS NFR BLD: 0.5 % (ref 0–1.9)
BILIRUB SERPL-MCNC: 0.4 MG/DL (ref 0.1–1)
BNP SERPL-MCNC: 16 PG/ML (ref 0–99)
BUN SERPL-MCNC: 18 MG/DL (ref 8–23)
CALCIUM SERPL-MCNC: 9.8 MG/DL (ref 8.7–10.5)
CHLORIDE SERPL-SCNC: 104 MMOL/L (ref 95–110)
CO2 SERPL-SCNC: 22 MMOL/L (ref 23–29)
CREAT SERPL-MCNC: 1 MG/DL (ref 0.5–1.4)
CREAT SERPL-MCNC: 1 MG/DL (ref 0.5–1.4)
DIFFERENTIAL METHOD BLD: ABNORMAL
EOSINOPHIL # BLD AUTO: 0.2 K/UL (ref 0–0.5)
EOSINOPHIL NFR BLD: 2.4 % (ref 0–8)
ERYTHROCYTE [DISTWIDTH] IN BLOOD BY AUTOMATED COUNT: 13.7 % (ref 11.5–14.5)
EST. GFR  (NO RACE VARIABLE): 57.7 ML/MIN/1.73 M^2
GLUCOSE SERPL-MCNC: 108 MG/DL (ref 70–110)
HCT VFR BLD AUTO: 33.7 % (ref 37–48.5)
HGB BLD-MCNC: 11.2 G/DL (ref 12–16)
IMM GRANULOCYTES # BLD AUTO: 0.04 K/UL (ref 0–0.04)
IMM GRANULOCYTES NFR BLD AUTO: 0.5 % (ref 0–0.5)
INFLUENZA A, MOLECULAR: NEGATIVE
INFLUENZA B, MOLECULAR: NEGATIVE
LIPASE SERPL-CCNC: 11 U/L (ref 4–60)
LYMPHOCYTES # BLD AUTO: 2 K/UL (ref 1–4.8)
LYMPHOCYTES NFR BLD: 23.6 % (ref 18–48)
MAGNESIUM SERPL-MCNC: 2.5 MG/DL (ref 1.6–2.6)
MCH RBC QN AUTO: 30.2 PG (ref 27–31)
MCHC RBC AUTO-ENTMCNC: 33.2 G/DL (ref 32–36)
MCV RBC AUTO: 91 FL (ref 82–98)
MONOCYTES # BLD AUTO: 0.6 K/UL (ref 0.3–1)
MONOCYTES NFR BLD: 6.8 % (ref 4–15)
NEUTROPHILS # BLD AUTO: 5.5 K/UL (ref 1.8–7.7)
NEUTROPHILS NFR BLD: 66.2 % (ref 38–73)
NRBC BLD-RTO: 0 /100 WBC
OHS QRS DURATION: 72 MS
OHS QTC CALCULATION: 456 MS
PLATELET # BLD AUTO: 295 K/UL (ref 150–450)
PMV BLD AUTO: 10.1 FL (ref 9.2–12.9)
POTASSIUM SERPL-SCNC: 3.8 MMOL/L (ref 3.5–5.1)
PROT SERPL-MCNC: 6.8 G/DL (ref 6–8.4)
RBC # BLD AUTO: 3.71 M/UL (ref 4–5.4)
SAMPLE: NORMAL
SARS-COV-2 RDRP RESP QL NAA+PROBE: NEGATIVE
SODIUM SERPL-SCNC: 140 MMOL/L (ref 136–145)
SPECIMEN SOURCE: NORMAL
TROPONIN I SERPL HS-MCNC: 5.4 PG/ML (ref 0–14.9)
TROPONIN I SERPL HS-MCNC: 6.5 PG/ML (ref 0–14.9)
WBC # BLD AUTO: 8.36 K/UL (ref 3.9–12.7)

## 2024-02-14 PROCEDURE — 93010 ELECTROCARDIOGRAM REPORT: CPT | Mod: ,,, | Performed by: GENERAL PRACTICE

## 2024-02-14 PROCEDURE — 3077F SYST BP >= 140 MM HG: CPT | Mod: HCNC,CPTII,S$GLB,

## 2024-02-14 PROCEDURE — 82565 ASSAY OF CREATININE: CPT | Mod: 59

## 2024-02-14 PROCEDURE — 1126F AMNT PAIN NOTED NONE PRSNT: CPT | Mod: HCNC,CPTII,S$GLB,

## 2024-02-14 PROCEDURE — 93010 ELECTROCARDIOGRAM REPORT: CPT | Mod: HCWC,S$GLB,, | Performed by: INTERNAL MEDICINE

## 2024-02-14 PROCEDURE — 99285 EMERGENCY DEPT VISIT HI MDM: CPT | Mod: 25

## 2024-02-14 PROCEDURE — U0002 COVID-19 LAB TEST NON-CDC: HCPCS | Performed by: STUDENT IN AN ORGANIZED HEALTH CARE EDUCATION/TRAINING PROGRAM

## 2024-02-14 PROCEDURE — 1101F PT FALLS ASSESS-DOCD LE1/YR: CPT | Mod: HCNC,CPTII,S$GLB,

## 2024-02-14 PROCEDURE — 80053 COMPREHEN METABOLIC PANEL: CPT | Performed by: STUDENT IN AN ORGANIZED HEALTH CARE EDUCATION/TRAINING PROGRAM

## 2024-02-14 PROCEDURE — 3288F FALL RISK ASSESSMENT DOCD: CPT | Mod: HCNC,CPTII,S$GLB,

## 2024-02-14 PROCEDURE — 63600175 PHARM REV CODE 636 W HCPCS: Performed by: STUDENT IN AN ORGANIZED HEALTH CARE EDUCATION/TRAINING PROGRAM

## 2024-02-14 PROCEDURE — 93005 ELECTROCARDIOGRAM TRACING: CPT | Mod: HCNC,S$GLB,,

## 2024-02-14 PROCEDURE — 96375 TX/PRO/DX INJ NEW DRUG ADDON: CPT

## 2024-02-14 PROCEDURE — 84484 ASSAY OF TROPONIN QUANT: CPT | Performed by: STUDENT IN AN ORGANIZED HEALTH CARE EDUCATION/TRAINING PROGRAM

## 2024-02-14 PROCEDURE — 83690 ASSAY OF LIPASE: CPT | Performed by: STUDENT IN AN ORGANIZED HEALTH CARE EDUCATION/TRAINING PROGRAM

## 2024-02-14 PROCEDURE — 96374 THER/PROPH/DIAG INJ IV PUSH: CPT | Mod: 59

## 2024-02-14 PROCEDURE — 85025 COMPLETE CBC W/AUTO DIFF WBC: CPT | Performed by: STUDENT IN AN ORGANIZED HEALTH CARE EDUCATION/TRAINING PROGRAM

## 2024-02-14 PROCEDURE — 99215 OFFICE O/P EST HI 40 MIN: CPT | Mod: HCNC,S$GLB,,

## 2024-02-14 PROCEDURE — 99999 PR PBB SHADOW E&M-EST. PATIENT-LVL V: CPT | Mod: PBBFAC,HCNC,,

## 2024-02-14 PROCEDURE — 83735 ASSAY OF MAGNESIUM: CPT | Performed by: STUDENT IN AN ORGANIZED HEALTH CARE EDUCATION/TRAINING PROGRAM

## 2024-02-14 PROCEDURE — 3078F DIAST BP <80 MM HG: CPT | Mod: HCNC,CPTII,S$GLB,

## 2024-02-14 PROCEDURE — 1159F MED LIST DOCD IN RCRD: CPT | Mod: HCNC,CPTII,S$GLB,

## 2024-02-14 PROCEDURE — 1160F RVW MEDS BY RX/DR IN RCRD: CPT | Mod: HCNC,CPTII,S$GLB,

## 2024-02-14 PROCEDURE — 83880 ASSAY OF NATRIURETIC PEPTIDE: CPT | Performed by: STUDENT IN AN ORGANIZED HEALTH CARE EDUCATION/TRAINING PROGRAM

## 2024-02-14 PROCEDURE — 87502 INFLUENZA DNA AMP PROBE: CPT | Performed by: STUDENT IN AN ORGANIZED HEALTH CARE EDUCATION/TRAINING PROGRAM

## 2024-02-14 PROCEDURE — 93005 ELECTROCARDIOGRAM TRACING: CPT | Performed by: GENERAL PRACTICE

## 2024-02-14 PROCEDURE — 25000003 PHARM REV CODE 250: Performed by: STUDENT IN AN ORGANIZED HEALTH CARE EDUCATION/TRAINING PROGRAM

## 2024-02-14 PROCEDURE — 25500020 PHARM REV CODE 255: Performed by: EMERGENCY MEDICINE

## 2024-02-14 RX ORDER — ONDANSETRON HYDROCHLORIDE 2 MG/ML
4 INJECTION, SOLUTION INTRAVENOUS
Status: COMPLETED | OUTPATIENT
Start: 2024-02-14 | End: 2024-02-14

## 2024-02-14 RX ORDER — ALUMINUM HYDROXIDE, MAGNESIUM HYDROXIDE, AND SIMETHICONE 1200; 120; 1200 MG/30ML; MG/30ML; MG/30ML
30 SUSPENSION ORAL ONCE
Status: COMPLETED | OUTPATIENT
Start: 2024-02-14 | End: 2024-02-14

## 2024-02-14 RX ORDER — IPRATROPIUM BROMIDE AND ALBUTEROL SULFATE 2.5; .5 MG/3ML; MG/3ML
3 SOLUTION RESPIRATORY (INHALATION) EVERY 4 HOURS
Status: DISCONTINUED | OUTPATIENT
Start: 2024-02-14 | End: 2024-02-14

## 2024-02-14 RX ORDER — FAMOTIDINE 10 MG/ML
20 INJECTION INTRAVENOUS
Status: COMPLETED | OUTPATIENT
Start: 2024-02-14 | End: 2024-02-14

## 2024-02-14 RX ORDER — LIDOCAINE HYDROCHLORIDE 20 MG/ML
15 SOLUTION OROPHARYNGEAL ONCE
Status: COMPLETED | OUTPATIENT
Start: 2024-02-14 | End: 2024-02-14

## 2024-02-14 RX ORDER — SUCRALFATE 1 G/1
1 TABLET ORAL 4 TIMES DAILY
Qty: 80 TABLET | Refills: 0 | OUTPATIENT
Start: 2024-02-14 | End: 2024-02-19

## 2024-02-14 RX ADMIN — ONDANSETRON 4 MG: 2 INJECTION INTRAMUSCULAR; INTRAVENOUS at 03:02

## 2024-02-14 RX ADMIN — IOHEXOL 100 ML: 350 INJECTION, SOLUTION INTRAVENOUS at 04:02

## 2024-02-14 RX ADMIN — LIDOCAINE HYDROCHLORIDE 15 ML: 20 SOLUTION ORAL at 03:02

## 2024-02-14 RX ADMIN — ALUMINUM HYDROXIDE, MAGNESIUM HYDROXIDE, AND SIMETHICONE 30 ML: 200; 200; 20 SUSPENSION ORAL at 03:02

## 2024-02-14 RX ADMIN — FAMOTIDINE 20 MG: 10 INJECTION, SOLUTION INTRAVENOUS at 05:02

## 2024-02-14 NOTE — PROGRESS NOTES
Subjective:       Patient ID: Rola Richter is a 78 y.o. female.    Chief Complaint: Nausea      Rola Richter is a 78 y.o. female with history of HTN, HLP, GERD who presents to clinic with  for evaluation of nausea and vomiting ongoing for about 6 days. Reports once daily episodes of non-bloody, non-bilious vomiting after eating since last Thursday. Denies fever, chills, or diarrhea. Reports normal BM this morning. She also reports headache, dizziness, chest tightness and shortness of breath associated with episodes of nausea and vomiting.  reports he recently recovered from similar symptoms last week.      endorses concern over her shortness of breath and chest tightness, noting that this is quite unusual for her.         Review of Systems   Constitutional:  Negative for chills, fatigue and fever.   Respiratory:  Positive for shortness of breath. Negative for cough.    Cardiovascular:  Negative for chest pain and palpitations.   Gastrointestinal:  Positive for abdominal pain, nausea and vomiting. Negative for constipation and diarrhea.   Neurological:  Positive for dizziness and weakness. Negative for light-headedness and headaches.         Past Medical History:   Diagnosis Date    Anxiety     Arthritis     Cataract     DDD (degenerative disc disease), lumbar     Depression     Encounter for blood transfusion     GERD (gastroesophageal reflux disease)     Headache     Hyperlipidemia     Hypertension     pt states she does not take meds anymore she just watches her weight    Neuromuscular disorder     Occipital neuralgia 3/24/2017    Osteoporosis        Review of patient's allergies indicates:   Allergen Reactions    Aspirin Nausea And Vomiting    Penicillins Itching    Crestor [rosuvastatin]      Muscle pain      Lipitor [atorvastatin]      Achy           Current Outpatient Medications:     busPIRone (BUSPAR) 5 MG Tab, Take one or one and a half (5mg total or 7.5mg total) every  afternoon., Disp: 45 tablet, Rfl: 1    COD LIVER OIL ORAL, Take 1 tablet by mouth once daily. , Disp: , Rfl:     cyclobenzaprine (FLEXERIL) 10 MG tablet, Take 1 tablet (10 mg total) by mouth 2 (two) times daily as needed for Muscle spasms., Disp: 60 tablet, Rfl: 1    diclofenac sodium (VOLTAREN) 1 % Gel, Apply 2 g topically 4 (four) times daily., Disp: 450 g, Rfl: 3    donepeziL (ARICEPT) 10 MG tablet, Take 1 tablet (10 mg total) by mouth every evening., Disp: 90 tablet, Rfl: 0    evolocumab (REPATHA SURECLICK) 140 mg/mL PnIj, Inject 1 mL (140 mg total) into the skin every 14 (fourteen) days., Disp: 2 mL, Rfl: 2    fish oil-omega-3 fatty acids 300-1,000 mg capsule, Take 2 g by mouth once daily., Disp: , Rfl:     fluticasone propionate (FLONASE) 50 mcg/actuation nasal spray, 2 sprays (100 mcg total) by Each Nostril route once daily., Disp: 16 g, Rfl: 11    gabapentin (NEURONTIN) 100 MG capsule, TAKE 1 CAPSULE TWICE DAILY, Disp: 180 capsule, Rfl: 3    HYDROcodone-acetaminophen (NORCO) 5-325 mg per tablet, Take 1 tablet by mouth every 12 (twelve) hours as needed for Pain., Disp: 60 tablet, Rfl: 0    [START ON 3/1/2024] HYDROcodone-acetaminophen (NORCO) 5-325 mg per tablet, Take 1 tablet by mouth every 12 (twelve) hours as needed for Pain., Disp: 60 tablet, Rfl: 0    [START ON 3/30/2024] HYDROcodone-acetaminophen (NORCO) 5-325 mg per tablet, Take 1 tablet by mouth every 12 (twelve) hours as needed for Pain., Disp: 60 tablet, Rfl: 0    ipratropium (ATROVENT) 42 mcg (0.06 %) nasal spray, 2 sprays by Nasal route 3 (three) times daily., Disp: 15 mL, Rfl: 6    irbesartan (AVAPRO) 300 MG tablet, Take 1 tablet (300 mg total) by mouth every evening., Disp: 90 tablet, Rfl: 1    meloxicam (MOBIC) 7.5 MG tablet, Take 1 tablet (7.5 mg total) by mouth daily as needed for Pain., Disp: 90 tablet, Rfl: PRN    multivit with min-folic acid 200 mcg Chew, Take 1 tablet by mouth once daily. , Disp: , Rfl:     omeprazole (PRILOSEC) 40 MG  capsule, TAKE 1 CAPSULE EVERY DAY, Disp: 90 capsule, Rfl: 2    terbinafine HCL (LAMISIL) 1 % cream, Apply topically 2 (two) times daily., Disp: 15 g, Rfl: 1    cetirizine (ZYRTEC) 5 MG tablet, Take 1 tablet (5 mg total) by mouth once daily., Disp: 30 tablet, Rfl: 11    DULoxetine (CYMBALTA) 60 MG capsule, Take 1 capsule (60 mg total) by mouth once daily. (Patient not taking: Reported on 2/14/2024), Disp: 30 capsule, Rfl: 1  No current facility-administered medications for this visit.    Facility-Administered Medications Ordered in Other Visits:     electrolyte-S (ISOLYTE), , Intravenous, Continuous, Jose Nguyen MD    lactated ringers infusion, , Intravenous, Continuous, Jose Nguyen MD, Stopped at 01/10/24 1122    metoclopramide injection 10 mg, 10 mg, Intravenous, Q10 Min PRN, Jose Nguyen MD    Objective:        Physical Exam  Vitals reviewed.   Constitutional:       Appearance: Normal appearance. She is not ill-appearing.      Comments: Patient reported dizziness, weakness, and chest tightness on completion of EKG   HENT:      Head: Normocephalic and atraumatic.   Eyes:      Conjunctiva/sclera: Conjunctivae normal.   Cardiovascular:      Rate and Rhythm: Normal rate and regular rhythm.      Heart sounds: Normal heart sounds.   Pulmonary:      Breath sounds: Normal breath sounds. No wheezing, rhonchi or rales.      Comments: Increased work of breathing on exam, O2 sat 100%  Abdominal:      General: Bowel sounds are normal.      Palpations: Abdomen is soft.      Tenderness: There is abdominal tenderness (diffuse). There is no guarding or rebound.   Musculoskeletal:         General: Normal range of motion.      Cervical back: Normal range of motion and neck supple.   Skin:     General: Skin is warm and dry.   Neurological:      Mental Status: She is alert and oriented to person, place, and time.           Visit Vitals  BP (!) 164/72 (BP Location: Right arm, Patient Position: Sitting, BP Method:  "Small (Manual))   Pulse 90   Temp 97.6 °F (36.4 °C) (Oral)   Resp 12   Ht 4' 11" (1.499 m)   Wt 53 kg (116 lb 13.5 oz)   SpO2 97%   BMI 23.60 kg/m²      Assessment:         1. Chest tightness    2. Nausea and vomiting, unspecified vomiting type    3. Dizziness        Plan:         Rola was seen today for nausea.    Diagnoses and all orders for this visit:    Chest tightness  -     IN OFFICE EKG 12-LEAD (to Muse)    Nausea and vomiting, unspecified vomiting type    Dizziness       Recommend the patient report to the ER for further evaluation. Pt and  demonstrate understanding.     Pt requests that EMS be called for transportation.         Family Medicine Physician Assistant     Future Appointments       Date Provider Specialty Appt Notes    3/5/2024 Florian Gandhi, NP Family Medicine 3 month f/u    3/12/2024 Marcie Garcia LCSW Psychiatry f/u    4/4/2024 Will Leong DO Psychiatry f/u    4/30/2024 Kathy Jim PA-C Pain Medicine 3 month    5/14/2024 Juan Manuel Eduardo, OD Optometry refraction    7/5/2024 Liseth Carias MD Family Medicine 6 month f/u             Tests to Keep You Healthy    Colon Cancer Screening: Met on 5/13/2019  Last Blood Pressure <= 139/89 (2/14/2024): NO       I spent a total of 30 minutes on the day of the visit.This includes face to face time and non-face to face time preparing to see the patient (eg, review of tests), obtaining and/or reviewing separately obtained history, documenting clinical information in the electronic or other health record, independently interpreting results and communicating results to the patient/family/caregiver, or care coordinator.    We have addressed [5] High: 1 or more chronic illnesses with severe exacerbation, progression, or side effects of treatment / 1 acute or chronic illness or injury that poses a threat to life or bodily function  The complexity of the data reviewed and analyzed for this visit was [5] Extensive (MUST HAVE " 2 OF 3: Reviewed prior external note, ordered unique testing and reviewed the results of each unique test / Independently interpreted a test / Discussed management or interpretation of a test with another provider)  The risk of complications and/or morbidity or mortality are [5] High risk (I.e. Drug therapy requiring intensive monitoring for toxicity / Decision regarding elective major surgery with identified patient or procedure risk factors / Decision regarding emergency major surgery / Decision regarding hospitalization / Decision not to resuscitate or to de-escalate care because of poor prognosis)   The level of Medical Decision Making for this visit is [5] High

## 2024-02-14 NOTE — ED PROVIDER NOTES
Encounter Date: 2/14/2024       History     Chief Complaint   Patient presents with    Cough    Shortness of Breath     EMESIS, COUGH, CHEST TIGHTNESS-SENT BY CLINIC [facility]     78-year-old female PMH significant for hypertension, hyperlipidemia, GERD presents emergency room today for evaluation of a reported 3 days of midepigastric abdominal pain associated with nausea and vomiting.  She was sent by family practice provider given that she reported chest pain or shortness breath associated with this.  That no reported 6 days of symptoms.  Tells me she has had at least once daily episodes of nonbilious, nonbloody emesis.  Her  recently had similar symptoms.  She describes the chest pain is burning, radiating upwards from the midepigastrium.  Does not radiate through to the back.    The history is provided by the patient. No  was used.     Review of patient's allergies indicates:   Allergen Reactions    Aspirin Nausea And Vomiting    Penicillins Itching    Crestor [rosuvastatin]      Muscle pain      Lipitor [atorvastatin]      Achy       Past Medical History:   Diagnosis Date    Anxiety     Arthritis     Cataract     DDD (degenerative disc disease), lumbar     Depression     Encounter for blood transfusion     GERD (gastroesophageal reflux disease)     Headache     Hyperlipidemia     Hypertension     pt states she does not take meds anymore she just watches her weight    Neuromuscular disorder     Occipital neuralgia 3/24/2017    Osteoporosis      Past Surgical History:   Procedure Laterality Date    APPENDECTOMY      CATARACT EXTRACTION W/  INTRAOCULAR LENS IMPLANT Left 10/25/2023    Procedure: CEIOL OS;  Surgeon: Rustam Dyer MD;  Location: [facility];  Service: Ophthalmology;  Laterality: Left;  Last Case of day, short eye, very high power IOL    CATARACT EXTRACTION W/  INTRAOCULAR LENS IMPLANT Right 1/10/2024    Procedure: CEIOL OD Preop Mannitol;  Surgeon: Rustam Dyer  MD HILDA;  Location: [facility] OR;  Service: Ophthalmology;  Laterality: Right;  Will need pre-op Mannitol     SECTION      x 2    CHOLECYSTECTOMY      COLONOSCOPY  prior to     COLONOSCOPY N/A 2019    Procedure: COLONOSCOPY;  Surgeon: Greg Victor MD;  Location: [facility];  Service: Endoscopy;  Laterality: N/A; 2 colon polyps, hemorrhoids; repeat in 5 years for surveillance; biopsy: Tubular adenoma x2    EPIDURAL STEROID INJECTION INTO CERVICAL SPINE N/A 2022    Procedure: Injection-steroid-epidural-cervical C7-T1;  Surgeon: Timothy Grimaldo MD;  Location: [facility] OR;  Service: Pain Management;  Laterality: N/A;    ESOPHAGOGASTRODUODENOSCOPY N/A 2019    Procedure: EGD (ESOPHAGOGASTRODUODENOSCOPY);  Surgeon: Greg Victor MD;  Location: [facility];  Service: Endoscopy;  Laterality: N/A; Mild Schatzki ring. Dilated. small hiatal hernia; Four gastric polyps. Resected and retrieved, gastritis; biopsy:stomach- unremarkable, negative for h pylori, polyps-Fundic type mucosa with foveolar hyperplasia.    ESOPHAGOGASTRODUODENOSCOPY N/A 2021    Procedure: EGD (ESOPHAGOGASTRODUODENOSCOPY);  Surgeon: Greg Victor MD;  Location: [facility];  Service: Endoscopy;  Laterality: N/A;    HYSTERECTOMY      Laser Periphery Iridotomy Bilateral     OD 16 and OS touch up 2016    UPPER GASTROINTESTINAL ENDOSCOPY  2017    Dr. Marcial: esophageal stenosis- dilated, gastritis, gastric polyps removed; biopsy- mild gastritis, negative for h pylori, hyperplastic polyp, esophagus unremarkable    vocal cord tumor removal       Family History   Problem Relation Age of Onset    Osteoarthritis Mother     Alcohol abuse Mother     Rheum arthritis Mother     Osteoarthritis Sister     Diabetes Brother     No Known Problems Son     No Known Problems Sister     No Known Problems Sister     No Known Problems Brother     Arthritis Son     No Known Problems Son     Stroke Maternal Grandmother 99    Rheum arthritis  Maternal Grandmother     Retinal detachment Neg Hx     Macular degeneration Neg Hx     Glaucoma Neg Hx     Amblyopia Neg Hx     Blindness Neg Hx     Cancer Neg Hx     Cataracts Neg Hx     Hypertension Neg Hx     Strabismus Neg Hx     Thyroid disease Neg Hx     Lupus Neg Hx     Kidney disease Neg Hx     Inflammatory bowel disease Neg Hx     Psoriasis Neg Hx     Colon cancer Neg Hx     Crohn's disease Neg Hx     Ulcerative colitis Neg Hx     Stomach cancer Neg Hx     Esophageal cancer Neg Hx      Social History     Tobacco Use    Smoking status: Never    Smokeless tobacco: Never   Substance Use Topics    Alcohol use: No     Alcohol/week: 0.0 standard drinks of alcohol    Drug use: Yes     Types: Hydrocodone     Review of Systems   Constitutional:  Positive for fatigue. Negative for chills and fever.   HENT:  Negative for congestion, hearing loss, sore throat and trouble swallowing.    Eyes:  Negative for visual disturbance.   Respiratory:  Positive for cough and shortness of breath. Negative for chest tightness.    Cardiovascular:  Positive for chest pain.   Gastrointestinal:  Positive for abdominal pain, nausea and vomiting. Negative for constipation and diarrhea.   Endocrine: Negative for polyuria.   Genitourinary:  Negative for difficulty urinating.   Musculoskeletal:  Negative for arthralgias and myalgias.   Skin:  Negative for rash.   Neurological:  Negative for dizziness and headaches.   Psychiatric/Behavioral:  The patient is not nervous/anxious.        Physical Exam     Initial Vitals [02/14/24 1456]   BP Pulse Resp Temp SpO2   (!) 166/81 85 (!) 39 97.9 °F (36.6 °C) 100 %      MAP       --         Physical Exam    Nursing note and vitals reviewed.  Constitutional: She appears well-developed and well-nourished.   HENT:   Head: Normocephalic and atraumatic.   Eyes: Conjunctivae are normal. Pupils are equal, round, and reactive to light.   Neck: Neck supple.   Normal range of motion.  Cardiovascular:  Normal  rate, regular rhythm and normal heart sounds.           Pulmonary/Chest: Breath sounds normal. No respiratory distress.   Abdominal: Abdomen is soft. She exhibits no distension. There is no abdominal tenderness.   Musculoskeletal:         General: Normal range of motion.      Cervical back: Normal range of motion and neck supple.     Neurological: She is alert and oriented to person, place, and time. GCS score is 15. GCS eye subscore is 4. GCS verbal subscore is 5. GCS motor subscore is 6.   Skin: Skin is warm and dry. Capillary refill takes less than 2 seconds.   Psychiatric: She has a normal mood and affect. Her behavior is normal. Judgment and thought content normal.         ED Course   Procedures  Labs Reviewed   CBC W/ AUTO DIFFERENTIAL - Abnormal; Notable for the following components:       Result Value    RBC 3.71 (*)     Hemoglobin 11.2 (*)     Hematocrit 33.7 (*)     All other components within normal limits   COMPREHENSIVE METABOLIC PANEL - Abnormal; Notable for the following components:    CO2 22 (*)     eGFR 57.7 (*)     All other components within normal limits   MAGNESIUM   TROPONIN I HIGH SENSITIVITY   B-TYPE NATRIURETIC PEPTIDE   SARS-COV-2 RNA AMPLIFICATION, QUAL   INFLUENZA A AND B ANTIGEN    Narrative:     Specimen Source->Nasopharyngeal Swab   LIPASE   TROPONIN I HIGH SENSITIVITY   ISTAT CREATININE   POCT CREATININE          Imaging Results              CT Abdomen Pelvis With IV Contrast NO Oral Contrast (Final result)  Result time 02/14/24 17:23:16      Final result by Berta Gonzáles DO (02/14/24 17:23:16)                   Narrative:    CT ABDOMEN AND PELVIS WITH INTRAVENOUS CONTRAST: 2/14/2024 5:03 PM CST    HISTORY: 78 years  old Female with Epigastric pain; Abdominal pain, acute, nonlocalized.    COMPARISON: None available    TECHNIQUE: Axial contiguous images were obtained from the lung bases to the proximal femurs with intravenous intravenous contrast administered. Sagittal and coronal  reconstructions were also obtained and reviewed. This exam was performed according to our departmental dose-optimization program, which includes automated exposure control, adjustment of the mA and/or KV according to the patient's size and/or use of iterative reconstruction technique.    FINDINGS:    LUNG BASES: No focal consolidative airspace opacities are seen.  No discrete pleural effusions are seen.    LIVER: The visualized hepatic parenchyma demonstrates no focal abnormality.  The main portal vein is well opacified with contrast.    GALLBLADDER: The gallbladder is surgically absent.    SPLEEN: The spleen is normal in size.    PANCREAS: The pancreas is unremarkable.    ADRENAL GLANDS: The bilateral adrenal glands are unremarkable.    KIDNEYS: Both kidneys demonstrate no hydronephrosis. No renal or ureteral calculi are seen.    PELVIS: The urinary bladder is mildly distended. The uterus is not visualized and is likely surgically absent.    STOMACH: The stomach is moderately distended.    BOWEL: The small bowel loops appear unremarkable. No pericolonic inflammatory stranding is seen. There are multiple colonic diverticula without evidence to suggest acute diverticulitis.    APPENDIX: The appendix is not visualized. There are postsurgical changes noted at the base of the cecum, likely from prior appendectomy.    PERITONEUM: There is no evidence of pneumoperitoneum or free fluid.    VESSELS: The IVC is unremarkable. The abdominal aorta is within normal limits of size.    LYMPH NODES: No significantly enlarged lymph nodes are seen in the abdomen or pelvis.    BONES AND SOFT TISSUES: No acute osseous abnormality is seen. Multilevel degenerative changes are seen at the lumbar spine.    IMPRESSION:  No acute process is seen within the abdomen or pelvis.    Electronically signed by:  Berta Gonzáles DO  02/14/2024 05:23 PM CST Workstation: [id number]                                     X-Ray Chest AP Portable (Final  result)  Result time 02/14/24 15:56:15      Final result by Troy Awad MD (02/14/24 15:56:15)                   Narrative:    Reason: Chest Pain    FINDINGS:  Portable chest at 1531 compared with 6/3/2021 shows normal cardiomediastinal silhouette.    Lungs are clear. Pulmonary vasculature is normal. No acute osseous abnormality.    IMPRESSION:  Negative chest.    Electronically signed by:  Troy Awad MD  02/14/2024 03:56 PM [facility] Workstation: [id number]                                     Medications   ondansetron injection 4 mg (4 mg Intravenous Given 2/14/24 1532)   aluminum-magnesium hydroxide-simethicone 200-200-20 mg/5 mL suspension 30 mL (30 mLs Oral Given 2/14/24 1532)     And   LIDOcaine viscous HCl 2% oral solution 15 mL (15 mLs Oral Given 2/14/24 1532)   iohexoL (OMNIPAQUE 350) injection 100 mL (100 mLs Intravenous Given 2/14/24 1653)   famotidine (PF) injection 20 mg (20 mg Intravenous Given 2/14/24 1751)     Medical Decision Making  Patient presents for emergent evaluation of burning epigastric pain. Workup today consistent with likely esophageal reflux.  Differential includes reflux, gastritis pancreatitis cholecystitis/choledocholithiasis/cholangitis, ACS.  Patient is nontoxic and well appearing, Afebrile with stable vital signs.  Labs were ordered and documented in the ED course.  CBC, CMP, magnesium, BNP, lipase all unremarkable.  Negative troponin x2.  Negative for COVID flu.  Imaging was ordered and documented in the ED course.  Chest x-ray without acute cardiopulmonary abnormality.  CT abdomen pelvis without acute intra-abdominal pathology.  EKG not acutely ischemic, largely unchanged from prior.  Heart score 3.    The treatment they received in the emergency department included GI cocktail, Pepcid.  She feels improved.  Less likely this represents cardiothoracic pathology and more likely this is related to known acid reflux.  Patient is on omeprazole 40 mg daily so will add Carafate and  place outpatient referral for GI.  Encouraged to follow-up with cardiologist as well.    Given patient presentation and workup, doubt emergent or surgical pathology necessitating consultation or admission from the emergency department.  I discussed the findings with the patient.  I discussed strict and strong return precautions. They will be discharged home in stable condition.      Amount and/or Complexity of Data Reviewed  Labs: ordered. Decision-making details documented in ED Course.  Radiology: ordered. Decision-making details documented in ED Course.  ECG/medicine tests:  Decision-making details documented in ED Course.    Risk  OTC drugs.  Prescription drug management.      Additional MDM:   Heart Score:    History:          Slightly suspicious.  ECG:             Normal  Age:               >65 years  Risk factors: 1-2 risk factors  Troponin:       Less than or equal to normal limit  Heart Score = 3                ED Course as of 02/14/24 1855 Wed Feb 14, 2024   1400 EKG 12-lead  EKG independently interpreted by me.  Demonstrates normal sinus rhythm rate of 84 beats per minute.  Normal axis, normal intervals.  No acute ST elevation, depression or T-wave inversion to suggest ischemia.  No STEMI. [AN]   1608 BP(!): 166/81 [AN]   1608 Temp: 97.9 °F (36.6 °C) [AN]   1608 Pulse: 85 [AN]   1608 Resp(!): 39  On my exam, patient is not tachypneic [AN]   1608 SpO2: 100 % [AN]   1611 X-Ray Chest AP Portable  I reviewed the images as well as the radiologist interpretation.  I see no evidence of acute cardiopulmonary abnormality on chest x-ray. [AN]   1809 CT Abdomen Pelvis With IV Contrast NO Oral Contrast  No acute process is seen within the abdomen or pelvis. [AN]   1809 CBC auto differential(!)  No leukocytosis.  Normal platelets.  Stable anemia. [AN]   1809 Comprehensive metabolic panel(!)  No significant metabolic abnormality. [AN]   1810 Magnesium : 2.5 [AN]   1810 BNP: 16 [AN]   1810 Lipase: 11 [AN]   1810  SARS-CoV-2 RNA, Amplification, Qual: Negative [AN]   1810 Influenza A, Molecular: Negative [AN]   1810 Influenza B, Molecular: Negative [AN]   1810 Troponin I High Sensitivity: 5.4 [AN]   1834 Troponin I High Sensitivity: 6.5 [AN]   1842 Patient reassessed.  Patient is feeling improved after GI cocktail.  Still having some midepigastric discomfort.  EKG nonischemic, negative stroke x2.  Likely this acute esophageal reflux.  Patient is on omeprazole 40 mg so will initiate Carafate at this time.  Consult placed for GI outpatient evaluation [AN]      ED Course User Index  [AN] Fabian Cueto PA-C                             Clinical Impression:  Final diagnoses:  [K21.9] Gastroesophageal reflux disease, unspecified whether esophagitis present (Primary)          ED Disposition Condition    Discharge Stable          ED Prescriptions       Medication Sig Dispense Start Date End Date Auth. Provider    sucralfate (CARAFATE) 1 gram tablet Take 1 tablet (1 g total) by mouth 4 (four) times daily. for 20 days 80 tablet 2/14/2024 3/5/2024 Fabian Cueto PA-C          Follow-up Information       Follow up With Specialties Details Why Contact Info Additional Information    Liseth Carias MD Family Medicine Schedule an appointment as soon as possible for a visit in 1 week  [address] [facility] [address]  [phone]       [facility] - Emergency Dept Emergency Medicine Go to  As needed, If symptoms worsen [address] [facility] [address]  [phone] 1st floor    Surinder Vang MD Gastroenterology Schedule an appointment as soon as possible for a visit in 1 week  [address] [facility] [address]  [phone]                Fabian Cueto PA-C  02/14/24 5857

## 2024-02-15 NOTE — DISCHARGE INSTRUCTIONS
You were evaluated and treated in the emergency department today. You were found to have a diagnoses that can be managed well at home, however that requires your commitment to getting better.   Problem-Specific Instructions:  Take medication as prescribed.  Follow-up with primary care provider.  Return emergency room for any new or worsening symptoms.  A consult has been placed for Gastroenterology.      Ensure you follow up with your Primary Care Provider or any additional providers listed on this discharge sheet. While you may be healthy enough to go home today, I cannot predict the exact course of your diagnoses. As such, it is your responsibility to monitor symptoms, follow-up with another healthcare provider, or return to the emergency room for new or worsening concerns. Unless otherwise instructed, continue all home medications and any new medications prescribed to you in the Emergency Department.   General Maintenance: Ensure adequate hydration to prevent prolonged illness and recovery. Monitor your caloric intake with a goal of obtaining and maintaining a healthy weight to help prevent the development of chronic and life-threatening medical conditions. Start healthy fitness habits and aim for a goal of 30 minutes to an hour of exercise 3-5 times a week. Avoid the use of tobacco, alcohol, and illicit drugs as these may be detrimental to your health goals.

## 2024-02-16 ENCOUNTER — OFFICE VISIT (OUTPATIENT)
Dept: FAMILY MEDICINE | Facility: CLINIC | Age: 79
End: 2024-02-16
Payer: MEDICARE

## 2024-02-16 VITALS
BODY MASS INDEX: 23.74 KG/M2 | TEMPERATURE: 98 F | DIASTOLIC BLOOD PRESSURE: 70 MMHG | WEIGHT: 117.75 LBS | OXYGEN SATURATION: 99 % | RESPIRATION RATE: 18 BRPM | HEIGHT: 59 IN | SYSTOLIC BLOOD PRESSURE: 138 MMHG | HEART RATE: 88 BPM

## 2024-02-16 DIAGNOSIS — K21.9 GASTROESOPHAGEAL REFLUX DISEASE, UNSPECIFIED WHETHER ESOPHAGITIS PRESENT: Primary | ICD-10-CM

## 2024-02-16 DIAGNOSIS — R11.0 NAUSEA: ICD-10-CM

## 2024-02-16 DIAGNOSIS — R13.14 PHARYNGOESOPHAGEAL DYSPHAGIA: ICD-10-CM

## 2024-02-16 PROCEDURE — 1125F AMNT PAIN NOTED PAIN PRSNT: CPT | Mod: HCNC,CPTII,S$GLB,

## 2024-02-16 PROCEDURE — 1159F MED LIST DOCD IN RCRD: CPT | Mod: HCNC,CPTII,S$GLB,

## 2024-02-16 PROCEDURE — 1160F RVW MEDS BY RX/DR IN RCRD: CPT | Mod: HCNC,CPTII,S$GLB,

## 2024-02-16 PROCEDURE — 99214 OFFICE O/P EST MOD 30 MIN: CPT | Mod: HCNC,S$GLB,,

## 2024-02-16 PROCEDURE — 1101F PT FALLS ASSESS-DOCD LE1/YR: CPT | Mod: HCNC,CPTII,S$GLB,

## 2024-02-16 PROCEDURE — 3288F FALL RISK ASSESSMENT DOCD: CPT | Mod: HCNC,CPTII,S$GLB,

## 2024-02-16 PROCEDURE — 3075F SYST BP GE 130 - 139MM HG: CPT | Mod: HCNC,CPTII,S$GLB,

## 2024-02-16 PROCEDURE — 3078F DIAST BP <80 MM HG: CPT | Mod: HCNC,CPTII,S$GLB,

## 2024-02-16 PROCEDURE — 99999 PR PBB SHADOW E&M-EST. PATIENT-LVL V: CPT | Mod: PBBFAC,HCNC,,

## 2024-02-16 RX ORDER — FAMOTIDINE 20 MG/1
20 TABLET, FILM COATED ORAL 2 TIMES DAILY
Qty: 60 TABLET | Refills: 2 | Status: SHIPPED | OUTPATIENT
Start: 2024-02-16 | End: 2025-02-15

## 2024-02-16 RX ORDER — ONDANSETRON 4 MG/1
4 TABLET, ORALLY DISINTEGRATING ORAL EVERY 6 HOURS PRN
Qty: 30 TABLET | Refills: 1 | Status: SHIPPED | OUTPATIENT
Start: 2024-02-16

## 2024-02-16 NOTE — H&P (VIEW-ONLY)
"Subjective:       Patient ID: Rola Richter is a 78 y.o. female.    Chief Complaint: Hospital Follow Up, Abdominal Pain, and Nausea    Pt w GERD here after ED visit for chest tightness, abdominal pain, N/V. Symptoms have not improved since visit/taking carafate. Has been taking omeprazole for years, but has not helped with symptoms recently. States she has been taking 2 tums after attempting eating which helped slightly. C/o of discomfort/acid  "stuck in throat". Pt unable to eat or drink without feeling nauseated. Try eating chicken noodle soup but vomited after. States this has happened in the past, believes it is GERD exacerbation. Pt believes EGD will be beneficial in determining issue.    Review of patient's allergies indicates:   Allergen Reactions    Aspirin Nausea And Vomiting    Penicillins Itching    Crestor [rosuvastatin]      Muscle pain      Lipitor [atorvastatin]      Achy           Current Outpatient Medications:     busPIRone (BUSPAR) 5 MG Tab, Take one or one and a half (5mg total or 7.5mg total) every afternoon. (Patient taking differently: Take 7.5 mg by mouth once daily. Take one or one and a half (5mg total or 7.5mg total) every afternoon.), Disp: 45 tablet, Rfl: 1    COD LIVER OIL ORAL, Take 1 capsule by mouth once daily., Disp: , Rfl:     cyclobenzaprine (FLEXERIL) 10 MG tablet, Take 1 tablet (10 mg total) by mouth 2 (two) times daily as needed for Muscle spasms., Disp: 60 tablet, Rfl: 1    diclofenac sodium (VOLTAREN) 1 % Gel, Apply 2 g topically 4 (four) times daily., Disp: 450 g, Rfl: 3    donepeziL (ARICEPT) 10 MG tablet, Take 1 tablet (10 mg total) by mouth every evening., Disp: 90 tablet, Rfl: 0    evolocumab (REPATHA SURECLICK) 140 mg/mL PnIj, Inject 1 mL (140 mg total) into the skin every 14 (fourteen) days. (Patient taking differently: Inject 140 mg into the skin every 14 (fourteen) days. 15th and 30th), Disp: 2 mL, Rfl: 2    fish oil-omega-3 fatty acids 300-1,000 mg capsule, Take " 2 g by mouth once daily., Disp: , Rfl:     fluticasone propionate (FLONASE) 50 mcg/actuation nasal spray, 2 sprays (100 mcg total) by Each Nostril route once daily., Disp: 16 g, Rfl: 11    gabapentin (NEURONTIN) 100 MG capsule, TAKE 1 CAPSULE TWICE DAILY, Disp: 180 capsule, Rfl: 3    HYDROcodone-acetaminophen (NORCO) 5-325 mg per tablet, Take 1 tablet by mouth every 12 (twelve) hours as needed for Pain., Disp: 60 tablet, Rfl: 0    [START ON 3/1/2024] HYDROcodone-acetaminophen (NORCO) 5-325 mg per tablet, Take 1 tablet by mouth every 12 (twelve) hours as needed for Pain., Disp: 60 tablet, Rfl: 0    [START ON 3/30/2024] HYDROcodone-acetaminophen (NORCO) 5-325 mg per tablet, Take 1 tablet by mouth every 12 (twelve) hours as needed for Pain., Disp: 60 tablet, Rfl: 0    irbesartan (AVAPRO) 300 MG tablet, Take 1 tablet (300 mg total) by mouth every evening., Disp: 90 tablet, Rfl: 1    meloxicam (MOBIC) 7.5 MG tablet, Take 1 tablet (7.5 mg total) by mouth daily as needed for Pain., Disp: 90 tablet, Rfl: PRN    multivit with min-folic acid 200 mcg Chew, Take 1 tablet by mouth once daily. , Disp: , Rfl:     omeprazole (PRILOSEC) 40 MG capsule, TAKE 1 CAPSULE EVERY DAY, Disp: 90 capsule, Rfl: 2    sucralfate (CARAFATE) 1 gram tablet, Take 1 tablet (1 g total) by mouth 4 (four) times daily. for 20 days, Disp: 80 tablet, Rfl: 0    terbinafine HCL (LAMISIL) 1 % cream, Apply topically 2 (two) times daily., Disp: 15 g, Rfl: 1    famotidine (PEPCID) 20 MG tablet, Take 1 tablet (20 mg total) by mouth 2 (two) times daily., Disp: 60 tablet, Rfl: 2    ondansetron (ZOFRAN-ODT) 4 MG TbDL, Take 1 tablet (4 mg total) by mouth every 6 (six) hours as needed (nausea)., Disp: 30 tablet, Rfl: 1  No current facility-administered medications for this visit.    Facility-Administered Medications Ordered in Other Visits:     electrolyte-S (ISOLYTE), , Intravenous, Continuous, Jose Nguyen MD    lactated ringers infusion, , Intravenous,  Continuous, Jose Nguyen MD, Stopped at 01/10/24 1122    metoclopramide injection 10 mg, 10 mg, Intravenous, Q10 Min PRN, Jose Nguyen MD    Lab Results   Component Value Date    WBC 8.36 02/14/2024    HGB 11.2 (L) 02/14/2024    HCT 33.7 (L) 02/14/2024     02/14/2024    CHOL 280 (H) 11/22/2023    TRIG 163 (H) 11/22/2023    HDL 65 11/22/2023    ALT 10 02/14/2024    AST 16 02/14/2024     02/14/2024    K 3.8 02/14/2024     02/14/2024    CREATININE 1.0 02/14/2024    BUN 18 02/14/2024    CO2 22 (L) 02/14/2024    TSH 1.831 06/21/2023    HGBA1C 5.8 (H) 11/22/2023       Review of Systems   Constitutional:  Positive for appetite change. Negative for chills and fever.   Gastrointestinal:  Positive for abdominal pain, nausea and vomiting.       Objective:      Physical Exam  Vitals reviewed.   Constitutional:       General: She is not in acute distress.     Appearance: Normal appearance. She is not ill-appearing, toxic-appearing or diaphoretic.   HENT:      Head: Normocephalic.      Right Ear: External ear normal.      Left Ear: External ear normal.      Nose: Nose normal. No congestion or rhinorrhea.      Mouth/Throat:      Mouth: Mucous membranes are moist.      Pharynx: Oropharynx is clear.   Eyes:      General: No scleral icterus.        Right eye: No discharge.         Left eye: No discharge.      Extraocular Movements: Extraocular movements intact.      Conjunctiva/sclera: Conjunctivae normal.   Cardiovascular:      Rate and Rhythm: Normal rate and regular rhythm.      Pulses: Normal pulses.      Heart sounds: Normal heart sounds. No murmur heard.     No friction rub. No gallop.   Pulmonary:      Effort: Pulmonary effort is normal. No respiratory distress.      Breath sounds: Normal breath sounds. No wheezing, rhonchi or rales.   Chest:      Chest wall: No tenderness.   Abdominal:      General: There is no distension.      Palpations: Abdomen is soft.      Tenderness: There is abdominal  tenderness.   Musculoskeletal:         General: No swelling, tenderness or deformity. Normal range of motion.      Cervical back: Normal range of motion.      Right lower leg: No edema.      Left lower leg: No edema.   Skin:     General: Skin is warm and dry.      Capillary Refill: Capillary refill takes less than 2 seconds.      Coloration: Skin is not jaundiced.      Findings: No bruising, erythema, lesion or rash.   Neurological:      Mental Status: She is alert and oriented to person, place, and time.      Gait: Gait normal.   Psychiatric:         Mood and Affect: Mood normal.         Behavior: Behavior normal.         Thought Content: Thought content normal.         Judgment: Judgment normal.         Assessment:       1. Gastroesophageal reflux disease, unspecified whether esophagitis present    2. Pharyngoesophageal dysphagia    3. Nausea        Plan:       Gastroesophageal reflux disease, unspecified whether esophagitis present  -     famotidine (PEPCID) 20 MG tablet; Take 1 tablet (20 mg total) by mouth 2 (two) times daily.  Dispense: 60 tablet; Refill: 2  -     Ambulatory referral/consult to Gastroenterology; Future; Expected date: 02/23/2024    Pharyngoesophageal dysphagia  -     famotidine (PEPCID) 20 MG tablet; Take 1 tablet (20 mg total) by mouth 2 (two) times daily.  Dispense: 60 tablet; Refill: 2  -     Ambulatory referral/consult to Gastroenterology; Future; Expected date: 02/23/2024    Nausea  -     ondansetron (ZOFRAN-ODT) 4 MG TbDL; Take 1 tablet (4 mg total) by mouth every 6 (six) hours as needed (nausea).  Dispense: 30 tablet; Refill: 1    ANIL Hernandez-S2  [employer] Physician Assistant Student       Daljit Gates PA-C  Family Medicine Physician Assistant

## 2024-02-16 NOTE — PATIENT INSTRUCTIONS
Walter Canela,     If you are due for any health screening(s) below please notify me so we can arrange them to be ordered and scheduled to maintain your health. Most healthy patients complete it. Don't lose out on improving your health.     Tests to Keep You Healthy    Colon Cancer Screening: Met on 5/13/2019  Last Blood Pressure <= 139/89 (2/16/2024): NO

## 2024-02-18 LAB
OHS QRS DURATION: 80 MS
OHS QTC CALCULATION: 448 MS

## 2024-02-19 ENCOUNTER — TELEPHONE (OUTPATIENT)
Dept: FAMILY MEDICINE | Facility: CLINIC | Age: 79
End: 2024-02-19
Payer: MEDICARE

## 2024-02-19 ENCOUNTER — HOSPITAL ENCOUNTER (EMERGENCY)
Facility: HOSPITAL | Age: 79
Discharge: HOME | End: 2024-02-19
Attending: EMERGENCY MEDICINE
Payer: MEDICARE

## 2024-02-19 VITALS
HEIGHT: 59 IN | BODY MASS INDEX: 23.59 KG/M2 | WEIGHT: 117 LBS | RESPIRATION RATE: 18 BRPM | HEART RATE: 96 BPM | SYSTOLIC BLOOD PRESSURE: 163 MMHG | OXYGEN SATURATION: 100 % | DIASTOLIC BLOOD PRESSURE: 63 MMHG | TEMPERATURE: 97 F

## 2024-02-19 DIAGNOSIS — R11.2 NAUSEA AND VOMITING, UNSPECIFIED VOMITING TYPE: ICD-10-CM

## 2024-02-19 DIAGNOSIS — K21.9 GASTROESOPHAGEAL REFLUX DISEASE, UNSPECIFIED WHETHER ESOPHAGITIS PRESENT: Primary | ICD-10-CM

## 2024-02-19 LAB
ALBUMIN SERPL BCP-MCNC: 4.4 G/DL (ref 3.5–5.2)
ALP SERPL-CCNC: 85 U/L (ref 55–135)
ALT SERPL W/O P-5'-P-CCNC: 15 U/L (ref 10–44)
ANION GAP SERPL CALC-SCNC: 11 MMOL/L (ref 8–16)
AST SERPL-CCNC: 20 U/L (ref 10–40)
BASOPHILS # BLD AUTO: 0.05 K/UL (ref 0–0.2)
BASOPHILS NFR BLD: 0.5 % (ref 0–1.9)
BILIRUB SERPL-MCNC: 0.3 MG/DL (ref 0.1–1)
BILIRUB UR QL STRIP: NEGATIVE
BUN SERPL-MCNC: 16 MG/DL (ref 8–23)
CALCIUM SERPL-MCNC: 9.8 MG/DL (ref 8.7–10.5)
CHLORIDE SERPL-SCNC: 105 MMOL/L (ref 95–110)
CLARITY UR: CLEAR
CO2 SERPL-SCNC: 23 MMOL/L (ref 23–29)
COLOR UR: YELLOW
CREAT SERPL-MCNC: 1.1 MG/DL (ref 0.5–1.4)
DIFFERENTIAL METHOD BLD: ABNORMAL
EOSINOPHIL # BLD AUTO: 0.2 K/UL (ref 0–0.5)
EOSINOPHIL NFR BLD: 1.9 % (ref 0–8)
ERYTHROCYTE [DISTWIDTH] IN BLOOD BY AUTOMATED COUNT: 13.2 % (ref 11.5–14.5)
EST. GFR  (NO RACE VARIABLE): 51 ML/MIN/1.73 M^2
GLUCOSE SERPL-MCNC: 127 MG/DL (ref 70–110)
GLUCOSE UR QL STRIP: NEGATIVE
HCT VFR BLD AUTO: 37.7 % (ref 37–48.5)
HGB BLD-MCNC: 12.2 G/DL (ref 12–16)
HGB UR QL STRIP: NEGATIVE
IMM GRANULOCYTES # BLD AUTO: 0.03 K/UL (ref 0–0.04)
IMM GRANULOCYTES NFR BLD AUTO: 0.3 % (ref 0–0.5)
INFLUENZA A, MOLECULAR: NEGATIVE
INFLUENZA B, MOLECULAR: NEGATIVE
KETONES UR QL STRIP: ABNORMAL
LEUKOCYTE ESTERASE UR QL STRIP: NEGATIVE
LIPASE SERPL-CCNC: 24 U/L (ref 4–60)
LYMPHOCYTES # BLD AUTO: 1.6 K/UL (ref 1–4.8)
LYMPHOCYTES NFR BLD: 16.7 % (ref 18–48)
MCH RBC QN AUTO: 30 PG (ref 27–31)
MCHC RBC AUTO-ENTMCNC: 32.4 G/DL (ref 32–36)
MCV RBC AUTO: 93 FL (ref 82–98)
MONOCYTES # BLD AUTO: 0.6 K/UL (ref 0.3–1)
MONOCYTES NFR BLD: 5.9 % (ref 4–15)
NEUTROPHILS # BLD AUTO: 6.9 K/UL (ref 1.8–7.7)
NEUTROPHILS NFR BLD: 74.7 % (ref 38–73)
NITRITE UR QL STRIP: NEGATIVE
NRBC BLD-RTO: 0 /100 WBC
PH UR STRIP: 7 [PH] (ref 5–8)
PLATELET # BLD AUTO: 295 K/UL (ref 150–450)
PMV BLD AUTO: 10 FL (ref 9.2–12.9)
POTASSIUM SERPL-SCNC: 4.5 MMOL/L (ref 3.5–5.1)
PROT SERPL-MCNC: 7.9 G/DL (ref 6–8.4)
PROT UR QL STRIP: NEGATIVE
RBC # BLD AUTO: 4.07 M/UL (ref 4–5.4)
SARS-COV-2 RDRP RESP QL NAA+PROBE: NEGATIVE
SODIUM SERPL-SCNC: 139 MMOL/L (ref 136–145)
SP GR UR STRIP: 1.01 (ref 1–1.03)
SPECIMEN SOURCE: NORMAL
URN SPEC COLLECT METH UR: ABNORMAL
UROBILINOGEN UR STRIP-ACNC: NEGATIVE EU/DL
WBC # BLD AUTO: 9.3 K/UL (ref 3.9–12.7)

## 2024-02-19 PROCEDURE — 83690 ASSAY OF LIPASE: CPT | Performed by: NURSE PRACTITIONER

## 2024-02-19 PROCEDURE — 25000003 PHARM REV CODE 250: Performed by: NURSE PRACTITIONER

## 2024-02-19 PROCEDURE — 96374 THER/PROPH/DIAG INJ IV PUSH: CPT

## 2024-02-19 PROCEDURE — 99284 EMERGENCY DEPT VISIT MOD MDM: CPT

## 2024-02-19 PROCEDURE — 36415 COLL VENOUS BLD VENIPUNCTURE: CPT | Performed by: NURSE PRACTITIONER

## 2024-02-19 PROCEDURE — 87502 INFLUENZA DNA AMP PROBE: CPT | Performed by: NURSE PRACTITIONER

## 2024-02-19 PROCEDURE — 63600175 PHARM REV CODE 636 W HCPCS: Performed by: NURSE PRACTITIONER

## 2024-02-19 PROCEDURE — 81003 URINALYSIS AUTO W/O SCOPE: CPT | Performed by: NURSE PRACTITIONER

## 2024-02-19 PROCEDURE — 96375 TX/PRO/DX INJ NEW DRUG ADDON: CPT

## 2024-02-19 PROCEDURE — U0002 COVID-19 LAB TEST NON-CDC: HCPCS | Performed by: NURSE PRACTITIONER

## 2024-02-19 PROCEDURE — 96361 HYDRATE IV INFUSION ADD-ON: CPT

## 2024-02-19 PROCEDURE — 85025 COMPLETE CBC W/AUTO DIFF WBC: CPT | Performed by: NURSE PRACTITIONER

## 2024-02-19 PROCEDURE — 80053 COMPREHEN METABOLIC PANEL: CPT | Performed by: NURSE PRACTITIONER

## 2024-02-19 RX ORDER — ALUMINUM HYDROXIDE, MAGNESIUM HYDROXIDE, AND SIMETHICONE 1200; 120; 1200 MG/30ML; MG/30ML; MG/30ML
30 SUSPENSION ORAL ONCE
Status: COMPLETED | OUTPATIENT
Start: 2024-02-19 | End: 2024-02-19

## 2024-02-19 RX ORDER — METOCLOPRAMIDE HYDROCHLORIDE 5 MG/ML
10 INJECTION INTRAMUSCULAR; INTRAVENOUS
Status: COMPLETED | OUTPATIENT
Start: 2024-02-19 | End: 2024-02-19

## 2024-02-19 RX ORDER — DIPHENHYDRAMINE HYDROCHLORIDE 50 MG/ML
12.5 INJECTION INTRAMUSCULAR; INTRAVENOUS
Status: COMPLETED | OUTPATIENT
Start: 2024-02-19 | End: 2024-02-19

## 2024-02-19 RX ORDER — LIDOCAINE HYDROCHLORIDE 20 MG/ML
15 SOLUTION OROPHARYNGEAL ONCE
Status: COMPLETED | OUTPATIENT
Start: 2024-02-19 | End: 2024-02-19

## 2024-02-19 RX ORDER — SUCRALFATE 1 G/10ML
1 SUSPENSION ORAL
Status: COMPLETED | OUTPATIENT
Start: 2024-02-19 | End: 2024-02-19

## 2024-02-19 RX ORDER — SUCRALFATE 1 G/10ML
1 SUSPENSION ORAL 4 TIMES DAILY
Qty: 120 ML | Refills: 0 | Status: SHIPPED | OUTPATIENT
Start: 2024-02-19 | End: 2024-02-20 | Stop reason: SDUPTHER

## 2024-02-19 RX ADMIN — SUCRALFATE 1 G: 1 SUSPENSION ORAL at 02:02

## 2024-02-19 RX ADMIN — ALUMINUM HYDROXIDE, MAGNESIUM HYDROXIDE, AND SIMETHICONE 30 ML: 200; 200; 20 SUSPENSION ORAL at 04:02

## 2024-02-19 RX ADMIN — METOCLOPRAMIDE 10 MG: 5 INJECTION, SOLUTION INTRAMUSCULAR; INTRAVENOUS at 01:02

## 2024-02-19 RX ADMIN — SODIUM CHLORIDE 1000 ML: 9 INJECTION, SOLUTION INTRAVENOUS at 12:02

## 2024-02-19 RX ADMIN — LIDOCAINE HYDROCHLORIDE 15 ML: 20 SOLUTION ORAL at 04:02

## 2024-02-19 RX ADMIN — DIPHENHYDRAMINE HYDROCHLORIDE 12.5 MG: 50 INJECTION INTRAMUSCULAR; INTRAVENOUS at 01:02

## 2024-02-19 NOTE — DISCHARGE INSTRUCTIONS
You were seen and evaluated in the ER today.  Your workup while you were here is reassuring for no acute causes of your symptoms.  Your symptoms are likely due to your GERD.  Please follow-up with GI tomorrow as scheduled.  Avoid any fried, spicy or heavy foods.  Increase fluids and very bland diet.  Please follow-up with your PCP as needed.  Please return to the ED for any worsening symptoms such as chest pain, shortness of breath, fever not controlled with Tylenol or ibuprofen or uncontrolled pain.      Our goal in the emergency department is to always give you outstanding care and exceptional service. You may receive a survey by mail or e-mail in the next week regarding your experience in our ED. We would greatly appreciate your completing and returning the survey. Your feedback provides us with a way to recognize our staff who give very good care and it helps us learn how to improve when your experience was below our aspiration of excellence.

## 2024-02-19 NOTE — ED PROVIDER NOTES
Source of History:  Patient, , chart    Chief complaint:  Nausea and Vomiting (X 1 week  / seen at [facility] ED last Monday )      HPI:  Rola Richter is a 78 y.o. female with medical history of anxiety, hypertension, GERD, PTSD presenting with epigastric pain with associated nausea vomiting.  Patient states pain has been ongoing for over a week.  Patient has been seen in the ED and by PCP and is scheduled to see GI tomorrow.  Patient states pain worsened today after eating oatmeal.    This is the extent to the patients complaints today here in the emergency department.    ROS: As per HPI and below:  Constitutional: No fever.  No chills.  Eyes: No visual changes.  ENT: No sore throat. No ear pain    Cardiovascular: No chest pain.  Respiratory: No shortness of breath.  GI:  Positive for abdominal pain.  Positive for nausea.  Positive for vomiting.  Genitourinary: No abnormal urination.  Neurologic: No headache. No focal weakness.  No numbness.  MSK: no back pain.  Integument: No rashes or lesions.  Hematologic: No easy bruising.  Endocrine: No excessive thirst or urination.    Review of patient's allergies indicates:   Allergen Reactions    Aspirin Nausea And Vomiting    Penicillins Itching    Crestor [rosuvastatin]      Muscle pain      Lipitor [atorvastatin]      Achy         PMH:  As per HPI and below:  Past Medical History:   Diagnosis Date    Anxiety     Arthritis     Cataract     DDD (degenerative disc disease), lumbar     Depression     Encounter for blood transfusion     GERD (gastroesophageal reflux disease)     Headache     Hyperlipidemia     Hypertension     pt states she does not take meds anymore she just watches her weight    Neuromuscular disorder     Occipital neuralgia 3/24/2017    Osteoporosis      Past Surgical History:   Procedure Laterality Date    APPENDECTOMY      CATARACT EXTRACTION W/  INTRAOCULAR LENS IMPLANT Left 10/25/2023    Procedure: CEIOL OS;  Surgeon: Rustam Dyer MD;   Location: [facility] OR;  Service: Ophthalmology;  Laterality: Left;  Last Case of day, short eye, very high power IOL    CATARACT EXTRACTION W/  INTRAOCULAR LENS IMPLANT Right 1/10/2024    Procedure: CEIOL OD Preop Mannitol;  Surgeon: Rustam Dyer MD;  Location: [facility] OR;  Service: Ophthalmology;  Laterality: Right;  Will need pre-op Mannitol     SECTION      x 2    CHOLECYSTECTOMY      COLONOSCOPY  prior to     COLONOSCOPY N/A 2019    Procedure: COLONOSCOPY;  Surgeon: Greg Victor MD;  Location: [facility];  Service: Endoscopy;  Laterality: N/A; 2 colon polyps, hemorrhoids; repeat in 5 years for surveillance; biopsy: Tubular adenoma x2    EPIDURAL STEROID INJECTION INTO CERVICAL SPINE N/A 2022    Procedure: Injection-steroid-epidural-cervical C7-T1;  Surgeon: Timothy Grimaldo MD;  Location: [facility] OR;  Service: Pain Management;  Laterality: N/A;    ESOPHAGOGASTRODUODENOSCOPY N/A 2019    Procedure: EGD (ESOPHAGOGASTRODUODENOSCOPY);  Surgeon: Greg Victor MD;  Location: [facility];  Service: Endoscopy;  Laterality: N/A; Mild Schatzki ring. Dilated. small hiatal hernia; Four gastric polyps. Resected and retrieved, gastritis; biopsy:stomach- unremarkable, negative for h pylori, polyps-Fundic type mucosa with foveolar hyperplasia.    ESOPHAGOGASTRODUODENOSCOPY N/A 2021    Procedure: EGD (ESOPHAGOGASTRODUODENOSCOPY);  Surgeon: Greg Victor MD;  Location: [facility];  Service: Endoscopy;  Laterality: N/A;    HYSTERECTOMY      Laser Periphery Iridotomy Bilateral     OD 16 and OS touch up 2016    UPPER GASTROINTESTINAL ENDOSCOPY  2017    Dr. Marcial: esophageal stenosis- dilated, gastritis, gastric polyps removed; biopsy- mild gastritis, negative for h pylori, hyperplastic polyp, esophagus unremarkable    vocal cord tumor removal         Social History     Tobacco Use    Smoking status: Never    Smokeless tobacco: Never   Substance Use Topics    Alcohol use: No      "Alcohol/week: 0.0 standard drinks of alcohol    Drug use: Yes     Types: Hydrocodone       Physical Exam:    BP (!) 163/63   Pulse 96   Temp 97.2 °F (36.2 °C) (Oral)   Resp 18   Ht 4' 11" (1.499 m)   Wt 53.1 kg (117 lb)   SpO2 100%   Breastfeeding No   BMI 23.63 kg/m²   Nursing note and vital signs reviewed.  Constitutional: No acute distress.  Nontoxic  Eyes: No conjunctival injection.  Extraocular muscles are intact.  ENT: Oropharynx clear.  Normal phonation.  Cardiovascular: Regular rate and rhythm.  No murmurs. No gallops. No rubs  Respiratory: Clear to auscultation bilaterally.  Good air movement.  No wheezes.  No rhonchi. No rales. No accessory muscle use.  Abdomen:  Soft with mild tenderness to palpation to epigastric region.  No rebound or guarding appreciated on exam.  Bowel sounds within normal limits.  Non peritoneal.  Musculoskeletal: Good range of motion all joints.  No deformities.  Neck supple.  No meningismus.  Skin: No rashes seen.  Good turgor.  No abrasions.  No ecchymoses.  Neuro: alert and oriented x3,  no focal neurological deficits.  Psych: Appropriate, conversant    MDM    Emergent evaluation of a 77 yo female presenting for epigastric pain with associated nausea or vomiting.  Patient states symptoms have been ongoing for the past few weeks.  Patient is scheduled to see GI tomorrow.  Patient states pain worsened this morning after eating oatmeal.  On exam pt is A&Ox3. VSS. Nonfebrile and nontoxic appearing.  Mucous membranes pink and moist. Abdomen soft with mild tenderness to palpation to epigastric region.  No rebound or guarding appreciated on exam.  Bowel sounds within normal limits.  Non peritoneal. Pt speaking in full sentences.  Steady gait appreciated. Cap refill < 3 seconds.      History Acquisition   Additional history was acquired from other historians.  , chart    The patient's list of active medical problems, social history, medications, and allergies as documented " per RN staff has been reviewed.     Differential Diagnoses   Based on available information and the initial assessment, the working differential diagnoses considered during this evaluation include but are not limited to gastritis, gastroenteritis, PUD, GERD, UTI, others.    I will get labs, hydrate, medicate and reassess.      LABS     Labs Reviewed   CBC W/ AUTO DIFFERENTIAL - Abnormal; Notable for the following components:       Result Value    Gran % 74.7 (*)     Lymph % 16.7 (*)     All other components within normal limits   COMPREHENSIVE METABOLIC PANEL - Abnormal; Notable for the following components:    Glucose 127 (*)     eGFR 51 (*)     All other components within normal limits   URINALYSIS, REFLEX TO URINE CULTURE - Abnormal; Notable for the following components:    Ketones, UA 1+ (*)     All other components within normal limits    Narrative:     Specimen Source->Urine   INFLUENZA A & B BY MOLECULAR   LIPASE   SARS-COV-2 RNA AMPLIFICATION, QUAL     Additional Consideration   All available testing was considered during the course of this workup.  Comorbidities taken into consideration during the patient's evaluation and treatment include weight, age.    Social determinants of health were taken into consideration during development of our treatment plan.    Medications   metoclopramide injection 10 mg (10 mg Intravenous Given 2/19/24 1303)   diphenhydrAMINE injection 12.5 mg (12.5 mg Intravenous Given 2/19/24 1303)   sodium chloride 0.9% bolus 1,000 mL 1,000 mL (0 mLs Intravenous Stopped 2/19/24 1446)   sucralfate 100 mg/mL suspension 1 g (1 g Oral Given 2/19/24 1443)   aluminum-magnesium hydroxide-simethicone 200-200-20 mg/5 mL suspension 30 mL (30 mLs Oral Given 2/19/24 1601)     And   LIDOcaine viscous HCl 2% oral solution 15 mL (15 mLs Oral Given 2/19/24 1601)      ED Course as of 02/19/24 1626   Mon Feb 19, 2024   1428 CBC unremarkable.  No leukocytosis noted.  H&H stable at 12.2 and 37.7.  CMP  unremarkable.  UA negative for any acute infectious process.  Lipase negative.  COVID and influenza negative. [RZ]   1610 Patient able to tolerate p.o. in the ED.  Patient is scheduled to follow up with GI tomorrow.  Patient advised to increase fluids and bland diet throughout the night tonight.  Strict return to ED precautions discussed.  Patient verbalized understanding of this plan of care.  All questions and concerns addressed. [RZ]      ED Course User Index  [RZ] Angy Darnell NP             CLINICAL IMPRESSION  1. Gastroesophageal reflux disease, unspecified whether esophagitis present    2. Nausea and vomiting, unspecified vomiting type         ED Disposition Condition    Discharge Stable            Instruction:  I see no indication of an emergent process beyond that addressed during our encounter but have duly counseled the patient/family regarding the need for prompt follow-up as well as the indications that should prompt immediate return to the emergency room should new or worrisome developments occur.  The patient/family has been provided with verbal and printed direction regarding our final diagnosis(es) as well as instructions regarding use of OTC and/or Rx medications intended to manage the patient's aforementioned conditions including:  ED Prescriptions       Medication Sig Dispense Start Date End Date Auth. Provider    sucralfate (CARAFATE) 100 mg/mL suspension Take 10 mLs (1 g total) by mouth 4 (four) times daily. for 3 days 120 mL 2/19/2024 2/22/2024 Angy Darnell NP          Patient has been advised of following recommended follow-up instructions:  Follow-up Information       Follow up With Specialties Details Why Contact Info    Liseth Carias MD Family Medicine Schedule an appointment as soon as possible for a visit  As needed [address]  [place] [address]  [phone]      Elen Chavez NP Gastroenterology Go on 2/20/2024 as scheduled [address]  [address]  [place]  [address]  [phone]            The patient/family communicates understanding of all this information and all remaining questions and concerns were addressed at this time.      The patient's condition did not warrant review of the  and prescription of controlled substances.      This note was created using dictation software.  This program may occasionally mistype words and phrases.         Angy Darnell, NP  02/19/24 4287

## 2024-02-19 NOTE — TELEPHONE ENCOUNTER
Returned patients call in regards to needing to be seen today. Patient could not hold a conversation without coughing and throwing up. Patient threw up multiple times while on the phone with me. States she has been throwing up all night and all morning, and is feeling very weak. Advised patient to go to the ED right now to be evaluated and treated.

## 2024-02-19 NOTE — TELEPHONE ENCOUNTER
----- Message from Ruth Gonsalves sent at 2/19/2024  9:04 AM CST -----  Contact: pt  Type: Needs Medical Advice  Who Called:  pt  Best Call Back Number: [phone]    Additional Information: Pt is calling the office needs to be seen.Please call back and advise.

## 2024-02-19 NOTE — FIRST PROVIDER EVALUATION
Emergency Department TeleTriage Encounter Note      CHIEF COMPLAINT    Chief Complaint   Patient presents with    Nausea    Vomiting     X 1 week  / seen at [facility] ED last Monday        VITAL SIGNS   Initial Vitals [02/19/24 1019]   BP Pulse Resp Temp SpO2   (!) 174/76 92 18 97.2 °F (36.2 °C) 99 %      MAP       --            ALLERGIES    Review of patient's allergies indicates:   Allergen Reactions    Aspirin Nausea And Vomiting    Penicillins Itching    Crestor [rosuvastatin]      Muscle pain      Lipitor [atorvastatin]      Achy         PROVIDER TRIAGE NOTE  Verbal consent for the teletriage evaluation was given by the patient at the start of the evaluation.  All efforts will be made to maintain patient's privacy during the evaluation.      This is a teletriage evaluation of a 78 y.o. female presenting to the ED with c/o nausea and vomiting for 1 week.  Seen in the ED last week with no improvement. No improvement with zofran.  Limited physical exam via telehealth: The patient is awake, alert, answering questions appropriately and is not in respiratory distress.  As the Teletriage provider, I performed an initial assessment and ordered appropriate labs and imaging studies, if any, to facilitate the patient's care once placed in the ED. Once a room is available, care and a full evaluation will be completed by an alternate ED provider.  Any additional orders and the final disposition will be determined by that provider.  All imaging and labs will not be followed-up by the Teletriage Team, including myself.          ORDERS  Labs Reviewed   INFLUENZA A & B BY MOLECULAR   CBC W/ AUTO DIFFERENTIAL   COMPREHENSIVE METABOLIC PANEL   LIPASE   URINALYSIS, REFLEX TO URINE CULTURE   SARS-COV-2 RNA AMPLIFICATION, QUAL       ED Orders (720h ago, onward)      Start Ordered     Status Ordering Provider    02/19/24 1025 02/19/24 1024  COVID-19 Rapid Screening  STAT         Ordered LESLIE CHANG    02/19/24 1025 02/19/24 1024   Influenza A & B by Molecular  Once         Ordered LESLIE CHANG    02/19/24 1024 02/19/24 1023  Vital signs  Every 2 hours         Ordered LESLIE CHANG    02/19/24 1024 02/19/24 1023  Diet NPO  Diet effective now         Ordered LESLIE CHANG    02/19/24 1024 02/19/24 1023  Insert peripheral IV  Once         Ordered LESLIE CHANG    02/19/24 1024 02/19/24 1023  CBC W/ AUTO DIFFERENTIAL  STAT         Pending Collection LESLIE CHANG    02/19/24 1024 02/19/24 1023  Comp. Metabolic Panel  STAT         Pending Collection LESLIE CHANG    02/19/24 1024 02/19/24 1023  Lipase  STAT         Ordered LESLIE CHANG    02/19/24 1024 02/19/24 1023  Urinalysis, Reflex to Urine Culture Urine, Clean Catch  STAT         Ordered LESLIE CHANG              Virtual Visit Note: The provider triage portion of this emergency department evaluation and documentation was performed via [employer], a HIPAA-compliant telemedicine application, in concert with a tele-presenter in the room. A face to face patient evaluation with one of my colleagues will occur once the patient is placed in an emergency department room.      DISCLAIMER: This note was prepared with [employer] voice recognition transcription software. Garbled syntax, mangled pronouns, and other bizarre constructions may be attributed to that software system.

## 2024-02-20 ENCOUNTER — OFFICE VISIT (OUTPATIENT)
Dept: GASTROENTEROLOGY | Facility: CLINIC | Age: 79
End: 2024-02-20
Payer: MEDICARE

## 2024-02-20 VITALS
HEART RATE: 90 BPM | DIASTOLIC BLOOD PRESSURE: 74 MMHG | BODY MASS INDEX: 23.47 KG/M2 | WEIGHT: 116.19 LBS | SYSTOLIC BLOOD PRESSURE: 155 MMHG

## 2024-02-20 DIAGNOSIS — R10.13 EPIGASTRIC PAIN: Primary | ICD-10-CM

## 2024-02-20 DIAGNOSIS — R13.10 DYSPHAGIA, UNSPECIFIED TYPE: ICD-10-CM

## 2024-02-20 DIAGNOSIS — K44.9 HIATAL HERNIA: ICD-10-CM

## 2024-02-20 DIAGNOSIS — R12 WATERBRASH: ICD-10-CM

## 2024-02-20 DIAGNOSIS — Z86.010 HISTORY OF COLON POLYPS: ICD-10-CM

## 2024-02-20 DIAGNOSIS — Z87.19 HISTORY OF GASTRIC POLYP: ICD-10-CM

## 2024-02-20 DIAGNOSIS — R12 HEARTBURN: ICD-10-CM

## 2024-02-20 DIAGNOSIS — R11.2 NAUSEA AND VOMITING, UNSPECIFIED VOMITING TYPE: ICD-10-CM

## 2024-02-20 DIAGNOSIS — K59.09 INTERMITTENT CONSTIPATION: ICD-10-CM

## 2024-02-20 DIAGNOSIS — R14.2 BELCHING: ICD-10-CM

## 2024-02-20 DIAGNOSIS — K22.2 SCHATZKI'S RING: ICD-10-CM

## 2024-02-20 DIAGNOSIS — K21.9 GASTROESOPHAGEAL REFLUX DISEASE, UNSPECIFIED WHETHER ESOPHAGITIS PRESENT: ICD-10-CM

## 2024-02-20 PROCEDURE — 1125F AMNT PAIN NOTED PAIN PRSNT: CPT | Mod: HCNC,CPTII,S$GLB,

## 2024-02-20 PROCEDURE — 3288F FALL RISK ASSESSMENT DOCD: CPT | Mod: HCNC,CPTII,S$GLB,

## 2024-02-20 PROCEDURE — 3077F SYST BP >= 140 MM HG: CPT | Mod: HCNC,CPTII,S$GLB,

## 2024-02-20 PROCEDURE — 99214 OFFICE O/P EST MOD 30 MIN: CPT | Mod: HCNC,S$GLB,,

## 2024-02-20 PROCEDURE — 1159F MED LIST DOCD IN RCRD: CPT | Mod: HCNC,CPTII,S$GLB,

## 2024-02-20 PROCEDURE — 99999 PR PBB SHADOW E&M-EST. PATIENT-LVL V: CPT | Mod: PBBFAC,HCNC,,

## 2024-02-20 PROCEDURE — 3078F DIAST BP <80 MM HG: CPT | Mod: HCNC,CPTII,S$GLB,

## 2024-02-20 PROCEDURE — 1100F PTFALLS ASSESS-DOCD GE2>/YR: CPT | Mod: HCNC,CPTII,S$GLB,

## 2024-02-20 RX ORDER — PANTOPRAZOLE SODIUM 40 MG/1
40 TABLET, DELAYED RELEASE ORAL DAILY
Qty: 90 TABLET | Refills: 1 | Status: SHIPPED | OUTPATIENT
Start: 2024-02-20 | End: 2024-08-18

## 2024-02-20 RX ORDER — SUCRALFATE 1 G/10ML
1 SUSPENSION ORAL 4 TIMES DAILY
Qty: 400 ML | Refills: 0 | Status: SHIPPED | OUTPATIENT
Start: 2024-02-20 | End: 2024-03-01

## 2024-02-20 NOTE — PROGRESS NOTES
Subjective:       Patient ID: Rola Richter is a 78 y.o. female Body mass index is 23.47 kg/m².    Chief Complaint: No chief complaint on file.    This patient is new to me.  Referring Provider: Daljit Gates for epigastric pain.  Established patient of Dr. Victor.         Abdominal Pain  This is a recurrent (started 2 weeks ago) problem. The problem occurs intermittently (Lipase, CMP, CBC stable). The problem has been gradually worsening. The pain is located in the epigastric region. The pain is at a severity of 9/10. The quality of the pain is burning. The abdominal pain does not radiate. Associated symptoms include belching, constipation (Has a bm daily, mild intermittent constipation, strains sometimes, will take metamucil prn rates stool type 3 on bristol stool scale), nausea (chronic nausea, but states it has worsened with epigastric pain) and vomiting (will vomit up anything she eats will come  back up food, liquids, no blood or bile in vomit). Pertinent negatives include no anorexia, arthralgias, diarrhea, dysuria, fever, flatus, frequency, headaches, hematochezia, hematuria, melena, myalgias or weight loss. Associated symptoms comments: Pt w/ medical history of anxiety, hypertension, GERD, PTSD presenting with epigastric pain with associated nausea vomiting.  Patient states pain has been ongoing for over a week.  Patient has been seen in the ED and by PCP for abdominal pain a CT of abdomen was completed No acute process is seen within the abdomen or pelvis, patient was prescribed sucralfate in the ED states has been taking it once or twice daily    Procedure Date: 2/17/2021   Attending MD: Greg Victor MD  Procedure:   Upper GI endoscopy  Impression:   - Normal upper third of esophagus and middle third                        of esophagus.                       - Z-line regular, 34 cm from the incisors.                       - [place]-colored mucosa suspicious for short-segment                         Godoy's esophagus. Biopsied.                       - Mild Schatzki ring. Dilated.                       - Medium-sized hiatal hernia.                       - Ten gastric polyps. Resected and retrieved.                       - Gastritis. Biopsied.                       - Normal examined duodenum.  biopsies showed no bacteria.  Polyps were benign fundic gland polyp    -Patient's last colonoscopy with Dr. Victor was in 2019 showed internal hemorrhoids and colon polyps per pathology report:  benign and is the adenomatous type which is precancerous/risk factor for cancer.  Denies any family history of colon cancer  . The pain is aggravated by eating. Treatments tried: carafate prescribed by ED has helped with the pain. The treatment provided moderate relief. Prior diagnostic workup includes CT scan and GI consult. Her past medical history is significant for GERD. There is no history of abdominal surgery, colon cancer, Crohn's disease, gallstones, irritable bowel syndrome, pancreatitis, PUD or ulcerative colitis. Patient's medical history does not include kidney stones and UTI.   Gastroesophageal Reflux  She complains of abdominal pain, belching, dysphagia (hx of dysphagia with breads and dry foods, hx of egd dilation helped with dysphagia, dysphagia with pills, no issues with liquids, will push items down with water), heartburn, nausea (chronic nausea, but states it has worsened with epigastric pain) and water brash. She reports no chest pain, no choking, no coughing, no early satiety, no globus sensation or no hoarse voice. This is a chronic problem. The current episode started more than 1 year ago. The problem has been waxing and waning. The heartburn is located in the substernum. The heartburn wakes her from sleep. The heartburn does not limit her activity. The heartburn doesn't change with position. Exacerbated by: Reports stays away from acidic foodsAs it does worsen GERD symptoms. Pertinent negatives include  no anemia, fatigue, melena, muscle weakness, orthopnea or weight loss. Risk factors include hiatal hernia. She has tried a PPI, an antacid and a histamine-2 antagonist (Has been taking omeprazole 40 mg orally daily, and takes Pepcid as needed) for the symptoms. The treatment provided moderate relief. Past procedures include an EGD. Past procedures do not include an abdominal ultrasound, esophageal manometry, esophageal pH monitoring, H. pylori antibody titer or a UGI. Past invasive treatments do not include gastroplasty, gastroplication or reflux surgery.       Review of Systems   Constitutional:  Negative for fatigue, fever and weight loss.   HENT:  Negative for hoarse voice.    Respiratory:  Negative for cough and choking.    Cardiovascular:  Negative for chest pain.   Gastrointestinal:  Positive for abdominal pain, constipation (Has a bm daily, mild intermittent constipation, strains sometimes, will take metamucil prn rates stool type 3 on bristol stool scale), dysphagia (hx of dysphagia with breads and dry foods, hx of egd dilation helped with dysphagia, dysphagia with pills, no issues with liquids, will push items down with water), heartburn, nausea (chronic nausea, but states it has worsened with epigastric pain) and vomiting (will vomit up anything she eats will come  back up food, liquids, no blood or bile in vomit). Negative for abdominal distention, anal bleeding, anorexia, blood in stool, diarrhea, flatus, hematochezia, melena and rectal pain.   Genitourinary:  Negative for dysuria, frequency and hematuria.   Musculoskeletal:  Negative for arthralgias, myalgias and muscle weakness.   Neurological:  Negative for headaches.         No LMP recorded. Patient has had a hysterectomy.  Past Medical History:   Diagnosis Date    Anxiety     Arthritis     Cataract     Colon polyp     DDD (degenerative disc disease), lumbar     Depression     Encounter for blood transfusion     GERD (gastroesophageal reflux disease)      Headache     Hyperlipidemia     Hypertension     pt states she does not take meds anymore she just watches her weight    Neuromuscular disorder     Occipital neuralgia 2017    Osteoporosis      Past Surgical History:   Procedure Laterality Date    APPENDECTOMY      CATARACT EXTRACTION W/  INTRAOCULAR LENS IMPLANT Left 10/25/2023    Procedure: CEIOL OS;  Surgeon: Rustam Dyer MD;  Location: [facility] OR;  Service: Ophthalmology;  Laterality: Left;  Last Case of day, short eye, very high power IOL    CATARACT EXTRACTION W/  INTRAOCULAR LENS IMPLANT Right 1/10/2024    Procedure: CEIOL OD Preop Mannitol;  Surgeon: Rustam Dyer MD;  Location: [facility] OR;  Service: Ophthalmology;  Laterality: Right;  Will need pre-op Mannitol     SECTION      x 2    CHOLECYSTECTOMY      COLONOSCOPY  prior to     COLONOSCOPY N/A 2019    Procedure: COLONOSCOPY;  Surgeon: Greg Victor MD;  Location: [facility];  Service: Endoscopy;  Laterality: N/A; 2 colon polyps, hemorrhoids; repeat in 5 years for surveillance; biopsy: Tubular adenoma x2    EPIDURAL STEROID INJECTION INTO CERVICAL SPINE N/A 2022    Procedure: Injection-steroid-epidural-cervical C7-T1;  Surgeon: Timothy Grimaldo MD;  Location: [facility] OR;  Service: Pain Management;  Laterality: N/A;    ESOPHAGOGASTRODUODENOSCOPY N/A 2019    Procedure: EGD (ESOPHAGOGASTRODUODENOSCOPY);  Surgeon: Greg Victor MD;  Location: [facility];  Service: Endoscopy;  Laterality: N/A; Mild Schatzki ring. Dilated. small hiatal hernia; Four gastric polyps. Resected and retrieved, gastritis; biopsy:stomach- unremarkable, negative for h pylori, polyps-Fundic type mucosa with foveolar hyperplasia.    ESOPHAGOGASTRODUODENOSCOPY N/A 2021    Procedure: EGD (ESOPHAGOGASTRODUODENOSCOPY);  Surgeon: Greg Victor MD;  Location: [facility];  Service: Endoscopy;  Laterality: N/A;    HYSTERECTOMY      Laser Periphery Iridotomy Bilateral     OD 16 and OS touch  up 5/26/2016    UPPER GASTROINTESTINAL ENDOSCOPY  03/14/2017    Dr. Marcial: esophageal stenosis- dilated, gastritis, gastric polyps removed; biopsy- mild gastritis, negative for h pylori, hyperplastic polyp, esophagus unremarkable    vocal cord tumor removal       Family History   Problem Relation Age of Onset    Osteoarthritis Mother     Alcohol abuse Mother     Rheum arthritis Mother     Osteoarthritis Sister     Diabetes Brother     No Known Problems Son     No Known Problems Sister     No Known Problems Sister     No Known Problems Brother     Arthritis Son     No Known Problems Son     Stroke Maternal Grandmother 99    Rheum arthritis Maternal Grandmother     Retinal detachment Neg Hx     Macular degeneration Neg Hx     Glaucoma Neg Hx     Amblyopia Neg Hx     Blindness Neg Hx     Cancer Neg Hx     Cataracts Neg Hx     Hypertension Neg Hx     Strabismus Neg Hx     Thyroid disease Neg Hx     Lupus Neg Hx     Kidney disease Neg Hx     Inflammatory bowel disease Neg Hx     Psoriasis Neg Hx     Colon cancer Neg Hx     Crohn's disease Neg Hx     Ulcerative colitis Neg Hx     Stomach cancer Neg Hx     Esophageal cancer Neg Hx      Social History     Tobacco Use    Smoking status: Never    Smokeless tobacco: Never   Substance Use Topics    Alcohol use: No     Alcohol/week: 0.0 standard drinks of alcohol    Drug use: Yes     Types: Hydrocodone     Wt Readings from Last 10 Encounters:   02/20/24 52.7 kg (116 lb 2.9 oz)   02/19/24 53.1 kg (117 lb)   02/16/24 53.4 kg (117 lb 11.6 oz)   02/14/24 54 kg (119 lb)   02/14/24 53 kg (116 lb 13.5 oz)   02/01/24 54.9 kg (121 lb)   01/04/24 55.2 kg (121 lb 11.1 oz)   11/30/23 55 kg (121 lb 4.1 oz)   10/30/23 54 kg (119 lb)   10/25/23 54.4 kg (119 lb 14.9 oz)     Lab Results   Component Value Date    WBC 9.30 02/19/2024    HGB 12.2 02/19/2024    HCT 37.7 02/19/2024    MCV 93 02/19/2024     02/19/2024     CMP  Sodium   Date Value Ref Range Status   02/19/2024 139 136 - 145  mmol/L Final     Potassium   Date Value Ref Range Status   02/19/2024 4.5 3.5 - 5.1 mmol/L Final     Chloride   Date Value Ref Range Status   02/19/2024 105 95 - 110 mmol/L Final     CO2   Date Value Ref Range Status   02/19/2024 23 23 - 29 mmol/L Final     Glucose   Date Value Ref Range Status   02/19/2024 127 (H) 70 - 110 mg/dL Final     BUN   Date Value Ref Range Status   02/19/2024 16 8 - 23 mg/dL Final     Creatinine   Date Value Ref Range Status   02/19/2024 1.1 0.5 - 1.4 mg/dL Final     Calcium   Date Value Ref Range Status   02/19/2024 9.8 8.7 - 10.5 mg/dL Final     Total Protein   Date Value Ref Range Status   02/19/2024 7.9 6.0 - 8.4 g/dL Final     Albumin   Date Value Ref Range Status   02/19/2024 4.4 3.5 - 5.2 g/dL Final     Total Bilirubin   Date Value Ref Range Status   02/19/2024 0.3 0.1 - 1.0 mg/dL Final     Comment:     For infants and newborns, interpretation of results should be based  on gestational age, weight and in agreement with clinical  observations.    Premature Infant recommended reference ranges:  Up to 24 hours.............<8.0 mg/dL  Up to 48 hours............<12.0 mg/dL  3-5 days..................<15.0 mg/dL  6-29 days.................<15.0 mg/dL       Alkaline Phosphatase   Date Value Ref Range Status   02/19/2024 85 55 - 135 U/L Final     AST   Date Value Ref Range Status   02/19/2024 20 10 - 40 U/L Final     ALT   Date Value Ref Range Status   02/19/2024 15 10 - 44 U/L Final     Anion Gap   Date Value Ref Range Status   02/19/2024 11 8 - 16 mmol/L Final     eGFR if    Date Value Ref Range Status   05/09/2022 >60.0 >60 mL/min/1.73 m^2 Final     eGFR if non    Date Value Ref Range Status   05/09/2022 >60.0 >60 mL/min/1.73 m^2 Final     Comment:     Calculation used to obtain the estimated glomerular filtration  rate (eGFR) is the CKD-EPI equation.        Lab Results   Component Value Date    LIPASE 24 02/19/2024     No results found for:  ""LIPASERES"  Lab Results   Component Value Date    TSH 1.831 06/21/2023       Reviewed prior medical records including hospital encounter 02/14/2024, hospital encounter 02/19/2024 radiology report of CT abdomen 02/14/2020 & endoscopy history (see surgical history/procedures).    Objective:      Physical Exam  Vitals and nursing note reviewed.   Constitutional:       Appearance: Normal appearance. She is normal weight.   Cardiovascular:      Rate and Rhythm: Normal rate and regular rhythm.      Heart sounds: Normal heart sounds.   Pulmonary:      Breath sounds: Normal breath sounds.   Abdominal:      General: Bowel sounds are normal.      Palpations: Abdomen is soft.      Tenderness: There is abdominal tenderness in the epigastric area.   Skin:     General: Skin is warm and dry.      Coloration: Skin is not jaundiced.   Neurological:      Mental Status: She is alert and oriented to person, place, and time.   Psychiatric:         Mood and Affect: Mood normal.         Behavior: Behavior normal.         Assessment:       1. Epigastric pain    2. Gastroesophageal reflux disease, unspecified whether esophagitis present    3. Heartburn    4. Belching    5. Waterbrash    6. Nausea and vomiting, unspecified vomiting type    7. History of gastric polyp    8. Schatzki's ring    9. Dysphagia, unspecified type    10. Hiatal hernia    11. Intermittent constipation    12. History of colon polyps        Plan:       Epigastric pain  - START    pantoprazole (PROTONIX) 40 MG tablet; Take 1 tablet (40 mg total) by mouth once daily.  Dispense: 90 tablet; Refill: 1  -   START  sucralfate (CARAFATE) 100 mg/mL suspension; Take 10 mLs (1 g total) by mouth 4 (four) times daily. for 10 days  Dispense: 400 mL; Refill: 0  -     Case Request Endoscopy: EGD (ESOPHAGOGASTRODUODENOSCOPY)  - schedule EGD, discussed procedure with patient, including risks and benefits, patient verbalized understanding  -follow GERD lifestyle modifications  -If no " improvement in symptoms or symptoms worsen, call/follow-up at clinic or go to ER    Gastroesophageal reflux disease, unspecified whether esophagitis present, Heartburn, Belching, Waterbrash  -     Case Request Endoscopy: EGD (ESOPHAGOGASTRODUODENOSCOPY)  - START    pantoprazole (PROTONIX) 40 MG tablet; Take 1 tablet (40 mg total) by mouth once daily.  Dispense: 90 tablet; Refill: 1   -Take PPI 30min-1hr before eating breakfast  -Educated patient on lifestyle modifications to help control/reduce reflux/abdominal pain including: avoid large meals, avoid eating within 2-3 hours of bedtime (avoid late night eating & lying down soon after eating), elevate head of bed if nocturnal symptoms are present, smoking cessation (if current smoker), & weight loss (if overweight).   -Educated to avoid known foods which trigger reflux symptoms & to minimize/avoid high-fat foods, chocolate, caffeine, citrus, alcohol, & tomato products.  -Advised to avoid/limit use of NSAID's, since they can cause GI upset, bleeding, and/or ulcers. If needed, take with food.     Nausea and vomiting, unspecified vomiting type  -     Case Request Endoscopy: EGD (ESOPHAGOGASTRODUODENOSCOPY)  - schedule EGD, discussed procedure with patient, including risks and benefits, patient verbalized understanding  -start on new PPI  -continue Zofran as needed    History of gastric polyp  -     Case Request Endoscopy: EGD (ESOPHAGOGASTRODUODENOSCOPY)    Schatzki's ring, Dysphagia, unspecified type  -     Case Request Endoscopy: EGD (ESOPHAGOGASTRODUODENOSCOPY)  - schedule EGD, discussed procedure with patient and possible esophageal dilation may be performed during procedure if indicated, patient verbalized understanding  - educated patient to eat smaller more frequent meals and to eat slowly and advised to eat a soft diet.  - possible UGI/esophagram/esophageal manometry if symptoms persist    Hiatal hernia  -     Case Request Endoscopy: EGD  (ESOPHAGOGASTRODUODENOSCOPY)  -discussed diagnosis with patient & that it is usually managed by controlling reflux symptoms, surgery is an option, but usually performed if reflux is uncontrolled by medication management and lifestyle/dietary modifications; if symptoms persist despite medication management and lifestyle/dietary modifications, we can refer to general surgery to consult about surgical options, patient verbalized understanding    Intermittent constipation  -     Case Request Endoscopy: COLONOSCOPY  -Recommend daily exercise as tolerated, adequate water intake (six 8-oz glasses of water daily), and high fiber diet. OTC fiber supplements are recommended if diet does not reach daily fiber goal (20-30 grams daily), such as Metamucil, Citrucel, or FiberCon (take as directed, separate from other oral medications by >2 hours).  -Recommend trying OTC MiraLax once daily (17g PO) as directed  -If no improvement with above recommendations, try intermittently dosed Dulcolax OTC as directed (every 3-4 days) PRN to facilitate bowel movements  -If no relief with this, consider adding a emollient laxative (castor oil or mineral oil) +/- enema  -If still no improvement with these measures, call/follow-up    History of colon polyps  -     Case Request Endoscopy: COLONOSCOPY  - schedule Colonoscopy, discussed procedure with the patient, including risks and benefits, patient verbalized understanding        Follow up if symptoms worsen or fail to improve.      If no improvement in symptoms or symptoms worsen, call/follow-up at clinic or go to ER.       [facility] - GASTROENTEROLOGY  [facility], [place]     Dictation software program was used for this note. Please expect some simple typographical  errors in this note.    Encounter includes face to face time and non-face to face time preparing to see the patient (eg, review of tests), obtaining and/or reviewing separately obtained history,  documenting clinical information in the electronic or other health record, independently interpreting results (not separately reported) and communicating results to the patient/family/caregiver, or care coordination (not separately reported).

## 2024-02-20 NOTE — H&P (VIEW-ONLY)
Subjective:       Patient ID: Rola Richter is a 78 y.o. female Body mass index is 23.47 kg/m².    Chief Complaint: No chief complaint on file.    This patient is new to me.  Referring Provider: Daljit Gates for epigastric pain.  Established patient of Dr. Victor.         Abdominal Pain  This is a recurrent (started 2 weeks ago) problem. The problem occurs intermittently (Lipase, CMP, CBC stable). The problem has been gradually worsening. The pain is located in the epigastric region. The pain is at a severity of 9/10. The quality of the pain is burning. The abdominal pain does not radiate. Associated symptoms include belching, constipation (Has a bm daily, mild intermittent constipation, strains sometimes, will take metamucil prn rates stool type 3 on bristol stool scale), nausea (chronic nausea, but states it has worsened with epigastric pain) and vomiting (will vomit up anything she eats will come  back up food, liquids, no blood or bile in vomit). Pertinent negatives include no anorexia, arthralgias, diarrhea, dysuria, fever, flatus, frequency, headaches, hematochezia, hematuria, melena, myalgias or weight loss. Associated symptoms comments: Pt w/ medical history of anxiety, hypertension, GERD, PTSD presenting with epigastric pain with associated nausea vomiting.  Patient states pain has been ongoing for over a week.  Patient has been seen in the ED and by PCP for abdominal pain a CT of abdomen was completed No acute process is seen within the abdomen or pelvis, patient was prescribed sucralfate in the ED states has been taking it once or twice daily    Procedure Date: 2/17/2021   Attending MD: Greg Victor MD  Procedure:   Upper GI endoscopy  Impression:   - Normal upper third of esophagus and middle third                        of esophagus.                       - Z-line regular, 34 cm from the incisors.                       - [place]-colored mucosa suspicious for short-segment                         Godoy's esophagus. Biopsied.                       - Mild Schatzki ring. Dilated.                       - Medium-sized hiatal hernia.                       - Ten gastric polyps. Resected and retrieved.                       - Gastritis. Biopsied.                       - Normal examined duodenum.  biopsies showed no bacteria.  Polyps were benign fundic gland polyp    -Patient's last colonoscopy with Dr. Victor was in 2019 showed internal hemorrhoids and colon polyps per pathology report:  benign and is the adenomatous type which is precancerous/risk factor for cancer.  Denies any family history of colon cancer  . The pain is aggravated by eating. Treatments tried: carafate prescribed by ED has helped with the pain. The treatment provided moderate relief. Prior diagnostic workup includes CT scan and GI consult. Her past medical history is significant for GERD. There is no history of abdominal surgery, colon cancer, Crohn's disease, gallstones, irritable bowel syndrome, pancreatitis, PUD or ulcerative colitis. Patient's medical history does not include kidney stones and UTI.   Gastroesophageal Reflux  She complains of abdominal pain, belching, dysphagia (hx of dysphagia with breads and dry foods, hx of egd dilation helped with dysphagia, dysphagia with pills, no issues with liquids, will push items down with water), heartburn, nausea (chronic nausea, but states it has worsened with epigastric pain) and water brash. She reports no chest pain, no choking, no coughing, no early satiety, no globus sensation or no hoarse voice. This is a chronic problem. The current episode started more than 1 year ago. The problem has been waxing and waning. The heartburn is located in the substernum. The heartburn wakes her from sleep. The heartburn does not limit her activity. The heartburn doesn't change with position. Exacerbated by: Reports stays away from acidic foodsAs it does worsen GERD symptoms. Pertinent negatives include  no anemia, fatigue, melena, muscle weakness, orthopnea or weight loss. Risk factors include hiatal hernia. She has tried a PPI, an antacid and a histamine-2 antagonist (Has been taking omeprazole 40 mg orally daily, and takes Pepcid as needed) for the symptoms. The treatment provided moderate relief. Past procedures include an EGD. Past procedures do not include an abdominal ultrasound, esophageal manometry, esophageal pH monitoring, H. pylori antibody titer or a UGI. Past invasive treatments do not include gastroplasty, gastroplication or reflux surgery.       Review of Systems   Constitutional:  Negative for fatigue, fever and weight loss.   HENT:  Negative for hoarse voice.    Respiratory:  Negative for cough and choking.    Cardiovascular:  Negative for chest pain.   Gastrointestinal:  Positive for abdominal pain, constipation (Has a bm daily, mild intermittent constipation, strains sometimes, will take metamucil prn rates stool type 3 on bristol stool scale), dysphagia (hx of dysphagia with breads and dry foods, hx of egd dilation helped with dysphagia, dysphagia with pills, no issues with liquids, will push items down with water), heartburn, nausea (chronic nausea, but states it has worsened with epigastric pain) and vomiting (will vomit up anything she eats will come  back up food, liquids, no blood or bile in vomit). Negative for abdominal distention, anal bleeding, anorexia, blood in stool, diarrhea, flatus, hematochezia, melena and rectal pain.   Genitourinary:  Negative for dysuria, frequency and hematuria.   Musculoskeletal:  Negative for arthralgias, myalgias and muscle weakness.   Neurological:  Negative for headaches.         No LMP recorded. Patient has had a hysterectomy.  Past Medical History:   Diagnosis Date    Anxiety     Arthritis     Cataract     Colon polyp     DDD (degenerative disc disease), lumbar     Depression     Encounter for blood transfusion     GERD (gastroesophageal reflux disease)      Headache     Hyperlipidemia     Hypertension     pt states she does not take meds anymore she just watches her weight    Neuromuscular disorder     Occipital neuralgia 2017    Osteoporosis      Past Surgical History:   Procedure Laterality Date    APPENDECTOMY      CATARACT EXTRACTION W/  INTRAOCULAR LENS IMPLANT Left 10/25/2023    Procedure: CEIOL OS;  Surgeon: Rustam Dyer MD;  Location: [facility] OR;  Service: Ophthalmology;  Laterality: Left;  Last Case of day, short eye, very high power IOL    CATARACT EXTRACTION W/  INTRAOCULAR LENS IMPLANT Right 1/10/2024    Procedure: CEIOL OD Preop Mannitol;  Surgeon: Rustam Dyer MD;  Location: [facility] OR;  Service: Ophthalmology;  Laterality: Right;  Will need pre-op Mannitol     SECTION      x 2    CHOLECYSTECTOMY      COLONOSCOPY  prior to     COLONOSCOPY N/A 2019    Procedure: COLONOSCOPY;  Surgeon: Greg Victor MD;  Location: [facility];  Service: Endoscopy;  Laterality: N/A; 2 colon polyps, hemorrhoids; repeat in 5 years for surveillance; biopsy: Tubular adenoma x2    EPIDURAL STEROID INJECTION INTO CERVICAL SPINE N/A 2022    Procedure: Injection-steroid-epidural-cervical C7-T1;  Surgeon: Timothy Grimaldo MD;  Location: [facility] OR;  Service: Pain Management;  Laterality: N/A;    ESOPHAGOGASTRODUODENOSCOPY N/A 2019    Procedure: EGD (ESOPHAGOGASTRODUODENOSCOPY);  Surgeon: Greg Victor MD;  Location: [facility];  Service: Endoscopy;  Laterality: N/A; Mild Schatzki ring. Dilated. small hiatal hernia; Four gastric polyps. Resected and retrieved, gastritis; biopsy:stomach- unremarkable, negative for h pylori, polyps-Fundic type mucosa with foveolar hyperplasia.    ESOPHAGOGASTRODUODENOSCOPY N/A 2021    Procedure: EGD (ESOPHAGOGASTRODUODENOSCOPY);  Surgeon: Greg Victor MD;  Location: [facility];  Service: Endoscopy;  Laterality: N/A;    HYSTERECTOMY      Laser Periphery Iridotomy Bilateral     OD 16 and OS touch  up 5/26/2016    UPPER GASTROINTESTINAL ENDOSCOPY  03/14/2017    Dr. Marcial: esophageal stenosis- dilated, gastritis, gastric polyps removed; biopsy- mild gastritis, negative for h pylori, hyperplastic polyp, esophagus unremarkable    vocal cord tumor removal       Family History   Problem Relation Age of Onset    Osteoarthritis Mother     Alcohol abuse Mother     Rheum arthritis Mother     Osteoarthritis Sister     Diabetes Brother     No Known Problems Son     No Known Problems Sister     No Known Problems Sister     No Known Problems Brother     Arthritis Son     No Known Problems Son     Stroke Maternal Grandmother 99    Rheum arthritis Maternal Grandmother     Retinal detachment Neg Hx     Macular degeneration Neg Hx     Glaucoma Neg Hx     Amblyopia Neg Hx     Blindness Neg Hx     Cancer Neg Hx     Cataracts Neg Hx     Hypertension Neg Hx     Strabismus Neg Hx     Thyroid disease Neg Hx     Lupus Neg Hx     Kidney disease Neg Hx     Inflammatory bowel disease Neg Hx     Psoriasis Neg Hx     Colon cancer Neg Hx     Crohn's disease Neg Hx     Ulcerative colitis Neg Hx     Stomach cancer Neg Hx     Esophageal cancer Neg Hx      Social History     Tobacco Use    Smoking status: Never    Smokeless tobacco: Never   Substance Use Topics    Alcohol use: No     Alcohol/week: 0.0 standard drinks of alcohol    Drug use: Yes     Types: Hydrocodone     Wt Readings from Last 10 Encounters:   02/20/24 52.7 kg (116 lb 2.9 oz)   02/19/24 53.1 kg (117 lb)   02/16/24 53.4 kg (117 lb 11.6 oz)   02/14/24 54 kg (119 lb)   02/14/24 53 kg (116 lb 13.5 oz)   02/01/24 54.9 kg (121 lb)   01/04/24 55.2 kg (121 lb 11.1 oz)   11/30/23 55 kg (121 lb 4.1 oz)   10/30/23 54 kg (119 lb)   10/25/23 54.4 kg (119 lb 14.9 oz)     Lab Results   Component Value Date    WBC 9.30 02/19/2024    HGB 12.2 02/19/2024    HCT 37.7 02/19/2024    MCV 93 02/19/2024     02/19/2024     CMP  Sodium   Date Value Ref Range Status   02/19/2024 139 136 - 145  mmol/L Final     Potassium   Date Value Ref Range Status   02/19/2024 4.5 3.5 - 5.1 mmol/L Final     Chloride   Date Value Ref Range Status   02/19/2024 105 95 - 110 mmol/L Final     CO2   Date Value Ref Range Status   02/19/2024 23 23 - 29 mmol/L Final     Glucose   Date Value Ref Range Status   02/19/2024 127 (H) 70 - 110 mg/dL Final     BUN   Date Value Ref Range Status   02/19/2024 16 8 - 23 mg/dL Final     Creatinine   Date Value Ref Range Status   02/19/2024 1.1 0.5 - 1.4 mg/dL Final     Calcium   Date Value Ref Range Status   02/19/2024 9.8 8.7 - 10.5 mg/dL Final     Total Protein   Date Value Ref Range Status   02/19/2024 7.9 6.0 - 8.4 g/dL Final     Albumin   Date Value Ref Range Status   02/19/2024 4.4 3.5 - 5.2 g/dL Final     Total Bilirubin   Date Value Ref Range Status   02/19/2024 0.3 0.1 - 1.0 mg/dL Final     Comment:     For infants and newborns, interpretation of results should be based  on gestational age, weight and in agreement with clinical  observations.    Premature Infant recommended reference ranges:  Up to 24 hours.............<8.0 mg/dL  Up to 48 hours............<12.0 mg/dL  3-5 days..................<15.0 mg/dL  6-29 days.................<15.0 mg/dL       Alkaline Phosphatase   Date Value Ref Range Status   02/19/2024 85 55 - 135 U/L Final     AST   Date Value Ref Range Status   02/19/2024 20 10 - 40 U/L Final     ALT   Date Value Ref Range Status   02/19/2024 15 10 - 44 U/L Final     Anion Gap   Date Value Ref Range Status   02/19/2024 11 8 - 16 mmol/L Final     eGFR if    Date Value Ref Range Status   05/09/2022 >60.0 >60 mL/min/1.73 m^2 Final     eGFR if non    Date Value Ref Range Status   05/09/2022 >60.0 >60 mL/min/1.73 m^2 Final     Comment:     Calculation used to obtain the estimated glomerular filtration  rate (eGFR) is the CKD-EPI equation.        Lab Results   Component Value Date    LIPASE 24 02/19/2024     No results found for:  ""LIPASERES"  Lab Results   Component Value Date    TSH 1.831 06/21/2023       Reviewed prior medical records including hospital encounter 02/14/2024, hospital encounter 02/19/2024 radiology report of CT abdomen 02/14/2020 & endoscopy history (see surgical history/procedures).    Objective:      Physical Exam  Vitals and nursing note reviewed.   Constitutional:       Appearance: Normal appearance. She is normal weight.   Cardiovascular:      Rate and Rhythm: Normal rate and regular rhythm.      Heart sounds: Normal heart sounds.   Pulmonary:      Breath sounds: Normal breath sounds.   Abdominal:      General: Bowel sounds are normal.      Palpations: Abdomen is soft.      Tenderness: There is abdominal tenderness in the epigastric area.   Skin:     General: Skin is warm and dry.      Coloration: Skin is not jaundiced.   Neurological:      Mental Status: She is alert and oriented to person, place, and time.   Psychiatric:         Mood and Affect: Mood normal.         Behavior: Behavior normal.         Assessment:       1. Epigastric pain    2. Gastroesophageal reflux disease, unspecified whether esophagitis present    3. Heartburn    4. Belching    5. Waterbrash    6. Nausea and vomiting, unspecified vomiting type    7. History of gastric polyp    8. Schatzki's ring    9. Dysphagia, unspecified type    10. Hiatal hernia    11. Intermittent constipation    12. History of colon polyps        Plan:       Epigastric pain  - START    pantoprazole (PROTONIX) 40 MG tablet; Take 1 tablet (40 mg total) by mouth once daily.  Dispense: 90 tablet; Refill: 1  -   START  sucralfate (CARAFATE) 100 mg/mL suspension; Take 10 mLs (1 g total) by mouth 4 (four) times daily. for 10 days  Dispense: 400 mL; Refill: 0  -     Case Request Endoscopy: EGD (ESOPHAGOGASTRODUODENOSCOPY)  - schedule EGD, discussed procedure with patient, including risks and benefits, patient verbalized understanding  -follow GERD lifestyle modifications  -If no " improvement in symptoms or symptoms worsen, call/follow-up at clinic or go to ER    Gastroesophageal reflux disease, unspecified whether esophagitis present, Heartburn, Belching, Waterbrash  -     Case Request Endoscopy: EGD (ESOPHAGOGASTRODUODENOSCOPY)  - START    pantoprazole (PROTONIX) 40 MG tablet; Take 1 tablet (40 mg total) by mouth once daily.  Dispense: 90 tablet; Refill: 1   -Take PPI 30min-1hr before eating breakfast  -Educated patient on lifestyle modifications to help control/reduce reflux/abdominal pain including: avoid large meals, avoid eating within 2-3 hours of bedtime (avoid late night eating & lying down soon after eating), elevate head of bed if nocturnal symptoms are present, smoking cessation (if current smoker), & weight loss (if overweight).   -Educated to avoid known foods which trigger reflux symptoms & to minimize/avoid high-fat foods, chocolate, caffeine, citrus, alcohol, & tomato products.  -Advised to avoid/limit use of NSAID's, since they can cause GI upset, bleeding, and/or ulcers. If needed, take with food.     Nausea and vomiting, unspecified vomiting type  -     Case Request Endoscopy: EGD (ESOPHAGOGASTRODUODENOSCOPY)  - schedule EGD, discussed procedure with patient, including risks and benefits, patient verbalized understanding  -start on new PPI  -continue Zofran as needed    History of gastric polyp  -     Case Request Endoscopy: EGD (ESOPHAGOGASTRODUODENOSCOPY)    Schatzki's ring, Dysphagia, unspecified type  -     Case Request Endoscopy: EGD (ESOPHAGOGASTRODUODENOSCOPY)  - schedule EGD, discussed procedure with patient and possible esophageal dilation may be performed during procedure if indicated, patient verbalized understanding  - educated patient to eat smaller more frequent meals and to eat slowly and advised to eat a soft diet.  - possible UGI/esophagram/esophageal manometry if symptoms persist    Hiatal hernia  -     Case Request Endoscopy: EGD  (ESOPHAGOGASTRODUODENOSCOPY)  -discussed diagnosis with patient & that it is usually managed by controlling reflux symptoms, surgery is an option, but usually performed if reflux is uncontrolled by medication management and lifestyle/dietary modifications; if symptoms persist despite medication management and lifestyle/dietary modifications, we can refer to general surgery to consult about surgical options, patient verbalized understanding    Intermittent constipation  -     Case Request Endoscopy: COLONOSCOPY  -Recommend daily exercise as tolerated, adequate water intake (six 8-oz glasses of water daily), and high fiber diet. OTC fiber supplements are recommended if diet does not reach daily fiber goal (20-30 grams daily), such as Metamucil, Citrucel, or FiberCon (take as directed, separate from other oral medications by >2 hours).  -Recommend trying OTC MiraLax once daily (17g PO) as directed  -If no improvement with above recommendations, try intermittently dosed Dulcolax OTC as directed (every 3-4 days) PRN to facilitate bowel movements  -If no relief with this, consider adding a emollient laxative (castor oil or mineral oil) +/- enema  -If still no improvement with these measures, call/follow-up    History of colon polyps  -     Case Request Endoscopy: COLONOSCOPY  - schedule Colonoscopy, discussed procedure with the patient, including risks and benefits, patient verbalized understanding        Follow up if symptoms worsen or fail to improve.      If no improvement in symptoms or symptoms worsen, call/follow-up at clinic or go to ER.       [facility] - GASTROENTEROLOGY  [facility], [place]     Dictation software program was used for this note. Please expect some simple typographical  errors in this note.    Encounter includes face to face time and non-face to face time preparing to see the patient (eg, review of tests), obtaining and/or reviewing separately obtained history,  documenting clinical information in the electronic or other health record, independently interpreting results (not separately reported) and communicating results to the patient/family/caregiver, or care coordination (not separately reported).

## 2024-02-26 ENCOUNTER — TELEPHONE (OUTPATIENT)
Dept: PAIN MEDICINE | Facility: CLINIC | Age: 79
End: 2024-02-26
Payer: MEDICARE

## 2024-02-26 DIAGNOSIS — M54.12 CERVICAL RADICULITIS: Primary | ICD-10-CM

## 2024-02-26 NOTE — TELEPHONE ENCOUNTER
Monitor progress from repeat C7-T1 IL MELANY. Consider bilateral occipital blocks for head pain.      Did you want me to schedule this?

## 2024-02-26 NOTE — TELEPHONE ENCOUNTER
----- Message from Misty Martinez sent at 2/26/2024 11:29 AM CST -----  Contact: pt  Type:   Appointment Request    Caller is requesting a same day appointment.  Caller declined first available appointment listed below.    Name of Caller:pt    When is the first available appointment? Department book     Symptoms:injection for pinched nerve in neck    Best Call Back Number: [phone]    Additional Information: pt is wanting injection. Pain is not being relieved with medication    Please call to advise  Thanks

## 2024-02-27 NOTE — TELEPHONE ENCOUNTER
----- Message from Cristina Dixon sent at 2/27/2024 11:02 AM CST -----  Contact: pt  enzo  Type:  Patient Returning Call    Who Called:  pt's  enzo  Who Left Message for Patient:  yas  Does the patient know what this is regarding?:  yes  Best Call Back Number:  [phone]  Additional Information:  please call  to discuss

## 2024-02-27 NOTE — TELEPHONE ENCOUNTER
Pt VM is not set up I just called her will try again later. If she is in severe pain she will need to present to ER.

## 2024-02-27 NOTE — TELEPHONE ENCOUNTER
Pts  has been writing through his chart for this pt I advised that the phone calls go straight to VM and that VM is not set up . If pain is that severe then please present to ER.

## 2024-02-27 NOTE — TELEPHONE ENCOUNTER
----- Message from Hazel Nainnoris sent at 2/27/2024  9:32 AM CST -----  Contact: self  She called twice yesterday and has not heard from anyone she is crying in pain.  Pt is req a call back to see how fast she is going to be able have something done for her pain she can't turn her neck and is in serve pain, needs help. Call back at [phone] and thanks

## 2024-02-27 NOTE — TELEPHONE ENCOUNTER
Spoke with pt and spouse and advised on ER  if red flag symptoms arise, also advised on heat or  ice and  OTC lidociane or biofreeze . I scheduled the LEOBARDO for 03/07 instructions given

## 2024-02-29 ENCOUNTER — ANESTHESIA EVENT (OUTPATIENT)
Dept: ENDOSCOPY | Facility: HOSPITAL | Age: 79
End: 2024-02-29
Payer: MEDICARE

## 2024-02-29 ENCOUNTER — HOSPITAL ENCOUNTER (OUTPATIENT)
Facility: HOSPITAL | Age: 79
Discharge: HOME | End: 2024-02-29
Attending: INTERNAL MEDICINE | Admitting: INTERNAL MEDICINE
Payer: MEDICARE

## 2024-02-29 ENCOUNTER — ANESTHESIA (OUTPATIENT)
Dept: ENDOSCOPY | Facility: HOSPITAL | Age: 79
End: 2024-02-29
Payer: MEDICARE

## 2024-02-29 VITALS
OXYGEN SATURATION: 98 % | DIASTOLIC BLOOD PRESSURE: 73 MMHG | SYSTOLIC BLOOD PRESSURE: 146 MMHG | BODY MASS INDEX: 23.39 KG/M2 | WEIGHT: 116 LBS | HEIGHT: 59 IN | TEMPERATURE: 98 F | RESPIRATION RATE: 16 BRPM | HEART RATE: 70 BPM

## 2024-02-29 DIAGNOSIS — K29.70 GASTRITIS, PRESENCE OF BLEEDING UNSPECIFIED, UNSPECIFIED CHRONICITY, UNSPECIFIED GASTRITIS TYPE: ICD-10-CM

## 2024-02-29 DIAGNOSIS — K44.9 HIATAL HERNIA: ICD-10-CM

## 2024-02-29 DIAGNOSIS — K22.2 SCHATZKI'S RING: ICD-10-CM

## 2024-02-29 DIAGNOSIS — K31.7 GASTRIC POLYPS: Primary | ICD-10-CM

## 2024-02-29 DIAGNOSIS — R13.10 DYSPHAGIA: ICD-10-CM

## 2024-02-29 PROCEDURE — 43251 EGD REMOVE LESION SNARE: CPT | Performed by: INTERNAL MEDICINE

## 2024-02-29 PROCEDURE — 25000003 PHARM REV CODE 250: Performed by: NURSE ANESTHETIST, CERTIFIED REGISTERED

## 2024-02-29 PROCEDURE — 37000008 HC ANESTHESIA 1ST 15 MINUTES: Performed by: INTERNAL MEDICINE

## 2024-02-29 PROCEDURE — 43248 EGD GUIDE WIRE INSERTION: CPT | Performed by: INTERNAL MEDICINE

## 2024-02-29 PROCEDURE — 43239 EGD BIOPSY SINGLE/MULTIPLE: CPT | Mod: 59,,, | Performed by: INTERNAL MEDICINE

## 2024-02-29 PROCEDURE — 63600175 PHARM REV CODE 636 W HCPCS: Performed by: NURSE ANESTHETIST, CERTIFIED REGISTERED

## 2024-02-29 PROCEDURE — 25000003 PHARM REV CODE 250: Performed by: INTERNAL MEDICINE

## 2024-02-29 PROCEDURE — C1769 GUIDE WIRE: HCPCS | Performed by: INTERNAL MEDICINE

## 2024-02-29 PROCEDURE — 43248 EGD GUIDE WIRE INSERTION: CPT | Mod: ,,, | Performed by: INTERNAL MEDICINE

## 2024-02-29 PROCEDURE — 43239 EGD BIOPSY SINGLE/MULTIPLE: CPT | Mod: 59 | Performed by: INTERNAL MEDICINE

## 2024-02-29 PROCEDURE — 88305 TISSUE EXAM BY PATHOLOGIST: CPT | Mod: TC,59 | Performed by: PATHOLOGY

## 2024-02-29 PROCEDURE — 37000009 HC ANESTHESIA EA ADD 15 MINS: Performed by: INTERNAL MEDICINE

## 2024-02-29 PROCEDURE — 27201012 HC FORCEPS, HOT/COLD, DISP: Performed by: INTERNAL MEDICINE

## 2024-02-29 PROCEDURE — 43251 EGD REMOVE LESION SNARE: CPT | Mod: ,,, | Performed by: INTERNAL MEDICINE

## 2024-02-29 RX ORDER — SODIUM CHLORIDE 9 MG/ML
INJECTION, SOLUTION INTRAVENOUS CONTINUOUS
Status: DISCONTINUED | OUTPATIENT
Start: 2024-02-29 | End: 2024-02-29 | Stop reason: HOSPADM

## 2024-02-29 RX ORDER — LIDOCAINE HYDROCHLORIDE 20 MG/ML
INJECTION INTRAVENOUS
Status: DISCONTINUED | OUTPATIENT
Start: 2024-02-29 | End: 2024-02-29

## 2024-02-29 RX ORDER — PROPOFOL 10 MG/ML
VIAL (ML) INTRAVENOUS
Status: DISCONTINUED | OUTPATIENT
Start: 2024-02-29 | End: 2024-02-29

## 2024-02-29 RX ADMIN — PROPOFOL 40 MG: 10 INJECTION, EMULSION INTRAVENOUS at 12:02

## 2024-02-29 RX ADMIN — LIDOCAINE HYDROCHLORIDE 50 MG: 20 INJECTION, SOLUTION INTRAVENOUS at 12:02

## 2024-02-29 RX ADMIN — SODIUM CHLORIDE: 9 INJECTION, SOLUTION INTRAVENOUS at 11:02

## 2024-02-29 RX ADMIN — PROPOFOL 50 MG: 10 INJECTION, EMULSION INTRAVENOUS at 12:02

## 2024-02-29 NOTE — ANESTHESIA POSTPROCEDURE EVALUATION
Anesthesia Post Evaluation    Patient: Rola Richter    Procedure(s) Performed: Procedure(s) (LRB):  EGD (ESOPHAGOGASTRODUODENOSCOPY) (N/A)    Final Anesthesia Type: general      Patient location during evaluation: PACU  Patient participation: Yes- Able to Participate  Level of consciousness: awake and alert and oriented  Post-procedure vital signs: reviewed and stable  Pain management: adequate  Airway patency: patent    PONV status at discharge: No PONV  Anesthetic complications: no      Cardiovascular status: blood pressure returned to baseline and stable  Respiratory status: unassisted and spontaneous ventilation  Hydration status: euvolemic  Follow-up not needed.              Vitals Value Taken Time   /73 02/29/24 1300   Temp 36.7 °C (98 °F) 02/29/24 1300   Pulse 70 02/29/24 1300   Resp 16 02/29/24 1300   SpO2 98 % 02/29/24 1300         Event Time   Out of Recovery 13:09:00         Pain/Amol Score: Amol Score: 10 (2/29/2024  1:00 PM)

## 2024-02-29 NOTE — PLAN OF CARE
Vss, christina po fluids, denies pain, ambulates easily. IV removed, catheter intact. Discharge instructions provided and states understanding. States ready to go home.  Discharged from facility with family per wheelchair.

## 2024-02-29 NOTE — ANESTHESIA PREPROCEDURE EVALUATION
02/29/2024  Rola Richter is a 78 y.o., female.    Anesthesia Evaluation    I have reviewed the Patient Summary Reports.     I have reviewed the Nursing Notes. I have reviewed the NPO Status.   I have reviewed the Medications.     Review of Systems  Anesthesia Hx:  No problems with previous Anesthesia             Denies Family Hx of Anesthesia complications.    Denies Personal Hx of Anesthesia complications.                    Social:  Non-Smoker, No Alcohol Use       EENT/Dental:   cataract          Cardiovascular:     Hypertension, well controlled                                        Hepatic/GI:     GERD, well controlled   Dysphagia           Musculoskeletal:  Arthritis          Spine Disorders: cervical Degenerative disease and Disc disease           Neurological:    Neuromuscular Disease,  Headaches                                 Endocrine:  Endocrine Normal            Psych:  Psychiatric History                  Physical Exam  General:  Well nourished       Airway/Jaw/Neck:  Airway Findings: Mouth Opening: Normal     Tongue: Normal      General Airway Assessment: Adult, Good      Mallampati: II  Improves to II with phonation.  TM Distance: 4-6 cm               Dental:  Dental Findings: Upper Dentures, Lower partial dentures      Chest/Lungs:  Chest/Lungs Findings:  Clear to auscultation, Normal Respiratory Rate       Heart/Vascular:  Heart Findings: Rate: Normal  Rhythm: Regular Rhythm  Sounds: Normal  Heart murmur: negative                     Mental Status:  Mental Status Findings:  Cooperative, Alert and Oriented         Anesthesia Plan  Type of Anesthesia, risks & benefits discussed:  Anesthesia Type:  general    Patient's Preference:   Plan Factors:          Intra-op Monitoring Plan: standard ASA monitors  Intra-op Monitoring Plan Comments:   Post Op Pain Control Plan:   Post Op Pain Control  Plan Comments:     Induction:   IV  Beta Blocker:         Informed Consent: Informed consent signed with the Patient and all parties understand the risks and agree with anesthesia plan.  All questions answered.  Anesthesia consent signed with patient.  ASA Score: 3     Day of Surgery Review of History & Physical:              Ready For Surgery From Anesthesia Perspective.             Physical Exam  General: Well nourished    Airway:  Mallampati: II / II  Mouth Opening: Normal  TM Distance: 4-6 cm  Tongue: Normal    Dental:  Upper Dentures, Lower partial dentures    Chest/Lungs:  Clear to auscultation, Normal Respiratory Rate    Heart:  Rate: Normal  Rhythm: Regular Rhythm  Sounds: Normal          Anesthesia Plan  Type of Anesthesia, risks & benefits discussed:    Anesthesia Type: general  Intra-op Monitoring Plan: standard ASA monitors  Induction:  IV  Informed Consent: Informed consent signed with the Patient and all parties understand the risks and agree with anesthesia plan.  All questions answered.   ASA Score: 3    Ready For Surgery From Anesthesia Perspective.       .

## 2024-02-29 NOTE — TRANSFER OF CARE
"Anesthesia Transfer of Care Note    Patient: Rola Richter    Procedure(s) Performed: Procedure(s) (LRB):  EGD (ESOPHAGOGASTRODUODENOSCOPY) (N/A)    Patient location: PACU    Anesthesia Type: general    Transport from OR: Transported from OR on room air with adequate spontaneous ventilation    Post pain: adequate analgesia    Post assessment: no apparent anesthetic complications and tolerated procedure well    Post vital signs: stable    Level of consciousness: responds to stimulation and sedated    Nausea/Vomiting: no nausea/vomiting    Complications: none    Transfer of care protocol was followed      Last vitals: Visit Vitals  BP (!) 144/65 (BP Location: Left arm, Patient Position: Lying)   Pulse 70   Temp 36.6 °C (97.9 °F) (Skin)   Resp 18   Ht 4' 11" (1.499 m)   Wt 52.6 kg (116 lb)   SpO2 (!) 94%   Breastfeeding No   BMI 23.43 kg/m²     "

## 2024-02-29 NOTE — PROVATION PATIENT INSTRUCTIONS
Discharge Summary/Instructions after an Endoscopic Procedure  Patient Name: Rola Richter  Patient MRN: [id number]  Patient YOB: 1945 Thursday, February 29, 2024  Greg Cohen MD  Dear patient,  As a result of recent federal legislation (The Federal Cures Act), you may   receive lab or pathology results from your procedure in your [facility]   account before your physician is able to contact you. Your physician or   their representative will relay the results to you with their   recommendations at their soonest availability.  Thank you,  RESTRICTIONS:  During your procedure today, you received medications for sedation.  These   medications may affect your judgment, balance and coordination.  Therefore,   for 24 hours, you have the following restrictions:   - DO NOT drive a car, operate machinery, make legal/financial decisions,   sign important papers or drink alcohol.    ACTIVITY:  Today: no heavy lifting, straining or running due to procedural   sedation/anesthesia.  The following day: return to full activity including work.  DIET:  Eat and drink normally unless instructed otherwise.     TREATMENT FOR COMMON SIDE EFFECTS:  - Mild abdominal pain, nausea, belching, bloating or excessive gas:  rest,   eat lightly and use a heating pad.  - Sore Throat: treat with throat lozenges and/or gargle with warm salt   water.  - Because air was used during the procedure, expelling large amounts of air   from your rectum or belching is normal.  - If a bowel prep was taken, you may not have a bowel movement for 1-3 days.    This is normal.  SYMPTOMS TO WATCH FOR AND REPORT TO YOUR PHYSICIAN:  1. Abdominal pain or bloating, other than gas cramps.  2. Chest pain.  3. Back pain.  4. Signs of infection such as: chills or fever occurring within 24 hours   after the procedure.  5. Rectal bleeding, which would show as bright red, maroon, or black stools.   (A tablespoon of blood from the rectum is not serious,  especially if   hemorrhoids are present.)  6. Vomiting.  7. Weakness or dizziness.  GO DIRECTLY TO THE NEAREST EMERGENCY ROOM IF YOU HAVE ANY OF THE FOLLOWING:      Difficulty breathing              Chills and/or fever over 101 F   Persistent vomiting and/or vomiting blood   Severe abdominal pain   Severe chest pain   Black, tarry stools   Bleeding- more than one tablespoon   Any other symptom or condition that you feel may need urgent attention  Your doctor recommends these additional instructions:  If any biopsies were taken, your doctors clinic will contact you in 1 to 2   weeks with any results.  - Patient has a contact number available for emergencies.  The signs and   symptoms of potential delayed complications were discussed with the   patient.  Return to normal activities tomorrow.  Written discharge   instructions were provided to the patient.   - Resume previous diet.   - Continue present medications.   - No aspirin, ibuprofen, naproxen, or other non-steroidal anti-inflammatory   drugs.   - Await pathology results.   - Discharge patient to home (ambulatory).   - Follow an antireflux regimen.   - Return to GI office after studies are complete.  For questions, problems or results please call your physician - Greg Cohen MD at Work:  [phone].  [facility], EMERGENCY ROOM PHONE NUMBER: [phone]  IF A COMPLICATION OR EMERGENCY SITUATION ARISES AND YOU ARE UNABLE TO REACH   YOUR PHYSICIAN - GO DIRECTLY TO THE EMERGENCY ROOM.  Greg Cohen MD  2/29/2024 12:33:33 PM  This report has been verified and signed electronically.  Dear patient,  As a result of recent federal legislation (The Federal Cures Act), you may   receive lab or pathology results from your procedure in your [facility]   account before your physician is able to contact you. Your physician or   their representative will relay the results to you with their   recommendations at their soonest availability.  Thank  you,  PROVATION

## 2024-03-05 ENCOUNTER — OFFICE VISIT (OUTPATIENT)
Dept: FAMILY MEDICINE | Facility: CLINIC | Age: 79
End: 2024-03-05
Payer: MEDICARE

## 2024-03-05 VITALS
WEIGHT: 116.63 LBS | OXYGEN SATURATION: 97 % | HEART RATE: 90 BPM | HEIGHT: 59 IN | BODY MASS INDEX: 23.51 KG/M2 | SYSTOLIC BLOOD PRESSURE: 130 MMHG | DIASTOLIC BLOOD PRESSURE: 68 MMHG

## 2024-03-05 DIAGNOSIS — R11.0 NAUSEA: ICD-10-CM

## 2024-03-05 DIAGNOSIS — I10 PRIMARY HYPERTENSION: ICD-10-CM

## 2024-03-05 DIAGNOSIS — R73.9 HYPERGLYCEMIA: ICD-10-CM

## 2024-03-05 DIAGNOSIS — M12.9 ARTHRITIS, MULTIPLE JOINT INVOLVEMENT: ICD-10-CM

## 2024-03-05 DIAGNOSIS — E78.2 MIXED HYPERLIPIDEMIA: Primary | ICD-10-CM

## 2024-03-05 PROCEDURE — 99999 PR PBB SHADOW E&M-EST. PATIENT-LVL III: CPT | Mod: PBBFAC,HCNC,, | Performed by: NURSE PRACTITIONER

## 2024-03-05 PROCEDURE — 3288F FALL RISK ASSESSMENT DOCD: CPT | Mod: HCNC,CPTII,S$GLB, | Performed by: NURSE PRACTITIONER

## 2024-03-05 PROCEDURE — 1126F AMNT PAIN NOTED NONE PRSNT: CPT | Mod: HCNC,CPTII,S$GLB, | Performed by: NURSE PRACTITIONER

## 2024-03-05 PROCEDURE — 99214 OFFICE O/P EST MOD 30 MIN: CPT | Mod: HCNC,S$GLB,, | Performed by: NURSE PRACTITIONER

## 2024-03-05 PROCEDURE — 1101F PT FALLS ASSESS-DOCD LE1/YR: CPT | Mod: HCNC,CPTII,S$GLB, | Performed by: NURSE PRACTITIONER

## 2024-03-05 PROCEDURE — 1159F MED LIST DOCD IN RCRD: CPT | Mod: HCNC,CPTII,S$GLB, | Performed by: NURSE PRACTITIONER

## 2024-03-05 PROCEDURE — 3075F SYST BP GE 130 - 139MM HG: CPT | Mod: HCNC,CPTII,S$GLB, | Performed by: NURSE PRACTITIONER

## 2024-03-05 PROCEDURE — 3078F DIAST BP <80 MM HG: CPT | Mod: HCNC,CPTII,S$GLB, | Performed by: NURSE PRACTITIONER

## 2024-03-05 RX ORDER — SERTRALINE HYDROCHLORIDE 100 MG/1
100 TABLET, FILM COATED ORAL 2 TIMES DAILY
COMMUNITY
Start: 2024-01-08 | End: 2024-03-05 | Stop reason: ALTCHOICE

## 2024-03-05 RX ORDER — FLUTICASONE PROPIONATE 50 MCG
2 SPRAY, SUSPENSION (ML) NASAL DAILY
Qty: 16 G | Refills: 1 | Status: SHIPPED | OUTPATIENT
Start: 2024-03-05 | End: 2024-04-04

## 2024-03-05 RX ORDER — PROCHLORPERAZINE MALEATE 10 MG
10 TABLET ORAL NIGHTLY PRN
Qty: 30 TABLET | Refills: 0 | Status: SHIPPED | OUTPATIENT
Start: 2024-03-05 | End: 2024-04-15

## 2024-03-05 NOTE — PROGRESS NOTES
This dictation has been generated using Modal Fluency Dictation some phonetic errors may occur. Please contact author for clarification if needed.     Problem List Items Addressed This Visit          Hyperlipidemia    Hyperlipidemia - Primary    Relevant Orders    CBC Auto Differential    Comprehensive Metabolic Panel    Hemoglobin A1C    Lipid Panel    TSH       Other    Hypertension    Relevant Orders    Comprehensive Metabolic Panel     Other Visit Diagnoses       Arthritis, multiple joint involvement        Nausea        Hyperglycemia        Relevant Orders    Comprehensive Metabolic Panel    Hemoglobin A1C    Lipid Panel    TSH            Orders Placed This Encounter    CBC Auto Differential    Comprehensive Metabolic Panel    Hemoglobin A1C    Lipid Panel    TSH    prochlorperazine (COMPAZINE) 10 MG tablet     Hyperlipidemia.  Discussed follow-up with Cardiology for further management.    Arthritis refer to rheumatology.  Patient should consider going to [facility].    Occipital neuralgia continue follow-up with Dr. Plascencia    Nausea. Compazine as above. Risk of somnolence discussed with patient. Do not drive or operate machinery while taking medication. Do not engage in task that require mental alertness.   Discussed risk of falls and careful position transfers and assistance to steady her. Refuses cane/walker.   Anxiety depression chronic pain SE with Cymbalta pt has stopped med.     No follow-ups on file. Follow up with PCP in the Summer. CC labs to primary.     ________________________________________________________________  ________________________________________________________________      Chief Complaint   Patient presents with    Follow-up     History of present illness  This 78 y.o. presents today for complaint of 3 month follow up. GI problems nausea.  Notes she is due for labs before follow-up in June.  She notes that she stopped taking the Cymbalta due to side effects.  She notes that she did not  feel right on the medication.   Nausea. She request med. No abd pain. No vomiting.   LOV follow-up.  Patient has hyperlipidemia arthritis occipital neuralgia and notes new complaint of rash on hand.  Also notes nerve issues.  She would previously been refer to Psychiatry but states therapists doesn't write.  Patient taking Zoloft and notes it is not helpful.  She is asking about additional meds.  Also notes the rheumatology issues and pain.  We discussed risk vs benefit of other medicine.  Regarding rheumatology issue she had been referred to rheumatology at [place] previously but she had deferred.  Notes ring finger swelling and pain and reconsidering rheumatology appointment.  Her previous rheumatologist has retired.      Past Medical History:   Diagnosis Date    Anxiety     Arthritis     Cataract     Colon polyp     DDD (degenerative disc disease), lumbar     Depression     Encounter for blood transfusion     GERD (gastroesophageal reflux disease)     Headache     Hyperlipidemia     Hypertension     pt states she does not take meds anymore she just watches her weight    Neuromuscular disorder     Occipital neuralgia 2017    Osteoporosis     Seizures        Past Surgical History:   Procedure Laterality Date    APPENDECTOMY      CATARACT EXTRACTION W/  INTRAOCULAR LENS IMPLANT Left 10/25/2023    Procedure: CEIOL OS;  Surgeon: Rustam Dyer MD;  Location: [facility] OR;  Service: Ophthalmology;  Laterality: Left;  Last Case of day, short eye, very high power IOL    CATARACT EXTRACTION W/  INTRAOCULAR LENS IMPLANT Right 1/10/2024    Procedure: CEIOL OD Preop Mannitol;  Surgeon: Rustam Dyer MD;  Location: [facility] OR;  Service: Ophthalmology;  Laterality: Right;  Will need pre-op Mannitol     SECTION      x 2    CHOLECYSTECTOMY      COLONOSCOPY  prior to     COLONOSCOPY N/A 2019    Procedure: COLONOSCOPY;  Surgeon: Greg Victor MD;  Location: [facility];  Service: Endoscopy;   Laterality: N/A; 2 colon polyps, hemorrhoids; repeat in 5 years for surveillance; biopsy: Tubular adenoma x2    EPIDURAL STEROID INJECTION INTO CERVICAL SPINE N/A 9/20/2022    Procedure: Injection-steroid-epidural-cervical C7-T1;  Surgeon: Timothy Grimaldo MD;  Location: [facility];  Service: Pain Management;  Laterality: N/A;    ESOPHAGOGASTRODUODENOSCOPY N/A 4/30/2019    Procedure: EGD (ESOPHAGOGASTRODUODENOSCOPY);  Surgeon: Greg Victor MD;  Location: [facility];  Service: Endoscopy;  Laterality: N/A; Mild Schatzki ring. Dilated. small hiatal hernia; Four gastric polyps. Resected and retrieved, gastritis; biopsy:stomach- unremarkable, negative for h pylori, polyps-Fundic type mucosa with foveolar hyperplasia.    ESOPHAGOGASTRODUODENOSCOPY N/A 2/17/2021    Procedure: EGD (ESOPHAGOGASTRODUODENOSCOPY);  Surgeon: Greg Victor MD;  Location: [facility];  Service: Endoscopy;  Laterality: N/A;    ESOPHAGOGASTRODUODENOSCOPY N/A 2/29/2024    Procedure: EGD (ESOPHAGOGASTRODUODENOSCOPY);  Surgeon: Greg Victor MD;  Location: [facility];  Service: Endoscopy;  Laterality: N/A;    HYSTERECTOMY      Laser Periphery Iridotomy Bilateral     OD 5/26/16 and OS touch up 5/26/2016    UPPER GASTROINTESTINAL ENDOSCOPY  03/14/2017    Dr. Marcial: esophageal stenosis- dilated, gastritis, gastric polyps removed; biopsy- mild gastritis, negative for h pylori, hyperplastic polyp, esophagus unremarkable    vocal cord tumor removal         Family History   Problem Relation Age of Onset    Osteoarthritis Mother     Alcohol abuse Mother     Rheum arthritis Mother     Osteoarthritis Sister     Diabetes Brother     No Known Problems Son     No Known Problems Sister     No Known Problems Sister     No Known Problems Brother     Arthritis Son     No Known Problems Son     Stroke Maternal Grandmother 99    Rheum arthritis Maternal Grandmother     Retinal detachment Neg Hx     Macular degeneration Neg Hx     Glaucoma Neg Hx     Amblyopia Neg Hx      Blindness Neg Hx     Cancer Neg Hx     Cataracts Neg Hx     Hypertension Neg Hx     Strabismus Neg Hx     Thyroid disease Neg Hx     Lupus Neg Hx     Kidney disease Neg Hx     Inflammatory bowel disease Neg Hx     Psoriasis Neg Hx     Colon cancer Neg Hx     Crohn's disease Neg Hx     Ulcerative colitis Neg Hx     Stomach cancer Neg Hx     Esophageal cancer Neg Hx        Social History     Socioeconomic History    Marital status:    Tobacco Use    Smoking status: Never    Smokeless tobacco: Never   Substance and Sexual Activity    Alcohol use: No     Alcohol/week: 0.0 standard drinks of alcohol    Drug use: Yes     Types: Hydrocodone    Sexual activity: Yes     Partners: Male     Social Determinants of Health     Financial Resource Strain: Low Risk  (8/8/2023)    Overall Financial Resource Strain (CARDIA)     Difficulty of Paying Living Expenses: Not hard at all   Food Insecurity: No Food Insecurity (8/8/2023)    Hunger Vital Sign     Worried About Running Out of Food in the Last Year: Never true     Ran Out of Food in the Last Year: Never true   Transportation Needs: No Transportation Needs (8/8/2023)    PRAPARE - Transportation     Lack of Transportation (Medical): No     Lack of Transportation (Non-Medical): No   Physical Activity: Insufficiently Active (8/8/2023)    Exercise Vital Sign     Days of Exercise per Week: 3 days     Minutes of Exercise per Session: 30 min   Stress: No Stress Concern Present (8/8/2023)    [employer] [place] [employer] - Occupational Stress Questionnaire     Feeling of Stress : Not at all   Social Connections: Moderately Isolated (8/8/2023)    Social Connection and Isolation Panel [NHANES]     Frequency of Communication with Friends and Family: More than three times a week     Frequency of Social Gatherings with Friends and Family: More than three times a week     Attends Faith Services: Never     Active Member of Clubs or Organizations: No     Attends Club or  Organization Meetings: Never     Marital Status:    Housing Stability: Unknown (8/8/2023)    Housing Stability Vital Sign     Unable to Pay for Housing in the Last Year: No     Unstable Housing in the Last Year: No       Current Outpatient Medications   Medication Sig Dispense Refill    busPIRone (BUSPAR) 5 MG Tab Take one or one and a half (5mg total or 7.5mg total) every afternoon. (Patient taking differently: Take 7.5 mg by mouth once daily. Take one or one and a half (5mg total or 7.5mg total) every afternoon.) 45 tablet 1    COD LIVER OIL ORAL Take 1 capsule by mouth once daily.      cyclobenzaprine (FLEXERIL) 10 MG tablet Take 1 tablet (10 mg total) by mouth 2 (two) times daily as needed for Muscle spasms. 60 tablet 1    diclofenac sodium (VOLTAREN) 1 % Gel Apply 2 g topically 4 (four) times daily. 450 g 3    donepeziL (ARICEPT) 10 MG tablet Take 1 tablet (10 mg total) by mouth every evening. 90 tablet 0    evolocumab (REPATHA SURECLICK) 140 mg/mL PnIj Inject 1 mL (140 mg total) into the skin every 14 (fourteen) days. (Patient taking differently: Inject 140 mg into the skin every 14 (fourteen) days. 15th and 30th) 2 mL 2    famotidine (PEPCID) 20 MG tablet Take 1 tablet (20 mg total) by mouth 2 (two) times daily. 60 tablet 2    fish oil-omega-3 fatty acids 300-1,000 mg capsule Take 2 g by mouth once daily.      fluticasone propionate (FLONASE) 50 mcg/actuation nasal spray 2 sprays (100 mcg total) by Each Nostril route once daily. 16 g 11    gabapentin (NEURONTIN) 100 MG capsule TAKE 1 CAPSULE TWICE DAILY 180 capsule 3    irbesartan (AVAPRO) 300 MG tablet Take 1 tablet (300 mg total) by mouth every evening. 90 tablet 1    meloxicam (MOBIC) 7.5 MG tablet Take 1 tablet (7.5 mg total) by mouth daily as needed for Pain. 90 tablet PRN    multivit with min-folic acid 200 mcg Chew Take 1 tablet by mouth once daily.       ondansetron (ZOFRAN-ODT) 4 MG TbDL Take 1 tablet (4 mg total) by mouth every 6 (six) hours  "as needed (nausea). 30 tablet 1    pantoprazole (PROTONIX) 40 MG tablet Take 1 tablet (40 mg total) by mouth once daily. 90 tablet 1    prochlorperazine (COMPAZINE) 10 MG tablet Take 1 tablet (10 mg total) by mouth nightly as needed (night time nausea). 30 tablet 0     No current facility-administered medications for this visit.     Facility-Administered Medications Ordered in Other Visits   Medication Dose Route Frequency Provider Last Rate Last Admin    electrolyte-S (ISOLYTE)   Intravenous Continuous Jose Nguyen MD        lactated ringers infusion   Intravenous Continuous Jose Nguyen MD   Stopped at 01/10/24 1122    metoclopramide injection 10 mg  10 mg Intravenous Q10 Min PRN Jose Nguyen MD           Review of patient's allergies indicates:   Allergen Reactions    Aspirin Nausea And Vomiting    Penicillins Itching    Crestor [rosuvastatin]      Muscle pain      Lipitor [atorvastatin]      Achy         Physical examination  Vitals Reviewed  /68 (BP Location: Right arm, Patient Position: Sitting, BP Method: Medium (Manual))   Pulse 90   Ht 4' 11" (1.499 m)   Wt 52.9 kg (116 lb 10 oz)   SpO2 97%   BMI 23.56 kg/m²  Body mass index is 23.56 kg/m².     BP Readings from Last 3 Encounters:   03/05/24 130/68   02/29/24 (!) 146/73   02/20/24 (!) 155/74       Wt Readings from Last 3 Encounters:   03/05/24 52.9 kg (116 lb 10 oz)   02/29/24 52.6 kg (116 lb)   02/20/24 52.7 kg (116 lb 2.9 oz)       Gen. Well-dressed well-nourished   Skin warm dry and intact. No rash.   Neck is supple without adenopathy  Chest.  Respirations are even unlabored.  Lungs are clear to auscultation.  Cardiac regular rate and rhythm.  No chest wall adenopathy noted.  Neuro. Awake alert oriented x4.  Normal judgment and cognition noted.  Extremities no clubbing cyanosis or edema noted.  Arthralgias noted in the fingers.  Joint deformities noted.    Call or return to clinic prn if these symptoms worsen or fail to " improve as anticipated.

## 2024-03-07 ENCOUNTER — HOSPITAL ENCOUNTER (OUTPATIENT)
Facility: HOSPITAL | Age: 79
Discharge: HOME | End: 2024-03-07
Attending: ANESTHESIOLOGY | Admitting: ANESTHESIOLOGY
Payer: MEDICARE

## 2024-03-07 DIAGNOSIS — M54.12 CERVICAL RADICULITIS: ICD-10-CM

## 2024-03-07 PROCEDURE — 62321 NJX INTERLAMINAR CRV/THRC: CPT | Performed by: ANESTHESIOLOGY

## 2024-03-07 PROCEDURE — 25500020 PHARM REV CODE 255: Performed by: ANESTHESIOLOGY

## 2024-03-07 PROCEDURE — A4216 STERILE WATER/SALINE, 10 ML: HCPCS | Performed by: ANESTHESIOLOGY

## 2024-03-07 PROCEDURE — 63600175 PHARM REV CODE 636 W HCPCS: Performed by: ANESTHESIOLOGY

## 2024-03-07 PROCEDURE — 62321 NJX INTERLAMINAR CRV/THRC: CPT | Mod: ,,, | Performed by: ANESTHESIOLOGY

## 2024-03-07 PROCEDURE — 25000003 PHARM REV CODE 250: Performed by: ANESTHESIOLOGY

## 2024-03-07 RX ORDER — MIDAZOLAM HYDROCHLORIDE 1 MG/ML
INJECTION INTRAMUSCULAR; INTRAVENOUS
Status: DISCONTINUED | OUTPATIENT
Start: 2024-03-07 | End: 2024-03-07 | Stop reason: HOSPADM

## 2024-03-07 RX ORDER — DEXAMETHASONE SODIUM PHOSPHATE 10 MG/ML
INJECTION INTRAMUSCULAR; INTRAVENOUS
Status: DISCONTINUED | OUTPATIENT
Start: 2024-03-07 | End: 2024-03-07 | Stop reason: HOSPADM

## 2024-03-07 RX ORDER — LIDOCAINE HYDROCHLORIDE 10 MG/ML
INJECTION, SOLUTION EPIDURAL; INFILTRATION; INTRACAUDAL; PERINEURAL
Status: DISCONTINUED | OUTPATIENT
Start: 2024-03-07 | End: 2024-03-07 | Stop reason: HOSPADM

## 2024-03-07 RX ORDER — LIDOCAINE HYDROCHLORIDE 10 MG/ML
1 INJECTION, SOLUTION EPIDURAL; INFILTRATION; INTRACAUDAL; PERINEURAL ONCE
Status: COMPLETED | OUTPATIENT
Start: 2024-03-07 | End: 2024-03-07

## 2024-03-07 RX ORDER — SODIUM CHLORIDE, SODIUM LACTATE, POTASSIUM CHLORIDE, CALCIUM CHLORIDE 600; 310; 30; 20 MG/100ML; MG/100ML; MG/100ML; MG/100ML
INJECTION, SOLUTION INTRAVENOUS CONTINUOUS
Status: ACTIVE | OUTPATIENT
Start: 2024-03-07

## 2024-03-07 RX ORDER — SODIUM CHLORIDE 9 MG/ML
INJECTION, SOLUTION INTRAMUSCULAR; INTRAVENOUS; SUBCUTANEOUS
Status: DISCONTINUED | OUTPATIENT
Start: 2024-03-07 | End: 2024-03-07 | Stop reason: HOSPADM

## 2024-03-07 RX ORDER — FENTANYL CITRATE 50 UG/ML
INJECTION, SOLUTION INTRAMUSCULAR; INTRAVENOUS
Status: DISCONTINUED | OUTPATIENT
Start: 2024-03-07 | End: 2024-03-07 | Stop reason: HOSPADM

## 2024-03-07 RX ADMIN — SODIUM CHLORIDE, POTASSIUM CHLORIDE, SODIUM LACTATE AND CALCIUM CHLORIDE: 600; 310; 30; 20 INJECTION, SOLUTION INTRAVENOUS at 01:03

## 2024-03-07 RX ADMIN — LIDOCAINE HYDROCHLORIDE 0.2 MG: 10 INJECTION, SOLUTION EPIDURAL; INFILTRATION; INTRACAUDAL; PERINEURAL at 01:03

## 2024-03-07 NOTE — OP NOTE
PROCEDURE DATE: 3/7/2024    Procedure: C7-T1 cervical interlaminar epidural steroid injection under utilizing fluoroscopy.    Diagnosis: Cervical Degenerative Disc Diease; Cervical Radiculitis  POSTOP DIAGNOSIS: SAME    Physician: Timothy Grimaldo MD    Medications injected:  Dexamethasone 10mg followed by a slow injection of 4mL sterile, preservative-free normal saline.    Local anesthetic used: Lidocaine 1%, 2 ml.    Sedation Medications: RN IV sedation    Complications:  None     Estimated blood loss: None    Technique:  A time-out was taken to identify patient and procedure prior to starting the procedure.  With the patient laying in a prone position with the neck in a mid-flexed forward position, the area was prepped and draped in the usual sterile fashion using ChloraPrep and a fenestrated drape.  The area was determined under AP fluoroscopic guidance.  Local anesthetic was given using a 25-gauge 1.5 inch needle by raising a wheal and then infiltrating ventrally.  A 3.5 inch 20-gauge Touhy needle was introduced under fluoroscopic guidance to meet the lamina of C7.  The needle was then hinged under the lamina then advanced using loss of resistance technique.  Once the tip of the needle was in the desired position, the 2ml contrast dye  was injected to determine placement and no uptake.  The steroid was then injected slowly followed by a slow injection of 4 mL of the sterile preservative-free normal saline.  The patient tolerated the procedure well.    The patient was monitored after the procedure and was given post-procedure and discharge instructions to follow at home. The patient was discharged in a stable condition.

## 2024-03-07 NOTE — OR NURSING
DR Grimaldo notified that pt took Mobic yest, DR Grimaldo aware and reported  to continue with procedure

## 2024-03-07 NOTE — DISCHARGE SUMMARY
[facility] - Periop Services  Discharge Note  Short Stay    Procedure(s) (LRB):  Injection-steroid-epidural-cervical (N/A)      OUTCOME: Patient tolerated treatment/procedure well without complication and is now ready for discharge.    DISPOSITION: Home or Self Care    FINAL DIAGNOSIS:  <principal problem not specified>    FOLLOWUP: In clinic    DISCHARGE INSTRUCTIONS:    Discharge Procedure Orders   Notify your health care provider if you experience any of the following:  temperature >100.4     Notify your health care provider if you experience any of the following:  severe uncontrolled pain     Notify your health care provider if you experience any of the following:  redness, tenderness, or signs of infection (pain, swelling, redness, odor or green/yellow discharge around incision site)     Activity as tolerated        TIME SPENT ON DISCHARGE: 30 minutes

## 2024-03-08 VITALS
OXYGEN SATURATION: 98 % | HEIGHT: 59 IN | WEIGHT: 116.19 LBS | RESPIRATION RATE: 18 BRPM | DIASTOLIC BLOOD PRESSURE: 69 MMHG | HEART RATE: 73 BPM | BODY MASS INDEX: 23.42 KG/M2 | SYSTOLIC BLOOD PRESSURE: 151 MMHG | TEMPERATURE: 98 F

## 2024-03-12 ENCOUNTER — OFFICE VISIT (OUTPATIENT)
Dept: PSYCHIATRY | Facility: CLINIC | Age: 79
End: 2024-03-12
Payer: MEDICARE

## 2024-03-12 VITALS
BODY MASS INDEX: 23.24 KG/M2 | DIASTOLIC BLOOD PRESSURE: 80 MMHG | HEIGHT: 59 IN | WEIGHT: 115.31 LBS | SYSTOLIC BLOOD PRESSURE: 142 MMHG | HEART RATE: 79 BPM

## 2024-03-12 DIAGNOSIS — F43.10 PTSD (POST-TRAUMATIC STRESS DISORDER): ICD-10-CM

## 2024-03-12 DIAGNOSIS — F33.1 MODERATE EPISODE OF RECURRENT MAJOR DEPRESSIVE DISORDER: Primary | ICD-10-CM

## 2024-03-12 DIAGNOSIS — F41.1 GENERALIZED ANXIETY DISORDER: ICD-10-CM

## 2024-03-12 DIAGNOSIS — G31.84 MILD NEUROCOGNITIVE DISORDER: ICD-10-CM

## 2024-03-12 DIAGNOSIS — F19.20 DEPENDENCY ON PAIN MEDICATION: ICD-10-CM

## 2024-03-12 DIAGNOSIS — G47.00 INSOMNIA, UNSPECIFIED TYPE: ICD-10-CM

## 2024-03-12 PROCEDURE — 1159F MED LIST DOCD IN RCRD: CPT | Mod: HCNC,CPTII,S$GLB, | Performed by: SOCIAL WORKER

## 2024-03-12 PROCEDURE — 1159F MED LIST DOCD IN RCRD: CPT | Mod: HCNC,CPTII,S$GLB, | Performed by: STUDENT IN AN ORGANIZED HEALTH CARE EDUCATION/TRAINING PROGRAM

## 2024-03-12 PROCEDURE — 99999 PR PBB SHADOW E&M-EST. PATIENT-LVL IV: CPT | Mod: PBBFAC,HCNC,, | Performed by: STUDENT IN AN ORGANIZED HEALTH CARE EDUCATION/TRAINING PROGRAM

## 2024-03-12 PROCEDURE — 1125F AMNT PAIN NOTED PAIN PRSNT: CPT | Mod: HCNC,CPTII,S$GLB, | Performed by: STUDENT IN AN ORGANIZED HEALTH CARE EDUCATION/TRAINING PROGRAM

## 2024-03-12 PROCEDURE — 90834 PSYTX W PT 45 MINUTES: CPT | Mod: HCNC,S$GLB,, | Performed by: SOCIAL WORKER

## 2024-03-12 PROCEDURE — 99214 OFFICE O/P EST MOD 30 MIN: CPT | Mod: HCNC,S$GLB,, | Performed by: STUDENT IN AN ORGANIZED HEALTH CARE EDUCATION/TRAINING PROGRAM

## 2024-03-12 PROCEDURE — 3288F FALL RISK ASSESSMENT DOCD: CPT | Mod: HCNC,CPTII,S$GLB, | Performed by: STUDENT IN AN ORGANIZED HEALTH CARE EDUCATION/TRAINING PROGRAM

## 2024-03-12 PROCEDURE — 3077F SYST BP >= 140 MM HG: CPT | Mod: HCNC,CPTII,S$GLB, | Performed by: STUDENT IN AN ORGANIZED HEALTH CARE EDUCATION/TRAINING PROGRAM

## 2024-03-12 PROCEDURE — 3079F DIAST BP 80-89 MM HG: CPT | Mod: HCNC,CPTII,S$GLB, | Performed by: STUDENT IN AN ORGANIZED HEALTH CARE EDUCATION/TRAINING PROGRAM

## 2024-03-12 PROCEDURE — 1160F RVW MEDS BY RX/DR IN RCRD: CPT | Mod: HCNC,CPTII,S$GLB, | Performed by: STUDENT IN AN ORGANIZED HEALTH CARE EDUCATION/TRAINING PROGRAM

## 2024-03-12 PROCEDURE — 1101F PT FALLS ASSESS-DOCD LE1/YR: CPT | Mod: HCNC,CPTII,S$GLB, | Performed by: STUDENT IN AN ORGANIZED HEALTH CARE EDUCATION/TRAINING PROGRAM

## 2024-03-12 PROCEDURE — 99999 PR PBB SHADOW E&M-EST. PATIENT-LVL I: CPT | Mod: PBBFAC,HCNC,, | Performed by: SOCIAL WORKER

## 2024-03-12 RX ORDER — BUSPIRONE HYDROCHLORIDE 5 MG/1
TABLET ORAL
Qty: 45 TABLET | Refills: 1 | Status: SHIPPED | OUTPATIENT
Start: 2024-03-12 | End: 2024-04-16 | Stop reason: SDUPTHER

## 2024-03-12 RX ORDER — DULOXETIN HYDROCHLORIDE 30 MG/1
30 CAPSULE, DELAYED RELEASE ORAL EVERY MORNING
Qty: 30 CAPSULE | Refills: 1 | Status: SHIPPED | OUTPATIENT
Start: 2024-03-12 | End: 2024-04-16

## 2024-03-12 RX ORDER — MIRTAZAPINE 7.5 MG/1
7.5 TABLET, FILM COATED ORAL NIGHTLY
Qty: 30 TABLET | Refills: 1 | Status: SHIPPED | OUTPATIENT
Start: 2024-03-12 | End: 2024-04-16 | Stop reason: SDUPTHER

## 2024-03-12 NOTE — PATIENT INSTRUCTIONS
Start REMERON 7.5mg NIGHTLY   Start CYMBALTA 30mg daily in the morning  Continue BUSPAR as prescribed  Keep therapy appointments

## 2024-03-12 NOTE — PROGRESS NOTES
Individual Psychotherapy (PhD/LCSW)    3/12/2024    Site:  [facility]         Therapeutic Intervention: Met with patient.  Outpatient - Insight oriented psychotherapy 45 min - CPT code 31539, Outpatient - Behavior modifying psychotherapy 45 min - CPT code 83012, and Outpatient - Supportive psychotherapy 45 min - CPT Code 33848    Chief complaint/reason for encounter: depression and anxiety             Interval history and content of current session: Ct was referred by Dr. Leong to address depression, anxiety, and trauma. Ct arrived to session and was fully engaged.   Ct shared that she had been sick for a few days due to GI issues. She reported that she went to the hospital 2x and was dehydrated. She reported that she is better but not feeling like herself so she plans to reach out to her PCP today. Ct shared that she has been depressed because of this. She reported that she was switched from Zoloft to Cymbalta and would like to go back to Zoloft. SW will notify ct's psych med mgt Dr. Leong regarding this. Sw and ct processed the importance of ct recognizing that she did not get sick on purpose and that it's okay for her to not take care of things around the house the way she did prior to getting sick. SW and ct processed the importance of ziyad, ct having acceptance over what is, ct focusing on things that she can change and do vs things she can't. SW and ct processed psychological acceptance. Ct was receptive to feedback. SW was able to help ct secure appt with Dr. Leong after this visit. Ct to continue in 1:1 sessions.       Treatment plan:  Target symptoms: depression, anxiety , adjustment  Why chosen therapy is appropriate versus another modality: relevant to diagnosis, patient responds to this modality, evidence based practice  Outcome monitoring methods: self-report  Therapeutic intervention type: insight oriented psychotherapy, behavior modifying psychotherapy, supportive psychotherapy    Risk  parameters:  Patient reports no suicidal ideation  Patient reports no homicidal ideation  Patient reports no self-injurious behavior  Patient reports no violent behavior    CSSRS was completed:     Mental Status Exam:  General Appearance:  unremarkable, age appropriate   Speech: normal tone, normal rate, normal pitch, normal volume      Level of Cooperation: cooperative      Thought Processes: normal and logical   Mood: steady, sad      Thought Content: normal, no suicidality, no homicidality, delusions, or paranoia   Affect: congruent and appropriate   Orientation: Oriented x3   Memory: recent >  intact   Attention Span & Concentration: intact   Fund of General Knowledge: intact and appropriate to age and level of education   Abstract Reasoning: interpretation of similarities was abstract   Judgment & Insight: intact     Language intact       Verbal deficits: None    Patient's response to intervention:  The patient's response to intervention is accepting.    Progress toward goals and other mental status changes:  The patient's progress toward goals is fair .    Diagnosis:     ICD-10-CM ICD-9-CM   1. Moderate episode of recurrent major depressive disorder  F33.1 296.32   2. Generalized anxiety disorder  F41.1 300.02       Plan:  individual psychotherapy and ct to follow up with Dr. Leong for psych med mgt  Pt to go to ED or call 911 if symptoms worsen or if she has thoughts of harming self and/or others. Pt verbalized understanding.    Return to clinic: as scheduled    Length of Service (minutes): 45      A portion of this note was created using [employer] voice recognition software that occasionally misinterprets phrases or words.    Each patient to whom he or she provides medical services by telemedicine is: (1) informed of the relationship between the physician and patient and the respective role of any other health care provider with respect to management of the patient; and (2) notified that he or she may decline  to receive medical services by telemedicine and may withdraw from such care at any time.

## 2024-03-12 NOTE — PROGRESS NOTES
Outpatient Psychiatry Followup Visit (DO/MD/NP, etc.)    3/12/2024  Assessment & Plan    Assessment - Plan:     Impression     ICD-10-CM ICD-9-CM   1. Moderate episode of recurrent major depressive disorder  F33.1 296.32   2. Generalized anxiety disorder  F41.1 300.02   3. PTSD (post-traumatic stress disorder)  F43.10 309.81   4. Mild neurocognitive disorder  G31.84 331.83   5. 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 Insomnia, unspecified type  G47.00 780.52   7. BMI 23.0-23.9, adult  Z68.23 V85.1        Plan of Care & Medication Management    Chart was reviewed. The risks and benefits of medication were discussed with pt. The treatment plan and followup plan were reviewed with pt. Pt understands to contact clinic if symptoms worsen. Pt understands to call 911 or go to nearest ER for suicidal ideation, intent or plan.   RX History ARICEPT, BARBITURATES, BUSPAR, CYMBALTA, GABAPENTIN, PROZAC, WELLBUTRIN, and ZOLOFT     Current RX [id number]: 3month supply of ARICEPT sent to pharmacy, with neurology or primary care to refill  12MAR2024: Continues to take GABAPENTIN, MOBIC and FLEXERIL; all of these have psychoactive properties.  Continue BUSPAR  Adjustments:  [id number]: Adjust to 5-7.5mg in the afternoon  [id number]: Reduce to 7.5mg in the afternoon  22MAR2023: Reduce to 7.5mg BID  [id number]: Increase to 10mg BID  31JAN2023: Start 5mg BID for 3 days  Prior to 31JAN2023 pt had been taking ZOLOFT 150mg daily  Restart CYMBALTA  Adjustments:  12MAR2024: Restart CYMBALTA 30mg daily in the morning  12MAR2024: Reports stopped taking sometime in February 02JAN2024: Continue CYMBALTA 60mg daily  Prior to 02JAN2024 pt had been taking ZOLOFT for over a year, maximum dose 200mg daily in MAY2023.  Start REMERON  Pt was provided [facility] educational material 3/12/2024.  Adjustments:  12MAR2024: Start 7.5mg HS  Prior to 12MAR2024 pt had most recently tried CYMBALTA   Education & Counseling RX administration and adherence  "  Other Orders Continue individual psychotherapy   Monitor VITAL SIGNS  Instruments: PROMIS (A&D), PSS4  [id number] 29/30 S-MMSE, 4/5 Mini-Cog    RETURN M: RETURN IN 4 WEEKS (ONE MONTH), and reassess frequency next visit   Continue medication management     Subjective    Interval History:     Available documentation has been reviewed, and pertinent elements of the chart have been incorporated into this note where appropriate. Last [facility] encounter with writer was on 2/6/2024   Rola Richter, a 78 y.o. female, presenting for followup visit.      Since last visit, reports overall A LITTLE WORSE.    Vomiting starting around Manzanares's Day. Stopped taking CYMBALTA sometime last month because of this. Only taking BUSPAR. "I've been having thoughts like I don't want to be here, like I want to disappear." Overwhelmed. Frustrated by lack of strength in hands.    Depressed mood, crying spells. Frustration with limitations due to pain. Continues to take GABAPENTIN, MOBIC and FLEXERIL.    Insomnia is a problem.    Considering pt had NOT been taking an antidepressant in over a month, agreed to restart CYMBALTA at a lower dose (30mg) with REMERON NIGHTLY, which would address depression, anxiety and insomnia.       Unless otherwise specified, pt did NOT display signs of nor endorse symptoms of overt psychosis or acute mood disorder requiring hospitalization during the encounter; pt denied violent thoughts or suicidal or homicidal ideation, intent, or plan.         Objective    Measurement-Based Care (MBC):     N/A    Current Evaluation of Mental Status:     Constitutional / General       Vitals:    03/12/24 1123   BP: (!) 142/80   Pulse: 79   Weight: 52.3 kg (115 lb 4.8 oz)   Height: 4' 11" (1.499 m)       Psychiatric / Mental Status Examination  1. Appearance: Dress is informal but appropriate. Motor activity normal.  2. Discourse: Clear speech with normal rate and volume. Associations intact. Orderly.  3. Emotional " "Expression: Anxious and depressed mood. Tearful.  4. Perception and Thinking: No hallucinations. No suicidality, no homicidality, delusions, or paranoia.  5. Sensorium: Grossly intact. Able to focus for interview.  6. Memory and Fund of Knowledge: Intact for content of interview.  7. Insight and Judgment: Intact.         Auto-populated chart data omitted from this note for brevity.      Billing Documentation:     Method of Encounter IN PERSON visit at the clinic   Type of Encounter Follow up visit with me   Counseling;  Psychotherapy    Counseling;  Tobacco and/or Nicotine    Additional Codes and Modifiers N/A   Time Remaining Chart/Pt 60823: FOLLOW UP VISIT, Rx mgmt, "UNSTABLE chronic illness(es) which require(s) a change in treatment"   Total Mins  (3/12/2024) N/A - Not billing for time        Will Leong DO  Department of Psychiatry, [facility]        "

## 2024-03-14 ENCOUNTER — TELEPHONE (OUTPATIENT)
Dept: FAMILY MEDICINE | Facility: CLINIC | Age: 79
End: 2024-03-14
Payer: MEDICARE

## 2024-03-14 NOTE — TELEPHONE ENCOUNTER
----- Message from Abraham Gurrola sent at 3/14/2024  2:30 PM CDT -----  Contact: Self  Type: Needs Appointment  Who Called:  PABLO    Best Call Back Number: [phone]  Additional Information: PT is calling for reschedule 07/05 appt because  will be out of town.

## 2024-04-09 ENCOUNTER — OFFICE VISIT (OUTPATIENT)
Dept: NEUROLOGY | Facility: CLINIC | Age: 79
End: 2024-04-09
Payer: MEDICARE

## 2024-04-09 DIAGNOSIS — F33.1 MODERATE EPISODE OF RECURRENT MAJOR DEPRESSIVE DISORDER: Primary | ICD-10-CM

## 2024-04-09 DIAGNOSIS — F43.10 PTSD (POST-TRAUMATIC STRESS DISORDER): ICD-10-CM

## 2024-04-09 DIAGNOSIS — F41.1 GENERALIZED ANXIETY DISORDER: ICD-10-CM

## 2024-04-09 DIAGNOSIS — R41.3 MEMORY LOSS: ICD-10-CM

## 2024-04-09 PROCEDURE — 96116 NUBHVL XM PHYS/QHP 1ST HR: CPT | Mod: HCNC,S$GLB,, | Performed by: STUDENT IN AN ORGANIZED HEALTH CARE EDUCATION/TRAINING PROGRAM

## 2024-04-09 PROCEDURE — 96139 PSYCL/NRPSYC TST TECH EA: CPT | Mod: HCNC,S$GLB,, | Performed by: STUDENT IN AN ORGANIZED HEALTH CARE EDUCATION/TRAINING PROGRAM

## 2024-04-09 PROCEDURE — 96132 NRPSYC TST EVAL PHYS/QHP 1ST: CPT | Mod: HCNC,S$GLB,, | Performed by: STUDENT IN AN ORGANIZED HEALTH CARE EDUCATION/TRAINING PROGRAM

## 2024-04-09 PROCEDURE — 96138 PSYCL/NRPSYC TECH 1ST: CPT | Mod: HCNC,S$GLB,, | Performed by: STUDENT IN AN ORGANIZED HEALTH CARE EDUCATION/TRAINING PROGRAM

## 2024-04-09 PROCEDURE — 96133 NRPSYC TST EVAL PHYS/QHP EA: CPT | Mod: HCNC,S$GLB,, | Performed by: STUDENT IN AN ORGANIZED HEALTH CARE EDUCATION/TRAINING PROGRAM

## 2024-04-09 PROCEDURE — 99499 UNLISTED E&M SERVICE: CPT | Mod: HCNC,S$GLB,, | Performed by: STUDENT IN AN ORGANIZED HEALTH CARE EDUCATION/TRAINING PROGRAM

## 2024-04-09 NOTE — PROGRESS NOTES
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 a two-sentence story memory task is below average for learning and recall trials; she learned four details and recalled four details. Recognition of story elements is below average (7/12 correct). On a more detailed story memory task, immediate recall is below average and delayed recall is low average; she retained most of the details she learned. Figure recall is average and she was able to correctly identify the figure in multiple choice format.     MOOD  Responses on self-report mood inventories suggest severe levels of depression and moderate anxiety.     PREMORBID FUNCTIONING Raw Score Standardized Score Percentile/CP Descriptor   DAISHA-4 18 88 21 Low Average   COGNITIVE SCREENING Raw Score Standardized Score Percentile/CP Descriptor   MMSE 27 - - WNL   Orientation - Place 5 - - -   Orientation - Date 4 - - -   RBANS        Immediate Memory - 65 1 Exceptionally Low    VS/Construction - 81 10 Low Average   Language - 90 25 Average   Attention - 60 0.4 Exceptionally Low    Delayed Memory - 60 0.4 Exceptionally Low    Total Scale - 64 1 Exceptionally Low    LANGUAGE FUNCTIONING Raw Score Standardized Score Percentile/CP Descriptor "   BLANC Token Test 34 6 9 Low Average   WAIS-IV Vocabulary 16 5 5 Below Average   RBANS Naming 10 - 51-75 Average   RBANS Semantic Fluency 14 6 9 Low Average   NAB Naming 29 51 54 Average   NAB Naming Percent Correct After Semantic Cuing 0 - 38 Average   NAB Naming Percent Correct After Phonemic Cuing 50 - 47 Average   FAS 12 -1.2 14 Low Average   Animal Naming 8 -2 3 Below Average   VISUOSPATIAL FUNCTIONING Raw Score Standardized Score Percentile/CP Descriptor   RBANS Line Orientation  16 - 26-50 Average   RBANS Figure Copy 15 5 5 Below Average   Clock Request 5 - 100 WNL   Clock Copy 5 - 100 WNL   Clock Total 10 - 100 WNL   LEARNING & MEMORY Raw Score Standardized Score Percentile/CP Descriptor   RBANS        Immediate Memory - 65 1 Exceptionally Low    Delayed Memory - 60 0.4 Exceptionally Low    List Learning 17 4 2 Below Average   List Recall 0 - <2 Exceptionally Low   List Recognition 15 - <2 Exceptionally Low   Story Memory 8 4 2 Below Average   Story Recall 4 5 5 Below Average   Story Recognition  7 - 5-7 Below Average   Figure Recall 9 8 25 Average   Figure Recognition 1 - - -   WMS-IV Subtests        LM I 13 4 2 Below Average   LM II 7 6 9 Low Average   LM Recognition 12 - 3-9 Below Average   ATTENTION/WORKING MEMORY Raw Score Standardized Score Percentile/CP Descriptor   WAIS-IV Digit Span 11 3 1 Exceptionally Low          DS Forward 6 6 9 Low Average         DS Backward 4 5 5 Below Average         DS Sequence 1 2 0.4 Exceptionally Low          Longest Digit Forward 4 - - -         Longest Digit Backward 2 - - -         Longest Digit Sequence 2 - - -   RBANS Digit Span  8 7 16 Low Average   MENTAL PROCESSING SPEED Raw Score Standardized Score Percentile/CP Descriptor   RBANS Coding 9 1 0 Exceptionally Low    TMT A  177 18 0 Exceptionally Low    TMT A errors 1 - - -   Color Trails 1 82 36 8 Below Average   Color Trails 1 Errors 0 N/A N/A N/A   EXECUTIVE FUNCTIONING Raw Score Standardized Score Percentile/CP  Descriptor   TMT B d/c N/A N/A N/A   TMT B errors 4 - - -   Color Trails 2 249 <20 <1 Exceptionally Low    Color Trails 2 Errors 4 N/A N/A N/A   LOULOU 7/9  - N/A   Clock Request 5 - 100 WNL   NAB Judgment 10 34 5 Below Average   MOOD & PERSONALITY Raw Score Standardized Score Percentile/CP Descriptor   GDS-30 23 - - Severe   LAURA-7 13 - - Moderate   ss = scaled score (mean = 10, SD = 3); SS = standard score (mean = 100, SD = 15); Tscore mean = 50, SD = 10; zscore (mean = 0.00, SD = 1)       It is important to note that scores/percentiles should only be interpreted by a neuropsychologist. It is common for healthy individuals to have 1-3 isolated low/unusual scores that are not indicative of any significant cognitive dysfunction.    BILLING     Service Description CPT Code Minutes Units   Psychiatric diagnostic evaluation by physician [id number]  0   Neurobehavioral status exam by physician [id number] 60 1   Each additional hour by physician [id number]  0   Test Evaluation Services --  --   Neuropsychological testing evaluation services by physician [id number] 60 1   Each additional hour by physician [id number] 160 3   Test Administration and Scoring --  --   Psychological or neuropsychological test administration and scoring by physician [id number]  0   Each additional 30 minutes by physician [id number]  0   Psychological or neuropsychological test administration and scoring by technician [id number] 175 1   Each additional 30 minutes by technician [id number]  5

## 2024-04-15 ENCOUNTER — OFFICE VISIT (OUTPATIENT)
Dept: NEUROLOGY | Facility: CLINIC | Age: 79
End: 2024-04-15
Payer: MEDICARE

## 2024-04-15 DIAGNOSIS — F33.1 MODERATE EPISODE OF RECURRENT MAJOR DEPRESSIVE DISORDER: ICD-10-CM

## 2024-04-15 DIAGNOSIS — F43.10 PTSD (POST-TRAUMATIC STRESS DISORDER): Primary | ICD-10-CM

## 2024-04-15 DIAGNOSIS — F41.1 GENERALIZED ANXIETY DISORDER: ICD-10-CM

## 2024-04-15 DIAGNOSIS — R41.3 MEMORY LOSS: ICD-10-CM

## 2024-04-15 PROCEDURE — 99499 UNLISTED E&M SERVICE: CPT | Mod: HCNC,95,, | Performed by: STUDENT IN AN ORGANIZED HEALTH CARE EDUCATION/TRAINING PROGRAM

## 2024-04-15 RX ORDER — PROCHLORPERAZINE MALEATE 10 MG
TABLET ORAL
Qty: 30 TABLET | Refills: 0 | Status: SHIPPED | OUTPATIENT
Start: 2024-04-15

## 2024-04-15 NOTE — PROGRESS NOTES
NEUROPSYCHOLOGICAL EVALUATION FEEDBACK    Referral Information  Name: Rola Richter  MRN: [id number]  Age: 78 y.o.    : 1945  Race: White    Gender: female        Chief complaint leading to consultation/medical necessity: Feedback from neuropsychological evaluation.  Established Patient - Telehealth Visit   Patient location: [place], [place]  Visit type: Virtual visit with audio only (telephone)  The reason for the audio only service rather than synchronous audio and video virtual visit was related to technical difficulties or patient preference/necessity.  Total time spent with patient: 30 minutes.  Billin attached to testing visit on 2024.  Each patient to whom he or she provides medical services by telemedicine is: (1) informed of the relationship between the physician and patient and the respective role of any other health care provider with respect to management of the patient; and (2) notified that he or she may decline to receive medical services by telemedicine and may withdraw from such care at any time. Patient verbally consented to receive this service via voice-only telephone call. This service was not originating from a related E/M service provided within the previous 7 days nor will  to an E/M service or procedure within the next 24 hours or my soonest available appointment.  Prevailing standard of care was able to be met in this audio-only visit.   Consent/Emergency Plan: The patient expressed an understanding of the purpose of the evaluation and consented to all procedures. I informed the patient of limits to confidentiality and discussed an emergency plan.    FEEDBACK NOTE       Ms. Rola Richter and her  participated in a feedback session today.  We discussed the results of the neuropsychological evaluation. I gave time to discuss questions and concerns. A copy of a evaluation report will be provided to Ms. Richter via [employer].     Radha Rao, Ph.D.  Licensed  Clinical Neuropsychologist  [facility] - Department of Neurology

## 2024-04-16 ENCOUNTER — OFFICE VISIT (OUTPATIENT)
Dept: PSYCHIATRY | Facility: CLINIC | Age: 79
End: 2024-04-16
Payer: MEDICARE

## 2024-04-16 VITALS
DIASTOLIC BLOOD PRESSURE: 64 MMHG | BODY MASS INDEX: 23.33 KG/M2 | WEIGHT: 115.75 LBS | HEIGHT: 59 IN | SYSTOLIC BLOOD PRESSURE: 160 MMHG | HEART RATE: 76 BPM

## 2024-04-16 DIAGNOSIS — F43.10 PTSD (POST-TRAUMATIC STRESS DISORDER): ICD-10-CM

## 2024-04-16 DIAGNOSIS — G31.84 MILD NEUROCOGNITIVE DISORDER: ICD-10-CM

## 2024-04-16 DIAGNOSIS — F19.20 DEPENDENCY ON PAIN MEDICATION: ICD-10-CM

## 2024-04-16 DIAGNOSIS — G47.00 INSOMNIA, UNSPECIFIED TYPE: ICD-10-CM

## 2024-04-16 DIAGNOSIS — F33.1 MODERATE EPISODE OF RECURRENT MAJOR DEPRESSIVE DISORDER: Primary | ICD-10-CM

## 2024-04-16 DIAGNOSIS — F41.1 GENERALIZED ANXIETY DISORDER: ICD-10-CM

## 2024-04-16 PROCEDURE — 96136 PSYCL/NRPSYC TST PHY/QHP 1ST: CPT | Mod: 59,HCNC,S$GLB, | Performed by: STUDENT IN AN ORGANIZED HEALTH CARE EDUCATION/TRAINING PROGRAM

## 2024-04-16 PROCEDURE — 1160F RVW MEDS BY RX/DR IN RCRD: CPT | Mod: HCNC,CPTII,S$GLB, | Performed by: STUDENT IN AN ORGANIZED HEALTH CARE EDUCATION/TRAINING PROGRAM

## 2024-04-16 PROCEDURE — 99999 PR PBB SHADOW E&M-EST. PATIENT-LVL IV: CPT | Mod: PBBFAC,HCNC,, | Performed by: STUDENT IN AN ORGANIZED HEALTH CARE EDUCATION/TRAINING PROGRAM

## 2024-04-16 PROCEDURE — 99214 OFFICE O/P EST MOD 30 MIN: CPT | Mod: HCNC,S$GLB,, | Performed by: STUDENT IN AN ORGANIZED HEALTH CARE EDUCATION/TRAINING PROGRAM

## 2024-04-16 PROCEDURE — 3077F SYST BP >= 140 MM HG: CPT | Mod: HCNC,CPTII,S$GLB, | Performed by: STUDENT IN AN ORGANIZED HEALTH CARE EDUCATION/TRAINING PROGRAM

## 2024-04-16 PROCEDURE — 1101F PT FALLS ASSESS-DOCD LE1/YR: CPT | Mod: HCNC,CPTII,S$GLB, | Performed by: STUDENT IN AN ORGANIZED HEALTH CARE EDUCATION/TRAINING PROGRAM

## 2024-04-16 PROCEDURE — 1159F MED LIST DOCD IN RCRD: CPT | Mod: HCNC,CPTII,S$GLB, | Performed by: STUDENT IN AN ORGANIZED HEALTH CARE EDUCATION/TRAINING PROGRAM

## 2024-04-16 PROCEDURE — 1125F AMNT PAIN NOTED PAIN PRSNT: CPT | Mod: HCNC,CPTII,S$GLB, | Performed by: STUDENT IN AN ORGANIZED HEALTH CARE EDUCATION/TRAINING PROGRAM

## 2024-04-16 PROCEDURE — 3288F FALL RISK ASSESSMENT DOCD: CPT | Mod: HCNC,CPTII,S$GLB, | Performed by: STUDENT IN AN ORGANIZED HEALTH CARE EDUCATION/TRAINING PROGRAM

## 2024-04-16 PROCEDURE — 3078F DIAST BP <80 MM HG: CPT | Mod: HCNC,CPTII,S$GLB, | Performed by: STUDENT IN AN ORGANIZED HEALTH CARE EDUCATION/TRAINING PROGRAM

## 2024-04-16 RX ORDER — SERTRALINE HYDROCHLORIDE 50 MG/1
50 TABLET, FILM COATED ORAL DAILY
Qty: 30 TABLET | Refills: 1 | Status: SHIPPED | OUTPATIENT
Start: 2024-04-16 | End: 2024-05-07 | Stop reason: SDUPTHER

## 2024-04-16 RX ORDER — BUSPIRONE HYDROCHLORIDE 5 MG/1
5 TABLET ORAL DAILY
Qty: 30 TABLET | Refills: 1 | Status: SHIPPED | OUTPATIENT
Start: 2024-04-16 | End: 2024-05-07 | Stop reason: SDUPTHER

## 2024-04-16 RX ORDER — MIRTAZAPINE 7.5 MG/1
7.5 TABLET, FILM COATED ORAL NIGHTLY
Qty: 30 TABLET | Refills: 1 | Status: SHIPPED | OUTPATIENT
Start: 2024-04-16 | End: 2024-05-07 | Stop reason: SDUPTHER

## 2024-04-16 NOTE — PATIENT INSTRUCTIONS
Next visit bring in all bottles of medications, herbals and supplements so we can review them together.    Restart ZOLOFT (sertraline) 50mg in the morning  Continue BUSPAR (BUSPAR) 5mg in the afternoon  Continue REMERON (mirtazapine) 7.5mg NIGHTLY    Don't take CYMBALTA (duloxetine)    Primary care or neurology should manage and refill ARICEPT    Referral placed to Dr. Christensen for trauma therapy
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
## 2024-04-22 ENCOUNTER — TELEPHONE (OUTPATIENT)
Dept: PSYCHIATRY | Facility: CLINIC | Age: 79
End: 2024-04-22
Payer: MEDICARE

## 2024-04-22 NOTE — TELEPHONE ENCOUNTER
Spoke to patient regarding the referral for Dr. Christensen for PTSD/Trauma therapy. Scheduled a new patient appointment for 04/30/2024 @ 11:45 am in person. Advised patient of the location, contact phone number, to arrive 15 minutes early for the appointment, to bring ID, insurance card, informed of security presence and mandatory electronic search, and of the cancellation policy. Patient states understanding and no additional questions at this time.
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
## 2024-04-25 ENCOUNTER — OFFICE VISIT (OUTPATIENT)
Dept: PSYCHIATRY | Facility: CLINIC | Age: 79
End: 2024-04-25
Payer: MEDICARE

## 2024-04-25 DIAGNOSIS — F43.10 PTSD (POST-TRAUMATIC STRESS DISORDER): ICD-10-CM

## 2024-04-25 DIAGNOSIS — F41.1 GENERALIZED ANXIETY DISORDER: ICD-10-CM

## 2024-04-25 DIAGNOSIS — F33.1 MODERATE EPISODE OF RECURRENT MAJOR DEPRESSIVE DISORDER: Primary | ICD-10-CM

## 2024-04-25 PROCEDURE — 99999 PR PBB SHADOW E&M-EST. PATIENT-LVL I: CPT | Mod: PBBFAC,HCNC,, | Performed by: SOCIAL WORKER

## 2024-04-25 PROCEDURE — 90834 PSYTX W PT 45 MINUTES: CPT | Mod: HCNC,S$GLB,, | Performed by: SOCIAL WORKER

## 2024-04-30 ENCOUNTER — OFFICE VISIT (OUTPATIENT)
Dept: PSYCHIATRY | Facility: CLINIC | Age: 79
End: 2024-04-30
Payer: MEDICARE

## 2024-04-30 ENCOUNTER — OFFICE VISIT (OUTPATIENT)
Dept: PAIN MEDICINE | Facility: CLINIC | Age: 79
End: 2024-04-30
Payer: MEDICARE

## 2024-04-30 VITALS
DIASTOLIC BLOOD PRESSURE: 75 MMHG | WEIGHT: 115.75 LBS | SYSTOLIC BLOOD PRESSURE: 144 MMHG | HEART RATE: 76 BPM | BODY MASS INDEX: 23.33 KG/M2 | HEIGHT: 59 IN

## 2024-04-30 DIAGNOSIS — G89.4 CHRONIC PAIN SYNDROME: ICD-10-CM

## 2024-04-30 DIAGNOSIS — M79.18 MYOFASCIAL PAIN: ICD-10-CM

## 2024-04-30 DIAGNOSIS — G89.4 CHRONIC PAIN DISORDER: ICD-10-CM

## 2024-04-30 DIAGNOSIS — F33.3 MAJOR DEPRESSIVE DISORDER, RECURRENT, SEVERE WITH PSYCHOTIC FEATURES: ICD-10-CM

## 2024-04-30 DIAGNOSIS — M54.81 BILATERAL OCCIPITAL NEURALGIA: ICD-10-CM

## 2024-04-30 DIAGNOSIS — M47.892 OTHER SPONDYLOSIS, CERVICAL REGION: ICD-10-CM

## 2024-04-30 DIAGNOSIS — F43.10 PTSD (POST-TRAUMATIC STRESS DISORDER): Primary | ICD-10-CM

## 2024-04-30 DIAGNOSIS — Z13.31 POSITIVE DEPRESSION SCREENING: ICD-10-CM

## 2024-04-30 DIAGNOSIS — M50.30 DDD (DEGENERATIVE DISC DISEASE), CERVICAL: ICD-10-CM

## 2024-04-30 DIAGNOSIS — M54.12 CERVICAL RADICULITIS: Primary | ICD-10-CM

## 2024-04-30 PROCEDURE — 1125F AMNT PAIN NOTED PAIN PRSNT: CPT | Mod: HCNC,CPTII,S$GLB, | Performed by: PHYSICIAN ASSISTANT

## 2024-04-30 PROCEDURE — 1159F MED LIST DOCD IN RCRD: CPT | Mod: HCNC,CPTII,S$GLB, | Performed by: PHYSICIAN ASSISTANT

## 2024-04-30 PROCEDURE — 99999 PR PBB SHADOW E&M-EST. PATIENT-LVL III: CPT | Mod: PBBFAC,HCNC,, | Performed by: PHYSICIAN ASSISTANT

## 2024-04-30 PROCEDURE — 3078F DIAST BP <80 MM HG: CPT | Mod: HCNC,CPTII,S$GLB, | Performed by: PHYSICIAN ASSISTANT

## 2024-04-30 PROCEDURE — 99999 PR PBB SHADOW E&M-EST. PATIENT-LVL I: CPT | Mod: PBBFAC,HCNC,, | Performed by: PSYCHOLOGIST

## 2024-04-30 PROCEDURE — 90791 PSYCH DIAGNOSTIC EVALUATION: CPT | Mod: HCNC,S$GLB,, | Performed by: PSYCHOLOGIST

## 2024-04-30 PROCEDURE — 3077F SYST BP >= 140 MM HG: CPT | Mod: HCNC,CPTII,S$GLB, | Performed by: PHYSICIAN ASSISTANT

## 2024-04-30 PROCEDURE — 99214 OFFICE O/P EST MOD 30 MIN: CPT | Mod: HCNC,S$GLB,, | Performed by: PHYSICIAN ASSISTANT

## 2024-04-30 PROCEDURE — 1101F PT FALLS ASSESS-DOCD LE1/YR: CPT | Mod: HCNC,CPTII,S$GLB, | Performed by: PHYSICIAN ASSISTANT

## 2024-04-30 PROCEDURE — 3288F FALL RISK ASSESSMENT DOCD: CPT | Mod: HCNC,CPTII,S$GLB, | Performed by: PHYSICIAN ASSISTANT

## 2024-04-30 PROCEDURE — 1160F RVW MEDS BY RX/DR IN RCRD: CPT | Mod: HCNC,CPTII,S$GLB, | Performed by: PSYCHOLOGIST

## 2024-04-30 PROCEDURE — 1159F MED LIST DOCD IN RCRD: CPT | Mod: HCNC,CPTII,S$GLB, | Performed by: PSYCHOLOGIST

## 2024-04-30 RX ORDER — HYDROCODONE BITARTRATE AND ACETAMINOPHEN 5; 325 MG/1; MG/1
1 TABLET ORAL EVERY 12 HOURS PRN
Qty: 60 TABLET | Refills: 0 | Status: SHIPPED | OUTPATIENT
Start: 2024-05-14 | End: 2024-06-12

## 2024-04-30 RX ORDER — HYDROCODONE BITARTRATE AND ACETAMINOPHEN 5; 325 MG/1; MG/1
1 TABLET ORAL EVERY 12 HOURS PRN
Qty: 60 TABLET | Refills: 0 | Status: SHIPPED | OUTPATIENT
Start: 2024-07-11 | End: 2024-08-09

## 2024-04-30 RX ORDER — HYDROCODONE BITARTRATE AND ACETAMINOPHEN 5; 325 MG/1; MG/1
1 TABLET ORAL EVERY 12 HOURS PRN
Qty: 60 TABLET | Refills: 0 | Status: SHIPPED | OUTPATIENT
Start: 2024-06-12 | End: 2024-07-11

## 2024-04-30 RX ORDER — CYCLOBENZAPRINE HCL 10 MG
10 TABLET ORAL 2 TIMES DAILY PRN
Qty: 60 TABLET | Refills: 1 | Status: SHIPPED | OUTPATIENT
Start: 2024-04-30 | End: 2024-06-29
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
## 2024-04-30 NOTE — PROGRESS NOTES
Referring Physician: No ref. provider found    PCP: Liseth Carias MD      CC: neck and occipital pain    Interval history: Ms. Richter is a 78 y.o. female with neck pain and occipital neuralgia who returns to our clinic.  She continues to c/o headaches and neck pain.  Most recent occcipital nerve block only provided mild short lived beneft. Recent cervical MELANY improved neck pain that was extending into left shoulder.  She has numbness to her right hand and first through fourth digits. Cervical MELANY in February 2018  provided benefit for several weeks.    She continues to take Norco with benefit.  Flexeril 10 mg caused dry mouth however this resolved and she wishes to resume. Robaxin was helpful but caused dizziness.  Denies UE weakness. No bowel bladder changes.   Pain today is rated 9/10.    Prior HPI:   Patient is 70-year-old female with past history history of cervical DDD, cervical spondylosis and chronic headaches.  She recently moved here from [place], [place].  She is treated in the past by neurology.  She states having constant burning pain over the left side of her posterior scalp.  Pain radiates to her neck as well as a frontal.  She also has left-sided neck pain as well.  She denies any radicular arm pain.  No numbness or weakness.  She states having cervical epidural steroid injection at past with minimal benefit.  She also has had decided of cervical nerve blocks in the past with moderate benefit.  Most recent injection was performed 2 months ago.  She desires repeat injection.  She currently takes Norco 10 mg every 12 hours as needed with moderate benefit.  She also takes Zanaflex 4 mg every 8 hours with mild benefits.  She rates her pain 7/10 today.    Pain intervention history: s/p left occipital nerve blocks on 1/2016 with 50% relief of her headaches  S/p cervical MELANY 2/8/18 moderate relief for a couple of weeks.     ROS:  CONSTITUTIONAL: No fevers, chills, night sweats, wt. loss, appetite  changes  SKIN: no rashes or itching  ENT: No headaches, head trauma, vision changes, or eye pain  LYMPH NODES: None noticed   CV: No chest pain, palpitations.   RESP: No shortness of breath, dyspnea on exertion, cough, wheezing, or hemoptysis  GI: No nausea, emesis, diarrhea, constipation, melena, hematochezia, pain.    : No dysuria, hematuria, urgency, or frequency   HEME: No easy bruising, bleeding problems  PSYCHIATRIC: No depression, anxiety, psychosis, hallucinations.  NEURO: No seizures, memory loss, dizziness or difficulty sleeping  MSK: + History of present illness      Past Medical History:   Diagnosis Date    Anxiety     Arthritis     Cataract     Colon polyp     DDD (degenerative disc disease), lumbar     Depression     Encounter for blood transfusion     GERD (gastroesophageal reflux disease)     Headache     Hyperlipidemia     Hypertension     pt states she does not take meds anymore she just watches her weight    Insomnia 3/12/2024    Neuromuscular disorder     Occipital neuralgia 2017    Osteoporosis     Seizures      Past Surgical History:   Procedure Laterality Date    APPENDECTOMY      CATARACT EXTRACTION W/  INTRAOCULAR LENS IMPLANT Left 10/25/2023    Procedure: CEIOL OS;  Surgeon: Rustam Dyer MD;  Location: [facility];  Service: Ophthalmology;  Laterality: Left;  Last Case of day, short eye, very high power IOL    CATARACT EXTRACTION W/  INTRAOCULAR LENS IMPLANT Right 1/10/2024    Procedure: CEIOL OD Preop Mannitol;  Surgeon: Rustam Dyer MD;  Location: [facility] OR;  Service: Ophthalmology;  Laterality: Right;  Will need pre-op Mannitol     SECTION      x 2    CHOLECYSTECTOMY      COLONOSCOPY  prior to     COLONOSCOPY N/A 2019    Procedure: COLONOSCOPY;  Surgeon: Greg Victor MD;  Location: [facility];  Service: Endoscopy;  Laterality: N/A; 2 colon polyps, hemorrhoids; repeat in 5 years for surveillance; biopsy: Tubular adenoma x2    EPIDURAL STEROID  INJECTION INTO CERVICAL SPINE N/A 9/20/2022    Procedure: Injection-steroid-epidural-cervical C7-T1;  Surgeon: Timothy Grimaldo MD;  Location: [facility] OR;  Service: Pain Management;  Laterality: N/A;    EPIDURAL STEROID INJECTION INTO CERVICAL SPINE N/A 3/7/2024    Procedure: Injection-steroid-epidural-cervical;  Surgeon: Timothy Grimaldo MD;  Location: [facility] OR;  Service: Anesthesiology;  Laterality: N/A;  c7-t1    ESOPHAGOGASTRODUODENOSCOPY N/A 4/30/2019    Procedure: EGD (ESOPHAGOGASTRODUODENOSCOPY);  Surgeon: Greg Victor MD;  Location: [facility];  Service: Endoscopy;  Laterality: N/A; Mild Schatzki ring. Dilated. small hiatal hernia; Four gastric polyps. Resected and retrieved, gastritis; biopsy:stomach- unremarkable, negative for h pylori, polyps-Fundic type mucosa with foveolar hyperplasia.    ESOPHAGOGASTRODUODENOSCOPY N/A 2/17/2021    Procedure: EGD (ESOPHAGOGASTRODUODENOSCOPY);  Surgeon: Greg Victor MD;  Location: [facility];  Service: Endoscopy;  Laterality: N/A;    ESOPHAGOGASTRODUODENOSCOPY N/A 2/29/2024    Procedure: EGD (ESOPHAGOGASTRODUODENOSCOPY);  Surgeon: Greg Victor MD;  Location: [facility];  Service: Endoscopy;  Laterality: N/A;    HYSTERECTOMY      Laser Periphery Iridotomy Bilateral     OD 5/26/16 and OS touch up 5/26/2016    UPPER GASTROINTESTINAL ENDOSCOPY  03/14/2017    Dr. Marcial: esophageal stenosis- dilated, gastritis, gastric polyps removed; biopsy- mild gastritis, negative for h pylori, hyperplastic polyp, esophagus unremarkable    vocal cord tumor removal       Family History   Problem Relation Name Age of Onset    Osteoarthritis Mother      Alcohol abuse Mother      Rheum arthritis Mother      Osteoarthritis Sister      Diabetes Brother      No Known Problems Son      No Known Problems Sister      No Known Problems Sister      No Known Problems Brother      Arthritis Son      No Known Problems Son      Stroke Maternal Grandmother  99    Rheum arthritis Maternal Grandmother       Retinal detachment Neg Hx      Macular degeneration Neg Hx      Glaucoma Neg Hx      Amblyopia Neg Hx      Blindness Neg Hx      Cancer Neg Hx      Cataracts Neg Hx      Hypertension Neg Hx      Strabismus Neg Hx      Thyroid disease Neg Hx      Lupus Neg Hx      Kidney disease Neg Hx      Inflammatory bowel disease Neg Hx      Psoriasis Neg Hx      Colon cancer Neg Hx      Crohn's disease Neg Hx      Ulcerative colitis Neg Hx      Stomach cancer Neg Hx      Esophageal cancer Neg Hx       Social History     Socioeconomic History    Marital status:    Tobacco Use    Smoking status: Never    Smokeless tobacco: Never   Substance and Sexual Activity    Alcohol use: No     Alcohol/week: 0.0 standard drinks of alcohol    Drug use: Yes     Types: Hydrocodone    Sexual activity: Yes     Partners: Male     Social Determinants of Health     Financial Resource Strain: Low Risk  (8/8/2023)    Overall Financial Resource Strain (CARDIA)     Difficulty of Paying Living Expenses: Not hard at all   Food Insecurity: No Food Insecurity (8/8/2023)    Hunger Vital Sign     Worried About Running Out of Food in the Last Year: Never true     Ran Out of Food in the Last Year: Never true   Transportation Needs: No Transportation Needs (8/8/2023)    PRAPARE - Transportation     Lack of Transportation (Medical): No     Lack of Transportation (Non-Medical): No   Physical Activity: Insufficiently Active (8/8/2023)    Exercise Vital Sign     Days of Exercise per Week: 3 days     Minutes of Exercise per Session: 30 min   Stress: No Stress Concern Present (8/8/2023)    [employer] - Occupational Stress Questionnaire     Feeling of Stress : Not at all   Housing Stability: Unknown (8/8/2023)    Housing Stability Vital Sign     Unable to Pay for Housing in the Last Year: No     Unstable Housing in the Last Year: No         Medications/Allergies: See med card    Vitals:    04/30/24 1309   BP: (!) 144/75   Pulse:  "76   Weight: 52.5 kg (115 lb 11.9 oz)   Height: 4' 11" (1.499 m)   PainSc:   9   PainLoc: Neck         Physical exam:    GENERAL: A and O x3, the patient appears well groomed and is in no acute distress.  Skin: No rashes or obvious lesions  HEENT: normocephalic, atraumatic  CARDIOVASCULAR:  RRR  LUNGS: nonlabored breathing  ABDOMEN: soft, nontender   UPPER EXTREMITIES: Normal alignment, normal range of motion, no atrophy, no skin changes,  hair growth and nail growth normal and equal bilaterally. No swelling, no tenderness.  +Phalens on left side. +TTP tricep tendon  LOWER EXTREMITIES:  Normal alignment, normal range of motion, no atrophy, no skin changes,  hair growth and nail growth normal and equal bilaterally. No swelling, no tenderness.  CERVICAL SPINE:   Cervical spine: ROM is full in flexion, extension and lateral rotation.  Painful flexion > extension.  Positive facet loading bilaterally.  Spurling is positive at right side.  Myofascial exam:  Tenderness to palpation across cervical paraspinals deltoid and trapezius muscles bilaterally.      MENTAL STATUS: normal orientation, speech, language, and fund of knowledge for social situation.  Emotional state appropriate.    CRANIAL NERVES:  II:  PERRL bilaterally,   III,IV,VI: EOMI.    V:  Facial sensation equal bilaterally  VII:  Facial motor function normal.  VIII:  Hearing equal to finger rub bilaterally  IX/X: Gag normal, palate symmetric  XI:  Shoulder shrug equal, head turn equal  XII:  Tongue midline without fasciculations      MOTOR: Tone and bulk: normal bilateral upper and lower Strength: normal   Delt Bi Tri WE WF     R 5 5 5 5 5 5   L 5 5 5 5 5 5     IP ADD ABD Quad TA Gas HAM  R 5 5 5 5 5 5 5  L 5 5 5 5 5 5 5    SENSATION: Light touch and pinprick intact bilaterally  REFLEXES: normal, symmetric, nonbrisk.  Toes down, no clonus. No hoffmans.  GAIT: normal rise, base, steps, and arm swing.        Imaging:   Cervical MRI 12/4/17    Narrative "     EXAM: Cervical spine MRI without contrast.    INDICATION: Cervical radiculopathy.  Neck pain and occipital neuralgia.  The patient complains of neck and right arm pain.    TECHNIQUE: Routine multiplanar, multisequence unenhanced cervical spine MRI was performed.    COMPARISON: Plain films of the cervical spine obtained concurrently      FINDINGS:     Vertebral column: There is straightening of the cervical spine with loss of normal lordosis.  As seen on concurrent plain films, there is trace anterolisthesis of C3 on C4 with 2 mm anterolisthesis of C4 on C5.  There is marked disc space narrowing at the C5-6 level with moderate to marked disc space narrowing at the C4-5 and C6-7 levels.  There is partial non-segmentation anteriorly at the C2-3 level.  The C2 and C3 as well as the C4 and C5 facet joints appear fused and this may represent developmental non-segmentation or degenerative ankylosis.  All of the discs are desiccated.  The odontoid process is intact.    Spinal canal, cord, epidural space: The craniovertebral junction is normal.  The spinal canal is omental and normal.  There is no significant spinal stenosis.  The cord is normal in caliber.  There is very subtle flattening of the ventral cord surface at the C4-5 and C5-6 levels where there is degenerative change.  The study is mildly motion but there is no definite cord edema or myelomalacia.    Findings by level:    C2-3: There is no spinal canal or foraminal stenosis.  There is mild left facet joint arthropathy.    C3-4: There is trace anterolisthesis.  There is left greater than right uncovertebral spurring and facet joint arthropathy.  There is a mild disc osteophyte complex, slightly eccentric to the left with subtle annular fissure.  This narrows the ventral subarachnoid space.  There is no spinal stenosis or cord compression.  There is moderate marked left foraminal stenosis.    C4-5: There is moderate disc space narrowing with 2 mm  anterolisthesis of C4 on C5.  There is facet joint arthropathy or effusion left greater than right.  There is also mild bilateral but left greater than right uncovertebral spurring.  There is unroofing of a mild disc bulge which narrows the ventral subarachnoid space.  There is no spinal stenosis.  There is mild to moderate left foraminal stenosis.    C5-6:There is marked disc space narrowing.  There is bilateral uncovertebral spurring.  There is a disc osteophyte complex which narrows the subarachnoid space.  This is slightly eccentric to the right There is subtle flattening of the ventral cord surface.  Cord signal is grossly normal.  There is mild spinal stenosis with at least moderate bilateral foraminal stenosis.    C6-7: There is moderate disc space narrowing.  There is mild uncovertebral spurring.  There is a shallow disc osteophyte complex, slightly eccentric to the right.  There is narrowing of the ventral subarachnoid space.  There is no spinal stenosis.  Cord signal is normal.  There is mild bilateral foraminal stenosis.  There is a small 3.5 mm left foraminal perineural cyst.    C7- T1: There is a Schmorl's node in inferior endplate of C7, chronic.  There are tiny bilateral foraminal perineural cysts.  There is minimal bulging of the annulus and mild facet joint arthropathy without spinal canal or foraminal stenosis.    Soft tissues/other: The prevertebral soft tissues are normal.  The airway is patent.   Impression          1. There is multilevel degenerative disc disease described in detail above.  There is no acute fracture.  There is degenerative listhesis at several levels.  There is some degree of disc osteophyte complex, uncovertebral spurring and/or facet joint arthropathy contributing to some degree of foraminal stenosis at several levels.  There is no significant spinal canal stenosis.  There is very subtle flattening of the ventral cord surface at the C4-5 and C6-7 levels The pertinent  findings are summarized below.    2. At the C3-4 level, there is no spinal stenosis.  There is moderate to marked left foraminal stenosis.    3. At the C4-5 level there is no spinal stenosis.  There is mild to moderate left foraminal stenosis.    4. At the C5-6 level, there is mild spinal stenosis with at least moderate bilateral foraminal stenosis.    5. At the C6-7 level, there is no spinal stenosis.  There is mild bilateral foraminal stenosis.    6. There is no spinal canal or foraminal stenosis at the C2-3 or C7-T1 levels.     Assessment:  Mrs. Richter is a 78 y.o. female with neck and head pain    1. Cervical radiculitis    2. DDD (degenerative disc disease), cervical    3. Positive depression screening    4. Other spondylosis, cervical region    5. Myofascial pain    6. Bilateral occipital neuralgia            Plan:  1. I have stressed the importance of physical activity and exercise to improve overall health.  2. Monitor progress from repeat C7-T1 IL MELANY. Consider bilateral occipital blocks for head pain.  3. Norco 5mg q12hrs as needed for pain.  reviewed.  Previous UDS consistent   4. Flexeril 10 mg BID #60 1 refill.   5. F/u 3 months or sooner  All medication management was performed by Dr. Timothy Grimaldo

## 2024-05-01 ENCOUNTER — ANESTHESIA (OUTPATIENT)
Dept: ENDOSCOPY | Facility: HOSPITAL | Age: 79
End: 2024-05-01
Payer: MEDICARE

## 2024-05-01 ENCOUNTER — HOSPITAL ENCOUNTER (OUTPATIENT)
Facility: HOSPITAL | Age: 79
Discharge: HOME | End: 2024-05-01
Attending: INTERNAL MEDICINE | Admitting: FAMILY MEDICINE
Payer: MEDICARE

## 2024-05-01 ENCOUNTER — ANESTHESIA EVENT (OUTPATIENT)
Dept: ENDOSCOPY | Facility: HOSPITAL | Age: 79
End: 2024-05-01
Payer: MEDICARE

## 2024-05-01 DIAGNOSIS — Z86.010 HISTORY OF COLON POLYPS: ICD-10-CM

## 2024-05-01 DIAGNOSIS — K63.5 POLYP OF COLON, UNSPECIFIED PART OF COLON, UNSPECIFIED TYPE: Primary | ICD-10-CM

## 2024-05-01 DIAGNOSIS — K64.8 INTERNAL HEMORRHOIDS: ICD-10-CM

## 2024-05-01 PROCEDURE — 45380 COLONOSCOPY AND BIOPSY: CPT | Mod: PT,59,, | Performed by: INTERNAL MEDICINE

## 2024-05-01 PROCEDURE — 45385 COLONOSCOPY W/LESION REMOVAL: CPT | Mod: PT | Performed by: INTERNAL MEDICINE

## 2024-05-01 PROCEDURE — 45380 COLONOSCOPY AND BIOPSY: CPT | Mod: PT,59 | Performed by: INTERNAL MEDICINE

## 2024-05-01 PROCEDURE — D9220A PRA ANESTHESIA: Mod: PT,ANES,, | Performed by: ANESTHESIOLOGY

## 2024-05-01 PROCEDURE — D9220A PRA ANESTHESIA: Mod: PT,CRNA,, | Performed by: NURSE ANESTHETIST, CERTIFIED REGISTERED

## 2024-05-01 PROCEDURE — 37000008 HC ANESTHESIA 1ST 15 MINUTES: Performed by: INTERNAL MEDICINE

## 2024-05-01 PROCEDURE — 27201089 HC SNARE, DISP (ANY): Performed by: INTERNAL MEDICINE

## 2024-05-01 PROCEDURE — 45385 COLONOSCOPY W/LESION REMOVAL: CPT | Mod: PT,,, | Performed by: INTERNAL MEDICINE

## 2024-05-01 PROCEDURE — 63600175 PHARM REV CODE 636 W HCPCS: Performed by: NURSE ANESTHETIST, CERTIFIED REGISTERED

## 2024-05-01 PROCEDURE — 25000003 PHARM REV CODE 250: Performed by: NURSE ANESTHETIST, CERTIFIED REGISTERED

## 2024-05-01 PROCEDURE — 25000003 PHARM REV CODE 250: Performed by: INTERNAL MEDICINE

## 2024-05-01 PROCEDURE — 37000009 HC ANESTHESIA EA ADD 15 MINS: Performed by: INTERNAL MEDICINE

## 2024-05-01 PROCEDURE — 27201012 HC FORCEPS, HOT/COLD, DISP: Performed by: INTERNAL MEDICINE

## 2024-05-01 PROCEDURE — 88305 TISSUE EXAM BY PATHOLOGIST: CPT | Mod: TC | Performed by: PATHOLOGY

## 2024-05-01 RX ORDER — SODIUM CHLORIDE 9 MG/ML
INJECTION, SOLUTION INTRAVENOUS CONTINUOUS
Status: DISCONTINUED | OUTPATIENT
Start: 2024-05-01 | End: 2024-05-01 | Stop reason: HOSPADM

## 2024-05-01 RX ORDER — DEXTROMETHORPHAN/PSEUDOEPHED 2.5-7.5/.8
DROPS ORAL
Status: DISCONTINUED | OUTPATIENT
Start: 2024-05-01 | End: 2024-05-01 | Stop reason: HOSPADM

## 2024-05-01 RX ORDER — PROPOFOL 10 MG/ML
VIAL (ML) INTRAVENOUS
Status: DISCONTINUED | OUTPATIENT
Start: 2024-05-01 | End: 2024-05-01

## 2024-05-01 RX ORDER — LIDOCAINE HYDROCHLORIDE 20 MG/ML
INJECTION INTRAVENOUS
Status: DISCONTINUED | OUTPATIENT
Start: 2024-05-01 | End: 2024-05-01

## 2024-05-01 RX ADMIN — PROPOFOL 20 MG: 10 INJECTION, EMULSION INTRAVENOUS at 08:05

## 2024-05-01 RX ADMIN — LIDOCAINE HYDROCHLORIDE 50 MG: 20 INJECTION, SOLUTION INTRAVENOUS at 07:05

## 2024-05-01 RX ADMIN — PROPOFOL 100 MG: 10 INJECTION, EMULSION INTRAVENOUS at 07:05

## 2024-05-01 RX ADMIN — SODIUM CHLORIDE: 9 INJECTION, SOLUTION INTRAVENOUS at 07:05

## 2024-05-01 NOTE — OR NURSING
0855 md in to speak with pt  0900 Pt tolerating po fluids, pain controlled. All belongings returned to pt. D/c instructions given and explained to pt, pt v/u. D/c'd out to pov via wc per staff with kaci.

## 2024-05-01 NOTE — H&P
CC: History of colon polyps    78 year old female with above. States that symptoms are absent, no alleviating/exacerbating factors. No family history of colorectal CA. Positive personal history of polyps. No bleeding or weight loss.     ROS:  No headache, no fever/chills, no chest pain/SOB, no nausea/vomiting/diarrhea/constipation/GI bleeding/abdominal pain, no dysuria/hematuria.    VSSAF   Exam:   Alert and oriented x 3; no apparent distress   PERRLA, sclera anicteric  CV: Regular rate/rhythm, normal PMI   Lungs: Clear bilaterally with no wheeze/rales   Abdomen: Soft, NT/ND, normal bowel sounds   Ext: No cyanosis, clubbing     Impression:   As above    Plan:   Proceed with endoscopy. Further recs to follow.

## 2024-05-01 NOTE — TRANSFER OF CARE
"Anesthesia Transfer of Care Note    Patient: Rola Richter    Procedure(s) Performed: Procedure(s) (LRB):  COLONOSCOPY (N/A)    Patient location: GI    Anesthesia Type: general    Transport from OR: Transported from OR on room air with adequate spontaneous ventilation    Post pain: adequate analgesia    Post assessment: no apparent anesthetic complications    Post vital signs: stable    Level of consciousness: sedated    Nausea/Vomiting: no nausea/vomiting    Complications: none    Transfer of care protocol was followed      Last vitals: Visit Vitals  BP (!) 164/73 (BP Location: Left arm, Patient Position: Lying)   Pulse 72   Temp 36.7 °C (98.1 °F) (Skin)   Resp 16   Ht 4' 11" (1.499 m)   Wt 52.2 kg (115 lb)   SpO2 99%   Breastfeeding No   BMI 23.23 kg/m²     "

## 2024-05-01 NOTE — ANESTHESIA PREPROCEDURE EVALUATION
05/01/2024  Rola Richter is a 78 y.o., female.    Anesthesia Evaluation    I have reviewed the Patient Summary Reports.     I have reviewed the Nursing Notes. I have reviewed the NPO Status.   I have reviewed the Medications.     Review of Systems  Anesthesia Hx:  No problems with previous Anesthesia             Denies Family Hx of Anesthesia complications.    Denies Personal Hx of Anesthesia complications.                    Social:  Non-Smoker, No Alcohol Use       EENT/Dental:   cataract          Cardiovascular:     Hypertension, well controlled                                        Hepatic/GI:  Bowel Prep.   GERD, well controlled   Dysphagia           Musculoskeletal:  Arthritis          Spine Disorders: cervical Degenerative disease and Disc disease           Neurological:    Neuromuscular Disease,  Headaches                                 Psych:  Psychiatric History                  Physical Exam  General:  Well nourished       Airway/Jaw/Neck:  Airway Findings: Mouth Opening: Normal     Tongue: Normal      General Airway Assessment: Adult, Good      Mallampati: II  Improves to II with phonation.  TM Distance: 4-6 cm               Dental:  Dental Findings: Upper Dentures, Lower partial dentures      Chest/Lungs:  Chest/Lungs Findings:  Clear to auscultation, Normal Respiratory Rate       Heart/Vascular:  Heart Findings: Rate: Normal  Rhythm: Regular Rhythm  Sounds: Normal  Heart murmur: negative                     Mental Status:  Mental Status Findings:  Cooperative, Alert and Oriented         Anesthesia Plan  Type of Anesthesia, risks & benefits discussed:  Anesthesia Type:  general    Patient's Preference:   Plan Factors:          Intra-op Monitoring Plan: standard ASA monitors  Intra-op Monitoring Plan Comments:   Post Op Pain Control Plan:   Post Op Pain Control Plan Comments:     Induction:    IV  Beta Blocker:         Informed Consent: Informed consent signed with the Patient and all parties understand the risks and agree with anesthesia plan.  All questions answered.  Anesthesia consent signed with patient.  ASA Score: 3     Day of Surgery Review of History & Physical:              Ready For Surgery From Anesthesia Perspective.             Physical Exam  General: Well nourished    Airway:  Mallampati: II / II  Mouth Opening: Normal  TM Distance: 4-6 cm  Tongue: Normal    Dental:  Upper Dentures, Lower partial dentures    Chest/Lungs:  Clear to auscultation, Normal Respiratory Rate    Heart:  Rate: Normal  Rhythm: Regular Rhythm  Sounds: Normal          Anesthesia Plan  Type of Anesthesia, risks & benefits discussed:    Anesthesia Type: general  Intra-op Monitoring Plan: standard ASA monitors  Induction:  IV  Informed Consent: Informed consent signed with the Patient and all parties understand the risks and agree with anesthesia plan.  All questions answered.   ASA Score: 3    Ready For Surgery From Anesthesia Perspective.       .

## 2024-05-01 NOTE — PROVATION PATIENT INSTRUCTIONS
Discharge Summary/Instructions after an Endoscopic Procedure  Patient Name: Rola Richter  Patient MRN: [id number]  Patient YOB: 1945  Wednesday, May 1, 2024  Greg Cohen MD  Dear patient,  As a result of recent federal legislation (The Federal Cures Act), you may   receive lab or pathology results from your procedure in your [facility]   account before your physician is able to contact you. Your physician or   their representative will relay the results to you with their   recommendations at their soonest availability.  Thank you,  RESTRICTIONS:  During your procedure today, you received medications for sedation.  These   medications may affect your judgment, balance and coordination.  Therefore,   for 24 hours, you have the following restrictions:   - DO NOT drive a car, operate machinery, make legal/financial decisions,   sign important papers or drink alcohol.    ACTIVITY:  Today: no heavy lifting, straining or running due to procedural   sedation/anesthesia.  The following day: return to full activity including work.  DIET:  Eat and drink normally unless instructed otherwise.     TREATMENT FOR COMMON SIDE EFFECTS:  - Mild abdominal pain, nausea, belching, bloating or excessive gas:  rest,   eat lightly and use a heating pad.  - Sore Throat: treat with throat lozenges and/or gargle with warm salt   water.  - Because air was used during the procedure, expelling large amounts of air   from your rectum or belching is normal.  - If a bowel prep was taken, you may not have a bowel movement for 1-3 days.    This is normal.  SYMPTOMS TO WATCH FOR AND REPORT TO YOUR PHYSICIAN:  1. Abdominal pain or bloating, other than gas cramps.  2. Chest pain.  3. Back pain.  4. Signs of infection such as: chills or fever occurring within 24 hours   after the procedure.  5. Rectal bleeding, which would show as bright red, maroon, or black stools.   (A tablespoon of blood from the rectum is not serious, especially  if   hemorrhoids are present.)  6. Vomiting.  7. Weakness or dizziness.  GO DIRECTLY TO THE NEAREST EMERGENCY ROOM IF YOU HAVE ANY OF THE FOLLOWING:      Difficulty breathing              Chills and/or fever over 101 F   Persistent vomiting and/or vomiting blood   Severe abdominal pain   Severe chest pain   Black, tarry stools   Bleeding- more than one tablespoon   Any other symptom or condition that you feel may need urgent attention  Your doctor recommends these additional instructions:  If any biopsies were taken, your doctors clinic will contact you in 1 to 2   weeks with any results.  - Patient has a contact number available for emergencies.  The signs and   symptoms of potential delayed complications were discussed with the   patient.  Return to normal activities tomorrow.  Written discharge   instructions were provided to the patient.   - High fiber diet.   - Continue present medications.   - Await pathology results.   - Repeat colonoscopy in 5 years for surveillance.   - Discharge patient to home (ambulatory).   - Return to GI office PRN.  For questions, problems or results please call your physician - Greg Cohen MD at Work:  [phone].  [facility] PHONE NUMBER: [phone]  IF A COMPLICATION OR EMERGENCY SITUATION ARISES AND YOU ARE UNABLE TO REACH   YOUR PHYSICIAN - GO DIRECTLY TO THE EMERGENCY ROOM.  Greg Cohen MD  5/1/2024 8:23:46 AM  This report has been verified and signed electronically.  Dear patient,  As a result of recent federal legislation (The Federal Cures Act), you may   receive lab or pathology results from your procedure in your [facility]   account before your physician is able to contact you. Your physician or   their representative will relay the results to you with their   recommendations at their soonest availability.  Thank you,  [employer]ATION

## 2024-05-02 VITALS
DIASTOLIC BLOOD PRESSURE: 74 MMHG | TEMPERATURE: 98 F | RESPIRATION RATE: 16 BRPM | HEIGHT: 59 IN | SYSTOLIC BLOOD PRESSURE: 169 MMHG | WEIGHT: 115 LBS | BODY MASS INDEX: 23.18 KG/M2 | OXYGEN SATURATION: 100 % | HEART RATE: 70 BPM

## 2024-05-02 NOTE — PROGRESS NOTES
Please advise patient that polyp pathology was reviewed and is benign and is the villous type which is definitely precancerous/risk factor for cancer.  Repeat colonoscopy recommended in 3 years, not 5 as previously thought.  Place reminder in system for repeat colonoscopy.

## 2024-05-02 NOTE — ANESTHESIA POSTPROCEDURE EVALUATION
Anesthesia Post Evaluation    Patient: Rola Richter    Procedure(s) Performed: Procedure(s) (LRB):  COLONOSCOPY (N/A)    Final Anesthesia Type: general      Patient location during evaluation: PACU  Patient participation: Yes- Able to Participate  Level of consciousness: awake and alert  Post-procedure vital signs: reviewed and stable  Pain management: adequate  Airway patency: patent    PONV status at discharge: No PONV  Anesthetic complications: no      Cardiovascular status: blood pressure returned to baseline  Respiratory status: unassisted  Hydration status: euvolemic  Follow-up not needed.              Vitals Value Taken Time   /74 05/01/24 0840   Temp 36.7 °C (98.1 °F) 05/01/24 0840   Pulse 70 05/01/24 0840   Resp 16 05/01/24 0840   SpO2 100 % 05/01/24 0840         Event Time   Out of Recovery 09:00:00         Pain/Amol Score: Amol Score: 10 (5/1/2024  8:40 AM)

## 2024-05-03 DIAGNOSIS — Z78.9 STATIN NOT TOLERATED: ICD-10-CM

## 2024-05-03 DIAGNOSIS — E78.01 FAMILIAL HYPERCHOLESTEROLEMIA: ICD-10-CM

## 2024-05-03 DIAGNOSIS — I70.0 CALCIFICATION OF AORTA: ICD-10-CM

## 2024-05-06 RX ORDER — EVOLOCUMAB 140 MG/ML
140 INJECTION, SOLUTION SUBCUTANEOUS
Qty: 2 ML | Refills: 2 | Status: ACTIVE | OUTPATIENT
Start: 2024-05-06 | End: 2024-08-04

## 2024-05-07 ENCOUNTER — OFFICE VISIT (OUTPATIENT)
Dept: PSYCHIATRY | Facility: CLINIC | Age: 79
End: 2024-05-07
Payer: MEDICARE

## 2024-05-07 VITALS
BODY MASS INDEX: 23.65 KG/M2 | SYSTOLIC BLOOD PRESSURE: 117 MMHG | DIASTOLIC BLOOD PRESSURE: 67 MMHG | HEART RATE: 75 BPM | HEIGHT: 59 IN | WEIGHT: 117.31 LBS

## 2024-05-07 DIAGNOSIS — F19.20 DEPENDENCY ON PAIN MEDICATION: ICD-10-CM

## 2024-05-07 DIAGNOSIS — F33.1 MODERATE EPISODE OF RECURRENT MAJOR DEPRESSIVE DISORDER: Primary | ICD-10-CM

## 2024-05-07 DIAGNOSIS — F43.10 PTSD (POST-TRAUMATIC STRESS DISORDER): Primary | ICD-10-CM

## 2024-05-07 DIAGNOSIS — G47.00 INSOMNIA, UNSPECIFIED TYPE: ICD-10-CM

## 2024-05-07 DIAGNOSIS — F43.10 PTSD (POST-TRAUMATIC STRESS DISORDER): ICD-10-CM

## 2024-05-07 DIAGNOSIS — G31.84 MILD NEUROCOGNITIVE DISORDER: ICD-10-CM

## 2024-05-07 DIAGNOSIS — F41.1 GENERALIZED ANXIETY DISORDER: ICD-10-CM

## 2024-05-07 PROCEDURE — 1159F MED LIST DOCD IN RCRD: CPT | Mod: HCNC,CPTII,S$GLB, | Performed by: STUDENT IN AN ORGANIZED HEALTH CARE EDUCATION/TRAINING PROGRAM

## 2024-05-07 PROCEDURE — 99999 PR PBB SHADOW E&M-EST. PATIENT-LVL I: CPT | Mod: PBBFAC,HCNC,, | Performed by: PSYCHOLOGIST

## 2024-05-07 PROCEDURE — 99999 PR PBB SHADOW E&M-EST. PATIENT-LVL III: CPT | Mod: PBBFAC,HCNC,, | Performed by: STUDENT IN AN ORGANIZED HEALTH CARE EDUCATION/TRAINING PROGRAM

## 2024-05-07 PROCEDURE — 1160F RVW MEDS BY RX/DR IN RCRD: CPT | Mod: HCNC,CPTII,S$GLB, | Performed by: STUDENT IN AN ORGANIZED HEALTH CARE EDUCATION/TRAINING PROGRAM

## 2024-05-07 PROCEDURE — 3074F SYST BP LT 130 MM HG: CPT | Mod: HCNC,CPTII,S$GLB, | Performed by: STUDENT IN AN ORGANIZED HEALTH CARE EDUCATION/TRAINING PROGRAM

## 2024-05-07 PROCEDURE — 90834 PSYTX W PT 45 MINUTES: CPT | Mod: HCNC,S$GLB,, | Performed by: PSYCHOLOGIST

## 2024-05-07 PROCEDURE — 3288F FALL RISK ASSESSMENT DOCD: CPT | Mod: HCNC,CPTII,S$GLB, | Performed by: STUDENT IN AN ORGANIZED HEALTH CARE EDUCATION/TRAINING PROGRAM

## 2024-05-07 PROCEDURE — 3078F DIAST BP <80 MM HG: CPT | Mod: HCNC,CPTII,S$GLB, | Performed by: STUDENT IN AN ORGANIZED HEALTH CARE EDUCATION/TRAINING PROGRAM

## 2024-05-07 PROCEDURE — 96136 PSYCL/NRPSYC TST PHY/QHP 1ST: CPT | Mod: 59,HCNC,S$GLB, | Performed by: STUDENT IN AN ORGANIZED HEALTH CARE EDUCATION/TRAINING PROGRAM

## 2024-05-07 PROCEDURE — 1101F PT FALLS ASSESS-DOCD LE1/YR: CPT | Mod: HCNC,CPTII,S$GLB, | Performed by: STUDENT IN AN ORGANIZED HEALTH CARE EDUCATION/TRAINING PROGRAM

## 2024-05-07 PROCEDURE — 1125F AMNT PAIN NOTED PAIN PRSNT: CPT | Mod: HCNC,CPTII,S$GLB, | Performed by: STUDENT IN AN ORGANIZED HEALTH CARE EDUCATION/TRAINING PROGRAM

## 2024-05-07 PROCEDURE — 99214 OFFICE O/P EST MOD 30 MIN: CPT | Mod: HCNC,S$GLB,, | Performed by: STUDENT IN AN ORGANIZED HEALTH CARE EDUCATION/TRAINING PROGRAM

## 2024-05-07 RX ORDER — SERTRALINE HYDROCHLORIDE 50 MG/1
50 TABLET, FILM COATED ORAL DAILY
Qty: 30 TABLET | Refills: 1 | Status: SHIPPED | OUTPATIENT
Start: 2024-05-07 | End: 2024-06-11 | Stop reason: SDUPTHER

## 2024-05-07 RX ORDER — BUSPIRONE HYDROCHLORIDE 5 MG/1
5 TABLET ORAL DAILY
Qty: 30 TABLET | Refills: 1 | Status: SHIPPED | OUTPATIENT
Start: 2024-05-07 | End: 2024-06-11 | Stop reason: SDUPTHER

## 2024-05-07 RX ORDER — MIRTAZAPINE 7.5 MG/1
7.5 TABLET, FILM COATED ORAL NIGHTLY
Qty: 30 TABLET | Refills: 1 | Status: SHIPPED | OUTPATIENT
Start: 2024-05-07 | End: 2024-06-11 | Stop reason: SDUPTHER
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
## 2024-05-07 NOTE — PROGRESS NOTES
Outpatient Psychiatry Followup Visit (DO/MD/NP, etc.)    5/7/2024  Assessment & Plan    Assessment - Plan:     Impression     ICD-10-CM ICD-9-CM   1. Moderate episode of recurrent major depressive disorder  F33.1 296.32   2. Generalized anxiety disorder  F41.1 300.02   3. PTSD (post-traumatic stress disorder)  F43.10 309.81   4. Mild neurocognitive disorder  G31.84 331.83   5. 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 Insomnia, unspecified type  G47.00 780.52   7. BMI 23.0-23.9, adult  Z68.23 V85.1        Plan of Care & Medication Management    Chart was reviewed. The risks and benefits of medication were discussed with pt. The treatment plan and followup plan were reviewed with pt. Pt understands to contact clinic if symptoms worsen. Pt understands to call 911 or go to nearest ER for suicidal ideation, intent or plan.   RX History ARICEPT, BARBITURATES, BUSPAR, CYMBALTA, GABAPENTIN, PROZAC, REMERON, WELLBUTRIN, and ZOLOFT     Current RX Considering adding ABILIFY (2-5mg) as adjunct, with or without REMERON (7.5-15mg), to augment treatment with ZOLOFT (50-200mg)  Continue BUSPAR  Adjustments:  16APR2024: Continue as reported: 5mg in the afternoon  02TPF3276: Adjust to 5-7.5mg in the afternoon  20EEB0029: Reduce to 7.5mg in the afternoon  95TLM1206: Reduce to 7.5mg BID  45GQE0426: Increase to 10mg BID  31JAN2023: Start 5mg BID for 3 days  Prior to 31JAN2023 pt had been taking ZOLOFT 150mg daily  Continue REMERON  Pt was provided [employer] educational material 3/12/2024.  Adjustments:  12MAR2024: Start 7.5mg HS  Prior to 12MAR2024 pt had most recently tried CYMBALTA  Continue ZOLOFT  Adjustments:  44AVI2242: Restart 50mg daily  16SUS0094-NTQ3881 pt tried CYMBALTA. Adherence was intermittent and response was inadequate.  29NTU9892: Taper to DC:  100mg daily for 5 days, then 50mg daily for 5 days, then 25mg daily for 6 days, then discontinue  42PNB7289: Reduce to 150mg daily  04MAY2023: Increase to 200mg  "daily  55CRZ6494: Continue 150mg daily  Prior to evaluation pt had been taking 150mg daily   Education & Counseling RX administration and adherence   Other Orders Continue trauma therapy  Continue individual psychotherapy   Monitor VITAL SIGNS  Instruments: PROMIS (A&D), PSS4  [id number] 29/30 S-MMSE, 4/5 Mini-Cog     RETURN K: RETURN IN 4 WEEKS (ONE MONTH), and reassess frequency within three visits from now  Continue medication management     Subjective    Interval History:     Available documentation has been reviewed, and pertinent elements of the chart have been incorporated into this note where appropriate. Last Epic encounter with writer was on 4/16/2024   Rola Richter, a 78 y.o. female, presenting for followup visit.      "I'm doing pretty good."    Sleeping better.     Depression improving. "Sometimes I feel a little better and sometimes I drop."     Anxiety improving. "My worries are getting better."    Reviewed medications with patient. Unsure if taking REMERON. Wrote pt a post-it note so she has a visual reminder.       Unless otherwise specified, pt did NOT display signs of nor endorse symptoms of overt psychosis or acute mood disorder requiring hospitalization during the encounter; pt denied violent thoughts or suicidal or homicidal ideation, intent, or plan.         Objective    Measurement-Based Care (MBC):     Routine Instruments   PROMIS-ANXIETY Interpretation: 13 (4a raw score): T-SCORE 65.3; MODERATE using 55-60-70 cutoffs.   PROMIS-DEPRESSION Interpretation: 15 (4a raw score): T-SCORE 67.5; MODERATE using 55-60-70 cutoffs.   PSS4 Interpretation: 9/16; MODERATE using 6-11 cutoffs. 0 PH, 1 LSE. Moderate lack of self-efficacy; pt moderately perceives an inability to handle problems.   Additional Instruments   N/A     Current Evaluation of Mental Status:     Constitutional / General       Vitals:    05/07/24 1456   BP: 117/67   Pulse: 75   Weight: 53.2 kg (117 lb 4.6 oz)   Height: 4' 11" (1.499 " "m)       Psychiatric / Mental Status Examination  1. Appearance: Dress is informal but appropriate. Motor activity normal.  2. Discourse: Clear speech with normal rate and volume. Associations intact. Orderly.  3. Emotional Expression: Somewhat anxious and depressed mood. Affect is appropriate.  4. Perception and Thinking: No hallucinations. No suicidality, no homicidality, delusions, or paranoia.  5. Sensorium: Grossly intact. Able to focus for interview.  6. Memory and Fund of Knowledge: Intact for content of interview.  7. Insight and Judgment: Intact.               Billing Documentation:     Method of Encounter IN PERSON visit at the clinic   Type of Encounter Follow up visit with me   Counseling;  Psychotherapy    Counseling;  Tobacco and/or Nicotine    Additional Codes and Modifiers 44890, with modifer 59: administered and scored more than one psychological or neuropsychological tests (see MBC above) (16+ mins)   Time Remaining Chart/Pt 27582: FOLLOW UP VISIT, Rx mgmt, "Multiple STABLE chronic illnesses"   Total Mins  (5/7/2024) N/A - Not billing for time        Will Leong DO  Department of Psychiatry, [facility]        "

## 2024-05-13 ENCOUNTER — TELEPHONE (OUTPATIENT)
Dept: PSYCHIATRY | Facility: CLINIC | Age: 79
End: 2024-05-13
Payer: MEDICARE

## 2024-05-14 ENCOUNTER — OFFICE VISIT (OUTPATIENT)
Dept: OPTOMETRY | Facility: CLINIC | Age: 79
End: 2024-05-14
Payer: COMMERCIAL

## 2024-05-14 DIAGNOSIS — H52.7 REFRACTIVE ERROR: ICD-10-CM

## 2024-05-14 DIAGNOSIS — Z01.00 EXAMINATION OF EYES AND VISION: Primary | ICD-10-CM

## 2024-05-14 DIAGNOSIS — Z96.1 PSEUDOPHAKIA: ICD-10-CM

## 2024-05-14 PROCEDURE — 99999 PR PBB SHADOW E&M-EST. PATIENT-LVL III: CPT | Mod: PBBFAC,,, | Performed by: OPTOMETRIST

## 2024-05-14 PROCEDURE — 92014 COMPRE OPH EXAM EST PT 1/>: CPT | Mod: S$GLB,,, | Performed by: OPTOMETRIST

## 2024-05-14 PROCEDURE — 92015 DETERMINE REFRACTIVE STATE: CPT | Mod: S$GLB,,, | Performed by: OPTOMETRIST

## 2024-05-14 NOTE — PROGRESS NOTES
HPI     Blurred Vision     Additional comments: DLE 2-2024           Comments    Pt here today for refraction only to update specs rx.   States blurred   vision at both near & distance.    Has been using OTC readers only since cataract surgery.    (+) dry eyes -- burning  (+) ATS OU bid          Last edited by Janet Painting on 5/14/2024  2:36 PM.            Assessment /Plan     For exam results, see Encounter Report.    Examination of eyes and vision    Pseudophakia    Refractive error      1. Examination of eyes and vision    2. Pseudophakia  S/p cataract extraction OU    3. Refractive error  Dispensed updated spectacle Rx. Discussed various spectacle lens options. Discussed adaptation period to new specs.   Demonstrated new spec Rx vs uncorrected vision in phoropter with patient satisfaction

## 2024-05-20 ENCOUNTER — OFFICE VISIT (OUTPATIENT)
Dept: PSYCHIATRY | Facility: CLINIC | Age: 79
End: 2024-05-20
Payer: MEDICARE

## 2024-05-20 DIAGNOSIS — F43.10 PTSD (POST-TRAUMATIC STRESS DISORDER): Primary | ICD-10-CM

## 2024-05-20 PROCEDURE — 99999 PR PBB SHADOW E&M-EST. PATIENT-LVL I: CPT | Mod: PBBFAC,HCNC,, | Performed by: PSYCHOLOGIST

## 2024-05-20 PROCEDURE — 90837 PSYTX W PT 60 MINUTES: CPT | Mod: HCNC,S$GLB,, | Performed by: PSYCHOLOGIST

## 2024-05-20 PROCEDURE — 1159F MED LIST DOCD IN RCRD: CPT | Mod: HCNC,CPTII,S$GLB, | Performed by: PSYCHOLOGIST

## 2024-05-20 PROCEDURE — 1160F RVW MEDS BY RX/DR IN RCRD: CPT | Mod: HCNC,CPTII,S$GLB, | Performed by: PSYCHOLOGIST
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
## 2024-05-22 DIAGNOSIS — M25.551 RIGHT HIP PAIN: ICD-10-CM

## 2024-05-22 DIAGNOSIS — G31.84 MILD NEUROCOGNITIVE DISORDER: ICD-10-CM

## 2024-05-22 NOTE — TELEPHONE ENCOUNTER
----- Message from Misty Martinez sent at 5/22/2024 10:07 AM CDT -----  Contact: pt  Type:  RX Refill Request    Who Called: pt    Refill or New Rx: renewal     RX Name and Strength: 2 medications    meloxicam (MOBIC) 7.5 MG tablet  donepeziL (ARICEPT) 10 MG tablet    How is the patient currently taking it? (ex. 1XDay):as directed    Is this a 30 day or 90 day RX: 30    Preferred Pharmacy with phone number:   [id number] - [place], [place] - [address] AT [facility] [address]  [address]  [facility] [place] [address]  Phone: [phone] Fax: [phone]    Local or Mail Order:local    Ordering Provider: Jv    Would the patient rather a call back or a response via [facility]?  Call back    Best Call Back Number:[phone]    Additional Information: please renew above scripts  Thanks

## 2024-05-22 NOTE — TELEPHONE ENCOUNTER
No care due was identified.  [facility] Embedded Care Due Messages. Reference number: [id number].   5/22/2024 2:36:14 PM CDT

## 2024-05-23 RX ORDER — DONEPEZIL HYDROCHLORIDE 10 MG/1
10 TABLET, FILM COATED ORAL NIGHTLY
Qty: 90 TABLET | Refills: 0 | Status: SHIPPED | OUTPATIENT
Start: 2024-05-23 | End: 2024-08-21

## 2024-05-23 RX ORDER — MELOXICAM 7.5 MG/1
7.5 TABLET ORAL DAILY PRN
Qty: 90 TABLET | Refills: 0 | Status: SHIPPED | OUTPATIENT
Start: 2024-05-23

## 2024-05-27 ENCOUNTER — OFFICE VISIT (OUTPATIENT)
Dept: PSYCHIATRY | Facility: CLINIC | Age: 79
End: 2024-05-27
Payer: MEDICARE

## 2024-05-27 DIAGNOSIS — F43.10 PTSD (POST-TRAUMATIC STRESS DISORDER): Primary | ICD-10-CM

## 2024-05-27 DIAGNOSIS — I10 HYPERTENSION, UNSPECIFIED TYPE: ICD-10-CM

## 2024-05-27 PROCEDURE — 90834 PSYTX W PT 45 MINUTES: CPT | Mod: HCNC,S$GLB,, | Performed by: PSYCHOLOGIST

## 2024-05-27 PROCEDURE — 99999 PR PBB SHADOW E&M-EST. PATIENT-LVL I: CPT | Mod: PBBFAC,HCNC,, | Performed by: PSYCHOLOGIST

## 2024-05-27 PROCEDURE — 1159F MED LIST DOCD IN RCRD: CPT | Mod: HCNC,CPTII,S$GLB, | Performed by: PSYCHOLOGIST

## 2024-05-27 PROCEDURE — 1160F RVW MEDS BY RX/DR IN RCRD: CPT | Mod: HCNC,CPTII,S$GLB, | Performed by: PSYCHOLOGIST

## 2024-05-27 NOTE — TELEPHONE ENCOUNTER
No care due was identified.  [facility] Embedded Care Due Messages. Reference number: [id number].   5/27/2024 1:51:33 AM CDT
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
## 2024-05-28 RX ORDER — IRBESARTAN 300 MG/1
300 TABLET ORAL NIGHTLY
Qty: 90 TABLET | Refills: 3 | Status: SHIPPED | OUTPATIENT
Start: 2024-05-28

## 2024-06-11 ENCOUNTER — TELEPHONE (OUTPATIENT)
Dept: FAMILY MEDICINE | Facility: CLINIC | Age: 79
End: 2024-06-11
Payer: MEDICARE

## 2024-06-11 ENCOUNTER — OFFICE VISIT (OUTPATIENT)
Dept: PSYCHIATRY | Facility: CLINIC | Age: 79
End: 2024-06-11
Payer: MEDICARE

## 2024-06-11 ENCOUNTER — LAB VISIT (OUTPATIENT)
Dept: LAB | Facility: HOSPITAL | Age: 79
End: 2024-06-11
Attending: NURSE PRACTITIONER
Payer: MEDICARE

## 2024-06-11 VITALS
BODY MASS INDEX: 23.51 KG/M2 | SYSTOLIC BLOOD PRESSURE: 129 MMHG | WEIGHT: 116.38 LBS | HEART RATE: 74 BPM | DIASTOLIC BLOOD PRESSURE: 65 MMHG

## 2024-06-11 DIAGNOSIS — E78.2 MIXED HYPERLIPIDEMIA: ICD-10-CM

## 2024-06-11 DIAGNOSIS — G31.84 MILD NEUROCOGNITIVE DISORDER: ICD-10-CM

## 2024-06-11 DIAGNOSIS — F43.10 PTSD (POST-TRAUMATIC STRESS DISORDER): ICD-10-CM

## 2024-06-11 DIAGNOSIS — F33.1 MODERATE EPISODE OF RECURRENT MAJOR DEPRESSIVE DISORDER: Primary | ICD-10-CM

## 2024-06-11 DIAGNOSIS — F41.1 GENERALIZED ANXIETY DISORDER: ICD-10-CM

## 2024-06-11 DIAGNOSIS — R30.0 DYSURIA: Primary | ICD-10-CM

## 2024-06-11 DIAGNOSIS — R73.9 HYPERGLYCEMIA: ICD-10-CM

## 2024-06-11 DIAGNOSIS — G47.00 INSOMNIA, UNSPECIFIED TYPE: ICD-10-CM

## 2024-06-11 DIAGNOSIS — I10 PRIMARY HYPERTENSION: ICD-10-CM

## 2024-06-11 DIAGNOSIS — F19.20 DEPENDENCY ON PAIN MEDICATION: ICD-10-CM

## 2024-06-11 LAB
ALBUMIN SERPL BCP-MCNC: 3.9 G/DL (ref 3.5–5.2)
ALP SERPL-CCNC: 91 U/L (ref 55–135)
ALT SERPL W/O P-5'-P-CCNC: 11 U/L (ref 10–44)
ANION GAP SERPL CALC-SCNC: 11 MMOL/L (ref 8–16)
AST SERPL-CCNC: 20 U/L (ref 10–40)
BASOPHILS # BLD AUTO: 0.06 K/UL (ref 0–0.2)
BASOPHILS NFR BLD: 0.6 % (ref 0–1.9)
BILIRUB SERPL-MCNC: 0.4 MG/DL (ref 0.1–1)
BUN SERPL-MCNC: 26 MG/DL (ref 8–23)
CALCIUM SERPL-MCNC: 9.6 MG/DL (ref 8.7–10.5)
CHLORIDE SERPL-SCNC: 104 MMOL/L (ref 95–110)
CHOLEST SERPL-MCNC: 226 MG/DL (ref 120–199)
CHOLEST/HDLC SERPL: 4.1 {RATIO} (ref 2–5)
CO2 SERPL-SCNC: 21 MMOL/L (ref 23–29)
CREAT SERPL-MCNC: 1.2 MG/DL (ref 0.5–1.4)
DIFFERENTIAL METHOD BLD: ABNORMAL
EOSINOPHIL # BLD AUTO: 0.6 K/UL (ref 0–0.5)
EOSINOPHIL NFR BLD: 5.4 % (ref 0–8)
ERYTHROCYTE [DISTWIDTH] IN BLOOD BY AUTOMATED COUNT: 14.5 % (ref 11.5–14.5)
EST. GFR  (NO RACE VARIABLE): 46 ML/MIN/1.73 M^2
ESTIMATED AVG GLUCOSE: 111 MG/DL (ref 68–131)
GLUCOSE SERPL-MCNC: 82 MG/DL (ref 70–110)
HBA1C MFR BLD: 5.5 % (ref 4–5.6)
HCT VFR BLD AUTO: 35 % (ref 37–48.5)
HDLC SERPL-MCNC: 55 MG/DL (ref 40–75)
HDLC SERPL: 24.3 % (ref 20–50)
HGB BLD-MCNC: 11.2 G/DL (ref 12–16)
IMM GRANULOCYTES # BLD AUTO: 0.07 K/UL (ref 0–0.04)
IMM GRANULOCYTES NFR BLD AUTO: 0.6 % (ref 0–0.5)
LDLC SERPL CALC-MCNC: 135.2 MG/DL (ref 63–159)
LYMPHOCYTES # BLD AUTO: 2.7 K/UL (ref 1–4.8)
LYMPHOCYTES NFR BLD: 25.3 % (ref 18–48)
MCH RBC QN AUTO: 29.7 PG (ref 27–31)
MCHC RBC AUTO-ENTMCNC: 32 G/DL (ref 32–36)
MCV RBC AUTO: 93 FL (ref 82–98)
MONOCYTES # BLD AUTO: 1 K/UL (ref 0.3–1)
MONOCYTES NFR BLD: 9 % (ref 4–15)
NEUTROPHILS # BLD AUTO: 6.4 K/UL (ref 1.8–7.7)
NEUTROPHILS NFR BLD: 59.1 % (ref 38–73)
NONHDLC SERPL-MCNC: 171 MG/DL
NRBC BLD-RTO: 0 /100 WBC
PLATELET # BLD AUTO: 324 K/UL (ref 150–450)
PMV BLD AUTO: 10.4 FL (ref 9.2–12.9)
POTASSIUM SERPL-SCNC: 4.6 MMOL/L (ref 3.5–5.1)
PROT SERPL-MCNC: 7.2 G/DL (ref 6–8.4)
RBC # BLD AUTO: 3.77 M/UL (ref 4–5.4)
SODIUM SERPL-SCNC: 136 MMOL/L (ref 136–145)
TRIGL SERPL-MCNC: 179 MG/DL (ref 30–150)
TSH SERPL DL<=0.005 MIU/L-ACNC: 1.16 UIU/ML (ref 0.4–4)
WBC # BLD AUTO: 10.85 K/UL (ref 3.9–12.7)

## 2024-06-11 PROCEDURE — 84443 ASSAY THYROID STIM HORMONE: CPT | Mod: HCNC | Performed by: NURSE PRACTITIONER

## 2024-06-11 PROCEDURE — 3078F DIAST BP <80 MM HG: CPT | Mod: HCNC,CPTII,S$GLB, | Performed by: STUDENT IN AN ORGANIZED HEALTH CARE EDUCATION/TRAINING PROGRAM

## 2024-06-11 PROCEDURE — 85025 COMPLETE CBC W/AUTO DIFF WBC: CPT | Mod: HCNC | Performed by: NURSE PRACTITIONER

## 2024-06-11 PROCEDURE — 80061 LIPID PANEL: CPT | Mod: HCNC | Performed by: NURSE PRACTITIONER

## 2024-06-11 PROCEDURE — 3288F FALL RISK ASSESSMENT DOCD: CPT | Mod: HCNC,CPTII,S$GLB, | Performed by: STUDENT IN AN ORGANIZED HEALTH CARE EDUCATION/TRAINING PROGRAM

## 2024-06-11 PROCEDURE — 83036 HEMOGLOBIN GLYCOSYLATED A1C: CPT | Mod: HCNC | Performed by: NURSE PRACTITIONER

## 2024-06-11 PROCEDURE — 80053 COMPREHEN METABOLIC PANEL: CPT | Mod: HCNC | Performed by: NURSE PRACTITIONER

## 2024-06-11 PROCEDURE — 1125F AMNT PAIN NOTED PAIN PRSNT: CPT | Mod: HCNC,CPTII,S$GLB, | Performed by: STUDENT IN AN ORGANIZED HEALTH CARE EDUCATION/TRAINING PROGRAM

## 2024-06-11 PROCEDURE — 96136 PSYCL/NRPSYC TST PHY/QHP 1ST: CPT | Mod: 59,HCNC,S$GLB, | Performed by: STUDENT IN AN ORGANIZED HEALTH CARE EDUCATION/TRAINING PROGRAM

## 2024-06-11 PROCEDURE — 1101F PT FALLS ASSESS-DOCD LE1/YR: CPT | Mod: HCNC,CPTII,S$GLB, | Performed by: STUDENT IN AN ORGANIZED HEALTH CARE EDUCATION/TRAINING PROGRAM

## 2024-06-11 PROCEDURE — 1160F RVW MEDS BY RX/DR IN RCRD: CPT | Mod: HCNC,CPTII,S$GLB, | Performed by: STUDENT IN AN ORGANIZED HEALTH CARE EDUCATION/TRAINING PROGRAM

## 2024-06-11 PROCEDURE — 99999 PR PBB SHADOW E&M-EST. PATIENT-LVL II: CPT | Mod: PBBFAC,HCNC,, | Performed by: STUDENT IN AN ORGANIZED HEALTH CARE EDUCATION/TRAINING PROGRAM

## 2024-06-11 PROCEDURE — 99214 OFFICE O/P EST MOD 30 MIN: CPT | Mod: HCNC,S$GLB,, | Performed by: STUDENT IN AN ORGANIZED HEALTH CARE EDUCATION/TRAINING PROGRAM

## 2024-06-11 PROCEDURE — 3074F SYST BP LT 130 MM HG: CPT | Mod: HCNC,CPTII,S$GLB, | Performed by: STUDENT IN AN ORGANIZED HEALTH CARE EDUCATION/TRAINING PROGRAM

## 2024-06-11 PROCEDURE — 1159F MED LIST DOCD IN RCRD: CPT | Mod: HCNC,CPTII,S$GLB, | Performed by: STUDENT IN AN ORGANIZED HEALTH CARE EDUCATION/TRAINING PROGRAM

## 2024-06-11 PROCEDURE — 36415 COLL VENOUS BLD VENIPUNCTURE: CPT | Mod: HCNC,PO | Performed by: NURSE PRACTITIONER

## 2024-06-11 RX ORDER — MIRTAZAPINE 7.5 MG/1
7.5 TABLET, FILM COATED ORAL NIGHTLY
Qty: 30 TABLET | Refills: 1 | Status: SHIPPED | OUTPATIENT
Start: 2024-06-11 | End: 2024-08-10

## 2024-06-11 RX ORDER — SERTRALINE HYDROCHLORIDE 50 MG/1
50 TABLET, FILM COATED ORAL DAILY
Qty: 30 TABLET | Refills: 1 | Status: SHIPPED | OUTPATIENT
Start: 2024-06-11 | End: 2024-08-10

## 2024-06-11 RX ORDER — BUSPIRONE HYDROCHLORIDE 5 MG/1
5 TABLET ORAL DAILY
Qty: 30 TABLET | Refills: 1 | Status: SHIPPED | OUTPATIENT
Start: 2024-06-11 | End: 2024-08-10

## 2024-06-11 NOTE — PROGRESS NOTES
Outpatient Psychiatry Followup Visit (DO/MD/NP, etc.)    6/11/2024  Assessment & Plan    Assessment - Plan:     Impression     ICD-10-CM ICD-9-CM   1. Moderate episode of recurrent major depressive disorder  F33.1 296.32   2. Generalized anxiety disorder  F41.1 300.02   3. PTSD (post-traumatic stress disorder)  F43.10 309.81   4. Mild neurocognitive disorder  G31.84 331.83   5. 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 Insomnia, unspecified type  G47.00 780.52   7. BMI 23.0-23.9, adult  Z68.23 V85.1        Plan of Care & Medication Management    Chart was reviewed. The risks and benefits of medication were discussed with pt. The treatment plan and followup plan were reviewed with pt. Pt understands to contact clinic if symptoms worsen. Pt understands to call 911 or go to nearest ER for suicidal ideation, intent or plan.   RX History ARICEPT, BARBITURATES, BUSPAR, CYMBALTA, GABAPENTIN, PROZAC, REMERON, WELLBUTRIN, and ZOLOFT     Current RX Considering adding ABILIFY (2-5mg) as adjunct, with or without REMERON (7.5-15mg), to augment treatment with ZOLOFT (50-200mg)  Continue BUSPAR  Adjustments:  16APR2024: Continue as reported: 5mg in the afternoon  [id number]: Adjust to 5-7.5mg in the afternoon  73UWA7094: Reduce to 7.5mg in the afternoon  84BZK0473: Reduce to 7.5mg BID  22KYL8531: Increase to 10mg BID  31JAN2023: Start 5mg BID for 3 days  Prior to 31JAN2023 pt had been taking ZOLOFT 150mg daily  Continue REMERON  Pt was provided [employer] educational material 3/12/2024.  Adjustments:  12MAR2024: Start 7.5mg HS  Prior to 12MAR2024 pt had most recently tried CYMBALTA  Continue ZOLOFT  Adjustments:  43ACV8677: Restart 50mg daily  [id number] pt tried CYMBALTA. Adherence was intermittent and response was inadequate.  90GRZ1211: Taper to DC:  100mg daily for 5 days, then 50mg daily for 5 days, then 25mg daily for 6 days, then discontinue  96GSC7999: Reduce to 150mg daily  04MAY2023: Increase to 200mg  "daily  88UWI8861: Continue 150mg daily  Prior to evaluation pt had been taking 150mg daily   Education & Counseling RX administration and adherence   Other Orders Continue individual psychotherapy   Monitor VITAL SIGNS  Instruments: PROMIS (A&D), PSS4  [id number] 29/30 S-MMSE, 4/5 Mini-Cog     RETURN N: RETURN IN 6 WEEKS, and reassess frequency within three visits from now  Continue medication management     Subjective    Interval History:     Available documentation has been reviewed, and pertinent elements of the chart have been incorporated into this note where appropriate. Last Epic encounter with writer was on 5/7/2024   Rola Richter, a 79 y.o. female, presenting for followup visit.      Since last visit, reports overall MUCH BETTER.    "I'm feeling a lot better." Less depressed.    Attributes a lot of improvement to IMTT (completed 27MAY2024).    Believe memory is improving with ARICEPT.    Sleeping good.    Seems to be taking medications as prescribed.    Would like to keep medications as prescribed.       Unless otherwise specified, pt did NOT display signs of nor endorse symptoms of overt psychosis or acute mood disorder requiring hospitalization during the encounter; pt denied violent thoughts or suicidal or homicidal ideation, intent, or plan.         Objective    Measurement-Based Care (MBC):     Routine Instruments   PROMIS-ANXIETY Interpretation: 9 (4a raw score): T-SCORE 57.7; MILD using 55-60-70 cutoffs.   PROMIS-DEPRESSION Interpretation: 11 (4a raw score): T-SCORE 60.5; MODERATE using 55-60-70 cutoffs.   PSS4 Interpretation: 7/16; MODERATE using 6-11 cutoffs. 0 PH, 0 LSE.   Additional Instruments   N/A     Current Evaluation of Mental Status:     Constitutional / General       Vitals:    06/11/24 1521   BP: 129/65   Pulse: 74   Weight: 52.8 kg (116 lb 6.5 oz)       Psychiatric / Mental Status Examination  1. Appearance: Dress is informal but appropriate. Motor activity normal.  2. Discourse: " "Clear speech with normal rate and volume. Associations intact. Orderly.  3. Emotional Expression: Somewhat anxious and depressed mood. Affect is appropriate.  4. Perception and Thinking: No hallucinations. No suicidality, no homicidality, delusions, or paranoia.  5. Sensorium: Grossly intact. Able to focus for interview.  6. Memory and Fund of Knowledge: Intact for content of interview.  7. Insight and Judgment: Intact.         Auto-populated chart data omitted from this note for brevity.      Billing Documentation:     Method of Encounter IN PERSON visit at the clinic   Type of Encounter Follow up visit with me   Counseling;  Psychotherapy    Counseling;  Tobacco and/or Nicotine    Additional Codes and Modifiers 32072, with modifer 59: administered and scored more than one psychological or neuropsychological tests (see MBC above) (16+ mins)   Time Remaining Chart/Pt 12777: FOLLOW UP VISIT, Rx mgmt, "Multiple STABLE chronic illnesses"   Total Mins  (6/11/2024) N/A - Not billing for time        Will Leong DO  Department of Psychiatry, [facility]        "

## 2024-06-11 NOTE — TELEPHONE ENCOUNTER
----- Message from Yudi Ng, Patient Care Assistant sent at 6/11/2024  8:48 AM CDT -----  Regarding: advice  Contact: pt  Type: Needs Medical Advice    Who Called:  pt     Symptoms (please be specific):  bladder infection   How long has patient had these symptoms:  2 days     Pharmacy name and phone #:    [facility] #[id number] - [place], [place] - [address] AT [place] [address]  [address] [place] [place] [address]  Phone: [phone] Fax: [phone]    Best Call Back Number: [phone] (home)     Additional Information: please call to advise. Thanks!

## 2024-06-12 ENCOUNTER — TELEPHONE (OUTPATIENT)
Dept: FAMILY MEDICINE | Facility: CLINIC | Age: 79
End: 2024-06-12
Payer: MEDICARE

## 2024-06-12 DIAGNOSIS — R30.0 DYSURIA: Primary | ICD-10-CM

## 2024-06-12 RX ORDER — NITROFURANTOIN 25; 75 MG/1; MG/1
100 CAPSULE ORAL 2 TIMES DAILY
Qty: 14 CAPSULE | Refills: 0 | Status: SHIPPED | OUTPATIENT
Start: 2024-06-12

## 2024-06-12 NOTE — TELEPHONE ENCOUNTER
Urine culture pending but urine does appear infected.  I called in macrobid 100mg bid x 7 days. Monitor for worsening symptoms and return to clinic or go to the ER if these occur: fever > 100.4, severe abdominal pain, intractable vomiting, bleeding from the rectum or black tarry stools, dehydration, lethargy or other worsening symptoms.     denies pain/discomfort

## 2024-06-12 NOTE — TELEPHONE ENCOUNTER
See Mychart message. Please advise.   Pt dropped off urine sample yesterday. Results are in chart.

## 2024-06-12 NOTE — TELEPHONE ENCOUNTER
----- Message from Alva Barriossay sent at 6/12/2024  9:09 AM CDT -----  Regarding: advise  Contact: pt  Type: Needs Medical Advice  Who Called:  patient   Symptoms (please be specific):    How long has patient had these symptoms:    Pharmacy name and phone #:    [facility] #[id number] - [place], [place] - [address] AT [facility] [address]  [address] [place] [address]  Phone: [phone] Fax: [phone]        Best Call Back Number: [phone]  Additional Information: pham holden pt is seeking a prescription for a uti she states she dropped off a sample yesteray are u available to advise MRN: [id number] [place]ENT[place] BUSBY

## 2024-06-18 ENCOUNTER — OFFICE VISIT (OUTPATIENT)
Dept: FAMILY MEDICINE | Facility: CLINIC | Age: 79
End: 2024-06-18
Payer: MEDICARE

## 2024-06-18 VITALS
OXYGEN SATURATION: 98 % | WEIGHT: 117.06 LBS | SYSTOLIC BLOOD PRESSURE: 120 MMHG | RESPIRATION RATE: 12 BRPM | HEIGHT: 59 IN | HEART RATE: 83 BPM | DIASTOLIC BLOOD PRESSURE: 80 MMHG | TEMPERATURE: 99 F | BODY MASS INDEX: 23.6 KG/M2

## 2024-06-18 DIAGNOSIS — M25.551 RIGHT HIP PAIN: ICD-10-CM

## 2024-06-18 DIAGNOSIS — I70.0 CALCIFICATION OF AORTA: ICD-10-CM

## 2024-06-18 DIAGNOSIS — I10 PRIMARY HYPERTENSION: Primary | ICD-10-CM

## 2024-06-18 DIAGNOSIS — B07.9 VIRAL WART ON FINGER: ICD-10-CM

## 2024-06-18 DIAGNOSIS — D63.8 ANEMIA OF CHRONIC DISEASE: ICD-10-CM

## 2024-06-18 DIAGNOSIS — G89.29 CHRONIC LEFT SHOULDER PAIN: ICD-10-CM

## 2024-06-18 DIAGNOSIS — E78.01 FAMILIAL HYPERCHOLESTEROLEMIA: ICD-10-CM

## 2024-06-18 DIAGNOSIS — K21.9 GASTROESOPHAGEAL REFLUX DISEASE WITHOUT ESOPHAGITIS: ICD-10-CM

## 2024-06-18 DIAGNOSIS — N18.31 CHRONIC KIDNEY DISEASE, STAGE 3A: ICD-10-CM

## 2024-06-18 DIAGNOSIS — R73.9 HYPERGLYCEMIA: ICD-10-CM

## 2024-06-18 DIAGNOSIS — M25.512 CHRONIC LEFT SHOULDER PAIN: ICD-10-CM

## 2024-06-18 PROCEDURE — 99999 PR PBB SHADOW E&M-EST. PATIENT-LVL IV: CPT | Mod: PBBFAC,HCNC,, | Performed by: FAMILY MEDICINE

## 2024-06-18 NOTE — PROGRESS NOTES
Subjective:       Patient ID: Rola Richter is a 79 y.o. female.    Chief Complaint: Follow-up (6mth f/u)    HPI  Review of Systems   Constitutional:  Negative for fatigue and unexpected weight change.   Respiratory:  Negative for chest tightness and shortness of breath.    Cardiovascular:  Negative for chest pain, palpitations and leg swelling.   Gastrointestinal:  Negative for abdominal pain.   Musculoskeletal:  Positive for arthralgias.   Neurological:  Negative for dizziness, syncope, light-headedness and headaches.       Patient Active Problem List   Diagnosis    Hypertension    GERD (gastroesophageal reflux disease)    Hyperlipidemia    Osteoporosis    Dependency on pain medication    PTSD (post-traumatic stress disorder)    CMC arthritis    Dry eye syndrome    DDD (degenerative disc disease), cervical    Occipital neuralgia    Cervical radiculitis    Primary osteoarthritis involving multiple joints    Moderate episode of recurrent major depressive disorder    Dysphagia    Spondylosis of cervical region without myelopathy or radiculopathy    Myofascial pain    Neck pain    Bronchitis    Calcification of aorta    Memory loss    Generalized anxiety disorder    BMI 23.0-23.9, adult    Mild neurocognitive disorder    Insomnia    Chronic kidney disease, stage 3a    Anemia of chronic disease     Patient is here for a chronic conditions follow up.    Reviewed labs 6/24  Ckd stage 3 , mild anemia      Card Dr. Moyer familial cholesterolemia now on repatha. HPL-  Intolerant to statins due to myalgia.  Tried lipitor and crestor. Had to stop it . Total chol drown from 416 to 226 on repatha     Ortho C/o right hip flare. No known injury. Deep in buttock. Sharp sticking pain.  Worse with standing and changing positions. Norco helps some  C/o left shoulder pain and stiffness. Has done well with steroid injections in past     Eye Dr. Eduardo/Gomez cataracts     Endo HPL- Your cholesterol is high.  Tried lipitor, crestor,  pravastatin -all muscle pain. Taking QOD pravastatin 10mg. Candidate for repatha-now on.  Prediabetes a1c 5.5         Neuro PA Dara Has chronic daily headaches- top and frontal. Congestion relieved partially by astelin and flonase NS.  PND, sneezing, hoarse. Sob and wheezing are improving but still lots of phlegm. No fever.  Unrelieved with tessalon or robitussin.  singulair added 7/2021. CT sinus showed chronic bin sinus disease.  Start clindamycin 300mg qid x 14 days.  Under care of ENT Dr. Jansen -seen 8/23 and 9/21/2021. Did not feel daily HA were rhinogenic-Referred to [facility]IL[facility] Diamond -seen 10/7/2021 and gabapentin added. Helping some.   Mild neurocog d/o on aricept     Pain Referred to pain clinic for left leg numbness and cervical radiculitis 7/2021.   Dr. Grimaldo MELANY 9/22 . Pain mgmt Dr. Grimaldo taking norco 5 bid . DPS checked no evidence of diversion.  Tried lyrica but stopped it due to dizziness. On gabapentin 100mg bid    Neck pain -helps when wears collar        Heme/onc Dr. Murphy- anemia. Has had work up for blood loss anemia.  Likely anemia of chronic disease. Iron is normal     GI Dr. Victor egd 2/2021 10 gastric polyps (fundic gland polyps), hiatal hernia, gastritis.  H Pylori neg. Esophageal bx- no metaplasia, dysplasia or malignancy.   Colonoscopy 5/19 polyp -adenomatous. On 5 year surveillance   H/o ant neck surgery x 3 as child due to tumors -? Thyroid or thymus   C/o food stuck in throat, hoarseness.         Rheum Dr. KARUNA Ram Has severe hand OA- tried plaquenil but did not help so stopped it . Osteoporosis recommended to start prolia q 6m but has never started it. Having to hold prolia due to lower jaw dental extractions and recommended waiting at least 3 months after until healed and cleared. Letter written today and given to patient     Psych Dr. Benítez MDDD/PTSD. Mood is good on zoloft  Objective:      Physical Exam  Vitals and nursing note reviewed.   Constitutional:       Appearance:  She is well-developed.   Cardiovascular:      Rate and Rhythm: Normal rate and regular rhythm.      Heart sounds: Normal heart sounds.   Pulmonary:      Effort: Pulmonary effort is normal.      Breath sounds: Normal breath sounds.   Musculoskeletal:      Left shoulder: Tenderness and crepitus present. Decreased range of motion.   Skin:     General: Skin is warm and dry.      Comments: Slightly tender 1 mm papule with slight verrucus appearance , flesh colored left 2 distal phalanx pad laterally   Neurological:      Mental Status: She is alert and oriented to person, place, and time.         Assessment:       1. Primary hypertension    2. Familial hypercholesterolemia    3. Gastroesophageal reflux disease without esophagitis    4. Chronic kidney disease, stage 3a    5. Calcification of aorta    6. Anemia of chronic disease    7. Hyperglycemia    8. Chronic left shoulder pain    9. Viral wart on finger        Plan:         1. Primary hypertension  Controlled on current medications.  Continue current medications.      2. Familial hypercholesterolemia  Cont repatha. I recommend a heart healthy diet rich in fiber, fresh vegetables and fruit and low in saturated fats (fried foods, red meat, etc.).  I also recommend regular exercise including a minimum of 150 minutes of cardio exercise per week and 2-30 minute workouts of strength training like light weights, yoga, pilates, etc.  You should work toward a body mass index of < 25.      - Comprehensive Metabolic Panel; Future  - Lipid Panel; Future  - CBC Auto Differential; Future    3. Gastroesophageal reflux disease without esophagitis  Controlled on current medications.  Continue current medications.      4. Chronic kidney disease, stage 3a  Stable and chronic.  Will continue to monitor q3-6 months and control chronic conditions as optimally as possible to preserve function.      5. Calcification of aorta  Cont to lower risk factors    6. Anemia of chronic disease  Stable  and chronic.  Will continue to monitor q3-6 months and control chronic conditions as optimally as possible to preserve function.      7. Hyperglycemia   Your blood sugar is borderline high.  This means you are at risk for developing type 2 diabetes mellitus.  To lessen your risk you should exercise regularly, avoid excess carbohydrates and work toward a body mass index of less than 25.      - Hemoglobin A1C; Future    8. Chronic left shoulder pain  Refer for eval and treat  - Ambulatory referral/consult to Orthopedics; Future    9. Viral wart on finger  Refer for eval and treat  - Ambulatory referral/consult to Dermatology; Future      Time spent with patient: 20 minutes    Patient with be reevaluated in 4 months or sooner prn    Greater than 50% of this visit was spent counseling as described in above documentation:Yes

## 2024-06-19 DIAGNOSIS — M25.512 LEFT SHOULDER PAIN, UNSPECIFIED CHRONICITY: Primary | ICD-10-CM

## 2024-06-21 DIAGNOSIS — M25.512 LEFT SHOULDER PAIN, UNSPECIFIED CHRONICITY: Primary | ICD-10-CM

## 2024-06-25 ENCOUNTER — TELEPHONE (OUTPATIENT)
Dept: OPHTHALMOLOGY | Facility: CLINIC | Age: 79
End: 2024-06-25
Payer: MEDICARE

## 2024-06-25 NOTE — TELEPHONE ENCOUNTER
Spoke to pt and made aware glasses are in ----- Message from Mathieu Mac sent at 6/25/2024  3:35 PM CDT -----  Regarding: return call  Contact: patient  Type:  Patient Returning Call    Who Called:patient  Who Left Message for Patient:office nurse  Does the patient know what this is regarding?:not receiving new glasses  Would the patient rather a call back or a response via [facility]? Please advise  Best Call Back Number:[phone]  Additional Information:

## 2024-06-27 ENCOUNTER — OFFICE VISIT (OUTPATIENT)
Dept: ORTHOPEDICS | Facility: CLINIC | Age: 79
End: 2024-06-27
Payer: MEDICARE

## 2024-06-27 ENCOUNTER — HOSPITAL ENCOUNTER (OUTPATIENT)
Dept: RADIOLOGY | Facility: HOSPITAL | Age: 79
Discharge: HOME | End: 2024-06-27
Attending: ORTHOPAEDIC SURGERY
Payer: MEDICARE

## 2024-06-27 VITALS — BODY MASS INDEX: 23.6 KG/M2 | HEIGHT: 59 IN | WEIGHT: 117.06 LBS

## 2024-06-27 DIAGNOSIS — M25.512 LEFT SHOULDER PAIN, UNSPECIFIED CHRONICITY: ICD-10-CM

## 2024-06-27 DIAGNOSIS — M25.512 CHRONIC LEFT SHOULDER PAIN: Primary | ICD-10-CM

## 2024-06-27 DIAGNOSIS — G89.29 CHRONIC LEFT SHOULDER PAIN: Primary | ICD-10-CM

## 2024-06-27 PROCEDURE — 73030 X-RAY EXAM OF SHOULDER: CPT | Mod: TC,HCNC,PO,LT

## 2024-06-27 PROCEDURE — 99999 PR PBB SHADOW E&M-EST. PATIENT-LVL IV: CPT | Mod: PBBFAC,HCNC,, | Performed by: ORTHOPAEDIC SURGERY

## 2024-06-27 PROCEDURE — 73030 X-RAY EXAM OF SHOULDER: CPT | Mod: 26,HCNC,LT, | Performed by: RADIOLOGY

## 2024-06-27 RX ORDER — METHYLPREDNISOLONE ACETATE 80 MG/ML
80 INJECTION, SUSPENSION INTRA-ARTICULAR; INTRALESIONAL; INTRAMUSCULAR; SOFT TISSUE
Status: DISCONTINUED | OUTPATIENT
Start: 2024-06-27 | End: 2024-06-27 | Stop reason: HOSPADM

## 2024-06-27 RX ADMIN — METHYLPREDNISOLONE ACETATE 80 MG: 80 INJECTION, SUSPENSION INTRA-ARTICULAR; INTRALESIONAL; INTRAMUSCULAR; SOFT TISSUE at 01:06

## 2024-06-27 NOTE — PROGRESS NOTES
CC:  79-year-old female presents for evaluation of left shoulder pain.  The patient reports chronic pain in the left shoulder.  She states it limits her range of motion.  She rates the pain as a 9/10 even at rest.  The patient reports she was previously seen a rheumatologist and received injections in the shoulder in the past but unfortunately her rheumatologist retired.  She has not had an injection about a year and a half.  She states the injections did work very well for her.  She is having difficulty with basic daily activities.  She can not reach over her head.  She even had her hair cut short because she can not groomed the top of her head and face.    Past Medical History:   Diagnosis Date    Anemia of chronic disease 2024    Anxiety     Arthritis     Cataract     Colon polyp     DDD (degenerative disc disease), lumbar     Depression     Encounter for blood transfusion     GERD (gastroesophageal reflux disease)     Headache     Hyperlipidemia     Hypertension     pt states she does not take meds anymore she just watches her weight    Insomnia 3/12/2024    Neuromuscular disorder     Occipital neuralgia 2017    Osteoporosis     Seizures        Past Surgical History:   Procedure Laterality Date    APPENDECTOMY      CATARACT EXTRACTION W/  INTRAOCULAR LENS IMPLANT Left 10/25/2023    Procedure: CEIOL OS;  Surgeon: Rustam Dyer MD;  Location: [facility] OR;  Service: Ophthalmology;  Laterality: Left;  Last Case of day, short eye, very high power IOL    CATARACT EXTRACTION W/  INTRAOCULAR LENS IMPLANT Right 1/10/2024    Procedure: CEIOL OD Preop Mannitol;  Surgeon: Rustam Dyer MD;  Location: [facility] OR;  Service: Ophthalmology;  Laterality: Right;  Will need pre-op Mannitol     SECTION      x 2    CHOLECYSTECTOMY      COLONOSCOPY  prior to     COLONOSCOPY N/A 2019    Procedure: COLONOSCOPY;  Surgeon: Greg Victor MD;  Location: [facility];  Service: Endoscopy;  Laterality:  N/A; 2 colon polyps, hemorrhoids; repeat in 5 years for surveillance; biopsy: Tubular adenoma x2    COLONOSCOPY N/A 5/1/2024    Procedure: COLONOSCOPY;  Surgeon: Greg Victor MD;  Location: [facility];  Service: Endoscopy;  Laterality: N/A;    EPIDURAL STEROID INJECTION INTO CERVICAL SPINE N/A 9/20/2022    Procedure: Injection-steroid-epidural-cervical C7-T1;  Surgeon: Timothy Grimaldo MD;  Location: [facility] OR;  Service: Pain Management;  Laterality: N/A;    EPIDURAL STEROID INJECTION INTO CERVICAL SPINE N/A 3/7/2024    Procedure: Injection-steroid-epidural-cervical;  Surgeon: Timothy Grimaldo MD;  Location: [facility] OR;  Service: Anesthesiology;  Laterality: N/A;  c7-t1    ESOPHAGOGASTRODUODENOSCOPY N/A 4/30/2019    Procedure: EGD (ESOPHAGOGASTRODUODENOSCOPY);  Surgeon: Greg Victor MD;  Location: [facility];  Service: Endoscopy;  Laterality: N/A; Mild Schatzki ring. Dilated. small hiatal hernia; Four gastric polyps. Resected and retrieved, gastritis; biopsy:stomach- unremarkable, negative for h pylori, polyps-Fundic type mucosa with foveolar hyperplasia.    ESOPHAGOGASTRODUODENOSCOPY N/A 2/17/2021    Procedure: EGD (ESOPHAGOGASTRODUODENOSCOPY);  Surgeon: Greg Victor MD;  Location: [facility];  Service: Endoscopy;  Laterality: N/A;    ESOPHAGOGASTRODUODENOSCOPY N/A 2/29/2024    Procedure: EGD (ESOPHAGOGASTRODUODENOSCOPY);  Surgeon: Greg Victor MD;  Location: [facility];  Service: Endoscopy;  Laterality: N/A;    HYSTERECTOMY      Laser Periphery Iridotomy Bilateral     OD 5/26/16 and OS touch up 5/26/2016    UPPER GASTROINTESTINAL ENDOSCOPY  03/14/2017    Dr. Marcial: esophageal stenosis- dilated, gastritis, gastric polyps removed; biopsy- mild gastritis, negative for h pylori, hyperplastic polyp, esophagus unremarkable    vocal cord tumor removal         Current Outpatient Medications on File Prior to Visit   Medication Sig Dispense Refill    busPIRone (BUSPAR) 5 MG Tab Take 1 tablet (5 mg total) by mouth  once daily. Take every afternoon. 30 tablet 1    COD LIVER OIL ORAL Take 1 capsule by mouth once daily.      cyclobenzaprine (FLEXERIL) 10 MG tablet Take 1 tablet (10 mg total) by mouth 2 (two) times daily as needed for Muscle spasms. 60 tablet 1    diclofenac sodium (VOLTAREN) 1 % Gel Apply 2 g topically 4 (four) times daily. 450 g 3    donepeziL (ARICEPT) 10 MG tablet Take 1 tablet (10 mg total) by mouth every evening. 90 tablet 0    evolocumab (REPATHA SURECLICK) 140 mg/mL PnIj Inject 1 mL (140 mg total) into the skin every 14 (fourteen) days. 2 mL 2    famotidine (PEPCID) 20 MG tablet Take 1 tablet (20 mg total) by mouth 2 (two) times daily. 60 tablet 2    fish oil-omega-3 fatty acids 300-1,000 mg capsule Take 2 g by mouth once daily.      gabapentin (NEURONTIN) 100 MG capsule TAKE 1 CAPSULE TWICE DAILY 180 capsule 3    HYDROcodone-acetaminophen (NORCO) 5-325 mg per tablet Take 1 tablet by mouth every 12 (twelve) hours as needed for Pain. 60 tablet 0    [START ON 7/11/2024] HYDROcodone-acetaminophen (NORCO) 5-325 mg per tablet Take 1 tablet by mouth every 12 (twelve) hours as needed for Pain. 60 tablet 0    irbesartan (AVAPRO) 300 MG tablet TAKE 1 TABLET EVERY EVENING 90 tablet 3    meloxicam (MOBIC) 7.5 MG tablet Take 1 tablet (7.5 mg total) by mouth daily as needed for Pain. 90 tablet 0    mirtazapine (REMERON) 7.5 MG Tab Take 1 tablet (7.5 mg total) by mouth every evening. 30 tablet 1    multivit with min-folic acid 200 mcg Chew Take 1 tablet by mouth once daily.       nitrofurantoin, macrocrystal-monohydrate, (MACROBID) 100 MG capsule Take 1 capsule (100 mg total) by mouth 2 (two) times daily. 14 capsule 0    ondansetron (ZOFRAN-ODT) 4 MG TbDL Take 1 tablet (4 mg total) by mouth every 6 (six) hours as needed (nausea). 30 tablet 1    pantoprazole (PROTONIX) 40 MG tablet Take 1 tablet (40 mg total) by mouth once daily. 90 tablet 1    prochlorperazine (COMPAZINE) 10 MG tablet TAKE 1 TABLET BY MOUTH EVERY NIGHT  AT BEDTIME AS NEEDED NIGHT TIME NAUSEA 30 tablet 0    sertraline (ZOLOFT) 50 MG tablet Take 1 tablet (50 mg total) by mouth once daily. 30 tablet 1     Current Facility-Administered Medications on File Prior to Visit   Medication Dose Route Frequency Provider Last Rate Last Admin    electrolyte-S (ISOLYTE)   Intravenous Continuous Jose Nguyen MD        lactated ringers infusion   Intravenous Continuous Jose Nguyen MD   Stopped at 01/10/24 1122    lactated ringers infusion   Intravenous Continuous Timothy Grimaldo MD 25 mL/hr at 03/07/24 1356 New Bag at 03/07/24 1356    metoclopramide injection 10 mg  10 mg Intravenous Q10 Min PRN Jose Nguyen MD           ROS:    Constitution: Denies chills, fever, and sweats.  HENT: Denies headaches or blurry vision.  Cardiovascular: Denies chest pain or irregular heart beat.  Respiratory: Denies cough or shortness of breath.  Gastrointestinal: Denies abdominal pain, nausea, or vomiting.  Genitourinary:  Denies urinary incontinence, bladder and kidney issues  Musculoskeletal:  Denies muscle cramps.  Neurological: Denies dizziness or focal weakness.  Psychiatric/Behavioral: Normal mental status.  Hematologic/Lymphatic: Denies bleeding problem or easy bruising/bleeding.  Skin: Denies rash or suspicious lesions.    Physical examination     Gen - No acute distress, well nourished, well groomed   Eyes - Extraoccular motions intact, pupils equally round and reactive to light and accommodation   ENT - normocephalic, atruamtic, oropharynx clear   Neck - Supple, no abnormal masses   Cardiovascular - regular rate and rhythm   Pulmonary - clear to auscultation bilaterally, no wheezes, ronchi, or rales   Abdomen - soft, non-tender, non-distended, positive bowel sounds   Psych - The patient is alert and oriented x3 with normal mood and affect    Left Upper Extremity Examination     Skin is intact throughout   Motor is intact distally radial, median, ulnar, AIN, PIN   +2 radial  and ulnar pulses   Sensation to light touch is intact distally radial, median, and ulnar     Examination of the Left shoulder:   ROM:   For - 90 with pain  Abd - 90 with pain  Ext - 30 with pain  Int - back pocket with pain    Tenderness to palpation:   Subacromial space - positive  Biceps Tendon - positive  Anterior Glenohumeral Joint - positive  AC joint - negative  Glenohumeral instability - negative  Empty Can test - positive  Speeds test - positive  Jennings/Neers sign - positive  Cross-arm adduction test - positive    X-ray images were examined and personally interpreted by me.  Three views left shoulder dated 06/27/2024 show the humeral head is reduced in the glenoid.  Joint space is maintained.  There is some mild arthritis of the AC joint but no advanced glenohumeral arthritis noted.    Dx:  Chronic left shoulder pain    Plan:  We discussed treatment options.  Offered the patient an injection and she agreed.  We injected the left shoulder with a mixture of 2, 2, 1.  She tolerated that well.  Follow up in 4 weeks for re-evaluation.

## 2024-06-27 NOTE — PROCEDURES
Large Joint Aspiration/Injection: L glenohumeral    Date/Time: 6/27/2024 1:30 PM    Performed by: Juan Antonio Ortiz II, MD  Authorized by: Juan Antonio Ortiz II, MD    Consent Done?:  Yes (Verbal)  Indications:  Pain    Local anesthesia used?: Yes    Local anesthetic:  Topical anesthetic    Details:  Needle Size:  22 G  Approach:  Posterior  Location:  Shoulder  Site:  L glenohumeral  Medications:  80 mg methylPREDNISolone acetate 80 mg/mL  Patient tolerance:  Patient tolerated the procedure well with no immediate complications

## 2024-07-23 ENCOUNTER — OFFICE VISIT (OUTPATIENT)
Dept: PSYCHIATRY | Facility: CLINIC | Age: 79
End: 2024-07-23
Payer: MEDICARE

## 2024-07-23 VITALS
HEIGHT: 59 IN | DIASTOLIC BLOOD PRESSURE: 71 MMHG | BODY MASS INDEX: 23.65 KG/M2 | HEART RATE: 70 BPM | SYSTOLIC BLOOD PRESSURE: 144 MMHG | WEIGHT: 117.31 LBS

## 2024-07-23 DIAGNOSIS — F19.20 DEPENDENCY ON PAIN MEDICATION: ICD-10-CM

## 2024-07-23 DIAGNOSIS — G31.84 MILD NEUROCOGNITIVE DISORDER: ICD-10-CM

## 2024-07-23 DIAGNOSIS — F33.1 MODERATE EPISODE OF RECURRENT MAJOR DEPRESSIVE DISORDER: Primary | ICD-10-CM

## 2024-07-23 DIAGNOSIS — Z86.59 HISTORY OF POSTTRAUMATIC STRESS DISORDER (PTSD): ICD-10-CM

## 2024-07-23 DIAGNOSIS — F41.1 GENERALIZED ANXIETY DISORDER: ICD-10-CM

## 2024-07-23 DIAGNOSIS — G47.00 INSOMNIA, UNSPECIFIED TYPE: ICD-10-CM

## 2024-07-23 PROCEDURE — 3077F SYST BP >= 140 MM HG: CPT | Mod: HCNC,CPTII,S$GLB, | Performed by: STUDENT IN AN ORGANIZED HEALTH CARE EDUCATION/TRAINING PROGRAM

## 2024-07-23 PROCEDURE — 1160F RVW MEDS BY RX/DR IN RCRD: CPT | Mod: HCNC,CPTII,S$GLB, | Performed by: STUDENT IN AN ORGANIZED HEALTH CARE EDUCATION/TRAINING PROGRAM

## 2024-07-23 PROCEDURE — 99999 PR PBB SHADOW E&M-EST. PATIENT-LVL IV: CPT | Mod: PBBFAC,HCNC,, | Performed by: STUDENT IN AN ORGANIZED HEALTH CARE EDUCATION/TRAINING PROGRAM

## 2024-07-23 PROCEDURE — 3288F FALL RISK ASSESSMENT DOCD: CPT | Mod: HCNC,CPTII,S$GLB, | Performed by: STUDENT IN AN ORGANIZED HEALTH CARE EDUCATION/TRAINING PROGRAM

## 2024-07-23 PROCEDURE — 1159F MED LIST DOCD IN RCRD: CPT | Mod: HCNC,CPTII,S$GLB, | Performed by: STUDENT IN AN ORGANIZED HEALTH CARE EDUCATION/TRAINING PROGRAM

## 2024-07-23 PROCEDURE — 96136 PSYCL/NRPSYC TST PHY/QHP 1ST: CPT | Mod: 59,HCNC,S$GLB, | Performed by: STUDENT IN AN ORGANIZED HEALTH CARE EDUCATION/TRAINING PROGRAM

## 2024-07-23 PROCEDURE — 1125F AMNT PAIN NOTED PAIN PRSNT: CPT | Mod: HCNC,CPTII,S$GLB, | Performed by: STUDENT IN AN ORGANIZED HEALTH CARE EDUCATION/TRAINING PROGRAM

## 2024-07-23 PROCEDURE — 3078F DIAST BP <80 MM HG: CPT | Mod: HCNC,CPTII,S$GLB, | Performed by: STUDENT IN AN ORGANIZED HEALTH CARE EDUCATION/TRAINING PROGRAM

## 2024-07-23 PROCEDURE — 99214 OFFICE O/P EST MOD 30 MIN: CPT | Mod: HCNC,S$GLB,, | Performed by: STUDENT IN AN ORGANIZED HEALTH CARE EDUCATION/TRAINING PROGRAM

## 2024-07-23 PROCEDURE — 1101F PT FALLS ASSESS-DOCD LE1/YR: CPT | Mod: HCNC,CPTII,S$GLB, | Performed by: STUDENT IN AN ORGANIZED HEALTH CARE EDUCATION/TRAINING PROGRAM

## 2024-07-23 RX ORDER — MIRTAZAPINE 7.5 MG/1
7.5 TABLET, FILM COATED ORAL NIGHTLY
Qty: 30 TABLET | Refills: 1 | Status: SHIPPED | OUTPATIENT
Start: 2024-07-23 | End: 2024-09-21

## 2024-07-23 RX ORDER — SERTRALINE HYDROCHLORIDE 100 MG/1
100 TABLET, FILM COATED ORAL DAILY
Qty: 30 TABLET | Refills: 1 | Status: SHIPPED | OUTPATIENT
Start: 2024-07-23 | End: 2024-09-21

## 2024-07-23 RX ORDER — BUSPIRONE HYDROCHLORIDE 5 MG/1
5 TABLET ORAL DAILY
Qty: 30 TABLET | Refills: 1 | Status: SHIPPED | OUTPATIENT
Start: 2024-07-23 | End: 2024-09-21

## 2024-07-23 NOTE — PROGRESS NOTES
Outpatient Psychiatry Followup Visit (DO/MD/NP, etc.)    7/23/2024  Assessment & Plan    Assessment - Plan:     Impression     ICD-10-CM ICD-9-CM   1. Moderate episode of recurrent major depressive disorder  F33.1 296.32   2. Generalized anxiety disorder  F41.1 300.02   3. History of posttraumatic stress disorder (PTSD)  Z86.59 V11.8   4. Mild neurocognitive disorder  G31.84 331.83   5. 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 Insomnia, unspecified type  G47.00 780.52   7. BMI 23.0-23.9, adult  Z68.23 V85.1        Plan of Care & Medication Management    Chart was reviewed. The risks and benefits of medication were discussed with pt. The treatment plan and followup plan were reviewed with pt. Pt understands to contact clinic if symptoms worsen. Pt understands to call 911 or go to nearest ER for suicidal ideation, intent or plan.   RX History ARICEPT, BARBITURATES, BUSPAR, CYMBALTA, GABAPENTIN, PROZAC, REMERON, WELLBUTRIN, and ZOLOFT     Current RX Continue BUSPAR  Adjustments:  [id number]: Continue as reported: 5mg in the afternoon  [id number]: Adjust to 5-7.5mg in the afternoon  [id number]: Reduce to 7.5mg in the afternoon  [id number]: Reduce to 7.5mg BID  [id number]: Increase to 10mg BID  31JAN2023: Start 5mg BID for 3 days  Prior to 31JAN2023 pt had been taking ZOLOFT 150mg daily  Continue REMERON  Pt was provided [facility] educational material 3/12/2024.  Adjustments:  12MAR2024: Start 7.5mg HS  Prior to 12MAR2024 pt had most recently tried CYMBALTA  Increase ZOLOFT  Adjustments:  7/23/2024: Increase to 100mg daily  [id number]: Restart 50mg daily  [id number] pt tried CYMBALTA. Adherence was intermittent and response was inadequate.  [id number]: Taper to DC:  100mg daily for 5 days, then 50mg daily for 5 days, then 25mg daily for 6 days, then discontinue  [id number]: Reduce to 150mg daily  09GGN0183: Increase to 200mg daily  [id number]: Continue 150mg daily  Prior to evaluation pt had been taking 150mg daily  "  Education & Counseling RX administration and adherence   Other Orders NONE this visit   Monitor VITAL SIGNS  Instruments: PROMIS (A&D), PSS4  7/23/2024 4/5 MINICOG v2 3/5  [id number] 29/30 S-MMSE, 4/5 MINICOG v1  PCL5 43/80 APR2024, s/p 3 IMTT sessions PCL5 37/80   RETURN K: RETURN IN 4 WEEKS (ONE MONTH), and reassess frequency within three visits from now  Continue medication management     Subjective    Interval History:     Available documentation has been reviewed, and pertinent elements of the chart have been incorporated into this note where appropriate. Last Epic encounter with writer was on 6/11/2024   Rola Richter, a 79 y.o. female, presenting for followup visit.      Since last visit, reports overall about the same.    "I'm NOT as sad as I used to be." Appears sad.    Complains about word finding difficulty. Memory worsening. Repeated MINICOG - worse than 2 ya. Hasn't seen Neurology in over a year. Encouraged to followup    Repeated PCL5. Improved by 3 pts after 3 IMTT sessions. Declines further IMTT    Taking meds as prescribed    Discussed increasing ZOLOFT from 50mg to 100mg    Will increase visit frequency to q1m       Unless otherwise specified, pt did NOT display signs of nor endorse symptoms of overt psychosis or acute mood disorder requiring hospitalization during the encounter; pt denied violent thoughts or suicidal or homicidal ideation, intent, or plan.         Objective    Measurement-Based Care (MBC):     Routine Instruments   PROMIS-ANXIETY Interpretation: 10 (4a raw score): T-SCORE 59.5; MILD using 55-60-70 cutoffs.   PROMIS-DEPRESSION Interpretation: 13 (4a raw score): T-SCORE 63.9; MODERATE using 55-60-70 cutoffs.   PSS4 Interpretation: 7/16; MODERATE using 6-11 cutoffs. 0 PH, 0 LSE.   Additional Instruments   PCL-5 Interpretation: 37/80. Compared to 43/80 last time the score improved by 6 points, which indicates a reliable change and response to treatment.   MINICOG " "(Mini-Cog)  Version v.01.19.16  Word list version 2 (leader, season, table)  7/23/2024   Section Scores   Word Recall 1/3    Clock Draw 2/2    Total Score   3/5   A cut point of <3 on the Mini-Cog (MINICOG) has been validated for dementia screening, but many individuals with clinically meaningful cognitive impairment will score higher. When greater sensitivity is desired, a cut point of <4 is recommended as it may indicate a need for further evaluation of cognitive status.        Current Evaluation of Mental Status:     Constitutional / General       Vitals:    07/23/24 1434   BP: (!) 144/71   Pulse: 70   Weight: 53.2 kg (117 lb 4.6 oz)   Height: 4' 11" (1.499 m)       Psychiatric / Mental Status Examination  1. Appearance: Dress is informal but appropriate. Motor activity normal.  2. Discourse: Clear speech with normal rate and volume. Associations intact. Orderly.  3. Emotional Expression: Somewhat depressed. Affect is appropriate.  4. Perception and Thinking: No hallucinations. No suicidality, no homicidality, delusions, or paranoia.  5. Sensorium: Grossly intact. Able to focus for interview.  6. Memory and Fund of Knowledge: Intact for content of interview.  7. Insight and Judgment: Intact.               Billing Documentation:     Method of Encounter IN PERSON visit at the clinic   Type of Encounter Follow up visit with me   Counseling;  Psychotherapy    Counseling;  Tobacco and/or Nicotine    Additional Codes and Modifiers 01006, with modifer 59: administered and scored more than one psychological or neuropsychological tests (see MBC above) (16+ mins)   Time Remaining Chart/Pt 62774: FOLLOW UP VISIT, Rx mgmt, "Multiple STABLE chronic illnesses"   Total Mins  (7/23/2024) N/A - Not billing for time        Will Leong DO  Department of Psychiatry, [facility]        "

## 2024-07-23 NOTE — PATIENT INSTRUCTIONS
Followup with neurology (Ms. Bentley) - your last visit was in June 2023    Increase ZOLOFT from 50mg daily to 100mg daily  Continue other medications as prescribed

## 2024-07-25 ENCOUNTER — OFFICE VISIT (OUTPATIENT)
Dept: PAIN MEDICINE | Facility: CLINIC | Age: 79
End: 2024-07-25
Payer: MEDICARE

## 2024-07-25 VITALS
BODY MASS INDEX: 23.65 KG/M2 | HEART RATE: 82 BPM | WEIGHT: 117.31 LBS | DIASTOLIC BLOOD PRESSURE: 65 MMHG | SYSTOLIC BLOOD PRESSURE: 135 MMHG | HEIGHT: 59 IN

## 2024-07-25 DIAGNOSIS — G89.4 CHRONIC PAIN DISORDER: ICD-10-CM

## 2024-07-25 DIAGNOSIS — M54.12 CERVICAL RADICULITIS: Primary | ICD-10-CM

## 2024-07-25 DIAGNOSIS — M79.18 MYOFASCIAL PAIN: ICD-10-CM

## 2024-07-25 DIAGNOSIS — M50.30 DDD (DEGENERATIVE DISC DISEASE), CERVICAL: ICD-10-CM

## 2024-07-25 DIAGNOSIS — M47.892 OTHER SPONDYLOSIS, CERVICAL REGION: ICD-10-CM

## 2024-07-25 PROCEDURE — 99999 PR PBB SHADOW E&M-EST. PATIENT-LVL III: CPT | Mod: PBBFAC,HCNC,, | Performed by: PHYSICIAN ASSISTANT

## 2024-07-25 RX ORDER — HYDROCODONE BITARTRATE AND ACETAMINOPHEN 5; 325 MG/1; MG/1
1 TABLET ORAL EVERY 12 HOURS PRN
Qty: 60 TABLET | Refills: 0 | Status: SHIPPED | OUTPATIENT
Start: 2024-09-21 | End: 2024-10-20

## 2024-07-25 RX ORDER — HYDROCODONE BITARTRATE AND ACETAMINOPHEN 5; 325 MG/1; MG/1
1 TABLET ORAL EVERY 12 HOURS PRN
Qty: 60 TABLET | Refills: 0 | Status: SHIPPED | OUTPATIENT
Start: 2024-07-25 | End: 2024-08-23

## 2024-07-25 RX ORDER — CYCLOBENZAPRINE HCL 10 MG
10 TABLET ORAL 2 TIMES DAILY PRN
Qty: 60 TABLET | Refills: 1 | Status: SHIPPED | OUTPATIENT
Start: 2024-07-25 | End: 2024-09-23

## 2024-07-25 RX ORDER — HYDROCODONE BITARTRATE AND ACETAMINOPHEN 5; 325 MG/1; MG/1
1 TABLET ORAL EVERY 12 HOURS PRN
Qty: 60 TABLET | Refills: 0 | Status: SHIPPED | OUTPATIENT
Start: 2024-08-23 | End: 2024-09-21

## 2024-07-25 NOTE — PROGRESS NOTES
Referring Physician: No ref. provider found    PCP: Liseth Carias MD      CC: neck and occipital pain    Interval history: Ms. Richter is a 79 y.o. female with neck pain and occipital neuralgia who returns to our clinic.  She continues to c/o headaches and neck pain.  Most recent occcipital nerve block only provided mild short lived beneft. Recent cervical MELANY improved neck pain that was extending into left shoulder.  She has numbness to her right hand and first through fourth digits. Cervical MELANY in February 2018  provided benefit for several weeks.    She continues to take Norco with benefit.  Flexeril 10 mg caused dry mouth however this resolved and she wishes to resume. Robaxin was helpful but caused dizziness.  Denies UE weakness. No bowel bladder changes.   She does c/o intermittent numbness and tingling in b/l hands and feet. Pain today is rated 6/10.    Prior HPI:   Patient is 70-year-old female with past history history of cervical DDD, cervical spondylosis and chronic headaches.  She recently moved here from [place], [place].  She is treated in the past by neurology.  She states having constant burning pain over the left side of her posterior scalp.  Pain radiates to her neck as well as a frontal.  She also has left-sided neck pain as well.  She denies any radicular arm pain.  No numbness or weakness.  She states having cervical epidural steroid injection at past with minimal benefit.  She also has had decided of cervical nerve blocks in the past with moderate benefit.  Most recent injection was performed 2 months ago.  She desires repeat injection.  She currently takes Norco 10 mg every 12 hours as needed with moderate benefit.  She also takes Zanaflex 4 mg every 8 hours with mild benefits.  She rates her pain 7/10 today.    Pain intervention history: s/p left occipital nerve blocks on 1/2016 with 50% relief of her headaches  S/p cervical MELANY 2/8/18 moderate relief for a couple of weeks.      ROS:  CONSTITUTIONAL: No fevers, chills, night sweats, wt. loss, appetite changes  SKIN: no rashes or itching  ENT: No headaches, head trauma, vision changes, or eye pain  LYMPH NODES: None noticed   CV: No chest pain, palpitations.   RESP: No shortness of breath, dyspnea on exertion, cough, wheezing, or hemoptysis  GI: No nausea, emesis, diarrhea, constipation, melena, hematochezia, pain.    : No dysuria, hematuria, urgency, or frequency   HEME: No easy bruising, bleeding problems  PSYCHIATRIC: No depression, anxiety, psychosis, hallucinations.  NEURO: No seizures, memory loss, dizziness or difficulty sleeping  MSK: + History of present illness      Past Medical History:   Diagnosis Date    Anemia of chronic disease 2024    Anxiety     Arthritis     Cataract     Colon polyp     DDD (degenerative disc disease), lumbar     Depression     Encounter for blood transfusion     GERD (gastroesophageal reflux disease)     Headache     Hyperlipidemia     Hypertension     pt states she does not take meds anymore she just watches her weight    Insomnia 3/12/2024    Neuromuscular disorder     Occipital neuralgia 2017    Osteoporosis     Seizures      Past Surgical History:   Procedure Laterality Date    APPENDECTOMY      CATARACT EXTRACTION W/  INTRAOCULAR LENS IMPLANT Left 10/25/2023    Procedure: CEIOL OS;  Surgeon: Rustam Dyer MD;  Location: [facility] OR;  Service: Ophthalmology;  Laterality: Left;  Last Case of day, short eye, very high power IOL    CATARACT EXTRACTION W/  INTRAOCULAR LENS IMPLANT Right 1/10/2024    Procedure: CEIOL OD Preop Mannitol;  Surgeon: Rustam Dyer MD;  Location: [facility];  Service: Ophthalmology;  Laterality: Right;  Will need pre-op Mannitol     SECTION      x 2    CHOLECYSTECTOMY      COLONOSCOPY  prior to     COLONOSCOPY N/A 2019    Procedure: COLONOSCOPY;  Surgeon: Greg Victor MD;  Location: [facility];  Service: Endoscopy;  Laterality: N/A; 2  colon polyps, hemorrhoids; repeat in 5 years for surveillance; biopsy: Tubular adenoma x2    COLONOSCOPY N/A 5/1/2024    Procedure: COLONOSCOPY;  Surgeon: Greg Victor MD;  Location: [facility];  Service: Endoscopy;  Laterality: N/A;    EPIDURAL STEROID INJECTION INTO CERVICAL SPINE N/A 9/20/2022    Procedure: Injection-steroid-epidural-cervical C7-T1;  Surgeon: Timothy Grimaldo MD;  Location: [facility] OR;  Service: Pain Management;  Laterality: N/A;    EPIDURAL STEROID INJECTION INTO CERVICAL SPINE N/A 3/7/2024    Procedure: Injection-steroid-epidural-cervical;  Surgeon: Timothy Grimaldo MD;  Location: [facility] OR;  Service: Anesthesiology;  Laterality: N/A;  c7-t1    ESOPHAGOGASTRODUODENOSCOPY N/A 4/30/2019    Procedure: EGD (ESOPHAGOGASTRODUODENOSCOPY);  Surgeon: Greg Victor MD;  Location: [facility];  Service: Endoscopy;  Laterality: N/A; Mild Schatzki ring. Dilated. small hiatal hernia; Four gastric polyps. Resected and retrieved, gastritis; biopsy:stomach- unremarkable, negative for h pylori, polyps-Fundic type mucosa with foveolar hyperplasia.    ESOPHAGOGASTRODUODENOSCOPY N/A 2/17/2021    Procedure: EGD (ESOPHAGOGASTRODUODENOSCOPY);  Surgeon: Greg Victor MD;  Location: [facility];  Service: Endoscopy;  Laterality: N/A;    ESOPHAGOGASTRODUODENOSCOPY N/A 2/29/2024    Procedure: EGD (ESOPHAGOGASTRODUODENOSCOPY);  Surgeon: Greg Victor MD;  Location: [facility];  Service: Endoscopy;  Laterality: N/A;    HYSTERECTOMY      Laser Periphery Iridotomy Bilateral     OD 5/26/16 and OS touch up 5/26/2016    UPPER GASTROINTESTINAL ENDOSCOPY  03/14/2017    Dr. Marcial: esophageal stenosis- dilated, gastritis, gastric polyps removed; biopsy- mild gastritis, negative for h pylori, hyperplastic polyp, esophagus unremarkable    vocal cord tumor removal       Family History   Problem Relation Name Age of Onset    Osteoarthritis Mother      Alcohol abuse Mother      Rheum arthritis Mother      Osteoarthritis Sister       Diabetes Brother      No Known Problems Son      No Known Problems Sister      No Known Problems Sister      No Known Problems Brother      Arthritis Son      No Known Problems Son      Stroke Maternal Grandmother  99    Rheum arthritis Maternal Grandmother      Retinal detachment Neg Hx      Macular degeneration Neg Hx      Glaucoma Neg Hx      Amblyopia Neg Hx      Blindness Neg Hx      Cancer Neg Hx      Cataracts Neg Hx      Hypertension Neg Hx      Strabismus Neg Hx      Thyroid disease Neg Hx      Lupus Neg Hx      Kidney disease Neg Hx      Inflammatory bowel disease Neg Hx      Psoriasis Neg Hx      Colon cancer Neg Hx      Crohn's disease Neg Hx      Ulcerative colitis Neg Hx      Stomach cancer Neg Hx      Esophageal cancer Neg Hx       Social History     Socioeconomic History    Marital status:    Tobacco Use    Smoking status: Never    Smokeless tobacco: Never   Substance and Sexual Activity    Alcohol use: No     Alcohol/week: 0.0 standard drinks of alcohol    Drug use: Yes     Types: Hydrocodone    Sexual activity: Yes     Partners: Male     Social Determinants of Health     Financial Resource Strain: Low Risk  (8/8/2023)    Overall Financial Resource Strain (CARDIA)     Difficulty of Paying Living Expenses: Not hard at all   Food Insecurity: No Food Insecurity (8/8/2023)    Hunger Vital Sign     Worried About Running Out of Food in the Last Year: Never true     Ran Out of Food in the Last Year: Never true   Transportation Needs: No Transportation Needs (8/8/2023)    PRAPARE - Transportation     Lack of Transportation (Medical): No     Lack of Transportation (Non-Medical): No   Physical Activity: Insufficiently Active (8/8/2023)    Exercise Vital Sign     Days of Exercise per Week: 3 days     Minutes of Exercise per Session: 30 min   Stress: No Stress Concern Present (8/8/2023)    [employer] - Occupational Stress Questionnaire     Feeling of Stress : Not at all  "  Housing Stability: Unknown (8/8/2023)    Housing Stability Vital Sign     Unable to Pay for Housing in the Last Year: No     Unstable Housing in the Last Year: No         Medications/Allergies: See med card    Vitals:    07/25/24 1216   BP: 135/65   Pulse: 82   Weight: 53.2 kg (117 lb 4.6 oz)   Height: 4' 11" (1.499 m)   PainSc:   6   PainLoc: Neck         Physical exam:    GENERAL: A and O x3, the patient appears well groomed and is in no acute distress.  Skin: No rashes or obvious lesions  HEENT: normocephalic, atraumatic  CARDIOVASCULAR:  RRR  LUNGS: nonlabored breathing  ABDOMEN: soft, nontender   UPPER EXTREMITIES: Normal alignment, normal range of motion, no atrophy, no skin changes,  hair growth and nail growth normal and equal bilaterally. No swelling, no tenderness.  +Phalens on left side. +TTP tricep tendon  LOWER EXTREMITIES:  Normal alignment, normal range of motion, no atrophy, no skin changes,  hair growth and nail growth normal and equal bilaterally. No swelling, no tenderness.  CERVICAL SPINE:   Cervical spine: ROM is full in flexion, extension and lateral rotation.  Painful flexion > extension.  Positive facet loading bilaterally.  Spurling is positive at right side.  Myofascial exam:  Tenderness to palpation across cervical paraspinals deltoid and trapezius muscles bilaterally.      MENTAL STATUS: normal orientation, speech, language, and fund of knowledge for social situation.  Emotional state appropriate.    CRANIAL NERVES:  II:  PERRL bilaterally,   III,IV,VI: EOMI.    V:  Facial sensation equal bilaterally  VII:  Facial motor function normal.  VIII:  Hearing equal to finger rub bilaterally  IX/X: Gag normal, palate symmetric  XI:  Shoulder shrug equal, head turn equal  XII:  Tongue midline without fasciculations      MOTOR: Tone and bulk: normal bilateral upper and lower Strength: normal "   Delt Bi Tri WE WF     R 5 5 5 5 5 5   L 5 5 5 5 5 5     IP ADD ABD Quad TA Gas HAM  R 5 5 5 5 5 5 5  L 5 5 5 5 5 5 5    SENSATION: Light touch and pinprick intact bilaterally  REFLEXES: normal, symmetric, nonbrisk.  Toes down, no clonus. No hoffmans.  GAIT: normal rise, base, steps, and arm swing.        Imaging:   Cervical MRI 12/4/17    Narrative     EXAM: Cervical spine MRI without contrast.    INDICATION: Cervical radiculopathy.  Neck pain and occipital neuralgia.  The patient complains of neck and right arm pain.    TECHNIQUE: Routine multiplanar, multisequence unenhanced cervical spine MRI was performed.    COMPARISON: Plain films of the cervical spine obtained concurrently      FINDINGS:     Vertebral column: There is straightening of the cervical spine with loss of normal lordosis.  As seen on concurrent plain films, there is trace anterolisthesis of C3 on C4 with 2 mm anterolisthesis of C4 on C5.  There is marked disc space narrowing at the C5-6 level with moderate to marked disc space narrowing at the C4-5 and C6-7 levels.  There is partial non-segmentation anteriorly at the C2-3 level.  The C2 and C3 as well as the C4 and C5 facet joints appear fused and this may represent developmental non-segmentation or degenerative ankylosis.  All of the discs are desiccated.  The odontoid process is intact.    Spinal canal, cord, epidural space: The craniovertebral junction is normal.  The spinal canal is omental and normal.  There is no significant spinal stenosis.  The cord is normal in caliber.  There is very subtle flattening of the ventral cord surface at the C4-5 and C5-6 levels where there is degenerative change.  The study is mildly motion but there is no definite cord edema or myelomalacia.    Findings by level:    C2-3: There is no spinal canal or foraminal stenosis.  There is mild left facet joint arthropathy.    C3-4: There is trace anterolisthesis.  There is left greater than right uncovertebral  spurring and facet joint arthropathy.  There is a mild disc osteophyte complex, slightly eccentric to the left with subtle annular fissure.  This narrows the ventral subarachnoid space.  There is no spinal stenosis or cord compression.  There is moderate marked left foraminal stenosis.    C4-5: There is moderate disc space narrowing with 2 mm anterolisthesis of C4 on C5.  There is facet joint arthropathy or effusion left greater than right.  There is also mild bilateral but left greater than right uncovertebral spurring.  There is unroofing of a mild disc bulge which narrows the ventral subarachnoid space.  There is no spinal stenosis.  There is mild to moderate left foraminal stenosis.    C5-6:There is marked disc space narrowing.  There is bilateral uncovertebral spurring.  There is a disc osteophyte complex which narrows the subarachnoid space.  This is slightly eccentric to the right There is subtle flattening of the ventral cord surface.  Cord signal is grossly normal.  There is mild spinal stenosis with at least moderate bilateral foraminal stenosis.    C6-7: There is moderate disc space narrowing.  There is mild uncovertebral spurring.  There is a shallow disc osteophyte complex, slightly eccentric to the right.  There is narrowing of the ventral subarachnoid space.  There is no spinal stenosis.  Cord signal is normal.  There is mild bilateral foraminal stenosis.  There is a small 3.5 mm left foraminal perineural cyst.    C7- T1: There is a Schmorl's node in inferior endplate of C7, chronic.  There are tiny bilateral foraminal perineural cysts.  There is minimal bulging of the annulus and mild facet joint arthropathy without spinal canal or foraminal stenosis.    Soft tissues/other: The prevertebral soft tissues are normal.  The airway is patent.   Impression          1. There is multilevel degenerative disc disease described in detail above.  There is no acute fracture.  There is degenerative listhesis at  several levels.  There is some degree of disc osteophyte complex, uncovertebral spurring and/or facet joint arthropathy contributing to some degree of foraminal stenosis at several levels.  There is no significant spinal canal stenosis.  There is very subtle flattening of the ventral cord surface at the C4-5 and C6-7 levels The pertinent findings are summarized below.    2. At the C3-4 level, there is no spinal stenosis.  There is moderate to marked left foraminal stenosis.    3. At the C4-5 level there is no spinal stenosis.  There is mild to moderate left foraminal stenosis.    4. At the C5-6 level, there is mild spinal stenosis with at least moderate bilateral foraminal stenosis.    5. At the C6-7 level, there is no spinal stenosis.  There is mild bilateral foraminal stenosis.    6. There is no spinal canal or foraminal stenosis at the C2-3 or C7-T1 levels.     Assessment:  Mrs. Richter is a 79 y.o. female with neck and head pain    1. Cervical radiculitis    2. DDD (degenerative disc disease), cervical    3. Other spondylosis, cervical region    4. Myofascial pain          Plan:  1. I have stressed the importance of physical activity and exercise to improve overall health.  2. Monitor progress from repeat C7-T1 IL MELANY. Consider bilateral occipital blocks for head pain.  3. Norco 5mg q12hrs as needed for pain.  reviewed.  Previous UDS consistent   4. Flexeril 10 mg BID #60 1 refill.   5. Consider EMG for extremity numbness and tingling  6. F/u 3 months or sooner  All medication management was performed by Dr. Timothy Grimaldo

## 2024-08-05 ENCOUNTER — OFFICE VISIT (OUTPATIENT)
Dept: ORTHOPEDICS | Facility: CLINIC | Age: 79
End: 2024-08-05
Payer: MEDICARE

## 2024-08-05 VITALS — BODY MASS INDEX: 23.65 KG/M2 | HEIGHT: 59 IN | WEIGHT: 117.31 LBS

## 2024-08-05 DIAGNOSIS — G89.29 CHRONIC LEFT SHOULDER PAIN: ICD-10-CM

## 2024-08-05 DIAGNOSIS — R10.13 EPIGASTRIC PAIN: ICD-10-CM

## 2024-08-05 DIAGNOSIS — M25.512 CHRONIC LEFT SHOULDER PAIN: ICD-10-CM

## 2024-08-05 DIAGNOSIS — M25.511 CHRONIC RIGHT SHOULDER PAIN: ICD-10-CM

## 2024-08-05 DIAGNOSIS — G89.29 CHRONIC RIGHT SHOULDER PAIN: ICD-10-CM

## 2024-08-05 DIAGNOSIS — M75.21 BICEPS TENDINITIS OF RIGHT SHOULDER: Primary | ICD-10-CM

## 2024-08-05 DIAGNOSIS — M12.812 ROTATOR CUFF ARTHROPATHY, LEFT: ICD-10-CM

## 2024-08-05 PROCEDURE — 1101F PT FALLS ASSESS-DOCD LE1/YR: CPT | Mod: HCNC,CPTII,S$GLB, | Performed by: FAMILY MEDICINE

## 2024-08-05 PROCEDURE — 20550 NJX 1 TENDON SHEATH/LIGAMENT: CPT | Mod: HCNC,RT,S$GLB, | Performed by: FAMILY MEDICINE

## 2024-08-05 PROCEDURE — 99214 OFFICE O/P EST MOD 30 MIN: CPT | Mod: 25,HCNC,S$GLB, | Performed by: FAMILY MEDICINE

## 2024-08-05 PROCEDURE — 99999 PR PBB SHADOW E&M-EST. PATIENT-LVL III: CPT | Mod: PBBFAC,HCNC,, | Performed by: FAMILY MEDICINE

## 2024-08-05 PROCEDURE — 1125F AMNT PAIN NOTED PAIN PRSNT: CPT | Mod: HCNC,CPTII,S$GLB, | Performed by: FAMILY MEDICINE

## 2024-08-05 PROCEDURE — 76942 ECHO GUIDE FOR BIOPSY: CPT | Mod: HCNC,S$GLB,, | Performed by: FAMILY MEDICINE

## 2024-08-05 PROCEDURE — 1159F MED LIST DOCD IN RCRD: CPT | Mod: HCNC,CPTII,S$GLB, | Performed by: FAMILY MEDICINE

## 2024-08-05 PROCEDURE — 3288F FALL RISK ASSESSMENT DOCD: CPT | Mod: HCNC,CPTII,S$GLB, | Performed by: FAMILY MEDICINE

## 2024-08-05 RX ORDER — PANTOPRAZOLE SODIUM 40 MG/1
40 TABLET, DELAYED RELEASE ORAL DAILY
Qty: 90 TABLET | Refills: 1 | Status: SHIPPED | OUTPATIENT
Start: 2024-08-05 | End: 2025-02-01

## 2024-08-05 RX ORDER — METHYLPREDNISOLONE ACETATE 80 MG/ML
80 INJECTION, SUSPENSION INTRA-ARTICULAR; INTRALESIONAL; INTRAMUSCULAR; SOFT TISSUE
Status: DISCONTINUED | OUTPATIENT
Start: 2024-08-05 | End: 2024-08-05 | Stop reason: HOSPADM

## 2024-08-05 RX ADMIN — METHYLPREDNISOLONE ACETATE 80 MG: 80 INJECTION, SUSPENSION INTRA-ARTICULAR; INTRALESIONAL; INTRAMUSCULAR; SOFT TISSUE at 09:08

## 2024-08-09 DIAGNOSIS — Z78.9 STATIN NOT TOLERATED: ICD-10-CM

## 2024-08-09 DIAGNOSIS — I70.0 CALCIFICATION OF AORTA: ICD-10-CM

## 2024-08-09 DIAGNOSIS — E78.01 FAMILIAL HYPERCHOLESTEROLEMIA: ICD-10-CM

## 2024-08-13 RX ORDER — EVOLOCUMAB 140 MG/ML
140 INJECTION, SOLUTION SUBCUTANEOUS
Qty: 2 ML | Refills: 2 | Status: ACTIVE | OUTPATIENT
Start: 2024-08-13 | End: 2024-11-11

## 2024-08-20 ENCOUNTER — OFFICE VISIT (OUTPATIENT)
Dept: PSYCHIATRY | Facility: CLINIC | Age: 79
End: 2024-08-20
Payer: MEDICARE

## 2024-08-20 VITALS
WEIGHT: 116.88 LBS | BODY MASS INDEX: 23.56 KG/M2 | SYSTOLIC BLOOD PRESSURE: 128 MMHG | HEART RATE: 79 BPM | HEIGHT: 59 IN | DIASTOLIC BLOOD PRESSURE: 74 MMHG

## 2024-08-20 DIAGNOSIS — Z86.59 HISTORY OF POSTTRAUMATIC STRESS DISORDER (PTSD): ICD-10-CM

## 2024-08-20 DIAGNOSIS — F41.1 GENERALIZED ANXIETY DISORDER: ICD-10-CM

## 2024-08-20 DIAGNOSIS — F33.1 MODERATE EPISODE OF RECURRENT MAJOR DEPRESSIVE DISORDER: Primary | ICD-10-CM

## 2024-08-20 DIAGNOSIS — G47.00 INSOMNIA, UNSPECIFIED TYPE: ICD-10-CM

## 2024-08-20 DIAGNOSIS — F19.20 DEPENDENCY ON PAIN MEDICATION: ICD-10-CM

## 2024-08-20 DIAGNOSIS — G31.84 MILD NEUROCOGNITIVE DISORDER: ICD-10-CM

## 2024-08-20 PROCEDURE — 3074F SYST BP LT 130 MM HG: CPT | Mod: HCNC,CPTII,S$GLB, | Performed by: STUDENT IN AN ORGANIZED HEALTH CARE EDUCATION/TRAINING PROGRAM

## 2024-08-20 PROCEDURE — 96136 PSYCL/NRPSYC TST PHY/QHP 1ST: CPT | Mod: 59,HCNC,S$GLB, | Performed by: STUDENT IN AN ORGANIZED HEALTH CARE EDUCATION/TRAINING PROGRAM

## 2024-08-20 PROCEDURE — 1159F MED LIST DOCD IN RCRD: CPT | Mod: HCNC,CPTII,S$GLB, | Performed by: STUDENT IN AN ORGANIZED HEALTH CARE EDUCATION/TRAINING PROGRAM

## 2024-08-20 PROCEDURE — 1160F RVW MEDS BY RX/DR IN RCRD: CPT | Mod: HCNC,CPTII,S$GLB, | Performed by: STUDENT IN AN ORGANIZED HEALTH CARE EDUCATION/TRAINING PROGRAM

## 2024-08-20 PROCEDURE — 99214 OFFICE O/P EST MOD 30 MIN: CPT | Mod: HCNC,S$GLB,, | Performed by: STUDENT IN AN ORGANIZED HEALTH CARE EDUCATION/TRAINING PROGRAM

## 2024-08-20 PROCEDURE — G2211 COMPLEX E/M VISIT ADD ON: HCPCS | Mod: HCNC,S$GLB,, | Performed by: STUDENT IN AN ORGANIZED HEALTH CARE EDUCATION/TRAINING PROGRAM

## 2024-08-20 PROCEDURE — 3288F FALL RISK ASSESSMENT DOCD: CPT | Mod: HCNC,CPTII,S$GLB, | Performed by: STUDENT IN AN ORGANIZED HEALTH CARE EDUCATION/TRAINING PROGRAM

## 2024-08-20 PROCEDURE — 3078F DIAST BP <80 MM HG: CPT | Mod: HCNC,CPTII,S$GLB, | Performed by: STUDENT IN AN ORGANIZED HEALTH CARE EDUCATION/TRAINING PROGRAM

## 2024-08-20 PROCEDURE — 1125F AMNT PAIN NOTED PAIN PRSNT: CPT | Mod: HCNC,CPTII,S$GLB, | Performed by: STUDENT IN AN ORGANIZED HEALTH CARE EDUCATION/TRAINING PROGRAM

## 2024-08-20 PROCEDURE — 1101F PT FALLS ASSESS-DOCD LE1/YR: CPT | Mod: HCNC,CPTII,S$GLB, | Performed by: STUDENT IN AN ORGANIZED HEALTH CARE EDUCATION/TRAINING PROGRAM

## 2024-08-20 PROCEDURE — 99999 PR PBB SHADOW E&M-EST. PATIENT-LVL III: CPT | Mod: PBBFAC,HCNC,, | Performed by: STUDENT IN AN ORGANIZED HEALTH CARE EDUCATION/TRAINING PROGRAM

## 2024-08-20 RX ORDER — BUSPIRONE HYDROCHLORIDE 5 MG/1
5 TABLET ORAL DAILY
Qty: 30 TABLET | Refills: 1 | Status: SHIPPED | OUTPATIENT
Start: 2024-08-20 | End: 2024-10-19

## 2024-08-20 RX ORDER — SERTRALINE HYDROCHLORIDE 100 MG/1
100 TABLET, FILM COATED ORAL DAILY
Qty: 30 TABLET | Refills: 1 | Status: SHIPPED | OUTPATIENT
Start: 2024-08-20 | End: 2024-10-19

## 2024-08-20 RX ORDER — MIRTAZAPINE 7.5 MG/1
7.5 TABLET, FILM COATED ORAL NIGHTLY
Qty: 30 TABLET | Refills: 1 | Status: SHIPPED | OUTPATIENT
Start: 2024-08-20 | End: 2024-10-19

## 2024-08-20 NOTE — PROGRESS NOTES
Outpatient Psychiatry Followup Visit (DO/MD/NP, etc.)    8/20/2024  Assessment & Plan    Assessment - Plan:     Impression     ICD-10-CM ICD-9-CM   1. Moderate episode of recurrent major depressive disorder  F33.1 296.32   2. History of posttraumatic stress disorder (PTSD)  Z86.59 V11.8   3. Generalized anxiety disorder  F41.1 300.02   4. Mild neurocognitive disorder  G31.84 331.83   5. 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 Insomnia, unspecified type  G47.00 780.52   7. BMI 23.0-23.9, adult  Z68.23 V85.1      Plan of Care & Medication Management    Chart was reviewed. The risks and benefits of medication were discussed with pt. The treatment plan and followup plan were reviewed with pt. Pt understands to contact clinic if symptoms worsen. Pt understands to call 911 or go to nearest ER for suicidal ideation, intent or plan.   RX History ARICEPT, BARBITURATES, BUSPAR, CYMBALTA, GABAPENTIN, PROZAC, REMERON, WELLBUTRIN, and ZOLOFT     Current RX Continue BUSPAR  Adjustments:  18JXT0828: Continue as reported: 5mg in the afternoon  11BAN0941: Adjust to 5-7.5mg in the afternoon  73HCA1905: Reduce to 7.5mg in the afternoon  75CEP8691: Reduce to 7.5mg BID  48XHC3655: Increase to 10mg BID  31JAN2023: Start 5mg BID for 3 days  Prior to 31JAN2023 pt had been taking ZOLOFT 150mg daily  Continue REMERON  Pt was provided [employer] educational material 3/12/2024.  Adjustments:  12MAR2024: Start 7.5mg HS  Prior to 12MAR2024 pt had most recently tried CYMBALTA  Continue ZOLOFT  Adjustments:  7/23/2024: Increase to 100mg daily  35BHT7661: Restart 50mg daily  [id number] pt tried CYMBALTA. Adherence was intermittent and response was inadequate.  08SSB3739: Taper to DC:  100mg daily for 5 days, then 50mg daily for 5 days, then 25mg daily for 6 days, then discontinue  76CWM1772: Reduce to 150mg daily  65MSB4035: Increase to 200mg daily  24URO3486: Continue 150mg daily  Prior to evaluation pt had been taking 150mg daily  "  Education, Counseling & Monitoring []BOX BREATHING  []SLEEP HYGIENE  []THERAPY  [] []CONTRACT TODAY 8/20/2024  []REVIEWED  8/20/2024    []NRT    []ASRS  []L2RTB  []PCL5  []LABS  [] []NICOTINE  []ETOH  []BENZOS  []CANNABIS  []CAFFEINE   Other Orders    RETURN N: RETURN IN 6 WEEKS, and reassess frequency within three visits from now       Subjective    Interval History:     Available documentation has been reviewed, and pertinent elements of the chart have been incorporated into this note where appropriate. Last [facility] encounter with writer was on 7/23/2024   Rola Richter, a 79 y.o. female, presenting for followup visit.    "Overall, how is your mental health now, compared to our last visit?"   [x]much better []a little better []about the same []a little worse []much worse     "I've been feeling a lot better"  Feels less "down"    Sleeping well  Eating well    More calm with ZOLOFT increase to 100mg/d  Anxiety is improving    Seems to be adherent to pharmacotherapy    Reports PTSD symptoms to be stable  IMTT was a success    Can reduce visit frequency to q6w    Pt would like to continue treatment otherwise as planned       Unless otherwise specified, pt did NOT display signs of nor endorse symptoms of overt psychosis or acute mood disorder requiring hospitalization during the encounter; pt denied violent thoughts or suicidal or homicidal ideation, intent, or plan.         Objective    Measurement-Based Care (MBC):     Routine Instruments   PROMIS-ANXIETY Interpretation: 13 (4a raw score): T-SCORE 65.3; MODERATE using 55-60-70 cutoffs.   PROMIS-DEPRESSION Interpretation: 14 (4a raw score): T-SCORE 65.7; MODERATE using 55-60-70 cutoffs.   PSS4 Interpretation: 8/16; MODERATE using 6-11 cutoffs. 0 PH, 0 LSE.   Additional Instruments   N/A     Current Evaluation of Mental Status:     Constitutional / General       Vitals:    08/20/24 1429   BP: 128/74   Pulse: 79   Weight: 53 kg (116 lb 13.5 oz)   Height: 4' 11" " "(1.499 m)     (Current body mass index is 23.6 kg/m².)    Psychiatric / Mental Status Examination  1. Appearance: Dress is informal but appropriate. Motor activity normal.  2. Discourse: Clear speech with normal rate and volume. Associations intact. Orderly.  3. Emotional Expression: Somewhat anxious and depressed mood. Affect is appropriate.  4. Perception and Thinking: No hallucinations. No suicidality, no homicidality, delusions, or paranoia.  5. Sensorium: Grossly intact. Able to focus for interview.  6. Memory and Fund of Knowledge: Intact for content of interview.  7. Insight and Judgment: Intact.               Billing Documentation:     Method of Encounter IN PERSON visit at the clinic   Type of Encounter Follow up visit with me   Counseling;  Psychotherapy    Counseling;  Tobacco and/or Nicotine    Additional Codes and Modifiers 26086, with modifer 59: administered and scored more than one psychological or neuropsychological tests (see MBC above) (16+ mins)  , without modifiers -24,-25,-53: COMPLEXITY: Visit today included increased complexity associated with the care of the episodic problem(s) (multiple psychiatric disorders - see above) addressed and managing the longitudinal care of the patient due to the serious and/or complex managed problem(s) (multiple psychiatric disorders - see above).   Time Remaining Chart/Pt 79536: FOLLOW UP VISIT, Rx mgmt, "Multiple STABLE chronic illnesses; no changes in treatment at this time"   Total Mins  (8/20/2024) N/A - Not billing for time        Will Leong DO  Department of Psychiatry, [facility]        "

## 2024-09-09 ENCOUNTER — OFFICE VISIT (OUTPATIENT)
Dept: ORTHOPEDICS | Facility: CLINIC | Age: 79
End: 2024-09-09
Payer: MEDICARE

## 2024-09-09 ENCOUNTER — HOSPITAL ENCOUNTER (OUTPATIENT)
Dept: RADIOLOGY | Facility: HOSPITAL | Age: 79
Discharge: HOME | End: 2024-09-09
Attending: FAMILY MEDICINE
Payer: MEDICARE

## 2024-09-09 VITALS — BODY MASS INDEX: 23.56 KG/M2 | WEIGHT: 116.88 LBS | HEIGHT: 59 IN

## 2024-09-09 DIAGNOSIS — M79.662 PAIN OF LEFT CALF: Primary | ICD-10-CM

## 2024-09-09 DIAGNOSIS — M79.606 PAIN OF LOWER EXTREMITY, UNSPECIFIED LATERALITY: Primary | ICD-10-CM

## 2024-09-09 DIAGNOSIS — M79.605 LEFT LEG PAIN: ICD-10-CM

## 2024-09-09 DIAGNOSIS — R25.2 LEG CRAMPS: ICD-10-CM

## 2024-09-09 DIAGNOSIS — M79.606 PAIN OF LOWER EXTREMITY, UNSPECIFIED LATERALITY: ICD-10-CM

## 2024-09-09 PROCEDURE — 1101F PT FALLS ASSESS-DOCD LE1/YR: CPT | Mod: HCNC,CPTII,S$GLB, | Performed by: FAMILY MEDICINE

## 2024-09-09 PROCEDURE — 99214 OFFICE O/P EST MOD 30 MIN: CPT | Mod: HCNC,S$GLB,, | Performed by: FAMILY MEDICINE

## 2024-09-09 PROCEDURE — 1125F AMNT PAIN NOTED PAIN PRSNT: CPT | Mod: HCNC,CPTII,S$GLB, | Performed by: FAMILY MEDICINE

## 2024-09-09 PROCEDURE — 1159F MED LIST DOCD IN RCRD: CPT | Mod: HCNC,CPTII,S$GLB, | Performed by: FAMILY MEDICINE

## 2024-09-09 PROCEDURE — 73590 X-RAY EXAM OF LOWER LEG: CPT | Mod: 26,HCNC,LT, | Performed by: RADIOLOGY

## 2024-09-09 PROCEDURE — 3288F FALL RISK ASSESSMENT DOCD: CPT | Mod: HCNC,CPTII,S$GLB, | Performed by: FAMILY MEDICINE

## 2024-09-09 PROCEDURE — 73590 X-RAY EXAM OF LOWER LEG: CPT | Mod: TC,HCNC,PO,LT

## 2024-09-09 PROCEDURE — 99999 PR PBB SHADOW E&M-EST. PATIENT-LVL III: CPT | Mod: PBBFAC,HCNC,, | Performed by: FAMILY MEDICINE

## 2024-09-09 NOTE — PROGRESS NOTES
"Subjective     Patient ID: Rola Richter is a 79 y.o. female.    Chief Complaint: Shoulder Pain (Pt here for R) shoulder f/u /Bicep tendon inj 8/5/24; Depo/States pain has improved. /Pt c/o L) lower leg pain.Denies recent injury/fall/ States L) leg keeps cramping up. )    Returns today for follow up of right-sided shoulder pain secondary to biceps tendinitis.  We gave her an ultrasound-guided injection into her biceps tendon August 5, 2024.  Reports that pain has significantly improved following the injection.  She brings up a new problem today of left lower leg pain.  She particularly points out her lateral calf.  Has been having cramping intermittently in her lateral calf for about the last year.  Seems to happen two to 3 times a week and it seems to happen at random.  Describes as a "Charley horse. ".  Notes that when it happens it will last for several minutes and usually go away when she stands up.  However it will occasionally returned whenever she sits back down.  She does run Voltaren gel on it which does seem to help.  Has a prescription for Flexeril and has been taking it twice daily for her neck but has not noticed any relief of cramping.        Review of Systems   Constitutional: Negative for chills and decreased appetite.   HENT:  Negative for congestion and sore throat.    Eyes:  Negative for blurred vision.   Cardiovascular:  Negative for chest pain, dyspnea on exertion and palpitations.   Respiratory:  Negative for cough and shortness of breath.    Skin:  Negative for rash.   Neurological:  Negative for difficulty with concentration, disturbances in coordination and headaches.   Psychiatric/Behavioral:  Negative for altered mental status, depression, hallucinations, memory loss and suicidal ideas.           Objective     General    Nursing note and vitals reviewed.  Constitutional: She is oriented to person, place, and time. She appears well-developed and well-nourished.   HENT:   Nose: Nose " normal.   Eyes: EOM are normal. Pupils are equal, round, and reactive to light.   Neck: Neck supple.   Cardiovascular:  Normal rate.            Pulmonary/Chest: Effort normal.   Abdominal: Soft.   Neurological: She is alert and oriented to person, place, and time. She has normal reflexes.   Psychiatric: She has a normal mood and affect. Her behavior is normal. Judgment and thought content normal.         Right Shoulder Exam     Inspection/Observation   Swelling: absent  Bruising: absent  Scars: absent  Deformity: absent  Scapular Winging: absent  Scapular Dyskinesia: negative    Tenderness   The patient is experiencing no tenderness.    Range of Motion   Active abduction:  normal   Passive abduction:  normal   Extension:  normal   Forward Flexion:  normal   Forward Elevation: normal  Adduction: normal    Tests & Signs   Jennings test: negative  Impingement: negative  Active Compression Test ([place]'s Sign): negative  Speed's Test: negative  Anterior Drawer Test: 0   Posterior Drawer Test: 0    Other   Sensation: normal    Left Shoulder Exam     Inspection/Observation   Swelling: absent  Bruising: absent  Scars: absent  Deformity: absent  Scapular Winging: absent  Scapular Dyskinesia: negative    Tenderness   The patient is tender to palpation of the acromioclavicular joint and supraspinatus.    Range of Motion   Active abduction:  150   Forward Flexion:  150     Tests & Signs   Jennings test: negative  Impingement: negative  Active Compression test ([place]'s Sign): negative  Speed's Test: negative  Anterior Drawer Test: 0  Posterior Drawer Test: 0    Other   Sensation: normal       Muscle Strength   Right Upper Extremity   Shoulder Abduction: 5/5   Shoulder Internal Rotation: 5/5   Shoulder External Rotation: 5/5   Supraspinatus: 5/5   Subscapularis: 5/5   Biceps: 5/5   Left Upper Extremity  Shoulder Abduction: 5/5   Shoulder Internal Rotation: 5/5   Shoulder External Rotation: 5/5   Supraspinatus: 5/5    Subscapularis: 5/5   Biceps: 5/5     Vascular Exam     Right Pulses      Radial:                    2+      Left Pulses      Radial:                    2+      Edema  Left Lower Leg: absent    Physical Exam  Vitals and nursing note reviewed.   Constitutional:       Appearance: She is well-developed and well-nourished.   HENT:      Nose: Nose normal.   Eyes:      Extraocular Movements: EOM normal.      Pupils: Pupils are equal, round, and reactive to light.   Cardiovascular:      Rate and Rhythm: Normal rate.      Pulses:           Radial pulses are 2+ on the right side and 2+ on the left side.   Pulmonary:      Effort: Pulmonary effort is normal.   Abdominal:      Palpations: Abdomen is soft.   Musculoskeletal:      Right shoulder: No swelling or deformity.      Left shoulder: No swelling or deformity.      Cervical back: Normal range of motion and neck supple.      Left lower leg: Tenderness present. No swelling, deformity, lacerations or bony tenderness. No edema.        Legs:    Neurological:      Mental Status: She is alert and oriented to person, place, and time.      Deep Tendon Reflexes: Reflexes are normal and symmetric.   Psychiatric:         Mood and Affect: Mood and affect normal.         Behavior: Behavior normal.         Thought Content: Thought content normal.         Judgment: Judgment normal.        X-ray images ordered obtained interpreted by me.  They show no obvious bony abnormalities of the tibia or fibula.  No acute fractures present.  Unremarkable x-ray.       Assessment and Plan     Encounter Diagnoses   Name Primary?    Left leg pain     Leg cramps     Pain of left calf Yes         Rola was seen today for shoulder pain.    Diagnoses and all orders for this visit:    Pain of left calf  -     Ambulatory referral/consult to Physical/Occupational Therapy; Future    Left leg pain  -     Ambulatory referral/consult to Physical/Occupational Therapy; Future    Leg cramps  -     Ambulatory  referral/consult to Physical/Occupational Therapy; Future    I discussed at length with her the potential causes for leg cramping.  It is likely that one of the medications she is on is causing him as a side effect.  However she is not able to stop any of the medications she is on currently due to potential risks.  For that I would recommend she attend physical therapy to learn some stretching techniques for the calf.  May follow up here as needed.

## 2024-09-10 ENCOUNTER — CLINICAL SUPPORT (OUTPATIENT)
Dept: REHABILITATION | Facility: HOSPITAL | Age: 79
End: 2024-09-10
Payer: MEDICARE

## 2024-09-10 DIAGNOSIS — M79.662 PAIN OF LEFT CALF: ICD-10-CM

## 2024-09-10 DIAGNOSIS — M79.605 LEFT LEG PAIN: ICD-10-CM

## 2024-09-10 DIAGNOSIS — R25.2 LEG CRAMPS: ICD-10-CM

## 2024-09-10 PROCEDURE — 97161 PT EVAL LOW COMPLEX 20 MIN: CPT | Mod: HCNC,PO

## 2024-09-10 PROCEDURE — 97110 THERAPEUTIC EXERCISES: CPT | Mod: HCNC,PO

## 2024-09-10 NOTE — PLAN OF CARE
"[facility]  Physical Therapy  Initial Evaluation     Name: Rola Richter  Clinic Number: [id number]    Therapy Diagnosis:   Encounter Diagnoses   Name Primary?    Left leg pain     Leg cramps     Pain of left calf      Physician: Eliseo Oliva MD    Physician Orders: PT Eval and Treat   Medical Diagnosis from Referral:   M79.605 (ICD-10-CM) - Left leg pain   R25.2 (ICD-10-CM) - Leg cramps   M79.662 (ICD-10-CM) - Pain of left calf     Evaluation Date: 9/10/2024  Authorization Period Expiration: 9/9/24  Plan of Care Expiration: 10/29/24    Visit # / Visits authorized: 1/ 1  FOTO: 59/100    Precautions: Standard and Fall, Hx of seizure    Time In: 1045  Time Out: 1130  Total Billable Time: 45 minutes    Subjective      Date of onset: 9/9/24    History of current condition - Rola reports: numbness and leg cramps to left lower leg since a  year ago. Lately the cramps is worse and burns.     Imaging, Xray tibia-fibula left 9/9/24: There is no fracture or dislocation. The soft tissues are unremarkable.     Prior Therapy: shoulder/neck  Social History: lives with    Falls: none   DME: none    Home Environment: no steps/stairs; tub shower   Exercise Routine / History: walk regularly each morning   Occupation: retired ; watch repair  Prior Level of Function: Independent  Current Level of Function: difficulty walking up steps; difficulty walking up an incline.    Pain:  Current 8/10, worst 10/10, best 0/10   Location: left calf   Description: Tight and pulling  Aggravating Factors: Walking and Night Time  Easing Factors: pain medication, heating pad, and rest    Patient's goals: "to have less pain."    Medical History:   Past Medical History:   Diagnosis Date    Anemia of chronic disease 6/18/2024    Anxiety     Arthritis     Cataract     Colon polyp     DDD (degenerative disc disease), lumbar     Depression     Encounter for blood transfusion     GERD (gastroesophageal " reflux disease)     Headache     Hyperlipidemia     Hypertension     pt states she does not take meds anymore she just watches her weight    Insomnia 3/12/2024    Neuromuscular disorder     Occipital neuralgia 2017    Osteoporosis     Seizures        Surgical History:   Rola Richter  has a past surgical history that includes Cholecystectomy;  section; Hysterectomy; vocal cord tumor removal; Laser Periphery Iridotomy (Bilateral); Appendectomy; Upper gastrointestinal endoscopy (2017); Colonoscopy (prior to ); Esophagogastroduodenoscopy (N/A, 2019); Colonoscopy (N/A, 2019); Esophagogastroduodenoscopy (N/A, 2021); Epidural steroid injection into cervical spine (N/A, 2022); Cataract extraction w/  intraocular lens implant (Left, 10/25/2023); Cataract extraction w/  intraocular lens implant (Right, 1/10/2024); Esophagogastroduodenoscopy (N/A, 2024); Epidural steroid injection into cervical spine (N/A, 3/7/2024); and Colonoscopy (N/A, 2024).    Medications:   Rola has a current medication list which includes the following prescription(s): buspirone, cod liver oil, cyclobenzaprine, diclofenac sodium, donepezil, repatha sureclick, famotidine, fish oil-omega-3 fatty acids, gabapentin, hydrocodone-acetaminophen, [START ON 2024] hydrocodone-acetaminophen, irbesartan, meloxicam, mirtazapine, multivit with min-folic acid, ondansetron, pantoprazole, prochlorperazine, and sertraline.    Allergies:   Review of patient's allergies indicates:   Allergen Reactions    Aspirin Nausea And Vomiting    Penicillins Itching    Crestor [rosuvastatin]      Muscle pain      Lipitor [atorvastatin]      Achy          Objective      Posture: R shoulder lower  Palpation: tenderness to left calf  Sensation: loss of pin prick and   Range of Motion/Strength:    Lower extremities Right  Left  Pain/Dysfunction with Movement        HIP PROM MMT PROM MMT    Flexion  WFL   4/5  WFL  4-/5     Extension  WFL  4+/5  WFL  4/5    Abduction  WFL   3+  WFL  3    Adduction  WFL   3+  WFL  3         KNEE        Flexion  WFL   4  WFL   4    Extension  WFL  3+  WFL   3+  Elicit of spasms when resisted         ANKLE      Left side   dorsiflexion  WFL  4  WFL   4    plantarflexion  WFL  4+  WFL   $+      Flexibility: SLR  65 deg bilat  Gait: Without AD  Analysis: trendelenburg to right  Bed Mobility:Independent  Transfers: Independent  Special Tests: 30 second sit to stand :9  Single leg stance   R  10 sec     L 4 sec  Jenna's sign (-)    Intake Outcome Measure for FOTO lower leg Survey    Therapist reviewed FOTO scores for Rola Richter on 9/10/2024.   FOTO report - see Media section or FOTO account episode details.    Intake Score: 59%       Treatment     Total Treatment time separate from Evaluation: 15 minutes    Rola received the treatments listed below:      therapeutic exercises to develop strength, flexibility, and establish home program for 15 minutes including:  Gastroc/hamstring stretch  Heel raises  Mini squats  Ankle pumps/SAQ's   Bridging  Straight leg raising    Patient Education and Home Exercises     Education provided:   - Discussed Plan of care; Home exercise Instructions.    Written Home Exercises Provided: Yes.  Exercises were reviewed and Rola was able to demonstrate them prior to the end of the session.  Rola demonstrated good  understanding of the education provided.     Assessment     Rola is a 79 y.o. female referred to outpatient Physical Therapy with a medical diagnosis of left leg pain, leg cramps, pain left calf. Patient presents with random tetanic left gastroc muscle contractions, tenderness to calf    Patient prognosis is Good.   Patient will benefit from skilled outpatient Physical Therapy to address the deficits stated above and in the chart below, provide patient /family education, and to maximize patient's level of independence.     Plan of care discussed  with patient: Yes  Patient's spiritual, cultural and educational needs considered and patient is agreeable to the plan of care and goals as stated below:     Anticipated Barriers for therapy: anxiety    Medical Necessity is demonstrated by the following  History  Co-morbidities and personal factors that may impact the plan of care [x] LOW: no personal factors / co-morbidities  [] MODERATE: 1-2 personal factors / co-morbidities  [] HIGH: 3+ personal factors / co-morbidities    Moderate / High Support Documentation:   Co-morbidities affecting plan of care: arthritis, anxiety    Personal Factors:   coping style     Examination  Body Structures and Functions, activity limitations and participation restrictions that may impact the plan of care [x] LOW: addressing 1-2 elements  [] MODERATE: 3+ elements  [] HIGH: 4+ elements (please support below)    Moderate / High Support Documentation: no deficit     Clinical Presentation [x] LOW: stable  [] MODERATE: Evolving  [] HIGH: Unstable     Decision Making/ Complexity Score: low       Goals:  Short Term Goals: 2 weeks   Patient will report compliance to home exercises given.  Patient will tolerate 10' on bike.  Patient will tolerate gastroc stretch standing on half roll 2'    Long Term Goals: 6 weeks   Patient will report less cramping and pain frequency 0/10 90% of the time..  Patient will tolerate 10' on treadmill with 15% incline 1.3 mph  Patient will demonstrate single leg stance > 10 sec  Patient will improve FOTO score  63/100    Plan     Plan of care Certification: 9/10/2024 to 10/29/24.    Outpatient Physical Therapy 2 times weekly for 6 weeks to include the following interventions: Electrical Stimulation , DN, Manual Therapy, Moist Heat/ Ice, Neuromuscular Re-ed, Therapeutic Activities, Therapeutic Exercise, and Ultrasound.     Edison Lockwood PT        Physician's Signature: _________________________________________ Date: ________________

## 2024-09-10 NOTE — PATIENT INSTRUCTIONS
Calf Stretch        Place one leg forward, bent, other leg behind and straight. Lean forward keeping back heel flat. Hold ____ seconds while counting out loud. Repeat with other leg forward.  Repeat ____ times. Do ____ sessions per day.     https://gt2.[email or web address]     Copyright © [employer]. All rights reserved.      Ankle Pumps        Lie with knees supported on bolster or sit. Straighten one knee. Keep knee straight; alternately press out and pull up heel. Emphasize movement of heel.  Repeat ___ times.    Copyright © [employer]. All rights reserved.      HIP: Flexion / KNEE: Extension, Straight Leg Raise        Raise leg, keeping knee straight. Perform slowly. ___ reps per set, ___ sets per day, ___ days per week      Copyright © [employer]. All rights reserved.      Bridging        Slowly raise buttocks from floor, keeping stomach tight.  Repeat ____ times per set. Do ____ sets per session. Do ____ sessions per day.     https://orth.[employer].us/1097     Copyright © [employer]. All rights reserved.      Heel Raises        Stand with support. Tighten pelvic floor and hold. With knees straight, raise heels off ground. Hold ___ seconds. Relax for ___ seconds.  Repeat ___ times. Do ___ times a day.    Copyright © [employer]. All rights reserved.      Mini-Squats (Standing)        Stand with support. Bend knees slightly. Tighten pelvic floor. Hold for ___ seconds. Return to straight standing. Relax for ___ seconds.  Repeat ___ times. Do ___ times a day.

## 2024-09-15 DIAGNOSIS — R13.14 PHARYNGOESOPHAGEAL DYSPHAGIA: ICD-10-CM

## 2024-09-15 DIAGNOSIS — K21.9 GASTROESOPHAGEAL REFLUX DISEASE, UNSPECIFIED WHETHER ESOPHAGITIS PRESENT: ICD-10-CM

## 2024-09-16 RX ORDER — FAMOTIDINE 20 MG/1
20 TABLET, FILM COATED ORAL 2 TIMES DAILY
Qty: 60 TABLET | Refills: 5 | Status: SHIPPED | OUTPATIENT
Start: 2024-09-16

## 2024-09-18 ENCOUNTER — CLINICAL SUPPORT (OUTPATIENT)
Dept: REHABILITATION | Facility: HOSPITAL | Age: 79
End: 2024-09-18
Payer: MEDICARE

## 2024-09-18 DIAGNOSIS — M79.662 PAIN OF LEFT CALF: ICD-10-CM

## 2024-09-18 DIAGNOSIS — R25.2 LEG CRAMPS: ICD-10-CM

## 2024-09-18 DIAGNOSIS — M79.605 LEFT LEG PAIN: Primary | ICD-10-CM

## 2024-09-18 PROCEDURE — 97110 THERAPEUTIC EXERCISES: CPT | Mod: HCNC,PO

## 2024-09-18 NOTE — PROGRESS NOTES
[facility]   Physical Therapy Treatment Note      Name: Rola Richter  Clinic Number: [id number]    Therapy Diagnosis:   Encounter Diagnoses   Name Primary?    Left leg pain Yes    Leg cramps     Pain of left calf      Physician: Eliseo Oliva MD    Visit Date: 9/18/2024  Physician Orders: PT Eval and Treat   Medical Diagnosis from Referral:   M79.605 (ICD-10-CM) - Left leg pain   R25.2 (ICD-10-CM) - Leg cramps   M79.662 (ICD-10-CM) - Pain of left calf      Evaluation Date: 9/10/2024  Authorization Period Expiration: 9/9/24  Plan of Care Expiration: 10/29/24     Visit # / Visits authorized: 1/20   (2)     Precautions: Standard and Fall, Hx of seizure     Time In: 1045  Time Out: 1130  Total Billable Time: 45 minutes    Subjective     Patient reports: less cramping.  She was compliant with home exercise program.  Response to previous treatment: on initial visit  Functional change: less cramps.    Pain: 0/10  Location: none indicated      Objective      SLR  65 deg bilat    Treatment     Rola received the treatments listed below:      therapeutic exercises to develop strength and flexibility for 45 minutes including:  Nustep L3 all 4's 10'  Standing gastroc stretches on half roll 2'  Heel-toe rocking 20  Mini squats 20  Supine hamstring stretches 3'  Bridging  crooklying 20; knee on extension 10  Quad sets 20/20  Straight leg raising 20/20    Patient Education and Home Exercises       Education provided:   - Periodic stretching    Written Home Exercises Provided: Pt instructed to continue prior HEP. Exercises were reviewed and Rola was able to demonstrate them prior to the end of the session.  Rola demonstrated good  understanding of the education provided. See Electronic Medical Record under Patient Instructions for exercises provided during therapy sessions    Assessment     Patient is reactive to ankle and hamstring stretch. Tight hamstrings.  No lingering pain after  stretch.  Tolerated Rx well.     Rola Is progressing well towards her goals.   Patient prognosis is Good.     Patient will continue to benefit from skilled outpatient physical therapy to address the deficits listed in the problem list box on initial evaluation, provide pt/family education and to maximize pt's level of independence in the home and community environment.     Patient's spiritual, cultural and educational needs considered and pt agreeable to plan of care and goals.     Anticipated barriers to physical therapy: none    Goals: Short Term Goals:    Patient will report compliance to home exercises given. (ongoing)  Patient will tolerate 10' on bike.  (ongoing)  Patient will tolerate gastroc stretch standing on half roll 2'  (ongoing)     Long Term Goals:    Patient will report less cramping and pain frequency 0/10 90% of the time. (Ongoing)  Patient will tolerate 10' on treadmill with 15% incline 1.3 mph  (ongoing)  Patient will demonstrate single leg stance > 10 sec   (ongoing)  Patient will improve FOTO score  63/100   (ongoing)    Plan     Strength training  Stretching/ Relaxation  Continue Plan of Care.    Edison Lockwood, PT

## 2024-09-23 ENCOUNTER — CLINICAL SUPPORT (OUTPATIENT)
Dept: REHABILITATION | Facility: HOSPITAL | Age: 79
End: 2024-09-23
Payer: MEDICARE

## 2024-09-23 DIAGNOSIS — M79.662 PAIN OF LEFT CALF: ICD-10-CM

## 2024-09-23 DIAGNOSIS — M79.605 LEFT LEG PAIN: Primary | ICD-10-CM

## 2024-09-23 DIAGNOSIS — R25.2 LEG CRAMPS: ICD-10-CM

## 2024-09-23 PROCEDURE — 97112 NEUROMUSCULAR REEDUCATION: CPT | Mod: HCNC,PO,CQ

## 2024-09-23 PROCEDURE — 97110 THERAPEUTIC EXERCISES: CPT | Mod: HCNC,PO,CQ

## 2024-09-23 NOTE — PROGRESS NOTES
[facility]   Physical Therapy Treatment Note      Name: Rola Richter  Clinic Number: [id number]    Therapy Diagnosis:   Encounter Diagnoses   Name Primary?    Left leg pain Yes    Leg cramps     Pain of left calf        Physician: Eliseo Oliva MD    Visit Date: 9/23/2024  Physician Orders: PT Eval and Treat   Medical Diagnosis from Referral:   M79.605 (ICD-10-CM) - Left leg pain   R25.2 (ICD-10-CM) - Leg cramps   M79.662 (ICD-10-CM) - Pain of left calf      Evaluation Date: 9/10/2024  Authorization Period Expiration: 12/31/2024  Plan of Care Expiration: 10/29/24     Visit # / Visits authorized: 2/20   (3)     Precautions: Standard and Fall, Hx of seizure     Time In: 1345   Time Out: 1430   Total Time: 45 minutes  Total Billable Time: 45 minutes    Subjective     Patient reports: bilateral neck and shoulder pain 8/10. Left LE pain 5/10. No more cramping in her leg (hamstrings, lateral hip, and calf) since starting therapy. TV watching in the evening is no longer interrupted with cramping. No longer waking with muscle cramps, sleeping better.     She was compliant with home exercise program.  Response to previous treatment: on initial visit  Functional change: TV watching in the evening is no longer interrupted with cramping. No longer waking with muscle cramps, sleeping better.     Pain: 5/10  Location: none indicated      Objective      SLR  65 deg bilat    Treatment     Rola received the treatments listed below:      therapeutic exercises to develop strength and flexibility for 27 minutes including:  Nustep L3 10'-no UE's 2* UE discomfort  Standing gastroc stretches on half roll 3 x 30s  Supine hamstring stretches 3'-seated today 5 x 10s each  Bridging  crooklying 20; knee on extension with bolster x 10  Quad sets 20/20      neuromuscular re-education activities to improve posture and muscle activation and control for 18 minutes. The following activities were included:  Mini  squats 20  Heel-toe rocking 20-cues for erect posture  Hip abduction, standing x 10  Straight leg raising 20/20    Patient Education and Home Exercises       Education provided:   - Periodic stretching    Written Home Exercises Provided: Yes, added to current home exercise program.   Exercises were reviewed and Rola was able to demonstrate them prior to the end of the session.  Rola demonstrated good  understanding of the education provided. See Electronic Medical Record under Patient Instructions for exercises provided during therapy sessions    Assessment     Continued pain but spasms have resolved, resulting in improved rest with tv watching and improved sleep uninterrupted. Manual cues for muscle activation and eccentric control with exercises. Progressing well. Fatigued after session but denies pain.  Will benefit from continued physical therapy intervention to progress toward goals set forth in plan of care to improve functional mobility and quality of life.     Rola Is progressing well towards her goals.   Patient prognosis is Good.     Patient will continue to benefit from skilled outpatient physical therapy to address the deficits listed in the problem list box on initial evaluation, provide pt/family education and to maximize pt's level of independence in the home and community environment.     Patient's spiritual, cultural and educational needs considered and pt agreeable to plan of care and goals.     Anticipated barriers to physical therapy: none    Goals: Short Term Goals:    Patient will report compliance to home exercises given. (ongoing)  Patient will tolerate 10' on bike.  (ongoing)  Patient will tolerate gastroc stretch standing on half roll 2'  (ongoing)     Long Term Goals:    Patient will report less cramping and pain frequency 0/10 90% of the time. (Ongoing)  Patient will tolerate 10' on treadmill with 15% incline 1.3 mph  (ongoing)  Patient will demonstrate single leg stance > 10  sec   (ongoing)  Patient will improve FOTO score  63/100   (ongoing)    Plan     Strength training  Stretching/ Relaxation  Continue Plan of Care.    Brenda Ruiz, PTA

## 2024-10-01 ENCOUNTER — TELEPHONE (OUTPATIENT)
Dept: FAMILY MEDICINE | Facility: CLINIC | Age: 79
End: 2024-10-01
Payer: MEDICARE

## 2024-10-01 ENCOUNTER — OFFICE VISIT (OUTPATIENT)
Dept: PSYCHIATRY | Facility: CLINIC | Age: 79
End: 2024-10-01
Payer: MEDICARE

## 2024-10-01 VITALS
HEART RATE: 78 BPM | BODY MASS INDEX: 24.31 KG/M2 | DIASTOLIC BLOOD PRESSURE: 67 MMHG | SYSTOLIC BLOOD PRESSURE: 135 MMHG | WEIGHT: 120.56 LBS | HEIGHT: 59 IN

## 2024-10-01 DIAGNOSIS — F19.20 DEPENDENCY ON PAIN MEDICATION: ICD-10-CM

## 2024-10-01 DIAGNOSIS — G31.84 MILD NEUROCOGNITIVE DISORDER: ICD-10-CM

## 2024-10-01 DIAGNOSIS — Z86.59 HISTORY OF POSTTRAUMATIC STRESS DISORDER (PTSD): ICD-10-CM

## 2024-10-01 DIAGNOSIS — G47.00 INSOMNIA, UNSPECIFIED TYPE: ICD-10-CM

## 2024-10-01 DIAGNOSIS — F33.0 MILD EPISODE OF RECURRENT MAJOR DEPRESSIVE DISORDER: Primary | ICD-10-CM

## 2024-10-01 DIAGNOSIS — F41.1 GENERALIZED ANXIETY DISORDER: ICD-10-CM

## 2024-10-01 PROCEDURE — 99999 PR PBB SHADOW E&M-EST. PATIENT-LVL III: CPT | Mod: PBBFAC,HCNC,, | Performed by: STUDENT IN AN ORGANIZED HEALTH CARE EDUCATION/TRAINING PROGRAM

## 2024-10-01 PROCEDURE — 3078F DIAST BP <80 MM HG: CPT | Mod: HCNC,CPTII,S$GLB, | Performed by: STUDENT IN AN ORGANIZED HEALTH CARE EDUCATION/TRAINING PROGRAM

## 2024-10-01 PROCEDURE — 3075F SYST BP GE 130 - 139MM HG: CPT | Mod: HCNC,CPTII,S$GLB, | Performed by: STUDENT IN AN ORGANIZED HEALTH CARE EDUCATION/TRAINING PROGRAM

## 2024-10-01 PROCEDURE — 1159F MED LIST DOCD IN RCRD: CPT | Mod: HCNC,CPTII,S$GLB, | Performed by: STUDENT IN AN ORGANIZED HEALTH CARE EDUCATION/TRAINING PROGRAM

## 2024-10-01 PROCEDURE — G2211 COMPLEX E/M VISIT ADD ON: HCPCS | Mod: HCNC,S$GLB,, | Performed by: STUDENT IN AN ORGANIZED HEALTH CARE EDUCATION/TRAINING PROGRAM

## 2024-10-01 PROCEDURE — 96136 PSYCL/NRPSYC TST PHY/QHP 1ST: CPT | Mod: 59,HCNC,S$GLB, | Performed by: STUDENT IN AN ORGANIZED HEALTH CARE EDUCATION/TRAINING PROGRAM

## 2024-10-01 PROCEDURE — 99214 OFFICE O/P EST MOD 30 MIN: CPT | Mod: HCNC,S$GLB,, | Performed by: STUDENT IN AN ORGANIZED HEALTH CARE EDUCATION/TRAINING PROGRAM

## 2024-10-01 PROCEDURE — 1101F PT FALLS ASSESS-DOCD LE1/YR: CPT | Mod: HCNC,CPTII,S$GLB, | Performed by: STUDENT IN AN ORGANIZED HEALTH CARE EDUCATION/TRAINING PROGRAM

## 2024-10-01 PROCEDURE — 3288F FALL RISK ASSESSMENT DOCD: CPT | Mod: HCNC,CPTII,S$GLB, | Performed by: STUDENT IN AN ORGANIZED HEALTH CARE EDUCATION/TRAINING PROGRAM

## 2024-10-01 PROCEDURE — 1160F RVW MEDS BY RX/DR IN RCRD: CPT | Mod: HCNC,CPTII,S$GLB, | Performed by: STUDENT IN AN ORGANIZED HEALTH CARE EDUCATION/TRAINING PROGRAM

## 2024-10-01 PROCEDURE — 1125F AMNT PAIN NOTED PAIN PRSNT: CPT | Mod: HCNC,CPTII,S$GLB, | Performed by: STUDENT IN AN ORGANIZED HEALTH CARE EDUCATION/TRAINING PROGRAM

## 2024-10-01 RX ORDER — SERTRALINE HYDROCHLORIDE 100 MG/1
100 TABLET, FILM COATED ORAL DAILY
Qty: 30 TABLET | Refills: 1 | Status: SHIPPED | OUTPATIENT
Start: 2024-10-01 | End: 2024-11-30

## 2024-10-01 RX ORDER — MIRTAZAPINE 7.5 MG/1
7.5 TABLET, FILM COATED ORAL NIGHTLY
Qty: 30 TABLET | Refills: 1 | Status: SHIPPED | OUTPATIENT
Start: 2024-10-01 | End: 2024-11-30

## 2024-10-01 RX ORDER — BUSPIRONE HYDROCHLORIDE 5 MG/1
5 TABLET ORAL DAILY
Qty: 30 TABLET | Refills: 1 | Status: SHIPPED | OUTPATIENT
Start: 2024-10-01 | End: 2024-11-30

## 2024-10-01 NOTE — PROGRESS NOTES
[facility]   Physical Therapy Treatment Note      Name: Rola Richter  Clinic Number: [id number]    Therapy Diagnosis:   Encounter Diagnoses   Name Primary?    Left leg pain Yes    Leg cramps     Pain of left calf        Physician: Eliseo Oliva MD    Visit Date: 10/2/2024  Physician Orders: PT Eval and Treat   Medical Diagnosis from Referral:   M79.605 (ICD-10-CM) - Left leg pain   R25.2 (ICD-10-CM) - Leg cramps   M79.662 (ICD-10-CM) - Pain of left calf      Evaluation Date: 9/10/2024  Authorization Period Expiration: 12/31/2024  Plan of Care Expiration: 10/29/24     Visit # / Visits authorized: 3/20   (4)     Precautions: Standard and Fall, Hx of seizure     Time In: 1046   Time Out: 1129   Total Time: 43 minutes  Total Billable Time: 43 minutes    Subjective     Patient reports: no LE pain. Mild neck and shoulder pain. No more cramping in her leg (hamstrings, lateral hip, and calf) since starting therapy.     She was compliant with home exercise program.  Response to previous treatment: on initial visit  Functional change: TV watching in the evening is no longer interrupted with cramping. No longer waking with muscle cramps, sleeping better.     Pain: 0/10  Location: none indicated      Objective      SLR  65 deg bilat    Treatment     Rola received the treatments listed below:      therapeutic exercises to develop strength and flexibility for 20 minutes including:  Nustep L3 10'-no UE's 2* UE discomfort  Standing gastroc stretches on half roll 3 x 30s  Supine hamstring stretches 3'-seated today  Bridging  crooklying 30; knee on extension with bolster (NP)x 10  NP-Quad sets 20/20      neuromuscular re-education activities to improve posture and muscle activation and control for 23 minutes. The following activities were included:  Mini squats 20  Heel-toe rocking 20-cues for erect posture  Hip abduction, standing x 15  Straight leg raising 20/20  SLS x 30s each  Lateral walking  x 2 laps each way in // bars  Hip extension, standing x 10    Patient Education and Home Exercises       Education provided:   - Periodic stretching    Written Home Exercises Provided: Yes, added to current home exercise program.   Exercises were reviewed and Rola was able to demonstrate them prior to the end of the session.  Rola demonstrated good  understanding of the education provided. See Electronic Medical Record under Patient Instructions for exercises provided during therapy sessions    Assessment     Improved LE pain. Good home exercise program compliance. Manual cues for muscle activation and eccentric control with exercises. Progressing well. With  Fatigued after session but denies pain.  Will benefit from continued physical therapy intervention to progress toward goals set forth in plan of care to improve functional mobility and quality of life.     Rola Is progressing well towards her goals.   Patient prognosis is Good.     Patient will continue to benefit from skilled outpatient physical therapy to address the deficits listed in the problem list box on initial evaluation, provide pt/family education and to maximize pt's level of independence in the home and community environment.     Patient's spiritual, cultural and educational needs considered and pt agreeable to plan of care and goals.     Anticipated barriers to physical therapy: none    Goals: Short Term Goals:    Patient will report compliance to home exercises given. (ongoing)  Patient will tolerate 10' on bike.  (ongoing)  Patient will tolerate gastroc stretch standing on half roll 2'  (ongoing)     Long Term Goals:    Patient will report less cramping and pain frequency 0/10 90% of the time. (Ongoing)  Patient will tolerate 10' on treadmill with 15% incline 1.3 mph  (ongoing)  Patient will demonstrate single leg stance > 10 sec   (ongoing)  Patient will improve FOTO score  63/100   (ongoing)    Plan     Strength  training  Stretching/ Relaxation  Continue Plan of Care.    Brenda Ruiz, PTA

## 2024-10-01 NOTE — TELEPHONE ENCOUNTER
----- Message from Sherita sent at 10/1/2024 12:20 PM CDT -----  Regarding: new rx and increase in dosage  Contact: patient  Type:  RX Refill Request    Who Called:  patient  Refill or New Rx:  new  RX Name and Strength:  donepeziL (ARICEPT) 10 MG tablet  How is the patient currently taking it? (ex. 1XDay):  as directed  Is this a 30 day or 90 day RX:  90  Preferred Pharmacy with phone number:    [id number] - [place] - [address] AT [facility] [address]  [address]  [place] [address]  Phone: [phone] Fax: [phone]  Local or Mail Order:  local  Ordering Provider:  Dr. Jv Ortiz Call Back Number:  [phone] (home)     Additional Information:  Patient states she would like medication dosage increased.  Please call patient to advise.  Thanks!

## 2024-10-01 NOTE — TELEPHONE ENCOUNTER
Lov 6/18/24  Spoke to pt who states her memory is getting worse and would like an increase on her Aricept. Scheduled pt appt

## 2024-10-01 NOTE — PROGRESS NOTES
Outpatient Psychiatry Followup Visit (DO/MD/NP, etc.)    10/1/2024  Assessment & Plan    Assessment - Plan:     Impression     ICD-10-CM ICD-9-CM   1. Mild episode of recurrent major depressive disorder  F33.0 296.31   2. History of posttraumatic stress disorder (PTSD)  Z86.59 V11.8   3. Generalized anxiety disorder  F41.1 300.02   4. Mild neurocognitive disorder  G31.84 331.83   5. 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 Insomnia, unspecified type  G47.00 780.52   7. BMI 24.0-24.9, adult  Z68.24 V85.1      Plan of Care & Medication Management    Chart was reviewed. The risks and benefits of medication were discussed with pt. The treatment plan and followup plan were reviewed with pt. Pt understands to contact clinic if symptoms worsen. Pt understands to call 911 or go to nearest ER for suicidal ideation, intent or plan.   RX History ARICEPT, BARBITURATES, BUSPAR, CYMBALTA, GABAPENTIN, PROZAC, REMERON, WELLBUTRIN, and ZOLOFT     Current RX Considering adding ABILIFY or SEROQUEL  Continue BUSPAR  Adjustments:  [id number]: Continue as reported: 5mg in the afternoon  54ZKX3905: Adjust to 5-7.5mg in the afternoon  10JZY7189: Reduce to 7.5mg in the afternoon  22MAR2023: Reduce to 7.5mg BID  03RHK5067: Increase to 10mg BID  31JAN2023: Start 5mg BID for 3 days  Prior to 31JAN2023 pt had been taking ZOLOFT 150mg daily  Continue REMERON  Pt was provided [employer] educational material 3/12/2024.  Adjustments:  12MAR2024: Start 7.5mg HS  Prior to 12MAR2024 pt had most recently tried CYMBALTA  Continue ZOLOFT  Adjustments:  7/23/2024: Increase to 100mg daily  00FJL2817: Restart 50mg daily  [id number] pt tried CYMBALTA. Adherence was intermittent and response was inadequate.  44JRQ2574: Taper to DC:  100mg daily for 5 days, then 50mg daily for 5 days, then 25mg daily for 6 days, then discontinue  45AMK9935: Reduce to 150mg daily  39SUF8217: Increase to 200mg daily  97JIM2013: Continue 150mg daily  Prior to  "evaluation pt had been taking 150mg daily      Education, Counseling & Monitoring []BOX BREATHING  []SLEEP HYGIENE  []THERAPY  [] []CONTRACT 10/1/2024?  [] REVIEWED 10/1/2024?  []NRT  []LABS    []THIERRY  []CAFF   []CANNABIS  []GILBERT []ETOH []GDS []MINICOG []MOCA  []AIMS []ASRS []BI   []PCL5 []L2RTB  []   Other Orders    RETURN R: RETURN IN 8 WEEKS (TWO MONTHS), and reassess frequency within three visits from now       Subjective    Interval History:     Available documentation has been reviewed, and pertinent elements of the chart have been incorporated into this note where appropriate. Last Epic encounter with writer was on 8/20/2024   Rola Richter, a 79 y.o. female, presenting for followup visit. This visit was done in person, IN CLINIC.    "Overall, how is your mental health now, compared to our last visit?"   []much better []a little better []about the same []a little worse []much worse     "Depression comes and goes"    In good spirits, making jokes    Stress at home  Some anxiety    GDS today is mild    Memory concerns  Meeting with PCP soon    Considering adding ABILIFY or SEROQUEL as adjunct    Reduce visit frequency to q2m  Continue treatment as planned       Unless otherwise specified, pt did NOT display signs of nor endorse symptoms of overt psychosis or acute mood disorder requiring hospitalization during the encounter; pt denied violent thoughts or suicidal or homicidal ideation, intent, or plan.         Objective    Measurement-Based Care (MBC):     Routine Instruments   PROMIS-ANXIETY Interpretation: 14 (4a raw score): T-SCORE 67.3; MODERATE using 55-60-70 cutoffs.   PROMIS-DEPRESSION Interpretation: 13 (4a raw score): T-SCORE 63.9; MODERATE using 55-60-70 cutoffs.   PSS4 Interpretation: 08/16; MODERATE using 6-11 cutoffs. 0 PH, 0 LSE.   Additional Instruments   GDS Interpretation: 14/30; MILD depression and/or some quality of life issues, using 10-20 cutoffs. Plan to administer GDS when PROMIS-D " "moderate; raw 11+, T-score 60.5+     Current Evaluation of Mental Status:     Constitutional / General       Vitals:    10/01/24 1432   BP: 135/67   Pulse: 78   Weight: 54.7 kg (120 lb 9.5 oz)   Height: 4' 11" (1.499 m)     (Current body mass index is 24.36 kg/m².)    Psychiatric / Mental Status Examination  1. Appearance: Dress is informal but appropriate. Motor activity normal.  2. Discourse: Clear speech with normal rate and volume. Associations intact. Orderly.  3. Emotional Expression: Somewhat anxious and depressed mood. Affect is appropriate.  4. Perception and Thinking: No hallucinations. No suicidality, no homicidality, delusions, or paranoia.  5. Sensorium: Grossly intact. Able to focus for interview.  6. Memory and Fund of Knowledge: Intact for content of interview.  7. Insight and Judgment: Intact.         Auto-populated chart data omitted from this note for brevity.      Billing Documentation:     Method of Encounter IN PERSON visit at the clinic   Type of Encounter Follow up visit with me   Counseling;  Psychotherapy    Counseling;  Tobacco and/or Nicotine    Additional Codes and Modifiers 16651, with modifer 59: administered and scored more than one psychological or neuropsychological tests (see MBC above) (16+ mins)  , without modifiers -24,-25,-53: COMPLEXITY: Visit today included increased complexity associated with the care of the episodic problem(s) (multiple psychiatric disorders - see above) addressed and managing the longitudinal care of the patient due to the serious and/or complex managed problem(s) (multiple psychiatric disorders - see above).   Time Remaining Chart/Pt 94517: FOLLOW UP VISIT, Rx mgmt, "Multiple STABLE chronic illnesses; no changes in treatment at this time"   Total Mins  (10/1/2024) N/A - Not billing for time        Will Leong DO  Department of Psychiatry, [facility]      "

## 2024-10-02 ENCOUNTER — CLINICAL SUPPORT (OUTPATIENT)
Dept: REHABILITATION | Facility: HOSPITAL | Age: 79
End: 2024-10-02
Payer: MEDICARE

## 2024-10-02 DIAGNOSIS — R25.2 LEG CRAMPS: ICD-10-CM

## 2024-10-02 DIAGNOSIS — M79.605 LEFT LEG PAIN: Primary | ICD-10-CM

## 2024-10-02 DIAGNOSIS — M79.662 PAIN OF LEFT CALF: ICD-10-CM

## 2024-10-02 PROCEDURE — 97110 THERAPEUTIC EXERCISES: CPT | Mod: HCNC,PO,CQ

## 2024-10-02 PROCEDURE — 97112 NEUROMUSCULAR REEDUCATION: CPT | Mod: HCNC,PO,CQ

## 2024-10-04 DIAGNOSIS — G31.84 MILD NEUROCOGNITIVE DISORDER: ICD-10-CM

## 2024-10-04 RX ORDER — DONEPEZIL HYDROCHLORIDE 10 MG/1
10 TABLET, FILM COATED ORAL NIGHTLY
Qty: 90 TABLET | Refills: 3 | Status: SHIPPED | OUTPATIENT
Start: 2024-10-04 | End: 2025-09-29

## 2024-10-04 NOTE — TELEPHONE ENCOUNTER
Last visit 6-18-24, last labs 6-11-24.  Patient states that she is out of medication and needs filled asap.

## 2024-10-04 NOTE — TELEPHONE ENCOUNTER
----- Message from Med Assistant Jame sent at 10/4/2024 11:50 AM CDT -----  Type: RX Refill Request    Who Called: Self    Have you contacted your pharmacy:no    Refill or New Rx:refill     RX Name and Strength: Pt. States she's out of medication at this time ..     donepeziL (ARICEPT) 10 MG tablet    Preferred Pharmacy with phone number: [id number] - [place], [place] - [address] AT [place] [address]   Phone: [phone]  Fax: [phone]          Local or Mail Order:local    Ordering Provider:Leeann    Would the patient rather a call back or a response via [facility]? Yes, call     Best Call Back Number: [phone] (home)

## 2024-10-09 NOTE — TELEPHONE ENCOUNTER
Agree.  Hold prolia for 3 months before extractions unless extractions are urgent/emergent.  Then hold for 3-6 months after extractions until healed and given clearance that bone is well healed from dentist before restarting.    It is OK to use OTCs, we could send over trazodone 50mg tablets to try at HS prn for sleep. I do not want her to take daily. OK to send over 30 tablets no refills. UPdate us how it is going after a few weeks trying them.

## 2024-10-11 ENCOUNTER — TELEPHONE (OUTPATIENT)
Dept: PAIN MEDICINE | Facility: CLINIC | Age: 79
End: 2024-10-11
Payer: MEDICARE

## 2024-10-11 NOTE — TELEPHONE ENCOUNTER
----- Message from Cristina sent at 10/11/2024  2:19 PM CDT -----  Contact: pt  enzo  Type: Needs Medical Advice  Who Called:  pt  enzo  Best Call Back Number: .[phone]  Additional Information: pt is out of town and her  enzo quinonez would like to take this appt.please call

## 2024-10-14 ENCOUNTER — TELEPHONE (OUTPATIENT)
Dept: REHABILITATION | Facility: HOSPITAL | Age: 79
End: 2024-10-14
Payer: MEDICARE

## 2024-10-14 NOTE — TELEPHONE ENCOUNTER
Called re: missed appt.  stated she is out of town and won't be back until Monday. Cancelled her appt Wed. Instructed pt's  to remind Ms Canela that she does not have any appts next week and will need to call and schedule.

## 2024-10-22 ENCOUNTER — LAB VISIT (OUTPATIENT)
Dept: LAB | Facility: HOSPITAL | Age: 79
End: 2024-10-22
Attending: FAMILY MEDICINE
Payer: MEDICARE

## 2024-10-22 DIAGNOSIS — E78.01 FAMILIAL HYPERCHOLESTEROLEMIA: ICD-10-CM

## 2024-10-22 DIAGNOSIS — R73.9 HYPERGLYCEMIA: ICD-10-CM

## 2024-10-22 LAB
ALBUMIN SERPL BCP-MCNC: 3.8 G/DL (ref 3.5–5.2)
ALP SERPL-CCNC: 91 U/L (ref 40–150)
ALT SERPL W/O P-5'-P-CCNC: 11 U/L (ref 10–44)
ANION GAP SERPL CALC-SCNC: 8 MMOL/L (ref 8–16)
AST SERPL-CCNC: 19 U/L (ref 10–40)
BASOPHILS # BLD AUTO: 0.07 K/UL (ref 0–0.2)
BASOPHILS NFR BLD: 0.8 % (ref 0–1.9)
BILIRUB SERPL-MCNC: 0.3 MG/DL (ref 0.1–1)
BUN SERPL-MCNC: 14 MG/DL (ref 8–23)
CALCIUM SERPL-MCNC: 9.2 MG/DL (ref 8.7–10.5)
CHLORIDE SERPL-SCNC: 105 MMOL/L (ref 95–110)
CHOLEST SERPL-MCNC: 238 MG/DL (ref 120–199)
CHOLEST/HDLC SERPL: 4.1 {RATIO} (ref 2–5)
CO2 SERPL-SCNC: 23 MMOL/L (ref 23–29)
CREAT SERPL-MCNC: 1 MG/DL (ref 0.5–1.4)
DIFFERENTIAL METHOD BLD: ABNORMAL
EOSINOPHIL # BLD AUTO: 0.4 K/UL (ref 0–0.5)
EOSINOPHIL NFR BLD: 4.6 % (ref 0–8)
ERYTHROCYTE [DISTWIDTH] IN BLOOD BY AUTOMATED COUNT: 14.5 % (ref 11.5–14.5)
EST. GFR  (NO RACE VARIABLE): 57.3 ML/MIN/1.73 M^2
ESTIMATED AVG GLUCOSE: 111 MG/DL (ref 68–131)
GLUCOSE SERPL-MCNC: 99 MG/DL (ref 70–110)
HBA1C MFR BLD: 5.5 % (ref 4–5.6)
HCT VFR BLD AUTO: 35.6 % (ref 37–48.5)
HDLC SERPL-MCNC: 58 MG/DL (ref 40–75)
HDLC SERPL: 24.4 % (ref 20–50)
HGB BLD-MCNC: 11.4 G/DL (ref 12–16)
IMM GRANULOCYTES # BLD AUTO: 0.07 K/UL (ref 0–0.04)
IMM GRANULOCYTES NFR BLD AUTO: 0.8 % (ref 0–0.5)
LDLC SERPL CALC-MCNC: 142.4 MG/DL (ref 63–159)
LYMPHOCYTES # BLD AUTO: 2.6 K/UL (ref 1–4.8)
LYMPHOCYTES NFR BLD: 31.4 % (ref 18–48)
MCH RBC QN AUTO: 30.4 PG (ref 27–31)
MCHC RBC AUTO-ENTMCNC: 32 G/DL (ref 32–36)
MCV RBC AUTO: 95 FL (ref 82–98)
MONOCYTES # BLD AUTO: 0.9 K/UL (ref 0.3–1)
MONOCYTES NFR BLD: 10.3 % (ref 4–15)
NEUTROPHILS # BLD AUTO: 4.3 K/UL (ref 1.8–7.7)
NEUTROPHILS NFR BLD: 52.1 % (ref 38–73)
NONHDLC SERPL-MCNC: 180 MG/DL
NRBC BLD-RTO: 0 /100 WBC
PLATELET # BLD AUTO: 348 K/UL (ref 150–450)
PMV BLD AUTO: 10.5 FL (ref 9.2–12.9)
POTASSIUM SERPL-SCNC: 4.1 MMOL/L (ref 3.5–5.1)
PROT SERPL-MCNC: 7.2 G/DL (ref 6–8.4)
RBC # BLD AUTO: 3.75 M/UL (ref 4–5.4)
SODIUM SERPL-SCNC: 136 MMOL/L (ref 136–145)
TRIGL SERPL-MCNC: 188 MG/DL (ref 30–150)
WBC # BLD AUTO: 8.31 K/UL (ref 3.9–12.7)

## 2024-10-22 PROCEDURE — 36415 COLL VENOUS BLD VENIPUNCTURE: CPT | Mod: HCNC,PO | Performed by: FAMILY MEDICINE

## 2024-10-22 PROCEDURE — 85025 COMPLETE CBC W/AUTO DIFF WBC: CPT | Mod: HCNC | Performed by: FAMILY MEDICINE

## 2024-10-22 PROCEDURE — 80061 LIPID PANEL: CPT | Mod: HCNC | Performed by: FAMILY MEDICINE

## 2024-10-22 PROCEDURE — 83036 HEMOGLOBIN GLYCOSYLATED A1C: CPT | Mod: HCNC | Performed by: FAMILY MEDICINE

## 2024-10-22 PROCEDURE — 80053 COMPREHEN METABOLIC PANEL: CPT | Mod: HCNC | Performed by: FAMILY MEDICINE

## 2024-10-23 ENCOUNTER — OFFICE VISIT (OUTPATIENT)
Dept: FAMILY MEDICINE | Facility: CLINIC | Age: 79
End: 2024-10-23
Payer: MEDICARE

## 2024-10-23 VITALS
SYSTOLIC BLOOD PRESSURE: 132 MMHG | RESPIRATION RATE: 16 BRPM | HEART RATE: 75 BPM | OXYGEN SATURATION: 99 % | BODY MASS INDEX: 23.95 KG/M2 | TEMPERATURE: 99 F | DIASTOLIC BLOOD PRESSURE: 60 MMHG | HEIGHT: 59 IN | WEIGHT: 118.81 LBS

## 2024-10-23 DIAGNOSIS — D63.8 ANEMIA OF CHRONIC DISEASE: ICD-10-CM

## 2024-10-23 DIAGNOSIS — N95.9 MENOPAUSAL AND POSTMENOPAUSAL DISORDER: ICD-10-CM

## 2024-10-23 DIAGNOSIS — I10 PRIMARY HYPERTENSION: Primary | ICD-10-CM

## 2024-10-23 DIAGNOSIS — Z12.31 ENCOUNTER FOR SCREENING MAMMOGRAM FOR MALIGNANT NEOPLASM OF BREAST: ICD-10-CM

## 2024-10-23 DIAGNOSIS — G44.209 TENSION HEADACHE: ICD-10-CM

## 2024-10-23 DIAGNOSIS — N18.31 CHRONIC KIDNEY DISEASE, STAGE 3A: ICD-10-CM

## 2024-10-23 DIAGNOSIS — E78.01 FAMILIAL HYPERCHOLESTEROLEMIA: ICD-10-CM

## 2024-10-23 DIAGNOSIS — R73.9 HYPERGLYCEMIA: ICD-10-CM

## 2024-10-23 DIAGNOSIS — J30.1 SEASONAL ALLERGIC RHINITIS DUE TO POLLEN: ICD-10-CM

## 2024-10-23 PROCEDURE — 99999 PR PBB SHADOW E&M-EST. PATIENT-LVL IV: CPT | Mod: PBBFAC,HCNC,, | Performed by: FAMILY MEDICINE

## 2024-10-23 RX ORDER — LORATADINE 10 MG/1
10 TABLET ORAL DAILY PRN
Qty: 90 TABLET | Status: SHIPPED | OUTPATIENT
Start: 2024-10-23 | End: 2025-10-23

## 2024-10-23 RX ORDER — GABAPENTIN 100 MG/1
200 CAPSULE ORAL 2 TIMES DAILY
Qty: 360 CAPSULE | Refills: 3 | Status: SHIPPED | OUTPATIENT
Start: 2024-10-23

## 2024-10-23 RX ORDER — FLUTICASONE PROPIONATE 50 MCG
2 SPRAY, SUSPENSION (ML) NASAL DAILY
Qty: 16 G | Status: SHIPPED | OUTPATIENT
Start: 2024-10-23

## 2024-10-23 NOTE — PROGRESS NOTES
Responded to patient via [employer].    Subjective:       Patient ID: Rola Richter is a 79 y.o. female.    Chief Complaint: Follow-up (4mth f/u)    Patient Active Problem List   Diagnosis    Hypertension    GERD (gastroesophageal reflux disease)    Hyperlipidemia    Osteoporosis    Dependency on pain medication    CMC arthritis    Dry eye syndrome    DDD (degenerative disc disease), cervical    Occipital neuralgia    Cervical radiculitis    Primary osteoarthritis involving multiple joints    Moderate episode of recurrent major depressive disorder    Dysphagia    Spondylosis of cervical region without myelopathy or radiculopathy    Myofascial pain    Neck pain    Bronchitis    Calcification of aorta    Memory loss    Generalized anxiety disorder    BMI 23.0-23.9, adult    Mild neurocognitive disorder    Insomnia    Chronic kidney disease, stage 3a    Anemia of chronic disease    History of posttraumatic stress disorder (PTSD)    Left leg pain    Leg cramps    Pain of left calf     Patient is here for a chronic conditions follow up.    Reviewed labs 10/2024  History of Present Illness    CHIEF COMPLAINT:  Ms. Richter presents today for follow-up of multiple chronic conditions.    ALLERGIES AND ENT SYMPTOMS:  She reports morning congestion, burning sensation, and ear pain associated with allergy symptoms, exacerbated by cold air. She also mentions occasional nosebleeds. She is currently taking Claritin and Flonase for allergy management.    CHOLESTEROL:  She reports improvement in cholesterol levels. Total cholesterol has decreased from the 400s to 238. LDL cholesterol has reduced from 300 to 142. Triglycerides have improved to 188, a significant reduction of approximately 100 points.    DIABETES:  Her A1C remains in the non-diabetic range, indicating good blood sugar control. She has maintained stable kidney function, with recent results showing slight improvement compared to her baseline.    ARTHRITIS AND PAIN MANAGEMENT:  She reports worsening  arthritis pain, particularly in her fingers. She describes a burning sensation in her legs and head. She is currently taking 100mg of medication for arthritis and requests an increase in dosage. She denies experiencing dizziness or drowsiness from the current medication. She is receiving physical therapy for leg cramps and pain. She reports having received injections for shoulder inflammation from a doctor adjacent to the pain therapy clinic, which provided some relief. She is actively engaging in home exercises, including using a stationary bicycle, and has received guidance from her physical therapist on specific exercises for her legs.    BONE HEALTH:  She reports previously being on Prolia for osteoporosis, administered as an injection every six months, prescribed by Dr. Nuñez. She has not been taking it recently. She is aware of the need for a bone density scan to assess current bone mass and potential progression to osteoporosis. She mentions experiencing more frequent pain in her bones, which may be indicative of underlying bone health concerns. She is very careful to avoid falls but recognizes the unpredictable nature of such incidents.    VACCINATIONS:  She is up-to-date on vaccinations, including influenza, COVID-19, pneumococcal, and RSV vaccines.    MEDICATIONS:  Current medications include Claritin, Flonase, and an unspecified arthritis medication at 100mg.      ROS:  ROS findings as noted in HPI. History of Present Illness    CHIEF COMPLAINT:  Ms. Richter presents today for follow-up of multiple chronic conditions.    ALLERGIES AND ENT SYMPTOMS:  She reports morning congestion, burning sensation, and ear pain associated with allergy symptoms, exacerbated by cold air. She also mentions occasional nosebleeds. She is currently taking Claritin and Flonase for allergy management.    CHOLESTEROL:  She reports improvement in cholesterol levels. Total cholesterol has decreased from the 400s to 238. LDL  cholesterol has reduced from 300 to 142. Triglycerides have improved to 188, a significant reduction of approximately 100 points.    DIABETES:  Her A1C remains in the non-diabetic range, indicating good blood sugar control. She has maintained stable kidney function, with recent results showing slight improvement compared to her baseline.    ARTHRITIS AND PAIN MANAGEMENT:  She reports worsening arthritis pain, particularly in her fingers. She describes a burning sensation in her legs and head. She is currently taking 100mg of medication for arthritis and requests an increase in dosage. She denies experiencing dizziness or drowsiness from the current medication. She is receiving physical therapy for leg cramps and pain. She reports having received injections for shoulder inflammation from a doctor adjacent to the pain therapy clinic, which provided some relief. She is actively engaging in home exercises, including using a stationary bicycle, and has received guidance from her physical therapist on specific exercises for her legs.    BONE HEALTH:  She reports previously being on Prolia for osteoporosis, administered as an injection every six months, prescribed by Dr. Nuñez. She has not been taking it recently. She is aware of the need for a bone density scan to assess current bone mass and potential progression to osteoporosis. She mentions experiencing more frequent pain in her bones, which may be indicative of underlying bone health concerns. She is very careful to avoid falls but recognizes the unpredictable nature of such incidents.    VACCINATIONS:  She is up-to-date on vaccinations, including influenza, COVID-19, pneumococcal, and RSV vaccines.    MEDICATIONS:  Current medications include Claritin, Flonase, and an unspecified arthritis medication at 100mg.      ROS:  ROS findings as noted in HPI.            Review of Systems   Constitutional:  Negative for fatigue and unexpected weight change.   Respiratory:   Negative for chest tightness and shortness of breath.    Cardiovascular:  Negative for chest pain, palpitations and leg swelling.   Gastrointestinal:  Negative for abdominal pain.   Musculoskeletal:  Negative for arthralgias.   Neurological:  Negative for dizziness, syncope, light-headedness and headaches.      Relevant History:  Nephro Ckd stage 3 , mild anemia      Card Dr. Moyer familial cholesterolemia now on repatha. HPL-  Intolerant to statins due to myalgia.  Tried lipitor and crestor. Had to stop it . Total chol drown from 416 to 226 on repatha     Ortho C/o right hip flare. No known injury. Deep in buttock. Sharp sticking pain.  Worse with standing and changing positions. Norco helps some  C/o left shoulder pain and stiffness. Has done well with steroid injections in past     Eye Dr. Eduardo/Gomez cataracts     Endo HPL- Your cholesterol is high.  Tried lipitor, crestor, pravastatin -all muscle pain. Taking QOD pravastatin 10mg. Candidate for repatha-now on.  Prediabetes a1c 5.5         Neuro ANIL Diamond Has chronic daily headaches- top and frontal. Congestion relieved partially by astelin and flonase NS.  PND, sneezing, hoarse. Sob and wheezing are improving but still lots of phlegm. No fever.  Unrelieved with tessalon or robitussin.  singulair added 7/2021. CT sinus showed chronic bin sinus disease.  Start clindamycin 300mg qid x 14 days.  Under care of ENT Dr. Jansen -seen 8/23 and 9/21/2021. Did not feel daily HA were rhinogenic-Referred to [facility] SILVERIO Diamond -seen 10/7/2021 and gabapentin added. Helping some.   Mild neurocog d/o on aricept     Pain Referred to pain clinic for left leg numbness and cervical radiculitis 7/2021.   Dr. Grimaldo MELANY 9/22 . Pain mgmt Dr. Grimaldo prescribing norco 5 bid . DPS checked no evidence of diversion.  Tried lyrica but stopped it due to dizziness. On gabapentin 100mg bid    Neck pain -helps when wears collar        Heme/onc Dr. Murphy- anemia. Has had work up for blood loss  anemia.  Likely anemia of chronic disease. Iron is normal     GI Dr. Victor egd 2/2021 10 gastric polyps (fundic gland polyps), hiatal hernia, gastritis.  H Pylori neg. Esophageal bx- no metaplasia, dysplasia or malignancy.   Colonoscopy 5/2024 2 polyps -adenomatous. On 5 year surveillance   H/o ant neck surgery x 3 as child due to tumors -? Thyroid or thymus   C/o food stuck in throat, hoarseness.          Rheum Dr. KARUNA Ram Has severe hand OA- tried plaquenil but did not help so stopped it . Osteoporosis recommended to start prolia q 6m but has never started it. Having to hold prolia due to lower jaw dental extractions since 2022     Psych Dr. Benítez MDDD/PTSD. Mood is good on zoloft  Objective:      Physical Exam  Vitals and nursing note reviewed.   Constitutional:       Appearance: She is well-developed.   Cardiovascular:      Rate and Rhythm: Normal rate and regular rhythm.      Heart sounds: Normal heart sounds.   Pulmonary:      Effort: Pulmonary effort is normal.      Breath sounds: Normal breath sounds.   Skin:     General: Skin is warm and dry.   Neurological:      Mental Status: She is alert and oriented to person, place, and time.         Assessment:       ICD-10-CM ICD-9-CM    1. Primary hypertension  I10 401.9       2. Familial hypercholesterolemia  E78.01 272.0 Lipid Panel      3. Chronic kidney disease, stage 3a  N18.31 585.3 Comprehensive Metabolic Panel      4. Anemia of chronic disease  D63.8 285.29 CBC Auto Differential      5. Hyperglycemia  R73.9 790.29 Hemoglobin A1C      6. Encounter for screening mammogram for malignant neoplasm of breast  Z12.31 V76.12 Mammo Digital Screening Bilat w/ Luis Eduardo      7. Menopausal and postmenopausal disorder  N95.9 627.9 DXA Bone Density Axial Skeleton 1 or more sites      8. Seasonal allergic rhinitis due to pollen  J30.1 477.0 loratadine (CLARITIN) 10 mg tablet      fluticasone propionate (FLONASE) 50 mcg/actuation nasal spray      9. Tension headache   G44.209 307.81 gabapentin (NEURONTIN) 100 MG capsule         Plan:   1. Primary hypertension (Primary)  Controlled on current medications.  Continue current medications.      2. Familial hypercholesterolemia  Much improved on repatha  - Lipid Panel; Future    3. Chronic kidney disease, stage 3a  Stable and chronic.  Will continue to monitor q3-6 months and control chronic conditions as optimally as possible to preserve function.    - Comprehensive Metabolic Panel; Future    4. Anemia of chronic disease  Stable and chronic.  Will continue to monitor q3-6 months and control chronic conditions as optimally as possible to preserve function.    - CBC Auto Differential; Future    5. Hyperglycemia   Your blood sugar is borderline high.  This means you are at risk for developing type 2 diabetes mellitus.  To lessen your risk you should exercise regularly, avoid excess carbohydrates and work toward a body mass index of less than 25.      - Hemoglobin A1C; Future    6. Encounter for screening mammogram for malignant neoplasm of breast  Screen and treat as indicated:    - Mammo Digital Screening Bilat w/ Luis Eduardo; Future    7. Menopausal and postmenopausal disorder  Screen and treat as indicated:    - DXA Bone Density Axial Skeleton 1 or more sites; Future    8. Seasonal allergic rhinitis due to pollen  Recommend otc non-sedating antihistamine such as Loratadine and/or steroid nasal spray such as Flonase as directed and as needed.  Please return to clinic if symptoms persist after these interventions.    - loratadine (CLARITIN) 10 mg tablet; Take 1 tablet (10 mg total) by mouth daily as needed for Allergies.  Dispense: 90 tablet; Refill: PRN  - fluticasone propionate (FLONASE) 50 mcg/actuation nasal spray; 2 sprays (100 mcg total) by Each Nostril route once daily.  Dispense: 16 g; Refill: PRN    9. Tension headache  For neuropathy. Increase to  - gabapentin (NEURONTIN) 100 MG capsule; Take 2 capsules (200 mg total) by mouth 2  (two) times daily.  Dispense: 360 capsule; Refill: 3  Assessment & Plan    - Educated on ragweed pollen season lasting through December and its impact on allergy symptoms.  - Explained cold air can exacerbate sinus problems, causing dryness or congestion.  - Discussed importance of consistent medication use for managing nerve pain and potential side effects.  - Ms. Richter to continue with physical therapy exercises for leg pain and arthritis management.  - Ms. Richter to use home bicycle for exercise.  - Started Claritin (OTC) daily for allergy management.  - Continued Flonase nasal spray, instructing to use 2 sprays in each nostril daily (total 4 sprays) for allergy management.  - Increased gabapentin from 100mg to 200mg twice daily for arthritis and nerve pain.  - Refilled Flonase nasal spray.  - Mammogram ordered.  - Bone density scan ordered.  - Follow up in 6 months with labs.  - Contact the office if gabapentin dosage needs further adjustment or if experiencing side effects.         Time spent with patient: 20 minutes  Patient with be reevaluated in 6 months or sooner prn  Greater than 50% of this visit was spent counseling as described in above documentation:Yes  This note was generated with the assistance of ambient listening technology. Verbal consent was obtained by the patient and accompanying visitor(s) for the recording of patient appointment to facilitate this note. I attest to having reviewed and edited the generated note for accuracy, though some syntax or spelling errors may persist. Please contact the author of this note for any clarification.

## 2024-10-24 ENCOUNTER — TELEPHONE (OUTPATIENT)
Dept: PAIN MEDICINE | Facility: CLINIC | Age: 79
End: 2024-10-24
Payer: MEDICARE

## 2024-10-24 NOTE — TELEPHONE ENCOUNTER
----- Message from Kellie sent at 10/24/2024 10:09 AM CDT -----  Regarding: Needs refills  Type:  RX Refill Request    Who Called:  Pt   Refill or New Rx:  refill  RX Name and Strength:  needs all of her medications refilled along with hydrocodone  How is the patient currently taking it? (ex. 1XDay):  see chart  Is this a 30 day or 90 day RX:  see chart  Preferred Pharmacy with phone number:    [id number] - [place] - [address] [facility] [address]  [address] [address]  Phone: [phone] Fax: [phone]      Local or Mail Order:  local  Ordering Provider:  liberty Ortiz Call Back Number:  [phone]    Additional Information:  Please call when request is recieved

## 2024-10-24 NOTE — TELEPHONE ENCOUNTER
Pt is at her 3 month follow up  date she needs to be seen before norco can be refilled. Her Gabapentin was sent to her pharmacy yesterday please call Mr quinonez and make Ms quinonez next available appointment. Thank you

## 2024-10-28 ENCOUNTER — OFFICE VISIT (OUTPATIENT)
Dept: PAIN MEDICINE | Facility: CLINIC | Age: 79
End: 2024-10-28
Payer: MEDICARE

## 2024-10-28 VITALS
HEIGHT: 59 IN | HEART RATE: 79 BPM | SYSTOLIC BLOOD PRESSURE: 142 MMHG | WEIGHT: 118.81 LBS | BODY MASS INDEX: 23.95 KG/M2 | DIASTOLIC BLOOD PRESSURE: 67 MMHG

## 2024-10-28 DIAGNOSIS — M47.892 OTHER SPONDYLOSIS, CERVICAL REGION: ICD-10-CM

## 2024-10-28 DIAGNOSIS — M54.81 BILATERAL OCCIPITAL NEURALGIA: ICD-10-CM

## 2024-10-28 DIAGNOSIS — G89.4 CHRONIC PAIN DISORDER: ICD-10-CM

## 2024-10-28 DIAGNOSIS — M50.30 DDD (DEGENERATIVE DISC DISEASE), CERVICAL: ICD-10-CM

## 2024-10-28 DIAGNOSIS — M54.12 CERVICAL RADICULITIS: Primary | ICD-10-CM

## 2024-10-28 PROCEDURE — 99999 PR PBB SHADOW E&M-EST. PATIENT-LVL III: CPT | Mod: PBBFAC,HCNC,, | Performed by: PHYSICIAN ASSISTANT

## 2024-10-28 PROCEDURE — 99214 OFFICE O/P EST MOD 30 MIN: CPT | Mod: HCNC,S$GLB,, | Performed by: PHYSICIAN ASSISTANT

## 2024-10-28 PROCEDURE — 1159F MED LIST DOCD IN RCRD: CPT | Mod: HCNC,CPTII,S$GLB, | Performed by: PHYSICIAN ASSISTANT

## 2024-10-28 PROCEDURE — 1125F AMNT PAIN NOTED PAIN PRSNT: CPT | Mod: HCNC,CPTII,S$GLB, | Performed by: PHYSICIAN ASSISTANT

## 2024-10-28 PROCEDURE — 3078F DIAST BP <80 MM HG: CPT | Mod: HCNC,CPTII,S$GLB, | Performed by: PHYSICIAN ASSISTANT

## 2024-10-28 PROCEDURE — 3077F SYST BP >= 140 MM HG: CPT | Mod: HCNC,CPTII,S$GLB, | Performed by: PHYSICIAN ASSISTANT

## 2024-10-28 RX ORDER — HYDROCODONE BITARTRATE AND ACETAMINOPHEN 5; 325 MG/1; MG/1
1 TABLET ORAL EVERY 12 HOURS PRN
Qty: 60 TABLET | Refills: 0 | Status: SHIPPED | OUTPATIENT
Start: 2024-10-28 | End: 2024-11-26

## 2024-10-28 RX ORDER — HYDROCODONE BITARTRATE AND ACETAMINOPHEN 5; 325 MG/1; MG/1
1 TABLET ORAL EVERY 12 HOURS PRN
Qty: 60 TABLET | Refills: 0 | Status: SHIPPED | OUTPATIENT
Start: 2024-11-26 | End: 2024-12-25

## 2024-10-28 RX ORDER — HYDROCODONE BITARTRATE AND ACETAMINOPHEN 5; 325 MG/1; MG/1
1 TABLET ORAL EVERY 12 HOURS PRN
Qty: 60 TABLET | Refills: 0 | Status: SHIPPED | OUTPATIENT
Start: 2024-12-25 | End: 2025-01-23

## 2024-10-31 ENCOUNTER — HOSPITAL ENCOUNTER (OUTPATIENT)
Dept: RADIOLOGY | Facility: CLINIC | Age: 79
Discharge: HOME | End: 2024-10-31
Attending: FAMILY MEDICINE
Payer: MEDICARE

## 2024-10-31 DIAGNOSIS — N95.9 MENOPAUSAL AND POSTMENOPAUSAL DISORDER: ICD-10-CM

## 2024-10-31 DIAGNOSIS — Z12.31 ENCOUNTER FOR SCREENING MAMMOGRAM FOR MALIGNANT NEOPLASM OF BREAST: ICD-10-CM

## 2024-10-31 PROCEDURE — 77063 BREAST TOMOSYNTHESIS BI: CPT | Mod: 26,HCNC,, | Performed by: RADIOLOGY

## 2024-10-31 PROCEDURE — 77080 DXA BONE DENSITY AXIAL: CPT | Mod: TC,HCNC,PO

## 2024-10-31 PROCEDURE — 77063 BREAST TOMOSYNTHESIS BI: CPT | Mod: TC,HCNC,PO

## 2024-10-31 PROCEDURE — 77080 DXA BONE DENSITY AXIAL: CPT | Mod: 26,HCNC,, | Performed by: RADIOLOGY

## 2024-10-31 PROCEDURE — 77067 SCR MAMMO BI INCL CAD: CPT | Mod: 26,HCNC,, | Performed by: RADIOLOGY

## 2024-11-06 DIAGNOSIS — I70.0 CALCIFICATION OF AORTA: ICD-10-CM

## 2024-11-06 DIAGNOSIS — Z78.9 STATIN NOT TOLERATED: ICD-10-CM

## 2024-11-06 DIAGNOSIS — E78.01 FAMILIAL HYPERCHOLESTEROLEMIA: ICD-10-CM

## 2024-11-06 RX ORDER — EVOLOCUMAB 140 MG/ML
140 INJECTION, SOLUTION SUBCUTANEOUS
Qty: 2 ML | Refills: 2 | Status: ACTIVE | OUTPATIENT
Start: 2024-11-06 | End: 2025-02-04

## 2024-11-07 ENCOUNTER — OFFICE VISIT (OUTPATIENT)
Dept: FAMILY MEDICINE | Facility: CLINIC | Age: 79
End: 2024-11-07
Payer: MEDICARE

## 2024-11-07 VITALS
HEIGHT: 59 IN | BODY MASS INDEX: 23.95 KG/M2 | SYSTOLIC BLOOD PRESSURE: 124 MMHG | HEART RATE: 79 BPM | DIASTOLIC BLOOD PRESSURE: 70 MMHG | WEIGHT: 118.81 LBS | OXYGEN SATURATION: 98 %

## 2024-11-07 DIAGNOSIS — Z00.00 ENCOUNTER FOR PREVENTIVE HEALTH EXAMINATION: Primary | ICD-10-CM

## 2024-11-07 DIAGNOSIS — I70.0 CALCIFICATION OF AORTA: ICD-10-CM

## 2024-11-07 DIAGNOSIS — N18.31 CHRONIC KIDNEY DISEASE, STAGE 3A: ICD-10-CM

## 2024-11-07 DIAGNOSIS — F33.1 MODERATE EPISODE OF RECURRENT MAJOR DEPRESSIVE DISORDER: ICD-10-CM

## 2024-11-07 DIAGNOSIS — I10 PRIMARY HYPERTENSION: ICD-10-CM

## 2024-11-07 PROCEDURE — 99999 PR PBB SHADOW E&M-EST. PATIENT-LVL IV: CPT | Mod: PBBFAC,HCNC,, | Performed by: NURSE PRACTITIONER

## 2024-11-07 RX ORDER — CYCLOBENZAPRINE HCL 10 MG
10 TABLET ORAL
COMMUNITY
Start: 2024-10-18

## 2024-11-07 NOTE — PROGRESS NOTES
"  Rola Richter presented for a  Medicare AWV and comprehensive Health Risk Assessment today. The following components were reviewed and updated:    Medical history  Family History  Social history  Allergies and Current Medications  Health Risk Assessment  Health Maintenance  Care Team         ** See Completed Assessments for Annual Wellness Visit within the encounter summary.**         The following assessments were completed:  Living Situation  CAGE  Depression Screening  Timed Get Up and Go  Whisper Test  Cognitive Function Screening  Nutrition Screening  ADL Screening  PAQ Screening    Clock in media   Opioid documentation:      Patient does have a current opioid prescription.      Patient accepted further discussion regarding opioid medication use.      Patient is currently taking hydrocodone narcotic for neck pain.        Pain level today is 8/10.       In addition to narcotic pain medications, patient is also using voltaren gel for pain control.       Patient is followed by a specialist currently for their pain and will not be referred today.       Patient's opioid risk potential based on ORT-OUD tool:       Daljit each box that applies   No   Yes     Family history of substance abuse   Alcohol [] [x]   Illegal drugs [x] []   Rx drugs [x] []     Personal history of substance abuse   Alcohol [x] []   Illegal drugs [x] []   Rx drugs [x] []     Age between 16-45 years   [x]   []     Patient with ADD, OCD, Bipolar disorder, schizoprenia   [x]   []     Patient with depression   []   [x]                         Scoring total                                                      1           Non-opioid treatment options have been discussed today and added to the patient's after visit summary.        Vitals:    11/07/24 0930   BP: 124/70   Pulse: 79   TempSrc: Oral   SpO2: 98%   Weight: 53.9 kg (118 lb 13.3 oz)   Height: 4' 11" (1.499 m)     Body mass index is 24 kg/m².  Physical Exam  Constitutional:       Appearance: " She is well-developed.   HENT:      Head: Normocephalic and atraumatic.      Nose: Nose normal.   Eyes:      General: Lids are normal.      Conjunctiva/sclera: Conjunctivae normal.   Cardiovascular:      Rate and Rhythm: Normal rate.   Pulmonary:      Effort: Pulmonary effort is normal.   Neurological:      Mental Status: She is alert and oriented to person, place, and time.   Psychiatric:         Speech: Speech normal.         Behavior: Behavior normal.               Diagnoses and health risks identified today and associated recommendations/orders:    1. Encounter for preventive health examination  Discussed health maintenance guidelines appropriate for age.        2. Moderate episode of recurrent major depressive disorder  Stable, continue current medication  Followed by psychiatry     3. Calcification of aorta  Stable, continue to monitor  Followed by pcp     4. Chronic kidney disease, stage 3a  Stable, continue to monitor  Followed by pcp     5. Primary hypertension  Controlled, continue current medication regimen  Low salt diet  Increase physical activity  Followed by pcp        Provided Rola with a 5-10 year written screening schedule and personal prevention plan. Recommendations were developed using the USPSTF age appropriate recommendations. Education, counseling, and referrals were provided as needed. After Visit Summary printed and given to patient which includes a list of additional screenings\tests needed.    Follow up for One year for Annual Wellness Visit.    Tamera Esquivel, NP    I offered to discuss advanced care planning, including how to pick a person who would make decisions for you if you were unable to make them for yourself, called a health care power of , and what kind of decisions you might make such as use of life sustaining treatments such as ventilators and tube feeding when faced with a life limiting illness recorded on a living will that they will need to know. (How you want  to be cared for as you near the end of your natural life)     X Patient is interested in learning more about how to make advanced directives.  I provided them paperwork and offered to discuss this with them.

## 2024-11-07 NOTE — PATIENT INSTRUCTIONS
Counseling and Referral of Other Preventative  (Italic type indicates deductible and co-insurance are waived)    Patient Name: Rola Richter  Today's Date: 11/7/2024    Health Maintenance       Date Due Completion Date    Sign Pain Contract Never done ---    Naloxone Prescription Never done ---    Shingles Vaccine (2 of 2) 04/14/2023 2/17/2023    Urine Drug Screen 04/30/2024 10/30/2023    Lipid Panel 10/22/2025 10/22/2024    TETANUS VACCINE 01/26/2026 1/26/2016    DEXA Scan 10/31/2027 10/31/2024    Colonoscopy 05/01/2029 5/1/2024        No orders of the defined types were placed in this encounter.    The following information is provided to all patients.  This information is to help you find resources for any of the problems found today that may be affecting your health:                  Living healthy guide: www.[facility].[place].[facility]      Understanding Diabetes: www.diabetes.org      Eating healthy: www.cdc.gov/healthyweight      [facility] home safety checklist: www.cdc.gov/steadi/patient.html      Agency on Aging: www.goea.[place].[facility]      Alcoholics anonymous (AA): www.aa.org      Physical Activity: www.milagros.nih.gov/qj5plnu      Tobacco use: www.quitwithusla.org

## 2024-11-14 RX ORDER — ZOLEDRONIC ACID 5 MG/100ML
5 INJECTION, SOLUTION INTRAVENOUS
OUTPATIENT
Start: 2024-11-14

## 2024-11-14 RX ORDER — HEPARIN 100 UNIT/ML
500 SYRINGE INTRAVENOUS
OUTPATIENT
Start: 2024-11-14

## 2024-11-14 RX ORDER — SODIUM CHLORIDE 0.9 % (FLUSH) 0.9 %
10 SYRINGE (ML) INJECTION
OUTPATIENT
Start: 2024-11-14

## 2024-11-21 ENCOUNTER — OFFICE VISIT (OUTPATIENT)
Dept: FAMILY MEDICINE | Facility: CLINIC | Age: 79
End: 2024-11-21
Payer: MEDICARE

## 2024-11-21 VITALS
WEIGHT: 118.19 LBS | HEART RATE: 63 BPM | DIASTOLIC BLOOD PRESSURE: 60 MMHG | OXYGEN SATURATION: 99 % | TEMPERATURE: 98 F | BODY MASS INDEX: 23.83 KG/M2 | SYSTOLIC BLOOD PRESSURE: 148 MMHG | HEIGHT: 59 IN

## 2024-11-21 DIAGNOSIS — M85.80 OSTEOPENIA WITH HIGH RISK OF FRACTURE: Primary | ICD-10-CM

## 2024-11-21 PROCEDURE — 3078F DIAST BP <80 MM HG: CPT | Mod: HCNC,CPTII,S$GLB, | Performed by: NURSE PRACTITIONER

## 2024-11-21 PROCEDURE — 3288F FALL RISK ASSESSMENT DOCD: CPT | Mod: HCNC,CPTII,S$GLB, | Performed by: NURSE PRACTITIONER

## 2024-11-21 PROCEDURE — 99214 OFFICE O/P EST MOD 30 MIN: CPT | Mod: HCNC,S$GLB,, | Performed by: NURSE PRACTITIONER

## 2024-11-21 PROCEDURE — 1160F RVW MEDS BY RX/DR IN RCRD: CPT | Mod: HCNC,CPTII,S$GLB, | Performed by: NURSE PRACTITIONER

## 2024-11-21 PROCEDURE — 99999 PR PBB SHADOW E&M-EST. PATIENT-LVL V: CPT | Mod: PBBFAC,HCNC,, | Performed by: NURSE PRACTITIONER

## 2024-11-21 PROCEDURE — 1125F AMNT PAIN NOTED PAIN PRSNT: CPT | Mod: HCNC,CPTII,S$GLB, | Performed by: NURSE PRACTITIONER

## 2024-11-21 PROCEDURE — 1159F MED LIST DOCD IN RCRD: CPT | Mod: HCNC,CPTII,S$GLB, | Performed by: NURSE PRACTITIONER

## 2024-11-21 PROCEDURE — 1101F PT FALLS ASSESS-DOCD LE1/YR: CPT | Mod: HCNC,CPTII,S$GLB, | Performed by: NURSE PRACTITIONER

## 2024-11-21 PROCEDURE — 3077F SYST BP >= 140 MM HG: CPT | Mod: HCNC,CPTII,S$GLB, | Performed by: NURSE PRACTITIONER

## 2024-11-21 NOTE — PROGRESS NOTES
Subjective:       Patient ID: Rola Richter is a 79 y.o. female.    Chief Complaint: discuss bone density results     HPI   78 y/o female patient with medical problems listed below presents for bone density result. Patient is here with spouse. Denies smoking. Patient does not use device for ambulation but has cane at home. Denies hx of fracture. Noted that patient is scheduled for reclast injection on 12/10/2024.     Labs reviewed from 10/2024    DXA Bone density 10/31/2024  FINDINGS:  The L1-L4 vertebral bone mineral density is equal to 0.815 g/cm squared with a T score of -2.1.  There has been a 4.7% statistically significant interval increase in bone mineral density of the lumbar spine relative to the prior study.     The left femoral neck bone mineral density is equal to 0.630 g/cm squared with a T score of -2.0.  There has been  a 1.1% non statistically significant interval increase in bone mineral density of the left femoral neck relative to the prior study.     There is a 49% risk of a major osteoporotic fracture and a 36% risk of hip fracture in the next 10 years (FRAX).    Patient Active Problem List   Diagnosis    Hypertension    GERD (gastroesophageal reflux disease)    Hyperlipidemia    Osteoporosis    Dependency on pain medication    CMC arthritis    Dry eye syndrome    DDD (degenerative disc disease), cervical    Occipital neuralgia    Cervical radiculitis    Primary osteoarthritis involving multiple joints    Moderate episode of recurrent major depressive disorder    Dysphagia    Spondylosis of cervical region without myelopathy or radiculopathy    Myofascial pain    Neck pain    Bronchitis    Calcification of aorta    Memory loss    Generalized anxiety disorder    BMI 23.0-23.9, adult    Mild neurocognitive disorder    Insomnia    Chronic kidney disease, stage 3a    Anemia of chronic disease    History of posttraumatic stress disorder (PTSD)    Left leg pain    Leg cramps    Pain of left calf       Review of patient's allergies indicates:   Allergen Reactions    Aspirin Nausea And Vomiting    Penicillins Itching    Crestor [rosuvastatin]      Muscle pain      Lipitor [atorvastatin]      Achy       Past Surgical History:   Procedure Laterality Date    APPENDECTOMY      CATARACT EXTRACTION W/  INTRAOCULAR LENS IMPLANT Left 10/25/2023    Procedure: CEIOL OS;  Surgeon: Rustam Dyer MD;  Location: [facility] OR;  Service: Ophthalmology;  Laterality: Left;  Last Case of day, short eye, very high power IOL    CATARACT EXTRACTION W/  INTRAOCULAR LENS IMPLANT Right 1/10/2024    Procedure: CEIOL OD Preop Mannitol;  Surgeon: Rustam Dyer MD;  Location: [facility] OR;  Service: Ophthalmology;  Laterality: Right;  Will need pre-op Mannitol     SECTION      x 2    CHOLECYSTECTOMY      COLONOSCOPY  prior to     COLONOSCOPY N/A 2019    Procedure: COLONOSCOPY;  Surgeon: Greg Victor MD;  Location: [facility];  Service: Endoscopy;  Laterality: N/A; 2 colon polyps, hemorrhoids; repeat in 5 years for surveillance; biopsy: Tubular adenoma x2    COLONOSCOPY N/A 2024    Procedure: COLONOSCOPY;  Surgeon: Greg Victor MD;  Location: [facility];  Service: Endoscopy;  Laterality: N/A;    EPIDURAL STEROID INJECTION INTO CERVICAL SPINE N/A 2022    Procedure: Injection-steroid-epidural-cervical C7-T1;  Surgeon: Timothy Grimaldo MD;  Location: [facility] OR;  Service: Pain Management;  Laterality: N/A;    EPIDURAL STEROID INJECTION INTO CERVICAL SPINE N/A 3/7/2024    Procedure: Injection-steroid-epidural-cervical;  Surgeon: Timothy Grimaldo MD;  Location: [facility] OR;  Service: Anesthesiology;  Laterality: N/A;  c7-t1    ESOPHAGOGASTRODUODENOSCOPY N/A 2019    Procedure: EGD (ESOPHAGOGASTRODUODENOSCOPY);  Surgeon: Greg Victor MD;  Location: [facility];  Service: Endoscopy;  Laterality: N/A; Mild Schatzki ring. Dilated. small hiatal hernia; Four gastric polyps. Resected and retrieved, gastritis;  biopsy:stomach- unremarkable, negative for h pylori, polyps-Fundic type mucosa with foveolar hyperplasia.    ESOPHAGOGASTRODUODENOSCOPY N/A 2/17/2021    Procedure: EGD (ESOPHAGOGASTRODUODENOSCOPY);  Surgeon: Greg Victor MD;  Location: [facility];  Service: Endoscopy;  Laterality: N/A;    ESOPHAGOGASTRODUODENOSCOPY N/A 2/29/2024    Procedure: EGD (ESOPHAGOGASTRODUODENOSCOPY);  Surgeon: Greg Victor MD;  Location: [facility];  Service: Endoscopy;  Laterality: N/A;    HYSTERECTOMY      Laser Periphery Iridotomy Bilateral     OD 5/26/16 and OS touch up 5/26/2016    UPPER GASTROINTESTINAL ENDOSCOPY  03/14/2017    Dr. Marcial: esophageal stenosis- dilated, gastritis, gastric polyps removed; biopsy- mild gastritis, negative for h pylori, hyperplastic polyp, esophagus unremarkable    vocal cord tumor removal          Current Outpatient Medications:     busPIRone (BUSPAR) 5 MG Tab, Take 1 tablet (5 mg total) by mouth once daily. Take every afternoon., Disp: 30 tablet, Rfl: 1    cyclobenzaprine (FLEXERIL) 10 MG tablet, Take 10 mg by mouth., Disp: , Rfl:     diclofenac sodium (VOLTAREN) 1 % Gel, Apply 2 g topically 4 (four) times daily., Disp: 450 g, Rfl: 3    donepeziL (ARICEPT) 10 MG tablet, Take 1 tablet (10 mg total) by mouth every evening., Disp: 90 tablet, Rfl: 3    evolocumab (REPATHA SURECLICK) 140 mg/mL PnIj, Inject 1 mL (140 mg total) into the skin every 14 (fourteen) days., Disp: 2 mL, Rfl: 2    famotidine (PEPCID) 20 MG tablet, TAKE 1 TABLET(20 MG) BY MOUTH TWICE DAILY, Disp: 60 tablet, Rfl: 5    fish oil-omega-3 fatty acids 300-1,000 mg capsule, Take 2 g by mouth once daily., Disp: , Rfl:     fluticasone propionate (FLONASE) 50 mcg/actuation nasal spray, 2 sprays (100 mcg total) by Each Nostril route once daily., Disp: 16 g, Rfl: PRN    gabapentin (NEURONTIN) 100 MG capsule, Take 2 capsules (200 mg total) by mouth 2 (two) times daily., Disp: 360 capsule, Rfl: 3    HYDROcodone-acetaminophen (NORCO) 5-325 mg  per tablet, Take 1 tablet by mouth every 12 (twelve) hours as needed for Pain., Disp: 60 tablet, Rfl: 0    [START ON 11/26/2024] HYDROcodone-acetaminophen (NORCO) 5-325 mg per tablet, Take 1 tablet by mouth every 12 (twelve) hours as needed for Pain., Disp: 60 tablet, Rfl: 0    [START ON 12/25/2024] HYDROcodone-acetaminophen (NORCO) 5-325 mg per tablet, Take 1 tablet by mouth every 12 (twelve) hours as needed for Pain., Disp: 60 tablet, Rfl: 0    irbesartan (AVAPRO) 300 MG tablet, TAKE 1 TABLET EVERY EVENING, Disp: 90 tablet, Rfl: 3    loratadine (CLARITIN) 10 mg tablet, Take 1 tablet (10 mg total) by mouth daily as needed for Allergies., Disp: 90 tablet, Rfl: PRN    meloxicam (MOBIC) 7.5 MG tablet, Take 1 tablet (7.5 mg total) by mouth daily as needed for Pain., Disp: 90 tablet, Rfl: 0    mirtazapine (REMERON) 7.5 MG Tab, Take 1 tablet (7.5 mg total) by mouth every evening., Disp: 30 tablet, Rfl: 1    multivit with min-folic acid 200 mcg Chew, Take 1 tablet by mouth once daily. , Disp: , Rfl:     ondansetron (ZOFRAN-ODT) 4 MG TbDL, Take 1 tablet (4 mg total) by mouth every 6 (six) hours as needed (nausea)., Disp: 30 tablet, Rfl: 1    pantoprazole (PROTONIX) 40 MG tablet, Take 1 tablet (40 mg total) by mouth once daily., Disp: 90 tablet, Rfl: 1    prochlorperazine (COMPAZINE) 10 MG tablet, TAKE 1 TABLET BY MOUTH EVERY NIGHT AT BEDTIME AS NEEDED NIGHT TIME NAUSEA, Disp: 30 tablet, Rfl: 0    sertraline (ZOLOFT) 100 MG tablet, Take 1 tablet (100 mg total) by mouth once daily., Disp: 30 tablet, Rfl: 1    Review of Systems   Constitutional:  Negative for chills and fever.   Respiratory:  Negative for cough and shortness of breath.    Cardiovascular:  Negative for chest pain and palpitations.   Gastrointestinal:  Negative for abdominal pain.   Neurological:  Negative for dizziness and headaches.       Objective:   BP (!) 148/60 (BP Location: Right arm, Patient Position: Sitting)   Pulse 63   Temp 98.1 °F (36.7 °C)  "(Oral)   Ht 4' 11" (1.499 m)   Wt 53.6 kg (118 lb 2.7 oz)   SpO2 99%   BMI 23.87 kg/m²         Physical Exam  Constitutional:       General: She is not in acute distress.     Appearance: Normal appearance.   HENT:      Head: Atraumatic.   Cardiovascular:      Rate and Rhythm: Normal rate and regular rhythm.      Pulses: Normal pulses.      Heart sounds: Normal heart sounds.   Pulmonary:      Effort: Pulmonary effort is normal.      Breath sounds: Normal breath sounds.   Abdominal:      General: Abdomen is flat. Bowel sounds are normal.      Palpations: Abdomen is soft.   Neurological:      Mental Status: She is oriented to person, place, and time.         Assessment:       1. Osteopenia with high risk of fracture        Plan:       1. Osteopenia with high risk of fracture (Primary)  - Continue with calcium with vitamin d, discussed regarding weight-bearing and strengthening exercises, and fall precautions discussed   - Continue with reclast as scheduled     Patient with be reevaluated in  as scheduled  or sooner sean Diane NP  "

## 2024-12-03 ENCOUNTER — OFFICE VISIT (OUTPATIENT)
Dept: PSYCHIATRY | Facility: CLINIC | Age: 79
End: 2024-12-03
Payer: MEDICARE

## 2024-12-03 VITALS
WEIGHT: 118.19 LBS | SYSTOLIC BLOOD PRESSURE: 159 MMHG | HEART RATE: 68 BPM | DIASTOLIC BLOOD PRESSURE: 75 MMHG | HEIGHT: 59 IN | BODY MASS INDEX: 23.83 KG/M2

## 2024-12-03 DIAGNOSIS — Z86.59 HISTORY OF POSTTRAUMATIC STRESS DISORDER (PTSD): ICD-10-CM

## 2024-12-03 DIAGNOSIS — F41.1 GENERALIZED ANXIETY DISORDER: ICD-10-CM

## 2024-12-03 DIAGNOSIS — F19.20 DEPENDENCY ON PAIN MEDICATION: ICD-10-CM

## 2024-12-03 DIAGNOSIS — F03.90 MAJOR NEUROCOGNITIVE DISORDER: ICD-10-CM

## 2024-12-03 DIAGNOSIS — F33.0 MILD EPISODE OF RECURRENT MAJOR DEPRESSIVE DISORDER: Primary | ICD-10-CM

## 2024-12-03 DIAGNOSIS — G47.00 INSOMNIA, UNSPECIFIED TYPE: ICD-10-CM

## 2024-12-03 PROBLEM — G31.84 MILD NEUROCOGNITIVE DISORDER: Status: RESOLVED | Noted: 2023-06-23 | Resolved: 2024-12-03

## 2024-12-03 PROCEDURE — 1160F RVW MEDS BY RX/DR IN RCRD: CPT | Mod: HCNC,CPTII,S$GLB, | Performed by: STUDENT IN AN ORGANIZED HEALTH CARE EDUCATION/TRAINING PROGRAM

## 2024-12-03 PROCEDURE — 99999 PR PBB SHADOW E&M-EST. PATIENT-LVL III: CPT | Mod: PBBFAC,HCNC,, | Performed by: STUDENT IN AN ORGANIZED HEALTH CARE EDUCATION/TRAINING PROGRAM

## 2024-12-03 PROCEDURE — 3288F FALL RISK ASSESSMENT DOCD: CPT | Mod: HCNC,CPTII,S$GLB, | Performed by: STUDENT IN AN ORGANIZED HEALTH CARE EDUCATION/TRAINING PROGRAM

## 2024-12-03 PROCEDURE — 1101F PT FALLS ASSESS-DOCD LE1/YR: CPT | Mod: HCNC,CPTII,S$GLB, | Performed by: STUDENT IN AN ORGANIZED HEALTH CARE EDUCATION/TRAINING PROGRAM

## 2024-12-03 PROCEDURE — 1125F AMNT PAIN NOTED PAIN PRSNT: CPT | Mod: HCNC,CPTII,S$GLB, | Performed by: STUDENT IN AN ORGANIZED HEALTH CARE EDUCATION/TRAINING PROGRAM

## 2024-12-03 PROCEDURE — 96136 PSYCL/NRPSYC TST PHY/QHP 1ST: CPT | Mod: 59,HCNC,S$GLB, | Performed by: STUDENT IN AN ORGANIZED HEALTH CARE EDUCATION/TRAINING PROGRAM

## 2024-12-03 PROCEDURE — 1159F MED LIST DOCD IN RCRD: CPT | Mod: HCNC,CPTII,S$GLB, | Performed by: STUDENT IN AN ORGANIZED HEALTH CARE EDUCATION/TRAINING PROGRAM

## 2024-12-03 PROCEDURE — 3077F SYST BP >= 140 MM HG: CPT | Mod: HCNC,CPTII,S$GLB, | Performed by: STUDENT IN AN ORGANIZED HEALTH CARE EDUCATION/TRAINING PROGRAM

## 2024-12-03 PROCEDURE — 99214 OFFICE O/P EST MOD 30 MIN: CPT | Mod: HCNC,S$GLB,, | Performed by: STUDENT IN AN ORGANIZED HEALTH CARE EDUCATION/TRAINING PROGRAM

## 2024-12-03 PROCEDURE — 3078F DIAST BP <80 MM HG: CPT | Mod: HCNC,CPTII,S$GLB, | Performed by: STUDENT IN AN ORGANIZED HEALTH CARE EDUCATION/TRAINING PROGRAM

## 2024-12-03 PROCEDURE — G2211 COMPLEX E/M VISIT ADD ON: HCPCS | Mod: HCNC,S$GLB,, | Performed by: STUDENT IN AN ORGANIZED HEALTH CARE EDUCATION/TRAINING PROGRAM

## 2024-12-03 RX ORDER — SERTRALINE HYDROCHLORIDE 100 MG/1
100 TABLET, FILM COATED ORAL DAILY
Qty: 30 TABLET | Refills: 1 | Status: SHIPPED | OUTPATIENT
Start: 2024-12-03 | End: 2025-02-01

## 2024-12-03 RX ORDER — BUSPIRONE HYDROCHLORIDE 5 MG/1
5 TABLET ORAL DAILY
Qty: 30 TABLET | Refills: 1 | Status: SHIPPED | OUTPATIENT
Start: 2024-12-03 | End: 2025-02-01

## 2024-12-03 RX ORDER — MIRTAZAPINE 7.5 MG/1
7.5 TABLET, FILM COATED ORAL NIGHTLY
Qty: 30 TABLET | Refills: 1 | Status: SHIPPED | OUTPATIENT
Start: 2024-12-03 | End: 2025-02-01

## 2024-12-03 NOTE — PROGRESS NOTES
Outpatient Psychiatry Followup Visit  12/3/2024  Assessment & Plan    Impression     ICD-10-CM ICD-9-CM   1. Mild episode of recurrent major depressive disorder  F33.0 296.31   2. Generalized anxiety disorder  F41.1 300.02   3. History of posttraumatic stress disorder (PTSD)  Z86.59 V11.8   4. Major neurocognitive disorder  F03.90 294.20   5. Insomnia, unspecified type  G47.00 780.52   6. Dependency on pain medication  F19.20 304.90   7. BMI 23.0-23.9, adult  Z68.23 V85.1      Plan of Care & Medication Management    Chart was reviewed. The risks and benefits of medication were discussed with pt. The treatment plan and followup plan were reviewed with pt. Pt understands to contact clinic if symptoms worsen. Pt understands to call 911 or go to nearest ER for suicidal ideation, intent or plan. Unless otherwise specified, pt did NOT display signs of nor endorse symptoms of overt psychosis or acute mood disorder requiring hospitalization during the encounter; pt denied violent thoughts or suicidal or homicidal ideation, intent, or plan.   RX History ARICEPT, BARBITURATES, BUSPAR, CYMBALTA, GABAPENTIN, PROZAC, REMERON, WELLBUTRIN, and ZOLOFT     Current RX Considering adding ABILIFY or SEROQUEL  Cognitive testin/3/2024 MOCA 8.1 14/30  12/3/2024 MINICOG v4 22024 MINICOG v2 3/5  [id number]  S-MMSE, MINICOG v1   Continue BUSPAR  Adjustments:  [id number]: Continue as reported: 5mg in the afternoon  [id number]: Adjust to 5-7.5mg in the afternoon  [id number]: Reduce to 7.5mg in the afternoon  [id number]: Reduce to 7.5mg BID  [id number]: Increase to 10mg BID  2023: Start 5mg BID for 3 days  Prior to 2023 pt had been taking ZOLOFT 150mg daily  Continue REMERON  Pt was provided [employer] educational material 3/12/2024.  Adjustments:  2024: Start 7.5mg HS  Prior to 2024 pt had most recently tried CYMBALTA  Continue ZOLOFT  Adjustments:  2024: Increase to 100mg daily  2024: Restart 50mg  "daily  [id number] pt tried CYMBALTA. Adherence was intermittent and response was inadequate.  10KNS0550: Taper to DC:  100mg daily for 5 days, then 50mg daily for 5 days, then 25mg daily for 6 days, then discontinue  15PIB8576: Reduce to 150mg daily  [id number]: Increase to 200mg daily  52ZQS1477: Continue 150mg daily  Prior to evaluation pt had been taking 150mg daily      Education, Counseling & Monitoring []SLEEP HYGIENE  []THERAPY  []CONTRACT 12/3/2024?  [] REVIEWED 12/3/2024?  []NRT  []   Other Orders    RETURN S: STANDARD PROTOCOL: RETURN IN 8 WEEKS (TWO MONTHS)       Subjective    Available documentation has been reviewed, and pertinent elements of the chart have been incorporated into this note where appropriate. Last [facility] encounter with writer was on 10/1/2024   Rola Richter, a 79 y.o. female, presenting for followup visit. This visit was done in person, IN CLINIC.      "So-so"    Memory concerns  Repeated MINICOG v4, scored 2/5  Administered MOCA 8.3; score below    Memory is worse  Encouraged to meet with neurology  Will continue treatment otherwise as planned        Objective    Mental Status and Physical Exam  1. Appearance: Dress is informal but appropriate. Motor activity normal.  2. Discourse: Clear speech with normal rate and volume. Associations intact. Orderly.  3. Emotional Expression: Somewhat depressed. Affect is appropriate.  4. Perception and Thinking: No hallucinations. No suicidality, no homicidality, delusions, or paranoia.  5. Sensorium: Grossly intact. Able to focus for interview.  6. Memory and Fund of Knowledge: Intact for content of interview.  7. Insight and Judgment: Intact.    Constitutional / General  Vitals:    12/03/24 1417   BP: (!) 159/75   Pulse: 68   Weight: 53.6 kg (118 lb 2.7 oz)   Height: 4' 11" (1.499 m)     (Current body mass index is 23.87 kg/m².)         Measurement-Based Care (MBC):     Routine Instruments   PROMIS-ANXIETY Interpretation: 12 (4a raw " score): T-SCORE 63.4; MODERATE using 55-60-70 cutoffs.   GDS4 Interpretation: 1/4; UNCERTAIN for depression    PSS4 Interpretation: 10/16; MODERATE using 6-11 cutoffs. 1 PH, 1 LSE. Moderate perceived helplessness; pt moderately experiences a lack of control over responses to stress. Moderate lack of self-efficacy; pt moderately perceives an inability to handle problems.   Additional Instruments   MBC ANCHOR : a little worse    MINICOG (Mini-Cog)  Version v.01.19.16  Word list version 4 (river, freddy, finger)  12/3/2024   Section Scores   Word Recall 2/3    Clock Draw 0/2    Total Score   2/5   A cut point of <3 on the Mini-Cog (MINICOG) has been validated for dementia screening, but many individuals with clinically meaningful cognitive impairment will score higher. When greater sensitivity is desired, a cut point of <4 is recommended as it may indicate a need for further evaluation of cognitive status.     Brett Cognitive Assessment (MoCA)  Version 8.1 English (lion, cube)  12/3/2024   Section Scores   Visuospatial/Executive 2/5    Naming 3/3    Attention 0/6    Language 0/3    Abstraction 2/2    Delayed Recall 0/5    Orientation 6/6    Composite Scores   Raw Total Score (MOCA-TS) 13/30   Memory Index Score (MOCA-MIS) 6/15    Interpretation    Educational attainment strongly affects the MOCA score. Note that patient completed 6th grade. When the subject of a MOCA test has 12 years of education or fewer, a point is added to his/her total score: the patient meets this criteria, so the adjusted  MOCA-TS is 14/30. This score (10-17) typically suggests MODERATE COGNITIVE IMPAIRMENT or MILD DEMENTIA (6-10-18-23-26 cutoffs). The cut-off score of 18 is usually considered to separate MCI from AD but there is overlap in the scores since, by definition, AD is determined by the presence of cognitive impairment in addition to loss of autonomy. The average MoCA score for MCI is 22 (range 19-25) and the average MoCA score for  "Mild AD is 16 (11-21). Patient will need to follow up with neurology for further management.   [id number]                 Auto-populated chart data omitted from this note for brevity.      Billing Documentation:     Method of Encounter IN PERSON visit at the clinic, established   E/M Code 98889: FOLLOW UP VISIT, Rx mgmt, "Multiple STABLE chronic illnesses"   Additional Codes and Modifiers     52386, with modifer 59: administered and scored more than one psychological or neuropsychological tests (see MBC above) (16+ mins)  , without modifiers -24,-25,-53: COMPLEXITY: Visit today included increased complexity associated with the care of the episodic problem(s) (multiple psychiatric disorders - see above) addressed and managing the longitudinal care of the patient due to the serious and/or complex managed problem(s) (multiple psychiatric disorders - see above).   Time N/A - Not billing for time        Will Leong DO  Department of Psychiatry, [facility]        "

## 2024-12-04 ENCOUNTER — TELEPHONE (OUTPATIENT)
Dept: NEUROLOGY | Facility: CLINIC | Age: 79
End: 2024-12-04
Payer: MEDICARE

## 2024-12-09 ENCOUNTER — TELEPHONE (OUTPATIENT)
Dept: NEUROLOGY | Facility: CLINIC | Age: 79
End: 2024-12-09
Payer: MEDICARE

## 2024-12-10 ENCOUNTER — OFFICE VISIT (OUTPATIENT)
Dept: NEUROLOGY | Facility: CLINIC | Age: 79
End: 2024-12-10
Payer: MEDICARE

## 2024-12-10 ENCOUNTER — INFUSION (OUTPATIENT)
Dept: INFUSION THERAPY | Facility: HOSPITAL | Age: 79
End: 2024-12-10
Attending: FAMILY MEDICINE
Payer: MEDICARE

## 2024-12-10 VITALS
WEIGHT: 120.69 LBS | BODY MASS INDEX: 24.33 KG/M2 | HEIGHT: 59 IN | DIASTOLIC BLOOD PRESSURE: 69 MMHG | OXYGEN SATURATION: 99 % | SYSTOLIC BLOOD PRESSURE: 132 MMHG | HEART RATE: 77 BPM | TEMPERATURE: 98 F | RESPIRATION RATE: 18 BRPM

## 2024-12-10 VITALS
BODY MASS INDEX: 24.22 KG/M2 | SYSTOLIC BLOOD PRESSURE: 154 MMHG | RESPIRATION RATE: 18 BRPM | HEART RATE: 85 BPM | DIASTOLIC BLOOD PRESSURE: 78 MMHG | WEIGHT: 119.94 LBS

## 2024-12-10 DIAGNOSIS — R41.3 MEMORY LOSS: Primary | ICD-10-CM

## 2024-12-10 DIAGNOSIS — M81.0 AGE-RELATED OSTEOPOROSIS WITHOUT CURRENT PATHOLOGICAL FRACTURE: Primary | ICD-10-CM

## 2024-12-10 PROCEDURE — 63600175 PHARM REV CODE 636 W HCPCS: Performed by: FAMILY MEDICINE

## 2024-12-10 PROCEDURE — A4216 STERILE WATER/SALINE, 10 ML: HCPCS | Performed by: FAMILY MEDICINE

## 2024-12-10 PROCEDURE — 99499 UNLISTED E&M SERVICE: CPT | Mod: HCNC,S$GLB,, | Performed by: NURSE PRACTITIONER

## 2024-12-10 PROCEDURE — 25000003 PHARM REV CODE 250: Performed by: FAMILY MEDICINE

## 2024-12-10 PROCEDURE — 96365 THER/PROPH/DIAG IV INF INIT: CPT

## 2024-12-10 PROCEDURE — 99999 PR PBB SHADOW E&M-EST. PATIENT-LVL IV: CPT | Mod: PBBFAC,HCNC,, | Performed by: NURSE PRACTITIONER

## 2024-12-10 PROCEDURE — 99483 ASSMT & CARE PLN PT COG IMP: CPT | Mod: HCNC,S$GLB,, | Performed by: NURSE PRACTITIONER

## 2024-12-10 RX ORDER — HEPARIN 100 UNIT/ML
500 SYRINGE INTRAVENOUS
OUTPATIENT
Start: 2024-12-10

## 2024-12-10 RX ORDER — SODIUM CHLORIDE 0.9 % (FLUSH) 0.9 %
10 SYRINGE (ML) INJECTION
Status: DISCONTINUED | OUTPATIENT
Start: 2024-12-10 | End: 2024-12-10 | Stop reason: HOSPADM

## 2024-12-10 RX ORDER — ZOLEDRONIC ACID 5 MG/100ML
5 INJECTION, SOLUTION INTRAVENOUS
OUTPATIENT
Start: 2024-12-10

## 2024-12-10 RX ORDER — ZOLEDRONIC ACID 5 MG/100ML
5 INJECTION, SOLUTION INTRAVENOUS
Status: COMPLETED | OUTPATIENT
Start: 2024-12-10 | End: 2024-12-10

## 2024-12-10 RX ORDER — SODIUM CHLORIDE 0.9 % (FLUSH) 0.9 %
10 SYRINGE (ML) INJECTION
OUTPATIENT
Start: 2024-12-10

## 2024-12-10 RX ADMIN — SODIUM CHLORIDE: 9 INJECTION, SOLUTION INTRAVENOUS at 03:12

## 2024-12-10 RX ADMIN — SODIUM CHLORIDE, PRESERVATIVE FREE 10 ML: 5 INJECTION INTRAVENOUS at 03:12

## 2024-12-10 RX ADMIN — ZOLEDRONIC ACID 5 MG: 5 INJECTION, SOLUTION INTRAVENOUS at 03:12
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
## 2024-12-10 NOTE — PLAN OF CARE
Problem: Fatigue  Goal: Improved Activity Tolerance  Outcome: Progressing  Intervention: Promote Improved Energy  Flowsheets (Taken 12/10/2024 7292)  Fatigue Management: fatigue-related activity identified  Sleep/Rest Enhancement: noise level reduced  Activity Management: Up in chair - L3

## 2024-12-10 NOTE — PROGRESS NOTES
Caregiver name: Aldo Richter   Relationship to the patient: Spouse   Does the patient have a living will? No  Does the patient have a designated healthcare POA? No  Does the patient have a designated general POA? No    Have educational materials/resources been provided? No      Activities of Daily Living    Bathing: Independent  Dressing: Independent  Grooming: Needs Help  Mouth Care: Independent  Toileting: Independent  Transferring Bed/Chair: Independent  Walking: Independent  Climbing: Needs Help  Eating: Independent      Instrumental Activities of Daily Living    Shopping: Needs Help  Cooking: Independent  Managing Medications: Independent  Using the phone and looking up numbers: Independent  Doing Housework: Needs Help  Doing Laundry: Needs Help  Driving or using public transportation: Dependent  Managing finances: Dependent    Functional Assessment Staging  5   Requires assistance in choosing proper clothing to wear for the day, season, or occasion, e.g. patient may wear the same clothing repeatedly, unless supervised.*             11/7/2024     9:33 AM 10/28/2024     1:27 PM 7/25/2024    12:17 PM 4/30/2024     1:10 PM 2/14/2024     1:11 PM 2/1/2024    11:49 AM 10/30/2023     1:53 PM   Depression Patient Health Questionnaire   Over the last two weeks how often have you been bothered by little interest or pleasure in doing things Not at all Not at all Not at all Not at all Not at all Not at all Nearly every day   Over the last two weeks how often have you been bothered by feeling down, depressed or hopeless Several days Not at all Not at all Not at all Not at all Not at all More than half the days   PHQ-2 Total Score 1 0 0 0 0 0 5   Over the last two weeks how often have you been bothered by trouble falling or staying asleep, or sleeping too much       Not at all   Over the last two weeks how often have you been bothered by feeling tired or having little energy       Not at all   Over the last two weeks  how often have you been bothered by a poor appetite or overeating       Not at all   Over the last two weeks how often have you been bothered by feeling bad about yourself - or that you are a failure or have let yourself or your family down       Nearly every day   Over the last two weeks how often have you been bothered by trouble concentrating on things, such as reading the newspaper or watching television       More than half the days   Over the last two weeks how often have you been bothered by moving or speaking so slowly that other people could have noticed. Or the opposite - being so fidgety or restless that you have been moving around a lot more than usual.       Not at all   If you checked off any problems, how difficult have these problems made it for you to do your work, take care of things at home or get along with other people?       Somewhat difficult

## 2024-12-10 NOTE — ASSESSMENT & PLAN NOTE
Patient is a 80 y/o female that presents for memory f/u  Onset of memory changes began ~ 6 years ago. She reports forgetfulness and misplacing items.   Spouse states she is more irritable. She denies sleep disturbances, recent falls, urinary incontinence. She does endorse depression that is somewhat controlled. There is questionable hallucinations vs visual disturbances as she reports seeing shadows out her peripheral.   MMSE today 22/30; stable   - MCI not warranted as per recent NP testing; emotional distress and PTSD likely exacerbating cognitive weakness.   MR brain scan noted with mild white change  Serologies noted  Discussed role vs expectation of cognitive enhancing medications at length    - Aricept initiated in 2021 and christina well.   Continue!   - consider adding Namenda in future    Continue following psych for med management    - rec she restart psychotherapy sessions as she voiced worsening mood today. Message sent to SW  Discussed importance of brain stimulation and educated pt on side effects of opiates   Reassurance offered today

## 2024-12-12 ENCOUNTER — TELEPHONE (OUTPATIENT)
Dept: PSYCHIATRY | Facility: CLINIC | Age: 79
End: 2024-12-12
Payer: MEDICARE

## 2024-12-12 NOTE — TELEPHONE ENCOUNTER
Called patients  and no answer left message was returning call and to please call office back. [phone] opt 3

## 2024-12-12 NOTE — TELEPHONE ENCOUNTER
----- Message from  Marcie sent at 12/12/2024 10:50 AM CST -----  Regarding: FW: psychotherapy  Hi ladies. This is ct that was seeing me and Dr. Leong at one point. Below is a message that I got from the NP at Neurology. I don't mind seeing Ms LEONARD again if she's interested. Would one of you reach out to her to see if she is interested in being seen again and if so would you get her scheduled?  Thanks  Marcie  ----- Message -----  From: Nusrat Bentley FNP  Sent: 12/10/2024  11:38 AM CST  To: Marcie Garcia [facility]  Subject: psychotherapy                                    Hi there,  I saw this patient today for memory f/u. Overall her MMSE is stable from that when I last saw her in June 2023 but she complains of worsening mood. She feels that her mood is unstable and believes she did benefit from seeing you in the past.   How can we get her back seeing you?    PATRICIA Galloway  [facility] [place]

## 2024-12-12 NOTE — TELEPHONE ENCOUNTER
Called patient to get her scheduled for follow up visit to start again with Marcie Garcia.  She did not answer so left message to call office [phone] opt 3

## 2024-12-12 NOTE — TELEPHONE ENCOUNTER
Called and spoke to patient and she scheduled for Marcie to start therapy again on 01/07/2025 @ 11 AM  Sent to override to have her scheduled

## 2024-12-26 DIAGNOSIS — M25.511 RIGHT SHOULDER PAIN, UNSPECIFIED CHRONICITY: Primary | ICD-10-CM

## 2024-12-27 ENCOUNTER — OFFICE VISIT (OUTPATIENT)
Dept: ORTHOPEDICS | Facility: CLINIC | Age: 79
End: 2024-12-27
Payer: MEDICARE

## 2024-12-27 ENCOUNTER — HOSPITAL ENCOUNTER (OUTPATIENT)
Dept: RADIOLOGY | Facility: HOSPITAL | Age: 79
Discharge: HOME | End: 2024-12-27
Attending: FAMILY MEDICINE
Payer: MEDICARE

## 2024-12-27 VITALS — HEIGHT: 59 IN | WEIGHT: 120.56 LBS | BODY MASS INDEX: 24.31 KG/M2

## 2024-12-27 DIAGNOSIS — G89.29 CHRONIC LEFT SHOULDER PAIN: ICD-10-CM

## 2024-12-27 DIAGNOSIS — M75.51 BURSITIS OF BOTH SHOULDERS: Primary | ICD-10-CM

## 2024-12-27 DIAGNOSIS — M75.52 BURSITIS OF BOTH SHOULDERS: Primary | ICD-10-CM

## 2024-12-27 DIAGNOSIS — M25.511 RIGHT SHOULDER PAIN, UNSPECIFIED CHRONICITY: ICD-10-CM

## 2024-12-27 DIAGNOSIS — M25.512 CHRONIC LEFT SHOULDER PAIN: ICD-10-CM

## 2024-12-27 DIAGNOSIS — G89.29 CHRONIC RIGHT SHOULDER PAIN: ICD-10-CM

## 2024-12-27 DIAGNOSIS — M25.511 CHRONIC RIGHT SHOULDER PAIN: ICD-10-CM

## 2024-12-27 DIAGNOSIS — M12.812 ROTATOR CUFF ARTHROPATHY, LEFT: ICD-10-CM

## 2024-12-27 PROCEDURE — 73030 X-RAY EXAM OF SHOULDER: CPT | Mod: 26,HCNC,RT, | Performed by: RADIOLOGY

## 2024-12-27 PROCEDURE — 73030 X-RAY EXAM OF SHOULDER: CPT | Mod: TC,HCNC,PO,RT

## 2024-12-27 PROCEDURE — 99999 PR PBB SHADOW E&M-EST. PATIENT-LVL III: CPT | Mod: PBBFAC,HCNC,, | Performed by: FAMILY MEDICINE

## 2024-12-27 RX ORDER — METHYLPREDNISOLONE ACETATE 80 MG/ML
80 INJECTION, SUSPENSION INTRA-ARTICULAR; INTRALESIONAL; INTRAMUSCULAR; SOFT TISSUE
Status: DISCONTINUED | OUTPATIENT
Start: 2024-12-27 | End: 2024-12-27 | Stop reason: HOSPADM

## 2024-12-27 RX ADMIN — METHYLPREDNISOLONE ACETATE 80 MG: 80 INJECTION, SUSPENSION INTRA-ARTICULAR; INTRALESIONAL; INTRAMUSCULAR; SOFT TISSUE at 11:12

## 2024-12-27 NOTE — PROGRESS NOTES
Subjective     Patient ID: Rola Richter is a 79 y.o. female.    Chief Complaint: Pain of the Right Shoulder (Patient c/o shoulder pain in her R shoulder. Pt is requesting steroid injections today.) and Shoulder Pain (Pt here w/ c/o R) shoulder pain)    Patient returns today with complaints of bilateral shoulder pain likely secondary to subacromial bursitis.  This has a chronic recurring issue for her.  She usually responds very well to cortisone injections.  She requesting injections in both shoulders today.  It has gotten to the point where any movement of the upper extremities is difficult and she is unable to perform activities of daily life without pain.    Pain  Pertinent negatives include no chest pain, chills, congestion, coughing, headaches, rash or sore throat.   Shoulder Pain       Review of Systems   Constitutional: Negative for chills and decreased appetite.   HENT:  Negative for congestion and sore throat.    Eyes:  Negative for blurred vision.   Cardiovascular:  Negative for chest pain, dyspnea on exertion and palpitations.   Respiratory:  Negative for cough and shortness of breath.    Skin:  Negative for rash.   Neurological:  Negative for difficulty with concentration, disturbances in coordination and headaches.   Psychiatric/Behavioral:  Negative for altered mental status, depression, hallucinations, memory loss and suicidal ideas.           Objective     General    Nursing note and vitals reviewed.  Constitutional: She is oriented to person, place, and time. She appears well-developed and well-nourished.   HENT:   Nose: Nose normal.   Eyes: EOM are normal. Pupils are equal, round, and reactive to light.   Neck: Neck supple.   Cardiovascular:  Normal rate.            Pulmonary/Chest: Effort normal.   Abdominal: Soft.   Neurological: She is alert and oriented to person, place, and time. She has normal reflexes.   Psychiatric: She has a normal mood and affect. Her behavior is normal. Judgment and  thought content normal.         Right Shoulder Exam     Inspection/Observation   Swelling: absent  Bruising: absent  Scars: absent  Deformity: absent  Scapular Winging: absent  Scapular Dyskinesia: negative    Tenderness   The patient is tender to palpation of the acromioclavicular joint and supraspinatus.    Range of Motion   Active abduction:  150   Passive abduction:  normal   Extension:  normal   Forward Flexion:  150   Forward Elevation: normal  Adduction: 90     Tests & Signs   Jennings test: positive  Impingement: positive  Active Compression Test (Zavala's Sign): negative  Speed's Test: negative  Anterior Drawer Test: 0   Posterior Drawer Test: 0    Other   Sensation: normal    Left Shoulder Exam   Left shoulder exam is normal.       Muscle Strength   Right Upper Extremity   Shoulder Abduction: 5/5   Shoulder Internal Rotation: 5/5   Shoulder External Rotation: 5/5   Supraspinatus: 5/5   Subscapularis: 5/5   Biceps: 5/5     Vascular Exam     Right Pulses      Radial:                    2+      Physical Exam  Vitals and nursing note reviewed.   Constitutional:       Appearance: She is well-developed and well-nourished.   HENT:      Nose: Nose normal.   Eyes:      Extraocular Movements: EOM normal.      Pupils: Pupils are equal, round, and reactive to light.   Cardiovascular:      Rate and Rhythm: Normal rate.      Pulses:           Radial pulses are 2+ on the right side.   Pulmonary:      Effort: Pulmonary effort is normal.   Abdominal:      Palpations: Abdomen is soft.   Musculoskeletal:      Right shoulder: No swelling or deformity.      Cervical back: Normal range of motion and neck supple.   Neurological:      Mental Status: She is alert and oriented to person, place, and time.      Deep Tendon Reflexes: Reflexes are normal and symmetric.   Psychiatric:         Mood and Affect: Mood and affect normal.         Behavior: Behavior normal.         Thought Content: Thought content normal.         Judgment:  Judgment normal.      X-ray images ordered obtained interpreted by me.  They show no significant changes when compared to previous x-rays.  No acute fractures present.  No evidence of any soft tissue injury.       Assessment and Plan     Encounter Diagnoses   Name Primary?    Chronic left shoulder pain     Rotator cuff arthropathy, left     Chronic right shoulder pain     Bursitis of both shoulders Yes         Rola was seen today for pain and shoulder pain.    Diagnoses and all orders for this visit:    Bursitis of both shoulders    Chronic left shoulder pain    Rotator cuff arthropathy, left    Chronic right shoulder pain    Gave her injections in both shoulders today.  Follow up here as needed.    Large Joint Aspiration/Injection: bilateral subacromial bursa    Date/Time: 12/27/2024 11:40 AM    Performed by: Eliseo Oliva MD  Authorized by: Eliseo Oliva MD    Consent Done?:  Yes (Verbal)  Indications:  Arthritis and pain  Site marked: the procedure site was marked    Timeout: prior to procedure the correct patient, procedure, and site was verified    Prep: patient was prepped and draped in usual sterile fashion      Local anesthesia used?: Yes    Local anesthetic:  Lidocaine 1% without epinephrine  Anesthetic total (ml):  4      Details:  Needle Size:  22 G  Ultrasonic Guidance for needle placement?: No    Approach:  Anterolateral  Location:  Shoulder  Laterality:  Bilateral  Site:  Bilateral subacromial bursa  Medications (Right):  80 mg methylPREDNISolone acetate 80 mg/mL  Medications (Left):  80 mg methylPREDNISolone acetate 80 mg/mL  Patient tolerance:  Patient tolerated the procedure well with no immediate complications

## 2025-01-13 DIAGNOSIS — Z00.00 ENCOUNTER FOR MEDICARE ANNUAL WELLNESS EXAM: ICD-10-CM

## 2025-01-15 ENCOUNTER — PATIENT MESSAGE (OUTPATIENT)
Dept: PSYCHIATRY | Facility: CLINIC | Age: 80
End: 2025-01-15
Payer: MEDICARE

## 2025-01-15 ENCOUNTER — OFFICE VISIT (OUTPATIENT)
Dept: PSYCHIATRY | Facility: CLINIC | Age: 80
End: 2025-01-15
Payer: MEDICARE

## 2025-01-15 DIAGNOSIS — F33.0 MILD EPISODE OF RECURRENT MAJOR DEPRESSIVE DISORDER: ICD-10-CM

## 2025-01-15 DIAGNOSIS — F41.1 GENERALIZED ANXIETY DISORDER: Primary | ICD-10-CM

## 2025-01-15 PROCEDURE — 99999 PR PBB SHADOW E&M-EST. PATIENT-LVL I: CPT | Mod: PBBFAC,HCNC,, | Performed by: SOCIAL WORKER

## 2025-01-15 PROCEDURE — 90834 PSYTX W PT 45 MINUTES: CPT | Mod: HCNC,S$GLB,, | Performed by: SOCIAL WORKER

## 2025-01-15 PROCEDURE — 1159F MED LIST DOCD IN RCRD: CPT | Mod: HCNC,CPTII,S$GLB, | Performed by: SOCIAL WORKER

## 2025-01-16 NOTE — PROGRESS NOTES
"Individual Psychotherapy (PhD/LCSW)    1/15/2025    Site:  [facility]         Therapeutic Intervention: Met with patient.  Outpatient - Insight oriented psychotherapy 45 min - CPT code 04848, Outpatient - Behavior modifying psychotherapy 45 min - CPT code 17622, and Outpatient - Supportive psychotherapy 45 min - CPT Code 45548    Chief complaint/reason for encounter: depression and anxiety             Interval history and content of current session: Ct was referred by Dr. Leong to address depression, anxiety, and trauma. Ct arrived to session and was fully engaged.   Ct reported that she still has anxiety symptoms. She reported that she uses her coping skills,- trying to stay busy, doing things around the house etc. Ct shared that she has worried thoughts about not being able to get things done or she has to do things a certain way or she "go bananas" Sw and ct processed irrational thought processes and expectations. Ct was receptive. Ct to continue in 1:1 sessions.       Treatment plan:  Target symptoms: depression, anxiety   Why chosen therapy is appropriate versus another modality: relevant to diagnosis, patient responds to this modality, evidence based practice  Outcome monitoring methods: self-report  Therapeutic intervention type: insight oriented psychotherapy, behavior modifying psychotherapy, supportive psychotherapy    Risk parameters:  Patient reports no suicidal ideation  Patient reports no homicidal ideation  Patient reports no self-injurious behavior  Patient reports no violent behavior    CSSRS was completed:     Mental Status Exam:  General Appearance:  unremarkable, age appropriate   Speech: normal tone, normal rate, normal pitch, normal volume      Level of Cooperation: cooperative      Thought Processes: normal and logical   Mood: steady      Thought Content: normal, no suicidality, no homicidality, delusions, or paranoia   Affect: congruent and appropriate   Orientation: Oriented x3   Memory: " recent >  intact   Attention Span & Concentration: intact   Fund of General Knowledge: intact and appropriate to age and level of education   Abstract Reasoning: interpretation of similarities was abstract   Judgment & Insight: fair, intact     Language intact       Verbal deficits: None    Patient's response to intervention:  The patient's response to intervention is accepting.    Progress toward goals and other mental status changes:  The patient's progress toward goals is fair , some improvement .    Diagnosis:     ICD-10-CM ICD-9-CM   1. Generalized anxiety disorder  F41.1 300.02   2. Mild episode of recurrent major depressive disorder  F33.0 296.31       Plan:  individual psychotherapy and ct to follow up with Dr. Leong for psych med mgt  Pt to go to ED or call 911 if symptoms worsen or if she has thoughts of harming self and/or others. Pt verbalized understanding.    Return to clinic: as scheduled    Length of Service (minutes): 45      A portion of this note was created using Interface21 voice recognition software that occasionally misinterprets phrases or words.    Each patient to whom he or she provides medical services by telemedicine is: (1) informed of the relationship between the physician and patient and the respective role of any other health care provider with respect to management of the patient; and (2) notified that he or she may decline to receive medical services by telemedicine and may withdraw from such care at any time.

## 2025-01-23 ENCOUNTER — OFFICE VISIT (OUTPATIENT)
Dept: PAIN MEDICINE | Facility: CLINIC | Age: 80
End: 2025-01-23
Payer: MEDICARE

## 2025-01-23 VITALS
SYSTOLIC BLOOD PRESSURE: 112 MMHG | DIASTOLIC BLOOD PRESSURE: 57 MMHG | WEIGHT: 120.56 LBS | HEART RATE: 77 BPM | HEIGHT: 59 IN | BODY MASS INDEX: 24.31 KG/M2

## 2025-01-23 DIAGNOSIS — M54.12 CERVICAL RADICULITIS: Primary | ICD-10-CM

## 2025-01-23 DIAGNOSIS — M54.81 BILATERAL OCCIPITAL NEURALGIA: ICD-10-CM

## 2025-01-23 DIAGNOSIS — G89.4 CHRONIC PAIN DISORDER: ICD-10-CM

## 2025-01-23 DIAGNOSIS — M50.30 DDD (DEGENERATIVE DISC DISEASE), CERVICAL: ICD-10-CM

## 2025-01-23 DIAGNOSIS — M79.18 MYOFASCIAL PAIN: ICD-10-CM

## 2025-01-23 DIAGNOSIS — M47.892 OTHER SPONDYLOSIS, CERVICAL REGION: ICD-10-CM

## 2025-01-23 PROCEDURE — 99999 PR PBB SHADOW E&M-EST. PATIENT-LVL III: CPT | Mod: PBBFAC,HCNC,, | Performed by: PHYSICIAN ASSISTANT

## 2025-01-23 PROCEDURE — 1125F AMNT PAIN NOTED PAIN PRSNT: CPT | Mod: HCNC,CPTII,S$GLB, | Performed by: PHYSICIAN ASSISTANT

## 2025-01-23 PROCEDURE — 3078F DIAST BP <80 MM HG: CPT | Mod: HCNC,CPTII,S$GLB, | Performed by: PHYSICIAN ASSISTANT

## 2025-01-23 PROCEDURE — 99214 OFFICE O/P EST MOD 30 MIN: CPT | Mod: HCNC,S$GLB,, | Performed by: PHYSICIAN ASSISTANT

## 2025-01-23 PROCEDURE — 3074F SYST BP LT 130 MM HG: CPT | Mod: HCNC,CPTII,S$GLB, | Performed by: PHYSICIAN ASSISTANT

## 2025-01-23 PROCEDURE — 1159F MED LIST DOCD IN RCRD: CPT | Mod: HCNC,CPTII,S$GLB, | Performed by: PHYSICIAN ASSISTANT

## 2025-01-23 RX ORDER — HYDROCODONE BITARTRATE AND ACETAMINOPHEN 5; 325 MG/1; MG/1
1 TABLET ORAL EVERY 12 HOURS PRN
Qty: 60 TABLET | Refills: 0 | Status: SHIPPED | OUTPATIENT
Start: 2025-02-21 | End: 2025-03-22

## 2025-01-23 RX ORDER — HYDROCODONE BITARTRATE AND ACETAMINOPHEN 5; 325 MG/1; MG/1
1 TABLET ORAL EVERY 12 HOURS PRN
Qty: 60 TABLET | Refills: 0 | Status: SHIPPED | OUTPATIENT
Start: 2025-03-22 | End: 2025-04-20

## 2025-01-23 RX ORDER — HYDROCODONE BITARTRATE AND ACETAMINOPHEN 5; 325 MG/1; MG/1
1 TABLET ORAL EVERY 12 HOURS PRN
Qty: 60 TABLET | Refills: 0 | Status: SHIPPED | OUTPATIENT
Start: 2025-01-23 | End: 2025-02-21

## 2025-01-23 RX ORDER — CYCLOBENZAPRINE HCL 10 MG
10 TABLET ORAL 3 TIMES DAILY PRN
Qty: 90 TABLET | Refills: 2 | Status: SHIPPED | OUTPATIENT
Start: 2025-01-23 | End: 2025-04-23

## 2025-01-23 NOTE — PROGRESS NOTES
Referring Physician: No ref. provider found    PCP: Liseth Carias MD      CC: neck and occipital pain    Interval history: Ms. Richter is a 79 y.o. female with neck pain and occipital neuralgia who returns to our clinic.  She continues to c/o headaches and neck pain.  Most recent occcipital nerve block only provided mild short lived beneft. Recent cervical MELANY improved neck pain that was extending into left shoulder.  She has numbness to her right hand and first through fourth digits. Cervical MELANY in February 2018  provided benefit for several weeks.    She continues to take Norco with benefit.  Flexeril 10 mg caused dry mouth however this resolved and she wishes to resume. Robaxin was helpful but caused dizziness.  Denies UE weakness. No bowel bladder changes.   She does c/o intermittent numbness and tingling in b/l hands and feet. Pain today is rated 8/10.    Prior HPI:   Patient is 70-year-old female with past history history of cervical DDD, cervical spondylosis and chronic headaches.  She recently moved here from [place], [place].  She is treated in the past by neurology.  She states having constant burning pain over the left side of her posterior scalp.  Pain radiates to her neck as well as a frontal.  She also has left-sided neck pain as well.  She denies any radicular arm pain.  No numbness or weakness.  She states having cervical epidural steroid injection at past with minimal benefit.  She also has had decided of cervical nerve blocks in the past with moderate benefit.  Most recent injection was performed 2 months ago.  She desires repeat injection.  She currently takes Norco 10 mg every 12 hours as needed with moderate benefit.  She also takes Zanaflex 4 mg every 8 hours with mild benefits.  She rates her pain 7/10 today.    Pain intervention history: s/p left occipital nerve blocks on 1/2016 with 50% relief of her headaches  S/p cervical MELANY 2/8/18 moderate relief for a couple of weeks.      ROS:  CONSTITUTIONAL: No fevers, chills, night sweats, wt. loss, appetite changes  SKIN: no rashes or itching  ENT: No headaches, head trauma, vision changes, or eye pain  LYMPH NODES: None noticed   CV: No chest pain, palpitations.   RESP: No shortness of breath, dyspnea on exertion, cough, wheezing, or hemoptysis  GI: No nausea, emesis, diarrhea, constipation, melena, hematochezia, pain.    : No dysuria, hematuria, urgency, or frequency   HEME: No easy bruising, bleeding problems  PSYCHIATRIC: No depression, anxiety, psychosis, hallucinations.  NEURO: No seizures, memory loss, dizziness or difficulty sleeping  MSK: + History of present illness      Past Medical History:   Diagnosis Date    Anemia of chronic disease 2024    Anxiety     Arthritis     Cataract     Colon polyp     DDD (degenerative disc disease), lumbar     Depression     Encounter for blood transfusion     GERD (gastroesophageal reflux disease)     Headache     Hyperlipidemia     Hypertension     pt states she does not take meds anymore she just watches her weight    Insomnia 3/12/2024    Major neurocognitive disorder 12/3/2024    Neuromuscular disorder     Occipital neuralgia 2017    Osteoporosis     Seizures      Past Surgical History:   Procedure Laterality Date    APPENDECTOMY      CATARACT EXTRACTION W/  INTRAOCULAR LENS IMPLANT Left 10/25/2023    Procedure: CEIOL OS;  Surgeon: Rustam Dyer MD;  Location: [facility] OR;  Service: Ophthalmology;  Laterality: Left;  Last Case of day, short eye, very high power IOL    CATARACT EXTRACTION W/  INTRAOCULAR LENS IMPLANT Right 1/10/2024    Procedure: CEIOL OD Preop Mannitol;  Surgeon: Rustam Dyer MD;  Location: [facility] OR;  Service: Ophthalmology;  Laterality: Right;  Will need pre-op Mannitol     SECTION      x 2    CHOLECYSTECTOMY      COLONOSCOPY  prior to     COLONOSCOPY N/A 2019    Procedure: COLONOSCOPY;  Surgeon: Greg Victor MD;  Location: [facility]  ENDO;  Service: Endoscopy;  Laterality: N/A; 2 colon polyps, hemorrhoids; repeat in 5 years for surveillance; biopsy: Tubular adenoma x2    COLONOSCOPY N/A 5/1/2024    Procedure: COLONOSCOPY;  Surgeon: Greg Victor MD;  Location: [facility];  Service: Endoscopy;  Laterality: N/A;    EPIDURAL STEROID INJECTION INTO CERVICAL SPINE N/A 9/20/2022    Procedure: Injection-steroid-epidural-cervical C7-T1;  Surgeon: Timothy Grimaldo MD;  Location: [facility] OR;  Service: Pain Management;  Laterality: N/A;    EPIDURAL STEROID INJECTION INTO CERVICAL SPINE N/A 3/7/2024    Procedure: Injection-steroid-epidural-cervical;  Surgeon: Timothy Grimaldo MD;  Location: [facility] OR;  Service: Anesthesiology;  Laterality: N/A;  c7-t1    ESOPHAGOGASTRODUODENOSCOPY N/A 4/30/2019    Procedure: EGD (ESOPHAGOGASTRODUODENOSCOPY);  Surgeon: Greg Victor MD;  Location: [facility];  Service: Endoscopy;  Laterality: N/A; Mild Schatzki ring. Dilated. small hiatal hernia; Four gastric polyps. Resected and retrieved, gastritis; biopsy:stomach- unremarkable, negative for h pylori, polyps-Fundic type mucosa with foveolar hyperplasia.    ESOPHAGOGASTRODUODENOSCOPY N/A 2/17/2021    Procedure: EGD (ESOPHAGOGASTRODUODENOSCOPY);  Surgeon: Greg Victor MD;  Location: [facility];  Service: Endoscopy;  Laterality: N/A;    ESOPHAGOGASTRODUODENOSCOPY N/A 2/29/2024    Procedure: EGD (ESOPHAGOGASTRODUODENOSCOPY);  Surgeon: Greg Victor MD;  Location: [facility];  Service: Endoscopy;  Laterality: N/A;    HYSTERECTOMY      Laser Periphery Iridotomy Bilateral     OD 5/26/16 and OS touch up 5/26/2016    UPPER GASTROINTESTINAL ENDOSCOPY  03/14/2017    Dr. Marcial: esophageal stenosis- dilated, gastritis, gastric polyps removed; biopsy- mild gastritis, negative for h pylori, hyperplastic polyp, esophagus unremarkable    vocal cord tumor removal       Family History   Problem Relation Name Age of Onset    Osteoarthritis Mother      Alcohol abuse Mother      Rheum  arthritis Mother      Osteoarthritis Sister      No Known Problems Sister      No Known Problems Sister      Diabetes Brother      No Known Problems Brother      No Known Problems Son      Arthritis Son      No Known Problems Son      Stroke Maternal Grandmother  99    Rheum arthritis Maternal Grandmother      Retinal detachment Neg Hx      Macular degeneration Neg Hx      Glaucoma Neg Hx      Amblyopia Neg Hx      Blindness Neg Hx      Cancer Neg Hx      Cataracts Neg Hx      Hypertension Neg Hx      Strabismus Neg Hx      Thyroid disease Neg Hx      Lupus Neg Hx      Kidney disease Neg Hx      Inflammatory bowel disease Neg Hx      Psoriasis Neg Hx      Colon cancer Neg Hx      Crohn's disease Neg Hx      Ulcerative colitis Neg Hx      Stomach cancer Neg Hx      Esophageal cancer Neg Hx       Social History     Socioeconomic History    Marital status:    Tobacco Use    Smoking status: Never    Smokeless tobacco: Never   Substance and Sexual Activity    Alcohol use: No     Alcohol/week: 0.0 standard drinks of alcohol    Drug use: Yes     Types: Hydrocodone    Sexual activity: Yes     Partners: Male     Social Drivers of Health     Financial Resource Strain: Low Risk  (11/7/2024)    Overall Financial Resource Strain (CARDIA)     Difficulty of Paying Living Expenses: Not hard at all   Food Insecurity: No Food Insecurity (11/7/2024)    Hunger Vital Sign     Worried About Running Out of Food in the Last Year: Never true     Ran Out of Food in the Last Year: Never true   Transportation Needs: No Transportation Needs (11/7/2024)    PRAPARE - Transportation     Lack of Transportation (Medical): No     Lack of Transportation (Non-Medical): No   Physical Activity: Sufficiently Active (11/7/2024)    Exercise Vital Sign     Days of Exercise per Week: 7 days     Minutes of Exercise per Session: 30 min   Stress: No Stress Concern Present (11/7/2024)    [employer] - Occupational Stress  "Questionnaire     Feeling of Stress : Only a little   Housing Stability: Low Risk  (11/7/2024)    Housing Stability Vital Sign     Unable to Pay for Housing in the Last Year: No     Homeless in the Last Year: No         Medications/Allergies: See med card    Vitals:    01/23/25 1312   BP: (!) 112/57   Pulse: 77   Weight: 54.7 kg (120 lb 9.5 oz)   Height: 4' 11" (1.499 m)   PainSc:   8   PainLoc: Back         Physical exam:    GENERAL: A and O x3, the patient appears well groomed and is in no acute distress.  Skin: No rashes or obvious lesions  HEENT: normocephalic, atraumatic  CARDIOVASCULAR:  RRR  LUNGS: nonlabored breathing  ABDOMEN: soft, nontender   UPPER EXTREMITIES: Normal alignment, normal range of motion, no atrophy, no skin changes,  hair growth and nail growth normal and equal bilaterally. No swelling, no tenderness.  +Phalens on left side. +TTP tricep tendon  LOWER EXTREMITIES:  Normal alignment, normal range of motion, no atrophy, no skin changes,  hair growth and nail growth normal and equal bilaterally. No swelling, no tenderness.  CERVICAL SPINE:   Cervical spine: ROM is full in flexion, extension and lateral rotation.  Painful flexion > extension.  Positive facet loading bilaterally.  Spurling is positive at right side.  Myofascial exam:  Tenderness to palpation across cervical paraspinals deltoid and trapezius muscles bilaterally.      MENTAL STATUS: normal orientation, speech, language, and fund of knowledge for social situation.  Emotional state appropriate.    CRANIAL NERVES:  II:  PERRL bilaterally,   III,IV,VI: EOMI.    V:  Facial sensation equal bilaterally  VII:  Facial motor function normal.  VIII:  Hearing equal to finger rub bilaterally  IX/X: Gag normal, palate symmetric  XI:  Shoulder shrug equal, head turn equal  XII:  Tongue midline without fasciculations      MOTOR: Tone and bulk: normal bilateral upper and lower Strength: normal "   Delt Bi Tri WE WF     R 5 5 5 5 5 5   L 5 5 5 5 5 5     IP ADD ABD Quad TA Gas HAM  R 5 5 5 5 5 5 5  L 5 5 5 5 5 5 5    SENSATION: Light touch and pinprick intact bilaterally  REFLEXES: normal, symmetric, nonbrisk.  Toes down, no clonus. No hoffmans.  GAIT: normal rise, base, steps, and arm swing.        Imaging:   Cervical MRI 12/4/17    Narrative     EXAM: Cervical spine MRI without contrast.    INDICATION: Cervical radiculopathy.  Neck pain and occipital neuralgia.  The patient complains of neck and right arm pain.    TECHNIQUE: Routine multiplanar, multisequence unenhanced cervical spine MRI was performed.    COMPARISON: Plain films of the cervical spine obtained concurrently      FINDINGS:     Vertebral column: There is straightening of the cervical spine with loss of normal lordosis.  As seen on concurrent plain films, there is trace anterolisthesis of C3 on C4 with 2 mm anterolisthesis of C4 on C5.  There is marked disc space narrowing at the C5-6 level with moderate to marked disc space narrowing at the C4-5 and C6-7 levels.  There is partial non-segmentation anteriorly at the C2-3 level.  The C2 and C3 as well as the C4 and C5 facet joints appear fused and this may represent developmental non-segmentation or degenerative ankylosis.  All of the discs are desiccated.  The odontoid process is intact.    Spinal canal, cord, epidural space: The craniovertebral junction is normal.  The spinal canal is omental and normal.  There is no significant spinal stenosis.  The cord is normal in caliber.  There is very subtle flattening of the ventral cord surface at the C4-5 and C5-6 levels where there is degenerative change.  The study is mildly motion but there is no definite cord edema or myelomalacia.    Findings by level:    C2-3: There is no spinal canal or foraminal stenosis.  There is mild left facet joint arthropathy.    C3-4: There is trace anterolisthesis.  There is left greater than right uncovertebral  spurring and facet joint arthropathy.  There is a mild disc osteophyte complex, slightly eccentric to the left with subtle annular fissure.  This narrows the ventral subarachnoid space.  There is no spinal stenosis or cord compression.  There is moderate marked left foraminal stenosis.    C4-5: There is moderate disc space narrowing with 2 mm anterolisthesis of C4 on C5.  There is facet joint arthropathy or effusion left greater than right.  There is also mild bilateral but left greater than right uncovertebral spurring.  There is unroofing of a mild disc bulge which narrows the ventral subarachnoid space.  There is no spinal stenosis.  There is mild to moderate left foraminal stenosis.    C5-6:There is marked disc space narrowing.  There is bilateral uncovertebral spurring.  There is a disc osteophyte complex which narrows the subarachnoid space.  This is slightly eccentric to the right There is subtle flattening of the ventral cord surface.  Cord signal is grossly normal.  There is mild spinal stenosis with at least moderate bilateral foraminal stenosis.    C6-7: There is moderate disc space narrowing.  There is mild uncovertebral spurring.  There is a shallow disc osteophyte complex, slightly eccentric to the right.  There is narrowing of the ventral subarachnoid space.  There is no spinal stenosis.  Cord signal is normal.  There is mild bilateral foraminal stenosis.  There is a small 3.5 mm left foraminal perineural cyst.    C7- T1: There is a Schmorl's node in inferior endplate of C7, chronic.  There are tiny bilateral foraminal perineural cysts.  There is minimal bulging of the annulus and mild facet joint arthropathy without spinal canal or foraminal stenosis.    Soft tissues/other: The prevertebral soft tissues are normal.  The airway is patent.   Impression          1. There is multilevel degenerative disc disease described in detail above.  There is no acute fracture.  There is degenerative listhesis at  several levels.  There is some degree of disc osteophyte complex, uncovertebral spurring and/or facet joint arthropathy contributing to some degree of foraminal stenosis at several levels.  There is no significant spinal canal stenosis.  There is very subtle flattening of the ventral cord surface at the C4-5 and C6-7 levels The pertinent findings are summarized below.    2. At the C3-4 level, there is no spinal stenosis.  There is moderate to marked left foraminal stenosis.    3. At the C4-5 level there is no spinal stenosis.  There is mild to moderate left foraminal stenosis.    4. At the C5-6 level, there is mild spinal stenosis with at least moderate bilateral foraminal stenosis.    5. At the C6-7 level, there is no spinal stenosis.  There is mild bilateral foraminal stenosis.    6. There is no spinal canal or foraminal stenosis at the C2-3 or C7-T1 levels.     Assessment:  Mrs. Richter is a 79 y.o. female with neck and head pain    1. Cervical radiculitis    2. DDD (degenerative disc disease), cervical    3. Other spondylosis, cervical region    4. Myofascial pain    5. Bilateral occipital neuralgia        Plan:  1. I have stressed the importance of physical activity and exercise to improve overall health.  2. Monitor progress from repeat C7-T1 IL MELANY. Consider bilateral occipital blocks for head pain.  3. Norco 5mg q12hrs as needed for pain.  reviewed.  Previous UDS consistent   4. Flexeril 10 mg BID #60 call for prescription   5. Consider EMG for extremity numbness and tingling  6. F/u 3 months or sooner  All medication management was performed by Dr. Timothy Grimaldo

## 2025-02-05 ENCOUNTER — TELEPHONE (OUTPATIENT)
Dept: PSYCHIATRY | Facility: CLINIC | Age: 80
End: 2025-02-05
Payer: MEDICARE

## 2025-02-06 DIAGNOSIS — E78.01 FAMILIAL HYPERCHOLESTEROLEMIA: ICD-10-CM

## 2025-02-06 DIAGNOSIS — I70.0 CALCIFICATION OF AORTA: ICD-10-CM

## 2025-02-06 DIAGNOSIS — Z78.9 STATIN NOT TOLERATED: ICD-10-CM

## 2025-02-07 RX ORDER — EVOLOCUMAB 140 MG/ML
140 INJECTION, SOLUTION SUBCUTANEOUS
Qty: 2 ML | Refills: 2 | Status: ACTIVE | OUTPATIENT
Start: 2025-02-07 | End: 2025-05-08

## 2025-02-07 NOTE — TELEPHONE ENCOUNTER
Returned patient call to reschedule appt with Dr. Leong. Patient states someone called her this morning to confirm her appt on Monday with Marcie and also scheduled an appointment with Dr. Leong. I apologized to patient for calling again, but I confirmed that she was scheduled for the 12th and that we will see her then. No further needs at this time.

## 2025-02-10 ENCOUNTER — OFFICE VISIT (OUTPATIENT)
Dept: PSYCHIATRY | Facility: CLINIC | Age: 80
End: 2025-02-10
Payer: MEDICARE

## 2025-02-10 DIAGNOSIS — F41.1 GENERALIZED ANXIETY DISORDER: Primary | ICD-10-CM

## 2025-02-10 DIAGNOSIS — Z86.59 HISTORY OF POSTTRAUMATIC STRESS DISORDER (PTSD): ICD-10-CM

## 2025-02-10 DIAGNOSIS — F33.1 MODERATE EPISODE OF RECURRENT MAJOR DEPRESSIVE DISORDER: ICD-10-CM

## 2025-02-10 PROCEDURE — 90837 PSYTX W PT 60 MINUTES: CPT | Mod: HCNC,S$GLB,, | Performed by: SOCIAL WORKER

## 2025-02-10 PROCEDURE — 99999 PR PBB SHADOW E&M-EST. PATIENT-LVL I: CPT | Mod: PBBFAC,HCNC,, | Performed by: SOCIAL WORKER

## 2025-02-10 PROCEDURE — 1159F MED LIST DOCD IN RCRD: CPT | Mod: HCNC,CPTII,S$GLB, | Performed by: SOCIAL WORKER

## 2025-02-12 ENCOUNTER — OFFICE VISIT (OUTPATIENT)
Dept: PSYCHIATRY | Facility: CLINIC | Age: 80
End: 2025-02-12
Payer: MEDICARE

## 2025-02-12 VITALS
BODY MASS INDEX: 24.04 KG/M2 | SYSTOLIC BLOOD PRESSURE: 120 MMHG | HEART RATE: 77 BPM | DIASTOLIC BLOOD PRESSURE: 64 MMHG | WEIGHT: 119.25 LBS | HEIGHT: 59 IN

## 2025-02-12 DIAGNOSIS — F41.1 GENERALIZED ANXIETY DISORDER: ICD-10-CM

## 2025-02-12 DIAGNOSIS — F33.41 RECURRENT MAJOR DEPRESSIVE DISORDER, IN PARTIAL REMISSION: Primary | ICD-10-CM

## 2025-02-12 DIAGNOSIS — F19.20 DEPENDENCY ON PAIN MEDICATION: ICD-10-CM

## 2025-02-12 DIAGNOSIS — G47.00 INSOMNIA, UNSPECIFIED TYPE: ICD-10-CM

## 2025-02-12 DIAGNOSIS — Z86.59 HISTORY OF POSTTRAUMATIC STRESS DISORDER (PTSD): ICD-10-CM

## 2025-02-12 DIAGNOSIS — F03.90 MAJOR NEUROCOGNITIVE DISORDER: ICD-10-CM

## 2025-02-12 PROBLEM — F33.1 MODERATE EPISODE OF RECURRENT MAJOR DEPRESSIVE DISORDER: Status: RESOLVED | Noted: 2018-09-24 | Resolved: 2025-02-12

## 2025-02-12 PROCEDURE — G2211 COMPLEX E/M VISIT ADD ON: HCPCS | Mod: HCNC,S$GLB,, | Performed by: STUDENT IN AN ORGANIZED HEALTH CARE EDUCATION/TRAINING PROGRAM

## 2025-02-12 PROCEDURE — 99214 OFFICE O/P EST MOD 30 MIN: CPT | Mod: HCNC,S$GLB,, | Performed by: STUDENT IN AN ORGANIZED HEALTH CARE EDUCATION/TRAINING PROGRAM

## 2025-02-12 PROCEDURE — 1160F RVW MEDS BY RX/DR IN RCRD: CPT | Mod: HCNC,CPTII,S$GLB, | Performed by: STUDENT IN AN ORGANIZED HEALTH CARE EDUCATION/TRAINING PROGRAM

## 2025-02-12 PROCEDURE — 1159F MED LIST DOCD IN RCRD: CPT | Mod: HCNC,CPTII,S$GLB, | Performed by: STUDENT IN AN ORGANIZED HEALTH CARE EDUCATION/TRAINING PROGRAM

## 2025-02-12 PROCEDURE — 1101F PT FALLS ASSESS-DOCD LE1/YR: CPT | Mod: HCNC,CPTII,S$GLB, | Performed by: STUDENT IN AN ORGANIZED HEALTH CARE EDUCATION/TRAINING PROGRAM

## 2025-02-12 PROCEDURE — 3078F DIAST BP <80 MM HG: CPT | Mod: HCNC,CPTII,S$GLB, | Performed by: STUDENT IN AN ORGANIZED HEALTH CARE EDUCATION/TRAINING PROGRAM

## 2025-02-12 PROCEDURE — 3288F FALL RISK ASSESSMENT DOCD: CPT | Mod: HCNC,CPTII,S$GLB, | Performed by: STUDENT IN AN ORGANIZED HEALTH CARE EDUCATION/TRAINING PROGRAM

## 2025-02-12 PROCEDURE — 3074F SYST BP LT 130 MM HG: CPT | Mod: HCNC,CPTII,S$GLB, | Performed by: STUDENT IN AN ORGANIZED HEALTH CARE EDUCATION/TRAINING PROGRAM

## 2025-02-12 PROCEDURE — 96136 PSYCL/NRPSYC TST PHY/QHP 1ST: CPT | Mod: 59,HCNC,S$GLB, | Performed by: STUDENT IN AN ORGANIZED HEALTH CARE EDUCATION/TRAINING PROGRAM

## 2025-02-12 PROCEDURE — 1126F AMNT PAIN NOTED NONE PRSNT: CPT | Mod: HCNC,CPTII,S$GLB, | Performed by: STUDENT IN AN ORGANIZED HEALTH CARE EDUCATION/TRAINING PROGRAM

## 2025-02-12 PROCEDURE — 99999 PR PBB SHADOW E&M-EST. PATIENT-LVL III: CPT | Mod: PBBFAC,HCNC,, | Performed by: STUDENT IN AN ORGANIZED HEALTH CARE EDUCATION/TRAINING PROGRAM

## 2025-02-12 RX ORDER — BUSPIRONE HYDROCHLORIDE 5 MG/1
TABLET ORAL
Qty: 30 TABLET | Refills: 1 | Status: SHIPPED | OUTPATIENT
Start: 2025-02-12

## 2025-02-12 RX ORDER — SERTRALINE HYDROCHLORIDE 100 MG/1
100 TABLET, FILM COATED ORAL DAILY
Qty: 30 TABLET | Refills: 1 | Status: SHIPPED | OUTPATIENT
Start: 2025-02-12 | End: 2025-04-13

## 2025-02-12 RX ORDER — MIRTAZAPINE 7.5 MG/1
7.5 TABLET, FILM COATED ORAL NIGHTLY
Qty: 30 TABLET | Refills: 1 | Status: SHIPPED | OUTPATIENT
Start: 2025-02-12 | End: 2025-04-13

## 2025-02-12 NOTE — PATIENT INSTRUCTIONS
Start taking half tablet of BUSPAR instead of whole tablet  Continue other medicine as prescribed   Please keep therapy appointments     [facility]

## 2025-02-12 NOTE — PROGRESS NOTES
Individual Psychotherapy (PhD/LCSW)    2/10/2025    Site:  [facility]         Therapeutic Intervention: Met with patient.  Outpatient - Insight oriented psychotherapy 60 min - CPT code 09510, Outpatient - Behavior modifying psychotherapy 60 min - CPT code 78116, and Outpatient - Supportive psychotherapy 60 min - CPT Code 69862    Chief complaint/reason for encounter: depression and anxiety             Interval history and content of current session: Ct was referred by Dr. Leong to address depression, anxiety, and trauma. Ct arrived to session and was fully engaged.   Ct shared that her depression symptoms have decreased. She reported that once in a while, she will have an episode where she does not know where she is and she has to remind herself that she's at home. She reported that this does not happen often. She was unable provide additional details about the episodes other than her not knowing where she is. She does not believe that it's dementia per her report of being tested and there were no finding of dementia. SW and ct discussed ct considering going to [facility]. SW and ct discussed the importance of ct having more structure to her days, being around different people and the support that IOP could provide. Ct shared that she would speak to Dr. Leong about it. SW and ct also discussed the importance of ct and her  getting out of the house more during the day and trying to engage in activities to break up the monotony. Ct was receptive. Ct to continue in 1:1 sessions.       Treatment plan:  Target symptoms: depression, anxiety   Why chosen therapy is appropriate versus another modality: relevant to diagnosis, patient responds to this modality, evidence based practice  Outcome monitoring methods: self-report  Therapeutic intervention type: insight oriented psychotherapy, behavior modifying psychotherapy, supportive psychotherapy    Risk parameters:  Patient reports no suicidal ideation  Patient reports no  homicidal ideation  Patient reports no self-injurious behavior  Patient reports no violent behavior    CSSRS was completed:     Mental Status Exam:  General Appearance:  unremarkable, age appropriate   Speech: normal tone, normal rate, normal pitch, normal volume      Level of Cooperation: cooperative      Thought Processes: normal and logical   Mood: steady      Thought Content: normal, no suicidality, no homicidality, delusions, or paranoia   Affect: congruent and appropriate   Orientation: Oriented x3   Memory: recent >  intact   Attention Span & Concentration: intact   Fund of General Knowledge: intact and appropriate to age and level of education   Abstract Reasoning: interpretation of similarities was abstract   Judgment & Insight: fair, intact     Language intact       Verbal deficits: None    Patient's response to intervention:  The patient's response to intervention is accepting.    Progress toward goals and other mental status changes:  The patient's progress toward goals is fair , some improvement .    Diagnosis:     ICD-10-CM ICD-9-CM   1. Generalized anxiety disorder  F41.1 300.02   2. Moderate episode of recurrent major depressive disorder  F33.1 296.32   3. History of posttraumatic stress disorder (PTSD)  Z86.59 V11.8       Plan:  individual psychotherapy and ct to follow up with Dr. Leong for psych med mgt  Pt to go to ED or call 911 if symptoms worsen or if she has thoughts of harming self and/or others. Pt verbalized understanding.    Return to clinic: as scheduled    Length of Service (minutes): 60      A portion of this note was created using [employer] voice recognition software that occasionally misinterprets phrases or words.    Each patient to whom he or she provides medical services by telemedicine is: (1) informed of the relationship between the physician and patient and the respective role of any other health care provider with respect to management of the patient; and (2) notified that he or  she may decline to receive medical services by telemedicine and may withdraw from such care at any time.

## 2025-02-12 NOTE — PROGRESS NOTES
Outpatient Psychiatry Followup Visit  2025  Assessment & Plan    Impression     ICD-10-CM ICD-9-CM   1. Recurrent major depressive disorder, in partial remission  F33.41 296.35   2. Generalized anxiety disorder  F41.1 300.02   3. History of posttraumatic stress disorder (PTSD)  Z86.59 V11.8   4. Major neurocognitive disorder  F03.90 294.20   5. Insomnia, unspecified type  G47.00 780.52   6. Dependency on pain medication  F19.20 304.90   7. BMI 24.0-24.9, adult  Z68.24 V85.1      Plan of Care & Medication Management    Chart was reviewed. The risks and benefits of medication were discussed with pt. The treatment plan and followup plan were reviewed with pt. Pt understands to contact clinic if symptoms worsen. Pt understands to call 911 or go to nearest ER for suicidal ideation, intent or plan. Unless otherwise specified, pt did NOT display signs of nor endorse symptoms of overt psychosis or acute mood disorder requiring hospitalization during the encounter; pt denied violent thoughts or suicidal or homicidal ideation, intent, or plan.   RX History ARICEPT, BARBITURATES, BUSPAR, CYMBALTA, GABAPENTIN, PROZAC, REMERON, WELLBUTRIN, and ZOLOFT     Current RX Considering adding ABILIFY or SEROQUEL  Cognitive testin/3/2024 MOCA 8.1 14/30  12/3/2024 MINICOG v4 2024 MINICOG v2 3/5  [id number]  S-MMSE, MINICOG v1   Reduce BUSPAR  Adjustments:  2025: Reduce to 2.5mg in the afternoon  56KPK9497: Continue as reported: 5mg in the afternoon  35WIW8678: Adjust to 5-7.5mg in the afternoon  89NUI3248: Reduce to 7.5mg in the afternoon  84TDB1808: Reduce to 7.5mg BID  74PYV9858: Increase to 10mg BID  2023: Start 5mg BID for 3 days  Prior to 2023 pt had been taking ZOLOFT 150mg daily  Continue REMERON  Pt was provided [employer] educational material 3/12/2024.  Adjustments:  2024: Start 7.5mg HS  Prior to 2024 pt had most recently tried CYMBALTA  Continue ZOLOFT  Adjustments:  2024:  "Increase to 100mg daily  11YVP4544: Restart 50mg daily  [id number] pt tried CYMBALTA. Adherence was intermittent and response was inadequate.  62OBM1647: Taper to DC:  100mg daily for 5 days, then 50mg daily for 5 days, then 25mg daily for 6 days, then discontinue  93NQK0669: Reduce to 150mg daily  49GWM8258: Increase to 200mg daily  74ZHZ1929: Continue 150mg daily  Prior to evaluation pt had been taking 150mg daily      Education, Counseling & Monitoring []SLEEP HYGIENE  []THERAPY  []CONTRACT 2/12/2025?  [] REVIEWED 2/12/2025?  []NRT  []   Other Orders    RETURN T: STANDARD PROTOCOL: RETURN IN 8 WEEKS (TWO MONTHS)       Subjective    Available documentation has been reviewed, and pertinent elements of the chart have been incorporated into this note where appropriate. Last Epic encounter with writer was on 12/3/2024   Rola Richter, a 79 y.o. female, presenting for followup visit. This visit was done in person, IN CLINIC.      Case discussed briefly with therapist today    "Sometimes I get these moods like I'm NOT here, like my day is cloudy"  Boredom  Little social life    Encouraged pt to start participating in activities at [facility] [employer]    Getting along better with   Denies depressed mood  NO crying spells    Keeping therapy appointments   Seems to be adherent to pharmacotherapy  Discussed reducing BUSPAR dose to reduce polypharmacy  Will continue treatment otherwise as planned        Objective    Mental Status and Physical Exam  1. Appearance: Dress is informal but appropriate. Motor activity normal.  2. Discourse: Clear speech with normal rate and volume. Associations intact. Orderly.  3. Emotional Expression: Euthymic mood with appropriate affect.  4. Perception and Thinking: No hallucinations. No suicidality, no homicidality, delusions, or paranoia.  5. Sensorium: Grossly intact. Able to focus for interview.  6. Memory and Fund of Knowledge: Intact for content of interview.  7. " "Insight and Judgment: Intact.    Constitutional / General  Vitals:    02/12/25 1319   BP: 120/64   Pulse: 77   Weight: 54.1 kg (119 lb 4.3 oz)   Height: 4' 11" (1.499 m)     (Current body mass index is 24.09 kg/m².)         Measurement-Based Care (MBC):     Routine Instruments   PROMIS-ANXIETY Interpretation: 13 (4a raw score): T-SCORE 65.3; MODERATE using 55-60-70 cutoffs.   GDS4 Interpretation: 0/4; NEGATIVE for depression   PSS4 Interpretation: 08/16; MODERATE using 6-11 cutoffs. 0 PH, 0 LSE.   Additional Instruments   MBC ANCHOR : a little better         Auto-populated chart data omitted from this note for brevity.      Billing Documentation:     Method of Encounter IN PERSON visit at the clinic, established   E/M Code 50115: FOLLOW UP VISIT, Rx mgmt, "Multiple STABLE chronic illnesses"   Additional Codes and Modifiers     45804, with modifer 59: administered and scored more than one psychological or neuropsychological tests (see MBC above) (16+ mins)  , without modifiers -24,-25,-53: COMPLEXITY: Visit today included increased complexity associated with the care of the episodic problem(s) (multiple psychiatric disorders - see above) addressed and managing the longitudinal care of the patient due to the serious and/or complex managed problem(s) (multiple psychiatric disorders - see above).   Time N/A - Not billing for time        Will Leong DO  Department of Psychiatry, [facility]        "

## 2025-02-13 DIAGNOSIS — R10.13 EPIGASTRIC PAIN: ICD-10-CM

## 2025-02-13 RX ORDER — PANTOPRAZOLE SODIUM 40 MG/1
40 TABLET, DELAYED RELEASE ORAL DAILY
Qty: 90 TABLET | Refills: 3 | Status: SHIPPED | OUTPATIENT
Start: 2025-02-13 | End: 2025-02-19 | Stop reason: SDUPTHER

## 2025-02-13 NOTE — TELEPHONE ENCOUNTER
----- Message from Qian sent at 2/13/2025  2:29 PM CST -----  Type:  Pharmacy Calling to Clarify an RX    Name of Caller:  pt  Pharmacy Name:    [id number] - [place] - [address] AT [facility] [address]  [address]  [place] [address]  Phone: [phone] Fax: [phone]    [facility] - [place], [place] - [address]  [address] [facility] [address]  Phone: [phone] Fax: [phone]     Prescription Name:  pantoprazole (PROTONIX) 40 MG tablet   What do they need to clarify?:  needs PA  Best Call Back Number:  [phone]   Additional Information:  pt says pharmacy needs doctors consent. Pt states she really needs it hospital prescribed a while back. Please call back to advise. Thanks!

## 2025-02-19 DIAGNOSIS — R10.13 EPIGASTRIC PAIN: ICD-10-CM

## 2025-02-19 RX ORDER — PANTOPRAZOLE SODIUM 40 MG/1
40 TABLET, DELAYED RELEASE ORAL DAILY
Qty: 90 TABLET | Refills: 3 | Status: SHIPPED | OUTPATIENT
Start: 2025-02-19 | End: 2026-02-14

## 2025-02-19 NOTE — TELEPHONE ENCOUNTER
Refill Decision Note   Rola Tinker  is requesting a refill authorization.  Brief Assessment and Rationale for Refill:  Approve     Medication Therapy Plan:  Approved 2/13/25. Requesting to different pharmacy      Comments:     Note composed:1:13 PM 02/19/2025

## 2025-02-19 NOTE — TELEPHONE ENCOUNTER
----- Message from Janet sent at 2/19/2025 10:12 AM CST -----  Type:  RX Refill RequestWho Called:  PTRefill or New Rx: RefillRX Name and Strength:Outpatient Medication Detail Disp Refills Start End pantoprazole (PROTONIX) 40 MG tablet 90 tablet 3 2/13/2025 2/8/2026 Sig - Route: Take 1 tablet (40 mg total) by mouth once daily. - Oral Sent to pharmacy as: pantoprazole (PROTONIX) 40 MG tablet How is the patient currently taking it? (ex. 1XDay): see aboveIs this a 30 day or 90 day RX:see abovePreferred Pharmacy with phone number:[id number] - [place], [place] - [address]  [facility] [id number] [facility] [address]: [phone] Fax: [phone] or Mail Order: localOrdering Provider:Chrisould the patient rather a call back or a response via [facility]? [employer] Call Back Number:[phone] Additional Information:  states 2 time calling about rx Please call back to advise. Thank you!

## 2025-02-19 NOTE — TELEPHONE ENCOUNTER
No care due was identified.  [facility] Embedded Care Due Messages. Reference number: [id number].   2/19/2025 10:34:09 AM CST

## 2025-03-16 DIAGNOSIS — I10 HYPERTENSION, UNSPECIFIED TYPE: ICD-10-CM

## 2025-03-16 NOTE — TELEPHONE ENCOUNTER
No care due was identified.  [facility] Embedded Care Due Messages. Reference number: [id number].   3/16/2025 1:13:35 PM CDT

## 2025-03-17 ENCOUNTER — TELEPHONE (OUTPATIENT)
Dept: PSYCHIATRY | Facility: CLINIC | Age: 80
End: 2025-03-17
Payer: MEDICARE

## 2025-03-17 RX ORDER — IRBESARTAN 300 MG/1
300 TABLET ORAL NIGHTLY
Qty: 90 TABLET | Refills: 2 | Status: SHIPPED | OUTPATIENT
Start: 2025-03-17

## 2025-03-17 NOTE — TELEPHONE ENCOUNTER
Refill Decision Note   Rola Richter  is requesting a refill authorization.  Brief Assessment and Rationale for Refill:  Approve     Medication Therapy Plan:         Comments:     Note composed:12:32 PM 03/17/2025

## 2025-03-17 NOTE — TELEPHONE ENCOUNTER
Called and spoke to patient and she was thinking she missed Dr Leong visit but I explained she missed Marcie visit on 03/10/2025. She apologized for missing and rescheduled for 04/15/2025 @ 2PM  Sent to override to have scheduled

## 2025-03-17 NOTE — TELEPHONE ENCOUNTER
Rec'd GISELLA. Pt is requesting a call back to discuss letter for appt with Marcie that was a no show on 3/10/25.

## 2025-03-19 ENCOUNTER — PATIENT MESSAGE (OUTPATIENT)
Dept: ADMINISTRATIVE | Facility: HOSPITAL | Age: 80
End: 2025-03-19
Payer: MEDICARE

## 2025-03-28 ENCOUNTER — OFFICE VISIT (OUTPATIENT)
Dept: ORTHOPEDICS | Facility: CLINIC | Age: 80
End: 2025-03-28
Payer: MEDICARE

## 2025-03-28 VITALS — WEIGHT: 119.25 LBS | HEIGHT: 59 IN | BODY MASS INDEX: 24.04 KG/M2

## 2025-03-28 DIAGNOSIS — G89.29 CHRONIC RIGHT SHOULDER PAIN: Primary | ICD-10-CM

## 2025-03-28 DIAGNOSIS — G89.29 CHRONIC LEFT SHOULDER PAIN: ICD-10-CM

## 2025-03-28 DIAGNOSIS — M25.512 CHRONIC LEFT SHOULDER PAIN: ICD-10-CM

## 2025-03-28 DIAGNOSIS — M75.52 BURSITIS OF BOTH SHOULDERS: ICD-10-CM

## 2025-03-28 DIAGNOSIS — M75.51 BURSITIS OF BOTH SHOULDERS: ICD-10-CM

## 2025-03-28 DIAGNOSIS — M25.511 CHRONIC RIGHT SHOULDER PAIN: Primary | ICD-10-CM

## 2025-03-28 PROCEDURE — 99999 PR PBB SHADOW E&M-EST. PATIENT-LVL III: CPT | Mod: PBBFAC,HCNC,, | Performed by: FAMILY MEDICINE

## 2025-03-28 RX ORDER — METHYLPREDNISOLONE ACETATE 80 MG/ML
80 INJECTION, SUSPENSION INTRA-ARTICULAR; INTRALESIONAL; INTRAMUSCULAR; SOFT TISSUE
Status: DISCONTINUED | OUTPATIENT
Start: 2025-03-28 | End: 2025-03-28 | Stop reason: HOSPADM

## 2025-03-28 RX ADMIN — METHYLPREDNISOLONE ACETATE 80 MG: 80 INJECTION, SUSPENSION INTRA-ARTICULAR; INTRALESIONAL; INTRAMUSCULAR; SOFT TISSUE at 10:03

## 2025-03-28 NOTE — PROGRESS NOTES
Subjective     Patient ID: Rola Richter is a 79 y.o. female.    Chief Complaint: Shoulder Pain (Pt here w/c /o bilat shoulder pain. /Last injection 12/27/24-Depo /Wants injections today. )    Patient returns today with complaints of bilateral shoulder pain secondary to subacromial bursitis.  Has responded well to cortisone injections in the past.  Last received an injection in both shoulders on December 27, 2024.  Pain returned about a month ago.  She complains of severe pain with any motion of the shoulder.  Is requesting repeat injections today.    Shoulder Pain     Review of Systems   Constitutional: Negative for chills and decreased appetite.   HENT:  Negative for congestion and sore throat.    Eyes:  Negative for blurred vision.   Cardiovascular:  Negative for chest pain, dyspnea on exertion and palpitations.   Respiratory:  Negative for cough and shortness of breath.    Skin:  Negative for rash.   Neurological:  Negative for difficulty with concentration, disturbances in coordination and headaches.   Psychiatric/Behavioral:  Negative for altered mental status, depression, hallucinations, memory loss and suicidal ideas.           Objective     General    Nursing note and vitals reviewed.  Constitutional: She is oriented to person, place, and time. She appears well-developed and well-nourished.   HENT:   Nose: Nose normal.   Eyes: EOM are normal. Pupils are equal, round, and reactive to light.   Neck: Neck supple.   Cardiovascular:  Normal rate.            Pulmonary/Chest: Effort normal.   Abdominal: Soft.   Neurological: She is alert and oriented to person, place, and time. She has normal reflexes.   Psychiatric: She has a normal mood and affect. Her behavior is normal. Judgment and thought content normal.         Right Shoulder Exam     Inspection/Observation   Swelling: absent  Bruising: absent  Scars: absent  Deformity: absent  Scapular Winging: absent  Scapular Dyskinesia: negative    Tenderness   The  patient is tender to palpation of the acromioclavicular joint and supraspinatus.    Range of Motion   Active abduction:  150   Passive abduction:  normal   Extension:  normal   Forward Flexion:  150   Forward Elevation: normal  Adduction: 90     Tests & Signs   Jennings test: positive  Impingement: positive  Active Compression Test (Broadwater's Sign): negative  Speed's Test: negative  Anterior Drawer Test: 0   Posterior Drawer Test: 0    Other   Sensation: normal    Left Shoulder Exam   Left shoulder exam is normal.       Muscle Strength   Right Upper Extremity   Shoulder Abduction: 5/5   Shoulder Internal Rotation: 5/5   Shoulder External Rotation: 5/5   Supraspinatus: 5/5   Subscapularis: 5/5   Biceps: 5/5     Vascular Exam     Right Pulses      Radial:                    2+    Physical Exam  Vitals and nursing note reviewed.   Constitutional:       Appearance: She is well-developed and well-nourished.   HENT:      Nose: Nose normal.   Eyes:      Extraocular Movements: EOM normal.      Pupils: Pupils are equal, round, and reactive to light.   Cardiovascular:      Rate and Rhythm: Normal rate.      Pulses:           Radial pulses are 2+ on the right side.   Pulmonary:      Effort: Pulmonary effort is normal.   Abdominal:      Palpations: Abdomen is soft.   Musculoskeletal:      Right shoulder: No swelling or deformity.      Cervical back: Neck supple.   Neurological:      Mental Status: She is alert and oriented to person, place, and time.      Deep Tendon Reflexes: Reflexes are normal and symmetric.   Psychiatric:         Mood and Affect: Mood and affect normal.         Behavior: Behavior normal.         Thought Content: Thought content normal.         Judgment: Judgment normal.           Assessment and Plan     Encounter Diagnoses   Name Primary?    Chronic right shoulder pain Yes    Chronic left shoulder pain     Bursitis of both shoulders          Rola was seen today for shoulder pain.    Diagnoses and all  orders for this visit:    Chronic right shoulder pain    Chronic left shoulder pain    Bursitis of both shoulders    Gave her injections in both shoulders today.  Will have her follow up in three months for recheck of this issue.    Large Joint Aspiration/Injection: bilateral subacromial bursa    Date/Time: 3/28/2025 10:20 AM    Performed by: Eliseo Oliva MD  Authorized by: Eliseo Oliva MD    Consent Done?:  Yes (Verbal)  Indications:  Arthritis and pain  Site marked: the procedure site was marked    Timeout: prior to procedure the correct patient, procedure, and site was verified    Prep: patient was prepped and draped in usual sterile fashion      Local anesthesia used?: Yes    Local anesthetic:  Lidocaine 1% without epinephrine and topical anesthetic  Anesthetic total (ml):  4      Details:  Needle Size:  22 G  Ultrasonic Guidance for needle placement?: No    Approach:  Posterior  Location:  Shoulder  Laterality:  Bilateral  Site:  Bilateral subacromial bursa  Medications (Right):  80 mg methylPREDNISolone acetate 80 mg/mL  Medications (Left):  80 mg methylPREDNISolone acetate 80 mg/mL  Patient tolerance:  Patient tolerated the procedure well with no immediate complications

## 2025-04-09 ENCOUNTER — OFFICE VISIT (OUTPATIENT)
Dept: PSYCHIATRY | Facility: CLINIC | Age: 80
End: 2025-04-09
Payer: MEDICARE

## 2025-04-09 VITALS
SYSTOLIC BLOOD PRESSURE: 146 MMHG | DIASTOLIC BLOOD PRESSURE: 62 MMHG | WEIGHT: 117.19 LBS | BODY MASS INDEX: 23.67 KG/M2

## 2025-04-09 DIAGNOSIS — F33.41 RECURRENT MAJOR DEPRESSIVE DISORDER, IN PARTIAL REMISSION: Primary | ICD-10-CM

## 2025-04-09 DIAGNOSIS — F19.20 DEPENDENCY ON PAIN MEDICATION: ICD-10-CM

## 2025-04-09 DIAGNOSIS — Z86.59 HISTORY OF POSTTRAUMATIC STRESS DISORDER (PTSD): ICD-10-CM

## 2025-04-09 DIAGNOSIS — F41.1 GENERALIZED ANXIETY DISORDER: ICD-10-CM

## 2025-04-09 DIAGNOSIS — G47.00 INSOMNIA, UNSPECIFIED TYPE: ICD-10-CM

## 2025-04-09 DIAGNOSIS — F03.90 MAJOR NEUROCOGNITIVE DISORDER: ICD-10-CM

## 2025-04-09 PROCEDURE — 3077F SYST BP >= 140 MM HG: CPT | Mod: HCNC,CPTII,S$GLB, | Performed by: STUDENT IN AN ORGANIZED HEALTH CARE EDUCATION/TRAINING PROGRAM

## 2025-04-09 PROCEDURE — 1159F MED LIST DOCD IN RCRD: CPT | Mod: HCNC,CPTII,S$GLB, | Performed by: STUDENT IN AN ORGANIZED HEALTH CARE EDUCATION/TRAINING PROGRAM

## 2025-04-09 PROCEDURE — G2211 COMPLEX E/M VISIT ADD ON: HCPCS | Mod: HCNC,S$GLB,, | Performed by: STUDENT IN AN ORGANIZED HEALTH CARE EDUCATION/TRAINING PROGRAM

## 2025-04-09 PROCEDURE — 1125F AMNT PAIN NOTED PAIN PRSNT: CPT | Mod: HCNC,CPTII,S$GLB, | Performed by: STUDENT IN AN ORGANIZED HEALTH CARE EDUCATION/TRAINING PROGRAM

## 2025-04-09 PROCEDURE — 1101F PT FALLS ASSESS-DOCD LE1/YR: CPT | Mod: HCNC,CPTII,S$GLB, | Performed by: STUDENT IN AN ORGANIZED HEALTH CARE EDUCATION/TRAINING PROGRAM

## 2025-04-09 PROCEDURE — 99999 PR PBB SHADOW E&M-EST. PATIENT-LVL III: CPT | Mod: PBBFAC,HCNC,, | Performed by: STUDENT IN AN ORGANIZED HEALTH CARE EDUCATION/TRAINING PROGRAM

## 2025-04-09 PROCEDURE — 3078F DIAST BP <80 MM HG: CPT | Mod: HCNC,CPTII,S$GLB, | Performed by: STUDENT IN AN ORGANIZED HEALTH CARE EDUCATION/TRAINING PROGRAM

## 2025-04-09 PROCEDURE — 3288F FALL RISK ASSESSMENT DOCD: CPT | Mod: HCNC,CPTII,S$GLB, | Performed by: STUDENT IN AN ORGANIZED HEALTH CARE EDUCATION/TRAINING PROGRAM

## 2025-04-09 PROCEDURE — 1160F RVW MEDS BY RX/DR IN RCRD: CPT | Mod: HCNC,CPTII,S$GLB, | Performed by: STUDENT IN AN ORGANIZED HEALTH CARE EDUCATION/TRAINING PROGRAM

## 2025-04-09 PROCEDURE — 99214 OFFICE O/P EST MOD 30 MIN: CPT | Mod: HCNC,S$GLB,, | Performed by: STUDENT IN AN ORGANIZED HEALTH CARE EDUCATION/TRAINING PROGRAM

## 2025-04-09 PROCEDURE — 96136 PSYCL/NRPSYC TST PHY/QHP 1ST: CPT | Mod: 59,HCNC,S$GLB, | Performed by: STUDENT IN AN ORGANIZED HEALTH CARE EDUCATION/TRAINING PROGRAM

## 2025-04-09 RX ORDER — SERTRALINE HYDROCHLORIDE 100 MG/1
100 TABLET, FILM COATED ORAL DAILY
Qty: 30 TABLET | Refills: 1 | Status: SHIPPED | OUTPATIENT
Start: 2025-04-09 | End: 2025-06-08

## 2025-04-09 RX ORDER — MIRTAZAPINE 7.5 MG/1
7.5 TABLET, FILM COATED ORAL NIGHTLY
Qty: 30 TABLET | Refills: 1 | Status: SHIPPED | OUTPATIENT
Start: 2025-04-09 | End: 2025-06-08

## 2025-04-09 NOTE — PATIENT INSTRUCTIONS
Stop taking BUSPAR (BUSPIRONE)  Continue to take both ZOLOFT (sertraline) and REMERON (mirtazapine)

## 2025-04-09 NOTE — PROGRESS NOTES
Outpatient Psychiatry Followup Visit  2025  Assessment & Plan    Impression     ICD-10-CM ICD-9-CM   1. Recurrent major depressive disorder, in partial remission  F33.41 296.35   2. Generalized anxiety disorder  F41.1 300.02   3. History of posttraumatic stress disorder (PTSD)  Z86.59 V11.8   4. Major neurocognitive disorder  F03.90 294.20   5. Insomnia, unspecified type  G47.00 780.52   6. Dependency on pain medication  F19.20 304.90   7. BMI 23.0-23.9, adult  Z68.23 V85.1      Plan of Care & Medication Management    Chart was reviewed. The risks and benefits of medication were discussed with pt. The treatment plan and followup plan were reviewed with pt. Pt understands to contact clinic if symptoms worsen. Pt understands to call 911 or go to nearest ER for suicidal ideation, intent or plan. Unless otherwise specified, pt did NOT display signs of nor endorse symptoms of overt psychosis or acute mood disorder requiring hospitalization during the encounter; pt denied violent thoughts or suicidal or homicidal ideation, intent, or plan.   RX History ARICEPT, BARBITURATES, BUSPAR, CYMBALTA, GABAPENTIN, PROZAC, REMERON, WELLBUTRIN, and ZOLOFT     Current RX Considering adding ABILIFY or SEROQUEL  Cognitive testin/3/2024 MOCA 8.1 14/30  12/3/2024 MINICOG v4 22024 MINICOG v2 3/5  51KGJ3219  S-MMSE, MINICOG v1   Discontinue BUSPAR  Adjustments:  2025: Discontinue BUSPAR  2025: Reduce to 2.5mg in the afternoon  19JYF9603: Continue as reported: 5mg in the afternoon  35LRG5241: Adjust to 5-7.5mg in the afternoon  80XGQ9191: Reduce to 7.5mg in the afternoon  54FWE9351: Reduce to 7.5mg BID  07GEW3304: Increase to 10mg BID  81ECW9156: Start 5mg BID for 3 days  Prior to 2023 pt had been taking ZOLOFT 150mg daily  Continue REMERON  Pt was provided [employer] educational material 3/12/2024.  Adjustments:  2024: Start 7.5mg HS  Prior to 2024 pt had most recently tried CYMBALTA  Continue  ZOLOFT  Adjustments:  7/23/2024: Increase to 100mg daily  43BRT6891: Restart 50mg daily  [id number] pt tried CYMBALTA. Adherence was intermittent and response was inadequate.  06DRR7380: Taper to DC:  100mg daily for 5 days, then 50mg daily for 5 days, then 25mg daily for 6 days, then discontinue  21FPH6489: Reduce to 150mg daily  19AEI1950: Increase to 200mg daily  50TQG5054: Continue 150mg daily  Prior to evaluation pt had been taking 150mg daily      Education, Counseling & Monitoring []SLEEP HYGIENE  []THERAPY  []CONTRACT 4/9/2025?  [] REVIEWED 4/9/2025?  []NRT  []   Other Orders    RETURN R: STANDARD PROTOCOL: RETURN IN 8 WEEKS (TWO MONTHS)       Subjective    Available documentation has been reviewed, and pertinent elements of the chart have been incorporated into this note where appropriate. Last Epic encounter with writer was on 2/12/2025   Rola Richter, a 79 y.o. female, presenting for followup visit. This visit was done in person, IN CLINIC.      Less boredom  Better mood  Mood is stable  Denies depressed mood  Staying busy, enrolled at [facility], getting outside  Socialization intact    Anxiety is controlled  Sleeping well    Discussed discontinue BUSPAR  Reports doing well and would like to meet with therapist less often  Continue treatment otherwise as planned        Objective    Vitals:    04/09/25 1350   BP: (!) 146/62   Weight: 53.1 kg (117 lb 2.8 oz)     (Current body mass index is 23.67 kg/m².)    MSE/PE  1. Appearance: Dress is informal but appropriate. Motor activity normal.  2. Discourse: Clear speech with normal rate and volume. Associations intact. Orderly.  3. Emotional Expression: Euthymic mood.  4. Perception and Thinking: No hallucinations. No suicidality, no homicidality, delusions, or paranoia.  5. Sensorium: Grossly intact. Able to focus for interview.  6. Memory and Fund of Knowledge: Intact for content of interview.  7. Insight and Judgment: Intact.    "    Measurement-Based Care (MBC):     Routine Instruments   PROMIS-ANXIETY Interpretation: 10 (4a raw score): T-SCORE 59.5; MILD using 55-60-70 cutoffs.   GDS4 Interpretation: 0/4; NEGATIVE for depression   PSS4 Interpretation: 700: Stress appraisal is MODERATE. 0 PH, 0 LSE.   Additional Instruments            Auto-populated chart data omitted from this note for brevity.      Billing Documentation:     Method of Encounter IN PERSON visit at the clinic, established   E/M Code 43507: FOLLOW UP VISIT, Rx mgmt, "Multiple STABLE chronic illnesses"   Additional Codes and Modifiers     98268, with modifer 59: administered and scored more than one psychological or neuropsychological tests (see MBC above) (16+ mins)  , without modifiers -24,-25,-53: COMPLEXITY: Visit today included increased complexity associated with the care of the episodic problem(s) (multiple psychiatric disorders - see above) addressed and managing the longitudinal care of the patient due to the serious and/or complex managed problem(s) (multiple psychiatric disorders - see above).   Time N/A - Not billing for time        Will Leong DO  Department of Psychiatry, [facility]        "

## 2025-04-14 ENCOUNTER — OFFICE VISIT (OUTPATIENT)
Dept: PAIN MEDICINE | Facility: CLINIC | Age: 80
End: 2025-04-14
Payer: MEDICARE

## 2025-04-14 ENCOUNTER — OFFICE VISIT (OUTPATIENT)
Dept: ORTHOPEDICS | Facility: CLINIC | Age: 80
End: 2025-04-14
Payer: MEDICARE

## 2025-04-14 VITALS
WEIGHT: 117.06 LBS | WEIGHT: 117.06 LBS | SYSTOLIC BLOOD PRESSURE: 132 MMHG | DIASTOLIC BLOOD PRESSURE: 62 MMHG | HEIGHT: 59 IN | HEIGHT: 59 IN | BODY MASS INDEX: 23.6 KG/M2 | BODY MASS INDEX: 23.6 KG/M2 | HEART RATE: 76 BPM

## 2025-04-14 DIAGNOSIS — G89.29 CHRONIC RIGHT SHOULDER PAIN: ICD-10-CM

## 2025-04-14 DIAGNOSIS — M47.892 OTHER SPONDYLOSIS, CERVICAL REGION: ICD-10-CM

## 2025-04-14 DIAGNOSIS — M75.21 BICEPS TENDINITIS OF RIGHT SHOULDER: Primary | ICD-10-CM

## 2025-04-14 DIAGNOSIS — M25.551 RIGHT HIP PAIN: ICD-10-CM

## 2025-04-14 DIAGNOSIS — Z79.891 CHRONIC USE OF OPIATE FOR THERAPEUTIC PURPOSE: Primary | ICD-10-CM

## 2025-04-14 DIAGNOSIS — M50.30 DDD (DEGENERATIVE DISC DISEASE), CERVICAL: ICD-10-CM

## 2025-04-14 DIAGNOSIS — G89.4 CHRONIC PAIN DISORDER: ICD-10-CM

## 2025-04-14 DIAGNOSIS — M54.12 CERVICAL RADICULITIS: ICD-10-CM

## 2025-04-14 DIAGNOSIS — M25.511 CHRONIC RIGHT SHOULDER PAIN: ICD-10-CM

## 2025-04-14 DIAGNOSIS — M79.18 MYOFASCIAL PAIN: ICD-10-CM

## 2025-04-14 DIAGNOSIS — M75.51 ACUTE BURSITIS OF RIGHT SHOULDER: ICD-10-CM

## 2025-04-14 PROCEDURE — 76942 ECHO GUIDE FOR BIOPSY: CPT | Mod: S$GLB,,, | Performed by: FAMILY MEDICINE

## 2025-04-14 PROCEDURE — 99999 PR PBB SHADOW E&M-EST. PATIENT-LVL IV: CPT | Mod: PBBFAC,,, | Performed by: PHYSICIAN ASSISTANT

## 2025-04-14 PROCEDURE — 20550 NJX 1 TENDON SHEATH/LIGAMENT: CPT | Mod: RT,S$GLB,, | Performed by: FAMILY MEDICINE

## 2025-04-14 PROCEDURE — 1159F MED LIST DOCD IN RCRD: CPT | Mod: CPTII,S$GLB,, | Performed by: FAMILY MEDICINE

## 2025-04-14 PROCEDURE — 1101F PT FALLS ASSESS-DOCD LE1/YR: CPT | Mod: CPTII,S$GLB,, | Performed by: FAMILY MEDICINE

## 2025-04-14 PROCEDURE — 99214 OFFICE O/P EST MOD 30 MIN: CPT | Mod: 25,S$GLB,, | Performed by: FAMILY MEDICINE

## 2025-04-14 PROCEDURE — 80307 DRUG TEST PRSMV CHEM ANLYZR: CPT | Performed by: PHYSICIAN ASSISTANT

## 2025-04-14 PROCEDURE — 1125F AMNT PAIN NOTED PAIN PRSNT: CPT | Mod: CPTII,S$GLB,, | Performed by: FAMILY MEDICINE

## 2025-04-14 PROCEDURE — 3288F FALL RISK ASSESSMENT DOCD: CPT | Mod: CPTII,S$GLB,, | Performed by: FAMILY MEDICINE

## 2025-04-14 PROCEDURE — 99999 PR PBB SHADOW E&M-EST. PATIENT-LVL III: CPT | Mod: PBBFAC,,, | Performed by: FAMILY MEDICINE

## 2025-04-14 RX ORDER — METHYLPREDNISOLONE ACETATE 80 MG/ML
80 INJECTION, SUSPENSION INTRA-ARTICULAR; INTRALESIONAL; INTRAMUSCULAR; SOFT TISSUE
Status: DISCONTINUED | OUTPATIENT
Start: 2025-04-14 | End: 2025-04-14 | Stop reason: HOSPADM

## 2025-04-14 RX ORDER — HYDROCODONE BITARTRATE AND ACETAMINOPHEN 5; 325 MG/1; MG/1
1 TABLET ORAL EVERY 12 HOURS PRN
Qty: 60 TABLET | Refills: 0 | Status: SHIPPED | OUTPATIENT
Start: 2025-05-13 | End: 2025-06-11

## 2025-04-14 RX ORDER — MELOXICAM 15 MG/1
15 TABLET ORAL DAILY PRN
Qty: 30 TABLET | Refills: 1 | Status: SHIPPED | OUTPATIENT
Start: 2025-04-14

## 2025-04-14 RX ORDER — CYCLOBENZAPRINE HCL 10 MG
10 TABLET ORAL 3 TIMES DAILY PRN
Qty: 90 TABLET | Refills: 2 | Status: SHIPPED | OUTPATIENT
Start: 2025-04-14 | End: 2025-07-13

## 2025-04-14 RX ORDER — HYDROCODONE BITARTRATE AND ACETAMINOPHEN 5; 325 MG/1; MG/1
1 TABLET ORAL EVERY 12 HOURS PRN
Qty: 60 TABLET | Refills: 0 | Status: SHIPPED | OUTPATIENT
Start: 2025-04-14 | End: 2025-05-13

## 2025-04-14 RX ORDER — METHYLPREDNISOLONE 4 MG/1
TABLET ORAL
Qty: 21 EACH | Refills: 0 | Status: SHIPPED | OUTPATIENT
Start: 2025-04-14

## 2025-04-14 RX ORDER — HYDROCODONE BITARTRATE AND ACETAMINOPHEN 5; 325 MG/1; MG/1
1 TABLET ORAL EVERY 12 HOURS PRN
Qty: 60 TABLET | Refills: 0 | Status: SHIPPED | OUTPATIENT
Start: 2025-06-11 | End: 2025-07-10

## 2025-04-14 RX ADMIN — METHYLPREDNISOLONE ACETATE 80 MG: 80 INJECTION, SUSPENSION INTRA-ARTICULAR; INTRALESIONAL; INTRAMUSCULAR; SOFT TISSUE at 10:04

## 2025-04-14 NOTE — PROGRESS NOTES
Referring Physician: No ref. provider found    PCP: Liseth Carias MD      CC: neck and occipital pain    Interval history: Ms. Richter is a 79 y.o. female with neck pain and occipital neuralgia who returns to our clinic.  She continues to c/o headaches and neck pain.  Most recent occcipital nerve block only provided mild short lived beneft. Recent cervical MELANY improved neck pain that was extending into left shoulder.  She has numbness to her right hand and first through fourth digits. Cervical MELANY in February 2018  provided benefit for several weeks.    She continues to take Norco with benefit.  Flexeril 10 mg caused dry mouth however this resolved and she wishes to resume. Robaxin was helpful but caused dizziness.  Denies UE weakness. No bowel bladder changes.   She does c/o intermittent numbness and tingling in b/l hands and feet. Pain today is rated 8/10.    Prior HPI:   Patient is 70-year-old female with past history history of cervical DDD, cervical spondylosis and chronic headaches.  She recently moved here from [place], [place].  She is treated in the past by neurology.  She states having constant burning pain over the left side of her posterior scalp.  Pain radiates to her neck as well as a frontal.  She also has left-sided neck pain as well.  She denies any radicular arm pain.  No numbness or weakness.  She states having cervical epidural steroid injection at past with minimal benefit.  She also has had decided of cervical nerve blocks in the past with moderate benefit.  Most recent injection was performed 2 months ago.  She desires repeat injection.  She currently takes Norco 10 mg every 12 hours as needed with moderate benefit.  She also takes Zanaflex 4 mg every 8 hours with mild benefits.  She rates her pain 7/10 today.    Pain intervention history: s/p left occipital nerve blocks on 1/2016 with 50% relief of her headaches  S/p cervical MELANY 2/8/18 moderate relief for a couple of weeks.      ROS:  CONSTITUTIONAL: No fevers, chills, night sweats, wt. loss, appetite changes  SKIN: no rashes or itching  ENT: No headaches, head trauma, vision changes, or eye pain  LYMPH NODES: None noticed   CV: No chest pain, palpitations.   RESP: No shortness of breath, dyspnea on exertion, cough, wheezing, or hemoptysis  GI: No nausea, emesis, diarrhea, constipation, melena, hematochezia, pain.    : No dysuria, hematuria, urgency, or frequency   HEME: No easy bruising, bleeding problems  PSYCHIATRIC: No depression, anxiety, psychosis, hallucinations.  NEURO: No seizures, memory loss, dizziness or difficulty sleeping  MSK: + History of present illness      Past Medical History:   Diagnosis Date    Anemia of chronic disease 2024    Anxiety     Arthritis     Cataract     Colon polyp     DDD (degenerative disc disease), lumbar     Depression     Encounter for blood transfusion     GERD (gastroesophageal reflux disease)     Headache     Hyperlipidemia     Hypertension     pt states she does not take meds anymore she just watches her weight    Insomnia 3/12/2024    Major neurocognitive disorder 12/3/2024    Neuromuscular disorder     Occipital neuralgia 2017    Osteoporosis     Seizures      Past Surgical History:   Procedure Laterality Date    APPENDECTOMY      CATARACT EXTRACTION W/  INTRAOCULAR LENS IMPLANT Left 10/25/2023    Procedure: CEIOL OS;  Surgeon: Rustam Dyer MD;  Location: [facility] OR;  Service: Ophthalmology;  Laterality: Left;  Last Case of day, short eye, very high power IOL    CATARACT EXTRACTION W/  INTRAOCULAR LENS IMPLANT Right 1/10/2024    Procedure: CEIOL OD Preop Mannitol;  Surgeon: Rustam Dyer MD;  Location: [facility] OR;  Service: Ophthalmology;  Laterality: Right;  Will need pre-op Mannitol     SECTION      x 2    CHOLECYSTECTOMY      COLONOSCOPY  prior to     COLONOSCOPY N/A 2019    Procedure: COLONOSCOPY;  Surgeon: Greg Victor MD;  Location: [facility]  ENDO;  Service: Endoscopy;  Laterality: N/A; 2 colon polyps, hemorrhoids; repeat in 5 years for surveillance; biopsy: Tubular adenoma x2    COLONOSCOPY N/A 5/1/2024    Procedure: COLONOSCOPY;  Surgeon: Greg Victor MD;  Location: [facility];  Service: Endoscopy;  Laterality: N/A;    EPIDURAL STEROID INJECTION INTO CERVICAL SPINE N/A 9/20/2022    Procedure: Injection-steroid-epidural-cervical C7-T1;  Surgeon: Timothy Grimaldo MD;  Location: [facility] OR;  Service: Pain Management;  Laterality: N/A;    EPIDURAL STEROID INJECTION INTO CERVICAL SPINE N/A 3/7/2024    Procedure: Injection-steroid-epidural-cervical;  Surgeon: Timothy Grimaldo MD;  Location: [facility] OR;  Service: Anesthesiology;  Laterality: N/A;  c7-t1    ESOPHAGOGASTRODUODENOSCOPY N/A 4/30/2019    Procedure: EGD (ESOPHAGOGASTRODUODENOSCOPY);  Surgeon: Greg Victor MD;  Location: [facility];  Service: Endoscopy;  Laterality: N/A; Mild Schatzki ring. Dilated. small hiatal hernia; Four gastric polyps. Resected and retrieved, gastritis; biopsy:stomach- unremarkable, negative for h pylori, polyps-Fundic type mucosa with foveolar hyperplasia.    ESOPHAGOGASTRODUODENOSCOPY N/A 2/17/2021    Procedure: EGD (ESOPHAGOGASTRODUODENOSCOPY);  Surgeon: Greg Victor MD;  Location: [facility];  Service: Endoscopy;  Laterality: N/A;    ESOPHAGOGASTRODUODENOSCOPY N/A 2/29/2024    Procedure: EGD (ESOPHAGOGASTRODUODENOSCOPY);  Surgeon: Greg Victor MD;  Location: [facility];  Service: Endoscopy;  Laterality: N/A;    HYSTERECTOMY      Laser Periphery Iridotomy Bilateral     OD 5/26/16 and OS touch up 5/26/2016    UPPER GASTROINTESTINAL ENDOSCOPY  03/14/2017    Dr. Marcial: esophageal stenosis- dilated, gastritis, gastric polyps removed; biopsy- mild gastritis, negative for h pylori, hyperplastic polyp, esophagus unremarkable    vocal cord tumor removal       Family History   Problem Relation Name Age of Onset    Osteoarthritis Mother      Alcohol abuse Mother      Rheum  arthritis Mother      Osteoarthritis Sister      No Known Problems Sister      No Known Problems Sister      Diabetes Brother      No Known Problems Brother      No Known Problems Son      Arthritis Son      No Known Problems Son      Stroke Maternal Grandmother  99    Rheum arthritis Maternal Grandmother      Retinal detachment Neg Hx      Macular degeneration Neg Hx      Glaucoma Neg Hx      Amblyopia Neg Hx      Blindness Neg Hx      Cancer Neg Hx      Cataracts Neg Hx      Hypertension Neg Hx      Strabismus Neg Hx      Thyroid disease Neg Hx      Lupus Neg Hx      Kidney disease Neg Hx      Inflammatory bowel disease Neg Hx      Psoriasis Neg Hx      Colon cancer Neg Hx      Crohn's disease Neg Hx      Ulcerative colitis Neg Hx      Stomach cancer Neg Hx      Esophageal cancer Neg Hx       Social History     Socioeconomic History    Marital status:    Tobacco Use    Smoking status: Never    Smokeless tobacco: Never   Substance and Sexual Activity    Alcohol use: No     Alcohol/week: 0.0 standard drinks of alcohol    Drug use: Yes     Types: Hydrocodone    Sexual activity: Yes     Partners: Male     Social Drivers of Health     Financial Resource Strain: Low Risk  (11/7/2024)    Overall Financial Resource Strain (CARDIA)     Difficulty of Paying Living Expenses: Not hard at all   Food Insecurity: No Food Insecurity (11/7/2024)    Hunger Vital Sign     Worried About Running Out of Food in the Last Year: Never true     Ran Out of Food in the Last Year: Never true   Transportation Needs: No Transportation Needs (11/7/2024)    PRAPARE - Transportation     Lack of Transportation (Medical): No     Lack of Transportation (Non-Medical): No   Physical Activity: Sufficiently Active (11/7/2024)    Exercise Vital Sign     Days of Exercise per Week: 7 days     Minutes of Exercise per Session: 30 min   Stress: No Stress Concern Present (11/7/2024)    [employer] - Occupational Stress  "Questionnaire     Feeling of Stress : Only a little   Housing Stability: Unknown (11/7/2024)    Housing Stability Vital Sign     Unable to Pay for Housing in the Last Year: No     Homeless in the Last Year: No         Medications/Allergies: See med card    Vitals:    04/14/25 1056   BP: 132/62   Pulse: 76   Weight: 53.1 kg (117 lb 1 oz)   Height: 4' 11" (1.499 m)   PainSc:   8   PainLoc: Neck         Physical exam:    GENERAL: A and O x3, the patient appears well groomed and is in no acute distress.  Skin: No rashes or obvious lesions  HEENT: normocephalic, atraumatic  CARDIOVASCULAR:  RRR  LUNGS: nonlabored breathing  ABDOMEN: soft, nontender   UPPER EXTREMITIES: Normal alignment, normal range of motion, no atrophy, no skin changes,  hair growth and nail growth normal and equal bilaterally. No swelling, no tenderness.  +Phalens on left side. +TTP tricep tendon  LOWER EXTREMITIES:  Normal alignment, normal range of motion, no atrophy, no skin changes,  hair growth and nail growth normal and equal bilaterally. No swelling, no tenderness.  CERVICAL SPINE:   Cervical spine: ROM is full in flexion, extension and lateral rotation.  Painful flexion > extension.  Positive facet loading bilaterally.  Spurling is positive at right side.  Myofascial exam:  Tenderness to palpation across cervical paraspinals deltoid and trapezius muscles bilaterally.      MENTAL STATUS: normal orientation, speech, language, and fund of knowledge for social situation.  Emotional state appropriate.    CRANIAL NERVES:  II:  PERRL bilaterally,   III,IV,VI: EOMI.    V:  Facial sensation equal bilaterally  VII:  Facial motor function normal.  VIII:  Hearing equal to finger rub bilaterally  IX/X: Gag normal, palate symmetric  XI:  Shoulder shrug equal, head turn equal  XII:  Tongue midline without fasciculations      MOTOR: Tone and bulk: normal bilateral upper and lower Strength: normal "   Delt Bi Tri WE WF     R 5 5 5 5 5 5   L 5 5 5 5 5 5     IP ADD ABD Quad TA Gas HAM  R 5 5 5 5 5 5 5  L 5 5 5 5 5 5 5    SENSATION: Light touch and pinprick intact bilaterally  REFLEXES: normal, symmetric, nonbrisk.  Toes down, no clonus. No hoffmans.  GAIT: normal rise, base, steps, and arm swing.        Imaging:   Cervical MRI 12/4/17    Narrative     EXAM: Cervical spine MRI without contrast.    INDICATION: Cervical radiculopathy.  Neck pain and occipital neuralgia.  The patient complains of neck and right arm pain.    TECHNIQUE: Routine multiplanar, multisequence unenhanced cervical spine MRI was performed.    COMPARISON: Plain films of the cervical spine obtained concurrently      FINDINGS:     Vertebral column: There is straightening of the cervical spine with loss of normal lordosis.  As seen on concurrent plain films, there is trace anterolisthesis of C3 on C4 with 2 mm anterolisthesis of C4 on C5.  There is marked disc space narrowing at the C5-6 level with moderate to marked disc space narrowing at the C4-5 and C6-7 levels.  There is partial non-segmentation anteriorly at the C2-3 level.  The C2 and C3 as well as the C4 and C5 facet joints appear fused and this may represent developmental non-segmentation or degenerative ankylosis.  All of the discs are desiccated.  The odontoid process is intact.    Spinal canal, cord, epidural space: The craniovertebral junction is normal.  The spinal canal is omental and normal.  There is no significant spinal stenosis.  The cord is normal in caliber.  There is very subtle flattening of the ventral cord surface at the C4-5 and C5-6 levels where there is degenerative change.  The study is mildly motion but there is no definite cord edema or myelomalacia.    Findings by level:    C2-3: There is no spinal canal or foraminal stenosis.  There is mild left facet joint arthropathy.    C3-4: There is trace anterolisthesis.  There is left greater than right uncovertebral  spurring and facet joint arthropathy.  There is a mild disc osteophyte complex, slightly eccentric to the left with subtle annular fissure.  This narrows the ventral subarachnoid space.  There is no spinal stenosis or cord compression.  There is moderate marked left foraminal stenosis.    C4-5: There is moderate disc space narrowing with 2 mm anterolisthesis of C4 on C5.  There is facet joint arthropathy or effusion left greater than right.  There is also mild bilateral but left greater than right uncovertebral spurring.  There is unroofing of a mild disc bulge which narrows the ventral subarachnoid space.  There is no spinal stenosis.  There is mild to moderate left foraminal stenosis.    C5-6:There is marked disc space narrowing.  There is bilateral uncovertebral spurring.  There is a disc osteophyte complex which narrows the subarachnoid space.  This is slightly eccentric to the right There is subtle flattening of the ventral cord surface.  Cord signal is grossly normal.  There is mild spinal stenosis with at least moderate bilateral foraminal stenosis.    C6-7: There is moderate disc space narrowing.  There is mild uncovertebral spurring.  There is a shallow disc osteophyte complex, slightly eccentric to the right.  There is narrowing of the ventral subarachnoid space.  There is no spinal stenosis.  Cord signal is normal.  There is mild bilateral foraminal stenosis.  There is a small 3.5 mm left foraminal perineural cyst.    C7- T1: There is a Schmorl's node in inferior endplate of C7, chronic.  There are tiny bilateral foraminal perineural cysts.  There is minimal bulging of the annulus and mild facet joint arthropathy without spinal canal or foraminal stenosis.    Soft tissues/other: The prevertebral soft tissues are normal.  The airway is patent.   Impression          1. There is multilevel degenerative disc disease described in detail above.  There is no acute fracture.  There is degenerative listhesis at  several levels.  There is some degree of disc osteophyte complex, uncovertebral spurring and/or facet joint arthropathy contributing to some degree of foraminal stenosis at several levels.  There is no significant spinal canal stenosis.  There is very subtle flattening of the ventral cord surface at the C4-5 and C6-7 levels The pertinent findings are summarized below.    2. At the C3-4 level, there is no spinal stenosis.  There is moderate to marked left foraminal stenosis.    3. At the C4-5 level there is no spinal stenosis.  There is mild to moderate left foraminal stenosis.    4. At the C5-6 level, there is mild spinal stenosis with at least moderate bilateral foraminal stenosis.    5. At the C6-7 level, there is no spinal stenosis.  There is mild bilateral foraminal stenosis.    6. There is no spinal canal or foraminal stenosis at the C2-3 or C7-T1 levels.     Assessment:  Mrs. Richter is a 79 y.o. female with neck and head pain    1. Chronic use of opiate for therapeutic purpose    2. Cervical radiculitis    3. DDD (degenerative disc disease), cervical    4. Other spondylosis, cervical region    5. Myofascial pain        Plan:  1. I have stressed the importance of physical activity and exercise to improve overall health.  2. Monitor progress from repeat C7-T1 IL MELANY. Consider bilateral occipital blocks for head pain.  3. Norco 5mg q12hrs as needed for pain.  reviewed.  Previous UDS consistent   4. Flexeril 10 mg BID #60 call for prescription   5. Consider EMG for extremity numbness and tingling  6. F/u 3 months or sooner  All medication management was performed by Dr. Timothy Grimaldo

## 2025-04-14 NOTE — PROGRESS NOTES
Subjective     Patient ID: Rola Richter is a 79 y.o. female.    Chief Complaint: Pain of the Right Shoulder    Patient returns today with complaints of right-sided shoulder pain.  Saw a few weeks ago for bilateral shoulder pain secondary to impingement syndrome and gave her injections in both shoulders.  Reports some relief in the left shoulder although she is still experiencing some pain there.  She is still experiencing some significant pain in the right shoulder.  She localizes this primarily to the anterior shoulder over the area of the biceps tendon.  States she is not able to  any object with any weight with her right hand without severe pain.  Has been taking hydrocodone she has through pain management which provides very temporary relief.  Her PCP's prescribed her meloxicam 7.5 mg.  She would not have any relief with one but was taking two and did get slight relief from that.    Pain  Pertinent negatives include no chest pain, chills, congestion, coughing, headaches, rash or sore throat.       Review of Systems   Constitutional: Negative for chills and decreased appetite.   HENT:  Negative for congestion and sore throat.    Eyes:  Negative for blurred vision.   Cardiovascular:  Negative for chest pain, dyspnea on exertion and palpitations.   Respiratory:  Negative for cough and shortness of breath.    Skin:  Negative for rash.   Neurological:  Negative for difficulty with concentration, disturbances in coordination and headaches.   Psychiatric/Behavioral:  Negative for altered mental status, depression, hallucinations, memory loss and suicidal ideas.           Objective     General    Nursing note and vitals reviewed.  Constitutional: She is oriented to person, place, and time. She appears well-developed and well-nourished.   HENT:   Nose: Nose normal.   Eyes: EOM are normal. Pupils are equal, round, and reactive to light.   Neck: Neck supple.   Cardiovascular:  Normal rate.             Pulmonary/Chest: Effort normal.   Abdominal: Soft.   Neurological: She is alert and oriented to person, place, and time. She has normal reflexes.   Psychiatric: She has a normal mood and affect. Her behavior is normal. Judgment and thought content normal.         Right Shoulder Exam     Inspection/Observation   Swelling: absent  Bruising: absent  Scars: absent  Deformity: absent  Scapular Winging: absent  Scapular Dyskinesia: negative    Tenderness   The patient is tender to palpation of the biceps tendon.    Range of Motion   Active abduction:  150   Passive abduction:  normal   Extension:  normal   Forward Flexion:  150   Forward Elevation: normal  Adduction: 90     Tests & Signs   Jennings test: positive  Impingement: negative  Active Compression Test (Holmes's Sign): negative  Yergason's Test: positive  Speed's Test: positive  Anterior Drawer Test: 0   Posterior Drawer Test: 0    Other   Sensation: normal    Left Shoulder Exam   Left shoulder exam is normal.       Muscle Strength   Right Upper Extremity   Shoulder Abduction: 5/5   Shoulder Internal Rotation: 5/5   Shoulder External Rotation: 5/5   Supraspinatus: 5/5   Subscapularis: 5/5   Biceps: 5/5     Vascular Exam     Right Pulses      Radial:                    2+    Physical Exam  Vitals and nursing note reviewed.   Constitutional:       Appearance: She is well-developed and well-nourished.   HENT:      Nose: Nose normal.   Eyes:      Extraocular Movements: EOM normal.      Pupils: Pupils are equal, round, and reactive to light.   Cardiovascular:      Rate and Rhythm: Normal rate.      Pulses:           Radial pulses are 2+ on the right side.   Pulmonary:      Effort: Pulmonary effort is normal.   Abdominal:      Palpations: Abdomen is soft.   Musculoskeletal:      Right shoulder: No swelling or deformity.      Cervical back: Normal range of motion and neck supple.   Neurological:      Mental Status: She is alert and oriented to person, place, and time.       Deep Tendon Reflexes: Reflexes are normal and symmetric.   Psychiatric:         Mood and Affect: Mood and affect normal.         Behavior: Behavior normal.         Thought Content: Thought content normal.         Judgment: Judgment normal.           Assessment and Plan     Encounter Diagnoses   Name Primary?    Chronic right shoulder pain     Biceps tendinitis of right shoulder Yes    Acute bursitis of right shoulder     Right hip pain          Rola was seen today for pain.    Diagnoses and all orders for this visit:    Biceps tendinitis of right shoulder    Chronic right shoulder pain    Acute bursitis of right shoulder    Right hip pain  -     meloxicam (MOBIC) 15 MG tablet; Take 1 tablet (15 mg total) by mouth daily as needed for Pain.    Other orders  -     methylPREDNISolone (MEDROL DOSEPACK) 4 mg tablet; use as directed    Offered her an ultrasound-guided biceps tendon injection today.  She had previously had one of these done in August of 2024.  She agreed to proceed with this.  I do think she still has some element of shoulder bursitis I will also prescribe her a Medrol Dosepak.  I am going to call out some meloxicam 15 mg as it seems to help her more.  I recommend following up here should she continue to have shoulder pain in the next few weeks.  That point would consider getting an MRI of her shoulder.    Tendon Sheath    Date/Time: 4/14/2025 10:20 AM    Performed by: Eliseo Oliva MD  Authorized by: Eliseo Oliva MD    Consent Done?:  Yes (Verbal)  Indications:  Pain  Site marked: the procedure site was marked    Timeout: prior to procedure the correct patient, procedure, and site was verified    Prep: patient was prepped and draped in usual sterile fashion      Local anesthesia used?: Yes    Local anesthetic:  Lidocaine 1% without epinephrine and topical anesthetic  Anesthetic total (ml):  2    Location:  Shoulder  Site:  R bicep tendon  Ultrasonic guidance for needle placement?: Yes     Needle size:  22 G  Approach:  Lateral  Medications:  80 mg methylPREDNISolone acetate 80 mg/mL  Patient tolerance:  Patient tolerated the procedure well with no immediate complications    Additional Comments: Ultrasound guidance was used to confirm proper needle placement.  Images of proper needle placement were saved and stored to the machine and uploaded to the patient's chart.

## 2025-04-16 ENCOUNTER — LAB VISIT (OUTPATIENT)
Dept: LAB | Facility: HOSPITAL | Age: 80
End: 2025-04-16
Attending: FAMILY MEDICINE
Payer: MEDICARE

## 2025-04-16 ENCOUNTER — RESULTS FOLLOW-UP (OUTPATIENT)
Dept: FAMILY MEDICINE | Facility: CLINIC | Age: 80
End: 2025-04-16

## 2025-04-16 DIAGNOSIS — E78.01 FAMILIAL HYPERCHOLESTEROLEMIA: ICD-10-CM

## 2025-04-16 DIAGNOSIS — D63.8 ANEMIA OF CHRONIC DISEASE: ICD-10-CM

## 2025-04-16 DIAGNOSIS — R73.9 HYPERGLYCEMIA: ICD-10-CM

## 2025-04-16 DIAGNOSIS — N18.31 CHRONIC KIDNEY DISEASE, STAGE 3A: ICD-10-CM

## 2025-04-16 LAB
ABSOLUTE EOSINOPHIL (OHS): 0.09 K/UL
ABSOLUTE MONOCYTE (OHS): 1.23 K/UL (ref 0.3–1)
ABSOLUTE NEUTROPHIL COUNT (OHS): 8.53 K/UL (ref 1.8–7.7)
ALBUMIN SERPL BCP-MCNC: 3.7 G/DL (ref 3.5–5.2)
ALP SERPL-CCNC: 55 UNIT/L (ref 40–150)
ALT SERPL W/O P-5'-P-CCNC: 11 UNIT/L (ref 10–44)
ANION GAP (OHS): 9 MMOL/L (ref 8–16)
AST SERPL-CCNC: 16 UNIT/L (ref 11–45)
BASOPHILS # BLD AUTO: 0.03 K/UL
BASOPHILS NFR BLD AUTO: 0.2 %
BILIRUB SERPL-MCNC: 0.4 MG/DL (ref 0.1–1)
BUN SERPL-MCNC: 26 MG/DL (ref 8–23)
CALCIUM SERPL-MCNC: 8.7 MG/DL (ref 8.7–10.5)
CHLORIDE SERPL-SCNC: 104 MMOL/L (ref 95–110)
CHOLEST SERPL-MCNC: 225 MG/DL (ref 120–199)
CHOLEST/HDLC SERPL: 2.6 {RATIO} (ref 2–5)
CO2 SERPL-SCNC: 24 MMOL/L (ref 23–29)
CREAT SERPL-MCNC: 1 MG/DL (ref 0.5–1.4)
EAG (OHS): 117 MG/DL (ref 68–131)
ERYTHROCYTE [DISTWIDTH] IN BLOOD BY AUTOMATED COUNT: 14.9 % (ref 11.5–14.5)
GFR SERPLBLD CREATININE-BSD FMLA CKD-EPI: 57 ML/MIN/1.73/M2
GLUCOSE SERPL-MCNC: 85 MG/DL (ref 70–110)
HBA1C MFR BLD: 5.7 % (ref 4–5.6)
HCT VFR BLD AUTO: 34.5 % (ref 37–48.5)
HDLC SERPL-MCNC: 86 MG/DL (ref 40–75)
HDLC SERPL: 38.2 % (ref 20–50)
HGB BLD-MCNC: 11.1 GM/DL (ref 12–16)
IMM GRANULOCYTES # BLD AUTO: 0.1 K/UL (ref 0–0.04)
IMM GRANULOCYTES NFR BLD AUTO: 0.7 % (ref 0–0.5)
LDLC SERPL CALC-MCNC: 113.4 MG/DL (ref 63–159)
LYMPHOCYTES # BLD AUTO: 3.67 K/UL (ref 1–4.8)
MCH RBC QN AUTO: 29.8 PG (ref 27–31)
MCHC RBC AUTO-ENTMCNC: 32.2 G/DL (ref 32–36)
MCV RBC AUTO: 93 FL (ref 82–98)
NONHDLC SERPL-MCNC: 139 MG/DL
NUCLEATED RBC (/100WBC) (OHS): 0 /100 WBC
PLATELET # BLD AUTO: 308 K/UL (ref 150–450)
PMV BLD AUTO: 10.1 FL (ref 9.2–12.9)
POTASSIUM SERPL-SCNC: 4.4 MMOL/L (ref 3.5–5.1)
PROT SERPL-MCNC: 6.9 GM/DL (ref 6–8.4)
RBC # BLD AUTO: 3.73 M/UL (ref 4–5.4)
RELATIVE EOSINOPHIL (OHS): 0.7 %
RELATIVE LYMPHOCYTE (OHS): 26.9 % (ref 18–48)
RELATIVE MONOCYTE (OHS): 9 % (ref 4–15)
RELATIVE NEUTROPHIL (OHS): 62.5 % (ref 38–73)
SODIUM SERPL-SCNC: 137 MMOL/L (ref 136–145)
TRIGL SERPL-MCNC: 128 MG/DL (ref 30–150)
WBC # BLD AUTO: 13.65 K/UL (ref 3.9–12.7)

## 2025-04-16 PROCEDURE — 82040 ASSAY OF SERUM ALBUMIN: CPT

## 2025-04-16 PROCEDURE — 80061 LIPID PANEL: CPT

## 2025-04-16 PROCEDURE — 36415 COLL VENOUS BLD VENIPUNCTURE: CPT | Mod: PO

## 2025-04-16 PROCEDURE — 83036 HEMOGLOBIN GLYCOSYLATED A1C: CPT

## 2025-04-16 PROCEDURE — 85025 COMPLETE CBC W/AUTO DIFF WBC: CPT

## 2025-04-19 LAB
1OH-MIDAZOLAM UR QL SCN: NOT DETECTED
6MAM UR QL: NOT DETECTED
7AMINOCLONAZEPAM UR QL: NOT DETECTED
A-OH ALPRAZ UR QL: NOT DETECTED
ALPRAZ UR QL: NOT DETECTED
AMPHET UR QL SCN: NOT DETECTED
ANNOTATION COMMENT IMP: NORMAL
AR NOROXYMORPHONE (CUTOFF 100 NG/ML): NOT DETECTED
AR OXYMORPHONE (CUTOFF 40 NG/ML): NOT DETECTED
BARBITURATES UR QL: NEGATIVE
BUPRENORPHINE UR QL: NOT DETECTED
BZE UR QL: NEGATIVE
CARBOXYTHC UR QL: NEGATIVE
CARISOPRODOL UR QL: NEGATIVE
CLONAZEPAM UR QL: NOT DETECTED
CODEINE UR QL: NOT DETECTED
CREAT UR-MCNC: 81.9 MG/DL
DIAZEPAM UR QL: NOT DETECTED
ETHYL GLUCURONIDE UR QL: NEGATIVE
FENTANYL UR QL: NOT DETECTED
GABAPENTIN UR QL CFM: PRESENT
HYDROCODONE UR QL: PRESENT
HYDROMORPHONE UR QL: PRESENT
LORAZEPAM UR QL: NOT DETECTED
MDA UR QL: NOT DETECTED
MDEA UR QL: NOT DETECTED
MDMA UR QL: NOT DETECTED
ME-PHENIDATE UR QL: NOT DETECTED
METHADONE UR QL: NEGATIVE
METHAMPHET UR QL: NOT DETECTED
MIDAZOLAM UR QL SCN: NOT DETECTED
MORPHINE UR QL: NOT DETECTED
NALOXONE UR QL CFM: NOT DETECTED
NORBUPRENORPHINE UR QL CFM: NOT DETECTED
NORDIAZEPAM UR QL: NOT DETECTED
NORFENTANYL UR QL: NOT DETECTED
NORMEPERIDINE UR QL CFM: NOT DETECTED
NOROXYCODONE UR QL CFM: PRESENT
NOROXYMORPHONE UR QL SCN: NOT DETECTED
OXAZEPAM UR QL: NOT DETECTED
OXYCODONE UR QL: NOT DETECTED
PATHOLOGY STUDY: NORMAL
PCP UR QL: NEGATIVE
PHENTERMINE UR QL: NOT DETECTED
PREGABALIN UR QL CFM: NOT DETECTED
SERVICE CMNT-IMP: NORMAL
TAPENTADOL UR QL SCN: NOT DETECTED
TAPENTADOL UR QL SCN: NOT DETECTED
TEMAZEPAM UR QL: NOT DETECTED
TRAMADOL UR QL: NEGATIVE
ZOLPIDEM PHENYL-4-CARB UR QL SCN: NOT DETECTED
ZOLPIDEM UR QL: NOT DETECTED

## 2025-04-24 ENCOUNTER — OFFICE VISIT (OUTPATIENT)
Dept: FAMILY MEDICINE | Facility: CLINIC | Age: 80
End: 2025-04-24
Payer: MEDICARE

## 2025-04-24 VITALS
HEIGHT: 59 IN | BODY MASS INDEX: 24.27 KG/M2 | HEART RATE: 88 BPM | DIASTOLIC BLOOD PRESSURE: 60 MMHG | OXYGEN SATURATION: 98 % | SYSTOLIC BLOOD PRESSURE: 130 MMHG | WEIGHT: 120.38 LBS | TEMPERATURE: 99 F | RESPIRATION RATE: 13 BRPM

## 2025-04-24 DIAGNOSIS — D64.9 NORMOCYTIC ANEMIA: ICD-10-CM

## 2025-04-24 DIAGNOSIS — E78.01 FAMILIAL HYPERCHOLESTEROLEMIA: ICD-10-CM

## 2025-04-24 DIAGNOSIS — G31.84 MILD NEUROCOGNITIVE DISORDER: ICD-10-CM

## 2025-04-24 DIAGNOSIS — D63.8 ANEMIA OF CHRONIC DISEASE: ICD-10-CM

## 2025-04-24 DIAGNOSIS — R73.9 HYPERGLYCEMIA: ICD-10-CM

## 2025-04-24 DIAGNOSIS — I10 HYPERTENSION, UNSPECIFIED TYPE: Primary | ICD-10-CM

## 2025-04-24 DIAGNOSIS — K21.9 GASTROESOPHAGEAL REFLUX DISEASE, UNSPECIFIED WHETHER ESOPHAGITIS PRESENT: ICD-10-CM

## 2025-04-24 DIAGNOSIS — M85.80 OSTEOPENIA WITH HIGH RISK OF FRACTURE: ICD-10-CM

## 2025-04-24 PROCEDURE — 99999 PR PBB SHADOW E&M-EST. PATIENT-LVL III: CPT | Mod: PBBFAC,,, | Performed by: FAMILY MEDICINE

## 2025-04-24 NOTE — PROGRESS NOTES
Subjective:       Patient ID: Rola Richter is a 79 y.o. female.    Chief Complaint: Follow-up (6mth f/u)    Problem List[1]  Patient is here for a chronic conditions follow up.    Reviewed labs 4/2025  History of Present Illness    CHIEF COMPLAINT:  Ms. Richter presents today for follow up of shoulder pain    MUSCULOSKELETAL:  She received two injections from Dr. Oliva for bilateral shoulder pain, with improvement noted near the end of the week following treatment. One shoulder is more symptomatic than the other.    MEDICATIONS:  She takes increased dosage of Lexicon as needed for severe pain, and pantoprazole (Protonix) daily before breakfast.    GASTROINTESTINAL:  She reports intolerance to spicy foods and bread, noting gastric upset despite morning medication.    CARDIOVASCULAR:  She experiences pulsating sensations in head at night when lying down to sleep.    LABS:  Total cholesterol improved from 416 to 225. LDL cholesterol improved from 300 to 113. Triglycerides now in normal range under 150, previously in borderline range. HbA1c is 5.7, which is borderline.    PAST MEDICAL HISTORY:  She has a history of epilepsy.      ROS:  ROS findings as noted in HPI.        Review of Systems   Constitutional:  Negative for fatigue and unexpected weight change.   Respiratory:  Negative for chest tightness and shortness of breath.    Cardiovascular:  Negative for chest pain, palpitations and leg swelling.   Gastrointestinal:  Negative for abdominal pain.   Musculoskeletal:  Positive for arthralgias.   Neurological:  Negative for dizziness, syncope, light-headedness and headaches.      Relevant History:  GYN dexa 2024 osteopenia with high risk on reclast. Mammo 10/2024 neg     Nephro Ckd stage 3 , mild anemia      Card Dr. Moyer familial cholesterolemia now on repatha. HPL-  Intolerant to statins due to myalgia.  Tried lipitor and crestor. Had to stop it . Total chol drown from 416 to 226 on repatha     Ortho C/o right  hip flare. No known injury. Deep in buttock. Sharp sticking pain.  Worse with standing and changing positions. Norco helps some  C/o left shoulder pain and stiffness. Has done well with steroid injections in past  Dr. Oliva right shoulder biceps tendinitis     Eye Dr. Eduardo/Gomez cataracts     Endo HPL- Your cholesterol is high.  Tried lipitor, crestor, pravastatin -all muscle pain. Taking QOD pravastatin 10mg. Candidate for repatha-now on.  Prediabetes a1c 5.7         Neuro PA Dara Has chronic daily headaches- top and frontal. Congestion relieved partially by astelin and flonase NS.  PND, sneezing, hoarse. Sob and wheezing are improving but still lots of phlegm. No fever.  Unrelieved with tessalon or robitussin.  singulair added 7/2021. CT sinus showed chronic bin sinus disease.  Start clindamycin 300mg qid x 14 days.  Under care of ENT Dr. Jansen -seen 8/23 and 9/21/2021. Did not feel daily HA were rhinogenic-Referred to [facility] SILVERIO Diamond -seen 10/7/2021 and gabapentin added. Helping some.   Neuro NP Mc Mild neurocog d/o on aricept     Pain Referred to pain clinic for left leg numbness and cervical radiculitis 7/2021.   Dr. Grimaldo MELANY 9/22 . Pain mgmt Dr. Grimaldo prescribing norco 5 bid . DPS checked no evidence of diversion.  Tried lyrica but stopped it due to dizziness. On gabapentin 100mg bid    Neck pain -helps when wears collar        Heme/onc Dr. Murphy- anemia. Has had work up for blood loss anemia.  Likely anemia of chronic disease. Iron is normal     GI Dr. Victor egd 2/2021 10 gastric polyps (fundic gland polyps), hiatal hernia, gastritis.  H Pylori neg. Esophageal bx- no metaplasia, dysplasia or malignancy.   Colonoscopy 5/2024 2 polyps -adenomatous. On 5 year surveillance   H/o ant neck surgery x 3 as child due to tumors -? Thyroid or thymus   C/o food stuck in throat, hoarseness.          Rheum Dr. KARUNA Ram Has severe hand OA- tried plaquenil but did not help so stopped it . Osteoporosis  recommended to start prolia q 6m but has never started it. Having to hold prolia due to lower jaw dental extractions since 2022     Psych Dr. Benítez /Sandra/PTSD. Mood is good on zoloft  Objective:      Physical Exam  Vitals and nursing note reviewed.   Constitutional:       Appearance: She is well-developed.   Cardiovascular:      Rate and Rhythm: Normal rate and regular rhythm.      Heart sounds: Normal heart sounds.   Pulmonary:      Effort: Pulmonary effort is normal.      Breath sounds: Normal breath sounds.   Skin:     General: Skin is warm and dry.   Neurological:      Mental Status: She is alert and oriented to person, place, and time.         Assessment:       ICD-10-CM ICD-9-CM    1. Hypertension, unspecified type  I10 401.9       2. Familial hypercholesterolemia  E78.01 272.0 Lipid Panel      3. Anemia of chronic disease  D63.8 285.29       4. Hyperglycemia  R73.9 790.29 Comprehensive Metabolic Panel      Hemoglobin A1C      5. Mild neurocognitive disorder  G31.84 331.83       6. Gastroesophageal reflux disease, unspecified whether esophagitis present  K21.9 530.81       7. Osteopenia with high risk of fracture  M85.80 733.90       8. Normocytic anemia  D64.9 285.9 CBC Auto Differential      Iron and TIBC      Ferritin         Plan:   1. Hypertension, unspecified type (Primary)  Controlled on current medications.  Continue current medications.      2. Familial hypercholesterolemia  Much improved on repatha  - Lipid Panel; Future    3. Anemia of chronic disease  Stable and chronic.  Will continue to monitor q3-6 months and control chronic conditions as optimally as possible to preserve function.  :      4. Hyperglycemia   Your blood sugar is borderline high.  This means you are at risk for developing type 2 diabetes mellitus.  To lessen your risk you should exercise regularly, avoid excess carbohydrates and work toward a body mass index of less than 25.      - Comprehensive Metabolic Panel; Future  -  Hemoglobin A1C; Future    5. Mild neurocognitive disorder  Cont current mgmt    6. Gastroesophageal reflux disease, unspecified whether esophagitis present  Controlled.  Cont protonix 40mg daily and pepcid 20mg bid    7. Osteopenia with high risk of fracture  Cont reclast    8. Normocytic anemia  Screen and treat as indicated:    - CBC Auto Differential; Future  - Iron and TIBC; Future  - Ferritin; Future  Assessment & Plan    - Explained frozen shoulder condition and its implications for mobility.  - Recommend avoiding gardening activities that involve lifting or pulling to prevent exacerbation of shoulder condition.  - Discussed the relationship between simple carbohydrates, sugar intake, and inflammation.  - Recommend avoiding excessive intake of sweets, bread, pasta, and rice while increasing intake of vegetables, fruits, and protein. Ms. Richter to continue eating salads with lunch.  - Educated on the importance of monitoring stomach discomfort with pain medication use and the connection between steroids and elevated glucose levels.  - Continued pain medication (likely Lexapro) as prescribed by Dr. Oliva, with instructions to take only when pain is significant and to monitor for stomach upset.  - Ms. Richter to monitor BP at home when experiencing nighttime head throbbing sensation.  - Continued Repatha injections for cholesterol management.  - Continued pantoprazole (Protonix) daily before breakfast.  - Ordered labs for 6-month follow-up.         Time spent with patient: 20 minutes  Patient with be reevaluated in 6 months or sooner prn  Greater than 50% of this visit was spent counseling as described in above documentation:Yes  This note was generated with the assistance of ambient listening technology. Verbal consent was obtained by the patient and accompanying visitor(s) for the recording of patient appointment to facilitate this note. I attest to having reviewed and edited the generated note for accuracy,  though some syntax or spelling errors may persist. Please contact the author of this note for any clarification.            [1]   Patient Active Problem List  Diagnosis    Hypertension    GERD (gastroesophageal reflux disease)    Hyperlipidemia    Osteoporosis    Dependency on pain medication    CMC arthritis    Dry eye syndrome    DDD (degenerative disc disease), cervical    Occipital neuralgia    Cervical radiculitis    Primary osteoarthritis involving multiple joints    Dysphagia    Spondylosis of cervical region without myelopathy or radiculopathy    Myofascial pain    Neck pain    Bronchitis    Calcification of aorta    Memory loss    Generalized anxiety disorder    BMI 23.0-23.9, adult    Insomnia    Chronic kidney disease, stage 3a    Anemia of chronic disease    History of posttraumatic stress disorder (PTSD)    Left leg pain    Leg cramps    Pain of left calf    Major neurocognitive disorder    Recurrent major depressive disorder, in partial remission

## 2025-05-08 DIAGNOSIS — I70.0 CALCIFICATION OF AORTA: ICD-10-CM

## 2025-05-08 DIAGNOSIS — E78.01 FAMILIAL HYPERCHOLESTEROLEMIA: ICD-10-CM

## 2025-05-08 DIAGNOSIS — Z78.9 STATIN NOT TOLERATED: ICD-10-CM

## 2025-05-08 RX ORDER — EVOLOCUMAB 140 MG/ML
140 INJECTION, SOLUTION SUBCUTANEOUS
Qty: 2 ML | Refills: 2 | OUTPATIENT
Start: 2025-05-08 | End: 2025-08-06

## 2025-05-13 DIAGNOSIS — I70.0 CALCIFICATION OF AORTA: ICD-10-CM

## 2025-05-13 DIAGNOSIS — Z78.9 STATIN NOT TOLERATED: ICD-10-CM

## 2025-05-13 DIAGNOSIS — E78.01 FAMILIAL HYPERCHOLESTEROLEMIA: ICD-10-CM

## 2025-05-13 NOTE — TELEPHONE ENCOUNTER
----- Message from Rosita sent at 5/13/2025  3:43 PM CDT -----  Type: Needs Medical AdviceWho Called:  Aldo Millan Call Back Number: [phone] Information: Pt is asking why Dr HAWLEY stopped the pts    evolocumab (REPATHA SURECLICK) 140 mg/mL PnIj.  Please call back to advise, thank you!

## 2025-05-14 RX ORDER — EVOLOCUMAB 140 MG/ML
140 INJECTION, SOLUTION SUBCUTANEOUS
Qty: 2 ML | Refills: 2 | Status: ACTIVE | OUTPATIENT
Start: 2025-05-14 | End: 2025-08-12

## 2025-05-21 ENCOUNTER — TELEPHONE (OUTPATIENT)
Dept: PSYCHIATRY | Facility: CLINIC | Age: 80
End: 2025-05-21
Payer: MEDICARE

## 2025-05-21 ENCOUNTER — TELEPHONE (OUTPATIENT)
Dept: NEUROLOGY | Facility: CLINIC | Age: 80
End: 2025-05-21
Payer: MEDICARE

## 2025-06-05 ENCOUNTER — OFFICE VISIT (OUTPATIENT)
Dept: FAMILY MEDICINE | Facility: CLINIC | Age: 80
End: 2025-06-05
Payer: MEDICARE

## 2025-06-05 VITALS
BODY MASS INDEX: 24.53 KG/M2 | OXYGEN SATURATION: 99 % | HEIGHT: 59 IN | TEMPERATURE: 98 F | WEIGHT: 121.69 LBS | SYSTOLIC BLOOD PRESSURE: 122 MMHG | DIASTOLIC BLOOD PRESSURE: 62 MMHG | HEART RATE: 82 BPM

## 2025-06-05 DIAGNOSIS — F33.41 RECURRENT MAJOR DEPRESSIVE DISORDER, IN PARTIAL REMISSION: ICD-10-CM

## 2025-06-05 DIAGNOSIS — I70.0 CALCIFICATION OF AORTA: ICD-10-CM

## 2025-06-05 DIAGNOSIS — R41.3 MEMORY LOSS: ICD-10-CM

## 2025-06-05 DIAGNOSIS — Z00.00 ENCOUNTER FOR MEDICARE ANNUAL WELLNESS EXAM: Primary | ICD-10-CM

## 2025-06-05 DIAGNOSIS — N18.31 CHRONIC KIDNEY DISEASE, STAGE 3A: ICD-10-CM

## 2025-06-05 DIAGNOSIS — R26.9 ABNORMALITY OF GAIT AND MOBILITY: ICD-10-CM

## 2025-06-05 PROCEDURE — 99999 PR PBB SHADOW E&M-EST. PATIENT-LVL V: CPT | Mod: PBBFAC,HCNC,, | Performed by: NURSE PRACTITIONER

## 2025-06-26 ENCOUNTER — OFFICE VISIT (OUTPATIENT)
Dept: PSYCHIATRY | Facility: CLINIC | Age: 80
End: 2025-06-26
Payer: MEDICARE

## 2025-06-26 VITALS
HEART RATE: 74 BPM | SYSTOLIC BLOOD PRESSURE: 122 MMHG | HEIGHT: 59 IN | DIASTOLIC BLOOD PRESSURE: 64 MMHG | BODY MASS INDEX: 24.4 KG/M2 | WEIGHT: 121.06 LBS

## 2025-06-26 DIAGNOSIS — F19.20 DEPENDENCY ON PAIN MEDICATION: ICD-10-CM

## 2025-06-26 DIAGNOSIS — Z86.59 HISTORY OF POSTTRAUMATIC STRESS DISORDER (PTSD): ICD-10-CM

## 2025-06-26 DIAGNOSIS — G47.00 INSOMNIA, UNSPECIFIED TYPE: ICD-10-CM

## 2025-06-26 DIAGNOSIS — F41.1 GENERALIZED ANXIETY DISORDER: ICD-10-CM

## 2025-06-26 DIAGNOSIS — F33.41 RECURRENT MAJOR DEPRESSIVE DISORDER, IN PARTIAL REMISSION: Primary | ICD-10-CM

## 2025-06-26 DIAGNOSIS — F03.90 MAJOR NEUROCOGNITIVE DISORDER: ICD-10-CM

## 2025-06-26 PROCEDURE — 99999 PR PBB SHADOW E&M-EST. PATIENT-LVL III: CPT | Mod: PBBFAC,HCNC,, | Performed by: STUDENT IN AN ORGANIZED HEALTH CARE EDUCATION/TRAINING PROGRAM

## 2025-06-26 RX ORDER — SERTRALINE HYDROCHLORIDE 100 MG/1
100 TABLET, FILM COATED ORAL DAILY
Qty: 30 TABLET | Refills: 1 | Status: SHIPPED | OUTPATIENT
Start: 2025-06-26 | End: 2025-08-25

## 2025-06-26 RX ORDER — MIRTAZAPINE 7.5 MG/1
7.5 TABLET, FILM COATED ORAL NIGHTLY
Qty: 30 TABLET | Refills: 1 | Status: SHIPPED | OUTPATIENT
Start: 2025-06-26 | End: 2025-08-25

## 2025-06-26 NOTE — PROGRESS NOTES
Outpatient Psychiatry Followup Visit - IN PERSON  2025  Assessment & Plan    Impression     ICD-10-CM ICD-9-CM   1. Recurrent major depressive disorder, in partial remission  F33.41 296.35   2. Generalized anxiety disorder  F41.1 300.02   3. Major neurocognitive disorder  F03.90 294.20   4. History of posttraumatic stress disorder (PTSD)  Z86.59 V11.8   5. Insomnia, unspecified type  G47.00 780.52   6. Dependency on pain medication  F19.20 304.90   7. BMI 24.0-24.9, adult  Z68.24 V85.1      Plan of Care & Medication Management    Chart was reviewed. The risks and benefits of medication were discussed with pt. The treatment plan and followup plan were reviewed with pt. Pt understands to contact clinic if symptoms worsen. Pt understands to call 911 or go to nearest ER for suicidal ideation, intent or plan. Unless otherwise specified, pt did NOT display signs of nor endorse symptoms of overt psychosis or acute mood disorder requiring hospitalization during the encounter; pt denied violent thoughts or suicidal or homicidal ideation, intent, or plan.   RX History ARICEPT, BARBITURATES, BUSPAR, CYMBALTA, GABAPENTIN, PROZAC, REMERON, WELLBUTRIN, and ZOLOFT     Current RX Considering adding ABILIFY or SEROQUEL  Cognitive testin/3/2024 MOCA 8.1 14/30  12/3/2024 MINICOG v4 2/5  2024 MINICOG v2 3/5  [id number]  S-MMSE, MINICOG v1   Continue REMERON  Adjustments:  2024: Start 7.5mg HS  Prior to 2024 pt had most recently tried CYMBALTA  Continue ZOLOFT  Adjustments:  2024: Increase to 100mg daily  [id number]: Restart 50mg daily  [id number] pt tried CYMBALTA. Adherence was intermittent and response was inadequate.  [id number]: Taper to DC:  100mg daily for 5 days, then 50mg daily for 5 days, then 25mg daily for 6 days, then discontinue  [id number]: Reduce to 150mg daily  [id number]: Increase to 200mg daily  [id number]: Continue 150mg daily  Prior to evaluation pt had been taking 150mg  "daily      Other []CONTRACT 6/26/2025?  [] REVIEWED 6/26/2025?  []   RETURN S: STANDARD PROTOCOL: RETURN IN 8 WEEKS (TWO MONTHS)       Subjective    Available documentation has been reviewed, and pertinent elements of the chart have been incorporated into this note where appropriate. Last [facility] encounter with writer was on 4/9/2025   Rola Richter, a 80 y.o. female, presenting for followup visit. This visit was done in person, IN CLINIC.      Hip pain, seeing pain mgmt    Recently went on vacation to visit family in [facility] for 3w, tired, arrived back yesterday    Will be joining [facility] soon    Home life unchanged    Mood is stable  Anxiety is controlled  Tolerated BUSPAR discontinuation  Seems to be adherent to psychopharmacotherapy    Some forgetfulness, will continue to monitor    Therapy on pause    Continue treatment otherwise as planned        Objective    Vitals:    06/26/25 1400   BP: 122/64   Pulse: 74   Weight: 54.9 kg (121 lb 0.5 oz)   Height: 4' 11" (1.499 m)     (Current body mass index is 24.45 kg/m².)    MSE/PE  1. Appearance: Dress is informal but appropriate. Motor activity normal.  2. Discourse: Clear speech with normal rate and volume. Associations intact. Orderly.  3. Emotional Expression: Euthymic mood.  4. Perception and Thinking: No hallucinations. No suicidality, no homicidality, delusions, or paranoia.  5. Sensorium: Grossly intact. Able to focus for interview.  6. Memory and Fund of Knowledge: Intact for content of interview.  7. Insight and Judgment: Intact.       Measurement-Based Care (MBC):     Routine Instruments   PROMIS-ANXIETY Interpretation: 13 (4a raw score): T-SCORE 65.3; MODERATE using 55-60-70 cutoffs.   GDS4 Interpretation: 0/4; NEGATIVE for depression   WHO-5: 15 raw: 60%   Additional Instruments   MBC ANCHOR : a little better         Auto-populated chart data omitted from this note for brevity.      Billing Documentation:     Method IN PERSON visit at the clinic, " "established   E/M 23456: FOLLOW UP VISIT, Rx mgmt, "Multiple STABLE chronic illnesses"   Additional 92641, with modifer 59: administered and scored more than one psychological or neuropsychological tests (see MBC above) (16+ mins)  , without modifiers -24,-25,-53: COMPLEXITY: Visit today included increased complexity associated with the care of the episodic problem(s) (multiple psychiatric disorders - see above) addressed and managing the longitudinal care of the patient due to the serious and/or complex managed problem(s) (multiple psychiatric disorders - see above).              Will Leong,   Department of Psychiatry, [facility]      "

## 2025-06-27 DIAGNOSIS — M25.561 CHRONIC PAIN OF BOTH KNEES: Primary | ICD-10-CM

## 2025-06-27 DIAGNOSIS — G89.29 CHRONIC PAIN OF BOTH KNEES: Primary | ICD-10-CM

## 2025-06-27 DIAGNOSIS — M25.562 CHRONIC PAIN OF BOTH KNEES: Primary | ICD-10-CM

## 2025-06-30 ENCOUNTER — OFFICE VISIT (OUTPATIENT)
Dept: ORTHOPEDICS | Facility: CLINIC | Age: 80
End: 2025-06-30
Payer: MEDICARE

## 2025-06-30 ENCOUNTER — HOSPITAL ENCOUNTER (OUTPATIENT)
Dept: RADIOLOGY | Facility: HOSPITAL | Age: 80
Discharge: HOME | End: 2025-06-30
Attending: FAMILY MEDICINE
Payer: MEDICARE

## 2025-06-30 VITALS — BODY MASS INDEX: 24.4 KG/M2 | HEIGHT: 59 IN | WEIGHT: 121.06 LBS

## 2025-06-30 DIAGNOSIS — G89.29 CHRONIC PAIN OF BOTH KNEES: ICD-10-CM

## 2025-06-30 DIAGNOSIS — M70.52 PES ANSERINUS BURSITIS OF BOTH KNEES: Primary | ICD-10-CM

## 2025-06-30 DIAGNOSIS — M25.561 CHRONIC PAIN OF BOTH KNEES: ICD-10-CM

## 2025-06-30 DIAGNOSIS — M25.562 CHRONIC PAIN OF BOTH KNEES: ICD-10-CM

## 2025-06-30 DIAGNOSIS — M70.51 PES ANSERINUS BURSITIS OF BOTH KNEES: Primary | ICD-10-CM

## 2025-06-30 PROCEDURE — 1101F PT FALLS ASSESS-DOCD LE1/YR: CPT | Mod: CPTII,HCNC,S$GLB, | Performed by: FAMILY MEDICINE

## 2025-06-30 PROCEDURE — 73564 X-RAY EXAM KNEE 4 OR MORE: CPT | Mod: 26,50,HCNC, | Performed by: RADIOLOGY

## 2025-06-30 PROCEDURE — 3288F FALL RISK ASSESSMENT DOCD: CPT | Mod: CPTII,HCNC,S$GLB, | Performed by: FAMILY MEDICINE

## 2025-06-30 PROCEDURE — 1159F MED LIST DOCD IN RCRD: CPT | Mod: CPTII,HCNC,S$GLB, | Performed by: FAMILY MEDICINE

## 2025-06-30 PROCEDURE — 99214 OFFICE O/P EST MOD 30 MIN: CPT | Mod: 25,HCNC,S$GLB, | Performed by: FAMILY MEDICINE

## 2025-06-30 PROCEDURE — 99999 PR PBB SHADOW E&M-EST. PATIENT-LVL III: CPT | Mod: PBBFAC,HCNC,, | Performed by: FAMILY MEDICINE

## 2025-06-30 PROCEDURE — 73564 X-RAY EXAM KNEE 4 OR MORE: CPT | Mod: TC,50,HCNC,PO

## 2025-06-30 PROCEDURE — 20610 DRAIN/INJ JOINT/BURSA W/O US: CPT | Mod: 50,HCNC,S$GLB, | Performed by: FAMILY MEDICINE

## 2025-06-30 RX ORDER — METHYLPREDNISOLONE ACETATE 80 MG/ML
80 INJECTION, SUSPENSION INTRA-ARTICULAR; INTRALESIONAL; INTRAMUSCULAR; SOFT TISSUE
Status: DISCONTINUED | OUTPATIENT
Start: 2025-06-30 | End: 2025-06-30 | Stop reason: HOSPADM

## 2025-06-30 RX ADMIN — METHYLPREDNISOLONE ACETATE 80 MG: 80 INJECTION, SUSPENSION INTRA-ARTICULAR; INTRALESIONAL; INTRAMUSCULAR; SOFT TISSUE at 10:06

## 2025-06-30 NOTE — PROGRESS NOTES
Subjective     Patient ID: Rola Richter is a 80 y.o. female.    Chief Complaint: Knee Pain    Patient known to me from previous issues returns today with complaints of bilateral knee pain.  This has began about a month ago or so right after she was walking [place] the [place] in [facility].  We last saw her about three months ago and gave her injections into the shoulder which did significantly reduce her pain.  Knee pain is not due to her but she has not had an issue for quite some time.  Most of the pain is in the medial aspect of both knees just below the joint line.  The left is hurting her worse than the right.  She is interested in possible injections in her knees.    Knee Pain       Review of Systems   Constitutional: Negative for chills and decreased appetite.   HENT:  Negative for congestion and sore throat.    Eyes:  Negative for blurred vision.   Cardiovascular:  Negative for chest pain, dyspnea on exertion and palpitations.   Respiratory:  Negative for cough and shortness of breath.    Skin:  Negative for rash.   Neurological:  Negative for difficulty with concentration, disturbances in coordination and headaches.   Psychiatric/Behavioral:  Negative for altered mental status, depression, hallucinations, memory loss and suicidal ideas.           Objective     General    Nursing note and vitals reviewed.  Constitutional: She is oriented to person, place, and time. She appears well-developed and well-nourished.   HENT:   Nose: Nose normal.   Eyes: EOM are normal. Pupils are equal, round, and reactive to light.   Neck: Neck supple.   Cardiovascular:  Normal rate.            Pulmonary/Chest: Effort normal.   Abdominal: Soft.   Neurological: She is alert and oriented to person, place, and time. She has normal reflexes.   Psychiatric: She has a normal mood and affect. Her behavior is normal. Judgment and thought content normal.           Right Knee Exam     Inspection   Effusion: absent    Tenderness   The  patient is tender to palpation of the pes anserinus.    Crepitus   The patient has crepitus of the patella and medial joint line.    Range of Motion   Extension:  50   Flexion:  140     Tests   Meniscus   Saurabh:  Medial - negative Lateral - negative  Ligament Examination   Lachman: normal (-1 to 2mm)   PCL-Posterior Drawer: normal (0 to 2mm)     MCL - Valgus: normal (0 to 2mm)  LCL - Varus: normal  Patella   Patellar apprehension: negative  Passive Patellar Tilt: neutral  Patellar Tracking: normal    Other   Popliteal (Baker's) Cyst: absent  Sensation: normal    Left Knee Exam     Inspection   Effusion: absent    Tenderness   The patient tender to palpation of the pes anserinus.    Crepitus   The patient has crepitus of the patella and medial joint line.    Range of Motion   Extension:  50   Flexion:  140     Tests   Meniscus   Saurabh:  Medial - negative Lateral - negative  Stability   Lachman: normal (-1 to 2mm)   PCL-Posterior Drawer: normal (0 to 2mm)  MCL - Valgus: normal (0 to 2mm)  LCL - Varus: normal (0 to 2mm)  Patella   Patellar apprehension: negative  Passive Patellar Tilt: neutral  Patellar Tracking: normal    Other   Popliteal (Baker's) Cyst: absent  Sensation: normal    Muscle Strength   Right Lower Extremity   Quadriceps:  5/5   Hamstrin/5   Left Lower Extremity   Quadriceps:  5/5   Hamstrin/5     Vascular Exam     Right Pulses  Dorsalis Pedis:      2+          Left Pulses  Dorsalis Pedis:      2+          Physical Exam  Vitals and nursing note reviewed.   Constitutional:       Appearance: She is well-developed and well-nourished.   HENT:      Nose: Nose normal.   Eyes:      Extraocular Movements: EOM normal.      Pupils: Pupils are equal, round, and reactive to light.   Cardiovascular:      Rate and Rhythm: Normal rate.      Pulses:           Dorsalis pedis pulses are 2+ on the right side and 2+ on the left side.   Pulmonary:      Effort: Pulmonary effort is normal.   Abdominal:       Palpations: Abdomen is soft.   Musculoskeletal:      Cervical back: Normal range of motion and neck supple.      Right knee: No effusion.      Instability Tests: Medial Saurabh test negative and lateral Saurabh test negative.      Left knee: No effusion.      Instability Tests: Medial Saurabh test negative and lateral Saurabh test negative.   Neurological:      Mental Status: She is alert and oriented to person, place, and time.      Deep Tendon Reflexes: Reflexes are normal and symmetric.   Psychiatric:         Mood and Affect: Mood and affect normal.         Behavior: Behavior normal.         Thought Content: Thought content normal.         Judgment: Judgment normal.      X-ray images ordered obtained interpreted by me.  They show some mild degenerative changes seen in both knees in all three compartments with no significant osteophyte formations and no acute fractures present.  No evidence of soft tissue injury.       Assessment and Plan     Encounter Diagnoses   Name Primary?    Chronic pain of both knees     Pes anserinus bursitis of both knees Yes         Rola was seen today for knee pain.    Diagnoses and all orders for this visit:    Pes anserinus bursitis of both knees    Chronic pain of both knees    On physical exam most of her pain is in the bilateral pes bursa area.  Discussed this condition in offered her injections in both.  She agreed with this.  Recommend following up here as needed should condition fail to improve.    Large Joint Aspiration/Injection: bilateral anserine bursa    Date/Time: 6/30/2025 10:40 AM    Performed by: Eliseo Oliva MD  Authorized by: Eliseo Oliva MD    Consent Done?:  Yes (Verbal)  Indications:  Arthritis and pain  Site marked: the procedure site was marked    Timeout: prior to procedure the correct patient, procedure, and site was verified    Prep: patient was prepped and draped in usual sterile fashion      Local anesthesia used?: Yes    Local anesthetic:   Lidocaine 1% without epinephrine  Anesthetic total (ml):  4      Details:  Needle Size:  22 G  Ultrasonic Guidance for needle placement?: No    Approach:  Anteromedial  Location:  Knee  Laterality:  Bilateral  Site:  Bilateral anserine bursa  Medications (Right):  80 mg methylPREDNISolone acetate 80 mg/mL  Medications (Left):  80 mg methylPREDNISolone acetate 80 mg/mL  Patient tolerance:  Patient tolerated the procedure well with no immediate complications

## 2025-07-08 ENCOUNTER — OFFICE VISIT (OUTPATIENT)
Dept: PAIN MEDICINE | Facility: CLINIC | Age: 80
End: 2025-07-08
Payer: MEDICARE

## 2025-07-08 VITALS
DIASTOLIC BLOOD PRESSURE: 69 MMHG | WEIGHT: 121.06 LBS | BODY MASS INDEX: 24.4 KG/M2 | HEIGHT: 59 IN | HEART RATE: 79 BPM | SYSTOLIC BLOOD PRESSURE: 137 MMHG

## 2025-07-08 DIAGNOSIS — M50.30 DDD (DEGENERATIVE DISC DISEASE), CERVICAL: ICD-10-CM

## 2025-07-08 DIAGNOSIS — M54.81 BILATERAL OCCIPITAL NEURALGIA: ICD-10-CM

## 2025-07-08 DIAGNOSIS — M79.18 MYOFASCIAL PAIN: ICD-10-CM

## 2025-07-08 DIAGNOSIS — G89.4 CHRONIC PAIN DISORDER: ICD-10-CM

## 2025-07-08 DIAGNOSIS — M54.12 CERVICAL RADICULITIS: Primary | ICD-10-CM

## 2025-07-08 DIAGNOSIS — M47.892 OTHER SPONDYLOSIS, CERVICAL REGION: ICD-10-CM

## 2025-07-08 PROCEDURE — 3078F DIAST BP <80 MM HG: CPT | Mod: CPTII,HCNC,S$GLB, | Performed by: PHYSICIAN ASSISTANT

## 2025-07-08 PROCEDURE — 99999 PR PBB SHADOW E&M-EST. PATIENT-LVL III: CPT | Mod: PBBFAC,HCNC,, | Performed by: PHYSICIAN ASSISTANT

## 2025-07-08 PROCEDURE — 99214 OFFICE O/P EST MOD 30 MIN: CPT | Mod: HCNC,S$GLB,, | Performed by: PHYSICIAN ASSISTANT

## 2025-07-08 PROCEDURE — 3075F SYST BP GE 130 - 139MM HG: CPT | Mod: CPTII,HCNC,S$GLB, | Performed by: PHYSICIAN ASSISTANT

## 2025-07-08 PROCEDURE — 1159F MED LIST DOCD IN RCRD: CPT | Mod: CPTII,HCNC,S$GLB, | Performed by: PHYSICIAN ASSISTANT

## 2025-07-08 PROCEDURE — 1125F AMNT PAIN NOTED PAIN PRSNT: CPT | Mod: CPTII,HCNC,S$GLB, | Performed by: PHYSICIAN ASSISTANT

## 2025-07-08 RX ORDER — HYDROCODONE BITARTRATE AND ACETAMINOPHEN 5; 325 MG/1; MG/1
1 TABLET ORAL EVERY 12 HOURS PRN
Qty: 60 TABLET | Refills: 0 | Status: SHIPPED | OUTPATIENT
Start: 2025-08-06 | End: 2025-09-04

## 2025-07-08 RX ORDER — HYDROCODONE BITARTRATE AND ACETAMINOPHEN 5; 325 MG/1; MG/1
1 TABLET ORAL EVERY 12 HOURS PRN
Qty: 60 TABLET | Refills: 0 | Status: SHIPPED | OUTPATIENT
Start: 2025-07-08 | End: 2025-08-06

## 2025-07-08 NOTE — PROGRESS NOTES
Referring Physician: No ref. provider found    PCP: Liseth Carias MD      CC: neck and occipital pain    Interval history: Ms. Richter is a 80 y.o. female with neck pain and occipital neuralgia who returns to our clinic.  She continues to c/o headaches and neck pain.  Most recent occcipital nerve block only provided mild short lived beneft. Recent cervical MELANY improved neck pain that was extending into left shoulder.  She has numbness to her right hand and first through fourth digits. Cervical MELANY in February 2018  provided benefit for several weeks.    She continues to take Norco with benefit.  Flexeril 10 mg caused dry mouth however this resolved and she wishes to resume. Robaxin was helpful but caused dizziness.  Denies UE weakness. No bowel bladder changes.   She does c/o intermittent numbness and tingling in b/l hands and feet. Pain today is rated 8/10.    Prior HPI:   Patient is 70-year-old female with past history history of cervical DDD, cervical spondylosis and chronic headaches.  She recently moved here from [place], [place].  She is treated in the past by neurology.  She states having constant burning pain over the left side of her posterior scalp.  Pain radiates to her neck as well as a frontal.  She also has left-sided neck pain as well.  She denies any radicular arm pain.  No numbness or weakness.  She states having cervical epidural steroid injection at past with minimal benefit.  She also has had decided of cervical nerve blocks in the past with moderate benefit.  Most recent injection was performed 2 months ago.  She desires repeat injection.  She currently takes Norco 10 mg every 12 hours as needed with moderate benefit.  She also takes Zanaflex 4 mg every 8 hours with mild benefits.  She rates her pain 7/10 today.    Pain intervention history: s/p left occipital nerve blocks on 1/2016 with 50% relief of her headaches  S/p cervical MELANY 2/8/18 moderate relief for a couple of weeks.      ROS:  CONSTITUTIONAL: No fevers, chills, night sweats, wt. loss, appetite changes  SKIN: no rashes or itching  ENT: No headaches, head trauma, vision changes, or eye pain  LYMPH NODES: None noticed   CV: No chest pain, palpitations.   RESP: No shortness of breath, dyspnea on exertion, cough, wheezing, or hemoptysis  GI: No nausea, emesis, diarrhea, constipation, melena, hematochezia, pain.    : No dysuria, hematuria, urgency, or frequency   HEME: No easy bruising, bleeding problems  PSYCHIATRIC: No depression, anxiety, psychosis, hallucinations.  NEURO: No seizures, memory loss, dizziness or difficulty sleeping  MSK: + History of present illness      Past Medical History:   Diagnosis Date    Anemia of chronic disease 2024    Anxiety     Arthritis     Cataract     Colon polyp     DDD (degenerative disc disease), lumbar     Depression     Encounter for blood transfusion     GERD (gastroesophageal reflux disease)     Headache     Hyperlipidemia     Hypertension     pt states she does not take meds anymore she just watches her weight    Insomnia 3/12/2024    Major neurocognitive disorder 12/3/2024    Neuromuscular disorder     Occipital neuralgia 2017    Osteoporosis     Seizures      Past Surgical History:   Procedure Laterality Date    APPENDECTOMY      CATARACT EXTRACTION W/  INTRAOCULAR LENS IMPLANT Left 10/25/2023    Procedure: CEIOL OS;  Surgeon: Rustam Dyer MD;  Location: [facility] OR;  Service: Ophthalmology;  Laterality: Left;  Last Case of day, short eye, very high power IOL    CATARACT EXTRACTION W/  INTRAOCULAR LENS IMPLANT Right 1/10/2024    Procedure: CEIOL OD Preop Mannitol;  Surgeon: Rustam Dyer MD;  Location: [facility] OR;  Service: Ophthalmology;  Laterality: Right;  Will need pre-op Mannitol     SECTION      x 2    CHOLECYSTECTOMY      COLONOSCOPY  prior to     COLONOSCOPY N/A 2019    Procedure: COLONOSCOPY;  Surgeon: Greg Victor MD;  Location: [facility]  ENDO;  Service: Endoscopy;  Laterality: N/A; 2 colon polyps, hemorrhoids; repeat in 5 years for surveillance; biopsy: Tubular adenoma x2    COLONOSCOPY N/A 5/1/2024    Procedure: COLONOSCOPY;  Surgeon: Greg Victor MD;  Location: [facility];  Service: Endoscopy;  Laterality: N/A;    EPIDURAL STEROID INJECTION INTO CERVICAL SPINE N/A 9/20/2022    Procedure: Injection-steroid-epidural-cervical C7-T1;  Surgeon: Timothy Grimaldo MD;  [facility];  Service: Pain Management;  Laterality: N/A;    EPIDURAL STEROID INJECTION INTO CERVICAL SPINE N/A 3/7/2024    Procedure: Injection-steroid-epidural-cervical;  Surgeon: Timothy Grimaldo MD;  Location: [facility] OR;  Service: Anesthesiology;  Laterality: N/A;  c7-t1    ESOPHAGOGASTRODUODENOSCOPY N/A 4/30/2019    Procedure: EGD (ESOPHAGOGASTRODUODENOSCOPY);  Surgeon: Greg Victor MD;  Location: [facility];  Service: Endoscopy;  Laterality: N/A; Mild Schatzki ring. Dilated. small hiatal hernia; Four gastric polyps. Resected and retrieved, gastritis; biopsy:stomach- unremarkable, negative for h pylori, polyps-Fundic type mucosa with foveolar hyperplasia.    ESOPHAGOGASTRODUODENOSCOPY N/A 2/17/2021    Procedure: EGD (ESOPHAGOGASTRODUODENOSCOPY);  Surgeon: Greg Victor MD;  Location: [facility];  Service: Endoscopy;  Laterality: N/A;    ESOPHAGOGASTRODUODENOSCOPY N/A 2/29/2024    Procedure: EGD (ESOPHAGOGASTRODUODENOSCOPY);  Surgeon: Greg Victor MD;  Location: [facility];  Service: Endoscopy;  Laterality: N/A;    HYSTERECTOMY      Laser Periphery Iridotomy Bilateral     OD 5/26/16 and OS touch up 5/26/2016    UPPER GASTROINTESTINAL ENDOSCOPY  03/14/2017    Dr. Marcial: esophageal stenosis- dilated, gastritis, gastric polyps removed; biopsy- mild gastritis, negative for h pylori, hyperplastic polyp, esophagus unremarkable    vocal cord tumor removal       Family History   Problem Relation Name Age of Onset    Osteoarthritis Mother      Alcohol abuse Mother      Rheum  arthritis Mother      Osteoarthritis Sister      No Known Problems Sister      No Known Problems Sister      Diabetes Brother      No Known Problems Brother      No Known Problems Son      Arthritis Son      No Known Problems Son      Stroke Maternal Grandmother  99    Rheum arthritis Maternal Grandmother      Retinal detachment Neg Hx      Macular degeneration Neg Hx      Glaucoma Neg Hx      Amblyopia Neg Hx      Blindness Neg Hx      Cancer Neg Hx      Cataracts Neg Hx      Hypertension Neg Hx      Strabismus Neg Hx      Thyroid disease Neg Hx      Lupus Neg Hx      Kidney disease Neg Hx      Inflammatory bowel disease Neg Hx      Psoriasis Neg Hx      Colon cancer Neg Hx      Crohn's disease Neg Hx      Ulcerative colitis Neg Hx      Stomach cancer Neg Hx      Esophageal cancer Neg Hx       Social History     Socioeconomic History    Marital status:    Tobacco Use    Smoking status: Never    Smokeless tobacco: Never   Substance and Sexual Activity    Alcohol use: No     Alcohol/week: 0.0 standard drinks of alcohol    Drug use: Yes     Types: Hydrocodone    Sexual activity: Yes     Partners: Male     Social Drivers of Health     Financial Resource Strain: Low Risk  (6/5/2025)    Overall Financial Resource Strain (CARDIA)     Difficulty of Paying Living Expenses: Not hard at all   Food Insecurity: No Food Insecurity (6/5/2025)    Hunger Vital Sign     Worried About Running Out of Food in the Last Year: Never true     Ran Out of Food in the Last Year: Never true   Transportation Needs: No Transportation Needs (6/5/2025)    PRAPARE - Transportation     Lack of Transportation (Medical): No     Lack of Transportation (Non-Medical): No   Physical Activity: Sufficiently Active (6/5/2025)    Exercise Vital Sign     Days of Exercise per Week: 7 days     Minutes of Exercise per Session: 30 min   Recent Concern: Physical Activity - Insufficiently Active (6/5/2025)    Exercise Vital Sign     Days of Exercise per Week:  "2 days     Minutes of Exercise per Session: 20 min   Stress: No Stress Concern Present (6/5/2025)    [employer] - Occupational Stress Questionnaire     Feeling of Stress : Not at all   Housing Stability: Low Risk  (6/5/2025)    Housing Stability Vital Sign     Unable to Pay for Housing in the Last Year: No     Homeless in the Last Year: No         Medications/Allergies: See med card    Vitals:    07/08/25 1158   BP: 137/69   Pulse: 79   Weight: 54.9 kg (121 lb 0.5 oz)   Height: 4' 11" (1.499 m)   PainSc:   8   PainLoc: Neck         Physical exam:    GENERAL: A and O x3, the patient appears well groomed and is in no acute distress.  Skin: No rashes or obvious lesions  HEENT: normocephalic, atraumatic  CARDIOVASCULAR:  RRR  LUNGS: nonlabored breathing  ABDOMEN: soft, nontender   UPPER EXTREMITIES: Normal alignment, normal range of motion, no atrophy, no skin changes,  hair growth and nail growth normal and equal bilaterally. No swelling, no tenderness.  +Phalens on left side. +TTP tricep tendon  LOWER EXTREMITIES:  Normal alignment, normal range of motion, no atrophy, no skin changes,  hair growth and nail growth normal and equal bilaterally. No swelling, no tenderness.  CERVICAL SPINE:   Cervical spine: ROM is full in flexion, extension and lateral rotation.  Painful flexion > extension.  Positive facet loading bilaterally.  Spurling is positive at right side.  Myofascial exam:  Tenderness to palpation across cervical paraspinals deltoid and trapezius muscles bilaterally.      MENTAL STATUS: normal orientation, speech, language, and fund of knowledge for social situation.  Emotional state appropriate.    CRANIAL NERVES:  II:  PERRL bilaterally,   III,IV,VI: EOMI.    V:  Facial sensation equal bilaterally  VII:  Facial motor function normal.  VIII:  Hearing equal to finger rub bilaterally  IX/X: Gag normal, palate symmetric  XI:  Shoulder shrug equal, head turn equal  XII:  Tongue midline " without fasciculations      MOTOR: Tone and bulk: normal bilateral upper and lower Strength: normal   Delt Bi Tri WE WF     R 5 5 5 5 5 5   L 5 5 5 5 5 5     IP ADD ABD Quad TA Gas HAM  R 5 5 5 5 5 5 5  L 5 5 5 5 5 5 5    SENSATION: Light touch and pinprick intact bilaterally  REFLEXES: normal, symmetric, nonbrisk.  Toes down, no clonus. No hoffmans.  GAIT: normal rise, base, steps, and arm swing.        Imaging:   Cervical MRI 12/4/17    Narrative     EXAM: Cervical spine MRI without contrast.    INDICATION: Cervical radiculopathy.  Neck pain and occipital neuralgia.  The patient complains of neck and right arm pain.    TECHNIQUE: Routine multiplanar, multisequence unenhanced cervical spine MRI was performed.    COMPARISON: Plain films of the cervical spine obtained concurrently      FINDINGS:     Vertebral column: There is straightening of the cervical spine with loss of normal lordosis.  As seen on concurrent plain films, there is trace anterolisthesis of C3 on C4 with 2 mm anterolisthesis of C4 on C5.  There is marked disc space narrowing at the C5-6 level with moderate to marked disc space narrowing at the C4-5 and C6-7 levels.  There is partial non-segmentation anteriorly at the C2-3 level.  The C2 and C3 as well as the C4 and C5 facet joints appear fused and this may represent developmental non-segmentation or degenerative ankylosis.  All of the discs are desiccated.  The odontoid process is intact.    Spinal canal, cord, epidural space: The craniovertebral junction is normal.  The spinal canal is omental and normal.  There is no significant spinal stenosis.  The cord is normal in caliber.  There is very subtle flattening of the ventral cord surface at the C4-5 and C5-6 levels where there is degenerative change.  The study is mildly motion but there is no definite cord edema or myelomalacia.    Findings by level:    C2-3: There is no spinal canal or foraminal stenosis.  There is mild left facet joint  arthropathy.    C3-4: There is trace anterolisthesis.  There is left greater than right uncovertebral spurring and facet joint arthropathy.  There is a mild disc osteophyte complex, slightly eccentric to the left with subtle annular fissure.  This narrows the ventral subarachnoid space.  There is no spinal stenosis or cord compression.  There is moderate marked left foraminal stenosis.    C4-5: There is moderate disc space narrowing with 2 mm anterolisthesis of C4 on C5.  There is facet joint arthropathy or effusion left greater than right.  There is also mild bilateral but left greater than right uncovertebral spurring.  There is unroofing of a mild disc bulge which narrows the ventral subarachnoid space.  There is no spinal stenosis.  There is mild to moderate left foraminal stenosis.    C5-6:There is marked disc space narrowing.  There is bilateral uncovertebral spurring.  There is a disc osteophyte complex which narrows the subarachnoid space.  This is slightly eccentric to the right There is subtle flattening of the ventral cord surface.  Cord signal is grossly normal.  There is mild spinal stenosis with at least moderate bilateral foraminal stenosis.    C6-7: There is moderate disc space narrowing.  There is mild uncovertebral spurring.  There is a shallow disc osteophyte complex, slightly eccentric to the right.  There is narrowing of the ventral subarachnoid space.  There is no spinal stenosis.  Cord signal is normal.  There is mild bilateral foraminal stenosis.  There is a small 3.5 mm left foraminal perineural cyst.    C7- T1: There is a Schmorl's node in inferior endplate of C7, chronic.  There are tiny bilateral foraminal perineural cysts.  There is minimal bulging of the annulus and mild facet joint arthropathy without spinal canal or foraminal stenosis.    Soft tissues/other: The prevertebral soft tissues are normal.  The airway is patent.   Impression          1. There is multilevel degenerative disc  disease described in detail above.  There is no acute fracture.  There is degenerative listhesis at several levels.  There is some degree of disc osteophyte complex, uncovertebral spurring and/or facet joint arthropathy contributing to some degree of foraminal stenosis at several levels.  There is no significant spinal canal stenosis.  There is very subtle flattening of the ventral cord surface at the C4-5 and C6-7 levels The pertinent findings are summarized below.    2. At the C3-4 level, there is no spinal stenosis.  There is moderate to marked left foraminal stenosis.    3. At the C4-5 level there is no spinal stenosis.  There is mild to moderate left foraminal stenosis.    4. At the C5-6 level, there is mild spinal stenosis with at least moderate bilateral foraminal stenosis.    5. At the C6-7 level, there is no spinal stenosis.  There is mild bilateral foraminal stenosis.    6. There is no spinal canal or foraminal stenosis at the C2-3 or C7-T1 levels.     Assessment:  Mrs. Richter is a 80 y.o. female with neck and head pain    1. Cervical radiculitis    2. DDD (degenerative disc disease), cervical    3. Other spondylosis, cervical region    4. Bilateral occipital neuralgia    5. Myofascial pain          Plan:  1. I have stressed the importance of physical activity and exercise to improve overall health.  2. Monitor progress from repeat C7-T1 IL MELANY. Consider bilateral occipital blocks for head pain.  3. Norco 5mg q12hrs as needed for pain.  reviewed.  Previous UDS consistent   4. Flexeril 10 mg BID #60 call for prescription   5. Consider EMG for extremity numbness and tingling  6. F/u 3 months or sooner  All medication management was performed by Dr. Timothy Grimaldo

## 2025-07-24 ENCOUNTER — OFFICE VISIT (OUTPATIENT)
Dept: NEUROLOGY | Facility: CLINIC | Age: 80
End: 2025-07-24
Payer: MEDICARE

## 2025-07-24 VITALS
SYSTOLIC BLOOD PRESSURE: 143 MMHG | BODY MASS INDEX: 24.42 KG/M2 | WEIGHT: 120.94 LBS | DIASTOLIC BLOOD PRESSURE: 79 MMHG | HEART RATE: 83 BPM

## 2025-07-24 DIAGNOSIS — R41.3 MEMORY LOSS: Primary | ICD-10-CM

## 2025-07-24 PROCEDURE — 99483 ASSMT & CARE PLN PT COG IMP: CPT | Mod: HCNC,S$GLB,, | Performed by: NURSE PRACTITIONER

## 2025-07-24 PROCEDURE — 99999 PR PBB SHADOW E&M-EST. PATIENT-LVL IV: CPT | Mod: PBBFAC,HCNC,, | Performed by: NURSE PRACTITIONER

## 2025-07-24 PROCEDURE — 99499 UNLISTED E&M SERVICE: CPT | Mod: HCNC,S$GLB,, | Performed by: NURSE PRACTITIONER

## 2025-07-24 RX ORDER — CYCLOBENZAPRINE HCL 10 MG
10 TABLET ORAL 3 TIMES DAILY
COMMUNITY
Start: 2025-07-23

## 2025-07-24 NOTE — PROGRESS NOTES
Caregiver name: Mr Richter   Relationship to the patient: Spouse   Does the patient have a living will? No  Does the patient have a designated healthcare POA? No  Does the patient have a designated general POA? No    Have educational materials/resources been provided? No      Activities of Daily Living    Bathing: Independent  Dressing: Independent  Grooming: Independent  Mouth Care: Independent  Toileting: Independent  Transferring Bed/Chair: Independent  Walking: Independent  Climbing: Needs Help  Eating: Independent      Instrumental Activities of Daily Living    Shopping: Needs Help  Cooking: Independent  Managing Medications: Independent  Using the phone and looking up numbers: Independent  Doing Housework: Independent  Doing Laundry: Independent  Driving or using public transportation: Dependent  Managing finances: Dependent    Functional Assessment Staging  3   Decreased job functioning evident to co-workers. Difficulty in traveling to new locations. Decreased organizational capacity             7/24/2025     2:19 PM 7/8/2025    11:59 AM 6/5/2025     3:02 PM 4/14/2025    10:57 AM 1/23/2025     1:14 PM 11/7/2024     9:33 AM 10/28/2024     1:27 PM   Depression Patient Health Questionnaire   Over the last two weeks how often have you been bothered by little interest or pleasure in doing things Several days Not at all Not at all Not at all More than half the days Not at all Not at all   Over the last two weeks how often have you been bothered by feeling down, depressed or hopeless Several days Not at all Several days Not at all Several days Several days Not at all   PHQ-2 Total Score 2 0 1 0 3 1 0   Over the last two weeks how often have you been bothered by trouble falling or staying asleep, or sleeping too much     Not at all     Over the last two weeks how often have you been bothered by feeling tired or having little energy     Several days     Over the last two weeks how often have you been bothered by a  poor appetite or overeating     Several days     Over the last two weeks how often have you been bothered by feeling bad about yourself - or that you are a failure or have let yourself or your family down     Several days     Over the last two weeks how often have you been bothered by trouble concentrating on things, such as reading the newspaper or watching television     Not at all     Over the last two weeks how often have you been bothered by moving or speaking so slowly that other people could have noticed. Or the opposite - being so fidgety or restless that you have been moving around a lot more than usual.     Several days     Over the last two weeks how often have you been bothered by thoughts that you would be better off dead, or of hurting yourself     Not at all     PHQ-9 Score     7     PHQ-9 Interpretation     Mild
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
## 2025-07-24 NOTE — ASSESSMENT & PLAN NOTE
Patient is a 81 y/o female that presents for memory f/u  Onset of memory changes began ~ 6-7 years ago. She reports forgetfulness and misplacing items.   Irritability is stable. She denies sleep disturbances, recent falls, urinary incontinence. She does endorse depression that is somewhat controlled. No hallucinations.   MMSE today 21/30; prev 22/30   - MCI not warranted as per NP testing from 4/2024; emotional distress and PTSD likely exacerbating cognitive weakness. Will order repeat NP testing to assess changes overtime  ACB score:4   - score >=3 and is therefore at a higher risk of confusion, falls and death.   MR brain scan noted with mild white matter change  Serologies noted  Discussed role vs expectation of cognitive enhancing medications at length    - Aricept initiated in 2021 and christina well. Continue!   - consider adding Namenda after repeat NP testing     Continue following psych for med management   Discussed importance of brain stimulation and educated pt on side effects of opiates   Reassurance offered today

## 2025-08-07 DIAGNOSIS — Z78.9 STATIN NOT TOLERATED: ICD-10-CM

## 2025-08-07 DIAGNOSIS — I70.0 CALCIFICATION OF AORTA: ICD-10-CM

## 2025-08-07 DIAGNOSIS — E78.01 FAMILIAL HYPERCHOLESTEROLEMIA: ICD-10-CM

## 2025-08-07 RX ORDER — EVOLOCUMAB 140 MG/ML
140 INJECTION, SOLUTION SUBCUTANEOUS
Qty: 2 ML | Refills: 2 | Status: CANCELLED | OUTPATIENT
Start: 2025-08-07 | End: 2025-11-05

## 2025-08-11 DIAGNOSIS — E78.01 FAMILIAL HYPERCHOLESTEROLEMIA: ICD-10-CM

## 2025-08-11 DIAGNOSIS — Z78.9 STATIN NOT TOLERATED: ICD-10-CM

## 2025-08-11 DIAGNOSIS — I70.0 CALCIFICATION OF AORTA: ICD-10-CM

## 2025-08-11 RX ORDER — EVOLOCUMAB 140 MG/ML
140 INJECTION, SOLUTION SUBCUTANEOUS
Qty: 2 ML | Refills: 11 | Status: ACTIVE | OUTPATIENT
Start: 2025-08-11 | End: 2026-07-13

## 2025-08-26 ENCOUNTER — OFFICE VISIT (OUTPATIENT)
Dept: PSYCHIATRY | Facility: CLINIC | Age: 80
End: 2025-08-26
Payer: MEDICARE

## 2025-08-26 VITALS
WEIGHT: 121.56 LBS | DIASTOLIC BLOOD PRESSURE: 64 MMHG | SYSTOLIC BLOOD PRESSURE: 132 MMHG | HEART RATE: 100 BPM | BODY MASS INDEX: 24.56 KG/M2

## 2025-08-26 DIAGNOSIS — F19.20 DEPENDENCY ON PAIN MEDICATION: ICD-10-CM

## 2025-08-26 DIAGNOSIS — Z86.59 HISTORY OF POSTTRAUMATIC STRESS DISORDER (PTSD): ICD-10-CM

## 2025-08-26 DIAGNOSIS — G47.00 INSOMNIA, UNSPECIFIED TYPE: ICD-10-CM

## 2025-08-26 DIAGNOSIS — F33.2 SEVERE EPISODE OF RECURRENT MAJOR DEPRESSIVE DISORDER, WITHOUT PSYCHOTIC FEATURES: Primary | ICD-10-CM

## 2025-08-26 DIAGNOSIS — F41.1 GENERALIZED ANXIETY DISORDER: ICD-10-CM

## 2025-08-26 DIAGNOSIS — F03.90 MAJOR NEUROCOGNITIVE DISORDER: ICD-10-CM

## 2025-08-26 PROCEDURE — 1159F MED LIST DOCD IN RCRD: CPT | Mod: CPTII,HCNC,S$GLB, | Performed by: STUDENT IN AN ORGANIZED HEALTH CARE EDUCATION/TRAINING PROGRAM

## 2025-08-26 PROCEDURE — 96136 PSYCL/NRPSYC TST PHY/QHP 1ST: CPT | Mod: 59,HCNC,S$GLB, | Performed by: STUDENT IN AN ORGANIZED HEALTH CARE EDUCATION/TRAINING PROGRAM

## 2025-08-26 PROCEDURE — 3075F SYST BP GE 130 - 139MM HG: CPT | Mod: CPTII,HCNC,S$GLB, | Performed by: STUDENT IN AN ORGANIZED HEALTH CARE EDUCATION/TRAINING PROGRAM

## 2025-08-26 PROCEDURE — 1101F PT FALLS ASSESS-DOCD LE1/YR: CPT | Mod: CPTII,HCNC,S$GLB, | Performed by: STUDENT IN AN ORGANIZED HEALTH CARE EDUCATION/TRAINING PROGRAM

## 2025-08-26 PROCEDURE — G2211 COMPLEX E/M VISIT ADD ON: HCPCS | Mod: HCNC,S$GLB,, | Performed by: STUDENT IN AN ORGANIZED HEALTH CARE EDUCATION/TRAINING PROGRAM

## 2025-08-26 PROCEDURE — 99214 OFFICE O/P EST MOD 30 MIN: CPT | Mod: HCNC,S$GLB,, | Performed by: STUDENT IN AN ORGANIZED HEALTH CARE EDUCATION/TRAINING PROGRAM

## 2025-08-26 PROCEDURE — 3078F DIAST BP <80 MM HG: CPT | Mod: CPTII,HCNC,S$GLB, | Performed by: STUDENT IN AN ORGANIZED HEALTH CARE EDUCATION/TRAINING PROGRAM

## 2025-08-26 PROCEDURE — 1160F RVW MEDS BY RX/DR IN RCRD: CPT | Mod: CPTII,HCNC,S$GLB, | Performed by: STUDENT IN AN ORGANIZED HEALTH CARE EDUCATION/TRAINING PROGRAM

## 2025-08-26 PROCEDURE — 3288F FALL RISK ASSESSMENT DOCD: CPT | Mod: CPTII,HCNC,S$GLB, | Performed by: STUDENT IN AN ORGANIZED HEALTH CARE EDUCATION/TRAINING PROGRAM

## 2025-08-26 PROCEDURE — 99999 PR PBB SHADOW E&M-EST. PATIENT-LVL III: CPT | Mod: PBBFAC,HCNC,, | Performed by: STUDENT IN AN ORGANIZED HEALTH CARE EDUCATION/TRAINING PROGRAM

## 2025-08-26 PROCEDURE — 1125F AMNT PAIN NOTED PAIN PRSNT: CPT | Mod: CPTII,HCNC,S$GLB, | Performed by: STUDENT IN AN ORGANIZED HEALTH CARE EDUCATION/TRAINING PROGRAM

## 2025-08-26 RX ORDER — SERTRALINE HYDROCHLORIDE 100 MG/1
100 TABLET, FILM COATED ORAL DAILY
Qty: 30 TABLET | Refills: 1 | Status: SHIPPED | OUTPATIENT
Start: 2025-08-26 | End: 2025-10-25

## 2025-08-26 RX ORDER — MIRTAZAPINE 15 MG/1
15 TABLET, FILM COATED ORAL NIGHTLY
Qty: 30 TABLET | Refills: 1 | Status: SHIPPED | OUTPATIENT
Start: 2025-08-26

## 2025-08-27 DIAGNOSIS — I10 HYPERTENSION, UNSPECIFIED TYPE: ICD-10-CM

## 2025-08-27 RX ORDER — IRBESARTAN 300 MG/1
300 TABLET ORAL NIGHTLY
Qty: 90 TABLET | Refills: 2 | Status: SHIPPED | OUTPATIENT
Start: 2025-08-27

## (undated) DEVICE — SYR 10CC LUER LOCK

## (undated) DEVICE — SYS LABEL CORRECT MED

## (undated) DEVICE — CHLORAPREP 10.5 ML APPLICATOR

## (undated) DEVICE — APPLICATOR CHLORAPREP CLR 10.5

## (undated) DEVICE — PACK CUSTOM EYE KIT

## (undated) DEVICE — NDL SAFETY 25G X 1.5 ECLIPSE

## (undated) DEVICE — BLADE SURG BVL ANG COAX 2.4MM

## (undated) DEVICE — SYR GLASS 5CC LUER LOK

## (undated) DEVICE — SYR DISP LL 5CC

## (undated) DEVICE — GLOVE SENSICARE PI GRN 7.5

## (undated) DEVICE — TUBING MINIBORE EXTENSION

## (undated) DEVICE — KNIFE ANGLE 1MM

## (undated) DEVICE — SOL BETADINE 5%

## (undated) DEVICE — EXPANDER I-RING PUPIL

## (undated) DEVICE — NDL HYPODERMIC BLUNT 18G 1.5IN

## (undated) DEVICE — CYSTOTOME IRRIG 24G 13MM

## (undated) DEVICE — TIP I & A

## (undated) DEVICE — NDL TUOHY EPIDURAL 20G X 3.5

## (undated) DEVICE — GLOVE SURG ULTRA TOUCH 7.5

## (undated) DEVICE — DRAPE OPTHALMIC W/POUCH

## (undated) DEVICE — SHIELD EYE PLASTIC 3100G

## (undated) DEVICE — GLOVE SENSICARE PI ALOE 7.5